# Patient Record
Sex: MALE | Race: WHITE | NOT HISPANIC OR LATINO | Employment: FULL TIME | URBAN - METROPOLITAN AREA
[De-identification: names, ages, dates, MRNs, and addresses within clinical notes are randomized per-mention and may not be internally consistent; named-entity substitution may affect disease eponyms.]

---

## 2018-01-04 ENCOUNTER — OFFICE VISIT (OUTPATIENT)
Dept: FAMILY MEDICINE | Facility: CLINIC | Age: 48
End: 2018-01-04
Payer: COMMERCIAL

## 2018-01-04 ENCOUNTER — LAB VISIT (OUTPATIENT)
Dept: LAB | Facility: HOSPITAL | Age: 48
End: 2018-01-04
Attending: FAMILY MEDICINE
Payer: COMMERCIAL

## 2018-01-04 VITALS
TEMPERATURE: 99 F | SYSTOLIC BLOOD PRESSURE: 134 MMHG | BODY MASS INDEX: 42.32 KG/M2 | WEIGHT: 285.69 LBS | HEART RATE: 109 BPM | HEIGHT: 69 IN | DIASTOLIC BLOOD PRESSURE: 86 MMHG | OXYGEN SATURATION: 97 %

## 2018-01-04 DIAGNOSIS — Z00.00 VISIT FOR WELL MAN HEALTH CHECK: Primary | ICD-10-CM

## 2018-01-04 DIAGNOSIS — Z23 NEED FOR INFLUENZA VACCINATION: ICD-10-CM

## 2018-01-04 DIAGNOSIS — R22.1 LOCALIZED SWELLING, MASS AND LUMP, NECK: ICD-10-CM

## 2018-01-04 DIAGNOSIS — Z12.11 COLON CANCER SCREENING: ICD-10-CM

## 2018-01-04 DIAGNOSIS — Z00.00 VISIT FOR WELL MAN HEALTH CHECK: ICD-10-CM

## 2018-01-04 DIAGNOSIS — Z23 NEED FOR DIPHTHERIA-TETANUS-PERTUSSIS (TDAP) VACCINE: ICD-10-CM

## 2018-01-04 LAB
25(OH)D3+25(OH)D2 SERPL-MCNC: 22 NG/ML
ALBUMIN SERPL BCP-MCNC: 3.5 G/DL
ALP SERPL-CCNC: 91 U/L
ALT SERPL W/O P-5'-P-CCNC: 18 U/L
ANION GAP SERPL CALC-SCNC: 8 MMOL/L
AST SERPL-CCNC: 23 U/L
BASOPHILS # BLD AUTO: 0.05 K/UL
BASOPHILS NFR BLD: 0.5 %
BILIRUB SERPL-MCNC: 0.3 MG/DL
BUN SERPL-MCNC: 16 MG/DL
CALCIUM SERPL-MCNC: 9.5 MG/DL
CHLORIDE SERPL-SCNC: 103 MMOL/L
CHOLEST SERPL-MCNC: 195 MG/DL
CHOLEST/HDLC SERPL: 5 {RATIO}
CO2 SERPL-SCNC: 28 MMOL/L
CREAT SERPL-MCNC: 0.9 MG/DL
DIFFERENTIAL METHOD: ABNORMAL
EOSINOPHIL # BLD AUTO: 0.3 K/UL
EOSINOPHIL NFR BLD: 2.8 %
ERYTHROCYTE [DISTWIDTH] IN BLOOD BY AUTOMATED COUNT: 12.7 %
EST. GFR  (AFRICAN AMERICAN): >60 ML/MIN/1.73 M^2
EST. GFR  (NON AFRICAN AMERICAN): >60 ML/MIN/1.73 M^2
ESTIMATED AVG GLUCOSE: 123 MG/DL
GLUCOSE SERPL-MCNC: 98 MG/DL
HBA1C MFR BLD HPLC: 5.9 %
HCT VFR BLD AUTO: 42.1 %
HDLC SERPL-MCNC: 39 MG/DL
HDLC SERPL: 20 %
HGB BLD-MCNC: 14.2 G/DL
IMM GRANULOCYTES # BLD AUTO: 0.05 K/UL
IMM GRANULOCYTES NFR BLD AUTO: 0.5 %
LDLC SERPL CALC-MCNC: 104.8 MG/DL
LYMPHOCYTES # BLD AUTO: 2 K/UL
LYMPHOCYTES NFR BLD: 19.3 %
MCH RBC QN AUTO: 31.3 PG
MCHC RBC AUTO-ENTMCNC: 33.7 G/DL
MCV RBC AUTO: 93 FL
MONOCYTES # BLD AUTO: 0.9 K/UL
MONOCYTES NFR BLD: 8.5 %
NEUTROPHILS # BLD AUTO: 7.1 K/UL
NEUTROPHILS NFR BLD: 68.4 %
NONHDLC SERPL-MCNC: 156 MG/DL
NRBC BLD-RTO: 0 /100 WBC
PLATELET # BLD AUTO: 259 K/UL
PMV BLD AUTO: 9.5 FL
POTASSIUM SERPL-SCNC: 3.9 MMOL/L
PROT SERPL-MCNC: 7.6 G/DL
RBC # BLD AUTO: 4.53 M/UL
SODIUM SERPL-SCNC: 139 MMOL/L
T3FREE SERPL-MCNC: 3.2 PG/ML
T4 FREE SERPL-MCNC: 1.07 NG/DL
TRIGL SERPL-MCNC: 256 MG/DL
TSH SERPL DL<=0.005 MIU/L-ACNC: 1.39 UIU/ML
WBC # BLD AUTO: 10.34 K/UL

## 2018-01-04 PROCEDURE — 84439 ASSAY OF FREE THYROXINE: CPT

## 2018-01-04 PROCEDURE — 99999 PR PBB SHADOW E&M-NEW PATIENT-LVL IV: CPT | Mod: PBBFAC,,, | Performed by: FAMILY MEDICINE

## 2018-01-04 PROCEDURE — 82306 VITAMIN D 25 HYDROXY: CPT

## 2018-01-04 PROCEDURE — 85025 COMPLETE CBC W/AUTO DIFF WBC: CPT

## 2018-01-04 PROCEDURE — 80053 COMPREHEN METABOLIC PANEL: CPT

## 2018-01-04 PROCEDURE — 84443 ASSAY THYROID STIM HORMONE: CPT

## 2018-01-04 PROCEDURE — 99386 PREV VISIT NEW AGE 40-64: CPT | Mod: S$GLB,,, | Performed by: FAMILY MEDICINE

## 2018-01-04 PROCEDURE — 80061 LIPID PANEL: CPT

## 2018-01-04 PROCEDURE — 83036 HEMOGLOBIN GLYCOSYLATED A1C: CPT

## 2018-01-04 PROCEDURE — 84481 FREE ASSAY (FT-3): CPT

## 2018-01-04 PROCEDURE — 36415 COLL VENOUS BLD VENIPUNCTURE: CPT | Mod: PO

## 2018-01-04 NOTE — PROGRESS NOTES
Patient, Burton Whiting (MRN #[id number]), presented with a recorded BMI of 42.19 kg/m^2 consistent with the definition of morbid obesity (ICD-10 E66.01). The patient's morbid obesity was monitored, evaluated, addressed and/or treated. This addendum to the medical record is made on 01/04/2018.

## 2018-01-04 NOTE — PROGRESS NOTES
Subjective:       Patient ID: Burton Whiting is a 48 y.o. male.    Chief Complaint: Establish Care (Growth on neck)    48 year old male presents today to establish care and neck swelling.     Rare exercise. Eats a lot of fast/processed food.     Patient complains of swelling in the left neck.  The edema has been severe. Onset of symptoms was 4 months ago, and patient reports symptoms have gradually worsened since that time. The swelling is present all day. The patient states the problem is new. T The swelling has been relieved by nothing. Associated factors include: shortness of breath. Cardiac risk factors include male gender, obesity (BMI >= 30 kg/m2) and smoking/ tobacco exposure. US done by previous physician done about 6 weeks ago. Records are pending.    Flu: declined  Tdap: declined for today  Labs: ordered   c-scope: ordered    PMHx: reviewed in EMR and updated  Meds: reviewed in EMR and updated  Shx: reviewed in EMR and updated  FMHx: no family history of colon cancer, breast cancer, ovarian cancer  Social: he is a  and . He lives alone. No pets. He has no family in the immediate area.              Review of Systems   Constitutional: Positive for fatigue. Negative for chills, diaphoresis, fever and unexpected weight change.   HENT: Positive for facial swelling, trouble swallowing and voice change.         Neck swelling   Eyes: Negative for photophobia and visual disturbance.   Respiratory: Negative for cough, choking and shortness of breath.    Cardiovascular: Negative for chest pain.   Gastrointestinal: Negative for blood in stool, constipation, diarrhea, nausea and vomiting.   Genitourinary: Negative for difficulty urinating and dysuria.   Musculoskeletal: Positive for neck pain. Negative for back pain, joint swelling and neck stiffness.   Skin: Negative for rash.   Neurological: Negative for dizziness, light-headedness, numbness and headaches.          Objective:     Vitals:    01/04/18  1507   BP: 134/86   Pulse: 109   Temp: 98.9 °F (37.2 °C)        Physical Exam   Constitutional: He appears well-developed and well-nourished.   HENT:   Head: Normocephalic and atraumatic.       Right Ear: External ear normal.   Left Ear: External ear normal.   PLEASE SEE PICTURES UNDER MEDIA    Eyes: Conjunctivae are normal. Pupils are equal, round, and reactive to light.   Cardiovascular:   Tachycardia noted  Normal rhythm   Pulmonary/Chest: Effort normal and breath sounds normal. No respiratory distress.   Abdominal: Soft.   obese   Musculoskeletal: He exhibits no edema.   Lymphadenopathy:        Head (right side): No submental and no submandibular adenopathy present.     He has no axillary adenopathy.        Right: No inguinal, no supraclavicular and no epitrochlear adenopathy present.        Left: No inguinal and no supraclavicular adenopathy present.   See exam under HEAD for further details along with Photograph listed in MEDIA   Neurological: No cranial nerve deficit or sensory deficit. He exhibits normal muscle tone.   Psychiatric: He has a normal mood and affect. His behavior is normal. Judgment and thought content normal.   Nursing note and vitals reviewed.      Assessment:       1. Visit for [facility] health check    2. Localized swelling, mass and lump, neck    3. Colon cancer screening    4. Need for influenza vaccination    5. Need for diphtheria-tetanus-pertussis (Tdap) vaccine        Plan:         Visit for well [facility] health check  ?Avoid tobacco  ?Be physically active  ?Maintain a healthy weight  ?Eat a diet rich in fruits, vegetables, and whole grains, and low in saturated/trans fat  ?Limit alcohol consumption  ?Protect against sexually transmitted infections  ?Avoid excess sun  -     CBC auto differential; Future; Expected date: 01/04/2018  -     Comprehensive metabolic panel; Future; Expected date: 01/04/2018  -     Hemoglobin A1c; Future; Expected date: 01/04/2018  -     TSH; Future; Expected date:  01/04/2018  -     Vitamin D; Future; Expected date: 01/04/2018  -     T4, free; Future; Expected date: 01/04/2018    Localized swelling, mass and lump, neck  This has been going on for about 4 months. US by previous PCP was apparently negative, records pending. CT and ENT referral ordered  -     Ambulatory referral to ENT  -     CT Soft Tissue Neck W WO Contrast; Future; Expected date: 01/04/2018    Colon cancer screening  -     Case request GI: COLONOSCOPY    Need for influenza vaccination  Declined today    Need for diphtheria-tetanus-pertussis (Tdap) vaccine  Declined today    BMI 42  Diet and exercise changes discussed briefly  Will readdress after workup for neck swelling is completed    Warning signs discussed, patient to call with any further issues or worsening of symptoms.

## 2018-01-04 NOTE — PATIENT INSTRUCTIONS
Understanding Fat and Cholesterol  Too much cholesterol in your blood can lead to many problems such as blocked arteries. This can lead to heart attack and stroke. One of the best ways to manage heart and blood vessel disease is to lower your blood cholesterol. Planning meals that are low in saturated fat and cholesterol helps reduce the level of cholesterol in your blood. Below are eating tips to help lower your blood cholesterol levels.  Eat less fat  A healthy goal is to have less than 25% to 35% of your daily calories come from fat. Instead of fats, eat more fruits, whole-grains, and vegetables. This also helps control your weight, and can even reduce your risk for some cancers. There are different kinds of fats in foods. Fats can be saturated, unsaturated, or trans fats. The best fats to choose are unsaturated fats. But fats are high in calories, so eat even unsaturated fats sparingly.  Limit foods high in saturated fats  Saturated fats come from animals and certain plants (such as coconut and palm). Eating too much saturated fat can raise your blood cholesterol levels and make your artery problems worse. Your goal is to eat less saturated fat. Below are some examples of foods that contain lots of saturated fat:  · Fatty cuts of meat (lamb, ham, beef)  · Many pastries, cakes, cookies, and candies  · Cream, ice cream, sour cream, cheese, and butter, and foods made with them  · Sauces made with butter or cream  · Salad dressings with saturated fats  · Foods that contain palm or coconut oil  Choose unsaturated fats  Unsaturated fats are usually liquid at room temperature. They are better choices for your heart than saturated fat. There are two types of unsaturated fats: polyunsaturated fat and monounsaturated fat. Aim to replace saturated fats with polyunsaturated or monounsaturated fats.  · Polyunsaturated fats are found in corn oil, safflower oil, sunflower oil, and other vegetable oils.  · Monounsaturated  fats are found in olive oil, canola oil, and peanut oil. Some margarines and spreads are now made with these oils, too. Avocados are also high in monounsaturated fat.  Of all fats, monounsaturated fats are the least harmful to your heart.  Avoid trans fats  Like saturated fats, trans fats have been linked to heart disease. Even a small amount can harm your health. Trans fats are found in liquid oils that have been changed to be solid at room temperature. Margarine, which is often made from vegetable oil, is one example. Vegetable shortening is another. Trans fats are often found in packaged goods. Check ingredients for the words hydrogenated or partially hydrogenated. They mean the foods contain trans fat.  What about triglycerides?  Triglycerides are a type of fat in your blood. Like cholesterol, high levels of triglycerides can lead to blocked arteries. High triglyceride levels can be reduced 20% to 50%  by limiting added sugars in your diet, susbstituting healthier fats for saturated and trans fats, getting more physical activity, and losing weight if your are overweight. You may also be advised to avoid or limi alcohol.    Reading food labels  Luckily, most foods now have labels giving you the facts about what youre eating. Reading food labels helps you make healthy choices. Look for the words highlighted below.  · Serving Size. This is the amount of food in 1 serving. If you eat larger portions, be sure to count more of everything: fat, calories, and cholesterol.  · Total Fat. Tells you how many grams (g) of fat are in 1 serving.  · Calories from Fat. This tells you the total number of calories from fat in 1 serving (there are 9 calories per gram of fat). Look for foods with the fewest calories from fat.  · Saturated Fat. Tells you how many grams (g) of saturated fat are in 1 serving.  · Trans Fat. Tells how many grams (g) of trans fat are in 1 serving.  · Cholesterol. Tells you how many milligrams (mg) of  cholesterol are in 1 serving.  Date Last Reviewed: 5/11/2015  © 1868-9356 [employer]. [address] [place] [address], [place] [address]. All rights reserved. This information is not intended as a substitute for professional medical care. Always follow your healthcare professional's instructions.        Low-Salt Diet  This diet removes foods that are high in salt. It also limits the amount of salt you use when cooking. It is most often used for people with high blood pressure, edema (fluid retention), and kidney, liver, or heart disease.  Table salt contains the mineral sodium. Your body needs sodium to work normally. But too much sodium can make your health problems worse. Your healthcare provider is recommending a low-salt (also called low-sodium) diet for you. Your total daily allowance of salt is 1,500 to 2,300 milligrams (mg). It is less than 1 teaspoon of table salt. This means you can have only about 500 to 700 mg of sodium at each meal. People with certain health problems should limit salt intake to the lower end of the recommended range.    When you cook, dont add much salt. If you can cook without using salt, even better. Dont add salt to your food at the table.  When shopping, read food labels. Salt is often called sodium on the label. Choose foods that are salt-free, low salt, or very low salt. Note that foods with reduced salt may not lower your salt intake enough.    Beans, potatoes, and pasta  Ok: Dry beans, split peas, lentils, potatoes, rice, macaroni, pasta, spaghetti without added salt  Avoid: Potato chips, tortilla chips, and similar products  Breads and cereals  Ok: Low-sodium breads, rolls, cereals, and cakes; low-salt crackers, matzo crackers  Avoid: Salted crackers, pretzels, popcorn, Hungarian toast, pancakes, muffins  Dairy  Ok: Milk, chocolate milk, hot chocolate mix, low-salt cheeses, and yogurt  Avoid: Processed cheese and cheese spreads; Roquefort, Camembert, and cottage cheese;  buttermilk, instant breakfast drink  Desserts  Ok: Ice cream, frozen yogurt, juice bars, gelatin, cookies and pies, sugar, honey, jelly, hard candy  Avoid: Most pies, cakes and cookies prepared or processed with salt; instant pudding  Drinks  Ok: Tea, coffee, fizzy (carbonated) drinks, juices  Avoid: Flavored coffees, electrolyte replacement drinks, sports drinks  Meats  Ok: All fresh meat, fish, poultry, low-salt tuna, eggs, egg substitute  Avoid: Smoked, pickled, brine-cured, or salted meats and fish. This includes ferrer, chipped beef, corned beef, hot dogs, deli meats, ham, kosher meats, salt pork, sausage, canned tuna, salted codfish, smoked salmon, herring, sardines, or anchovies.  Seasonings and spices  Ok: Most seasonings are okay. Good substitutes for salt include: fresh herb blends, hot sauce, lemon, garlic, arteaga, vinegar, dry mustard, parsley, cilantro, horseradish, tomato paste, regular margarine, mayonnaise, unsalted butter, cream cheese, vegetable oil, cream, low-salt salad dressing and gravy.  Avoid: Regular ketchup, relishes, pickles, soy sauce, teriyaki sauce, Worcestershire sauce, BBQ sauce, tartar sauce, meat tenderizer, chili sauce, regular gravy, regular salad dressing, salted butter  Soups  Ok: Low-salt soups and broths made with allowed foods  Avoid: Bouillon cubes, soups with smoked or salted meats, regular soup and broth  Vegetables  Ok: Most vegetables are okay; also low-salt tomato and vegetable juices  Avoid: Sauerkraut and other brine-soaked vegetables; pickles and other pickled vegetables; tomato juice, olives  Date Last Reviewed: 8/1/2016  © 5732-3934 [employer]. [address] [place] [address]. All rights reserved. This information is not intended as a substitute for professional medical care. Always follow your healthcare professional's instructions.        4 Steps for Eating Healthier  Changing the way you eat can improve your health. It can lower your  cholesterol and blood pressure, and help you stay at a healthy weight. Your diet doesnt have to be bland and boring to be healthy. Just watch your calories and follow these steps:    1. Eat fewer unhealthy fats  · Choose more fish and lean meats instead of fatty cuts of meat.  · Skip butter and lard, and use less margarine.  · Pass on foods that have palm, coconut, or hydrogenated oils.  · Eat fewer high-fat dairy foods like cheese, ice cream, and whole milk.  · Get a heart-healthy cookbook and try some low-fat recipes.  2. Go light on salt  · Keep the saltshaker off the table.  · Limit high-salt ingredients, such as soy sauce, bouillon, and garlic salt.  · Instead of adding salt when cooking, season your food with herbs and flavorings. Try lemon, garlic, and onion.  · Limit convenience foods, such as boxed or canned foods and restaurant food.  · Read food labels and choose lower-sodium options.  3. Limit sugar  · Pause before you add sugars to pancakes, cereal, coffee, or tea. This includes white and brown table sugar, syrup, honey, and molasses. Cut your usual amount by half.  · Use non-sugar sweeteners. Stevia, aspartame, and sucralose can satisfy a sweet tooth without adding calories.  · Swap out sugar-filled soda and other drinks. Buy sugar-free or low-calorie beverages. Remember water is always the best choice.  · Read labels and choose foods with less added sugar. Keep in mind that dairy foods and foods with fruit will have some natural sugar.  · Cut the sugar in recipes by 1/3 to 1/2. Boost the flavor with extracts like almond, vanilla, or orange. Or add spices such as cinnamon or nutmeg.  4. Eat more fiber  · Eat fresh fruits and vegetables every day.  · Boost your diet with whole grains. Go for oats, whole-grain rice, and bran.  · Add beans and lentils to your meals.  · Drink more water to match your fiber increase. This is to help prevent constipation.  Date Last Reviewed: 5/11/2015  © 0874-4774 [employer]. [address] [place] [address], [place] [address]. All rights reserved. This information is not intended as a substitute for professional medical care. Always follow your healthcare professional's instructions.

## 2018-01-05 ENCOUNTER — TELEPHONE (OUTPATIENT)
Dept: FAMILY MEDICINE | Facility: CLINIC | Age: 48
End: 2018-01-05

## 2018-01-05 DIAGNOSIS — R79.89 LOW VITAMIN D LEVEL: Primary | ICD-10-CM

## 2018-01-05 DIAGNOSIS — R73.01 ELEVATED FASTING GLUCOSE: ICD-10-CM

## 2018-01-05 DIAGNOSIS — R73.03 PREDIABETES: ICD-10-CM

## 2018-01-05 NOTE — TELEPHONE ENCOUNTER
Called patient, left message discussing all of his lab results and his abnormalities (low vitamin D, prediabetes). Recommend that patient get neck mass worked up prior to discussing the small issues with his blood work. Reiterated what symptoms should take patient to ED with his neck mass.

## 2018-01-08 ENCOUNTER — TELEPHONE (OUTPATIENT)
Dept: FAMILY MEDICINE | Facility: CLINIC | Age: 48
End: 2018-01-08

## 2018-01-08 ENCOUNTER — OFFICE VISIT (OUTPATIENT)
Dept: OTOLARYNGOLOGY | Facility: CLINIC | Age: 48
End: 2018-01-08
Payer: COMMERCIAL

## 2018-01-08 ENCOUNTER — HOSPITAL ENCOUNTER (OUTPATIENT)
Dept: RADIOLOGY | Facility: HOSPITAL | Age: 48
Discharge: HOME | End: 2018-01-08
Attending: FAMILY MEDICINE
Payer: COMMERCIAL

## 2018-01-08 VITALS
SYSTOLIC BLOOD PRESSURE: 168 MMHG | DIASTOLIC BLOOD PRESSURE: 96 MMHG | HEART RATE: 95 BPM | BODY MASS INDEX: 42.19 KG/M2 | WEIGHT: 285.69 LBS

## 2018-01-08 DIAGNOSIS — R22.1 NECK MASS: Primary | ICD-10-CM

## 2018-01-08 DIAGNOSIS — J35.8 TONSILLAR MASS: ICD-10-CM

## 2018-01-08 DIAGNOSIS — R22.1 LOCALIZED SWELLING, MASS AND LUMP, NECK: ICD-10-CM

## 2018-01-08 PROCEDURE — 88173 CYTOPATH EVAL FNA REPORT: CPT | Mod: 26,,, | Performed by: PATHOLOGY

## 2018-01-08 PROCEDURE — 88305 TISSUE EXAM BY PATHOLOGIST: CPT | Mod: 26,,, | Performed by: PATHOLOGY

## 2018-01-08 PROCEDURE — 10021 FNA BX W/O IMG GDN 1ST LES: CPT | Mod: ,,, | Performed by: PATHOLOGY

## 2018-01-08 PROCEDURE — 88305 TISSUE EXAM BY PATHOLOGIST: CPT | Performed by: PATHOLOGY

## 2018-01-08 PROCEDURE — 99204 OFFICE O/P NEW MOD 45 MIN: CPT | Mod: 25,S$GLB,, | Performed by: OTOLARYNGOLOGY

## 2018-01-08 PROCEDURE — 88342 IMHCHEM/IMCYTCHM 1ST ANTB: CPT | Mod: 26,,, | Performed by: PATHOLOGY

## 2018-01-08 PROCEDURE — 31575 DIAGNOSTIC LARYNGOSCOPY: CPT | Mod: S$GLB,,, | Performed by: OTOLARYNGOLOGY

## 2018-01-08 PROCEDURE — 88342 IMHCHEM/IMCYTCHM 1ST ANTB: CPT | Performed by: PATHOLOGY

## 2018-01-08 PROCEDURE — 70492 CT SFT TSUE NCK W/O & W/DYE: CPT | Mod: TC,PO

## 2018-01-08 PROCEDURE — 25500020 PHARM REV CODE 255: Mod: PO | Performed by: FAMILY MEDICINE

## 2018-01-08 PROCEDURE — 99999 PR PBB SHADOW E&M-EST. PATIENT-LVL III: CPT | Mod: PBBFAC,,, | Performed by: OTOLARYNGOLOGY

## 2018-01-08 RX ADMIN — IOHEXOL 75 ML: 350 INJECTION, SOLUTION INTRAVENOUS at 09:01

## 2018-01-08 NOTE — PROCEDURES
The patient was examined and there was a palpable mass, lt neck     The patient was explained the nature of the procedure and risks, and a consent form was signed. At timeout was performed at 4pm    The skin was prepared with alcohol, and fine needle aspirate was performed. 2 passes were performed. Material was obtained, and results will follow in a separate report. The patient tolerated the procedure well.  Additional comments: none  Complications: none

## 2018-01-08 NOTE — TELEPHONE ENCOUNTER
----- Message from Millicent Manzanares MA sent at 1/8/2018  3:42 PM CST -----  Contact: [phone]/self      ----- Message -----  From: Junie Walters  Sent: 1/8/2018  12:57 PM  To: [facility] White Staff    Patient called in returning your call. Please advise.

## 2018-01-08 NOTE — TELEPHONE ENCOUNTER
Called patient, no response. Left message urging patient to make it to the ENT appointment this afternoon and to call me back to discuss CT results if he time prior to then. Emphasized importance of follow up with ENT without disclosing results over the phone.

## 2018-01-08 NOTE — LETTER
January 8, 2018      Christopher Turpin, DO  [address] [facility] [facility]  [place] [address]           [facility]  [address]  [facility] [address]  Phone: [phone]  Fax: [phone]          Patient: Burton Whiting   MR Number: [id number]   YOB: 1970   Date of Visit: 1/8/2018       Dear Dr. Christopher Turpin:    Thank you for referring Burton Whiting to me for evaluation. Attached you will find relevant portions of my assessment and plan of care.    If you have questions, please do not hesitate to call me. I look forward to following Burton Whiting along with you.    Sincerely,    Xavier Olguin MD    Enclosure  CC:  No Recipients    If you would like to receive this communication electronically, please contact [email or web address] or [phone] to request more information on [employer] access.    For providers and/or their staff who would like to refer a patient to [facility], please contact us through our one-stop-shop provider referral line, [facility], at [phone].    If you feel you have received this communication in error or would no longer like to receive these types of communications, please e-mail [email or web address]

## 2018-01-08 NOTE — TELEPHONE ENCOUNTER
Attempted to call patient back, he is likely at the appointment with his ENT doctor, who will give him his diagnosis at this point. If he calls back, I can discuss the results with him also

## 2018-01-08 NOTE — PROGRESS NOTES
Chief Complaint   Patient presents with    Mass     left side of neck    Neck Swelling       HPI   48 y.o. male presents from Dr. Turpin with a 4 month history of a R neck mass.  He reports that he first noted a L neck mass 4 months ago.  He was uninsured at the time and, thus, did not seek care.   Since that time, this mass has continued to enlarge.  He denies brian SOB while awake but reports that he feels that his KESHAV has worsened since this mass appeared.  He denies throat pain.  He reports that he does have some dysphagia to solids since noting this mass.  He denies weight loss.  He quit smoking 8 mos ago.     Review of Systems   Constitutional: Negative for fatigue and unexpected weight change.   HENT: Per HPI.  Eyes: Negative for visual disturbance.   Respiratory: Negative for shortness of breath, hemoptysis   Cardiovascular: Negative for chest pain and palpitations.   Musculoskeletal: Negative for decreased ROM, back pain.   Skin: Negative for rash, sunburn, itching.   Neurological: Negative for dizziness and seizures.   Hematological: Negative for adenopathy. Does not bruise/bleed easily.   Endocrine: Negative for rapid weight loss/weight gain, heat/cold intolerance.     Past Medical History   Patient Active Problem List   Diagnosis    Localized swelling, mass and lump, neck    Low vitamin D level    Prediabetes           Past Surgical History   No past surgical history on file.      Family History   Family History   Problem Relation Age of Onset    Cancer Mother     Leukemia Mother     Hypertension Father            Social History   .  Social History     Social History    Marital status: Single     Spouse name: N/A    Number of children: N/A    Years of education: N/A     Occupational History    Not on file.     Social History Main Topics    Smoking status: Former Smoker     Packs/day: 1.50     Years: 25.00     Types: Cigarettes     Quit date: 06/2017    Smokeless tobacco: Never Used       Comment: smoked for about 25 years. quit 6 months ago    Alcohol use No    Drug use: No    Sexual activity: Yes     Partners: Female     Other Topics Concern    Not on file     Social History Narrative    No narrative on file         Allergies   Review of patient's allergies indicates:  No Known Allergies        Physical Exam     Vitals:    01/08/18 1514   BP: (!) 168/96   Pulse: 95         Body mass index is 42.19 kg/m².      General: AOx3, NAD   Respiratory:  Symmetric chest rise, normal effort  Right Ear: External Auditory Canal WNL,TM w/o masses/lesions/perforations.  Middle ear without evidence of effusion.   Left Ear: External Auditory Canal WNL,TM w/o masses/lesions/perforations.  Middle ear without evidence of effusion.   Nose: No gross nasal septal deviation. Inferior Turbinates WNL bilaterally. No septal perforation. No masses/lesions.   Oral Cavity:  Oral Tongue mobile, no lesions noted. Hard Palate WNL. No buccal or FOM lesions.  Oropharynx:  No masses/lesions of the posterior pharyngeal wall. L tonsil enlarged relative to R side ~ 4 cm in diameter.  Soft palate without masses. Midline uvula.   Neck: No scars.  ~9 cm firm, fixed L neck mass.  Trachea deviated to R.   No thyromegaly or thyroid nodules.  Normal range of motion.    Face: House Brackmann I bilaterally.     Flex Naso [facility] Antonia Hypo Procedures #2    Procedure:  Diagnostic flexible nasopharyngoscopy, laryngoscopy and hypopharyngoscopy:    Routine preparation with local atomizer with 1% neosynephrine/pontocaine with customary flexible endoscope.    Nasopharynx:  No lesions.   Mucosa:  No lesions.   Adenoids:  Present.  Posterior Choanae:  Patent.  Eustachian Tubes:  Patent.  Posterior pharynx:  L tonsil enlarged, obstructing ~40% of the pharynx.    Larynx/hypopharynx:   Epiglottis:  No lesions, without edema.   AE Folds:  No lesions.   Vocal cords:  No polyps, nodules, ulcers or lesions.   Mobility:  Equal and normal bilateral.   Hypopharynx:   No lesions.   Piriform sinus:  No pooling, no lesions.   Post Cricoid:  No erythema, no edema.    CT neck reviewed    Assessment/Plan  Problem List Items Addressed This Visit     None      Visit Diagnoses     Neck mass    -  Primary    Relevant Orders    Cytology Specimen-FNA-Pathologist Performed    Tonsillar mass          L tonsil mass suspicious for SCCA with bulky L cervical LAD and clival lesion worrisome for metastatic disease.  FNA obtained today. His airway is narrowed but stable and safe though I do suspect that his tonsil tumor is worsening his KESHAV.    I will contact him with the results of his FNA.

## 2018-01-10 ENCOUNTER — TELEPHONE (OUTPATIENT)
Dept: OTOLARYNGOLOGY | Facility: CLINIC | Age: 48
End: 2018-01-10

## 2018-01-10 DIAGNOSIS — C09.9 TONSIL CANCER: Primary | ICD-10-CM

## 2018-01-12 ENCOUNTER — TELEPHONE (OUTPATIENT)
Dept: RADIATION ONCOLOGY | Facility: CLINIC | Age: 48
End: 2018-01-12

## 2018-01-12 NOTE — TELEPHONE ENCOUNTER
----- Message from Emily Price sent at 1/12/2018  8:51 AM CST -----  Pt is returning your call. Please call at [phone].  Return call to patient  appt for 1/19/18 confirmed

## 2018-01-16 ENCOUNTER — TELEPHONE (OUTPATIENT)
Dept: HEMATOLOGY/ONCOLOGY | Facility: CLINIC | Age: 48
End: 2018-01-16

## 2018-01-18 ENCOUNTER — TELEPHONE (OUTPATIENT)
Dept: RADIATION ONCOLOGY | Facility: CLINIC | Age: 48
End: 2018-01-18

## 2018-01-18 ENCOUNTER — TELEPHONE (OUTPATIENT)
Dept: HEMATOLOGY/ONCOLOGY | Facility: CLINIC | Age: 48
End: 2018-01-18

## 2018-01-19 ENCOUNTER — INITIAL CONSULT (OUTPATIENT)
Dept: RADIATION ONCOLOGY | Facility: CLINIC | Age: 48
End: 2018-01-19
Payer: COMMERCIAL

## 2018-01-19 ENCOUNTER — HOSPITAL ENCOUNTER (OUTPATIENT)
Dept: RADIOLOGY | Facility: HOSPITAL | Age: 48
Discharge: HOME | End: 2018-01-19
Attending: OTOLARYNGOLOGY
Payer: COMMERCIAL

## 2018-01-19 VITALS
HEIGHT: 69 IN | DIASTOLIC BLOOD PRESSURE: 97 MMHG | HEART RATE: 110 BPM | BODY MASS INDEX: 41.77 KG/M2 | RESPIRATION RATE: 22 BRPM | WEIGHT: 282 LBS | TEMPERATURE: 98 F | SYSTOLIC BLOOD PRESSURE: 157 MMHG

## 2018-01-19 DIAGNOSIS — C09.9 TONSIL CANCER: ICD-10-CM

## 2018-01-19 DIAGNOSIS — C09.9 SQUAMOUS CELL CARCINOMA OF PALATINE TONSIL: Primary | ICD-10-CM

## 2018-01-19 LAB — POCT GLUCOSE: 90 MG/DL (ref 70–110)

## 2018-01-19 PROCEDURE — 78815 PET IMAGE W/CT SKULL-THIGH: CPT | Mod: TC

## 2018-01-19 PROCEDURE — 99205 OFFICE O/P NEW HI 60 MIN: CPT | Mod: S$GLB,,, | Performed by: RADIOLOGY

## 2018-01-19 PROCEDURE — 78815 PET IMAGE W/CT SKULL-THIGH: CPT | Mod: 26,PI,, | Performed by: RADIOLOGY

## 2018-01-19 PROCEDURE — A9552 F18 FDG: HCPCS

## 2018-01-19 PROCEDURE — 99999 PR PBB SHADOW E&M-EST. PATIENT-LVL III: CPT | Mod: PBBFAC,,, | Performed by: RADIOLOGY

## 2018-01-19 NOTE — LETTER
January 19, 2018      Xavier Olguin MD  [address] [facility] [address]           [facility] - Radiation Oncology  [address]  [place] [address]  Phone: [phone]          Patient: Burton Whiting   MR Number: [id number]   YOB: 1970   Date of Visit: 1/19/2018       Dear Dr. Xavier Olguin:    Thank you for referring Burton Whiting to me for evaluation. Attached you will find relevant portions of my assessment and plan of care.    If you have questions, please do not hesitate to call me. I look forward to following Burton Whiting along with you.    Sincerely,    Burton Sandra MD    Enclosure  CC:  No Recipients    If you would like to receive this communication electronically, please contact [email or web address] or [phone] to request more information on [email or web address] Link access.    For providers and/or their staff who would like to refer a patient to [facility], please contact us through our one-stop-shop provider referral line, [facility], [place], at [phone].    If you feel you have received this communication in error or would no longer like to receive these types of communications, please e-mail [email or web address]

## 2018-01-19 NOTE — PROGRESS NOTES
REFERRING PHYSICIAN: Dr. 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 Whiting is a 49 yo male former smoker who presented with a 4 month history of an enlarging neck mass.  He delayed care initially due to lack of insurance.  He was seen by Dr. Turpin who referred him to Dr. Olguin.  CT neck showed a 3.8 x 3.8 x 4.9 cm soft tissue mass arising from the left palatine tonsil resulting in moderate narrowing of the hypopharyngeal airway.  Adjacent extensive matted left cervical lymphadenopathy.  The largest ella mass measures 6.6 x 9.0 x 2.6 cm extending inferiorly to the subclavicular region.  The mass pushes the trachea and the left common carotid artery to the right.  Additional representative of cervical lymph nodes measure 2.9 x 3.1 x 3.5 cm on axial image 37 of series 3 and 2.4 x 2.1 x 2.2 cm on axial image 55 of series 3.  There is a sclerotic lesion involving the midline of the clivus measuring 1.2 cm.  FNA of the left neck mass revealed p16 positive SCC.  PET/CT was performed today.  Results are not yet available.  I reviewed the images which shows a large hypermetabolic left tonsil mass with narrowing of the oropharyngeal airway. There are multiple large ipsilateral LNs involving at least levels 1B, II, and III.  I do not appreciate definite uptake in the clivus, nor do I see any metastases in the lung or elsewhere.  Mild uptake in contralateral level 2 and maybe 1B.  Will await the final report.    Patient was scheduled to see Dr. Lopez today but had to cancel that appt to accomodate the PET scan, which was rescheduled from a couple of days ago due to weather issues.   Today he feels well.  He denies any brian pain or dysphagia.  No otalgia.  Occasional unusual sensations of his left postauricular area but no pain.  He has a remote history of opiate abuse and is worried about using opiates in the future.  He has a  "remote alcohol use history but quit many years ago.  He quit smoking 8 months ago (45 pack year history).  No weight loss.  He continues to work as a .  He does not have a dentist.    ECO  ECOG SCORE           REVIEW OF SYSTEMS:   As above.  In addition, patient denies headaches, visual problems, dizziness, chest pain, shortness of breath, cough, nausea, vomiting, diarrhea, or any new bony pains.  Patient also denies easy bruising, skin rashes, or numbness or tingling.      PAST MEDICAL HISTORY:  No past medical history on file.    PAST SURGICAL HISTORY:  No past surgical history on file.    ALLERGIES:   Review of patient's allergies indicates:  No Known Allergies    MEDICATIONS:  No current outpatient prescriptions on file.     No current facility-administered medications for this visit.        SOCIAL HISTORY:  Social History     Social History    Marital status: Single     Spouse name: N/A    Number of children: N/A    Years of education: N/A     Occupational History    Not on file.     Social History Main Topics    Smoking status: Former Smoker     Packs/day: 1.50     Years: 25.00     Types: Cigarettes     Quit date: 2017    Smokeless tobacco: Never Used      Comment: smoked for about 25 years. quit 6 months ago    Alcohol use No    Drug use: No    Sexual activity: Yes     Partners: Female     Other Topics Concern    Not on file     Social History Narrative    No narrative on file   Single, no kids.  [place] in [place]  Works as a supervisor.  Very supportive family.    FAMILY HISTORY:  Family History   Problem Relation Age of Onset    Cancer Mother     Leukemia Mother     Hypertension Father          PHYSICAL EXAMINATION:  BP (!) 157/97 (BP Location: Right arm, Patient Position: Sitting)   Pulse 110   Temp 98.3 °F (36.8 °C) (Oral)   Resp (!) 22   Ht 5' 9" (1.753 m)   Wt 127.9 kg (282 lb)   BMI 41.64 kg/m²   GENERAL: Patient is alert and oriented, in no acute " distress.  HEENT:Extraocular muscles are intact.  There is a 4-5 cm exophytic mass involving the left tonsil extending onto the soft palate, narrowing the airway.  Dentition shows some e/o decay at the gumline, no obvious missing teeth,   LYMPH:  There is no right cervical or supraclavicular lymphadenopathy.  There is a multilobulated left cervical neck mass measuring 10-11 cm in AP dimension, with possible other palpable LNs in levels 2 and IB.  HEART: Regular rate and rhythm.  LUNGS: Clear to auscultation bilaterally.  ABDOMEN:Soft, nontender, nondistended, without hepatosplenomegaly.  Normoactive bowel sounds.  EXTREMITIES: No clubbing, cyanosis, or edema.  NEUROLOGICAL: Cranial nerve II through XII grossly intact.  Sensation is intact.  Strength is 5 out of 5 in the upper and lower extremities bilaterally.    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    PLAN:  I reviewed the PET scan with the patient and his brother.  It looks like he has bulky T3N3M0 disease, but we need to await the official read.  The patient understands this.  Treatment options include chemoRT or chemo-->chemoRT, followed by possible surgery for incomplete response.  The patient will make appointments with Dr. Jessica TORO as well as with a dentist.  I gave them Dr. Hood Cabrera's phone number, who they will call (we tried to make an appt for him but office was closed).  He understands the need for urgent dental eval and clearance.  He will need 10-14 days of healing if extractions are required.  If there will be a delay in initiating treatment due to necessary dental work it may be reasonable to start with 2-3 cycles of TPF, simply as a bridge to chemoRT.  This will be discussed with the multidisciplinary team.  If he does not need major dental work then I think it is reasonable to proceed with definitive chemoradiation.  He is p16+ but his disease is bulky (both primary and nodes).  Additionally he  has a relatively heavy smoking history, so I plan to deliver 70Gy to the primary and involved nodes, concurrently with chemo.  I would prefer q3week cisplatin if primary chemoRT is delivered.  He does not have any known comorbidities at this point and his PS is excellent.  Will refer him to speech and swallow for baseline eval, and dietician.  We discussed the rationale for treatment, logistics, risks v benefits, side effects and potential complications of treatment.  Both acute and long term effects were discussed, including but not limited to mucositis, dermatitis, dysgeusia, dysphagia, xerostomia, dehydration/malnutrition, need for PEG tube and PEG tube dependence, as well as osteoradionecrosis. He understands and asked excellent questions, which I answered as best I could.  He wishes to proceed.  Consent form was signed.  He will call the dentist to schedule an appointment ASAP and let me know when his appt is so that simulation can be scheduled accordingly.  We discussed a phase 2 oral mucositis prevention trial I have opened, and he will think about it.    I spent approximately 75 minutes reviewing the available records and evaluating the patient, out of which over 50% of the time was spent face to face with the patient in counseling and coordinating this patient's care.    Distress Screening Results: Psychosocial Distress screening score of Distress Score: 0 noted and reviewed. No intervention indicated.

## 2018-01-22 ENCOUNTER — OFFICE VISIT (OUTPATIENT)
Dept: OTOLARYNGOLOGY | Facility: CLINIC | Age: 48
End: 2018-01-22
Payer: COMMERCIAL

## 2018-01-22 ENCOUNTER — TELEPHONE (OUTPATIENT)
Dept: OTOLARYNGOLOGY | Facility: CLINIC | Age: 48
End: 2018-01-22

## 2018-01-22 ENCOUNTER — TELEPHONE (OUTPATIENT)
Dept: SPEECH THERAPY | Facility: HOSPITAL | Age: 48
End: 2018-01-22

## 2018-01-22 VITALS
BODY MASS INDEX: 42.97 KG/M2 | SYSTOLIC BLOOD PRESSURE: 158 MMHG | HEART RATE: 104 BPM | TEMPERATURE: 97 F | DIASTOLIC BLOOD PRESSURE: 98 MMHG | WEIGHT: 291 LBS

## 2018-01-22 DIAGNOSIS — C09.9 SQUAMOUS CELL CARCINOMA OF PALATINE TONSIL: Primary | ICD-10-CM

## 2018-01-22 PROCEDURE — 99213 OFFICE O/P EST LOW 20 MIN: CPT | Mod: S$GLB,,, | Performed by: OTOLARYNGOLOGY

## 2018-01-22 PROCEDURE — 99999 PR PBB SHADOW E&M-EST. PATIENT-LVL III: CPT | Mod: PBBFAC,,, | Performed by: OTOLARYNGOLOGY

## 2018-01-22 NOTE — Clinical Note
Burton-   His PET showed only his tonsil mass and neck disease.  Interestingly, the lesion in his clivus did not light up.  I explained that we have 3 options at this point regarding his clivus:  Observe and proceed with CRT for tonsil cancer vs MRI to better characterize vs biopsy.  The PET is reassuring so this may be some congenital lesion.  I'm leaning toward getting an MRI.  Thoughts?

## 2018-01-22 NOTE — TELEPHONE ENCOUNTER
Spoke with patient above scheduling his speech therapy appointment. Patient would like to go through his appointment and schedule before making commitment to appointment in speech.

## 2018-01-22 NOTE — PROGRESS NOTES
Chief Complaint   Patient presents with    post PET       HPI   48 y.o. male presents from Dr. Turpin with a 4 month history of a R neck mass.  He reports that he first noted a L neck mass 4 months ago.  He was uninsured at the time and, thus, did not seek care.   Since that time, this mass has continued to enlarge.  He denies brian SOB while awake but reports that he feels that his KESHAV has worsened since this mass appeared.  He denies throat pain.  He reports that he does have some dysphagia to solids since noting this mass.  He denies weight loss.  He quit smoking 8 mos ago.     FNA revealed p16+ SCCA.  He has seen Dr. Sandra and is scheduled to see Dr. Lopez later this week.  PET/CT today revealed evidence of his primary tumor and cervical nodes.  There were several non-metabolic pulmonary lesions noted.  His clival lesion was found to be non-metabolic.     Review of Systems   Constitutional: Negative for fatigue and unexpected weight change.   HENT: Per HPI.  Eyes: Negative for visual disturbance.   Respiratory: Negative for shortness of breath, hemoptysis   Cardiovascular: Negative for chest pain and palpitations.   Musculoskeletal: Negative for decreased ROM, back pain.   Skin: Negative for rash, sunburn, itching.   Neurological: Negative for dizziness and seizures.   Hematological: Negative for adenopathy. Does not bruise/bleed easily.   Endocrine: Negative for rapid weight loss/weight gain, heat/cold intolerance.     Past Medical History   Patient Active Problem List   Diagnosis    Squamous cell carcinoma of palatine tonsil    Low vitamin D level    Prediabetes           Past Surgical History   No past surgical history on file.      Family History   Family History   Problem Relation Age of Onset    Cancer Mother     Leukemia Mother     Hypertension Father            Social History   .  Social History     Social History    Marital status: Single     Spouse name: N/A    Number of children: N/A     Years of education: N/A     Occupational History    Not on file.     Social History Main Topics    Smoking status: Former Smoker     Packs/day: 1.50     Years: 25.00     Types: Cigarettes     Quit date: 06/2017    Smokeless tobacco: Never Used      Comment: smoked for about 25 years. quit 6 months ago    Alcohol use No    Drug use: No    Sexual activity: Yes     Partners: Female     Other Topics Concern    Not on file     Social History Narrative    No narrative on file         Allergies   Review of patient's allergies indicates:  No Known Allergies        Physical Exam     Vitals:    01/22/18 1424   BP: (!) 158/98   Pulse: 104   Temp: 97.4 °F (36.3 °C)         Body mass index is 42.97 kg/m².    No exam performed.  Entire visit devoted to counseling.     Assessment/Plan  Problem List Items Addressed This Visit        Oncology    Squamous cell carcinoma of palatine tonsil - Primary        T3N3M0 HPV+ SCCA L tonsil.  There is no brian evidence of distant metastases on his PET/CT though he does have several small lung nodules that may be beyond the resolution of a PET/CT.  His clival lesion remains of uncertain etiology though it is reassuring that it did not light up on PET/CT.  I explained that,at this time, we have 3 options regarding his clival lesion: observe and proceed with CRT for tonsil cancer vs biopsy vs MRI to further characterize.  I will discuss these options with Dr. Sandra and at our next tumor board and will contact him with recommendations.

## 2018-01-22 NOTE — TELEPHONE ENCOUNTER
----- Message from Burton Sandra MD sent at 1/22/2018  3:31 PM CST -----  I agree, let's get an MRI.    Thanks!  PP  ----- Message -----  From: Xavier Olguin MD  Sent: 1/22/2018   3:06 PM  To: MD Burton Brown-    His PET showed only his tonsil mass and neck disease.  Interestingly, the lesion in his clivus did not light up.  I explained that we have 3 options at this point regarding his clivus:  Observe and proceed with CRT for tonsil cancer vs MRI to better characterize vs biopsy.  The PET is reassuring so this may be some congenital lesion.  I'm leaning toward getting an MRI.    Thoughts?

## 2018-01-23 ENCOUNTER — TELEPHONE (OUTPATIENT)
Dept: HEMATOLOGY/ONCOLOGY | Facility: CLINIC | Age: 48
End: 2018-01-23

## 2018-01-23 NOTE — TELEPHONE ENCOUNTER
----- Message from Cortney Lopez MD sent at 1/23/2018  2:59 PM CST -----  Yes if possible  ----- Message -----  From: Sachin Vela RN  Sent: 1/23/2018   2:44 PM  To: Cortney Lopez MD    This is the Thursday patient at 7am you wanted me to move to lunch.  He has an MRI for dr louise at noon---    Should I ask dr louise if I can reschedule that to a later date?      ~sachin

## 2018-01-23 NOTE — TELEPHONE ENCOUNTER
Spoke with patient.  Rescheduled MRI orbit to tomorrow. Dr. Lopez at noon on Thursday.  Patient thanked nurse.

## 2018-01-24 ENCOUNTER — HOSPITAL ENCOUNTER (OUTPATIENT)
Dept: RADIOLOGY | Facility: HOSPITAL | Age: 48
Discharge: HOME | End: 2018-01-24
Attending: OTOLARYNGOLOGY
Payer: COMMERCIAL

## 2018-01-24 DIAGNOSIS — C09.9 SQUAMOUS CELL CARCINOMA OF PALATINE TONSIL: ICD-10-CM

## 2018-01-24 PROCEDURE — A9585 GADOBUTROL INJECTION: HCPCS | Mod: PO | Performed by: OTOLARYNGOLOGY

## 2018-01-24 PROCEDURE — 70543 MRI ORBT/FAC/NCK W/O &W/DYE: CPT | Mod: TC,PO

## 2018-01-24 PROCEDURE — 25500020 PHARM REV CODE 255: Mod: PO | Performed by: OTOLARYNGOLOGY

## 2018-01-24 RX ORDER — GADOBUTROL 604.72 MG/ML
10 INJECTION INTRAVENOUS
Status: COMPLETED | OUTPATIENT
Start: 2018-01-24 | End: 2018-01-24

## 2018-01-24 RX ADMIN — GADOBUTROL 10 ML: 604.72 INJECTION INTRAVENOUS at 02:01

## 2018-01-25 ENCOUNTER — HOSPITAL ENCOUNTER (OUTPATIENT)
Dept: RADIOLOGY | Facility: HOSPITAL | Age: 48
Discharge: HOME | End: 2018-01-25
Attending: INTERNAL MEDICINE
Payer: COMMERCIAL

## 2018-01-25 ENCOUNTER — INITIAL CONSULT (OUTPATIENT)
Dept: HEMATOLOGY/ONCOLOGY | Facility: CLINIC | Age: 48
End: 2018-01-25
Payer: COMMERCIAL

## 2018-01-25 VITALS
DIASTOLIC BLOOD PRESSURE: 90 MMHG | WEIGHT: 282.88 LBS | OXYGEN SATURATION: 93 % | TEMPERATURE: 99 F | RESPIRATION RATE: 21 BRPM | BODY MASS INDEX: 44.4 KG/M2 | HEART RATE: 103 BPM | HEIGHT: 67 IN | SYSTOLIC BLOOD PRESSURE: 150 MMHG

## 2018-01-25 DIAGNOSIS — C79.51 SECONDARY CANCER OF BONE: ICD-10-CM

## 2018-01-25 DIAGNOSIS — C09.9 SQUAMOUS CELL CARCINOMA OF PALATINE TONSIL: ICD-10-CM

## 2018-01-25 DIAGNOSIS — C09.9 SQUAMOUS CELL CARCINOMA OF PALATINE TONSIL: Primary | ICD-10-CM

## 2018-01-25 DIAGNOSIS — C77.0 CANCER OF HEAD, FACE, OR NECK LYMPH NODES, SECONDARY: ICD-10-CM

## 2018-01-25 DIAGNOSIS — E66.01 MORBID OBESITY: ICD-10-CM

## 2018-01-25 PROCEDURE — 25500020 PHARM REV CODE 255: Performed by: INTERNAL MEDICINE

## 2018-01-25 PROCEDURE — 99999 PR PBB SHADOW E&M-EST. PATIENT-LVL IV: CPT | Mod: PBBFAC,,, | Performed by: INTERNAL MEDICINE

## 2018-01-25 PROCEDURE — 72157 MRI CHEST SPINE W/O & W/DYE: CPT | Mod: TC

## 2018-01-25 PROCEDURE — 72156 MRI NECK SPINE W/O & W/DYE: CPT | Mod: 26,,, | Performed by: RADIOLOGY

## 2018-01-25 PROCEDURE — 99205 OFFICE O/P NEW HI 60 MIN: CPT | Mod: S$GLB,,, | Performed by: INTERNAL MEDICINE

## 2018-01-25 PROCEDURE — 72156 MRI NECK SPINE W/O & W/DYE: CPT | Mod: TC

## 2018-01-25 PROCEDURE — A9585 GADOBUTROL INJECTION: HCPCS | Performed by: INTERNAL MEDICINE

## 2018-01-25 PROCEDURE — 72157 MRI CHEST SPINE W/O & W/DYE: CPT | Mod: 26,,, | Performed by: RADIOLOGY

## 2018-01-25 RX ORDER — GADOBUTROL 604.72 MG/ML
10 INJECTION INTRAVENOUS
Status: COMPLETED | OUTPATIENT
Start: 2018-01-25 | End: 2018-01-25

## 2018-01-25 RX ORDER — VIT C/E/ZN/COPPR/LUTEIN/ZEAXAN 250MG-90MG
1000 CAPSULE ORAL DAILY
COMMUNITY
End: 2018-10-01

## 2018-01-25 RX ORDER — MULTIVITAMIN
1 TABLET ORAL DAILY
COMMUNITY
End: 2018-05-17

## 2018-01-25 RX ADMIN — GADOBUTROL 10 ML: 604.72 INJECTION INTRAVENOUS at 05:01

## 2018-01-25 NOTE — PROGRESS NOTES
REASON FOR VISIT:  New diagnosis of tonsil cancer.    REFERRING PHYSICIAN:  Dr. Sandra and Dr. Olguin with Radiation Oncology and Head   and Neck Surgical Oncology.    HISTORY OF PRESENT ILLNESS:  Mr. Burton Whiting is a 48-year-old male, former   smoker, who presents today with a four-month history of enlarging neck mass.  He   initially delayed care due to lack of insurance.  He was seen by Dr. Turpin   who referred him to see Dr. Olguin.  He had a CT that showed a 3.8 x 3.8 x 4.9   cm soft tissue mass arising from the left palatine tonsil resulting in moderate   narrowing of the hypopharyngeal airway adjacent extensive matted left cervical   lymphadenopathy was noted.  The largest ella mass was 6.6 x 9 x 2.6 cm   extending inferiorly to the supraclavicular region.  The mass pushed the trachea   and the left common carotid artery to the right.  Additional representative of   cervical lymph nodes measuring 2.9 x 3.1 x 3.5 cm on axial image 37 of series 3   and 2.4 x 2.1 x 2.2 cm on axial image 55 of series 3.  There is also a sclerotic   lesion involving the midline of the clivus measuring 1.2 cm.  FNA of the left   mass revealed P16 positive squamous cell carcinoma.  PET scan performed on   01/19/2018 revealed persistent evidence of a soft tissue mass arising from the   left palatine tonsil resulting in moderate narrowing of the hypopharyngeal   airway.  The mass is hypermetabolic demonstrating an SUV max of 18.5.  There is   also persistent adjacent extensive matted left cervical lymphadenopathy, largest   of this measuring 6.7 x 8.42 with hypermetabolic activity and SUV max of 20.5.    Lymphadenopathy is again noted to have a mass effect on the trachea and left   common carotid artery, pushing both structures towards the right.  There is also   prominent right cervical lymph nodes with the largest measuring 0.8 cm and   demonstrating mild hypermetabolic activity with SUV max of 1.8, physiologic   distribution of  FDG in the gray matter.  There are 3 pulmonary nodules noted   within the right lung, the largest is in the anterior segment of the right upper   lobe measuring 1 cm, second is visualized in the middle lobe measuring 0.6 cm   and the third is within the posterior basal segment measuring 0.6 cm.  There is   one pulmonary nodule within the left lung within the lateral basal segment   measuring 0.6 cm.  These nodules do not demonstrate hypermetabolic activity;   however, they are below the threshold for observation with PET.  He also   underwent MRI orbit, face, neck with Dr. Olguin yesterday and that has revealed   infiltrative metastatic disease involving the cervical spine, visualized upper   thoracic spine, clivus and visualized calvarium.  He currently surprisingly has   no symptoms.  He denies any pain.  He notes some discomfort in his throat.  He   denies any appetite change, weight loss, cough, shortness of breath, chest pain,   abdominal pain, diarrhea, back pain, rash, headaches or adenopathy.    REVIEW OF SYSTEMS:  CONSTITUTIONAL:  Negative for appetite change, fatigue, unexpected weight   change.  HEENT:  Negative for mouth sores.  Eyes, negative for visual disturbance.  RESPIRATORY:  Negative for cough and shortness of breath.  CARDIOVASCULAR:  Negative for chest pain.  GASTROINTESTINAL:  Negative for diarrhea.  GENITOURINARY:  Negative for frequency.  MUSCULOSKELETAL:  Negative for back pain.  SKIN:  Negative for rash.  NEUROLOGIC:  Negative for headaches.  HEMATOLOGIC:  Negative for adenopathy.  PSYCHIATRIC AND BEHAVIORAL:  He does not nervous or anxious.    COMORBID MEDICAL CONDITIONS:  Include none.    PAST SURGICAL HISTORY:  None.    FAMILY HISTORY:  Brother alive with hypertension.  Father is alive with   hypertension.  Maternal uncle alive with breast cancer.  Mother  at age   31 with leukemia.  Sister alive with thyroid problems.    SOCIAL HISTORY:  Former smoker, quit in 2017, one and  a half packs a day   for 25 years.  Denies any alcohol use, currently not , has no children.    PHYSICAL EXAMINATION:  VITAL SIGNS:  Reviewed in EPIC.  GENERAL:  The patient is oriented to person, place and time.  He is morbidly   obese.  He does not appear to be in any acute distress.  HEENT:  Right and left ears are normal.  His mouth and throat area, he has a   huge solid mass, which is exophytic involving his left neck area.  There is no   right-sided cervical lymphadenopathy or supraclavicular lymphadenopathy   appreciated.  The mass is about 10-11 cm and no other lymph nodes could be   palpable underneath the mass; however, it is technically difficult as well.    Eyes, conjunctivae and lids are normal.  NECK:  Supple, no JVD, no thyromegaly.  CARDIOVASCULAR:  Normal rate, regular rhythm.  Normal heart sounds.  No edema or   tenderness in the extremities.  PULMONARY AND CHEST:  Bilateral equal breath sounds heard.  No rales, rhonchi or   wheezes.  ABDOMEN:  Soft, nontender, nondistended.  No hepatomegaly or splenomegaly.  MUSCULOSKELETAL:  Normal range of motion.  Gait is normal.  No clubbing or   cyanosis.  LYMPHADENOPATHY:  No submental, submandibular, cervical or supraclavicular lymph   nodes noted.  NEUROLOGIC:  Alert and oriented to person, place and time, has normal strength,   normal reflexes.  No sensory deficits.  Gait is normal.  SKIN:  Warm, dry and intact.  No bruising, no lesions.  No rashes.  PSYCHIATRIC:  Normal mood and affect.  Speech, behavior, judgment, thought   content, cognition and memory are normal.    LABORATORY DATA:  From 01/04/2018 revealed normal CMP.  CBC, which is within   normal limits.  Thyroid within normal limits.    IMAGING:  PET scan, MRI as discussed above.    ASSESSMENT AND PLAN:  Mr. Burton Whiting is a 48-year-old male with tonsil cancer,   appears to be a T3 N3 M0; however, the bone involvement is an issue at this   time.  I have scheduled him for MRIs this  afternoon.  He will need a port   placement, which has been scheduled for tomorrow and hopes to start a TPS   chemotherapy on Monday.  I will discuss his case this afternoon at the head and   neck conference to see if he needs any palliative radiation to the spine.  The   rationale for this was extensively discussed with the patient.  He understands   his prognosis is very poor and if there is any involvement of the cervical and   spinal cord, then he could be at immediate threat for paralysis.  So if his   scans show evidence of disease there, I will refer him to the Emergency Room for   further evaluation.  Currently, surprisingly despite how aggressive the tumor   looks on the scans and the extent of the tumor, the patient is relatively   asymptomatic.  All other patient's questions and his brother's questions were   answered to satisfaction.    This was a 60-minute visit with more than 50% spent on counseling.  Distress   score is 5 and no intervention is indicated.      SPS/HN  dd: 01/25/2018 12:59:33 (CST)  td: 01/26/2018 09:51:10 (CST)  Doc ID   #[id number]  Job ID #[id number]    CC:       Distress Score    Distress Score: 5        Practical Problems Physical Problems   : No Appearance: No   Housing: No Bathing / Dressing: No   Insurance / Financial: No Breathing: No    Transportation: No  Changes in Urination: No    Work / School: No  Constipation: No   Treatment Decisions: No  Diarrhea: No     Eating: No    Family Problems Fatigue: No    Dealing with Children: No Feeling Swollen: No    Dealing with Partner: No Fevers: No    Ability to Have Children: No  Getting Around: No    Family Health Issues: No  Indigestion: No     Memory / Concentration: No   Emotional Problems Mouth Sores: No    Depression: No  Nausea: No    Fears: No  Nose Dry / Congested: No    Nervousness: No  Pain: No    Sadness: No Sexual: No    Worry: No Skin Dry / Itchy: No    Loss of Interest in Usual Activities: No Sleep: No     Substance  Abuse: No    Spiritual/Religions Concerns Tingling in Hands / Feet: No   Spritual / Yazdanism Concerns: No         Other Problems            Dictated # [id number]

## 2018-01-25 NOTE — LETTER
January 25, 2018      Xavier Olguin MD  [address]  [facility] [address]           [facility] Hematology Oncology  [address]  [facility] [address]  Phone: [phone]          Patient: Burton Whiting   MR Number: [id number]   YOB: 1970   Date of Visit: 1/25/2018       Dear Dr. Xavier Olguin:    Thank you for referring Burton Whiting to me for evaluation. Attached you will find relevant portions of my assessment and plan of care.    If you have questions, please do not hesitate to call me. I look forward to following Burton Whiting along with you.    Sincerely,    Cortney Lopez MD    Enclosure  CC:  No Recipients    If you would like to receive this communication electronically, please contact [email or web address] or [phone] to request more information on [employer] Link access.    For providers and/or their staff who would like to refer a patient to [facility], please contact us through our one-stop-shop provider referral line, [facility] , at [phone].    If you feel you have received this communication in error or would no longer like to receive these types of communications, please e-mail [email or web address]

## 2018-01-26 ENCOUNTER — TELEPHONE (OUTPATIENT)
Dept: RADIATION ONCOLOGY | Facility: CLINIC | Age: 48
End: 2018-01-26

## 2018-01-26 ENCOUNTER — TELEPHONE (OUTPATIENT)
Dept: HEMATOLOGY/ONCOLOGY | Facility: CLINIC | Age: 48
End: 2018-01-26

## 2018-01-26 ENCOUNTER — DOCUMENTATION ONLY (OUTPATIENT)
Dept: OTOLARYNGOLOGY | Facility: CLINIC | Age: 48
End: 2018-01-26

## 2018-01-26 ENCOUNTER — SURGERY (OUTPATIENT)
Age: 48
End: 2018-01-26

## 2018-01-26 ENCOUNTER — HOSPITAL ENCOUNTER (OUTPATIENT)
Facility: OTHER | Age: 48
Discharge: HOME | End: 2018-01-26
Attending: RADIOLOGY | Admitting: RADIOLOGY
Payer: COMMERCIAL

## 2018-01-26 VITALS
SYSTOLIC BLOOD PRESSURE: 122 MMHG | TEMPERATURE: 97 F | RESPIRATION RATE: 18 BRPM | HEART RATE: 92 BPM | DIASTOLIC BLOOD PRESSURE: 81 MMHG | OXYGEN SATURATION: 96 %

## 2018-01-26 DIAGNOSIS — C09.9 SQUAMOUS CELL CARCINOMA OF PALATINE TONSIL: ICD-10-CM

## 2018-01-26 DIAGNOSIS — C79.51 SECONDARY CANCER OF BONE: ICD-10-CM

## 2018-01-26 DIAGNOSIS — C09.9 TONSIL CANCER: ICD-10-CM

## 2018-01-26 LAB
APTT BLDCRRT: 29.9 SEC
INR PPP: 0.9
PROTHROMBIN TIME: 10.3 SEC

## 2018-01-26 PROCEDURE — 85610 PROTHROMBIN TIME: CPT

## 2018-01-26 PROCEDURE — 36561 INSERT TUNNELED CV CATH: CPT

## 2018-01-26 PROCEDURE — C1788 PORT, INDWELLING, IMP: HCPCS | Performed by: RADIOLOGY

## 2018-01-26 PROCEDURE — 63600175 PHARM REV CODE 636 W HCPCS

## 2018-01-26 PROCEDURE — 85730 THROMBOPLASTIN TIME PARTIAL: CPT

## 2018-01-26 PROCEDURE — 36571 INSERT PICVAD CATH: CPT

## 2018-01-26 PROCEDURE — 99152 MOD SED SAME PHYS/QHP 5/>YRS: CPT | Performed by: RADIOLOGY

## 2018-01-26 PROCEDURE — C1769 GUIDE WIRE: HCPCS | Performed by: RADIOLOGY

## 2018-01-26 PROCEDURE — 25000003 PHARM REV CODE 250

## 2018-01-26 PROCEDURE — 25500020 PHARM REV CODE 255

## 2018-01-26 PROCEDURE — C1894 INTRO/SHEATH, NON-LASER: HCPCS | Performed by: RADIOLOGY

## 2018-01-26 PROCEDURE — 63600175 PHARM REV CODE 636 W HCPCS: Performed by: RADIOLOGY

## 2018-01-26 RX ORDER — LIDOCAINE HYDROCHLORIDE 10 MG/ML
INJECTION INFILTRATION; PERINEURAL
Status: DISCONTINUED | OUTPATIENT
Start: 2018-01-26 | End: 2018-01-26 | Stop reason: HOSPADM

## 2018-01-26 RX ORDER — HEPARIN SODIUM 1000 [USP'U]/ML
INJECTION, SOLUTION INTRAVENOUS; SUBCUTANEOUS
Status: DISCONTINUED | OUTPATIENT
Start: 2018-01-26 | End: 2018-01-26 | Stop reason: HOSPADM

## 2018-01-26 RX ORDER — MIDAZOLAM HYDROCHLORIDE 1 MG/ML
INJECTION, SOLUTION INTRAMUSCULAR; INTRAVENOUS
Status: DISCONTINUED | OUTPATIENT
Start: 2018-01-26 | End: 2018-01-26 | Stop reason: HOSPADM

## 2018-01-26 RX ORDER — CEFAZOLIN SODIUM 2 G/50ML
SOLUTION INTRAVENOUS
Status: DISCONTINUED | OUTPATIENT
Start: 2018-01-26 | End: 2018-01-26 | Stop reason: HOSPADM

## 2018-01-26 RX ORDER — FENTANYL CITRATE 50 UG/ML
INJECTION, SOLUTION INTRAMUSCULAR; INTRAVENOUS
Status: DISCONTINUED | OUTPATIENT
Start: 2018-01-26 | End: 2018-01-26 | Stop reason: HOSPADM

## 2018-01-26 RX ORDER — HEPARIN SOD,PORCINE/0.9 % NACL 1000/500ML
INTRAVENOUS SOLUTION INTRAVENOUS
Status: DISCONTINUED | OUTPATIENT
Start: 2018-01-26 | End: 2018-01-26 | Stop reason: HOSPADM

## 2018-01-26 RX ADMIN — HEPARIN SODIUM IN SODIUM CHLORIDE 500 ML: 200 INJECTION INTRAVENOUS at 03:01

## 2018-01-26 RX ADMIN — FENTANYL CITRATE 50 MCG: 50 INJECTION, SOLUTION INTRAMUSCULAR; INTRAVENOUS at 03:01

## 2018-01-26 RX ADMIN — MIDAZOLAM HYDROCHLORIDE 1 MG: 1 INJECTION, SOLUTION INTRAMUSCULAR; INTRAVENOUS at 03:01

## 2018-01-26 RX ADMIN — LIDOCAINE HYDROCHLORIDE 2 ML: 10 INJECTION INFILTRATION; PERINEURAL at 03:01

## 2018-01-26 RX ADMIN — HEPARIN SODIUM 3000 UNITS: 1000 INJECTION, SOLUTION INTRAVENOUS; SUBCUTANEOUS at 03:01

## 2018-01-26 RX ADMIN — CEFAZOLIN SODIUM 2 G: 2 SOLUTION INTRAVENOUS at 02:01

## 2018-01-26 NOTE — DISCHARGE SUMMARY
Radiology Discharge Summary      Hospital Course: No complications    Admit Date: 1/26/2018  Discharge Date: 01/26/2018     Instructions Given to Patient: Yes  Diet: Resume prior diet  Activity: activity as tolerated and no driving for today    Description of Condition on Discharge: Stable  Vital Signs (Most Recent): Temp: 98.6 °F (37 °C) (01/26/18 1310)  Pulse: 92 (01/26/18 1530)  Resp: 20 (01/26/18 1530)  BP: 136/85 (01/26/18 1530)  SpO2: 97 % (01/26/18 1530)    Discharge Disposition: Home    Discharge Diagnosis: tonsillar cancer     Follow-up: per oncology    @SIG@

## 2018-01-26 NOTE — TELEPHONE ENCOUNTER
Left a  Message telling pt to keep his dental appointment on Monday.  ----- Message from Burton Sandra MD sent at 1/26/2018  1:37 PM CST -----  He still should see dental because there is a very high likelihood that he will have radiation down the road, even if he does have metastatic disease.  The tentative plan is to start with 2 or 3 cycles of chemotherapy.  Meanwhile we are working up these bone findings to see if they're real.  He may need a biopsy.  Jessica has ordered MRIs of his C and T spine.  Suzanne could you give him a quick call and let him know to still see dental?  Please tell him I'm out of town so won't be able to call him until Monday, but if Jessica changes anything she will give him a call.    ----- Message -----  From: Suzanne El RN  Sent: 1/26/2018  10:39 AM  To: Elle Christopher RN, Burton Sandra MD    Pt is going for a port at [facility] this AM per is brother Rocky. Jesscia is starting chemo Monday. Keeping dental appointment for Monday just in case the dental work is still needed. Brother would like Dr. Sandra & Dr. Lopez  to call pt later this evening to discuss Jessica's findings from yesterday and the radiation plan change if there is one. Let me know what I need to do.

## 2018-01-26 NOTE — PROGRESS NOTES
Burton Whiting is a 48 y.o. male with T3N3M0 HPV+ SCC L tonsil.    His case was discussed at the Multidisciplinary Head and Neck Team Planning Meeting on 1/25/18.   The following studies were reviewed: path.  Representatives from Medical Oncology, Radiation Oncology, Head and Neck Surgical Oncology, Psychosocial Oncology, and Speech and Language Pathology discussed the case with the following recommendations:    1) TPF  2) chemoradiation

## 2018-01-26 NOTE — PROCEDURES
Radiology Post-Procedure Note    Pre Op Diagnosis: head and neck cancer  Post Op Diagnosis: Same    Procedure: port placement    Procedure performed by: Andrew Villanueva MD    Written Informed Consent Obtained: Yes  Specimen Removed: NO  Estimated Blood Loss: Minimal    Findings:   Successful R arm port placement.    Patient tolerated procedure well.    @SIG@

## 2018-01-26 NOTE — H&P
Consult/H&P Note  Interventional Radiology    Consult Requested By: oncology    Reason for Consult: port placement    SUBJECTIVE:     Chief Complaint: head and neck cancer    History of Present Illness: 47 yo M with head and neck cancer, needing port.    Past Medical History:   Diagnosis Date    Squamous cell carcinoma of palatine tonsil      History reviewed. No pertinent surgical history.  Family History   Problem Relation Age of Onset    Leukemia Mother     Hypertension Father     Thyroid disease Sister     Hypertension Brother     Brain cancer Maternal Uncle     Brain cancer Maternal Uncle      Social History   Substance Use Topics    Smoking status: Former Smoker     Packs/day: 1.50     Years: 25.00     Types: Cigarettes     Quit date: 06/2017    Smokeless tobacco: Never Used      Comment: smoked for about 25 years. quit 6 months ago    Alcohol use No       Review of Systems:  Constitutional/General:No fever, chills, change in appetite or weight loss.  Hematological/Immuno: no known coagulopathies  Respiratory: no shortness of breath  Cardiovascular: no chest pain  Gastrointestinal: no abdominal pain  Genito-Urinary: no dysuria  Neurological: no TIA or stroke symptoms  Psychiatric: normal mood/affect, good insight/judgement      OBJECTIVE:     Vital Signs Range (Last 24H):  Temp:  [98.6 °F (37 °C)]   Pulse:  [100]   Resp:  [20]   BP: (146)/(88)   SpO2:  [96 %]     Physical Exam:  General- Patient alert and oriented x3 in NAD  ENT- PERRLA,  Neck- L neck mass  CV- Regular rate and rhythm  Resp-  No increased WOB  GI- Non tender/non-distended  Extrem- No cyanosis, clubbing, edema.   Neuro-  No focal deficits noted.     Physical Exam  There is no height or weight on file to calculate BMI.    Scheduled Meds:   Continuous Infusions:   PRN Meds:    Allergies: Review of patient's allergies indicates:  No Known Allergies    Labs:  No results for input(s): INR in the last 168 hours.    Invalid input(s):  PT,   PTT    Recent Labs  Lab 01/25/18  1309   WBC 8.84   HGB 13.9*   HCT 40.8   MCV 92         Recent Labs  Lab 01/25/18  1309   GLU 98      K 4.1      CO2 31*   BUN 10   CREATININE 0.9   CALCIUM 9.4   MG 2.1   ALT 18   AST 24   ALBUMIN 3.6   BILITOT 0.4       Vitals (Most Recent):  Temp: 98.6 °F (37 °C) (01/26/18 1310)  Pulse: 100 (01/26/18 1310)  Resp: 20 (01/26/18 1310)  BP: (!) 146/88 (01/26/18 1310)  SpO2: 96 % (01/26/18 1310)    ASA: 3  Mallampati: 2    Consent obtained    ASSESSMENT/PLAN:     R arm port placement. Moderate sedation.    Active Hospital Problems    Diagnosis  POA    Tonsil cancer [C09.9]  Yes      Resolved Hospital Problems    Diagnosis Date Resolved POA   No resolved problems to display.           Andrew Villanueva MD

## 2018-01-26 NOTE — PLAN OF CARE
Burton Whiting has met all discharge criteria from Phase II. Vital Signs are stable, ambulating  without difficulty. Discharge instructions given, patient verbalized understanding. Discharged from facility via wheelchair in stable condition.

## 2018-01-29 ENCOUNTER — OFFICE VISIT (OUTPATIENT)
Dept: HEMATOLOGY/ONCOLOGY | Facility: CLINIC | Age: 48
End: 2018-01-29
Payer: COMMERCIAL

## 2018-01-29 ENCOUNTER — INFUSION (OUTPATIENT)
Dept: INFUSION THERAPY | Facility: HOSPITAL | Age: 48
End: 2018-01-29
Attending: INTERNAL MEDICINE
Payer: COMMERCIAL

## 2018-01-29 VITALS
HEIGHT: 68 IN | OXYGEN SATURATION: 95 % | SYSTOLIC BLOOD PRESSURE: 141 MMHG | DIASTOLIC BLOOD PRESSURE: 82 MMHG | TEMPERATURE: 98 F | BODY MASS INDEX: 42.66 KG/M2 | HEART RATE: 98 BPM | WEIGHT: 281.5 LBS | RESPIRATION RATE: 18 BRPM

## 2018-01-29 VITALS
HEART RATE: 92 BPM | RESPIRATION RATE: 18 BRPM | SYSTOLIC BLOOD PRESSURE: 156 MMHG | TEMPERATURE: 99 F | DIASTOLIC BLOOD PRESSURE: 100 MMHG | OXYGEN SATURATION: 95 %

## 2018-01-29 DIAGNOSIS — C77.0 CANCER OF HEAD, FACE, OR NECK LYMPH NODES, SECONDARY: ICD-10-CM

## 2018-01-29 DIAGNOSIS — C79.51 SECONDARY CANCER OF BONE: ICD-10-CM

## 2018-01-29 DIAGNOSIS — C09.9 SQUAMOUS CELL CARCINOMA OF PALATINE TONSIL: ICD-10-CM

## 2018-01-29 DIAGNOSIS — C09.9 TONSIL CANCER: Primary | ICD-10-CM

## 2018-01-29 DIAGNOSIS — C09.9 SQUAMOUS CELL CARCINOMA OF PALATINE TONSIL: Primary | ICD-10-CM

## 2018-01-29 PROCEDURE — 63600175 PHARM REV CODE 636 W HCPCS: Performed by: INTERNAL MEDICINE

## 2018-01-29 PROCEDURE — 96416 CHEMO PROLONG INFUSE W/PUMP: CPT

## 2018-01-29 PROCEDURE — 96366 THER/PROPH/DIAG IV INF ADDON: CPT

## 2018-01-29 PROCEDURE — 96417 CHEMO IV INFUS EACH ADDL SEQ: CPT

## 2018-01-29 PROCEDURE — 99999 PR PBB SHADOW E&M-EST. PATIENT-LVL IV: CPT | Mod: PBBFAC,,, | Performed by: INTERNAL MEDICINE

## 2018-01-29 PROCEDURE — 96413 CHEMO IV INFUSION 1 HR: CPT

## 2018-01-29 PROCEDURE — 96367 TX/PROPH/DG ADDL SEQ IV INF: CPT

## 2018-01-29 PROCEDURE — 99215 OFFICE O/P EST HI 40 MIN: CPT | Mod: S$GLB,,, | Performed by: INTERNAL MEDICINE

## 2018-01-29 PROCEDURE — 25000003 PHARM REV CODE 250: Performed by: INTERNAL MEDICINE

## 2018-01-29 RX ORDER — HEPARIN 100 UNIT/ML
500 SYRINGE INTRAVENOUS
Status: CANCELLED | OUTPATIENT
Start: 2018-01-31

## 2018-01-29 RX ORDER — DEXAMETHASONE 6 MG/1
6 TABLET ORAL 2 TIMES DAILY WITH MEALS
Qty: 8 TABLET | Refills: 6 | Status: SHIPPED | OUTPATIENT
Start: 2018-01-29 | End: 2018-02-06

## 2018-01-29 RX ORDER — HEPARIN 100 UNIT/ML
500 SYRINGE INTRAVENOUS
Status: DISCONTINUED | OUTPATIENT
Start: 2018-01-29 | End: 2018-01-29 | Stop reason: HOSPADM

## 2018-01-29 RX ORDER — HEPARIN 100 UNIT/ML
500 SYRINGE INTRAVENOUS
Status: CANCELLED | OUTPATIENT
Start: 2018-02-03

## 2018-01-29 RX ORDER — SODIUM CHLORIDE 0.9 % (FLUSH) 0.9 %
10 SYRINGE (ML) INJECTION
Status: DISCONTINUED | OUTPATIENT
Start: 2018-01-29 | End: 2018-01-29 | Stop reason: HOSPADM

## 2018-01-29 RX ORDER — ONDANSETRON HYDROCHLORIDE 8 MG/1
8 TABLET, FILM COATED ORAL 4 TIMES DAILY PRN
Qty: 60 TABLET | Refills: 2 | Status: SHIPPED | OUTPATIENT
Start: 2018-01-29 | End: 2018-08-01

## 2018-01-29 RX ORDER — SODIUM CHLORIDE 0.9 % (FLUSH) 0.9 %
10 SYRINGE (ML) INJECTION
Status: CANCELLED | OUTPATIENT
Start: 2018-01-31

## 2018-01-29 RX ORDER — SODIUM CHLORIDE 0.9 % (FLUSH) 0.9 %
10 SYRINGE (ML) INJECTION
Status: CANCELLED | OUTPATIENT
Start: 2018-02-03

## 2018-01-29 RX ORDER — SODIUM CHLORIDE 0.9 % (FLUSH) 0.9 %
10 SYRINGE (ML) INJECTION
Status: CANCELLED | OUTPATIENT
Start: 2018-01-30

## 2018-01-29 RX ORDER — HEPARIN 100 UNIT/ML
500 SYRINGE INTRAVENOUS
Status: CANCELLED | OUTPATIENT
Start: 2018-01-30

## 2018-01-29 RX ORDER — PROMETHAZINE HYDROCHLORIDE 25 MG/1
25 TABLET ORAL EVERY 6 HOURS PRN
Qty: 60 TABLET | Refills: 7 | Status: ON HOLD | OUTPATIENT
Start: 2018-01-29 | End: 2018-02-13 | Stop reason: HOSPADM

## 2018-01-29 RX ADMIN — CISPLATIN 248 MG: 1 INJECTION, SOLUTION INTRAVENOUS at 03:01

## 2018-01-29 RX ADMIN — DOCETAXEL 185 MG: 10 INJECTION, SOLUTION INTRAVENOUS at 01:01

## 2018-01-29 RX ADMIN — SODIUM CHLORIDE 150 MG: 9 INJECTION, SOLUTION INTRAVENOUS at 01:01

## 2018-01-29 RX ADMIN — MAGNESIUM SULFATE HEPTAHYDRATE: 500 INJECTION, SOLUTION INTRAMUSCULAR; INTRAVENOUS at 11:01

## 2018-01-29 RX ADMIN — FLUOROURACIL 12400 MG: 50 INJECTION, SOLUTION INTRAVENOUS at 06:01

## 2018-01-29 RX ADMIN — DEXAMETHASONE SODIUM PHOSPHATE: 4 INJECTION, SOLUTION INTRAMUSCULAR; INTRAVENOUS at 01:01

## 2018-01-29 RX ADMIN — MAGNESIUM SULFATE HEPTAHYDRATE: 500 INJECTION, SOLUTION INTRAMUSCULAR; INTRAVENOUS at 04:01

## 2018-01-29 NOTE — PATIENT INSTRUCTIONS
Docetaxel injection  What is this medicine?  DOCETAXEL (luevano se TAX el) is a chemotherapy drug. It targets fast dividing cells, like cancer cells, and causes these cells to die. This medicine is used to treat many types of cancers like breast cancer, certain stomach cancers, head and neck cancer, lung cancer, and prostate cancer.  How should I use this medicine?  This drug is given as an infusion into a vein. It is administered in a hospital or clinic by a specially trained health care professional.  Talk to your pediatrician regarding the use of this medicine in children. Special care may be needed.  What side effects may I notice from receiving this medicine?  Side effects that you should report to your doctor or health care professional as soon as possible:  · allergic reactions like skin rash, itching or hives, swelling of the face, lips, or tongue  · low blood counts - This drug may decrease the number of white blood cells, red blood cells and platelets. You may be at increased risk for infections and bleeding.  · signs of infection - fever or chills, cough, sore throat, pain or difficulty passing urine  · signs of decreased platelets or bleeding - bruising, pinpoint red spots on the skin, black, tarry stools, nosebleeds  · signs of decreased red blood cells - unusually weak or tired, fainting spells, lightheadedness  · breathing problems  · fast or irregular heartbeat  · low blood pressure  · mouth sores  · nausea and vomiting  · pain, swelling, redness or irritation at the injection site  · pain, tingling, numbness in the hands or feet  · swelling of the ankle, feet, hands  · weight gain  Side effects that usually do not require medical attention (report to your prescriber or health care professional if they continue or are bothersome):  · bone pain  · complete hair loss including hair on your head, underarms, pubic hair, eyebrows, and eyelashes  · diarrhea  · excessive tearing  · changes in the color of  fingernails  · loosening of the fingernails  · nausea  · muscle pain  · red flush to skin  · sweating  · weak or tired  What may interact with this medicine?  · cyclosporine  · erythromycin  · ketoconazole  · medicines to increase blood counts like filgrastim, pegfilgrastim, sargramostim  · vaccines  Talk to your doctor or health care professional before taking any of these medicines:  · acetaminophen  · aspirin  · ibuprofen  · ketoprofen  · naproxen  What if I miss a dose?  It is important not to miss your dose. Call your doctor or health care professional if you are unable to keep an appointment.  Where should I keep my medicine?  This drug is given in a hospital or clinic and will not be stored at home.  What should I tell my health care provider before I take this medicine?  They need to know if you have any of these conditions:  · infection (especially a virus infection such as chickenpox, cold sores, or herpes)  · liver disease  · low blood counts, like low white cell, platelet, or red cell counts  · an unusual or allergic reaction to docetaxel, polysorbate 80, other chemotherapy agents, other medicines, foods, dyes, or preservatives  · pregnant or trying to get pregnant  · breast-feeding  What should I watch for while using this medicine?  Your condition will be monitored carefully while you are receiving this medicine. You will need important blood work done while you are taking this medicine.  This drug may make you feel generally unwell. This is not uncommon, as chemotherapy can affect healthy cells as well as cancer cells. Report any side effects. Continue your course of treatment even though you feel ill unless your doctor tells you to stop.  In some cases, you may be given additional medicines to help with side effects. Follow all directions for their use.  Call your doctor or health care professional for advice if you get a fever, chills or sore throat, or other symptoms of a cold or flu. Do not treat  yourself. This drug decreases your body's ability to fight infections. Try to avoid being around people who are sick.  This medicine may increase your risk to bruise or bleed. Call your doctor or health care professional if you notice any unusual bleeding.  This medicine may contain alcohol in the product. You may get drowsy or dizzy. Do not drive, use machinery, or do anything that needs mental alertness until you know how this medicine affects you. Do not stand or sit up quickly, especially if you are an older patient. This reduces the risk of dizzy or fainting spells. Avoid alcoholic drinks.  Do not become pregnant while taking this medicine. Women should inform their doctor if they wish to become pregnant or think they might be pregnant. There is a potential for serious side effects to an unborn child. Talk to your health care professional or pharmacist for more information. Do not breast-feed an infant while taking this medicine.  NOTE:This sheet is a summary. It may not cover all possible information. If you have questions about this medicine, talk to your doctor, pharmacist, or health care provider. Copyright© 2017 [employer]        Cisplatin injection  What is this medicine?  CISPLATIN (SIS bianca tin) is a chemotherapy drug. It targets fast dividing cells, like cancer cells, and causes these cells to die. This medicine is used to treat many types of cancer like bladder, ovarian, and testicular cancers.  How should I use this medicine?  This drug is given as an infusion into a vein. It is administered in a hospital or clinic by a specially trained health care professional.  Talk to your pediatrician regarding the use of this medicine in children. Special care may be needed.  What side effects may I notice from receiving this medicine?  Side effects that you should report to your doctor or health care professional as soon as possible:  · allergic reactions like skin rash, itching or hives, swelling of the  face, lips, or tongue  · signs of infection - fever or chills, cough, sore throat, pain or difficulty passing urine  · signs of decreased platelets or bleeding - bruising, pinpoint red spots on the skin, black, tarry stools, nosebleeds  · signs of decreased red blood cells - unusually weak or tired, fainting spells, lightheadedness  · breathing problems  · changes in hearing  · gout pain  · low blood counts - This drug may decrease the number of white blood cells, red blood cells and platelets. You may be at increased risk for infections and bleeding.  · nausea and vomiting  · pain, swelling, redness or irritation at the injection site  · pain, tingling, numbness in the hands or feet  · problems with balance, movement  · trouble passing urine or change in the amount of urine  Side effects that usually do not require medical attention (report to your doctor or health care professional if they continue or are bothersome):  · changes in vision  · loss of appetite  · metallic taste in the mouth or changes in taste  What may interact with this medicine?  · dofetilide  · foscarnet  · medicines for seizures  · medicines to increase blood counts like filgrastim, pegfilgrastim, sargramostim  · probenecid  · pyridoxine used with altretamine  · rituximab  · some antibiotics like amikacin, gentamicin, neomycin, polymyxin B, streptomycin, tobramycin  · sulfinpyrazone  · vaccines  · zalcitabine  Talk to your doctor or health care professional before taking any of these medicines:  · acetaminophen  · aspirin  · ibuprofen  · ketoprofen  · naproxen  What if I miss a dose?  It is important not to miss a dose. Call your doctor or health care professional if you are unable to keep an appointment.  Where should I keep my medicine?  This drug is given in a hospital or clinic and will not be stored at home.  What should I tell my health care provider before I take this medicine?  They need to know if you have any of these  conditions:  · blood disorders  · hearing problems  · kidney disease  · recent or ongoing radiation therapy  · an unusual or allergic reaction to cisplatin, carboplatin, other chemotherapy, other medicines, foods, dyes, or preservatives  · pregnant or trying to get pregnant  · breast-feeding  What should I watch for while using this medicine?  Your condition will be monitored carefully while you are receiving this medicine. You will need important blood work done while you are taking this medicine.  This drug may make you feel generally unwell. This is not uncommon, as chemotherapy can affect healthy cells as well as cancer cells. Report any side effects. Continue your course of treatment even though you feel ill unless your doctor tells you to stop.  In some cases, you may be given additional medicines to help with side effects. Follow all directions for their use.  Call your doctor or health care professional for advice if you get a fever, chills or sore throat, or other symptoms of a cold or flu. Do not treat yourself. This drug decreases your body's ability to fight infections. Try to avoid being around people who are sick.  This medicine may increase your risk to bruise or bleed. Call your doctor or health care professional if you notice any unusual bleeding.  Be careful brushing and flossing your teeth or using a toothpick because you may get an infection or bleed more easily. If you have any dental work done, tell your dentist you are receiving this medicine.  Avoid taking products that contain aspirin, acetaminophen, ibuprofen, naproxen, or ketoprofen unless instructed by your doctor. These medicines may hide a fever.  Do not become pregnant while taking this medicine. Women should inform their doctor if they wish to become pregnant or think they might be pregnant. There is a potential for serious side effects to an unborn child. Talk to your health care professional or pharmacist for more information. Do  not breast-feed an infant while taking this medicine.  Drink fluids as directed while you are taking this medicine. This will help protect your kidneys.  Call your doctor or health care professional if you get diarrhea. Do not treat yourself.  NOTE:This sheet is a summary. It may not cover all possible information. If you have questions about this medicine, talk to your doctor, pharmacist, or health care provider. Copyright© 2017 [employer]        Fluorouracil, 5-FU injection  What is this medicine?  FLUOROURACIL, 5-FU (flure oh YOOR a luca) is a chemotherapy drug. It slows the growth of cancer cells. This medicine is used to treat many types of cancer like breast cancer, colon or rectal cancer, pancreatic cancer, and stomach cancer.  How should I use this medicine?  This drug is given as an infusion or injection into a vein. It is administered in a hospital or clinic by a specially trained health care professional.  Talk to your pediatrician regarding the use of this medicine in children. Special care may be needed.  What side effects may I notice from receiving this medicine?  Side effects that you should report to your doctor or health care professional as soon as possible:  · allergic reactions like skin rash, itching or hives, swelling of the face, lips, or tongue  · low blood counts - this medicine may decrease the number of white blood cells, red blood cells and platelets. You may be at increased risk for infections and bleeding.  · signs of infection - fever or chills, cough, sore throat, pain or difficulty passing urine  · signs of decreased platelets or bleeding - bruising, pinpoint red spots on the skin, black, tarry stools, blood in the urine  · signs of decreased red blood cells - unusually weak or tired, fainting spells, lightheadedness  · breathing problems  · changes in vision  · chest pain  · mouth sores  · nausea and vomiting  · pain, swelling, redness at site where injected  · pain, tingling,  numbness in the hands or feet  · redness, swelling, or sores on hands or feet  · stomach pain  · unusual bleeding  Side effects that usually do not require medical attention (report to your doctor or health care professional if they continue or are bothersome):  · changes in finger or toe nails  · diarrhea  · dry or itchy skin  · hair loss  · headache  · loss of appetite  · sensitivity of eyes to the light  · stomach upset  · unusually teary eyes  What may interact with this medicine?  · allopurinol  · cimetidine  · dapsone  · digoxin  · hydroxyurea  · leucovorin  · levamisole  · medicines for seizures like ethotoin, fosphenytoin, phenytoin  · medicines to increase blood counts like filgrastim, pegfilgrastim, sargramostim  · medicines that treat or prevent blood clots like warfarin, enoxaparin, and dalteparin  · methotrexate  · metronidazole  · pyrimethamine  · some other chemotherapy drugs like busulfan, cisplatin, estramustine, vinblastine  · trimethoprim  · trimetrexate  · vaccines  Talk to your doctor or health care professional before taking any of these medicines:  · acetaminophen  · aspirin  · ibuprofen  · ketoprofen  · naproxen  What if I miss a dose?  It is important not to miss your dose. Call your doctor or health care professional if you are unable to keep an appointment.  Where should I keep my medicine?  This drug is given in a hospital or clinic and will not be stored at home.  What should I tell my health care provider before I take this medicine?  They need to know if you have any of these conditions:  · blood disorders  · dihydropyrimidine dehydrogenase (DPD) deficiency  · infection (especially a virus infection such as chickenpox, cold sores, or herpes)  · kidney disease  · liver disease  · malnourished, poor nutrition  · recent or ongoing radiation therapy  · an unusual or allergic reaction to fluorouracil, other chemotherapy, other medicines, foods, dyes, or preservatives  · pregnant or trying  to get pregnant  · breast-feeding  What should I watch for while using this medicine?  Visit your doctor for checks on your progress. This drug may make you feel generally unwell. This is not uncommon, as chemotherapy can affect healthy cells as well as cancer cells. Report any side effects. Continue your course of treatment even though you feel ill unless your doctor tells you to stop.  In some cases, you may be given additional medicines to help with side effects. Follow all directions for their use.  Call your doctor or health care professional for advice if you get a fever, chills or sore throat, or other symptoms of a cold or flu. Do not treat yourself. This drug decreases your body's ability to fight infections. Try to avoid being around people who are sick.  This medicine may increase your risk to bruise or bleed. Call your doctor or health care professional if you notice any unusual bleeding.  Be careful brushing and flossing your teeth or using a toothpick because you may get an infection or bleed more easily. If you have any dental work done, tell your dentist you are receiving this medicine.  Avoid taking products that contain aspirin, acetaminophen, ibuprofen, naproxen, or ketoprofen unless instructed by your doctor. These medicines may hide a fever.  Do not become pregnant while taking this medicine. Women should inform their doctor if they wish to become pregnant or think they might be pregnant. There is a potential for serious side effects to an unborn child. Talk to your health care professional or pharmacist for more information. Do not breast-feed an infant while taking this medicine.  Men should inform their doctor if they wish to father a child. This medicine may lower sperm counts.  Do not treat diarrhea with over the counter products. Contact your doctor if you have diarrhea that lasts more than 2 days or if it is severe and watery.  This medicine can make you more sensitive to the sun. Keep  out of the sun. If you cannot avoid being in the sun, wear protective clothing and use sunscreen. Do not use sun lamps or tanning beds/booths.  NOTE:This sheet is a summary. It may not cover all possible information. If you have questions about this medicine, talk to your doctor, pharmacist, or health care provider. Copyright© 2017 [employer]        Discharge Instructions for Chemotherapy  Your healthcare provider prescribed a type of medicine therapy for you called chemotherapy. Healthcare providers prescribe chemotherapy for many different types of illnesses, including cancer. There are many types of chemotherapy. This sheet provides general guidelines on how you can take care of yourself after your chemotherapy.  Mouth care  Dont be discouraged if you get mouth sores, even if you are following all your healthcare providers instructions. Many people get mouth sores as a side effect of chemotherapy. Heres what you can do to prevent mouth sores:  · Keep your mouth clean. Brush your teeth with a soft-bristle toothbrush after every meal.  · Ask if you should use a toothpaste with fluoride, or a mixture of 1 teaspoon of salt in 8-ounces of water to brush your teeth.   · Use an oral swab or special soft toothbrush if your gums bleed during regular brushing.  · Don't use dental floss if it causes your gums to bleed.  · Use any mouthwashes given to you as directed.  · If you cant tolerate regular methods, use salt and baking soda to clean your mouth. Mix 1 teaspoon of salt and 1 teaspoon of baking soda into an 8-ounce glass of warm water. Swish and spit.  · If you wear dentures, you may be told to wear them only when you eat, ask your healthcare provider. Clean dentures twice a day and soak in antimicrobial solution when you aren't wearing them. Rinse your mouth after each meal.   · Watch your mouth and tongue for white patches. This may be a sign of a type of yeast infection (thrush), a common side effect of  chemotherapy. Be sure to tell your healthcare provider about these patches. Medicine can be prescribed to treat it.  Other home care  Here's what else you can do:  · Try to exercise. Exercise keeps you strong and keeps your heart and lungs active. Walking and yoga are good types of exercise.   · Keep clean. During chemotherapy, your body cant fight infection very well. Take short baths or showers.  ¨ Wash your hands before you eat and after going to the bathroom.  ¨ Use moisturizing soap. Chemotherapy can make your skin dry.  ¨ Apply moisturizing lotion several times a day to help relieve dry skin.  ¨ Dont take very hot or very cold showers or baths.  · Dont be surprised if your chemotherapy causes slight burns to your skin--usually on the hands and feet. Some medicines used in high doses cause this to happen. Ask for a special cream to help relieve the burn and protect your skin.  · Avoid people who are sick with illnesses and diseases you could catch, such as colds, flu, measles, or chicken pox as well as people who have recently had vaccinations for these illnesses.   · Let your healthcare provider know if your throat is sore. You may have an infection that needs treatment.  · Remember, many patients feel sick and lose their appetites during treatment. Eat small meals several times a day to keep your strength up:  ¨ Choose bland foods with little taste or smell if you are reacting strongly to food.  ¨ Be sure to cook all food thoroughly. This kills bacteria and helps you avoid infection.  ¨ Eat foods that are soft. Soft foods are less likely to cause stomach irritation.  ¨ Try to eat a variety of foods for a well-balanced diet. Drink plenty of fluids and eat foods with fiber to avoid constipation.      When to call your healthcare provider  Call your healthcare provider right away if you have any of the following:  · Unexplained bleeding  · Trouble concentrating  · Ongoing fatigue  · Shortness of breath,  wheezing, trouble breathing, or bad cough  · Rapid, irregular heartbeat, or chest pain  · Dizziness, lightheadedness  · Constant feeling of being cold  · Hives or a cut or rash that swells, turns red, feels hot or painful, or begins to ooze  · Burning when you urinate  · Fever of 100.4°F (38°C) or higher, or chills   Date Last Reviewed: 5/1/2016  © 4708-4127 [email or web address]. [address] [place] [address], [place] [address]. All rights reserved. This information is not intended as a substitute for professional medical care. Always follow your healthcare professional's instructions.        Mouth Care During Chemotherapy     Brush gently with an extra soft toothbrush and mild toothpaste.     Mouth sores (stomatitis) and dry mouth are common side effects of chemotherapy and radiation therapy. These side effects occur because these treatments affect normal cells as well as cancer cells. Using the tips on this handout may help you feel better.   Remedies that help  · Rinse with 1/2 teaspoon baking soda and 1/4 teaspoon salt mixed in 1 cup of warm water. This helps keep your mouth free of germs.  · Use products that coat and protect the mouth and throat. Or use medications that coat and soothe mouth sores themselves.  · Numb your mouth and throat with special sprays or lozenges to make eating easier.  Prevent mouth sores  · Buy an extra soft toothbrush and mild toothpaste.  · Gently brush your teeth and gums.  · Have your dentist treat any dental problems before your therapy begins.  Moisten a dry mouth  · Drink plenty of water. Take frequent sips or suck on ice chips.  · Suck on sugar-free candy and lozenges. Chew sugar-free gum.  · Use products that moisten the mouth if your doctor prescribes them.  · Apply lip balm to help prevent dry lips.  · Avoid mouthwash that contains alcohol.  Choose foods less likely to irritate  Try foods that are:  · Soft and go down smoothly, such as a milkshake or food puréed with a    · Served cold or at room temperature  · Cooked until tender and cut into small pieces  Avoid foods that are:  · Sharp or crunchy  · Hot, salty, or spicy  · Acidic, such as citrus juices  When to seek medical advice  · You develop mouth sores  · Mouth pain keeps you from eating or resting   Date Last Reviewed: 1/5/2016  © 1428-9739 [employer]. [address] [place] [address], [place] [address]. All rights reserved. This information is not intended as a substitute for professional medical care. Always follow your healthcare professional's instructions.        Nutrition During Chemotherapy     Drink plenty of liquids, such as water.     During chemotherapy, the energy provided by a healthy diet can help you rebuild normal cells. It can also help you keep up your strength and fight infection. As a result, you may feel better and be more able to cope with side effects. Ask your doctor about your nutrition needs.  Drink plenty of fluids  · Fluids help the body produce urine and decrease constipation. They help prevent kidney and bladder problems. They also help replace fluids lost from vomiting and diarrhea.  · Try water, unsweetened juices, and other flavored drinks without caffeine. They flush toxins from the body.  Get enough calories  · Calories are fuel. The body uses this fuel to perform all of its functions, including healing.  · Its OK to be lean, but be sure you are not underweight. If you are, try eating more calories.  · Eat calorie-dense foods such as avocados, peanut butter, eggs, and ice cream.  · If you need extra calories, add butter, gravy, and sauces to foods (if tolerated).  · If you don't need the extra calories, try to limit foods that are fried, greasy, or high in fat or added sugar.  Eat protein, fruits, and vegetables  · Protein builds muscle, bone, skin, and blood. It helps your body heal and fight infection. It also helps boost your energy level.  · Good protein choices include  yogurt, eggs, chicken, lean meats, beans, and peanut butter.  · Fruits and vegetables are full of important vitamins, minerals, and fiber to help your body function properly.  · Try to eat a variety of vegetables, fruits, whole grains, and beans.  · Ask your doctor about instant protein powder or other supplements.  Eating right during treatment  Side effects may make it a little harder to eat well on some days. The following tips will help you continue to get the nutrition you need:  · Be open to new foods and recipes.  · Eat small portions often and slowly.  · Have a healthy snack instead of a meal if you are not very hungry.  · Try eating in a new setting.  · Physical activity, such as walking, can help increase your appetite. Try to be active for at least 30 minutes each day.  · Boost your diet by getting the vitamins and minerals you need from fruits, vegetables, and whole grains.  · If you live alone and are not up to cooking, ask your healthcare provider about Meals on Wheels or other outreach programs.  · Sometimes, it is best to follow your appetitie. Eat when you are hungry, but when you ar enot, forcing yourself to eat can make you feel bad, nauseated, or even cause you to vomit.   Date Last Reviewed: 1/6/2016 © 2000-2017 [employer]. [address] [place] [address], [place] [address]. All rights reserved. This information is not intended as a substitute for professional medical care. Always follow your healthcare professional's instructions.        Chemotherapy: Common Questions About Activity During Treatment     Working from home may be an option.   You may have questions about how chemotherapy could affect the things you take for granted in everyday life. Here are some answers to common questions, and some of the adjustments you may need to make.  Will I still be able to work?  Many people do still work during chemotherapy. If you find you have less energy, you may need to talk with your  employer about adjusting your schedule:  · Work at home or reduce the number of hours you work.  · Plan time off that fits best with your treatment cycle.  Should I exercise?  Ask your healthcare provider about starting an exercise program. It may help you sleep better and sometimes even helps control your appetite. It is also good for your sense of well-being:  · Exercise when you feel most energetic.  · Keep the pace moderate. Even small amounts of exercise can help. Instead of jogging, walk or ride a stationary bike.  Will I be affected sexually?  Chemotherapy can cause sexual changes in men and women:  · You may notice changes in your desire to have sex. Hugging and cuddling may seem more important now.  · Chemotherapy can cause short-term or permanent infertility. Talk to your healthcare provider if you are planning on having children. Men may want to bank, or freeze, sperm.  · Most chemotherapy drugs can cause birth defects if taken during pregnancy. Talk to your healthcare provider about whether you need to use birth control throughout treatment.  Date Last Reviewed: 12/27/2015  © 7048-0388 [employer]. [address] [place] [address], [place] [address]. All rights reserved. This information is not intended as a substitute for professional medical care. Always follow your healthcare professional's instructions.

## 2018-01-29 NOTE — PROGRESS NOTES
Subjective:       Patient ID: Burton Whiting is a 48 y.o. male.    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 Burton Whiting is a 48-year-old male, former smoker, who presents today with a four-month history of enlarging neck mass.  He initially delayed care due to lack of insurance.  He was seen by Dr. Turpin who referred him to see Dr. Olguin.  He had a CT that showed a 3.8 x 3.8 x 4.9 cm soft tissue mass arising from the left palatine tonsil resulting in moderate narrowing of the hypopharyngeal airway adjacent extensive matted left cervical lymphadenopathy was noted.  The largest ella mass was 6.6 x 9 x 2.6 cm extending inferiorly to the supraclavicular region.  The mass pushed the trachea and the left common carotid artery to the right.  Additional representative of cervical lymph nodes measuring 2.9 x 3.1 x 3.5 cm on axial image 37 of series 3   and 2.4 x 2.1 x 2.2 cm on axial image 55 of series 3.  There is also a sclerotic lesion involving the midline of the clivus measuring 1.2 cm.  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  PET scan performed on 01/19/2018 revealed persistent evidence of a soft tissue mass arising from the left palatine tonsil resulting in moderate narrowing of the hypopharyngeal   airway.  The mass is hypermetabolic demonstrating an SUV max of 18.5.  There is also persistent adjacent extensive matted left cervical lymphadenopathy, largest of this measuring 6.7 x 8.42 with hypermetabolic activity and SUV max of 20.5.  Lymphadenopathy is again noted to have a mass effect on the trachea and left common carotid artery, pushing both structures towards the right.  There is also prominent right cervical lymph nodes with the largest measuring 0.8 cm and demonstrating mild hypermetabolic activity with SUV max of 1.8, physiologic   distribution of FDG in the gray matter.  There are 3 pulmonary nodules noted within the right lung, the largest is in the  "anterior segment of the right upper lobe measuring 1 cm, second is visualized in the middle lobe measuring 0.6 cm and the third is within the posterior basal segment measuring 0.6 cm.  There is one pulmonary nodule within the left lung within the lateral basal segment measuring 0.6 cm.  These nodules do not demonstrate hypermetabolic activity; however, they are below the threshold for observation with PET    HPIHe underwent MRI cervical spine revealed "No evidence of metastatic disease to the cervical spine. Multilevel degenerative changes of the cervical spine with disc protrusion at C5-6 which results in moderate to severe spinal canal stenosis and and associated cord signal abnormality, suggestive of compressive myelopathy. 6 mm T1 hypointense non-enhancing lesion in the clivus.  Continued followup is suggested. 9 mm inferior descent of the cerebellar tonsils below the foramen magnum, suggestive of Chiari 1 malformation"  MRI thoracic spine "No evidence of metastatic disease involving the thoracic spine. Incidental hemangioma involving the T7 vertebral body. Mild degenerative disc disease without any significant spinal canal stenosis or neuroforaminal narrowing.  He comes in to receive TPF.    Review of Systems   Constitutional: Negative for appetite change, fatigue and unexpected weight change.   HENT: Negative for mouth sores.    Eyes: Negative for visual disturbance.   Respiratory: Negative for cough and shortness of breath.    Cardiovascular: Negative for chest pain.   Gastrointestinal: Negative for abdominal pain and diarrhea.   Genitourinary: Negative for frequency.   Musculoskeletal: Negative for back pain.   Skin: Negative for rash.   Neurological: Negative for headaches.   Hematological: Negative for adenopathy.   Psychiatric/Behavioral: The patient is not nervous/anxious.    All other systems reviewed and are negative.      PMFSH: all information reviewed and updated as relevant to today's " visit  Objective:      Physical Exam   Constitutional: He is oriented to person, place, and time. He appears well-developed and well-nourished.   HENT:   Mouth/Throat: No oropharyngeal exudate.   Cardiovascular: Normal rate and normal heart sounds.    Pulmonary/Chest: Effort normal and breath sounds normal. He has no wheezes.   Abdominal: Soft. Bowel sounds are normal. There is no tenderness.   Musculoskeletal: He exhibits no edema or tenderness.   Lymphadenopathy:     He has no cervical adenopathy.   Neurological: He is alert and oriented to person, place, and time. Coordination normal.   Skin: Skin is warm and dry. No rash noted.   Psychiatric: He has a normal mood and affect. Judgment and thought content normal.   Vitals reviewed.      LABS:  WBC   Date Value Ref Range Status   01/25/2018 8.84 3.90 - 12.70 K/uL Final     Hemoglobin   Date Value Ref Range Status   01/25/2018 13.9 (L) 14.0 - 18.0 g/dL Final     Hematocrit   Date Value Ref Range Status   01/25/2018 40.8 40.0 - 54.0 % Final     Platelets   Date Value Ref Range Status   01/25/2018 248 150 - 350 K/uL Final     Gran # (ANC)   Date Value Ref Range Status   01/25/2018 6.0 1.8 - 7.7 K/uL Final     Comment:     The ANC is based on a white cell differential from an   automated cell counter. It has not been microscopically   reviewed for the presence of abnormal cells. Clinical   correlation is required.         Chemistry        Component Value Date/Time     01/25/2018 1309    K 4.1 01/25/2018 1309     01/25/2018 1309    CO2 31 (H) 01/25/2018 1309    BUN 10 01/25/2018 1309    CREATININE 0.9 01/25/2018 1309    GLU 98 01/25/2018 1309        Component Value Date/Time    CALCIUM 9.4 01/25/2018 1309    ALKPHOS 90 01/25/2018 1309    AST 24 01/25/2018 1309    ALT 18 01/25/2018 1309    BILITOT 0.4 01/25/2018 1309    ESTGFRAFRICA >60.0 01/25/2018 1309    EGFRNONAA >60.0 01/25/2018 1309          Assessment:       1. Squamous cell carcinoma of palatine  tonsil    2. Cancer of head, face, or neck lymph nodes, secondary    3. Secondary cancer of bone        Plan:        1,2,3. He will proceed with cycle 1 of TPF. Patient was consented for chemotherapy today 1/29/2018 .   An extensive discussion was had which included a thorough discussion of the risk and benefits of treatment and alternatives.  Risks, including but not limited to, possible hair loss, bone marrow damage (anemia, thrombocytopenia, immune suppression, neutropenia), damage to body organs (brain, heart, liver, kidney, lungs, nervous system, skin, and others), allergic reactions, sterility, nausea/vomiting, constipation/diarrhea, sores in the mouth, secondary cancers, local damage at possible injection sites, and rarely death were all discussed.  The patient agrees with the plan, and all questions have been answered to their satisfaction.  Consent was signed the patient, provider, and a third party witness.      Reviewed MRI of cervical and thoracic spine  He will return in 3 weeks for next cycle    Above care plan was discussed with patient and accompanying brother and sister and all questions were addressed to their satisfaction

## 2018-01-29 NOTE — Clinical Note
Schedule weekly labs CBC,CMP, mag and phos  Schedule CBC,CMP, Mag, phos and see me in 3 weeks and for TPF. DC pump on day 6. Neulasta on day 6

## 2018-01-29 NOTE — PLAN OF CARE
Problem: Patient Care Overview (Adult)  Goal: Individualization & Mutuality  Outcome: Ongoing (interventions implemented as appropriate)  1100-Labs , hx, and medications reviewed, patient was seen by MD prior to arrival. Assessment completed. Discussed plan of care with patient. Patient in agreement. Chair reclined and warm blanket and snack offered.

## 2018-01-30 ENCOUNTER — TELEPHONE (OUTPATIENT)
Dept: HEMATOLOGY/ONCOLOGY | Facility: CLINIC | Age: 48
End: 2018-01-30

## 2018-01-30 ENCOUNTER — PATIENT MESSAGE (OUTPATIENT)
Dept: HEMATOLOGY/ONCOLOGY | Facility: CLINIC | Age: 48
End: 2018-01-30

## 2018-01-30 ENCOUNTER — INFUSION (OUTPATIENT)
Dept: INFUSION THERAPY | Facility: HOSPITAL | Age: 48
End: 2018-01-30
Attending: INTERNAL MEDICINE
Payer: COMMERCIAL

## 2018-01-30 VITALS
DIASTOLIC BLOOD PRESSURE: 76 MMHG | TEMPERATURE: 98 F | SYSTOLIC BLOOD PRESSURE: 136 MMHG | RESPIRATION RATE: 20 BRPM | HEART RATE: 77 BPM

## 2018-01-30 DIAGNOSIS — C09.9 TONSIL CANCER: Primary | ICD-10-CM

## 2018-01-30 DIAGNOSIS — C09.9 SQUAMOUS CELL CARCINOMA OF PALATINE TONSIL: ICD-10-CM

## 2018-01-30 DIAGNOSIS — K12.30 ORAL MUCOSITIS: Primary | ICD-10-CM

## 2018-01-30 PROCEDURE — 25000003 PHARM REV CODE 250: Performed by: INTERNAL MEDICINE

## 2018-01-30 PROCEDURE — 96360 HYDRATION IV INFUSION INIT: CPT

## 2018-01-30 RX ORDER — NYSTATIN 100000 [USP'U]/ML
4 SUSPENSION ORAL 4 TIMES DAILY
Qty: 500 ML | Refills: 6 | Status: SHIPPED | OUTPATIENT
Start: 2018-01-30 | End: 2018-02-06

## 2018-01-30 RX ORDER — HEPARIN 100 UNIT/ML
500 SYRINGE INTRAVENOUS
Status: DISCONTINUED | OUTPATIENT
Start: 2018-01-30 | End: 2018-01-30 | Stop reason: HOSPADM

## 2018-01-30 RX ORDER — SODIUM CHLORIDE 0.9 % (FLUSH) 0.9 %
10 SYRINGE (ML) INJECTION
Status: DISCONTINUED | OUTPATIENT
Start: 2018-01-30 | End: 2018-01-30 | Stop reason: HOSPADM

## 2018-01-30 RX ORDER — LIDOCAINE HYDROCHLORIDE 20 MG/ML
SOLUTION OROPHARYNGEAL
Qty: 500 ML | Refills: 6 | Status: SHIPPED | OUTPATIENT
Start: 2018-01-30 | End: 2018-02-22 | Stop reason: SDUPTHER

## 2018-01-30 RX ADMIN — SODIUM CHLORIDE: 0.9 INJECTION, SOLUTION INTRAVENOUS at 05:01

## 2018-01-30 NOTE — PATIENT INSTRUCTIONS
Discharge Instructions for Chemotherapy  Your healthcare provider prescribed a type of medicine therapy for you called chemotherapy. Healthcare providers prescribe chemotherapy for many different types of illnesses, including cancer. There are many types of chemotherapy. This sheet provides general guidelines on how you can take care of yourself after your chemotherapy.  Mouth care  Dont be discouraged if you get mouth sores, even if you are following all your healthcare providers instructions. Many people get mouth sores as a side effect of chemotherapy. Heres what you can do to prevent mouth sores:  · Keep your mouth clean. Brush your teeth with a soft-bristle toothbrush after every meal.  · Ask if you should use a toothpaste with fluoride, or a mixture of 1 teaspoon of salt in 8-ounces of water to brush your teeth.   · Use an oral swab or special soft toothbrush if your gums bleed during regular brushing.  · Don't use dental floss if it causes your gums to bleed.  · Use any mouthwashes given to you as directed.  · If you cant tolerate regular methods, use salt and baking soda to clean your mouth. Mix 1 teaspoon of salt and 1 teaspoon of baking soda into an 8-ounce glass of warm water. Swish and spit.  · If you wear dentures, you may be told to wear them only when you eat, ask your healthcare provider. Clean dentures twice a day and soak in antimicrobial solution when you aren't wearing them. Rinse your mouth after each meal.   · Watch your mouth and tongue for white patches. This may be a sign of a type of yeast infection (thrush), a common side effect of chemotherapy. Be sure to tell your healthcare provider about these patches. Medicine can be prescribed to treat it.  Other home care  Here's what else you can do:  · Try to exercise. Exercise keeps you strong and keeps your heart and lungs active. Walking and yoga are good types of exercise.   · Keep clean. During chemotherapy, your body cant fight  infection very well. Take short baths or showers.  ¨ Wash your hands before you eat and after going to the bathroom.  ¨ Use moisturizing soap. Chemotherapy can make your skin dry.  ¨ Apply moisturizing lotion several times a day to help relieve dry skin.  ¨ Dont take very hot or very cold showers or baths.  · Dont be surprised if your chemotherapy causes slight burns to your skin--usually on the hands and feet. Some medicines used in high doses cause this to happen. Ask for a special cream to help relieve the burn and protect your skin.  · Avoid people who are sick with illnesses and diseases you could catch, such as colds, flu, measles, or chicken pox as well as people who have recently had vaccinations for these illnesses.   · Let your healthcare provider know if your throat is sore. You may have an infection that needs treatment.  · Remember, many patients feel sick and lose their appetites during treatment. Eat small meals several times a day to keep your strength up:  ¨ Choose bland foods with little taste or smell if you are reacting strongly to food.  ¨ Be sure to cook all food thoroughly. This kills bacteria and helps you avoid infection.  ¨ Eat foods that are soft. Soft foods are less likely to cause stomach irritation.  ¨ Try to eat a variety of foods for a well-balanced diet. Drink plenty of fluids and eat foods with fiber to avoid constipation.      When to call your healthcare provider  Call your healthcare provider right away if you have any of the following:  · Unexplained bleeding  · Trouble concentrating  · Ongoing fatigue  · Shortness of breath, wheezing, trouble breathing, or bad cough  · Rapid, irregular heartbeat, or chest pain  · Dizziness, lightheadedness  · Constant feeling of being cold  · Hives or a cut or rash that swells, turns red, feels hot or painful, or begins to ooze  · Burning when you urinate  · Fever of 100.4°F (38°C) or higher, or chills   Date Last Reviewed: 5/1/2016  ©  9351-0138 [employer]. [address] [place] [address], [place] [address]. All rights reserved. This information is not intended as a substitute for professional medical care. Always follow your healthcare professional's instructions.

## 2018-01-30 NOTE — NURSING
1708: Patient present for IV fluids. No c/o pain at this time. Patient has 5FU pump in place to RUE PAC. Sister accompanying.

## 2018-01-30 NOTE — TELEPHONE ENCOUNTER
----- Message from Danae Culver RN sent at 1/29/2018  5:57 PM CST -----  Darvin Combs- Mr Whiting's med list says that the dukes solution was sent to [facility] but when he called [facility] he was told they dont have the rx, when he talked to the [facility] they told him they had the rx and could fill it but his insurance wouldn't cover it and it would be $200.  Mr Whiting and his family are just trying to figure out if there is a reason it isnt covered or what the deal is and I figured you might be able to help them out.  Thanks!

## 2018-01-30 NOTE — PLAN OF CARE
Problem: Patient Care Overview (Adult)  Goal: Plan of Care Review  Outcome: Ongoing (interventions implemented as appropriate)  1830- Portable pump infusing on discharge all clamps open and connections secured. No kinks in tubing noted. Settings double checked by ALESSIA Jose RN. Educated patient to be sure pump should say run and volume infused number should increase daily as reservoir volume decreases. Instructed to call with any issues with pump. Phone numbers given.   Instructed to call provider for any questions or concerns.  Patient to return tomorrow for day 2 iv fluids and return Saturday for pump dc.

## 2018-01-30 NOTE — TELEPHONE ENCOUNTER
----- Message from Corteny Lopez MD sent at 1/29/2018 10:14 AM CST -----  Schedule weekly labs CBC,CMP, mag and phos    Schedule CBC,CMP, Mag, phos and see me in 3 weeks and for TPF. DC pump on day 6. Neulasta on day 6

## 2018-01-30 NOTE — TELEPHONE ENCOUNTER
Can you please send lidocaine and nystatin to [facility] separately?  His insurance does not cover compounds, and the out of picket cost to him is $200.    Message routed to dr waggoner

## 2018-01-31 NOTE — NURSING
1815: patient received fluids per MD orders. 5fu pump infusing. Patient verbalized understanding to RTC Saturday for pump dc.

## 2018-02-01 ENCOUNTER — PATIENT MESSAGE (OUTPATIENT)
Dept: HEMATOLOGY/ONCOLOGY | Facility: CLINIC | Age: 48
End: 2018-02-01

## 2018-02-01 ENCOUNTER — TELEPHONE (OUTPATIENT)
Dept: RADIATION ONCOLOGY | Facility: CLINIC | Age: 48
End: 2018-02-01

## 2018-02-02 ENCOUNTER — TELEPHONE (OUTPATIENT)
Dept: GASTROENTEROLOGY | Facility: CLINIC | Age: 48
End: 2018-02-02

## 2018-02-02 NOTE — TELEPHONE ENCOUNTER
Referral was sent from Dr. Turpin to schedule patient for an Colonoscopy, patient declines at this time due to medical problems.

## 2018-02-03 ENCOUNTER — INFUSION (OUTPATIENT)
Dept: INFUSION THERAPY | Facility: HOSPITAL | Age: 48
End: 2018-02-03
Attending: INTERNAL MEDICINE
Payer: COMMERCIAL

## 2018-02-03 DIAGNOSIS — B37.0 ORAL THRUSH: Primary | ICD-10-CM

## 2018-02-03 DIAGNOSIS — C09.9 TONSIL CANCER: ICD-10-CM

## 2018-02-03 DIAGNOSIS — G89.3 NEOPLASM RELATED PAIN: ICD-10-CM

## 2018-02-03 DIAGNOSIS — C09.9 SQUAMOUS CELL CARCINOMA OF PALATINE TONSIL: Primary | ICD-10-CM

## 2018-02-03 PROCEDURE — A4216 STERILE WATER/SALINE, 10 ML: HCPCS | Performed by: INTERNAL MEDICINE

## 2018-02-03 PROCEDURE — 96360 HYDRATION IV INFUSION INIT: CPT

## 2018-02-03 PROCEDURE — 63600175 PHARM REV CODE 636 W HCPCS: Performed by: INTERNAL MEDICINE

## 2018-02-03 PROCEDURE — 25000003 PHARM REV CODE 250: Performed by: INTERNAL MEDICINE

## 2018-02-03 RX ORDER — FLUCONAZOLE 100 MG/1
100 TABLET ORAL DAILY
Qty: 11 TABLET | Refills: 0 | Status: ON HOLD | OUTPATIENT
Start: 2018-02-03 | End: 2018-02-13 | Stop reason: HOSPADM

## 2018-02-03 RX ORDER — SODIUM CHLORIDE 0.9 % (FLUSH) 0.9 %
10 SYRINGE (ML) INJECTION
Status: DISCONTINUED | OUTPATIENT
Start: 2018-02-03 | End: 2018-02-03 | Stop reason: HOSPADM

## 2018-02-03 RX ORDER — HEPARIN 100 UNIT/ML
500 SYRINGE INTRAVENOUS
Status: DISCONTINUED | OUTPATIENT
Start: 2018-02-03 | End: 2018-02-03 | Stop reason: HOSPADM

## 2018-02-03 RX ORDER — OXYCODONE AND ACETAMINOPHEN 5; 325 MG/1; MG/1
1 TABLET ORAL EVERY 4 HOURS PRN
Qty: 120 TABLET | Refills: 0 | Status: ON HOLD | OUTPATIENT
Start: 2018-02-03 | End: 2018-02-13 | Stop reason: HOSPADM

## 2018-02-03 RX ORDER — OXYCODONE AND ACETAMINOPHEN 5; 325 MG/1; MG/1
1 TABLET ORAL EVERY 6 HOURS PRN
Status: DISCONTINUED | OUTPATIENT
Start: 2018-02-03 | End: 2018-02-03

## 2018-02-03 RX ADMIN — SODIUM CHLORIDE 1000 ML: 0.9 INJECTION, SOLUTION INTRAVENOUS at 11:02

## 2018-02-03 RX ADMIN — SODIUM CHLORIDE, PRESERVATIVE FREE 10 ML: 5 INJECTION INTRAVENOUS at 10:02

## 2018-02-03 RX ADMIN — HEPARIN 500 UNITS: 100 SYRINGE at 12:02

## 2018-02-03 NOTE — NURSING
Pt. Arrived today for ambulatory pump d/c. Infusion had about 1.5 hours remaining. Pt supposed to get the Neulasta OBI placed but refused, requested to come get the injection on Monday. Pt reports pain in throat. Lips dry, cracked  And bleeding. Tongue appears to have thrush, back of throat is red and raw. Pt reports difficulty swallowing. Dr. Lopez arrived at chairside to assess. Pain medication ordered, mouthwash for thrush and IV Fluids for dehydration added to today's treatment plan. Order placed for Neualsta injection Monday. Appt to be scheduled. No questions at this time.

## 2018-02-05 ENCOUNTER — DOCUMENTATION ONLY (OUTPATIENT)
Dept: HEMATOLOGY/ONCOLOGY | Facility: CLINIC | Age: 48
End: 2018-02-05

## 2018-02-05 ENCOUNTER — INFUSION (OUTPATIENT)
Dept: INFUSION THERAPY | Facility: HOSPITAL | Age: 48
End: 2018-02-05
Attending: INTERNAL MEDICINE
Payer: COMMERCIAL

## 2018-02-05 ENCOUNTER — PATIENT MESSAGE (OUTPATIENT)
Dept: HEMATOLOGY/ONCOLOGY | Facility: CLINIC | Age: 48
End: 2018-02-05

## 2018-02-05 DIAGNOSIS — C09.9 SQUAMOUS CELL CARCINOMA OF PALATINE TONSIL: Primary | ICD-10-CM

## 2018-02-05 PROCEDURE — 25000003 PHARM REV CODE 250: Performed by: INTERNAL MEDICINE

## 2018-02-05 RX ORDER — ONDANSETRON 4 MG/1
8 TABLET, FILM COATED ORAL ONCE
Status: COMPLETED | OUTPATIENT
Start: 2018-02-05 | End: 2018-02-05

## 2018-02-05 RX ADMIN — ONDANSETRON 8 MG: 4 TABLET, FILM COATED ORAL at 03:02

## 2018-02-05 NOTE — NURSING
Per LIONEL Gaitan, charge RN neupogen will not be approved today.  Patient will be placed on tomorrow's schedule and notified once authorization has occurred.  Information relayed to patient's brother Rigo, verbalized understanding.

## 2018-02-05 NOTE — NURSING
Per SHANELL Vela, RN Dr Lopez states will not get neulasta today, and will be changed to 5 days of neupogen 480mcg.  Patient and family member updated on plan of care, verbalized understanding.  Awaiting auth before giving.  At this time SHANELL Vela RN also notified of mouth sores, per Dr Lopez advice instructed patient on use of ordered Dukes solution 4-5 times a day along with baking soda and salt water rinses, verbalized understanding.  Patient will be given zofran po while waiting for authorization because he forgot his nausea medication at home.

## 2018-02-06 ENCOUNTER — PATIENT MESSAGE (OUTPATIENT)
Dept: HEMATOLOGY/ONCOLOGY | Facility: CLINIC | Age: 48
End: 2018-02-06

## 2018-02-06 ENCOUNTER — TELEPHONE (OUTPATIENT)
Dept: HEMATOLOGY/ONCOLOGY | Facility: CLINIC | Age: 48
End: 2018-02-06

## 2018-02-06 ENCOUNTER — INFUSION (OUTPATIENT)
Dept: INFUSION THERAPY | Facility: HOSPITAL | Age: 48
End: 2018-02-06
Attending: INTERNAL MEDICINE
Payer: COMMERCIAL

## 2018-02-06 VITALS
WEIGHT: 261.69 LBS | DIASTOLIC BLOOD PRESSURE: 65 MMHG | SYSTOLIC BLOOD PRESSURE: 97 MMHG | RESPIRATION RATE: 18 BRPM | HEART RATE: 128 BPM | TEMPERATURE: 100 F | BODY MASS INDEX: 39.66 KG/M2 | HEIGHT: 68 IN

## 2018-02-06 DIAGNOSIS — C09.9 SQUAMOUS CELL CARCINOMA OF PALATINE TONSIL: Primary | ICD-10-CM

## 2018-02-06 PROBLEM — E86.0 DEHYDRATION: Status: ACTIVE | Noted: 2018-02-06

## 2018-02-06 PROBLEM — R50.81 FEVER AND NEUTROPENIA: Status: ACTIVE | Noted: 2018-02-06

## 2018-02-06 PROBLEM — A41.9 SEPSIS: Status: ACTIVE | Noted: 2018-02-06

## 2018-02-06 PROBLEM — D70.9 FEVER AND NEUTROPENIA: Status: ACTIVE | Noted: 2018-02-06

## 2018-02-06 PROBLEM — B37.0 THRUSH: Status: ACTIVE | Noted: 2018-02-06

## 2018-02-06 PROCEDURE — 96372 THER/PROPH/DIAG INJ SC/IM: CPT | Mod: PN

## 2018-02-06 PROCEDURE — 63600175 PHARM REV CODE 636 W HCPCS: Mod: TB,PN | Performed by: INTERNAL MEDICINE

## 2018-02-06 RX ADMIN — FILGRASTIM 480 MCG: 480 INJECTION, SOLUTION INTRAVENOUS; SUBCUTANEOUS at 08:02

## 2018-02-06 NOTE — PATIENT INSTRUCTIONS
"  Preventing Falls: Are You At Risk of Falling?     Ask for help to reduce risk of falling in your home.     As you get older, you're not as steady on your feet as you once were. And you may have health problems you didn't have when you were younger. So, it's not surprising that older people are more likely to trip and fall. Falling can be very serious. It can change your overall health and quality of life. That's why it's important to be aware of your own risk of falling.  The dangers of falling  Falls are one of the main causes of injury in people over age 65. An older person who falls may take longer to get better than a younger person. And, after a fall, an older person is more likely to have problems that don't go away. So, preventing falls can help you avoid serious health problems.  Are you at risk of falling?  Answer these questions to rate your level of risk.  · Are you a woman?  · Have you fallen or stumbled in the last year?  · Are you over age 65?  · Are you ever dizzy or lightheaded with standing?  · Do you have a hard time getting in and out of the bathtub or on and off the toilet?  · Do you lean on objects to help you get around? Or do you use a cane or walker?  · Do you have vision or hearing problems? For example, do you need new glasses or hearing aids?  · Do you have 2 or more long-lasting (chronic) medical conditions?  · Do you take 3 or more medicines?  · Have you felt depressed recently?  · Have you had more trouble with your memory in recent months?  · Are there hazards in your home that might cause you to fall, such as loose rugs or poor lighting?  · Do you have a pet that jumps on you or might trip you?  · Have you stopped getting regular exercise?  · Do you have diabetes?   · Do you have a neurologic disease, such as Parkinson or Alzheimer disease?   · Do you drink alcohol?  · Do you wear athletic shoes or slippers, or go barefoot at home?  You can help prevent falls  If you answered "yes" " to any of the above questions, you should take steps to reduce your risk of a fall. Monitoring health conditions and keeping walkways in your home free of clutter are just 2 ways. Changing is sometimes easier said than done. But keep in mind that even small changes can make you less likely to fall.  The fear of falling  It's normal to be scared of falling, especially if you've fallen before. But being afraid can actually make you more likely to fall. This is because:  · Fear might cause you to become less active. Being less active can lead to a loss of strength and balance.  · Fear can lead to isolation from others, depression, or the use of more medicines or alcohol. And all these things make falling even more likely.  To break the cycle, learn more about ways to avoid falling. As you take control, you may find yourself feeling less afraid.   Date Last Reviewed: 6/12/2015  © 8926-8633 [employer]. [address] [place] [address]. All rights reserved. This information is not intended as a substitute for professional medical care. Always follow your healthcare professional's instructions.        Understanding Neupogen     You may be able to get Neupogen injections at home.      Your doctor has prescribed the medication Neupogen for you. Neupogen increases the number of infection-fighting cells in your blood following a chemotherapy treatment. It is given by injection (a shot). Your doctor or pharmacist can give you information about getting the injections. This sheet will help you learn more about Neupogen and how it is given at home, or in a clinic or hospital.  What Neupogen can do for you  · Improve your quality of life.  · Make you less prone to infection.  · Make it safe for you to be in close contact with people. And as a result, you can do more.  · Prevent illness that could cause a delay in your treatment.  How it works  · When you have a chemotherapy treatment, the number of  infection-fighting cells in your blood is reduced.  · As the number of cells decreases, you are less able to fight infection.  · Neupogen works by helping these infection-fighting blood cells rebuild faster inside of your bones.  Coping with side effects  · Common side effects are aching bones, joints, and muscles, and redness, swelling, or itching at the site of injection.  · These symptoms can often be relieved with a heating pad and/or pain medications that dont contain aspirin. Check with your doctor before you take anything for pain.  · Rare side effects include headache, pain in the lower back or pelvis, skin rash or itching, and nausea. Report all side effects to your doctor.  When should I call my healthcare provider?  Call your healthcare provider right away if any of these occur:  · Possible signs of infection, such as fever, chills, rash, sore throat, diarrhea, or redness at the site of a wound or sore  · Pain in the left upper stomach area or left shoulder area  (this could be a sign of spleen enlargement, a rare side effect of neupogen treatment)  · Shortness of breath, wheezing, dizziness, swelling around the mouth or eyes, quick pulse, or sweating  · Redness, swelling, or itching at the site of injection   Date Last Reviewed: 5/1/2016 © 2000-2017 [employer]. [address] [place] [address]. All rights reserved. This information is not intended as a substitute for professional medical care. Always follow your healthcare professional's instructions.        Oncology: Preventing Infections  Chemotherapy can make your body less able to fight off infection. This happens because treatment reduces the number of white blood cells. White blood cells fight infection in your body. To help prevent infections, follow the tips below.     Scrub your hands with soap and warm water for at least 15 seconds.   Know your jenise  The jenise is the time during your chemotherapy cycle when you have  the fewest white blood cells. The length of your jenise and when it occurs depend on the medicines you are taking. Each medicine has its own jenise. Talk with your doctor or nurse about your jenise period. Then take extra precautions to prevent infection at that time.  Protect yourself  · Keep your hands clean. To reduce your risk of infection, bathe every day and wash your hands often throughout the day. For best results, lather them with soap for at least 15 seconds. Wash your hands before eating, after spending time in public places, and after using the bathroom.  · Stay away from some foods. Limit your risk. Dont eat uncooked or undercooked meat or fish. You may also be told not to eat raw vegetables or thin-skinned fruits during your jenise.  · Reduce your risk for illness. During this time your body is less able to fight off colds, measles, and other illnesses. Stay away from anyone who has a fever or an infection. Also stay away from large crowds during your jenise.  · Wear gloves. Make it harder for infection to enter your body. Wear gloves when you work around germs and dirt. Have someone else clean a pets tank, cage, or litter box.  · Try not to accidentally cut yourself. Protect your feet from injury and germs by not walking barefoot.  How medicines can help  · Prevent and treat infection. Antibiotics work in this way by attacking and killing the germs that cause infection.  · Trigger new cell growth. These medicines cause your body to make new white blood cells. Neupogen is an example of such a medicine.  Talk with your doctor about the best medicines for you. In some cases, your doctor may tell you to not take acetaminophen or NSAIDs for pain relief because these medicines can mask a fever if you have neutropenia. Talk with your doctor about medicines for pain control if you need it during your jenise period.  When to seek medical advice  Contact your doctor right away if you have any of the  following:  · Fever of 100.4ºF (38ºC) or higher, or as directed by your healthcare provider  · Burning when you urinate  · Severe coughing or sore throat  · Shortness of breath, sweating, or chills  · Pain, especially near an open wound or catheter site   Date Last Reviewed: 1/3/2016  © 6047-3734 [employer]. [address] [place] [address]. All rights reserved. This information is not intended as a substitute for professional medical care. Always follow your healthcare professional's instructions.

## 2018-02-06 NOTE — NURSING
"Pt arrived "I don't feel good at all." Pt will 100.0 axillary temp. Pt has chills and is here for Neupogen. See doc flow sheet. Dr. Lopez notified. To the ER at [facility]  after Neupogen injection per v/o read back Lauryn Vela RN for Dr Lopez. Pt and family verbalized understanding.  "

## 2018-02-07 ENCOUNTER — PATIENT MESSAGE (OUTPATIENT)
Dept: HEMATOLOGY/ONCOLOGY | Facility: CLINIC | Age: 48
End: 2018-02-07

## 2018-02-11 ENCOUNTER — PATIENT MESSAGE (OUTPATIENT)
Dept: HEMATOLOGY/ONCOLOGY | Facility: CLINIC | Age: 48
End: 2018-02-11

## 2018-02-12 ENCOUNTER — PATIENT MESSAGE (OUTPATIENT)
Dept: HEMATOLOGY/ONCOLOGY | Facility: CLINIC | Age: 48
End: 2018-02-12

## 2018-02-18 ENCOUNTER — PATIENT MESSAGE (OUTPATIENT)
Dept: HEMATOLOGY/ONCOLOGY | Facility: CLINIC | Age: 48
End: 2018-02-18

## 2018-02-18 PROBLEM — T45.1X5A CHEMOTHERAPY-INDUCED NEUTROPENIA: Status: ACTIVE | Noted: 2018-02-18

## 2018-02-18 PROBLEM — D70.1 CHEMOTHERAPY-INDUCED NEUTROPENIA: Status: ACTIVE | Noted: 2018-02-18

## 2018-02-19 ENCOUNTER — PATIENT MESSAGE (OUTPATIENT)
Dept: HEMATOLOGY/ONCOLOGY | Facility: CLINIC | Age: 48
End: 2018-02-19

## 2018-02-20 ENCOUNTER — PATIENT MESSAGE (OUTPATIENT)
Dept: HEMATOLOGY/ONCOLOGY | Facility: CLINIC | Age: 48
End: 2018-02-20

## 2018-02-21 ENCOUNTER — OFFICE VISIT (OUTPATIENT)
Dept: HEMATOLOGY/ONCOLOGY | Facility: CLINIC | Age: 48
End: 2018-02-21
Payer: COMMERCIAL

## 2018-02-21 ENCOUNTER — PATIENT MESSAGE (OUTPATIENT)
Dept: HEMATOLOGY/ONCOLOGY | Facility: CLINIC | Age: 48
End: 2018-02-21

## 2018-02-21 ENCOUNTER — INFUSION (OUTPATIENT)
Dept: INFUSION THERAPY | Facility: HOSPITAL | Age: 48
End: 2018-02-21
Attending: INTERNAL MEDICINE
Payer: COMMERCIAL

## 2018-02-21 VITALS
OXYGEN SATURATION: 97 % | HEART RATE: 81 BPM | SYSTOLIC BLOOD PRESSURE: 139 MMHG | TEMPERATURE: 98 F | DIASTOLIC BLOOD PRESSURE: 83 MMHG | BODY MASS INDEX: 39.2 KG/M2 | RESPIRATION RATE: 16 BRPM | HEIGHT: 68 IN | WEIGHT: 258.63 LBS

## 2018-02-21 VITALS
DIASTOLIC BLOOD PRESSURE: 64 MMHG | TEMPERATURE: 98 F | SYSTOLIC BLOOD PRESSURE: 121 MMHG | RESPIRATION RATE: 18 BRPM | HEART RATE: 70 BPM

## 2018-02-21 DIAGNOSIS — C09.9 SQUAMOUS CELL CARCINOMA OF PALATINE TONSIL: ICD-10-CM

## 2018-02-21 DIAGNOSIS — R11.0 NAUSEA: Primary | ICD-10-CM

## 2018-02-21 DIAGNOSIS — C09.9 SQUAMOUS CELL CARCINOMA OF PALATINE TONSIL: Primary | ICD-10-CM

## 2018-02-21 DIAGNOSIS — C77.0 CANCER OF HEAD, FACE, OR NECK LYMPH NODES, SECONDARY: ICD-10-CM

## 2018-02-21 DIAGNOSIS — C79.51 SECONDARY CANCER OF BONE: ICD-10-CM

## 2018-02-21 PROCEDURE — 63600175 PHARM REV CODE 636 W HCPCS: Performed by: INTERNAL MEDICINE

## 2018-02-21 PROCEDURE — 3008F BODY MASS INDEX DOCD: CPT | Mod: S$GLB,,, | Performed by: INTERNAL MEDICINE

## 2018-02-21 PROCEDURE — 96413 CHEMO IV INFUSION 1 HR: CPT

## 2018-02-21 PROCEDURE — 99999 PR PBB SHADOW E&M-EST. PATIENT-LVL IV: CPT | Mod: PBBFAC,,, | Performed by: INTERNAL MEDICINE

## 2018-02-21 PROCEDURE — 96366 THER/PROPH/DIAG IV INF ADDON: CPT

## 2018-02-21 PROCEDURE — 25000003 PHARM REV CODE 250: Performed by: INTERNAL MEDICINE

## 2018-02-21 PROCEDURE — 96417 CHEMO IV INFUS EACH ADDL SEQ: CPT

## 2018-02-21 PROCEDURE — 96367 TX/PROPH/DG ADDL SEQ IV INF: CPT

## 2018-02-21 PROCEDURE — 99215 OFFICE O/P EST HI 40 MIN: CPT | Mod: S$GLB,,, | Performed by: INTERNAL MEDICINE

## 2018-02-21 PROCEDURE — 96416 CHEMO PROLONG INFUSE W/PUMP: CPT

## 2018-02-21 RX ORDER — HEPARIN 100 UNIT/ML
500 SYRINGE INTRAVENOUS
Status: CANCELLED | OUTPATIENT
Start: 2018-02-26

## 2018-02-21 RX ORDER — DEXAMETHASONE 6 MG/1
6 TABLET ORAL 2 TIMES DAILY WITH MEALS
Qty: 60 TABLET | Refills: 0 | Status: ON HOLD | OUTPATIENT
Start: 2018-02-21 | End: 2018-03-08 | Stop reason: HOSPADM

## 2018-02-21 RX ORDER — SODIUM CHLORIDE 0.9 % (FLUSH) 0.9 %
10 SYRINGE (ML) INJECTION
Status: CANCELLED | OUTPATIENT
Start: 2018-02-26

## 2018-02-21 RX ORDER — SODIUM CHLORIDE 0.9 % (FLUSH) 0.9 %
10 SYRINGE (ML) INJECTION
Status: CANCELLED | OUTPATIENT
Start: 2018-02-21

## 2018-02-21 RX ORDER — HEPARIN 100 UNIT/ML
500 SYRINGE INTRAVENOUS
Status: DISCONTINUED | OUTPATIENT
Start: 2018-02-21 | End: 2018-02-21 | Stop reason: HOSPADM

## 2018-02-21 RX ORDER — HEPARIN 100 UNIT/ML
500 SYRINGE INTRAVENOUS
Status: CANCELLED | OUTPATIENT
Start: 2018-02-22

## 2018-02-21 RX ORDER — SODIUM CHLORIDE 0.9 % (FLUSH) 0.9 %
10 SYRINGE (ML) INJECTION
Status: CANCELLED | OUTPATIENT
Start: 2018-02-22

## 2018-02-21 RX ORDER — SODIUM CHLORIDE 0.9 % (FLUSH) 0.9 %
10 SYRINGE (ML) INJECTION
Status: DISCONTINUED | OUTPATIENT
Start: 2018-02-21 | End: 2018-02-21 | Stop reason: HOSPADM

## 2018-02-21 RX ORDER — HEPARIN 100 UNIT/ML
500 SYRINGE INTRAVENOUS
Status: CANCELLED | OUTPATIENT
Start: 2018-02-21

## 2018-02-21 RX ADMIN — MAGNESIUM SULFATE: 500 INJECTION, SOLUTION INTRAMUSCULAR; INTRAVENOUS at 02:02

## 2018-02-21 RX ADMIN — DOCETAXEL 180 MG: 10 INJECTION, SOLUTION INTRAVENOUS at 12:02

## 2018-02-21 RX ADMIN — SODIUM CHLORIDE 150 MG: 9 INJECTION, SOLUTION INTRAVENOUS at 12:02

## 2018-02-21 RX ADMIN — FLUOROURACIL 11850 MG: 50 INJECTION, SOLUTION INTRAVENOUS at 05:02

## 2018-02-21 RX ADMIN — MAGNESIUM SULFATE: 500 INJECTION, SOLUTION INTRAMUSCULAR; INTRAVENOUS at 09:02

## 2018-02-21 RX ADMIN — DEXAMETHASONE SODIUM PHOSPHATE: 4 INJECTION, SOLUTION INTRAMUSCULAR; INTRAVENOUS at 11:02

## 2018-02-21 RX ADMIN — SODIUM CHLORIDE 237 MG: 9 INJECTION, SOLUTION INTRAVENOUS at 01:02

## 2018-02-21 NOTE — PLAN OF CARE
Problem: Patient Care Overview  Goal: Plan of Care Review  Outcome: Ongoing (interventions implemented as appropriate)  Tolerated treatment well.  Advised to call MD for any problems or concerns.  AVS given.  RTC on tomorrow for ivf's.  NAD noted upon discharge.

## 2018-02-21 NOTE — PROGRESS NOTES
Subjective:       Patient ID: Burton Whiting is a 48 y.o. male.    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 Burton Whiting is a 48-year-old male, former smoker, who presents today with a four-month history of enlarging neck mass.  He initially delayed care due to lack of insurance.  He was seen by Dr. Turpin who referred him to see Dr. Olguin.  He had a CT that showed a 3.8 x 3.8 x 4.9 cm soft tissue mass arising from the left palatine tonsil resulting in moderate narrowing of the hypopharyngeal airway adjacent extensive matted left cervical lymphadenopathy was noted.  The largest ella mass was 6.6 x 9 x 2.6 cm extending inferiorly to the supraclavicular region.  The mass pushed the trachea and the left common carotid artery to the right.  Additional representative of cervical lymph nodes measuring 2.9 x 3.1 x 3.5 cm on axial image 37 of series 3   and 2.4 x 2.1 x 2.2 cm on axial image 55 of series 3.  There is also a sclerotic lesion involving the midline of the clivus measuring 1.2 cm.  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  PET scan performed on 01/19/2018 revealed persistent evidence of a soft tissue mass arising from the left palatine tonsil resulting in moderate narrowing of the hypopharyngeal   airway.  The mass is hypermetabolic demonstrating an SUV max of 18.5.  There is also persistent adjacent extensive matted left cervical lymphadenopathy, largest of this measuring 6.7 x 8.42 with hypermetabolic activity and SUV max of 20.5.  Lymphadenopathy is again noted to have a mass effect on the trachea and left common carotid artery, pushing both structures towards the right.  There is also prominent right cervical lymph nodes with the largest measuring 0.8 cm and demonstrating mild hypermetabolic activity with SUV max of 1.8, physiologic   distribution of FDG in the gray matter.  There are 3 pulmonary nodules noted within the right lung, the largest is in the  "anterior segment of the right upper lobe measuring 1 cm, second is visualized in the middle lobe measuring 0.6 cm and the third is within the posterior basal segment measuring 0.6 cm.  There is one pulmonary nodule within the left lung within the lateral basal segment measuring 0.6 cm.  These nodules do not demonstrate hypermetabolic activity; however, they are below the threshold for observation with PET     HPIHe underwent MRI cervical spine revealed "No evidence of metastatic disease to the cervical spine. Multilevel degenerative changes of the cervical spine with disc protrusion at C5-6 which results in moderate to severe spinal canal stenosis and and associated cord signal abnormality, suggestive of compressive myelopathy. 6 mm T1 hypointense non-enhancing lesion in the clivus.  Continued followup is suggested. 9 mm inferior descent of the cerebellar tonsils below the foramen magnum, suggestive of Chiari 1 malformation"  MRI thoracic spine "No evidence of metastatic disease involving the thoracic spine. Incidental hemangioma involving the T7 vertebral body. Mild degenerative disc disease without any significant spinal canal stenosis or neuroforaminal narrowing.    HPI He comes in to receive cycle 2 of TPF. He feels well and denies any new complaints. He feels better and notes that his breathing is better, and is also able to sleep.  He had severe mucositis and neutropenic fever and was admitted and now feels better. He notes that his breathing is better and he is able to sleep through the whole night  He denies any nausea, vomiting, diarrhea, constipation, abdominal pain, weight loss or loss of appetite, chest pain, shortness of breath, leg swelling, fatigue, pain, headache, dizziness, or mood changes. His ECOG PS is zero. He is accompanied by his brother.             Review of Systems   Constitutional: Negative for appetite change, fatigue and unexpected weight change.   HENT: Negative for mouth sores.  "   Eyes: Negative for visual disturbance.   Respiratory: Negative for cough and shortness of breath.    Cardiovascular: Negative for chest pain.   Gastrointestinal: Negative for abdominal pain and diarrhea.   Genitourinary: Negative for frequency.   Musculoskeletal: Negative for back pain.   Skin: Negative for rash.   Neurological: Negative for headaches.   Hematological: Negative for adenopathy.   Psychiatric/Behavioral: The patient is not nervous/anxious.    All other systems reviewed and are negative.      PMFSH: all information reviewed and updated as relevant to today's visit  Objective:      Physical Exam   Constitutional: He is oriented to person, place, and time. He appears well-developed and well-nourished.   HENT:   Mouth/Throat: No oropharyngeal exudate.   Cardiovascular: Normal rate and normal heart sounds.    Pulmonary/Chest: Effort normal and breath sounds normal. He has no wheezes.   Abdominal: Soft. Bowel sounds are normal. There is no tenderness.   Musculoskeletal: He exhibits no edema or tenderness.   Lymphadenopathy:     He has no cervical adenopathy.   Neurological: He is alert and oriented to person, place, and time. Coordination normal.   Skin: Skin is warm and dry. No rash noted.   Psychiatric: He has a normal mood and affect. Judgment and thought content normal.   Vitals reviewed.      LABS:  WBC   Date Value Ref Range Status   02/21/2018 7.44 3.90 - 12.70 K/uL Final     Hemoglobin   Date Value Ref Range Status   02/21/2018 11.9 (L) 14.0 - 18.0 g/dL Final     Hematocrit   Date Value Ref Range Status   02/21/2018 36.2 (L) 40.0 - 54.0 % Final     Platelets   Date Value Ref Range Status   02/21/2018 189 150 - 350 K/uL Final     Gran # (ANC)   Date Value Ref Range Status   02/21/2018 5.0 1.8 - 7.7 K/uL Final     Comment:     The ANC is based on a white cell differential from an   automated cell counter. It has not been microscopically   reviewed for the presence of abnormal cells. Clinical    correlation is required.         Chemistry        Component Value Date/Time     02/21/2018 0801    K 5.0 02/21/2018 0801     02/21/2018 0801    CO2 25 02/21/2018 0801    BUN 12 02/21/2018 0801    CREATININE 1.2 02/21/2018 0801     (H) 02/21/2018 0801        Component Value Date/Time    CALCIUM 9.4 02/21/2018 0801    ALKPHOS 95 02/21/2018 0801    AST 25 02/21/2018 0801    ALT 28 02/21/2018 0801    BILITOT 0.3 02/21/2018 0801    ESTGFRAFRICA >60.0 02/21/2018 0801    EGFRNONAA >60.0 02/21/2018 0801          Assessment:       1. Squamous cell carcinoma of palatine tonsil    2. Cancer of head, face, or neck lymph nodes, secondary    3. Secondary cancer of bone        Plan:        1,2,3. He will proceed with cycle 2 of TPF and will return in 3 weeks for cycle 3 with PET scan prior.  Neulasta on day 5. Will also monitor labs weekly    Above care plan was discussed with patient and accompanying brother and all questions were addressed to their satisfaction

## 2018-02-21 NOTE — Clinical Note
Schedule IV fluids tomorrow here Schedule CBC,CMp, mag and phos weekly in [place] weekly   Schedule CBC,CMP, mag and phos and PET scan (schedule PET on main campus) and see me in 3 weeks and for TPF. DC pump on day 5. Neulasta and IV fluids on day 5. IV fluids on day 2

## 2018-02-22 ENCOUNTER — PATIENT MESSAGE (OUTPATIENT)
Dept: HEMATOLOGY/ONCOLOGY | Facility: CLINIC | Age: 48
End: 2018-02-22

## 2018-02-22 ENCOUNTER — INFUSION (OUTPATIENT)
Dept: INFUSION THERAPY | Facility: HOSPITAL | Age: 48
End: 2018-02-22
Attending: INTERNAL MEDICINE
Payer: COMMERCIAL

## 2018-02-22 VITALS
HEART RATE: 78 BPM | TEMPERATURE: 98 F | SYSTOLIC BLOOD PRESSURE: 146 MMHG | DIASTOLIC BLOOD PRESSURE: 67 MMHG | RESPIRATION RATE: 18 BRPM

## 2018-02-22 DIAGNOSIS — K12.30 ORAL MUCOSITIS: ICD-10-CM

## 2018-02-22 DIAGNOSIS — C09.9 SQUAMOUS CELL CARCINOMA OF PALATINE TONSIL: Primary | ICD-10-CM

## 2018-02-22 PROCEDURE — 25000003 PHARM REV CODE 250: Performed by: INTERNAL MEDICINE

## 2018-02-22 PROCEDURE — 96360 HYDRATION IV INFUSION INIT: CPT

## 2018-02-22 RX ORDER — LIDOCAINE HYDROCHLORIDE 20 MG/ML
SOLUTION OROPHARYNGEAL
Qty: 500 ML | Refills: 6 | Status: SHIPPED | OUTPATIENT
Start: 2018-02-22 | End: 2018-10-01

## 2018-02-22 RX ORDER — SODIUM CHLORIDE 0.9 % (FLUSH) 0.9 %
10 SYRINGE (ML) INJECTION
Status: DISCONTINUED | OUTPATIENT
Start: 2018-02-22 | End: 2018-02-22 | Stop reason: HOSPADM

## 2018-02-22 RX ORDER — HEPARIN 100 UNIT/ML
500 SYRINGE INTRAVENOUS
Status: DISCONTINUED | OUTPATIENT
Start: 2018-02-22 | End: 2018-02-22 | Stop reason: HOSPADM

## 2018-02-22 RX ADMIN — SODIUM CHLORIDE: 0.9 INJECTION, SOLUTION INTRAVENOUS at 04:02

## 2018-02-22 NOTE — TELEPHONE ENCOUNTER
----- Message from Thea Bach sent at 2/22/2018 11:21 AM CST -----  Contact: Pt  Pt called about medication and is concern   Call back [phone]  Thank You  ITZ Bach

## 2018-02-22 NOTE — TELEPHONE ENCOUNTER
Spoke with patient.  He is requesting a refill on lidocaine solution to [facility]. Reviewed with patient how to take steroid---6mg bid starting on day 2---for 4 days.  Patient voiced understanding.

## 2018-02-23 NOTE — NURSING
Patient arrived for ivfs. Tolerated well. No complaints at this time. CADD pump infusing w/out issues. Plan to rtc 2/26. Verbalized understanding to call MD for any questions/concerns. AVS given. Discharged home, ambulated independently with brother by side.

## 2018-02-26 ENCOUNTER — INFUSION (OUTPATIENT)
Dept: INFUSION THERAPY | Facility: HOSPITAL | Age: 48
End: 2018-02-26
Attending: INTERNAL MEDICINE
Payer: COMMERCIAL

## 2018-02-26 ENCOUNTER — PATIENT MESSAGE (OUTPATIENT)
Dept: HEMATOLOGY/ONCOLOGY | Facility: CLINIC | Age: 48
End: 2018-02-26

## 2018-02-26 VITALS
RESPIRATION RATE: 16 BRPM | HEART RATE: 92 BPM | SYSTOLIC BLOOD PRESSURE: 128 MMHG | DIASTOLIC BLOOD PRESSURE: 72 MMHG | TEMPERATURE: 98 F

## 2018-02-26 DIAGNOSIS — C09.9 SQUAMOUS CELL CARCINOMA OF PALATINE TONSIL: Primary | ICD-10-CM

## 2018-02-26 DIAGNOSIS — B37.0 ORAL THRUSH: Primary | ICD-10-CM

## 2018-02-26 PROCEDURE — 25000003 PHARM REV CODE 250: Performed by: INTERNAL MEDICINE

## 2018-02-26 PROCEDURE — 63600175 PHARM REV CODE 636 W HCPCS: Mod: JG | Performed by: INTERNAL MEDICINE

## 2018-02-26 PROCEDURE — 96377 APPLICATON ON-BODY INJECTOR: CPT

## 2018-02-26 PROCEDURE — 96360 HYDRATION IV INFUSION INIT: CPT

## 2018-02-26 RX ORDER — HEPARIN 100 UNIT/ML
500 SYRINGE INTRAVENOUS
Status: DISCONTINUED | OUTPATIENT
Start: 2018-02-26 | End: 2018-02-26 | Stop reason: HOSPADM

## 2018-02-26 RX ORDER — SODIUM CHLORIDE 0.9 % (FLUSH) 0.9 %
10 SYRINGE (ML) INJECTION
Status: DISCONTINUED | OUTPATIENT
Start: 2018-02-26 | End: 2018-02-26 | Stop reason: HOSPADM

## 2018-02-26 RX ORDER — FLUCONAZOLE 40 MG/ML
200 POWDER, FOR SUSPENSION ORAL DAILY
Qty: 60 ML | Refills: 0 | Status: ON HOLD | OUTPATIENT
Start: 2018-02-26 | End: 2018-03-08 | Stop reason: HOSPADM

## 2018-02-26 RX ADMIN — HEPARIN SODIUM (PORCINE) LOCK FLUSH IV SOLN 100 UNIT/ML 500 UNITS: 100 SOLUTION at 05:02

## 2018-02-26 RX ADMIN — SODIUM CHLORIDE 1000 ML: 9 INJECTION, SOLUTION INTRAVENOUS at 04:02

## 2018-02-26 RX ADMIN — PEGFILGRASTIM 6 MG: KIT SUBCUTANEOUS at 05:02

## 2018-02-26 NOTE — PLAN OF CARE
Problem: Patient Care Overview  Goal: Plan of Care Review  Outcome: Ongoing (interventions implemented as appropriate)  Patient tolerated treatment well.  VSS, NAD noted.  Released to home accompanied by family members, in stable condition.

## 2018-02-26 NOTE — PLAN OF CARE
Problem: Patient Care Overview  Goal: Plan of Care Review  Outcome: Ongoing (interventions implemented as appropriate)  [facility], 5-FU CADD still infusing to [employer].  VSS, NAD noted.  Pt reports mouth sores not resolved, but improved since last visit. He is experiencing tinnitus, and constipation as well.  Lengthy discussion regarding care and tx of side effects.  Dr. Lopze's office notified of patient's complaints.

## 2018-02-28 ENCOUNTER — HOSPITAL ENCOUNTER (INPATIENT)
Facility: HOSPITAL | Age: 48
LOS: 8 days | Discharge: HOME | DRG: 871 | End: 2018-03-08
Attending: EMERGENCY MEDICINE | Admitting: INTERNAL MEDICINE
Payer: COMMERCIAL

## 2018-02-28 DIAGNOSIS — R10.9 ABDOMINAL PAIN: ICD-10-CM

## 2018-02-28 DIAGNOSIS — E86.0 DEHYDRATION: ICD-10-CM

## 2018-02-28 DIAGNOSIS — K52.9 ENTEROCOLITIS: Primary | ICD-10-CM

## 2018-02-28 DIAGNOSIS — D70.9 NEUTROPENIA, UNSPECIFIED TYPE: ICD-10-CM

## 2018-02-28 DIAGNOSIS — E87.8 ELECTROLYTE ABNORMALITY: ICD-10-CM

## 2018-02-28 PROBLEM — D69.6 THROMBOCYTOPENIA: Status: ACTIVE | Noted: 2018-02-28

## 2018-02-28 PROBLEM — E83.42 HYPOMAGNESEMIA: Status: ACTIVE | Noted: 2018-02-28

## 2018-02-28 PROBLEM — R00.0 TACHYCARDIA: Status: ACTIVE | Noted: 2018-02-28

## 2018-02-28 PROBLEM — E87.1 HYPONATREMIA: Status: ACTIVE | Noted: 2018-02-28

## 2018-02-28 LAB
ALBUMIN SERPL BCP-MCNC: 2.4 G/DL
ALP SERPL-CCNC: 61 U/L
ALT SERPL W/O P-5'-P-CCNC: 51 U/L
ANION GAP SERPL CALC-SCNC: 11 MMOL/L
ANISOCYTOSIS BLD QL SMEAR: SLIGHT
AST SERPL-CCNC: 22 U/L
BASOPHILS NFR BLD: 0 %
BILIRUB SERPL-MCNC: 1.2 MG/DL
BUN SERPL-MCNC: 20 MG/DL
C DIFF GDH STL QL: NEGATIVE
C DIFF TOX A+B STL QL IA: NEGATIVE
CALCIUM SERPL-MCNC: 7.1 MG/DL
CHLORIDE SERPL-SCNC: 95 MMOL/L
CO2 SERPL-SCNC: 23 MMOL/L
CREAT SERPL-MCNC: 1.1 MG/DL
DIFFERENTIAL METHOD: ABNORMAL
EOSINOPHIL NFR BLD: 0 %
ERYTHROCYTE [DISTWIDTH] IN BLOOD BY AUTOMATED COUNT: 12.6 %
EST. GFR  (AFRICAN AMERICAN): >60 ML/MIN/1.73 M^2
EST. GFR  (NON AFRICAN AMERICAN): >60 ML/MIN/1.73 M^2
GLUCOSE SERPL-MCNC: 165 MG/DL
HCT VFR BLD AUTO: 39 %
HGB BLD-MCNC: 13.4 G/DL
HYPOCHROMIA BLD QL SMEAR: ABNORMAL
IMM GRANULOCYTES # BLD AUTO: ABNORMAL K/UL
IMM GRANULOCYTES NFR BLD AUTO: ABNORMAL %
INR PPP: 1.3
LACTATE SERPL-SCNC: 2.7 MMOL/L
LACTATE SERPL-SCNC: 3.4 MMOL/L
LIPASE SERPL-CCNC: <3 U/L
LYMPHOCYTES NFR BLD: 81.2 %
MAGNESIUM SERPL-MCNC: 1.2 MG/DL
MCH RBC QN AUTO: 30.5 PG
MCHC RBC AUTO-ENTMCNC: 34.4 G/DL
MCV RBC AUTO: 89 FL
MONOCYTES NFR BLD: 0 %
NEUTROPHILS NFR BLD: 18.8 %
NRBC BLD-RTO: 0 /100 WBC
OB PNL STL: POSITIVE
OVALOCYTES BLD QL SMEAR: ABNORMAL
PLATELET # BLD AUTO: 126 K/UL
PLATELET BLD QL SMEAR: ABNORMAL
PMV BLD AUTO: 10.5 FL
POIKILOCYTOSIS BLD QL SMEAR: SLIGHT
POLYCHROMASIA BLD QL SMEAR: ABNORMAL
POTASSIUM SERPL-SCNC: 3.7 MMOL/L
PROT SERPL-MCNC: 5.6 G/DL
PROTHROMBIN TIME: 12.8 SEC
RBC # BLD AUTO: 4.39 M/UL
SODIUM SERPL-SCNC: 129 MMOL/L
TROPONIN I SERPL DL<=0.01 NG/ML-MCNC: 0.02 NG/ML
WBC # BLD AUTO: 0.32 K/UL
WBC #/AREA STL HPF: ABNORMAL /[HPF]

## 2018-02-28 PROCEDURE — 89055 LEUKOCYTE ASSESSMENT FECAL: CPT

## 2018-02-28 PROCEDURE — 96368 THER/DIAG CONCURRENT INF: CPT

## 2018-02-28 PROCEDURE — 25500020 PHARM REV CODE 255: Performed by: EMERGENCY MEDICINE

## 2018-02-28 PROCEDURE — 93010 ELECTROCARDIOGRAM REPORT: CPT | Mod: ,,, | Performed by: INTERNAL MEDICINE

## 2018-02-28 PROCEDURE — 87209 SMEAR COMPLEX STAIN: CPT

## 2018-02-28 PROCEDURE — 83690 ASSAY OF LIPASE: CPT

## 2018-02-28 PROCEDURE — S0030 INJECTION, METRONIDAZOLE: HCPCS | Performed by: STUDENT IN AN ORGANIZED HEALTH CARE EDUCATION/TRAINING PROGRAM

## 2018-02-28 PROCEDURE — 87425 ROTAVIRUS AG IA: CPT

## 2018-02-28 PROCEDURE — 84484 ASSAY OF TROPONIN QUANT: CPT

## 2018-02-28 PROCEDURE — 25000003 PHARM REV CODE 250: Performed by: EMERGENCY MEDICINE

## 2018-02-28 PROCEDURE — 80053 COMPREHEN METABOLIC PANEL: CPT

## 2018-02-28 PROCEDURE — 96366 THER/PROPH/DIAG IV INF ADDON: CPT

## 2018-02-28 PROCEDURE — 63600175 PHARM REV CODE 636 W HCPCS: Performed by: STUDENT IN AN ORGANIZED HEALTH CARE EDUCATION/TRAINING PROGRAM

## 2018-02-28 PROCEDURE — 63600175 PHARM REV CODE 636 W HCPCS: Performed by: EMERGENCY MEDICINE

## 2018-02-28 PROCEDURE — 87427 SHIGA-LIKE TOXIN AG IA: CPT | Mod: 59

## 2018-02-28 PROCEDURE — 25000003 PHARM REV CODE 250: Performed by: STUDENT IN AN ORGANIZED HEALTH CARE EDUCATION/TRAINING PROGRAM

## 2018-02-28 PROCEDURE — 96372 THER/PROPH/DIAG INJ SC/IM: CPT

## 2018-02-28 PROCEDURE — 82272 OCCULT BLD FECES 1-3 TESTS: CPT

## 2018-02-28 PROCEDURE — 87449 NOS EACH ORGANISM AG IA: CPT

## 2018-02-28 PROCEDURE — 99223 1ST HOSP IP/OBS HIGH 75: CPT | Mod: ,,, | Performed by: INTERNAL MEDICINE

## 2018-02-28 PROCEDURE — 83605 ASSAY OF LACTIC ACID: CPT

## 2018-02-28 PROCEDURE — 96365 THER/PROPH/DIAG IV INF INIT: CPT

## 2018-02-28 PROCEDURE — 11000001 HC ACUTE MED/SURG PRIVATE ROOM

## 2018-02-28 PROCEDURE — 96376 TX/PRO/DX INJ SAME DRUG ADON: CPT

## 2018-02-28 PROCEDURE — 85610 PROTHROMBIN TIME: CPT

## 2018-02-28 PROCEDURE — 96361 HYDRATE IV INFUSION ADD-ON: CPT

## 2018-02-28 PROCEDURE — 85007 BL SMEAR W/DIFF WBC COUNT: CPT

## 2018-02-28 PROCEDURE — 99285 EMERGENCY DEPT VISIT HI MDM: CPT | Mod: ,,, | Performed by: EMERGENCY MEDICINE

## 2018-02-28 PROCEDURE — 99285 EMERGENCY DEPT VISIT HI MDM: CPT | Mod: 25

## 2018-02-28 PROCEDURE — 87045 FECES CULTURE AEROBIC BACT: CPT

## 2018-02-28 PROCEDURE — 85027 COMPLETE CBC AUTOMATED: CPT

## 2018-02-28 PROCEDURE — 96375 TX/PRO/DX INJ NEW DRUG ADDON: CPT

## 2018-02-28 PROCEDURE — 83735 ASSAY OF MAGNESIUM: CPT

## 2018-02-28 PROCEDURE — 25000003 PHARM REV CODE 250: Performed by: INTERNAL MEDICINE

## 2018-02-28 PROCEDURE — 87046 STOOL CULTR AEROBIC BACT EA: CPT | Mod: 59

## 2018-02-28 RX ORDER — ONDANSETRON 2 MG/ML
4 INJECTION INTRAMUSCULAR; INTRAVENOUS
Status: COMPLETED | OUTPATIENT
Start: 2018-02-28 | End: 2018-02-28

## 2018-02-28 RX ORDER — SODIUM CHLORIDE 0.9 % (FLUSH) 0.9 %
3 SYRINGE (ML) INJECTION
Status: DISCONTINUED | OUTPATIENT
Start: 2018-02-28 | End: 2018-03-08 | Stop reason: HOSPADM

## 2018-02-28 RX ORDER — ONDANSETRON 2 MG/ML
8 INJECTION INTRAMUSCULAR; INTRAVENOUS
Status: COMPLETED | OUTPATIENT
Start: 2018-02-28 | End: 2018-02-28

## 2018-02-28 RX ORDER — CEFEPIME HYDROCHLORIDE 2 G/1
2 INJECTION, POWDER, FOR SOLUTION INTRAVENOUS
Status: COMPLETED | OUTPATIENT
Start: 2018-02-28 | End: 2018-02-28

## 2018-02-28 RX ORDER — PROMETHAZINE HYDROCHLORIDE 12.5 MG/1
12.5 TABLET ORAL EVERY 6 HOURS PRN
Status: DISCONTINUED | OUTPATIENT
Start: 2018-02-28 | End: 2018-03-08 | Stop reason: HOSPADM

## 2018-02-28 RX ORDER — METRONIDAZOLE 500 MG/100ML
500 INJECTION, SOLUTION INTRAVENOUS
Status: DISCONTINUED | OUTPATIENT
Start: 2018-02-28 | End: 2018-03-01

## 2018-02-28 RX ORDER — MAGNESIUM SULFATE/D5W 2 G/50 ML
2 INTRAVENOUS SOLUTION, PIGGYBACK (ML) INTRAVENOUS ONCE
Status: COMPLETED | OUTPATIENT
Start: 2018-02-28 | End: 2018-02-28

## 2018-02-28 RX ORDER — RAMELTEON 8 MG/1
8 TABLET ORAL NIGHTLY PRN
Status: DISCONTINUED | OUTPATIENT
Start: 2018-02-28 | End: 2018-03-08 | Stop reason: HOSPADM

## 2018-02-28 RX ORDER — ONDANSETRON 4 MG/1
4 TABLET, FILM COATED ORAL EVERY 6 HOURS PRN
Status: DISCONTINUED | OUTPATIENT
Start: 2018-02-28 | End: 2018-03-01

## 2018-02-28 RX ORDER — ADENOSINE 3 MG/ML
6 INJECTION, SOLUTION INTRAVENOUS ONCE
Status: DISCONTINUED | OUTPATIENT
Start: 2018-02-28 | End: 2018-02-28

## 2018-02-28 RX ORDER — KETOROLAC TROMETHAMINE 30 MG/ML
15 INJECTION, SOLUTION INTRAMUSCULAR; INTRAVENOUS EVERY 6 HOURS PRN
Status: DISCONTINUED | OUTPATIENT
Start: 2018-02-28 | End: 2018-03-01

## 2018-02-28 RX ORDER — FLUCONAZOLE 40 MG/ML
200 POWDER, FOR SUSPENSION ORAL DAILY
Status: DISCONTINUED | OUTPATIENT
Start: 2018-02-28 | End: 2018-03-01

## 2018-02-28 RX ORDER — CEFEPIME HYDROCHLORIDE 2 G/1
2 INJECTION, POWDER, FOR SOLUTION INTRAVENOUS
Status: DISCONTINUED | OUTPATIENT
Start: 2018-02-28 | End: 2018-03-01

## 2018-02-28 RX ORDER — FENTANYL CITRATE 50 UG/ML
75 INJECTION, SOLUTION INTRAMUSCULAR; INTRAVENOUS
Status: COMPLETED | OUTPATIENT
Start: 2018-02-28 | End: 2018-02-28

## 2018-02-28 RX ORDER — ACETAMINOPHEN 325 MG/1
650 TABLET ORAL EVERY 6 HOURS PRN
Status: DISCONTINUED | OUTPATIENT
Start: 2018-02-28 | End: 2018-03-08 | Stop reason: HOSPADM

## 2018-02-28 RX ORDER — ENOXAPARIN SODIUM 100 MG/ML
40 INJECTION SUBCUTANEOUS EVERY 24 HOURS
Status: DISCONTINUED | OUTPATIENT
Start: 2018-02-28 | End: 2018-03-02

## 2018-02-28 RX ADMIN — METRONIDAZOLE 500 MG: 500 INJECTION, SOLUTION INTRAVENOUS at 11:02

## 2018-02-28 RX ADMIN — SODIUM CHLORIDE 1000 ML: 0.9 INJECTION, SOLUTION INTRAVENOUS at 09:02

## 2018-02-28 RX ADMIN — ONDANSETRON 8 MG: 2 INJECTION INTRAMUSCULAR; INTRAVENOUS at 09:02

## 2018-02-28 RX ADMIN — KETOROLAC TROMETHAMINE 15 MG: 30 INJECTION, SOLUTION INTRAMUSCULAR at 03:02

## 2018-02-28 RX ADMIN — CEFEPIME HYDROCHLORIDE 2 G: 2 INJECTION, POWDER, FOR SOLUTION INTRAVENOUS at 11:02

## 2018-02-28 RX ADMIN — ACETAMINOPHEN 650 MG: 325 TABLET ORAL at 07:02

## 2018-02-28 RX ADMIN — ENOXAPARIN SODIUM 40 MG: 100 INJECTION SUBCUTANEOUS at 05:02

## 2018-02-28 RX ADMIN — SODIUM CHLORIDE 1000 ML: 0.9 INJECTION, SOLUTION INTRAVENOUS at 12:02

## 2018-02-28 RX ADMIN — IOHEXOL 100 ML: 350 INJECTION, SOLUTION INTRAVENOUS at 11:02

## 2018-02-28 RX ADMIN — SODIUM CHLORIDE 1000 ML: 0.9 INJECTION, SOLUTION INTRAVENOUS at 01:02

## 2018-02-28 RX ADMIN — ONDANSETRON 4 MG: 4 TABLET, FILM COATED ORAL at 09:02

## 2018-02-28 RX ADMIN — FENTANYL CITRATE 75 MCG: 50 INJECTION INTRAMUSCULAR; INTRAVENOUS at 09:02

## 2018-02-28 RX ADMIN — ONDANSETRON 4 MG: 2 INJECTION INTRAMUSCULAR; INTRAVENOUS at 12:02

## 2018-02-28 RX ADMIN — KETOROLAC TROMETHAMINE 15 MG: 30 INJECTION, SOLUTION INTRAMUSCULAR at 09:02

## 2018-02-28 RX ADMIN — METRONIDAZOLE 500 MG: 500 INJECTION, SOLUTION INTRAVENOUS at 03:02

## 2018-02-28 RX ADMIN — Medication 2 G: at 02:02

## 2018-02-28 NOTE — ASSESSMENT & PLAN NOTE
"Septic on admit with hypotension (SBP 80s), tachycardia 120s, ANC 60. Lactate 3.4. Likely source is enterocolitis given presenting complaints of diarrhea, predisposing RF of neutropenia, and findings on Ct. Also concern for UTI given lower abdominal pain with urinary urgency & previous history of UTI with E. Coli & enterobacter 2 weeks ago. CXR neg for PNA.     -CXR neg for PNA  -UA, urine culture sent  -blood cultures sent  -trending lactate  -monitor vitals  -see "enterocolitis" for details regarding plan of care  "

## 2018-02-28 NOTE — ASSESSMENT & PLAN NOTE
-secondary to chemotherapy-induced immunosuppression  -no thrush noted on physical exam, but patient was started on course per outpatient oncologist.  -currently day 3/10 course of fluconazole, will plan to continue while in patient

## 2018-02-28 NOTE — ASSESSMENT & PLAN NOTE
1.2 on admit. Likely secondary to diarrhea & decreased PO intake. Potassium wnl.    -correct PRN  -monitor with daily Mg

## 2018-02-28 NOTE — ASSESSMENT & PLAN NOTE
Sx=096 on admit. No neurological symptoms but overall some fatigue & confusion (likely due to hyponatremia with concurrent infection.) Hyponatremia likely hypovolemic hypotonic in nature due to diarrhea (appears dehydrated.) Also possibly euvolemic hypotonic secondary to low solute (decrease PO intake.)    -correcting with fluid resuscitation, will aim for 3L per sepsis 30cc/kg  -monitor with frequent CMP  -further work-up with urine & osm studies if Na doesn't correct with fluid resuscitation

## 2018-02-28 NOTE — ASSESSMENT & PLAN NOTE
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 Likely enterocolitis based on findings of bowel wall thickening seen on CT abd/pelvis. Other considerations included gastroenteritis vs food poisoning vs disimpaction (previously constipated) vs digestive enzyme deficiency.     No indications for surgery at this time (CT neg for bowel perforation or pneumonitis.) Septic on admit with hypotension SBP 80s, tachycardia 120s, ANC 60, lactate 3.4. Dehydrated with dry mucous membranes & hyponatremia. Received 1x dose cefepime 2g in ED & IVF (NS fluid boluses.)    -will con't cefepime 2g Q12H and add IV flagyl 500mg Q12H  -IVF (per sepsis guidelines, 30cc/hr ==> ~3L)  -bowel rest (NPO)  -zofran for nausea  -stool studies in process-C. Diff, o/p/c, WBC  -blood cultures sent  -trending lactate

## 2018-02-28 NOTE — ED NOTES
Patient identifiers verified and correct for Burton Whiting  LOC: The patient is awake, alert and aware of environment with an appropriate affect, the patient is oriented x 3 and speaking appropriately.   APPEARANCE: Patient appears in no acute distress, patient is clean and well groomed.  SKIN: The skin is warm and dry, color consistent with ethnicity, patient has normal skin turgor and moist mucus membranes, skin intact, no breakdown, bruising around gastronomy tube noted.   MUSCULOSKELETAL: Patient moving all extremities spontaneously, no swelling noted.  RESPIRATORY: Airway is open and patent, respirations are spontaneous, patient has a normal effort and rate, no accessory muscle use noted, pt placed on continuous pulse ox with O2 sats noted at 99% on room air.  CARDIAC: Pt placed on cardiac monitor. Patient has a tachy rate and regular rhythm, no edema noted, capillary refill < 3 seconds.   GASTRO: Soft and tender to palpation, no distention noted, normoactive bowel sounds present in all four quadrants. Pt states bowel movements have been like diarrhea for the past 2 days.  : Pt denies any pain or frequency with urination.  NEURO: Pt opens eyes spontaneously, behavior appropriate to situation, follows commands, facial expression symmetrical, bilateral hand grasp equal and even, purposeful motor response noted, normal sensation in all extremities when touched with a finger.

## 2018-02-28 NOTE — ASSESSMENT & PLAN NOTE
"Likely secodnary to sepsis, response to hypotension & hypovolemia .     -EKG neg for STEMI/NSTEMI or arrythmia.  -monitor with frequent vitals  -see "sepsis/enterocolitis" for details regarding plan of care  "

## 2018-02-28 NOTE — ASSESSMENT & PLAN NOTE
Likely secondary to chemotherapy. Will monitor for signs of bleeding while in patient.     -daily CBC  -transfuse per guidelines; no indication at this time.

## 2018-02-28 NOTE — ED TRIAGE NOTES
Pt arrived to ED via EMS with c/o lower abdominal pain in the middle of his abdomen and weakness. Pt states abd pain at a 5 out of 10. EMS gave the the pt 500ml NS in a 20g left hand. EMS performed CBG resulted as 174. Pt sister states the last time the pt had something PO was Monday.  Pt has hx of throat cancer and receives chemo. Pt has a port in his upper right arm.  Pt sister states symptoms began Monday night once the the pts home port was taken off. Pt received his regular chemo tx Monday. Pt has had a slight fever of 99.4 and c/o of chills, but once the put water through his gastromy tube, pt sister states his temp returned to normal. Pt states he's had diarrhea the past 2 days.

## 2018-02-28 NOTE — PROGRESS NOTES
[facility]  Hematology/Oncology  Progress Note    Patient Name: Burton Whiting  Admission Date: 2/28/2018  Hospital Length of Stay: 0 days  Code Status: Full Code     Subjective:     HPI:  Mr. Burton Whiting is a 48 y.o. male with a PMHx palatine tonsillar squamous cell carcinoma who presents with chief complaint of diarrhea. Onset 2 days ago, watery, non-bloody, occurring every 45 minutes, has started to taper down since this morning. Associated with lower abdominal pain (5/10, non-radiating, occurs intermittently, stabbing in nature, not associated with food,) nausea (no vomiting), low grade fever (tmax 99.4 that improved with water infusion through PEG), urinary urgency, decreased PO intake, insomnia (taking home med oxycodone 5mg more frequently to help sleep) and altered mental status (per sister, patient with some confusion to situations.) No recent sick contacts, abx use, travel, or atypical food intake. Completed cycle 2 of chemo 2 days ago with removal of port, prior to initiation of current symptoms. Recently admitted 02/08-02/13 with neutropenic fever, UTI w/ E. Coli & enterobacter. PEG placed 02/11 secondary to decreased PO intake.    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 He follows with Dr. Lopez in outpatient oncology clinic. He recently completed his second round of chemo on 02/26 with pump removal that day. Also on decadron 6mg BD with current cycles, [facility]'s solution for oral/throat pain, fluconazole (day 3/10 day course) for thrush, zofran PRN for nausea, and oxy 5mg Q6H PRN. PEG placed ~ 2wks ago. Lives in a home alone, is independent with his ADLs, former smoker (>30 yr history, quit 10 months ago), former EtOH use (sober 5 yrs), and opiate addiction (sober 10 yrs with current rare use of prescribed oxycodone for cancer pain.) Family support  includes brother & sister.    Oncology Treatment Plan:   OP CISPLATIN DOCETAXEL 5FU Q3W    Medications:  Continuous Infusions:  Scheduled Meds:   dexamethasone  6 mg Oral BID WM    enoxaparin  40 mg Subcutaneous Daily    fluconazole 40 mg/ml  200 mg Oral Daily    magnesium sulfate IVPB  2 g Intravenous Once    sodium chloride 0.9%  1,000 mL Intravenous ED 1 Time    sodium chloride 0.9%  1,000 mL Intravenous Once     PRN Meds:ketorolac, sodium chloride 0.9%     Review of patient's allergies indicates:  No Known Allergies     Past Medical History:   Diagnosis Date    Former smoker     HPV in male     Opiate addiction     Squamous cell carcinoma of palatine tonsil     throat and tonsillar     Past Surgical History:   Procedure Laterality Date    GASTROSTOMY TUBE PLACEMENT Left 02/12/2018     Family History     Problem Relation (Age of Onset)    Brain cancer Maternal Uncle, Maternal Uncle    Hypertension Father, Brother    Leukemia Mother    Thyroid disease Sister        Social History Main Topics    Smoking status: Former Smoker     Packs/day: 1.50     Years: 25.00     Types: Cigarettes     Quit date: 06/2017    Smokeless tobacco: Never Used      Comment: smoked for about 25 years. quit 6 months ago    Alcohol use No    Drug use: No      Comment: Former Opiate    Sexual activity: Yes     Partners: Female       Review of Systems   Constitutional: Positive for activity change, appetite change, chills and fatigue. Negative for diaphoresis and fever.   HENT: Negative for sore throat and trouble swallowing.         Thrush   Eyes: Negative for visual disturbance.   Respiratory: Negative for cough and shortness of breath.    Cardiovascular: Negative for chest pain, palpitations and leg swelling.   Gastrointestinal: Positive for abdominal pain, diarrhea and nausea. Negative for blood in stool and vomiting.   Genitourinary: Positive for urgency. Negative for decreased urine volume, difficulty urinating, dysuria,  frequency and hematuria.   Skin: Negative for rash.        Swelling at site of port removal, right arm   Allergic/Immunologic: Positive for immunocompromised state.   Neurological: Negative for seizures, syncope, weakness and numbness.   Psychiatric/Behavioral: Positive for confusion.     Objective:     Vital Signs (Most Recent):  Temp: 98.7 °F (37.1 °C) (02/28/18 0826)  Pulse: (!) 124 (02/28/18 1230)  Resp: 13 (02/28/18 1226)  BP: 120/73 (02/28/18 1230)  SpO2: 98 % (02/28/18 1230) Vital Signs (24h Range):  Temp:  [98.7 °F (37.1 °C)] 98.7 °F (37.1 °C)  Pulse:  [123-133] 124  Resp:  [12-24] 13  SpO2:  [95 %-99 %] 98 %  BP: ()/(49-80) 120/73     Weight: 113.4 kg (250 lb)  Body mass index is 39.16 kg/m².  Body surface area is 2.32 meters squared.      Intake/Output Summary (Last 24 hours) at 02/28/18 1256  Last data filed at 02/28/18 1243   Gross per 24 hour   Intake             1000 ml   Output                0 ml   Net             1000 ml       Physical Exam   Constitutional: He is oriented to person, place, and time. He appears well-developed and well-nourished. He appears distressed.   HENT:   Head: Normocephalic and atraumatic.   Mouth/Throat: No oropharyngeal exudate.   Dry mucous membranes  Healing ulcerations  No white plaques   Neck: Normal range of motion. No JVD present.   Cardiovascular: Normal rate, regular rhythm, normal heart sounds and intact distal pulses.    No murmur heard.  Tachycardic  Weak pulses   Pulmonary/Chest: Effort normal and breath sounds normal.   Abdominal: Soft. Bowel sounds are normal. He exhibits no distension. There is tenderness. There is no rebound and no guarding.   Musculoskeletal: Normal range of motion. He exhibits no edema or tenderness.   -right arm, medial aspect lump with small superficial. Non-tender to palpation, no drainage, surrounding skin is non-erythematous, non edematous.   -Healing incision inferior to lump, appears c/d. Site of former port.    Lymphadenopathy:     He has no cervical adenopathy.   Neurological: He is alert and oriented to person, place, and time.   drowsy   Skin: Skin is warm and dry. Capillary refill takes less than 2 seconds. He is not diaphoretic.   Flushing of face       Significant Labs:   CBC:   Recent Labs  Lab 02/28/18  0858   WBC 0.32*   HGB 13.4*   HCT 39.0*   *    and CMP:   Recent Labs  Lab 02/28/18  0858   *   K 3.7   CL 95   CO2 23   *   BUN 20   CREATININE 1.1   CALCIUM 7.1*   PROT 5.6*   ALBUMIN 2.4*   BILITOT 1.2*   ALKPHOS 61   AST 22   ALT 51*   ANIONGAP 11   EGFRNONAA >60.0       Diagnostic Results:  Imaging Results          CT Abdomen Pelvis With Contrast (Preliminary result)  Result time 02/28/18 12:16:12    Preliminary result by Sudhir Alvarenga MD (02/28/18 12:16:12)                 Impression:         Mild diffuse bowel wall thickening throughout the small and large bowel.  Findings likely represent infectious or inflammatory enterocolitis.  No pneumatosis intestinalis to suggest bowel ischemia.  Note is made that there are 2 small foci of air within the liver, presumably in the biliary system given that there is no extension to the periphery or evidence of mesenteric venous gas.    Percutaneous gastric tube in the stomach.    Additional findings as above.  ______________________________________     Electronically signed by resident: SUDHIR ALVARENGA MD  Date:     02/28/18  Time:    12:16            As the supervising and teaching physician, I personally reviewed the images and resident's interpretation and I agree with the findings.               Narrative:    HISTORY:  48-year-old male with provided clinical history of neutropenic     TECHNIQUE: Axial CT images acquired from the diaphragm through the ischial tuberosities  after administration of 100 cc of Omnipaque 350  IV.  Oral contrast not used.  Multiplanar reconstructions provided for review.    COMPARISON: PET/CT routine 01/19/2018      FINDINGS:    Inferior Mediastinum/Heart: No cardiomegaly. No significant pericardial effusion.  Lung Bases: No focal consolidation, mass, pleural thickening, or pneumothorax.    Peritoneal Space: No ascites. No free air.  Stomach: Distended with fluid.  There is a percutaneous gastric tube with tip/balloon inflated in the gastric antrum.  Otherwise unremarkable.  Liver: There are punctate foci of air in the liver (axial series 2, images 9 and 11).  Presumably this is biliary in location, noting no extension to the periphery.  Findings are not present on recent PET/CT examination 01/19/2018.  Otherwise the liver is normal in size, homogenous in attenuation, and without focal lesion.    Gallbladder: No calcified gallstones.  Bile Ducts: No evidence of dilated ducts.  Pancreas: Mild fatty involution. No mass or peripancreatic fat stranding.   Spleen: Unremarkable.  Adrenals: Unremarkable.  Bowel/Mesentery: There is diffuse, mild wall thickening throughout the small and large bowel.  No evidence of pneumatosis intestinalis or air visualized in the mesenteric veins.  Small and large bowel are normal in caliber without evidence of obstruction.  No significant mesenteric edema or adenopathy.  The appendix is unremarkable.      Urinary Tract: Kidneys are normal in size and position and concentrate/excrete contrast appropriately on delayed imaging. No solid renal mass.  No nephrolithiasis or evidence of obstructive uropathy.  Ureters and bladder are unremarkable.  Retroperitoneum:  No significant adenopathy, or fluid.   Pelvis:  No pelvic masses, adenopathy, or free fluid. Prostate is unremarkable.  Abdominal wall:  Unremarkable.     Vasculature: No aneurysm or dissection.  Minimal atherosclerotic calcification of the visualized aorta and its branch vessels.      Bones: No acute fracture or bony destructive process. Age related degenerative joint disease.                             X-Ray Chest 1 View (Final result)   "Result time 02/28/18 10:17:55    Final result by Gidlardo Artis Jr., MD (02/28/18 10:17:55)                 Impression:     See above      Electronically signed by: GILDARDO ARTIS MD  Date:     02/28/18  Time:    10:17              Narrative:    Chest single view compared to February 6.  Right-sided PICC type catheter overlies the SVC though its tip is not well-visualized.  Heart size and pulmonary vascularity is similar.  Lungs are hypoexpanded.  No confluent consolidation.                                Assessment/Plan:     * Enterocolitis    48 yr old with palatine tonsillar SCC on chemotherapy with neutropenia presents with diarrhea & abdominal pain. Likely enterocolitis based on findings of bowel wall thickening seen on CT abd/pelvis. Other considerations included gastroenteritis vs food poisoning vs disimpaction (previously constipated) vs digestive enzyme deficiency.     No indications for surgery at this time (CT neg for bowel perforation or pneumonitis.) Septic on admit with hypotension SBP 80s, tachycardia 120s, ANC 60, lactate 3.4. Dehydrated with dry mucous membranes & hyponatremia. Received 1x dose cefepime 2g in ED & IVF (NS fluid boluses.)    -will con't cefepime 2g Q12H and add IV flagyl 500mg Q12H  -IVF (per sepsis guidelines, 30cc/hr ==> ~3L)  -bowel rest (NPO)  -zofran for nausea  -stool studies in process-C. Diff, o/p/c, WBC  -blood cultures sent  -trending lactate        Sepsis    Septic on admit with hypotension (SBP 80s), tachycardia 120s, ANC 60. Lactate 3.4. Likely source is enterocolitis given presenting complaints of diarrhea, predisposing RF of neutropenia, and findings on Ct. Also concern for UTI given lower abdominal pain with urinary urgency & previous history of UTI with E. Coli & enterobacter 2 weeks ago. CXR neg for PNA.     -CXR neg for PNA  -UA, urine culture sent  -blood cultures sent  -trending lactate  -monitor vitals  -see "enterocolitis" for details regarding plan of care      " "  Squamous cell carcinoma of palatine tonsil    -Recent completion of cycle 2 on 02/26 with port removal. See Dr. Lopez's office visit note for details regarding oncological history & plan of care. Will optimize supportive care while in-patient.    -will hold on opiate analgesia for now, as patient not currently having cancer-related pain. Has mainly been using oxy at home to help with sleep. Will start with IV ketorolac for required pain control and reevaluate. Ramelteon PRN for sleep aid.   -zofran for nausea (QTc <500 on EKT 02/28)  -con't decadron 6mg BD  -Ness's sol'n PRN  -see "neutropenia" for details regarding plan of care        Chemotherapy-induced neutropenia    -neutropenic precautions  -monitor ANC while in hospital with daily CBC        Hyponatremia    Wh=167 on admit. No neurological symptoms but overall some fatigue & confusion (likely due to hyponatremia with concurrent infection.) Hyponatremia likely hypovolemic hypotonic in nature due to diarrhea (appears dehydrated.) Also possibly euvolemic hypotonic secondary to low solute (decrease PO intake.)    -correcting with fluid resuscitation, will aim for 3L per sepsis 30cc/kg  -monitor with frequent CMP  -further work-up with urine & osm studies if Na doesn't correct with fluid resuscitation        Tachycardia    Likely secodnary to sepsis, response to hypotension & hypovolemia .     -EKG neg for STEMI/NSTEMI or arrythmia.  -monitor with frequent vitals  -see "sepsis/enterocolitis" for details regarding plan of care        Hypomagnesemia    1.2 on admit. Likely secondary to diarrhea & decreased PO intake. Potassium wnl.    -correct PRN  -monitor with daily Mg        Thrombocytopenia    Likely secondary to chemotherapy. Will monitor for signs of bleeding while in patient.     -daily CBC  -transfuse per guidelines; no indication at this time.        Thrush    -secondary to chemotherapy-induced immunosuppression  -no thrush noted on physical exam, but " patient was started on course per outpatient oncologist.  -currently day 3/10 course of fluconazole, will plan to continue while in patient        Morbid obesity    -NPO for now given enterocolitis  -PT/OT        Prediabetes    HbA1C 6.4% 02/06/2018  - on admit  -will monitor with daily CMP        Low vitamin D level    Takes 1000u weekly  -will administer appropriately while in-patient          Diet-NPO for now in setting of bowel rest  DVT ppx-high risk in setting of active malignancy; enoxaparin 40mg Q24H  Dispo-no anticipated needs at this time. PT/OT to eval & treat once stable       Cici Phillips MD  Hematology/Oncology  [facility]

## 2018-02-28 NOTE — HPI
Mr. Burton Whiting is a 48 y.o. male with a PMHx palatine tonsillar squamous cell carcinoma who presents with chief complaint of diarrhea. Onset 2 days ago, watery, non-bloody, occurring every 45 minutes, has started to taper down since this morning. Associated with lower abdominal pain (5/10, non-radiating, occurs intermittently, stabbing in nature, not associated with food,) nausea (no vomiting), low grade fever (tmax 99.4 that improved with water infusion through PEG), urinary urgency, decreased PO intake, insomnia (taking home med oxycodone 5mg more frequently to help sleep) and altered mental status (per sister, patient with some confusion to situations.) No recent sick contacts, abx use, travel, or atypical food intake. Completed cycle 2 of chemo 2 days ago with removal of port, prior to initiation of current symptoms. Recently admitted 02/08-02/13 with neutropenic fever, UTI w/ E. Coli & enterobacter. PEG placed 02/11 secondary to decreased PO intake.    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 He follows with Dr. Lopez in outpatient oncology clinic. He recently completed his second round of chemo on 02/26 with pump removal that day. Also on decadron 6mg BD with current cycles, [facility]'s solution for oral/throat pain, fluconazole (day 3/10 day course) for thrush, zofran PRN for nausea, and oxy 5mg Q6H PRN. PEG placed ~ 2wks ago. Lives in a home alone, is independent with his ADLs, former smoker (>30 yr history, quit 10 months ago), former EtOH use (sober 5 yrs), and opiate addiction (sober 10 yrs with current rare use of prescribed oxycodone for cancer pain.) Family support includes brother & sister.

## 2018-02-28 NOTE — ED PROVIDER NOTES
Encounter Date: 2/28/2018    SCRIBE #1 NOTE: I, Carla Diann, am scribing for, and in the presence of, Dr. Klein.       History     Chief Complaint   Patient presents with    Abdominal Pain     Chemo last week. + N/V.      Time seen by provider: 8:43 AM    This is a 48 y.o. male with medical conditions including G tube, squamous cell carcinoma on chemo, who presents with complaint of severe diarrhea for 2 days and associated mid abdominal pain. The patient finished this round of chemo 2 days ago and got the chemo pump removed. Later that day he began having severe watery diarrhea, he thinks he has seen a little blood in it. He has been getting most of his feeding through the tube and has not had anything by mouth since this morning of his last day of chemo (2 days ago). The patient denies any vomiting. The patient's sister reports his temperature was 99.4 this morning but came down with some water. She also states she thinks he has thrush and the patient reports a mild sore throat and mild intermittent cough. He denies shortness of breath.       The history is provided by the patient and a relative.     Review of patient's allergies indicates:  No Known Allergies  Past Medical History:   Diagnosis Date    Former smoker     HPV in male     Opiate addiction     Squamous cell carcinoma of palatine tonsil     throat and tonsillar     Past Surgical History:   Procedure Laterality Date    GASTROSTOMY TUBE PLACEMENT Left 02/12/2018     Family History   Problem Relation Age of Onset    Leukemia Mother     Hypertension Father     Thyroid disease Sister     Hypertension Brother     Brain cancer Maternal Uncle     Brain cancer Maternal Uncle      Social History   Substance Use Topics    Smoking status: Former Smoker     Packs/day: 1.50     Years: 25.00     Types: Cigarettes     Quit date: 06/2017    Smokeless tobacco: Never Used      Comment: smoked for about 25 years. quit 6 months ago    Alcohol use No      Review of Systems   Constitutional: Positive for appetite change. Negative for chills and fever.   HENT: Positive for sore throat.    Eyes: Negative for visual disturbance.   Respiratory: Positive for cough. Negative for shortness of breath.    Cardiovascular: Negative for chest pain.   Gastrointestinal: Positive for abdominal pain, blood in stool and diarrhea. Negative for vomiting.   Genitourinary: Negative for difficulty urinating.   Musculoskeletal: Negative for gait problem.   Skin: Negative for rash.   Neurological: Negative for speech difficulty.       Physical Exam     Initial Vitals [02/28/18 0826]   BP Pulse Resp Temp SpO2   133/80 (!) 130 18 98.7 °F (37.1 °C) 99 %      MAP       97.67         Physical Exam    Nursing note and vitals reviewed.  Constitutional: He appears well-developed and well-nourished. He appears distressed.   Patient is ill appearing   HENT:   Head: Normocephalic and atraumatic.   Dry mucous membranes wit thrush on the pallet    Eyes: Conjunctivae are normal.   Neck: Normal range of motion.   Cardiovascular: Regular rhythm and normal heart sounds. Tachycardia present.    Pulmonary/Chest: No respiratory distress.   Course breath sounds with crackles at the bases   Abdominal: Soft. There is tenderness. There is guarding. There is no rebound.   Peg tube in epigastric area with scattered bruising and general tenderness to abdomen.    Musculoskeletal: Normal range of motion.   Neurological: He is alert and oriented to person, place, and time.   Skin: Skin is warm and dry.   Port in right bicep         ED Course   Procedures  Labs Reviewed   CBC W/ AUTO DIFFERENTIAL - Abnormal; Notable for the following:        Result Value    WBC 0.32 (*)     RBC 4.39 (*)     Hemoglobin 13.4 (*)     Hematocrit 39.0 (*)     Platelets 126 (*)     Gran% 18.8 (*)     Lymph% 81.2 (*)     Mono% 0.0 (*)     Platelet Estimate Decreased (*)     All other components within normal limits    Narrative:      WBC   critical result(s) called and verbal readback obtained from   GARCIA WILLOUGHBY RN, 02/28/2018 10:25   COMPREHENSIVE METABOLIC PANEL - Abnormal; Notable for the following:     Sodium 129 (*)     Glucose 165 (*)     Calcium 7.1 (*)     Total Protein 5.6 (*)     Albumin 2.4 (*)     Total Bilirubin 1.2 (*)     ALT 51 (*)     All other components within normal limits   LIPASE - Abnormal; Notable for the following:     Lipase <3 (*)     All other components within normal limits   LACTIC ACID, PLASMA - Abnormal; Notable for the following:     Lactate (Lactic Acid) 3.4 (*)     All other components within normal limits   MAGNESIUM - Abnormal; Notable for the following:     Magnesium 1.2 (*)     All other components within normal limits   PROTIME-INR - Abnormal; Notable for the following:     Prothrombin Time 12.8 (*)     INR 1.3 (*)     All other components within normal limits   OCCULT BLOOD X 1, STOOL - Abnormal; Notable for the following:     Occult Blood Positive (*)     All other components within normal limits   CULTURE, STOOL   CLOSTRIDIUM DIFFICILE   ENTEROHEMORRHAGIC E.COLI   URINALYSIS, REFLEX TO URINE CULTURE   STOOL EXAM-OVA,CYSTS,PARASITES   ROTAVIRUS ANTIGEN, STOOL   WBC, STOOL   LACTIC ACID, PLASMA   TROPONIN I     EKG Readings: (Independently Interpreted)   Initial Reading: No STEMI. Rhythm: Sinus Tachycardia. Heart Rate: 134. Ectopy: No Ectopy. ST Segments: Normal ST Segments.       X-Rays:   Independently Interpreted Readings:   Chest X-Ray: PICC line in place. No focal consolidations. No pleural effusion.      Medical Decision Making:   History:   Old Medical Records: I decided to obtain old medical records.  Initial Assessment:   Emergent evaluation of a 48 y.o. male who presents with diarrhea. He finished chemotherapy 2 days ago. Initial concerns include volume depletion, electrolyte abnormality, neutropenia, typhlitis. Will start fluid resuscitation and pain control. Will consult [facility]. I anticipate  admission to hospital.  Independently Interpreted Test(s):   I have ordered and independently interpreted X-rays - see prior notes.  Clinical Tests:   Lab Tests: Ordered and Reviewed  Radiological Study: Ordered and Reviewed  Other:   I have discussed this case with another health care provider.            Scribe Attestation:   Scribe #1: I performed the above scribed service and the documentation accurately describes the services I performed. I attest to the accuracy of the note.    Attending Attestation:             Attending ED Notes:   Labs reviewed. He has neutropenia. Patient also noted to have hyponatremia, elevated lactic acid. antibiotics given and fluid resuscitation begun. CT scan pending. I have a high suspicion of neutropenic typhlitis. Stool studies sent. Discussed with onc, they will admit the patient.              Clinical Impression:   The primary encounter diagnosis was Neutropenia, unspecified type. A diagnosis of Abdominal pain was also pertinent to this visit.    Disposition:   Disposition: Admitted                        Chen Klein MD  02/28/18 [id number]

## 2018-02-28 NOTE — ASSESSMENT & PLAN NOTE
"-Recent completion of cycle 2 on 02/26 with port removal. See Dr. Lopez's office visit note for details regarding oncological history & plan of care. Will optimize supportive care while in-patient.    -will hold on opiate analgesia for now, as patient not currently having cancer-related pain. Has mainly been using oxy at home to help with sleep. Will start with IV ketorolac for required pain control and reevaluate. Ramelteon PRN for sleep aid.   -zofran for nausea (QTc <500 on EKT 02/28)  -con't decadron 6mg BD  -Ness's sol'n PRN  -see "neutropenia" for details regarding plan of care  "

## 2018-02-28 NOTE — ASSESSMENT & PLAN NOTE
"-Recent completion of cycle 2 on 02/26 with port removal. See Dr. Lopez's office visit note for details regarding oncological history & plan of care. Will optimize supportive care while in-patient.    -significant pain related to mucositis; initiating PCA pump   -zofran for nausea (QTc <500 on EKT 02/28)  -Ness's sol'n PRN  -see "neutropenia" for details regarding plan of care  "

## 2018-02-28 NOTE — SUBJECTIVE & OBJECTIVE
Oncology Treatment Plan:   OP CISPLATIN DOCETAXEL 5FU Q3W    Medications:  Continuous Infusions:  Scheduled Meds:   dexamethasone  6 mg Oral BID WM    enoxaparin  40 mg Subcutaneous Daily    fluconazole 40 mg/ml  200 mg Oral Daily    magnesium sulfate IVPB  2 g Intravenous Once    sodium chloride 0.9%  1,000 mL Intravenous ED 1 Time    sodium chloride 0.9%  1,000 mL Intravenous Once     PRN Meds:ketorolac, sodium chloride 0.9%     Review of patient's allergies indicates:  No Known Allergies     Past Medical History:   Diagnosis Date    Former smoker     HPV in male     Opiate addiction     Squamous cell carcinoma of palatine tonsil     throat and tonsillar     Past Surgical History:   Procedure Laterality Date    GASTROSTOMY TUBE PLACEMENT Left 02/12/2018     Family History     Problem Relation (Age of Onset)    Brain cancer Maternal Uncle, Maternal Uncle    Hypertension Father, Brother    Leukemia Mother    Thyroid disease Sister        Social History Main Topics    Smoking status: Former Smoker     Packs/day: 1.50     Years: 25.00     Types: Cigarettes     Quit date: 06/2017    Smokeless tobacco: Never Used      Comment: smoked for about 25 years. quit 6 months ago    Alcohol use No    Drug use: No      Comment: Former Opiate    Sexual activity: Yes     Partners: Female       Review of Systems   Constitutional: Positive for activity change, appetite change, chills and fatigue. Negative for diaphoresis and fever.   HENT: Negative for sore throat and trouble swallowing.         Thrush   Eyes: Negative for visual disturbance.   Respiratory: Negative for cough and shortness of breath.    Cardiovascular: Negative for chest pain, palpitations and leg swelling.   Gastrointestinal: Positive for abdominal pain, diarrhea and nausea. Negative for blood in stool and vomiting.   Genitourinary: Positive for urgency. Negative for decreased urine volume, difficulty urinating, dysuria, frequency and hematuria.    Skin: Negative for rash.        Swelling at site of port removal, right arm   Allergic/Immunologic: Positive for immunocompromised state.   Neurological: Negative for seizures, syncope, weakness and numbness.   Psychiatric/Behavioral: Positive for confusion.     Objective:     Vital Signs (Most Recent):  Temp: 98.7 °F (37.1 °C) (02/28/18 0826)  Pulse: (!) 124 (02/28/18 1230)  Resp: 13 (02/28/18 1226)  BP: 120/73 (02/28/18 1230)  SpO2: 98 % (02/28/18 1230) Vital Signs (24h Range):  Temp:  [98.7 °F (37.1 °C)] 98.7 °F (37.1 °C)  Pulse:  [123-133] 124  Resp:  [12-24] 13  SpO2:  [95 %-99 %] 98 %  BP: ()/(49-80) 120/73     Weight: 113.4 kg (250 lb)  Body mass index is 39.16 kg/m².  Body surface area is 2.32 meters squared.      Intake/Output Summary (Last 24 hours) at 02/28/18 1256  Last data filed at 02/28/18 1243   Gross per 24 hour   Intake             1000 ml   Output                0 ml   Net             1000 ml       Physical Exam   Constitutional: He is oriented to person, place, and time. He appears well-developed and well-nourished. He appears distressed.   HENT:   Head: Normocephalic and atraumatic.   Mouth/Throat: No oropharyngeal exudate.   Dry mucous membranes  Healing ulcerations  No white plaques   Neck: Normal range of motion. No JVD present.   Cardiovascular: Normal rate, regular rhythm, normal heart sounds and intact distal pulses.    No murmur heard.  Tachycardic  Weak pulses   Pulmonary/Chest: Effort normal and breath sounds normal.   Abdominal: Soft. Bowel sounds are normal. He exhibits no distension. There is tenderness. There is no rebound and no guarding.   Musculoskeletal: Normal range of motion. He exhibits no edema or tenderness.   -right arm, medial aspect lump with small superficial. Non-tender to palpation, no drainage, surrounding skin is non-erythematous, non edematous.   -Healing incision inferior to lump, appears c/d. Site of former port.   Lymphadenopathy:     He has no cervical  adenopathy.   Neurological: He is alert and oriented to person, place, and time.   drowsy   Skin: Skin is warm and dry. Capillary refill takes less than 2 seconds. He is not diaphoretic.   Flushing of face       Significant Labs:   CBC:   Recent Labs  Lab 02/28/18  0858   WBC 0.32*   HGB 13.4*   HCT 39.0*   *    and CMP:   Recent Labs  Lab 02/28/18  0858   *   K 3.7   CL 95   CO2 23   *   BUN 20   CREATININE 1.1   CALCIUM 7.1*   PROT 5.6*   ALBUMIN 2.4*   BILITOT 1.2*   ALKPHOS 61   AST 22   ALT 51*   ANIONGAP 11   EGFRNONAA >60.0       Diagnostic Results:  Imaging Results          CT Abdomen Pelvis With Contrast (Preliminary result)  Result time 02/28/18 12:16:12    Preliminary result by Sudhir Alvarenga MD (02/28/18 12:16:12)                 Impression:         Mild diffuse bowel wall thickening throughout the small and large bowel.  Findings likely represent infectious or inflammatory enterocolitis.  No pneumatosis intestinalis to suggest bowel ischemia.  Note is made that there are 2 small foci of air within the liver, presumably in the biliary system given that there is no extension to the periphery or evidence of mesenteric venous gas.    Percutaneous gastric tube in the stomach.    Additional findings as above.  ______________________________________     Electronically signed by resident: SUDHIR ALVARENGA MD  Date:     02/28/18  Time:    12:16            As the supervising and teaching physician, I personally reviewed the images and resident's interpretation and I agree with the findings.               Narrative:    HISTORY:  48-year-old male with provided clinical history of neutropenic     TECHNIQUE: Axial CT images acquired from the diaphragm through the ischial tuberosities  after administration of 100 cc of Omnipaque 350  IV.  Oral contrast not used.  Multiplanar reconstructions provided for review.    COMPARISON: PET/CT routine 01/19/2018     FINDINGS:    Inferior Mediastinum/Heart:  No cardiomegaly. No significant pericardial effusion.  Lung Bases: No focal consolidation, mass, pleural thickening, or pneumothorax.    Peritoneal Space: No ascites. No free air.  Stomach: Distended with fluid.  There is a percutaneous gastric tube with tip/balloon inflated in the gastric antrum.  Otherwise unremarkable.  Liver: There are punctate foci of air in the liver (axial series 2, images 9 and 11).  Presumably this is biliary in location, noting no extension to the periphery.  Findings are not present on recent PET/CT examination 01/19/2018.  Otherwise the liver is normal in size, homogenous in attenuation, and without focal lesion.    Gallbladder: No calcified gallstones.  Bile Ducts: No evidence of dilated ducts.  Pancreas: Mild fatty involution. No mass or peripancreatic fat stranding.   Spleen: Unremarkable.  Adrenals: Unremarkable.  Bowel/Mesentery: There is diffuse, mild wall thickening throughout the small and large bowel.  No evidence of pneumatosis intestinalis or air visualized in the mesenteric veins.  Small and large bowel are normal in caliber without evidence of obstruction.  No significant mesenteric edema or adenopathy.  The appendix is unremarkable.      Urinary Tract: Kidneys are normal in size and position and concentrate/excrete contrast appropriately on delayed imaging. No solid renal mass.  No nephrolithiasis or evidence of obstructive uropathy.  Ureters and bladder are unremarkable.  Retroperitoneum:  No significant adenopathy, or fluid.   Pelvis:  No pelvic masses, adenopathy, or free fluid. Prostate is unremarkable.  Abdominal wall:  Unremarkable.     Vasculature: No aneurysm or dissection.  Minimal atherosclerotic calcification of the visualized aorta and its branch vessels.      Bones: No acute fracture or bony destructive process. Age related degenerative joint disease.                             X-Ray Chest 1 View (Final result)  Result time 02/28/18 10:17:55    Final result by  Gildardo Artis Jr., MD (02/28/18 10:17:55)                 Impression:     See above      Electronically signed by: GILDARDO ARTIS MD  Date:     02/28/18  Time:    10:17              Narrative:    Chest single view compared to February 6.  Right-sided PICC type catheter overlies the SVC though its tip is not well-visualized.  Heart size and pulmonary vascularity is similar.  Lungs are hypoexpanded.  No confluent consolidation.

## 2018-03-01 LAB
ALBUMIN SERPL BCP-MCNC: 2 G/DL
ALP SERPL-CCNC: 51 U/L
ALT SERPL W/O P-5'-P-CCNC: 34 U/L
AMORPH CRY UR QL COMP ASSIST: ABNORMAL
ANION GAP SERPL CALC-SCNC: 11 MMOL/L
AST SERPL-CCNC: 11 U/L
BACTERIA #/AREA URNS AUTO: ABNORMAL /HPF
BASOPHILS # BLD AUTO: 0 K/UL
BASOPHILS NFR BLD: 0 %
BILIRUB SERPL-MCNC: 0.8 MG/DL
BILIRUB UR QL STRIP: ABNORMAL
BUN SERPL-MCNC: 20 MG/DL
CALCIUM SERPL-MCNC: 7 MG/DL
CHLORIDE SERPL-SCNC: 99 MMOL/L
CLARITY UR REFRACT.AUTO: ABNORMAL
CO2 SERPL-SCNC: 21 MMOL/L
COLOR UR AUTO: ABNORMAL
CREAT SERPL-MCNC: 1.2 MG/DL
DIFFERENTIAL METHOD: ABNORMAL
E COLI SXT1 STL QL IA: NEGATIVE
E COLI SXT2 STL QL IA: NEGATIVE
EOSINOPHIL # BLD AUTO: 0 K/UL
EOSINOPHIL NFR BLD: 0 %
ERYTHROCYTE [DISTWIDTH] IN BLOOD BY AUTOMATED COUNT: 12.6 %
EST. GFR  (AFRICAN AMERICAN): >60 ML/MIN/1.73 M^2
EST. GFR  (NON AFRICAN AMERICAN): >60 ML/MIN/1.73 M^2
GLUCOSE SERPL-MCNC: 175 MG/DL
GLUCOSE UR QL STRIP: ABNORMAL
HCT VFR BLD AUTO: 33 %
HGB BLD-MCNC: 11.7 G/DL
HGB UR QL STRIP: ABNORMAL
HYALINE CASTS UR QL AUTO: 0 /LPF
IMM GRANULOCYTES # BLD AUTO: 0 K/UL
IMM GRANULOCYTES NFR BLD AUTO: 0 %
KETONES UR QL STRIP: ABNORMAL
LACTATE SERPL-SCNC: 1.1 MMOL/L
LEUKOCYTE ESTERASE UR QL STRIP: NEGATIVE
LYMPHOCYTES # BLD AUTO: 0.2 K/UL
LYMPHOCYTES NFR BLD: 56.3 %
MAGNESIUM SERPL-MCNC: 1.5 MG/DL
MCH RBC QN AUTO: 31 PG
MCHC RBC AUTO-ENTMCNC: 35.5 G/DL
MCV RBC AUTO: 88 FL
MICROSCOPIC COMMENT: ABNORMAL
MONOCYTES # BLD AUTO: 0.1 K/UL
MONOCYTES NFR BLD: 18.8 %
NEUTROPHILS # BLD AUTO: 0.1 K/UL
NEUTROPHILS NFR BLD: 24.9 %
NITRITE UR QL STRIP: NEGATIVE
NRBC BLD-RTO: 0 /100 WBC
O+P STL TRI STN: NORMAL
PH UR STRIP: 5 [PH] (ref 5–8)
PHOSPHATE SERPL-MCNC: 2.4 MG/DL
PLATELET # BLD AUTO: 79 K/UL
PMV BLD AUTO: 10.8 FL
POTASSIUM SERPL-SCNC: 3.5 MMOL/L
PROT SERPL-MCNC: 5.1 G/DL
PROT UR QL STRIP: ABNORMAL
RBC # BLD AUTO: 3.77 M/UL
RBC #/AREA URNS AUTO: 1 /HPF (ref 0–4)
RV AG STL QL IA.RAPID: NEGATIVE
SODIUM SERPL-SCNC: 131 MMOL/L
SP GR UR STRIP: >=1.03 (ref 1–1.03)
URN SPEC COLLECT METH UR: ABNORMAL
UROBILINOGEN UR STRIP-ACNC: NEGATIVE EU/DL
WBC # BLD AUTO: 0.32 K/UL
WBC #/AREA URNS AUTO: 8 /HPF (ref 0–5)

## 2018-03-01 PROCEDURE — 83605 ASSAY OF LACTIC ACID: CPT

## 2018-03-01 PROCEDURE — 80053 COMPREHEN METABOLIC PANEL: CPT

## 2018-03-01 PROCEDURE — 85025 COMPLETE CBC W/AUTO DIFF WBC: CPT

## 2018-03-01 PROCEDURE — 87040 BLOOD CULTURE FOR BACTERIA: CPT | Mod: 59

## 2018-03-01 PROCEDURE — 63600175 PHARM REV CODE 636 W HCPCS: Performed by: STUDENT IN AN ORGANIZED HEALTH CARE EDUCATION/TRAINING PROGRAM

## 2018-03-01 PROCEDURE — 87086 URINE CULTURE/COLONY COUNT: CPT

## 2018-03-01 PROCEDURE — 25000003 PHARM REV CODE 250: Performed by: INTERNAL MEDICINE

## 2018-03-01 PROCEDURE — 99233 SBSQ HOSP IP/OBS HIGH 50: CPT | Mod: ,,, | Performed by: INTERNAL MEDICINE

## 2018-03-01 PROCEDURE — 81001 URINALYSIS AUTO W/SCOPE: CPT

## 2018-03-01 PROCEDURE — 36415 COLL VENOUS BLD VENIPUNCTURE: CPT

## 2018-03-01 PROCEDURE — 25000003 PHARM REV CODE 250: Performed by: STUDENT IN AN ORGANIZED HEALTH CARE EDUCATION/TRAINING PROGRAM

## 2018-03-01 PROCEDURE — 97802 MEDICAL NUTRITION INDIV IN: CPT

## 2018-03-01 PROCEDURE — 83735 ASSAY OF MAGNESIUM: CPT

## 2018-03-01 PROCEDURE — 84100 ASSAY OF PHOSPHORUS: CPT

## 2018-03-01 PROCEDURE — 11000001 HC ACUTE MED/SURG PRIVATE ROOM

## 2018-03-01 PROCEDURE — S0030 INJECTION, METRONIDAZOLE: HCPCS | Performed by: STUDENT IN AN ORGANIZED HEALTH CARE EDUCATION/TRAINING PROGRAM

## 2018-03-01 RX ORDER — LANOLIN ALCOHOL/MO/W.PET/CERES
400 CREAM (GRAM) TOPICAL DAILY
Status: DISCONTINUED | OUTPATIENT
Start: 2018-03-01 | End: 2018-03-01

## 2018-03-01 RX ORDER — HYDROMORPHONE HCL IN 0.9% NACL 6 MG/30 ML
PATIENT CONTROLLED ANALGESIA SYRINGE INTRAVENOUS CONTINUOUS
Status: DISCONTINUED | OUTPATIENT
Start: 2018-03-01 | End: 2018-03-05

## 2018-03-01 RX ORDER — NALOXONE HCL 0.4 MG/ML
0.02 VIAL (ML) INJECTION
Status: DISCONTINUED | OUTPATIENT
Start: 2018-03-01 | End: 2018-03-05

## 2018-03-01 RX ORDER — FLUCONAZOLE 200 MG/1
200 TABLET ORAL DAILY
Status: DISPENSED | OUTPATIENT
Start: 2018-03-01 | End: 2018-03-07

## 2018-03-01 RX ORDER — SODIUM CHLORIDE, SODIUM LACTATE, POTASSIUM CHLORIDE, CALCIUM CHLORIDE 600; 310; 30; 20 MG/100ML; MG/100ML; MG/100ML; MG/100ML
INJECTION, SOLUTION INTRAVENOUS CONTINUOUS
Status: ACTIVE | OUTPATIENT
Start: 2018-03-01 | End: 2018-03-02

## 2018-03-01 RX ORDER — CEFEPIME HYDROCHLORIDE 2 G/1
2 INJECTION, POWDER, FOR SOLUTION INTRAVENOUS
Status: DISCONTINUED | OUTPATIENT
Start: 2018-03-01 | End: 2018-03-05

## 2018-03-01 RX ORDER — OXYCODONE HCL 5 MG/5 ML
5 SOLUTION, ORAL ORAL EVERY 4 HOURS PRN
Status: DISCONTINUED | OUTPATIENT
Start: 2018-03-01 | End: 2018-03-01

## 2018-03-01 RX ORDER — OXYCODONE HYDROCHLORIDE 5 MG/1
5 TABLET ORAL EVERY 4 HOURS PRN
Status: DISCONTINUED | OUTPATIENT
Start: 2018-03-01 | End: 2018-03-01

## 2018-03-01 RX ORDER — MAGNESIUM SULFATE/D5W 2 G/50 ML
2 INTRAVENOUS SOLUTION, PIGGYBACK (ML) INTRAVENOUS ONCE
Status: COMPLETED | OUTPATIENT
Start: 2018-03-01 | End: 2018-03-01

## 2018-03-01 RX ORDER — CEFEPIME HYDROCHLORIDE 2 G/1
2 INJECTION, POWDER, FOR SOLUTION INTRAVENOUS
Status: DISCONTINUED | OUTPATIENT
Start: 2018-03-01 | End: 2018-03-01

## 2018-03-01 RX ORDER — HYDROMORPHONE HYDROCHLORIDE 5 MG/5ML
1 SOLUTION ORAL
Status: DISCONTINUED | OUTPATIENT
Start: 2018-03-01 | End: 2018-03-01

## 2018-03-01 RX ORDER — ONDANSETRON 4 MG/1
4 TABLET, ORALLY DISINTEGRATING ORAL ONCE
Status: COMPLETED | OUTPATIENT
Start: 2018-03-01 | End: 2018-03-01

## 2018-03-01 RX ORDER — DIPHENHYDRAMINE HYDROCHLORIDE 50 MG/ML
12.5 INJECTION INTRAMUSCULAR; INTRAVENOUS EVERY 4 HOURS PRN
Status: DISCONTINUED | OUTPATIENT
Start: 2018-03-01 | End: 2018-03-05

## 2018-03-01 RX ORDER — METRONIDAZOLE 500 MG/100ML
500 INJECTION, SOLUTION INTRAVENOUS
Status: DISCONTINUED | OUTPATIENT
Start: 2018-03-01 | End: 2018-03-05

## 2018-03-01 RX ORDER — FENTANYL CITRATE 50 UG/ML
25 INJECTION, SOLUTION INTRAMUSCULAR; INTRAVENOUS EVERY 4 HOURS PRN
Status: DISCONTINUED | OUTPATIENT
Start: 2018-03-01 | End: 2018-03-01

## 2018-03-01 RX ORDER — FENTANYL 25 UG/1
1 PATCH TRANSDERMAL
Status: DISCONTINUED | OUTPATIENT
Start: 2018-03-01 | End: 2018-03-01

## 2018-03-01 RX ORDER — ONDANSETRON 2 MG/ML
4 INJECTION INTRAMUSCULAR; INTRAVENOUS ONCE AS NEEDED
Status: COMPLETED | OUTPATIENT
Start: 2018-03-01 | End: 2018-03-01

## 2018-03-01 RX ORDER — SODIUM,POTASSIUM PHOSPHATES 280-250MG
2 POWDER IN PACKET (EA) ORAL ONCE
Status: DISCONTINUED | OUTPATIENT
Start: 2018-03-01 | End: 2018-03-03

## 2018-03-01 RX ADMIN — Medication 10 ML: at 05:03

## 2018-03-01 RX ADMIN — METRONIDAZOLE 500 MG: 500 INJECTION, SOLUTION INTRAVENOUS at 08:03

## 2018-03-01 RX ADMIN — VANCOMYCIN HYDROCHLORIDE 1750 MG: 1 INJECTION, POWDER, LYOPHILIZED, FOR SOLUTION INTRAVENOUS at 12:03

## 2018-03-01 RX ADMIN — ONDANSETRON 4 MG: 4 TABLET, ORALLY DISINTEGRATING ORAL at 09:03

## 2018-03-01 RX ADMIN — ENOXAPARIN SODIUM 40 MG: 100 INJECTION SUBCUTANEOUS at 05:03

## 2018-03-01 RX ADMIN — KETOROLAC TROMETHAMINE 15 MG: 30 INJECTION, SOLUTION INTRAMUSCULAR at 03:03

## 2018-03-01 RX ADMIN — CEFEPIME 2 G: 2 INJECTION, POWDER, FOR SOLUTION INTRAVENOUS at 05:03

## 2018-03-01 RX ADMIN — VANCOMYCIN HYDROCHLORIDE 1750 MG: 1 INJECTION, POWDER, LYOPHILIZED, FOR SOLUTION INTRAVENOUS at 11:03

## 2018-03-01 RX ADMIN — Medication: at 11:03

## 2018-03-01 RX ADMIN — FENTANYL CITRATE 25 MCG: 50 INJECTION, SOLUTION INTRAMUSCULAR; INTRAVENOUS at 12:03

## 2018-03-01 RX ADMIN — KETOROLAC TROMETHAMINE 15 MG: 30 INJECTION, SOLUTION INTRAMUSCULAR at 10:03

## 2018-03-01 RX ADMIN — Medication 2 G: at 09:03

## 2018-03-01 RX ADMIN — ONDANSETRON 4 MG: 2 INJECTION INTRAMUSCULAR; INTRAVENOUS at 03:03

## 2018-03-01 RX ADMIN — CEFEPIME 2 G: 2 INJECTION, POWDER, FOR SOLUTION INTRAVENOUS at 10:03

## 2018-03-01 RX ADMIN — SODIUM CHLORIDE 1000 ML: 0.9 INJECTION, SOLUTION INTRAVENOUS at 08:03

## 2018-03-01 RX ADMIN — METRONIDAZOLE 500 MG: 500 INJECTION, SOLUTION INTRAVENOUS at 10:03

## 2018-03-01 RX ADMIN — HYDROMORPHONE HYDROCHLORIDE 1 MG: 1 SOLUTION ORAL at 12:03

## 2018-03-01 RX ADMIN — METRONIDAZOLE 500 MG: 500 INJECTION, SOLUTION INTRAVENOUS at 02:03

## 2018-03-01 RX ADMIN — FLUCONAZOLE 200 MG: 40 POWDER, FOR SUSPENSION ORAL at 05:03

## 2018-03-01 RX ADMIN — FENTANYL 1 PATCH: 25 PATCH, EXTENDED RELEASE TRANSDERMAL at 12:03

## 2018-03-01 RX ADMIN — Medication: at 02:03

## 2018-03-01 RX ADMIN — SODIUM CHLORIDE, SODIUM LACTATE, POTASSIUM CHLORIDE, AND CALCIUM CHLORIDE: 600; 310; 30; 20 INJECTION, SOLUTION INTRAVENOUS at 10:03

## 2018-03-01 RX ADMIN — SODIUM CHLORIDE 1000 ML: 0.9 INJECTION, SOLUTION INTRAVENOUS at 05:03

## 2018-03-01 NOTE — PROGRESS NOTES
[facility]  Hematology/Oncology  Progress Note    Patient Name: Burton Whiting  Admission Date: 2/28/2018  Hospital Length of Stay: 1 days  Code Status: Full Code     Subjective:     HPI:  Mr. Burton Whiting is a 48 y.o. male with a PMHx palatine tonsillar squamous cell carcinoma who presents with chief complaint of diarrhea. Onset 2 days ago, watery, non-bloody, occurring every 45 minutes, has started to taper down since this morning. Associated with lower abdominal pain (5/10, non-radiating, occurs intermittently, stabbing in nature, not associated with food,) nausea (no vomiting), low grade fever (tmax 99.4 that improved with water infusion through PEG), urinary urgency, decreased PO intake, insomnia (taking home med oxycodone 5mg more frequently to help sleep) and altered mental status (per sister, patient with some confusion to situations.) No recent sick contacts, abx use, travel, or atypical food intake. Completed cycle 2 of chemo 2 days ago with removal of port, prior to initiation of current symptoms. Recently admitted 02/08-02/13 with neutropenic fever, UTI w/ E. Coli & enterobacter. PEG placed 02/11 secondary to decreased PO intake.    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 He follows with Dr. Lopez in outpatient oncology clinic. He recently completed his second round of chemo on 02/26 with pump removal that day. Also on decadron 6mg BD with current cycles, [facility]'s solution for oral/throat pain, fluconazole (day 3/10 day course) for thrush, zofran PRN for nausea, and oxy 5mg Q6H PRN. PEG placed ~ 2wks ago. Lives in a home alone, is independent with his ADLs, former smoker (>30 yr history, quit 10 months ago), former EtOH use (sober 5 yrs), and opiate addiction (sober 10 yrs with current rare use of prescribed oxycodone for cancer pain.) Family support  includes brother & sister.    Interval History: Remains in significant pain but more alert and responsive than yesterday. Few questions answered during interview, refusing PO meds, and refusing to provide urine sample, as patient is overall in distress.     Oncology Treatment Plan:   OP CISPLATIN DOCETAXEL 5FU Q3W    Medications:  Continuous Infusions:  Scheduled Meds:   ceFEPime (MAXIPIME) IVPB  2 g Intravenous Q12H    enoxaparin  40 mg Subcutaneous Daily    fluconazole 40 mg/ml  200 mg Oral Daily    magnesium sulfate IVPB  2 g Intravenous Once    metronidazole  500 mg Intravenous Q8H    ondansetron  4 mg Oral Once    sodium chloride 0.9%  1,000 mL Intravenous Once     PRN Meds:acetaminophen, duke's soln (benadryl 30 mL, mylanta 30 mL, lidocane 30 mL, nystatin 30 mL) 120 mL, ketorolac, oxyCODONE, promethazine, ramelteon, sodium chloride 0.9%     Review of Systems   Constitutional: Negative for chills and fever.   HENT: Positive for mouth sores (thrush). Negative for sore throat.         Dry mouth   Eyes: Negative for visual disturbance.   Respiratory: Negative for shortness of breath.    Cardiovascular: Positive for chest pain (extension of abdominal pain).   Gastrointestinal: Positive for abdominal pain, diarrhea and nausea. Negative for blood in stool, constipation and vomiting.   Genitourinary: Negative for difficulty urinating.   Musculoskeletal: Negative for arthralgias.   Neurological: Negative for dizziness and light-headedness.   Psychiatric/Behavioral: Negative for confusion.     Objective:     Vital Signs (Most Recent):  Temp: 96.9 °F (36.1 °C) (03/01/18 0813)  Pulse: 101 (03/01/18 0813)  Resp: 20 (03/01/18 0813)  BP: 133/67 (03/01/18 0813)  SpO2: 98 % (03/01/18 0813) Vital Signs (24h Range):  Temp:  [96.9 °F (36.1 °C)-100.1 °F (37.8 °C)] 96.9 °F (36.1 °C)  Pulse:  [101-133] 101  Resp:  [12-24] 20  SpO2:  [93 %-100 %] 98 %  BP: ()/(49-88) 133/67     Weight: 113.4 kg (250 lb)  Body mass index  is 39.16 kg/m².  Body surface area is 2.32 meters squared.      Intake/Output Summary (Last 24 hours) at 03/01/18 0858  Last data filed at 03/01/18 0822   Gross per 24 hour   Intake             4200 ml   Output                0 ml   Net             4200 ml       Physical Exam   Constitutional: He is oriented to person, place, and time. He appears well-developed and well-nourished. He appears distressed.   obese   HENT:   Head: Normocephalic and atraumatic.   Erythema, swelling of oral mucosa. White plaque ventral aspect tongue. Petechiae dorsal aspect tongue.   Eyes: EOM are normal. Pupils are equal, round, and reactive to light.   Neck: Normal range of motion. No JVD present.   Cardiovascular: Normal rate and regular rhythm.    Murmur heard.  Pulmonary/Chest: Effort normal and breath sounds normal.   Abdominal: Soft. Bowel sounds are normal. He exhibits distension. There is tenderness (diffuse). There is no guarding.   Musculoskeletal: He exhibits no edema or tenderness.   Neurological: He is alert and oriented to person, place, and time.   Skin: Skin is warm and dry. Capillary refill takes less than 2 seconds.       Significant Labs:   CBC:   Recent Labs  Lab 02/28/18  0858 03/01/18  0510   WBC 0.32* 0.32*   HGB 13.4* 11.7*   HCT 39.0* 33.0*   * 79*    and CMP:   Recent Labs  Lab 02/28/18  0858 03/01/18  0510   * 131*   K 3.7 3.5   CL 95 99   CO2 23 21*   * 175*   BUN 20 20   CREATININE 1.1 1.2   CALCIUM 7.1* 7.0*   PROT 5.6* 5.1*   ALBUMIN 2.4* 2.0*   BILITOT 1.2* 0.8   ALKPHOS 61 51*   AST 22 11   ALT 51* 34   ANIONGAP 11 11   EGFRNONAA >60.0 >60.0       Diagnostic Results:  Imaging Results          CT Abdomen Pelvis With Contrast (Final result)  Result time 02/28/18 16:07:16    Final result by Calvin Sainz MD (02/28/18 16:07:16)                 Impression:         Mild diffuse bowel wall thickening throughout the small and large bowel.  Findings likely represent infectious or  inflammatory enterocolitis.  No pneumatosis intestinalis to suggest bowel ischemia.  Note is made that there are 2 small foci of air within the liver, presumably in the biliary system given that there is no extension to the periphery or evidence of mesenteric venous gas.    Percutaneous gastric tube in the stomach.    Additional findings as above.  ______________________________________     Electronically signed by resident: RASHID WAGNER MD  Date:     02/28/18  Time:    12:16            As the supervising and teaching physician, I personally reviewed the images and resident's interpretation and I agree with the findings.            Electronically signed by: Dr. Calvin Sainz MD  Date:     02/28/18  Time:    16:07              Narrative:    HISTORY:  48-year-old male with provided clinical history of neutropenic     TECHNIQUE: Axial CT images acquired from the diaphragm through the ischial tuberosities  after administration of 100 cc of Omnipaque 350  IV.  Oral contrast not used.  Multiplanar reconstructions provided for review.    COMPARISON: PET/CT routine 01/19/2018     FINDINGS:    Inferior Mediastinum/Heart: No cardiomegaly. No significant pericardial effusion.  Lung Bases: No focal consolidation, mass, pleural thickening, or pneumothorax.    Peritoneal Space: No ascites. No free air.  Stomach: Distended with fluid.  There is a percutaneous gastric tube with tip/balloon inflated in the gastric antrum.  Otherwise unremarkable.  Liver: There are punctate foci of air in the liver (axial series 2, images 9 and 11).  Presumably this is biliary in location, noting no extension to the periphery.  Findings are not present on recent PET/CT examination 01/19/2018.  Otherwise the liver is normal in size, homogenous in attenuation, and without focal lesion.    Gallbladder: No calcified gallstones.  Bile Ducts: No evidence of dilated ducts.  Pancreas: Mild fatty involution. No mass or peripancreatic fat stranding.    Spleen: Unremarkable.  Adrenals: Unremarkable.  Bowel/Mesentery: There is diffuse, mild wall thickening throughout the small and large bowel.  No evidence of pneumatosis intestinalis or air visualized in the mesenteric veins.  Small and large bowel are normal in caliber without evidence of obstruction.  No significant mesenteric edema or adenopathy.  The appendix is unremarkable.      Urinary Tract: Kidneys are normal in size and position and concentrate/excrete contrast appropriately on delayed imaging. No solid renal mass.  No nephrolithiasis or evidence of obstructive uropathy.  Ureters and bladder are unremarkable.  Retroperitoneum:  No significant adenopathy, or fluid.   Pelvis:  No pelvic masses, adenopathy, or free fluid. Prostate is unremarkable.  Abdominal wall:  Unremarkable.     Vasculature: No aneurysm or dissection.  Minimal atherosclerotic calcification of the visualized aorta and its branch vessels.      Bones: No acute fracture or bony destructive process. Age related degenerative joint disease.                             X-Ray Chest 1 View (Final result)  Result time 02/28/18 10:17:55    Final result by Gildardo Artis Jr., MD (02/28/18 10:17:55)                 Impression:     See above      Electronically signed by: GILDARDO ARTIS MD  Date:     02/28/18  Time:    10:17              Narrative:    Chest single view compared to February 6.  Right-sided PICC type catheter overlies the SVC though its tip is not well-visualized.  Heart size and pulmonary vascularity is similar.  Lungs are hypoexpanded.  No confluent consolidation.                                Assessment/Plan:     * Enterocolitis    48 yr old with palatine tonsillar SCC on chemotherapy with neutropenia presents with diarrhea & abdominal pain. Likely enterocolitis based on findings of bowel wall thickening seen on CT abd/pelvis. Other considerations included gastroenteritis vs food poisoning vs disimpaction (previously  "constipated.)    No indications for surgery at this time (CT neg for bowel perforation, overall stable.) Septic on admit with hypotension SBP 80s, tachycardia 120s, ANC 60, lactate 3.4. Dehydrated with dry mucous membranes & hyponatremia. Received 1x dose cefepime 2g in ED & IVF (NS fluid boluses.)    -will con't cefepime 2g Q12H, IV flagyl 500mg Q12H; add vancomycin pending culture results  -IVF- 3L NS fluid boluses on day 1 admission;  will give another 2L today and start continuous drip  -bowel rest (NPO)  -IV zofran for nausea  -stool studies: WBC positive, C. Diff negative, o/p/c, culture & rotavirus in process  -blood cultures sent  -trending lactate, decreasing        Sepsis    Septic on admit with hypotension (SBP 80s), tachycardia 120s, ANC 60. Lactate 3.4. Likely source is enterocolitis given presenting complaints of diarrhea, predisposing RF of neutropenia, and findings on CT abd/pelv. Also concern for UTI given lower abdominal pain with urinary urgency & previous history of UTI with E. Coli & enterobacter 2 weeks ago. CXR neg for PNA.     -CXR neg for PNA  -UA, urine culture- unable to obtain as patient refusing; will reassess following improved pain control  -blood cultures sent  -trending lactate  -monitor vitals  -see "enterocolitis" for details regarding plan of care        Squamous cell carcinoma of palatine tonsil    -Recent completion of cycle 2 on 02/26 with port removal. See Dr. Lopez's office visit note for details regarding oncological history & plan of care. Will optimize supportive care while in-patient.    -significant pain related to mucositis; initiating PCA pump   -zofran for nausea (QTc <500 on EKT 02/28)  -Ness's sol'n PRN  -see "neutropenia" for details regarding plan of care        Chemotherapy-induced neutropenia    -neutropenic precautions  -monitor ANC while in hospital with daily CBC        Hyponatremia    Re=925 on admit. No neurological symptoms but overall some fatigue & " "confusion (likely due to hyponatremia with concurrent infection.) Hyponatremia likely hypovolemic hypotonic in nature due to diarrhea (appears dehydrated.) Also possibly euvolemic hypotonic secondary to low solute (decrease PO intake.)    -correcting with fluid resuscitation  -monitor with CMP  -further work-up with urine & osm studies if Na doesn't correct with fluid resuscitation        Tachycardia    Likely secondary to sepsis and appropriate response to hypotension & hypovolemia .     -EKG neg for STEMI/NSTEMI or arrythmia.  -monitor with frequent vitals  -see "sepsis/enterocolitis" for details regarding plan of care        Hypomagnesemia    1.2 on admit. Likely secondary to diarrhea & decreased PO intake. Potassium wnl.    -1.5 <-- 1.2 s/p IV Mg 2g  -correct PRN  -monitor with daily Mg        Thrombocytopenia    Likely secondary to chemotherapy. Will monitor for signs of bleeding while in patient.     -daily CBC  -transfuse per guidelines; no indication at this time.        Thrush    -secondary to chemotherapy-induced immunosuppression  -started on course per outpatient oncologist.  -today is day 4/10 day course fluconazole, however did not receive yesterday's dose and currently refusing PO meds in setting of mucositis  -will transition to IV fluconazole         Morbid obesity    -NPO for now given enterocolitis  -PT/OT        Prediabetes    HbA1C 6.4% 02/06/2018  - on admit  -will monitor with daily CMP        Low vitamin D level    Takes 1000u weekly  -will administer appropriately while in-patient            Diet-NPO   DVT ppx-enoxaparin 40mg SQ Q8H  Dispo- pending medical stability    Cici Phillips MD  Hematology/Oncology  [facility]    Attending Note  I have personally taken the history and examined this patient and agree with the resident's note as stated above.  Severe mucositis- start IV Fentanyl and Fentanyl patch  Neutropenic fever- will add Vancomycin to current antibiotic " with mucositis  IVFs and replete lytes  Diarrhea slowly improving

## 2018-03-01 NOTE — ASSESSMENT & PLAN NOTE
1.2 on admit. Likely secondary to diarrhea & decreased PO intake. Potassium wnl.    -1.5 <-- 1.2 s/p IV Mg 2g  -correct PRN  -monitor with daily Mg

## 2018-03-01 NOTE — ASSESSMENT & PLAN NOTE
Nutrition Problem  Inadequate oral intake    Related to (etiology):   Dysphagia, Squamous cell carcinoma of palatine tonsil    Signs and Symptoms (as evidenced by):   Unable to consume foods po, NPO status    Interventions/Recommendations (treatment strategy):  See recs    Nutrition Diagnosis Status:   New

## 2018-03-01 NOTE — SUBJECTIVE & OBJECTIVE
Interval History: Remains in significant pain but more alert and responsive than yesterday. Few questions answered during interview, refusing PO meds, and refusing to provide urine sample, as patient is overall in distress.     Oncology Treatment Plan:   OP CISPLATIN DOCETAXEL 5FU Q3W    Medications:  Continuous Infusions:  Scheduled Meds:   ceFEPime (MAXIPIME) IVPB  2 g Intravenous Q12H    enoxaparin  40 mg Subcutaneous Daily    fluconazole 40 mg/ml  200 mg Oral Daily    magnesium sulfate IVPB  2 g Intravenous Once    metronidazole  500 mg Intravenous Q8H    ondansetron  4 mg Oral Once    sodium chloride 0.9%  1,000 mL Intravenous Once     PRN Meds:acetaminophen, duke's soln (benadryl 30 mL, mylanta 30 mL, lidocane 30 mL, nystatin 30 mL) 120 mL, ketorolac, oxyCODONE, promethazine, ramelteon, sodium chloride 0.9%     Review of Systems   Constitutional: Negative for chills and fever.   HENT: Positive for mouth sores (thrush). Negative for sore throat.         Dry mouth   Eyes: Negative for visual disturbance.   Respiratory: Negative for shortness of breath.    Cardiovascular: Positive for chest pain (extension of abdominal pain).   Gastrointestinal: Positive for abdominal pain, diarrhea and nausea. Negative for blood in stool, constipation and vomiting.   Genitourinary: Negative for difficulty urinating.   Musculoskeletal: Negative for arthralgias.   Neurological: Negative for dizziness and light-headedness.   Psychiatric/Behavioral: Negative for confusion.     Objective:     Vital Signs (Most Recent):  Temp: 96.9 °F (36.1 °C) (03/01/18 0813)  Pulse: 101 (03/01/18 0813)  Resp: 20 (03/01/18 0813)  BP: 133/67 (03/01/18 0813)  SpO2: 98 % (03/01/18 0813) Vital Signs (24h Range):  Temp:  [96.9 °F (36.1 °C)-100.1 °F (37.8 °C)] 96.9 °F (36.1 °C)  Pulse:  [101-133] 101  Resp:  [12-24] 20  SpO2:  [93 %-100 %] 98 %  BP: ()/(49-88) 133/67     Weight: 113.4 kg (250 lb)  Body mass index is 39.16 kg/m².  Body surface  area is 2.32 meters squared.      Intake/Output Summary (Last 24 hours) at 03/01/18 0858  Last data filed at 03/01/18 0822   Gross per 24 hour   Intake             4200 ml   Output                0 ml   Net             4200 ml       Physical Exam   Constitutional: He is oriented to person, place, and time. He appears well-developed and well-nourished. He appears distressed.   obese   HENT:   Head: Normocephalic and atraumatic.   Erythema, swelling of oral mucosa. White plaque ventral aspect tongue. Petechiae dorsal aspect tongue.   Eyes: EOM are normal. Pupils are equal, round, and reactive to light.   Neck: Normal range of motion. No JVD present.   Cardiovascular: Normal rate and regular rhythm.    Murmur heard.  Pulmonary/Chest: Effort normal and breath sounds normal.   Abdominal: Soft. Bowel sounds are normal. He exhibits distension. There is tenderness (diffuse). There is no guarding.   Musculoskeletal: He exhibits no edema or tenderness.   Neurological: He is alert and oriented to person, place, and time.   Skin: Skin is warm and dry. Capillary refill takes less than 2 seconds.       Significant Labs:   CBC:   Recent Labs  Lab 02/28/18  0858 03/01/18  0510   WBC 0.32* 0.32*   HGB 13.4* 11.7*   HCT 39.0* 33.0*   * 79*    and CMP:   Recent Labs  Lab 02/28/18  0858 03/01/18  0510   * 131*   K 3.7 3.5   CL 95 99   CO2 23 21*   * 175*   BUN 20 20   CREATININE 1.1 1.2   CALCIUM 7.1* 7.0*   PROT 5.6* 5.1*   ALBUMIN 2.4* 2.0*   BILITOT 1.2* 0.8   ALKPHOS 61 51*   AST 22 11   ALT 51* 34   ANIONGAP 11 11   EGFRNONAA >60.0 >60.0       Diagnostic Results:  Imaging Results          CT Abdomen Pelvis With Contrast (Final result)  Result time 02/28/18 16:07:16    Final result by Calvin Sainz MD (02/28/18 16:07:16)                 Impression:         Mild diffuse bowel wall thickening throughout the small and large bowel.  Findings likely represent infectious or inflammatory enterocolitis.  No  pneumatosis intestinalis to suggest bowel ischemia.  Note is made that there are 2 small foci of air within the liver, presumably in the biliary system given that there is no extension to the periphery or evidence of mesenteric venous gas.    Percutaneous gastric tube in the stomach.    Additional findings as above.  ______________________________________     Electronically signed by resident: RASHID WAGNER MD  Date:     02/28/18  Time:    12:16            As the supervising and teaching physician, I personally reviewed the images and resident's interpretation and I agree with the findings.            Electronically signed by: Dr. Calvin Sianz MD  Date:     02/28/18  Time:    16:07              Narrative:    HISTORY:  48-year-old male with provided clinical history of neutropenic     TECHNIQUE: Axial CT images acquired from the diaphragm through the ischial tuberosities  after administration of 100 cc of Omnipaque 350  IV.  Oral contrast not used.  Multiplanar reconstructions provided for review.    COMPARISON: PET/CT routine 01/19/2018     FINDINGS:    Inferior Mediastinum/Heart: No cardiomegaly. No significant pericardial effusion.  Lung Bases: No focal consolidation, mass, pleural thickening, or pneumothorax.    Peritoneal Space: No ascites. No free air.  Stomach: Distended with fluid.  There is a percutaneous gastric tube with tip/balloon inflated in the gastric antrum.  Otherwise unremarkable.  Liver: There are punctate foci of air in the liver (axial series 2, images 9 and 11).  Presumably this is biliary in location, noting no extension to the periphery.  Findings are not present on recent PET/CT examination 01/19/2018.  Otherwise the liver is normal in size, homogenous in attenuation, and without focal lesion.    Gallbladder: No calcified gallstones.  Bile Ducts: No evidence of dilated ducts.  Pancreas: Mild fatty involution. No mass or peripancreatic fat stranding.   Spleen: Unremarkable.  Adrenals:  Unremarkable.  Bowel/Mesentery: There is diffuse, mild wall thickening throughout the small and large bowel.  No evidence of pneumatosis intestinalis or air visualized in the mesenteric veins.  Small and large bowel are normal in caliber without evidence of obstruction.  No significant mesenteric edema or adenopathy.  The appendix is unremarkable.      Urinary Tract: Kidneys are normal in size and position and concentrate/excrete contrast appropriately on delayed imaging. No solid renal mass.  No nephrolithiasis or evidence of obstructive uropathy.  Ureters and bladder are unremarkable.  Retroperitoneum:  No significant adenopathy, or fluid.   Pelvis:  No pelvic masses, adenopathy, or free fluid. Prostate is unremarkable.  Abdominal wall:  Unremarkable.     Vasculature: No aneurysm or dissection.  Minimal atherosclerotic calcification of the visualized aorta and its branch vessels.      Bones: No acute fracture or bony destructive process. Age related degenerative joint disease.                             X-Ray Chest 1 View (Final result)  Result time 02/28/18 10:17:55    Final result by Gildardo Artis Jr., MD (02/28/18 10:17:55)                 Impression:     See above      Electronically signed by: GILDARDO ARTIS MD  Date:     02/28/18  Time:    10:17              Narrative:    Chest single view compared to February 6.  Right-sided PICC type catheter overlies the SVC though its tip is not well-visualized.  Heart size and pulmonary vascularity is similar.  Lungs are hypoexpanded.  No confluent consolidation.

## 2018-03-01 NOTE — ASSESSMENT & PLAN NOTE
"Septic on admit with hypotension (SBP 80s), tachycardia 120s, ANC 60. Lactate 3.4. Likely source is enterocolitis given presenting complaints of diarrhea, predisposing RF of neutropenia, and findings on CT abd/pelv. Also concern for UTI given lower abdominal pain with urinary urgency & previous history of UTI with E. Coli & enterobacter 2 weeks ago. CXR neg for PNA.     -CXR neg for PNA  -UA, urine culture- unable to obtain as patient refusing; will reassess following improved pain control  -blood cultures sent  -trending lactate  -monitor vitals  -see "enterocolitis" for details regarding plan of care  "

## 2018-03-01 NOTE — ASSESSMENT & PLAN NOTE
Ra=108 on admit. No neurological symptoms but overall some fatigue & confusion (likely due to hyponatremia with concurrent infection.) Hyponatremia likely hypovolemic hypotonic in nature due to diarrhea (appears dehydrated.) Also possibly euvolemic hypotonic secondary to low solute (decrease PO intake.)    -correcting with fluid resuscitation  -monitor with CMP  -further work-up with urine & osm studies if Na doesn't correct with fluid resuscitation

## 2018-03-01 NOTE — PROGRESS NOTES
[facility]  Adult Nutrition  Progress Note    SUMMARY     Recommendations    Recommendation/Intervention: ADAT to Regular. If unable adv diet, Recommend Isosource 1.5 @ 60ml/hr w/150ml water flushes q6hrs & 2pkts Psyllium daily w/ flushes. Provides  2160kcals, 98g/Pro, 6.8g Fiber and 1500ml free fl. Start with 10ml/hr and increase by 10ml q2hrs until goal rate is achieved. hold for residuals >500ml  Goals: pt to consume nutrients >/= 85% EEN/EPN   Nutrition Goal Status: new  Communication of RD Recs: discussed on rounds    Reason for Assessment    Reason for Assessment: new tube feeding  Diagnosis:  (Enterocolitis)  Relevant Medical History: HPV, Opiate addiction, Squamous cell carcinoma of palatine tonsil   Interdisciplinary Rounds: attended  General Information Comments: pt with moderate dysphagia, c/o mouth/throat pain when swallowing.   Nutrition Discharge Planning: A[place]FO    Nutrition Prescription Ordered    Current Diet Order: NPO      Evaluation of Received Nutrients/Fluid Intake     Energy Calories Required: meeting needs   Protein Required: meeting needs   I/O: +4.2L since admit        Intake/Output Summary (Last 24 hours) at 03/01/18 1416  Last data filed at 03/01/18 1000   Gross per 24 hour   Intake             2470 ml   Output                0 ml   Net             2470 ml      Fluid Required: meeting needs  Comments: LBM:3/01/18     % Intake of Estimated Energy Needs: 0 - 25 %  % Meal Intake: NPO     Nutrition Risk Screen     Nutrition Risk Screen: dysphagia or difficulty swallowing, tube feeding or parenteral nutrition    Nutrition/Diet History    Patient Reported Diet/Restrictions/Preferences: no oral intake (x 2wks)  Typical Food/Fluid Intake: decrease since dx  Food Preferences: no [employer] or cultural preferences noted   Factors Affecting Nutritional Intake: NPO     Labs/Tests/Procedures/Meds    Diagnostic Test/Procedure Review: reviewed  Pertinent Labs Reviewed:  "reviewed  Pertinent Labs Comments: WBC-0.32, H/H-11.7/33, Na-131, CO2-21, Glu-175, Ca-7.0, Phos-2.4, mg-1.5, AlkPO-2.0, Trig-256  Pertinent Medications Reviewed: reviewed  Pertinent Medications Comments:    ceFEPime  Q12H    enoxaparin Daily    fentaNYL Q72H    fluconazole 40 mg/ml Daily      metronidazole Q8H    potassium, sodium phosphates Once    sodium chloride 0.9% Once    vancomycin IVPB Q12H         Physical Findings    Overall Physical Appearance: obese, weak  Tubes: gastrostomy tube  Oral/Mouth Cavity: WDL  Skin: edema, intact    Anthropometrics    Temp: 98.4 °F (36.9 °C)  Height: 5' 7.01" (170.2 cm)  Weight Method: Stated  Weight: 113.4 kg (250 lb)  Ideal Body Weight (IBW), Male: 148.06 lb  % Ideal Body Weight, Male (lb): 168.85 lb  BMI (Calculated): 39.2  BMI Grade: 35 - 39.9 - obesity - grade II  Usual Body Weight (UBW), k.4 kg  % Usual Body Weight: 100.21  % Weight Change From Usual Weight: 0 %     Estimated/Assessed Needs    Weight Used For Calorie Calculations: 113.4 kg (250 lb)   Energy Calorie Requirements (kcal): 2579-5710  Energy Need Method: Kcal/kg   RMR (Jackson-St. Jeor Equation): 1952.75   Weight Used For Protein Calculations: 66.1 kg (145 lb 11.6 oz)  Protein Requirements:   Fluid Requirements (mL): per MD  Fluid Need Method: RDA Method   RDA Method (mL): 2835       Assessment and Plan    Squamous cell carcinoma of palatine tonsil    Nutrition Problem  Inadequate oral intake    Related to (etiology):   Dysphagia, Squamous cell carcinoma of palatine tonsil    Signs and Symptoms (as evidenced by):   Unable to consume foods po, NPO status    Interventions/Recommendations (treatment strategy):  See recs    Nutrition Diagnosis Status:   New                  Monitor and Evaluation    Food and Nutrient Intake: energy intake, enteral nutrition intake, food and beverage intake  Food and Nutrient Adminstration: enteral and parenteral nutrition administration, diet order  Physical " Activity and Function: nutrition-related ADLs and IADLs  Anthropometric Measurements: weight, weight change  Biochemical Data, Medical Tests and Procedures:  (All Labs)  Nutrition-Focused Physical Findings: overall appearance    Nutrition Risk    Level of Risk:  (f/u 2x/wk)    Nutrition Follow-Up    RD Follow-up?: Yes

## 2018-03-01 NOTE — PLAN OF CARE
Problem: Patient Care Overview  Goal: Plan of Care Review   03/01/18 1422   Coping/Psychosocial   Plan Of Care Reviewed With patient   Recommendations     Recommendation/Intervention: ADAT to Regular. If unable adv diet, Recommend Isosource 1.5 @ 60ml/hr w/150ml water flushes q6hrs & 2pkts Psyllium daily w/ flushes. Provides  2160kcals, 98g/Pro, 6.8g Fiber and 1500ml free fl. Start with 10ml/hr and increase by 10ml q2hrs until goal rate is achieved. hold for residuals >500ml  Goals: pt to consume nutrients >/= 85% EEN/EPN   Nutrition Goal Status: new  Communication of RD Recs: discussed on rounds    Assessment and Plan         Squamous cell carcinoma of palatine tonsil     Nutrition Problem  Inadequate oral intake     Related to (etiology):   Dysphagia, Squamous cell carcinoma of palatine tonsil     Signs and Symptoms (as evidenced by):   Unable to consume foods po, NPO status     Interventions/Recommendations (treatment strategy):  See recs     Nutrition Diagnosis Status:   New

## 2018-03-01 NOTE — ASSESSMENT & PLAN NOTE
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 Likely enterocolitis based on findings of bowel wall thickening seen on CT abd/pelvis. Other considerations included gastroenteritis vs food poisoning vs disimpaction (previously constipated.)    No indications for surgery at this time (CT neg for bowel perforation, overall stable.) Septic on admit with hypotension SBP 80s, tachycardia 120s, ANC 60, lactate 3.4. Dehydrated with dry mucous membranes & hyponatremia. Received 1x dose cefepime 2g in ED & IVF (NS fluid boluses.)    -will con't cefepime 2g Q12H, IV flagyl 500mg Q12H; add vancomycin pending culture results  -IVF- 3L NS fluid boluses on day 1 admission;  will give another 2L today and start continuous drip  -bowel rest (NPO)  -IV zofran for nausea  -stool studies: WBC positive, C. Diff negative, o/p/c, culture & rotavirus in process  -blood cultures sent  -trending lactate, decreasing

## 2018-03-01 NOTE — ASSESSMENT & PLAN NOTE
-secondary to chemotherapy-induced immunosuppression  -started on course per outpatient oncologist.  -today is day 4/10 day course fluconazole, however did not receive yesterday's dose and currently refusing PO meds in setting of mucositis  -will transition to IV fluconazole

## 2018-03-01 NOTE — ASSESSMENT & PLAN NOTE
"Likely secondary to sepsis and appropriate response to hypotension & hypovolemia .     -EKG neg for STEMI/NSTEMI or arrythmia.  -monitor with frequent vitals  -see "sepsis/enterocolitis" for details regarding plan of care  "

## 2018-03-01 NOTE — HOSPITAL COURSE
Mr. Burton Whiting is a 48 y.o. male with a palatine tonsillar squamous cell carcinoma s/p 2nd cycle CRX who presents with diarrhea. Enterocolitis seen on CT abd/pelv. Septic on admit (SBP 80s, tachycardic 120s, neutrophils 0.32, lactate 3.7) Dehydrated, with hypovolemic hypotonic Na 129. Administered 3L NS fluid boluses, started cefepime & flagyl, bowel rest & pain control. On day 1, vitals responding appropriately, lactate trending down. On day 2, patient remains in significant distress from mucositis and abdominal pain. Add vancomycin to antimicrobial regimen pending culture results. Started on PCA pump 03/01 which helped to control pain.  Transitioned to fentanyl patch 50mcg to then wean off of the PCA pump. Repeat CT abd/pelv to assess clinical status, as patient continues to have diarrhea Q2H. Demonstrates uncomplicated diverticulitis. Transitioned to PO ciprofloxacin & flagyl. Started tube feeds that were tolerated well. WBC rising daily 0.32 --> 7 throughout admission. Medically stable for discharge.

## 2018-03-02 PROBLEM — E87.8 ELECTROLYTE ABNORMALITY: Status: ACTIVE | Noted: 2018-02-28

## 2018-03-02 LAB
ALBUMIN SERPL BCP-MCNC: 1.7 G/DL
ALP SERPL-CCNC: 45 U/L
ALT SERPL W/O P-5'-P-CCNC: 22 U/L
ANION GAP SERPL CALC-SCNC: 8 MMOL/L
AST SERPL-CCNC: 7 U/L
BASOPHILS # BLD AUTO: 0 K/UL
BASOPHILS NFR BLD: 0 %
BILIRUB SERPL-MCNC: 0.5 MG/DL
BUN SERPL-MCNC: 19 MG/DL
CALCIUM SERPL-MCNC: 7.1 MG/DL
CHLORIDE SERPL-SCNC: 106 MMOL/L
CO2 SERPL-SCNC: 21 MMOL/L
CREAT SERPL-MCNC: 1.1 MG/DL
DIFFERENTIAL METHOD: ABNORMAL
EOSINOPHIL # BLD AUTO: 0 K/UL
EOSINOPHIL NFR BLD: 0 %
ERYTHROCYTE [DISTWIDTH] IN BLOOD BY AUTOMATED COUNT: 12.9 %
EST. GFR  (AFRICAN AMERICAN): >60 ML/MIN/1.73 M^2
EST. GFR  (NON AFRICAN AMERICAN): >60 ML/MIN/1.73 M^2
GLUCOSE SERPL-MCNC: 124 MG/DL
HCT VFR BLD AUTO: 28.7 %
HGB BLD-MCNC: 9.9 G/DL
IMM GRANULOCYTES # BLD AUTO: 0 K/UL
IMM GRANULOCYTES NFR BLD AUTO: 0 %
LYMPHOCYTES # BLD AUTO: 0.2 K/UL
LYMPHOCYTES NFR BLD: 53.5 %
MAGNESIUM SERPL-MCNC: 1.7 MG/DL
MCH RBC QN AUTO: 30.7 PG
MCHC RBC AUTO-ENTMCNC: 34.5 G/DL
MCV RBC AUTO: 89 FL
MONOCYTES # BLD AUTO: 0.1 K/UL
MONOCYTES NFR BLD: 16.3 %
NEUTROPHILS # BLD AUTO: 0.1 K/UL
NEUTROPHILS NFR BLD: 30.2 %
NRBC BLD-RTO: 0 /100 WBC
PHOSPHATE SERPL-MCNC: 1.9 MG/DL
PLATELET # BLD AUTO: 52 K/UL
PMV BLD AUTO: 10.9 FL
POTASSIUM SERPL-SCNC: 3.4 MMOL/L
PROT SERPL-MCNC: 4.7 G/DL
RBC # BLD AUTO: 3.23 M/UL
SODIUM SERPL-SCNC: 135 MMOL/L
VANCOMYCIN TROUGH SERPL-MCNC: 19.1 UG/ML
WBC # BLD AUTO: 0.43 K/UL

## 2018-03-02 PROCEDURE — 94770 HC EXHALED C02 TEST: CPT

## 2018-03-02 PROCEDURE — S0030 INJECTION, METRONIDAZOLE: HCPCS | Performed by: STUDENT IN AN ORGANIZED HEALTH CARE EDUCATION/TRAINING PROGRAM

## 2018-03-02 PROCEDURE — 99232 SBSQ HOSP IP/OBS MODERATE 35: CPT | Mod: ,,, | Performed by: INTERNAL MEDICINE

## 2018-03-02 PROCEDURE — 25000003 PHARM REV CODE 250: Performed by: STUDENT IN AN ORGANIZED HEALTH CARE EDUCATION/TRAINING PROGRAM

## 2018-03-02 PROCEDURE — 36415 COLL VENOUS BLD VENIPUNCTURE: CPT

## 2018-03-02 PROCEDURE — 80202 ASSAY OF VANCOMYCIN: CPT

## 2018-03-02 PROCEDURE — 83735 ASSAY OF MAGNESIUM: CPT

## 2018-03-02 PROCEDURE — 84100 ASSAY OF PHOSPHORUS: CPT

## 2018-03-02 PROCEDURE — 85025 COMPLETE CBC W/AUTO DIFF WBC: CPT

## 2018-03-02 PROCEDURE — 99900035 HC TECH TIME PER 15 MIN (STAT)

## 2018-03-02 PROCEDURE — 11000001 HC ACUTE MED/SURG PRIVATE ROOM

## 2018-03-02 PROCEDURE — 63600175 PHARM REV CODE 636 W HCPCS: Performed by: STUDENT IN AN ORGANIZED HEALTH CARE EDUCATION/TRAINING PROGRAM

## 2018-03-02 PROCEDURE — 80053 COMPREHEN METABOLIC PANEL: CPT

## 2018-03-02 RX ORDER — SODIUM,POTASSIUM PHOSPHATES 280-250MG
2 POWDER IN PACKET (EA) ORAL ONCE
Status: COMPLETED | OUTPATIENT
Start: 2018-03-02 | End: 2018-03-02

## 2018-03-02 RX ORDER — ONDANSETRON 2 MG/ML
4 INJECTION INTRAMUSCULAR; INTRAVENOUS EVERY 6 HOURS PRN
Status: DISCONTINUED | OUTPATIENT
Start: 2018-03-02 | End: 2018-03-07

## 2018-03-02 RX ADMIN — METRONIDAZOLE 500 MG: 500 INJECTION, SOLUTION INTRAVENOUS at 10:03

## 2018-03-02 RX ADMIN — CEFEPIME 2 G: 2 INJECTION, POWDER, FOR SOLUTION INTRAVENOUS at 05:03

## 2018-03-02 RX ADMIN — POTASSIUM & SODIUM PHOSPHATES POWDER PACK 280-160-250 MG 2 PACKET: 280-160-250 PACK at 09:03

## 2018-03-02 RX ADMIN — SODIUM CHLORIDE, SODIUM LACTATE, POTASSIUM CHLORIDE, AND CALCIUM CHLORIDE: 600; 310; 30; 20 INJECTION, SOLUTION INTRAVENOUS at 10:03

## 2018-03-02 RX ADMIN — METRONIDAZOLE 500 MG: 500 INJECTION, SOLUTION INTRAVENOUS at 01:03

## 2018-03-02 RX ADMIN — FLUCONAZOLE 200 MG: 200 TABLET ORAL at 09:03

## 2018-03-02 RX ADMIN — VANCOMYCIN HYDROCHLORIDE 1750 MG: 1 INJECTION, POWDER, LYOPHILIZED, FOR SOLUTION INTRAVENOUS at 11:03

## 2018-03-02 RX ADMIN — CEFEPIME 2 G: 2 INJECTION, POWDER, FOR SOLUTION INTRAVENOUS at 02:03

## 2018-03-02 RX ADMIN — METRONIDAZOLE 500 MG: 500 INJECTION, SOLUTION INTRAVENOUS at 05:03

## 2018-03-02 RX ADMIN — ONDANSETRON 4 MG: 2 INJECTION INTRAMUSCULAR; INTRAVENOUS at 08:03

## 2018-03-02 RX ADMIN — CEFEPIME 2 G: 2 INJECTION, POWDER, FOR SOLUTION INTRAVENOUS at 09:03

## 2018-03-02 RX ADMIN — Medication: at 04:03

## 2018-03-02 RX ADMIN — ASCORBIC ACID, VITAMIN A PALMITATE, CHOLECALCIFEROL, THIAMINE HYDROCHLORIDE, RIBOFLAVIN-5 PHOSPHATE SODIUM, PYRIDOXINE HYDROCHLORIDE, NIACINAMIDE, DEXPANTHENOL, ALPHA-TOCOPHEROL ACETATE, VITAMIN K1, FOLIC ACID, BIOTIN, CYANOCOBALAMIN: 200; 3300; 200; 6; 3.6; 6; 40; 15; 10; 150; 600; 60; 5 INJECTION, SOLUTION INTRAVENOUS at 04:03

## 2018-03-02 NOTE — ASSESSMENT & PLAN NOTE
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 Likely enterocolitis based on findings of bowel wall thickening seen on CT abd/pelvis. Other considerations included gastroenteritis vs food poisoning vs disimpaction (previously constipated.)    No indications for surgery at this time (CT neg for bowel perforation, overall stable.) Septic on admit with hypotension SBP 80s, tachycardia 120s, ANC 60, lactate 3.4. Dehydrated with dry mucous membranes & hyponatremia. Received 1x dose cefepime 2g in ED & IVF (NS fluid boluses.)    -will con't cefepime 2g Q12H, IV flagyl 500mg Q12H, vancomycin (added 03/01 pending culture results; will con't another 24 hrs)  -IVF- 5L total NS fluid boluses, continuous drip  -bowel rest (NPO), starting TPN today  -IV zofran for nausea  -stool studies: WBC positive, C. Diff, o/p/c, culture & rotavirus negative  -blood cultures NGTD  -lactate downtrending

## 2018-03-02 NOTE — ASSESSMENT & PLAN NOTE
Likely secondary to diarrhea & decreased PO intake    -P low, attempted to replete yesterday but patient unable to tolerate PO; try again today in setting of improved pain control with PCA pump  -K low-K phos ordered  -MG responded well to IV supplementation  -con't to monitor & replete PRN.

## 2018-03-02 NOTE — PROGRESS NOTES
[facility]  Hematology/Oncology  Progress Note    Patient Name: Burton Whiting  Admission Date: 2/28/2018  Hospital Length of Stay: 2 days  Code Status: Full Code     Subjective:     HPI:  Mr. Burton Whiting is a 48 y.o. male with a PMHx palatine tonsillar squamous cell carcinoma who presents with chief complaint of diarrhea. Onset 2 days ago, watery, non-bloody, occurring every 45 minutes, has started to taper down since this morning. Associated with lower abdominal pain (5/10, non-radiating, occurs intermittently, stabbing in nature, not associated with food,) nausea (no vomiting), low grade fever (tmax 99.4 that improved with water infusion through PEG), urinary urgency, decreased PO intake, insomnia (taking home med oxycodone 5mg more frequently to help sleep) and altered mental status (per sister, patient with some confusion to situations.) No recent sick contacts, abx use, travel, or atypical food intake. Completed cycle 2 of chemo 2 days ago with removal of port, prior to initiation of current symptoms. Recently admitted 02/08-02/13 with neutropenic fever, UTI w/ E. Coli & enterobacter. PEG placed 02/11 secondary to decreased PO intake.    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 He follows with Dr. Lopez in outpatient oncology clinic. He recently completed his second round of chemo on 02/26 with pump removal that day. Also on decadron 6mg BD with current cycles, [facility]'s solution for oral/throat pain, fluconazole (day 3/10 day course) for thrush, zofran PRN for nausea, and oxy 5mg Q6H PRN. PEG placed ~ 2wks ago. Lives in a home alone, is independent with his ADLs, former smoker (>30 yr history, quit 10 months ago), former EtOH use (sober 5 yrs), and opiate addiction (sober 10 yrs with current rare use of prescribed oxycodone for cancer pain.) Family support  includes brother & sister.    Interval History: Improved pain control with PCA pump. More cooperative with therapy. Diarrhea occurring less frequently. Continues to deny blood in stool, urine, or other sites.    Oncology Treatment Plan:   OP CISPLATIN DOCETAXEL 5FU Q3W    Medications:  Continuous Infusions:   hydromorphone in 0.9 % NaCl 6 mg/30 ml      lactated ringers 100 mL/hr at 03/02/18 1000     Scheduled Meds:   ceFEPime (MAXIPIME) IVPB  2 g Intravenous Q8H    enoxaparin  40 mg Subcutaneous Daily    fluconazole  200 mg Oral Daily    metronidazole  500 mg Intravenous Q8H    potassium, sodium phosphates  2 packet Oral Once    vancomycin (VANCOCIN) IVPB  15 mg/kg Intravenous Q12H     PRN Meds:acetaminophen, diphenhydrAMINE, duke's soln (benadryl 30 mL, mylanta 30 mL, lidocane 30 mL, nystatin 30 mL) 120 mL, naloxone, promethazine, ramelteon, sodium chloride 0.9%     Review of Systems   Constitutional: Negative for chills and fever.   HENT: Positive for mouth sores (thrush). Negative for sore throat.         Erythema, edema, slothing of mucous membranes. Petechia on dorsal aspect of tongue, white plaque on ventral aspect of tongue.   Eyes: Negative for visual disturbance.   Respiratory: Negative for shortness of breath.    Cardiovascular: Positive for chest pain (extension of abdominal pain).   Gastrointestinal: Positive for abdominal pain, diarrhea and nausea. Negative for blood in stool, constipation and vomiting.   Genitourinary: Negative for difficulty urinating and hematuria.   Musculoskeletal: Negative for arthralgias.   Neurological: Negative for dizziness and light-headedness.   Psychiatric/Behavioral: Negative for confusion.     Objective:     Vital Signs (Most Recent):  Temp: 97.8 °F (36.6 °C) (03/02/18 0441)  Pulse: 106 (03/02/18 0708)  Resp: 20 (03/02/18 0441)  BP: 132/88 (03/02/18 0441)  SpO2: 98 % (03/02/18 0441) Vital Signs (24h Range):  Temp:  [97.8 °F (36.6 °C)-100 °F (37.8 °C)] 97.8 °F  (36.6 °C)  Pulse:  [104-122] 106  Resp:  [16-20] 20  SpO2:  [93 %-98 %] 98 %  BP: (114-152)/(74-88) 132/88     Weight: 113.4 kg (250 lb)  Body mass index is 39.15 kg/m².  Body surface area is 2.32 meters squared.      Intake/Output Summary (Last 24 hours) at 03/02/18 1007  Last data filed at 03/02/18 0900   Gross per 24 hour   Intake          3352.94 ml   Output              175 ml   Net          3177.94 ml       Physical Exam   Constitutional: He is oriented to person, place, and time. He appears well-developed and well-nourished. He appears distressed.   obese   HENT:   Head: Normocephalic and atraumatic.   Erythema, swelling of oral mucosa. White plaque ventral aspect tongue. Petechiae dorsal aspect tongue.   Eyes: EOM are normal. Pupils are equal, round, and reactive to light.   Neck: Normal range of motion. No JVD present.   Cardiovascular: Normal rate and regular rhythm.    Murmur heard.  Pulmonary/Chest: Effort normal and breath sounds normal.   Abdominal: Soft. Bowel sounds are normal. He exhibits distension. There is tenderness (diffuse). There is no guarding.   Musculoskeletal: He exhibits no edema or tenderness.   Neurological: He is alert and oriented to person, place, and time.   Skin: Skin is warm and dry. Capillary refill takes less than 2 seconds.       Significant Labs:   CBC:     Recent Labs  Lab 03/01/18  0510 03/02/18  0358   WBC 0.32* 0.43*   HGB 11.7* 9.9*   HCT 33.0* 28.7*   PLT 79* 52*    and CMP:     Recent Labs  Lab 03/01/18  0510 03/02/18  0358   * 135*   K 3.5 3.4*   CL 99 106   CO2 21* 21*   * 124*   BUN 20 19   CREATININE 1.2 1.1   CALCIUM 7.0* 7.1*   PROT 5.1* 4.7*   ALBUMIN 2.0* 1.7*   BILITOT 0.8 0.5   ALKPHOS 51* 45*   AST 11 7*   ALT 34 22   ANIONGAP 11 8   EGFRNONAA >60.0 >60.0       Diagnostic Results:  Imaging Results          CT Abdomen Pelvis With Contrast (Final result)  Result time 02/28/18 16:07:16    Final result by Calvin Sainz MD (02/28/18 16:07:16)                  Impression:         Mild diffuse bowel wall thickening throughout the small and large bowel.  Findings likely represent infectious or inflammatory enterocolitis.  No pneumatosis intestinalis to suggest bowel ischemia.  Note is made that there are 2 small foci of air within the liver, presumably in the biliary system given that there is no extension to the periphery or evidence of mesenteric venous gas.    Percutaneous gastric tube in the stomach.    Additional findings as above.  ______________________________________     Electronically signed by resident: RASHID WAGNER MD  Date:     02/28/18  Time:    12:16            As the supervising and teaching physician, I personally reviewed the images and resident's interpretation and I agree with the findings.            Electronically signed by: Dr. Calvin Sainz MD  Date:     02/28/18  Time:    16:07              Narrative:    HISTORY:  48-year-old male with provided clinical history of neutropenic     TECHNIQUE: Axial CT images acquired from the diaphragm through the ischial tuberosities  after administration of 100 cc of Omnipaque 350  IV.  Oral contrast not used.  Multiplanar reconstructions provided for review.    COMPARISON: PET/CT routine 01/19/2018     FINDINGS:    Inferior Mediastinum/Heart: No cardiomegaly. No significant pericardial effusion.  Lung Bases: No focal consolidation, mass, pleural thickening, or pneumothorax.    Peritoneal Space: No ascites. No free air.  Stomach: Distended with fluid.  There is a percutaneous gastric tube with tip/balloon inflated in the gastric antrum.  Otherwise unremarkable.  Liver: There are punctate foci of air in the liver (axial series 2, images 9 and 11).  Presumably this is biliary in location, noting no extension to the periphery.  Findings are not present on recent PET/CT examination 01/19/2018.  Otherwise the liver is normal in size, homogenous in attenuation, and without focal lesion.    Gallbladder: No  calcified gallstones.  Bile Ducts: No evidence of dilated ducts.  Pancreas: Mild fatty involution. No mass or peripancreatic fat stranding.   Spleen: Unremarkable.  Adrenals: Unremarkable.  Bowel/Mesentery: There is diffuse, mild wall thickening throughout the small and large bowel.  No evidence of pneumatosis intestinalis or air visualized in the mesenteric veins.  Small and large bowel are normal in caliber without evidence of obstruction.  No significant mesenteric edema or adenopathy.  The appendix is unremarkable.      Urinary Tract: Kidneys are normal in size and position and concentrate/excrete contrast appropriately on delayed imaging. No solid renal mass.  No nephrolithiasis or evidence of obstructive uropathy.  Ureters and bladder are unremarkable.  Retroperitoneum:  No significant adenopathy, or fluid.   Pelvis:  No pelvic masses, adenopathy, or free fluid. Prostate is unremarkable.  Abdominal wall:  Unremarkable.     Vasculature: No aneurysm or dissection.  Minimal atherosclerotic calcification of the visualized aorta and its branch vessels.      Bones: No acute fracture or bony destructive process. Age related degenerative joint disease.                             X-Ray Chest 1 View (Final result)  Result time 02/28/18 10:17:55    Final result by Gildardo Artis Jr., MD (02/28/18 10:17:55)                 Impression:     See above      Electronically signed by: GILDARDO ARTIS MD  Date:     02/28/18  Time:    10:17              Narrative:    Chest single view compared to February 6.  Right-sided PICC type catheter overlies the SVC though its tip is not well-visualized.  Heart size and pulmonary vascularity is similar.  Lungs are hypoexpanded.  No confluent consolidation.                                Assessment/Plan:     * Enterocolitis    48 yr old with palatine tonsillar SCC on chemotherapy with neutropenia presents with diarrhea & abdominal pain. Likely enterocolitis based on findings of bowel wall  "thickening seen on CT abd/pelvis. Other considerations included gastroenteritis vs food poisoning vs disimpaction (previously constipated.)    No indications for surgery at this time (CT neg for bowel perforation, overall stable.) Septic on admit with hypotension SBP 80s, tachycardia 120s, ANC 60, lactate 3.4. Dehydrated with dry mucous membranes & hyponatremia. Received 1x dose cefepime 2g in ED & IVF (NS fluid boluses.)    -will con't cefepime 2g Q12H, IV flagyl 500mg Q12H, vancomycin (added 03/01 pending culture results; will con't another 24 hrs)  -IVF- 5L total NS fluid boluses, continuous drip  -bowel rest (NPO), starting TPN today  -IV zofran for nausea  -stool studies: WBC positive, C. Diff, o/p/c, culture & rotavirus negative  -blood cultures NGTD  -lactate downtrending        Sepsis    Septic on admit with hypotension (SBP 80s), tachycardia 120s, ANC 60. Lactate 3.4. Likely source is enterocolitis given presenting complaints of diarrhea, predisposing RF of neutropenia, and findings on CT abd/pelv. Also concern for UTI given lower abdominal pain with urinary urgency & previous history of UTI with E. Coli & enterobacter 2 weeks ago. CXR neg for PNA.     -CXR neg for PNA  -UA, urine culture neg  -blood cultures NGTD  -lactate downtrending    -monitor vitals  -see "enterocolitis" for details regarding plan of care        Squamous cell carcinoma of palatine tonsil    -Recent completion of cycle 2 on 02/26 with port removal. See Dr. Lopez's office visit note for details regarding oncological history & plan of care. Will optimize supportive care while in-patient.    -significant pain related to mucositis; con't PCA pump  -zofran for nausea (QTc <500 on EKT 02/28)  -Ness's sol'n PRN  -see "neutropenia" for details regarding plan of care        Chemotherapy-induced neutropenia    -neutropenic precautions  -monitor ANC while in hospital with daily CBC        Hyponatremia    Yq=955 on admit. No neurological symptoms " "but overall some fatigue & confusion (likely due to hyponatremia with concurrent infection.) Hyponatremia likely hypovolemic hypotonic in nature due to diarrhea (appears dehydrated.) Also possibly euvolemic hypotonic secondary to low solute (decrease PO intake.)    -correcting with fluid resuscitation  -monitor with CMP  -further work-up with urine & osm studies if Na doesn't correct with fluid resuscitation        Tachycardia    Likely secondary to sepsis and appropriate response to hypotension & hypovolemia .     -EKG neg for STEMI/NSTEMI or arrythmia.  -monitor with frequent vitals  -see "sepsis/enterocolitis" for details regarding plan of care        Electrolyte abnormality    Likely secondary to diarrhea & decreased PO intake    -P low, attempted to replete yesterday but patient unable to tolerate PO; try again today in setting of improved pain control with PCA pump  -K low-K phos ordered  -MG responded well to IV supplementation  -con't to monitor & replete PRN.          Thrombocytopenia    Likely secondary to chemotherapy. Will monitor for signs of bleeding while in patient.     -daily CBC  -transfuse per guidelines; no indication at this time.  -will hold pharmaceutical DVT ppx at this time        Thrush    -secondary to chemotherapy-induced immunosuppression  -started on course per outpatient oncologist.  -today is day 5/10 day course fluconazole        Morbid obesity    -NPO for now given enterocolitis  -PT/OT        Prediabetes    HbA1C 6.4% 02/06/2018  - on admit  -will monitor with daily CMP        Low vitamin D level    Takes 1000u weekly  -will administer appropriately while in-patient            Diet- PPN today  DVT ppx- platelets downtrending daily, 52K today; will stop pharmaceutical DVT ppx today and start SCDs  Dispo- pending control of diarrhea, pain, and improved immunosuppresion    Cici Phillips MD  Hematology/Oncology  [facility]    Attending Addendum:  The patient " was seen, examined, and discussed on rounds with the team.  I agree with the assessment and plan as outlined for Burton Whiting.  Doing better today.  Continue supportive care.    Sudhir Piper DO, FACP  Hematology & Oncology  [facility] [place] [address]  ph. [phone]  Fax. [phone]

## 2018-03-02 NOTE — PLAN OF CARE
Problem: Patient Care Overview  Goal: Plan of Care Review  Outcome: Ongoing (interventions implemented as appropriate)  Plan of care reviewed with patient and family.  FAll precautions maintained, side rails up x2 , call light in reach, bed in low position and locked.  Dilaudid pca and and lactated ringers infusing without difficulty.  Ambulating , voiding and npo at the present time.  Sister at the bedside.on telemetry.

## 2018-03-02 NOTE — PLAN OF CARE
Problem: Patient Care Overview  Goal: Plan of Care Review  Outcome: Ongoing (interventions implemented as appropriate)  Plan of care reviewed with the patient at the beginning of the shift. Pt admitted with enterocolitis. He is NPO but does take sips of water. Pt complains of mouth pain and abdominal pain. Thrush and mucositis noted. Dilaudid PCA for pain management. Reinforced correct usage of pump. He has diarrhea. Two liquid BMs overnight. PEG tube currently not in use but was flushed without resistance. Zofran given prn for nausea. IVF infusing. Fall precautions maintained. Pt able to walk independently at this time. Pt remained free from falls and injury this shift. Bed locked in lowest position, side rails up x2, call light within reach. Instructed pt to call for assistance as needed. Pt verbalized understanding. Pt is neutropenic. Precautions maintained. Tele monitoring continued. No acute issues overnight. Will continue to monitor.

## 2018-03-02 NOTE — SUBJECTIVE & OBJECTIVE
Interval History: Improved pain control with PCA pump. More cooperative with therapy. Diarrhea occurring less frequently. Continues to deny blood in stool, urine, or other sites.    Oncology Treatment Plan:   OP CISPLATIN DOCETAXEL 5FU Q3W    Medications:  Continuous Infusions:   hydromorphone in 0.9 % NaCl 6 mg/30 ml      lactated ringers 100 mL/hr at 03/02/18 1000     Scheduled Meds:   ceFEPime (MAXIPIME) IVPB  2 g Intravenous Q8H    enoxaparin  40 mg Subcutaneous Daily    fluconazole  200 mg Oral Daily    metronidazole  500 mg Intravenous Q8H    potassium, sodium phosphates  2 packet Oral Once    vancomycin (VANCOCIN) IVPB  15 mg/kg Intravenous Q12H     PRN Meds:acetaminophen, diphenhydrAMINE, duke's soln (benadryl 30 mL, mylanta 30 mL, lidocane 30 mL, nystatin 30 mL) 120 mL, naloxone, promethazine, ramelteon, sodium chloride 0.9%     Review of Systems   Constitutional: Negative for chills and fever.   HENT: Positive for mouth sores (thrush). Negative for sore throat.         Erythema, edema, slothing of mucous membranes. Petechia on dorsal aspect of tongue, white plaque on ventral aspect of tongue.   Eyes: Negative for visual disturbance.   Respiratory: Negative for shortness of breath.    Cardiovascular: Positive for chest pain (extension of abdominal pain).   Gastrointestinal: Positive for abdominal pain, diarrhea and nausea. Negative for blood in stool, constipation and vomiting.   Genitourinary: Negative for difficulty urinating and hematuria.   Musculoskeletal: Negative for arthralgias.   Neurological: Negative for dizziness and light-headedness.   Psychiatric/Behavioral: Negative for confusion.     Objective:     Vital Signs (Most Recent):  Temp: 97.8 °F (36.6 °C) (03/02/18 0441)  Pulse: 106 (03/02/18 0708)  Resp: 20 (03/02/18 0441)  BP: 132/88 (03/02/18 0441)  SpO2: 98 % (03/02/18 0441) Vital Signs (24h Range):  Temp:  [97.8 °F (36.6 °C)-100 °F (37.8 °C)] 97.8 °F (36.6 °C)  Pulse:  [104-122]  106  Resp:  [16-20] 20  SpO2:  [93 %-98 %] 98 %  BP: (114-152)/(74-88) 132/88     Weight: 113.4 kg (250 lb)  Body mass index is 39.15 kg/m².  Body surface area is 2.32 meters squared.      Intake/Output Summary (Last 24 hours) at 03/02/18 1007  Last data filed at 03/02/18 0900   Gross per 24 hour   Intake          3352.94 ml   Output              175 ml   Net          3177.94 ml       Physical Exam   Constitutional: He is oriented to person, place, and time. He appears well-developed and well-nourished. He appears distressed.   obese   HENT:   Head: Normocephalic and atraumatic.   Erythema, swelling of oral mucosa. White plaque ventral aspect tongue. Petechiae dorsal aspect tongue.   Eyes: EOM are normal. Pupils are equal, round, and reactive to light.   Neck: Normal range of motion. No JVD present.   Cardiovascular: Normal rate and regular rhythm.    Murmur heard.  Pulmonary/Chest: Effort normal and breath sounds normal.   Abdominal: Soft. Bowel sounds are normal. He exhibits distension. There is tenderness (diffuse). There is no guarding.   Musculoskeletal: He exhibits no edema or tenderness.   Neurological: He is alert and oriented to person, place, and time.   Skin: Skin is warm and dry. Capillary refill takes less than 2 seconds.       Significant Labs:   CBC:     Recent Labs  Lab 03/01/18  0510 03/02/18  0358   WBC 0.32* 0.43*   HGB 11.7* 9.9*   HCT 33.0* 28.7*   PLT 79* 52*    and CMP:     Recent Labs  Lab 03/01/18  0510 03/02/18  0358   * 135*   K 3.5 3.4*   CL 99 106   CO2 21* 21*   * 124*   BUN 20 19   CREATININE 1.2 1.1   CALCIUM 7.0* 7.1*   PROT 5.1* 4.7*   ALBUMIN 2.0* 1.7*   BILITOT 0.8 0.5   ALKPHOS 51* 45*   AST 11 7*   ALT 34 22   ANIONGAP 11 8   EGFRNONAA >60.0 >60.0       Diagnostic Results:  Imaging Results          CT Abdomen Pelvis With Contrast (Final result)  Result time 02/28/18 16:07:16    Final result by Calvin Sainz MD (02/28/18 16:07:16)                 Impression:          Mild diffuse bowel wall thickening throughout the small and large bowel.  Findings likely represent infectious or inflammatory enterocolitis.  No pneumatosis intestinalis to suggest bowel ischemia.  Note is made that there are 2 small foci of air within the liver, presumably in the biliary system given that there is no extension to the periphery or evidence of mesenteric venous gas.    Percutaneous gastric tube in the stomach.    Additional findings as above.  ______________________________________     Electronically signed by resident: RASHID WAGNER MD  Date:     02/28/18  Time:    12:16            As the supervising and teaching physician, I personally reviewed the images and resident's interpretation and I agree with the findings.            Electronically signed by: Dr. Calvin Sainz MD  Date:     02/28/18  Time:    16:07              Narrative:    HISTORY:  48-year-old male with provided clinical history of neutropenic     TECHNIQUE: Axial CT images acquired from the diaphragm through the ischial tuberosities  after administration of 100 cc of Omnipaque 350  IV.  Oral contrast not used.  Multiplanar reconstructions provided for review.    COMPARISON: PET/CT routine 01/19/2018     FINDINGS:    Inferior Mediastinum/Heart: No cardiomegaly. No significant pericardial effusion.  Lung Bases: No focal consolidation, mass, pleural thickening, or pneumothorax.    Peritoneal Space: No ascites. No free air.  Stomach: Distended with fluid.  There is a percutaneous gastric tube with tip/balloon inflated in the gastric antrum.  Otherwise unremarkable.  Liver: There are punctate foci of air in the liver (axial series 2, images 9 and 11).  Presumably this is biliary in location, noting no extension to the periphery.  Findings are not present on recent PET/CT examination 01/19/2018.  Otherwise the liver is normal in size, homogenous in attenuation, and without focal lesion.    Gallbladder: No calcified gallstones.  Bile  Ducts: No evidence of dilated ducts.  Pancreas: Mild fatty involution. No mass or peripancreatic fat stranding.   Spleen: Unremarkable.  Adrenals: Unremarkable.  Bowel/Mesentery: There is diffuse, mild wall thickening throughout the small and large bowel.  No evidence of pneumatosis intestinalis or air visualized in the mesenteric veins.  Small and large bowel are normal in caliber without evidence of obstruction.  No significant mesenteric edema or adenopathy.  The appendix is unremarkable.      Urinary Tract: Kidneys are normal in size and position and concentrate/excrete contrast appropriately on delayed imaging. No solid renal mass.  No nephrolithiasis or evidence of obstructive uropathy.  Ureters and bladder are unremarkable.  Retroperitoneum:  No significant adenopathy, or fluid.   Pelvis:  No pelvic masses, adenopathy, or free fluid. Prostate is unremarkable.  Abdominal wall:  Unremarkable.     Vasculature: No aneurysm or dissection.  Minimal atherosclerotic calcification of the visualized aorta and its branch vessels.      Bones: No acute fracture or bony destructive process. Age related degenerative joint disease.                             X-Ray Chest 1 View (Final result)  Result time 02/28/18 10:17:55    Final result by Gildardo Artis Jr., MD (02/28/18 10:17:55)                 Impression:     See above      Electronically signed by: GILDARDO ARTIS MD  Date:     02/28/18  Time:    10:17              Narrative:    Chest single view compared to February 6.  Right-sided PICC type catheter overlies the SVC though its tip is not well-visualized.  Heart size and pulmonary vascularity is similar.  Lungs are hypoexpanded.  No confluent consolidation.

## 2018-03-02 NOTE — ASSESSMENT & PLAN NOTE
Likely secondary to chemotherapy. Will monitor for signs of bleeding while in patient.     -daily CBC  -transfuse per guidelines; no indication at this time.  -will hold pharmaceutical DVT ppx at this time

## 2018-03-02 NOTE — ASSESSMENT & PLAN NOTE
"-Recent completion of cycle 2 on 02/26 with port removal. See Dr. Lopez's office visit note for details regarding oncological history & plan of care. Will optimize supportive care while in-patient.    -significant pain related to mucositis; con't PCA pump  -zofran for nausea (QTc <500 on EKT 02/28)  -Ness's sol'n PRN  -see "neutropenia" for details regarding plan of care  "

## 2018-03-02 NOTE — ASSESSMENT & PLAN NOTE
"Septic on admit with hypotension (SBP 80s), tachycardia 120s, ANC 60. Lactate 3.4. Likely source is enterocolitis given presenting complaints of diarrhea, predisposing RF of neutropenia, and findings on CT abd/pelv. Also concern for UTI given lower abdominal pain with urinary urgency & previous history of UTI with E. Coli & enterobacter 2 weeks ago. CXR neg for PNA.     -CXR neg for PNA  -UA, urine culture neg  -blood cultures NGTD  -lactate downtrending    -monitor vitals  -see "enterocolitis" for details regarding plan of care  "

## 2018-03-02 NOTE — ASSESSMENT & PLAN NOTE
-secondary to chemotherapy-induced immunosuppression  -started on course per outpatient oncologist.  -today is day 5/10 day course fluconazole

## 2018-03-02 NOTE — ASSESSMENT & PLAN NOTE
Lr=732 on admit. No neurological symptoms but overall some fatigue & confusion (likely due to hyponatremia with concurrent infection.) Hyponatremia likely hypovolemic hypotonic in nature due to diarrhea (appears dehydrated.) Also possibly euvolemic hypotonic secondary to low solute (decrease PO intake.)    -correcting with fluid resuscitation  -monitor with CMP  -further work-up with urine & osm studies if Na doesn't correct with fluid resuscitation

## 2018-03-03 LAB
ALBUMIN SERPL BCP-MCNC: 1.5 G/DL
ALP SERPL-CCNC: 39 U/L
ALT SERPL W/O P-5'-P-CCNC: 14 U/L
ANION GAP SERPL CALC-SCNC: 7 MMOL/L
ANISOCYTOSIS BLD QL SMEAR: SLIGHT
AST SERPL-CCNC: 5 U/L
BACTERIA STL CULT: NORMAL
BACTERIA UR CULT: NO GROWTH
BASOPHILS # BLD AUTO: ABNORMAL K/UL
BASOPHILS NFR BLD: 2 %
BILIRUB SERPL-MCNC: 0.5 MG/DL
BUN SERPL-MCNC: 20 MG/DL
CALCIUM SERPL-MCNC: 7.2 MG/DL
CHLORIDE SERPL-SCNC: 107 MMOL/L
CO2 SERPL-SCNC: 22 MMOL/L
CREAT SERPL-MCNC: 1 MG/DL
DIFFERENTIAL METHOD: ABNORMAL
EOSINOPHIL # BLD AUTO: ABNORMAL K/UL
EOSINOPHIL NFR BLD: 0 %
ERYTHROCYTE [DISTWIDTH] IN BLOOD BY AUTOMATED COUNT: 13 %
EST. GFR  (AFRICAN AMERICAN): >60 ML/MIN/1.73 M^2
EST. GFR  (NON AFRICAN AMERICAN): >60 ML/MIN/1.73 M^2
GLUCOSE SERPL-MCNC: 124 MG/DL
HCT VFR BLD AUTO: 26.2 %
HGB BLD-MCNC: 9.3 G/DL
IMM GRANULOCYTES # BLD AUTO: ABNORMAL K/UL
IMM GRANULOCYTES NFR BLD AUTO: ABNORMAL %
LYMPHOCYTES # BLD AUTO: ABNORMAL K/UL
LYMPHOCYTES NFR BLD: 80 %
MAGNESIUM SERPL-MCNC: 1.5 MG/DL
MCH RBC QN AUTO: 31.7 PG
MCHC RBC AUTO-ENTMCNC: 35.5 G/DL
MCV RBC AUTO: 89 FL
METAMYELOCYTES NFR BLD MANUAL: 2 %
MONOCYTES # BLD AUTO: ABNORMAL K/UL
MONOCYTES NFR BLD: 2 %
NEUTROPHILS NFR BLD: 14 %
NRBC BLD-RTO: 0 /100 WBC
PHOSPHATE SERPL-MCNC: 1.6 MG/DL
PLATELET # BLD AUTO: 30 K/UL
PLATELET BLD QL SMEAR: ABNORMAL
PMV BLD AUTO: 11.5 FL
POTASSIUM SERPL-SCNC: 3 MMOL/L
PROT SERPL-MCNC: 4.3 G/DL
RBC # BLD AUTO: 2.93 M/UL
SODIUM SERPL-SCNC: 136 MMOL/L
WBC # BLD AUTO: 0.8 K/UL

## 2018-03-03 PROCEDURE — 99233 SBSQ HOSP IP/OBS HIGH 50: CPT | Mod: ,,, | Performed by: INTERNAL MEDICINE

## 2018-03-03 PROCEDURE — 85007 BL SMEAR W/DIFF WBC COUNT: CPT

## 2018-03-03 PROCEDURE — 11000001 HC ACUTE MED/SURG PRIVATE ROOM

## 2018-03-03 PROCEDURE — 83735 ASSAY OF MAGNESIUM: CPT

## 2018-03-03 PROCEDURE — 84100 ASSAY OF PHOSPHORUS: CPT

## 2018-03-03 PROCEDURE — S0030 INJECTION, METRONIDAZOLE: HCPCS | Performed by: STUDENT IN AN ORGANIZED HEALTH CARE EDUCATION/TRAINING PROGRAM

## 2018-03-03 PROCEDURE — 25000003 PHARM REV CODE 250: Performed by: STUDENT IN AN ORGANIZED HEALTH CARE EDUCATION/TRAINING PROGRAM

## 2018-03-03 PROCEDURE — 63600175 PHARM REV CODE 636 W HCPCS: Performed by: STUDENT IN AN ORGANIZED HEALTH CARE EDUCATION/TRAINING PROGRAM

## 2018-03-03 PROCEDURE — 80053 COMPREHEN METABOLIC PANEL: CPT

## 2018-03-03 PROCEDURE — 85027 COMPLETE CBC AUTOMATED: CPT

## 2018-03-03 RX ORDER — LANOLIN ALCOHOL/MO/W.PET/CERES
400 CREAM (GRAM) TOPICAL 2 TIMES DAILY
Status: DISCONTINUED | OUTPATIENT
Start: 2018-03-03 | End: 2018-03-05

## 2018-03-03 RX ORDER — SODIUM,POTASSIUM PHOSPHATES 280-250MG
2 POWDER IN PACKET (EA) ORAL
Status: DISCONTINUED | OUTPATIENT
Start: 2018-03-03 | End: 2018-03-04

## 2018-03-03 RX ADMIN — MAGNESIUM OXIDE TAB 400 MG (241.3 MG ELEMENTAL MG) 400 MG: 400 (241.3 MG) TAB at 09:03

## 2018-03-03 RX ADMIN — CEFEPIME 2 G: 2 INJECTION, POWDER, FOR SOLUTION INTRAVENOUS at 10:03

## 2018-03-03 RX ADMIN — MAGNESIUM OXIDE TAB 400 MG (241.3 MG ELEMENTAL MG) 400 MG: 400 (241.3 MG) TAB at 08:03

## 2018-03-03 RX ADMIN — ASCORBIC ACID, VITAMIN A PALMITATE, CHOLECALCIFEROL, THIAMINE HYDROCHLORIDE, RIBOFLAVIN-5 PHOSPHATE SODIUM, PYRIDOXINE HYDROCHLORIDE, NIACINAMIDE, DEXPANTHENOL, ALPHA-TOCOPHEROL ACETATE, VITAMIN K1, FOLIC ACID, BIOTIN, CYANOCOBALAMIN: 200; 3300; 200; 6; 3.6; 6; 40; 15; 10; 150; 600; 60; 5 INJECTION, SOLUTION INTRAVENOUS at 05:03

## 2018-03-03 RX ADMIN — METRONIDAZOLE 500 MG: 500 INJECTION, SOLUTION INTRAVENOUS at 01:03

## 2018-03-03 RX ADMIN — POTASSIUM & SODIUM PHOSPHATES POWDER PACK 280-160-250 MG 2 PACKET: 280-160-250 PACK at 05:03

## 2018-03-03 RX ADMIN — METRONIDAZOLE 500 MG: 500 INJECTION, SOLUTION INTRAVENOUS at 10:03

## 2018-03-03 RX ADMIN — Medication 5 ML: at 09:03

## 2018-03-03 RX ADMIN — POTASSIUM & SODIUM PHOSPHATES POWDER PACK 280-160-250 MG 2 PACKET: 280-160-250 PACK at 09:03

## 2018-03-03 RX ADMIN — FLUCONAZOLE 200 MG: 200 TABLET ORAL at 08:03

## 2018-03-03 RX ADMIN — POTASSIUM & SODIUM PHOSPHATES POWDER PACK 280-160-250 MG 2 PACKET: 280-160-250 PACK at 10:03

## 2018-03-03 RX ADMIN — METRONIDAZOLE 500 MG: 500 INJECTION, SOLUTION INTRAVENOUS at 05:03

## 2018-03-03 RX ADMIN — CEFEPIME 2 G: 2 INJECTION, POWDER, FOR SOLUTION INTRAVENOUS at 02:03

## 2018-03-03 RX ADMIN — Medication: at 07:03

## 2018-03-03 RX ADMIN — CEFEPIME 2 G: 2 INJECTION, POWDER, FOR SOLUTION INTRAVENOUS at 05:03

## 2018-03-03 NOTE — ASSESSMENT & PLAN NOTE
Neutrophil count <1.0. Slowly improving. Will need continued hospitalization pending safe levels.    -neutropenic precautions  -monitor ANC while in hospital with daily CBC

## 2018-03-03 NOTE — ASSESSMENT & PLAN NOTE
"-Recent completion of cycle 2 on 02/26 with port removal. See Dr. Lopez's office visit note for details regarding oncological history & plan of care. Will optimize supportive care while in-patient.    -significant pain related to mucositis; con't PCA pump  -zofran for nausea (QTc <500 on EKT 02/28)  -magic sol'n rather than [facility]'s mouthwash per patient request  -see "neutropenia" for details regarding plan of care  "

## 2018-03-03 NOTE — ASSESSMENT & PLAN NOTE
Qp=332 on admit. No neurological symptoms but overall some fatigue & confusion (likely due to hyponatremia with concurrent infection.) Hyponatremia likely hypovolemic hypotonic in nature due to diarrhea (appears dehydrated.) Also possibly euvolemic hypotonic secondary to low solute (decrease PO intake.)    -correcting with fluid resuscitation  -monitor with CMP

## 2018-03-03 NOTE — ASSESSMENT & PLAN NOTE
Downtrending, likely secondary to chemotherapy. Will monitor for signs of bleeding while in patient.     -daily CBC  -transfuse per guidelines; no indication at this time.  -will hold pharmaceutical DVT ppx at this time

## 2018-03-03 NOTE — ASSESSMENT & PLAN NOTE
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 Treating for enterocolitis based on findings of bowel wall thickening seen on CT abd/pelvis. Other considerations included gastroenteritis vs food poisoning.    No indications for surgery at this time (CT neg for bowel perforation, overall stable.) Septic on admit with hypotension SBP 80s, tachycardia 120s, ANC 60, lactate 3.4. Dehydrated with dry mucous membranes & hyponatremia. Received 1x dose cefepime 2g in ED & IVF (NS fluid boluses.)     Overall improving (pain, appetite, more alert, neutrophil count rising.) Will con't PCA pump through today and reassess needs tomorrow. Initiate diet today.    -Will con't with broad spec abx targeted towards intraabdominal source (cefepime 2g Q12H, IV flagyl 500mg Q12H, discon't vancomycin   -IVF- 5L total NS fluid boluses, continuous drip  -bowel rest (NPO), started PPN 03/02; CLD today  -IV zofran for nausea  -stool studies: WBC positive, C. Diff, o/p/c, culture & rotavirus negative  -blood cultures NGTD  -lactate downtrending

## 2018-03-03 NOTE — PLAN OF CARE
Problem: Patient Care Overview  Goal: Plan of Care Review  Outcome: Ongoing (interventions implemented as appropriate)  Fall, and infection precautions continued. Dilaudid PCA continued. Clinimix continued. Vancomycin discontinued. Phosphorus and magnesium replaced as ordered. Telemetry continued. Patient advanced to a clear liquid diet, tolerating well. Two bowel movements this shift. Patient stable, will continue to monitor.

## 2018-03-03 NOTE — PLAN OF CARE
Problem: Patient Care Overview  Goal: Plan of Care Review  Outcome: Ongoing (interventions implemented as appropriate)  Plan of care reviewed with the patient at the beginning of the shift. Pt admitted with enterocolitis. He is NPO but does take sips of water. Pt complains of mouth pain and abdominal pain. Thrush and mucositis noted. Dilaudid PCA for pain management. Reinforced correct usage of pump. He has diarrhea. One liquid BMs overnight. PEG tube currently not in use but was flushed without resistance. Zofran given prn for nausea. IVF infusing. Fall precautions maintained. Pt able to walk independently at this time. Pt remained free from falls and injury this shift. Bed locked in lowest position, side rails up x2, call light within reach. Instructed pt to call for assistance as needed. Pt verbalized understanding. Pt is neutropenic. Vanc, Flagyl, and Cefepime continued. Precautions maintained. Tele monitoring continued. No acute issues overnight. Will continue to monitor.

## 2018-03-03 NOTE — ASSESSMENT & PLAN NOTE
-secondary to chemotherapy-induced immunosuppression  -started on course per outpatient oncologist.  -today is day 6/10 day course fluconazole

## 2018-03-03 NOTE — PROGRESS NOTES
[facility]  Hematology/Oncology  Progress Note    Patient Name: Burton Whiting  Admission Date: 2/28/2018  Hospital Length of Stay: 3 days  Code Status: Full Code     Subjective:     HPI:  Mr. Burton Whiting is a 48 y.o. male with a PMHx palatine tonsillar squamous cell carcinoma who presents with chief complaint of diarrhea. Onset 2 days ago, watery, non-bloody, occurring every 45 minutes, has started to taper down since this morning. Associated with lower abdominal pain (5/10, non-radiating, occurs intermittently, stabbing in nature, not associated with food,) nausea (no vomiting), low grade fever (tmax 99.4 that improved with water infusion through PEG), urinary urgency, decreased PO intake, insomnia (taking home med oxycodone 5mg more frequently to help sleep) and altered mental status (per sister, patient with some confusion to situations.) No recent sick contacts, abx use, travel, or atypical food intake. Completed cycle 2 of chemo 2 days ago with removal of port, prior to initiation of current symptoms. Recently admitted 02/08-02/13 with neutropenic fever, UTI w/ E. Coli & enterobacter. PEG placed 02/11 secondary to decreased PO intake.    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 He follows with Dr. Lopez in outpatient oncology clinic. He recently completed his second round of chemo on 02/26 with pump removal that day. Also on decadron 6mg BD with current cycles, [facility]'s solution for oral/throat pain, fluconazole (day 3/10 day course) for thrush, zofran PRN for nausea, and oxy 5mg Q6H PRN. PEG placed ~ 2wks ago. Lives in a home alone, is independent with his ADLs, former smoker (>30 yr history, quit 10 months ago), former EtOH use (sober 5 yrs), and opiate addiction (sober 10 yrs with current rare use of prescribed oxycodone for cancer pain.) Family support  includes brother & sister.    Interval History: Significant improvement in pain this morning. Main concern remains oral pain. However, today he is asking to eat. Bowel movements are lessening in frequency and solidifying. Continues to deny overt blood in stool, urine or other sites.    Oncology Treatment Plan:   OP CISPLATIN DOCETAXEL 5FU Q3W    Medications:  Continuous Infusions:   Amino acid 4.25% - dextrose 5% (CLINIMIX-E) solution with additives (1L provides 42.5 gm AA, 50 gm CHO (170 kcal/L dextrose), Na 35, K 30, Mg 5, Ca 4.5, Acetate 70, Cl 39, Phos 15) 75 mL/hr at 03/02/18 1654    hydromorphone in 0.9 % NaCl 6 mg/30 ml       Scheduled Meds:   ceFEPime (MAXIPIME) IVPB  2 g Intravenous Q8H    fluconazole  200 mg Oral Daily    magnesium oxide  400 mg Oral BID    metronidazole  500 mg Intravenous Q8H    potassium, sodium phosphates  2 packet Oral QID (AC & HS)     PRN Meds:(Magic mouthwash) 1:1:1 Benadryl 12.5mg/5ml liq, aluminum & magnesium hydroxide-simehticone (Maalox), lidocaine viscous 2%, acetaminophen, diphenhydrAMINE, naloxone, ondansetron, promethazine, ramelteon, sodium chloride 0.9%     Review of Systems   Constitutional: Negative for chills and fever.   HENT: Positive for mouth sores (thrush). Negative for sore throat.         Erythema, edema, slothing of mucous membranes. Petechia on dorsal aspect of tongue, white plaque on ventral aspect of tongue.   Eyes: Negative for visual disturbance.   Respiratory: Negative for shortness of breath.    Cardiovascular: Positive for chest pain (extension of abdominal pain).   Gastrointestinal: Positive for abdominal pain, diarrhea and nausea. Negative for blood in stool, constipation and vomiting.   Genitourinary: Negative for difficulty urinating and hematuria.   Musculoskeletal: Negative for arthralgias.   Neurological: Negative for dizziness and light-headedness.   Psychiatric/Behavioral: Negative for confusion.     Objective:     Vital Signs (Most  Recent):  Temp: 98.6 °F (37 °C) (03/03/18 0727)  Pulse: 87 (03/03/18 0752)  Resp: 18 (03/03/18 1000)  BP: (!) 150/90 (03/03/18 0727)  SpO2: 95 % (03/03/18 0727) Vital Signs (24h Range):  Temp:  [98.5 °F (36.9 °C)-98.9 °F (37.2 °C)] 98.6 °F (37 °C)  Pulse:  [] 87  Resp:  [14-18] 18  SpO2:  [95 %-97 %] 95 %  BP: (128-150)/(70-90) 150/90     Weight: 113.4 kg (250 lb)  Body mass index is 39.15 kg/m².  Body surface area is 2.32 meters squared.      Intake/Output Summary (Last 24 hours) at 03/03/18 1041  Last data filed at 03/03/18 1039   Gross per 24 hour   Intake          3455.22 ml   Output                0 ml   Net          3455.22 ml       Physical Exam   Constitutional: He is oriented to person, place, and time. He appears well-developed and well-nourished. He appears distressed.   obese   HENT:   Head: Normocephalic and atraumatic.   Erythema, swelling of oral mucosa. White plaque ventral aspect tongue. Petechiae dorsal aspect tongue.   Eyes: EOM are normal. Pupils are equal, round, and reactive to light.   Neck: Normal range of motion. No JVD present.   Cardiovascular: Normal rate and regular rhythm.    Murmur heard.  Pulmonary/Chest: Effort normal and breath sounds normal.   Abdominal: Soft. Bowel sounds are normal. He exhibits distension. There is tenderness (diffuse). There is no guarding.   Musculoskeletal: He exhibits no edema or tenderness.   Neurological: He is alert and oriented to person, place, and time.   Skin: Skin is warm and dry. Capillary refill takes less than 2 seconds.       Significant Labs:   CBC:     Recent Labs  Lab 03/02/18  0358 03/03/18  0500   WBC 0.43* 0.80*   HGB 9.9* 9.3*   HCT 28.7* 26.2*   PLT 52* 30*    and CMP:     Recent Labs  Lab 03/02/18  0358 03/03/18  0500   * 136   K 3.4* 3.0*    107   CO2 21* 22*   * 124*   BUN 19 20   CREATININE 1.1 1.0   CALCIUM 7.1* 7.2*   PROT 4.7* 4.3*   ALBUMIN 1.7* 1.5*   BILITOT 0.5 0.5   ALKPHOS 45* 39*   AST 7* 5*   ALT 22  14   ANIONGAP 8 7*   EGFRNONAA >60.0 >60.0       Diagnostic Results:  Imaging Results          CT Abdomen Pelvis With Contrast (Final result)  Result time 02/28/18 16:07:16    Final result by Calvin Sainz MD (02/28/18 16:07:16)                 Impression:         Mild diffuse bowel wall thickening throughout the small and large bowel.  Findings likely represent infectious or inflammatory enterocolitis.  No pneumatosis intestinalis to suggest bowel ischemia.  Note is made that there are 2 small foci of air within the liver, presumably in the biliary system given that there is no extension to the periphery or evidence of mesenteric venous gas.    Percutaneous gastric tube in the stomach.    Additional findings as above.  ______________________________________     Electronically signed by resident: RASHID WAGNER MD  Date:     02/28/18  Time:    12:16            As the supervising and teaching physician, I personally reviewed the images and resident's interpretation and I agree with the findings.            Electronically signed by: Dr. Calvin Sainz MD  Date:     02/28/18  Time:    16:07              Narrative:    HISTORY:  48-year-old male with provided clinical history of neutropenic     TECHNIQUE: Axial CT images acquired from the diaphragm through the ischial tuberosities  after administration of 100 cc of Omnipaque 350  IV.  Oral contrast not used.  Multiplanar reconstructions provided for review.    COMPARISON: PET/CT routine 01/19/2018     FINDINGS:    Inferior Mediastinum/Heart: No cardiomegaly. No significant pericardial effusion.  Lung Bases: No focal consolidation, mass, pleural thickening, or pneumothorax.    Peritoneal Space: No ascites. No free air.  Stomach: Distended with fluid.  There is a percutaneous gastric tube with tip/balloon inflated in the gastric antrum.  Otherwise unremarkable.  Liver: There are punctate foci of air in the liver (axial series 2, images 9 and 11).  Presumably this is  biliary in location, noting no extension to the periphery.  Findings are not present on recent PET/CT examination 01/19/2018.  Otherwise the liver is normal in size, homogenous in attenuation, and without focal lesion.    Gallbladder: No calcified gallstones.  Bile Ducts: No evidence of dilated ducts.  Pancreas: Mild fatty involution. No mass or peripancreatic fat stranding.   Spleen: Unremarkable.  Adrenals: Unremarkable.  Bowel/Mesentery: There is diffuse, mild wall thickening throughout the small and large bowel.  No evidence of pneumatosis intestinalis or air visualized in the mesenteric veins.  Small and large bowel are normal in caliber without evidence of obstruction.  No significant mesenteric edema or adenopathy.  The appendix is unremarkable.      Urinary Tract: Kidneys are normal in size and position and concentrate/excrete contrast appropriately on delayed imaging. No solid renal mass.  No nephrolithiasis or evidence of obstructive uropathy.  Ureters and bladder are unremarkable.  Retroperitoneum:  No significant adenopathy, or fluid.   Pelvis:  No pelvic masses, adenopathy, or free fluid. Prostate is unremarkable.  Abdominal wall:  Unremarkable.     Vasculature: No aneurysm or dissection.  Minimal atherosclerotic calcification of the visualized aorta and its branch vessels.      Bones: No acute fracture or bony destructive process. Age related degenerative joint disease.                             X-Ray Chest 1 View (Final result)  Result time 02/28/18 10:17:55    Final result by Gildardo Artis Jr., MD (02/28/18 10:17:55)                 Impression:     See above      Electronically signed by: GILDARDO ARTIS MD  Date:     02/28/18  Time:    10:17              Narrative:    Chest single view compared to February 6.  Right-sided PICC type catheter overlies the SVC though its tip is not well-visualized.  Heart size and pulmonary vascularity is similar.  Lungs are hypoexpanded.  No confluent consolidation.  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 Treating for enterocolitis based on findings of bowel wall thickening seen on CT abd/pelvis. Other considerations included gastroenteritis vs food poisoning.    No indications for surgery at this time (CT neg for bowel perforation, overall stable.) Septic on admit with hypotension SBP 80s, tachycardia 120s, ANC 60, lactate 3.4. Dehydrated with dry mucous membranes & hyponatremia. Received 1x dose cefepime 2g in ED & IVF (NS fluid boluses.)     Overall improving (pain, appetite, more alert, neutrophil count rising.) Will con't PCA pump through today and reassess needs tomorrow. Initiate diet today.    -Will con't with broad spec abx targeted towards intraabdominal source (cefepime 2g Q12H, IV flagyl 500mg Q12H, discon't vancomycin   -IVF- 5L total NS fluid boluses, continuous drip  -bowel rest (NPO), started PPN 03/02; CLD today  -IV zofran for nausea  -stool studies: WBC positive, C. Diff, o/p/c, culture & rotavirus negative  -blood cultures NGTD  -lactate downtrending        Sepsis    Septic on admit with hypotension (SBP 80s), tachycardia 120s, ANC 60. Lactate 3.4. Likely source is enterocolitis given presenting complaints of diarrhea, predisposing RF of neutropenia, and findings on CT abd/pelv. Also concern for UTI given lower abdominal pain with urinary urgency & previous history of UTI with E. Coli & enterobacter 2 weeks ago. CXR neg for PNA.     -CXR neg for PNA  -UA, urine culture neg  -blood cultures NGTD  -lactate downtrending    -monitor vitals  -see "enterocolitis" for details regarding plan of care        Squamous cell carcinoma of palatine tonsil    -Recent completion of cycle 2 on 02/26 with port removal. See Dr. Lopez's office visit note for details regarding oncological history & plan of care. Will optimize supportive " "care while in-patient.    -significant pain related to mucositis; con't PCA pump  -zofran for nausea (QTc <500 on EKT 02/28)  -magic sol'n rather than [facility] mouthwash per patient request  -see "neutropenia" for details regarding plan of care        Chemotherapy-induced neutropenia    Neutrophil count <1.0. Slowly improving. Will need continued hospitalization pending safe levels.    -neutropenic precautions  -monitor ANC while in hospital with daily CBC        Hyponatremia    Fx=284 on admit. No neurological symptoms but overall some fatigue & confusion (likely due to hyponatremia with concurrent infection.) Hyponatremia likely hypovolemic hypotonic in nature due to diarrhea (appears dehydrated.) Also possibly euvolemic hypotonic secondary to low solute (decrease PO intake.)    -correcting with fluid resuscitation  -monitor with CMP        Tachycardia    Likely secondary to sepsis, pain,and  hypotension/hypovolemia .     -EKG neg for STEMI/NSTEMI or arrythmia.  -monitor with frequent vitals  -see "sepsis/enterocolitis" for details regarding plan of care        Electrolyte abnormality    Likely secondary to diarrhea & decreased PO intake.     -Repleting daily with PO as patient does not qualify for IV route at this time.   -More aggressive repletion of K, P, Mg today  -con't to monitor with daily labs        Thrombocytopenia    Downtrending, likely secondary to chemotherapy. Will monitor for signs of bleeding while in patient.     -daily CBC  -transfuse per guidelines; no indication at this time.  -will hold pharmaceutical DVT ppx at this time          Thrush    -secondary to chemotherapy-induced immunosuppression  -started on course per outpatient oncologist.  -today is day 6/10 day course fluconazole        Morbid obesity    -NPO for now given enterocolitis  -PT/OT        Prediabetes    HbA1C 6.4% 02/06/2018  - on admit  -will monitor with daily CMP        Low vitamin D level    Takes 1000u weekly  -will " administer appropriately while in-patient            Diet- CLD  DVT ppx- holding pharmaceutical DVT ppx given thrombocytopenia  Dispo- pending improved NP count and controlled pain    Cici Phillips MD  Hematology/Oncology  [facility]

## 2018-03-03 NOTE — SUBJECTIVE & OBJECTIVE
Interval History: Significant improvement in pain this morning. Main concern remains oral pain. However, today he is asking to eat. Bowel movements are lessening in frequency and solidifying.     Oncology Treatment Plan:   OP CISPLATIN DOCETAXEL 5FU Q3W    Medications:  Continuous Infusions:   Amino acid 4.25% - dextrose 5% (CLINIMIX-E) solution with additives (1L provides 42.5 gm AA, 50 gm CHO (170 kcal/L dextrose), Na 35, K 30, Mg 5, Ca 4.5, Acetate 70, Cl 39, Phos 15) 75 mL/hr at 03/02/18 1654    hydromorphone in 0.9 % NaCl 6 mg/30 ml       Scheduled Meds:   ceFEPime (MAXIPIME) IVPB  2 g Intravenous Q8H    fluconazole  200 mg Oral Daily    magnesium oxide  400 mg Oral BID    metronidazole  500 mg Intravenous Q8H    potassium, sodium phosphates  2 packet Oral QID (AC & HS)     PRN Meds:(Magic mouthwash) 1:1:1 Benadryl 12.5mg/5ml liq, aluminum & magnesium hydroxide-simehticone (Maalox), lidocaine viscous 2%, acetaminophen, diphenhydrAMINE, naloxone, ondansetron, promethazine, ramelteon, sodium chloride 0.9%     Review of Systems   Constitutional: Negative for chills and fever.   HENT: Positive for mouth sores (thrush). Negative for sore throat.         Erythema, edema, slothing of mucous membranes. Petechia on dorsal aspect of tongue, white plaque on ventral aspect of tongue.   Eyes: Negative for visual disturbance.   Respiratory: Negative for shortness of breath.    Cardiovascular: Positive for chest pain (extension of abdominal pain).   Gastrointestinal: Positive for abdominal pain, diarrhea and nausea. Negative for blood in stool, constipation and vomiting.   Genitourinary: Negative for difficulty urinating and hematuria.   Musculoskeletal: Negative for arthralgias.   Neurological: Negative for dizziness and light-headedness.   Psychiatric/Behavioral: Negative for confusion.     Objective:     Vital Signs (Most Recent):  Temp: 98.6 °F (37 °C) (03/03/18 0727)  Pulse: 87 (03/03/18 0752)  Resp: 18 (03/03/18  1000)  BP: (!) 150/90 (03/03/18 0727)  SpO2: 95 % (03/03/18 0727) Vital Signs (24h Range):  Temp:  [98.5 °F (36.9 °C)-98.9 °F (37.2 °C)] 98.6 °F (37 °C)  Pulse:  [] 87  Resp:  [14-18] 18  SpO2:  [95 %-97 %] 95 %  BP: (128-150)/(70-90) 150/90     Weight: 113.4 kg (250 lb)  Body mass index is 39.15 kg/m².  Body surface area is 2.32 meters squared.      Intake/Output Summary (Last 24 hours) at 03/03/18 1041  Last data filed at 03/03/18 1039   Gross per 24 hour   Intake          3455.22 ml   Output                0 ml   Net          3455.22 ml       Physical Exam   Constitutional: He is oriented to person, place, and time. He appears well-developed and well-nourished. He appears distressed.   obese   HENT:   Head: Normocephalic and atraumatic.   Erythema, swelling of oral mucosa. White plaque ventral aspect tongue. Petechiae dorsal aspect tongue.   Eyes: EOM are normal. Pupils are equal, round, and reactive to light.   Neck: Normal range of motion. No JVD present.   Cardiovascular: Normal rate and regular rhythm.    Murmur heard.  Pulmonary/Chest: Effort normal and breath sounds normal.   Abdominal: Soft. Bowel sounds are normal. He exhibits distension. There is tenderness (diffuse). There is no guarding.   Musculoskeletal: He exhibits no edema or tenderness.   Neurological: He is alert and oriented to person, place, and time.   Skin: Skin is warm and dry. Capillary refill takes less than 2 seconds.       Significant Labs:   CBC:     Recent Labs  Lab 03/02/18  0358 03/03/18  0500   WBC 0.43* 0.80*   HGB 9.9* 9.3*   HCT 28.7* 26.2*   PLT 52* 30*    and CMP:     Recent Labs  Lab 03/02/18  0358 03/03/18  0500   * 136   K 3.4* 3.0*    107   CO2 21* 22*   * 124*   BUN 19 20   CREATININE 1.1 1.0   CALCIUM 7.1* 7.2*   PROT 4.7* 4.3*   ALBUMIN 1.7* 1.5*   BILITOT 0.5 0.5   ALKPHOS 45* 39*   AST 7* 5*   ALT 22 14   ANIONGAP 8 7*   EGFRNONAA >60.0 >60.0       Diagnostic Results:  Imaging Results           CT Abdomen Pelvis With Contrast (Final result)  Result time 02/28/18 16:07:16    Final result by Calvin Sainz MD (02/28/18 16:07:16)                 Impression:         Mild diffuse bowel wall thickening throughout the small and large bowel.  Findings likely represent infectious or inflammatory enterocolitis.  No pneumatosis intestinalis to suggest bowel ischemia.  Note is made that there are 2 small foci of air within the liver, presumably in the biliary system given that there is no extension to the periphery or evidence of mesenteric venous gas.    Percutaneous gastric tube in the stomach.    Additional findings as above.  ______________________________________     Electronically signed by resident: RASHID WAGNER MD  Date:     02/28/18  Time:    12:16            As the supervising and teaching physician, I personally reviewed the images and resident's interpretation and I agree with the findings.            Electronically signed by: Dr. Calvin Sainz MD  Date:     02/28/18  Time:    16:07              Narrative:    HISTORY:  48-year-old male with provided clinical history of neutropenic     TECHNIQUE: Axial CT images acquired from the diaphragm through the ischial tuberosities  after administration of 100 cc of Omnipaque 350  IV.  Oral contrast not used.  Multiplanar reconstructions provided for review.    COMPARISON: PET/CT routine 01/19/2018     FINDINGS:    Inferior Mediastinum/Heart: No cardiomegaly. No significant pericardial effusion.  Lung Bases: No focal consolidation, mass, pleural thickening, or pneumothorax.    Peritoneal Space: No ascites. No free air.  Stomach: Distended with fluid.  There is a percutaneous gastric tube with tip/balloon inflated in the gastric antrum.  Otherwise unremarkable.  Liver: There are punctate foci of air in the liver (axial series 2, images 9 and 11).  Presumably this is biliary in location, noting no extension to the periphery.  Findings are not present on recent  PET/CT examination 01/19/2018.  Otherwise the liver is normal in size, homogenous in attenuation, and without focal lesion.    Gallbladder: No calcified gallstones.  Bile Ducts: No evidence of dilated ducts.  Pancreas: Mild fatty involution. No mass or peripancreatic fat stranding.   Spleen: Unremarkable.  Adrenals: Unremarkable.  Bowel/Mesentery: There is diffuse, mild wall thickening throughout the small and large bowel.  No evidence of pneumatosis intestinalis or air visualized in the mesenteric veins.  Small and large bowel are normal in caliber without evidence of obstruction.  No significant mesenteric edema or adenopathy.  The appendix is unremarkable.      Urinary Tract: Kidneys are normal in size and position and concentrate/excrete contrast appropriately on delayed imaging. No solid renal mass.  No nephrolithiasis or evidence of obstructive uropathy.  Ureters and bladder are unremarkable.  Retroperitoneum:  No significant adenopathy, or fluid.   Pelvis:  No pelvic masses, adenopathy, or free fluid. Prostate is unremarkable.  Abdominal wall:  Unremarkable.     Vasculature: No aneurysm or dissection.  Minimal atherosclerotic calcification of the visualized aorta and its branch vessels.      Bones: No acute fracture or bony destructive process. Age related degenerative joint disease.                             X-Ray Chest 1 View (Final result)  Result time 02/28/18 10:17:55    Final result by Gildardo Artis Jr., MD (02/28/18 10:17:55)                 Impression:     See above      Electronically signed by: GILDARDO ARTIS MD  Date:     02/28/18  Time:    10:17              Narrative:    Chest single view compared to February 6.  Right-sided PICC type catheter overlies the SVC though its tip is not well-visualized.  Heart size and pulmonary vascularity is similar.  Lungs are hypoexpanded.  No confluent consolidation.

## 2018-03-03 NOTE — ASSESSMENT & PLAN NOTE
"Likely secondary to sepsis, pain,and  hypotension/hypovolemia .     -EKG neg for STEMI/NSTEMI or arrythmia.  -monitor with frequent vitals  -see "sepsis/enterocolitis" for details regarding plan of care  "

## 2018-03-03 NOTE — ASSESSMENT & PLAN NOTE
Likely secondary to diarrhea & decreased PO intake.     -Repleting daily with PO as patient does not qualify for IV route at this time.   -More aggressive repletion of K, P, Mg today  -con't to monitor with daily labs

## 2018-03-04 LAB
ALBUMIN SERPL BCP-MCNC: 1.8 G/DL
ALP SERPL-CCNC: 40 U/L
ALT SERPL W/O P-5'-P-CCNC: 12 U/L
ANION GAP SERPL CALC-SCNC: 7 MMOL/L
ANISOCYTOSIS BLD QL SMEAR: SLIGHT
AST SERPL-CCNC: 6 U/L
BASOPHILS # BLD AUTO: 0 K/UL
BASOPHILS NFR BLD: 0 %
BILIRUB SERPL-MCNC: 0.6 MG/DL
BUN SERPL-MCNC: 23 MG/DL
CALCIUM SERPL-MCNC: 7.9 MG/DL
CHLORIDE SERPL-SCNC: 103 MMOL/L
CO2 SERPL-SCNC: 26 MMOL/L
CREAT SERPL-MCNC: 1.1 MG/DL
DIFFERENTIAL METHOD: ABNORMAL
EOSINOPHIL # BLD AUTO: 0 K/UL
EOSINOPHIL NFR BLD: 0 %
ERYTHROCYTE [DISTWIDTH] IN BLOOD BY AUTOMATED COUNT: 12.9 %
EST. GFR  (AFRICAN AMERICAN): >60 ML/MIN/1.73 M^2
EST. GFR  (NON AFRICAN AMERICAN): >60 ML/MIN/1.73 M^2
GLUCOSE SERPL-MCNC: 142 MG/DL
HCT VFR BLD AUTO: 26.4 %
HGB BLD-MCNC: 9.2 G/DL
HYPOCHROMIA BLD QL SMEAR: ABNORMAL
IMM GRANULOCYTES # BLD AUTO: 0 K/UL
IMM GRANULOCYTES NFR BLD AUTO: 0 %
LYMPHOCYTES # BLD AUTO: 0.5 K/UL
LYMPHOCYTES NFR BLD: 68.1 %
MAGNESIUM SERPL-MCNC: 1.6 MG/DL
MCH RBC QN AUTO: 31.2 PG
MCHC RBC AUTO-ENTMCNC: 34.8 G/DL
MCV RBC AUTO: 90 FL
MONOCYTES # BLD AUTO: 0.1 K/UL
MONOCYTES NFR BLD: 6.9 %
NEUTROPHILS # BLD AUTO: 0.2 K/UL
NEUTROPHILS NFR BLD: 25 %
NRBC BLD-RTO: 0 /100 WBC
PHOSPHATE SERPL-MCNC: 2.2 MG/DL
PLATELET # BLD AUTO: 24 K/UL
PLATELET BLD QL SMEAR: ABNORMAL
PMV BLD AUTO: 10.7 FL
POTASSIUM SERPL-SCNC: 2.8 MMOL/L
PROT SERPL-MCNC: 4.6 G/DL
RBC # BLD AUTO: 2.95 M/UL
SODIUM SERPL-SCNC: 136 MMOL/L
WBC # BLD AUTO: 0.72 K/UL

## 2018-03-04 PROCEDURE — 25000003 PHARM REV CODE 250: Performed by: STUDENT IN AN ORGANIZED HEALTH CARE EDUCATION/TRAINING PROGRAM

## 2018-03-04 PROCEDURE — S0030 INJECTION, METRONIDAZOLE: HCPCS | Performed by: STUDENT IN AN ORGANIZED HEALTH CARE EDUCATION/TRAINING PROGRAM

## 2018-03-04 PROCEDURE — 25000003 PHARM REV CODE 250: Performed by: HOSPITALIST

## 2018-03-04 PROCEDURE — 63600175 PHARM REV CODE 636 W HCPCS: Performed by: STUDENT IN AN ORGANIZED HEALTH CARE EDUCATION/TRAINING PROGRAM

## 2018-03-04 PROCEDURE — 80053 COMPREHEN METABOLIC PANEL: CPT

## 2018-03-04 PROCEDURE — 99233 SBSQ HOSP IP/OBS HIGH 50: CPT | Mod: ,,, | Performed by: INTERNAL MEDICINE

## 2018-03-04 PROCEDURE — 11000001 HC ACUTE MED/SURG PRIVATE ROOM

## 2018-03-04 PROCEDURE — 85025 COMPLETE CBC W/AUTO DIFF WBC: CPT

## 2018-03-04 PROCEDURE — 84100 ASSAY OF PHOSPHORUS: CPT

## 2018-03-04 PROCEDURE — 83735 ASSAY OF MAGNESIUM: CPT

## 2018-03-04 RX ORDER — FENTANYL 25 UG/1
2 PATCH TRANSDERMAL
Status: DISCONTINUED | OUTPATIENT
Start: 2018-03-04 | End: 2018-03-07

## 2018-03-04 RX ORDER — SODIUM,POTASSIUM PHOSPHATES 280-250MG
2 POWDER IN PACKET (EA) ORAL ONCE
Status: DISCONTINUED | OUTPATIENT
Start: 2018-03-04 | End: 2018-03-04

## 2018-03-04 RX ORDER — POTASSIUM CHLORIDE 20 MEQ/15ML
40 SOLUTION ORAL ONCE
Status: DISCONTINUED | OUTPATIENT
Start: 2018-03-04 | End: 2018-03-04

## 2018-03-04 RX ORDER — FENTANYL 25 UG/1
2 PATCH TRANSDERMAL
Status: DISCONTINUED | OUTPATIENT
Start: 2018-03-07 | End: 2018-03-04

## 2018-03-04 RX ORDER — FENTANYL 25 UG/1
1 PATCH TRANSDERMAL
Status: DISCONTINUED | OUTPATIENT
Start: 2018-03-04 | End: 2018-03-04

## 2018-03-04 RX ADMIN — MAGNESIUM OXIDE TAB 400 MG (241.3 MG ELEMENTAL MG) 400 MG: 400 (241.3 MG) TAB at 08:03

## 2018-03-04 RX ADMIN — CEFEPIME 2 G: 2 INJECTION, POWDER, FOR SOLUTION INTRAVENOUS at 10:03

## 2018-03-04 RX ADMIN — Medication: at 11:03

## 2018-03-04 RX ADMIN — METRONIDAZOLE 500 MG: 500 INJECTION, SOLUTION INTRAVENOUS at 11:03

## 2018-03-04 RX ADMIN — CEFEPIME 2 G: 2 INJECTION, POWDER, FOR SOLUTION INTRAVENOUS at 05:03

## 2018-03-04 RX ADMIN — CEFEPIME 2 G: 2 INJECTION, POWDER, FOR SOLUTION INTRAVENOUS at 01:03

## 2018-03-04 RX ADMIN — POTASSIUM PHOSPHATE, MONOBASIC AND POTASSIUM PHOSPHATE, DIBASIC 30 MMOL: 224; 236 INJECTION, SOLUTION, CONCENTRATE INTRAVENOUS at 08:03

## 2018-03-04 RX ADMIN — RETINOL, ERGOCALCIFEROL, .ALPHA.-TOCOPHEROL ACETATE, DL-, PHYTONADIONE, ASCORBIC ACID, NIACINAMIDE, RIBOFLAVIN 5-PHOSPHATE SODIUM, THIAMINE HYDROCHLORIDE, PYRIDOXINE HYDROCHLORIDE, DEXPANTHENOL, BIOTIN, FOLIC ACID, AND CYANOCOBALAMIN: KIT at 07:03

## 2018-03-04 RX ADMIN — METRONIDAZOLE 500 MG: 500 INJECTION, SOLUTION INTRAVENOUS at 02:03

## 2018-03-04 RX ADMIN — FLUCONAZOLE 200 MG: 200 TABLET ORAL at 08:03

## 2018-03-04 RX ADMIN — MAGNESIUM OXIDE TAB 400 MG (241.3 MG ELEMENTAL MG) 400 MG: 400 (241.3 MG) TAB at 09:03

## 2018-03-04 RX ADMIN — POTASSIUM & SODIUM PHOSPHATES POWDER PACK 280-160-250 MG 2 PACKET: 280-160-250 PACK at 06:03

## 2018-03-04 RX ADMIN — FENTANYL 1 PATCH: 25 PATCH, EXTENDED RELEASE TRANSDERMAL at 11:03

## 2018-03-04 RX ADMIN — METRONIDAZOLE 500 MG: 500 INJECTION, SOLUTION INTRAVENOUS at 06:03

## 2018-03-04 RX ADMIN — FENTANYL 1 PATCH: 25 PATCH, EXTENDED RELEASE TRANSDERMAL at 05:03

## 2018-03-04 NOTE — ASSESSMENT & PLAN NOTE
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 Treating for enterocolitis based on findings of bowel wall thickening seen on CT abd/pelvis. Other considerations included gastroenteritis vs food poisoning.    No indications for surgery at this time (CT neg for bowel perforation, overall stable.) Septic on admit with hypotension SBP 80s, tachycardia 120s, ANC 60, lactate 3.4. Dehydrated with dry mucous membranes & hyponatremia. Received 1x dose cefepime 2g in ED & IVF (NS fluid boluses.)     Overall improving (pain, appetite, more alert, neutrophil count rising.)   - Will continue PCA pump through today at 0.1mg/hr decreased from 0.2mg/hour and will discontinue boluses  - fentanyl 25mg patch to be placed tonight in hopes of entirely discontinuing PCA pump tomorrow as pain seems to be well-controlled   -Will continue broad spectrum abx targeted towards intraabdominal source (cefepime 2g Q12H, IV flagyl 500mg Q12H, no longer on vancomycin)   -started PPN 03/02; CLD started 03/03 - will continue clear liquid diet today   -IV zofran for nausea  -stool studies: WBC positive, C. Diff, o/p/c, culture & rotavirus negative  -blood cultures NGTD  -lactate downtrended

## 2018-03-04 NOTE — SUBJECTIVE & OBJECTIVE
Interval History: Pain well-controlled today. Will continue PCA pump at 0.1mg/hour and discontinue pushes -slowly weaning off of pump. Patient not well-tolerating oral/PEG tube electrolyte replacements as it causes abdominal pain and diarrhea - IV replacements today. Continue with clear liquid diet today as patient is tolerating that well.     Oncology Treatment Plan:   OP CISPLATIN DOCETAXEL 5FU Q3W    Medications:  Continuous Infusions:   Amino acid 4.25% - dextrose 5% (CLINIMIX-E) solution with additives (1L provides 42.5 gm AA, 50 gm CHO (170 kcal/L dextrose), Na 35, K 30, Mg 5, Ca 4.5, Acetate 70, Cl 39, Phos 15) 75 mL/hr at 03/03/18 1755    hydromorphone in 0.9 % NaCl 6 mg/30 ml       Scheduled Meds:   ceFEPime (MAXIPIME) IVPB  2 g Intravenous Q8H    fluconazole  200 mg Oral Daily    magnesium oxide  400 mg Oral BID    metronidazole  500 mg Intravenous Q8H    potassium phosphate IVPB  30 mmol Intravenous Once    potassium, sodium phosphates  2 packet Oral QID (AC & HS)     PRN Meds:(Magic mouthwash) 1:1:1 Benadryl 12.5mg/5ml liq, aluminum & magnesium hydroxide-simehticone (Maalox), lidocaine viscous 2%, acetaminophen, diphenhydrAMINE, naloxone, ondansetron, promethazine, ramelteon, sodium chloride 0.9%     Review of Systems   Constitutional: Negative for chills and fever.   HENT: Positive for mouth sores (thrush). Negative for sore throat.         Erythema, edema, slothing of mucous membranes. Petechia on dorsal aspect of tongue, white plaque on ventral aspect of tongue.   Eyes: Negative for visual disturbance.   Respiratory: Negative for shortness of breath.    Cardiovascular: Positive for chest pain.   Gastrointestinal: Positive for abdominal pain, diarrhea and nausea. Negative for blood in stool, constipation and vomiting.   Genitourinary: Negative for difficulty urinating and hematuria.   Musculoskeletal: Negative for arthralgias.   Neurological: Negative for dizziness and light-headedness.    Psychiatric/Behavioral: Negative for confusion.     Objective:     Vital Signs (Most Recent):  Temp: 99 °F (37.2 °C) (03/04/18 1100)  Pulse: 98 (03/04/18 1100)  Resp: 16 (03/04/18 1100)  BP: (!) 124/94 (03/04/18 1100)  SpO2: 96 % (03/04/18 1100) Vital Signs (24h Range):  Temp:  [98.1 °F (36.7 °C)-99 °F (37.2 °C)] 99 °F (37.2 °C)  Pulse:  [] 98  Resp:  [14-20] 16  SpO2:  [95 %-98 %] 96 %  BP: (106-153)/(70-94) 124/94     Weight: 113.4 kg (250 lb)  Body mass index is 39.15 kg/m².  Body surface area is 2.32 meters squared.      Intake/Output Summary (Last 24 hours) at 03/04/18 1227  Last data filed at 03/04/18 0926   Gross per 24 hour   Intake          3329.45 ml   Output                0 ml   Net          3329.45 ml     Physical Exam   Constitutional: He is oriented to person, place, and time. He appears well-developed and well-nourished. No distress.   obese   HENT:   Head: Normocephalic and atraumatic.   Erythema, swelling of oral mucosa. White plaque ventral aspect tongue. Petechiae dorsal aspect tongue.   Eyes: EOM are normal. Pupils are equal, round, and reactive to light.   Neck: Normal range of motion. No JVD present.   Cardiovascular: Normal rate and regular rhythm.    Murmur heard.  Pulmonary/Chest: Effort normal and breath sounds normal.   Abdominal: Soft. Bowel sounds are normal. He exhibits no distension. There is no tenderness. There is no guarding.   Musculoskeletal: He exhibits no edema or tenderness.   Neurological: He is alert and oriented to person, place, and time.   Skin: Skin is warm and dry. Capillary refill takes less than 2 seconds.   Vitals reviewed.    Significant Labs:   BMP:   Recent Labs  Lab 03/03/18  0500 03/04/18  0356   * 142*    136   K 3.0* 2.8*    103   CO2 22* 26   BUN 20 23*   CREATININE 1.0 1.1   CALCIUM 7.2* 7.9*   MG 1.5* 1.6   CBC:   Recent Labs  Lab 03/03/18  0500 03/04/18  0356   WBC 0.80* 0.72*   HGB 9.3* 9.2*   HCT 26.2* 26.4*   PLT 30* 24*    CMP:   Recent Labs  Lab 03/03/18  0500 03/04/18  0356    136   K 3.0* 2.8*    103   CO2 22* 26   * 142*   BUN 20 23*   CREATININE 1.0 1.1   CALCIUM 7.2* 7.9*   PROT 4.3* 4.6*   ALBUMIN 1.5* 1.8*   BILITOT 0.5 0.6   ALKPHOS 39* 40*   AST 5* 6*   ALT 14 12   ANIONGAP 7* 7*   EGFRNONAA >60.0 >60.0     Diagnostic Results:  I have reviewed and interpreted all pertinent imaging results/findings within the past 24 hours.

## 2018-03-04 NOTE — ASSESSMENT & PLAN NOTE
Neutrophil count <1.0. Will need continued hospitalization pending safe levels.    -neutropenic precautions  -monitor ANC while in hospital with daily CBC

## 2018-03-04 NOTE — ASSESSMENT & PLAN NOTE
"-Recent completion of cycle 2 on 02/26 with port removal. See Dr. Lopez's office visit note for details regarding oncological history & plan of care. Will optimize supportive care while in-patient.    -significant pain related to mucositis; continue PCA pump today decreased to 0.1mg/hour   -zofran for nausea (QTc <500 on EKT 02/28)  -magic sol'n rather than [facility] mouthwash per patient request  -see "neutropenia" for details regarding plan of care  "

## 2018-03-04 NOTE — ASSESSMENT & PLAN NOTE
Likely secondary to diarrhea & decreased PO intake.     -Repleting - patient not well tolerating PO or via PEG tube - causes abdominal pain and diarrhea   -IV electrolyte replacement today   -daily CMP

## 2018-03-04 NOTE — PLAN OF CARE
Problem: Patient Care Overview  Goal: Plan of Care Review  Plan of care reviewed with the patient at the beginning of the shift. Pt admitted with enterocolitis. He is tolerating a clear liquid diet. Pt complains of mouth pain and abdominal pain. Thrush and mucositis noted. Dilaudid PCA for pain management. Reinforced correct usage of pump. He has diarrhea. Two liquid BMs overnight. PEG tube currently not in use but was flushed without resistance. He denies nausea, but Zofran available prn for nausea. Fall precautions maintained. Pt able to walk independently at this time. Pt remained free from falls and injury this shift. Bed locked in lowest position, side rails up x2, call light within reach. Instructed pt to call for assistance as needed. Pt verbalized understanding. Pt is neutropenic. Flagyl and Cefepime continued. Precautions maintained. Tele monitoring continued. No acute issues overnight. Will continue to monitor.

## 2018-03-04 NOTE — ASSESSMENT & PLAN NOTE
Downtrending, likely secondary to chemotherapy. Will monitor for signs of bleeding while in patient.     -daily CBC  -transfuse per guidelines; no indication at this time.  -will hold pharmacologic DVT ppx at this time

## 2018-03-04 NOTE — PROGRESS NOTES
[facility]  Hematology/Oncology  Progress Note    Patient Name: Burton Whiting  Admission Date: 2/28/2018  Hospital Length of Stay: 4 days  Code Status: Full Code     Subjective:     HPI:  Mr. Burton Whiting is a 48 y.o. male with a PMHx palatine tonsillar squamous cell carcinoma who presents with chief complaint of diarrhea. Onset 2 days ago, watery, non-bloody, occurring every 45 minutes, has started to taper down since this morning. Associated with lower abdominal pain (5/10, non-radiating, occurs intermittently, stabbing in nature, not associated with food,) nausea (no vomiting), low grade fever (tmax 99.4 that improved with water infusion through PEG), urinary urgency, decreased PO intake, insomnia (taking home med oxycodone 5mg more frequently to help sleep) and altered mental status (per sister, patient with some confusion to situations.) No recent sick contacts, abx use, travel, or atypical food intake. Completed cycle 2 of chemo 2 days ago with removal of port, prior to initiation of current symptoms. Recently admitted 02/08-02/13 with neutropenic fever, UTI w/ E. Coli & enterobacter. PEG placed 02/11 secondary to decreased PO intake.    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 He follows with Dr. Lopez in outpatient oncology clinic. He recently completed his second round of chemo on 02/26 with pump removal that day. Also on decadron 6mg BD with current cycles, [facility]'s solution for oral/throat pain, fluconazole (day 3/10 day course) for thrush, zofran PRN for nausea, and oxy 5mg Q6H PRN. PEG placed ~ 2wks ago. Lives in a home alone, is independent with his ADLs, former smoker (>30 yr history, quit 10 months ago), former EtOH use (sober 5 yrs), and opiate addiction (sober 10 yrs with current rare use of prescribed oxycodone for cancer pain.) Family support  includes brother & sister.    Interval History: Pain well-controlled today. Will continue PCA pump at 0.1mg/hour and discontinue pushes -slowly weaning off of pump. Patient not well-tolerating oral/PEG tube electrolyte replacements as it causes abdominal pain and diarrhea - IV replacements today. Continue with clear liquid diet today as patient is tolerating that well.     Oncology Treatment Plan:   OP CISPLATIN DOCETAXEL 5FU Q3W    Medications:  Continuous Infusions:   Amino acid 4.25% - dextrose 5% (CLINIMIX-E) solution with additives (1L provides 42.5 gm AA, 50 gm CHO (170 kcal/L dextrose), Na 35, K 30, Mg 5, Ca 4.5, Acetate 70, Cl 39, Phos 15) 75 mL/hr at 03/03/18 1755    hydromorphone in 0.9 % NaCl 6 mg/30 ml       Scheduled Meds:   ceFEPime (MAXIPIME) IVPB  2 g Intravenous Q8H    fluconazole  200 mg Oral Daily    magnesium oxide  400 mg Oral BID    metronidazole  500 mg Intravenous Q8H    potassium phosphate IVPB  30 mmol Intravenous Once    potassium, sodium phosphates  2 packet Oral QID (AC & HS)     PRN Meds:(Magic mouthwash) 1:1:1 Benadryl 12.5mg/5ml liq, aluminum & magnesium hydroxide-simehticone (Maalox), lidocaine viscous 2%, acetaminophen, diphenhydrAMINE, naloxone, ondansetron, promethazine, ramelteon, sodium chloride 0.9%     Review of Systems   Constitutional: Negative for chills and fever.   HENT: Positive for mouth sores (thrush). Negative for sore throat.         Erythema, edema, slothing of mucous membranes. Petechia on dorsal aspect of tongue, white plaque on ventral aspect of tongue.   Eyes: Negative for visual disturbance.   Respiratory: Negative for shortness of breath.    Cardiovascular: Positive for chest pain.   Gastrointestinal: Positive for abdominal pain, diarrhea and nausea. Negative for blood in stool, constipation and vomiting.   Genitourinary: Negative for difficulty urinating and hematuria.   Musculoskeletal: Negative for arthralgias.   Neurological: Negative for dizziness  and light-headedness.   Psychiatric/Behavioral: Negative for confusion.     Objective:     Vital Signs (Most Recent):  Temp: 99 °F (37.2 °C) (03/04/18 1100)  Pulse: 98 (03/04/18 1100)  Resp: 16 (03/04/18 1100)  BP: (!) 124/94 (03/04/18 1100)  SpO2: 96 % (03/04/18 1100) Vital Signs (24h Range):  Temp:  [98.1 °F (36.7 °C)-99 °F (37.2 °C)] 99 °F (37.2 °C)  Pulse:  [] 98  Resp:  [14-20] 16  SpO2:  [95 %-98 %] 96 %  BP: (106-153)/(70-94) 124/94     Weight: 113.4 kg (250 lb)  Body mass index is 39.15 kg/m².  Body surface area is 2.32 meters squared.      Intake/Output Summary (Last 24 hours) at 03/04/18 1227  Last data filed at 03/04/18 0926   Gross per 24 hour   Intake          3329.45 ml   Output                0 ml   Net          3329.45 ml     Physical Exam   Constitutional: He is oriented to person, place, and time. He appears well-developed and well-nourished. No distress.   obese   HENT:   Head: Normocephalic and atraumatic.   Erythema, swelling of oral mucosa. White plaque ventral aspect tongue. Petechiae dorsal aspect tongue.   Eyes: EOM are normal. Pupils are equal, round, and reactive to light.   Neck: Normal range of motion. No JVD present.   Cardiovascular: Normal rate and regular rhythm.    Murmur heard.  Pulmonary/Chest: Effort normal and breath sounds normal.   Abdominal: Soft. Bowel sounds are normal. He exhibits no distension. There is no tenderness. There is no guarding.   Musculoskeletal: He exhibits no edema or tenderness.   Neurological: He is alert and oriented to person, place, and time.   Skin: Skin is warm and dry. Capillary refill takes less than 2 seconds.   Vitals reviewed.    Significant Labs:   BMP:   Recent Labs  Lab 03/03/18  0500 03/04/18  0356   * 142*    136   K 3.0* 2.8*    103   CO2 22* 26   BUN 20 23*   CREATININE 1.0 1.1   CALCIUM 7.2* 7.9*   MG 1.5* 1.6   CBC:   Recent Labs  Lab 03/03/18  0500 03/04/18  0356   WBC 0.80* 0.72*   HGB 9.3* 9.2*   HCT 26.2*  26.4*   PLT 30* 24*   CMP:   Recent Labs  Lab 03/03/18  0500 03/04/18  0356    136   K 3.0* 2.8*    103   CO2 22* 26   * 142*   BUN 20 23*   CREATININE 1.0 1.1   CALCIUM 7.2* 7.9*   PROT 4.3* 4.6*   ALBUMIN 1.5* 1.8*   BILITOT 0.5 0.6   ALKPHOS 39* 40*   AST 5* 6*   ALT 14 12   ANIONGAP 7* 7*   EGFRNONAA >60.0 >60.0     Diagnostic Results:  I have reviewed and interpreted all pertinent imaging results/findings within the past 24 hours.    Assessment/Plan:     * Enterocolitis    48 yr old with palatine tonsillar SCC on chemotherapy with neutropenia presented with diarrhea & abdominal pain. Treating for enterocolitis based on findings of bowel wall thickening seen on CT abd/pelvis. Other considerations included gastroenteritis vs food poisoning.    No indications for surgery at this time (CT neg for bowel perforation, overall stable.) Septic on admit with hypotension SBP 80s, tachycardia 120s, ANC 60, lactate 3.4. Dehydrated with dry mucous membranes & hyponatremia. Received 1x dose cefepime 2g in ED & IVF (NS fluid boluses.)     Overall improving (pain, appetite, more alert, neutrophil count rising.)   - Will continue PCA pump through today at 0.1mg/hr decreased from 0.2mg/hour and will discontinue boluses  - fentanyl 25mg patch to be placed tonight in hopes of entirely discontinuing PCA pump tomorrow as pain seems to be well-controlled   -Will continue broad spectrum abx targeted towards intraabdominal source (cefepime 2g Q12H, IV flagyl 500mg Q12H, no longer on vancomycin)   -started PPN 03/02; CLD started 03/03 - will continue clear liquid diet today   -IV zofran for nausea  -stool studies: WBC positive, C. Diff, o/p/c, culture & rotavirus negative  -blood cultures NGTD  -lactate downtrended        Electrolyte abnormality    Likely secondary to diarrhea & decreased PO intake.     -Repleting - patient not well tolerating PO or via PEG tube - causes abdominal pain and diarrhea   -IV electrolyte  "replacement today   -daily CMP        Hyponatremia    Na:129 on admit. No neurological symptoms but overall some fatigue & confusion (likely due to hyponatremia with concurrent infection.)  Hyponatremia likely hypovolemic hypotonic in nature due to diarrhea (appeared dehydrated.) Also possibly euvolemic hypotonic secondary to low solute (decrease PO intake.)    -improved   -monitor with CMP        Tachycardia    Likely secondary to sepsis, pain,and  hypotension/hypovolemia .     -EKG neg for STEMI/NSTEMI or arrythmia.  -monitor with frequent vitals  -see "sepsis/enterocolitis" for details regarding plan of care    -resolved         Thrombocytopenia    Downtrending, likely secondary to chemotherapy. Will monitor for signs of bleeding while in patient.     -daily CBC  -transfuse per guidelines; no indication at this time.  -will hold pharmacologic DVT ppx at this time          Chemotherapy-induced neutropenia    Neutrophil count <1.0. Will need continued hospitalization pending safe levels.    -neutropenic precautions  -monitor ANC while in hospital with daily CBC        Thrush    -secondary to chemotherapy-induced immunosuppression  -started on course per outpatient oncologist.  -today is day 7/10 day course fluconazole        Sepsis    Septic on admit with hypotension (SBP 80s), tachycardia 120s, ANC 60. Lactate 3.4. Likely source is enterocolitis given presenting complaints of diarrhea, predisposing RF of neutropenia, and findings on CT abd/pelv. Also concern for UTI given lower abdominal pain with urinary urgency & previous history of UTI with E. Coli & enterobacter 2 weeks ago. CXR neg for PNA.     -CXR neg for PNA  -UA, urine culture neg  -blood cultures NGTD  -lactate downtrended    -continue cefepime and flagyl    -monitor vitals  -see "enterocolitis" for details regarding plan of care        Morbid obesity      -PT/OT        Prediabetes    HbA1C 6.4% 2018  -B on admit  -will monitor with daily " "CMP        Low vitamin D level    Takes 1000u weekly  -will continue inpatient         Squamous cell carcinoma of palatine tonsil    -Recent completion of cycle 2 on 02/26 with port removal. See Dr. Lopez's office visit note for details regarding oncological history & plan of care. Will optimize supportive care while in-patient.    -significant pain related to mucositis; continue PCA pump today decreased to 0.1mg/hour   -zofran for nausea (QTc <500 on EKT 02/28)  -magic sol'n rather than [facility]'s mouthwash per patient request  -see "neutropenia" for details regarding plan of care                 Dayanna Gregory MD  Hematology/Oncology  [facility]    Attending Note  I have personally taken the history and examined this patient and agree with the resident's note as stated above.  Clinically improving  Awaiting wbc recovery  Pain improved- we will stop bolus and decrease continuous rate on PCA with likely removal tomorrow  Tolerating clears  Replete lytes  "

## 2018-03-04 NOTE — ASSESSMENT & PLAN NOTE
Na:129 on admit. No neurological symptoms but overall some fatigue & confusion (likely due to hyponatremia with concurrent infection.)  Hyponatremia likely hypovolemic hypotonic in nature due to diarrhea (appeared dehydrated.) Also possibly euvolemic hypotonic secondary to low solute (decrease PO intake.)    -improved   -monitor with CMP

## 2018-03-04 NOTE — ASSESSMENT & PLAN NOTE
"Likely secondary to sepsis, pain,and  hypotension/hypovolemia .     -EKG neg for STEMI/NSTEMI or arrythmia.  -monitor with frequent vitals  -see "sepsis/enterocolitis" for details regarding plan of care    -resolved   "

## 2018-03-04 NOTE — ASSESSMENT & PLAN NOTE
-secondary to chemotherapy-induced immunosuppression  -started on course per outpatient oncologist.  -today is day 7/10 day course fluconazole

## 2018-03-04 NOTE — PLAN OF CARE
Problem: Patient Care Overview  Goal: Plan of Care Review  Outcome: Ongoing (interventions implemented as appropriate)  Fall, and infection precautions continued. Dilaudid PCA settings adjusted, bolus dose discontinued and continuous rate decreased. Fentanyl patch to be placed later today. Clinimix continued. Phosphorus, magnesium and potassium replaced as ordered. Telemetry discontinued. One bowel movements this shift, patient reports color of bowel movement is now brown. Patient stable, will continue to monitor.

## 2018-03-04 NOTE — ASSESSMENT & PLAN NOTE
"Septic on admit with hypotension (SBP 80s), tachycardia 120s, ANC 60. Lactate 3.4. Likely source is enterocolitis given presenting complaints of diarrhea, predisposing RF of neutropenia, and findings on CT abd/pelv. Also concern for UTI given lower abdominal pain with urinary urgency & previous history of UTI with E. Coli & enterobacter 2 weeks ago. CXR neg for PNA.     -CXR neg for PNA  -UA, urine culture neg  -blood cultures NGTD  -lactate downtrended    -continue cefepime and flagyl    -monitor vitals  -see "enterocolitis" for details regarding plan of care  "

## 2018-03-05 ENCOUNTER — PATIENT MESSAGE (OUTPATIENT)
Dept: HEMATOLOGY/ONCOLOGY | Facility: CLINIC | Age: 48
End: 2018-03-05

## 2018-03-05 LAB
ALBUMIN SERPL BCP-MCNC: 1.7 G/DL
ALP SERPL-CCNC: 38 U/L
ALT SERPL W/O P-5'-P-CCNC: 9 U/L
ANION GAP SERPL CALC-SCNC: 6 MMOL/L
ANISOCYTOSIS BLD QL SMEAR: SLIGHT
AST SERPL-CCNC: 6 U/L
BASOPHILS # BLD AUTO: ABNORMAL K/UL
BASOPHILS NFR BLD: 1.3 %
BILIRUB SERPL-MCNC: 0.4 MG/DL
BUN SERPL-MCNC: 22 MG/DL
CALCIUM SERPL-MCNC: 7.3 MG/DL
CHLORIDE SERPL-SCNC: 106 MMOL/L
CO2 SERPL-SCNC: 24 MMOL/L
CREAT SERPL-MCNC: 0.9 MG/DL
DIFFERENTIAL METHOD: ABNORMAL
EOSINOPHIL # BLD AUTO: ABNORMAL K/UL
EOSINOPHIL NFR BLD: 0 %
ERYTHROCYTE [DISTWIDTH] IN BLOOD BY AUTOMATED COUNT: 13.3 %
EST. GFR  (AFRICAN AMERICAN): >60 ML/MIN/1.73 M^2
EST. GFR  (NON AFRICAN AMERICAN): >60 ML/MIN/1.73 M^2
GLUCOSE SERPL-MCNC: 146 MG/DL
HCT VFR BLD AUTO: 24.6 %
HGB BLD-MCNC: 8.6 G/DL
IMM GRANULOCYTES # BLD AUTO: ABNORMAL K/UL
IMM GRANULOCYTES NFR BLD AUTO: ABNORMAL %
LYMPHOCYTES # BLD AUTO: ABNORMAL K/UL
LYMPHOCYTES NFR BLD: 85.4 %
MAGNESIUM SERPL-MCNC: 1.5 MG/DL
MCH RBC QN AUTO: 30.7 PG
MCHC RBC AUTO-ENTMCNC: 35 G/DL
MCV RBC AUTO: 88 FL
MONOCYTES # BLD AUTO: ABNORMAL K/UL
MONOCYTES NFR BLD: 6.7 %
MYELOCYTES NFR BLD MANUAL: 1.3 %
NEUTROPHILS NFR BLD: 4 %
NEUTS BAND NFR BLD MANUAL: 1.3 %
NRBC BLD-RTO: 0 /100 WBC
PHOSPHATE SERPL-MCNC: 2.3 MG/DL
PLATELET # BLD AUTO: 25 K/UL
PLATELET BLD QL SMEAR: ABNORMAL
PMV BLD AUTO: 10.8 FL
POTASSIUM SERPL-SCNC: 2.8 MMOL/L
PROT SERPL-MCNC: 4.2 G/DL
RBC # BLD AUTO: 2.8 M/UL
SODIUM SERPL-SCNC: 136 MMOL/L
WBC # BLD AUTO: 1.02 K/UL

## 2018-03-05 PROCEDURE — 97803 MED NUTRITION INDIV SUBSEQ: CPT

## 2018-03-05 PROCEDURE — 25000003 PHARM REV CODE 250: Performed by: INTERNAL MEDICINE

## 2018-03-05 PROCEDURE — 85027 COMPLETE CBC AUTOMATED: CPT

## 2018-03-05 PROCEDURE — S0030 INJECTION, METRONIDAZOLE: HCPCS | Performed by: STUDENT IN AN ORGANIZED HEALTH CARE EDUCATION/TRAINING PROGRAM

## 2018-03-05 PROCEDURE — 83735 ASSAY OF MAGNESIUM: CPT

## 2018-03-05 PROCEDURE — 97161 PT EVAL LOW COMPLEX 20 MIN: CPT

## 2018-03-05 PROCEDURE — 85007 BL SMEAR W/DIFF WBC COUNT: CPT

## 2018-03-05 PROCEDURE — 63600175 PHARM REV CODE 636 W HCPCS: Performed by: STUDENT IN AN ORGANIZED HEALTH CARE EDUCATION/TRAINING PROGRAM

## 2018-03-05 PROCEDURE — 63600175 PHARM REV CODE 636 W HCPCS: Performed by: INTERNAL MEDICINE

## 2018-03-05 PROCEDURE — 25000003 PHARM REV CODE 250: Performed by: STUDENT IN AN ORGANIZED HEALTH CARE EDUCATION/TRAINING PROGRAM

## 2018-03-05 PROCEDURE — 84100 ASSAY OF PHOSPHORUS: CPT

## 2018-03-05 PROCEDURE — 11000001 HC ACUTE MED/SURG PRIVATE ROOM

## 2018-03-05 PROCEDURE — 99232 SBSQ HOSP IP/OBS MODERATE 35: CPT | Mod: ,,, | Performed by: INTERNAL MEDICINE

## 2018-03-05 PROCEDURE — 80053 COMPREHEN METABOLIC PANEL: CPT

## 2018-03-05 RX ORDER — METRONIDAZOLE 500 MG/1
500 TABLET ORAL EVERY 8 HOURS
Status: DISCONTINUED | OUTPATIENT
Start: 2018-03-05 | End: 2018-03-08 | Stop reason: HOSPADM

## 2018-03-05 RX ORDER — CEFEPIME HYDROCHLORIDE 2 G/1
2 INJECTION, POWDER, FOR SOLUTION INTRAVENOUS
Status: DISCONTINUED | OUTPATIENT
Start: 2018-03-05 | End: 2018-03-06

## 2018-03-05 RX ORDER — LANOLIN ALCOHOL/MO/W.PET/CERES
800 CREAM (GRAM) TOPICAL 2 TIMES DAILY
Status: DISCONTINUED | OUTPATIENT
Start: 2018-03-05 | End: 2018-03-08 | Stop reason: HOSPADM

## 2018-03-05 RX ADMIN — ALUMINUM HYDROXIDE, MAGNESIUM HYDROXIDE, AND SIMETHICONE 50 ML: 200; 200; 20 SUSPENSION ORAL at 08:03

## 2018-03-05 RX ADMIN — METRONIDAZOLE 500 MG: 500 TABLET ORAL at 10:03

## 2018-03-05 RX ADMIN — CEFEPIME 2 G: 2 INJECTION, POWDER, FOR SOLUTION INTRAVENOUS at 03:03

## 2018-03-05 RX ADMIN — DEXTROSE MONOHYDRATE 30 MMOL: 5 INJECTION, SOLUTION INTRAVENOUS at 08:03

## 2018-03-05 RX ADMIN — METRONIDAZOLE 500 MG: 500 TABLET ORAL at 02:03

## 2018-03-05 RX ADMIN — METRONIDAZOLE 500 MG: 500 INJECTION, SOLUTION INTRAVENOUS at 06:03

## 2018-03-05 RX ADMIN — MAGNESIUM OXIDE TAB 400 MG (241.3 MG ELEMENTAL MG) 400 MG: 400 (241.3 MG) TAB at 08:03

## 2018-03-05 RX ADMIN — CEFEPIME HYDROCHLORIDE 2 G: 2 INJECTION, POWDER, FOR SOLUTION INTRAVENOUS at 10:03

## 2018-03-05 RX ADMIN — ASCORBIC ACID, VITAMIN A PALMITATE, CHOLECALCIFEROL, THIAMINE HYDROCHLORIDE, RIBOFLAVIN-5 PHOSPHATE SODIUM, PYRIDOXINE HYDROCHLORIDE, NIACINAMIDE, DEXPANTHENOL, ALPHA-TOCOPHEROL ACETATE, VITAMIN K1, FOLIC ACID, BIOTIN, CYANOCOBALAMIN: 200; 3300; 200; 6; 3.6; 6; 40; 15; 10; 150; 600; 60; 5 INJECTION, SOLUTION INTRAVENOUS at 10:03

## 2018-03-05 RX ADMIN — CEFEPIME 2 G: 2 INJECTION, POWDER, FOR SOLUTION INTRAVENOUS at 10:03

## 2018-03-05 RX ADMIN — FLUCONAZOLE 200 MG: 200 TABLET ORAL at 08:03

## 2018-03-05 RX ADMIN — MAGNESIUM SULFATE HEPTAHYDRATE 3 G: 500 INJECTION, SOLUTION INTRAMUSCULAR; INTRAVENOUS at 08:03

## 2018-03-05 RX ADMIN — POTASSIUM CHLORIDE: 2 INJECTION, SOLUTION, CONCENTRATE INTRAVENOUS at 02:03

## 2018-03-05 NOTE — ASSESSMENT & PLAN NOTE
"Septic on admit with hypotension (SBP 80s), tachycardia 120s, ANC 60. Lactate 3.4. Likely source is enterocolitis given presenting complaints of diarrhea, predisposing RF of neutropenia, and findings on CT abd/pelv. Also concern for UTI given lower abdominal pain with urinary urgency & previous history of UTI with E. Coli & enterobacter 2 weeks ago. CXR neg for PNA.     -CXR neg for PNA  -UA, urine culture neg  -blood cultures NGTD  -stool cultures all negative  -lactate downtrended    -continue cefepime and flagyl    -monitor vitals  -see "enterocolitis" for details regarding plan of care  "

## 2018-03-05 NOTE — PLAN OF CARE
"Problem: Patient Care Overview  Goal: Plan of Care Review  Outcome: Ongoing (interventions implemented as appropriate)  Patient remained free from falls throughout shift, call bell within reach. Patient complaining of pain in beginning of shift, stating that ever since PCA was decreased his pain is "awful." Dr. hamm notified and ordered and extra 25mcg fentanyl patch applied. Patient stated he started to feel better as the night went on. 3 BMs overnight. Vitals stable, will continue to monitor.      "

## 2018-03-05 NOTE — SUBJECTIVE & OBJECTIVE
Interval History: Pain well controlled with fentanyl patches and dilaudid pump.  Still not taking in nutrition by mouth.  Improvement in diarrhea overall.  Denies fevers, chills, cough, or shortness of breath.    Oncology Treatment Plan:   OP CISPLATIN DOCETAXEL 5FU Q3W    Medications:  Continuous Infusions:   Amino acid 4.25% - dextrose 10% (CLINIMIX-E) solution with additives (1L provides 42.5 gm AA, 100 gm CHO (340 kcal/L dextrose), Na 35, K 30, Mg 5, Ca 4.5, Acetate 70, Cl 39, Phos 15) 75 mL/hr at 03/04/18 1927    hydromorphone in 0.9 % NaCl 6 mg/30 ml       Scheduled Meds:   ceFEPime (MAXIPIME) IVPB  2 g Intravenous Q8H    fentaNYL  2 patch Transdermal Q72H    fluconazole  200 mg Oral Daily    magnesium oxide  400 mg Oral BID    magnesium sulfate IVPB  3 g Intravenous Once    metronidazole  500 mg Intravenous Q8H    potassium phosphate IVPB  30 mmol Intravenous Once    custom IV infusion builder   Intravenous Once     PRN Meds:(Magic mouthwash) 1:1:1 Benadryl 12.5mg/5ml liq, aluminum & magnesium hydroxide-simehticone (Maalox), lidocaine viscous 2%, acetaminophen, diphenhydrAMINE, naloxone, ondansetron, promethazine, ramelteon, sodium chloride 0.9%     Review of Systems   Constitutional: Negative for chills and fever.   HENT: Positive for mouth sores (thrush). Negative for sore throat.         Erythema, edema, slothing of mucous membranes. Petechia on dorsal aspect of tongue, white plaque on ventral aspect of tongue.   Eyes: Negative for visual disturbance.   Respiratory: Negative for shortness of breath.    Cardiovascular: Negative for chest pain.   Gastrointestinal: Positive for diarrhea and nausea. Negative for abdominal pain, blood in stool, constipation and vomiting.   Genitourinary: Negative for difficulty urinating and hematuria.   Musculoskeletal: Negative for arthralgias.   Neurological: Negative for dizziness and light-headedness.   Psychiatric/Behavioral: Negative for confusion.      Objective:     Vital Signs (Most Recent):  Temp: 97.7 °F (36.5 °C) (03/05/18 0739)  Pulse: 84 (03/05/18 0739)  Resp: 18 (03/05/18 0739)  BP: 118/75 (03/05/18 0739)  SpO2: 97 % (03/05/18 0739) Vital Signs (24h Range):  Temp:  [97.7 °F (36.5 °C)-99 °F (37.2 °C)] 97.7 °F (36.5 °C)  Pulse:  [76-98] 84  Resp:  [16-20] 18  SpO2:  [96 %-97 %] 97 %  BP: (118-144)/(75-94) 118/75     Weight: 113.4 kg (250 lb)  Body mass index is 39.15 kg/m².  Body surface area is 2.32 meters squared.      Intake/Output Summary (Last 24 hours) at 03/05/18 0806  Last data filed at 03/05/18 0400   Gross per 24 hour   Intake          2055.93 ml   Output                0 ml   Net          2055.93 ml       Physical Exam   Constitutional: He is oriented to person, place, and time. He appears well-developed and well-nourished. No distress.   obese   HENT:   Head: Normocephalic and atraumatic.   Erythema, swelling of oral mucosa. White plaque ventral aspect tongue. Petechiae dorsal aspect tongue.  Dried blood scattered throughout oral mucosa.   Eyes: EOM are normal. Pupils are equal, round, and reactive to light.   Neck: Normal range of motion. No JVD present.   Cardiovascular: Normal rate and regular rhythm.    Murmur heard.  Pulmonary/Chest: Effort normal and breath sounds normal.   Abdominal: Soft. Bowel sounds are normal. He exhibits no distension. There is no tenderness. There is no guarding.   Musculoskeletal: He exhibits no edema or tenderness.   Neurological: He is alert and oriented to person, place, and time.   Skin: Skin is warm and dry. Capillary refill takes less than 2 seconds.   Vitals reviewed.      Significant Labs:   CBC:   Recent Labs  Lab 03/04/18  0356 03/05/18  0328   WBC 0.72* 1.02*   HGB 9.2* 8.6*   HCT 26.4* 24.6*   PLT 24* 25*    and CMP:   Recent Labs  Lab 03/04/18  0356 03/05/18  0328    136   K 2.8* 2.8*    106   CO2 26 24   * 146*   BUN 23* 22*   CREATININE 1.1 0.9   CALCIUM 7.9* 7.3*   PROT 4.6*  4.2*   ALBUMIN 1.8* 1.7*   BILITOT 0.6 0.4   ALKPHOS 40* 38*   AST 6* 6*   ALT 12 9*   ANIONGAP 7* 6*   EGFRNONAA >60.0 >60.0       Diagnostic Results:  None

## 2018-03-05 NOTE — ASSESSMENT & PLAN NOTE
-secondary to chemotherapy-induced immunosuppression  -started on course per outpatient oncologist.  -today is day 8/10 day course fluconazole

## 2018-03-05 NOTE — ASSESSMENT & PLAN NOTE
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 Treating for enterocolitis based on findings of bowel wall thickening seen on CT abd/pelvis. Other considerations included gastroenteritis vs food poisoning.    No indications for surgery at this time (CT neg for bowel perforation, overall stable.) Septic on admit with hypotension SBP 80s, tachycardia 120s, ANC 60, lactate 3.4. Dehydrated with dry mucous membranes & hyponatremia. 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 Overall improving (pain, appetite, more alert, neutrophil count rising.)   - Consider discontinuation of PCA pump  - fentanyl 50mg patch to be placed tonight in hopes of entirely discontinuing PCA pump tomorrow as pain seems to be well-controlled   -Will continue broad spectrum abx targeted towards intraabdominal source (cefepime 2g Q12H, IV flagyl 500mg Q12H, no longer on vancomycin) until ANC recovers then transition to oral/GT abx for total course of 14 days.  Today is day 6   -started PPN 03/02; CLD started 03/03 - will continue clear liquid diet today   -IV zofran for nausea  -stool studies: WBC positive, C. Diff negative, o/p/c negative, culture & rotavirus negative  -blood cultures NGTD  -lactate downtrended

## 2018-03-05 NOTE — PLAN OF CARE
Problem: Nutrition, Imbalanced: Inadequate Oral Intake (Adult)  Intervention: Promote/Optimize Nutrition   03/05/18 1008   Nutrition Interventions   Oral Nutrition Promotion calorie dense liquids provided;calorie dense foods provided   Recommendations     Recommendation/Intervention: ADAT to Regular. If unable adv diet, Recommend Isosource 1.5 @ 60ml/hr w/150ml water flushes q6hrs & 2pkts Psyllium daily w/ flushes. Provides  2160kcals, 98g/Pro, 6.8g Fiber and 1500ml free fl. Start with 10ml/hr and increase by 10ml q2hrs until goal rate is achieved. hold for residuals >500ml  Goals: pt to consume nutrients >/= 85% EEN/EPN   Nutrition Goal Status: goal not met  Communication of RD Recs: discussed on rounds     Assessment and Plan         Squamous cell carcinoma of palatine tonsil     Nutrition Problem  Inadequate oral intake     Related to (etiology):   Dysphagia, Squamous cell carcinoma of palatine tonsil     Signs and Symptoms (as evidenced by):   Unable to consume solid foods po, Nutrition Via TPN     Interventions/Recommendations (treatment strategy):  See recs     Nutrition Diagnosis Status:   Continues

## 2018-03-05 NOTE — PT/OT/SLP EVAL
Physical Therapy Evaluation    Patient Name:  Burton Whiting   MRN:  [id number]    Recommendations:     Discharge Recommendations:  home   Discharge Equipment Recommendations: none   Barriers to discharge: Inaccessible home and decreased caregiver support    Assessment:     Burton Whiting is a 48 y.o. male admitted with a medical diagnosis of Enterocolitis.  He presents with the following impairments/functional limitations:  gait instability, pain, decreased lower extremity function. Patient demonstrated excellent participation despite oral pain and lack of sleep. Level of assistance required supervision. Able to tolerate gait training and transfers in room/hallway. Recommending home. Skilled physical therapy is medically necessary to address the above impairments in order to return the patient back to their previous level of function.       Rehab Prognosis:  excellent; patient would benefit from acute skilled PT services to address these deficits and reach maximum level of function.      Recent Surgery: * No surgery found *      Plan:     During this hospitalization, patient to be seen 2 x/week to address the above listed problems via gait training, therapeutic activities, therapeutic exercises, neuromuscular re-education  · Plan of Care Expires:      Plan of Care Reviewed with: patient    Subjective     Communicated with nurse Lopes prior to session.  Patient found in bed with HOB elevated in NAD upon PT entry to room, agreeable to evaluation.      Chief Complaint: oral pain and lack of sleep  Patient comments/goals: to go home  Pain/Comfort:  · Pain Rating 1: 2/10  · Location 1: mouth  · Pain Addressed 1: Cessation of Activity, Pre-medicate for activity    Patients cultural, spiritual, Baptism conflicts given the current situation: none stated    Living Environment:  Patient lives in a one story home alone. Home has 4 steps to enter with no handrails. He owns a walk in shower and jacuzzi tub.   Prior to  admission, patients level of function was independent with all activities and driving.  Patient works as a machine parts  Patient has the following equipment: none.  DME owned (not currently used): none.  Upon discharge, patient will have assistance from brother and sister.    Objective:     Patient found with:  (all lines intact)     General Precautions: Standard, neutropenic   Orthopedic Precautions:N/A     Exams:  · Postural Exam:  Patient presented with the following abnormalities:    · -       Rounded shoulders  · -       Forward head  · Sensation:    · -       Intact  · Skin Integrity/Edema:      · -       Skin integrity: Visible skin intact  · RUE ROM: WFL  · RUE Strength: WFL  · LUE ROM: WFL  · LUE Strength: WFL  · RLE ROM: WFL  · RLE Strength: WFL  · LLE ROM: WFL  · LLE Strength: WFL    Functional Mobility:  · Bed Mobility:   Rolling to the left: Modified [place]   Supine to Sit: Modified [place]    · Sitting Balance at Edge of Bed:   Assistance Level Required: [place]   Time: 5 minutes   Postural deviations noted:    -       Rounded shoulders  -       Forward head    · Transfers:   Sit to Stand: Supervision or Set-up Assistance with No Assistive Device x 1 trial   Stand to Sit: Independent with No Assistive Device x 1 trial      AM-PAC 6 CLICK MOBILITY  Total Score:23       Therapeutic Activities and Exercises:  PT arrived to patient's room to find patient resting quietly; agreeable to PT session. Patient performed mobility as above with non-skid shoes in place.    · Patient Education:   · PT role and POC  · Stair gait training with family   · Gait training safety   Bedside table in front of patient and area set up for function, convenience, and safety. RN aware of patient's mobility needs and status. Questions/concerns addressed within PT scope of practice; patient and family with no further questions.       Patient left at EOB sitting with all lines intact, call button in reach  and nurse Marianne notified.    GOALS:    Physical Therapy Goals        Problem: Physical Therapy Goal    Goal Priority Disciplines Outcome Goal Variances Interventions   Physical Therapy Goal     PT/OT, PT      Description:  Goals to be met by: 03/15/2018    Patient will increase functional independence with mobility by performin. Gait  x >500 feet with Supervision using no AD.   2. Ascend/descend 4 stairs with no Handrails Contact Guard Assistance using hand held assist of one person.   3. Lower extremity exercise program x30 reps, with supervision and independence                      History:     Past Medical History:   Diagnosis Date    Former smoker     HPV in male     Opiate addiction     Squamous cell carcinoma of palatine tonsil     throat and tonsillar       Past Surgical History:   Procedure Laterality Date    GASTROSTOMY TUBE PLACEMENT Left 2018       Clinical Decision Making:     History  Co-morbidities and personal factors that may impact the plan of care Examination  Body Structures and Functions, activity limitations and participation restrictions that may impact the plan of care Clinical Presentation   Decision Making/ Complexity Score   Co-morbidities:   [] Time since onset of injury / illness / exacerbation  [] Status of current condition  []Patient's cognitive status and safety concerns    [x] Multiple Medical Problems (see med hx)  Personal Factors:   [] Patient's age  [] Prior Level of function   [x] Patient's home situation (environment and family support)  [] Patient's level of motivation  [] Expected progression of patient      HISTORY:(criteria)    [] 48088 - no personal factors/history    [x] 44376 - has 1-2 personal factor/comorbidity     [] 18069 - has >3 personal factor/comorbidity     Body Regions:  [] Objective examination findings  [] Head     []  Neck  [] Trunk   [] Upper Extremity  [x] Lower Extremity    Body Systems:  [] For communication ability, affect,  cognition, language, and learning style: the assessment of the ability to make needs known, consciousness, orientation (person, place, and time), expected emotional /behavioral responses, and learning preferences (eg, learning barriers, education  needs)  [x] For the neuromuscular system: a general assessment of gross coordinated movement (eg, balance, gait, locomotion, transfers, and transitions) and motor function  (motor control and motor learning)  [x] For the musculoskeletal system: the assessment of gross symmetry, gross range of motion, gross strength, height, and weight  [] For the integumentary system: the assessment of pliability(texture), presence of scar formation, skin color, and skin integrity  [] For cardiovascular/pulmonary system: the assessment of heart rate, respiratory rate, blood pressure, and edema     Activity limitations:    [] Patient's cognitive status and saf ety concerns          [] Status of current condition      [] Weight bearing restriction  [] Cardiopulmunary Restriction    Participation Restrictions:   [x] Goals and goal agreement with the patient     [] Rehab potential (prognosis) and probable outcome      Examination of Body System: (criteria)    [x] 27716 - addressing 1-2 elements    [] 15750 - addressing a total of 3 or more elements     [] 54071 -  Addressing a total of 4 or more elements         Clinical Presentation: (criteria)  Evolving - 94278     On examination of body system using standardized tests and measures patient presents with (CHOOSE ONE) elements from any of the following: body structures and functions, activity limitations, and/or participation restrictions.  Leading to a clinical presentation that is considered stable and/or uncomplicated                              Clinical Decision Making  (Eval Complexity):  Low- 16608     Time Tracking:     PT Received On: 03/05/18  PT Start Time: 1408     PT Stop Time: 1421  PT Total Time (min): 13 min     Billable  Minutes: Evaluation 13    Lauren Navarro PT, DPT  3/5/2018  [phone]

## 2018-03-05 NOTE — ASSESSMENT & PLAN NOTE
Nutrition Problem  Inadequate oral intake    Related to (etiology):   Dysphagia, Squamous cell carcinoma of palatine tonsil    Signs and Symptoms (as evidenced by):   Unable to consume solid foods po, Nutrition Via TPN    Interventions/Recommendations (treatment strategy):  See recs    Nutrition Diagnosis Status:   Continues

## 2018-03-05 NOTE — ASSESSMENT & PLAN NOTE
"-Recent completion of cycle 2 on 02/26 with port removal. See Dr. Lopez's office visit note for details regarding oncological history & plan of care. Will optimize supportive care while in-patient.    -Admitted with significant pain related to mucositis; consider discontinuing PCA pump today, currently at 0.1mg/hour, as his pain is well controlled with fentanyl patches.  Still not eating but pain control significantly improved.  -zofran for nausea (QTc <500 on EKT 02/28)  -magic sol'n rather than [facility] mouthwash per patient request  -see "neutropenia" for details regarding plan of care  "

## 2018-03-05 NOTE — PROGRESS NOTES
[facility]  Adult Nutrition  Progress Note    SUMMARY     Recommendations    Recommendation/Intervention: ADAT to Regular. If unable adv diet, Recommend Isosource 1.5 @ 60ml/hr w/150ml water flushes q6hrs & 2pkts Psyllium daily w/ flushes. Provides  2160kcals, 98g/Pro, 6.8g Fiber and 1500ml free fl. Start with 10ml/hr and increase by 10ml q2hrs until goal rate is achieved. hold for residuals >500ml  Goals: pt to consume nutrients >/= 85% EEN/EPN   Nutrition Goal Status: goal not met  Communication of RD Recs: discussed on rounds    Reason for Assessment    Reason for Assessment: RD follow-up  Diagnosis:  (Enterocolitis)  Relevant Medical History: HPV, Opiate addiction, Squamous cell carcinoma of palatine tonsil   Interdisciplinary Rounds: attended  General Information Comments: TPN started over the weekend, currently running @ 75ml/hr in conjunction w/ Clear liquid diet. Pt asleep at time of visit ADELFO % of liq's consumed  Nutrition Discharge Planning: ADELFO    Nutrition Prescription Ordered    Current Diet Order: NPO     Evaluation of Received Nutrients/Fluid Intake     Energy Calories Required: meeting needs   Protein Required: meeting needs   I/O: +4.2L since admit    Intake/Output Summary (Last 24 hours) at 03/05/18 1001  Last data filed at 03/05/18 0400   Gross per 24 hour   Intake          1024.68 ml   Output                0 ml   Net          1024.68 ml   Fluid Required: meeting needs  Comments: LBM:3/01/18     % Intake of Estimated Energy Needs: 75 - 100 %  % Meal Intake: 0%     Nutrition Risk Screen     Nutrition Risk Screen: dysphagia or difficulty swallowing, tube feeding or parenteral nutrition    Nutrition/Diet History    Patient Reported Diet/Restrictions/Preferences: no oral intake (x 2wks)  Typical Food/Fluid Intake: decrease since dx  Food Preferences: no [place]ism or cultural preferences noted   Factors Affecting Nutritional Intake: NPO     Labs/Tests/Procedures/Meds    Diagnostic  "Test/Procedure Review: reviewed  Pertinent Labs Reviewed: reviewed  Pertinent Labs Comments:   Component Value    WBC 1.02 (LL)    HGB 8.6 (L)    HCT 24.6 (L)    PLT 25 (LL)     Component Value    K 2.8 (L)    BUN 22 (H)    CALCIUM 7.3 (L)    ANIONGAP 6 (L)     Component Value    ALT 9 (L)    AST 6 (L)    ALKPHOS 38 (L)     Component Value    CALCIUM 7.3 (L)    PHOS 2.3 (L)     Component Value    ALBUMIN 1.7 (L)         Pertinent Medications Reviewed: reviewed  Pertinent Medications Comments:    ceFEPime (MAXIPIME) IVPB  2 g    fentaNYL  2 patch    fluconazole  200 mg    magnesium oxide  800 mg    magnesium sulfate IVPB  3 g    metroNIDAZOLE  500 mg    potassium phosphate IVPB  30 mmol    custom IV infusion builder        (Magic mouthwash) 1:1:1 Benadryl 12.5mg/5ml liq, aluminum & magnesium hydroxide-simehticone (Maalox), lidocaine viscous 2%    acetaminophen    diphenhydrAMINE    naloxone    ondansetron    promethazine    ramelteon    sodium chloride 0.9%         Physical Findings    Overall Physical Appearance: obese, weak  Tubes: gastrostomy tube  Oral/Mouth Cavity: WDL  Skin: edema, intact    Anthropometrics    Temp: 97.7 °F (36.5 °C)  Height: 5' 7.01" (170.2 cm)  Weight Method: Standard Scale  Weight: 113.4 kg (250 lb)  Ideal Body Weight (IBW), Male: 148.06 lb  % Ideal Body Weight, Male (lb): 168.85 lb  BMI (Calculated): 39.2  BMI Grade: 35 - 39.9 - obesity - grade II  Usual Body Weight (UBW), k.4 kg  % Usual Body Weight: 100.21  % Weight Change From Usual Weight: 0 %     Estimated/Assessed Needs    Weight Used For Calorie Calculations: 113.4 kg (250 lb)   Energy Calorie Requirements (kcal): 9559-9496  Energy Need Method: Kcal/kg   RMR ([place]-St. Jeor Equation): .75   Weight Used For Protein Calculations: 66.1 kg (145 lb 11.6 oz)  Protein Requirements:   Fluid Requirements (mL): per MD  Fluid Need Method: RDA Method   RDA Method (mL): 7696         Assessment and " Plan    Squamous cell carcinoma of palatine tonsil    Nutrition Problem  Inadequate oral intake    Related to (etiology):   Dysphagia, Squamous cell carcinoma of palatine tonsil    Signs and Symptoms (as evidenced by):   Unable to consume solid foods po, Nutrition Via TPN    Interventions/Recommendations (treatment strategy):  See recs    Nutrition Diagnosis Status:   Continues                  Monitor and Evaluation    Food and Nutrient Intake: energy intake, enteral nutrition intake, food and beverage intake  Food and Nutrient Adminstration: enteral and parenteral nutrition administration, diet order     Physical Activity and Function: nutrition-related ADLs and IADLs  Anthropometric Measurements: weight, weight change  Biochemical Data, Medical Tests and Procedures:  (All Labs)  Nutrition-Focused Physical Findings: overall appearance    Nutrition Risk    Level of Risk:  (f/u 2x/wk)    Nutrition Follow-Up    RD Follow-up?: Yes

## 2018-03-05 NOTE — PROGRESS NOTES
[facility]  Hematology/Oncology  Progress Note    Patient Name: Burton Whiting  Admission Date: 2/28/2018  Hospital Length of Stay: 5 days  Code Status: Full Code     Subjective:     HPI:  Mr. Burton Whiting is a 48 y.o. male with a PMHx palatine tonsillar squamous cell carcinoma who presents with chief complaint of diarrhea. Onset 2 days ago, watery, non-bloody, occurring every 45 minutes, has started to taper down since this morning. Associated with lower abdominal pain (5/10, non-radiating, occurs intermittently, stabbing in nature, not associated with food,) nausea (no vomiting), low grade fever (tmax 99.4 that improved with water infusion through PEG), urinary urgency, decreased PO intake, insomnia (taking home med oxycodone 5mg more frequently to help sleep) and altered mental status (per sister, patient with some confusion to situations.) No recent sick contacts, abx use, travel, or atypical food intake. Completed cycle 2 of chemo 2 days ago with removal of port, prior to initiation of current symptoms. Recently admitted 02/08-02/13 with neutropenic fever, UTI w/ E. Coli & enterobacter. PEG placed 02/11 secondary to decreased PO intake.    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 He follows with Dr. Lopez in outpatient oncology clinic. He recently completed his second round of chemo on 02/26 with pump removal that day. Also on decadron 6mg BD with current cycles, [facility]'s solution for oral/throat pain, fluconazole (day 3/10 day course) for thrush, zofran PRN for nausea, and oxy 5mg Q6H PRN. PEG placed ~ 2wks ago. Lives in a home alone, is independent with his ADLs, former smoker (>30 yr history, quit 10 months ago), former EtOH use (sober 5 yrs), and opiate addiction (sober 10 yrs with current rare use of prescribed oxycodone for cancer pain.) Family support  includes brother & sister.    Interval History: Pain well controlled with fentanyl patches and dilaudid pump.  Still not taking in nutrition by mouth.  Improvement in diarrhea overall.  Denies fevers, chills, cough, or shortness of breath.    Oncology Treatment Plan:   OP CISPLATIN DOCETAXEL 5FU Q3W    Medications:  Continuous Infusions:   Amino acid 4.25% - dextrose 10% (CLINIMIX-E) solution with additives (1L provides 42.5 gm AA, 100 gm CHO (340 kcal/L dextrose), Na 35, K 30, Mg 5, Ca 4.5, Acetate 70, Cl 39, Phos 15) 75 mL/hr at 03/04/18 1927    hydromorphone in 0.9 % NaCl 6 mg/30 ml       Scheduled Meds:   ceFEPime (MAXIPIME) IVPB  2 g Intravenous Q8H    fentaNYL  2 patch Transdermal Q72H    fluconazole  200 mg Oral Daily    magnesium oxide  400 mg Oral BID    magnesium sulfate IVPB  3 g Intravenous Once    metronidazole  500 mg Intravenous Q8H    potassium phosphate IVPB  30 mmol Intravenous Once    custom IV infusion builder   Intravenous Once     PRN Meds:(Magic mouthwash) 1:1:1 Benadryl 12.5mg/5ml liq, aluminum & magnesium hydroxide-simehticone (Maalox), lidocaine viscous 2%, acetaminophen, diphenhydrAMINE, naloxone, ondansetron, promethazine, ramelteon, sodium chloride 0.9%     Review of Systems   Constitutional: Negative for chills and fever.   HENT: Positive for mouth sores (thrush). Negative for sore throat.         Erythema, edema, slothing of mucous membranes. Petechia on dorsal aspect of tongue, white plaque on ventral aspect of tongue.   Eyes: Negative for visual disturbance.   Respiratory: Negative for shortness of breath.    Cardiovascular: Negative for chest pain.   Gastrointestinal: Positive for diarrhea and nausea. Negative for abdominal pain, blood in stool, constipation and vomiting.   Genitourinary: Negative for difficulty urinating and hematuria.   Musculoskeletal: Negative for arthralgias.   Neurological: Negative for dizziness and light-headedness.   Psychiatric/Behavioral:  Negative for confusion.     Objective:     Vital Signs (Most Recent):  Temp: 97.7 °F (36.5 °C) (03/05/18 0739)  Pulse: 84 (03/05/18 0739)  Resp: 18 (03/05/18 0739)  BP: 118/75 (03/05/18 0739)  SpO2: 97 % (03/05/18 0739) Vital Signs (24h Range):  Temp:  [97.7 °F (36.5 °C)-99 °F (37.2 °C)] 97.7 °F (36.5 °C)  Pulse:  [76-98] 84  Resp:  [16-20] 18  SpO2:  [96 %-97 %] 97 %  BP: (118-144)/(75-94) 118/75     Weight: 113.4 kg (250 lb)  Body mass index is 39.15 kg/m².  Body surface area is 2.32 meters squared.      Intake/Output Summary (Last 24 hours) at 03/05/18 0806  Last data filed at 03/05/18 0400   Gross per 24 hour   Intake          2055.93 ml   Output                0 ml   Net          2055.93 ml       Physical Exam   Constitutional: He is oriented to person, place, and time. He appears well-developed and well-nourished. No distress.   obese   HENT:   Head: Normocephalic and atraumatic.   Erythema, swelling of oral mucosa. White plaque ventral aspect tongue. Petechiae dorsal aspect tongue.  Dried blood scattered throughout oral mucosa.   Eyes: EOM are normal. Pupils are equal, round, and reactive to light.   Neck: Normal range of motion. No JVD present.   Cardiovascular: Normal rate and regular rhythm.    Murmur heard.  Pulmonary/Chest: Effort normal and breath sounds normal.   Abdominal: Soft. Bowel sounds are normal. He exhibits no distension. There is no tenderness. There is no guarding.   Musculoskeletal: He exhibits no edema or tenderness.   Neurological: He is alert and oriented to person, place, and time.   Skin: Skin is warm and dry. Capillary refill takes less than 2 seconds.   Vitals reviewed.      Significant Labs:   CBC:   Recent Labs  Lab 03/04/18  0356 03/05/18  0328   WBC 0.72* 1.02*   HGB 9.2* 8.6*   HCT 26.4* 24.6*   PLT 24* 25*    and CMP:   Recent Labs  Lab 03/04/18  0356 03/05/18  0328    136   K 2.8* 2.8*    106   CO2 26 24   * 146*   BUN 23* 22*   CREATININE 1.1 0.9   CALCIUM  7.9* 7.3*   PROT 4.6* 4.2*   ALBUMIN 1.8* 1.7*   BILITOT 0.6 0.4   ALKPHOS 40* 38*   AST 6* 6*   ALT 12 9*   ANIONGAP 7* 6*   EGFRNONAA >60.0 >60.0       Diagnostic Results:  None    Assessment/Plan:     * Enterocolitis    48 yr old with palatine tonsillar SCC on chemotherapy with neutropenia presented with diarrhea & abdominal pain. Treating for enterocolitis based on findings of bowel wall thickening seen on CT abd/pelvis. Other considerations included gastroenteritis vs food poisoning.    No indications for surgery at this time (CT neg for bowel perforation, overall stable.) Septic on admit with hypotension SBP 80s, tachycardia 120s, ANC 60, lactate 3.4. Dehydrated with dry mucous membranes & hyponatremia. 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 Overall improving (pain, appetite, more alert, neutrophil count rising.)   - Consider discontinuation of PCA pump  - fentanyl 50mg patch to be placed tonight in hopes of entirely discontinuing PCA pump tomorrow as pain seems to be well-controlled   -Will continue broad spectrum abx targeted towards intraabdominal source (cefepime 2g Q12H, IV flagyl 500mg Q12H, no longer on vancomycin) until ANC recovers then transition to oral/GT abx for total course of 14 days.  Today is day 6   -started PPN 03/02; CLD started 03/03 - will continue clear liquid diet today   -IV zofran for nausea  -stool studies: WBC positive, C. Diff negative, o/p/c negative, culture & rotavirus negative  -blood cultures NGTD  -lactate downtrended        Electrolyte abnormality    Likely secondary to diarrhea & decreased PO intake.     -Repleting - patient not well tolerating PO or via PEG tube - causes abdominal pain and diarrhea   -IV electrolyte replacement today   -daily CMP        Hyponatremia    Na:129 on admit. No neurological symptoms but overall some fatigue & confusion (likely due to hyponatremia with concurrent  "infection.)  Hyponatremia likely hypovolemic hypotonic in nature due to diarrhea (appeared dehydrated.) Also possibly euvolemic hypotonic secondary to low solute (decrease PO intake.)    -improved   -monitor with CMP        Tachycardia    Likely secondary to sepsis, pain,and  hypotension/hypovolemia .     -EKG neg for STEMI/NSTEMI or arrythmia.  -monitor with frequent vitals  -see "sepsis/enterocolitis" for details regarding plan of care    -resolved         Thrombocytopenia    Downtrending, likely secondary to chemotherapy. Will monitor for signs of bleeding while in patient.     -daily CBC  -transfuse per guidelines; no indication at this time.  -will hold pharmacologic DVT ppx at this time          Chemotherapy-induced neutropenia    Neutrophil count <1.0. Will need continued hospitalization pending safe levels.    -neutropenic precautions  -monitor ANC while in hospital with daily CBC        Thrush    -secondary to chemotherapy-induced immunosuppression  -started on course per outpatient oncologist.  -today is day 8/10 day course fluconazole        Sepsis    Septic on admit with hypotension (SBP 80s), tachycardia 120s, ANC 60. Lactate 3.4. Likely source is enterocolitis given presenting complaints of diarrhea, predisposing RF of neutropenia, and findings on CT abd/pelv. Also concern for UTI given lower abdominal pain with urinary urgency & previous history of UTI with E. Coli & enterobacter 2 weeks ago. CXR neg for PNA.     -CXR neg for PNA  -UA, urine culture neg  -blood cultures NGTD  -stool cultures all negative  -lactate downtrended    -continue cefepime and flagyl    -monitor vitals  -see "enterocolitis" for details regarding plan of care        Morbid obesity      -PT/OT        Prediabetes    HbA1C 6.4% 2018  -B on admit  -will monitor with daily CMP        Low vitamin D level    Takes 1000u weekly  -will continue inpatient         Squamous cell carcinoma of palatine tonsil    -Recent " "completion of cycle 2 on 02/26 with port removal. See Dr. Lopez's office visit note for details regarding oncological history & plan of care. Will optimize supportive care while in-patient.    -Admitted with significant pain related to mucositis; consider discontinuing PCA pump today, currently at 0.1mg/hour, as his pain is well controlled with fentanyl patches.  Still not eating but pain control significantly improved.  -zofran for nausea (QTc <500 on EKT 02/28)  -magic sol'n rather than [facility]'s mouthwash per patient request  -see "neutropenia" for details regarding plan of care                 Roman Nelson MD  Hematology/Oncology  [facility]    Attending Addendum:  The patient was seen, examined, and discussed on rounds with the team.  I agree with the assessment and plan as outlined for Burton Whiting.  Continue on with supportive care.    Sudhir Piper DO, FACP  Hematology & Oncology  [address] [place] [address]  ph. [phone]  Fax. [phone]        "

## 2018-03-06 ENCOUNTER — PATIENT MESSAGE (OUTPATIENT)
Dept: HEMATOLOGY/ONCOLOGY | Facility: CLINIC | Age: 48
End: 2018-03-06

## 2018-03-06 PROBLEM — E87.1 HYPONATREMIA: Status: RESOLVED | Noted: 2018-02-28 | Resolved: 2018-03-06

## 2018-03-06 LAB
ALBUMIN SERPL BCP-MCNC: 2 G/DL
ALP SERPL-CCNC: 44 U/L
ALT SERPL W/O P-5'-P-CCNC: 9 U/L
ANION GAP SERPL CALC-SCNC: 10 MMOL/L
AST SERPL-CCNC: 7 U/L
BACTERIA BLD CULT: NORMAL
BACTERIA BLD CULT: NORMAL
BASOPHILS # BLD AUTO: 0.01 K/UL
BASOPHILS NFR BLD: 0.5 %
BILIRUB SERPL-MCNC: 0.4 MG/DL
BUN SERPL-MCNC: 21 MG/DL
CALCIUM SERPL-MCNC: 7.8 MG/DL
CHLORIDE SERPL-SCNC: 106 MMOL/L
CO2 SERPL-SCNC: 23 MMOL/L
CREAT SERPL-MCNC: 1 MG/DL
DIFFERENTIAL METHOD: ABNORMAL
EOSINOPHIL # BLD AUTO: 0 K/UL
EOSINOPHIL NFR BLD: 0 %
ERYTHROCYTE [DISTWIDTH] IN BLOOD BY AUTOMATED COUNT: 13.7 %
EST. GFR  (AFRICAN AMERICAN): >60 ML/MIN/1.73 M^2
EST. GFR  (NON AFRICAN AMERICAN): >60 ML/MIN/1.73 M^2
GLUCOSE SERPL-MCNC: 130 MG/DL
HCT VFR BLD AUTO: 27.6 %
HGB BLD-MCNC: 9.3 G/DL
IMM GRANULOCYTES # BLD AUTO: 0.02 K/UL
IMM GRANULOCYTES NFR BLD AUTO: 1.1 %
LYMPHOCYTES # BLD AUTO: 0.8 K/UL
LYMPHOCYTES NFR BLD: 44.1 %
MAGNESIUM SERPL-MCNC: 1.8 MG/DL
MCH RBC QN AUTO: 30.1 PG
MCHC RBC AUTO-ENTMCNC: 33.7 G/DL
MCV RBC AUTO: 89 FL
MONOCYTES # BLD AUTO: 0.2 K/UL
MONOCYTES NFR BLD: 12.9 %
NEUTROPHILS # BLD AUTO: 0.8 K/UL
NEUTROPHILS NFR BLD: 41.4 %
NRBC BLD-RTO: 1 /100 WBC
PHOSPHATE SERPL-MCNC: 2.5 MG/DL
PLATELET # BLD AUTO: 47 K/UL
PLATELET BLD QL SMEAR: ABNORMAL
PMV BLD AUTO: 10.7 FL
POTASSIUM SERPL-SCNC: 3 MMOL/L
PROT SERPL-MCNC: 4.8 G/DL
RBC # BLD AUTO: 3.09 M/UL
SODIUM SERPL-SCNC: 139 MMOL/L
WBC # BLD AUTO: 1.86 K/UL

## 2018-03-06 PROCEDURE — 63600175 PHARM REV CODE 636 W HCPCS: Performed by: STUDENT IN AN ORGANIZED HEALTH CARE EDUCATION/TRAINING PROGRAM

## 2018-03-06 PROCEDURE — 84100 ASSAY OF PHOSPHORUS: CPT

## 2018-03-06 PROCEDURE — 25500020 PHARM REV CODE 255: Performed by: INTERNAL MEDICINE

## 2018-03-06 PROCEDURE — 99232 SBSQ HOSP IP/OBS MODERATE 35: CPT | Mod: ,,, | Performed by: INTERNAL MEDICINE

## 2018-03-06 PROCEDURE — 85025 COMPLETE CBC W/AUTO DIFF WBC: CPT

## 2018-03-06 PROCEDURE — 25000003 PHARM REV CODE 250: Performed by: INTERNAL MEDICINE

## 2018-03-06 PROCEDURE — 80053 COMPREHEN METABOLIC PANEL: CPT

## 2018-03-06 PROCEDURE — 25500020 PHARM REV CODE 255: Performed by: STUDENT IN AN ORGANIZED HEALTH CARE EDUCATION/TRAINING PROGRAM

## 2018-03-06 PROCEDURE — 83735 ASSAY OF MAGNESIUM: CPT

## 2018-03-06 PROCEDURE — 11000001 HC ACUTE MED/SURG PRIVATE ROOM

## 2018-03-06 PROCEDURE — 25000003 PHARM REV CODE 250: Performed by: STUDENT IN AN ORGANIZED HEALTH CARE EDUCATION/TRAINING PROGRAM

## 2018-03-06 PROCEDURE — 63600175 PHARM REV CODE 636 W HCPCS: Performed by: INTERNAL MEDICINE

## 2018-03-06 RX ORDER — OXYCODONE HYDROCHLORIDE 5 MG/1
10 TABLET ORAL EVERY 6 HOURS PRN
Status: DISCONTINUED | OUTPATIENT
Start: 2018-03-06 | End: 2018-03-08 | Stop reason: HOSPADM

## 2018-03-06 RX ORDER — POTASSIUM CHLORIDE 14.9 MG/ML
20 INJECTION INTRAVENOUS
Status: COMPLETED | OUTPATIENT
Start: 2018-03-06 | End: 2018-03-06

## 2018-03-06 RX ORDER — SODIUM,POTASSIUM PHOSPHATES 280-250MG
2 POWDER IN PACKET (EA) ORAL ONCE
Status: COMPLETED | OUTPATIENT
Start: 2018-03-06 | End: 2018-03-06

## 2018-03-06 RX ORDER — OXYCODONE HYDROCHLORIDE 5 MG/1
10 TABLET ORAL ONCE
Status: COMPLETED | OUTPATIENT
Start: 2018-03-06 | End: 2018-03-06

## 2018-03-06 RX ORDER — CEFEPIME HYDROCHLORIDE 2 G/1
2 INJECTION, POWDER, FOR SOLUTION INTRAVENOUS
Status: DISCONTINUED | OUTPATIENT
Start: 2018-03-06 | End: 2018-03-07

## 2018-03-06 RX ORDER — CEFEPIME HYDROCHLORIDE 1 G/1
1 INJECTION, POWDER, FOR SOLUTION INTRAMUSCULAR; INTRAVENOUS
Status: DISCONTINUED | OUTPATIENT
Start: 2018-03-06 | End: 2018-03-06

## 2018-03-06 RX ORDER — POTASSIUM CHLORIDE 20 MEQ/1
40 TABLET, EXTENDED RELEASE ORAL ONCE
Status: DISCONTINUED | OUTPATIENT
Start: 2018-03-06 | End: 2018-03-06

## 2018-03-06 RX ORDER — CEFEPIME HYDROCHLORIDE 2 G/1
2 INJECTION, POWDER, FOR SOLUTION INTRAVENOUS
Status: DISCONTINUED | OUTPATIENT
Start: 2018-03-06 | End: 2018-03-06

## 2018-03-06 RX ADMIN — IOHEXOL 15 ML: 350 INJECTION, SOLUTION INTRAVENOUS at 12:03

## 2018-03-06 RX ADMIN — METRONIDAZOLE 500 MG: 500 TABLET ORAL at 03:03

## 2018-03-06 RX ADMIN — OXYCODONE HYDROCHLORIDE 10 MG: 5 TABLET ORAL at 04:03

## 2018-03-06 RX ADMIN — METRONIDAZOLE 500 MG: 500 TABLET ORAL at 09:03

## 2018-03-06 RX ADMIN — MAGNESIUM OXIDE TAB 400 MG (241.3 MG ELEMENTAL MG) 800 MG: 400 (241.3 MG) TAB at 08:03

## 2018-03-06 RX ADMIN — FLUCONAZOLE 200 MG: 200 TABLET ORAL at 09:03

## 2018-03-06 RX ADMIN — IOHEXOL 15 ML: 350 INJECTION, SOLUTION INTRAVENOUS at 03:03

## 2018-03-06 RX ADMIN — POTASSIUM & SODIUM PHOSPHATES POWDER PACK 280-160-250 MG 2 PACKET: 280-160-250 PACK at 03:03

## 2018-03-06 RX ADMIN — POTASSIUM CHLORIDE 20 MEQ: 200 INJECTION, SOLUTION INTRAVENOUS at 11:03

## 2018-03-06 RX ADMIN — POTASSIUM & SODIUM PHOSPHATES POWDER PACK 280-160-250 MG 2 PACKET: 280-160-250 PACK at 09:03

## 2018-03-06 RX ADMIN — METRONIDAZOLE 500 MG: 500 TABLET ORAL at 05:03

## 2018-03-06 RX ADMIN — IOHEXOL 100 ML: 350 INJECTION, SOLUTION INTRAVENOUS at 06:03

## 2018-03-06 RX ADMIN — MAGNESIUM OXIDE TAB 400 MG (241.3 MG ELEMENTAL MG) 800 MG: 400 (241.3 MG) TAB at 09:03

## 2018-03-06 RX ADMIN — CEFEPIME HYDROCHLORIDE 2 G: 2 INJECTION, POWDER, FOR SOLUTION INTRAVENOUS at 11:03

## 2018-03-06 RX ADMIN — OXYCODONE HYDROCHLORIDE 10 MG: 5 TABLET ORAL at 08:03

## 2018-03-06 RX ADMIN — RETINOL, ERGOCALCIFEROL, .ALPHA.-TOCOPHEROL ACETATE, DL-, PHYTONADIONE, ASCORBIC ACID, NIACINAMIDE, RIBOFLAVIN 5-PHOSPHATE SODIUM, THIAMINE HYDROCHLORIDE, PYRIDOXINE HYDROCHLORIDE, DEXPANTHENOL, BIOTIN, FOLIC ACID, AND CYANOCOBALAMIN: KIT at 08:03

## 2018-03-06 RX ADMIN — POTASSIUM CHLORIDE 20 MEQ: 200 INJECTION, SOLUTION INTRAVENOUS at 08:03

## 2018-03-06 NOTE — PT/OT/SLP DISCHARGE
Physical Therapy Discharge Summary    Name: Burton Whiting  MRN: [id number]   Principal Problem: Enterocolitis     Patient Discharged from acute Physical Therapy on 18.  Please refer to prior PT noted date on 18 for functional status.     Assessment:     Patient has met all goals and is not appropriate for therapy.    Objective:     GOALS:    Physical Therapy Goals     Not on file          Multidisciplinary Problems (Resolved)        Problem: Physical Therapy Goal    Goal Priority Disciplines Outcome Goal Variances Interventions   Physical Therapy Goal   (Resolved)     PT/OT, PT Outcome(s) achieved     Description:  Goals to be met by: 03/15/2018    Patient will increase functional independence with mobility by performin. Gait  x >500 feet with Supervision using no AD.   2. Ascend/descend 4 stairs with no Handrails Contact Guard Assistance using hand held assist of one person.   3. Lower extremity exercise program x30 reps, with supervision and independence                      Reasons for Discontinuation of Therapy Services  Satisfactory goal achievement.      Plan:     Patient Discharged to: Home no PT services needed.    Lauren Navarro, PT  3/6/2018

## 2018-03-06 NOTE — ASSESSMENT & PLAN NOTE
"Septic on admit with hypotension (SBP 80s), tachycardia 120s, ANC 60. Lactate 3.4. Likely source is enterocolitis given presenting complaints of diarrhea, predisposing RF of neutropenia, and findings on CT abd/pelv. Also concern for UTI given lower abdominal pain with urinary urgency & previous history of UTI with E. Coli & enterobacter 2 weeks ago. CXR neg for PNA.     -CXR neg for PNA  -UA, urine culture neg  -blood cultures NGTD  -stool cultures, C diff, Rotavirus, o/p/c neg; WBC (+)  -lactate downtrended  -continue cefepime and flagyl  -see "enterocolitis" for details regarding plan of care  "

## 2018-03-06 NOTE — SUBJECTIVE & OBJECTIVE
Interval History: Patient reports that he is having no pain. Continues to have diarrhea Q2H. Wants to go home.    Oncology Treatment Plan:   OP CISPLATIN DOCETAXEL 5FU Q3W    Medications:  Continuous Infusions:   Amino acid 4.25% - dextrose 10% (CLINIMIX-E) solution with additives (1L provides 42.5 gm AA, 100 gm CHO (340 kcal/L dextrose), Na 35, K 30, Mg 5, Ca 4.5, Acetate 70, Cl 39, Phos 15) 75 mL/hr at 03/05/18 2231     Scheduled Meds:   ceFEPime (MAXIPIME) IVPB  2 g Intravenous Q12H    fentaNYL  2 patch Transdermal Q72H    fluconazole  200 mg Oral Daily    magnesium oxide  800 mg Oral BID    metroNIDAZOLE  500 mg Oral Q8H    potassium, sodium phosphates  2 packet Oral Once     PRN Meds:(Magic mouthwash) 1:1:1 Benadryl 12.5mg/5ml liq, aluminum & magnesium hydroxide-simehticone (Maalox), lidocaine viscous 2%, acetaminophen, ondansetron, promethazine, ramelteon, sodium chloride 0.9%     Review of Systems   Constitutional: Negative for chills and fever.   HENT: Negative for sore throat.         Improved overall. Less erythema, edema, slothing of mucous membranes. No white plaques noted.   Eyes: Negative for visual disturbance.   Respiratory: Negative for shortness of breath.    Cardiovascular: Negative for chest pain.   Gastrointestinal: Positive for diarrhea. Negative for abdominal pain, blood in stool, constipation, nausea and vomiting.   Genitourinary: Negative for difficulty urinating and hematuria.   Musculoskeletal: Negative for arthralgias.   Neurological: Negative for dizziness and light-headedness.   Psychiatric/Behavioral: Negative for confusion.     Objective:     Vital Signs (Most Recent):  Temp: 98.5 °F (36.9 °C) (03/06/18 0818)  Pulse: 98 (03/06/18 0818)  Resp: 18 (03/06/18 0818)  BP: 121/68 (03/06/18 0818)  SpO2: (!) 94 % (03/06/18 0818) Vital Signs (24h Range):  Temp:  [98.5 °F (36.9 °C)-98.9 °F (37.2 °C)] 98.5 °F (36.9 °C)  Pulse:  [80-98] 98  Resp:  [16-20] 18  SpO2:  [94 %-98 %] 94 %  BP:  (114-146)/(65-85) 121/68     Weight: 113.4 kg (250 lb)  Body mass index is 39.15 kg/m².  Body surface area is 2.32 meters squared.      Intake/Output Summary (Last 24 hours) at 03/06/18 0957  Last data filed at 03/06/18 0400   Gross per 24 hour   Intake          1911.25 ml   Output                0 ml   Net          1911.25 ml       Physical Exam   Constitutional: He is oriented to person, place, and time. He appears well-developed and well-nourished. No distress.   obese   HENT:   Head: Normocephalic and atraumatic.   Erythema, swelling of oral mucosa. White plaque ventral aspect tongue. Petechiae dorsal aspect tongue.  Dried blood scattered throughout oral mucosa.   Eyes: EOM are normal. Pupils are equal, round, and reactive to light.   Neck: Normal range of motion. No JVD present.   Cardiovascular: Normal rate and regular rhythm.    Murmur heard.  Pulmonary/Chest: Effort normal and breath sounds normal.   Abdominal: Soft. Bowel sounds are normal. He exhibits no distension. There is no tenderness. There is no guarding.   Musculoskeletal: He exhibits no edema or tenderness.   Neurological: He is alert and oriented to person, place, and time.   Skin: Skin is warm and dry. Capillary refill takes less than 2 seconds.   Vitals reviewed.      Significant Labs:   CBC:     Recent Labs  Lab 03/05/18 0328 03/06/18 0419   WBC 1.02* 1.86*   HGB 8.6* 9.3*   HCT 24.6* 27.6*   PLT 25* 47*    and CMP:     Recent Labs  Lab 03/05/18 0328 03/06/18 0419    139   K 2.8* 3.0*    106   CO2 24 23   * 130*   BUN 22* 21*   CREATININE 0.9 1.0   CALCIUM 7.3* 7.8*   PROT 4.2* 4.8*   ALBUMIN 1.7* 2.0*   BILITOT 0.4 0.4   ALKPHOS 38* 44*   AST 6* 7*   ALT 9* 9*   ANIONGAP 6* 10   EGFRNONAA >60.0 >60.0       Diagnostic Results:  None

## 2018-03-06 NOTE — PROGRESS NOTES
[facility]  Hematology/Oncology  Progress Note    Patient Name: Burton Whiting  Admission Date: 2/28/2018  Hospital Length of Stay: 6 days  Code Status: Full Code     Subjective:     HPI:  Mr. Burton Whiting is a 48 y.o. male with a PMHx palatine tonsillar squamous cell carcinoma who presents with chief complaint of diarrhea. Onset 2 days ago, watery, non-bloody, occurring every 45 minutes, has started to taper down since this morning. Associated with lower abdominal pain (5/10, non-radiating, occurs intermittently, stabbing in nature, not associated with food,) nausea (no vomiting), low grade fever (tmax 99.4 that improved with water infusion through PEG), urinary urgency, decreased PO intake, insomnia (taking home med oxycodone 5mg more frequently to help sleep) and altered mental status (per sister, patient with some confusion to situations.) No recent sick contacts, abx use, travel, or atypical food intake. Completed cycle 2 of chemo 2 days ago with removal of port, prior to initiation of current symptoms. Recently admitted 02/08-02/13 with neutropenic fever, UTI w/ E. Coli & enterobacter. PEG placed 02/11 secondary to decreased PO intake.    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 He follows with Dr. Lopez in outpatient oncology clinic. He recently completed his second round of chemo on 02/26 with pump removal that day. Also on decadron 6mg BD with current cycles, [facility]'s solution for oral/throat pain, fluconazole (day 3/10 day course) for thrush, zofran PRN for nausea, and oxy 5mg Q6H PRN. PEG placed ~ 2wks ago. Lives in a home alone, is independent with his ADLs, former smoker (>30 yr history, quit 10 months ago), former EtOH use (sober 5 yrs), and opiate addiction (sober 10 yrs with current rare use of prescribed oxycodone for cancer pain.) Family support  includes brother & sister.    Interval History: Patient reports that he is having no pain. Continues to have diarrhea Q2H. Wants to go home.    Oncology Treatment Plan:   OP CISPLATIN DOCETAXEL 5FU Q3W    Medications:  Continuous Infusions:   Amino acid 4.25% - dextrose 10% (CLINIMIX-E) solution with additives (1L provides 42.5 gm AA, 100 gm CHO (340 kcal/L dextrose), Na 35, K 30, Mg 5, Ca 4.5, Acetate 70, Cl 39, Phos 15) 75 mL/hr at 03/05/18 2231     Scheduled Meds:   ceFEPime (MAXIPIME) IVPB  2 g Intravenous Q12H    fentaNYL  2 patch Transdermal Q72H    fluconazole  200 mg Oral Daily    magnesium oxide  800 mg Oral BID    metroNIDAZOLE  500 mg Oral Q8H    potassium, sodium phosphates  2 packet Oral Once     PRN Meds:(Magic mouthwash) 1:1:1 Benadryl 12.5mg/5ml liq, aluminum & magnesium hydroxide-simehticone (Maalox), lidocaine viscous 2%, acetaminophen, ondansetron, promethazine, ramelteon, sodium chloride 0.9%     Review of Systems   Constitutional: Negative for chills and fever.   HENT: Negative for sore throat.         Improved overall. Less erythema, edema, slothing of mucous membranes. No white plaques noted.   Eyes: Negative for visual disturbance.   Respiratory: Negative for shortness of breath.    Cardiovascular: Negative for chest pain.   Gastrointestinal: Positive for diarrhea. Negative for abdominal pain, blood in stool, constipation, nausea and vomiting.   Genitourinary: Negative for difficulty urinating and hematuria.   Musculoskeletal: Negative for arthralgias.   Neurological: Negative for dizziness and light-headedness.   Psychiatric/Behavioral: Negative for confusion.     Objective:     Vital Signs (Most Recent):  Temp: 98.5 °F (36.9 °C) (03/06/18 0818)  Pulse: 98 (03/06/18 0818)  Resp: 18 (03/06/18 0818)  BP: 121/68 (03/06/18 0818)  SpO2: (!) 94 % (03/06/18 0818) Vital Signs (24h Range):  Temp:  [98.5 °F (36.9 °C)-98.9 °F (37.2 °C)] 98.5 °F (36.9 °C)  Pulse:  [80-98] 98  Resp:  [16-20]  18  SpO2:  [94 %-98 %] 94 %  BP: (114-146)/(65-85) 121/68     Weight: 113.4 kg (250 lb)  Body mass index is 39.15 kg/m².  Body surface area is 2.32 meters squared.      Intake/Output Summary (Last 24 hours) at 03/06/18 0957  Last data filed at 03/06/18 0400   Gross per 24 hour   Intake          1911.25 ml   Output                0 ml   Net          1911.25 ml       Physical Exam   Constitutional: He is oriented to person, place, and time. He appears well-developed and well-nourished. No distress.   obese   HENT:   Head: Normocephalic and atraumatic.   Erythema, swelling of oral mucosa. White plaque ventral aspect tongue. Petechiae dorsal aspect tongue.  Dried blood scattered throughout oral mucosa.   Eyes: EOM are normal. Pupils are equal, round, and reactive to light.   Neck: Normal range of motion. No JVD present.   Cardiovascular: Normal rate and regular rhythm.    Murmur heard.  Pulmonary/Chest: Effort normal and breath sounds normal.   Abdominal: Soft. Bowel sounds are normal. He exhibits no distension. There is no tenderness. There is no guarding.   Musculoskeletal: He exhibits no edema or tenderness.   Neurological: He is alert and oriented to person, place, and time.   Skin: Skin is warm and dry. Capillary refill takes less than 2 seconds.   Vitals reviewed.      Significant Labs:   CBC:     Recent Labs  Lab 03/05/18  0328 03/06/18  0419   WBC 1.02* 1.86*   HGB 8.6* 9.3*   HCT 24.6* 27.6*   PLT 25* 47*    and CMP:     Recent Labs  Lab 03/05/18  0328 03/06/18  0419    139   K 2.8* 3.0*    106   CO2 24 23   * 130*   BUN 22* 21*   CREATININE 0.9 1.0   CALCIUM 7.3* 7.8*   PROT 4.2* 4.8*   ALBUMIN 1.7* 2.0*   BILITOT 0.4 0.4   ALKPHOS 38* 44*   AST 6* 7*   ALT 9* 9*   ANIONGAP 6* 10   EGFRNONAA >60.0 >60.0       Diagnostic Results:  None    Assessment/Plan:     * Enterocolitis    48 yr old with palatine tonsillar SCC on chemotherapy with neutropenia presented with diarrhea & abdominal pain.  "Treating for enterocolitis based on findings of bowel wall thickening seen on CT abd/pelvis. Other considerations included gastroenteritis vs food poisoning.    No indications for surgery at this time (CT neg for bowel perforation, overall stable.) Septic on admit with hypotension SBP 80s, tachycardia 120s, ANC 60, lactate 3.4. Dehydrated with dry mucous membranes & hyponatremia. 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 Overall improving (pain, appetite, more alert, neutrophil count rising.)     - stool studies: WBC positive, C. Diff negative, o/p/c negative, culture & rotavirus negative  - blood cultures NGTD  - lactate downtrended    - fentanyl 50mg patch   -Will continue broad spectrum abx targeted towards intraabdominal source (cefepime 2g Q12H, IV flagyl 500mg Q12H, no longer on vancomycin) until ANC recovers then transition to oral/GT abx for total course of 14 days. Today is day 7  - Continues to have diarrhea, will plan to repeat CT abd/pelv   - Con't PPN & hold PO/Gtube intake pending CT results  - IV zofran for nausea            Sepsis    Septic on admit with hypotension (SBP 80s), tachycardia 120s, ANC 60. Lactate 3.4. Likely source is enterocolitis given presenting complaints of diarrhea, predisposing RF of neutropenia, and findings on CT abd/pelv. Also concern for UTI given lower abdominal pain with urinary urgency & previous history of UTI with E. Coli & enterobacter 2 weeks ago. CXR neg for PNA.     -CXR neg for PNA  -UA, urine culture neg  -blood cultures NGTD  -stool cultures, C diff, Rotavirus, o/p/c neg; WBC (+)  -lactate downtrended  -continue cefepime and flagyl  -see "enterocolitis" for details regarding plan of care        Squamous cell carcinoma of palatine tonsil    -Recent completion of cycle 2 on 02/26 with port removal. See Dr. Lopez's office visit note for details regarding oncological history & plan of care. Will optimize " "supportive care while in-patient.    -Admitted with significant pain related to mucositis; consider discontinuing PCA pump today, currently at 0.1mg/hour, as his pain is well controlled with fentanyl patches.  Still not eating but pain control significantly improved.  -zofran for nausea (QTc <500 on EKT )  -magic sol'n rather than [facility]'s mouthwash per patient request  -see "neutropenia" for details regarding plan of care        Chemotherapy-induced neutropenia    Neutrophil count <1.0. Will need continued hospitalization pending safe levels.    -neutropenic precautions  -monitor ANC while in hospital with daily CBC        Tachycardia    Likely secondary to sepsis, pain,and  hypotension/hypovolemia .     -EKG neg for STEMI/NSTEMI or arrythmia.  -monitor with frequent vitals  -see "sepsis/enterocolitis" for details regarding plan of care    -resolved         Electrolyte abnormality    Likely secondary to diarrhea & decreased PO intake.     -Repleting PRN  -daily CMP        Thrombocytopenia    Likely secondary to chemotherapy. Downtrended daily, now starting to improve.    -Will monitor for signs of bleeding while in patient.   -daily CBC  -transfuse per guidelines; no indication at this time.  -will hold pharmacologic DVT ppx at this time          Thrush    -secondary to chemotherapy-induced immunosuppression  -started on course per outpatient oncologist.  -today is day 9/10 day course fluconazole  -overall improving, no white plaques noted on exam        Morbid obesity    -PT/OT        Prediabetes    HbA1C 6.4% 2018  -B on admit  -will monitor with daily CMP        Low vitamin D level    Takes 1000u weekly  -will continue inpatient           Diet-PPN pending results of CT. Plan to transition to tube feeds  DVT ppx-holding in setting of thrombocytopenia  Dispo-pending medical stability     Cici Phillips MD  Hematology/Oncology  [facility]    Attending Addendum:  The patient was " seen, examined, and discussed on rounds with the team.  I agree with the assessment and plan as outlined for Burton Whiting.  Doing better clinically and counts are improving.  We'll check CT prior to initiation of enteral feeds.    Sudhir Piper DO, FACP  Hematology & Oncology  [facility] [place] [address]  ph. [phone]  Fax. [phone]

## 2018-03-06 NOTE — PLAN OF CARE
Problem: Patient Care Overview  Goal: Plan of Care Review  Outcome: Ongoing (interventions implemented as appropriate)  Patient remained free from falls throughout shift, call bell within reach. Patient did not sleep well last night due to multiple BMs throughout night. Patient states he was up every hour going to the restroom. GI cocktail given once for complaints of indigestion in beginning of shift. PEG tube feedings to start today once tube feed arrives and TPN to be d/c'd. Vitals stable, will continue to monitor.

## 2018-03-06 NOTE — ASSESSMENT & PLAN NOTE
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 Treating for enterocolitis based on findings of bowel wall thickening seen on CT abd/pelvis. Other considerations included gastroenteritis vs food poisoning.    No indications for surgery at this time (CT neg for bowel perforation, overall stable.) Septic on admit with hypotension SBP 80s, tachycardia 120s, ANC 60, lactate 3.4. Dehydrated with dry mucous membranes & hyponatremia. 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 Overall improving (pain, appetite, more alert, neutrophil count rising.)     - stool studies: WBC positive, C. Diff negative, o/p/c negative, culture & rotavirus negative  - blood cultures NGTD  - lactate downtrended    - fentanyl 50mg patch   -Will continue broad spectrum abx targeted towards intraabdominal source (cefepime 2g Q12H, IV flagyl 500mg Q12H, no longer on vancomycin) until ANC recovers then transition to oral/GT abx for total course of 14 days. Today is day 7  - Continues to have diarrhea, will plan to repeat CT abd/pelv   - Con't PPN & hold PO/Gtube intake pending CT results  - IV zofran for nausea

## 2018-03-06 NOTE — PLAN OF CARE
Problem: Physical Therapy Goal  Goal: Physical Therapy Goal  Goals to be met by: 03/15/2018    Patient will increase functional independence with mobility by performin. Gait  x >500 feet with Supervision using no AD.   2. Ascend/descend 4 stairs with no Handrails Contact Guard Assistance using hand held assist of one person.   3. Lower extremity exercise program x30 reps, with supervision and independence     Outcome: Outcome(s) achieved Date Met: 18  Patient is at previous level of function; discharge from PT services.     Lauren Navarro PT, DPT  3/6/2018  [phone]

## 2018-03-06 NOTE — ASSESSMENT & PLAN NOTE
Likely secondary to chemotherapy. Downtrended daily, now starting to improve.    -Will monitor for signs of bleeding while in patient.   -daily CBC  -transfuse per guidelines; no indication at this time.  -will hold pharmacologic DVT ppx at this time

## 2018-03-06 NOTE — ASSESSMENT & PLAN NOTE
-secondary to chemotherapy-induced immunosuppression  -started on course per outpatient oncologist.  -today is day 9/10 day course fluconazole  -overall improving, no white plaques noted on exam

## 2018-03-07 PROBLEM — R00.0 TACHYCARDIA: Status: RESOLVED | Noted: 2018-02-28 | Resolved: 2018-03-07

## 2018-03-07 PROBLEM — K57.92 DIVERTICULITIS: Status: ACTIVE | Noted: 2018-03-07

## 2018-03-07 LAB
ALBUMIN SERPL BCP-MCNC: 2.1 G/DL
ALP SERPL-CCNC: 60 U/L
ALT SERPL W/O P-5'-P-CCNC: 10 U/L
ANION GAP SERPL CALC-SCNC: 7 MMOL/L
ANISOCYTOSIS BLD QL SMEAR: SLIGHT
AST SERPL-CCNC: 12 U/L
BASOPHILS # BLD AUTO: ABNORMAL K/UL
BASOPHILS NFR BLD: 1 %
BILIRUB SERPL-MCNC: 0.3 MG/DL
BUN SERPL-MCNC: 23 MG/DL
CALCIUM SERPL-MCNC: 8 MG/DL
CHLORIDE SERPL-SCNC: 106 MMOL/L
CO2 SERPL-SCNC: 25 MMOL/L
CREAT SERPL-MCNC: 1.1 MG/DL
DIFFERENTIAL METHOD: ABNORMAL
DOHLE BOD BLD QL SMEAR: PRESENT
EOSINOPHIL # BLD AUTO: ABNORMAL K/UL
EOSINOPHIL NFR BLD: 0 %
ERYTHROCYTE [DISTWIDTH] IN BLOOD BY AUTOMATED COUNT: 14 %
EST. GFR  (AFRICAN AMERICAN): >60 ML/MIN/1.73 M^2
EST. GFR  (NON AFRICAN AMERICAN): >60 ML/MIN/1.73 M^2
GLUCOSE SERPL-MCNC: 116 MG/DL
HCT VFR BLD AUTO: 27 %
HGB BLD-MCNC: 9.1 G/DL
IMM GRANULOCYTES # BLD AUTO: ABNORMAL K/UL
IMM GRANULOCYTES NFR BLD AUTO: ABNORMAL %
LYMPHOCYTES # BLD AUTO: ABNORMAL K/UL
LYMPHOCYTES NFR BLD: 34 %
MAGNESIUM SERPL-MCNC: 2 MG/DL
MCH RBC QN AUTO: 31.1 PG
MCHC RBC AUTO-ENTMCNC: 33.7 G/DL
MCV RBC AUTO: 92 FL
METAMYELOCYTES NFR BLD MANUAL: 1 %
MONOCYTES # BLD AUTO: ABNORMAL K/UL
MONOCYTES NFR BLD: 4 %
MYELOCYTES NFR BLD MANUAL: 1 %
NEUTROPHILS NFR BLD: 51 %
NEUTS BAND NFR BLD MANUAL: 8 %
NRBC BLD-RTO: 3 /100 WBC
OVALOCYTES BLD QL SMEAR: ABNORMAL
PHOSPHATE SERPL-MCNC: 3 MG/DL
PLATELET # BLD AUTO: 85 K/UL
PMV BLD AUTO: 10.7 FL
POIKILOCYTOSIS BLD QL SMEAR: SLIGHT
POLYCHROMASIA BLD QL SMEAR: ABNORMAL
POTASSIUM SERPL-SCNC: 3.2 MMOL/L
PROT SERPL-MCNC: 4.8 G/DL
RBC # BLD AUTO: 2.93 M/UL
SODIUM SERPL-SCNC: 138 MMOL/L
WBC # BLD AUTO: 4.58 K/UL

## 2018-03-07 PROCEDURE — 85007 BL SMEAR W/DIFF WBC COUNT: CPT

## 2018-03-07 PROCEDURE — 63600175 PHARM REV CODE 636 W HCPCS: Performed by: STUDENT IN AN ORGANIZED HEALTH CARE EDUCATION/TRAINING PROGRAM

## 2018-03-07 PROCEDURE — 25000003 PHARM REV CODE 250: Performed by: STUDENT IN AN ORGANIZED HEALTH CARE EDUCATION/TRAINING PROGRAM

## 2018-03-07 PROCEDURE — 99232 SBSQ HOSP IP/OBS MODERATE 35: CPT | Mod: ,,, | Performed by: INTERNAL MEDICINE

## 2018-03-07 PROCEDURE — 11000001 HC ACUTE MED/SURG PRIVATE ROOM

## 2018-03-07 PROCEDURE — 25000003 PHARM REV CODE 250: Performed by: INTERNAL MEDICINE

## 2018-03-07 PROCEDURE — 83735 ASSAY OF MAGNESIUM: CPT

## 2018-03-07 PROCEDURE — 80053 COMPREHEN METABOLIC PANEL: CPT

## 2018-03-07 PROCEDURE — 85027 COMPLETE CBC AUTOMATED: CPT

## 2018-03-07 PROCEDURE — 84100 ASSAY OF PHOSPHORUS: CPT

## 2018-03-07 RX ORDER — CIPROFLOXACIN 500 MG/1
500 TABLET ORAL EVERY 12 HOURS
Status: DISCONTINUED | OUTPATIENT
Start: 2018-03-07 | End: 2018-03-08 | Stop reason: HOSPADM

## 2018-03-07 RX ORDER — FENTANYL 50 UG/1
1 PATCH TRANSDERMAL
Status: DISCONTINUED | OUTPATIENT
Start: 2018-03-07 | End: 2018-03-08 | Stop reason: HOSPADM

## 2018-03-07 RX ORDER — ONDANSETRON 4 MG/1
4 TABLET, FILM COATED ORAL EVERY 4 HOURS PRN
Status: DISCONTINUED | OUTPATIENT
Start: 2018-03-07 | End: 2018-03-08 | Stop reason: HOSPADM

## 2018-03-07 RX ORDER — POTASSIUM CHLORIDE 20 MEQ/15ML
40 SOLUTION ORAL ONCE
Status: COMPLETED | OUTPATIENT
Start: 2018-03-07 | End: 2018-03-07

## 2018-03-07 RX ADMIN — FENTANYL 1 PATCH: 50 PATCH, EXTENDED RELEASE TRANSDERMAL at 09:03

## 2018-03-07 RX ADMIN — CIPROFLOXACIN HYDROCHLORIDE 500 MG: 500 TABLET, FILM COATED ORAL at 11:03

## 2018-03-07 RX ADMIN — OXYCODONE HYDROCHLORIDE 10 MG: 5 TABLET ORAL at 11:03

## 2018-03-07 RX ADMIN — METRONIDAZOLE 500 MG: 500 TABLET ORAL at 02:03

## 2018-03-07 RX ADMIN — METRONIDAZOLE 500 MG: 500 TABLET ORAL at 10:03

## 2018-03-07 RX ADMIN — POTASSIUM CHLORIDE 40 MEQ: 20 SOLUTION ORAL at 09:03

## 2018-03-07 RX ADMIN — MAGNESIUM OXIDE TAB 400 MG (241.3 MG ELEMENTAL MG) 800 MG: 400 (241.3 MG) TAB at 08:03

## 2018-03-07 RX ADMIN — METRONIDAZOLE 500 MG: 500 TABLET ORAL at 06:03

## 2018-03-07 RX ADMIN — FLUCONAZOLE 200 MG: 200 TABLET ORAL at 09:03

## 2018-03-07 RX ADMIN — MAGNESIUM OXIDE TAB 400 MG (241.3 MG ELEMENTAL MG) 800 MG: 400 (241.3 MG) TAB at 09:03

## 2018-03-07 RX ADMIN — OXYCODONE HYDROCHLORIDE 10 MG: 5 TABLET ORAL at 05:03

## 2018-03-07 RX ADMIN — OXYCODONE HYDROCHLORIDE 10 MG: 5 TABLET ORAL at 06:03

## 2018-03-07 RX ADMIN — PSYLLIUM HUSK 2 PACKET: 3.4 POWDER ORAL at 11:03

## 2018-03-07 RX ADMIN — CIPROFLOXACIN HYDROCHLORIDE 500 MG: 500 TABLET, FILM COATED ORAL at 08:03

## 2018-03-07 NOTE — ASSESSMENT & PLAN NOTE
-secondary to chemotherapy-induced immunosuppression  -started on course per outpatient oncologist.  -today is day 10/10 day course fluconazole  -overall improving

## 2018-03-07 NOTE — PLAN OF CARE
Problem: Patient Care Overview  Goal: Plan of Care Review  Outcome: Ongoing (interventions implemented as appropriate)  Patient remains free from falls and injury this shift. Bed in low, locked position with call bell in reach. Family at bedside. Patient encouraged to call for assistance when getting out of bed. Patient verbalized understanding. All belongings within reach. Ambulating independently. TPN discontinued - tube feedings restarted. Advancing tube feeds Q2H by 10cc/hr until goal of 60 cc/hr reached. Potassium replacements given as ordered. Continuing to have loose stools - metamucil given. Fentanyl patch controlling pain. Tolerating small amount of clear fluids orally. will continue to monitor.

## 2018-03-07 NOTE — SUBJECTIVE & OBJECTIVE
Interval History: Significant improvement in symptoms. Overall minimal pain. Diarrhea less frequent (1x episode last night and this morning.) Dnies n/v. Patient is open to trying tube feeds.    Oncology Treatment Plan:   OP CISPLATIN DOCETAXEL 5FU Q3W    Medications:  Continuous Infusions:   Amino acid 4.25% - dextrose 10% (CLINIMIX-E) solution with additives (1L provides 42.5 gm AA, 100 gm CHO (340 kcal/L dextrose), Na 35, K 30, Mg 5, Ca 4.5, Acetate 70, Cl 39, Phos 15) 75 mL/hr at 03/06/18 2033     Scheduled Meds:   ceFEPime (MAXIPIME) IVPB  2 g Intravenous Q12H    fentaNYL  1 patch Transdermal Q72H    fluconazole  200 mg Oral Daily    magnesium oxide  800 mg Oral BID    metroNIDAZOLE  500 mg Oral Q8H    potassium chloride 10%  40 mEq Oral Once     PRN Meds:(Magic mouthwash) 1:1:1 Benadryl 12.5mg/5ml liq, aluminum & magnesium hydroxide-simehticone (Maalox), lidocaine viscous 2%, acetaminophen, ondansetron, oxyCODONE, promethazine, ramelteon, sodium chloride 0.9%     Review of Systems   Constitutional: Negative for chills and fever.   HENT: Negative for sore throat.         Improved overall. Less erythema, edema, slothing of mucous membranes. No white plaques noted.   Eyes: Negative for visual disturbance.   Respiratory: Negative for shortness of breath.    Cardiovascular: Negative for chest pain.   Gastrointestinal: Positive for diarrhea. Negative for abdominal pain, blood in stool, constipation, nausea and vomiting.   Genitourinary: Negative for difficulty urinating and hematuria.   Musculoskeletal: Negative for arthralgias.   Neurological: Negative for dizziness and light-headedness.   Psychiatric/Behavioral: Negative for confusion.     Objective:     Vital Signs (Most Recent):  Temp: 99.2 °F (37.3 °C) (03/07/18 0808)  Pulse: 109 (03/07/18 0808)  Resp: 18 (03/07/18 0808)  BP: 118/72 (03/07/18 0808)  SpO2: (!) 94 % (03/07/18 0808) Vital Signs (24h Range):  Temp:  [98.3 °F (36.8 °C)-99.2 °F (37.3 °C)] 99.2  °F (37.3 °C)  Pulse:  [] 109  Resp:  [16-18] 18  SpO2:  [93 %-97 %] 94 %  BP: (107-131)/(64-80) 118/72     Weight: 113.4 kg (250 lb)  Body mass index is 39.15 kg/m².  Body surface area is 2.32 meters squared.      Intake/Output Summary (Last 24 hours) at 03/07/18 0838  Last data filed at 03/07/18 0400   Gross per 24 hour   Intake             3340 ml   Output                0 ml   Net             3340 ml       Physical Exam   Constitutional: He is oriented to person, place, and time. He appears well-developed and well-nourished. No distress.   obese   HENT:   Head: Normocephalic and atraumatic.   Improved erythema & swelling of oral mucosa. Minimal slough. 2x white plaque vs blistering of tongue. Some dried blood.   Eyes: EOM are normal. Pupils are equal, round, and reactive to light.   Neck: Normal range of motion. No JVD present.   Cardiovascular: Normal rate and regular rhythm.    Murmur heard.  Pulmonary/Chest: Effort normal and breath sounds normal.   Abdominal: Soft. Bowel sounds are normal. He exhibits no distension. There is no tenderness. There is no guarding.   Musculoskeletal: He exhibits no edema or tenderness.   Neurological: He is alert and oriented to person, place, and time.   Skin: Skin is warm and dry. Capillary refill takes less than 2 seconds.   Vitals reviewed.      Significant Labs:   CBC:     Recent Labs  Lab 03/06/18  0419 03/07/18  0349   WBC 1.86* 4.58   HGB 9.3* 9.1*   HCT 27.6* 27.0*   PLT 47* 85*    and CMP:     Recent Labs  Lab 03/06/18  0419 03/07/18  0349    138   K 3.0* 3.2*    106   CO2 23 25   * 116*   BUN 21* 23*   CREATININE 1.0 1.1   CALCIUM 7.8* 8.0*   PROT 4.8* 4.8*   ALBUMIN 2.0* 2.1*   BILITOT 0.4 0.3   ALKPHOS 44* 60   AST 7* 12   ALT 9* 10   ANIONGAP 10 7*   EGFRNONAA >60.0 >60.0       Diagnostic Results:  None

## 2018-03-07 NOTE — ASSESSMENT & PLAN NOTE
"Septic on admit with hypotension (SBP 80s), tachycardia 120s, ANC 60. Lactate 3.4. Likely source is typhlitis given presenting complaints of diarrhea, predisposing RF of neutropenia, and findings on CT abd/pelv.     -CXR neg for PNA  -UA, urine culture neg  -blood cultures NGTD  -stool cultures, C diff, Rotavirus, o/p/c, Salm/shigella neg; WBC (+)  -lactate downtrended  -see "enterocolitis" for details regarding plan of care  "

## 2018-03-07 NOTE — ASSESSMENT & PLAN NOTE
Admitted with neutrophil count <1.0. Likely causing mucositis & typhilitiis.    Uptrending daily. Monitor with CBC.

## 2018-03-07 NOTE — ASSESSMENT & PLAN NOTE
Repeat CT abd/pelv 03/06 obtained in setting continued diarrhea while treating typhlitis. Demonstrating uncomplicated diverticulitis.     -plan for 14 days antibiotics. Today is day 7/14.  -transition IV cefepime to PO cipro and con't PO flagyl

## 2018-03-07 NOTE — ASSESSMENT & PLAN NOTE
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 Treating for typhlitis (likely secondary to chemo) based on findings of bowel wall thickening seen on CT abd/pelvis. Other considerations included gastroenteritis vs food poisoning.    No indications for surgery (CT neg for bowel perforation, overall stable.) Septic on admit with hypotension SBP 80s, tachycardia 120s, ANC 60, lactate 3.4. Dehydrated with dry mucous membranes & hyponatremia. Received 1x dose cefepime 2g in ED & IVF (NS fluid boluses.)     Overall improving (pain, appetite, more alert, neutrophil count rising.)     - stool studies: WBC positive, C. Diff negative, o/p/c negative, culture & rotavirus negative  - blood cultures NGTD  - lactate downtrended    - fentanyl 50mcg patch   - 14 day course abx. Cefepime 2g Q12H --> PO cipro, PO flagyl 500mg Q12H  · Today day 8  - Start tube feeds today  - PO zofran for nausea

## 2018-03-07 NOTE — PLAN OF CARE
Problem: Patient Care Overview  Goal: Plan of Care Review  Outcome: Ongoing (interventions implemented as appropriate)  Patient remained free from falls throughout shift, call bell within reach. Patient had much better night last night, pain better controlled with oxy 10mg  q6hr ordered. Oxy given twice last night. Patient only had 2 BMs overnight. Vitals stable, will continue to monitor.

## 2018-03-07 NOTE — PROGRESS NOTES
[facility]  Hematology/Oncology  Progress Note    Patient Name: Burton Whiting  Admission Date: 2/28/2018  Hospital Length of Stay: 7 days  Code Status: Full Code     Subjective:     HPI:  Mr. Burton Whiting is a 48 y.o. male with a PMHx palatine tonsillar squamous cell carcinoma who presents with chief complaint of diarrhea. Onset 2 days ago, watery, non-bloody, occurring every 45 minutes, has started to taper down since this morning. Associated with lower abdominal pain (5/10, non-radiating, occurs intermittently, stabbing in nature, not associated with food,) nausea (no vomiting), low grade fever (tmax 99.4 that improved with water infusion through PEG), urinary urgency, decreased PO intake, insomnia (taking home med oxycodone 5mg more frequently to help sleep) and altered mental status (per sister, patient with some confusion to situations.) No recent sick contacts, abx use, travel, or atypical food intake. Completed cycle 2 of chemo 2 days ago with removal of port, prior to initiation of current symptoms. Recently admitted 02/08-02/13 with neutropenic fever, UTI w/ E. Coli & enterobacter. PEG placed 02/11 secondary to decreased PO intake.    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 He follows with Dr. Lopez in outpatient oncology clinic. He recently completed his second round of chemo on 02/26 with pump removal that day. Also on decadron 6mg BD with current cycles, Duke's solution for oral/throat pain, fluconazole (day 3/10 day course) for thrush, zofran PRN for nausea, and oxy 5mg Q6H PRN. PEG placed ~ 2wks ago. Lives in a home alone, is independent with his ADLs, former smoker (>30 yr history, quit 10 months ago), former EtOH use (sober 5 yrs), and opiate addiction (sober 10 yrs with current rare use of prescribed oxycodone for cancer pain.) Family support  includes brother & sister.    Interval History: Significant improvement in symptoms. Overall minimal pain. Diarrhea less frequent (1x episode last night and this morning.) Dnies n/v. Patient is open to trying tube feeds.    Oncology Treatment Plan:   OP CISPLATIN DOCETAXEL 5FU Q3W    Medications:  Continuous Infusions:   Amino acid 4.25% - dextrose 10% (CLINIMIX-E) solution with additives (1L provides 42.5 gm AA, 100 gm CHO (340 kcal/L dextrose), Na 35, K 30, Mg 5, Ca 4.5, Acetate 70, Cl 39, Phos 15) 75 mL/hr at 03/06/18 2033     Scheduled Meds:   ceFEPime (MAXIPIME) IVPB  2 g Intravenous Q12H    fentaNYL  1 patch Transdermal Q72H    fluconazole  200 mg Oral Daily    magnesium oxide  800 mg Oral BID    metroNIDAZOLE  500 mg Oral Q8H    potassium chloride 10%  40 mEq Oral Once     PRN Meds:(Magic mouthwash) 1:1:1 Benadryl 12.5mg/5ml liq, aluminum & magnesium hydroxide-simehticone (Maalox), lidocaine viscous 2%, acetaminophen, ondansetron, oxyCODONE, promethazine, ramelteon, sodium chloride 0.9%     Review of Systems   Constitutional: Negative for chills and fever.   HENT: Negative for sore throat.         Improved overall. Less erythema, edema, slothing of mucous membranes. No white plaques noted.   Eyes: Negative for visual disturbance.   Respiratory: Negative for shortness of breath.    Cardiovascular: Negative for chest pain.   Gastrointestinal: Positive for diarrhea. Negative for abdominal pain, blood in stool, constipation, nausea and vomiting.   Genitourinary: Negative for difficulty urinating and hematuria.   Musculoskeletal: Negative for arthralgias.   Neurological: Negative for dizziness and light-headedness.   Psychiatric/Behavioral: Negative for confusion.     Objective:     Vital Signs (Most Recent):  Temp: 99.2 °F (37.3 °C) (03/07/18 0808)  Pulse: 109 (03/07/18 0808)  Resp: 18 (03/07/18 0808)  BP: 118/72 (03/07/18 0808)  SpO2: (!) 94 % (03/07/18 0808) Vital Signs (24h Range):  Temp:  [98.3 °F  (36.8 °C)-99.2 °F (37.3 °C)] 99.2 °F (37.3 °C)  Pulse:  [] 109  Resp:  [16-18] 18  SpO2:  [93 %-97 %] 94 %  BP: (107-131)/(64-80) 118/72     Weight: 113.4 kg (250 lb)  Body mass index is 39.15 kg/m².  Body surface area is 2.32 meters squared.      Intake/Output Summary (Last 24 hours) at 03/07/18 0838  Last data filed at 03/07/18 0400   Gross per 24 hour   Intake             3340 ml   Output                0 ml   Net             3340 ml       Physical Exam   Constitutional: He is oriented to person, place, and time. He appears well-developed and well-nourished. No distress.   obese   HENT:   Head: Normocephalic and atraumatic.   Improved erythema & swelling of oral mucosa. Minimal slough. 2x white plaque vs blistering of tongue. Some dried blood.   Eyes: EOM are normal. Pupils are equal, round, and reactive to light.   Neck: Normal range of motion. No JVD present.   Cardiovascular: Normal rate and regular rhythm.    Murmur heard.  Pulmonary/Chest: Effort normal and breath sounds normal.   Abdominal: Soft. Bowel sounds are normal. He exhibits no distension. There is no tenderness. There is no guarding.   Musculoskeletal: He exhibits no edema or tenderness.   Neurological: He is alert and oriented to person, place, and time.   Skin: Skin is warm and dry. Capillary refill takes less than 2 seconds.   Vitals reviewed.      Significant Labs:   CBC:     Recent Labs  Lab 03/06/18 0419 03/07/18  0349   WBC 1.86* 4.58   HGB 9.3* 9.1*   HCT 27.6* 27.0*   PLT 47* 85*    and CMP:     Recent Labs  Lab 03/06/18 0419 03/07/18  0349    138   K 3.0* 3.2*    106   CO2 23 25   * 116*   BUN 21* 23*   CREATININE 1.0 1.1   CALCIUM 7.8* 8.0*   PROT 4.8* 4.8*   ALBUMIN 2.0* 2.1*   BILITOT 0.4 0.3   ALKPHOS 44* 60   AST 7* 12   ALT 9* 10   ANIONGAP 10 7*   EGFRNONAA >60.0 >60.0       Diagnostic Results:  None    Assessment/Plan:     * Enterocolitis    48 yr old with palatine tonsillar SCC on chemotherapy with  "neutropenia presented with diarrhea & abdominal pain. Treating for typhlitis (likely secondary to chemo) based on findings of bowel wall thickening seen on CT abd/pelvis. Other considerations included gastroenteritis vs food poisoning.    No indications for surgery (CT neg for bowel perforation, overall stable.) Septic on admit with hypotension SBP 80s, tachycardia 120s, ANC 60, lactate 3.4. Dehydrated with dry mucous membranes & hyponatremia. Received 1x dose cefepime 2g in ED & IVF (NS fluid boluses.)     Overall improving (pain, appetite, more alert, neutrophil count rising.)     - stool studies: WBC positive, C. Diff negative, o/p/c negative, culture & rotavirus negative  - blood cultures NGTD  - lactate downtrended    - fentanyl 50mcg patch   - 14 day course abx. Cefepime 2g Q12H --> PO cipro, PO flagyl 500mg Q12H  · Today day 8  - Start tube feeds today  - PO zofran for nausea            Sepsis    Septic on admit with hypotension (SBP 80s), tachycardia 120s, ANC 60. Lactate 3.4. Likely source is typhlitis given presenting complaints of diarrhea, predisposing RF of neutropenia, and findings on CT abd/pelv.     -CXR neg for PNA  -UA, urine culture neg  -blood cultures NGTD  -stool cultures, C diff, Rotavirus, o/p/c, Salm/shigella neg; WBC (+)  -lactate downtrended  -see "enterocolitis" for details regarding plan of care        Diverticulitis    Repeat CT abd/pelv 03/06 obtained in setting continued diarrhea while treating typhlitis. Demonstrating uncomplicated diverticulitis.     -plan for 14 days antibiotics. Today is day 7/14.  -transition IV cefepime to PO cipro and con't PO flagyl        Squamous cell carcinoma of palatine tonsil    -Recent completion of cycle 2 on 02/26 with port removal. See Dr. Lopez's office visit note for details regarding oncological history & plan of care. Will optimize supportive care while in-patient.    -Admitted with significant pain related to mucositis; consider discontinuing " "PCA pump today, currently at 0.1mg/hour, as his pain is well controlled with fentanyl patches.  Still not eating but pain control significantly improved.  -zofran for nausea (QTc <500 on EKT )  -magic sol'n rather than [facility]'s mouthwash per patient request  -see "neutropenia" for details regarding plan of care        Chemotherapy-induced neutropenia    Admitted with neutrophil count <1.0. Likely causing mucositis & typhilitiis.    Uptrending daily. Monitor with CBC.        Electrolyte abnormality    Likely secondary to diarrhea & decreased PO intake.     -Repleting PRN  -daily CMP        Thrombocytopenia    Likely secondary to chemotherapy. Improving now.    -Will monitor for signs of bleeding while in patient.   -daily CBC  -transfuse per guidelines; no indication at this time.  -will hold pharmacologic DVT ppx at this time          Thrush    -secondary to chemotherapy-induced immunosuppression  -started on course per outpatient oncologist.  -today is day 10/10 day course fluconazole  -overall improving        Morbid obesity    -PT/OT        Prediabetes    HbA1C 6.4% 2018  -B on admit  -will monitor with daily CMP        Low vitamin D level    Takes 1000u weekly  -will continue inpatient           Diet-tube feeds isosource  DVT ppx- holding pharmaceutical DVT ppx in setting of thrombocytopenia  Dispo- pending PO intake       Cici Phillips MD  Hematology/Oncology  [facility]    Attending Addendum:  The patient was seen, examined, and discussed on rounds with the team.  I agree with the assessment and plan as outlined for Burton Whiting.  Overall doing much better clinically.  Counts continue to improve.  CT findings noted.  Will transition to by mouth antibiotics.  Start tube feeds.  Discontinue TPN.  Likely home tomorrow.    Sudhir Piper DO, FACP  Hematology & Oncology  [address] [place] [address]  ph. [phone]  Fax. [phone]        "

## 2018-03-08 ENCOUNTER — TELEPHONE (OUTPATIENT)
Dept: HEMATOLOGY/ONCOLOGY | Facility: CLINIC | Age: 48
End: 2018-03-08

## 2018-03-08 VITALS
SYSTOLIC BLOOD PRESSURE: 144 MMHG | OXYGEN SATURATION: 99 % | HEART RATE: 122 BPM | DIASTOLIC BLOOD PRESSURE: 86 MMHG | WEIGHT: 250 LBS | RESPIRATION RATE: 20 BRPM | HEIGHT: 67 IN | TEMPERATURE: 97 F | BODY MASS INDEX: 39.24 KG/M2

## 2018-03-08 LAB
ALBUMIN SERPL BCP-MCNC: 2.4 G/DL
ALP SERPL-CCNC: 74 U/L
ALT SERPL W/O P-5'-P-CCNC: 10 U/L
ANION GAP SERPL CALC-SCNC: 9 MMOL/L
ANISOCYTOSIS BLD QL SMEAR: SLIGHT
AST SERPL-CCNC: 13 U/L
BASO STIPL BLD QL SMEAR: ABNORMAL
BASOPHILS # BLD AUTO: ABNORMAL K/UL
BASOPHILS NFR BLD: 0 %
BILIRUB SERPL-MCNC: 0.3 MG/DL
BUN SERPL-MCNC: 24 MG/DL
CALCIUM SERPL-MCNC: 8.3 MG/DL
CHLORIDE SERPL-SCNC: 109 MMOL/L
CO2 SERPL-SCNC: 24 MMOL/L
CREAT SERPL-MCNC: 1.3 MG/DL
DIFFERENTIAL METHOD: ABNORMAL
DOHLE BOD BLD QL SMEAR: PRESENT
EOSINOPHIL # BLD AUTO: ABNORMAL K/UL
EOSINOPHIL NFR BLD: 0 %
ERYTHROCYTE [DISTWIDTH] IN BLOOD BY AUTOMATED COUNT: 14.6 %
EST. GFR  (AFRICAN AMERICAN): >60 ML/MIN/1.73 M^2
EST. GFR  (NON AFRICAN AMERICAN): >60 ML/MIN/1.73 M^2
GLUCOSE SERPL-MCNC: 115 MG/DL
HCT VFR BLD AUTO: 30 %
HGB BLD-MCNC: 9.9 G/DL
HYPOCHROMIA BLD QL SMEAR: ABNORMAL
IMM GRANULOCYTES # BLD AUTO: ABNORMAL K/UL
IMM GRANULOCYTES NFR BLD AUTO: ABNORMAL %
LYMPHOCYTES # BLD AUTO: ABNORMAL K/UL
LYMPHOCYTES NFR BLD: 19 %
MAGNESIUM SERPL-MCNC: 2 MG/DL
MCH RBC QN AUTO: 30.9 PG
MCHC RBC AUTO-ENTMCNC: 33 G/DL
MCV RBC AUTO: 94 FL
MONOCYTES # BLD AUTO: ABNORMAL K/UL
MONOCYTES NFR BLD: 6 %
NEUTROPHILS NFR BLD: 69 %
NEUTS BAND NFR BLD MANUAL: 6 %
NRBC BLD-RTO: 1 /100 WBC
PHOSPHATE SERPL-MCNC: 3.6 MG/DL
PLATELET # BLD AUTO: 129 K/UL
PLATELET BLD QL SMEAR: ABNORMAL
PMV BLD AUTO: 10.5 FL
POTASSIUM SERPL-SCNC: 3.1 MMOL/L
PROT SERPL-MCNC: 5.5 G/DL
RBC # BLD AUTO: 3.2 M/UL
SODIUM SERPL-SCNC: 142 MMOL/L
TOXIC GRANULES BLD QL SMEAR: PRESENT
WBC # BLD AUTO: 7.36 K/UL

## 2018-03-08 PROCEDURE — 63600175 PHARM REV CODE 636 W HCPCS: Performed by: STUDENT IN AN ORGANIZED HEALTH CARE EDUCATION/TRAINING PROGRAM

## 2018-03-08 PROCEDURE — 80053 COMPREHEN METABOLIC PANEL: CPT

## 2018-03-08 PROCEDURE — 25000003 PHARM REV CODE 250: Performed by: STUDENT IN AN ORGANIZED HEALTH CARE EDUCATION/TRAINING PROGRAM

## 2018-03-08 PROCEDURE — 84100 ASSAY OF PHOSPHORUS: CPT

## 2018-03-08 PROCEDURE — 99239 HOSP IP/OBS DSCHRG MGMT >30: CPT | Mod: ,,, | Performed by: INTERNAL MEDICINE

## 2018-03-08 PROCEDURE — 83735 ASSAY OF MAGNESIUM: CPT

## 2018-03-08 PROCEDURE — 25000003 PHARM REV CODE 250: Performed by: INTERNAL MEDICINE

## 2018-03-08 RX ORDER — HEPARIN 100 UNIT/ML
300 SYRINGE INTRAVENOUS
Status: DISCONTINUED | OUTPATIENT
Start: 2018-03-08 | End: 2018-03-08 | Stop reason: HOSPADM

## 2018-03-08 RX ORDER — METRONIDAZOLE 500 MG/1
500 TABLET ORAL EVERY 8 HOURS
Qty: 18 TABLET | Refills: 0 | Status: SHIPPED | OUTPATIENT
Start: 2018-03-08 | End: 2018-03-14

## 2018-03-08 RX ORDER — POTASSIUM CHLORIDE 20 MEQ/15ML
80 SOLUTION ORAL ONCE
Status: COMPLETED | OUTPATIENT
Start: 2018-03-08 | End: 2018-03-08

## 2018-03-08 RX ORDER — POTASSIUM CHLORIDE 20 MEQ/15ML
60 SOLUTION ORAL 2 TIMES DAILY
Status: DISCONTINUED | OUTPATIENT
Start: 2018-03-08 | End: 2018-03-08

## 2018-03-08 RX ORDER — FENTANYL 50 UG/1
1 PATCH TRANSDERMAL
Qty: 3 PATCH | Refills: 0 | Status: SHIPPED | OUTPATIENT
Start: 2018-03-10 | End: 2018-05-17

## 2018-03-08 RX ORDER — FENTANYL 50 UG/1
1 PATCH TRANSDERMAL
Qty: 3 PATCH | Refills: 0 | Status: SHIPPED | OUTPATIENT
Start: 2018-03-10 | End: 2018-03-08

## 2018-03-08 RX ORDER — HEPARIN 100 UNIT/ML
SYRINGE INTRAVENOUS
Status: DISPENSED
Start: 2018-03-08 | End: 2018-03-09

## 2018-03-08 RX ORDER — CIPROFLOXACIN 500 MG/1
500 TABLET ORAL EVERY 12 HOURS
Qty: 12 TABLET | Refills: 0 | Status: SHIPPED | OUTPATIENT
Start: 2018-03-08 | End: 2018-03-14

## 2018-03-08 RX ORDER — POTASSIUM CHLORIDE 20 MEQ/15ML
SOLUTION ORAL
Status: DISPENSED
Start: 2018-03-08 | End: 2018-03-08

## 2018-03-08 RX ADMIN — MAGNESIUM OXIDE TAB 400 MG (241.3 MG ELEMENTAL MG) 800 MG: 400 (241.3 MG) TAB at 09:03

## 2018-03-08 RX ADMIN — HEPARIN 300 UNITS: 100 SYRINGE at 01:03

## 2018-03-08 RX ADMIN — METRONIDAZOLE 500 MG: 500 TABLET ORAL at 05:03

## 2018-03-08 RX ADMIN — CIPROFLOXACIN HYDROCHLORIDE 500 MG: 500 TABLET, FILM COATED ORAL at 09:03

## 2018-03-08 RX ADMIN — OXYCODONE HYDROCHLORIDE 10 MG: 5 TABLET ORAL at 05:03

## 2018-03-08 RX ADMIN — PSYLLIUM HUSK 2 PACKET: 3.4 POWDER ORAL at 09:03

## 2018-03-08 RX ADMIN — POTASSIUM CHLORIDE 80 MEQ: 20 SOLUTION ORAL at 09:03

## 2018-03-08 NOTE — ASSESSMENT & PLAN NOTE
Likely secondary to chemotherapy. Improving now.    -Will monitor for signs of bleeding while in patient.   -daily CBC  -transfuse per guidelines; no indication at this time.  -holding pharmacologic DVT ppx at this time

## 2018-03-08 NOTE — PLAN OF CARE
[facility]    HOME HEALTH ORDERS  FACE TO FACE ENCOUNTER    Patient Name: Burton Whiting  YOB: 1970    PCP: Christopher Turpin DO   PCP Address: [address] [place] [address] / [place] [address]  PCP Phone Number: [phone]  PCP Fax: [phone]    Encounter Date: 03/08/2018    Admit to Home Health    Diagnoses:  Active Hospital Problems    Diagnosis  POA    *Enterocolitis [K52.9]  Unknown     Priority: 1 - High    Sepsis [A41.9]  Yes     Priority: 2     Diverticulitis [K57.92]  Unknown     Priority: 3     Squamous cell carcinoma of palatine tonsil [C09.9]  Yes     Priority: 4     Chemotherapy-induced neutropenia [D70.1, T45.1X5A]  Yes     Priority: 5     Thrombocytopenia [D69.6]  Yes    Electrolyte abnormality [E87.8]  Unknown    Thrush [B37.0]  Yes    Morbid obesity [E66.01]  Yes    Low vitamin D level [E55.9]  Yes    Prediabetes [R73.03]  Yes      Resolved Hospital Problems    Diagnosis Date Resolved POA    Hyponatremia [E87.1] 03/06/2018 Unknown     Priority: 5     Tachycardia [R00.0] 03/07/2018 Unknown     Priority: 6        Future Appointments  Date Time Provider Department Center   3/13/2018 12:00 PM [facility] PET CT LIMIT 500 LBS [facility]   3/14/2018 9:00 AM Cortney Lopez MD [facility] HEM ONC [facility]   3/14/2018 10:00 AM [facility]   3/15/2018 3:00 PM [facility]   3/19/2018 3:00 PM [facility]   4/4/2018 4:00 PM Christopher Turpin DO [facility]     Follow-up Information     Cortney Lopez MD On 3/14/2018.    Specialties:  Hematology and Oncology, Hematology  Contact information:  [address]  [facility] [address]  [phone]                     I have seen and examined this patient face to face today. My clinical findings that support the need for the home health skilled services and home bound status are the following:  Medical restrictions requiring assistance of  another human to leave home due to  Newly placed G-tube/ostomy.    Allergies:Review of patient's allergies indicates:  No Known Allergies    Diet:   isosource 1.5 tube feedings:   1 can at a time with 125ml flushes before and after, adding 1/2 can with 400ml water in evening  4x daily    Activities: activity as tolerated    Nursing:   SN to complete comprehensive assessment including routine vital signs. Instruct on disease process and s/s of complications to report to MD. Review/verify medication list sent home with the patient at time of discharge  and instruct patient/caregiver as needed. Frequency may be adjusted depending on start of care date.    Notify MD if SBP > 160 or < 90; DBP > 90 or < 50; HR > 120 or < 50; Temp > 101      CONSULTS:    Aide to provide assistance with personal care, ADLs, and vital signs.    MISCELLANEOUS CARE:  PEG Care:  Instruct patient/caregiver to clean site.  Monitor skin integrity.    WOUND CARE ORDERS  n/a      Medications: Review discharge medications with patient and family and provide education.      Current Discharge Medication List      START taking these medications    Details   ciprofloxacin HCl (CIPRO) 500 MG tablet Take 1 tablet (500 mg total) by mouth every 12 (twelve) hours.  Qty: 12 tablet, Refills: 0      fentaNYL (DURAGESIC) 50 mcg/hr Place 1 patch onto the skin every 72 hours.  Qty: 3 patch, Refills: 0      metroNIDAZOLE (FLAGYL) 500 MG tablet Take 1 tablet (500 mg total) by mouth every 8 (eight) hours.  Qty: 18 tablet, Refills: 0    Associated Diagnoses: Neutropenia, unspecified type      psyllium husk, aspartame, (METAMUCIL) 3.4 gram PwPk packet Take 2 packets by mouth once daily.         CONTINUE these medications which have NOT CHANGED    Details   cholecalciferol, vitamin D3, (VITAMIN D3) 1,000 unit capsule Take 1,000 Units by mouth once daily.      diphenhydrAMINE-aluminum-magnesium hydroxide-simethicone-lidocaine HCl 2% Swish and spit 15 mLs every 4 (four)  hours as needed (pain).  Qty: 500 mL, Refills: 0      multivitamin (THERAGRAN) per tablet Take 1 tablet by mouth once daily.      NYSTATIN (DUKE'S SOLUTION) Mix equal amounts of benadryl, maalox, 2% viscous lidocaine and nystatin    Swish and swallow 10 ml 4 times a day  Qty: 500 mL, Refills: 5    Associated Diagnoses: Squamous cell carcinoma of palatine tonsil      ondansetron (ZOFRAN) 8 MG tablet Take 1 tablet (8 mg total) by mouth 4 (four) times daily as needed for Nausea.  Qty: 60 tablet, Refills: 2    Associated Diagnoses: Squamous cell carcinoma of palatine tonsil      oxyCODONE (ROXICODONE) 5 mg/5 mL Soln Take 5 mLs (5 mg total) by mouth every 6 (six) hours as needed.  Qty: 473 mL, Refills: 0      IBUPROFEN (ADVIL ORAL) Take 1 tablet by mouth as needed.      lidocaine HCl 2% (XYLOCAINE) 2 % Soln by Mucous Membrane route every 3 (three) hours.  Qty: 500 mL, Refills: 6    Associated Diagnoses: Oral mucositis         STOP taking these medications       dexamethasone (DECADRON) 6 MG tablet Comments:   Reason for Stopping:         fluconazole 40 mg/ml (DIFLUCAN) 40 mg/mL suspension Comments:   Reason for Stopping:               I certify that this patient is confined to his home and needs intermittent skilled nursing care.

## 2018-03-08 NOTE — ASSESSMENT & PLAN NOTE
Likely secondary to chemotherapy. Improving daily.    -Will monitor for signs of bleeding  -daily CBC  -transfuse per guidelines; no indication at this time.  -holding pharmacologic DVT ppx at this time

## 2018-03-08 NOTE — ASSESSMENT & PLAN NOTE
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 Treating for typhlitis (likely secondary to chemo) based on findings of bowel wall thickening seen on CT abd/pelvis. No indications for surgery (CT neg for bowel perforation, overall stable.) Septic on admit with hypotension SBP 80s, tachycardia 120s, ANC 60, lactate 3.4. Dehydrated with dry mucous membranes & hyponatremia. Received 1x dose cefepime 2g in ED & IVF (NS fluid boluses.)     Overall improving (pain, appetite, more alert, neutrophil count rising.)     - stool studies: WBC positive, C. Diff negative, o/p/c negative, culture & rotavirus negative  - blood cultures NGTD  - lactate downtrended    - fentanyl 50mcg patch & oxy 10mg Q6H PRN  - 14 day course abx. PO cipro, PO flagyl 500mg Q12H  · Today day 8  - Continue tube feeds  - PO zofran for nausea

## 2018-03-08 NOTE — ASSESSMENT & PLAN NOTE
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 Treating for typhlitis (likely secondary to chemo) based on findings of bowel wall thickening seen on CT abd/pelvis. No indications for surgery (CT neg for bowel perforation, overall stable.) Septic on admit with hypotension SBP 80s, tachycardia 120s, ANC 60, lactate 3.4. Dehydrated with dry mucous membranes & hyponatremia. Received 1x dose cefepime 2g in ED & IVF (NS fluid boluses.)     Overall improving (pain, appetite, more alert, neutrophil count rising.)     - stool studies: WBC positive, C. Diff negative, o/p/c negative, culture & rotavirus negative  - blood cultures NGTD  - lactate downtrended    - fentanyl 50mcg patch prescribed; patient has roxanol remaining at home  - 14 day course abx. PO cipro, PO flagyl 500mg Q12H  · Anticipated end date: 03/14  - Continue tube feeds at home  - PO zofran for nausea

## 2018-03-08 NOTE — ASSESSMENT & PLAN NOTE
Repeat CT abd/pelv 03/06 obtained in setting continued diarrhea despite treating typhlitis. Now demonstrating uncomplicated diverticulitis.     -plan for 14 days antibiotics. Today is day 8/14.  -con't PO cipro and con't PO flagyl

## 2018-03-08 NOTE — PROGRESS NOTES
[facility]  Hematology/Oncology  Progress Note    Patient Name: Burton Whiting  Admission Date: 2/28/2018  Hospital Length of Stay: 8 days  Code Status: Full Code     Subjective:     HPI:  Mr. Burton Whiting is a 48 y.o. male with a PMHx palatine tonsillar squamous cell carcinoma who presents with chief complaint of diarrhea. Onset 2 days ago, watery, non-bloody, occurring every 45 minutes, has started to taper down since this morning. Associated with lower abdominal pain (5/10, non-radiating, occurs intermittently, stabbing in nature, not associated with food,) nausea (no vomiting), low grade fever (tmax 99.4 that improved with water infusion through PEG), urinary urgency, decreased PO intake, insomnia (taking home med oxycodone 5mg more frequently to help sleep) and altered mental status (per sister, patient with some confusion to situations.) No recent sick contacts, abx use, travel, or atypical food intake. Completed cycle 2 of chemo 2 days ago with removal of port, prior to initiation of current symptoms. Recently admitted 02/08-02/13 with neutropenic fever, UTI w/ E. Coli & enterobacter. PEG placed 02/11 secondary to decreased PO intake.    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 He follows with Dr. Lopez in outpatient oncology clinic. He recently completed his second round of chemo on 02/26 with pump removal that day. Also on decadron 6mg BD with current cycles, [facility]'s solution for oral/throat pain, fluconazole (day 3/10 day course) for thrush, zofran PRN for nausea, and oxy 5mg Q6H PRN. PEG placed ~ 2wks ago. Lives in a home alone, is independent with his ADLs, former smoker (>30 yr history, quit 10 months ago), former EtOH use (sober 5 yrs), and opiate addiction (sober 10 yrs with current rare use of prescribed oxycodone for cancer pain.) Family support  includes brother & sister.    Interval History: No events overnight. Abdominal & esophageal pain is stable. Tolerating tube feeds without increased pain, n/v although stools mildly looser.    Oncology Treatment Plan:   OP CISPLATIN DOCETAXEL 5FU Q3W    Medications:  Continuous Infusions:    Scheduled Meds:   ciprofloxacin HCl  500 mg Oral Q12H    fentaNYL  1 patch Transdermal Q72H    magnesium oxide  800 mg Oral BID    metroNIDAZOLE  500 mg Oral Q8H    psyllium husk (aspartame)  2 packet Oral Daily     PRN Meds:(Magic mouthwash) 1:1:1 Benadryl 12.5mg/5ml liq, aluminum & magnesium hydroxide-simehticone (Maalox), lidocaine viscous 2%, acetaminophen, ondansetron, oxyCODONE, promethazine, ramelteon, sodium chloride 0.9%     Review of Systems   Constitutional: Negative for chills and fever.   HENT: Negative for sore throat.         Improved overall. Less erythema, edema, slothing of mucous membranes. No white plaques noted.   Eyes: Negative for visual disturbance.   Respiratory: Negative for shortness of breath.    Cardiovascular: Negative for chest pain.   Gastrointestinal: Positive for abdominal pain (minimal, chronic). Negative for blood in stool, constipation, diarrhea, nausea and vomiting.   Genitourinary: Negative for difficulty urinating and hematuria.   Musculoskeletal: Negative for arthralgias.   Neurological: Negative for dizziness and light-headedness.   Psychiatric/Behavioral: Negative for confusion.     Objective:     Vital Signs (Most Recent):  Temp: 98.7 °F (37.1 °C) (03/08/18 0741)  Pulse: 92 (03/08/18 0741)  Resp: 16 (03/08/18 0741)  BP: 116/70 (03/08/18 0741)  SpO2: 95 % (03/08/18 0741) Vital Signs (24h Range):  Temp:  [98.2 °F (36.8 °C)-98.7 °F (37.1 °C)] 98.7 °F (37.1 °C)  Pulse:  [] 92  Resp:  [16-20] 16  SpO2:  [94 %-96 %] 95 %  BP: (114-138)/(67-81) 116/70     Weight: 113.4 kg (250 lb)  Body mass index is 39.15 kg/m².  Body surface area is 2.32 meters squared.      Intake/Output Summary  (Last 24 hours) at 03/08/18 0950  Last data filed at 03/08/18 0743   Gross per 24 hour   Intake           2343.5 ml   Output                0 ml   Net           2343.5 ml       Physical Exam   Constitutional: He is oriented to person, place, and time. He appears well-developed and well-nourished. No distress.   obese   HENT:   Head: Normocephalic and atraumatic.   Improved erythema & swelling of oral mucosa. Minimal slough. 2x white plaque vs blistering of tongue. Some dried blood.   Eyes: EOM are normal. Pupils are equal, round, and reactive to light.   Neck: Normal range of motion. No JVD present.   Cardiovascular: Normal rate and regular rhythm.    Murmur heard.  Pulmonary/Chest: Effort normal and breath sounds normal.   Abdominal: Soft. Bowel sounds are normal. He exhibits no distension. There is no tenderness. There is no guarding.   Musculoskeletal: He exhibits no edema or tenderness.   Neurological: He is alert and oriented to person, place, and time.   Skin: Skin is warm and dry. Capillary refill takes less than 2 seconds.   Vitals reviewed.      Significant Labs:   CBC:     Recent Labs  Lab 03/07/18  0349 03/08/18  0314   WBC 4.58 7.36   HGB 9.1* 9.9*   HCT 27.0* 30.0*   PLT 85* 129*    and CMP:     Recent Labs  Lab 03/07/18  0349 03/08/18  0314    142   K 3.2* 3.1*    109   CO2 25 24   * 115*   BUN 23* 24*   CREATININE 1.1 1.3   CALCIUM 8.0* 8.3*   PROT 4.8* 5.5*   ALBUMIN 2.1* 2.4*   BILITOT 0.3 0.3   ALKPHOS 60 74   AST 12 13   ALT 10 10   ANIONGAP 7* 9   EGFRNONAA >60.0 >60.0       Diagnostic Results:  None    Assessment/Plan:     * Enterocolitis    48 yr old with palatine tonsillar SCC on chemotherapy with neutropenia presented with diarrhea & abdominal pain. Treating for typhlitis (likely secondary to chemo) based on findings of bowel wall thickening seen on CT abd/pelvis. No indications for surgery (CT neg for bowel perforation, overall stable.) Septic on admit with hypotension SBP  "80s, tachycardia 120s, ANC 60, lactate 3.4. Dehydrated with dry mucous membranes & hyponatremia. Received 1x dose cefepime 2g in ED & IVF (NS fluid boluses.)     Overall improving (pain, appetite, more alert, neutrophil count rising.)     - stool studies: WBC positive, C. Diff negative, o/p/c negative, culture & rotavirus negative  - blood cultures NGTD  - lactate downtrended    - fentanyl 50mcg patch & oxy 10mg Q6H PRN  - 14 day course abx. PO cipro, PO flagyl 500mg Q12H  · Today day 8  - Continue tube feeds  - PO zofran for nausea            Sepsis    Septic on admit with hypotension (SBP 80s), tachycardia 120s, ANC 60. Lactate 3.4. Likely source is typhlitis given presenting complaints of diarrhea, predisposing RF of neutropenia, and findings on CT abd/pelv.     -CXR neg for PNA  -UA, urine culture neg  -blood cultures NGTD  -stool cultures, C diff, Rotavirus, o/p/c, Salm/shigella neg; WBC (+)  -lactate downtrended  -see "enterocolitis" for details regarding plan of care        Diverticulitis    Repeat CT abd/pelv 03/06 obtained in setting continued diarrhea despite treating typhlitis. Now demonstrating uncomplicated diverticulitis.     -plan for 14 days antibiotics. Today is day 8/14.  -con't PO cipro and con't PO flagyl        Squamous cell carcinoma of palatine tonsil    -Recent completion of cycle 2 on 02/26 with port removal. See Dr. Lopez's office visit note for details regarding oncological history & plan of care. Will optimize supportive care while in-patient.    -Pain control with fentanyl 50mcg patch & oxycodone 10mg Q6H PRN  -zofran for nausea (QTc <500 on EKT 02/28)  -magic sol'n rather than [facility] mouthwash per patient request  -see "neutropenia" for details regarding plan of care        Chemotherapy-induced neutropenia    Admitted with neutrophil count <1.0. Likely causing mucositis & typhilitiis.    Uptrending daily. Monitor with CBC.        Electrolyte abnormality    Likely secondary to diarrhea & " decreased PO intake.     -Repleting PRN  -daily CMP        Thrombocytopenia    Likely secondary to chemotherapy. Improving now.    -Will monitor for signs of bleeding while in patient.   -daily CBC  -transfuse per guidelines; no indication at this time.  -holding pharmacologic DVT ppx at this time          Thrush    -secondary to chemotherapy-induced immunosuppression  -started on course per outpatient oncologist.  -completed course of fluconazole  -overall improved        Morbid obesity    -PT/OT        Prediabetes    HbA1C 6.4% 02/06/2018  - BG well-controlled  - monitor with daily CMP        Low vitamin D level    Takes 1000u weekly  -will continue inpatient           Diet- tube feeds with isosource  DVT ppx- holding in setting of thrombocytopenia; SCDs ordered  Dispo- medically stable for discharge       Cici Phillips MD  Hematology/Oncology  [facility]    Attending Addendum:  The patient was seen, examined, and discussed on rounds with the team.  I agree with the assessment and plan as outlined for Burton Whiting.  Doing well clinically and tolerating tube feeds.  Pain well controlled. Home today.    Sudhir Piper DO, FACP  Hematology & Oncology  [facility] [place] [address]  ph. [phone]  Fax. [phone]

## 2018-03-08 NOTE — ASSESSMENT & PLAN NOTE
Admitted with neutrophil count <1.0. Likely causing mucositis & typhilitiis.    Uptrending daily. Within normal range (WBC 7) on day of discharge

## 2018-03-08 NOTE — ASSESSMENT & PLAN NOTE
"-Recent completion of cycle 2 on 02/26 with port removal. See Dr. Lopez's office visit note for details regarding oncological history & plan of care. Will optimize supportive care while in-patient.    -Pain control with fentanyl 50mcg patch & oxycodone 10mg Q6H PRN  -zofran for nausea (QTc <500 on EKT 02/28)  -magic sol'n rather than [facility]'s mouthwash per patient request  -see "neutropenia" for details regarding plan of care  "

## 2018-03-08 NOTE — ASSESSMENT & PLAN NOTE
-secondary to chemotherapy-induced immunosuppression  -started on course per outpatient oncologist.  -completed course of fluconazole  -overall improved

## 2018-03-08 NOTE — ASSESSMENT & PLAN NOTE
Repeat CT abd/pelv 03/06 obtained in setting continued diarrhea despite treating typhlitis. Now demonstrating uncomplicated diverticulitis.     -plan for 14 days antibiotics. Anticipated end date: 03/14  -con't PO cipro and con't PO flagyl

## 2018-03-08 NOTE — ASSESSMENT & PLAN NOTE
-Recent completion of cycle 2 on 02/26 with port removal. See Dr. Lopez's office visit note for details regarding oncological history & plan of care. Will optimize supportive care while in-patient.    -Pain control with fentanyl 50mcg patch & oxycodone 10mg Q6H PRN  -zofran for nausea (QTc <500 on EKT 02/28)  -Scheduled for PET 03/13, chemo & office appointment with Dr. Lopez 03/14

## 2018-03-08 NOTE — PLAN OF CARE
Problem: Patient Care Overview  Goal: Plan of Care Review  Outcome: Ongoing (interventions implemented as appropriate)  Patient remained free from falls throughout shift, call bell within reach. Patient complained of pain once overnight, prn oxy given. Tube feeds currently infusing at 60cc/hr, which is goal. Patient tolerating well. Patient tried to eat a couple bites of applesauce overnight, but state it burned a little. Only 3 BMs reported. Vitals stable, will continue to monitor.

## 2018-03-08 NOTE — SUBJECTIVE & OBJECTIVE
Interval History: No events overnight. Abdominal & esophageal pain is stable. Tolerating tube feeds without increased pain, n/v although stools mildly looser.    Oncology Treatment Plan:   OP CISPLATIN DOCETAXEL 5FU Q3W    Medications:  Continuous Infusions:    Scheduled Meds:   ciprofloxacin HCl  500 mg Oral Q12H    fentaNYL  1 patch Transdermal Q72H    magnesium oxide  800 mg Oral BID    metroNIDAZOLE  500 mg Oral Q8H    psyllium husk (aspartame)  2 packet Oral Daily     PRN Meds:(Magic mouthwash) 1:1:1 Benadryl 12.5mg/5ml liq, aluminum & magnesium hydroxide-simehticone (Maalox), lidocaine viscous 2%, acetaminophen, ondansetron, oxyCODONE, promethazine, ramelteon, sodium chloride 0.9%     Review of Systems   Constitutional: Negative for chills and fever.   HENT: Negative for sore throat.         Improved overall. Less erythema, edema, slothing of mucous membranes. No white plaques noted.   Eyes: Negative for visual disturbance.   Respiratory: Negative for shortness of breath.    Cardiovascular: Negative for chest pain.   Gastrointestinal: Positive for abdominal pain (minimal, chronic). Negative for blood in stool, constipation, diarrhea, nausea and vomiting.   Genitourinary: Negative for difficulty urinating and hematuria.   Musculoskeletal: Negative for arthralgias.   Neurological: Negative for dizziness and light-headedness.   Psychiatric/Behavioral: Negative for confusion.     Objective:     Vital Signs (Most Recent):  Temp: 98.7 °F (37.1 °C) (03/08/18 0741)  Pulse: 92 (03/08/18 0741)  Resp: 16 (03/08/18 0741)  BP: 116/70 (03/08/18 0741)  SpO2: 95 % (03/08/18 0741) Vital Signs (24h Range):  Temp:  [98.2 °F (36.8 °C)-98.7 °F (37.1 °C)] 98.7 °F (37.1 °C)  Pulse:  [] 92  Resp:  [16-20] 16  SpO2:  [94 %-96 %] 95 %  BP: (114-138)/(67-81) 116/70     Weight: 113.4 kg (250 lb)  Body mass index is 39.15 kg/m².  Body surface area is 2.32 meters squared.      Intake/Output Summary (Last 24 hours) at 03/08/18  0950  Last data filed at 03/08/18 0743   Gross per 24 hour   Intake           2343.5 ml   Output                0 ml   Net           2343.5 ml       Physical Exam   Constitutional: He is oriented to person, place, and time. He appears well-developed and well-nourished. No distress.   obese   HENT:   Head: Normocephalic and atraumatic.   Improved erythema & swelling of oral mucosa. Minimal slough. 2x white plaque vs blistering of tongue. Some dried blood.   Eyes: EOM are normal. Pupils are equal, round, and reactive to light.   Neck: Normal range of motion. No JVD present.   Cardiovascular: Normal rate and regular rhythm.    Murmur heard.  Pulmonary/Chest: Effort normal and breath sounds normal.   Abdominal: Soft. Bowel sounds are normal. He exhibits no distension. There is no tenderness. There is no guarding.   Musculoskeletal: He exhibits no edema or tenderness.   Neurological: He is alert and oriented to person, place, and time.   Skin: Skin is warm and dry. Capillary refill takes less than 2 seconds.   Vitals reviewed.      Significant Labs:   CBC:     Recent Labs  Lab 03/07/18  0349 03/08/18  0314   WBC 4.58 7.36   HGB 9.1* 9.9*   HCT 27.0* 30.0*   PLT 85* 129*    and CMP:     Recent Labs  Lab 03/07/18  0349 03/08/18 0314    142   K 3.2* 3.1*    109   CO2 25 24   * 115*   BUN 23* 24*   CREATININE 1.1 1.3   CALCIUM 8.0* 8.3*   PROT 4.8* 5.5*   ALBUMIN 2.1* 2.4*   BILITOT 0.3 0.3   ALKPHOS 60 74   AST 12 13   ALT 10 10   ANIONGAP 7* 9   EGFRNONAA >60.0 >60.0       Diagnostic Results:  None

## 2018-03-08 NOTE — DISCHARGE SUMMARY
[facility]  Hematology/Oncology  Discharge Summary      Patient Name: Burton Whiting  MRN: [id number]  Admission Date: 2/28/2018  Hospital Length of Stay: 8 days  Discharge Date and Time:  03/08/2018 1:42 PM  Attending Physician: Sudhir Piper DO, F*   Discharging Provider: Cici Phillips MD  Primary Care Provider: Christopher Turpin DO    HPI: Mr. Burton Whiting is a 48 y.o. male with a PMHx palatine tonsillar squamous cell carcinoma who presents with chief complaint of diarrhea. Onset 2 days ago, watery, non-bloody, occurring every 45 minutes, has started to taper down since this morning. Associated with lower abdominal pain (5/10, non-radiating, occurs intermittently, stabbing in nature, not associated with food,) nausea (no vomiting), low grade fever (tmax 99.4 that improved with water infusion through PEG), urinary urgency, decreased PO intake, insomnia (taking home med oxycodone 5mg more frequently to help sleep) and altered mental status (per sister, patient with some confusion to situations.) No recent sick contacts, abx use, travel, or atypical food intake. Completed cycle 2 of chemo 2 days ago with removal of port, prior to initiation of current symptoms. Recently admitted 02/08-02/13 with neutropenic fever, UTI w/ E. Coli & enterobacter. PEG placed 02/11 secondary to decreased PO intake.    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 He follows with Dr. Lopez in outpatient oncology clinic. He recently completed his second round of chemo on 02/26 with pump removal that day. Also on decadron 6mg BD with current cycles, [facility]'s solution for oral/throat pain, fluconazole (day 3/10 day course) for thrush, zofran PRN for nausea, and oxy 5mg Q6H PRN. PEG placed ~ 2wks ago. Lives in a home alone, is independent with his ADLs, former smoker (>30 yr history, quit 10  months ago), former EtOH use (sober 5 yrs), and opiate addiction (sober 10 yrs with current rare use of prescribed oxycodone for cancer pain.) Family support includes brother & sister.    * No surgery found *     Hospital Course: Mr. Burton Whiting is a 48 y.o. male with a palatine tonsillar squamous cell carcinoma s/p 2nd cycle CRX who presents with diarrhea. Enterocolitis seen on CT abd/pelv. Septic on admit (SBP 80s, tachycardic 120s, neutrophils 0.32, lactate 3.7) Dehydrated, with hypovolemic hypotonic Na 129. Administered 3L NS fluid boluses, started cefepime & flagyl, bowel rest & pain control. On day 1, vitals responding appropriately, lactate trending down. On day 2, patient remains in significant distress from mucositis and abdominal pain. Add vancomycin to antimicrobial regimen pending culture results. Started on PCA pump 03/01 which helped to control pain.  Transitioned to fentanyl patch 50mcg to then wean off of the PCA pump. Repeat CT abd/pelv to assess clinical status, as patient continues to have diarrhea Q2H. Demonstrates uncomplicated diverticulitis. Transitioned to PO ciprofloxacin & flagyl. Started tube feeds that were tolerated well. WBC rising daily 0.32 --> 7 throughout admission. Medically stable for discharge.      Consults:   Consults         Status Ordering Provider     Inpatient consult to Registered Dietitian/Nutritionist  Once     Provider:  (Not yet assigned)    Completed SERAFIN CLINTON          Significant Diagnostic Studies: Labs:   CMP   Recent Labs  Lab 03/07/18  0349 03/08/18  0314    142   K 3.2* 3.1*    109   CO2 25 24   * 115*   BUN 23* 24*   CREATININE 1.1 1.3   CALCIUM 8.0* 8.3*   PROT 4.8* 5.5*   ALBUMIN 2.1* 2.4*   BILITOT 0.3 0.3   ALKPHOS 60 74   AST 12 13   ALT 10 10   ANIONGAP 7* 9   ESTGFRAFRICA >60.0 >60.0   EGFRNONAA >60.0 >60.0    and CBC   Recent Labs  Lab 03/07/18  0349 03/08/18  0314   WBC 4.58 7.36   HGB 9.1* 9.9*   HCT 27.0* 30.0*   PLT 85*  129*     Imaging Results          CT Abdomen Pelvis With Contrast (Final result)  Result time 02/28/18 16:07:16    Final result by Calvin Sainz MD (02/28/18 16:07:16)                 Impression:         Mild diffuse bowel wall thickening throughout the small and large bowel.  Findings likely represent infectious or inflammatory enterocolitis.  No pneumatosis intestinalis to suggest bowel ischemia.  Note is made that there are 2 small foci of air within the liver, presumably in the biliary system given that there is no extension to the periphery or evidence of mesenteric venous gas.    Percutaneous gastric tube in the stomach.    Additional findings as above.  ______________________________________     Electronically signed by resident: RASHID WAGNER MD  Date:     02/28/18  Time:    12:16            As the supervising and teaching physician, I personally reviewed the images and resident's interpretation and I agree with the findings.            Electronically signed by: Dr. Calvin Sainz MD  Date:     02/28/18  Time:    16:07              Narrative:    HISTORY:  48-year-old male with provided clinical history of neutropenic     TECHNIQUE: Axial CT images acquired from the diaphragm through the ischial tuberosities  after administration of 100 cc of Omnipaque 350  IV.  Oral contrast not used.  Multiplanar reconstructions provided for review.    COMPARISON: PET/CT routine 01/19/2018     FINDINGS:    Inferior Mediastinum/Heart: No cardiomegaly. No significant pericardial effusion.  Lung Bases: No focal consolidation, mass, pleural thickening, or pneumothorax.    Peritoneal Space: No ascites. No free air.  Stomach: Distended with fluid.  There is a percutaneous gastric tube with tip/balloon inflated in the gastric antrum.  Otherwise unremarkable.  Liver: There are punctate foci of air in the liver (axial series 2, images 9 and 11).  Presumably this is biliary in location, noting no extension to the periphery.   Findings are not present on recent PET/CT examination 01/19/2018.  Otherwise the liver is normal in size, homogenous in attenuation, and without focal lesion.    Gallbladder: No calcified gallstones.  Bile Ducts: No evidence of dilated ducts.  Pancreas: Mild fatty involution. No mass or peripancreatic fat stranding.   Spleen: Unremarkable.  Adrenals: Unremarkable.  Bowel/Mesentery: There is diffuse, mild wall thickening throughout the small and large bowel.  No evidence of pneumatosis intestinalis or air visualized in the mesenteric veins.  Small and large bowel are normal in caliber without evidence of obstruction.  No significant mesenteric edema or adenopathy.  The appendix is unremarkable.      Urinary Tract: Kidneys are normal in size and position and concentrate/excrete contrast appropriately on delayed imaging. No solid renal mass.  No nephrolithiasis or evidence of obstructive uropathy.  Ureters and bladder are unremarkable.  Retroperitoneum:  No significant adenopathy, or fluid.   Pelvis:  No pelvic masses, adenopathy, or free fluid. Prostate is unremarkable.  Abdominal wall:  Unremarkable.     Vasculature: No aneurysm or dissection.  Minimal atherosclerotic calcification of the visualized aorta and its branch vessels.      Bones: No acute fracture or bony destructive process. Age related degenerative joint disease.                             X-Ray Chest 1 View (Final result)  Result time 02/28/18 10:17:55    Final result by Gildardo Artis Jr., MD (02/28/18 10:17:55)                 Impression:     See above      Electronically signed by: GILDARDO ARTIS MD  Date:     02/28/18  Time:    10:17              Narrative:    Chest single view compared to February 6.  Right-sided PICC type catheter overlies the SVC though its tip is not well-visualized.  Heart size and pulmonary vascularity is similar.  Lungs are hypoexpanded.  No confluent consolidation.                                Pending Diagnostic Studies:      None        Final Active Diagnoses:    Diagnosis Date Noted POA    PRINCIPAL PROBLEM:  Enterocolitis [K52.9] 02/28/2018 Unknown    Sepsis [A41.9] 02/06/2018 Yes    Diverticulitis [K57.92] 03/07/2018 Unknown    Squamous cell carcinoma of palatine tonsil [C09.9] 01/04/2018 Yes    Chemotherapy-induced neutropenia [D70.1, T45.1X5A] 02/18/2018 Yes    Thrombocytopenia [D69.6] 02/28/2018 Yes    Electrolyte abnormality [E87.8] 02/28/2018 Unknown    Thrush [B37.0] 02/06/2018 Yes    Morbid obesity [E66.01] 01/25/2018 Yes    Low vitamin D level [E55.9] 01/05/2018 Yes    Prediabetes [R73.03] 01/05/2018 Yes      Problems Resolved During this Admission:    Diagnosis Date Noted Date Resolved POA    Hyponatremia [E87.1] 02/28/2018 03/06/2018 Unknown    Tachycardia [R00.0] 02/28/2018 03/07/2018 Unknown      Discharged Condition: good    Disposition: Home or Self Care    Follow Up:  Follow-up Information     Cortney Lopez MD On 3/14/2018.    Specialties:  Hematology and Oncology, Hematology  Contact information:  [address] [place] [address] [id number] [phone]                 Patient Instructions:     Diet Cardiac     Activity as tolerated     Notify your health care provider if you experience any of the following:  temperature >100.4     Notify your health care provider if you experience any of the following:  persistent nausea and vomiting or diarrhea     Notify your health care provider if you experience any of the following:  severe uncontrolled pain     Notify your health care provider if you experience any of the following:  redness, tenderness, or signs of infection (pain, swelling, redness, odor or green/yellow discharge around incision site)     Notify your health care provider if you experience any of the following:  difficulty breathing or increased cough     Notify your health care provider if you experience any of the following:  severe persistent headache     Notify your health care provider if  you experience any of the following:  persistent dizziness, light-headedness, or visual disturbances     Notify your health care provider if you experience any of the following:  increased confusion or weakness       Medications:  Reconciled Home Medications:   Current Discharge Medication List      START taking these medications    Details   ciprofloxacin HCl (CIPRO) 500 MG tablet Take 1 tablet (500 mg total) by mouth every 12 (twelve) hours.  Qty: 12 tablet, Refills: 0      fentaNYL (DURAGESIC) 50 mcg/hr Place 1 patch onto the skin every 72 hours.  Qty: 3 patch, Refills: 0      metroNIDAZOLE (FLAGYL) 500 MG tablet Take 1 tablet (500 mg total) by mouth every 8 (eight) hours.  Qty: 18 tablet, Refills: 0    Associated Diagnoses: Neutropenia, unspecified type      psyllium husk, aspartame, (METAMUCIL) 3.4 gram PwPk packet Take 2 packets by mouth once daily.         CONTINUE these medications which have NOT CHANGED    Details   cholecalciferol, vitamin D3, (VITAMIN D3) 1,000 unit capsule Take 1,000 Units by mouth once daily.      diphenhydrAMINE-aluminum-magnesium hydroxide-simethicone-lidocaine HCl 2% Swish and spit 15 mLs every 4 (four) hours as needed (pain).  Qty: 500 mL, Refills: 0      multivitamin (THERAGRAN) per tablet Take 1 tablet by mouth once daily.      NYSTATIN ([facility]KE'S SOLUTION) Mix equal amounts of benadryl, maalox, 2% viscous lidocaine and nystatin    Swish and swallow 10 ml 4 times a day  Qty: 500 mL, Refills: 5    Associated Diagnoses: Squamous cell carcinoma of palatine tonsil      ondansetron (ZOFRAN) 8 MG tablet Take 1 tablet (8 mg total) by mouth 4 (four) times daily as needed for Nausea.  Qty: 60 tablet, Refills: 2    Associated Diagnoses: Squamous cell carcinoma of palatine tonsil      oxyCODONE (ROXICODONE) 5 mg/5 mL Soln Take 5 mLs (5 mg total) by mouth every 6 (six) hours as needed.  Qty: 473 mL, Refills: 0      IBUPROFEN (ADVIL ORAL) Take 1 tablet by mouth as needed.      lidocaine HCl 2%  (XYLOCAINE) 2 % Soln by Mucous Membrane route every 3 (three) hours.  Qty: 500 mL, Refills: 6    Associated Diagnoses: Oral mucositis         STOP taking these medications       dexamethasone (DECADRON) 6 MG tablet Comments:   Reason for Stopping:         fluconazole 40 mg/ml (DIFLUCAN) 40 mg/mL suspension Comments:   Reason for Stopping:               Cici Phillips MD  Hematology/Oncology  [facility]

## 2018-03-08 NOTE — PROGRESS NOTES
03/08/18 1320   Vital Signs   Pulse (!) 122   Dr. Gregory notified of elevated HR above baseline. Patient performing a lot of activity over the last couple hours (showering and walking within room). After 10 minutes of rest, HR dropped from 129 to 122. Patient asymptomatic - denies dizziness or SOB. MD stated ok to send patient home.

## 2018-03-09 ENCOUNTER — PATIENT MESSAGE (OUTPATIENT)
Dept: HEMATOLOGY/ONCOLOGY | Facility: CLINIC | Age: 48
End: 2018-03-09

## 2018-03-11 ENCOUNTER — PATIENT MESSAGE (OUTPATIENT)
Dept: HEMATOLOGY/ONCOLOGY | Facility: CLINIC | Age: 48
End: 2018-03-11

## 2018-03-13 ENCOUNTER — HOSPITAL ENCOUNTER (OUTPATIENT)
Dept: RADIOLOGY | Facility: HOSPITAL | Age: 48
Discharge: HOME | End: 2018-03-13
Attending: INTERNAL MEDICINE
Payer: COMMERCIAL

## 2018-03-13 ENCOUNTER — PATIENT MESSAGE (OUTPATIENT)
Dept: HEMATOLOGY/ONCOLOGY | Facility: CLINIC | Age: 48
End: 2018-03-13

## 2018-03-13 DIAGNOSIS — C09.9 SQUAMOUS CELL CARCINOMA OF PALATINE TONSIL: ICD-10-CM

## 2018-03-13 LAB — POCT GLUCOSE: 106 MG/DL (ref 70–110)

## 2018-03-13 PROCEDURE — 78815 PET IMAGE W/CT SKULL-THIGH: CPT | Mod: 26,PS,, | Performed by: RADIOLOGY

## 2018-03-13 PROCEDURE — A9552 F18 FDG: HCPCS

## 2018-03-13 PROCEDURE — 78815 PET IMAGE W/CT SKULL-THIGH: CPT | Mod: TC

## 2018-03-14 ENCOUNTER — OFFICE VISIT (OUTPATIENT)
Dept: HEMATOLOGY/ONCOLOGY | Facility: CLINIC | Age: 48
End: 2018-03-14
Payer: COMMERCIAL

## 2018-03-14 ENCOUNTER — LAB VISIT (OUTPATIENT)
Dept: LAB | Facility: HOSPITAL | Age: 48
End: 2018-03-14
Attending: INTERNAL MEDICINE
Payer: COMMERCIAL

## 2018-03-14 VITALS
SYSTOLIC BLOOD PRESSURE: 113 MMHG | WEIGHT: 244.06 LBS | BODY MASS INDEX: 38.21 KG/M2 | TEMPERATURE: 98 F | DIASTOLIC BLOOD PRESSURE: 70 MMHG | RESPIRATION RATE: 16 BRPM | HEART RATE: 118 BPM | OXYGEN SATURATION: 99 %

## 2018-03-14 DIAGNOSIS — T45.1X5A CHEMOTHERAPY-INDUCED NEUTROPENIA: ICD-10-CM

## 2018-03-14 DIAGNOSIS — D70.1 CHEMOTHERAPY-INDUCED NEUTROPENIA: ICD-10-CM

## 2018-03-14 DIAGNOSIS — C09.9 SQUAMOUS CELL CARCINOMA OF PALATINE TONSIL: ICD-10-CM

## 2018-03-14 DIAGNOSIS — C09.9 SQUAMOUS CELL CARCINOMA OF PALATINE TONSIL: Primary | ICD-10-CM

## 2018-03-14 DIAGNOSIS — C77.0 CANCER OF HEAD, FACE, OR NECK LYMPH NODES, SECONDARY: ICD-10-CM

## 2018-03-14 LAB
ALBUMIN SERPL BCP-MCNC: 2.7 G/DL
ALP SERPL-CCNC: 80 U/L
ALT SERPL W/O P-5'-P-CCNC: 12 U/L
ANION GAP SERPL CALC-SCNC: 10 MMOL/L
AST SERPL-CCNC: 15 U/L
BILIRUB SERPL-MCNC: 0.3 MG/DL
BUN SERPL-MCNC: 19 MG/DL
CALCIUM SERPL-MCNC: 8.6 MG/DL
CHLORIDE SERPL-SCNC: 101 MMOL/L
CO2 SERPL-SCNC: 24 MMOL/L
CREAT SERPL-MCNC: 1.2 MG/DL
ERYTHROCYTE [DISTWIDTH] IN BLOOD BY AUTOMATED COUNT: 15.4 %
EST. GFR  (AFRICAN AMERICAN): >60 ML/MIN/1.73 M^2
EST. GFR  (NON AFRICAN AMERICAN): >60 ML/MIN/1.73 M^2
GLUCOSE SERPL-MCNC: 140 MG/DL
HCT VFR BLD AUTO: 30 %
HGB BLD-MCNC: 10 G/DL
IMM GRANULOCYTES # BLD AUTO: 0.77 K/UL
MAGNESIUM SERPL-MCNC: 1.5 MG/DL
MCH RBC QN AUTO: 30.6 PG
MCHC RBC AUTO-ENTMCNC: 33.3 G/DL
MCV RBC AUTO: 92 FL
NEUTROPHILS # BLD AUTO: 7.5 K/UL
PHOSPHATE SERPL-MCNC: 2 MG/DL
PLATELET # BLD AUTO: 262 K/UL
PMV BLD AUTO: 9.3 FL
POTASSIUM SERPL-SCNC: 3.7 MMOL/L
PROT SERPL-MCNC: 5.9 G/DL
RBC # BLD AUTO: 3.27 M/UL
SODIUM SERPL-SCNC: 135 MMOL/L
WBC # BLD AUTO: 10.75 K/UL

## 2018-03-14 PROCEDURE — 85027 COMPLETE CBC AUTOMATED: CPT

## 2018-03-14 PROCEDURE — 80053 COMPREHEN METABOLIC PANEL: CPT

## 2018-03-14 PROCEDURE — 83735 ASSAY OF MAGNESIUM: CPT

## 2018-03-14 PROCEDURE — 99999 PR PBB SHADOW E&M-EST. PATIENT-LVL III: CPT | Mod: PBBFAC,,, | Performed by: INTERNAL MEDICINE

## 2018-03-14 PROCEDURE — 84100 ASSAY OF PHOSPHORUS: CPT

## 2018-03-14 PROCEDURE — 36415 COLL VENOUS BLD VENIPUNCTURE: CPT

## 2018-03-14 PROCEDURE — 99215 OFFICE O/P EST HI 40 MIN: CPT | Mod: S$GLB,,, | Performed by: INTERNAL MEDICINE

## 2018-03-14 NOTE — PROGRESS NOTES
Subjective:       Patient ID: Burton Whiting is a 48 y.o. male.    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 Burton Whiting is a 48-year-old male, former smoker, who presents today with a four-month history of enlarging neck mass.  He initially delayed care due to lack of insurance.  He was seen by Dr. Turpin who referred him to see Dr. Olguin.  He had a CT that showed a 3.8 x 3.8 x 4.9 cm soft tissue mass arising from the left palatine tonsil resulting in moderate narrowing of the hypopharyngeal airway adjacent extensive matted left cervical lymphadenopathy was noted.  The largest ella mass was 6.6 x 9 x 2.6 cm extending inferiorly to the supraclavicular region.  The mass pushed the trachea and the left common carotid artery to the right.  Additional representative of cervical lymph nodes measuring 2.9 x 3.1 x 3.5 cm on axial image 37 of series 3   and 2.4 x 2.1 x 2.2 cm on axial image 55 of series 3.  There is also a sclerotic lesion involving the midline of the clivus measuring 1.2 cm.  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  PET scan performed on 01/19/2018 revealed persistent evidence of a soft tissue mass arising from the left palatine tonsil resulting in moderate narrowing of the hypopharyngeal   airway.  The mass is hypermetabolic demonstrating an SUV max of 18.5.  There is also persistent adjacent extensive matted left cervical lymphadenopathy, largest of this measuring 6.7 x 8.42 with hypermetabolic activity and SUV max of 20.5.  Lymphadenopathy is again noted to have a mass effect on the trachea and left common carotid artery, pushing both structures towards the right.  There is also prominent right cervical lymph nodes with the largest measuring 0.8 cm and demonstrating mild hypermetabolic activity with SUV max of 1.8, physiologic   distribution of FDG in the gray matter.  There are 3 pulmonary nodules noted within the right lung, the largest is in the  "anterior segment of the right upper lobe measuring 1 cm, second is visualized in the middle lobe measuring 0.6 cm and the third is within the posterior basal segment measuring 0.6 cm.  There is one pulmonary nodule within the left lung within the lateral basal segment measuring 0.6 cm.  These nodules do not demonstrate hypermetabolic activity; however, they are below the threshold for observation with PET     HPIHe underwent MRI cervical spine revealed "No evidence of metastatic disease to the cervical spine. Multilevel degenerative changes of the cervical spine with disc protrusion at C5-6 which results in moderate to severe spinal canal stenosis and and associated cord signal abnormality, suggestive of compressive myelopathy. 6 mm T1 hypointense non-enhancing lesion in the clivus.  Continued followup is suggested. 9 mm inferior descent of the cerebellar tonsils below the foramen magnum, suggestive of Chiari 1 malformation"  MRI thoracic spine "No evidence of metastatic disease involving the thoracic spine. Incidental hemangioma involving the T7 vertebral body. Mild degenerative disc disease without any significant spinal canal stenosis or neuroforaminal narrowing.     HPI He comes in to receive cycle 3 of TPF  PET scan reveals "Interval decrease in size and activity of left palatine tonsillar mass and left cervical mass.  Mild increase in hypermetabolic activity of a right cervical lymph node and sclerotic focus within the clivus.  Left cervical mass is ill-defined and margins are hard to delineate from the underlying musculature.Index lesions:1. Left palatine tonsillar mass SUV max 3.8 (previously 18.5) 2. Left cervical mass SUV max 8.4 (previously 20.5) 3. Right cervical lymph node SUV max 3.5 (previously 2.8) 4. Sclerotic focus within the clivus SUV max 4.7 (previously 2.7). There is diffuse mild hypermetabolic activity throughout the joints of the skeletal system compatible with DJD.    Review of Systems "   Constitutional: Negative for appetite change, fatigue and unexpected weight change.   HENT: Negative for mouth sores.    Eyes: Negative for visual disturbance.   Respiratory: Negative for cough and shortness of breath.    Cardiovascular: Negative for chest pain.   Gastrointestinal: Negative for abdominal pain and diarrhea.   Genitourinary: Negative for frequency.   Musculoskeletal: Negative for back pain.   Skin: Negative for rash.   Neurological: Negative for headaches.   Hematological: Negative for adenopathy.   Psychiatric/Behavioral: The patient is not nervous/anxious.    All other systems reviewed and are negative.      Objective:      Physical Exam   Constitutional: He is oriented to person, place, and time. He appears well-developed and well-nourished.   HENT:   Mouth/Throat: No oropharyngeal exudate.   Cardiovascular: Normal rate and normal heart sounds.    Pulmonary/Chest: Effort normal and breath sounds normal. He has no wheezes.   Abdominal: Soft. Bowel sounds are normal. There is no tenderness.   Musculoskeletal: He exhibits no edema or tenderness.   Lymphadenopathy:     He has no cervical adenopathy.   Neurological: He is alert and oriented to person, place, and time. Coordination normal.   Skin: Skin is warm and dry. No rash noted.   Psychiatric: He has a normal mood and affect. Judgment and thought content normal.   Vitals reviewed.      LABS:  WBC   Date Value Ref Range Status   03/14/2018 10.75 3.90 - 12.70 K/uL Final     Hemoglobin   Date Value Ref Range Status   03/14/2018 10.0 (L) 14.0 - 18.0 g/dL Final     Hematocrit   Date Value Ref Range Status   03/14/2018 30.0 (L) 40.0 - 54.0 % Final     Platelets   Date Value Ref Range Status   03/14/2018 262 150 - 350 K/uL Final     Gran # (ANC)   Date Value Ref Range Status   03/14/2018 7.5 1.8 - 7.7 K/uL Final     Comment:     The ANC is based on a white cell differential from an   automated cell counter. It has not been microscopically   reviewed for  the presence of abnormal cells. Clinical   correlation is required.         Chemistry        Component Value Date/Time     (L) 03/14/2018 0809    K 3.7 03/14/2018 0809     03/14/2018 0809    CO2 24 03/14/2018 0809    BUN 19 03/14/2018 0809    CREATININE 1.2 03/14/2018 0809     (H) 03/14/2018 0809        Component Value Date/Time    CALCIUM 8.6 (L) 03/14/2018 0809    ALKPHOS 80 03/14/2018 0809    AST 15 03/14/2018 0809    ALT 12 03/14/2018 0809    BILITOT 0.3 03/14/2018 0809    ESTGFRAFRICA >60.0 03/14/2018 0809    EGFRNONAA >60.0 03/14/2018 0809          Assessment:       1. Squamous cell carcinoma of palatine tonsil    2. Cancer of head, face, or neck lymph nodes, secondary    3. Chemotherapy-induced neutropenia        Plan:        1,2,3. He is still recovering from recent hospitalization so hold chemo this week. Will plan on proceeding with cycle 3 of TPF on 3/19/18   Will plan on seeing him for cycle 4.     Above care plan was discussed with patient and accompanying brother and brother-in-law and all questions were addressed to their satisfaction

## 2018-03-14 NOTE — Clinical Note
Cancel chemo today  Schedule CBC and schedule TPF on (Monday 3/19) early AM IV fluids on day 2.  Schedule DC pump on day 5. Neupogen on day 6,7,8,9 in [place]  No neulasta this time.  Also schedule CBC,CMP weekly for labs in [place]   Schedule CBC,CMP, and see me or another MD and for TPF in 3 weeks. IV fluids on day 2. DC pump on day 5. neupogen in [place] for day 4 days.

## 2018-03-15 ENCOUNTER — PATIENT MESSAGE (OUTPATIENT)
Dept: HEMATOLOGY/ONCOLOGY | Facility: CLINIC | Age: 48
End: 2018-03-15

## 2018-03-15 ENCOUNTER — TELEPHONE (OUTPATIENT)
Dept: HEMATOLOGY/ONCOLOGY | Facility: CLINIC | Age: 48
End: 2018-03-15

## 2018-03-16 ENCOUNTER — PATIENT MESSAGE (OUTPATIENT)
Dept: HEMATOLOGY/ONCOLOGY | Facility: CLINIC | Age: 48
End: 2018-03-16

## 2018-03-16 NOTE — TELEPHONE ENCOUNTER
He has seen Dr. Sandra also. Original plan was to do chemo and Radiation together but the mass was so big that we back off of that and did chemo first but now since the mass shrunk Dr. Sandra is willing to try radiation and chemo

## 2018-03-19 ENCOUNTER — INFUSION (OUTPATIENT)
Dept: INFUSION THERAPY | Facility: HOSPITAL | Age: 48
End: 2018-03-19
Attending: INTERNAL MEDICINE
Payer: COMMERCIAL

## 2018-03-19 ENCOUNTER — PATIENT MESSAGE (OUTPATIENT)
Dept: HEMATOLOGY/ONCOLOGY | Facility: CLINIC | Age: 48
End: 2018-03-19

## 2018-03-19 ENCOUNTER — TELEPHONE (OUTPATIENT)
Dept: RADIATION ONCOLOGY | Facility: CLINIC | Age: 48
End: 2018-03-19

## 2018-03-19 ENCOUNTER — PATIENT MESSAGE (OUTPATIENT)
Dept: RADIATION ONCOLOGY | Facility: CLINIC | Age: 48
End: 2018-03-19

## 2018-03-19 VITALS
HEART RATE: 110 BPM | RESPIRATION RATE: 18 BRPM | TEMPERATURE: 98 F | SYSTOLIC BLOOD PRESSURE: 124 MMHG | WEIGHT: 244.06 LBS | BODY MASS INDEX: 38.3 KG/M2 | HEIGHT: 67 IN | DIASTOLIC BLOOD PRESSURE: 70 MMHG

## 2018-03-19 DIAGNOSIS — C09.9 TONSIL CANCER: Primary | ICD-10-CM

## 2018-03-19 DIAGNOSIS — C09.9 SQUAMOUS CELL CARCINOMA OF PALATINE TONSIL: ICD-10-CM

## 2018-03-19 DIAGNOSIS — C77.0 CANCER OF HEAD, FACE, OR NECK LYMPH NODES, SECONDARY: ICD-10-CM

## 2018-03-19 DIAGNOSIS — C76.0 HEAD AND NECK CANCER: Primary | ICD-10-CM

## 2018-03-19 PROCEDURE — 63600175 PHARM REV CODE 636 W HCPCS: Performed by: INTERNAL MEDICINE

## 2018-03-19 PROCEDURE — 96416 CHEMO PROLONG INFUSE W/PUMP: CPT

## 2018-03-19 PROCEDURE — 96367 TX/PROPH/DG ADDL SEQ IV INF: CPT

## 2018-03-19 PROCEDURE — 96413 CHEMO IV INFUSION 1 HR: CPT

## 2018-03-19 PROCEDURE — 96366 THER/PROPH/DIAG IV INF ADDON: CPT

## 2018-03-19 PROCEDURE — 96417 CHEMO IV INFUS EACH ADDL SEQ: CPT

## 2018-03-19 PROCEDURE — 25000003 PHARM REV CODE 250: Performed by: INTERNAL MEDICINE

## 2018-03-19 RX ORDER — DEXAMETHASONE 6 MG/1
TABLET ORAL
Qty: 24 TABLET | Refills: 0 | Status: SHIPPED | OUTPATIENT
Start: 2018-03-19 | End: 2018-05-17

## 2018-03-19 RX ORDER — SODIUM CHLORIDE 0.9 % (FLUSH) 0.9 %
10 SYRINGE (ML) INJECTION
Status: DISCONTINUED | OUTPATIENT
Start: 2018-03-19 | End: 2018-03-19 | Stop reason: HOSPADM

## 2018-03-19 RX ORDER — HEPARIN 100 UNIT/ML
500 SYRINGE INTRAVENOUS
Status: DISCONTINUED | OUTPATIENT
Start: 2018-03-19 | End: 2018-03-19 | Stop reason: HOSPADM

## 2018-03-19 RX ORDER — DEXAMETHASONE 4 MG/1
TABLET ORAL
COMMUNITY
Start: 2018-02-21 | End: 2018-03-19 | Stop reason: SDUPTHER

## 2018-03-19 RX ORDER — HEPARIN 100 UNIT/ML
500 SYRINGE INTRAVENOUS
Status: CANCELLED | OUTPATIENT
Start: 2018-03-19

## 2018-03-19 RX ORDER — SODIUM CHLORIDE 0.9 % (FLUSH) 0.9 %
10 SYRINGE (ML) INJECTION
Status: CANCELLED | OUTPATIENT
Start: 2018-03-20

## 2018-03-19 RX ORDER — SODIUM CHLORIDE 0.9 % (FLUSH) 0.9 %
10 SYRINGE (ML) INJECTION
Status: CANCELLED | OUTPATIENT
Start: 2018-03-23

## 2018-03-19 RX ORDER — HEPARIN 100 UNIT/ML
500 SYRINGE INTRAVENOUS
Status: CANCELLED | OUTPATIENT
Start: 2018-03-23

## 2018-03-19 RX ORDER — SODIUM CHLORIDE 0.9 % (FLUSH) 0.9 %
10 SYRINGE (ML) INJECTION
Status: CANCELLED | OUTPATIENT
Start: 2018-03-19

## 2018-03-19 RX ORDER — HEPARIN 100 UNIT/ML
500 SYRINGE INTRAVENOUS
Status: CANCELLED | OUTPATIENT
Start: 2018-03-20

## 2018-03-19 RX ADMIN — SODIUM CHLORIDE 150 MG: 9 INJECTION, SOLUTION INTRAVENOUS at 01:03

## 2018-03-19 RX ADMIN — DEXAMETHASONE SODIUM PHOSPHATE: 4 INJECTION, SOLUTION INTRAMUSCULAR; INTRAVENOUS at 12:03

## 2018-03-19 RX ADMIN — FLUOROURACIL 8590 MG: 50 INJECTION, SOLUTION INTRAVENOUS at 03:03

## 2018-03-19 RX ADMIN — CISPLATIN 172 MG: 1 INJECTION, SOLUTION INTRAVENOUS at 02:03

## 2018-03-19 RX ADMIN — MAGNESIUM SULFATE HEPTAHYDRATE: 500 INJECTION, SOLUTION INTRAMUSCULAR; INTRAVENOUS at 11:03

## 2018-03-19 RX ADMIN — DOCETAXEL 170 MG: 10 INJECTION, SOLUTION INTRAVENOUS at 01:03

## 2018-03-19 NOTE — TELEPHONE ENCOUNTER
----- Message from Burton Sandra MD sent at 3/16/2018  5:40 PM CDT -----  Suzanne,  Would you please call Mr. Whiting on Monday and tell him to go see his dentist for pre radiation evaluation and fluoride trays if he hasn't already done so?  Thanks!!    ----- Message -----  From: Cortney Lopez MD  Sent: 3/16/2018   4:24 PM  To: Burton Sandra MD    He has not done any dental work  ----- Message -----  From: Burton Sandra MD  Sent: 3/16/2018   4:08 PM  To: Cortney Lopez MD    He's coming on Tuesday for simulation.   I need to make sure he's had any needed dental work done.  If all goes perfectly I should be ready to start him Monday 3/26.  PP  ----- Message -----  From: Cortnye Lopez MD  Sent: 3/16/2018   3:10 PM  To: Burton Sandra MD    Thanks. As discussed will plan on Cisplatin weekly with RT  ----- Message -----  From: Burton Sandra MD  Sent: 3/15/2018   9:53 PM  To: Cortney Lopez MD    Yes, absolutely can be done.  Are you planning to give him cycle 3 on 3/19 or should we get him started with cis/radiation?  Do you know if he saw the dentist?      ----- Message -----  From: Cortney Lopez MD  Sent: 3/15/2018   4:38 PM  To: MD Payam Brown,  Can you please take a look at his scans and tell me if chemo/RT can be done. He has had several issues with TPF

## 2018-03-19 NOTE — PHYSICIAN QUERY
PT Name: Burton Whiting  MR #: [id number]    Physician Query Form - Perfusion Diagnosis Clarification     CDS/: Inez Loera RN            Contact information: [phone]  This form is a permanent document in the medical record.     Query Date: March 19, 2018    By submitting this query, we are merely seeking further clarification of documentation. Please utilize your independent clinical judgment when addressing the question(s) below.    The medical record contains the following:    Indicators   Supporting Clinical Findings   Location in Medical Record   x Acute Illness (e.g. AMI, Sepsis, etc.) Sepsis, enterocolitis, electrolyte abnormality DC Summary 3/8    Acidosis documented     x ABGs / Labs Lactate 3.4 Labs 2/28   x Vital Signs BP 84/49 Hematology PN  2/28   x Hypotension or Low Blood Pressure documented Septic on admit with hypotension SBP 80s, tachycardia 120s, ANC 60, lactate 3.4. Dehydrated with dry mucous membranes & hyponatremia. Received 1x dose cefepime 2g in ED & IVF (NS fluid boluses.) Hematology PN 2/28   x Altered Mental Status or Confusion but overall some fatigue & confusion (likely due to hyponatremia with concurrent infection.)  Hematology PN 2/28    Diaphoresis, Cold Extremities or Cyanosis      Oliguria     x Medication/Treatment:  -Vasopressors  -Inotropic Drugs  -IV Fluids  -Cardiac Assist Devices  -Hemodynamic Monitoring  -Blood/Blood Products Administered 3L NS fluid boluses DC Summary 3/8   x Other: Will start fluid resuscitation     Initial concerns include volume depletion,    bowel movements have been like diarrhea for the past 2 days.    Septic on admit (SBP 80s, tachycardic 120s, neutrophils 0.32, lactate 3.7 ED Note 2/28                  DC Summary 3/8     Provider, please specify diagnosis or diagnoses associated with above clinical findings.        [  ] Hypovolemic Shock  [X  ] Septic Shock  [  ] Other Shock (please specify): _________________________________  [  ] Shock  Unspecified  [  ] Other Condition (please specify): ___________________________________  [  ] Clinically Undetermined    Please document in your progress notes daily for the duration of treatment until resolved and include in your discharge summary.

## 2018-03-19 NOTE — TELEPHONE ENCOUNTER
No more tpf for him, correct---  He is on the schedule today for TPF.    Waiting on cisplatin/RT to start/be approved.  ~Lauryn

## 2018-03-20 ENCOUNTER — INFUSION (OUTPATIENT)
Dept: INFUSION THERAPY | Facility: HOSPITAL | Age: 48
End: 2018-03-20
Attending: INTERNAL MEDICINE
Payer: COMMERCIAL

## 2018-03-20 ENCOUNTER — PATIENT MESSAGE (OUTPATIENT)
Dept: HEMATOLOGY/ONCOLOGY | Facility: CLINIC | Age: 48
End: 2018-03-20

## 2018-03-20 VITALS
HEART RATE: 68 BPM | TEMPERATURE: 98 F | RESPIRATION RATE: 16 BRPM | SYSTOLIC BLOOD PRESSURE: 129 MMHG | DIASTOLIC BLOOD PRESSURE: 86 MMHG

## 2018-03-20 DIAGNOSIS — C77.0 CANCER OF HEAD, FACE, OR NECK LYMPH NODES, SECONDARY: ICD-10-CM

## 2018-03-20 DIAGNOSIS — C09.9 SQUAMOUS CELL CARCINOMA OF PALATINE TONSIL: ICD-10-CM

## 2018-03-20 DIAGNOSIS — C09.9 TONSIL CANCER: Primary | ICD-10-CM

## 2018-03-20 PROCEDURE — 96360 HYDRATION IV INFUSION INIT: CPT

## 2018-03-20 PROCEDURE — 25000003 PHARM REV CODE 250: Performed by: INTERNAL MEDICINE

## 2018-03-20 RX ORDER — SODIUM CHLORIDE 0.9 % (FLUSH) 0.9 %
10 SYRINGE (ML) INJECTION
Status: DISCONTINUED | OUTPATIENT
Start: 2018-03-20 | End: 2018-03-20 | Stop reason: HOSPADM

## 2018-03-20 RX ORDER — HEPARIN 100 UNIT/ML
500 SYRINGE INTRAVENOUS
Status: DISCONTINUED | OUTPATIENT
Start: 2018-03-20 | End: 2018-03-20 | Stop reason: HOSPADM

## 2018-03-20 RX ADMIN — SODIUM CHLORIDE: 0.9 INJECTION, SOLUTION INTRAVENOUS at 03:03

## 2018-03-20 NOTE — PLAN OF CARE
Problem: Patient Care Overview  Goal: Plan of Care Review  Outcome: Ongoing (interventions implemented as appropriate)  Patient received fluids without any issues. Notified to call MD with any further concerns . Vss. No apparent distress noted.

## 2018-03-20 NOTE — PLAN OF CARE
Problem: Chemotherapy Effects (Adult)  Goal: Signs and Symptoms of Listed Potential Problems Will be Absent, Minimized or Managed (Chemotherapy Effects)  Signs and symptoms of listed potential problems will be absent, minimized or managed by discharge/transition of care (reference Chemotherapy Effects (Adult) CPG).   Outcome: Ongoing (interventions implemented as appropriate)  Pt here for fluids.  VSS. Labs/allergies/treatment reviewed.  Accessed per flowsheet.  All questions asked were answered. Will Continue to monitor

## 2018-03-21 ENCOUNTER — TELEPHONE (OUTPATIENT)
Dept: RADIATION ONCOLOGY | Facility: CLINIC | Age: 48
End: 2018-03-21

## 2018-03-21 NOTE — TELEPHONE ENCOUNTER
Pt is going to call oral surgeon to confirm that blood work from 3/28 is ok or does the blood work need to be done closer to the surgery date. Pt will call back to let me know. ----- Message from Emily Price sent at 3/21/2018  1:05 PM CDT -----  Pt would like to speak to you. Please call at [phone].

## 2018-03-21 NOTE — TELEPHONE ENCOUNTER
After his extractions, he needs to contact RT to be sim'd--then we can start weekly cisplatin with RT the following week after planning happens?  Lise

## 2018-03-23 ENCOUNTER — INFUSION (OUTPATIENT)
Dept: INFUSION THERAPY | Facility: HOSPITAL | Age: 48
End: 2018-03-23
Attending: INTERNAL MEDICINE
Payer: COMMERCIAL

## 2018-03-23 VITALS
SYSTOLIC BLOOD PRESSURE: 125 MMHG | RESPIRATION RATE: 16 BRPM | DIASTOLIC BLOOD PRESSURE: 68 MMHG | HEART RATE: 99 BPM | TEMPERATURE: 98 F

## 2018-03-23 DIAGNOSIS — C77.0 CANCER OF HEAD, FACE, OR NECK LYMPH NODES, SECONDARY: Primary | ICD-10-CM

## 2018-03-23 PROCEDURE — 25000003 PHARM REV CODE 250: Performed by: INTERNAL MEDICINE

## 2018-03-23 PROCEDURE — 96360 HYDRATION IV INFUSION INIT: CPT

## 2018-03-23 RX ADMIN — SODIUM CHLORIDE 1000 ML: 0.9 INJECTION, SOLUTION INTRAVENOUS at 04:03

## 2018-03-24 ENCOUNTER — INFUSION (OUTPATIENT)
Dept: INFUSION THERAPY | Facility: HOSPITAL | Age: 48
End: 2018-03-24
Attending: INTERNAL MEDICINE
Payer: COMMERCIAL

## 2018-03-24 VITALS
HEART RATE: 72 BPM | DIASTOLIC BLOOD PRESSURE: 78 MMHG | TEMPERATURE: 98 F | RESPIRATION RATE: 18 BRPM | SYSTOLIC BLOOD PRESSURE: 117 MMHG

## 2018-03-24 DIAGNOSIS — C09.9 SQUAMOUS CELL CARCINOMA OF PALATINE TONSIL: ICD-10-CM

## 2018-03-24 DIAGNOSIS — C77.0 CANCER OF HEAD, FACE, OR NECK LYMPH NODES, SECONDARY: ICD-10-CM

## 2018-03-24 DIAGNOSIS — C09.9 TONSIL CANCER: Primary | ICD-10-CM

## 2018-03-24 PROCEDURE — 25000003 PHARM REV CODE 250: Performed by: INTERNAL MEDICINE

## 2018-03-24 PROCEDURE — 96360 HYDRATION IV INFUSION INIT: CPT

## 2018-03-24 PROCEDURE — 63600175 PHARM REV CODE 636 W HCPCS: Performed by: INTERNAL MEDICINE

## 2018-03-24 PROCEDURE — A4216 STERILE WATER/SALINE, 10 ML: HCPCS | Performed by: INTERNAL MEDICINE

## 2018-03-24 RX ORDER — HEPARIN 100 UNIT/ML
500 SYRINGE INTRAVENOUS
Status: DISCONTINUED | OUTPATIENT
Start: 2018-03-24 | End: 2018-03-24 | Stop reason: HOSPADM

## 2018-03-24 RX ORDER — SODIUM CHLORIDE 0.9 % (FLUSH) 0.9 %
10 SYRINGE (ML) INJECTION
Status: DISCONTINUED | OUTPATIENT
Start: 2018-03-24 | End: 2018-03-24 | Stop reason: HOSPADM

## 2018-03-24 RX ADMIN — HEPARIN 500 UNITS: 100 SYRINGE at 12:03

## 2018-03-24 RX ADMIN — SODIUM CHLORIDE 1000 ML: 0.9 INJECTION, SOLUTION INTRAVENOUS at 11:03

## 2018-03-24 RX ADMIN — SODIUM CHLORIDE, PRESERVATIVE FREE 10 ML: 5 INJECTION INTRAVENOUS at 12:03

## 2018-03-24 NOTE — PLAN OF CARE
Problem: Chemotherapy Effects (Adult)  Goal: Signs and Symptoms of Listed Potential Problems Will be Absent, Minimized or Managed (Chemotherapy Effects)  Signs and symptoms of listed potential problems will be absent, minimized or managed by discharge/transition of care (reference Chemotherapy Effects (Adult) CPG).   Outcome: Ongoing (interventions implemented as appropriate)  Pt here for  1 liter of fluid. VSS.  No complaints  voiced.Consent/labs/meds/allergies/treatment reviewed.  Accessed per flowsheet.  All questions asked were answered. Will Continue to monitor

## 2018-03-24 NOTE — NURSING
Pt. Arrived today for ambulatory pump d/c. Infusion had a little over an hour left. Pt requested to sit while it finished and receive additional IV hydration. Reports dizzy spell this morning, feeling very week and fatigued. VSS. Dr. Lopez notified. 1 liter of NS given over 1 hour. Upon port de-access, pt has a new white spot, tough skin. Not oozing, not hot, no discharge. Pt. Denies fevers. States same tenderness that he always has. Pt. Coming back Monday (in two days). Will Reassess port site. No questions at this time.

## 2018-03-24 NOTE — NURSING
"1620--Pt arrived. Upon assessing pump reading on machine was at 47cc to be infused.  Notified pt that he still had a full 24 hours to receive his treatment.  Pt and family member reported "No I am suppose to get it off today."  Requested pt to wait and let me look into the situation.  Will start iv fluids for pt.  "

## 2018-03-24 NOTE — NURSING
1645-- Notified Dr Lopez of situation. Pt is here for pump d/c but still has a full day of infusion to happen. Tomorrow wihen his pump is to complete, the infusion area will be closed.  Was given the okay to speed up the infusion for it to complete approximately at 12 pm.  Also notified Md of unable to give Neulasta on Saturday.  Md also reported that the Neulasta is being changed to Neupogen and he is to return on Monday for the injection for 4 days. Monday thru Thursday.  Will notify the patient and friend at side.

## 2018-03-24 NOTE — PLAN OF CARE
Problem: Patient Care Overview  Goal: Plan of Care Review  Outcome: Ongoing (interventions implemented as appropriate)  Patient received 1 liter of fluid without any issues. Notified to call MD with any further concerns . Vss. No apparent distress noted.

## 2018-03-24 NOTE — NURSING
1655--Speed up the pump to 2.4 cc and it should finish between 11:30 and 12 noon.  Notified pt of all changes of pump to return tomorrow, and of neupogen to be given Monday thru THursday.  All questions asked were answered.

## 2018-03-26 ENCOUNTER — INFUSION (OUTPATIENT)
Dept: INFUSION THERAPY | Facility: HOSPITAL | Age: 48
End: 2018-03-26
Attending: INTERNAL MEDICINE
Payer: COMMERCIAL

## 2018-03-26 ENCOUNTER — OFFICE VISIT (OUTPATIENT)
Dept: HEMATOLOGY/ONCOLOGY | Facility: CLINIC | Age: 48
End: 2018-03-26
Payer: COMMERCIAL

## 2018-03-26 ENCOUNTER — TELEPHONE (OUTPATIENT)
Dept: HEMATOLOGY/ONCOLOGY | Facility: CLINIC | Age: 48
End: 2018-03-26

## 2018-03-26 VITALS
RESPIRATION RATE: 18 BRPM | SYSTOLIC BLOOD PRESSURE: 123 MMHG | HEART RATE: 100 BPM | TEMPERATURE: 98 F | DIASTOLIC BLOOD PRESSURE: 77 MMHG

## 2018-03-26 VITALS
DIASTOLIC BLOOD PRESSURE: 69 MMHG | HEIGHT: 67 IN | SYSTOLIC BLOOD PRESSURE: 110 MMHG | OXYGEN SATURATION: 96 % | HEART RATE: 104 BPM | TEMPERATURE: 99 F | RESPIRATION RATE: 18 BRPM

## 2018-03-26 DIAGNOSIS — B37.0 ORAL THRUSH: ICD-10-CM

## 2018-03-26 DIAGNOSIS — C09.9 TONSIL CANCER: Primary | ICD-10-CM

## 2018-03-26 DIAGNOSIS — T80.212A PORT OR RESERVOIR INFECTION, INITIAL ENCOUNTER: Primary | ICD-10-CM

## 2018-03-26 DIAGNOSIS — C77.0 CANCER OF HEAD, FACE, OR NECK LYMPH NODES, SECONDARY: ICD-10-CM

## 2018-03-26 DIAGNOSIS — C09.9 SQUAMOUS CELL CARCINOMA OF PALATINE TONSIL: ICD-10-CM

## 2018-03-26 PROCEDURE — 25000003 PHARM REV CODE 250: Performed by: INTERNAL MEDICINE

## 2018-03-26 PROCEDURE — 99999 PR PBB SHADOW E&M-EST. PATIENT-LVL III: CPT | Mod: PBBFAC,,, | Performed by: INTERNAL MEDICINE

## 2018-03-26 PROCEDURE — 99215 OFFICE O/P EST HI 40 MIN: CPT | Mod: S$GLB,,, | Performed by: INTERNAL MEDICINE

## 2018-03-26 PROCEDURE — 63600175 PHARM REV CODE 636 W HCPCS: Performed by: INTERNAL MEDICINE

## 2018-03-26 PROCEDURE — 96365 THER/PROPH/DIAG IV INF INIT: CPT

## 2018-03-26 PROCEDURE — 96361 HYDRATE IV INFUSION ADD-ON: CPT

## 2018-03-26 PROCEDURE — 87040 BLOOD CULTURE FOR BACTERIA: CPT | Mod: 59

## 2018-03-26 PROCEDURE — 96372 THER/PROPH/DIAG INJ SC/IM: CPT

## 2018-03-26 PROCEDURE — 96366 THER/PROPH/DIAG IV INF ADDON: CPT

## 2018-03-26 PROCEDURE — 63600175 PHARM REV CODE 636 W HCPCS: Mod: JG | Performed by: INTERNAL MEDICINE

## 2018-03-26 RX ORDER — HEPARIN 100 UNIT/ML
500 SYRINGE INTRAVENOUS
Status: DISCONTINUED | OUTPATIENT
Start: 2018-03-26 | End: 2018-03-26 | Stop reason: HOSPADM

## 2018-03-26 RX ORDER — HEPARIN 100 UNIT/ML
500 SYRINGE INTRAVENOUS
Status: CANCELLED | OUTPATIENT
Start: 2018-03-26

## 2018-03-26 RX ORDER — SULFAMETHOXAZOLE AND TRIMETHOPRIM 800; 160 MG/1; MG/1
2 TABLET ORAL 2 TIMES DAILY
Qty: 40 TABLET | Refills: 2 | Status: SHIPPED | OUTPATIENT
Start: 2018-03-26 | End: 2018-04-12

## 2018-03-26 RX ORDER — FLUCONAZOLE 40 MG/ML
200 POWDER, FOR SUSPENSION ORAL DAILY
Qty: 60 ML | Refills: 0 | Status: SHIPPED | OUTPATIENT
Start: 2018-03-26 | End: 2018-04-05

## 2018-03-26 RX ORDER — SODIUM CHLORIDE 0.9 % (FLUSH) 0.9 %
10 SYRINGE (ML) INJECTION
Status: CANCELLED | OUTPATIENT
Start: 2018-03-27

## 2018-03-26 RX ORDER — SODIUM CHLORIDE 0.9 % (FLUSH) 0.9 %
10 SYRINGE (ML) INJECTION
Status: CANCELLED | OUTPATIENT
Start: 2018-03-26

## 2018-03-26 RX ORDER — SODIUM CHLORIDE 0.9 % (FLUSH) 0.9 %
10 SYRINGE (ML) INJECTION
Status: DISCONTINUED | OUTPATIENT
Start: 2018-03-26 | End: 2018-03-26 | Stop reason: HOSPADM

## 2018-03-26 RX ORDER — HEPARIN 100 UNIT/ML
500 SYRINGE INTRAVENOUS
Status: CANCELLED | OUTPATIENT
Start: 2018-03-27

## 2018-03-26 RX ADMIN — FILGRASTIM 480 MCG: 480 INJECTION, SOLUTION INTRAVENOUS; SUBCUTANEOUS at 09:03

## 2018-03-26 RX ADMIN — VANCOMYCIN HYDROCHLORIDE 1750 MG: 1 INJECTION, POWDER, LYOPHILIZED, FOR SOLUTION INTRAVENOUS at 12:03

## 2018-03-26 RX ADMIN — SODIUM CHLORIDE 2000 ML: 0.9 INJECTION, SOLUTION INTRAVENOUS at 11:03

## 2018-03-26 NOTE — PROGRESS NOTES
Subjective:       Patient ID: Burton Whiting is a 48 y.o. male.    Chief Complaint: No chief complaint on file.  Mr. Burton Whiting is a 48-year-old male, former smoker, who presents today with a four-month history of enlarging neck mass.  He initially delayed care due to lack of insurance.  He was seen by Dr. Turpin who referred him to see Dr. Olguin.  He had a CT that showed a 3.8 x 3.8 x 4.9 cm soft tissue mass arising from the left palatine tonsil resulting in moderate narrowing of the hypopharyngeal airway adjacent extensive matted left cervical lymphadenopathy was noted.  The largest ella mass was 6.6 x 9 x 2.6 cm extending inferiorly to the supraclavicular region.  The mass pushed the trachea and the left common carotid artery to the right.  Additional representative of cervical lymph nodes measuring 2.9 x 3.1 x 3.5 cm on axial image 37 of series 3   and 2.4 x 2.1 x 2.2 cm on axial image 55 of series 3.  There is also a sclerotic lesion involving the midline of the clivus measuring 1.2 cm.  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  PET scan performed on 01/19/2018 revealed persistent evidence of a soft tissue mass arising from the left palatine tonsil resulting in moderate narrowing of the hypopharyngeal   airway.  The mass is hypermetabolic demonstrating an SUV max of 18.5.  There is also persistent adjacent extensive matted left cervical lymphadenopathy, largest of this measuring 6.7 x 8.42 with hypermetabolic activity and SUV max of 20.5.  Lymphadenopathy is again noted to have a mass effect on the trachea and left common carotid artery, pushing both structures towards the right.  There is also prominent right cervical lymph nodes with the largest measuring 0.8 cm and demonstrating mild hypermetabolic activity with SUV max of 1.8, physiologic   distribution of FDG in the gray matter.  There are 3 pulmonary nodules noted within the right lung, the largest is in the anterior segment  "of the right upper lobe measuring 1 cm, second is visualized in the middle lobe measuring 0.6 cm and the third is within the posterior basal segment measuring 0.6 cm.  There is one pulmonary nodule within the left lung within the lateral basal segment measuring 0.6 cm.  These nodules do not demonstrate hypermetabolic activity; however, they are below the threshold for observation with PET     HPIHe underwent MRI cervical spine revealed "No evidence of metastatic disease to the cervical spine. Multilevel degenerative changes of the cervical spine with disc protrusion at C5-6 which results in moderate to severe spinal canal stenosis and and associated cord signal abnormality, suggestive of compressive myelopathy. 6 mm T1 hypointense non-enhancing lesion in the clivus.  Continued followup is suggested. 9 mm inferior descent of the cerebellar tonsils below the foramen magnum, suggestive of Chiari 1 malformation"  MRI thoracic spine "No evidence of metastatic disease involving the thoracic spine. Incidental hemangioma involving the T7 vertebral body. Mild degenerative disc disease without any significant spinal canal stenosis or neuroforaminal narrowing.      HPI He received cycle 3 of TPF last week  Started neupogen today. He is being seen as urgent care as his PORT site is red and irritable. He notes chills, No fever.  He denies any nausea, vomiting, diarrhea, constipation, abdominal pain, weight loss or loss of appetite, chest pain, shortness of breath, leg swelling, fatigue, pain, headache, dizziness, or mood changes. His ECOG PS is one. He is accompanied by his           Review of Systems   Constitutional: Positive for fatigue. Negative for appetite change and unexpected weight change.   HENT: Negative for mouth sores.    Eyes: Negative for visual disturbance.   Respiratory: Negative for cough and shortness of breath.    Cardiovascular: Negative for chest pain.   Gastrointestinal: Negative for abdominal pain and " diarrhea.   Genitourinary: Negative for frequency.   Musculoskeletal: Negative for back pain.   Skin: Negative for rash.   Neurological: Negative for headaches.   Hematological: Negative for adenopathy.   Psychiatric/Behavioral: The patient is not nervous/anxious.    All other systems reviewed and are negative.      Objective:      Physical Exam   Constitutional: He is oriented to person, place, and time. He appears well-developed and well-nourished.   HENT:   Mouth/Throat: No oropharyngeal exudate.   Eyes:   Oral thrush noted)   Cardiovascular: Normal rate and normal heart sounds.    Pulmonary/Chest: Effort normal and breath sounds normal. He has no wheezes.   Abdominal: Soft. Bowel sounds are normal. There is no tenderness.   Musculoskeletal: He exhibits no edema or tenderness.   Lymphadenopathy:     He has no cervical adenopathy.   Neurological: He is alert and oriented to person, place, and time. Coordination normal.   Skin: Skin is warm and dry. No rash noted.   Psychiatric: He has a normal mood and affect. Judgment and thought content normal.   Vitals reviewed.      LABS:  WBC   Date Value Ref Range Status   03/26/2018 1.01 (LL) 3.90 - 12.70 K/uL Final     Comment:     WBC critical result(s) called and verbal readback obtained from   NILDA RAYMOND RN., 03/26/2018 11:37       Hemoglobin   Date Value Ref Range Status   03/26/2018 9.5 (L) 14.0 - 18.0 g/dL Final     Hematocrit   Date Value Ref Range Status   03/26/2018 28.4 (L) 40.0 - 54.0 % Final     Platelets   Date Value Ref Range Status   03/26/2018 125 (L) 150 - 350 K/uL Final     Gran # (ANC)   Date Value Ref Range Status   03/26/2018 0.6 (L) 1.8 - 7.7 K/uL Final     Comment:     The ANC is based on a white cell differential from an   automated cell counter. It has not been microscopically   reviewed for the presence of abnormal cells. Clinical   correlation is required.         Chemistry        Component Value Date/Time     (L) 03/26/2018 1049    K  3.3 (L) 03/26/2018 1049    CL 92 (L) 03/26/2018 1049    CO2 29 03/26/2018 1049    BUN 24 (H) 03/26/2018 1049    CREATININE 1.1 03/26/2018 1049     03/26/2018 1049        Component Value Date/Time    CALCIUM 7.9 (L) 03/26/2018 1049    ALKPHOS 66 03/26/2018 1049    AST 20 03/26/2018 1049    ALT 30 03/26/2018 1049    BILITOT 0.8 03/26/2018 1049    ESTGFRAFRICA >60.0 03/26/2018 1049    EGFRNONAA >60.0 03/26/2018 1049          Assessment:       1. Port or reservoir infection, initial encounter    2. Oral thrush        Plan:        . Administerd neupogen, IV fluids and IV Vancomycin. He will return daily for neupogen this week.  Labs daily   Blood cultures were drawn and escribed Bactrim    Above care plan was discussed with patient and accompanying sister and all questions were addressed to their satisfaction

## 2018-03-26 NOTE — Clinical Note
IV fluids 2 liters and IV Vanc today  Schedule CBC,CMP on 4/2 AM  Cancel my appointment and chemo on 4/4/18 He needs to see me on same day as he starts RT with CBC,CMP, mag and phos and weekly Cisplatin. IV fluids on day 2

## 2018-03-26 NOTE — PLAN OF CARE
Problem: Patient Care Overview  Goal: Plan of Care Review  Outcome: Ongoing (interventions implemented as appropriate)  Pt. Tolerated infusions of 2 liters NS and Vancomycin without complication. Blood cultures drawn and sent from port. Lab results reported to Dr. Lopez. Pt's sister expressed concerns about being able to get patient back and forth to appointments if fatigue continues. Per Jessica, will monitor. Encouraged pt to hydrate and take in broths and gatorade as well as water and popsicles Encrouraged rest and to utilize transport services when coming in and going out of the hospital. No questions at this time. Verbalized understanding. Instructed to report fevers if they occur.

## 2018-03-26 NOTE — NURSING
Patient port sire red, swollen, warm to touch, scab present, denies fever but states has not eaten or drank by mouth or peg tube, thinks he needs fluids.  SHANELL oates Rn notified.  Per Dr Lopez patient to see her today to further asses.  Patient and family updated, verbalized understanding.

## 2018-03-26 NOTE — TELEPHONE ENCOUNTER
----- Message from Cortney Lopez MD sent at 3/26/2018 10:14 AM CDT -----  IV fluids 2 liters and IV Vanc today   Schedule CBC,CMP on 4/2 AM    Cancel my appointment and chemo on 4/4/18  He needs to see me on same day as he starts RT with CBC,CMP, mag and phos and weekly Cisplatin. IV fluids on day 2

## 2018-03-27 ENCOUNTER — INFUSION (OUTPATIENT)
Dept: INFUSION THERAPY | Facility: HOSPITAL | Age: 48
End: 2018-03-27
Attending: INTERNAL MEDICINE
Payer: COMMERCIAL

## 2018-03-27 ENCOUNTER — TELEPHONE (OUTPATIENT)
Dept: HEMATOLOGY/ONCOLOGY | Facility: CLINIC | Age: 48
End: 2018-03-27

## 2018-03-27 ENCOUNTER — PATIENT MESSAGE (OUTPATIENT)
Dept: HEMATOLOGY/ONCOLOGY | Facility: CLINIC | Age: 48
End: 2018-03-27

## 2018-03-27 DIAGNOSIS — C77.0 CANCER OF HEAD, FACE, OR NECK LYMPH NODES, SECONDARY: ICD-10-CM

## 2018-03-27 DIAGNOSIS — C09.9 TONSIL CANCER: Primary | ICD-10-CM

## 2018-03-27 DIAGNOSIS — C09.9 SQUAMOUS CELL CARCINOMA OF PALATINE TONSIL: ICD-10-CM

## 2018-03-27 PROCEDURE — 96372 THER/PROPH/DIAG INJ SC/IM: CPT

## 2018-03-27 PROCEDURE — 63600175 PHARM REV CODE 636 W HCPCS: Mod: JG | Performed by: INTERNAL MEDICINE

## 2018-03-27 RX ADMIN — FILGRASTIM 480 MCG: 480 INJECTION, SOLUTION INTRAVENOUS; SUBCUTANEOUS at 08:03

## 2018-03-27 NOTE — NURSING
Pt arrived for neupogen #2.  Pt tolerated injection SQ to left side of abd.  Discharged to home in wheelchair with wife

## 2018-03-27 NOTE — TELEPHONE ENCOUNTER
----- Message from Cortney Lopez MD sent at 3/26/2018  5:31 PM CDT -----  Scghedule labs daily this week and Vancomycin tomorrow

## 2018-03-27 NOTE — TELEPHONE ENCOUNTER
spoke with pt on today in regards to upcoming appointment on Wednesday and Thursday, pt has confirm.

## 2018-03-28 ENCOUNTER — DOCUMENTATION ONLY (OUTPATIENT)
Dept: HEMATOLOGY/ONCOLOGY | Facility: CLINIC | Age: 48
End: 2018-03-28

## 2018-03-28 ENCOUNTER — INFUSION (OUTPATIENT)
Dept: INFUSION THERAPY | Facility: HOSPITAL | Age: 48
End: 2018-03-28
Attending: INTERNAL MEDICINE
Payer: COMMERCIAL

## 2018-03-28 ENCOUNTER — TELEPHONE (OUTPATIENT)
Dept: HEMATOLOGY/ONCOLOGY | Facility: CLINIC | Age: 48
End: 2018-03-28

## 2018-03-28 VITALS
DIASTOLIC BLOOD PRESSURE: 62 MMHG | SYSTOLIC BLOOD PRESSURE: 112 MMHG | TEMPERATURE: 100 F | RESPIRATION RATE: 18 BRPM | HEART RATE: 101 BPM

## 2018-03-28 DIAGNOSIS — T80.212A PORT OR RESERVOIR INFECTION, INITIAL ENCOUNTER: Primary | ICD-10-CM

## 2018-03-28 DIAGNOSIS — C09.9 TONSIL CANCER: ICD-10-CM

## 2018-03-28 DIAGNOSIS — C77.0 CANCER OF HEAD, FACE, OR NECK LYMPH NODES, SECONDARY: ICD-10-CM

## 2018-03-28 DIAGNOSIS — C09.9 SQUAMOUS CELL CARCINOMA OF PALATINE TONSIL: ICD-10-CM

## 2018-03-28 PROCEDURE — 96372 THER/PROPH/DIAG INJ SC/IM: CPT

## 2018-03-28 PROCEDURE — 25000003 PHARM REV CODE 250: Performed by: INTERNAL MEDICINE

## 2018-03-28 PROCEDURE — 63600175 PHARM REV CODE 636 W HCPCS: Mod: JG | Performed by: INTERNAL MEDICINE

## 2018-03-28 PROCEDURE — 96365 THER/PROPH/DIAG IV INF INIT: CPT

## 2018-03-28 PROCEDURE — 96361 HYDRATE IV INFUSION ADD-ON: CPT

## 2018-03-28 PROCEDURE — 96366 THER/PROPH/DIAG IV INF ADDON: CPT

## 2018-03-28 RX ORDER — SODIUM CHLORIDE 0.9 % (FLUSH) 0.9 %
10 SYRINGE (ML) INJECTION
Status: DISCONTINUED | OUTPATIENT
Start: 2018-03-28 | End: 2018-03-28 | Stop reason: HOSPADM

## 2018-03-28 RX ORDER — HEPARIN 100 UNIT/ML
500 SYRINGE INTRAVENOUS
Status: DISCONTINUED | OUTPATIENT
Start: 2018-03-28 | End: 2018-03-28 | Stop reason: HOSPADM

## 2018-03-28 RX ADMIN — FILGRASTIM 480 MCG: 480 INJECTION, SOLUTION INTRAVENOUS; SUBCUTANEOUS at 03:03

## 2018-03-28 RX ADMIN — SODIUM CHLORIDE 2000 ML: 0.9 INJECTION, SOLUTION INTRAVENOUS at 02:03

## 2018-03-28 RX ADMIN — VANCOMYCIN HYDROCHLORIDE 1750 MG: 1 INJECTION, POWDER, LYOPHILIZED, FOR SOLUTION INTRAVENOUS at 02:03

## 2018-03-28 RX ADMIN — HEPARIN 500 UNITS: 100 SYRINGE at 05:03

## 2018-03-28 NOTE — PLAN OF CARE
Problem: Patient Care Overview  Goal: Plan of Care Review  Outcome: Ongoing (interventions implemented as appropriate)  Report received from Nicolette Butler RN. Patient completed ivfs. Temp of 99.6. Port flushed, hep locked and deaccessed. Red and tender to touch. Educated on importance of monitoring temp and notify md of temp > 100.4. AVS given, Discharged home, ambulated independently with wife by side.

## 2018-03-28 NOTE — TELEPHONE ENCOUNTER
Left VM for dr maldonado, patient's dentist ([phone]) informing him patient will have repeat cbc next week---and if WBC has recovered, he is OK to proceed with 22 extractions.

## 2018-03-29 ENCOUNTER — INFUSION (OUTPATIENT)
Dept: INFUSION THERAPY | Facility: HOSPITAL | Age: 48
End: 2018-03-29
Attending: INTERNAL MEDICINE
Payer: COMMERCIAL

## 2018-03-29 DIAGNOSIS — C09.9 TONSIL CANCER: Primary | ICD-10-CM

## 2018-03-29 DIAGNOSIS — C77.0 CANCER OF HEAD, FACE, OR NECK LYMPH NODES, SECONDARY: ICD-10-CM

## 2018-03-29 DIAGNOSIS — C09.9 SQUAMOUS CELL CARCINOMA OF PALATINE TONSIL: ICD-10-CM

## 2018-03-29 PROCEDURE — 63600175 PHARM REV CODE 636 W HCPCS: Mod: JG | Performed by: INTERNAL MEDICINE

## 2018-03-29 PROCEDURE — 96372 THER/PROPH/DIAG INJ SC/IM: CPT

## 2018-03-29 RX ADMIN — FILGRASTIM 480 MCG: 480 INJECTION, SOLUTION INTRAVENOUS; SUBCUTANEOUS at 08:03

## 2018-03-29 NOTE — NURSING
Pt arrived for neupogen.  Pt tolerated injection SQ to right side of abd.  Labs reviiewed.  Pt discharged to home with sister.

## 2018-03-30 ENCOUNTER — HOSPITAL ENCOUNTER (EMERGENCY)
Facility: HOSPITAL | Age: 48
Discharge: HOME | End: 2018-03-30
Payer: COMMERCIAL

## 2018-03-30 VITALS
TEMPERATURE: 98 F | HEART RATE: 103 BPM | DIASTOLIC BLOOD PRESSURE: 83 MMHG | OXYGEN SATURATION: 98 % | BODY MASS INDEX: 36.02 KG/M2 | RESPIRATION RATE: 20 BRPM | WEIGHT: 230 LBS | SYSTOLIC BLOOD PRESSURE: 147 MMHG

## 2018-03-30 DIAGNOSIS — K94.23 MALFUNCTION OF GASTROSTOMY TUBE: ICD-10-CM

## 2018-03-30 DIAGNOSIS — K94.23 GASTROSTOMY TUBE OBSTRUCTION: Primary | ICD-10-CM

## 2018-03-30 PROCEDURE — 99282 EMERGENCY DEPT VISIT SF MDM: CPT

## 2018-03-30 NOTE — ED PROVIDER NOTES
Encounter Date: 3/30/2018       History     Chief Complaint   Patient presents with    GI Problem     states his PEG is clogged for 1 day     Had trouble with his PEG tube this morning and feels like it's blocked  has PEG tube for oral cancer and has been undergoing chemotherapy      The history is provided by the patient.   GI Problem   The other symptoms of the illness do not include vomiting.   The patient states that she believes she is currently not pregnant. The patient has not had a change in bowel habit. Symptoms associated with the illness do not include chills. Significant associated medical issues do not include PUD.     Review of patient's allergies indicates:  No Known Allergies  Past Medical History:   Diagnosis Date    Former smoker     HPV in male     Opiate addiction     Squamous cell carcinoma of palatine tonsil     throat and tonsillar     Past Surgical History:   Procedure Laterality Date    GASTROSTOMY TUBE PLACEMENT Left 02/12/2018     Family History   Problem Relation Age of Onset    Leukemia Mother     Hypertension Father     Thyroid disease Sister     Hypertension Brother     Brain cancer Maternal Uncle     Brain cancer Maternal Uncle      Social History   Substance Use Topics    Smoking status: Former Smoker     Packs/day: 1.50     Years: 25.00     Types: Cigarettes     Quit date: 06/2017    Smokeless tobacco: Never Used      Comment: smoked for about 25 years. quit 6 months ago    Alcohol use No     Review of Systems   Constitutional: Negative.  Negative for chills.   Eyes: Negative.    Respiratory: Negative.    Cardiovascular: Negative.    Gastrointestinal: Negative.  Negative for vomiting.   Endocrine: Negative.    Genitourinary: Negative.    Musculoskeletal: Negative.    Skin: Negative.    Allergic/Immunologic: Negative.    Neurological: Negative.    Hematological: Negative.    Psychiatric/Behavioral: Negative.    All other systems reviewed and are negative.      Physical  Exam     Initial Vitals [03/30/18 1010]   BP Pulse Resp Temp SpO2   (!) 147/83 103 20 98.2 °F (36.8 °C) 98 %      MAP       104.33         Physical Exam    Constitutional: He appears well-developed.   HENT:   Head: Normocephalic.   Eyes: Conjunctivae are normal.   Neck: Normal range of motion.   Cardiovascular: Normal rate.   Pulmonary/Chest: Breath sounds normal.   Abdominal: Soft.   PEG tube site okay.  There is some residue material in the lining of the PEG tube   Neurological: He is alert and oriented to person, place, and time.   Skin: Skin is warm.   Psychiatric: He has a normal mood and affect.         ED Course   Procedures  Labs Reviewed - No data to display          Medical Decision Making:   Initial Assessment:   Obstructed gastrostomy tube  ED Management:  Tube was cleared by irrigation of saline and there was no obstruction was detected at the end of the irrigation                      Clinical Impression:   The primary encounter diagnosis was Gastrostomy tube obstruction. A diagnosis of Malfunction of gastrostomy tube was also pertinent to this visit.    Disposition:   Disposition: Discharged  Condition: Stable                        EDY Reyes III, MD  03/30/18 1029       EDY Reyes III, MD  03/30/18 1032

## 2018-03-30 NOTE — ED NOTES
Patient identifiers verified and correct for Burton Barrerajared.    LOC: The patient is awake, alert and aware of environment with an appropriate affect, the patient is oriented x 3 and speaking appropriately.  APPEARANCE: Patient resting comfortably and in no acute distress, patient is clean and well groomed, patient's clothing is properly fastened.  SKIN: The skin is warm and dry, color consistent with ethnicity, patient has normal skin turgor and moist mucus membranes, skin intact, no breakdown or bruising noted.  MUSCULOSKELETAL: Patient moving all extremities spontaneously, no obvious swelling or deformities noted.  ABDOMEN: Soft and non tender to palpation, no distention noted, normoactive bowel sounds present in all four quadrants. Peg tube noted to abdominal area.

## 2018-03-31 ENCOUNTER — PATIENT MESSAGE (OUTPATIENT)
Dept: HEMATOLOGY/ONCOLOGY | Facility: CLINIC | Age: 48
End: 2018-03-31

## 2018-03-31 LAB — BACTERIA BLD CULT: NORMAL

## 2018-04-02 ENCOUNTER — PATIENT MESSAGE (OUTPATIENT)
Dept: FAMILY MEDICINE | Facility: CLINIC | Age: 48
End: 2018-04-02

## 2018-04-02 ENCOUNTER — LAB VISIT (OUTPATIENT)
Dept: LAB | Facility: HOSPITAL | Age: 48
End: 2018-04-02
Attending: INTERNAL MEDICINE
Payer: COMMERCIAL

## 2018-04-02 ENCOUNTER — PATIENT MESSAGE (OUTPATIENT)
Dept: HEMATOLOGY/ONCOLOGY | Facility: CLINIC | Age: 48
End: 2018-04-02

## 2018-04-02 DIAGNOSIS — T80.212A PORT OR RESERVOIR INFECTION, INITIAL ENCOUNTER: ICD-10-CM

## 2018-04-02 LAB
ALBUMIN SERPL BCP-MCNC: 3 G/DL
ALP SERPL-CCNC: 76 U/L
ALT SERPL W/O P-5'-P-CCNC: 14 U/L
ANION GAP SERPL CALC-SCNC: 11 MMOL/L
AST SERPL-CCNC: 14 U/L
BILIRUB SERPL-MCNC: 0.1 MG/DL
BUN SERPL-MCNC: 11 MG/DL
CALCIUM SERPL-MCNC: 8.7 MG/DL
CHLORIDE SERPL-SCNC: 103 MMOL/L
CO2 SERPL-SCNC: 19 MMOL/L
CREAT SERPL-MCNC: 1.3 MG/DL
ERYTHROCYTE [DISTWIDTH] IN BLOOD BY AUTOMATED COUNT: 16.5 %
EST. GFR  (AFRICAN AMERICAN): >60 ML/MIN/1.73 M^2
EST. GFR  (NON AFRICAN AMERICAN): >60 ML/MIN/1.73 M^2
GLUCOSE SERPL-MCNC: 100 MG/DL
HCT VFR BLD AUTO: 27.4 %
HGB BLD-MCNC: 9.1 G/DL
IMM GRANULOCYTES # BLD AUTO: 0.15 K/UL
MCH RBC QN AUTO: 32.4 PG
MCHC RBC AUTO-ENTMCNC: 33.2 G/DL
MCV RBC AUTO: 98 FL
NEUTROPHILS # BLD AUTO: 1.5 K/UL
PLATELET # BLD AUTO: 133 K/UL
PMV BLD AUTO: 9.7 FL
POTASSIUM SERPL-SCNC: 3.5 MMOL/L
PROT SERPL-MCNC: 6.2 G/DL
RBC # BLD AUTO: 2.81 M/UL
SODIUM SERPL-SCNC: 133 MMOL/L
WBC # BLD AUTO: 3.28 K/UL

## 2018-04-02 PROCEDURE — 36415 COLL VENOUS BLD VENIPUNCTURE: CPT | Mod: PO

## 2018-04-02 PROCEDURE — 80053 COMPREHEN METABOLIC PANEL: CPT

## 2018-04-02 PROCEDURE — 85027 COMPLETE CBC AUTOMATED: CPT

## 2018-04-03 ENCOUNTER — PATIENT MESSAGE (OUTPATIENT)
Dept: HEMATOLOGY/ONCOLOGY | Facility: CLINIC | Age: 48
End: 2018-04-03

## 2018-04-03 ENCOUNTER — HOSPITAL ENCOUNTER (EMERGENCY)
Facility: HOSPITAL | Age: 48
Discharge: HOME | End: 2018-04-03
Admitting: INTERNAL MEDICINE
Payer: COMMERCIAL

## 2018-04-03 ENCOUNTER — TELEPHONE (OUTPATIENT)
Dept: HEMATOLOGY/ONCOLOGY | Facility: CLINIC | Age: 48
End: 2018-04-03

## 2018-04-03 ENCOUNTER — TELEPHONE (OUTPATIENT)
Dept: FAMILY MEDICINE | Facility: CLINIC | Age: 48
End: 2018-04-03

## 2018-04-03 VITALS
HEART RATE: 83 BPM | RESPIRATION RATE: 16 BRPM | SYSTOLIC BLOOD PRESSURE: 110 MMHG | OXYGEN SATURATION: 98 % | HEIGHT: 67 IN | DIASTOLIC BLOOD PRESSURE: 67 MMHG | WEIGHT: 230 LBS | BODY MASS INDEX: 36.1 KG/M2 | TEMPERATURE: 98 F

## 2018-04-03 DIAGNOSIS — K94.23 PEG TUBE MALFUNCTION: ICD-10-CM

## 2018-04-03 DIAGNOSIS — T85.598A: ICD-10-CM

## 2018-04-03 DIAGNOSIS — T85.598A FEEDING TUBE BLOCKED, INITIAL ENCOUNTER: Primary | ICD-10-CM

## 2018-04-03 LAB
ALBUMIN SERPL BCP-MCNC: 3.3 G/DL
ALP SERPL-CCNC: 84 U/L
ALT SERPL W/O P-5'-P-CCNC: 22 U/L
ANION GAP SERPL CALC-SCNC: 13 MMOL/L
ANISOCYTOSIS BLD QL SMEAR: ABNORMAL
AST SERPL-CCNC: 17 U/L
BASOPHILS # BLD AUTO: ABNORMAL K/UL
BASOPHILS NFR BLD: 1 %
BILIRUB SERPL-MCNC: 0.1 MG/DL
BUN SERPL-MCNC: 11 MG/DL
CALCIUM SERPL-MCNC: 8.9 MG/DL
CHLORIDE SERPL-SCNC: 101 MMOL/L
CO2 SERPL-SCNC: 21 MMOL/L
CREAT SERPL-MCNC: 1.3 MG/DL
DACRYOCYTES BLD QL SMEAR: ABNORMAL
DIFFERENTIAL METHOD: ABNORMAL
EOSINOPHIL # BLD AUTO: ABNORMAL K/UL
EOSINOPHIL NFR BLD: 1 %
ERYTHROCYTE [DISTWIDTH] IN BLOOD BY AUTOMATED COUNT: 16.7 %
EST. GFR  (AFRICAN AMERICAN): >60 ML/MIN/1.73 M^2
EST. GFR  (NON AFRICAN AMERICAN): >60 ML/MIN/1.73 M^2
GLUCOSE SERPL-MCNC: 98 MG/DL
HCT VFR BLD AUTO: 27.3 %
HGB BLD-MCNC: 8.8 G/DL
HYPOCHROMIA BLD QL SMEAR: ABNORMAL
LYMPHOCYTES # BLD AUTO: ABNORMAL K/UL
LYMPHOCYTES NFR BLD: 26 %
MCH RBC QN AUTO: 32 PG
MCHC RBC AUTO-ENTMCNC: 32.2 G/DL
MCV RBC AUTO: 99 FL
METAMYELOCYTES NFR BLD MANUAL: 2 %
MONOCYTES # BLD AUTO: ABNORMAL K/UL
MONOCYTES NFR BLD: 14 %
NEUTROPHILS NFR BLD: 42 %
NEUTS BAND NFR BLD MANUAL: 14 %
NRBC BLD-RTO: 1 /100 WBC
PLATELET # BLD AUTO: 154 K/UL
PMV BLD AUTO: 8.7 FL
POIKILOCYTOSIS BLD QL SMEAR: SLIGHT
POLYCHROMASIA BLD QL SMEAR: ABNORMAL
POTASSIUM SERPL-SCNC: 3.6 MMOL/L
PROT SERPL-MCNC: 5.9 G/DL
RBC # BLD AUTO: 2.75 M/UL
SMUDGE CELLS BLD QL SMEAR: PRESENT
SODIUM SERPL-SCNC: 135 MMOL/L
WBC # BLD AUTO: 3.42 K/UL

## 2018-04-03 PROCEDURE — 96360 HYDRATION IV INFUSION INIT: CPT

## 2018-04-03 PROCEDURE — 96361 HYDRATE IV INFUSION ADD-ON: CPT

## 2018-04-03 PROCEDURE — 85007 BL SMEAR W/DIFF WBC COUNT: CPT

## 2018-04-03 PROCEDURE — 99283 EMERGENCY DEPT VISIT LOW MDM: CPT | Mod: 25

## 2018-04-03 PROCEDURE — 25000003 PHARM REV CODE 250

## 2018-04-03 PROCEDURE — 25000003 PHARM REV CODE 250: Performed by: NURSE PRACTITIONER

## 2018-04-03 PROCEDURE — 80053 COMPREHEN METABOLIC PANEL: CPT

## 2018-04-03 PROCEDURE — 85027 COMPLETE CBC AUTOMATED: CPT

## 2018-04-03 RX ORDER — SODIUM CHLORIDE 9 MG/ML
1000 INJECTION, SOLUTION INTRAVENOUS
Status: COMPLETED | OUTPATIENT
Start: 2018-04-03 | End: 2018-04-03

## 2018-04-03 RX ORDER — SODIUM BICARBONATE 650 MG/1
1 TABLET ORAL ONCE
Status: COMPLETED | OUTPATIENT
Start: 2018-04-03 | End: 2018-04-03

## 2018-04-03 RX ADMIN — PANCRELIPASE 2 CAPSULE: 30000; 6000; 19000 CAPSULE, DELAYED RELEASE PELLETS ORAL at 07:04

## 2018-04-03 RX ADMIN — SODIUM BICARBONATE 650 MG TABLET 650 MG: at 07:04

## 2018-04-03 RX ADMIN — SODIUM CHLORIDE 1000 ML: 0.9 INJECTION, SOLUTION INTRAVENOUS at 09:04

## 2018-04-03 RX ADMIN — SODIUM CHLORIDE 1000 ML: 0.9 INJECTION, SOLUTION INTRAVENOUS at 04:04

## 2018-04-03 NOTE — TELEPHONE ENCOUNTER
----- Message from Tesha Corbett sent at 4/3/2018  9:05 AM CDT -----  Contact: Self/ [phone]  Patient called in requesting to speak with you. Patient prefers to speak with a nurse.     Please call and advise.

## 2018-04-03 NOTE — TELEPHONE ENCOUNTER
----- Message from Blaire Simpson sent at 4/3/2018  9:56 AM CDT -----  Contact: pt   Nurse Lauryn:  Pt would like to be called back regarding documents for oral surgeon and for PEG tube     Pt can be reached at [phone].

## 2018-04-03 NOTE — ED PROVIDER NOTES
"Encounter Date: 4/3/2018    SCRIBE #1 NOTE: I, Elias Penny, am scribing for, and in the presence of,  Dr. Shi. I have scribed the entire note.       History     Chief Complaint   Patient presents with    clogged peg tube     "My peg tube is clogged and I been trying to unclog it for 24 hours"       Time seen by provider: 6:08 PM    This is a 48 y.o. male who presents with complaint of a clogged PEG tube since 7 PM last night. Patient reports that he attempted to clear the blockage of the PEG tube at home with Coke and water, but he was unable to resolve the issue. Patient denies any abdominal pain, vomiting, or nausea. He reports that he was seen at [facility] ER already for this problem, but they were unable to unclog the tube. The provider from [facility] spoke with Dr. Salinas, who agreed to see the patient in the [place] ER to determine how to replace the tube.This patient has a PEG tube placed due to his throat cancer. No other palliative or provocative factors except as noted.      The history is provided by the patient.     Review of patient's allergies indicates:  No Known Allergies  Past Medical History:   Diagnosis Date    Former smoker     HPV in male     Opiate addiction     Squamous cell carcinoma of palatine tonsil     throat and tonsillar     Past Surgical History:   Procedure Laterality Date    GASTROSTOMY TUBE PLACEMENT Left 02/12/2018     Family History   Problem Relation Age of Onset    Leukemia Mother     Hypertension Father     Thyroid disease Sister     Hypertension Brother     Brain cancer Maternal Uncle     Brain cancer Maternal Uncle      Social History   Substance Use Topics    Smoking status: Former Smoker     Packs/day: 1.50     Years: 25.00     Types: Cigarettes     Quit date: 06/2017    Smokeless tobacco: Never Used      Comment: smoked for about 25 years. quit 6 months ago    Alcohol use No     Review of Systems   Gastrointestinal:        Clogged PEG tube   All " other systems reviewed and are negative.      Physical Exam     Initial Vitals [04/03/18 1331]   BP Pulse Resp Temp SpO2   128/80 89 19 98.3 °F (36.8 °C) 97 %      MAP       96         Physical Exam    Constitutional: Vital signs are normal. He appears well-developed.   HENT:   Head: Normocephalic and atraumatic.   Neck: Neck supple.   Cardiovascular: Regular rhythm.   Abdominal: Soft. There is no tenderness. There is no rebound, no guarding, no tenderness at McBurney's point and negative Guerra's sign.   G-tube in LUQ which is clear, dry, and intact. However, tube will not flush or draw.   Musculoskeletal:   No deformity.   Neurological: He is alert and oriented to person, place, and time.   Skin: Skin is warm and dry.   Psychiatric: He has a normal mood and affect.         ED Course   Procedures  Labs Reviewed   CBC W/ AUTO DIFFERENTIAL - Abnormal; Notable for the following:        Result Value    WBC 3.42 (*)     RBC 2.75 (*)     Hemoglobin 8.8 (*)     Hematocrit 27.3 (*)     MCV 99 (*)     MCH 32.0 (*)     RDW 16.7 (*)     MPV 8.7 (*)     nRBC 1 (*)     All other components within normal limits   COMPREHENSIVE METABOLIC PANEL - Abnormal; Notable for the following:     Sodium 135 (*)     CO2 21 (*)     Total Protein 5.9 (*)     Albumin 3.3 (*)     All other components within normal limits        Imaging Results          X-Ray Abdomen AP 1 View (KUB) (Final result)  Result time 04/03/18 22:03:46    Final result by Alhaji Hardy MD (04/03/18 22:03:46)                 Impression:      Moderate gaseous distension of small bowel in the left mid abdomen, which could represent an ileus or partial small bowel obstruction.      Electronically signed by: Alhaji Hardy MD  Date:    04/03/2018  Time:    22:03             Narrative:    EXAMINATION:  XR ABDOMEN AP 1 VIEW    CLINICAL HISTORY:  Other mechanical complication of other gastrointestinal prosthetic devices, implants and grafts, initial  encounter    TECHNIQUE:  Single AP View of the abdomen was performed.    COMPARISON:  None    FINDINGS:  Bilateral hemidiaphragms are excluded from the field of view, limiting evaluation.  There is a prominent gas distended loop of small bowel in the left mid abdomen measuring up to 4 cm in diameter.  Bowel gas pattern is otherwise unremarkable.  Percutaneous gastrostomy tube overlies the left upper quadrant.  No large volume fecal burden is identified.                                   Medical Decision Making:   Clinical Tests:   Lab Tests: Ordered and Reviewed  ED Management:  6:20 PM  Spoke with Dr. Salinas, who recommends surgical consult.    6:30 PM   After discussion with transfer facility, who reports only attempted clearance of the tube was a warm water flush. We will attempt cola and pancreatic enzymes.     9:40 PM  Discussed with patient the removal and replacement of tube in the ED. Patient declined this procedure secondary to complications with initial tube placement and comorbidities. Patient states he is urinating well and that he feels comfortable to follow up with surgery tomorrow.     9:45 PM  Spoke with Dr. Kebede,who will see patient in clinic tomorrow.    10:25 PM  At time of disposition, we were able to obtain endoscopy brush which was used by nursing staff to clear tube, which now flushes easily. Patient is comfortable, exam benign, will follow up as previously planned.  X-ray findings noted; however patient does not clinically have any signs of bowel obstruction/ileus.  He notes normal bowel movements, normal flatus, no abdominal pain or tenderness.  Exam benign.  Discussed x-ray findings with patient who will monitor for symptoms and return to the emergency department for any changes.  Note that he is following up with surgery tomorrow.    Patient is nontoxic appearing in the ED.  No persistent emergent issues detected.  Exam benign.  We will discharge home to follow up with   Edson.  Patient will return to the ED as needed for any deterioration or any other concerns.  Verbal discharge instructions and return precautions given.                        Clinical Impression:     1. Feeding tube blocked, initial encounter    2. PEG tube malfunction            I, Brijesh Shi, personally performed the services described in this documentation. All medical record entries made by the scribe were at my direction and in my presence.  I have reviewed the chart and agree that the record reflects my personal performance and is accurate and complete. Brijesh Shi MD  04/03/18 7771

## 2018-04-03 NOTE — ED NOTES
Pt presents to the ED via EMS from [facility] c/o PEG tube complications at [facility]

## 2018-04-03 NOTE — ED PROVIDER NOTES
"Encounter Date: 4/3/2018       History     Chief Complaint   Patient presents with    clogged peg tube     "My peg tube is clogged and I been trying to unclog it for 24 hours"      48-year-old male presents to emergency room with clogged J-tube since this morning.  Patient states he attempted to unclog at home with cold water, warm water and .  Patient has a PEG tube due to throat cancer and thrush.  States he was placed approximately 2 months ago.  Has not followed up since placement but has had 2 previous issues of clogged PEG tube as.  Denies any abdominal pain or distention.          Review of patient's allergies indicates:  No Known Allergies  Past Medical History:   Diagnosis Date    Former smoker     HPV in male     Opiate addiction     Squamous cell carcinoma of palatine tonsil     throat and tonsillar     Past Surgical History:   Procedure Laterality Date    GASTROSTOMY TUBE PLACEMENT Left 02/12/2018     Family History   Problem Relation Age of Onset    Leukemia Mother     Hypertension Father     Thyroid disease Sister     Hypertension Brother     Brain cancer Maternal Uncle     Brain cancer Maternal Uncle      Social History   Substance Use Topics    Smoking status: Former Smoker     Packs/day: 1.50     Years: 25.00     Types: Cigarettes     Quit date: 06/2017    Smokeless tobacco: Never Used      Comment: smoked for about 25 years. quit 6 months ago    Alcohol use No     Review of Systems   Constitutional: Negative for fever.   HENT: Negative for sore throat.    Respiratory: Negative for shortness of breath.    Cardiovascular: Negative for chest pain.   Gastrointestinal: Negative for nausea.        PEG tube issues   Genitourinary: Negative for dysuria.   Musculoskeletal: Negative for back pain.   Skin: Negative for rash.   Neurological: Negative for weakness.   Hematological: Does not bruise/bleed easily.   All other systems reviewed and are negative.      Physical Exam     Initial " Vitals [04/03/18 1331]   BP Pulse Resp Temp SpO2   128/80 89 19 98.3 °F (36.8 °C) 97 %      MAP       96         Physical Exam    Nursing note and vitals reviewed.  Constitutional: He appears well-developed and well-nourished. He is not diaphoretic. No distress.   HENT:   Head: Normocephalic and atraumatic.   Eyes: Conjunctivae are normal.   Neck: Normal range of motion. Neck supple.   Cardiovascular: Normal rate and regular rhythm.   Pulmonary/Chest: Breath sounds normal. No respiratory distress. He exhibits no tenderness.   Abdominal: Soft. He exhibits no distension. There is no tenderness.       Musculoskeletal: Normal range of motion. He exhibits no tenderness.   Neurological: He is alert and oriented to person, place, and time. He has normal strength. No cranial nerve deficit or sensory deficit.   Skin: Skin is warm and dry.   Psychiatric: He has a normal mood and affect. His behavior is normal. Judgment and thought content normal.         ED Course   Procedures  Labs Reviewed - No data to display          Medical Decision Making:   Initial Assessment:   48-year-old male presents to emergency room with clogged J-tube since this morning.  Patient states he attempted to unclog at home with cold water, warm water and .   Patient has a PEG tube due to throat cancer and thrush.  States he was placed approximately 2 months ago.  Has not followed up since placement but has had 2 previous issues of clogged PEG tube as.  Denies any abdominal pain or distention.  Patient was placed on February 11 due to throat cancer and thrush.  Patient had a large tumor that time and was unable to be intubated for surgical procedures therefore an 18 [place] tube was placed at [facility].  Patient has since had 3 rounds of radiation and the tumor shrunk however he is still unable to take those foods by mouth.  Patient takes all medication and fluids and initiation through G-tube.  Abdomen is soft nontender.    Attempted  to irrigate tube unsuccessfully with warm water.  Irrigation was unsuccessful.  ED Management:  Spoke with Dr. Rita BURNETT if patient is sent to [facility] [place] he will be evaluated in the emergency room at that time to determine how to needs to be replaced.  Patient accepted by Dr. Grande for transfer to ED at [facility].                      Clinical Impression:   The primary encounter diagnosis was Feeding tube blocked, initial encounter. Diagnoses of PEG tube malfunction and Feeding tube blocked were also pertinent to this visit.                           Elle Pradhan NP  04/05/18 1100

## 2018-04-03 NOTE — TELEPHONE ENCOUNTER
Spoke to patient, he is getting ready to go to the Er, let him know we would work on getting him set up with surgery to to evaluate the PEG. Discussed the possibility of home health if he qualifies. Patient verbalizes an understanding.

## 2018-04-03 NOTE — TELEPHONE ENCOUNTER
Spoke with patient and he state that his feeding tube is blocked and he tried everything to get it unblocked.I advised patient to go to the GI doctor that placed the feeding tune or go to the ED.Patient voiced understanding.

## 2018-04-04 DIAGNOSIS — C09.9 SQUAMOUS CELL CARCINOMA OF PALATINE TONSIL: Primary | ICD-10-CM

## 2018-04-04 NOTE — ED NOTES
Unable to flush PEG tube after instillation of medication through tube. Dr. Shi notified. Pt and family updated on plan of care.

## 2018-04-06 ENCOUNTER — TELEPHONE (OUTPATIENT)
Dept: HEMATOLOGY/ONCOLOGY | Facility: CLINIC | Age: 48
End: 2018-04-06

## 2018-04-06 NOTE — TELEPHONE ENCOUNTER
Received consult re: arranging home health services for patient. Called patient and discussed services ordered. Patient stated that he doesn't feel he needs home health at this time, but maybe in a month or so. He is doing pretty good and has returned to work this week. He reported that his sister and family will be coming to town and will be able to help him if needed. He has an upcoming appt. to see the surgeon who placed his feeding tube. He also has a dental appt. next week and will have to get his teeth extracted so he can move forward with radiation treatments. He will call the clinic if/when things change and he feels he needs a home health nurse to check on him. Notifying Dr. Lopez and staff via Epic message.

## 2018-04-09 ENCOUNTER — TELEPHONE (OUTPATIENT)
Dept: HEMATOLOGY/ONCOLOGY | Facility: CLINIC | Age: 48
End: 2018-04-09

## 2018-04-09 ENCOUNTER — PATIENT MESSAGE (OUTPATIENT)
Dept: HEMATOLOGY/ONCOLOGY | Facility: CLINIC | Age: 48
End: 2018-04-09

## 2018-04-09 DIAGNOSIS — G89.3 NEOPLASM RELATED PAIN: Primary | ICD-10-CM

## 2018-04-09 RX ORDER — OXYCODONE HCL 5 MG/5 ML
5 SOLUTION, ORAL ORAL EVERY 6 HOURS PRN
Qty: 473 ML | Refills: 0 | Status: SHIPPED | OUTPATIENT
Start: 2018-04-09 | End: 2018-04-10 | Stop reason: SDUPTHER

## 2018-04-09 NOTE — TELEPHONE ENCOUNTER
----- Message from Thea Bach sent at 4/9/2018  2:50 PM CDT -----  Contact: Pt  Pt called to get a refill on medication  oxyCODONE (ROXICODONE) 5 mg/5 mL Soln  [address]   [phone] (Phone)  [phone] (fax  Callback#[phone]  Thank You  Xin

## 2018-04-10 ENCOUNTER — PATIENT MESSAGE (OUTPATIENT)
Dept: HEMATOLOGY/ONCOLOGY | Facility: CLINIC | Age: 48
End: 2018-04-10

## 2018-04-10 ENCOUNTER — TELEPHONE (OUTPATIENT)
Dept: PHARMACY | Facility: CLINIC | Age: 48
End: 2018-04-10

## 2018-04-10 DIAGNOSIS — G89.3 NEOPLASM RELATED PAIN: ICD-10-CM

## 2018-04-10 RX ORDER — OXYCODONE HCL 5 MG/5 ML
5 SOLUTION, ORAL ORAL EVERY 6 HOURS PRN
Qty: 473 ML | Refills: 0 | Status: SHIPPED | OUTPATIENT
Start: 2018-04-10 | End: 2018-05-03 | Stop reason: SDUPTHER

## 2018-04-11 ENCOUNTER — TELEPHONE (OUTPATIENT)
Dept: HEMATOLOGY/ONCOLOGY | Facility: CLINIC | Age: 48
End: 2018-04-11

## 2018-04-12 ENCOUNTER — TELEPHONE (OUTPATIENT)
Dept: RADIATION ONCOLOGY | Facility: CLINIC | Age: 48
End: 2018-04-12

## 2018-04-12 ENCOUNTER — PATIENT MESSAGE (OUTPATIENT)
Dept: RADIATION ONCOLOGY | Facility: CLINIC | Age: 48
End: 2018-04-12

## 2018-04-12 ENCOUNTER — PATIENT MESSAGE (OUTPATIENT)
Dept: ADMINISTRATIVE | Facility: HOSPITAL | Age: 48
End: 2018-04-12

## 2018-04-12 NOTE — TELEPHONE ENCOUNTER
Spoke with pt to answer his questions. Told him not PET was needed and that his SIM was set for the 4/17 @ 1PM and he would be getting weekly chemo and no pump would be needed.

## 2018-04-13 ENCOUNTER — TELEPHONE (OUTPATIENT)
Dept: HEMATOLOGY/ONCOLOGY | Facility: CLINIC | Age: 48
End: 2018-04-13

## 2018-04-14 PROBLEM — K94.23 PEG TUBE MALFUNCTION: Status: ACTIVE | Noted: 2018-04-14

## 2018-04-17 ENCOUNTER — HOSPITAL ENCOUNTER (OUTPATIENT)
Dept: RADIATION THERAPY | Facility: HOSPITAL | Age: 48
Discharge: HOME | End: 2018-04-17
Attending: RADIOLOGY
Payer: COMMERCIAL

## 2018-04-17 PROCEDURE — 77290 THER RAD SIMULAJ FIELD CPLX: CPT | Mod: 26,,, | Performed by: RADIOLOGY

## 2018-04-17 PROCEDURE — 77334 RADIATION TREATMENT AID(S): CPT | Mod: TC | Performed by: RADIOLOGY

## 2018-04-17 PROCEDURE — 77334 RADIATION TREATMENT AID(S): CPT | Mod: 26,,, | Performed by: RADIOLOGY

## 2018-04-17 PROCEDURE — 77290 THER RAD SIMULAJ FIELD CPLX: CPT | Mod: TC | Performed by: RADIOLOGY

## 2018-04-17 PROCEDURE — 77014 HC CT GUIDANCE RADIATION THERAPY FLDS PLACEMENT: CPT | Mod: TC | Performed by: RADIOLOGY

## 2018-04-17 PROCEDURE — 77263 THER RADIOLOGY TX PLNG CPLX: CPT | Mod: ,,, | Performed by: RADIOLOGY

## 2018-04-18 ENCOUNTER — PATIENT MESSAGE (OUTPATIENT)
Dept: HEMATOLOGY/ONCOLOGY | Facility: CLINIC | Age: 48
End: 2018-04-18

## 2018-04-23 ENCOUNTER — TELEPHONE (OUTPATIENT)
Dept: HEMATOLOGY/ONCOLOGY | Facility: CLINIC | Age: 48
End: 2018-04-23

## 2018-04-27 ENCOUNTER — TELEPHONE (OUTPATIENT)
Dept: SURGERY | Facility: CLINIC | Age: 48
End: 2018-04-27

## 2018-04-27 NOTE — TELEPHONE ENCOUNTER
Pt called back.  He will continue to f/u with Dr. Quintero on the [facility] for his PEG tube issues.  He verbalized understanding.

## 2018-04-27 NOTE — TELEPHONE ENCOUNTER
----- Message from Lis Monroy RN sent at 4/26/2018 11:42 AM CDT -----  Please see below.  ----- Message -----  From: Lissa Sanon RN  Sent: 4/4/2018   3:12 PM  To: [facility] General Surgery Clinical Support    This was a referral from Dr. Lopez to surgical oncology but this would be a general surgery f/u. Dr. Haas put in the PEG tube. They need a surgeon to help manage it.

## 2018-04-30 PROCEDURE — 77301 RADIOTHERAPY DOSE PLAN IMRT: CPT | Mod: 26,,, | Performed by: RADIOLOGY

## 2018-04-30 PROCEDURE — 77301 RADIOTHERAPY DOSE PLAN IMRT: CPT | Mod: TC | Performed by: RADIOLOGY

## 2018-05-01 ENCOUNTER — PATIENT MESSAGE (OUTPATIENT)
Dept: RADIATION ONCOLOGY | Facility: CLINIC | Age: 48
End: 2018-05-01

## 2018-05-01 ENCOUNTER — HOSPITAL ENCOUNTER (OUTPATIENT)
Dept: RADIATION THERAPY | Facility: HOSPITAL | Age: 48
Discharge: HOME | End: 2018-05-01
Attending: RADIOLOGY
Payer: COMMERCIAL

## 2018-05-01 PROCEDURE — 77300 RADIATION THERAPY DOSE PLAN: CPT | Mod: 26,,, | Performed by: RADIOLOGY

## 2018-05-01 PROCEDURE — 77470 SPECIAL RADIATION TREATMENT: CPT | Mod: 26,59,, | Performed by: RADIOLOGY

## 2018-05-01 PROCEDURE — 77300 RADIATION THERAPY DOSE PLAN: CPT | Mod: TC | Performed by: RADIOLOGY

## 2018-05-01 PROCEDURE — 77338 DESIGN MLC DEVICE FOR IMRT: CPT | Mod: TC | Performed by: RADIOLOGY

## 2018-05-01 PROCEDURE — 77470 SPECIAL RADIATION TREATMENT: CPT | Mod: 59,TC | Performed by: RADIOLOGY

## 2018-05-01 PROCEDURE — 77338 DESIGN MLC DEVICE FOR IMRT: CPT | Mod: 26,,, | Performed by: RADIOLOGY

## 2018-05-02 PROCEDURE — 77280 THER RAD SIMULAJ FIELD SMPL: CPT | Mod: 26,,, | Performed by: RADIOLOGY

## 2018-05-02 PROCEDURE — 77280 THER RAD SIMULAJ FIELD SMPL: CPT | Mod: TC | Performed by: RADIOLOGY

## 2018-05-03 ENCOUNTER — OFFICE VISIT (OUTPATIENT)
Dept: HEMATOLOGY/ONCOLOGY | Facility: CLINIC | Age: 48
End: 2018-05-03
Payer: COMMERCIAL

## 2018-05-03 ENCOUNTER — INFUSION (OUTPATIENT)
Dept: INFUSION THERAPY | Facility: HOSPITAL | Age: 48
End: 2018-05-03
Attending: INTERNAL MEDICINE
Payer: COMMERCIAL

## 2018-05-03 ENCOUNTER — HOSPITAL ENCOUNTER (OUTPATIENT)
Dept: RADIOLOGY | Facility: HOSPITAL | Age: 48
Discharge: HOME | End: 2018-05-03
Attending: INTERNAL MEDICINE
Payer: COMMERCIAL

## 2018-05-03 VITALS
RESPIRATION RATE: 20 BRPM | HEART RATE: 61 BPM | TEMPERATURE: 98 F | DIASTOLIC BLOOD PRESSURE: 62 MMHG | SYSTOLIC BLOOD PRESSURE: 109 MMHG

## 2018-05-03 VITALS
RESPIRATION RATE: 19 BRPM | HEART RATE: 61 BPM | OXYGEN SATURATION: 100 % | SYSTOLIC BLOOD PRESSURE: 125 MMHG | HEIGHT: 67 IN | WEIGHT: 232.81 LBS | TEMPERATURE: 98 F | DIASTOLIC BLOOD PRESSURE: 69 MMHG | BODY MASS INDEX: 36.54 KG/M2

## 2018-05-03 DIAGNOSIS — C09.9 SQUAMOUS CELL CARCINOMA OF PALATINE TONSIL: Primary | ICD-10-CM

## 2018-05-03 DIAGNOSIS — G89.3 NEOPLASM RELATED PAIN: ICD-10-CM

## 2018-05-03 DIAGNOSIS — T85.848A PAIN AROUND PERCUTANEOUS ENDOSCOPIC GASTROSTOMY (PEG) TUBE SITE, INITIAL ENCOUNTER: ICD-10-CM

## 2018-05-03 DIAGNOSIS — C09.9 SQUAMOUS CELL CARCINOMA OF PALATINE TONSIL: ICD-10-CM

## 2018-05-03 DIAGNOSIS — C09.9 TONSIL CANCER: Primary | ICD-10-CM

## 2018-05-03 DIAGNOSIS — C77.0 CANCER OF HEAD, FACE, OR NECK LYMPH NODES, SECONDARY: ICD-10-CM

## 2018-05-03 PROCEDURE — 77387 GUIDANCE FOR RADJ TX DLVR: CPT | Mod: 26,,, | Performed by: RADIOLOGY

## 2018-05-03 PROCEDURE — 25000003 PHARM REV CODE 250: Performed by: INTERNAL MEDICINE

## 2018-05-03 PROCEDURE — 3008F BODY MASS INDEX DOCD: CPT | Mod: CPTII,S$GLB,, | Performed by: INTERNAL MEDICINE

## 2018-05-03 PROCEDURE — 71260 CT THORAX DX C+: CPT | Mod: 26,,, | Performed by: RADIOLOGY

## 2018-05-03 PROCEDURE — 99999 PR PBB SHADOW E&M-EST. PATIENT-LVL IV: CPT | Mod: PBBFAC,,, | Performed by: INTERNAL MEDICINE

## 2018-05-03 PROCEDURE — 96367 TX/PROPH/DG ADDL SEQ IV INF: CPT

## 2018-05-03 PROCEDURE — 70491 CT SOFT TISSUE NECK W/DYE: CPT | Mod: TC

## 2018-05-03 PROCEDURE — 77386 HC IMRT, COMPLEX: CPT | Performed by: RADIOLOGY

## 2018-05-03 PROCEDURE — 70491 CT SOFT TISSUE NECK W/DYE: CPT | Mod: 26,,, | Performed by: RADIOLOGY

## 2018-05-03 PROCEDURE — 25500020 PHARM REV CODE 255: Performed by: INTERNAL MEDICINE

## 2018-05-03 PROCEDURE — 96361 HYDRATE IV INFUSION ADD-ON: CPT

## 2018-05-03 PROCEDURE — 63600175 PHARM REV CODE 636 W HCPCS: Mod: JG | Performed by: INTERNAL MEDICINE

## 2018-05-03 PROCEDURE — 96413 CHEMO IV INFUSION 1 HR: CPT

## 2018-05-03 PROCEDURE — 99215 OFFICE O/P EST HI 40 MIN: CPT | Mod: S$GLB,,, | Performed by: INTERNAL MEDICINE

## 2018-05-03 RX ORDER — NYSTATIN 100000 U/G
CREAM TOPICAL 2 TIMES DAILY
Qty: 30 G | Refills: 6 | Status: SHIPPED | OUTPATIENT
Start: 2018-05-03 | End: 2018-10-01

## 2018-05-03 RX ORDER — SODIUM CHLORIDE 0.9 % (FLUSH) 0.9 %
10 SYRINGE (ML) INJECTION
Status: CANCELLED | OUTPATIENT
Start: 2018-05-03

## 2018-05-03 RX ORDER — HEPARIN 100 UNIT/ML
500 SYRINGE INTRAVENOUS
Status: DISCONTINUED | OUTPATIENT
Start: 2018-05-03 | End: 2018-05-03 | Stop reason: HOSPADM

## 2018-05-03 RX ORDER — SODIUM CHLORIDE 0.9 % (FLUSH) 0.9 %
10 SYRINGE (ML) INJECTION
Status: DISCONTINUED | OUTPATIENT
Start: 2018-05-03 | End: 2018-05-03 | Stop reason: HOSPADM

## 2018-05-03 RX ORDER — OXYCODONE HCL 5 MG/5 ML
5 SOLUTION, ORAL ORAL EVERY 6 HOURS PRN
Qty: 473 ML | Refills: 0 | Status: CANCELLED | OUTPATIENT
Start: 2018-05-03

## 2018-05-03 RX ORDER — AMOXICILLIN AND CLAVULANATE POTASSIUM 400; 57 MG/5ML; MG/5ML
10 POWDER, FOR SUSPENSION ORAL 2 TIMES DAILY
Qty: 200 ML | Refills: 0 | Status: SHIPPED | OUTPATIENT
Start: 2018-05-03 | End: 2018-05-13

## 2018-05-03 RX ORDER — HEPARIN 100 UNIT/ML
500 SYRINGE INTRAVENOUS
Status: CANCELLED | OUTPATIENT
Start: 2018-05-03

## 2018-05-03 RX ORDER — OXYCODONE HCL 5 MG/5 ML
5 SOLUTION, ORAL ORAL EVERY 6 HOURS PRN
Qty: 473 ML | Refills: 0 | Status: SHIPPED | OUTPATIENT
Start: 2018-05-03 | End: 2018-05-17 | Stop reason: SDUPTHER

## 2018-05-03 RX ADMIN — CISPLATIN 89 MG: 100 INJECTION, SOLUTION INTRAVENOUS at 01:05

## 2018-05-03 RX ADMIN — DEXAMETHASONE SODIUM PHOSPHATE: 4 INJECTION, SOLUTION INTRAMUSCULAR; INTRAVENOUS at 01:05

## 2018-05-03 RX ADMIN — SODIUM CHLORIDE: 9 INJECTION, SOLUTION INTRAVENOUS at 02:05

## 2018-05-03 RX ADMIN — IOHEXOL 100 ML: 350 INJECTION, SOLUTION INTRAVENOUS at 06:05

## 2018-05-03 RX ADMIN — SODIUM CHLORIDE: 0.9 INJECTION, SOLUTION INTRAVENOUS at 12:05

## 2018-05-03 NOTE — Clinical Note
CT neck and Ultrasound upper extremities today Schedule follow-up with Ovi TORO Schedule CBC,CMP, mag and phos and see me or another MD in 1 week and for Cisplatin, Day 2 IV fluids. Schedule CBC,CMP, Mag and phos and see me in 2 weeks and for Cisplatin. Day 2 IV fluids

## 2018-05-03 NOTE — PROGRESS NOTES
Subjective:       Patient ID: Burton Whiting is a 48 y.o. male.    Chief Complaint: Squamous cell carcinoma of palatine tonsil; g-tube malodorous; left neck pain 2/10; 4 cans formula per day; and r arm port---red/warm to touch  Mr. Burton Whiting is a 48-year-old male, former smoker, who presents today with a four-month history of enlarging neck mass.  He initially delayed care due to lack of insurance.  He was seen by Dr. Turpin who referred him to see Dr. Olguin.  He had a CT that showed a 3.8 x 3.8 x 4.9 cm soft tissue mass arising from the left palatine tonsil resulting in moderate narrowing of the hypopharyngeal airway adjacent extensive matted left cervical lymphadenopathy was noted.  The largest ella mass was 6.6 x 9 x 2.6 cm extending inferiorly to the supraclavicular region.  The mass pushed the trachea and the left common carotid artery to the right.  Additional representative of cervical lymph nodes measuring 2.9 x 3.1 x 3.5 cm on axial image 37 of series 3   and 2.4 x 2.1 x 2.2 cm on axial image 55 of series 3.  There is also a sclerotic lesion involving the midline of the clivus measuring 1.2 cm.  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  PET scan performed on 01/19/2018 revealed persistent evidence of a soft tissue mass arising from the left palatine tonsil resulting in moderate narrowing of the hypopharyngeal   airway.  The mass is hypermetabolic demonstrating an SUV max of 18.5.  There is also persistent adjacent extensive matted left cervical lymphadenopathy, largest of this measuring 6.7 x 8.42 with hypermetabolic activity and SUV max of 20.5.  Lymphadenopathy is again noted to have a mass effect on the trachea and left common carotid artery, pushing both structures towards the right.  There is also prominent right cervical lymph nodes with the largest measuring 0.8 cm and demonstrating mild hypermetabolic activity with SUV max of 1.8, physiologic   distribution of FDG in  "the gray matter.  There are 3 pulmonary nodules noted within the right lung, the largest is in the anterior segment of the right upper lobe measuring 1 cm, second is visualized in the middle lobe measuring 0.6 cm and the third is within the posterior basal segment measuring 0.6 cm.  There is one pulmonary nodule within the left lung within the lateral basal segment measuring 0.6 cm.  These nodules do not demonstrate hypermetabolic activity; however, they are below the threshold for observation with PET     HPIHe underwent MRI cervical spine revealed "No evidence of metastatic disease to the cervical spine. Multilevel degenerative changes of the cervical spine with disc protrusion at C5-6 which results in moderate to severe spinal canal stenosis and and associated cord signal abnormality, suggestive of compressive myelopathy. 6 mm T1 hypointense non-enhancing lesion in the clivus.  Continued followup is suggested. 9 mm inferior descent of the cerebellar tonsils below the foramen magnum, suggestive of Chiari 1 malformation"  MRI thoracic spine "No evidence of metastatic disease involving the thoracic spine. Incidental hemangioma involving the T7 vertebral body. Mild degenerative disc disease without any significant spinal canal stenosis or neuroforaminal narrowing.       HPI He completed 3 cycles of TPF and plan is to start definitive chemo./RT today. There has been a delay in dental extractions and he is now ready to start  Last PET from 3/13/18 revealed "Mixed response to treatment. Decreased size and activity of left palatine tonsillar mass and left cervical mass, with mildly increased hypermetabolic activity of a right cervical node and sclerotic focus within the clivus"  He feels his neck mass is slightly larger. He has finally has all his teeth extracted.          Review of Systems   Constitutional: Negative for appetite change, fatigue and unexpected weight change.   HENT: Negative for mouth sores.         Neck " mass +   Eyes: Negative for visual disturbance.   Respiratory: Negative for cough and shortness of breath.    Cardiovascular: Negative for chest pain.   Gastrointestinal: Negative for abdominal pain and diarrhea.   Genitourinary: Negative for frequency.   Musculoskeletal: Negative for back pain.   Skin: Negative for rash.   Neurological: Negative for headaches.   Hematological: Negative for adenopathy.   Psychiatric/Behavioral: The patient is not nervous/anxious.    All other systems reviewed and are negative.      Objective:      Physical Exam   Constitutional: He is oriented to person, place, and time. He appears well-developed and well-nourished.   HENT:   Mouth/Throat: No oropharyngeal exudate.   Neck:   Left neck mass ~ 5 cm and hard.   Cardiovascular: Normal rate and normal heart sounds.    Pulmonary/Chest: Effort normal and breath sounds normal. He has no wheezes.   Abdominal: Soft. Bowel sounds are normal. There is no tenderness.   Musculoskeletal: He exhibits no edema or tenderness.   Lymphadenopathy:     He has no cervical adenopathy.   Neurological: He is alert and oriented to person, place, and time. Coordination normal.   Skin: Skin is warm and dry. No rash noted.   Psychiatric: He has a normal mood and affect. Judgment and thought content normal.   Vitals reviewed.        LABS:  WBC   Date Value Ref Range Status   05/03/2018 4.59 3.90 - 12.70 K/uL Final     Hemoglobin   Date Value Ref Range Status   05/03/2018 8.9 (L) 14.0 - 18.0 g/dL Final     Hematocrit   Date Value Ref Range Status   05/03/2018 27.4 (L) 40.0 - 54.0 % Final     Platelets   Date Value Ref Range Status   05/03/2018 239 150 - 350 K/uL Final     Gran # (ANC)   Date Value Ref Range Status   05/03/2018 2.8 1.8 - 7.7 K/uL Final     Comment:     The ANC is based on a white cell differential from an   automated cell counter. It has not been microscopically   reviewed for the presence of abnormal cells. Clinical   correlation is required.          Chemistry        Component Value Date/Time     05/03/2018 0802    K 4.7 05/03/2018 0802     05/03/2018 0802    CO2 31 (H) 05/03/2018 0802    BUN 16 05/03/2018 0802    CREATININE 1.2 05/03/2018 0802     (H) 05/03/2018 0802        Component Value Date/Time    CALCIUM 9.6 05/03/2018 0802    ALKPHOS 70 05/03/2018 0802    AST 27 05/03/2018 0802    ALT 18 05/03/2018 0802    BILITOT 0.6 05/03/2018 0802    ESTGFRAFRICA >60.0 05/03/2018 0802    EGFRNONAA >60.0 05/03/2018 0802          Assessment:       1. Squamous cell carcinoma of palatine tonsil    2. Cancer of head, face, or neck lymph nodes, secondary    3. Neoplasm related pain    4. Pain around percutaneous endoscopic gastrostomy (PEG) tube site, initial encounter        Plan:        1,2. He will proceed with Cisplatin weekly with concurrent RT and will return in 1 week for next chcmeo. Will also obtain CT neck to get a baseline imaging of his cancer.  3,4. PORT siote appears red. Escribed antibiotic and will also obtain ultrasound of his upper extremity.   Site around PEG tube appears soggy and erythematous. Escribed Nystatin cream.    Above care plan was discussed with patient and accompanying brother and sister (on the telephone) and all questions were addressed to their satisfaction

## 2018-05-03 NOTE — PLAN OF CARE
Problem: Patient Care Overview  Goal: Plan of Care Review  Outcome: Ongoing (interventions implemented as appropriate)  Tolerated treatment well.  Advised to call MD for any problems or concerns.  AVS given.  RTC on tomorrow for day 2 ivf's . NAD noted upon discharge.

## 2018-05-03 NOTE — PLAN OF CARE
Problem: Chemotherapy Effects (Adult)  Goal: Signs and Symptoms of Listed Potential Problems Will be Absent, Minimized or Managed (Chemotherapy Effects)  Signs and symptoms of listed potential problems will be absent, minimized or managed by discharge/transition of care (reference Chemotherapy Effects (Adult) CPG).   Outcome: Ongoing (interventions implemented as appropriate)  Here for cycle 1 day1 of weekly Cisplatin with radiation.  No questions at this time.  Comfort measures provided.  Port not used today d/t redness and warmth.  MD aware.

## 2018-05-03 NOTE — TELEPHONE ENCOUNTER
----- Message from Lauryn Vela RN sent at 5/3/2018 10:52 AM CDT -----  Yes, that is OK.  Thank you  ~lauryn    ----- Message -----  From: Yue Engle  Sent: 5/3/2018  10:49 AM  To: ALLEN Russell Dr. is out on 5/10 and Dr. Sneed doesn't have anythign (he is on service). can I schedule with Dr. Nicole?     Message Received: Today   Message Contents MD Vilma Mcfarland    CT neck and Ultrasound upper extremities today   Schedule follow-up with Ovi TORO   Schedule CBC,CMP, mag and phos and see me or another MD in 1 week and for Cisplatin, Day 2 IV fluids.   Schedule CBC,CMP, Mag and phos and see me in 2 weeks and for Cisplatin. Day 2 IV fluids

## 2018-05-04 ENCOUNTER — HOSPITAL ENCOUNTER (OUTPATIENT)
Dept: RADIOLOGY | Facility: HOSPITAL | Age: 48
Discharge: HOME | End: 2018-05-04
Attending: INTERNAL MEDICINE
Payer: COMMERCIAL

## 2018-05-04 ENCOUNTER — PATIENT MESSAGE (OUTPATIENT)
Dept: HEMATOLOGY/ONCOLOGY | Facility: CLINIC | Age: 48
End: 2018-05-04

## 2018-05-04 ENCOUNTER — INFUSION (OUTPATIENT)
Dept: INFUSION THERAPY | Facility: HOSPITAL | Age: 48
End: 2018-05-04
Attending: INTERNAL MEDICINE
Payer: COMMERCIAL

## 2018-05-04 ENCOUNTER — TELEPHONE (OUTPATIENT)
Dept: HEMATOLOGY/ONCOLOGY | Facility: CLINIC | Age: 48
End: 2018-05-04

## 2018-05-04 VITALS
HEART RATE: 62 BPM | TEMPERATURE: 98 F | RESPIRATION RATE: 18 BRPM | DIASTOLIC BLOOD PRESSURE: 70 MMHG | SYSTOLIC BLOOD PRESSURE: 114 MMHG

## 2018-05-04 DIAGNOSIS — C09.9 TONSIL CANCER: Primary | ICD-10-CM

## 2018-05-04 DIAGNOSIS — C09.9 SQUAMOUS CELL CARCINOMA OF PALATINE TONSIL: ICD-10-CM

## 2018-05-04 DIAGNOSIS — C77.0 CANCER OF HEAD, FACE, OR NECK LYMPH NODES, SECONDARY: ICD-10-CM

## 2018-05-04 PROCEDURE — 96360 HYDRATION IV INFUSION INIT: CPT

## 2018-05-04 PROCEDURE — 93970 EXTREMITY STUDY: CPT | Mod: TC,PO

## 2018-05-04 PROCEDURE — 77387 GUIDANCE FOR RADJ TX DLVR: CPT | Mod: 26,,, | Performed by: RADIOLOGY

## 2018-05-04 PROCEDURE — 77386 HC IMRT, COMPLEX: CPT | Performed by: RADIOLOGY

## 2018-05-04 NOTE — NURSING
1600:  [facility], assessment completed.  #20 gauge saline lock patent and intact to left forearm, placed by radiology today, for CT scan.  Flushes easily, blood return noted. IVF's initiated.

## 2018-05-04 NOTE — TELEPHONE ENCOUNTER
----- Message from Thea Bach sent at 5/4/2018 10:40 AM CDT -----  Contact: Pt  Patient Returning Call    Who Called: Burton Whiting   Who Left Message for Patient: Yue Engle  Does the patient know what this is regarding?: Schedule Appt  Communication Preference (Ald[place]art response to Pt. (or) Call Back # and timeframe)[phone]  Thank You  ITZ Bach  Additional Information:

## 2018-05-04 NOTE — TELEPHONE ENCOUNTER
Returned call, spoke with patient and assisted with moving his fluid appointment on Friday so he could see the dietician as well on Friday.

## 2018-05-04 NOTE — PLAN OF CARE
Problem: Patient Care Overview  Goal: Plan of Care Review  Outcome: Ongoing (interventions implemented as appropriate)  1720:  Patient tolerated 1 liter of IVF well.  PIV removed intact.  AVS declined, released to home in stable condition, accompanied by his brother.

## 2018-05-07 PROCEDURE — 77386 HC IMRT, COMPLEX: CPT | Performed by: RADIOLOGY

## 2018-05-07 PROCEDURE — 77387 GUIDANCE FOR RADJ TX DLVR: CPT | Mod: 26,,, | Performed by: RADIOLOGY

## 2018-05-08 PROCEDURE — 77386 HC IMRT, COMPLEX: CPT | Performed by: RADIOLOGY

## 2018-05-08 PROCEDURE — 77387 GUIDANCE FOR RADJ TX DLVR: CPT | Mod: 26,,, | Performed by: RADIOLOGY

## 2018-05-09 PROCEDURE — 77387 GUIDANCE FOR RADJ TX DLVR: CPT | Mod: 26,,, | Performed by: RADIOLOGY

## 2018-05-09 PROCEDURE — 77386 HC IMRT, COMPLEX: CPT | Performed by: RADIOLOGY

## 2018-05-10 ENCOUNTER — OFFICE VISIT (OUTPATIENT)
Dept: HEMATOLOGY/ONCOLOGY | Facility: CLINIC | Age: 48
End: 2018-05-10
Payer: COMMERCIAL

## 2018-05-10 ENCOUNTER — INFUSION (OUTPATIENT)
Dept: INFUSION THERAPY | Facility: HOSPITAL | Age: 48
End: 2018-05-10
Attending: INTERNAL MEDICINE
Payer: COMMERCIAL

## 2018-05-10 ENCOUNTER — DOCUMENTATION ONLY (OUTPATIENT)
Dept: RADIATION ONCOLOGY | Facility: CLINIC | Age: 48
End: 2018-05-10

## 2018-05-10 ENCOUNTER — LAB VISIT (OUTPATIENT)
Dept: LAB | Facility: HOSPITAL | Age: 48
End: 2018-05-10
Attending: INTERNAL MEDICINE
Payer: COMMERCIAL

## 2018-05-10 VITALS
SYSTOLIC BLOOD PRESSURE: 115 MMHG | HEIGHT: 67 IN | BODY MASS INDEX: 36.47 KG/M2 | HEART RATE: 69 BPM | DIASTOLIC BLOOD PRESSURE: 59 MMHG | TEMPERATURE: 98 F | WEIGHT: 232.38 LBS

## 2018-05-10 VITALS
SYSTOLIC BLOOD PRESSURE: 113 MMHG | TEMPERATURE: 98 F | RESPIRATION RATE: 16 BRPM | HEART RATE: 62 BPM | DIASTOLIC BLOOD PRESSURE: 66 MMHG

## 2018-05-10 DIAGNOSIS — D69.6 THROMBOCYTOPENIA: ICD-10-CM

## 2018-05-10 DIAGNOSIS — C09.9 TONSIL CANCER: Primary | ICD-10-CM

## 2018-05-10 DIAGNOSIS — C09.9 SQUAMOUS CELL CARCINOMA OF PALATINE TONSIL: ICD-10-CM

## 2018-05-10 DIAGNOSIS — K52.9 ENTEROCOLITIS: ICD-10-CM

## 2018-05-10 DIAGNOSIS — C79.51 SECONDARY CANCER OF BONE: ICD-10-CM

## 2018-05-10 DIAGNOSIS — E66.01 MORBID OBESITY: ICD-10-CM

## 2018-05-10 DIAGNOSIS — C09.9 SQUAMOUS CELL CARCINOMA OF PALATINE TONSIL: Primary | ICD-10-CM

## 2018-05-10 DIAGNOSIS — C77.0 CANCER OF HEAD, FACE, OR NECK LYMPH NODES, SECONDARY: ICD-10-CM

## 2018-05-10 LAB
ALBUMIN SERPL BCP-MCNC: 3.3 G/DL
ALP SERPL-CCNC: 75 U/L
ALT SERPL W/O P-5'-P-CCNC: 9 U/L
ANION GAP SERPL CALC-SCNC: 10 MMOL/L
AST SERPL-CCNC: 15 U/L
BILIRUB SERPL-MCNC: 0.4 MG/DL
BUN SERPL-MCNC: 12 MG/DL
CALCIUM SERPL-MCNC: 9.4 MG/DL
CHLORIDE SERPL-SCNC: 102 MMOL/L
CO2 SERPL-SCNC: 27 MMOL/L
CREAT SERPL-MCNC: 1.2 MG/DL
ERYTHROCYTE [DISTWIDTH] IN BLOOD BY AUTOMATED COUNT: 13.1 %
EST. GFR  (AFRICAN AMERICAN): >60 ML/MIN/1.73 M^2
EST. GFR  (NON AFRICAN AMERICAN): >60 ML/MIN/1.73 M^2
GLUCOSE SERPL-MCNC: 107 MG/DL
HCT VFR BLD AUTO: 26.8 %
HGB BLD-MCNC: 8.9 G/DL
IMM GRANULOCYTES # BLD AUTO: 0.03 K/UL
MCH RBC QN AUTO: 34.4 PG
MCHC RBC AUTO-ENTMCNC: 33.2 G/DL
MCV RBC AUTO: 104 FL
NEUTROPHILS # BLD AUTO: 4 K/UL
PLATELET # BLD AUTO: 217 K/UL
PMV BLD AUTO: 9.2 FL
POTASSIUM SERPL-SCNC: 4.2 MMOL/L
PROT SERPL-MCNC: 6.1 G/DL
RBC # BLD AUTO: 2.59 M/UL
SODIUM SERPL-SCNC: 139 MMOL/L
WBC # BLD AUTO: 5.53 K/UL

## 2018-05-10 PROCEDURE — 25000003 PHARM REV CODE 250: Performed by: INTERNAL MEDICINE

## 2018-05-10 PROCEDURE — 77336 RADIATION PHYSICS CONSULT: CPT | Performed by: RADIOLOGY

## 2018-05-10 PROCEDURE — 96361 HYDRATE IV INFUSION ADD-ON: CPT

## 2018-05-10 PROCEDURE — 3008F BODY MASS INDEX DOCD: CPT | Mod: CPTII,S$GLB,, | Performed by: INTERNAL MEDICINE

## 2018-05-10 PROCEDURE — 96367 TX/PROPH/DG ADDL SEQ IV INF: CPT

## 2018-05-10 PROCEDURE — 36415 COLL VENOUS BLD VENIPUNCTURE: CPT

## 2018-05-10 PROCEDURE — 77417 THER RADIOLOGY PORT IMAGE(S): CPT | Performed by: RADIOLOGY

## 2018-05-10 PROCEDURE — 96413 CHEMO IV INFUSION 1 HR: CPT

## 2018-05-10 PROCEDURE — 99999 PR PBB SHADOW E&M-EST. PATIENT-LVL III: CPT | Mod: PBBFAC,,, | Performed by: INTERNAL MEDICINE

## 2018-05-10 PROCEDURE — 77387 GUIDANCE FOR RADJ TX DLVR: CPT | Mod: 26,,, | Performed by: RADIOLOGY

## 2018-05-10 PROCEDURE — 63600175 PHARM REV CODE 636 W HCPCS: Performed by: INTERNAL MEDICINE

## 2018-05-10 PROCEDURE — 85027 COMPLETE CBC AUTOMATED: CPT

## 2018-05-10 PROCEDURE — 99215 OFFICE O/P EST HI 40 MIN: CPT | Mod: S$GLB,,, | Performed by: INTERNAL MEDICINE

## 2018-05-10 PROCEDURE — 80053 COMPREHEN METABOLIC PANEL: CPT

## 2018-05-10 PROCEDURE — 77386 HC IMRT, COMPLEX: CPT | Performed by: RADIOLOGY

## 2018-05-10 RX ORDER — SODIUM CHLORIDE 0.9 % (FLUSH) 0.9 %
10 SYRINGE (ML) INJECTION
Status: DISCONTINUED | OUTPATIENT
Start: 2018-05-10 | End: 2018-05-10 | Stop reason: HOSPADM

## 2018-05-10 RX ORDER — HEPARIN 100 UNIT/ML
500 SYRINGE INTRAVENOUS
Status: DISCONTINUED | OUTPATIENT
Start: 2018-05-10 | End: 2018-05-10 | Stop reason: HOSPADM

## 2018-05-10 RX ORDER — HEPARIN 100 UNIT/ML
500 SYRINGE INTRAVENOUS
Status: CANCELLED | OUTPATIENT
Start: 2018-05-10

## 2018-05-10 RX ORDER — SODIUM CHLORIDE 0.9 % (FLUSH) 0.9 %
10 SYRINGE (ML) INJECTION
Status: CANCELLED | OUTPATIENT
Start: 2018-05-10

## 2018-05-10 RX ADMIN — DEXAMETHASONE SODIUM PHOSPHATE: 4 INJECTION, SOLUTION INTRA-ARTICULAR; INTRALESIONAL; INTRAMUSCULAR; INTRAVENOUS; SOFT TISSUE at 11:05

## 2018-05-10 RX ADMIN — SODIUM CHLORIDE: 0.9 INJECTION, SOLUTION INTRAVENOUS at 12:05

## 2018-05-10 RX ADMIN — SODIUM CHLORIDE: 0.9 INJECTION, SOLUTION INTRAVENOUS at 10:05

## 2018-05-10 RX ADMIN — CISPLATIN 89 MG: 1 INJECTION, SOLUTION INTRAVENOUS at 11:05

## 2018-05-10 NOTE — PLAN OF CARE
Problem: Patient Care Overview  Goal: Plan of Care Review  Outcome: Ongoing (interventions implemented as appropriate)  Day 6 of radiation to the h/n wgt stable

## 2018-05-10 NOTE — PLAN OF CARE
Problem: Patient Care Overview  Goal: Plan of Care Review  Outcome: Ongoing (interventions implemented as appropriate)  1315:  Patient tolerated treatment well, NAD noted, no c/o voiced.  Released to home in stable condition, accompanied by his brother.

## 2018-05-10 NOTE — PROGRESS NOTES
Subjective:       Patient ID: Burton Whiting is a 48 y.o. male.    Chief Complaint: On active treatment for head and neck cancer    Mr. Burton Whiting is a 48-year-old male, former smoker, who presents today with a four-month history of enlarging neck mass.  He initially delayed care due to lack of insurance.  He was seen by Dr. Turpin who referred him to see Dr. Olguin.  He had a CT that showed a 3.8 x 3.8 x 4.9 cm soft tissue mass arising from the left palatine tonsil resulting in moderate narrowing of the hypopharyngeal airway adjacent extensive matted left cervical lymphadenopathy was noted.  The largest ella mass was 6.6 x 9 x 2.6 cm extending inferiorly to the supraclavicular region.  The mass pushed the trachea and the left common carotid artery to the right.  Additional representative of cervical lymph nodes measuring 2.9 x 3.1 x 3.5 cm on axial image 37 of series 3 and 2.4 x 2.1 x 2.2 cm on axial image 55 of series 3.  There is also a sclerotic lesion involving the midline of the clivus measuring 1.2 cm.  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  PET scan performed on 01/19/2018 revealed persistent evidence of a soft tissue mass arising from the left palatine tonsil resulting in moderate narrowing of the hypopharyngeal   airway.  The mass is hypermetabolic demonstrating an SUV max of 18.5.  There is also persistent adjacent extensive matted left cervical lymphadenopathy, largest of this measuring 6.7 x 8.42 with hypermetabolic activity and SUV max of 20.5.  Lymphadenopathy is again noted to have a mass effect on the trachea and left common carotid artery, pushing both structures towards the right.  There is also prominent right cervical lymph nodes with the largest measuring 0.8 cm and demonstrating mild hypermetabolic activity with SUV max of 1.8, physiologic   distribution of FDG in the gray matter.  There are 3 pulmonary nodules noted within the right lung, the largest is in the  "anterior segment of the right upper lobe measuring 1 cm, second is visualized in the middle lobe measuring 0.6 cm and the third is within the posterior basal segment measuring 0.6 cm.  There is one pulmonary nodule within the left lung within the lateral basal segment measuring 0.6 cm.  These nodules do not demonstrate hypermetabolic activity; however, they are below the threshold for observation with PET     He underwent MRI cervical spine revealed "No evidence of metastatic disease to the cervical spine. Multilevel degenerative changes of the cervical spine with disc protrusion at C5-6 which results in moderate to severe spinal canal stenosis and and associated cord signal abnormality, suggestive of compressive myelopathy. 6 mm T1 hypointense non-enhancing lesion in the clivus.  Continued followup is suggested. 9 mm inferior descent of the cerebellar tonsils below the foramen magnum, suggestive of Chiari 1 malformation"  MRI thoracic spine "No evidence of metastatic disease involving the thoracic spine. Incidental hemangioma involving the T7 vertebral body. Mild degenerative disc disease without any significant spinal canal stenosis or neuroforaminal narrowing.       HPI He completed 3 cycles of TPF and plan is to start definitive chemo./RT today. There has been a delay in dental extractions and he is now ready to start  Last PET from 3/13/18 revealed "Mixed response to treatment. Decreased size and activity of left palatine tonsillar mass and left cervical mass, with mildly increased hypermetabolic activity of a right cervical node and sclerotic focus within the clivus"  He feels his neck mass is slightly larger. He has finally has all his teeth extracted.    Interval History:   is doing well. The infection overlaying his port is markedly better with the course of antibiotics. His only complaint is intermittent thrush in his mouth, but he controls it well with fluconazole as needed.    Review of Systems "   Constitutional: Negative for appetite change, fatigue and unexpected weight change.   HENT: Negative for mouth sores.         Neck mass +   Eyes: Negative for visual disturbance.   Respiratory: Negative for cough and shortness of breath.    Cardiovascular: Negative for chest pain.   Gastrointestinal: Negative for abdominal pain and diarrhea.   Genitourinary: Negative for frequency.   Musculoskeletal: Negative for back pain.   Skin: Negative for rash.   Neurological: Negative for headaches.   Hematological: Negative for adenopathy.   Psychiatric/Behavioral: The patient is not nervous/anxious.    All other systems reviewed and are negative.      Objective:      Physical Exam   Constitutional: He is oriented to person, place, and time. He appears well-developed and well-nourished.   HENT:   Mouth/Throat: No oropharyngeal exudate.   Neck:   Left neck mass ~ 5 cm and hard.   Cardiovascular: Normal rate and normal heart sounds.    Pulmonary/Chest: Effort normal and breath sounds normal. He has no wheezes.   Abdominal: Soft. Bowel sounds are normal. There is no tenderness.   Musculoskeletal: He exhibits no edema or tenderness.   Lymphadenopathy:     He has no cervical adenopathy.   Neurological: He is alert and oriented to person, place, and time. Coordination normal.   Skin: Skin is warm and dry. No rash noted.   Psychiatric: He has a normal mood and affect. Judgment and thought content normal.   Vitals reviewed.        LABS:  WBC   Date Value Ref Range Status   05/10/2018 5.53 3.90 - 12.70 K/uL Final     Hemoglobin   Date Value Ref Range Status   05/10/2018 8.9 (L) 14.0 - 18.0 g/dL Final     Hematocrit   Date Value Ref Range Status   05/10/2018 26.8 (L) 40.0 - 54.0 % Final     Platelets   Date Value Ref Range Status   05/10/2018 217 150 - 350 K/uL Final     Gran # (ANC)   Date Value Ref Range Status   05/10/2018 4.0 1.8 - 7.7 K/uL Final     Comment:     The ANC is based on a white cell differential from an   automated  cell counter. It has not been microscopically   reviewed for the presence of abnormal cells. Clinical   correlation is required.         Chemistry        Component Value Date/Time     05/10/2018 0743    K 4.2 05/10/2018 0743     05/10/2018 0743    CO2 27 05/10/2018 0743    BUN 12 05/10/2018 0743    CREATININE 1.2 05/10/2018 0743     05/10/2018 0743        Component Value Date/Time    CALCIUM 9.4 05/10/2018 0743    ALKPHOS 75 05/10/2018 0743    AST 15 05/10/2018 0743    ALT 9 (L) 05/10/2018 0743    BILITOT 0.4 05/10/2018 0743    ESTGFRAFRICA >60.0 05/10/2018 0743    EGFRNONAA >60.0 05/10/2018 0743          Assessment:   Head and neck cancer on active treatment  Plan:        1,2. He will proceed with Cisplatin weekly with concurrent RT and will return in 1 week for next chemo. Will also obtain CT neck to get a baseline imaging of his cancer.  3,4. PORT site is much improved. He knows to finish full course of abx.   Site around PEG tube is improved with the Nystatin cream.    30 minutes were spent face to face with the patient and his  family to discuss the disease, natural history, treatment options and survival statistics. I have provided the patient with an opportunity to ask questions and have all questions answered to his satisfaction.     he will return to clinic in 1 week with , but knows to call in the interim if symptoms change or should a problem arise        Glory Nicole MD  Hematology and Medical Oncology  Bone Marrow Transplant  [facility]

## 2018-05-10 NOTE — NURSING
1005:  Pt arrived at Infusion center, VSS, assessment completed.Unable to use right portacath, due to resolving infection.  #22 gauge PIV placed to left forearm, flushes easily, brisk blood return noted.  IVF initiated.

## 2018-05-11 ENCOUNTER — INFUSION (OUTPATIENT)
Dept: INFUSION THERAPY | Facility: HOSPITAL | Age: 48
End: 2018-05-11
Attending: INTERNAL MEDICINE
Payer: COMMERCIAL

## 2018-05-11 ENCOUNTER — CLINICAL SUPPORT (OUTPATIENT)
Dept: HEMATOLOGY/ONCOLOGY | Facility: CLINIC | Age: 48
End: 2018-05-11
Payer: COMMERCIAL

## 2018-05-11 VITALS — HEIGHT: 67 IN | BODY MASS INDEX: 36.79 KG/M2 | WEIGHT: 234.38 LBS

## 2018-05-11 VITALS
DIASTOLIC BLOOD PRESSURE: 77 MMHG | RESPIRATION RATE: 18 BRPM | HEART RATE: 65 BPM | TEMPERATURE: 98 F | SYSTOLIC BLOOD PRESSURE: 128 MMHG

## 2018-05-11 DIAGNOSIS — C09.9 TONSIL CANCER: ICD-10-CM

## 2018-05-11 DIAGNOSIS — Z71.3 NUTRITIONAL COUNSELING: Primary | ICD-10-CM

## 2018-05-11 DIAGNOSIS — C09.9 SQUAMOUS CELL CARCINOMA OF PALATINE TONSIL: ICD-10-CM

## 2018-05-11 DIAGNOSIS — C09.9 TONSIL CANCER: Primary | ICD-10-CM

## 2018-05-11 DIAGNOSIS — C77.0 CANCER OF HEAD, FACE, OR NECK LYMPH NODES, SECONDARY: ICD-10-CM

## 2018-05-11 PROCEDURE — 96360 HYDRATION IV INFUSION INIT: CPT

## 2018-05-11 PROCEDURE — 97802 MEDICAL NUTRITION INDIV IN: CPT | Mod: S$GLB,,, | Performed by: DIETITIAN, REGISTERED

## 2018-05-11 PROCEDURE — 99999 PR PBB SHADOW E&M-EST. PATIENT-LVL II: CPT | Mod: PBBFAC,,,

## 2018-05-11 PROCEDURE — 25000003 PHARM REV CODE 250: Performed by: INTERNAL MEDICINE

## 2018-05-11 PROCEDURE — 77386 HC IMRT, COMPLEX: CPT | Performed by: RADIOLOGY

## 2018-05-11 PROCEDURE — 77387 GUIDANCE FOR RADJ TX DLVR: CPT | Mod: 26,,, | Performed by: RADIOLOGY

## 2018-05-11 RX ADMIN — SODIUM CHLORIDE 1000 ML: 9 INJECTION, SOLUTION INTRAVENOUS at 12:05

## 2018-05-11 NOTE — PLAN OF CARE
Problem: Patient Care Overview  Goal: Discharge Needs Assessment  Outcome: Ongoing (interventions implemented as appropriate)  Pt tolerated IVF well, leaves clinic ambulatory unassisted, vitals stable, avs given future appointments reviewed, instructed to contact MD office with any questions or concerns.

## 2018-05-11 NOTE — PROGRESS NOTES
"Medical Nutrition Therapy Oncology Progress Note    Name: Burton Whiting MRN: [id number]  : 1970    Age: 48 y.o.  Ethnicity: /White Language: English    Diagnosis: No diagnosis found.    Chemo Regimen: Cisplatin   Referring MD: Dr. Lopez Frequency: weekly Radiation: Yes           Goal of Cancer treatment n/a         Nutrition Assessment     Chief Complaint:   Chief Complaint   Patient presents with    Nutrition Counseling    Cancer     tonsil        Anthropometric Measurements:  Height: 5' 7" (1.702 m)  Current Weight: 106.3 kg (234 lb 5.6 oz)  Ideal Body Weight: 148#  Percent Ideal Body Weight:: 158%  BMI: Body mass index is 36.7 kg/m².     Weight History:   Wt Readings from Last 8 Encounters:   18 106.3 kg (234 lb 5.6 oz)   05/10/18 105.4 kg (232 lb 5.8 oz)   18 105.6 kg (232 lb 12.9 oz)   18 108 kg (238 lb)   18 104.3 kg (230 lb)   18 104.3 kg (230 lb)   18 110.7 kg (244 lb 0.8 oz)   18 110.7 kg (244 lb 0.8 oz)     Medical Health History:  Feeding tube placed: Yes - PEG  Pre-treatment: No    Past Surgical History:   Procedure Laterality Date    GASTROSTOMY TUBE PLACEMENT Left 2018        Medications:   Current Outpatient Prescriptions:     amoxicillin-clavulanate (AUGMENTIN) 400-57 mg/5 mL SusR, 10 mLs by Per G Tube route 2 (two) times daily., Disp: 200 mL, Rfl: 0    cholecalciferol, vitamin D3, (VITAMIN D3) 1,000 unit capsule, Take 1,000 Units by mouth once daily., Disp: , Rfl:     dexamethasone (DECADRON) 6 MG tablet, Take 1 tablet by mouth twice daily starting on day 2 and for 4 days after chemo, Disp: 24 tablet, Rfl: 0    diphenhydrAMINE-aluminum-magnesium hydroxide-simethicone-lidocaine HCl 2%, Swish and spit 15 mLs every 4 (four) hours as needed (pain)., Disp: 500 mL, Rfl: 0    fentaNYL (DURAGESIC) 50 mcg/hr, Place 1 patch onto the skin every 72 hours., Disp: 3 patch, Rfl: 0    fluconazole (DIFLUCAN) 100 MG tablet, 1 tablet by Per G Tube " route once daily., Disp: , Rfl:     IBUPROFEN (ADVIL ORAL), Take 1 tablet by mouth as needed., Disp: , Rfl:     lidocaine HCl 2% (XYLOCAINE) 2 % Soln, by Mucous Membrane route every 3 (three) hours., Disp: 500 mL, Rfl: 6    multivitamin (THERAGRAN) per tablet, Take 1 tablet by mouth once daily., Disp: , Rfl:     NYSTATIN (DUKE'S SOLUTION), Mix equal amounts of benadryl, maalox, 2% viscous lidocaine and nystatin  Swish and swallow 10 ml 4 times a day, Disp: 500 mL, Rfl: 5    nystatin (MYCOSTATIN) cream, Apply topically 2 (two) times daily., Disp: 30 g, Rfl: 6    ondansetron (ZOFRAN) 8 MG tablet, Take 1 tablet (8 mg total) by mouth 4 (four) times daily as needed for Nausea., Disp: 60 tablet, Rfl: 2    oxyCODONE (ROXICODONE) 5 mg/5 mL Soln, Take 5 mLs (5 mg total) by mouth every 6 (six) hours as needed., Disp: 473 mL, Rfl: 0    psyllium husk, aspartame, (METAMUCIL) 3.4 gram PwPk packet, Take 2 packets by mouth once daily., Disp: , Rfl:   No current facility-administered medications for this visit.     Last Labs:  Last Labs:  Glucose   Date Value Ref Range Status   05/10/2018 107 70 - 110 mg/dL Final   05/03/2018 114 (H) 70 - 110 mg/dL Final     BUN, Bld   Date Value Ref Range Status   05/10/2018 12 6 - 20 mg/dL Final   05/03/2018 16 6 - 20 mg/dL Final     Creatinine   Date Value Ref Range Status   05/10/2018 1.2 0.5 - 1.4 mg/dL Final   05/03/2018 1.2 0.5 - 1.4 mg/dL Final     Sodium   Date Value Ref Range Status   05/10/2018 139 136 - 145 mmol/L Final   05/03/2018 138 136 - 145 mmol/L Final     Potassium   Date Value Ref Range Status   05/10/2018 4.2 3.5 - 5.1 mmol/L Final   05/03/2018 4.7 3.5 - 5.1 mmol/L Final     Phosphorus   Date Value Ref Range Status   05/10/2018 3.2 2.7 - 4.5 mg/dL Final   03/14/2018 2.0 (L) 2.7 - 4.5 mg/dL Final     Calcium   Date Value Ref Range Status   05/10/2018 9.4 8.7 - 10.5 mg/dL Final   05/03/2018 9.6 8.7 - 10.5 mg/dL Final     No results found for: PREALBUMIN  Total Protein    Date Value Ref Range Status   05/10/2018 6.1 6.0 - 8.4 g/dL Final   05/03/2018 6.2 6.0 - 8.4 g/dL Final     Cholesterol   Date Value Ref Range Status   01/04/2018 195 120 - 199 mg/dL Final     Comment:     The National Cholesterol Education Program (NCEP) has set the  following guidelines (reference ranges) for Cholesterol:  Optimal.....................<200 mg/dL  Borderline High.............200-239 mg/dL  High........................> or = 240 mg/dL       Hemoglobin A1C   Date Value Ref Range Status   02/07/2018 6.4 (H) 0.0 - 5.6 % Final     Comment:     Reference Interval:  5.0 - 5.6 Normal   5.7 - 6.4 High Risk   > 6.5 Diabetic    Hgb A1c results are standardized based on the (NGSP) National   Glycohemoglobin Standardization Program.    Hemoglobin A1C levels are related to mean serum/plasma glucose   during the preceding 2-3 months.        01/04/2018 5.9 (H) 4.0 - 5.6 % Final     Comment:     According to ADA guidelines, hemoglobin A1c <7.0% represents  optimal control in non-pregnant diabetic patients. Different  metrics may apply to specific patient populations.   Standards of Medical Care in Diabetes-2016.  For the purpose of screening for the presence of diabetes:  <5.7%     Consistent with the absence of diabetes  5.7-6.4%  Consistent with increasing risk for diabetes   (prediabetes)  >or=6.5%  Consistent with diabetes  Currently, no consensus exists for use of hemoglobin A1c  for diagnosis of diabetes for children.  This Hemoglobin A1c assay has significant interference with fetal   hemoglobin   (HbF). The results are invalid for patients with abnormal amounts of   HbF,   including those with known Hereditary Persistence   of Fetal Hemoglobin. Heterozygous hemoglobin variants (HbAS, HbAC,   HbAD, HbAE, HbA2) do not significantly interfere with this assay;   however, presence of multiple variants in a sample may impact the %   interference.       Hemoglobin   Date Value Ref Range Status   05/10/2018 8.9  (L) 14.0 - 18.0 g/dL Final   05/03/2018 8.9 (L) 14.0 - 18.0 g/dL Final     Hematocrit   Date Value Ref Range Status   05/10/2018 26.8 (L) 40.0 - 54.0 % Final   05/03/2018 27.4 (L) 40.0 - 54.0 % Final     No results found for: IRON  No components found for: FROLATE  Vit D, 25-Hydroxy   Date Value Ref Range Status   01/04/2018 22 (L) 30 - 96 ng/mL Final     Comment:     Vitamin D deficiency.........<10 ng/mL                              Vitamin D insufficiency......10-29 ng/mL       Vitamin D sufficiency........> or equal to 30 ng/mL  Vitamin D toxicity............>100 ng/mL       WBC   Date Value Ref Range Status   05/10/2018 5.53 3.90 - 12.70 K/uL Final   05/03/2018 4.59 3.90 - 12.70 K/uL Final         Client History/Food Access:    Living Situation: Lives alone   Who: Shops for Groceries? Patient   Who : Prepares meals? Patient   Who: Fills prescriptions? Patient   Are there financial difficulties purchasing food? No   Personal History (occupation, physical activity level, exercise):      Baseline for Outcomes Monitoring  Food and Nutrition History: Pt here for nutrition counseling while on chemo and radiation. Pt with current weight of 234#. Pt reports UBW of 282# (last weighed in January); weight loss over 2-3 months. Pt states he had a bad reaction to the first chemo he was put on which is why he lost so much weight. Chemo has since been switched and pt feels he is tolerating it much better. Complains of taste changes and dry mouth. Pt feels he has kept a stable weight x 1 month. Pt has PEG tube and uses 4 cans of Twocal/day for 1900 calories and 80 g protein. Pt also eating yogurt, oatmeal, and fruit smoothies throughout the day (on most days). Pt flushing with 150 ml water before and after each feeding and pt spreads 4 feedings out over about 15 hours. Tolerating feedings well. No complaints other than some heartburn that pt states started when he started Cisplatin. Pt on day 6 of radiation. . Provided pt with  head and neck treatment packet which suggests liquids and soft foods that are more easily tolerated with dysphagia. Provided tips on managing common side effects of chemo and radiation for head and neck cancer including dry mouth, difficulty swallowing, taste changes, nausea/vomiting, diarrhea/constipation, sore mouth and throat, gas, and poor appetite. Pt understands that as PO intake decreases, he may need to increase tube feeding to 5 cans/day.      Nutritional Needs:  Estimated Needs Method Use    2300 kcal/day [] Predictive Equation: [place]   [x]  30 kcal/kg (adjusted)   Protein 100 g 1.3 gm/kg/day   Fluid 2300 ml 30 ml/kg/day     Food/Nutrient Intake (oral, enteral or parenteral) and Patient Behaviors     Calorie intake: Adequate   Protein intake: Adequate     Yes/No    Yes Uses medical food supplements   N/A Cooking techniques to minimize fatigue   Yes Currently modifying food textures   Yes Able to maintain usual physical actiivty     Nutrition Diagnosis     Nutrition Diagnosis Related to (Etiology) As Evidenced By (Signs/Symptoms)   Imbalance of nutrients Increased demand for nutrients Tonsil cancer on chemo and radiation with previous 50# unintentional weight loss                 Nutritional Intervention and Prescription        Nutrition Intervention Enteral Nutrition  Volume   Goals/Expected Outcomes Pt to continue 4 cans of Twocal each day to provide 1900 calories and 80 g protein while also continuing PO intake.  If pt begins having difficulty chewing/swallowing, pt to increase to 5 cans of Twocal each day to provide 2300 calories and 100 g protein.   Progress Progressing towards goal     Nutrition Intervention Fluid-modified diet   Goals/Expected Outcomes Pt to continue 150 ml water flushes before and after each feeding to maintain hydration throughout treatment.   Progress Progressing towards goal     Nutrition Intervention Strategies  Self-monitoring   Goals/Expected Outcomes Pt to manage  side effects with strategies discussed today.  Pt to monitor weight.   Progress Progressing towards goal     Pt needs education? yes (see intervention)    Coordination of Nutrition Care: Comments:   Collaboration with other providers MD         Monitoring and Evaluation     Monitor: weight    Next Visit: PRN    Materials Provided:  1. RD contact information 2. Head and neck packet               Total time: 15 minutes    Nutrition Score:      ©2010 [employer] Oncology Toolkit

## 2018-05-14 PROCEDURE — 77386 HC IMRT, COMPLEX: CPT | Performed by: RADIOLOGY

## 2018-05-14 PROCEDURE — 77387 GUIDANCE FOR RADJ TX DLVR: CPT | Mod: 26,,, | Performed by: RADIOLOGY

## 2018-05-15 PROCEDURE — 77386 HC IMRT, COMPLEX: CPT | Performed by: RADIOLOGY

## 2018-05-15 PROCEDURE — 77387 GUIDANCE FOR RADJ TX DLVR: CPT | Mod: 26,,, | Performed by: RADIOLOGY

## 2018-05-16 PROCEDURE — 77387 GUIDANCE FOR RADJ TX DLVR: CPT | Mod: 26,,, | Performed by: RADIOLOGY

## 2018-05-16 PROCEDURE — 77386 HC IMRT, COMPLEX: CPT | Performed by: RADIOLOGY

## 2018-05-17 ENCOUNTER — DOCUMENTATION ONLY (OUTPATIENT)
Dept: RADIATION ONCOLOGY | Facility: CLINIC | Age: 48
End: 2018-05-17

## 2018-05-17 ENCOUNTER — INFUSION (OUTPATIENT)
Dept: INFUSION THERAPY | Facility: HOSPITAL | Age: 48
End: 2018-05-17
Attending: INTERNAL MEDICINE
Payer: COMMERCIAL

## 2018-05-17 ENCOUNTER — OFFICE VISIT (OUTPATIENT)
Dept: HEMATOLOGY/ONCOLOGY | Facility: CLINIC | Age: 48
End: 2018-05-17
Payer: COMMERCIAL

## 2018-05-17 ENCOUNTER — LAB VISIT (OUTPATIENT)
Dept: LAB | Facility: HOSPITAL | Age: 48
End: 2018-05-17
Attending: INTERNAL MEDICINE
Payer: COMMERCIAL

## 2018-05-17 VITALS
HEART RATE: 76 BPM | TEMPERATURE: 98 F | SYSTOLIC BLOOD PRESSURE: 127 MMHG | HEIGHT: 67 IN | DIASTOLIC BLOOD PRESSURE: 72 MMHG | BODY MASS INDEX: 35.36 KG/M2 | OXYGEN SATURATION: 98 % | WEIGHT: 225.31 LBS | RESPIRATION RATE: 18 BRPM

## 2018-05-17 VITALS
HEIGHT: 67 IN | RESPIRATION RATE: 18 BRPM | TEMPERATURE: 98 F | WEIGHT: 225.31 LBS | DIASTOLIC BLOOD PRESSURE: 68 MMHG | SYSTOLIC BLOOD PRESSURE: 121 MMHG | HEART RATE: 68 BPM | BODY MASS INDEX: 35.36 KG/M2

## 2018-05-17 DIAGNOSIS — C09.9 SQUAMOUS CELL CARCINOMA OF PALATINE TONSIL: Primary | ICD-10-CM

## 2018-05-17 DIAGNOSIS — C79.51 SECONDARY CANCER OF BONE: ICD-10-CM

## 2018-05-17 DIAGNOSIS — C77.0 CANCER OF HEAD, FACE, OR NECK LYMPH NODES, SECONDARY: ICD-10-CM

## 2018-05-17 DIAGNOSIS — C09.9 SQUAMOUS CELL CARCINOMA OF PALATINE TONSIL: ICD-10-CM

## 2018-05-17 DIAGNOSIS — C09.9 TONSIL CANCER: Primary | ICD-10-CM

## 2018-05-17 DIAGNOSIS — G89.3 NEOPLASM RELATED PAIN: ICD-10-CM

## 2018-05-17 LAB
ALBUMIN SERPL BCP-MCNC: 3.7 G/DL
ALP SERPL-CCNC: 69 U/L
ALT SERPL W/O P-5'-P-CCNC: 7 U/L
ANION GAP SERPL CALC-SCNC: 10 MMOL/L
AST SERPL-CCNC: 13 U/L
BILIRUB SERPL-MCNC: 0.5 MG/DL
BUN SERPL-MCNC: 20 MG/DL
CALCIUM SERPL-MCNC: 9.4 MG/DL
CHLORIDE SERPL-SCNC: 98 MMOL/L
CO2 SERPL-SCNC: 30 MMOL/L
CREAT SERPL-MCNC: 1.4 MG/DL
ERYTHROCYTE [DISTWIDTH] IN BLOOD BY AUTOMATED COUNT: 12.8 %
EST. GFR  (AFRICAN AMERICAN): >60 ML/MIN/1.73 M^2
EST. GFR  (NON AFRICAN AMERICAN): 58.2 ML/MIN/1.73 M^2
GLUCOSE SERPL-MCNC: 108 MG/DL
HCT VFR BLD AUTO: 25.5 %
HGB BLD-MCNC: 8.4 G/DL
IMM GRANULOCYTES # BLD AUTO: 0.03 K/UL
MCH RBC QN AUTO: 34.4 PG
MCHC RBC AUTO-ENTMCNC: 32.9 G/DL
MCV RBC AUTO: 105 FL
NEUTROPHILS # BLD AUTO: 4.9 K/UL
PLATELET # BLD AUTO: 171 K/UL
PMV BLD AUTO: 9.5 FL
POTASSIUM SERPL-SCNC: 4.1 MMOL/L
PROT SERPL-MCNC: 6.8 G/DL
RBC # BLD AUTO: 2.44 M/UL
SODIUM SERPL-SCNC: 138 MMOL/L
WBC # BLD AUTO: 6.07 K/UL

## 2018-05-17 PROCEDURE — 3008F BODY MASS INDEX DOCD: CPT | Mod: CPTII,S$GLB,, | Performed by: INTERNAL MEDICINE

## 2018-05-17 PROCEDURE — 80053 COMPREHEN METABOLIC PANEL: CPT

## 2018-05-17 PROCEDURE — 99215 OFFICE O/P EST HI 40 MIN: CPT | Mod: S$GLB,,, | Performed by: INTERNAL MEDICINE

## 2018-05-17 PROCEDURE — 36415 COLL VENOUS BLD VENIPUNCTURE: CPT

## 2018-05-17 PROCEDURE — 25000003 PHARM REV CODE 250: Performed by: INTERNAL MEDICINE

## 2018-05-17 PROCEDURE — 77387 GUIDANCE FOR RADJ TX DLVR: CPT | Mod: 26,,, | Performed by: RADIOLOGY

## 2018-05-17 PROCEDURE — A4216 STERILE WATER/SALINE, 10 ML: HCPCS | Performed by: INTERNAL MEDICINE

## 2018-05-17 PROCEDURE — 99999 PR PBB SHADOW E&M-EST. PATIENT-LVL III: CPT | Mod: PBBFAC,,, | Performed by: INTERNAL MEDICINE

## 2018-05-17 PROCEDURE — 77386 HC IMRT, COMPLEX: CPT | Performed by: RADIOLOGY

## 2018-05-17 PROCEDURE — 96361 HYDRATE IV INFUSION ADD-ON: CPT

## 2018-05-17 PROCEDURE — 85027 COMPLETE CBC AUTOMATED: CPT

## 2018-05-17 PROCEDURE — 96413 CHEMO IV INFUSION 1 HR: CPT

## 2018-05-17 PROCEDURE — 63600175 PHARM REV CODE 636 W HCPCS: Mod: JG | Performed by: INTERNAL MEDICINE

## 2018-05-17 PROCEDURE — 96367 TX/PROPH/DG ADDL SEQ IV INF: CPT

## 2018-05-17 RX ORDER — HEPARIN 100 UNIT/ML
500 SYRINGE INTRAVENOUS
Status: DISCONTINUED | OUTPATIENT
Start: 2018-05-17 | End: 2018-05-17 | Stop reason: HOSPADM

## 2018-05-17 RX ORDER — HEPARIN 100 UNIT/ML
500 SYRINGE INTRAVENOUS
Status: CANCELLED | OUTPATIENT
Start: 2018-05-17

## 2018-05-17 RX ORDER — SODIUM CHLORIDE 0.9 % (FLUSH) 0.9 %
10 SYRINGE (ML) INJECTION
Status: DISCONTINUED | OUTPATIENT
Start: 2018-05-17 | End: 2018-05-17 | Stop reason: HOSPADM

## 2018-05-17 RX ORDER — SODIUM CHLORIDE 0.9 % (FLUSH) 0.9 %
10 SYRINGE (ML) INJECTION
Status: CANCELLED | OUTPATIENT
Start: 2018-05-17

## 2018-05-17 RX ORDER — OXYCODONE HCL 5 MG/5 ML
5 SOLUTION, ORAL ORAL EVERY 6 HOURS PRN
Qty: 473 ML | Refills: 0 | Status: SHIPPED | OUTPATIENT
Start: 2018-05-17 | End: 2018-06-07 | Stop reason: SDUPTHER

## 2018-05-17 RX ADMIN — SODIUM CHLORIDE: 9 INJECTION, SOLUTION INTRAVENOUS at 01:05

## 2018-05-17 RX ADMIN — SODIUM CHLORIDE 89 MG: 9 INJECTION, SOLUTION INTRAVENOUS at 12:05

## 2018-05-17 RX ADMIN — DEXAMETHASONE SODIUM PHOSPHATE: 4 INJECTION, SOLUTION INTRAMUSCULAR; INTRAVENOUS at 11:05

## 2018-05-17 RX ADMIN — HEPARIN 500 UNITS: 100 SYRINGE at 02:05

## 2018-05-17 RX ADMIN — SODIUM CHLORIDE, PRESERVATIVE FREE 10 ML: 5 INJECTION INTRAVENOUS at 02:05

## 2018-05-17 RX ADMIN — SODIUM CHLORIDE: 9 INJECTION, SOLUTION INTRAVENOUS at 11:05

## 2018-05-17 NOTE — PROGRESS NOTES
Subjective:       Patient ID: Burton Whiting is a 48 y.o. male.    Chief Complaint: Squamous cell carcinoma of palatine tonsil  Chief Complaint: Squamous cell carcinoma of palatine tonsil; g-tube malodorous; left neck pain 2/10; 4 cans formula per day; and r arm port---red/warm to touch  Mr. Burton Whiting is a 48-year-old male, former smoker, who presents today with a four-month history of enlarging neck mass.  He initially delayed care due to lack of insurance.  He was seen by Dr. Turpin who referred him to see Dr. Olguin.  He had a CT that showed a 3.8 x 3.8 x 4.9 cm soft tissue mass arising from the left palatine tonsil resulting in moderate narrowing of the hypopharyngeal airway adjacent extensive matted left cervical lymphadenopathy was noted.  The largest ella mass was 6.6 x 9 x 2.6 cm extending inferiorly to the supraclavicular region.  The mass pushed the trachea and the left common carotid artery to the right.  Additional representative of cervical lymph nodes measuring 2.9 x 3.1 x 3.5 cm on axial image 37 of series 3   and 2.4 x 2.1 x 2.2 cm on axial image 55 of series 3.  There is also a sclerotic lesion involving the midline of the clivus measuring 1.2 cm.  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  PET scan performed on 01/19/2018 revealed persistent evidence of a soft tissue mass arising from the left palatine tonsil resulting in moderate narrowing of the hypopharyngeal   airway.  The mass is hypermetabolic demonstrating an SUV max of 18.5.  There is also persistent adjacent extensive matted left cervical lymphadenopathy, largest of this measuring 6.7 x 8.42 with hypermetabolic activity and SUV max of 20.5.  Lymphadenopathy is again noted to have a mass effect on the trachea and left common carotid artery, pushing both structures towards the right.  There is also prominent right cervical lymph nodes with the largest measuring 0.8 cm and demonstrating mild hypermetabolic  "activity with SUV max of 1.8, physiologic   distribution of FDG in the gray matter.  There are 3 pulmonary nodules noted within the right lung, the largest is in the anterior segment of the right upper lobe measuring 1 cm, second is visualized in the middle lobe measuring 0.6 cm and the third is within the posterior basal segment measuring 0.6 cm.  There is one pulmonary nodule within the left lung within the lateral basal segment measuring 0.6 cm.  These nodules do not demonstrate hypermetabolic activity; however, they are below the threshold for observation with PET     HPIHe underwent MRI cervical spine revealed "No evidence of metastatic disease to the cervical spine. Multilevel degenerative changes of the cervical spine with disc protrusion at C5-6 which results in moderate to severe spinal canal stenosis and and associated cord signal abnormality, suggestive of compressive myelopathy. 6 mm T1 hypointense non-enhancing lesion in the clivus.  Continued followup is suggested. 9 mm inferior descent of the cerebellar tonsils below the foramen magnum, suggestive of Chiari 1 malformation"  MRI thoracic spine "No evidence of metastatic disease involving the thoracic spine. Incidental hemangioma involving the T7 vertebral body. Mild degenerative disc disease without any significant spinal canal stenosis or neuroforaminal narrowing.   He completed 3 cycles of TPF and is now on definitive chemo./RT      HPIHe comes in for week 3 of Cisplatin and RT. He is taking 4 cans/day via the PEG tube.  He has been able to drink liquids by mouth  He is here with his sister.    Review of Systems   Constitutional: Negative for appetite change, fatigue and unexpected weight change.   HENT: Negative for mouth sores.    Eyes: Negative for visual disturbance.   Respiratory: Negative for cough and shortness of breath.    Cardiovascular: Negative for chest pain.   Gastrointestinal: Negative for abdominal pain and diarrhea.   Genitourinary: " Negative for frequency.   Musculoskeletal: Negative for back pain.   Skin: Negative for rash.   Neurological: Negative for headaches.   Hematological: Negative for adenopathy.   Psychiatric/Behavioral: The patient is not nervous/anxious.    All other systems reviewed and are negative.      Objective:      Physical Exam   Constitutional: He is oriented to person, place, and time. He appears well-developed and well-nourished.   HENT:   Mouth/Throat: No oropharyngeal exudate.   Cardiovascular: Normal rate and normal heart sounds.    Pulmonary/Chest: Effort normal and breath sounds normal. He has no wheezes.   Abdominal: Soft. Bowel sounds are normal. There is no tenderness.   Musculoskeletal: He exhibits no edema or tenderness.   Lymphadenopathy:     He has no cervical adenopathy.   Neurological: He is alert and oriented to person, place, and time. Coordination normal.   Skin: Skin is warm and dry. No rash noted.   Psychiatric: He has a normal mood and affect. Judgment and thought content normal.   Vitals reviewed.      LABS:  WBC   Date Value Ref Range Status   05/17/2018 6.07 3.90 - 12.70 K/uL Final     Hemoglobin   Date Value Ref Range Status   05/17/2018 8.4 (L) 14.0 - 18.0 g/dL Final     Hematocrit   Date Value Ref Range Status   05/17/2018 25.5 (L) 40.0 - 54.0 % Final     Platelets   Date Value Ref Range Status   05/17/2018 171 150 - 350 K/uL Final     Gran # (ANC)   Date Value Ref Range Status   05/17/2018 4.9 1.8 - 7.7 K/uL Final     Comment:     The ANC is based on a white cell differential from an   automated cell counter. It has not been microscopically   reviewed for the presence of abnormal cells. Clinical   correlation is required.         Chemistry        Component Value Date/Time     05/17/2018 0839    K 4.1 05/17/2018 0839    CL 98 05/17/2018 0839    CO2 30 (H) 05/17/2018 0839    BUN 20 05/17/2018 0839    CREATININE 1.4 05/17/2018 0839     05/17/2018 0839        Component Value Date/Time     CALCIUM 9.4 05/17/2018 0839    ALKPHOS 69 05/17/2018 0839    AST 13 05/17/2018 0839    ALT 7 (L) 05/17/2018 0839    BILITOT 0.5 05/17/2018 0839    ESTGFRAFRICA >60.0 05/17/2018 0839    EGFRNONAA 58.2 (A) 05/17/2018 0839          Assessment:       1. Squamous cell carcinoma of palatine tonsil    2. Neoplasm related pain    3. Cancer of head, face, or neck lymph nodes, secondary    4. Secondary cancer of bone        Plan:        1,2,3,4. He will proceed with Cisplatin and RT and will return in 1 week for next cycle    .Above care plan was discussed with patient and accompanying sister and all questions were addressed to their satisfaction

## 2018-05-17 NOTE — PLAN OF CARE
Problem: Patient Care Overview  Goal: Plan of Care Review  Outcome: Ongoing (interventions implemented as appropriate)  Day 11 of radiation to the h/n wgt down 3 lbs instructed ti increase tube feeds

## 2018-05-17 NOTE — Clinical Note
IV fluids 2 liters tomorrow and all day 2 Schedule CBC, CMP, mag and phos and see me and for weekly Cisplatin X 3 more weeks. IV fluids on day 2

## 2018-05-17 NOTE — PLAN OF CARE
Problem: Patient Care Overview  Goal: Plan of Care Review  1420-Patient tolerated treatment well. Discharged without complaints or S/S of adverse event. Patients port left accessed at his request- will return to clinic for next infusion tomorrow.  Instructed to call provider for any questions or concerns.

## 2018-05-17 NOTE — PLAN OF CARE
Problem: Patient Care Overview  Goal: Individualization & Mutuality  Outcome: Ongoing (interventions implemented as appropriate)  1100-Labs , hx, and medications reviewed, patient was seen by Md prior to arrival- okay to use port again per MD. Assessment completed. Discussed plan of care with patient. Patient in agreement. Chair reclined and warm blanket and snack offered.

## 2018-05-18 ENCOUNTER — INFUSION (OUTPATIENT)
Dept: INFUSION THERAPY | Facility: HOSPITAL | Age: 48
End: 2018-05-18
Attending: INTERNAL MEDICINE
Payer: COMMERCIAL

## 2018-05-18 VITALS
RESPIRATION RATE: 18 BRPM | SYSTOLIC BLOOD PRESSURE: 135 MMHG | TEMPERATURE: 98 F | DIASTOLIC BLOOD PRESSURE: 70 MMHG | HEART RATE: 77 BPM

## 2018-05-18 DIAGNOSIS — C77.0 CANCER OF HEAD, FACE, OR NECK LYMPH NODES, SECONDARY: ICD-10-CM

## 2018-05-18 DIAGNOSIS — C09.9 TONSIL CANCER: Primary | ICD-10-CM

## 2018-05-18 DIAGNOSIS — C09.9 SQUAMOUS CELL CARCINOMA OF PALATINE TONSIL: ICD-10-CM

## 2018-05-18 PROCEDURE — 96361 HYDRATE IV INFUSION ADD-ON: CPT

## 2018-05-18 PROCEDURE — 77336 RADIATION PHYSICS CONSULT: CPT | Performed by: RADIOLOGY

## 2018-05-18 PROCEDURE — 77386 HC IMRT, COMPLEX: CPT | Performed by: RADIOLOGY

## 2018-05-18 PROCEDURE — 96360 HYDRATION IV INFUSION INIT: CPT

## 2018-05-18 PROCEDURE — 77387 GUIDANCE FOR RADJ TX DLVR: CPT | Mod: 26,,, | Performed by: RADIOLOGY

## 2018-05-18 PROCEDURE — 25000003 PHARM REV CODE 250: Performed by: INTERNAL MEDICINE

## 2018-05-18 RX ADMIN — SODIUM CHLORIDE 2000 ML: 0.9 INJECTION, SOLUTION INTRAVENOUS at 01:05

## 2018-05-18 NOTE — PLAN OF CARE
Problem: Patient Care Overview  Goal: Plan of Care Review  Pt tolerated 2 liters normal saline without issue, avs given, pt to rtc 5/24/18, no distress noted upon d/c to home

## 2018-05-18 NOTE — PLAN OF CARE
Problem: Chemotherapy Effects (Adult)  Goal: Signs and Symptoms of Listed Potential Problems Will be Absent, Minimized or Managed (Chemotherapy Effects)  Signs and symptoms of listed potential problems will be absent, minimized or managed by discharge/transition of care (reference Chemotherapy Effects (Adult) CPG).   Outcome: Ongoing (interventions implemented as appropriate)  Pt here for 2 liters normal saline, labs, hx, meds, allergies reviewed, pt with no complaints at this time, reclined in chair, continue to monitor

## 2018-05-21 PROCEDURE — 77387 GUIDANCE FOR RADJ TX DLVR: CPT | Mod: 26,,, | Performed by: RADIOLOGY

## 2018-05-21 PROCEDURE — 77386 HC IMRT, COMPLEX: CPT | Performed by: RADIOLOGY

## 2018-05-22 PROCEDURE — 77387 GUIDANCE FOR RADJ TX DLVR: CPT | Mod: 26,,, | Performed by: RADIOLOGY

## 2018-05-22 PROCEDURE — 77386 HC IMRT, COMPLEX: CPT | Performed by: RADIOLOGY

## 2018-05-23 PROCEDURE — 77387 GUIDANCE FOR RADJ TX DLVR: CPT | Mod: 26,,, | Performed by: RADIOLOGY

## 2018-05-23 PROCEDURE — 77386 HC IMRT, COMPLEX: CPT | Performed by: RADIOLOGY

## 2018-05-24 ENCOUNTER — DOCUMENTATION ONLY (OUTPATIENT)
Dept: RADIATION ONCOLOGY | Facility: CLINIC | Age: 48
End: 2018-05-24

## 2018-05-24 ENCOUNTER — INFUSION (OUTPATIENT)
Dept: INFUSION THERAPY | Facility: HOSPITAL | Age: 48
End: 2018-05-24
Attending: INTERNAL MEDICINE
Payer: COMMERCIAL

## 2018-05-24 ENCOUNTER — OFFICE VISIT (OUTPATIENT)
Dept: HEMATOLOGY/ONCOLOGY | Facility: CLINIC | Age: 48
End: 2018-05-24
Payer: COMMERCIAL

## 2018-05-24 ENCOUNTER — LAB VISIT (OUTPATIENT)
Dept: LAB | Facility: HOSPITAL | Age: 48
End: 2018-05-24
Attending: INTERNAL MEDICINE
Payer: COMMERCIAL

## 2018-05-24 VITALS
RESPIRATION RATE: 18 BRPM | OXYGEN SATURATION: 99 % | HEIGHT: 67 IN | DIASTOLIC BLOOD PRESSURE: 68 MMHG | TEMPERATURE: 98 F | WEIGHT: 222.88 LBS | SYSTOLIC BLOOD PRESSURE: 106 MMHG | HEART RATE: 76 BPM | BODY MASS INDEX: 34.98 KG/M2

## 2018-05-24 VITALS
SYSTOLIC BLOOD PRESSURE: 120 MMHG | OXYGEN SATURATION: 99 % | DIASTOLIC BLOOD PRESSURE: 70 MMHG | TEMPERATURE: 98 F | RESPIRATION RATE: 16 BRPM | HEART RATE: 70 BPM

## 2018-05-24 DIAGNOSIS — C77.0 CANCER OF HEAD, FACE, OR NECK LYMPH NODES, SECONDARY: ICD-10-CM

## 2018-05-24 DIAGNOSIS — C09.9 SQUAMOUS CELL CARCINOMA OF PALATINE TONSIL: ICD-10-CM

## 2018-05-24 DIAGNOSIS — C09.9 SQUAMOUS CELL CARCINOMA OF PALATINE TONSIL: Primary | ICD-10-CM

## 2018-05-24 DIAGNOSIS — Z93.1 STATUS POST INSERTION OF PERCUTANEOUS ENDOSCOPIC GASTROSTOMY (PEG) TUBE: ICD-10-CM

## 2018-05-24 DIAGNOSIS — C09.9 TONSIL CANCER: Primary | ICD-10-CM

## 2018-05-24 LAB
ALBUMIN SERPL BCP-MCNC: 3.3 G/DL
ALP SERPL-CCNC: 62 U/L
ALT SERPL W/O P-5'-P-CCNC: 8 U/L
ANION GAP SERPL CALC-SCNC: 11 MMOL/L
AST SERPL-CCNC: 10 U/L
BILIRUB SERPL-MCNC: 0.5 MG/DL
BUN SERPL-MCNC: 17 MG/DL
CALCIUM SERPL-MCNC: 8.8 MG/DL
CHLORIDE SERPL-SCNC: 96 MMOL/L
CO2 SERPL-SCNC: 29 MMOL/L
CREAT SERPL-MCNC: 1.3 MG/DL
ERYTHROCYTE [DISTWIDTH] IN BLOOD BY AUTOMATED COUNT: 12.4 %
EST. GFR  (AFRICAN AMERICAN): >60 ML/MIN/1.73 M^2
EST. GFR  (NON AFRICAN AMERICAN): >60 ML/MIN/1.73 M^2
GLUCOSE SERPL-MCNC: 122 MG/DL
HCT VFR BLD AUTO: 24 %
HGB BLD-MCNC: 8.2 G/DL
IMM GRANULOCYTES # BLD AUTO: 0.01 K/UL
MAGNESIUM SERPL-MCNC: 1.1 MG/DL
MCH RBC QN AUTO: 34.5 PG
MCHC RBC AUTO-ENTMCNC: 34.2 G/DL
MCV RBC AUTO: 101 FL
NEUTROPHILS # BLD AUTO: 2.9 K/UL
PHOSPHATE SERPL-MCNC: 2.8 MG/DL
PLATELET # BLD AUTO: 149 K/UL
PMV BLD AUTO: 9.5 FL
POTASSIUM SERPL-SCNC: 3.6 MMOL/L
PROT SERPL-MCNC: 6.7 G/DL
RBC # BLD AUTO: 2.38 M/UL
SODIUM SERPL-SCNC: 136 MMOL/L
WBC # BLD AUTO: 3.38 K/UL

## 2018-05-24 PROCEDURE — 3008F BODY MASS INDEX DOCD: CPT | Mod: CPTII,S$GLB,, | Performed by: INTERNAL MEDICINE

## 2018-05-24 PROCEDURE — 96360 HYDRATION IV INFUSION INIT: CPT

## 2018-05-24 PROCEDURE — 84100 ASSAY OF PHOSPHORUS: CPT

## 2018-05-24 PROCEDURE — 96413 CHEMO IV INFUSION 1 HR: CPT

## 2018-05-24 PROCEDURE — 96361 HYDRATE IV INFUSION ADD-ON: CPT

## 2018-05-24 PROCEDURE — 25000003 PHARM REV CODE 250: Performed by: INTERNAL MEDICINE

## 2018-05-24 PROCEDURE — 83735 ASSAY OF MAGNESIUM: CPT

## 2018-05-24 PROCEDURE — 96367 TX/PROPH/DG ADDL SEQ IV INF: CPT

## 2018-05-24 PROCEDURE — 80053 COMPREHEN METABOLIC PANEL: CPT

## 2018-05-24 PROCEDURE — 96366 THER/PROPH/DIAG IV INF ADDON: CPT

## 2018-05-24 PROCEDURE — 99999 PR PBB SHADOW E&M-EST. PATIENT-LVL III: CPT | Mod: PBBFAC,,, | Performed by: INTERNAL MEDICINE

## 2018-05-24 PROCEDURE — 99215 OFFICE O/P EST HI 40 MIN: CPT | Mod: S$GLB,,, | Performed by: INTERNAL MEDICINE

## 2018-05-24 PROCEDURE — 36415 COLL VENOUS BLD VENIPUNCTURE: CPT

## 2018-05-24 PROCEDURE — 85027 COMPLETE CBC AUTOMATED: CPT

## 2018-05-24 PROCEDURE — 63600175 PHARM REV CODE 636 W HCPCS: Performed by: INTERNAL MEDICINE

## 2018-05-24 RX ORDER — SODIUM CHLORIDE 0.9 % (FLUSH) 0.9 %
10 SYRINGE (ML) INJECTION
Status: DISCONTINUED | OUTPATIENT
Start: 2018-05-24 | End: 2018-05-24 | Stop reason: HOSPADM

## 2018-05-24 RX ORDER — HEPARIN 100 UNIT/ML
500 SYRINGE INTRAVENOUS
Status: DISCONTINUED | OUTPATIENT
Start: 2018-05-24 | End: 2018-05-24 | Stop reason: HOSPADM

## 2018-05-24 RX ORDER — SODIUM CHLORIDE 0.9 % (FLUSH) 0.9 %
10 SYRINGE (ML) INJECTION
Status: CANCELLED | OUTPATIENT
Start: 2018-05-24

## 2018-05-24 RX ORDER — HEPARIN 100 UNIT/ML
500 SYRINGE INTRAVENOUS
Status: CANCELLED | OUTPATIENT
Start: 2018-05-24

## 2018-05-24 RX ADMIN — SODIUM CHLORIDE: 0.9 INJECTION, SOLUTION INTRAVENOUS at 10:05

## 2018-05-24 RX ADMIN — SODIUM CHLORIDE: 0.9 INJECTION, SOLUTION INTRAVENOUS at 01:05

## 2018-05-24 RX ADMIN — DEXAMETHASONE SODIUM PHOSPHATE: 4 INJECTION, SOLUTION INTRA-ARTICULAR; INTRALESIONAL; INTRAMUSCULAR; INTRAVENOUS; SOFT TISSUE at 12:05

## 2018-05-24 RX ADMIN — CISPLATIN 89 MG: 100 INJECTION, SOLUTION INTRAVENOUS at 12:05

## 2018-05-24 NOTE — PLAN OF CARE
Problem: Patient Care Overview  Goal: Plan of Care Review  Outcome: Ongoing (interventions implemented as appropriate)  Pt completed 4 of 6 cycles of Cisplatin along with pre and post hydration fluids, Tolerated well. Plan of care reviewed with Patient and Pt instructed to contact MD with any further questions or concerns, he verbalized understanding. REBEL Port left in place, flushed and hep locked and capped, area covered with elastic dressing. Pt to return 5/25/18 for IV fluids. Pt given AVS and discharged, ambulated off unit unassisted, accompanied by  @ 15:35

## 2018-05-24 NOTE — PLAN OF CARE
Problem: Chemotherapy Effects (Adult)  Goal: Signs and Symptoms of Listed Potential Problems Will be Absent, Minimized or Managed (Chemotherapy Effects)  Signs and symptoms of listed potential problems will be absent, minimized or managed by discharge/transition of care (reference Chemotherapy Effects (Adult) CPG).   Outcome: Ongoing (interventions implemented as appropriate)  Pt admitted for 4 of 6 cycles of Cisplatin,ambulated unassisted to unit, , accompanied by brother. Side effects of chemo reviewed with Pt, he verbalized understanding.  VSS. [place] offered.

## 2018-05-24 NOTE — PROGRESS NOTES
Subjective:       Patient ID: Burton Whiting is a 48 y.o. male.    Chief Complaint: Squamous cell carcinoma of palatine tonsil  Chief Complaint: Squamous cell carcinoma of palatine tonsil; g-tube malodorous; left neck pain 2/10; 4 cans formula per day; and r arm port---red/warm to touch  Mr. Burton Whiting is a 48-year-old male, former smoker, who presents today with a four-month history of enlarging neck mass.  He initially delayed care due to lack of insurance.  He was seen by Dr. Turpin who referred him to see Dr. Olguin.  He had a CT that showed a 3.8 x 3.8 x 4.9 cm soft tissue mass arising from the left palatine tonsil resulting in moderate narrowing of the hypopharyngeal airway adjacent extensive matted left cervical lymphadenopathy was noted.  The largest ella mass was 6.6 x 9 x 2.6 cm extending inferiorly to the supraclavicular region.  The mass pushed the trachea and the left common carotid artery to the right.  Additional representative of cervical lymph nodes measuring 2.9 x 3.1 x 3.5 cm on axial image 37 of series 3   and 2.4 x 2.1 x 2.2 cm on axial image 55 of series 3.  There is also a sclerotic lesion involving the midline of the clivus measuring 1.2 cm.  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  PET scan performed on 01/19/2018 revealed persistent evidence of a soft tissue mass arising from the left palatine tonsil resulting in moderate narrowing of the hypopharyngeal   airway.  The mass is hypermetabolic demonstrating an SUV max of 18.5.  There is also persistent adjacent extensive matted left cervical lymphadenopathy, largest of this measuring 6.7 x 8.42 with hypermetabolic activity and SUV max of 20.5.  Lymphadenopathy is again noted to have a mass effect on the trachea and left common carotid artery, pushing both structures towards the right.  There is also prominent right cervical lymph nodes with the largest measuring 0.8 cm and demonstrating mild hypermetabolic  "activity with SUV max of 1.8, physiologic   distribution of FDG in the gray matter.  There are 3 pulmonary nodules noted within the right lung, the largest is in the anterior segment of the right upper lobe measuring 1 cm, second is visualized in the middle lobe measuring 0.6 cm and the third is within the posterior basal segment measuring 0.6 cm.  There is one pulmonary nodule within the left lung within the lateral basal segment measuring 0.6 cm.  These nodules do not demonstrate hypermetabolic activity; however, they are below the threshold for observation with PET     HPIHe underwent MRI cervical spine revealed "No evidence of metastatic disease to the cervical spine. Multilevel degenerative changes of the cervical spine with disc protrusion at C5-6 which results in moderate to severe spinal canal stenosis and and associated cord signal abnormality, suggestive of compressive myelopathy. 6 mm T1 hypointense non-enhancing lesion in the clivus.  Continued followup is suggested. 9 mm inferior descent of the cerebellar tonsils below the foramen magnum, suggestive of Chiari 1 malformation"  MRI thoracic spine "No evidence of metastatic disease involving the thoracic spine. Incidental hemangioma involving the T7 vertebral body. Mild degenerative disc disease without any significant spinal canal stenosis or neuroforaminal narrowing.   He completed 3 cycles of TPF and is now on definitive chemo./RT         HPI He comes in for week 4 of Cisplatin and RT. He is taking 4 cans/day via the PEG tube.  He has been able to drink liquids by mouth  He is here with his sister  He notes vomiting, diarrhea, constipation, abdominal pain, weight loss or loss of appetite, chest pain, shortness of breath, leg swelling, fatigue, pain, headache, dizziness, or mood changes. His ECOG PS is zero He is accompanied by his brother          Review of Systems   Constitutional: Negative for appetite change, fatigue and unexpected weight change. "   HENT: Negative for mouth sores.    Eyes: Negative for visual disturbance.   Respiratory: Negative for cough and shortness of breath.    Cardiovascular: Negative for chest pain.   Gastrointestinal: Positive for vomiting. Negative for abdominal pain and diarrhea.   Genitourinary: Negative for frequency.   Musculoskeletal: Negative for back pain.   Skin: Negative for rash.   Neurological: Negative for headaches.   Hematological: Negative for adenopathy.   Psychiatric/Behavioral: The patient is not nervous/anxious.    All other systems reviewed and are negative.      PMFSH: all information reviewed and updated as relevant to today's visit  Objective:      Physical Exam   Constitutional: He is oriented to person, place, and time. He appears well-developed and well-nourished.   HENT:   Mouth/Throat: No oropharyngeal exudate.   Cardiovascular: Normal rate and normal heart sounds.    Pulmonary/Chest: Effort normal and breath sounds normal. He has no wheezes.   Abdominal: Soft. Bowel sounds are normal. There is no tenderness.   Musculoskeletal: He exhibits no edema or tenderness.   Lymphadenopathy:     He has no cervical adenopathy.   Neurological: He is alert and oriented to person, place, and time. Coordination normal.   Skin: Skin is warm and dry. No rash noted.   Psychiatric: He has a normal mood and affect. Judgment and thought content normal.   Vitals reviewed.        LABS:  WBC   Date Value Ref Range Status   05/24/2018 3.38 (L) 3.90 - 12.70 K/uL Final     Hemoglobin   Date Value Ref Range Status   05/24/2018 8.2 (L) 14.0 - 18.0 g/dL Final     Hematocrit   Date Value Ref Range Status   05/24/2018 24.0 (L) 40.0 - 54.0 % Final     Platelets   Date Value Ref Range Status   05/24/2018 149 (L) 150 - 350 K/uL Final     Gran # (ANC)   Date Value Ref Range Status   05/24/2018 2.9 1.8 - 7.7 K/uL Final     Comment:     The ANC is based on a white cell differential from an   automated cell counter. It has not been  microscopically   reviewed for the presence of abnormal cells. Clinical   correlation is required.         Chemistry        Component Value Date/Time     05/24/2018 0834    K 3.6 05/24/2018 0834    CL 96 05/24/2018 0834    CO2 29 05/24/2018 0834    BUN 17 05/24/2018 0834    CREATININE 1.3 05/24/2018 0834     (H) 05/24/2018 0834        Component Value Date/Time    CALCIUM 8.8 05/24/2018 0834    ALKPHOS 62 05/24/2018 0834    AST 10 05/24/2018 0834    ALT 8 (L) 05/24/2018 0834    BILITOT 0.5 05/24/2018 0834    ESTGFRAFRICA >60.0 05/24/2018 0834    EGFRNONAA >60.0 05/24/2018 0834          Assessment:       1. Squamous cell carcinoma of palatine tonsil    2. Cancer of head, face, or neck lymph nodes, secondary    3. Status post insertion of percutaneous endoscopic gastrostomy (PEG) tube        Plan:        1,2,3. He will proceed with weekly Cisplatin and concurrent RT and will return in 1 week for next dose.    Last RT will be on 6/20/18    Above care plan was discussed with patient and accompanying brother and all questions were addressed to their satisfaction

## 2018-05-24 NOTE — Clinical Note
Also schedule CBC,CMP, mag and phos and see me on 6/7 and 6/14/18 and for weekly Cisplatin. IV fluids on day 2

## 2018-05-25 ENCOUNTER — INFUSION (OUTPATIENT)
Dept: INFUSION THERAPY | Facility: HOSPITAL | Age: 48
End: 2018-05-25
Attending: INTERNAL MEDICINE
Payer: COMMERCIAL

## 2018-05-25 ENCOUNTER — TELEPHONE (OUTPATIENT)
Dept: HEMATOLOGY/ONCOLOGY | Facility: CLINIC | Age: 48
End: 2018-05-25

## 2018-05-25 VITALS
TEMPERATURE: 98 F | HEART RATE: 78 BPM | RESPIRATION RATE: 20 BRPM | DIASTOLIC BLOOD PRESSURE: 82 MMHG | SYSTOLIC BLOOD PRESSURE: 139 MMHG

## 2018-05-25 DIAGNOSIS — C09.9 TONSIL CANCER: Primary | ICD-10-CM

## 2018-05-25 DIAGNOSIS — C77.0 CANCER OF HEAD, FACE, OR NECK LYMPH NODES, SECONDARY: ICD-10-CM

## 2018-05-25 DIAGNOSIS — C09.9 SQUAMOUS CELL CARCINOMA OF PALATINE TONSIL: ICD-10-CM

## 2018-05-25 PROCEDURE — 96360 HYDRATION IV INFUSION INIT: CPT

## 2018-05-25 PROCEDURE — 77387 GUIDANCE FOR RADJ TX DLVR: CPT | Mod: 26,,, | Performed by: RADIOLOGY

## 2018-05-25 PROCEDURE — 63600175 PHARM REV CODE 636 W HCPCS: Performed by: INTERNAL MEDICINE

## 2018-05-25 PROCEDURE — 77336 RADIATION PHYSICS CONSULT: CPT | Performed by: RADIOLOGY

## 2018-05-25 PROCEDURE — 96361 HYDRATE IV INFUSION ADD-ON: CPT

## 2018-05-25 PROCEDURE — 77386 HC IMRT, COMPLEX: CPT | Performed by: RADIOLOGY

## 2018-05-25 PROCEDURE — 77417 THER RADIOLOGY PORT IMAGE(S): CPT | Performed by: RADIOLOGY

## 2018-05-25 PROCEDURE — 25000003 PHARM REV CODE 250: Performed by: INTERNAL MEDICINE

## 2018-05-25 RX ORDER — HEPARIN 100 UNIT/ML
500 SYRINGE INTRAVENOUS
Status: COMPLETED | OUTPATIENT
Start: 2018-05-25 | End: 2018-05-25

## 2018-05-25 RX ADMIN — SODIUM CHLORIDE 2000 ML: 0.9 INJECTION, SOLUTION INTRAVENOUS at 01:05

## 2018-05-25 RX ADMIN — Medication 500 UNITS: at 04:05

## 2018-05-25 NOTE — PLAN OF CARE
Problem: Patient Care Overview  Goal: Plan of Care Review  Outcome: Ongoing (interventions implemented as appropriate)  Pt received 2L NS. Pt arrived with port already accessed. Upon deaccessing chen needle, there appeared to be a 2mm hole above port site with port protruding out of hole. Pt stated that Dr. Lopez has seen this and was aware. ALESSIA Dougherty, nurse working with Dr. Lopez in clinic today, notified and stated she would ensure Dr. Lopez was aware of the situation. Pt discharged to radiation appt independently.

## 2018-05-25 NOTE — PLAN OF CARE
Problem: Patient Care Overview  Goal: Plan of Care Review  Outcome: Ongoing (interventions implemented as appropriate)  Day 15 of radiation to the H/N tolerating well

## 2018-05-29 ENCOUNTER — PATIENT MESSAGE (OUTPATIENT)
Dept: HEMATOLOGY/ONCOLOGY | Facility: CLINIC | Age: 48
End: 2018-05-29

## 2018-05-29 ENCOUNTER — PATIENT MESSAGE (OUTPATIENT)
Dept: RADIATION ONCOLOGY | Facility: CLINIC | Age: 48
End: 2018-05-29

## 2018-05-29 DIAGNOSIS — C09.9 TONSIL CANCER: Primary | ICD-10-CM

## 2018-05-30 ENCOUNTER — PATIENT MESSAGE (OUTPATIENT)
Dept: HEMATOLOGY/ONCOLOGY | Facility: CLINIC | Age: 48
End: 2018-05-30

## 2018-05-30 ENCOUNTER — TELEPHONE (OUTPATIENT)
Dept: HEMATOLOGY/ONCOLOGY | Facility: CLINIC | Age: 48
End: 2018-05-30

## 2018-05-30 ENCOUNTER — HOSPITAL ENCOUNTER (EMERGENCY)
Facility: HOSPITAL | Age: 48
Discharge: HOME | End: 2018-05-30
Attending: EMERGENCY MEDICINE
Payer: COMMERCIAL

## 2018-05-30 VITALS
BODY MASS INDEX: 30.96 KG/M2 | SYSTOLIC BLOOD PRESSURE: 125 MMHG | OXYGEN SATURATION: 99 % | HEIGHT: 69 IN | WEIGHT: 209 LBS | TEMPERATURE: 98 F | RESPIRATION RATE: 29 BRPM | HEART RATE: 62 BPM | DIASTOLIC BLOOD PRESSURE: 83 MMHG

## 2018-05-30 DIAGNOSIS — N17.9 AKI (ACUTE KIDNEY INJURY): Primary | ICD-10-CM

## 2018-05-30 DIAGNOSIS — E83.42 HYPOMAGNESEMIA: ICD-10-CM

## 2018-05-30 PROCEDURE — 99285 EMERGENCY DEPT VISIT HI MDM: CPT | Mod: ,,, | Performed by: EMERGENCY MEDICINE

## 2018-05-30 PROCEDURE — 77387 GUIDANCE FOR RADJ TX DLVR: CPT | Mod: ,,, | Performed by: RADIOLOGY

## 2018-05-30 PROCEDURE — 96368 THER/DIAG CONCURRENT INF: CPT

## 2018-05-30 PROCEDURE — 63600175 PHARM REV CODE 636 W HCPCS: Performed by: STUDENT IN AN ORGANIZED HEALTH CARE EDUCATION/TRAINING PROGRAM

## 2018-05-30 PROCEDURE — 77386 HC IMRT, COMPLEX: CPT | Performed by: RADIOLOGY

## 2018-05-30 PROCEDURE — 25000003 PHARM REV CODE 250: Performed by: STUDENT IN AN ORGANIZED HEALTH CARE EDUCATION/TRAINING PROGRAM

## 2018-05-30 PROCEDURE — 99284 EMERGENCY DEPT VISIT MOD MDM: CPT | Mod: 25

## 2018-05-30 PROCEDURE — 96365 THER/PROPH/DIAG IV INF INIT: CPT

## 2018-05-30 RX ORDER — MAGNESIUM SULFATE HEPTAHYDRATE 40 MG/ML
2 INJECTION, SOLUTION INTRAVENOUS
Status: DISCONTINUED | OUTPATIENT
Start: 2018-05-30 | End: 2018-05-30 | Stop reason: HOSPADM

## 2018-05-30 RX ADMIN — MAGNESIUM SULFATE IN WATER 2 G: 40 INJECTION, SOLUTION INTRAVENOUS at 02:05

## 2018-05-30 RX ADMIN — PROMETHAZINE HYDROCHLORIDE 25 MG: 25 INJECTION INTRAMUSCULAR; INTRAVENOUS at 03:05

## 2018-05-30 RX ADMIN — SODIUM CHLORIDE, SODIUM LACTATE, POTASSIUM CHLORIDE, AND CALCIUM CHLORIDE 1000 ML: .6; .31; .03; .02 INJECTION, SOLUTION INTRAVENOUS at 02:05

## 2018-05-30 NOTE — ED NOTES
"Pt stated "not sticking me again, Im going home, get my paperwork together now and get me out of here"  MD notified   "

## 2018-05-30 NOTE — ED PROVIDER NOTES
Encounter Date: 5/30/2018    SCRIBE #1 NOTE: I, Tamara Dior, am scribing for, and in the presence of,  Dr. Ellis. I have scribed the following portions of the note - the Resident attestation.       History     Chief Complaint   Patient presents with    Weight Loss     chemo 17.2 # weight loss in one week. blood work done this am.      48M on active treatment with cisplatin and radiation therapy for SCC of tonsil presenting for weight loss and nausea/vomiting. Patient has a G tube in place and tolerated feeds this morning. Apparently has lost 17 pounds in the last week. He complains of nausea and has had a non-productive cough. Last chemo was Thursday, due again tomorrow. Denies chest and abdominal pain.           Review of patient's allergies indicates:  No Known Allergies  Past Medical History:   Diagnosis Date    Former smoker     HPV in male     Opiate addiction     Squamous cell carcinoma of palatine tonsil     throat and tonsillar     Past Surgical History:   Procedure Laterality Date    GASTROSTOMY TUBE PLACEMENT Left 02/12/2018     Family History   Problem Relation Age of Onset    Leukemia Mother     Hypertension Father     Thyroid disease Sister     Hypertension Brother     Brain cancer Maternal Uncle     Brain cancer Maternal Uncle      Social History   Substance Use Topics    Smoking status: Former Smoker     Packs/day: 1.50     Years: 25.00     Types: Cigarettes     Quit date: 06/2017    Smokeless tobacco: Never Used      Comment: smoked for about 25 years. quit 6 months ago    Alcohol use No     Review of Systems   Constitutional: Positive for chills and unexpected weight change. Negative for fever.   HENT: Positive for sore throat and trouble swallowing.    Eyes: Negative for photophobia and visual disturbance.   Respiratory: Positive for cough. Negative for shortness of breath.    Cardiovascular: Negative for chest pain, palpitations and leg swelling.   Gastrointestinal: Positive for  nausea and vomiting. Negative for abdominal pain and diarrhea.   Genitourinary: Negative for dysuria and hematuria.   Musculoskeletal: Negative for arthralgias and myalgias.   Skin: Negative for rash and wound.   Neurological: Negative for syncope, weakness, numbness and headaches.   Psychiatric/Behavioral: Negative for agitation and confusion.       Physical Exam     Initial Vitals [05/30/18 1201]   BP Pulse Resp Temp SpO2   136/84 80 18 98 °F (36.7 °C) 97 %      MAP       101.33         Physical Exam    Constitutional: No distress.   Chronically ill appearing  + rigors   HENT:   Head: Normocephalic and atraumatic.   Mouth/Throat: Oropharynx is clear and moist.   Eyes: Right eye exhibits no discharge. Left eye exhibits no discharge. No scleral icterus.   Neck: No thyromegaly present.   Cardiovascular: Normal rate, regular rhythm and normal heart sounds.   Pulmonary/Chest: Breath sounds normal. No respiratory distress. He has no wheezes. He has no rales.   Abdominal: Soft. Bowel sounds are normal. He exhibits no distension. There is no tenderness.   G tube in place with no surrounding erythema   Musculoskeletal: He exhibits no edema or tenderness.   Neurological: He is alert and oriented to person, place, and time.   Skin: Skin is warm and dry.   Psychiatric: He has a normal mood and affect. Thought content normal.         ED Course   Procedures  Labs Reviewed - No data to display          Medical Decision Making:   History:   Old Medical Records: I decided to obtain old medical records.  Clinical Tests:   Lab Tests: Ordered and Reviewed  Radiological Study: Ordered and Reviewed  Other:   I have discussed this case with another health care provider.       APC / Resident Notes:   4:11 PM  Discussed with Oncology fellow Dr. Mejias. If patient tolerating tube feeds with phenergan he recommends discharge with prescription for phenergan and follow up in the oncology urgent care clinic tomorrow.     Discussed with Formula  Room, tube feeds will be delivered for trial of feed.        Scribe Attestation:   Scribe #1: I performed the above scribed service and the documentation accurately describes the services I performed. I attest to the accuracy of the note.    Attending Attestation:   Physician Attestation Statement for Resident:  As the supervising MD   Physician Attestation Statement: I have personally seen and examined this patient.   I agree with the above history. -: Weight loss of 17 lbs over the past 7 days, likely dehydrated. Undergoing daily radiation and weekly chemotherapy for throat cancer. Will check labs, give fluids, and consult oncology. Disposition per their recommendation.    As the supervising MD I agree with the above PE.    As the supervising MD I agree with the above treatment, course, plan, and disposition.  I have reviewed and agree with the residents interpretation of the following: lab data and x-rays.  I have reviewed the following: old records at this facility.                       Clinical Impression:   The primary encounter diagnosis was PJ (acute kidney injury). A diagnosis of Hypomagnesemia was also pertinent to this visit.                           Harry Ellis MD  05/31/18 [id number]

## 2018-05-30 NOTE — ED NOTES
"Pt refusing blood cultures and stated "aint sticking me with no more needles, I want to go home, get my paperwork together".   "

## 2018-05-30 NOTE — ED NOTES
"Pt reports "been sick nausea and vomiting, have throat cancer and doing radiation and chemo, sick from chemo" saliva thick , lip and mm dry, speech distorted pt denies sob, cp, fever, abd pain dysuria     LOC: The patient is awake, alert and aware of environment with an appropriate affect, the patient is oriented x 3 and speaking appropriately.  APPEARANCE: Patient resting comfortably and in no acute distress, patient is clean and well groomed, patient's clothing is properly fastened.  SKIN: The skin is warm and dry, intact, patient has normal skin turgor and moist mucus membranes.   RESPIRATORY: Airway is open and patent, respirations are spontaneous, patient has a normal effort and rate.  CARDIAC: Patient has a normal rate and rhythm, no periphreal edema noted, capillary refill < 3 seconds. Bilateral radial pulses +2  ABDOMEN: Soft and non tender to palpation, no distention noted. Bowel sounds present  NEUROLOGIC: PERRL, facial expression is symmetrical, patient moving all extremities spontaneously, normal sensation in all extremities when touched with a finger. Follows all commands appropriately  MUSCULOSKELETAL: No obvious deformities. Full ROM in all 4 extremities.   "

## 2018-05-31 ENCOUNTER — OFFICE VISIT (OUTPATIENT)
Dept: HEMATOLOGY/ONCOLOGY | Facility: CLINIC | Age: 48
End: 2018-05-31
Payer: COMMERCIAL

## 2018-05-31 ENCOUNTER — INFUSION (OUTPATIENT)
Dept: INFUSION THERAPY | Facility: HOSPITAL | Age: 48
End: 2018-05-31
Attending: INTERNAL MEDICINE
Payer: COMMERCIAL

## 2018-05-31 ENCOUNTER — DOCUMENTATION ONLY (OUTPATIENT)
Dept: RADIATION ONCOLOGY | Facility: CLINIC | Age: 48
End: 2018-05-31

## 2018-05-31 VITALS
HEART RATE: 72 BPM | WEIGHT: 211.44 LBS | OXYGEN SATURATION: 98 % | DIASTOLIC BLOOD PRESSURE: 80 MMHG | SYSTOLIC BLOOD PRESSURE: 124 MMHG | BODY MASS INDEX: 33.19 KG/M2 | TEMPERATURE: 98 F | RESPIRATION RATE: 16 BRPM | HEIGHT: 67 IN

## 2018-05-31 VITALS
HEART RATE: 72 BPM | DIASTOLIC BLOOD PRESSURE: 84 MMHG | HEIGHT: 67 IN | TEMPERATURE: 98 F | BODY MASS INDEX: 33.12 KG/M2 | SYSTOLIC BLOOD PRESSURE: 133 MMHG | WEIGHT: 211 LBS | RESPIRATION RATE: 18 BRPM

## 2018-05-31 DIAGNOSIS — C76.0 HEAD AND NECK CANCER: Primary | ICD-10-CM

## 2018-05-31 DIAGNOSIS — B37.0 ORAL THRUSH: ICD-10-CM

## 2018-05-31 DIAGNOSIS — C09.9 TONSIL CANCER: Primary | ICD-10-CM

## 2018-05-31 DIAGNOSIS — R11.0 NAUSEA: ICD-10-CM

## 2018-05-31 DIAGNOSIS — C09.9 SQUAMOUS CELL CARCINOMA OF PALATINE TONSIL: Primary | ICD-10-CM

## 2018-05-31 DIAGNOSIS — N17.9 ACUTE RENAL FAILURE, UNSPECIFIED ACUTE RENAL FAILURE TYPE: ICD-10-CM

## 2018-05-31 DIAGNOSIS — T80.212A PORT OR RESERVOIR INFECTION, INITIAL ENCOUNTER: Primary | ICD-10-CM

## 2018-05-31 DIAGNOSIS — C77.0 CANCER OF HEAD, FACE, OR NECK LYMPH NODES, SECONDARY: ICD-10-CM

## 2018-05-31 PROCEDURE — 77387 GUIDANCE FOR RADJ TX DLVR: CPT | Mod: ,,, | Performed by: RADIOLOGY

## 2018-05-31 PROCEDURE — 3008F BODY MASS INDEX DOCD: CPT | Mod: CPTII,S$GLB,, | Performed by: INTERNAL MEDICINE

## 2018-05-31 PROCEDURE — 99999 PR PBB SHADOW E&M-EST. PATIENT-LVL III: CPT | Mod: PBBFAC,,, | Performed by: INTERNAL MEDICINE

## 2018-05-31 PROCEDURE — 96361 HYDRATE IV INFUSION ADD-ON: CPT

## 2018-05-31 PROCEDURE — 25000003 PHARM REV CODE 250: Performed by: INTERNAL MEDICINE

## 2018-05-31 PROCEDURE — 77386 HC IMRT, COMPLEX: CPT | Performed by: RADIOLOGY

## 2018-05-31 PROCEDURE — 99215 OFFICE O/P EST HI 40 MIN: CPT | Mod: S$GLB,,, | Performed by: INTERNAL MEDICINE

## 2018-05-31 PROCEDURE — 96367 TX/PROPH/DG ADDL SEQ IV INF: CPT

## 2018-05-31 PROCEDURE — 63600175 PHARM REV CODE 636 W HCPCS: Performed by: INTERNAL MEDICINE

## 2018-05-31 RX ORDER — HEPARIN SODIUM,PORCINE/PF 10 UNIT/ML
10 SYRINGE (ML) INTRAVENOUS
Qty: 200 ML | Refills: 12 | Status: SHIPPED | OUTPATIENT
Start: 2018-05-31 | End: 2018-08-10

## 2018-05-31 RX ORDER — ONDANSETRON HYDROCHLORIDE 4 MG/5ML
8 SOLUTION ORAL EVERY 6 HOURS PRN
Qty: 500 ML | Refills: 3 | Status: SHIPPED | OUTPATIENT
Start: 2018-05-31 | End: 2018-08-01

## 2018-05-31 RX ORDER — SODIUM CHLORIDE 0.9 % (FLUSH) 0.9 %
10 SYRINGE (ML) INJECTION
Qty: 200 ML | Refills: 12 | Status: SHIPPED | OUTPATIENT
Start: 2018-05-31 | End: 2018-08-10

## 2018-05-31 RX ORDER — PROMETHAZINE HYDROCHLORIDE 6.25 MG/5ML
25 SYRUP ORAL EVERY 6 HOURS PRN
Qty: 500 ML | Refills: 6 | Status: SHIPPED | OUTPATIENT
Start: 2018-05-31 | End: 2018-10-01

## 2018-05-31 RX ORDER — HEPARIN 100 UNIT/ML
500 SYRINGE INTRAVENOUS
Status: CANCELLED | OUTPATIENT
Start: 2018-05-31

## 2018-05-31 RX ORDER — SODIUM CHLORIDE 0.9 % (FLUSH) 0.9 %
10 SYRINGE (ML) INJECTION
Status: CANCELLED | OUTPATIENT
Start: 2018-05-31

## 2018-05-31 RX ORDER — FLUCONAZOLE 40 MG/ML
200 POWDER, FOR SUSPENSION ORAL DAILY
Qty: 100 ML | Refills: 0 | Status: SHIPPED | OUTPATIENT
Start: 2018-05-31 | End: 2018-06-20

## 2018-05-31 RX ORDER — HEPARIN 100 UNIT/ML
500 SYRINGE INTRAVENOUS
Status: DISCONTINUED | OUTPATIENT
Start: 2018-05-31 | End: 2018-05-31 | Stop reason: HOSPADM

## 2018-05-31 RX ORDER — SODIUM CHLORIDE 0.9 % (FLUSH) 0.9 %
10 SYRINGE (ML) INJECTION
Status: DISCONTINUED | OUTPATIENT
Start: 2018-05-31 | End: 2018-05-31 | Stop reason: HOSPADM

## 2018-05-31 RX ADMIN — SODIUM CHLORIDE 2000 ML: 0.9 INJECTION, SOLUTION INTRAVENOUS at 10:05

## 2018-05-31 RX ADMIN — GRANISETRON HYDROCHLORIDE 1 MG: 0.1 INJECTION, SOLUTION INTRAVENOUS at 10:05

## 2018-05-31 NOTE — PLAN OF CARE
Problem: Chemotherapy Effects (Adult)  Goal: Signs and Symptoms of Listed Potential Problems Will be Absent, Minimized or Managed (Chemotherapy Effects)  Signs and symptoms of listed potential problems will be absent, minimized or managed by discharge/transition of care (reference Chemotherapy Effects (Adult) CPG).   Outcome: Ongoing (interventions implemented as appropriate)  Pt here for ivf and anti-nausea , will not receive chemo today, labs, hx, meds, allergies reviewed, peripheral started will not use right arm port, reclined in chair, warm blankets provided, continue to monitor

## 2018-05-31 NOTE — PLAN OF CARE
Problem: Patient Care Overview  Goal: Plan of Care Review  Outcome: Ongoing (interventions implemented as appropriate)  Pt tolerated kytril and ivf without issue, pt to rtc 6/1/18 for chemo after picc placed tomorrow, left #22 to left forearm in for use for tomorrow, clamped, flushed and wrapped, no distress noted upon d/c to home with sister

## 2018-05-31 NOTE — PLAN OF CARE
Problem: Patient Care Overview  Goal: Plan of Care Review  Outcome: Ongoing (interventions implemented as appropriate)  P c/o nausea and vomiting after PEG feeding. Pt looks pale and states he his weak having trouble getting up and just having overall fatigue. Weight loss of 17.2 lbs in 1 week. Pt brought to ER by nurse per Dr. Sandra.

## 2018-05-31 NOTE — Clinical Note
Only IV fluids 2 liters and Kytril today. No chemo today  Schedule CBC, CMP and Cisplatin tomorrow. IV fluids on Saturday

## 2018-05-31 NOTE — PROGRESS NOTES
Subjective:       Patient ID: Burton Whiting is a 48 y.o. male.    Chief Complaint: Squamous cell carcinoma of palatine tonsil  Chief Complaint: Squamous cell carcinoma of palatine tonsil; g-tube malodorous; left neck pain 2/10; 4 cans formula per day; and r arm port---red/warm to touch  Mr. Burton Whiting is a 48-year-old male, former smoker, who presents today with a four-month history of enlarging neck mass.  He initially delayed care due to lack of insurance.  He was seen by Dr. Turpin who referred him to see Dr. Olguin.  He had a CT that showed a 3.8 x 3.8 x 4.9 cm soft tissue mass arising from the left palatine tonsil resulting in moderate narrowing of the hypopharyngeal airway adjacent extensive matted left cervical lymphadenopathy was noted.  The largest ella mass was 6.6 x 9 x 2.6 cm extending inferiorly to the supraclavicular region.  The mass pushed the trachea and the left common carotid artery to the right.  Additional representative of cervical lymph nodes measuring 2.9 x 3.1 x 3.5 cm on axial image 37 of series 3   and 2.4 x 2.1 x 2.2 cm on axial image 55 of series 3.  There is also a sclerotic lesion involving the midline of the clivus measuring 1.2 cm.  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  PET scan performed on 01/19/2018 revealed persistent evidence of a soft tissue mass arising from the left palatine tonsil resulting in moderate narrowing of the hypopharyngeal   airway.  The mass is hypermetabolic demonstrating an SUV max of 18.5.  There is also persistent adjacent extensive matted left cervical lymphadenopathy, largest of this measuring 6.7 x 8.42 with hypermetabolic activity and SUV max of 20.5.  Lymphadenopathy is again noted to have a mass effect on the trachea and left common carotid artery, pushing both structures towards the right.  There is also prominent right cervical lymph nodes with the largest measuring 0.8 cm and demonstrating mild hypermetabolic  "activity with SUV max of 1.8, physiologic   distribution of FDG in the gray matter.  There are 3 pulmonary nodules noted within the right lung, the largest is in the anterior segment of the right upper lobe measuring 1 cm, second is visualized in the middle lobe measuring 0.6 cm and the third is within the posterior basal segment measuring 0.6 cm.  There is one pulmonary nodule within the left lung within the lateral basal segment measuring 0.6 cm.  These nodules do not demonstrate hypermetabolic activity; however, they are below the threshold for observation with PET     HPIHe underwent MRI cervical spine revealed "No evidence of metastatic disease to the cervical spine. Multilevel degenerative changes of the cervical spine with disc protrusion at C5-6 which results in moderate to severe spinal canal stenosis and and associated cord signal abnormality, suggestive of compressive myelopathy. 6 mm T1 hypointense non-enhancing lesion in the clivus.  Continued followup is suggested. 9 mm inferior descent of the cerebellar tonsils below the foramen magnum, suggestive of Chiari 1 malformation"  MRI thoracic spine "No evidence of metastatic disease involving the thoracic spine. Incidental hemangioma involving the T7 vertebral body. Mild degenerative disc disease without any significant spinal canal stenosis or neuroforaminal narrowing.   He completed 3 cycles of TPF and is now on definitive chemo./RT           HPI He comes in for week 4 of Cisplatin and RT. He noted nausea and vomiting over the weekend and was unable to get nutrition via PEG tube.  He restarted nutrition this morning and put in half a can via the PEG tube and has not thrown up. He was referred to the ED yesterday for feeling weak but he left as he was frustrated and left        Review of Systems   Constitutional: Positive for appetite change, fatigue and unexpected weight change.   HENT: Positive for mouth sores.    Eyes: Negative for visual disturbance. "   Respiratory: Negative for cough and shortness of breath.    Cardiovascular: Negative for chest pain.   Gastrointestinal: Negative for abdominal pain and diarrhea.   Genitourinary: Negative for frequency.   Musculoskeletal: Negative for back pain.   Skin: Negative for rash.   Neurological: Negative for headaches.   Hematological: Negative for adenopathy.   Psychiatric/Behavioral: The patient is not nervous/anxious.    All other systems reviewed and are negative.      Objective:      Physical Exam   Constitutional: He is oriented to person, place, and time. He appears well-developed and well-nourished. He appears toxic. He has a sickly appearance.   HENT:   Mouth/Throat: No oropharyngeal exudate.   Cardiovascular: Normal rate and normal heart sounds.    Pulmonary/Chest: Effort normal and breath sounds normal. He has no wheezes.   Abdominal: Soft. Bowel sounds are normal. There is no tenderness.   Musculoskeletal: He exhibits no edema or tenderness.   Lymphadenopathy:     He has no cervical adenopathy.   Neurological: He is alert and oriented to person, place, and time. Coordination normal.   Skin: Skin is warm and dry. No rash noted.   Psychiatric: He has a normal mood and affect. Judgment and thought content normal.   Vitals reviewed.      LABS:  WBC   Date Value Ref Range Status   05/30/2018 2.11 (L) 3.90 - 12.70 K/uL Final     Hemoglobin   Date Value Ref Range Status   05/30/2018 8.9 (L) 14.0 - 18.0 g/dL Final     Hematocrit   Date Value Ref Range Status   05/30/2018 25.5 (L) 40.0 - 54.0 % Final     Platelets   Date Value Ref Range Status   05/30/2018 93 (L) 150 - 350 K/uL Final     Gran # (ANC)   Date Value Ref Range Status   05/30/2018 1.6 (L) 1.8 - 7.7 K/uL Final     Comment:     The ANC is based on a white cell differential from an   automated cell counter. It has not been microscopically   reviewed for the presence of abnormal cells. Clinical   correlation is required.         Chemistry        Component  Value Date/Time     05/30/2018 1110    K 3.8 05/30/2018 1110    CL 97 05/30/2018 1110    CO2 32 (H) 05/30/2018 1110    BUN 29 (H) 05/30/2018 1110    CREATININE 1.6 (H) 05/30/2018 1110     (H) 05/30/2018 1110        Component Value Date/Time    CALCIUM 8.5 (L) 05/30/2018 1110    ALKPHOS 70 05/30/2018 1110    AST 12 05/30/2018 1110    ALT 10 05/30/2018 1110    BILITOT 0.5 05/30/2018 1110    ESTGFRAFRICA 57.2 (A) 05/30/2018 1110    EGFRNONAA 49.5 (A) 05/30/2018 1110          Assessment:       1. Squamous cell carcinoma of palatine tonsil    2. Cancer of head, face, or neck lymph nodes, secondary    3. Nausea    4. Oral thrush    5. Acute renal failure, unspecified acute renal failure type        Plan:        1,2,3,4,5. Hold chemo today. He will receive IV fluids and then return tomorrow after PICC line placement with labs and for Cisplatin.  His PORT has to be removed as it is coming out of the surface.  He will also receive IV fluids today. Escribed liquid Zofran and phenergan and oral diflucan. Continue nutrition through PEG tube.        Above care plan was discussed with patient and accompanying sister and all questions were addressed to their satisfaction

## 2018-06-01 ENCOUNTER — TELEPHONE (OUTPATIENT)
Dept: HEMATOLOGY/ONCOLOGY | Facility: CLINIC | Age: 48
End: 2018-06-01

## 2018-06-01 ENCOUNTER — PATIENT MESSAGE (OUTPATIENT)
Dept: HEMATOLOGY/ONCOLOGY | Facility: CLINIC | Age: 48
End: 2018-06-01

## 2018-06-01 ENCOUNTER — HOSPITAL ENCOUNTER (OUTPATIENT)
Dept: INTERVENTIONAL RADIOLOGY/VASCULAR | Facility: HOSPITAL | Age: 48
Discharge: HOME | End: 2018-06-01
Attending: INTERNAL MEDICINE
Payer: COMMERCIAL

## 2018-06-01 ENCOUNTER — HOSPITAL ENCOUNTER (OUTPATIENT)
Dept: RADIATION THERAPY | Facility: HOSPITAL | Age: 48
Discharge: HOME | End: 2018-06-01
Attending: RADIOLOGY
Payer: COMMERCIAL

## 2018-06-01 VITALS
DIASTOLIC BLOOD PRESSURE: 69 MMHG | SYSTOLIC BLOOD PRESSURE: 123 MMHG | HEART RATE: 72 BPM | RESPIRATION RATE: 18 BRPM | OXYGEN SATURATION: 98 %

## 2018-06-01 DIAGNOSIS — C76.0 HEAD AND NECK CANCER: ICD-10-CM

## 2018-06-01 DIAGNOSIS — C76.0 HEAD AND NECK CANCER: Primary | ICD-10-CM

## 2018-06-01 DIAGNOSIS — C09.9 SQUAMOUS CELL CARCINOMA OF PALATINE TONSIL: Primary | ICD-10-CM

## 2018-06-01 PROCEDURE — 76937 US GUIDE VASCULAR ACCESS: CPT | Mod: 26,,, | Performed by: RADIOLOGY

## 2018-06-01 PROCEDURE — C1751 CATH, INF, PER/CENT/MIDLINE: HCPCS

## 2018-06-01 PROCEDURE — 77001 FLUOROGUIDE FOR VEIN DEVICE: CPT | Mod: 26,,, | Performed by: RADIOLOGY

## 2018-06-01 PROCEDURE — 77387 GUIDANCE FOR RADJ TX DLVR: CPT | Mod: 26,,, | Performed by: RADIOLOGY

## 2018-06-01 PROCEDURE — 76937 US GUIDE VASCULAR ACCESS: CPT | Mod: TC

## 2018-06-01 PROCEDURE — 77386 HC IMRT, COMPLEX: CPT | Performed by: RADIOLOGY

## 2018-06-01 PROCEDURE — 36569 INSJ PICC 5 YR+ W/O IMAGING: CPT | Mod: LT,,, | Performed by: RADIOLOGY

## 2018-06-01 NOTE — TELEPHONE ENCOUNTER
----- Message from Lisa Brumfield sent at 6/1/2018 12:44 PM CDT -----  Contact: [place], [facility]  MsKana Irwin is calling to speak with Staff regarding a prescription for Heprin.  Medication is on back order and the pt needs something for the weekend.      She can be reached a [phone].    Thank you.

## 2018-06-01 NOTE — TELEPHONE ENCOUNTER
Spoke to pharmacy, heparin will not be in until Monday. Let her know that I will talk to the doctor and be back in touch

## 2018-06-01 NOTE — TELEPHONE ENCOUNTER
So apparently patient or family do the flushes but Modesta is calling to see if his insurance covers home health for that.   Orders done

## 2018-06-01 NOTE — H&P
Radiology History & Physical      SUBJECTIVE:     Chief Complaint: tonsillar cancer    History of Present Illness:  Burton Whiting is a 48 y.o. male who presents for picc placement. Already has right arm port but cannot be used due to skin breakdown. Scheduled for port removal 6/6. Will attempt left arm picc placement.    Past Medical History:   Diagnosis Date    Former smoker     HPV in male     Opiate addiction     Squamous cell carcinoma of palatine tonsil     throat and tonsillar     Past Surgical History:   Procedure Laterality Date    GASTROSTOMY TUBE PLACEMENT Left 02/12/2018       Home Meds:   Prior to Admission medications    Medication Sig Start Date End Date Taking? Authorizing Provider   cholecalciferol, vitamin D3, (VITAMIN D3) 1,000 unit capsule Take 1,000 Units by mouth once daily.    Historical Provider, MD   fluconazole 40 mg/ml (DIFLUCAN) 40 mg/mL suspension Take 5 mLs (200 mg total) by mouth once daily. 5/31/18 6/20/18  Cortney Lopez MD   heparin, porcine, PF, (HEPARIN LOCKFLUSH,PORCINE,,PF,) 10 unit/mL Syrg Inject 1 mL (10 Units total) into the vein as needed. 5/31/18   Cortney Lopez MD   IBUPROFEN (ADVIL ORAL) Take 1 tablet by mouth as needed.    Historical Provider, MD   lidocaine HCl 2% (XYLOCAINE) 2 % Soln by Mucous Membrane route every 3 (three) hours. 2/22/18   Cotrney Lopez MD   NYSTATIN (DUKE'S SOLUTION) Mix equal amounts of benadryl, maalox, 2% viscous lidocaine and nystatin    Swish and swallow 10 ml 4 times a day 1/29/18   Cortney Lopez MD   nystatin (MYCOSTATIN) cream Apply topically 2 (two) times daily. 5/3/18   Cortney Lopez MD   ondansetron (ZOFRAN) 4 mg/5 mL solution Take 10 mLs (8 mg total) by mouth every 6 (six) hours as needed for Nausea. 5/31/18   Cortney Lopez MD   ondansetron (ZOFRAN) 8 MG tablet Take 1 tablet (8 mg total) by mouth 4 (four) times daily as needed for Nausea. 1/29/18 1/29/19  Cortney Lopez MD   oxyCODONE (ROXICODONE) 5 mg/5 mL Soln Take 5 mLs (5 mg  total) by mouth every 6 (six) hours as needed. 5/17/18   Cortney Lopez MD   promethazine (PHENERGAN) 6.25 mg/5 mL syrup Take 20 mLs (25 mg total) by mouth every 6 (six) hours as needed for Nausea. 5/31/18   Cortney Lopez MD   psyllium husk, aspartame, (METAMUCIL) 3.4 gram PwPk packet Take 2 packets by mouth once daily. 3/9/18   Cici Phillips MD   sodium chloride 0.9% injection Inject 10 mLs into the vein as needed. 5/31/18   Cortney Lopez MD     Anticoagulants/Antiplatelets: no anticoagulation    Allergies: Review of patient's allergies indicates:  No Known Allergies  Sedation History:  no adverse reactions    Review of Systems:   As documented in primary provider H&P.      OBJECTIVE:     Vital Signs (Most Recent)  Pulse: 72 (06/01/18 1046)  Resp: 18 (06/01/18 1046)  BP: 123/69 (06/01/18 1046)  SpO2: 98 % (06/01/18 1046)    Physical Exam:  ASA: 3  Mallampati: 1      Laboratory  Lab Results   Component Value Date    INR 1.3 (H) 02/28/2018       Lab Results   Component Value Date    WBC 2.11 (L) 05/30/2018    HGB 8.9 (L) 05/30/2018    HCT 25.5 (L) 05/30/2018    MCV 99 (H) 05/30/2018    PLT 93 (L) 05/30/2018      Lab Results   Component Value Date     (H) 05/30/2018     05/30/2018    K 3.8 05/30/2018    CL 97 05/30/2018    CO2 32 (H) 05/30/2018    BUN 29 (H) 05/30/2018    CREATININE 1.6 (H) 05/30/2018    CALCIUM 8.5 (L) 05/30/2018    MG 1.0 (L) 05/30/2018    ALT 10 05/30/2018    AST 12 05/30/2018    ALBUMIN 3.3 (L) 05/30/2018    BILITOT 0.5 05/30/2018       ASSESSMENT/PLAN:     Sedation Plan: local only  Patient will undergo left arm picc placement.    Chino Méndez MD  Department of Radiology  Pager: [phone]

## 2018-06-01 NOTE — PROCEDURES
"Burton Whiting is a 48 y.o. male patient.    Pulse: 72 (06/01/18 1046)  Resp: 18 (06/01/18 1046)  BP: 123/69 (06/01/18 1046)  SpO2: 98 % (06/01/18 1046)       Central Line  Date/Time: 6/1/2018 11:27 AM  Performed by: KIRBY WHITE  Supervising provider: Dr. Landers  Consent Done: Yes  Time out: Immediately prior to procedure a "time out" was called to verify the correct patient, procedure, equipment, support staff and site/side marked as required.  Indications: med administration  Anesthesia: local infiltration    Anesthesia:  Local Anesthetic: lidocaine 1% without epinephrine  Preparation: skin prepped with ChloraPrep  Skin prep agent dried: skin prep agent completely dried prior to procedure  Sterile barriers: all five maximum sterile barriers used - cap, mask, sterile gown, sterile gloves, and large sterile sheet  Hand hygiene: hand hygiene performed prior to central venous catheter insertion  Location details: left brachial  Catheter type: double lumen  Catheter size: 5 Fr  Catheter Length: 42cm    Ultrasound guidance: yes  Vessel Caliber: small, patent, compressibility normal  Needle advanced into vessel with real time Ultrasound guidance.  Guidewire confirmed in vessel.  Image recorded and saved.  Sterile sheath used.  Number of attempts: 2  Assessment: verified by fluoroscopy  Complications: none  Estimated blood loss (mL): 1  Post-procedure: chlorhexidine patch,  sterile dressing applied and blood return through all ports  Comments: PICC line is ready for use.           Kirby White  6/1/2018    "

## 2018-06-01 NOTE — PROGRESS NOTES
Discharge instructions given. Verbalized understand. Instructed on incision site care and symptoms that require MD attention. Patient ambulated tin lobby. Gait steady.

## 2018-06-01 NOTE — DISCHARGE INSTRUCTIONS
For scheduling: Call Abena at [phone]    For questions or concerns call: FABIENNE MON-FRI 8 AM- 5PM [phone]. Radiology resident on call [phone].    For immediate concerns that are not emergent, you may call our radiology clinic at: [phone]

## 2018-06-04 PROCEDURE — 77387 GUIDANCE FOR RADJ TX DLVR: CPT | Mod: 26,,, | Performed by: RADIOLOGY

## 2018-06-04 PROCEDURE — 77386 HC IMRT, COMPLEX: CPT | Performed by: RADIOLOGY

## 2018-06-05 ENCOUNTER — DOCUMENTATION ONLY (OUTPATIENT)
Dept: RADIATION ONCOLOGY | Facility: CLINIC | Age: 48
End: 2018-06-05

## 2018-06-05 DIAGNOSIS — F41.0 ANXIETY ATTACK: ICD-10-CM

## 2018-06-05 DIAGNOSIS — C76.0 HEAD AND NECK CANCER: Primary | ICD-10-CM

## 2018-06-05 PROCEDURE — 77336 RADIATION PHYSICS CONSULT: CPT | Performed by: RADIOLOGY

## 2018-06-05 PROCEDURE — 77387 GUIDANCE FOR RADJ TX DLVR: CPT | Mod: 26,,, | Performed by: RADIOLOGY

## 2018-06-05 PROCEDURE — 77417 THER RADIOLOGY PORT IMAGE(S): CPT | Performed by: RADIOLOGY

## 2018-06-05 PROCEDURE — 77386 HC IMRT, COMPLEX: CPT | Performed by: RADIOLOGY

## 2018-06-05 RX ORDER — LORAZEPAM 0.5 MG/1
0.5 TABLET ORAL EVERY 6 HOURS PRN
Qty: 60 TABLET | Refills: 0 | Status: SHIPPED | OUTPATIENT
Start: 2018-06-05 | End: 2018-08-01 | Stop reason: SDUPTHER

## 2018-06-05 NOTE — PLAN OF CARE
Problem: Patient Care Overview  Goal: Plan of Care Review  Outcome: Ongoing (interventions implemented as appropriate)  Day 21 of XRT to left tonsil/neck. Using Peg with 3 cans daily. Nausea at times. Able to swallow liquids.

## 2018-06-06 ENCOUNTER — HOSPITAL ENCOUNTER (OUTPATIENT)
Facility: HOSPITAL | Age: 48
Discharge: HOME | End: 2018-06-06
Attending: INTERNAL MEDICINE | Admitting: INTERNAL MEDICINE
Payer: COMMERCIAL

## 2018-06-06 VITALS
BODY MASS INDEX: 31.83 KG/M2 | RESPIRATION RATE: 18 BRPM | TEMPERATURE: 98 F | HEART RATE: 61 BPM | HEIGHT: 68 IN | DIASTOLIC BLOOD PRESSURE: 90 MMHG | WEIGHT: 210 LBS | SYSTOLIC BLOOD PRESSURE: 169 MMHG | OXYGEN SATURATION: 99 %

## 2018-06-06 DIAGNOSIS — C76.0 HEAD AND NECK CANCER: ICD-10-CM

## 2018-06-06 PROCEDURE — 77387 GUIDANCE FOR RADJ TX DLVR: CPT | Mod: 26,,, | Performed by: RADIOLOGY

## 2018-06-06 PROCEDURE — 63600175 PHARM REV CODE 636 W HCPCS: Performed by: STUDENT IN AN ORGANIZED HEALTH CARE EDUCATION/TRAINING PROGRAM

## 2018-06-06 PROCEDURE — 77386 HC IMRT, COMPLEX: CPT | Performed by: RADIOLOGY

## 2018-06-06 RX ORDER — FENTANYL CITRATE 50 UG/ML
INJECTION, SOLUTION INTRAMUSCULAR; INTRAVENOUS CODE/TRAUMA/SEDATION MEDICATION
Status: COMPLETED | OUTPATIENT
Start: 2018-06-06 | End: 2018-06-06

## 2018-06-06 RX ORDER — MIDAZOLAM HYDROCHLORIDE 1 MG/ML
INJECTION INTRAMUSCULAR; INTRAVENOUS CODE/TRAUMA/SEDATION MEDICATION
Status: COMPLETED | OUTPATIENT
Start: 2018-06-06 | End: 2018-06-06

## 2018-06-06 RX ORDER — FENTANYL CITRATE 50 UG/ML
50 INJECTION, SOLUTION INTRAMUSCULAR; INTRAVENOUS
Status: DISCONTINUED | OUTPATIENT
Start: 2018-06-06 | End: 2018-06-06 | Stop reason: HOSPADM

## 2018-06-06 RX ORDER — CEFAZOLIN SODIUM 1 G/3ML
1 INJECTION, POWDER, FOR SOLUTION INTRAMUSCULAR; INTRAVENOUS
Status: DISCONTINUED | OUTPATIENT
Start: 2018-06-06 | End: 2018-06-06 | Stop reason: HOSPADM

## 2018-06-06 RX ORDER — SODIUM CHLORIDE 9 MG/ML
500 INJECTION, SOLUTION INTRAVENOUS ONCE
Status: DISCONTINUED | OUTPATIENT
Start: 2018-06-06 | End: 2018-06-06 | Stop reason: HOSPADM

## 2018-06-06 RX ORDER — MIDAZOLAM HYDROCHLORIDE 1 MG/ML
1 INJECTION INTRAMUSCULAR; INTRAVENOUS
Status: DISCONTINUED | OUTPATIENT
Start: 2018-06-06 | End: 2018-06-06 | Stop reason: HOSPADM

## 2018-06-06 RX ADMIN — FENTANYL CITRATE 50 MCG: 50 INJECTION, SOLUTION INTRAMUSCULAR; INTRAVENOUS at 02:06

## 2018-06-06 RX ADMIN — MIDAZOLAM HYDROCHLORIDE 1 MG: 1 INJECTION, SOLUTION INTRAMUSCULAR; INTRAVENOUS at 02:06

## 2018-06-06 RX ADMIN — MIDAZOLAM HYDROCHLORIDE 0.5 MG: 1 INJECTION, SOLUTION INTRAMUSCULAR; INTRAVENOUS at 02:06

## 2018-06-06 NOTE — PROGRESS NOTES
Pt discharged to home.  Discharge instructions given, pt and friend stated understanding.  Dressing to arm dry and intact, IV removed.  Pt left with friend to home.

## 2018-06-06 NOTE — DISCHARGE SUMMARY
Radiology Discharge Summary      Hospital Course: No complications    Admit Date: 6/6/2018  Discharge Date: 06/06/2018     Instructions Given to Patient: Yes  Diet: Resume prior diet  Activity: activity as tolerated    Description of Condition on Discharge: Stable  Vital Signs (Most Recent): Temp: 98.1 °F (36.7 °C) (06/06/18 1319)  Pulse: 67 (06/06/18 1505)  Resp: 17 (06/06/18 1505)  BP: (!) 157/88 (06/06/18 1505)  SpO2: 97 % (06/06/18 1505)    Discharge Disposition: Home    Discharge Diagnosis: s/p right arm port removal     Follow-up: per ordering provider    Chino Méndez MD  Department of Radiology  Pager: [phone]

## 2018-06-06 NOTE — PROGRESS NOTES
Pt arrived to [facility], bay [facility]. Report received from ALLEN Rendon. Dressing to arm dry and intact. Family at bedside.

## 2018-06-06 NOTE — PROGRESS NOTES
right arm port removal procedure complete. Patient tolerated well, no acute signs of distress noted. VSS. Dressing clean, dry and intact. Patient ready for transfer to [facility].

## 2018-06-06 NOTE — PROCEDURES
Radiology Post-Procedure Note    Pre Op Diagnosis: skin erosion  Post Op Diagnosis: Same    Procedure: right arm port removal    Procedure performed by: Ward Forman MD; Chino Méndez MD (res)    Written Informed Consent Obtained: Yes  Specimen Removed: YES right arm port  Estimated Blood Loss: Minimal    Findings:   Using a combination of blunt and sharp dissection, a right arm port was removed. Incision closed deeply with 3-0 vicryl and superficially with dermabond and steri-strips. Patient tolerated procedure well. See imaging report for details.    Patient tolerated procedure well.    Chino Méndez MD  Department of Radiology  Pager: [phone]

## 2018-06-06 NOTE — H&P
Radiology History & Physical      SUBJECTIVE:     Chief Complaint: Squamous cell carcinoma    History of Present Illness:  Burton Whiting is a 48 y.o. male who presents for right arm port removal.     Past Medical History:   Diagnosis Date    Former smoker     HPV in male     Opiate addiction     Squamous cell carcinoma of palatine tonsil     throat and tonsillar     Past Surgical History:   Procedure Laterality Date    GASTROSTOMY TUBE PLACEMENT Left 02/12/2018       Home Meds:   Prior to Admission medications    Medication Sig Start Date End Date Taking? Authorizing Provider   heparin, porcine, PF, (HEPARIN LOCKFLUSH,PORCINE,,PF,) 10 unit/mL Syrg Inject 1 mL (10 Units total) into the vein as needed. 5/31/18  Yes Cortney Lopez MD   IBUPROFEN (ADVIL ORAL) Take 1 tablet by mouth as needed.   Yes Historical Provider, MD   LORazepam (ATIVAN) 0.5 MG tablet Take 1 tablet (0.5 mg total) by mouth every 6 (six) hours as needed for Anxiety. 6/5/18 7/5/18 Yes Burton Sandra MD   ondansetron (ZOFRAN) 4 mg/5 mL solution Take 10 mLs (8 mg total) by mouth every 6 (six) hours as needed for Nausea. 5/31/18  Yes Cortney Lopez MD   ondansetron (ZOFRAN) 8 MG tablet Take 1 tablet (8 mg total) by mouth 4 (four) times daily as needed for Nausea. 1/29/18 1/29/19 Yes Cortney Lopez MD   oxyCODONE (ROXICODONE) 5 mg/5 mL Soln Take 5 mLs (5 mg total) by mouth every 6 (six) hours as needed. 5/17/18  Yes Cortney Lopez MD   cholecalciferol, vitamin D3, (VITAMIN D3) 1,000 unit capsule Take 1,000 Units by mouth once daily.    Historical Provider, MD   fluconazole 40 mg/ml (DIFLUCAN) 40 mg/mL suspension Take 5 mLs (200 mg total) by mouth once daily. 5/31/18 6/20/18  Cortney Lopez MD   lidocaine HCl 2% (XYLOCAINE) 2 % Soln by Mucous Membrane route every 3 (three) hours. 2/22/18   Cortney Lopez MD   NYSTATIN (DUKE'S SOLUTION) Mix equal amounts of benadryl, maalox, 2% viscous lidocaine and nystatin    Swish and swallow 10 ml 4 times a day 1/29/18    Cortney Lopez MD   nystatin (MYCOSTATIN) cream Apply topically 2 (two) times daily. 5/3/18   Cortney Lopez MD   promethazine (PHENERGAN) 6.25 mg/5 mL syrup Take 20 mLs (25 mg total) by mouth every 6 (six) hours as needed for Nausea. 5/31/18   Cortney Lopez MD   psyllium husk, aspartame, (METAMUCIL) 3.4 gram PwPk packet Take 2 packets by mouth once daily. 3/9/18   Cici Phillips MD   sodium chloride 0.9% injection Inject 10 mLs into the vein as needed. 5/31/18   Cortney Lopez MD     Anticoagulants/Antiplatelets: no anticoagulation    Allergies: Review of patient's allergies indicates:  No Known Allergies  Sedation History:  no adverse reactions    Review of Systems:   Hematological: no known coagulopathies  Respiratory: no shortness of breath  Cardiovascular: no chest pain  Gastrointestinal: no abdominal pain  Genito-Urinary: no dysuria  Musculoskeletal: negative  Neurological: no TIA or stroke symptoms         OBJECTIVE:     Vital Signs (Most Recent)  Temp: 98.1 °F (36.7 °C) (06/06/18 1319)  Pulse: 77 (06/06/18 1319)  Resp: 18 (06/06/18 1319)  BP: 133/75 (06/06/18 1319)  SpO2: 98 % (06/06/18 1319)    Physical Exam:  ASA: III  Mallampati: II    General: no acute distress  Mental Status: alert and oriented to person, place and time  HEENT: normocephalic, atraumatic  Chest: unlabored breathing  Abdomen: nondistended  Extremity: moves all extremities    Laboratory  Lab Results   Component Value Date    INR 1.0 06/06/2018       Lab Results   Component Value Date    WBC 2.43 (L) 06/01/2018    HGB 8.5 (L) 06/01/2018    HCT 24.4 (L) 06/01/2018    MCV 99 (H) 06/01/2018    PLT 75 (L) 06/01/2018      Lab Results   Component Value Date    GLU 97 06/01/2018     06/01/2018    K 3.3 (L) 06/01/2018    CL 99 06/01/2018    CO2 26 06/01/2018    BUN 19 06/01/2018    CREATININE 1.4 06/01/2018    CALCIUM 8.3 (L) 06/01/2018    MG 1.0 (L) 05/30/2018    ALT 9 (L) 06/01/2018    AST 11 06/01/2018    ALBUMIN 3.5 06/01/2018     BILITOT 0.4 06/01/2018       ASSESSMENT/PLAN:     Sedation Plan: Local anesthesia  Patient will undergo port removal.    Junaid Gregorio MD  PGY-III  Dept of Radiology   Pager: [phone]

## 2018-06-06 NOTE — DISCHARGE INSTRUCTIONS
For scheduling: Call Abena at [phone]    For questions or concerns call: FABIENNE MON-FRI 8 AM- 5PM [phone]. Radiology resident on call [phone].    For immediate concerns that are not emergent, you may call our radiology clinic at: [phone]

## 2018-06-07 ENCOUNTER — LAB VISIT (OUTPATIENT)
Dept: LAB | Facility: HOSPITAL | Age: 48
End: 2018-06-07
Attending: INTERNAL MEDICINE
Payer: COMMERCIAL

## 2018-06-07 ENCOUNTER — TELEPHONE (OUTPATIENT)
Dept: PHARMACY | Facility: CLINIC | Age: 48
End: 2018-06-07

## 2018-06-07 ENCOUNTER — OFFICE VISIT (OUTPATIENT)
Dept: HEMATOLOGY/ONCOLOGY | Facility: CLINIC | Age: 48
End: 2018-06-07
Payer: COMMERCIAL

## 2018-06-07 ENCOUNTER — PATIENT MESSAGE (OUTPATIENT)
Dept: HEMATOLOGY/ONCOLOGY | Facility: CLINIC | Age: 48
End: 2018-06-07

## 2018-06-07 ENCOUNTER — INFUSION (OUTPATIENT)
Dept: INFUSION THERAPY | Facility: HOSPITAL | Age: 48
End: 2018-06-07
Attending: INTERNAL MEDICINE
Payer: COMMERCIAL

## 2018-06-07 VITALS
OXYGEN SATURATION: 97 % | WEIGHT: 212.31 LBS | HEIGHT: 67 IN | BODY MASS INDEX: 33.32 KG/M2 | RESPIRATION RATE: 18 BRPM | TEMPERATURE: 98 F | DIASTOLIC BLOOD PRESSURE: 94 MMHG | HEART RATE: 81 BPM | SYSTOLIC BLOOD PRESSURE: 133 MMHG

## 2018-06-07 VITALS
DIASTOLIC BLOOD PRESSURE: 87 MMHG | SYSTOLIC BLOOD PRESSURE: 162 MMHG | OXYGEN SATURATION: 97 % | RESPIRATION RATE: 18 BRPM | HEART RATE: 63 BPM | TEMPERATURE: 98 F

## 2018-06-07 DIAGNOSIS — T45.1X5A CHEMOTHERAPY INDUCED NEUTROPENIA: ICD-10-CM

## 2018-06-07 DIAGNOSIS — D70.1 CHEMOTHERAPY-INDUCED NEUTROPENIA: ICD-10-CM

## 2018-06-07 DIAGNOSIS — T45.1X5A CHEMOTHERAPY-INDUCED NEUTROPENIA: ICD-10-CM

## 2018-06-07 DIAGNOSIS — C09.9 SQUAMOUS CELL CARCINOMA OF PALATINE TONSIL: Primary | ICD-10-CM

## 2018-06-07 DIAGNOSIS — T45.1X5A ANTINEOPLASTIC CHEMOTHERAPY INDUCED ANEMIA: Primary | ICD-10-CM

## 2018-06-07 DIAGNOSIS — D63.0 ANEMIA IN NEOPLASTIC DISEASE: Primary | ICD-10-CM

## 2018-06-07 DIAGNOSIS — T45.1X5A ANTINEOPLASTIC CHEMOTHERAPY INDUCED ANEMIA: ICD-10-CM

## 2018-06-07 DIAGNOSIS — C09.9 SQUAMOUS CELL CARCINOMA OF PALATINE TONSIL: ICD-10-CM

## 2018-06-07 DIAGNOSIS — D70.1 CHEMOTHERAPY INDUCED NEUTROPENIA: ICD-10-CM

## 2018-06-07 DIAGNOSIS — D64.81 ANTINEOPLASTIC CHEMOTHERAPY INDUCED ANEMIA: ICD-10-CM

## 2018-06-07 DIAGNOSIS — C77.0 CANCER OF HEAD, FACE, OR NECK LYMPH NODES, SECONDARY: ICD-10-CM

## 2018-06-07 DIAGNOSIS — T80.212A PORT OR RESERVOIR INFECTION, INITIAL ENCOUNTER: ICD-10-CM

## 2018-06-07 DIAGNOSIS — C79.51 SECONDARY CANCER OF BONE: ICD-10-CM

## 2018-06-07 DIAGNOSIS — G89.3 NEOPLASM RELATED PAIN: ICD-10-CM

## 2018-06-07 DIAGNOSIS — D64.81 ANTINEOPLASTIC CHEMOTHERAPY INDUCED ANEMIA: Primary | ICD-10-CM

## 2018-06-07 LAB
ALBUMIN SERPL BCP-MCNC: 3.4 G/DL
ALP SERPL-CCNC: 68 U/L
ALT SERPL W/O P-5'-P-CCNC: 8 U/L
ANION GAP SERPL CALC-SCNC: 12 MMOL/L
AST SERPL-CCNC: 14 U/L
BILIRUB SERPL-MCNC: 0.4 MG/DL
BUN SERPL-MCNC: 14 MG/DL
CALCIUM SERPL-MCNC: 8.3 MG/DL
CHLORIDE SERPL-SCNC: 95 MMOL/L
CO2 SERPL-SCNC: 29 MMOL/L
CREAT SERPL-MCNC: 1.2 MG/DL
ERYTHROCYTE [DISTWIDTH] IN BLOOD BY AUTOMATED COUNT: 12.9 %
EST. GFR  (AFRICAN AMERICAN): >60 ML/MIN/1.73 M^2
EST. GFR  (NON AFRICAN AMERICAN): >60 ML/MIN/1.73 M^2
GLUCOSE SERPL-MCNC: 105 MG/DL
HCT VFR BLD AUTO: 21.9 %
HGB BLD-MCNC: 7.7 G/DL
IMM GRANULOCYTES # BLD AUTO: 0 K/UL
MAGNESIUM SERPL-MCNC: 1.1 MG/DL
MCH RBC QN AUTO: 34.8 PG
MCHC RBC AUTO-ENTMCNC: 35.2 G/DL
MCV RBC AUTO: 99 FL
NEUTROPHILS # BLD AUTO: 0.7 K/UL
PHOSPHATE SERPL-MCNC: 3.6 MG/DL
PLATELET # BLD AUTO: 53 K/UL
PMV BLD AUTO: 10 FL
POTASSIUM SERPL-SCNC: 3.3 MMOL/L
PROT SERPL-MCNC: 6.8 G/DL
RBC # BLD AUTO: 2.21 M/UL
SODIUM SERPL-SCNC: 136 MMOL/L
WBC # BLD AUTO: 1 K/UL

## 2018-06-07 PROCEDURE — 63600175 PHARM REV CODE 636 W HCPCS: Performed by: INTERNAL MEDICINE

## 2018-06-07 PROCEDURE — 99999 PR PBB SHADOW E&M-EST. PATIENT-LVL IV: CPT | Mod: PBBFAC,,, | Performed by: INTERNAL MEDICINE

## 2018-06-07 PROCEDURE — 36415 COLL VENOUS BLD VENIPUNCTURE: CPT

## 2018-06-07 PROCEDURE — 3008F BODY MASS INDEX DOCD: CPT | Mod: CPTII,S$GLB,, | Performed by: INTERNAL MEDICINE

## 2018-06-07 PROCEDURE — 96374 THER/PROPH/DIAG INJ IV PUSH: CPT

## 2018-06-07 PROCEDURE — 96372 THER/PROPH/DIAG INJ SC/IM: CPT

## 2018-06-07 PROCEDURE — 84100 ASSAY OF PHOSPHORUS: CPT

## 2018-06-07 PROCEDURE — 36430 TRANSFUSION BLD/BLD COMPNT: CPT

## 2018-06-07 PROCEDURE — 25000003 PHARM REV CODE 250: Performed by: INTERNAL MEDICINE

## 2018-06-07 PROCEDURE — 77386 HC IMRT, COMPLEX: CPT | Performed by: RADIOLOGY

## 2018-06-07 PROCEDURE — 83735 ASSAY OF MAGNESIUM: CPT

## 2018-06-07 PROCEDURE — 80053 COMPREHEN METABOLIC PANEL: CPT

## 2018-06-07 PROCEDURE — 99215 OFFICE O/P EST HI 40 MIN: CPT | Mod: S$GLB,,, | Performed by: INTERNAL MEDICINE

## 2018-06-07 PROCEDURE — 77387 GUIDANCE FOR RADJ TX DLVR: CPT | Mod: 26,,, | Performed by: RADIOLOGY

## 2018-06-07 PROCEDURE — 85027 COMPLETE CBC AUTOMATED: CPT

## 2018-06-07 RX ORDER — HYDROMORPHONE HYDROCHLORIDE 4 MG/1
4 TABLET ORAL EVERY 4 HOURS PRN
Qty: 90 TABLET | Refills: 0 | Status: SHIPPED | OUTPATIENT
Start: 2018-06-07 | End: 2018-08-01

## 2018-06-07 RX ORDER — ACETAMINOPHEN 325 MG/1
650 TABLET ORAL
Status: CANCELLED | OUTPATIENT
Start: 2018-06-07

## 2018-06-07 RX ORDER — HYDROCODONE BITARTRATE AND ACETAMINOPHEN 500; 5 MG/1; MG/1
TABLET ORAL ONCE
Status: COMPLETED | OUTPATIENT
Start: 2018-06-07 | End: 2018-06-07

## 2018-06-07 RX ORDER — OXYCODONE HCL 5 MG/5 ML
5 SOLUTION, ORAL ORAL EVERY 6 HOURS PRN
Qty: 473 ML | Refills: 0 | Status: SHIPPED | OUTPATIENT
Start: 2018-06-07 | End: 2018-06-14 | Stop reason: SDUPTHER

## 2018-06-07 RX ORDER — DIPHENHYDRAMINE HCL 25 MG
25 CAPSULE ORAL
Status: CANCELLED | OUTPATIENT
Start: 2018-06-07

## 2018-06-07 RX ORDER — DIPHENHYDRAMINE HCL 25 MG
25 CAPSULE ORAL
Status: COMPLETED | OUTPATIENT
Start: 2018-06-07 | End: 2018-06-07

## 2018-06-07 RX ORDER — ACETAMINOPHEN 325 MG/1
650 TABLET ORAL
Status: COMPLETED | OUTPATIENT
Start: 2018-06-07 | End: 2018-06-07

## 2018-06-07 RX ORDER — LANOLIN ALCOHOL/MO/W.PET/CERES
1200 CREAM (GRAM) TOPICAL
Status: COMPLETED | OUTPATIENT
Start: 2018-06-07 | End: 2018-06-07

## 2018-06-07 RX ORDER — HYDROCODONE BITARTRATE AND ACETAMINOPHEN 500; 5 MG/1; MG/1
TABLET ORAL ONCE
Status: CANCELLED | OUTPATIENT
Start: 2018-06-07 | End: 2018-06-07

## 2018-06-07 RX ORDER — LANOLIN ALCOHOL/MO/W.PET/CERES
1200 CREAM (GRAM) TOPICAL
Status: CANCELLED
Start: 2018-06-07

## 2018-06-07 RX ADMIN — ACETAMINOPHEN 650 MG: 325 TABLET, FILM COATED ORAL at 10:06

## 2018-06-07 RX ADMIN — DIPHENHYDRAMINE HYDROCHLORIDE 25 MG: 25 CAPSULE ORAL at 10:06

## 2018-06-07 RX ADMIN — SODIUM CHLORIDE: 9 INJECTION, SOLUTION INTRAVENOUS at 11:06

## 2018-06-07 RX ADMIN — MAGNESIUM OXIDE TAB 400 MG (241.3 MG ELEMENTAL MG) 1200 MG: 400 (241.3 MG) TAB at 04:06

## 2018-06-07 RX ADMIN — FILGRASTIM 480 MCG: 480 INJECTION, SOLUTION INTRAVENOUS; SUBCUTANEOUS at 04:06

## 2018-06-07 RX ADMIN — HYDROCORTISONE SODIUM SUCCINATE 50 MG: 100 INJECTION, POWDER, FOR SOLUTION INTRAMUSCULAR; INTRAVENOUS at 11:06

## 2018-06-07 NOTE — PLAN OF CARE
Problem: Patient Care Overview  Goal: Plan of Care Review  Outcome: Ongoing (interventions implemented as appropriate)  Pt tolerated tx without complications. VSS. No s/s of reaction. neupogen injection given in abd without problems. 1K mg Mg ox given PO. Instructed to contact MD with any questions. PICC hep locked x2. AVS given to patient.

## 2018-06-07 NOTE — PROGRESS NOTES
Subjective:       Patient ID: Burton Whiting is a 48 y.o. male.    Chief Complaint: Squamous cell carcinoma of palatine tonsil  Chief Complaint: Squamous cell carcinoma of palatine tonsil; g-tube malodorous; left neck pain 2/10; 4 cans formula per day; and r arm port---red/warm to touch  Mr. Burton Whiting is a 48-year-old male, former smoker, who presents today with a four-month history of enlarging neck mass.  He initially delayed care due to lack of insurance.  He was seen by Dr. Turpin who referred him to see Dr. Olguin.  He had a CT that showed a 3.8 x 3.8 x 4.9 cm soft tissue mass arising from the left palatine tonsil resulting in moderate narrowing of the hypopharyngeal airway adjacent extensive matted left cervical lymphadenopathy was noted.  The largest ella mass was 6.6 x 9 x 2.6 cm extending inferiorly to the supraclavicular region.  The mass pushed the trachea and the left common carotid artery to the right.  Additional representative of cervical lymph nodes measuring 2.9 x 3.1 x 3.5 cm on axial image 37 of series 3   and 2.4 x 2.1 x 2.2 cm on axial image 55 of series 3.  There is also a sclerotic lesion involving the midline of the clivus measuring 1.2 cm.  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  PET scan performed on 01/19/2018 revealed persistent evidence of a soft tissue mass arising from the left palatine tonsil resulting in moderate narrowing of the hypopharyngeal   airway.  The mass is hypermetabolic demonstrating an SUV max of 18.5.  There is also persistent adjacent extensive matted left cervical lymphadenopathy, largest of this measuring 6.7 x 8.42 with hypermetabolic activity and SUV max of 20.5.  Lymphadenopathy is again noted to have a mass effect on the trachea and left common carotid artery, pushing both structures towards the right.  There is also prominent right cervical lymph nodes with the largest measuring 0.8 cm and demonstrating mild hypermetabolic  "activity with SUV max of 1.8, physiologic   distribution of FDG in the gray matter.  There are 3 pulmonary nodules noted within the right lung, the largest is in the anterior segment of the right upper lobe measuring 1 cm, second is visualized in the middle lobe measuring 0.6 cm and the third is within the posterior basal segment measuring 0.6 cm.  There is one pulmonary nodule within the left lung within the lateral basal segment measuring 0.6 cm.  These nodules do not demonstrate hypermetabolic activity; however, they are below the threshold for observation with PET     HPIHe underwent MRI cervical spine revealed "No evidence of metastatic disease to the cervical spine. Multilevel degenerative changes of the cervical spine with disc protrusion at C5-6 which results in moderate to severe spinal canal stenosis and and associated cord signal abnormality, suggestive of compressive myelopathy. 6 mm T1 hypointense non-enhancing lesion in the clivus.  Continued followup is suggested. 9 mm inferior descent of the cerebellar tonsils below the foramen magnum, suggestive of Chiari 1 malformation"  MRI thoracic spine "No evidence of metastatic disease involving the thoracic spine. Incidental hemangioma involving the T7 vertebral body. Mild degenerative disc disease without any significant spinal canal stenosis or neuroforaminal narrowing.   He completed 3 cycles of TPF and is now on definitive chemo./RT              HPI He comes in for week 5 of Cisplatin and RT.  He notes fatigue and weakness    Review of Systems   Constitutional: Positive for fatigue and unexpected weight change. Negative for appetite change.   HENT: Positive for mouth sores and trouble swallowing.    Eyes: Negative for visual disturbance.   Respiratory: Negative for cough and shortness of breath.    Cardiovascular: Negative for chest pain.   Gastrointestinal: Negative for abdominal pain and diarrhea.   Genitourinary: Negative for frequency. "   Musculoskeletal: Negative for back pain.   Skin: Negative for rash.   Neurological: Negative for headaches.   Hematological: Negative for adenopathy.   Psychiatric/Behavioral: The patient is not nervous/anxious.    All other systems reviewed and are negative.      Objective:      Physical Exam   Constitutional: He is oriented to person, place, and time. He appears well-developed and well-nourished.   HENT:   Mouth/Throat: No oropharyngeal exudate.   Cardiovascular: Normal rate and normal heart sounds.    Pulmonary/Chest: Effort normal and breath sounds normal. He has no wheezes.   Abdominal: Soft. Bowel sounds are normal. There is no tenderness.   PEG tube in place   Musculoskeletal: He exhibits no edema or tenderness.   Lymphadenopathy:     He has no cervical adenopathy.   Neurological: He is alert and oriented to person, place, and time. Coordination normal.   Skin: Skin is warm and dry. No rash noted.   Psychiatric: He has a normal mood and affect. Judgment and thought content normal.   Vitals reviewed.        LABS:  WBC   Date Value Ref Range Status   06/07/2018 1.00 (LL) 3.90 - 12.70 K/uL Final     Comment:     wbc  critical result(s) called and verbal readback obtained from   NILDA RAYMONDNURSE, 06/07/2018 09:06       Hemoglobin   Date Value Ref Range Status   06/07/2018 7.7 (L) 14.0 - 18.0 g/dL Final     Hematocrit   Date Value Ref Range Status   06/07/2018 21.9 (L) 40.0 - 54.0 % Final     Platelets   Date Value Ref Range Status   06/07/2018 53 (L) 150 - 350 K/uL Final     Gran # (ANC)   Date Value Ref Range Status   06/07/2018 0.7 (L) 1.8 - 7.7 K/uL Final     Comment:     The ANC is based on a white cell differential from an   automated cell counter. It has not been microscopically   reviewed for the presence of abnormal cells. Clinical   correlation is required.         Chemistry        Component Value Date/Time     06/07/2018 0828    K 3.3 (L) 06/07/2018 0828    CL 95 06/07/2018 0828    CO2 29  06/07/2018 0828    BUN 14 06/07/2018 0828    CREATININE 1.2 06/07/2018 0828     06/07/2018 0828        Component Value Date/Time    CALCIUM 8.3 (L) 06/07/2018 0828    ALKPHOS 68 06/07/2018 0828    AST 14 06/07/2018 0828    ALT 8 (L) 06/07/2018 0828    BILITOT 0.4 06/07/2018 0828    ESTGFRAFRICA >60.0 06/07/2018 0828    EGFRNONAA >60.0 06/07/2018 0828          Assessment:       1. Squamous cell carcinoma of palatine tonsil    2. Cancer of head, face, or neck lymph nodes, secondary    3. Secondary cancer of bone    4. Neoplasm related pain    5. Antineoplastic chemotherapy induced anemia    6. Chemotherapy-induced neutropenia    7. Chemotherapy induced neutropenia        Plan:        1,2,3. Hold chemo. He will receive 2 units PRBC and neupogen and will return in 1 week to be reassessed for chemo    Last RT 6/25/18    Above care plan was discussed with patient and accompanying brother and all questions were addressed to their satisfaction

## 2018-06-07 NOTE — PLAN OF CARE
Problem: Anemia (Adult)  Goal: Identify Related Risk Factors and Signs and Symptoms  Related risk factors and signs and symptoms are identified upon initiation of [employer] Clinical Practice Guideline (CPG)   Outcome: Ongoing (interventions implemented as appropriate)  Pt here for 2 units PRBC. VSS. No complaints voiced. Consent/labs/meds/allergies reviewed. PICC line in place on arrival- blood return noted. All questions answered. Will continue to monitor.

## 2018-06-08 ENCOUNTER — INFUSION (OUTPATIENT)
Dept: INFUSION THERAPY | Facility: HOSPITAL | Age: 48
End: 2018-06-08
Attending: INTERNAL MEDICINE
Payer: COMMERCIAL

## 2018-06-08 DIAGNOSIS — T80.212A PORT OR RESERVOIR INFECTION, INITIAL ENCOUNTER: Primary | ICD-10-CM

## 2018-06-08 PROCEDURE — 63600175 PHARM REV CODE 636 W HCPCS: Mod: JG | Performed by: INTERNAL MEDICINE

## 2018-06-08 PROCEDURE — 96372 THER/PROPH/DIAG INJ SC/IM: CPT

## 2018-06-08 PROCEDURE — 77387 GUIDANCE FOR RADJ TX DLVR: CPT | Mod: 26,,, | Performed by: RADIOLOGY

## 2018-06-08 PROCEDURE — 77386 HC IMRT, COMPLEX: CPT | Performed by: RADIOLOGY

## 2018-06-08 RX ADMIN — FILGRASTIM 480 MCG: 480 INJECTION, SOLUTION INTRAVENOUS; SUBCUTANEOUS at 09:06

## 2018-06-09 ENCOUNTER — INFUSION (OUTPATIENT)
Dept: INFUSION THERAPY | Facility: HOSPITAL | Age: 48
End: 2018-06-09
Attending: INTERNAL MEDICINE
Payer: COMMERCIAL

## 2018-06-09 DIAGNOSIS — T80.212A PORT OR RESERVOIR INFECTION, INITIAL ENCOUNTER: Primary | ICD-10-CM

## 2018-06-09 PROCEDURE — 96372 THER/PROPH/DIAG INJ SC/IM: CPT

## 2018-06-09 PROCEDURE — 63600175 PHARM REV CODE 636 W HCPCS: Mod: JG | Performed by: INTERNAL MEDICINE

## 2018-06-09 RX ADMIN — FILGRASTIM 480 MCG: 480 INJECTION, SOLUTION INTRAVENOUS; SUBCUTANEOUS at 09:06

## 2018-06-11 PROCEDURE — 77386 HC IMRT, COMPLEX: CPT | Performed by: RADIOLOGY

## 2018-06-11 PROCEDURE — 77387 GUIDANCE FOR RADJ TX DLVR: CPT | Mod: 26,,, | Performed by: RADIOLOGY

## 2018-06-12 PROCEDURE — 77387 GUIDANCE FOR RADJ TX DLVR: CPT | Mod: 26,,, | Performed by: RADIOLOGY

## 2018-06-12 PROCEDURE — 77417 THER RADIOLOGY PORT IMAGE(S): CPT | Performed by: RADIOLOGY

## 2018-06-12 PROCEDURE — 77386 HC IMRT, COMPLEX: CPT | Performed by: RADIOLOGY

## 2018-06-13 ENCOUNTER — DOCUMENTATION ONLY (OUTPATIENT)
Dept: RADIATION ONCOLOGY | Facility: CLINIC | Age: 48
End: 2018-06-13

## 2018-06-13 PROCEDURE — 77387 GUIDANCE FOR RADJ TX DLVR: CPT | Mod: 26,,, | Performed by: RADIOLOGY

## 2018-06-13 PROCEDURE — 77386 HC IMRT, COMPLEX: CPT | Performed by: RADIOLOGY

## 2018-06-13 NOTE — PLAN OF CARE
Problem: Patient Care Overview  Goal: Plan of Care Review  Outcome: Ongoing (interventions implemented as appropriate)  Day of 27 of XRT to H&N Vomiting from time to time and taking nausea meds. PEG only with 3 cans. Only swallowing water but it burns to swallow. Constipation x1 week. Using pain RX.

## 2018-06-14 ENCOUNTER — LAB VISIT (OUTPATIENT)
Dept: LAB | Facility: HOSPITAL | Age: 48
End: 2018-06-14
Attending: INTERNAL MEDICINE
Payer: COMMERCIAL

## 2018-06-14 ENCOUNTER — PATIENT MESSAGE (OUTPATIENT)
Dept: RADIATION ONCOLOGY | Facility: CLINIC | Age: 48
End: 2018-06-14

## 2018-06-14 ENCOUNTER — OFFICE VISIT (OUTPATIENT)
Dept: HEMATOLOGY/ONCOLOGY | Facility: CLINIC | Age: 48
End: 2018-06-14
Payer: COMMERCIAL

## 2018-06-14 ENCOUNTER — INFUSION (OUTPATIENT)
Dept: INFUSION THERAPY | Facility: HOSPITAL | Age: 48
End: 2018-06-14
Attending: INTERNAL MEDICINE
Payer: COMMERCIAL

## 2018-06-14 VITALS
HEART RATE: 78 BPM | HEIGHT: 67 IN | OXYGEN SATURATION: 97 % | RESPIRATION RATE: 18 BRPM | DIASTOLIC BLOOD PRESSURE: 85 MMHG | TEMPERATURE: 98 F | HEART RATE: 88 BPM | SYSTOLIC BLOOD PRESSURE: 133 MMHG | DIASTOLIC BLOOD PRESSURE: 91 MMHG | RESPIRATION RATE: 18 BRPM | WEIGHT: 215.38 LBS | BODY MASS INDEX: 33.8 KG/M2 | SYSTOLIC BLOOD PRESSURE: 152 MMHG | BODY MASS INDEX: 33.8 KG/M2 | HEIGHT: 67 IN | TEMPERATURE: 99 F | WEIGHT: 215.38 LBS

## 2018-06-14 DIAGNOSIS — Z51.11 ENCOUNTER FOR ANTINEOPLASTIC CHEMOTHERAPY AND IMMUNOTHERAPY: Primary | ICD-10-CM

## 2018-06-14 DIAGNOSIS — C09.9 SQUAMOUS CELL CARCINOMA OF PALATINE TONSIL: ICD-10-CM

## 2018-06-14 DIAGNOSIS — Z51.12 ENCOUNTER FOR ANTINEOPLASTIC CHEMOTHERAPY AND IMMUNOTHERAPY: Primary | ICD-10-CM

## 2018-06-14 DIAGNOSIS — K59.00 CONSTIPATION, UNSPECIFIED CONSTIPATION TYPE: ICD-10-CM

## 2018-06-14 DIAGNOSIS — C09.9 TONSIL CANCER: ICD-10-CM

## 2018-06-14 DIAGNOSIS — C09.9 SQUAMOUS CELL CARCINOMA OF PALATINE TONSIL: Primary | ICD-10-CM

## 2018-06-14 DIAGNOSIS — C77.0 CANCER OF HEAD, FACE, OR NECK LYMPH NODES, SECONDARY: ICD-10-CM

## 2018-06-14 DIAGNOSIS — G89.3 NEOPLASM RELATED PAIN: ICD-10-CM

## 2018-06-14 DIAGNOSIS — C79.51 SECONDARY CANCER OF BONE: ICD-10-CM

## 2018-06-14 LAB
ALBUMIN SERPL BCP-MCNC: 3.6 G/DL
ALP SERPL-CCNC: 76 U/L
ALT SERPL W/O P-5'-P-CCNC: 13 U/L
ANION GAP SERPL CALC-SCNC: 12 MMOL/L
AST SERPL-CCNC: 16 U/L
BILIRUB SERPL-MCNC: 0.5 MG/DL
BUN SERPL-MCNC: 14 MG/DL
CALCIUM SERPL-MCNC: 9.3 MG/DL
CHLORIDE SERPL-SCNC: 93 MMOL/L
CO2 SERPL-SCNC: 34 MMOL/L
CREAT SERPL-MCNC: 1.3 MG/DL
ERYTHROCYTE [DISTWIDTH] IN BLOOD BY AUTOMATED COUNT: 14.4 %
EST. GFR  (AFRICAN AMERICAN): >60 ML/MIN/1.73 M^2
EST. GFR  (NON AFRICAN AMERICAN): >60 ML/MIN/1.73 M^2
GLUCOSE SERPL-MCNC: 103 MG/DL
HCT VFR BLD AUTO: 28.4 %
HGB BLD-MCNC: 9.9 G/DL
IMM GRANULOCYTES # BLD AUTO: 0.07 K/UL
MAGNESIUM SERPL-MCNC: 1.3 MG/DL
MCH RBC QN AUTO: 34.4 PG
MCHC RBC AUTO-ENTMCNC: 34.9 G/DL
MCV RBC AUTO: 99 FL
NEUTROPHILS # BLD AUTO: 3.3 K/UL
PHOSPHATE SERPL-MCNC: 3.5 MG/DL
PLATELET # BLD AUTO: 92 K/UL
PMV BLD AUTO: 9.4 FL
POTASSIUM SERPL-SCNC: 3.8 MMOL/L
PROT SERPL-MCNC: 6.6 G/DL
RBC # BLD AUTO: 2.88 M/UL
SODIUM SERPL-SCNC: 139 MMOL/L
WBC # BLD AUTO: 4.26 K/UL

## 2018-06-14 PROCEDURE — 3008F BODY MASS INDEX DOCD: CPT | Mod: CPTII,S$GLB,, | Performed by: INTERNAL MEDICINE

## 2018-06-14 PROCEDURE — 63600175 PHARM REV CODE 636 W HCPCS: Performed by: INTERNAL MEDICINE

## 2018-06-14 PROCEDURE — 36415 COLL VENOUS BLD VENIPUNCTURE: CPT

## 2018-06-14 PROCEDURE — 77387 GUIDANCE FOR RADJ TX DLVR: CPT | Mod: 26,,, | Performed by: RADIOLOGY

## 2018-06-14 PROCEDURE — 99999 PR PBB SHADOW E&M-EST. PATIENT-LVL III: CPT | Mod: PBBFAC,,, | Performed by: INTERNAL MEDICINE

## 2018-06-14 PROCEDURE — 77386 HC IMRT, COMPLEX: CPT | Performed by: RADIOLOGY

## 2018-06-14 PROCEDURE — 77336 RADIATION PHYSICS CONSULT: CPT | Performed by: RADIOLOGY

## 2018-06-14 PROCEDURE — 96361 HYDRATE IV INFUSION ADD-ON: CPT

## 2018-06-14 PROCEDURE — 83735 ASSAY OF MAGNESIUM: CPT

## 2018-06-14 PROCEDURE — 99215 OFFICE O/P EST HI 40 MIN: CPT | Mod: S$GLB,,, | Performed by: INTERNAL MEDICINE

## 2018-06-14 PROCEDURE — 96413 CHEMO IV INFUSION 1 HR: CPT

## 2018-06-14 PROCEDURE — 85027 COMPLETE CBC AUTOMATED: CPT

## 2018-06-14 PROCEDURE — 96367 TX/PROPH/DG ADDL SEQ IV INF: CPT

## 2018-06-14 PROCEDURE — 80053 COMPREHEN METABOLIC PANEL: CPT

## 2018-06-14 PROCEDURE — 84100 ASSAY OF PHOSPHORUS: CPT

## 2018-06-14 PROCEDURE — 25000003 PHARM REV CODE 250: Performed by: INTERNAL MEDICINE

## 2018-06-14 RX ORDER — SODIUM CHLORIDE 0.9 % (FLUSH) 0.9 %
10 SYRINGE (ML) INJECTION
Status: CANCELLED | OUTPATIENT
Start: 2018-06-14

## 2018-06-14 RX ORDER — SODIUM CHLORIDE 0.9 % (FLUSH) 0.9 %
10 SYRINGE (ML) INJECTION
Status: DISCONTINUED | OUTPATIENT
Start: 2018-06-14 | End: 2018-06-14 | Stop reason: HOSPADM

## 2018-06-14 RX ORDER — HEPARIN 100 UNIT/ML
500 SYRINGE INTRAVENOUS
Status: DISCONTINUED | OUTPATIENT
Start: 2018-06-14 | End: 2018-06-14 | Stop reason: HOSPADM

## 2018-06-14 RX ORDER — LACTULOSE 10 G/15ML
20 SOLUTION ORAL 3 TIMES DAILY
Qty: 900 ML | Refills: 6 | Status: SHIPPED | OUTPATIENT
Start: 2018-06-14 | End: 2018-06-24

## 2018-06-14 RX ORDER — HEPARIN 100 UNIT/ML
500 SYRINGE INTRAVENOUS
Status: CANCELLED | OUTPATIENT
Start: 2018-06-14

## 2018-06-14 RX ORDER — OXYCODONE HCL 5 MG/5 ML
5 SOLUTION, ORAL ORAL EVERY 6 HOURS PRN
Qty: 473 ML | Refills: 0 | Status: SHIPPED | OUTPATIENT
Start: 2018-06-14 | End: 2018-07-10 | Stop reason: SDUPTHER

## 2018-06-14 RX ORDER — OXYCODONE HCL 5 MG/5 ML
5 SOLUTION, ORAL ORAL EVERY 6 HOURS PRN
Qty: 473 ML | Refills: 0 | Status: SHIPPED | OUTPATIENT
Start: 2018-06-14 | End: 2018-06-14 | Stop reason: SDUPTHER

## 2018-06-14 RX ADMIN — HEPARIN 500 UNITS: 100 SYRINGE at 03:06

## 2018-06-14 RX ADMIN — CISPLATIN 89 MG: 100 INJECTION, SOLUTION INTRAVENOUS at 01:06

## 2018-06-14 RX ADMIN — SODIUM CHLORIDE: 9 INJECTION, SOLUTION INTRAVENOUS at 12:06

## 2018-06-14 RX ADMIN — SODIUM CHLORIDE: 9 INJECTION, SOLUTION INTRAVENOUS at 02:06

## 2018-06-14 RX ADMIN — DEXAMETHASONE SODIUM PHOSPHATE: 4 INJECTION, SOLUTION INTRA-ARTICULAR; INTRALESIONAL; INTRAMUSCULAR; INTRAVENOUS; SOFT TISSUE at 12:06

## 2018-06-14 RX ADMIN — SODIUM CHLORIDE: 9 INJECTION, SOLUTION INTRAVENOUS at 11:06

## 2018-06-14 NOTE — Clinical Note
Schedule neupogen tomorrow and on 6/18, 6/19  Schedule CBC,CMP and see me in 1 weeks and for Cisplatin. IV fluids on day 2

## 2018-06-14 NOTE — PLAN OF CARE
Problem: Chemotherapy Effects (Adult)  Goal: Signs and Symptoms of Listed Potential Problems Will be Absent, Minimized or Managed (Chemotherapy Effects)  Signs and symptoms of listed potential problems will be absent, minimized or managed by discharge/transition of care (reference Chemotherapy Effects (Adult) CPG).   Outcome: Ongoing (interventions implemented as appropriate)  Pt here for Cisplatin infusion, accompanied by brother, reports continued nausea/vomiting following treatment, also constipation, last bowel movement approx one week prior, MD Lopez aware of same and new script which brother will  today; brother states ANGEL PICC placed approx two week prior, dressing was to be changed by  but didn't have supplies, dressing will be changed today and weekly at center, pt's nephew is flushing PICC daily; discussed treatment plan, all pt questions answered and pt agrees to proceed

## 2018-06-14 NOTE — PLAN OF CARE
Problem: Patient Care Overview  Goal: Plan of Care Review  Outcome: Ongoing (interventions implemented as appropriate)  Infusion completed, pt tolerated well; pt instructed to increase hydration r/t Cisplatin, instructed to contact MD for any needs or concerns; brother declined printed AVS, pt and brother verbalized understanding of all discussed and when to report next

## 2018-06-14 NOTE — PROGRESS NOTES
Subjective:       Patient ID: Burton Whiting is a 48 y.o. male.    Chief Complaint: Squamous cell carcinoma of palatine tonsil  Chief Complaint: Squamous cell carcinoma of palatine tonsil; g-tube malodorous; left neck pain 2/10; 4 cans formula per day; and r arm port---red/warm to touch  Mr. Burton Whiting is a 48-year-old male, former smoker, who presents today with a four-month history of enlarging neck mass.  He initially delayed care due to lack of insurance.  He was seen by Dr. Turpin who referred him to see Dr. Olguin.  He had a CT that showed a 3.8 x 3.8 x 4.9 cm soft tissue mass arising from the left palatine tonsil resulting in moderate narrowing of the hypopharyngeal airway adjacent extensive matted left cervical lymphadenopathy was noted.  The largest ella mass was 6.6 x 9 x 2.6 cm extending inferiorly to the supraclavicular region.  The mass pushed the trachea and the left common carotid artery to the right.  Additional representative of cervical lymph nodes measuring 2.9 x 3.1 x 3.5 cm on axial image 37 of series 3   and 2.4 x 2.1 x 2.2 cm on axial image 55 of series 3.  There is also a sclerotic lesion involving the midline of the clivus measuring 1.2 cm.  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  PET scan performed on 01/19/2018 revealed persistent evidence of a soft tissue mass arising from the left palatine tonsil resulting in moderate narrowing of the hypopharyngeal   airway.  The mass is hypermetabolic demonstrating an SUV max of 18.5.  There is also persistent adjacent extensive matted left cervical lymphadenopathy, largest of this measuring 6.7 x 8.42 with hypermetabolic activity and SUV max of 20.5.  Lymphadenopathy is again noted to have a mass effect on the trachea and left common carotid artery, pushing both structures towards the right.  There is also prominent right cervical lymph nodes with the largest measuring 0.8 cm and demonstrating mild hypermetabolic  "activity with SUV max of 1.8, physiologic   distribution of FDG in the gray matter.  There are 3 pulmonary nodules noted within the right lung, the largest is in the anterior segment of the right upper lobe measuring 1 cm, second is visualized in the middle lobe measuring 0.6 cm and the third is within the posterior basal segment measuring 0.6 cm.  There is one pulmonary nodule within the left lung within the lateral basal segment measuring 0.6 cm.  These nodules do not demonstrate hypermetabolic activity; however, they are below the threshold for observation with PET     HPIHe underwent MRI cervical spine revealed "No evidence of metastatic disease to the cervical spine. Multilevel degenerative changes of the cervical spine with disc protrusion at C5-6 which results in moderate to severe spinal canal stenosis and and associated cord signal abnormality, suggestive of compressive myelopathy. 6 mm T1 hypointense non-enhancing lesion in the clivus.  Continued followup is suggested. 9 mm inferior descent of the cerebellar tonsils below the foramen magnum, suggestive of Chiari 1 malformation"  MRI thoracic spine "No evidence of metastatic disease involving the thoracic spine. Incidental hemangioma involving the T7 vertebral body. Mild degenerative disc disease without any significant spinal canal stenosis or neuroforaminal narrowing.   He completed 3 cycles of TPF and is now on definitive chemo./RT                HPI He comes in for week 5 of Cisplatin and RT. He feels better today and denies any new issues. He is receiving nutrition via PEG tube.      Review of Systems   Constitutional: Negative for appetite change, fatigue and unexpected weight change.   HENT: Negative for mouth sores.    Eyes: Negative for visual disturbance.   Respiratory: Negative for cough and shortness of breath.    Cardiovascular: Negative for chest pain.   Gastrointestinal: Negative for abdominal pain and diarrhea.   Genitourinary: Negative for " frequency.   Musculoskeletal: Negative for back pain.   Skin: Negative for rash.   Neurological: Negative for headaches.   Hematological: Negative for adenopathy.   Psychiatric/Behavioral: The patient is not nervous/anxious.    All other systems reviewed and are negative.      Objective:      Physical Exam   Constitutional: He is oriented to person, place, and time. He appears well-developed and well-nourished.   HENT:   Mouth/Throat: No oropharyngeal exudate.   Cardiovascular: Normal rate and normal heart sounds.    Pulmonary/Chest: Effort normal and breath sounds normal. He has no wheezes.   Abdominal: Soft. Bowel sounds are normal. There is no tenderness.   Musculoskeletal: He exhibits no edema or tenderness.   Lymphadenopathy:     He has no cervical adenopathy.   Neurological: He is alert and oriented to person, place, and time. Coordination normal.   Skin: Skin is warm and dry. No rash noted.   Psychiatric: He has a normal mood and affect. Judgment and thought content normal.   Vitals reviewed.        LABS:  WBC   Date Value Ref Range Status   06/14/2018 4.26 3.90 - 12.70 K/uL Final     Hemoglobin   Date Value Ref Range Status   06/14/2018 9.9 (L) 14.0 - 18.0 g/dL Final     Hematocrit   Date Value Ref Range Status   06/14/2018 28.4 (L) 40.0 - 54.0 % Final     Platelets   Date Value Ref Range Status   06/14/2018 92 (L) 150 - 350 K/uL Final     Gran # (ANC)   Date Value Ref Range Status   06/14/2018 3.3 1.8 - 7.7 K/uL Final     Comment:     The ANC is based on a white cell differential from an   automated cell counter. It has not been microscopically   reviewed for the presence of abnormal cells. Clinical   correlation is required.         Chemistry        Component Value Date/Time     06/14/2018 0930    K 3.8 06/14/2018 0930    CL 93 (L) 06/14/2018 0930    CO2 34 (H) 06/14/2018 0930    BUN 14 06/14/2018 0930    CREATININE 1.3 06/14/2018 0930     06/14/2018 0930        Component Value Date/Time     CALCIUM 9.3 06/14/2018 0930    ALKPHOS 76 06/14/2018 0930    AST 16 06/14/2018 0930    ALT 13 06/14/2018 0930    BILITOT 0.5 06/14/2018 0930    ESTGFRAFRICA >60.0 06/14/2018 0930    EGFRNONAA >60.0 06/14/2018 0930          Assessment:       1. Squamous cell carcinoma of palatine tonsil    2. Secondary cancer of bone    3. Cancer of head, face, or neck lymph nodes, secondary    4. Neoplasm related pain    5. Constipation, unspecified constipation type        Plan:        1,2,3. He is doing well clinically and will proceed with Cisplatin and concurrent RT and will return in 1 week for next cycle  4. Stable on meds  5. Escribed lactulose.    Above care plan was discussed with patient and accompanying brother and all questions were addressed to their satisfaction

## 2018-06-15 ENCOUNTER — PATIENT MESSAGE (OUTPATIENT)
Dept: HEMATOLOGY/ONCOLOGY | Facility: CLINIC | Age: 48
End: 2018-06-15

## 2018-06-15 ENCOUNTER — TELEPHONE (OUTPATIENT)
Dept: HEMATOLOGY/ONCOLOGY | Facility: CLINIC | Age: 48
End: 2018-06-15

## 2018-06-15 ENCOUNTER — INFUSION (OUTPATIENT)
Dept: INFUSION THERAPY | Facility: HOSPITAL | Age: 48
End: 2018-06-15
Attending: INTERNAL MEDICINE
Payer: COMMERCIAL

## 2018-06-15 VITALS
TEMPERATURE: 98 F | SYSTOLIC BLOOD PRESSURE: 172 MMHG | HEART RATE: 70 BPM | DIASTOLIC BLOOD PRESSURE: 99 MMHG | RESPIRATION RATE: 18 BRPM

## 2018-06-15 DIAGNOSIS — C09.9 SQUAMOUS CELL CARCINOMA OF PALATINE TONSIL: ICD-10-CM

## 2018-06-15 DIAGNOSIS — C77.0 CANCER OF HEAD, FACE, OR NECK LYMPH NODES, SECONDARY: ICD-10-CM

## 2018-06-15 DIAGNOSIS — C09.9 TONSIL CANCER: Primary | ICD-10-CM

## 2018-06-15 PROCEDURE — 96372 THER/PROPH/DIAG INJ SC/IM: CPT

## 2018-06-15 PROCEDURE — 25000003 PHARM REV CODE 250: Performed by: INTERNAL MEDICINE

## 2018-06-15 PROCEDURE — 96361 HYDRATE IV INFUSION ADD-ON: CPT

## 2018-06-15 PROCEDURE — 63600175 PHARM REV CODE 636 W HCPCS: Mod: JG | Performed by: INTERNAL MEDICINE

## 2018-06-15 PROCEDURE — 96360 HYDRATION IV INFUSION INIT: CPT

## 2018-06-15 PROCEDURE — A4216 STERILE WATER/SALINE, 10 ML: HCPCS | Performed by: INTERNAL MEDICINE

## 2018-06-15 RX ORDER — HEPARIN 100 UNIT/ML
500 SYRINGE INTRAVENOUS
Status: CANCELLED | OUTPATIENT
Start: 2018-06-15

## 2018-06-15 RX ORDER — SODIUM CHLORIDE 0.9 % (FLUSH) 0.9 %
10 SYRINGE (ML) INJECTION
Status: DISCONTINUED | OUTPATIENT
Start: 2018-06-15 | End: 2018-06-15 | Stop reason: HOSPADM

## 2018-06-15 RX ORDER — SODIUM CHLORIDE 0.9 % (FLUSH) 0.9 %
10 SYRINGE (ML) INJECTION
Status: CANCELLED | OUTPATIENT
Start: 2018-06-15

## 2018-06-15 RX ORDER — HEPARIN 100 UNIT/ML
500 SYRINGE INTRAVENOUS
Status: DISCONTINUED | OUTPATIENT
Start: 2018-06-15 | End: 2018-06-15 | Stop reason: HOSPADM

## 2018-06-15 RX ORDER — SODIUM CHLORIDE 0.9 % (FLUSH) 0.9 %
10 SYRINGE (ML) INJECTION
Status: COMPLETED | OUTPATIENT
Start: 2018-06-15 | End: 2018-06-15

## 2018-06-15 RX ORDER — HEPARIN 100 UNIT/ML
500 SYRINGE INTRAVENOUS
Status: COMPLETED | OUTPATIENT
Start: 2018-06-15 | End: 2018-06-15

## 2018-06-15 RX ADMIN — SODIUM CHLORIDE 2000 ML: 0.9 INJECTION, SOLUTION INTRAVENOUS at 02:06

## 2018-06-15 RX ADMIN — FILGRASTIM 480 MCG: 480 INJECTION, SOLUTION INTRAVENOUS; SUBCUTANEOUS at 03:06

## 2018-06-15 RX ADMIN — SODIUM CHLORIDE, PRESERVATIVE FREE 10 ML: 5 INJECTION INTRAVENOUS at 04:06

## 2018-06-15 RX ADMIN — HEPARIN 500 UNITS: 100 SYRINGE at 04:06

## 2018-06-15 NOTE — PLAN OF CARE
Problem: Chemotherapy Effects (Adult)  Goal: Signs and Symptoms of Listed Potential Problems Will be Absent, Minimized or Managed (Chemotherapy Effects)  Signs and symptoms of listed potential problems will be absent, minimized or managed by discharge/transition of care (reference Chemotherapy Effects (Adult) CPG).   Outcome: Ongoing (interventions implemented as appropriate)  Pt here for 2L NS, Neupogen injection, accompanied by nephew, no new complaints or concerns at present; reports bowel movement this morning (nephew states ten days since last BM); discussed treatment plan for today, all pt questions answered and pt agrees to proceed

## 2018-06-15 NOTE — PLAN OF CARE
Problem: Patient Care Overview  Goal: Plan of Care Review  Outcome: Ongoing (interventions implemented as appropriate)  Infusion completed, pt tolerated well; Neupogen injection to LUAQ, tolerated well; pt instructed to increase hydration (via PEG tube), to contact MD for any needs or concerns (also reviewed s/s of elevated BP); pt instructed to report for Neupogen injection tomorrow at 12N, again on Monday, printed AVS given to and reviewed with nephew, pt and nephew verbalized understanding of all discussed and when to report next

## 2018-06-15 NOTE — TELEPHONE ENCOUNTER
Spoke with patient's nephew.  Encouraged him to do his best to get his uncle to clinic today for fluids---as he had cisplatin today. Nephew will get him here---unless it is impossible--and if that is the case----he will call nurse back.

## 2018-06-15 NOTE — NURSING
[id number]  Per MD Lopez pt to receive D2 Neupogen on Sat 6/16/18, D3 Neupogen on Mon 6/18/18; discussed with pt and nephew, nephew verbalized understanding

## 2018-06-16 ENCOUNTER — INFUSION (OUTPATIENT)
Dept: INFUSION THERAPY | Facility: HOSPITAL | Age: 48
End: 2018-06-16
Attending: INTERNAL MEDICINE
Payer: COMMERCIAL

## 2018-06-16 DIAGNOSIS — C77.0 CANCER OF HEAD, FACE, OR NECK LYMPH NODES, SECONDARY: ICD-10-CM

## 2018-06-16 DIAGNOSIS — C09.9 TONSIL CANCER: Primary | ICD-10-CM

## 2018-06-16 DIAGNOSIS — C09.9 SQUAMOUS CELL CARCINOMA OF PALATINE TONSIL: ICD-10-CM

## 2018-06-16 PROCEDURE — 96372 THER/PROPH/DIAG INJ SC/IM: CPT

## 2018-06-16 PROCEDURE — 63600175 PHARM REV CODE 636 W HCPCS: Mod: JG | Performed by: INTERNAL MEDICINE

## 2018-06-16 RX ADMIN — FILGRASTIM 480 MCG: 480 INJECTION, SOLUTION INTRAVENOUS; SUBCUTANEOUS at 10:06

## 2018-06-16 NOTE — NURSING
Neupgen given subcu to abd. No redness around injection site. No complaints voiced. Pt refused 2L IVF.

## 2018-06-18 ENCOUNTER — INFUSION (OUTPATIENT)
Dept: INFUSION THERAPY | Facility: HOSPITAL | Age: 48
End: 2018-06-18
Attending: INTERNAL MEDICINE
Payer: COMMERCIAL

## 2018-06-18 DIAGNOSIS — C09.9 TONSIL CANCER: Primary | ICD-10-CM

## 2018-06-18 DIAGNOSIS — C09.9 SQUAMOUS CELL CARCINOMA OF PALATINE TONSIL: ICD-10-CM

## 2018-06-18 DIAGNOSIS — C77.0 CANCER OF HEAD, FACE, OR NECK LYMPH NODES, SECONDARY: ICD-10-CM

## 2018-06-18 PROCEDURE — 77387 GUIDANCE FOR RADJ TX DLVR: CPT | Mod: 26,,, | Performed by: RADIOLOGY

## 2018-06-18 PROCEDURE — 77386 HC IMRT, COMPLEX: CPT | Performed by: RADIOLOGY

## 2018-06-18 PROCEDURE — 63600175 PHARM REV CODE 636 W HCPCS: Mod: JG | Performed by: INTERNAL MEDICINE

## 2018-06-18 PROCEDURE — 96372 THER/PROPH/DIAG INJ SC/IM: CPT

## 2018-06-18 RX ORDER — LACTULOSE 10 G/15ML
SOLUTION ORAL; RECTAL
Refills: 6 | COMMUNITY
Start: 2018-06-14 | End: 2018-10-01

## 2018-06-18 RX ADMIN — FILGRASTIM 480 MCG: 480 INJECTION, SOLUTION INTRAVENOUS; SUBCUTANEOUS at 10:06

## 2018-06-19 ENCOUNTER — PATIENT MESSAGE (OUTPATIENT)
Dept: HEMATOLOGY/ONCOLOGY | Facility: CLINIC | Age: 48
End: 2018-06-19

## 2018-06-19 PROCEDURE — 77386 HC IMRT, COMPLEX: CPT | Performed by: RADIOLOGY

## 2018-06-19 PROCEDURE — 77387 GUIDANCE FOR RADJ TX DLVR: CPT | Mod: 26,,, | Performed by: RADIOLOGY

## 2018-06-20 ENCOUNTER — TELEPHONE (OUTPATIENT)
Dept: ADMINISTRATIVE | Facility: CLINIC | Age: 48
End: 2018-06-20

## 2018-06-20 PROCEDURE — 77417 THER RADIOLOGY PORT IMAGE(S): CPT | Performed by: RADIOLOGY

## 2018-06-20 PROCEDURE — 77387 GUIDANCE FOR RADJ TX DLVR: CPT | Mod: 26,,, | Performed by: RADIOLOGY

## 2018-06-20 PROCEDURE — 77386 HC IMRT, COMPLEX: CPT | Performed by: RADIOLOGY

## 2018-06-21 ENCOUNTER — INFUSION (OUTPATIENT)
Dept: INFUSION THERAPY | Facility: HOSPITAL | Age: 48
End: 2018-06-21
Attending: INTERNAL MEDICINE
Payer: COMMERCIAL

## 2018-06-21 ENCOUNTER — OFFICE VISIT (OUTPATIENT)
Dept: HEMATOLOGY/ONCOLOGY | Facility: CLINIC | Age: 48
End: 2018-06-21
Payer: COMMERCIAL

## 2018-06-21 VITALS — HEART RATE: 68 BPM | SYSTOLIC BLOOD PRESSURE: 117 MMHG | DIASTOLIC BLOOD PRESSURE: 75 MMHG | RESPIRATION RATE: 18 BRPM

## 2018-06-21 VITALS
DIASTOLIC BLOOD PRESSURE: 76 MMHG | HEART RATE: 88 BPM | WEIGHT: 206.81 LBS | TEMPERATURE: 99 F | OXYGEN SATURATION: 96 % | SYSTOLIC BLOOD PRESSURE: 116 MMHG | HEIGHT: 67 IN | RESPIRATION RATE: 20 BRPM | BODY MASS INDEX: 32.46 KG/M2

## 2018-06-21 DIAGNOSIS — C09.9 SQUAMOUS CELL CARCINOMA OF PALATINE TONSIL: Primary | ICD-10-CM

## 2018-06-21 DIAGNOSIS — Z09 CHEMOTHERAPY FOLLOW-UP EXAMINATION: ICD-10-CM

## 2018-06-21 DIAGNOSIS — N17.9 ACUTE RENAL FAILURE, UNSPECIFIED ACUTE RENAL FAILURE TYPE: ICD-10-CM

## 2018-06-21 DIAGNOSIS — C77.0 CANCER OF HEAD, FACE, OR NECK LYMPH NODES, SECONDARY: ICD-10-CM

## 2018-06-21 DIAGNOSIS — C09.9 SQUAMOUS CELL CARCINOMA OF PALATINE TONSIL: ICD-10-CM

## 2018-06-21 DIAGNOSIS — B37.0 ORAL THRUSH: ICD-10-CM

## 2018-06-21 DIAGNOSIS — C09.9 TONSIL CANCER: Primary | ICD-10-CM

## 2018-06-21 PROCEDURE — 99999 PR PBB SHADOW E&M-EST. PATIENT-LVL IV: CPT | Mod: PBBFAC,,, | Performed by: INTERNAL MEDICINE

## 2018-06-21 PROCEDURE — 99215 OFFICE O/P EST HI 40 MIN: CPT | Mod: S$GLB,,, | Performed by: INTERNAL MEDICINE

## 2018-06-21 PROCEDURE — 63600175 PHARM REV CODE 636 W HCPCS: Performed by: INTERNAL MEDICINE

## 2018-06-21 PROCEDURE — 25000003 PHARM REV CODE 250: Performed by: INTERNAL MEDICINE

## 2018-06-21 PROCEDURE — 96360 HYDRATION IV INFUSION INIT: CPT

## 2018-06-21 PROCEDURE — 96361 HYDRATE IV INFUSION ADD-ON: CPT

## 2018-06-21 PROCEDURE — 77387 GUIDANCE FOR RADJ TX DLVR: CPT | Mod: 26,,, | Performed by: RADIOLOGY

## 2018-06-21 PROCEDURE — 77386 HC IMRT, COMPLEX: CPT | Performed by: RADIOLOGY

## 2018-06-21 PROCEDURE — 3008F BODY MASS INDEX DOCD: CPT | Mod: CPTII,S$GLB,, | Performed by: INTERNAL MEDICINE

## 2018-06-21 RX ORDER — HEPARIN 100 UNIT/ML
500 SYRINGE INTRAVENOUS
Status: COMPLETED | OUTPATIENT
Start: 2018-06-21 | End: 2018-06-21

## 2018-06-21 RX ORDER — HEPARIN 100 UNIT/ML
500 SYRINGE INTRAVENOUS
Status: CANCELLED | OUTPATIENT
Start: 2018-06-22

## 2018-06-21 RX ORDER — SODIUM CHLORIDE 0.9 % (FLUSH) 0.9 %
10 SYRINGE (ML) INJECTION
Status: DISCONTINUED | OUTPATIENT
Start: 2018-06-21 | End: 2018-06-21 | Stop reason: HOSPADM

## 2018-06-21 RX ORDER — SODIUM CHLORIDE 0.9 % (FLUSH) 0.9 %
10 SYRINGE (ML) INJECTION
Status: CANCELLED | OUTPATIENT
Start: 2018-06-21

## 2018-06-21 RX ORDER — HEPARIN 100 UNIT/ML
500 SYRINGE INTRAVENOUS
Status: CANCELLED | OUTPATIENT
Start: 2018-06-21

## 2018-06-21 RX ORDER — FLUCONAZOLE 40 MG/ML
200 POWDER, FOR SUSPENSION ORAL DAILY
Qty: 70 ML | Refills: 0 | Status: ON HOLD | OUTPATIENT
Start: 2018-06-21 | End: 2018-07-05 | Stop reason: HOSPADM

## 2018-06-21 RX ORDER — SODIUM CHLORIDE 0.9 % (FLUSH) 0.9 %
10 SYRINGE (ML) INJECTION
Status: CANCELLED | OUTPATIENT
Start: 2018-06-22

## 2018-06-21 RX ADMIN — SODIUM CHLORIDE 2000 ML: 0.9 INJECTION, SOLUTION INTRAVENOUS at 09:06

## 2018-06-21 RX ADMIN — HEPARIN 500 UNITS: 100 SYRINGE at 11:06

## 2018-06-21 NOTE — PLAN OF CARE
Problem: Patient Care Overview  Goal: Plan of Care Review  Outcome: Ongoing (interventions implemented as appropriate)  Pt. Tolerated 2 liters of IV fluids without complication. VSS. PICC hep-locked and secured.

## 2018-06-21 NOTE — PLAN OF CARE
Problem: Chemotherapy Effects (Adult)  Goal: Signs and Symptoms of Listed Potential Problems Will be Absent, Minimized or Managed (Chemotherapy Effects)  Signs and symptoms of listed potential problems will be absent, minimized or managed by discharge/transition of care (reference Chemotherapy Effects (Adult) CPG).   Outcome: Ongoing (interventions implemented as appropriate)  Pt. Here today for 2 liters of IV fluids, instead of chemotherapy. Reports weight loss of around 9 lbs in last two weeks with nausea and vomitting in the mornings. MD aware. No questions at this time. PICC Dressing changed per sterile procedure.

## 2018-06-22 ENCOUNTER — INFUSION (OUTPATIENT)
Dept: INFUSION THERAPY | Facility: HOSPITAL | Age: 48
End: 2018-06-22
Attending: INTERNAL MEDICINE
Payer: COMMERCIAL

## 2018-06-22 VITALS
RESPIRATION RATE: 18 BRPM | SYSTOLIC BLOOD PRESSURE: 105 MMHG | TEMPERATURE: 99 F | HEART RATE: 72 BPM | DIASTOLIC BLOOD PRESSURE: 58 MMHG

## 2018-06-22 DIAGNOSIS — T80.212A PORT OR RESERVOIR INFECTION, INITIAL ENCOUNTER: Primary | ICD-10-CM

## 2018-06-22 PROCEDURE — 77386 HC IMRT, COMPLEX: CPT | Performed by: RADIOLOGY

## 2018-06-22 PROCEDURE — 96361 HYDRATE IV INFUSION ADD-ON: CPT

## 2018-06-22 PROCEDURE — 77336 RADIATION PHYSICS CONSULT: CPT | Performed by: RADIOLOGY

## 2018-06-22 PROCEDURE — 96360 HYDRATION IV INFUSION INIT: CPT

## 2018-06-22 PROCEDURE — 77387 GUIDANCE FOR RADJ TX DLVR: CPT | Mod: 26,,, | Performed by: RADIOLOGY

## 2018-06-22 PROCEDURE — 25000003 PHARM REV CODE 250: Performed by: INTERNAL MEDICINE

## 2018-06-22 RX ORDER — SODIUM CHLORIDE 0.9 % (FLUSH) 0.9 %
10 SYRINGE (ML) INJECTION
Status: DISCONTINUED | OUTPATIENT
Start: 2018-06-22 | End: 2018-06-22 | Stop reason: HOSPADM

## 2018-06-22 RX ORDER — HEPARIN 100 UNIT/ML
500 SYRINGE INTRAVENOUS
Status: DISCONTINUED | OUTPATIENT
Start: 2018-06-22 | End: 2018-06-22 | Stop reason: HOSPADM

## 2018-06-22 RX ADMIN — SODIUM CHLORIDE 2000 ML: 9 INJECTION, SOLUTION INTRAVENOUS at 02:06

## 2018-06-22 NOTE — PLAN OF CARE
Problem: Patient Care Overview  Goal: Discharge Needs Assessment  Outcome: Ongoing (interventions implemented as appropriate)  Pt tolerated 2L IVF bolus well leaves clinic accompanied by wife, PICC flushed hep locked capped. ambulatory NAD noted. Instructed to contact MD office with any concerns or questions.

## 2018-06-25 ENCOUNTER — INFUSION (OUTPATIENT)
Dept: INFUSION THERAPY | Facility: HOSPITAL | Age: 48
End: 2018-06-25
Attending: INTERNAL MEDICINE
Payer: COMMERCIAL

## 2018-06-25 ENCOUNTER — PATIENT MESSAGE (OUTPATIENT)
Dept: HEMATOLOGY/ONCOLOGY | Facility: CLINIC | Age: 48
End: 2018-06-25

## 2018-06-25 ENCOUNTER — DOCUMENTATION ONLY (OUTPATIENT)
Dept: RADIATION ONCOLOGY | Facility: CLINIC | Age: 48
End: 2018-06-25

## 2018-06-25 DIAGNOSIS — C09.9 SQUAMOUS CELL CARCINOMA OF PALATINE TONSIL: ICD-10-CM

## 2018-06-25 DIAGNOSIS — C77.0 CANCER OF HEAD, FACE, OR NECK LYMPH NODES, SECONDARY: ICD-10-CM

## 2018-06-25 DIAGNOSIS — C09.9 TONSIL CANCER: Primary | ICD-10-CM

## 2018-06-25 PROCEDURE — 63600175 PHARM REV CODE 636 W HCPCS: Performed by: INTERNAL MEDICINE

## 2018-06-25 PROCEDURE — 96361 HYDRATE IV INFUSION ADD-ON: CPT

## 2018-06-25 PROCEDURE — 77386 HC IMRT, COMPLEX: CPT | Performed by: RADIOLOGY

## 2018-06-25 PROCEDURE — 96367 TX/PROPH/DG ADDL SEQ IV INF: CPT

## 2018-06-25 PROCEDURE — 25000003 PHARM REV CODE 250: Performed by: INTERNAL MEDICINE

## 2018-06-25 PROCEDURE — 96413 CHEMO IV INFUSION 1 HR: CPT

## 2018-06-25 PROCEDURE — 77387 GUIDANCE FOR RADJ TX DLVR: CPT | Mod: 26,,, | Performed by: RADIOLOGY

## 2018-06-25 PROCEDURE — 96366 THER/PROPH/DIAG IV INF ADDON: CPT

## 2018-06-25 RX ORDER — SODIUM CHLORIDE 0.9 % (FLUSH) 0.9 %
10 SYRINGE (ML) INJECTION
Status: DISCONTINUED | OUTPATIENT
Start: 2018-06-25 | End: 2018-06-25 | Stop reason: HOSPADM

## 2018-06-25 RX ORDER — HEPARIN 100 UNIT/ML
500 SYRINGE INTRAVENOUS
Status: CANCELLED | OUTPATIENT
Start: 2018-06-25

## 2018-06-25 RX ORDER — HEPARIN 100 UNIT/ML
500 SYRINGE INTRAVENOUS
Status: DISCONTINUED | OUTPATIENT
Start: 2018-06-25 | End: 2018-06-25 | Stop reason: HOSPADM

## 2018-06-25 RX ORDER — SODIUM CHLORIDE 0.9 % (FLUSH) 0.9 %
10 SYRINGE (ML) INJECTION
Status: CANCELLED | OUTPATIENT
Start: 2018-06-25

## 2018-06-25 RX ADMIN — DEXAMETHASONE SODIUM PHOSPHATE: 4 INJECTION, SOLUTION INTRA-ARTICULAR; INTRALESIONAL; INTRAMUSCULAR; INTRAVENOUS; SOFT TISSUE at 11:06

## 2018-06-25 RX ADMIN — SODIUM CHLORIDE: 9 INJECTION, SOLUTION INTRAVENOUS at 12:06

## 2018-06-25 RX ADMIN — CISPLATIN 89 MG: 100 INJECTION, SOLUTION INTRAVENOUS at 11:06

## 2018-06-25 RX ADMIN — SODIUM CHLORIDE: 9 INJECTION, SOLUTION INTRAVENOUS at 10:06

## 2018-06-25 RX ADMIN — MAGNESIUM SULFATE 3 G: 500 INJECTION, SOLUTION INTRAMUSCULAR; INTRAVENOUS at 12:06

## 2018-06-25 NOTE — PLAN OF CARE
Problem: Patient Care Overview  Goal: Plan of Care Review  Outcome: Ongoing (interventions implemented as appropriate)  Pt. Tolerated infusion. VSS. PICC flushed, hep-locked and capped. No questions at this time.

## 2018-06-25 NOTE — TELEPHONE ENCOUNTER
Yes he can remove it but if he needs fluids will be willing to get stuck. He is more than likely to need fluids

## 2018-06-25 NOTE — PLAN OF CARE
Problem: Chemotherapy Effects (Adult)  Goal: Signs and Symptoms of Listed Potential Problems Will be Absent, Minimized or Managed (Chemotherapy Effects)  Signs and symptoms of listed potential problems will be absent, minimized or managed by discharge/transition of care (reference Chemotherapy Effects (Adult) CPG).   Outcome: Ongoing (interventions implemented as appropriate)  Pt. Here today for chemo. Reports pain, nausea and vomiting related to radiation. Has radiation for two more days. This is scheduled to be the final cycle of chemo, per pt. Overall feeling better than last week. Okay to treat per dr. Lopez. No questions at this time.

## 2018-06-25 NOTE — PLAN OF CARE
Problem: Patient Care Overview  Goal: Plan of Care Review  Outcome: Ongoing (interventions implemented as appropriate)  Day 33 of XET to left neck tonsils. Reports fatigue. Swallowing liquids.  Denies headache but had nausea and vomiting 3 times yesterday.

## 2018-06-26 ENCOUNTER — DOCUMENTATION ONLY (OUTPATIENT)
Dept: RADIATION ONCOLOGY | Facility: CLINIC | Age: 48
End: 2018-06-26

## 2018-06-26 ENCOUNTER — INFUSION (OUTPATIENT)
Dept: INFUSION THERAPY | Facility: HOSPITAL | Age: 48
End: 2018-06-26
Attending: INTERNAL MEDICINE
Payer: COMMERCIAL

## 2018-06-26 VITALS
DIASTOLIC BLOOD PRESSURE: 71 MMHG | HEART RATE: 73 BPM | SYSTOLIC BLOOD PRESSURE: 134 MMHG | TEMPERATURE: 98 F | RESPIRATION RATE: 18 BRPM

## 2018-06-26 DIAGNOSIS — C77.0 CANCER OF HEAD, FACE, OR NECK LYMPH NODES, SECONDARY: ICD-10-CM

## 2018-06-26 DIAGNOSIS — C09.9 SQUAMOUS CELL CARCINOMA OF PALATINE TONSIL: ICD-10-CM

## 2018-06-26 DIAGNOSIS — C09.9 TONSIL CANCER: ICD-10-CM

## 2018-06-26 PROCEDURE — 63600175 PHARM REV CODE 636 W HCPCS: Performed by: INTERNAL MEDICINE

## 2018-06-26 PROCEDURE — 96372 THER/PROPH/DIAG INJ SC/IM: CPT

## 2018-06-26 PROCEDURE — 25000003 PHARM REV CODE 250: Performed by: INTERNAL MEDICINE

## 2018-06-26 PROCEDURE — 77387 GUIDANCE FOR RADJ TX DLVR: CPT | Mod: 26,,, | Performed by: RADIOLOGY

## 2018-06-26 PROCEDURE — 96361 HYDRATE IV INFUSION ADD-ON: CPT

## 2018-06-26 PROCEDURE — 77386 HC IMRT, COMPLEX: CPT | Performed by: RADIOLOGY

## 2018-06-26 RX ADMIN — FILGRASTIM 480 MCG: 480 INJECTION, SOLUTION INTRAVENOUS; SUBCUTANEOUS at 12:06

## 2018-06-26 RX ADMIN — SODIUM CHLORIDE 2000 ML: 0.9 INJECTION, SOLUTION INTRAVENOUS at 09:06

## 2018-06-26 RX ADMIN — PROMETHAZINE HYDROCHLORIDE 25 MG: 25 INJECTION INTRAMUSCULAR; INTRAVENOUS at 10:06

## 2018-06-26 NOTE — PLAN OF CARE
Problem: Patient Care Overview  Goal: Plan of Care Review  Outcome: Outcome(s) achieved Date Met: 06/26/18  Last day of XRT. Pt needs follow up apt and call in 1 week.

## 2018-06-26 NOTE — PLAN OF CARE
Problem: Chemotherapy Effects (Adult)  Goal: Signs and Symptoms of Listed Potential Problems Will be Absent, Minimized or Managed (Chemotherapy Effects)  Signs and symptoms of listed potential problems will be absent, minimized or managed by discharge/transition of care (reference Chemotherapy Effects (Adult) CPG).   Outcome: Ongoing (interventions implemented as appropriate)  Pt tolerated 2L IVF/phenergan/neupogen in abd  without complications. VSS. No s/s of reaction. Instructed to contact MD with any questions. PICC x2 lumens heparin locked. AVS given to patient.

## 2018-06-26 NOTE — PLAN OF CARE
Problem: Patient Care Overview  Goal: Plan of Care Review  Outcome: Outcome(s) achieved Date Met: 06/26/18  Last day of XRT to tonsil. Miaderm being used. Difficulty swallowing due to thrush. Nausea with car rides. Follow up appointment needed in 2 week.

## 2018-06-27 ENCOUNTER — INFUSION (OUTPATIENT)
Dept: INFUSION THERAPY | Facility: HOSPITAL | Age: 48
End: 2018-06-27
Attending: INTERNAL MEDICINE
Payer: COMMERCIAL

## 2018-06-27 DIAGNOSIS — C09.9 TONSIL CANCER: Primary | ICD-10-CM

## 2018-06-27 DIAGNOSIS — C77.0 CANCER OF HEAD, FACE, OR NECK LYMPH NODES, SECONDARY: ICD-10-CM

## 2018-06-27 DIAGNOSIS — C09.9 SQUAMOUS CELL CARCINOMA OF PALATINE TONSIL: ICD-10-CM

## 2018-06-27 PROCEDURE — 63600175 PHARM REV CODE 636 W HCPCS: Mod: JG | Performed by: INTERNAL MEDICINE

## 2018-06-27 PROCEDURE — 96372 THER/PROPH/DIAG INJ SC/IM: CPT

## 2018-06-27 RX ADMIN — FILGRASTIM 480 MCG: 480 INJECTION, SOLUTION INTRAVENOUS; SUBCUTANEOUS at 01:06

## 2018-06-28 PROCEDURE — 77336 RADIATION PHYSICS CONSULT: CPT | Performed by: RADIOLOGY

## 2018-06-30 ENCOUNTER — HOSPITAL ENCOUNTER (EMERGENCY)
Facility: HOSPITAL | Age: 48
Discharge: ANOTHER HOSPITAL | End: 2018-07-01
Attending: SURGERY
Payer: COMMERCIAL

## 2018-06-30 DIAGNOSIS — R50.9 FEVER: ICD-10-CM

## 2018-06-30 DIAGNOSIS — R11.2 INTRACTABLE VOMITING WITH NAUSEA, UNSPECIFIED VOMITING TYPE: Primary | ICD-10-CM

## 2018-06-30 LAB
ALBUMIN SERPL BCP-MCNC: 4.4 G/DL
ALP SERPL-CCNC: 75 U/L
ALT SERPL W/O P-5'-P-CCNC: 20 U/L
ANION GAP SERPL CALC-SCNC: 12 MMOL/L
AST SERPL-CCNC: 24 U/L
BASOPHILS # BLD AUTO: 0.02 K/UL
BASOPHILS NFR BLD: 0.3 %
BILIRUB SERPL-MCNC: 0.7 MG/DL
BILIRUB UR QL STRIP: NEGATIVE
BUN SERPL-MCNC: 34 MG/DL
CALCIUM SERPL-MCNC: 8.3 MG/DL
CHLORIDE SERPL-SCNC: 95 MMOL/L
CLARITY UR REFRACT.AUTO: CLEAR
CO2 SERPL-SCNC: 35 MMOL/L
COLOR UR AUTO: YELLOW
CREAT SERPL-MCNC: 1.42 MG/DL
DIFFERENTIAL METHOD: ABNORMAL
EOSINOPHIL # BLD AUTO: 0 K/UL
EOSINOPHIL NFR BLD: 0.3 %
ERYTHROCYTE [DISTWIDTH] IN BLOOD BY AUTOMATED COUNT: 14.5 %
EST. GFR  (AFRICAN AMERICAN): >60 ML/MIN/1.73 M^2
EST. GFR  (NON AFRICAN AMERICAN): 57.2 ML/MIN/1.73 M^2
GLUCOSE SERPL-MCNC: 114 MG/DL
GLUCOSE UR QL STRIP: NEGATIVE
HCT VFR BLD AUTO: 28.3 %
HGB BLD-MCNC: 9.7 G/DL
HGB UR QL STRIP: NEGATIVE
INR PPP: 1.2
KETONES UR QL STRIP: NEGATIVE
LACTATE SERPL-SCNC: 1.6 MMOL/L
LEUKOCYTE ESTERASE UR QL STRIP: NEGATIVE
LYMPHOCYTES # BLD AUTO: 0.3 K/UL
LYMPHOCYTES NFR BLD: 4.2 %
MCH RBC QN AUTO: 33.8 PG
MCHC RBC AUTO-ENTMCNC: 34.3 G/DL
MCV RBC AUTO: 99 FL
MONOCYTES # BLD AUTO: 0.8 K/UL
MONOCYTES NFR BLD: 12.6 %
NEUTROPHILS # BLD AUTO: 4.9 K/UL
NEUTROPHILS NFR BLD: 82.3 %
NITRITE UR QL STRIP: NEGATIVE
PH UR STRIP: 7 [PH] (ref 5–8)
PLATELET # BLD AUTO: 222 K/UL
PMV BLD AUTO: 9.7 FL
POTASSIUM SERPL-SCNC: 3.4 MMOL/L
PROT SERPL-MCNC: 7.3 G/DL
PROT UR QL STRIP: ABNORMAL
PROTHROMBIN TIME: 13.3 SEC
RBC # BLD AUTO: 2.87 M/UL
SODIUM SERPL-SCNC: 142 MMOL/L
SP GR UR STRIP: 1.01 (ref 1–1.03)
URN SPEC COLLECT METH UR: ABNORMAL
UROBILINOGEN UR STRIP-ACNC: NEGATIVE EU/DL
WBC # BLD AUTO: 5.95 K/UL

## 2018-06-30 PROCEDURE — 25000003 PHARM REV CODE 250: Performed by: SURGERY

## 2018-06-30 PROCEDURE — 81003 URINALYSIS AUTO W/O SCOPE: CPT

## 2018-06-30 PROCEDURE — 85025 COMPLETE CBC W/AUTO DIFF WBC: CPT

## 2018-06-30 PROCEDURE — 96375 TX/PRO/DX INJ NEW DRUG ADDON: CPT

## 2018-06-30 PROCEDURE — 96361 HYDRATE IV INFUSION ADD-ON: CPT

## 2018-06-30 PROCEDURE — 96376 TX/PRO/DX INJ SAME DRUG ADON: CPT

## 2018-06-30 PROCEDURE — 83605 ASSAY OF LACTIC ACID: CPT

## 2018-06-30 PROCEDURE — 63600175 PHARM REV CODE 636 W HCPCS

## 2018-06-30 PROCEDURE — 87040 BLOOD CULTURE FOR BACTERIA: CPT

## 2018-06-30 PROCEDURE — 85610 PROTHROMBIN TIME: CPT

## 2018-06-30 PROCEDURE — 25500020 PHARM REV CODE 255: Performed by: SURGERY

## 2018-06-30 PROCEDURE — 94760 N-INVAS EAR/PLS OXIMETRY 1: CPT

## 2018-06-30 PROCEDURE — 63600175 PHARM REV CODE 636 W HCPCS: Performed by: SURGERY

## 2018-06-30 PROCEDURE — 96374 THER/PROPH/DIAG INJ IV PUSH: CPT

## 2018-06-30 PROCEDURE — 99285 EMERGENCY DEPT VISIT HI MDM: CPT | Mod: 25

## 2018-06-30 PROCEDURE — 27000221 HC OXYGEN, UP TO 24 HOURS

## 2018-06-30 PROCEDURE — 80053 COMPREHEN METABOLIC PANEL: CPT

## 2018-06-30 RX ORDER — MORPHINE SULFATE 4 MG/ML
3 INJECTION, SOLUTION INTRAMUSCULAR; INTRAVENOUS
Status: COMPLETED | OUTPATIENT
Start: 2018-06-30 | End: 2018-06-30

## 2018-06-30 RX ORDER — PROMETHAZINE HYDROCHLORIDE 25 MG/ML
INJECTION, SOLUTION INTRAMUSCULAR; INTRAVENOUS
Status: COMPLETED
Start: 2018-06-30 | End: 2018-06-30

## 2018-06-30 RX ORDER — ONDANSETRON 2 MG/ML
8 INJECTION INTRAMUSCULAR; INTRAVENOUS
Status: COMPLETED | OUTPATIENT
Start: 2018-06-30 | End: 2018-06-30

## 2018-06-30 RX ORDER — PROMETHAZINE HYDROCHLORIDE 25 MG/ML
25 INJECTION, SOLUTION INTRAMUSCULAR; INTRAVENOUS
Status: COMPLETED | OUTPATIENT
Start: 2018-06-30 | End: 2018-06-30

## 2018-06-30 RX ADMIN — ONDANSETRON 8 MG: 2 INJECTION INTRAMUSCULAR; INTRAVENOUS at 04:06

## 2018-06-30 RX ADMIN — IOHEXOL 100 ML: 350 INJECTION, SOLUTION INTRAVENOUS at 06:06

## 2018-06-30 RX ADMIN — PROMETHAZINE HYDROCHLORIDE 25 MG: 25 INJECTION INTRAMUSCULAR; INTRAVENOUS at 11:06

## 2018-06-30 RX ADMIN — PROMETHAZINE HYDROCHLORIDE 25 MG: 25 INJECTION, SOLUTION INTRAMUSCULAR; INTRAVENOUS at 11:06

## 2018-06-30 RX ADMIN — MORPHINE SULFATE 3 MG: 4 INJECTION INTRAVENOUS at 06:06

## 2018-06-30 RX ADMIN — SODIUM CHLORIDE 1000 ML: 0.9 INJECTION, SOLUTION INTRAVENOUS at 04:06

## 2018-06-30 RX ADMIN — LORAZEPAM 1 MG: 2 INJECTION INTRAMUSCULAR; INTRAVENOUS at 04:06

## 2018-06-30 NOTE — ED NOTES
CA pt how has a beg tube, flushes well. Lots of dry heaving with some mucus coming up. Pt feels he has a lot he can not get out. Family at bedside.

## 2018-06-30 NOTE — ED PROVIDER NOTES
Encounter Date: 6/30/2018       History     Chief Complaint   Patient presents with    not eating     not eating or drinking for a couple of days. mild abd discomfort. history of throat cancer and has a peg. just finished chemo and radiation.      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  He has had recent episodes of sepsis which necessitated removal of his port and recent episodes of diverticulitis as well as inflammatory colitis.  He has a PEG tube placed and  his chief complaint today is vomiting after PEG feedings.  He has not had a bowel movement in several days.  He complains of pain in the left tonsil.  And burning chest discomfort from the vomiting      The history is provided by the patient.   Emesis    This is a new problem. The current episode started yesterday. The problem occurs 2 - 4 times per day. The problem has been unchanged. The emesis has an appearance of stomach contents. Associated symptoms include abdominal pain. Pertinent negatives include no diarrhea and no headaches.     Review of patient's allergies indicates:  No Known Allergies  Past Medical History:   Diagnosis Date    Former smoker     HPV in male     Opiate addiction     Squamous cell carcinoma of palatine tonsil     throat and tonsillar     Past Surgical History:   Procedure Laterality Date    GASTROSTOMY TUBE PLACEMENT Left 02/12/2018    MEDIPORT REMOVAL N/A 6/6/2018    Procedure: Removal-port-a-cath;  Surgeon: Blayne Diagnostic Provider;  Location: [facility] OR [address] [place] [address];  Service: General;  Laterality: N/A;     Family History   Problem Relation Age of Onset    Leukemia Mother     Hypertension Father     Thyroid disease Sister     Hypertension Brother     Brain cancer Maternal Uncle     Brain cancer Maternal Uncle      Social History   Substance Use Topics    Smoking status:  Former Smoker     Packs/day: 1.50     Years: 25.00     Types: Cigarettes     Quit date: 06/2017    Smokeless tobacco: Never Used      Comment: smoked for about 25 years. quit 6 months ago    Alcohol use No     Review of Systems   Constitutional: Positive for unexpected weight change.   HENT: Negative.    Eyes: Negative.    Respiratory: Negative.    Cardiovascular: Negative.    Gastrointestinal: Positive for abdominal pain and vomiting. Negative for diarrhea.   Endocrine: Negative.    Genitourinary: Negative.    Musculoskeletal: Negative.    Skin: Negative.    Allergic/Immunologic: Negative.    Neurological: Negative.  Negative for headaches.   Hematological: Negative.    Psychiatric/Behavioral: Negative.    All other systems reviewed and are negative.      Physical Exam     Initial Vitals   BP Pulse Resp Temp SpO2   06/30/18 1612 06/30/18 1612 06/30/18 1612 06/30/18 1612 06/30/18 1702   (!) 147/79 62 16 98.3 °F (36.8 °C) 99 %      MAP       --                Physical Exam    Nursing note and vitals reviewed.  Constitutional:   Retching and appears ill   HENT:   Left tonsil appears inflamed but no radiation sores seen   Eyes: Conjunctivae are normal.   Neck: Normal range of motion.   Cardiovascular: Normal rate, regular rhythm, normal heart sounds and intact distal pulses.   Pulmonary/Chest: Breath sounds normal.   Abdominal:   PEG tube in place with mild distention no localized tenderness   Musculoskeletal: Normal range of motion.   Neurological: He is alert and oriented to person, place, and time. He has normal strength.   Skin: Skin is warm and dry. Capillary refill takes less than 2 seconds.         ED Course   Procedures  Labs Reviewed   COMPREHENSIVE METABOLIC PANEL - Abnormal; Notable for the following:        Result Value    Potassium 3.4 (*)     CO2 35 (*)     Glucose 114 (*)     BUN, Bld 34 (*)     Creatinine 1.42 (*)     Calcium 8.3 (*)     eGFR if non  57.2 (*)     All other components  within normal limits   CBC W/ AUTO DIFFERENTIAL - Abnormal; Notable for the following:     RBC 2.87 (*)     Hemoglobin 9.7 (*)     Hematocrit 28.3 (*)     MCV 99 (*)     MCH 33.8 (*)     Lymph # 0.3 (*)     Gran% 82.3 (*)     Lymph% 4.2 (*)     All other components within normal limits   PROTIME-INR - Abnormal; Notable for the following:     Prothrombin Time 13.3 (*)     All other components within normal limits   CULTURE, BLOOD   CULTURE, BLOOD   LACTIC ACID, PLASMA   URINALYSIS          Imaging Results          X-Ray Chest 1 View (Final result)  Result time 06/30/18 17:00:59    Final result by Oc Albarado Jr., MD (06/30/18 17:00:59)                 Impression:      No acute cardiopulmonary disease.      Electronically signed by: Oc Albarado Jr., MD  Date:    06/30/2018  Time:    17:00             Narrative:    EXAMINATION:  XR CHEST 1 VIEW    CLINICAL HISTORY:  Fever, unspecified    COMPARISON:  05/30/2018.    FINDINGS:  The lungs are clear. The cardiac silhouette and mediastinum are within normal limits. The remaining osseous structures and soft tissues are within normal limits.                                 Medical Decision Making:   Clinical Tests:   Lab Tests: Ordered and Reviewed  Radiological Study: Ordered and Reviewed  ED Management:  2130: I spoke with both patient and his family at length regarding his situation.  It seems that the patient is having intractable vomiting despite Phenergan and Zofran.  In light of this will have to admitted to the hospital for IV medications and IV fluids.  I have spoken with the regional referral [place] and they will get someone for his admission                      Clinical Impression:   The primary encounter diagnosis was Intractable vomiting with nausea, unspecified vomiting type. A diagnosis of Fever was also pertinent to this visit.      Disposition:   Disposition: Transferred  Condition: Stable                        Lili Fontana MD  07/01/18 0039

## 2018-07-01 ENCOUNTER — HOSPITAL ENCOUNTER (INPATIENT)
Facility: HOSPITAL | Age: 48
LOS: 4 days | Discharge: HOME HEALTH | DRG: 392 | End: 2018-07-05
Attending: INTERNAL MEDICINE | Admitting: INTERNAL MEDICINE
Payer: COMMERCIAL

## 2018-07-01 VITALS
OXYGEN SATURATION: 100 % | HEIGHT: 68 IN | HEART RATE: 70 BPM | WEIGHT: 195 LBS | BODY MASS INDEX: 29.55 KG/M2 | SYSTOLIC BLOOD PRESSURE: 140 MMHG | RESPIRATION RATE: 18 BRPM | DIASTOLIC BLOOD PRESSURE: 87 MMHG | TEMPERATURE: 100 F

## 2018-07-01 DIAGNOSIS — N17.9 ACUTE RENAL FAILURE, UNSPECIFIED ACUTE RENAL FAILURE TYPE: ICD-10-CM

## 2018-07-01 DIAGNOSIS — C76.0 HEAD AND NECK CANCER: ICD-10-CM

## 2018-07-01 DIAGNOSIS — B37.0 THRUSH: ICD-10-CM

## 2018-07-01 DIAGNOSIS — E87.8 ELECTROLYTE ABNORMALITY: ICD-10-CM

## 2018-07-01 DIAGNOSIS — E86.0 DEHYDRATION: ICD-10-CM

## 2018-07-01 DIAGNOSIS — R11.2 INTRACTABLE VOMITING WITH NAUSEA: ICD-10-CM

## 2018-07-01 DIAGNOSIS — C09.9 SQUAMOUS CELL CARCINOMA OF PALATINE TONSIL: Primary | ICD-10-CM

## 2018-07-01 PROBLEM — D53.9 MACROCYTIC ANEMIA: Status: ACTIVE | Noted: 2018-07-01

## 2018-07-01 LAB
ABO + RH BLD: NORMAL
ALBUMIN SERPL BCP-MCNC: 3.5 G/DL
ALP SERPL-CCNC: 86 U/L
ALT SERPL W/O P-5'-P-CCNC: 18 U/L
ANION GAP SERPL CALC-SCNC: 13 MMOL/L
ANION GAP SERPL CALC-SCNC: 13 MMOL/L
AST SERPL-CCNC: 18 U/L
BASOPHILS # BLD AUTO: 0.02 K/UL
BASOPHILS NFR BLD: 0.6 %
BILIRUB SERPL-MCNC: 0.7 MG/DL
BILIRUB UR QL STRIP: NEGATIVE
BLD GP AB SCN CELLS X3 SERPL QL: NORMAL
BUN SERPL-MCNC: 25 MG/DL
BUN SERPL-MCNC: 27 MG/DL
CALCIUM SERPL-MCNC: 7.7 MG/DL
CALCIUM SERPL-MCNC: 8.1 MG/DL
CHLORIDE SERPL-SCNC: 100 MMOL/L
CHLORIDE SERPL-SCNC: 98 MMOL/L
CLARITY UR REFRACT.AUTO: CLEAR
CO2 SERPL-SCNC: 28 MMOL/L
CO2 SERPL-SCNC: 33 MMOL/L
COLOR UR AUTO: YELLOW
CORTIS SERPL-MCNC: 12 UG/DL
CREAT SERPL-MCNC: 1.4 MG/DL
CREAT SERPL-MCNC: 1.6 MG/DL
DEPRECATED S PYO AG THROAT QL EIA: NEGATIVE
DIFFERENTIAL METHOD: ABNORMAL
EOSINOPHIL # BLD AUTO: 0 K/UL
EOSINOPHIL NFR BLD: 1.1 %
ERYTHROCYTE [DISTWIDTH] IN BLOOD BY AUTOMATED COUNT: 14.6 %
EST. GFR  (AFRICAN AMERICAN): 57.2 ML/MIN/1.73 M^2
EST. GFR  (AFRICAN AMERICAN): >60 ML/MIN/1.73 M^2
EST. GFR  (NON AFRICAN AMERICAN): 49.5 ML/MIN/1.73 M^2
EST. GFR  (NON AFRICAN AMERICAN): 58.2 ML/MIN/1.73 M^2
GLUCOSE SERPL-MCNC: 92 MG/DL
GLUCOSE SERPL-MCNC: 94 MG/DL
GLUCOSE UR QL STRIP: NEGATIVE
HCT VFR BLD AUTO: 24.1 %
HGB BLD-MCNC: 8.5 G/DL
HGB UR QL STRIP: NEGATIVE
IMM GRANULOCYTES # BLD AUTO: 0.03 K/UL
IMM GRANULOCYTES NFR BLD AUTO: 0.8 %
INR PPP: 1.1
KETONES UR QL STRIP: NEGATIVE
LACTATE SERPL-SCNC: 0.8 MMOL/L
LEUKOCYTE ESTERASE UR QL STRIP: NEGATIVE
LIPASE SERPL-CCNC: <3 U/L
LYMPHOCYTES # BLD AUTO: 0.3 K/UL
LYMPHOCYTES NFR BLD: 7.3 %
MAGNESIUM SERPL-MCNC: 0.9 MG/DL
MAGNESIUM SERPL-MCNC: 2 MG/DL
MCH RBC QN AUTO: 34.7 PG
MCHC RBC AUTO-ENTMCNC: 35.3 G/DL
MCV RBC AUTO: 98 FL
MONOCYTES # BLD AUTO: 0.8 K/UL
MONOCYTES NFR BLD: 21.9 %
NEUTROPHILS # BLD AUTO: 2.4 K/UL
NEUTROPHILS NFR BLD: 68.3 %
NITRITE UR QL STRIP: NEGATIVE
NRBC BLD-RTO: 0 /100 WBC
PH UR STRIP: 7 [PH] (ref 5–8)
PHOSPHATE SERPL-MCNC: 3.5 MG/DL
PHOSPHATE SERPL-MCNC: 4 MG/DL
PLATELET # BLD AUTO: 194 K/UL
PMV BLD AUTO: 10.2 FL
POTASSIUM SERPL-SCNC: 3 MMOL/L
POTASSIUM SERPL-SCNC: 3.3 MMOL/L
PROCALCITONIN SERPL IA-MCNC: 0.17 NG/ML
PROT SERPL-MCNC: 6.4 G/DL
PROT UR QL STRIP: NEGATIVE
PROTHROMBIN TIME: 11.7 SEC
RBC # BLD AUTO: 2.45 M/UL
SODIUM SERPL-SCNC: 141 MMOL/L
SODIUM SERPL-SCNC: 144 MMOL/L
SP GR UR STRIP: >=1.03 (ref 1–1.03)
TSH SERPL DL<=0.005 MIU/L-ACNC: 0.57 UIU/ML
URN SPEC COLLECT METH UR: ABNORMAL
UROBILINOGEN UR STRIP-ACNC: 2 EU/DL
WBC # BLD AUTO: 3.56 K/UL

## 2018-07-01 PROCEDURE — 83735 ASSAY OF MAGNESIUM: CPT | Mod: 91

## 2018-07-01 PROCEDURE — 82533 TOTAL CORTISOL: CPT

## 2018-07-01 PROCEDURE — 36415 COLL VENOUS BLD VENIPUNCTURE: CPT

## 2018-07-01 PROCEDURE — 87081 CULTURE SCREEN ONLY: CPT

## 2018-07-01 PROCEDURE — 63600175 PHARM REV CODE 636 W HCPCS: Performed by: STUDENT IN AN ORGANIZED HEALTH CARE EDUCATION/TRAINING PROGRAM

## 2018-07-01 PROCEDURE — 85025 COMPLETE CBC W/AUTO DIFF WBC: CPT

## 2018-07-01 PROCEDURE — 86850 RBC ANTIBODY SCREEN: CPT

## 2018-07-01 PROCEDURE — 25000003 PHARM REV CODE 250: Performed by: INTERNAL MEDICINE

## 2018-07-01 PROCEDURE — 87088 URINE BACTERIA CULTURE: CPT

## 2018-07-01 PROCEDURE — 84443 ASSAY THYROID STIM HORMONE: CPT

## 2018-07-01 PROCEDURE — 25000003 PHARM REV CODE 250: Performed by: STUDENT IN AN ORGANIZED HEALTH CARE EDUCATION/TRAINING PROGRAM

## 2018-07-01 PROCEDURE — 85610 PROTHROMBIN TIME: CPT

## 2018-07-01 PROCEDURE — 84145 PROCALCITONIN (PCT): CPT

## 2018-07-01 PROCEDURE — 84100 ASSAY OF PHOSPHORUS: CPT | Mod: 91

## 2018-07-01 PROCEDURE — 63600175 PHARM REV CODE 636 W HCPCS: Performed by: INTERNAL MEDICINE

## 2018-07-01 PROCEDURE — 83735 ASSAY OF MAGNESIUM: CPT

## 2018-07-01 PROCEDURE — 20600001 HC STEP DOWN PRIVATE ROOM

## 2018-07-01 PROCEDURE — 87880 STREP A ASSAY W/OPTIC: CPT

## 2018-07-01 PROCEDURE — 63600175 PHARM REV CODE 636 W HCPCS: Performed by: EMERGENCY MEDICINE

## 2018-07-01 PROCEDURE — 93005 ELECTROCARDIOGRAM TRACING: CPT

## 2018-07-01 PROCEDURE — 83690 ASSAY OF LIPASE: CPT

## 2018-07-01 PROCEDURE — 80048 BASIC METABOLIC PNL TOTAL CA: CPT

## 2018-07-01 PROCEDURE — 80053 COMPREHEN METABOLIC PANEL: CPT

## 2018-07-01 PROCEDURE — 99222 1ST HOSP IP/OBS MODERATE 55: CPT | Mod: ,,, | Performed by: INTERNAL MEDICINE

## 2018-07-01 PROCEDURE — 87077 CULTURE AEROBIC IDENTIFY: CPT

## 2018-07-01 PROCEDURE — 87086 URINE CULTURE/COLONY COUNT: CPT

## 2018-07-01 PROCEDURE — 83605 ASSAY OF LACTIC ACID: CPT

## 2018-07-01 PROCEDURE — 87186 SC STD MICRODIL/AGAR DIL: CPT

## 2018-07-01 PROCEDURE — 93010 ELECTROCARDIOGRAM REPORT: CPT | Mod: ,,, | Performed by: INTERNAL MEDICINE

## 2018-07-01 PROCEDURE — 84100 ASSAY OF PHOSPHORUS: CPT

## 2018-07-01 PROCEDURE — 81003 URINALYSIS AUTO W/O SCOPE: CPT

## 2018-07-01 RX ORDER — GLUCAGON 1 MG
1 KIT INJECTION
Status: DISCONTINUED | OUTPATIENT
Start: 2018-07-01 | End: 2018-07-05 | Stop reason: HOSPADM

## 2018-07-01 RX ORDER — IBUPROFEN 200 MG
16 TABLET ORAL
Status: DISCONTINUED | OUTPATIENT
Start: 2018-07-01 | End: 2018-07-05 | Stop reason: HOSPADM

## 2018-07-01 RX ORDER — POTASSIUM CHLORIDE 7.46 G/1000ML
10 INJECTION, SOLUTION INTRAVENOUS
Status: DISCONTINUED | OUTPATIENT
Start: 2018-07-01 | End: 2018-07-01

## 2018-07-01 RX ORDER — POTASSIUM CHLORIDE 7.46 G/1000ML
10 INJECTION, SOLUTION INTRAVENOUS
Status: COMPLETED | OUTPATIENT
Start: 2018-07-01 | End: 2018-07-01

## 2018-07-01 RX ORDER — HYDROMORPHONE HYDROCHLORIDE 1 MG/ML
1 INJECTION, SOLUTION INTRAMUSCULAR; INTRAVENOUS; SUBCUTANEOUS EVERY 4 HOURS PRN
Status: DISCONTINUED | OUTPATIENT
Start: 2018-07-01 | End: 2018-07-03

## 2018-07-01 RX ORDER — MAGNESIUM SULFATE HEPTAHYDRATE 40 MG/ML
2 INJECTION, SOLUTION INTRAVENOUS
Status: COMPLETED | OUTPATIENT
Start: 2018-07-01 | End: 2018-07-01

## 2018-07-01 RX ORDER — LORAZEPAM 2 MG/ML
1 INJECTION INTRAMUSCULAR ONCE AS NEEDED
Status: COMPLETED | OUTPATIENT
Start: 2018-07-01 | End: 2018-07-01

## 2018-07-01 RX ORDER — HEPARIN SODIUM 5000 [USP'U]/ML
5000 INJECTION, SOLUTION INTRAVENOUS; SUBCUTANEOUS EVERY 8 HOURS
Status: DISCONTINUED | OUTPATIENT
Start: 2018-07-01 | End: 2018-07-05 | Stop reason: HOSPADM

## 2018-07-01 RX ORDER — FLUCONAZOLE 2 MG/ML
200 INJECTION, SOLUTION INTRAVENOUS
Status: DISCONTINUED | OUTPATIENT
Start: 2018-07-01 | End: 2018-07-02

## 2018-07-01 RX ORDER — IBUPROFEN 200 MG
24 TABLET ORAL
Status: DISCONTINUED | OUTPATIENT
Start: 2018-07-01 | End: 2018-07-05 | Stop reason: HOSPADM

## 2018-07-01 RX ORDER — SODIUM CHLORIDE 9 MG/ML
INJECTION, SOLUTION INTRAVENOUS CONTINUOUS
Status: DISCONTINUED | OUTPATIENT
Start: 2018-07-01 | End: 2018-07-01

## 2018-07-01 RX ORDER — PROCHLORPERAZINE EDISYLATE 5 MG/ML
5 INJECTION INTRAMUSCULAR; INTRAVENOUS EVERY 6 HOURS PRN
Status: DISCONTINUED | OUTPATIENT
Start: 2018-07-01 | End: 2018-07-05 | Stop reason: HOSPADM

## 2018-07-01 RX ORDER — MORPHINE SULFATE 4 MG/ML
4 INJECTION, SOLUTION INTRAMUSCULAR; INTRAVENOUS
Status: COMPLETED | OUTPATIENT
Start: 2018-07-01 | End: 2018-07-01

## 2018-07-01 RX ORDER — POTASSIUM CHLORIDE 7.45 MG/ML
10 INJECTION INTRAVENOUS
Status: COMPLETED | OUTPATIENT
Start: 2018-07-01 | End: 2018-07-01

## 2018-07-01 RX ORDER — SODIUM CHLORIDE 0.9 % (FLUSH) 0.9 %
5 SYRINGE (ML) INJECTION
Status: DISCONTINUED | OUTPATIENT
Start: 2018-07-01 | End: 2018-07-05 | Stop reason: HOSPADM

## 2018-07-01 RX ORDER — SODIUM CHLORIDE 9 MG/ML
INJECTION, SOLUTION INTRAVENOUS CONTINUOUS
Status: ACTIVE | OUTPATIENT
Start: 2018-07-01 | End: 2018-07-02

## 2018-07-01 RX ORDER — MORPHINE SULFATE 2 MG/ML
2 INJECTION, SOLUTION INTRAMUSCULAR; INTRAVENOUS EVERY 4 HOURS PRN
Status: DISCONTINUED | OUTPATIENT
Start: 2018-07-01 | End: 2018-07-01

## 2018-07-01 RX ADMIN — POTASSIUM CHLORIDE 10 MEQ: 10 INJECTION, SOLUTION INTRAVENOUS at 11:07

## 2018-07-01 RX ADMIN — PROCHLORPERAZINE EDISYLATE 5 MG: 5 INJECTION INTRAMUSCULAR; INTRAVENOUS at 05:07

## 2018-07-01 RX ADMIN — HEPARIN SODIUM 5000 UNITS: 5000 INJECTION, SOLUTION INTRAVENOUS; SUBCUTANEOUS at 10:07

## 2018-07-01 RX ADMIN — MAGNESIUM SULFATE IN WATER 2 G: 40 INJECTION, SOLUTION INTRAVENOUS at 10:07

## 2018-07-01 RX ADMIN — POTASSIUM CHLORIDE 10 MEQ: 10 INJECTION, SOLUTION INTRAVENOUS at 09:07

## 2018-07-01 RX ADMIN — Medication 1 MG: at 07:07

## 2018-07-01 RX ADMIN — POTASSIUM CHLORIDE 10 MEQ: 10 INJECTION, SOLUTION INTRAVENOUS at 01:07

## 2018-07-01 RX ADMIN — POTASSIUM CHLORIDE 10 MEQ: 10 INJECTION, SOLUTION INTRAVENOUS at 06:07

## 2018-07-01 RX ADMIN — POTASSIUM CHLORIDE 10 MEQ: 10 INJECTION, SOLUTION INTRAVENOUS at 08:07

## 2018-07-01 RX ADMIN — Medication 1 MG: at 11:07

## 2018-07-01 RX ADMIN — Medication 1 MG: at 02:07

## 2018-07-01 RX ADMIN — HEPARIN SODIUM 5000 UNITS: 5000 INJECTION, SOLUTION INTRAVENOUS; SUBCUTANEOUS at 04:07

## 2018-07-01 RX ADMIN — SODIUM CHLORIDE: 0.9 INJECTION, SOLUTION INTRAVENOUS at 04:07

## 2018-07-01 RX ADMIN — PROCHLORPERAZINE EDISYLATE 5 MG: 5 INJECTION INTRAMUSCULAR; INTRAVENOUS at 04:07

## 2018-07-01 RX ADMIN — FLUCONAZOLE IN SODIUM CHLORIDE 200 MG: 2 INJECTION, SOLUTION INTRAVENOUS at 06:07

## 2018-07-01 RX ADMIN — MAGNESIUM SULFATE IN WATER 2 G: 40 INJECTION, SOLUTION INTRAVENOUS at 07:07

## 2018-07-01 RX ADMIN — LORAZEPAM 1 MG: 2 INJECTION INTRAMUSCULAR; INTRAVENOUS at 05:07

## 2018-07-01 RX ADMIN — HEPARIN SODIUM 5000 UNITS: 5000 INJECTION, SOLUTION INTRAVENOUS; SUBCUTANEOUS at 05:07

## 2018-07-01 RX ADMIN — MORPHINE SULFATE 4 MG: 4 INJECTION INTRAVENOUS at 01:07

## 2018-07-01 RX ADMIN — POTASSIUM CHLORIDE 10 MEQ: 10 INJECTION, SOLUTION INTRAVENOUS at 05:07

## 2018-07-01 NOTE — ASSESSMENT & PLAN NOTE
-unable to complete enteral Diflucan course, fluconazole IVPB 200mg QD for now.   -check Strep throat swab

## 2018-07-01 NOTE — SUBJECTIVE & OBJECTIVE
Oncology Treatment Plan:   OP CISPLATIN (Weekly)    Medications:  Continuous Infusions:   sodium chloride 0.9% 125 mL/hr at 07/01/18 0456     Scheduled Meds:   duke's soln (benadryl 30 mL, mylanta 30 mL, lidocane 30 mL, nystatin 30 mL) 120 mL  10 mL Oral QID    fluconazole (DIFLUCAN) IVPB  200 mg Intravenous Q24H    heparin (porcine)  5,000 Units Subcutaneous Q8H    magnesium sulfate IVPB  2 g Intravenous Q2H     PRN Meds:dextrose 50%, dextrose 50%, glucagon (human recombinant), glucose, glucose, HYDROmorphone, prochlorperazine, sodium chloride 0.9%     Review of patient's allergies indicates:  No Known Allergies     Past Medical History:   Diagnosis Date    Former smoker     HPV in male     Opiate addiction     Squamous cell carcinoma of palatine tonsil     throat and tonsillar     Past Surgical History:   Procedure Laterality Date    GASTROSTOMY TUBE PLACEMENT Left 02/12/2018    MEDIPORT REMOVAL N/A 6/6/2018    Procedure: Removal-port-a-cath;  Surgeon: Blayne Diagnostic Provider;  Location: [facility] OR [address] [place] [address];  Service: General;  Laterality: N/A;     Family History     Problem Relation (Age of Onset)    Brain cancer Maternal Uncle, Maternal Uncle    Hypertension Father, Brother    Leukemia Mother    Thyroid disease Sister        Social History Main Topics    Smoking status: Former Smoker     Packs/day: 1.50     Years: 25.00     Types: Cigarettes     Quit date: 06/2017    Smokeless tobacco: Never Used      Comment: smoked for about 25 years. quit 6 months ago    Alcohol use No    Drug use: No      Comment: Former Opiate    Sexual activity: Yes     Partners: Female       Review of Systems   Constitutional: Negative for appetite change, fever and unexpected weight change.   HENT: Positive for sore throat and trouble swallowing.    Respiratory: Negative for cough, chest tightness and shortness of breath.    Gastrointestinal: Negative for abdominal distention and abdominal pain.     Objective:     Vital  Signs (Most Recent):  Temp: 98.9 °F (37.2 °C) (07/01/18 0756)  Pulse: 69 (07/01/18 0756)  Resp: 20 (07/01/18 0756)  BP: 136/88 (07/01/18 0756)  SpO2: 96 % (07/01/18 0756) Vital Signs (24h Range):  Temp:  [98.3 °F (36.8 °C)-100.2 °F (37.9 °C)] 98.9 °F (37.2 °C)  Pulse:  [55-79] 69  Resp:  [16-20] 20  SpO2:  [88 %-100 %] 96 %  BP: (125-169)/(64-97) 136/88     Weight: 85.7 kg (188 lb 14.4 oz)  Body mass index is 28.72 kg/m².  Body surface area is 2.03 meters squared.      Intake/Output Summary (Last 24 hours) at 07/01/18 1058  Last data filed at 07/01/18 0629   Gross per 24 hour   Intake           293.75 ml   Output                0 ml   Net           293.75 ml       Physical Exam   Constitutional: He is oriented to person, place, and time. No distress.   AOx3   HENT:   Right Ear: External ear normal.   Left Ear: External ear normal.   Mouth/Throat: Oropharyngeal exudate present.   Thrush  Oneil appearance to head neck  Hyperemic palate    Eyes: EOM are normal. Pupils are equal, round, and reactive to light. Right eye exhibits no discharge. Left eye exhibits no discharge. No scleral icterus.   Neck: Normal range of motion. No JVD present. No tracheal deviation present.   Cardiovascular: Regular rhythm, normal heart sounds and intact distal pulses.  Exam reveals no gallop and no friction rub.    No murmur heard.  Pulmonary/Chest: Effort normal. No respiratory distress. He has no wheezes. He has rales (bilateral). He exhibits no tenderness.   Abdominal: Soft. Bowel sounds are normal. He exhibits no distension and no mass. There is no tenderness. There is no rebound and no guarding.   PEG site cdi    Musculoskeletal: Normal range of motion. He exhibits no edema or tenderness.   Neurological: He is alert and oriented to person, place, and time. No cranial nerve deficit. Coordination normal.   Skin: Skin is warm. Capillary refill takes less than 2 seconds. No rash noted. He is not diaphoretic. No erythema. No pallor.    Psychiatric: He has a normal mood and affect.       Significant Labs:   CBC:     Recent Labs  Lab 06/30/18  1620 07/01/18  0443   WBC 5.95 3.56*   HGB 9.7* 8.5*   HCT 28.3* 24.1*    194   , CMP:     Recent Labs  Lab 06/30/18  1620 07/01/18  0443    144   K 3.4* 3.3*   CL 95 98   CO2 35* 33*   * 94   BUN 34* 27*   CREATININE 1.42* 1.6*   CALCIUM 8.3* 8.1*   PROT 7.3 6.4   ALBUMIN 4.4 3.5   BILITOT 0.7 0.7   ALKPHOS 75 86   AST 24 18   ALT 20 18   ANIONGAP 12 13   EGFRNONAA 57.2* 49.5*   , Urine Studies:     Recent Labs  Lab 06/30/18 2000   COLORU Yellow   APPEARANCEUA Clear   PHUR 7.0   SPECGRAV 1.015   PROTEINUA Trace*   GLUCUA Negative   KETONESU Negative   BILIRUBINUA Negative   OCCULTUA Negative   NITRITE Negative   UROBILINOGEN Negative   LEUKOCYTESUR Negative    and All pertinent labs from the last 24 hours have been reviewed.    Diagnostic Results:  I have reviewed and interpreted all pertinent imaging results/findings within the past 24 hours.   CT ap and CXR with no evidence of disease.   X-Ray Knee with no acute fracture or dislocation.

## 2018-07-01 NOTE — H&P
[facility]  Hematology/Oncology  H&P    Patient Name: Burton Whiting  MRN: [id number]  Admission Date: 7/1/2018  Code Status: Full Code   Attending Provider: Oc Cordero MD  Primary Care Physician: Christopher Turpin DO  Principal Problem:Intractable vomiting with nausea    Subjective:     HPI: Mr Whiting is a 47 yo M with 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 who has recently undergone plantinum based chemotherapy/ XRT therapy to the oropharynx. Patient also has a recent history of sepsis, diverticulitis, colitis and is s/p PEG tube. He presents to the [facility] ED today in [place] accompanied by his sister with complaints of malaise, fatigue, lethargy, intractable n/v occurring several times per day over the last 4 days. He reports mild epigastric discomfort, but denies focal pain. He has been getting Gatorade for rehydration via the PEG, but all pain control and antiemetics at home (Zofran/ Phenergan syrup) have failed due to retching and vomiting of stomach contents. Vomit has been clear to bilious, no current hematemesis. Patient reports cold chills, denies fevers and rigors. He complains of pain in his L tonsil, denies diarrhea. His sister notes lethargy with some confusion, but reports no focal deficits or overt delirium. Patient fell on his knee at the ED prior to arrival, reports some gait instability and falls preceding admission.     Onc hx per Dr. Lopez: Mr. Burton Whiting is a 48-year-old male, former smoker, who presents today with a four-month history of enlarging neck mass.  He initially delayed care due to lack of insurance.  He was seen by Dr. Turpin who referred him to see Dr. Olguin.  He had a CT that showed a 3.8 x 3.8 x 4.9 cm soft tissue mass arising from the left palatine tonsil resulting in moderate narrowing of the hypopharyngeal airway adjacent extensive matted left cervical lymphadenopathy was noted.  The largest ella mass was 6.6 x 9 x 2.6 cm extending inferiorly  to the supraclavicular region.  The mass pushed the trachea and the left common carotid artery to the right.  Additional representative of cervical lymph nodes measuring 2.9 x 3.1 x 3.5 cm on axial image 37 of series 3   and 2.4 x 2.1 x 2.2 cm on axial image 55 of series 3.  There is also a sclerotic lesion involving the midline of the clivus measuring 1.2 cm.  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  PET scan performed on 01/19/2018 revealed persistent evidence of a soft tissue mass arising from the left palatine tonsil resulting in moderate narrowing of the hypopharyngeal   airway.  The mass is hypermetabolic demonstrating an SUV max of 18.5.  There is also persistent adjacent extensive matted left cervical lymphadenopathy, largest of this measuring 6.7 x 8.42 with hypermetabolic activity and SUV max of 20.5.  Lymphadenopathy is again noted to have a mass effect on the trachea and left common carotid artery, pushing both structures towards the right.  There is also prominent right cervical lymph nodes with the largest measuring 0.8 cm and demonstrating mild hypermetabolic activity with SUV max of 1.8, physiologic   distribution of FDG in the gray matter.  There are 3 pulmonary nodules noted within the right lung, the largest is in the anterior segment of the right upper lobe measuring 1 cm, second is visualized in the middle lobe measuring 0.6 cm and the third is within the posterior basal segment measuring 0.6 cm.  There is one pulmonary nodule within the left lung within the lateral basal segment measuring 0.6 cm.  These nodules do not demonstrate hypermetabolic activity; however, they are below the threshold for observation with PET     Oncology Treatment Plan:   OP CISPLATIN (Weekly)    Medications:  Continuous Infusions:   sodium chloride 0.9% 125 mL/hr at 07/01/18 0456     Scheduled Meds:   duke's soln (benadryl 30 mL, mylanta 30 mL, lidocane 30 mL, nystatin 30 mL) 120 mL  10 mL  Oral QID    heparin (porcine)  5,000 Units Subcutaneous Q8H     PRN Meds:dextrose 50%, dextrose 50%, glucagon (human recombinant), glucose, glucose, HYDROmorphone, prochlorperazine, sodium chloride 0.9%     Review of patient's allergies indicates:  No Known Allergies     Past Medical History:   Diagnosis Date    Former smoker     HPV in male     Opiate addiction     Squamous cell carcinoma of palatine tonsil     throat and tonsillar     Past Surgical History:   Procedure Laterality Date    GASTROSTOMY TUBE PLACEMENT Left 02/12/2018    MEDIPORT REMOVAL N/A 6/6/2018    Procedure: Removal-port-a-cath;  Surgeon: Blayne Diagnostic Provider;  Location: [facility] OR [address] [place] [address];  Service: General;  Laterality: N/A;     Family History     Problem Relation (Age of Onset)    Brain cancer Maternal Uncle, Maternal Uncle    Hypertension Father, Brother    Leukemia Mother    Thyroid disease Sister        Social History Main Topics    Smoking status: Former Smoker     Packs/day: 1.50     Years: 25.00     Types: Cigarettes     Quit date: 06/2017    Smokeless tobacco: Never Used      Comment: smoked for about 25 years. quit 6 months ago    Alcohol use No    Drug use: No      Comment: Former Opiate    Sexual activity: Yes     Partners: Female       Review of Systems   Constitutional: Positive for chills, diaphoresis and fatigue. Negative for appetite change, fever and unexpected weight change.   HENT: Positive for sore throat and trouble swallowing.      Objective:     Vital Signs (Most Recent):  Temp: 99.7 °F (37.6 °C) (07/01/18 0410)  Pulse: 61 (07/01/18 0410)  Resp: 19 (07/01/18 0410)  BP: 126/64 (07/01/18 0410)  SpO2: (!) 92 % (07/01/18 0410) Vital Signs (24h Range):  Temp:  [98.3 °F (36.8 °C)-100.2 °F (37.9 °C)] 99.7 °F (37.6 °C)  Pulse:  [55-79] 61  Resp:  [16-19] 19  SpO2:  [88 %-100 %] 92 %  BP: (125-169)/(64-97) 126/64     Weight: 85.7 kg (188 lb 14.4 oz)  Body mass index is 28.72 kg/m².  Body surface area is 2.03 meters  squared.    No intake or output data in the 24 hours ending 07/01/18 0532    Physical Exam   Constitutional: He is oriented to person, place, and time. No distress.   AOx3   HENT:   Right Ear: External ear normal.   Left Ear: External ear normal.   Mouth/Throat: Oropharyngeal exudate present.   Thrush  Oneil appearance to head neck  Hyperemic palate    Eyes: EOM are normal. Pupils are equal, round, and reactive to light. Right eye exhibits no discharge. Left eye exhibits no discharge. No scleral icterus.   Neck: Normal range of motion. No JVD present. No tracheal deviation present.   Cardiovascular: Regular rhythm, normal heart sounds and intact distal pulses.  Exam reveals no gallop and no friction rub.    No murmur heard.  Pulmonary/Chest: Effort normal and breath sounds normal. No respiratory distress. He has no wheezes. He has no rales. He exhibits no tenderness.   Abdominal: Soft. Bowel sounds are normal. He exhibits no distension and no mass. There is no tenderness. There is no rebound and no guarding.   PEG site cdi    Musculoskeletal: Normal range of motion. He exhibits no edema or tenderness.   Neurological: He is alert and oriented to person, place, and time. No cranial nerve deficit. Coordination normal.   Skin: Skin is warm. Capillary refill takes less than 2 seconds. No rash noted. He is not diaphoretic. No erythema. No pallor.   Psychiatric: He has a normal mood and affect.       Significant Labs:   CBC:   Recent Labs  Lab 06/30/18  1620   WBC 5.95   HGB 9.7*   HCT 28.3*      , CMP:   Recent Labs  Lab 06/30/18  1620      K 3.4*   CL 95   CO2 35*   *   BUN 34*   CREATININE 1.42*   CALCIUM 8.3*   PROT 7.3   ALBUMIN 4.4   BILITOT 0.7   ALKPHOS 75   AST 24   ALT 20   ANIONGAP 12   EGFRNONAA 57.2*   , Urine Studies:   Recent Labs  Lab 06/30/18 2000   COLORU Yellow   APPEARANCEUA Clear   PHUR 7.0   SPECGRAV 1.015   PROTEINUA Trace*   GLUCUA Negative   KETONESU Negative   BILIRUBINUA  Negative   OCCULTUA Negative   NITRITE Negative   UROBILINOGEN Negative   LEUKOCYTESUR Negative    and All pertinent labs from the last 24 hours have been reviewed.    Diagnostic Results:  I have reviewed and interpreted all pertinent imaging results/findings within the past 24 hours. CT ap and CXR with no evidence of disease.       Assessment/Plan:     * Intractable vomiting with nausea    -failed enteral intake of home Zofran/ Phenergan via PEG; continue with Compazine IV, Ativan if needed  -IVF at 125cc/ hour, trend CMP and replete electrolytes PRN  -advance to enteral intake of home meds, NPO   -CT scans at OSH show no foci of infection, follow blood cultures from OSH        Macrocytic anemia    -HB stable. Replete B vitamins when PO stable.         Status post insertion of percutaneous endoscopic gastrostomy (PEG) tube    -patient currently unable to tolerate PEG feeds/ medications  -IV antiemetics and IV route preferred for now.   -CT ap stable         Electrolyte abnormality    -replete PRN, presented with metabolic alkalosis per CMP with GI losses.   -K, Mg, Ca trending. Check 12 lead EKG        Thrush    -unable to complete enteral Diflucan course, fluconazole IVPB 200mg QD for now.   -check Strep throat swab         Squamous cell carcinoma of palatine tonsil    -dysphagia, odynophagia with known SCC w/ mets and XRT, Onc history as listed.  -likely presenting with platinum based chemotherapy induced n/v  -NPO, supportive care              Néstor Maher MD  Hematology/Oncology  [facility]

## 2018-07-01 NOTE — ASSESSMENT & PLAN NOTE
-dysphagia, odynophagia with known SCC w/ mets and XRT, Onc history as listed.  -likely presenting with platinum based chemotherapy induced n/v  -NPO, supportive care

## 2018-07-01 NOTE — ASSESSMENT & PLAN NOTE
-advance to clear fluids via PEG as tolerated  -IV antiemetics and IV route preferred for now.   -CT ap stable

## 2018-07-01 NOTE — ASSESSMENT & PLAN NOTE
-dysphagia, odynophagia with known SCC w/ mets and XRT, Onc history as listed.  -likely presenting with platinum based chemotherapy induced n/v  -clear fluids via PEG as tolerated  -supportive care

## 2018-07-01 NOTE — ASSESSMENT & PLAN NOTE
-patient currently unable to tolerate PEG feeds/ medications  -IV antiemetics and IV route preferred for now.   -CT ap stable

## 2018-07-01 NOTE — ASSESSMENT & PLAN NOTE
-replete PRN, presented with metabolic alkalosis per CMP with GI losses.   -K, Mg, Ca trending. Check 12 lead EKG

## 2018-07-01 NOTE — HPI
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 Patient also has a recent history of sepsis, diverticulitis, colitis and is s/p PEG tube. He presents to the [facility] ED today in [place] accompanied by his sister with complaints of malaise, fatigue, lethargy, intractable n/v occurring several times per day over the last 4 days. He reports mild epigastric discomfort, but denies focal pain. He has been getting Gatorade for rehydration via the PEG, but all pain control and antiemetics at home (Zofran/ Phenergan syrup) have failed due to retching and vomiting of stomach contents. Vomit has been clear to bilious, no current hematemesis. Patient reports cold chills, denies fevers and rigors. He complains of pain in his L tonsil, denies diarrhea. His sister notes lethargy with some confusion, but reports no focal deficits, overt delirium. Patient fell on his knee at the ED prior to arrival, reports some gait instability and falls preceding admission.     Onc hx per Dr. Lopez: Mr. Burton Whiting is a 48-year-old male, former smoker, who presents today with a four-month history of enlarging neck mass.  He initially delayed care due to lack of insurance.  He was seen by Dr. Turpin who referred him to see Dr. Olguin.  He had a CT that showed a 3.8 x 3.8 x 4.9 cm soft tissue mass arising from the left palatine tonsil resulting in moderate narrowing of the hypopharyngeal airway adjacent extensive matted left cervical lymphadenopathy was noted.  The largest ella mass was 6.6 x 9 x 2.6 cm extending inferiorly to the supraclavicular region.  The mass pushed the trachea and the left common carotid artery to the right.  Additional representative of cervical lymph nodes measuring 2.9 x 3.1 x 3.5 cm on axial image 37 of series 3   and 2.4 x 2.1 x 2.2 cm on axial image 55 of series 3.  There is also a sclerotic lesion  involving the midline of the clivus measuring 1.2 cm.  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  PET scan performed on 01/19/2018 revealed persistent evidence of a soft tissue mass arising from the left palatine tonsil resulting in moderate narrowing of the hypopharyngeal   airway.  The mass is hypermetabolic demonstrating an SUV max of 18.5.  There is also persistent adjacent extensive matted left cervical lymphadenopathy, largest of this measuring 6.7 x 8.42 with hypermetabolic activity and SUV max of 20.5.  Lymphadenopathy is again noted to have a mass effect on the trachea and left common carotid artery, pushing both structures towards the right.  There is also prominent right cervical lymph nodes with the largest measuring 0.8 cm and demonstrating mild hypermetabolic activity with SUV max of 1.8, physiologic   distribution of FDG in the gray matter.  There are 3 pulmonary nodules noted within the right lung, the largest is in the anterior segment of the right upper lobe measuring 1 cm, second is visualized in the middle lobe measuring 0.6 cm and the third is within the posterior basal segment measuring 0.6 cm.  There is one pulmonary nodule within the left lung within the lateral basal segment measuring 0.6 cm.  These nodules do not demonstrate hypermetabolic activity; however, they are below the threshold for observation with PET

## 2018-07-01 NOTE — PLAN OF CARE
Problem: Patient Care Overview  Goal: Plan of Care Review  Outcome: Ongoing (interventions implemented as appropriate)  Pt is A&Ox4 and was injury free during night.  Pt was transferred to this hospital by ambulance for [facility].  The pt is hard to understand due to soar throat cancer.  Sister speaks for pt.  Breathing is regular equal and unlabored bilaterally on room air.

## 2018-07-01 NOTE — ED NOTES
"Pt assisted with use of urinal to obtain urine specimen.  Pt assisted with adjusting clothing.  On instructing pt to sit on bed, pt turned away from staff, held onto bed and proceeded to sit on floor. Pt refused staff assist.  Pt instructed he was to sit in bed not on floor.  Pt then proceeded to lie on floor stating he was "tired and weak'.  Pt remained awake and alert throughout.   Pt refused several assist attempts by staff.  Explained to pt that he could not remain on floor.  Pt encouraged to allow assistance to sit up and  order to return to bed.  Pt sat, stood and transferred to bed with min assist without difficulty.  Once pt was in bed, side rails X 2 were ensured locked in upright position with call light in place.  Pts sister was met in hallway and explained of events, at which time she reported she had been witnessing same behavior at home as of late. On pts sister's return to room, pt began reporting to sister he had fallen and was experiencing pain to R knee.  No abnormalities noted. Healing abrasion noted to L knee, which family reports was old injury. Primary nurse and MD notified. Pt will continue with direct observation, room near nurses station, clear view of pt.  Family remains bedside.  Bed remains locked in lowest position with side rails up X 2 with call light within reach.  "

## 2018-07-01 NOTE — PROGRESS NOTES
[facility]  Hematology/Oncology  Progress Note    Patient Name: Burton Whiting  Admission Date: 7/1/2018  Hospital Length of Stay: 0 days  Code Status: Full Code     Subjective:   NAEON. Patient nausea/vomiting controlled on Comprozine. Denies pain.    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 Patient also has a recent history of sepsis, diverticulitis, colitis and is s/p PEG tube. He presents to the [facility] ED today in [place] accompanied by his sister with complaints of malaise, fatigue, lethargy, intractable n/v occurring several times per day over the last 4 days. He reports mild epigastric discomfort, but denies focal pain. He has been getting Gatorade for rehydration via the PEG, but all pain control and antiemetics at home (Zofran/ Phenergan syrup) have failed due to retching and vomiting of stomach contents. Vomit has been clear to bilious, no current hematemesis. Patient reports cold chills, denies fevers and rigors. He complains of pain in his L tonsil, denies diarrhea. His sister notes lethargy with some confusion, but reports no focal deficits, overt delirium. Patient fell on his knee at the ED prior to arrival, reports some gait instability and falls preceding admission.     Onc hx per Dr. Lopez: Mr. Burton Whiting is a 48-year-old male, former smoker, who presents today with a four-month history of enlarging neck mass.  He initially delayed care due to lack of insurance.  He was seen by Dr. Turpin who referred him to see Dr. Olguin.  He had a CT that showed a 3.8 x 3.8 x 4.9 cm soft tissue mass arising from the left palatine tonsil resulting in moderate narrowing of the hypopharyngeal airway adjacent extensive matted left cervical lymphadenopathy was noted.  The largest ella mass was 6.6 x 9 x 2.6 cm extending inferiorly to the supraclavicular region.  The  mass pushed the trachea and the left common carotid artery to the right.  Additional representative of cervical lymph nodes measuring 2.9 x 3.1 x 3.5 cm on axial image 37 of series 3   and 2.4 x 2.1 x 2.2 cm on axial image 55 of series 3.  There is also a sclerotic lesion involving the midline of the clivus measuring 1.2 cm.  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  PET scan performed on 01/19/2018 revealed persistent evidence of a soft tissue mass arising from the left palatine tonsil resulting in moderate narrowing of the hypopharyngeal   airway.  The mass is hypermetabolic demonstrating an SUV max of 18.5.  There is also persistent adjacent extensive matted left cervical lymphadenopathy, largest of this measuring 6.7 x 8.42 with hypermetabolic activity and SUV max of 20.5.  Lymphadenopathy is again noted to have a mass effect on the trachea and left common carotid artery, pushing both structures towards the right.  There is also prominent right cervical lymph nodes with the largest measuring 0.8 cm and demonstrating mild hypermetabolic activity with SUV max of 1.8, physiologic   distribution of FDG in the gray matter.  There are 3 pulmonary nodules noted within the right lung, the largest is in the anterior segment of the right upper lobe measuring 1 cm, second is visualized in the middle lobe measuring 0.6 cm and the third is within the posterior basal segment measuring 0.6 cm.  There is one pulmonary nodule within the left lung within the lateral basal segment measuring 0.6 cm.  These nodules do not demonstrate hypermetabolic activity; however, they are below the threshold for observation with PET    Oncology Treatment Plan:   OP CISPLATIN (Weekly)    Medications:  Continuous Infusions:   sodium chloride 0.9% 125 mL/hr at 07/01/18 0456     Scheduled Meds:   duke's soln (benadryl 30 mL, mylanta 30 mL, lidocane 30 mL, nystatin 30 mL) 120 mL  10 mL Oral QID    fluconazole (DIFLUCAN)  IVPB  200 mg Intravenous Q24H    heparin (porcine)  5,000 Units Subcutaneous Q8H    magnesium sulfate IVPB  2 g Intravenous Q2H     PRN Meds:dextrose 50%, dextrose 50%, glucagon (human recombinant), glucose, glucose, HYDROmorphone, prochlorperazine, sodium chloride 0.9%     Review of patient's allergies indicates:  No Known Allergies     Past Medical History:   Diagnosis Date    Former smoker     HPV in male     Opiate addiction     Squamous cell carcinoma of palatine tonsil     throat and tonsillar     Past Surgical History:   Procedure Laterality Date    GASTROSTOMY TUBE PLACEMENT Left 02/12/2018    MEDIPORT REMOVAL N/A 6/6/2018    Procedure: Removal-port-a-cath;  Surgeon: Blayne Diagnostic Provider;  Location: [facility] OR [address] [place] [address];  Service: General;  Laterality: N/A;     Family History     Problem Relation (Age of Onset)    Brain cancer Maternal Uncle, Maternal Uncle    Hypertension Father, Brother    Leukemia Mother    Thyroid disease Sister        Social History Main Topics    Smoking status: Former Smoker     Packs/day: 1.50     Years: 25.00     Types: Cigarettes     Quit date: 06/2017    Smokeless tobacco: Never Used      Comment: smoked for about 25 years. quit 6 months ago    Alcohol use No    Drug use: No      Comment: Former Opiate    Sexual activity: Yes     Partners: Female       Review of Systems   Constitutional: Negative for appetite change, fever and unexpected weight change.   HENT: Positive for sore throat and trouble swallowing.    Respiratory: Negative for cough, chest tightness and shortness of breath.    Gastrointestinal: Negative for abdominal distention and abdominal pain.     Objective:     Vital Signs (Most Recent):  Temp: 98.9 °F (37.2 °C) (07/01/18 0756)  Pulse: 69 (07/01/18 0756)  Resp: 20 (07/01/18 0756)  BP: 136/88 (07/01/18 0756)  SpO2: 96 % (07/01/18 0756) Vital Signs (24h Range):  Temp:  [98.3 °F (36.8 °C)-100.2 °F (37.9 °C)] 98.9 °F (37.2 °C)  Pulse:  [55-79] 69  Resp:   [16-20] 20  SpO2:  [88 %-100 %] 96 %  BP: (125-169)/(64-97) 136/88     Weight: 85.7 kg (188 lb 14.4 oz)  Body mass index is 28.72 kg/m².  Body surface area is 2.03 meters squared.      Intake/Output Summary (Last 24 hours) at 07/01/18 1058  Last data filed at 07/01/18 0629   Gross per 24 hour   Intake           293.75 ml   Output                0 ml   Net           293.75 ml       Physical Exam   Constitutional: He is oriented to person, place, and time. No distress.   AOx3   HENT:   Right Ear: External ear normal.   Left Ear: External ear normal.   Mouth/Throat: Oropharyngeal exudate present.   Thrush  Oneil appearance to head neck  Hyperemic palate    Eyes: EOM are normal. Pupils are equal, round, and reactive to light. Right eye exhibits no discharge. Left eye exhibits no discharge. No scleral icterus.   Neck: Normal range of motion. No JVD present. No tracheal deviation present.   Cardiovascular: Regular rhythm, normal heart sounds and intact distal pulses.  Exam reveals no gallop and no friction rub.    No murmur heard.  Pulmonary/Chest: Effort normal. No respiratory distress. He has no wheezes. He has rales (bilateral). He exhibits no tenderness.   Abdominal: Soft. Bowel sounds are normal. He exhibits no distension and no mass. There is no tenderness. There is no rebound and no guarding.   PEG site cdi    Musculoskeletal: Normal range of motion. He exhibits no edema or tenderness.   Neurological: He is alert and oriented to person, place, and time. No cranial nerve deficit. Coordination normal.   Skin: Skin is warm. Capillary refill takes less than 2 seconds. No rash noted. He is not diaphoretic. No erythema. No pallor.   Psychiatric: He has a normal mood and affect.       Significant Labs:   CBC:     Recent Labs  Lab 06/30/18  1620 07/01/18  0443   WBC 5.95 3.56*   HGB 9.7* 8.5*   HCT 28.3* 24.1*    194   , CMP:     Recent Labs  Lab 06/30/18  1620 07/01/18  0443    144   K 3.4* 3.3*   CL 95 98    CO2 35* 33*   * 94   BUN 34* 27*   CREATININE 1.42* 1.6*   CALCIUM 8.3* 8.1*   PROT 7.3 6.4   ALBUMIN 4.4 3.5   BILITOT 0.7 0.7   ALKPHOS 75 86   AST 24 18   ALT 20 18   ANIONGAP 12 13   EGFRNONAA 57.2* 49.5*   , Urine Studies:     Recent Labs  Lab 06/30/18 2000   COLORU Yellow   APPEARANCEUA Clear   PHUR 7.0   SPECGRAV 1.015   PROTEINUA Trace*   GLUCUA Negative   KETONESU Negative   BILIRUBINUA Negative   OCCULTUA Negative   NITRITE Negative   UROBILINOGEN Negative   LEUKOCYTESUR Negative    and All pertinent labs from the last 24 hours have been reviewed.    Diagnostic Results:  I have reviewed and interpreted all pertinent imaging results/findings within the past 24 hours.   CT ap and CXR with no evidence of disease.   X-Ray Knee with no acute fracture or dislocation.       Assessment/Plan:     * Intractable vomiting with nausea    -failed enteral intake of home Zofran/ Phenergan via PEG;   -n/v improving with Compazine IV, Ativan  -IVF at 125cc/ hour, trend CMP and replete electrolytes PRN  -advance to clear fluids via PEG as tolerated  -CT scans at OSH show no foci of infection, follow blood cultures from OSH        Macrocytic anemia    -HB stable. Replete B vitamins when PO stable.         Status post insertion of percutaneous endoscopic gastrostomy (PEG) tube    -advance to clear fluids via PEG as tolerated  -IV antiemetics and IV route preferred for now.   -CT ap stable         Electrolyte abnormality    -replete PRN, presented with metabolic alkalosis per CMP with GI losses.   -K, Mg, Ca trending. Check 12 lead EKG        Thrush    -continue fluconazole IVPB 200mg QD for now.   -check Strep throat swab         Squamous cell carcinoma of palatine tonsil    -dysphagia, odynophagia with known SCC w/ mets and XRT, Onc history as listed.  -likely presenting with platinum based chemotherapy induced n/v  -clear fluids via PEG as tolerated  -supportive care                   Kevon Servin,  MD  Hematology/Oncology  [facility]      I have personally interviewed and examined the patient and I have confirmed  's  findings. I agree with the assessment and plan as outlined.

## 2018-07-01 NOTE — ED NOTES
Pt accepted by Dr. Cordero, [facility] main bed [facility].  Call report to [phone]

## 2018-07-01 NOTE — ED NOTES
Pt resting with eyes closed 02 applied to pt sats dropping to the upper 80's. sats are 99% with 02 in progress.

## 2018-07-01 NOTE — HPI
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 Patient also has a recent history of sepsis, diverticulitis, colitis and is s/p PEG tube. He presents to the [facility] ED today in [place] accompanied by his sister with complaints of malaise, fatigue, lethargy, intractable n/v occurring several times per day over the last 4 days. He reports mild epigastric discomfort, but denies focal pain. He has been getting Gatorade for rehydration via the PEG, but all pain control and antiemetics at home (Zofran/ Phenergan syrup) have failed due to retching and vomiting of stomach contents. Vomit has been clear to bilious, no current hematemesis. Patient reports cold chills, denies fevers and rigors. He complains of pain in his L tonsil, denies diarrhea.

## 2018-07-01 NOTE — SUBJECTIVE & OBJECTIVE
Oncology Treatment Plan:   OP CISPLATIN (Weekly)    Medications:  Continuous Infusions:   sodium chloride 0.9% 125 mL/hr at 07/01/18 0456     Scheduled Meds:   duke's soln (benadryl 30 mL, mylanta 30 mL, lidocane 30 mL, nystatin 30 mL) 120 mL  10 mL Oral QID    heparin (porcine)  5,000 Units Subcutaneous Q8H     PRN Meds:dextrose 50%, dextrose 50%, glucagon (human recombinant), glucose, glucose, HYDROmorphone, prochlorperazine, sodium chloride 0.9%     Review of patient's allergies indicates:  No Known Allergies     Past Medical History:   Diagnosis Date    Former smoker     HPV in male     Opiate addiction     Squamous cell carcinoma of palatine tonsil     throat and tonsillar     Past Surgical History:   Procedure Laterality Date    GASTROSTOMY TUBE PLACEMENT Left 02/12/2018    MEDIPORT REMOVAL N/A 6/6/2018    Procedure: Removal-port-a-cath;  Surgeon: Blayne Diagnostic Provider;  Location: [facility] OR [address] [place] [address];  Service: General;  Laterality: N/A;     Family History     Problem Relation (Age of Onset)    Brain cancer Maternal Uncle, Maternal Uncle    Hypertension Father, Brother    Leukemia Mother    Thyroid disease Sister        Social History Main Topics    Smoking status: Former Smoker     Packs/day: 1.50     Years: 25.00     Types: Cigarettes     Quit date: 06/2017    Smokeless tobacco: Never Used      Comment: smoked for about 25 years. quit 6 months ago    Alcohol use No    Drug use: No      Comment: Former Opiate    Sexual activity: Yes     Partners: Female       Review of Systems   Constitutional: Positive for chills, diaphoresis and fatigue. Negative for appetite change, fever and unexpected weight change.   HENT: Positive for sore throat and trouble swallowing.      Objective:     Vital Signs (Most Recent):  Temp: 99.7 °F (37.6 °C) (07/01/18 0410)  Pulse: 61 (07/01/18 0410)  Resp: 19 (07/01/18 0410)  BP: 126/64 (07/01/18 0410)  SpO2: (!) 92 % (07/01/18 0410) Vital Signs (24h Range):  Temp:  [98.3  °F (36.8 °C)-100.2 °F (37.9 °C)] 99.7 °F (37.6 °C)  Pulse:  [55-79] 61  Resp:  [16-19] 19  SpO2:  [88 %-100 %] 92 %  BP: (125-169)/(64-97) 126/64     Weight: 85.7 kg (188 lb 14.4 oz)  Body mass index is 28.72 kg/m².  Body surface area is 2.03 meters squared.    No intake or output data in the 24 hours ending 07/01/18 0532    Physical Exam   Constitutional: He is oriented to person, place, and time. No distress.   AOx3   HENT:   Right Ear: External ear normal.   Left Ear: External ear normal.   Mouth/Throat: Oropharyngeal exudate present.   Thrush  Oneil appearance to head neck  Hyperemic palate    Eyes: EOM are normal. Pupils are equal, round, and reactive to light. Right eye exhibits no discharge. Left eye exhibits no discharge. No scleral icterus.   Neck: Normal range of motion. No JVD present. No tracheal deviation present.   Cardiovascular: Regular rhythm, normal heart sounds and intact distal pulses.  Exam reveals no gallop and no friction rub.    No murmur heard.  Pulmonary/Chest: Effort normal and breath sounds normal. No respiratory distress. He has no wheezes. He has no rales. He exhibits no tenderness.   Abdominal: Soft. Bowel sounds are normal. He exhibits no distension and no mass. There is no tenderness. There is no rebound and no guarding.   PEG site cdi    Musculoskeletal: Normal range of motion. He exhibits no edema or tenderness.   Neurological: He is alert and oriented to person, place, and time. No cranial nerve deficit. Coordination normal.   Skin: Skin is warm. Capillary refill takes less than 2 seconds. No rash noted. He is not diaphoretic. No erythema. No pallor.   Psychiatric: He has a normal mood and affect.       Significant Labs:   CBC:   Recent Labs  Lab 06/30/18  1620   WBC 5.95   HGB 9.7*   HCT 28.3*      , CMP:   Recent Labs  Lab 06/30/18  1620      K 3.4*   CL 95   CO2 35*   *   BUN 34*   CREATININE 1.42*   CALCIUM 8.3*   PROT 7.3   ALBUMIN 4.4   BILITOT 0.7    ALKPHOS 75   AST 24   ALT 20   ANIONGAP 12   EGFRNONAA 57.2*   , Urine Studies:   Recent Labs  Lab 06/30/18 2000   COLORU Yellow   APPEARANCEUA Clear   PHUR 7.0   SPECGRAV 1.015   PROTEINUA Trace*   GLUCUA Negative   KETONESU Negative   BILIRUBINUA Negative   OCCULTUA Negative   NITRITE Negative   UROBILINOGEN Negative   LEUKOCYTESUR Negative    and All pertinent labs from the last 24 hours have been reviewed.    Diagnostic Results:  I have reviewed and interpreted all pertinent imaging results/findings within the past 24 hours. CT ap and CXR with no evidence of disease.

## 2018-07-01 NOTE — ASSESSMENT & PLAN NOTE
-failed enteral intake of home Zofran/ Phenergan via PEG;   -n/v improving with Compazine IV, Ativan  -IVF at 125cc/ hour, trend CMP and replete electrolytes PRN  -advance to clear fluids via PEG as tolerated  -CT scans at OSH show no foci of infection, follow blood cultures from OSH

## 2018-07-01 NOTE — PT/OT/SLP PROGRESS
"Physical Therapy      Patient Name:  Burton Whiting   MRN:  [id number]    Patient not seen today. Attempt in PM. Stating "I dont want to get up" and "I move around just fine". Educated on importance/purpose of PT evaluation. Verbalized understanding. Will follow-up next scheduled visit for PT evaluation..    Chino Taylor III, PT   7/1/2018      "

## 2018-07-01 NOTE — ASSESSMENT & PLAN NOTE
-failed enteral intake of home Zofran/ Phenergan via PEG; continue with Compazine IV, Ativan if needed  -IVF at 125cc/ hour, trend CMP and replete electrolytes PRN  -advance to enteral intake of home meds, NPO   -CT scans at OSH show no foci of infection, follow blood cultures from OSH

## 2018-07-02 LAB
ALBUMIN SERPL BCP-MCNC: 3.3 G/DL
ALP SERPL-CCNC: 79 U/L
ALT SERPL W/O P-5'-P-CCNC: 21 U/L
ANION GAP SERPL CALC-SCNC: 11 MMOL/L
AST SERPL-CCNC: 21 U/L
BILIRUB SERPL-MCNC: 0.7 MG/DL
BUN SERPL-MCNC: 21 MG/DL
CALCIUM SERPL-MCNC: 7.8 MG/DL
CHLORIDE SERPL-SCNC: 102 MMOL/L
CO2 SERPL-SCNC: 29 MMOL/L
CREAT SERPL-MCNC: 1.3 MG/DL
ERYTHROCYTE [DISTWIDTH] IN BLOOD BY AUTOMATED COUNT: 14.6 %
EST. GFR  (AFRICAN AMERICAN): >60 ML/MIN/1.73 M^2
EST. GFR  (NON AFRICAN AMERICAN): >60 ML/MIN/1.73 M^2
GLUCOSE SERPL-MCNC: 78 MG/DL
HCT VFR BLD AUTO: 23.4 %
HGB BLD-MCNC: 8 G/DL
IMM GRANULOCYTES # BLD AUTO: 0.04 K/UL
MAGNESIUM SERPL-MCNC: 1.4 MG/DL
MCH RBC QN AUTO: 33.9 PG
MCHC RBC AUTO-ENTMCNC: 34.2 G/DL
MCV RBC AUTO: 99 FL
NEUTROPHILS # BLD AUTO: 1.2 K/UL
PHOSPHATE SERPL-MCNC: 3.7 MG/DL
PLATELET # BLD AUTO: 175 K/UL
PMV BLD AUTO: 10 FL
POTASSIUM SERPL-SCNC: 3.3 MMOL/L
PROT SERPL-MCNC: 6.1 G/DL
RBC # BLD AUTO: 2.36 M/UL
SODIUM SERPL-SCNC: 142 MMOL/L
WBC # BLD AUTO: 2.21 K/UL

## 2018-07-02 PROCEDURE — 84100 ASSAY OF PHOSPHORUS: CPT

## 2018-07-02 PROCEDURE — 85027 COMPLETE CBC AUTOMATED: CPT

## 2018-07-02 PROCEDURE — 36415 COLL VENOUS BLD VENIPUNCTURE: CPT

## 2018-07-02 PROCEDURE — 63600175 PHARM REV CODE 636 W HCPCS: Performed by: INTERNAL MEDICINE

## 2018-07-02 PROCEDURE — 20600001 HC STEP DOWN PRIVATE ROOM

## 2018-07-02 PROCEDURE — 97161 PT EVAL LOW COMPLEX 20 MIN: CPT

## 2018-07-02 PROCEDURE — 83735 ASSAY OF MAGNESIUM: CPT

## 2018-07-02 PROCEDURE — 25000003 PHARM REV CODE 250: Performed by: STUDENT IN AN ORGANIZED HEALTH CARE EDUCATION/TRAINING PROGRAM

## 2018-07-02 PROCEDURE — 80053 COMPREHEN METABOLIC PANEL: CPT

## 2018-07-02 PROCEDURE — 99232 SBSQ HOSP IP/OBS MODERATE 35: CPT | Mod: ,,, | Performed by: INTERNAL MEDICINE

## 2018-07-02 PROCEDURE — 63600175 PHARM REV CODE 636 W HCPCS: Performed by: STUDENT IN AN ORGANIZED HEALTH CARE EDUCATION/TRAINING PROGRAM

## 2018-07-02 RX ORDER — MAGNESIUM SULFATE HEPTAHYDRATE 40 MG/ML
2 INJECTION, SOLUTION INTRAVENOUS
Status: COMPLETED | OUTPATIENT
Start: 2018-07-02 | End: 2018-07-02

## 2018-07-02 RX ORDER — POTASSIUM CHLORIDE 7.45 MG/ML
10 INJECTION INTRAVENOUS
Status: COMPLETED | OUTPATIENT
Start: 2018-07-02 | End: 2018-07-02

## 2018-07-02 RX ORDER — SODIUM CHLORIDE 9 MG/ML
INJECTION, SOLUTION INTRAVENOUS CONTINUOUS
Status: ACTIVE | OUTPATIENT
Start: 2018-07-02 | End: 2018-07-02

## 2018-07-02 RX ORDER — FLUCONAZOLE 40 MG/ML
200 POWDER, FOR SUSPENSION ORAL DAILY
Status: DISCONTINUED | OUTPATIENT
Start: 2018-07-03 | End: 2018-07-05

## 2018-07-02 RX ADMIN — HEPARIN SODIUM 5000 UNITS: 5000 INJECTION, SOLUTION INTRAVENOUS; SUBCUTANEOUS at 05:07

## 2018-07-02 RX ADMIN — Medication 1 MG: at 07:07

## 2018-07-02 RX ADMIN — SODIUM CHLORIDE: 0.9 INJECTION, SOLUTION INTRAVENOUS at 02:07

## 2018-07-02 RX ADMIN — Medication 1 MG: at 12:07

## 2018-07-02 RX ADMIN — MAGNESIUM SULFATE IN WATER 2 G: 40 INJECTION, SOLUTION INTRAVENOUS at 10:07

## 2018-07-02 RX ADMIN — POTASSIUM CHLORIDE 10 MEQ: 10 INJECTION, SOLUTION INTRAVENOUS at 06:07

## 2018-07-02 RX ADMIN — HEPARIN SODIUM 5000 UNITS: 5000 INJECTION, SOLUTION INTRAVENOUS; SUBCUTANEOUS at 06:07

## 2018-07-02 RX ADMIN — POTASSIUM CHLORIDE 10 MEQ: 10 INJECTION, SOLUTION INTRAVENOUS at 02:07

## 2018-07-02 RX ADMIN — FLUCONAZOLE IN SODIUM CHLORIDE 200 MG: 2 INJECTION, SOLUTION INTRAVENOUS at 06:07

## 2018-07-02 RX ADMIN — PROCHLORPERAZINE EDISYLATE 5 MG: 5 INJECTION INTRAMUSCULAR; INTRAVENOUS at 09:07

## 2018-07-02 RX ADMIN — MAGNESIUM SULFATE IN WATER 2 G: 40 INJECTION, SOLUTION INTRAVENOUS at 07:07

## 2018-07-02 RX ADMIN — PROCHLORPERAZINE EDISYLATE 5 MG: 5 INJECTION INTRAMUSCULAR; INTRAVENOUS at 03:07

## 2018-07-02 RX ADMIN — Medication 1 MG: at 09:07

## 2018-07-02 RX ADMIN — Medication 1 MG: at 03:07

## 2018-07-02 RX ADMIN — POTASSIUM CHLORIDE 10 MEQ: 10 INJECTION, SOLUTION INTRAVENOUS at 01:07

## 2018-07-02 RX ADMIN — POTASSIUM CHLORIDE 10 MEQ: 10 INJECTION, SOLUTION INTRAVENOUS at 04:07

## 2018-07-02 RX ADMIN — HEPARIN SODIUM 5000 UNITS: 5000 INJECTION, SOLUTION INTRAVENOUS; SUBCUTANEOUS at 09:07

## 2018-07-02 RX ADMIN — Medication 1 MG: at 05:07

## 2018-07-02 RX ADMIN — SODIUM CHLORIDE: 0.9 INJECTION, SOLUTION INTRAVENOUS at 08:07

## 2018-07-02 NOTE — ASSESSMENT & PLAN NOTE
-replete PRN, presented with metabolic alkalosis per CMP with GI losses.   -K, Mg, Ca trending. Check 12 lead EKG  -K 3.3, Mg 1.4 today  -ordered K and Mg replacements

## 2018-07-02 NOTE — PLAN OF CARE
Problem: Patient Care Overview  Goal: Plan of Care Review  Outcome: Ongoing (interventions implemented as appropriate)  Pt is A&Ox4 and was injury free during night . The pt is able to speak much better to night than last night, throat not as coarse.  Breathing is regular equal and unlabored bilaterally on room air. Pt has frequent pain med's during night shift. New IV was started in Left hand. Pt did receive two K riders during night shift to finish up orders from day shift.  The pt was not able to tolerate the K riders at 100 cc hr even though they were Y with pt's fluids.

## 2018-07-02 NOTE — ASSESSMENT & PLAN NOTE
-dysphagia, odynophagia with known SCC w/ mets and XRT, Onc history as listed.  -likely presenting with platinum based chemotherapy induced n/v  -clear fluids diet  -supportive care  -patient still experiencing dysphagia with solids, will continue him on liquid diet.

## 2018-07-02 NOTE — SUBJECTIVE & OBJECTIVE
Oncology Treatment Plan:   OP CISPLATIN (Weekly)    Medications:  Continuous Infusions:   sodium chloride 0.9% 100 mL/hr at 07/02/18 0800     Scheduled Meds:   duke's soln (benadryl 30 mL, mylanta 30 mL, lidocane 30 mL, nystatin 30 mL) 120 mL  10 mL Oral QID    [START ON 7/3/2018] fluconazole 40 mg/ml  200 mg Per G Tube Daily    heparin (porcine)  5,000 Units Subcutaneous Q8H    potassium chloride  10 mEq Intravenous Q1H     PRN Meds:dextrose 50%, dextrose 50%, glucagon (human recombinant), glucose, glucose, HYDROmorphone, prochlorperazine, sodium chloride 0.9%     Review of patient's allergies indicates:  No Known Allergies     Past Medical History:   Diagnosis Date    Former smoker     HPV in male     Opiate addiction     Squamous cell carcinoma of palatine tonsil     throat and tonsillar     Past Surgical History:   Procedure Laterality Date    GASTROSTOMY TUBE PLACEMENT Left 02/12/2018    MEDIPORT REMOVAL N/A 6/6/2018    Procedure: Removal-port-a-cath;  Surgeon: Blayne Diagnostic Provider;  Location: [facility] OR [address] [place] [address];  Service: General;  Laterality: N/A;     Family History     Problem Relation (Age of Onset)    Brain cancer Maternal Uncle, Maternal Uncle    Hypertension Father, Brother    Leukemia Mother    Thyroid disease Sister        Social History Main Topics    Smoking status: Former Smoker     Packs/day: 1.50     Years: 25.00     Types: Cigarettes     Quit date: 06/2017    Smokeless tobacco: Never Used      Comment: smoked for about 25 years. quit 6 months ago    Alcohol use No    Drug use: No      Comment: Former Opiate    Sexual activity: Yes     Partners: Female       Review of Systems   Constitutional: Negative for appetite change, fever and unexpected weight change.   HENT: Positive for sore throat and trouble swallowing.    Respiratory: Negative for cough, chest tightness and shortness of breath.    Gastrointestinal: Negative for abdominal distention and abdominal pain.     Objective:      Vital Signs (Most Recent):  Temp: 98 °F (36.7 °C) (07/02/18 1246)  Pulse: 71 (07/02/18 1246)  Resp: 18 (07/02/18 1246)  BP: 124/69 (07/02/18 1246)  SpO2: 95 % (07/02/18 1246) Vital Signs (24h Range):  Temp:  [98 °F (36.7 °C)-99 °F (37.2 °C)] 98 °F (36.7 °C)  Pulse:  [64-78] 71  Resp:  [16-20] 18  SpO2:  [94 %-99 %] 95 %  BP: (120-141)/(61-84) 124/69     Weight: 85.7 kg (188 lb 14.4 oz)  Body mass index is 28.72 kg/m².  Body surface area is 2.03 meters squared.      Intake/Output Summary (Last 24 hours) at 07/02/18 1258  Last data filed at 07/02/18 1000   Gross per 24 hour   Intake          3611.25 ml   Output                0 ml   Net          3611.25 ml       Physical Exam   Constitutional: He is oriented to person, place, and time. No distress.   AOx3   HENT:   Right Ear: External ear normal.   Left Ear: External ear normal.   Mouth/Throat: Oropharyngeal exudate present.   Thrush  Oneil appearance to head neck  Hyperemic palate    Eyes: EOM are normal. Pupils are equal, round, and reactive to light. Right eye exhibits no discharge. Left eye exhibits no discharge. No scleral icterus.   Neck: Normal range of motion. No JVD present. No tracheal deviation present.   Cardiovascular: Regular rhythm, normal heart sounds and intact distal pulses.  Exam reveals no gallop and no friction rub.    No murmur heard.  Pulmonary/Chest: Effort normal. No respiratory distress. He has no wheezes. He has rales (bilateral; unchanged from previous exam). He exhibits no tenderness.   Abdominal: Soft. Bowel sounds are normal. He exhibits no distension and no mass. There is no tenderness. There is no rebound and no guarding.   PEG site cdi    Musculoskeletal: Normal range of motion. He exhibits no edema or tenderness.   Neurological: He is alert and oriented to person, place, and time. No cranial nerve deficit. Coordination normal.   Skin: Skin is warm. Capillary refill takes less than 2 seconds. No rash noted. He is not diaphoretic.  No erythema. No pallor.   Psychiatric: He has a normal mood and affect.       Significant Labs:   CBC:     Recent Labs  Lab 06/30/18  1620 07/01/18  0443 07/02/18  0421   WBC 5.95 3.56* 2.21*   HGB 9.7* 8.5* 8.0*   HCT 28.3* 24.1* 23.4*    194 175   , CMP:     Recent Labs  Lab 06/30/18  1620 07/01/18  0443 07/01/18  1352 07/02/18  0422    144 141 142   K 3.4* 3.3* 3.0* 3.3*   CL 95 98 100 102   CO2 35* 33* 28 29   * 94 92 78   BUN 34* 27* 25* 21*   CREATININE 1.42* 1.6* 1.4 1.3   CALCIUM 8.3* 8.1* 7.7* 7.8*   PROT 7.3 6.4  --  6.1   ALBUMIN 4.4 3.5  --  3.3*   BILITOT 0.7 0.7  --  0.7   ALKPHOS 75 86  --  79   AST 24 18  --  21   ALT 20 18  --  21   ANIONGAP 12 13 13 11   EGFRNONAA 57.2* 49.5* 58.2* >60.0   , Urine Studies:     Recent Labs  Lab 07/01/18  1300   COLORU Yellow   APPEARANCEUA Clear   PHUR 7.0   SPECGRAV >=1.030*   PROTEINUA Negative   GLUCUA Negative   KETONESU Negative   BILIRUBINUA Negative   OCCULTUA Negative   NITRITE Negative   UROBILINOGEN 2.0   LEUKOCYTESUR Negative    and All pertinent labs from the last 24 hours have been reviewed.    Diagnostic Results:  I have reviewed and interpreted all pertinent imaging results/findings within the past 24 hours.   CT ap and CXR with no evidence of disease.   X-Ray Knee with no acute fracture or dislocation.

## 2018-07-02 NOTE — PLAN OF CARE
Problem: Physical Therapy Goal  Goal: Physical Therapy Goal  PT goals not established. Acute PT not needed at this time.   Outcome: Outcome(s) achieved Date Met: 07/02/18  PT evaluation completed. Pt is supervision/mod (I) with all mobility and transfers. Pt does not demonstrate a need for skilled acute PT services. Eval/DC note to follow.     Dayanna Foreman, SPT  07/02/2018

## 2018-07-02 NOTE — PT/OT/SLP EVAL
"Physical Therapy Evaluation and Discharge Note    Patient Name:  Burton Whiting   MRN:  [id number]    Recommendations:     Discharge Recommendations:  home   Discharge Equipment Recommendations: none   Barriers to discharge: None    Assessment:     Burton Whiting is a 48 y.o. male admitted with a medical diagnosis of Intractable vomiting with nausea. Pt tolerated PT evaluation well. Pt demonstrated all bed mobility, transfers, and ambulation with (I). Pt ascend/descend 2 flights of stairs with (I), necessary for 5 steps to enter home. At this time, patient is functioning at their prior level of function and does not require further acute PT services. D/C home when medically appropriate.    Recent Surgery: * No surgery found *      Plan:     During this hospitalization, patient does not require further acute PT services.  Please re-consult if situation changes.     Plan of Care Reviewed with: patient, sibling    Subjective     Communicated with RN prior to session.  Patient found HoB elevated upon PT entry to room, agreeable to evaluation.      Chief Complaint: none stated  Patient comments/goals: "I know how to move. I don't need PT."  Pain/Comfort:  Pain Rating 1: 0/10    Patients cultural, spiritual, Buddhist conflicts given the current situation: none stated    Living Environment:  Pt lives alone in a [facility] with 3 RADHA. PTA, pt was (I) with all ADLs. Pt currently works as a supervisor, mainly doing computer work. Pt able to ambulate whatever distance necessary, using no ADs.  Patient has the following equipment:  (all lines intact).  DME owned (not currently used): none.  Upon discharge, patient will have assistance from sister when needed.    Objective:     Patient found with:  (all lines intact)     General Precautions: Standard, fall   Orthopedic Precautions:Full weight bearing   Braces: N/A     Exams:  · RLE ROM: WFL  · RLE Strength: WFL  · LLE ROM: WFL  · LLE Strength: WFL    Functional Mobility:  · Bed " Mobility:     · Scooting: independence  · Supine to Sit: independence  · Sit to Supine: independence    · Transfers:     · Sit to Stand:  independence with no AD    · Gait: Pt ambulated 160 with no AD and supervision for safety. Pt demonstrated increased nguyen and decreased safety awareness. Pt was very impulsive during ambulation.     · Stairs:  Pt ascended/descended 2 flight(s) with No Assistive Device with left handrail with Supervision.     AM-PAC 6 CLICK MOBILITY  Total Score:24       Therapeutic Activities and Exercises:    Pt educated on role of PT, importance of mobility, PT POC, and safety awareness education.    Patient left HOB elevated with all lines intact and call button in reach.    GOALS:    Physical Therapy Goals     Not on file          Multidisciplinary Problems (Resolved)        Problem: Physical Therapy Goal    Goal Priority Disciplines Outcome Goal Variances Interventions   Physical Therapy Goal   (Resolved)     PT/OT, PT Outcome(s) achieved     Description:  PT goals not established. Acute PT not needed at this time.                     History:     Past Medical History:   Diagnosis Date    Former smoker     HPV in male     Opiate addiction     Squamous cell carcinoma of palatine tonsil     throat and tonsillar       Past Surgical History:   Procedure Laterality Date    GASTROSTOMY TUBE PLACEMENT Left 02/12/2018    MEDIPORT REMOVAL N/A 6/6/2018    Procedure: Removal-port-a-cath;  Surgeon: Blayne Diagnostic Provider;  Location: [facility] OR [address] [place] [address];  Service: General;  Laterality: N/A;       Clinical Decision Making:     Personal factors/comorbidities: Cancer, Smoker. The listed co-morbidities and personal factors impact pt's current level and progress with functional mobility and independence.   Body systems elements affected: head, neck, musculoskeletal system  Impairments: see assessment below  Clinical Presentation: stable  Functional Outcome Tools: AMPAC, MMT, ROM  Evaluation Complexity  Level: low      Time Tracking:     PT Received On: 07/02/18  PT Start Time: 1133     PT Stop Time: 1145  PT Total Time (min): 12 min     Billable Minutes: Evaluation 12      RESHMA Ramirez  07/02/2018

## 2018-07-02 NOTE — ASSESSMENT & PLAN NOTE
-day 2 on fluconazole IVPB 200mg QD.   -Recommend 7-14 days of ongoing treatment.   -Strep throat swab negative

## 2018-07-02 NOTE — PROGRESS NOTES
Admit Assessment    Patient Identification  Burton Whiting   :  1970  Admit Date:  2018  Attending Provider:  Oc Cordero MD              Referral:   Patient was admitted to Medical Oncology with a diagnosis of 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 who recently received plantinum based chemotherapy/ XRT therapy to the oropharynx, and he was admitted this hospital stay due to intractable vomiting with nausea.  Intractable vomiting with nausea [R11.2]  Intractable vomiting with nausea [R11.2].   Additional oncologic and medical history noted in H&P in chart.    Oncology Social Worker is involved. Patient was referred to the Social Work Department via admission list/census. Patient presents as a 48 y.o. year old, single, [place]auasian male.    Persons interviewed, with patient's permission: Met with patient and his sister Franchesca Whiting in patient's room to complete assessment.    Living Situation:  Lives alone. Patient's siblings have been taking turns coming to town and staying with patient since he started cancer treatment.    Resides at [address] [place] [address].  Phone: [phone] (patient's cell phone).      Functional Status Prior  Ambulation: 2-->assistive person  Transferrin-->assistive person  Toiletin-->assistive person  Bathin-->assistive person  Dressin-->assistive person  Eatin-->assistive person  Communication: difficulty understanding (not related to language barrier)  Swallowing: difficulty swallowing liquids    Patient had been ambulatory and very independent prior to starting cancer treatment.    Emergency Contacts:  1. Vladislav Whiting, [phone]  2. Franchesca Whiting, [phone]      Current or Past Agencies and Description of Services/Supplies    DME  None    Home Health  Agency Name: [facility] [facility]  Agency Phone Number: [phone]  Services: Skilled nursing.    IV Infusion  Agency Name: [facility]  Phone Number: [phone]  Services/Supplies Provided: Tube feeding supplies    Nutrition: Bolus PEG tube feedings, and patient still taking in a little orally    Outpatient Pharmacy:     [facility], [place] - [address] [address]  Phone: [phone] Fax: [phone]      Patient Preference of agencies include: as listed above.    Patient/Caregiver informed of right to choose providers or agencies.  Patient provides permission to release any necessary information to [facility] and to Non[facility] agencies as needed to facilitate patient care, treatment planning, and patient discharge planning.  Written and verbal resources will be provided as needed/requested.      Coping  Patient having anxiety about feedings/eating, and not being able to work daily as he had been used to doing.  Patient has good family support.          Adjustment to Diagnosis and Treatment  Ongoing process.    Emotional/Behavioral/Cognitive Issues  Anxiety as above. None other noted/observed           History/Current Symptoms of Anxiety/Depression: Yes      History/Current Substance Use:   Social History     Social History Main Topics    Smoking status: Former Smoker     Packs/day: 1.50     Years: 25.00     Types: Cigarettes     Quit date: 2017    Smokeless tobacco: Never Used      Comment: smoked for about 25 years. quit 6 months ago    Alcohol use No    Drug use: No      Comment: Former Opiate    Sexual activity: Yes     Partners: Female       Indications of Abuse/Neglect: No  Abuse Screen  Do You Feel Unsafe at Home, Work or School?: yes      Financial:  Payor/Plan Subscr  Sex Relation Sub. Ins. ID Effective Group Num   1. BLUE CROSS BL* JUAN PABLO ADAME 1970 Male  [id number]                                   [address] [address]      Patient has [employer] insurance - Blue Connect plan.                Other identified concerns/needs: Patient and sister both spoke of patient's  anxiety and asked about someone to talk with. Discussed our Psychologist in Oncology, Dr. Vic Birch, and provided them with information needed to contact her  [facility] and make an appointment.                                                         Resumption of home health services and tube feeding supplies.    Plan: Patient to return home via car accompanied by family member.     Interventions/Referrals: Pending patient's readiness for d/c and orders from MDs.     Patient/caregiver engaged in treatment planning process.    Oncology Social Worker providing psychosocial and supportive counseling, resources, education, assistance and discharge planning as appropriate.  Patient/caregiver state understanding of  available resources,  following, remains available.    Provided patient and his sister Franchesca Whiting with business cards containing all contact information for Oncology Social Worker, and encouraged them to make contact as needed.

## 2018-07-02 NOTE — PLAN OF CARE
Problem: Patient Care Overview  Goal: Plan of Care Review  Pt had 120 cc  Broth via G tube +100 cc water flush this morning. Pt tolerated well.  Pt had the same as above for lunch, 2 hours later he threw up the whole broth and mucous green emesis.  For dinner he tolerated 60 cc of his own home formula plus 120 cc water. Tolerated well

## 2018-07-02 NOTE — PROGRESS NOTES
Pt had 120 cc  Broth via G tube +100 cc water flush this morning. Pt tolerated well.  Pt had the same as above for lunch, 2 hours later he threw up the whole broth and mucous green emesis

## 2018-07-02 NOTE — PROGRESS NOTES
[facility]  Hematology/Oncology  Progress Note    Patient Name: Burton Whiting  Admission Date: 7/1/2018  Hospital Length of Stay: 1 days  Code Status: Full Code     Subjective:   Interval History:  NAEON. Patient is tolerating liquid diet, supplemented with home TwoCal supplements.     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 Patient also has a recent history of sepsis, diverticulitis, colitis and is s/p PEG tube. He presents to the [facility] ED today in [place] accompanied by his sister with complaints of malaise, fatigue, lethargy, intractable n/v occurring several times per day over the last 4 days. He reports mild epigastric discomfort, but denies focal pain. He has been getting Gatorade for rehydration via the PEG, but all pain control and antiemetics at home (Zofran/ Phenergan syrup) have failed due to retching and vomiting of stomach contents. Vomit has been clear to bilious, no current hematemesis. Patient reports cold chills, denies fevers and rigors. He complains of pain in his L tonsil, denies diarrhea. His sister notes lethargy with some confusion, but reports no focal deficits, overt delirium. Patient fell on his knee at the ED prior to arrival, reports some gait instability and falls preceding admission.     Onc hx per Dr. Lopez: Mr. Burton Whiting is a 48-year-old male, former smoker, who presents today with a four-month history of enlarging neck mass.  He initially delayed care due to lack of insurance.  He was seen by Dr. Turpin who referred him to see Dr. Olguin.  He had a CT that showed a 3.8 x 3.8 x 4.9 cm soft tissue mass arising from the left palatine tonsil resulting in moderate narrowing of the hypopharyngeal airway adjacent extensive matted left cervical lymphadenopathy was noted.  The largest ella mass was 6.6 x 9 x 2.6 cm extending inferiorly  to the supraclavicular region.  The mass pushed the trachea and the left common carotid artery to the right.  Additional representative of cervical lymph nodes measuring 2.9 x 3.1 x 3.5 cm on axial image 37 of series 3   and 2.4 x 2.1 x 2.2 cm on axial image 55 of series 3.  There is also a sclerotic lesion involving the midline of the clivus measuring 1.2 cm.  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  PET scan performed on 01/19/2018 revealed persistent evidence of a soft tissue mass arising from the left palatine tonsil resulting in moderate narrowing of the hypopharyngeal   airway.  The mass is hypermetabolic demonstrating an SUV max of 18.5.  There is also persistent adjacent extensive matted left cervical lymphadenopathy, largest of this measuring 6.7 x 8.42 with hypermetabolic activity and SUV max of 20.5.  Lymphadenopathy is again noted to have a mass effect on the trachea and left common carotid artery, pushing both structures towards the right.  There is also prominent right cervical lymph nodes with the largest measuring 0.8 cm and demonstrating mild hypermetabolic activity with SUV max of 1.8, physiologic   distribution of FDG in the gray matter.  There are 3 pulmonary nodules noted within the right lung, the largest is in the anterior segment of the right upper lobe measuring 1 cm, second is visualized in the middle lobe measuring 0.6 cm and the third is within the posterior basal segment measuring 0.6 cm.  There is one pulmonary nodule within the left lung within the lateral basal segment measuring 0.6 cm.  These nodules do not demonstrate hypermetabolic activity; however, they are below the threshold for observation with PET    Oncology Treatment Plan:   OP CISPLATIN (Weekly)    Medications:  Continuous Infusions:   sodium chloride 0.9% 100 mL/hr at 07/02/18 0800     Scheduled Meds:   duke's soln (benadryl 30 mL, mylanta 30 mL, lidocane 30 mL, nystatin 30 mL) 120 mL  10 mL  Oral QID    [START ON 7/3/2018] fluconazole 40 mg/ml  200 mg Per G Tube Daily    heparin (porcine)  5,000 Units Subcutaneous Q8H    potassium chloride  10 mEq Intravenous Q1H     PRN Meds:dextrose 50%, dextrose 50%, glucagon (human recombinant), glucose, glucose, HYDROmorphone, prochlorperazine, sodium chloride 0.9%     Review of patient's allergies indicates:  No Known Allergies     Past Medical History:   Diagnosis Date    Former smoker     HPV in male     Opiate addiction     Squamous cell carcinoma of palatine tonsil     throat and tonsillar     Past Surgical History:   Procedure Laterality Date    GASTROSTOMY TUBE PLACEMENT Left 02/12/2018    MEDIPORT REMOVAL N/A 6/6/2018    Procedure: Removal-port-a-cath;  Surgeon: Blayne Diagnostic Provider;  Location: [facility] OR [address] [place] [address];  Service: General;  Laterality: N/A;     Family History     Problem Relation (Age of Onset)    Brain cancer Maternal Uncle, Maternal Uncle    Hypertension Father, Brother    Leukemia Mother    Thyroid disease Sister        Social History Main Topics    Smoking status: Former Smoker     Packs/day: 1.50     Years: 25.00     Types: Cigarettes     Quit date: 06/2017    Smokeless tobacco: Never Used      Comment: smoked for about 25 years. quit 6 months ago    Alcohol use No    Drug use: No      Comment: Former Opiate    Sexual activity: Yes     Partners: Female       Review of Systems   Constitutional: Negative for appetite change, fever and unexpected weight change.   HENT: Positive for sore throat and trouble swallowing.    Respiratory: Negative for cough, chest tightness and shortness of breath.    Gastrointestinal: Negative for abdominal distention and abdominal pain.     Objective:     Vital Signs (Most Recent):  Temp: 98 °F (36.7 °C) (07/02/18 1246)  Pulse: 71 (07/02/18 1246)  Resp: 18 (07/02/18 1246)  BP: 124/69 (07/02/18 1246)  SpO2: 95 % (07/02/18 1246) Vital Signs (24h Range):  Temp:  [98 °F (36.7 °C)-99 °F (37.2 °C)] 98 °F  (36.7 °C)  Pulse:  [64-78] 71  Resp:  [16-20] 18  SpO2:  [94 %-99 %] 95 %  BP: (120-141)/(61-84) 124/69     Weight: 85.7 kg (188 lb 14.4 oz)  Body mass index is 28.72 kg/m².  Body surface area is 2.03 meters squared.      Intake/Output Summary (Last 24 hours) at 07/02/18 1258  Last data filed at 07/02/18 1000   Gross per 24 hour   Intake          3611.25 ml   Output                0 ml   Net          3611.25 ml       Physical Exam   Constitutional: He is oriented to person, place, and time. No distress.   AOx3   HENT:   Right Ear: External ear normal.   Left Ear: External ear normal.   Mouth/Throat: Oropharyngeal exudate present.   Thrush  Oneil appearance to head neck  Hyperemic palate    Eyes: EOM are normal. Pupils are equal, round, and reactive to light. Right eye exhibits no discharge. Left eye exhibits no discharge. No scleral icterus.   Neck: Normal range of motion. No JVD present. No tracheal deviation present.   Cardiovascular: Regular rhythm, normal heart sounds and intact distal pulses.  Exam reveals no gallop and no friction rub.    No murmur heard.  Pulmonary/Chest: Effort normal. No respiratory distress. He has no wheezes. He has rales (bilateral; unchanged from previous exam). He exhibits no tenderness.   Abdominal: Soft. Bowel sounds are normal. He exhibits no distension and no mass. There is no tenderness. There is no rebound and no guarding.   PEG site cdi    Musculoskeletal: Normal range of motion. He exhibits no edema or tenderness.   Neurological: He is alert and oriented to person, place, and time. No cranial nerve deficit. Coordination normal.   Skin: Skin is warm. Capillary refill takes less than 2 seconds. No rash noted. He is not diaphoretic. No erythema. No pallor.   Psychiatric: He has a normal mood and affect.       Significant Labs:   CBC:     Recent Labs  Lab 06/30/18  1620 07/01/18  0443 07/02/18  0421   WBC 5.95 3.56* 2.21*   HGB 9.7* 8.5* 8.0*   HCT 28.3* 24.1* 23.4*    194  175   , CMP:     Recent Labs  Lab 06/30/18  1620 07/01/18  0443 07/01/18  1352 07/02/18  0422    144 141 142   K 3.4* 3.3* 3.0* 3.3*   CL 95 98 100 102   CO2 35* 33* 28 29   * 94 92 78   BUN 34* 27* 25* 21*   CREATININE 1.42* 1.6* 1.4 1.3   CALCIUM 8.3* 8.1* 7.7* 7.8*   PROT 7.3 6.4  --  6.1   ALBUMIN 4.4 3.5  --  3.3*   BILITOT 0.7 0.7  --  0.7   ALKPHOS 75 86  --  79   AST 24 18  --  21   ALT 20 18  --  21   ANIONGAP 12 13 13 11   EGFRNONAA 57.2* 49.5* 58.2* >60.0   , Urine Studies:     Recent Labs  Lab 07/01/18  1300   COLORU Yellow   APPEARANCEUA Clear   PHUR 7.0   SPECGRAV >=1.030*   PROTEINUA Negative   GLUCUA Negative   KETONESU Negative   BILIRUBINUA Negative   OCCULTUA Negative   NITRITE Negative   UROBILINOGEN 2.0   LEUKOCYTESUR Negative    and All pertinent labs from the last 24 hours have been reviewed.    Diagnostic Results:  I have reviewed and interpreted all pertinent imaging results/findings within the past 24 hours.   CT ap and CXR with no evidence of disease.   X-Ray Knee with no acute fracture or dislocation.       Assessment/Plan:     * Intractable vomiting with nausea    -failed enteral intake of home Zofran/ Phenergan via PEG;   -n/v improving with Compazine IV, Ativan  -IVF at 125cc/ hour, trend CMP and replete electrolytes PRN  -CT scans at OSH show no foci of infection, follow blood cultures from OSH        Macrocytic anemia    -HB stable. Replete B vitamins when PO stable.   -Hb 8.0 (8.5 yesterday)        Status post insertion of percutaneous endoscopic gastrostomy (PEG) tube    -on clear fluids  -IV antiemetics and IV route preferred for now.   -CT ap stable         Electrolyte abnormality    -replete PRN, presented with metabolic alkalosis per CMP with GI losses.   -K, Mg, Ca trending. Check 12 lead EKG  -K 3.3, Mg 1.4 today  -ordered K and Mg replacements         Thrush    -day 2 on fluconazole IVPB 200mg QD.   -Recommend 7-14 days of ongoing treatment.   -Strep throat swab  negative        Squamous cell carcinoma of palatine tonsil    -dysphagia, odynophagia with known SCC w/ mets and XRT, Onc history as listed.  -likely presenting with platinum based chemotherapy induced n/v  -clear fluids diet  -supportive care  -patient still experiencing dysphagia with solids, will continue him on liquid diet.                  Kevon Servin MD  Hematology/Oncology  [facility]

## 2018-07-02 NOTE — ASSESSMENT & PLAN NOTE
-failed enteral intake of home Zofran/ Phenergan via PEG;   -n/v improving with Compazine IV, Ativan  -IVF at 125cc/ hour, trend CMP and replete electrolytes PRN  -CT scans at OSH show no foci of infection, follow blood cultures from OSH

## 2018-07-03 LAB
ALBUMIN SERPL BCP-MCNC: 3.3 G/DL
ALP SERPL-CCNC: 84 U/L
ALT SERPL W/O P-5'-P-CCNC: 18 U/L
ANION GAP SERPL CALC-SCNC: 13 MMOL/L
AST SERPL-CCNC: 18 U/L
BASOPHILS # BLD AUTO: 0.02 K/UL
BASOPHILS NFR BLD: 0.7 %
BILIRUB SERPL-MCNC: 0.7 MG/DL
BUN SERPL-MCNC: 15 MG/DL
CALCIUM SERPL-MCNC: 8.6 MG/DL
CHLORIDE SERPL-SCNC: 99 MMOL/L
CO2 SERPL-SCNC: 26 MMOL/L
CREAT SERPL-MCNC: 1.1 MG/DL
DIFFERENTIAL METHOD: ABNORMAL
EOSINOPHIL # BLD AUTO: 0 K/UL
EOSINOPHIL NFR BLD: 1.5 %
ERYTHROCYTE [DISTWIDTH] IN BLOOD BY AUTOMATED COUNT: 14.2 %
EST. GFR  (AFRICAN AMERICAN): >60 ML/MIN/1.73 M^2
EST. GFR  (NON AFRICAN AMERICAN): >60 ML/MIN/1.73 M^2
GLUCOSE SERPL-MCNC: 79 MG/DL
HCT VFR BLD AUTO: 23.1 %
HGB BLD-MCNC: 8.4 G/DL
IMM GRANULOCYTES # BLD AUTO: 0.05 K/UL
IMM GRANULOCYTES NFR BLD AUTO: 1.8 %
LYMPHOCYTES # BLD AUTO: 0.2 K/UL
LYMPHOCYTES NFR BLD: 6.9 %
MAGNESIUM SERPL-MCNC: 1.6 MG/DL
MCH RBC QN AUTO: 34.7 PG
MCHC RBC AUTO-ENTMCNC: 36.4 G/DL
MCV RBC AUTO: 96 FL
MONOCYTES # BLD AUTO: 0.5 K/UL
MONOCYTES NFR BLD: 19.3 %
NEUTROPHILS # BLD AUTO: 1.9 K/UL
NEUTROPHILS NFR BLD: 69.8 %
NRBC BLD-RTO: 0 /100 WBC
PHOSPHATE SERPL-MCNC: 3.8 MG/DL
PLATELET # BLD AUTO: 166 K/UL
PMV BLD AUTO: 9.2 FL
POCT GLUCOSE: 106 MG/DL (ref 70–110)
POCT GLUCOSE: 85 MG/DL (ref 70–110)
POCT GLUCOSE: 88 MG/DL (ref 70–110)
POTASSIUM SERPL-SCNC: 3.1 MMOL/L
PROT SERPL-MCNC: 6.1 G/DL
RBC # BLD AUTO: 2.42 M/UL
SODIUM SERPL-SCNC: 138 MMOL/L
WBC # BLD AUTO: 2.74 K/UL

## 2018-07-03 PROCEDURE — 83735 ASSAY OF MAGNESIUM: CPT

## 2018-07-03 PROCEDURE — 25000003 PHARM REV CODE 250: Performed by: STUDENT IN AN ORGANIZED HEALTH CARE EDUCATION/TRAINING PROGRAM

## 2018-07-03 PROCEDURE — 63600175 PHARM REV CODE 636 W HCPCS: Performed by: STUDENT IN AN ORGANIZED HEALTH CARE EDUCATION/TRAINING PROGRAM

## 2018-07-03 PROCEDURE — 36415 COLL VENOUS BLD VENIPUNCTURE: CPT

## 2018-07-03 PROCEDURE — 63600175 PHARM REV CODE 636 W HCPCS: Performed by: INTERNAL MEDICINE

## 2018-07-03 PROCEDURE — 85025 COMPLETE CBC W/AUTO DIFF WBC: CPT

## 2018-07-03 PROCEDURE — 80053 COMPREHEN METABOLIC PANEL: CPT

## 2018-07-03 PROCEDURE — 99232 SBSQ HOSP IP/OBS MODERATE 35: CPT | Mod: ,,, | Performed by: INTERNAL MEDICINE

## 2018-07-03 PROCEDURE — 84100 ASSAY OF PHOSPHORUS: CPT

## 2018-07-03 PROCEDURE — 20600001 HC STEP DOWN PRIVATE ROOM

## 2018-07-03 PROCEDURE — 25000003 PHARM REV CODE 250: Performed by: INTERNAL MEDICINE

## 2018-07-03 RX ORDER — OXYCODONE HCL 5 MG/5 ML
5 SOLUTION, ORAL ORAL EVERY 4 HOURS PRN
Status: DISCONTINUED | OUTPATIENT
Start: 2018-07-03 | End: 2018-07-05

## 2018-07-03 RX ORDER — AMOXICILLIN 250 MG
1 CAPSULE ORAL DAILY PRN
Status: DISCONTINUED | OUTPATIENT
Start: 2018-07-03 | End: 2018-07-05 | Stop reason: HOSPADM

## 2018-07-03 RX ORDER — ONDANSETRON 2 MG/ML
8 INJECTION INTRAMUSCULAR; INTRAVENOUS EVERY 6 HOURS PRN
Status: DISCONTINUED | OUTPATIENT
Start: 2018-07-03 | End: 2018-07-05 | Stop reason: HOSPADM

## 2018-07-03 RX ORDER — POTASSIUM CHLORIDE 20 MEQ/15ML
40 SOLUTION ORAL EVERY 4 HOURS
Status: DISPENSED | OUTPATIENT
Start: 2018-07-03 | End: 2018-07-03

## 2018-07-03 RX ORDER — POTASSIUM CHLORIDE 7.46 G/1000ML
10 INJECTION, SOLUTION INTRAVENOUS
Status: COMPLETED | OUTPATIENT
Start: 2018-07-03 | End: 2018-07-04

## 2018-07-03 RX ORDER — POLYETHYLENE GLYCOL 3350 17 G/17G
17 POWDER, FOR SOLUTION ORAL 2 TIMES DAILY PRN
Status: DISCONTINUED | OUTPATIENT
Start: 2018-07-03 | End: 2018-07-05 | Stop reason: HOSPADM

## 2018-07-03 RX ADMIN — OXYCODONE HYDROCHLORIDE 5 MG: 5 SOLUTION ORAL at 06:07

## 2018-07-03 RX ADMIN — Medication 1 MG: at 08:07

## 2018-07-03 RX ADMIN — FLUCONAZOLE 200 MG: 40 POWDER, FOR SUSPENSION ORAL at 08:07

## 2018-07-03 RX ADMIN — PROCHLORPERAZINE EDISYLATE 5 MG: 5 INJECTION INTRAMUSCULAR; INTRAVENOUS at 12:07

## 2018-07-03 RX ADMIN — POTASSIUM CHLORIDE 10 MEQ: 10 INJECTION, SOLUTION INTRAVENOUS at 09:07

## 2018-07-03 RX ADMIN — OXYCODONE HYDROCHLORIDE 5 MG: 5 SOLUTION ORAL at 02:07

## 2018-07-03 RX ADMIN — POTASSIUM CHLORIDE 10 MEQ: 10 INJECTION, SOLUTION INTRAVENOUS at 11:07

## 2018-07-03 RX ADMIN — HEPARIN SODIUM 5000 UNITS: 5000 INJECTION, SOLUTION INTRAVENOUS; SUBCUTANEOUS at 02:07

## 2018-07-03 RX ADMIN — OXYCODONE HYDROCHLORIDE 5 MG: 5 SOLUTION ORAL at 09:07

## 2018-07-03 RX ADMIN — PROCHLORPERAZINE EDISYLATE 5 MG: 5 INJECTION INTRAMUSCULAR; INTRAVENOUS at 11:07

## 2018-07-03 RX ADMIN — Medication 10 ML: at 08:07

## 2018-07-03 RX ADMIN — HEPARIN SODIUM 5000 UNITS: 5000 INJECTION, SOLUTION INTRAVENOUS; SUBCUTANEOUS at 06:07

## 2018-07-03 RX ADMIN — POTASSIUM CHLORIDE 10 MEQ: 10 INJECTION, SOLUTION INTRAVENOUS at 07:07

## 2018-07-03 RX ADMIN — Medication 1 MG: at 04:07

## 2018-07-03 RX ADMIN — POTASSIUM CHLORIDE 40 MEQ: 20 SOLUTION ORAL at 07:07

## 2018-07-03 RX ADMIN — PROCHLORPERAZINE EDISYLATE 5 MG: 5 INJECTION INTRAMUSCULAR; INTRAVENOUS at 04:07

## 2018-07-03 RX ADMIN — MAGNESIUM SULFATE HEPTAHYDRATE 3 G: 500 INJECTION, SOLUTION INTRAMUSCULAR; INTRAVENOUS at 12:07

## 2018-07-03 RX ADMIN — MAGNESIUM SULFATE HEPTAHYDRATE 3 G: 500 INJECTION, SOLUTION INTRAMUSCULAR; INTRAVENOUS at 08:07

## 2018-07-03 RX ADMIN — OXYCODONE HYDROCHLORIDE 5 MG: 5 SOLUTION ORAL at 11:07

## 2018-07-03 RX ADMIN — POTASSIUM CHLORIDE 40 MEQ: 20 SOLUTION ORAL at 12:07

## 2018-07-03 RX ADMIN — HEPARIN SODIUM 5000 UNITS: 5000 INJECTION, SOLUTION INTRAVENOUS; SUBCUTANEOUS at 09:07

## 2018-07-03 RX ADMIN — ONDANSETRON 8 MG: 2 INJECTION INTRAMUSCULAR; INTRAVENOUS at 06:07

## 2018-07-03 NOTE — PROGRESS NOTES
[facility] has accepted the patient. [facility] has been to the bedside to see the patient already.

## 2018-07-03 NOTE — ASSESSMENT & PLAN NOTE
-day 3 on fluconazole gtube 200mg QD.   -Plan for 14 days of treatment.   -Strep throat swab negative

## 2018-07-03 NOTE — SUBJECTIVE & OBJECTIVE
Interval History: Last vomited yesterday early afternoon.  Nausea improved.  Denies dysuria.  Feeling better overall and throat pain control improving as well.  Denies constipation or diarrhea.    Oncology Treatment Plan:   OP CISPLATIN (Weekly)    Medications:  Continuous Infusions:  Scheduled Meds:   duke's soln (benadryl 30 mL, mylanta 30 mL, lidocane 30 mL, nystatin 30 mL) 120 mL  10 mL Oral QID    fluconazole 40 mg/ml  200 mg Per G Tube Daily    heparin (porcine)  5,000 Units Subcutaneous Q8H    magnesium sulfate IVPB  3 g Intravenous Q2H    potassium chloride 10%  40 mEq Per G Tube Q4H     PRN Meds:dextrose 50%, dextrose 50%, glucagon (human recombinant), glucose, glucose, oxyCODONE, polyethylene glycol, prochlorperazine, senna-docusate 8.6-50 mg, sodium chloride 0.9%     Review of Systems   Constitutional: Negative for appetite change, fever and unexpected weight change.   HENT: Positive for sore throat and trouble swallowing.    Respiratory: Negative for cough, chest tightness and shortness of breath.    Gastrointestinal: Negative for abdominal distention, abdominal pain, nausea and vomiting.   Genitourinary: Negative for dysuria.   Skin: Negative for pallor.   Neurological: Positive for weakness. Negative for dizziness and light-headedness.   Psychiatric/Behavioral: Negative for confusion.     Objective:     Vital Signs (Most Recent):  Temp: 98.7 °F (37.1 °C) (07/03/18 0735)  Pulse: 79 (07/03/18 0735)  Resp: 20 (07/03/18 0735)  BP: (!) 141/88 (07/03/18 0735)  SpO2: 96 % (07/03/18 0735) Vital Signs (24h Range):  Temp:  [98 °F (36.7 °C)-98.8 °F (37.1 °C)] 98.7 °F (37.1 °C)  Pulse:  [68-79] 79  Resp:  [18-20] 20  SpO2:  [95 %-97 %] 96 %  BP: (121-141)/(64-88) 141/88     Weight: 85.7 kg (188 lb 14.4 oz)  Body mass index is 28.72 kg/m².  Body surface area is 2.03 meters squared.      Intake/Output Summary (Last 24 hours) at 07/03/18 1221  Last data filed at 07/02/18 2144   Gross per 24 hour   Intake           2473.33 ml   Output                0 ml   Net          2473.33 ml       Physical Exam   Constitutional: He is oriented to person, place, and time. No distress.   AOx3   HENT:   Right Ear: External ear normal.   Left Ear: External ear normal.   Mouth/Throat: Oropharyngeal exudate present.   Thrush  Oneil appearance to head neck  Hyperemic palate    Eyes: EOM are normal. Pupils are equal, round, and reactive to light. Right eye exhibits no discharge. Left eye exhibits no discharge. No scleral icterus.   Neck: Normal range of motion. No JVD present. No tracheal deviation present.   Cardiovascular: Regular rhythm, normal heart sounds and intact distal pulses.  Exam reveals no gallop and no friction rub.    No murmur heard.  Pulmonary/Chest: Effort normal. No respiratory distress. He has no wheezes. He has rales (bilateral; unchanged from previous exam). He exhibits no tenderness.   Abdominal: Soft. Bowel sounds are normal. He exhibits no distension and no mass. There is no tenderness. There is no rebound and no guarding.   PEG site cdi    Musculoskeletal: Normal range of motion. He exhibits no edema or tenderness.   Neurological: He is alert and oriented to person, place, and time. No cranial nerve deficit. Coordination normal.   Skin: Skin is warm. Capillary refill takes less than 2 seconds. No rash noted. He is not diaphoretic. No erythema. No pallor.   Psychiatric: He has a normal mood and affect.       Significant Labs:   CBC:   Recent Labs  Lab 07/02/18  0421 07/03/18  0810   WBC 2.21* 2.74*   HGB 8.0* 8.4*   HCT 23.4* 23.1*    166    and CMP:   Recent Labs  Lab 07/01/18  1352 07/02/18  0422 07/03/18  0358    142 138   K 3.0* 3.3* 3.1*    102 99   CO2 28 29 26   GLU 92 78 79   BUN 25* 21* 15   CREATININE 1.4 1.3 1.1   CALCIUM 7.7* 7.8* 8.6*   PROT  --  6.1 6.1   ALBUMIN  --  3.3* 3.3*   BILITOT  --  0.7 0.7   ALKPHOS  --  79 84   AST  --  21 18   ALT  --  21 18   ANIONGAP 13 11 13   EGFRNONAA  58.2* >60.0 >60.0       Diagnostic Results:  None

## 2018-07-03 NOTE — ASSESSMENT & PLAN NOTE
Admitted because failed enteral intake of home Zofran/ Phenergan via PEG  -n/v improving with antiemetics  -s/p IVF; tolerating liquid diet  -CT scans at OSH show no foci of infection, follow blood cultures from OSH

## 2018-07-03 NOTE — PLAN OF CARE
MDR's with Dr Cordero.  Patient nausea has improved.  Plan to transition to PEG/PO pain meds today.  Planning for potential d/c tomorrow.  SW arranging HH needs.      Future Appointments  Date Time Provider Department Center   7/5/2018 8:30 AM LAB, HEMONC CANCER BLD[facility] LAB HO [facility]   7/5/2018 9:45 AM Cortney Lopez MD [facility] HEM ONC [facility]        07/03/18 1400   Final Note   Assessment Type Final Discharge Note   Discharge Disposition Home-Health   What phone number can be called within the next 1-3 days to see how you are doing after discharge? ([phone])   Hospital Follow Up  Appt(s) scheduled? Yes

## 2018-07-03 NOTE — PLAN OF CARE
Problem: Patient Care Overview  Goal: Plan of Care Review  Outcome: Ongoing (interventions implemented as appropriate)  Pt AAOx4. Sister at bedside. Pt NPO, PEG tube intact and patent. Pt tolerated bolus TFs x2 of 60 cc overnight. Pain managed with PRN pain meds. VSS on RA. Pt ambulates to the bathroom independently. Call light in reach and bed in lowest position. TEDs on. Pt slept between care. Pt remains free of falls and injury. No acute events this shift. Will continue to monitor.

## 2018-07-03 NOTE — PLAN OF CARE
Problem: Patient Care Overview  Goal: Plan of Care Review  Plan of care reviewed with patient who verbalized understanding.  Denies SOB/CP.  Pt reports better pain control with liquid medication via PEG.  1 episode of emesis today.  Nausea relieved with prn medication.  Ambulating in the [place] with SB assist.  Up in room independently.  Call bell remains within reach.  Bed in lowest position.  Frequent rounds made for pt safety.  Patient remains free of any new or additional falls/injury at this time. VSS, will continue to monitor.

## 2018-07-03 NOTE — PROGRESS NOTES
Referrals sent to [facility] for potential discharge tomorrow via [facility].

## 2018-07-03 NOTE — PLAN OF CARE
[facility]    HOME HEALTH ORDERS  FACE TO FACE ENCOUNTER    Patient Name: Burton Whiting  YOB: 1970    PCP: Christopher Turpin DO   PCP Address: [address] [place] [address] / [place] [address]  PCP Phone Number: [phone]  PCP Fax: [phone]    Encounter Date: 07/03/2018    Admit to Home Health    Diagnoses:  Active Hospital Problems    Diagnosis  POA    *Intractable vomiting with nausea [R11.2]  Yes    Macrocytic anemia [D53.9]  Yes    Status post insertion of percutaneous endoscopic gastrostomy (PEG) tube [Z93.1]  Not Applicable    Electrolyte abnormality [E87.8]  Yes    Thrush [B37.0]  Yes    Squamous cell carcinoma of palatine tonsil [C09.9]  Yes      Resolved Hospital Problems    Diagnosis Date Resolved POA   No resolved problems to display.       Future Appointments  Date Time Provider Department Center   7/5/2018 8:30 AM LAB, HEMONC CANCER [facility] LAB HO [facility]   7/5/2018 9:45 AM Cortney Lopez MD [facility] HEM ONC [facility]     Follow-up Information     [facility] On 7/5/2018.    Specialty:  Lab  Why:  Labs-8:30am  Contact information:  [address]  [facility] [address] [phone]  Additional information:  [facility] 3rd Floor           Cortney Lopez MD On 7/5/2018.    Specialties:  Hematology and Oncology, Hematology  Why:  follow up- 9:45am  Contact information:  [address]  [facility] [id number] [phone]                     I have seen and examined this patient face to face today. My clinical findings that support the need for the home health skilled services and home bound status are the following:  Medical restrictions requiring assistance of another human to leave home due to  Newly placed G-tube/ostomy and Morbid Obesity.    Allergies:Review of patient's allergies indicates:  No Known Allergies    Diet: tube feedings: Bolusisosource 1.5 or its equivalent, 4 times daily.  150cc water flushes every 6  hours. 2 packets of psyllium daily with water flushes    Activities: activity as tolerated    Nursing:   SN to complete comprehensive assessment including routine vital signs. Instruct on disease process and s/s of complications to report to MD. Review/verify medication list sent home with the patient at time of discharge  and instruct patient/caregiver as needed. Frequency may be adjusted depending on start of care date.    Notify MD if SBP > 160 or < 90; DBP > 90 or < 50; HR > 120 or < 50; Temp > 101; Other:         CONSULTS:    Aide to provide assistance with personal care, ADLs, and vital signs.    MISCELLANEOUS CARE:  PEG Care:  Instruct patient/caregiver to clean site.  Monitor skin integrity.    WOUND CARE ORDERS  n/a      Medications: Review discharge medications with patient and family and provide education.      Current Discharge Medication List      CONTINUE these medications which have NOT CHANGED    Details   cholecalciferol, vitamin D3, (VITAMIN D3) 1,000 unit capsule Take 1,000 Units by mouth once daily.      fluconazole 40 mg/ml (DIFLUCAN) 40 mg/mL suspension Take 5 mLs (200 mg total) by mouth once daily. for 14 days  Qty: 70 mL, Refills: 0    Associated Diagnoses: Oral thrush      heparin, porcine, PF, (HEPARIN LOCKFLUSH,PORCINE,,PF,) 10 unit/mL Syrg Inject 1 mL (10 Units total) into the vein as needed.  Qty: 200 mL, Refills: 12    Associated Diagnoses: Tonsil cancer      HYDROmorphone (DILAUDID) 4 MG tablet Take 1 tablet (4 mg total) by mouth every 4 (four) hours as needed for Pain.  Qty: 90 tablet, Refills: 0    Associated Diagnoses: Neoplasm related pain      IBUPROFEN (ADVIL ORAL) Take 1 tablet by mouth as needed.      lactulose (CHRONULAC) 10 gram/15 mL solution TAKE 30ML(CC) BY MOUTH THREE TIMES DAILY  Refills: 6      lidocaine HCl 2% (XYLOCAINE) 2 % Soln by Mucous Membrane route every 3 (three) hours.  Qty: 500 mL, Refills: 6    Associated Diagnoses: Oral mucositis      LORazepam (ATIVAN) 0.5  MG tablet Take 1 tablet (0.5 mg total) by mouth every 6 (six) hours as needed for Anxiety.  Qty: 60 tablet, Refills: 0    Associated Diagnoses: Anxiety attack      NYSTATIN (DUKE'S SOLUTION) Mix equal amounts of benadryl, maalox, 2% viscous lidocaine and nystatin    Swish and swallow 10 ml 4 times a day  Qty: 500 mL, Refills: 5    Associated Diagnoses: Squamous cell carcinoma of palatine tonsil      nystatin (MYCOSTATIN) cream Apply topically 2 (two) times daily.  Qty: 30 g, Refills: 6    Associated Diagnoses: Pain around percutaneous endoscopic gastrostomy (PEG) tube site, initial encounter      ondansetron (ZOFRAN) 4 mg/5 mL solution Take 10 mLs (8 mg total) by mouth every 6 (six) hours as needed for Nausea.  Qty: 500 mL, Refills: 3    Associated Diagnoses: Nausea      ondansetron (ZOFRAN) 8 MG tablet Take 1 tablet (8 mg total) by mouth 4 (four) times daily as needed for Nausea.  Qty: 60 tablet, Refills: 2    Associated Diagnoses: Squamous cell carcinoma of palatine tonsil      oxyCODONE (ROXICODONE) 5 mg/5 mL Soln Take 5 mLs (5 mg total) by mouth every 6 (six) hours as needed.  Qty: 473 mL, Refills: 0    Associated Diagnoses: Neoplasm related pain      promethazine (PHENERGAN) 6.25 mg/5 mL syrup Take 20 mLs (25 mg total) by mouth every 6 (six) hours as needed for Nausea.  Qty: 500 mL, Refills: 6    Associated Diagnoses: Nausea      psyllium husk, aspartame, (METAMUCIL) 3.4 gram PwPk packet Take 2 packets by mouth once daily.      sodium chloride 0.9% injection Inject 10 mLs into the vein as needed.  Qty: 200 mL, Refills: 12    Associated Diagnoses: Tonsil cancer             I certify that this patient is confined to his home and needs intermittent skilled nursing care.

## 2018-07-03 NOTE — PROGRESS NOTES
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 Patient also has a recent history of sepsis, diverticulitis, colitis and is s/p PEG tube. He presents to the [facility] ED today in [place] accompanied by his sister with complaints of malaise, fatigue, lethargy, intractable n/v occurring several times per day over the last 4 days. He reports mild epigastric discomfort, but denies focal pain. He has been getting Gatorade for rehydration via the PEG, but all pain control and antiemetics at home (Zofran/ Phenergan syrup) have failed due to retching and vomiting of stomach contents. Vomit has been clear to bilious, no current hematemesis. Patient reports cold chills, denies fevers and rigors. He complains of pain in his L tonsil, denies diarrhea. His sister notes lethargy with some confusion, but reports no focal deficits, overt delirium. Patient fell on his knee at the ED prior to arrival, reports some gait instability and falls preceding admission.     Onc hx per Dr. Lopez: Mr. Burton Whiting is a 48-year-old male, former smoker, who presents today with a four-month history of enlarging neck mass.  He initially delayed care due to lack of insurance.  He was seen by Dr. Turpin who referred him to see Dr. Olguin.  He had a CT that showed a 3.8 x 3.8 x 4.9 cm soft tissue mass arising from the left palatine tonsil resulting in moderate narrowing of the hypopharyngeal airway adjacent extensive matted left cervical lymphadenopathy was noted.  The largest ella mass was 6.6 x 9 x 2.6 cm extending inferiorly to the supraclavicular region.  The mass pushed the trachea and the left common carotid artery to the  right.  Additional representative of cervical lymph nodes measuring 2.9 x 3.1 x 3.5 cm on axial image 37 of series 3   and 2.4 x 2.1 x 2.2 cm on axial image 55 of series 3.  There is also a sclerotic lesion involving the midline of the clivus measuring 1.2 cm.  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  PET scan performed on 01/19/2018 revealed persistent evidence of a soft tissue mass arising from the left palatine tonsil resulting in moderate narrowing of the hypopharyngeal   airway.  The mass is hypermetabolic demonstrating an SUV max of 18.5.  There is also persistent adjacent extensive matted left cervical lymphadenopathy, largest of this measuring 6.7 x 8.42 with hypermetabolic activity and SUV max of 20.5.  Lymphadenopathy is again noted to have a mass effect on the trachea and left common carotid artery, pushing both structures towards the right.  There is also prominent right cervical lymph nodes with the largest measuring 0.8 cm and demonstrating mild hypermetabolic activity with SUV max of 1.8, physiologic   distribution of FDG in the gray matter.  There are 3 pulmonary nodules noted within the right lung, the largest is in the anterior segment of the right upper lobe measuring 1 cm, second is visualized in the middle lobe measuring 0.6 cm and the third is within the posterior basal segment measuring 0.6 cm.  There is one pulmonary nodule within the left lung within the lateral basal segment measuring 0.6 cm.  These nodules do not demonstrate hypermetabolic activity; however, they are below the threshold for observation with PET    Interval History: Last vomited yesterday early afternoon.  Nausea improved.  Denies dysuria.  Feeling better overall and throat pain control improving as well.  Denies constipation or diarrhea.    Oncology Treatment Plan:   OP CISPLATIN (Weekly)    Medications:  Continuous Infusions:  Scheduled Meds:   duke's soln (benadryl 30 mL, mylanta 30 mL,  lidocane 30 mL, nystatin 30 mL) 120 mL  10 mL Oral QID    fluconazole 40 mg/ml  200 mg Per G Tube Daily    heparin (porcine)  5,000 Units Subcutaneous Q8H    magnesium sulfate IVPB  3 g Intravenous Q2H    potassium chloride 10%  40 mEq Per G Tube Q4H     PRN Meds:dextrose 50%, dextrose 50%, glucagon (human recombinant), glucose, glucose, oxyCODONE, polyethylene glycol, prochlorperazine, senna-docusate 8.6-50 mg, sodium chloride 0.9%     Review of Systems   Constitutional: Negative for appetite change, fever and unexpected weight change.   HENT: Positive for sore throat and trouble swallowing.    Respiratory: Negative for cough, chest tightness and shortness of breath.    Gastrointestinal: Negative for abdominal distention, abdominal pain, nausea and vomiting.   Genitourinary: Negative for dysuria.   Skin: Negative for pallor.   Neurological: Positive for weakness. Negative for dizziness and light-headedness.   Psychiatric/Behavioral: Negative for confusion.     Objective:     Vital Signs (Most Recent):  Temp: 98.7 °F (37.1 °C) (07/03/18 0735)  Pulse: 79 (07/03/18 0735)  Resp: 20 (07/03/18 0735)  BP: (!) 141/88 (07/03/18 0735)  SpO2: 96 % (07/03/18 0735) Vital Signs (24h Range):  Temp:  [98 °F (36.7 °C)-98.8 °F (37.1 °C)] 98.7 °F (37.1 °C)  Pulse:  [68-79] 79  Resp:  [18-20] 20  SpO2:  [95 %-97 %] 96 %  BP: (121-141)/(64-88) 141/88     Weight: 85.7 kg (188 lb 14.4 oz)  Body mass index is 28.72 kg/m².  Body surface area is 2.03 meters squared.      Intake/Output Summary (Last 24 hours) at 07/03/18 1221  Last data filed at 07/02/18 2144   Gross per 24 hour   Intake          2473.33 ml   Output                0 ml   Net          2473.33 ml       Physical Exam   Constitutional: He is oriented to person, place, and time. No distress.   AOx3   HENT:   Right Ear: External ear normal.   Left Ear: External ear normal.   Mouth/Throat: Oropharyngeal exudate present.   Thrush  Oneil appearance to head neck  Hyperemic palate     Eyes: EOM are normal. Pupils are equal, round, and reactive to light. Right eye exhibits no discharge. Left eye exhibits no discharge. No scleral icterus.   Neck: Normal range of motion. No JVD present. No tracheal deviation present.   Cardiovascular: Regular rhythm, normal heart sounds and intact distal pulses.  Exam reveals no gallop and no friction rub.    No murmur heard.  Pulmonary/Chest: Effort normal. No respiratory distress. He has no wheezes. He has rales (bilateral; unchanged from previous exam). He exhibits no tenderness.   Abdominal: Soft. Bowel sounds are normal. He exhibits no distension and no mass. There is no tenderness. There is no rebound and no guarding.   PEG site cdi    Musculoskeletal: Normal range of motion. He exhibits no edema or tenderness.   Neurological: He is alert and oriented to person, place, and time. No cranial nerve deficit. Coordination normal.   Skin: Skin is warm. Capillary refill takes less than 2 seconds. No rash noted. He is not diaphoretic. No erythema. No pallor.   Psychiatric: He has a normal mood and affect.       Significant Labs:   CBC:   Recent Labs  Lab 07/02/18  0421 07/03/18  0810   WBC 2.21* 2.74*   HGB 8.0* 8.4*   HCT 23.4* 23.1*    166    and CMP:   Recent Labs  Lab 07/01/18  1352 07/02/18  0422 07/03/18  0358    142 138   K 3.0* 3.3* 3.1*    102 99   CO2 28 29 26   GLU 92 78 79   BUN 25* 21* 15   CREATININE 1.4 1.3 1.1   CALCIUM 7.7* 7.8* 8.6*   PROT  --  6.1 6.1   ALBUMIN  --  3.3* 3.3*   BILITOT  --  0.7 0.7   ALKPHOS  --  79 84   AST  --  21 18   ALT  --  21 18   ANIONGAP 13 11 13   EGFRNONAA 58.2* >60.0 >60.0       Diagnostic Results:  None    Assessment/Plan:     * Intractable vomiting with nausea    Admitted because failed enteral intake of home Zofran/ Phenergan via PEG  -n/v improving with antiemetics  -s/p IVF; tolerating liquid diet  -CT scans at OSH show no foci of infection, follow blood cultures from OSH        Macrocytic  anemia    -HB stable. Replete B vitamins when PO stable.   -Hb 8.4 (8.5 yesterday)        Status post insertion of percutaneous endoscopic gastrostomy (PEG) tube    -on clear fluids  -continue with tube feeds  -CT ap stable         Electrolyte abnormality    -replete PRN, presented with metabolic alkalosis per CMP with GI losses.   -K, Mg, Ca trending. Check 12 lead EKG  -K 3.1, Mg 1.6 today  -ordered K and Mg replacements         Thrush    -day 3 on fluconazole gtube 200mg QD.   -Plan for 14 days of treatment.   -Strep throat swab negative        Squamous cell carcinoma of palatine tonsil    -dysphagia, odynophagia with known SCC w/ mets and XRT, Onc history as listed.  -likely presenting with platinum based chemotherapy induced n/v  -clear fluids diet  -supportive care  -patient still experiencing dysphagia with solids, will continue him on liquid diet.                  Roman Nelson MD  Hematology/Oncology  [facility]

## 2018-07-03 NOTE — ASSESSMENT & PLAN NOTE
-replete PRN, presented with metabolic alkalosis per CMP with GI losses.   -K, Mg, Ca trending. Check 12 lead EKG  -K 3.1, Mg 1.6 today  -ordered K and Mg replacements

## 2018-07-04 LAB
ALBUMIN SERPL BCP-MCNC: 3.3 G/DL
ALP SERPL-CCNC: 77 U/L
ALT SERPL W/O P-5'-P-CCNC: 15 U/L
ANION GAP SERPL CALC-SCNC: 10 MMOL/L
AST SERPL-CCNC: 13 U/L
BACTERIA THROAT CULT: NORMAL
BACTERIA UR CULT: NORMAL
BILIRUB SERPL-MCNC: 0.6 MG/DL
BUN SERPL-MCNC: 13 MG/DL
CALCIUM SERPL-MCNC: 8.7 MG/DL
CHLORIDE SERPL-SCNC: 100 MMOL/L
CO2 SERPL-SCNC: 28 MMOL/L
CREAT SERPL-MCNC: 1.4 MG/DL
EST. GFR  (AFRICAN AMERICAN): >60 ML/MIN/1.73 M^2
EST. GFR  (NON AFRICAN AMERICAN): 58.2 ML/MIN/1.73 M^2
GLUCOSE SERPL-MCNC: 82 MG/DL
MAGNESIUM SERPL-MCNC: 2.1 MG/DL
PHOSPHATE SERPL-MCNC: 3.5 MG/DL
POCT GLUCOSE: 110 MG/DL (ref 70–110)
POCT GLUCOSE: 82 MG/DL (ref 70–110)
POCT GLUCOSE: 91 MG/DL (ref 70–110)
POCT GLUCOSE: 95 MG/DL (ref 70–110)
POCT GLUCOSE: 96 MG/DL (ref 70–110)
POTASSIUM SERPL-SCNC: 3.5 MMOL/L
PROT SERPL-MCNC: 6.2 G/DL
SODIUM SERPL-SCNC: 138 MMOL/L

## 2018-07-04 PROCEDURE — 63600175 PHARM REV CODE 636 W HCPCS: Performed by: STUDENT IN AN ORGANIZED HEALTH CARE EDUCATION/TRAINING PROGRAM

## 2018-07-04 PROCEDURE — 20600001 HC STEP DOWN PRIVATE ROOM

## 2018-07-04 PROCEDURE — 84100 ASSAY OF PHOSPHORUS: CPT

## 2018-07-04 PROCEDURE — 25000003 PHARM REV CODE 250: Performed by: STUDENT IN AN ORGANIZED HEALTH CARE EDUCATION/TRAINING PROGRAM

## 2018-07-04 PROCEDURE — 36415 COLL VENOUS BLD VENIPUNCTURE: CPT

## 2018-07-04 PROCEDURE — 99231 SBSQ HOSP IP/OBS SF/LOW 25: CPT | Mod: ,,, | Performed by: INTERNAL MEDICINE

## 2018-07-04 PROCEDURE — 80053 COMPREHEN METABOLIC PANEL: CPT

## 2018-07-04 PROCEDURE — 63600175 PHARM REV CODE 636 W HCPCS: Performed by: INTERNAL MEDICINE

## 2018-07-04 PROCEDURE — 83735 ASSAY OF MAGNESIUM: CPT

## 2018-07-04 PROCEDURE — 25000003 PHARM REV CODE 250: Performed by: INTERNAL MEDICINE

## 2018-07-04 RX ORDER — LACTULOSE 10 G/15ML
20 SOLUTION ORAL 3 TIMES DAILY
Status: DISCONTINUED | OUTPATIENT
Start: 2018-07-04 | End: 2018-07-05 | Stop reason: HOSPADM

## 2018-07-04 RX ADMIN — HEPARIN SODIUM 5000 UNITS: 5000 INJECTION, SOLUTION INTRAVENOUS; SUBCUTANEOUS at 05:07

## 2018-07-04 RX ADMIN — OXYCODONE HYDROCHLORIDE 5 MG: 5 SOLUTION ORAL at 05:07

## 2018-07-04 RX ADMIN — OXYCODONE HYDROCHLORIDE 5 MG: 5 SOLUTION ORAL at 07:07

## 2018-07-04 RX ADMIN — POTASSIUM CHLORIDE 10 MEQ: 10 INJECTION, SOLUTION INTRAVENOUS at 12:07

## 2018-07-04 RX ADMIN — ONDANSETRON 8 MG: 2 INJECTION INTRAMUSCULAR; INTRAVENOUS at 03:07

## 2018-07-04 RX ADMIN — ONDANSETRON 8 MG: 2 INJECTION INTRAMUSCULAR; INTRAVENOUS at 10:07

## 2018-07-04 RX ADMIN — Medication 10 ML: at 10:07

## 2018-07-04 RX ADMIN — PROCHLORPERAZINE EDISYLATE 5 MG: 5 INJECTION INTRAMUSCULAR; INTRAVENOUS at 07:07

## 2018-07-04 RX ADMIN — ONDANSETRON 8 MG: 2 INJECTION INTRAMUSCULAR; INTRAVENOUS at 07:07

## 2018-07-04 RX ADMIN — LACTULOSE 20 G: 20 SOLUTION ORAL at 03:07

## 2018-07-04 RX ADMIN — OXYCODONE HYDROCHLORIDE 5 MG: 5 SOLUTION ORAL at 03:07

## 2018-07-04 RX ADMIN — OXYCODONE HYDROCHLORIDE 5 MG: 5 SOLUTION ORAL at 10:07

## 2018-07-04 RX ADMIN — HEPARIN SODIUM 5000 UNITS: 5000 INJECTION, SOLUTION INTRAVENOUS; SUBCUTANEOUS at 10:07

## 2018-07-04 RX ADMIN — Medication 10 ML: at 01:07

## 2018-07-04 RX ADMIN — HEPARIN SODIUM 5000 UNITS: 5000 INJECTION, SOLUTION INTRAVENOUS; SUBCUTANEOUS at 01:07

## 2018-07-04 RX ADMIN — Medication 10 ML: at 06:07

## 2018-07-04 RX ADMIN — FLUCONAZOLE 200 MG: 40 POWDER, FOR SUSPENSION ORAL at 09:07

## 2018-07-04 NOTE — ASSESSMENT & PLAN NOTE
-day 5 on fluconazole gtube 200mg QD.   -Plan for 14 days of treatment.   -Strep throat swab negative

## 2018-07-04 NOTE — SUBJECTIVE & OBJECTIVE
Interval History: Vomited twice in past 24 hours. Denies dysuria. Denies constipation or diarrhea.    Oncology Treatment Plan:   OP CISPLATIN (Weekly)    Medications:  Continuous Infusions:  Scheduled Meds:   duke's soln (benadryl 30 mL, mylanta 30 mL, lidocane 30 mL, nystatin 30 mL) 120 mL  10 mL Oral QID    fluconazole 40 mg/ml  200 mg Per G Tube Daily    heparin (porcine)  5,000 Units Subcutaneous Q8H    lactulose  20 g Per G Tube TID     PRN Meds:dextrose 50%, dextrose 50%, glucagon (human recombinant), glucose, glucose, ondansetron, oxyCODONE, polyethylene glycol, prochlorperazine, senna-docusate 8.6-50 mg, sodium chloride 0.9%     Review of Systems   Constitutional: Negative for appetite change, fever and unexpected weight change.   HENT: Positive for sore throat and trouble swallowing.    Respiratory: Negative for cough, chest tightness and shortness of breath.    Gastrointestinal: Negative for abdominal distention, abdominal pain, nausea and vomiting.   Genitourinary: Negative for dysuria.   Skin: Negative for pallor.   Neurological: Positive for weakness. Negative for dizziness and light-headedness.   Psychiatric/Behavioral: Negative for confusion.     Objective:     Vital Signs (Most Recent):  Temp: 96.2 °F (35.7 °C) (07/04/18 1135)  Pulse: 87 (07/04/18 1135)  Resp: 18 (07/04/18 1135)  BP: 127/77 (07/04/18 1135)  SpO2: 96 % (07/04/18 1135) Vital Signs (24h Range):  Temp:  [96.2 °F (35.7 °C)-98.9 °F (37.2 °C)] 96.2 °F (35.7 °C)  Pulse:  [69-96] 87  Resp:  [16-18] 18  SpO2:  [95 %-98 %] 96 %  BP: (113-163)/() 127/77     Weight: 85.7 kg (188 lb 14.4 oz)  Body mass index is 28.72 kg/m².  Body surface area is 2.03 meters squared.      Intake/Output Summary (Last 24 hours) at 07/04/18 1139  Last data filed at 07/04/18 1052   Gross per 24 hour   Intake             1560 ml   Output                0 ml   Net             1560 ml       Physical Exam   Constitutional: He is oriented to person, place, and  time. No distress.   AOx3   HENT:   Right Ear: External ear normal.   Left Ear: External ear normal.   Mouth/Throat: Oropharyngeal exudate present.   Thrush  Oneil appearance to head neck  Hyperemic palate    Eyes: EOM are normal. Pupils are equal, round, and reactive to light. Right eye exhibits no discharge. Left eye exhibits no discharge. No scleral icterus.   Neck: Normal range of motion. No JVD present. No tracheal deviation present.   Cardiovascular: Regular rhythm, normal heart sounds and intact distal pulses.  Exam reveals no gallop and no friction rub.    No murmur heard.  Pulmonary/Chest: Effort normal. No respiratory distress. He has no wheezes. He has rales (bilateral; unchanged from previous exam). He exhibits no tenderness.   Abdominal: Soft. Bowel sounds are normal. He exhibits no distension and no mass. There is no tenderness. There is no rebound and no guarding.   PEG site cdi    Musculoskeletal: Normal range of motion. He exhibits no edema or tenderness.   Neurological: He is alert and oriented to person, place, and time. No cranial nerve deficit. Coordination normal.   Skin: Skin is warm. Capillary refill takes less than 2 seconds. No rash noted. He is not diaphoretic. No erythema. No pallor.   Psychiatric: He has a normal mood and affect.       Significant Labs:   CBC:     Recent Labs  Lab 07/03/18  0810   WBC 2.74*   HGB 8.4*   HCT 23.1*       and CMP:     Recent Labs  Lab 07/03/18  0358 07/04/18  0420    138   K 3.1* 3.5   CL 99 100   CO2 26 28   GLU 79 82   BUN 15 13   CREATININE 1.1 1.4   CALCIUM 8.6* 8.7   PROT 6.1 6.2   ALBUMIN 3.3* 3.3*   BILITOT 0.7 0.6   ALKPHOS 84 77   AST 18 13   ALT 18 15   ANIONGAP 13 10   EGFRNONAA >60.0 58.2*       Diagnostic Results:  None

## 2018-07-04 NOTE — ASSESSMENT & PLAN NOTE
Admitted because failed enteral intake of home Zofran/ Phenergan via PEG  -n/v improving with antiemetics  -s/p IVF; tolerating liquid diet  -CT scans at OSH show no foci of infection, follow blood cultures from OSH  -patient back on Zofran/Phenergan with improving control of N/S (2 episodes in past 24 hours)

## 2018-07-04 NOTE — PLAN OF CARE
Problem: Patient Care Overview  Goal: Plan of Care Review  Outcome: Ongoing (interventions implemented as appropriate)  Pt AAOx4. Sister at bedside. Pt NPO, PEG tube intact and patent. Pt tolerated bolus TF of 60cc x1 overnight. Nausea & pain managed with PRN pain meds. VSS on RA. Pt ambulates to the bathroom independently. Pt showered overnight. Call light in reach and bed in lowest position. Pt slept between care. Pt remains free of falls and injury. No acute events this shift. Will continue to monitor.

## 2018-07-04 NOTE — ASSESSMENT & PLAN NOTE
-replete PRN, presented with metabolic alkalosis per CMP with GI losses.   -K, Mg, Ca trending. Check 12 lead EKG  -ordered K and Mg replacements

## 2018-07-04 NOTE — HOSPITAL COURSE
Mr. Whiting is a 49 yo. M with SCC L palatine tonsil w/ LN/bone metastases, s/p chemotherapy/XRT, presented with intractable N/V unresponsive to Phenergan/Zofran. N/V was controlled on IV compazine, Ativan. Patient has since been able to tolerate liquid diet with supplements via PEG. Patient was hypokalemic and hypocalcemic on admission, which were resolved with supplementation. Pain controlled on oxycodone and hydromorphone PRN. Patient is currently stable, tolerating clear liquids diet with PEG supplementation, and his N/V is controlled on Phenergan and Zofran.

## 2018-07-04 NOTE — PLAN OF CARE
Problem: Patient Care Overview  Goal: Plan of Care Review  Plan of care reviewed with patient who verbalized understanding.  Pt reported 1 small episode of emesis today. Nausea relieved with prn antiemetics.  Up independently in room/ambulating hallway with sister.  CBG monitored Q4 hours.  Call bell remains within reach.  Bed in lowest position.  Frequent rounds made for pt safety.  Patient remains free of any new or additional falls/injury at this time.  VSS, will continue to monitor.

## 2018-07-05 ENCOUNTER — PATIENT MESSAGE (OUTPATIENT)
Dept: HEMATOLOGY/ONCOLOGY | Facility: CLINIC | Age: 48
End: 2018-07-05

## 2018-07-05 ENCOUNTER — TELEPHONE (OUTPATIENT)
Dept: PHARMACY | Facility: CLINIC | Age: 48
End: 2018-07-05

## 2018-07-05 VITALS
OXYGEN SATURATION: 97 % | RESPIRATION RATE: 18 BRPM | BODY MASS INDEX: 28.62 KG/M2 | SYSTOLIC BLOOD PRESSURE: 136 MMHG | TEMPERATURE: 99 F | DIASTOLIC BLOOD PRESSURE: 77 MMHG | HEIGHT: 68 IN | HEART RATE: 67 BPM | WEIGHT: 188.88 LBS

## 2018-07-05 PROBLEM — E44.0 MODERATE MALNUTRITION: Status: ACTIVE | Noted: 2018-07-05

## 2018-07-05 PROBLEM — Z93.1 STATUS POST INSERTION OF PERCUTANEOUS ENDOSCOPIC GASTROSTOMY (PEG) TUBE: Status: RESOLVED | Noted: 2018-04-14 | Resolved: 2018-07-05

## 2018-07-05 PROBLEM — R11.2 INTRACTABLE VOMITING WITH NAUSEA: Status: RESOLVED | Noted: 2018-07-01 | Resolved: 2018-07-05

## 2018-07-05 PROBLEM — B37.0 THRUSH: Status: RESOLVED | Noted: 2018-02-06 | Resolved: 2018-07-05

## 2018-07-05 PROBLEM — E87.8 ELECTROLYTE ABNORMALITY: Status: RESOLVED | Noted: 2018-02-28 | Resolved: 2018-07-05

## 2018-07-05 LAB
ALBUMIN SERPL BCP-MCNC: 3.3 G/DL
ALP SERPL-CCNC: 78 U/L
ALT SERPL W/O P-5'-P-CCNC: 11 U/L
ANION GAP SERPL CALC-SCNC: 13 MMOL/L
AST SERPL-CCNC: 11 U/L
BACTERIA BLD CULT: NORMAL
BACTERIA BLD CULT: NORMAL
BILIRUB SERPL-MCNC: 0.5 MG/DL
BUN SERPL-MCNC: 12 MG/DL
CALCIUM SERPL-MCNC: 9 MG/DL
CHLORIDE SERPL-SCNC: 99 MMOL/L
CO2 SERPL-SCNC: 24 MMOL/L
CREAT SERPL-MCNC: 1.5 MG/DL
EST. GFR  (AFRICAN AMERICAN): >60 ML/MIN/1.73 M^2
EST. GFR  (NON AFRICAN AMERICAN): 53.5 ML/MIN/1.73 M^2
GLUCOSE SERPL-MCNC: 83 MG/DL
MAGNESIUM SERPL-MCNC: 1.4 MG/DL
PHOSPHATE SERPL-MCNC: 4 MG/DL
POCT GLUCOSE: 106 MG/DL (ref 70–110)
POCT GLUCOSE: 115 MG/DL (ref 70–110)
POCT GLUCOSE: 96 MG/DL (ref 70–110)
POTASSIUM SERPL-SCNC: 3.4 MMOL/L
PROT SERPL-MCNC: 6 G/DL
SODIUM SERPL-SCNC: 136 MMOL/L

## 2018-07-05 PROCEDURE — 63600175 PHARM REV CODE 636 W HCPCS: Performed by: STUDENT IN AN ORGANIZED HEALTH CARE EDUCATION/TRAINING PROGRAM

## 2018-07-05 PROCEDURE — 80053 COMPREHEN METABOLIC PANEL: CPT

## 2018-07-05 PROCEDURE — 83735 ASSAY OF MAGNESIUM: CPT

## 2018-07-05 PROCEDURE — 25000003 PHARM REV CODE 250: Performed by: STUDENT IN AN ORGANIZED HEALTH CARE EDUCATION/TRAINING PROGRAM

## 2018-07-05 PROCEDURE — 84100 ASSAY OF PHOSPHORUS: CPT

## 2018-07-05 PROCEDURE — 36415 COLL VENOUS BLD VENIPUNCTURE: CPT

## 2018-07-05 PROCEDURE — 99239 HOSP IP/OBS DSCHRG MGMT >30: CPT | Mod: ,,, | Performed by: INTERNAL MEDICINE

## 2018-07-05 PROCEDURE — 63600175 PHARM REV CODE 636 W HCPCS: Performed by: INTERNAL MEDICINE

## 2018-07-05 RX ORDER — HYDROCODONE BITARTRATE AND ACETAMINOPHEN 7.5; 325 MG/15ML; MG/15ML
10 SOLUTION ORAL EVERY 4 HOURS PRN
Status: DISCONTINUED | OUTPATIENT
Start: 2018-07-05 | End: 2018-07-05 | Stop reason: HOSPADM

## 2018-07-05 RX ORDER — ACETAMINOPHEN 650 MG/20.3ML
650 LIQUID ORAL EVERY 4 HOURS PRN
Status: DISCONTINUED | OUTPATIENT
Start: 2018-07-05 | End: 2018-07-05

## 2018-07-05 RX ORDER — HYDROCODONE BITARTRATE AND ACETAMINOPHEN 7.5; 325 MG/15ML; MG/15ML
10 SOLUTION ORAL EVERY 4 HOURS PRN
Qty: 473 ML | Refills: 0 | Status: SHIPPED | OUTPATIENT
Start: 2018-07-05 | End: 2018-08-01

## 2018-07-05 RX ORDER — SODIUM CHLORIDE 9 MG/ML
INJECTION, SOLUTION INTRAVENOUS CONTINUOUS
Status: DISCONTINUED | OUTPATIENT
Start: 2018-07-05 | End: 2018-07-05 | Stop reason: HOSPADM

## 2018-07-05 RX ORDER — POTASSIUM CHLORIDE 7.45 MG/ML
10 INJECTION INTRAVENOUS
Status: DISPENSED | OUTPATIENT
Start: 2018-07-05 | End: 2018-07-05

## 2018-07-05 RX ORDER — HYDROCODONE BITARTRATE AND ACETAMINOPHEN 7.5; 325 MG/15ML; MG/15ML
10 SOLUTION ORAL EVERY 4 HOURS PRN
Status: DISCONTINUED | OUTPATIENT
Start: 2018-07-05 | End: 2018-07-05

## 2018-07-05 RX ORDER — MAGNESIUM SULFATE HEPTAHYDRATE 40 MG/ML
2 INJECTION, SOLUTION INTRAVENOUS
Status: COMPLETED | OUTPATIENT
Start: 2018-07-05 | End: 2018-07-05

## 2018-07-05 RX ADMIN — HYDROCODONE BITARTRATE AND ACETAMINOPHEN 10 ML: 7.5; 325 SOLUTION ORAL at 11:07

## 2018-07-05 RX ADMIN — PROCHLORPERAZINE EDISYLATE 5 MG: 5 INJECTION INTRAMUSCULAR; INTRAVENOUS at 04:07

## 2018-07-05 RX ADMIN — FLUCONAZOLE 200 MG: 40 POWDER, FOR SUSPENSION ORAL at 09:07

## 2018-07-05 RX ADMIN — ONDANSETRON 8 MG: 2 INJECTION INTRAMUSCULAR; INTRAVENOUS at 01:07

## 2018-07-05 RX ADMIN — POTASSIUM CHLORIDE 10 MEQ: 10 INJECTION, SOLUTION INTRAVENOUS at 03:07

## 2018-07-05 RX ADMIN — OXYCODONE HYDROCHLORIDE 5 MG: 5 SOLUTION ORAL at 02:07

## 2018-07-05 RX ADMIN — POTASSIUM CHLORIDE 10 MEQ: 10 INJECTION, SOLUTION INTRAVENOUS at 11:07

## 2018-07-05 RX ADMIN — PROCHLORPERAZINE EDISYLATE 5 MG: 5 INJECTION INTRAMUSCULAR; INTRAVENOUS at 08:07

## 2018-07-05 RX ADMIN — ACETAMINOPHEN 650 MG: 650 SOLUTION ORAL at 09:07

## 2018-07-05 RX ADMIN — HYDROCODONE BITARTRATE AND ACETAMINOPHEN 10 ML: 7.5; 325 SOLUTION ORAL at 04:07

## 2018-07-05 RX ADMIN — MAGNESIUM SULFATE IN WATER 2 G: 40 INJECTION, SOLUTION INTRAVENOUS at 10:07

## 2018-07-05 RX ADMIN — Medication 10 ML: at 09:07

## 2018-07-05 RX ADMIN — SODIUM CHLORIDE: 0.9 INJECTION, SOLUTION INTRAVENOUS at 08:07

## 2018-07-05 RX ADMIN — Medication 10 ML: at 03:07

## 2018-07-05 RX ADMIN — HEPARIN SODIUM 5000 UNITS: 5000 INJECTION, SOLUTION INTRAVENOUS; SUBCUTANEOUS at 06:07

## 2018-07-05 RX ADMIN — POTASSIUM CHLORIDE 10 MEQ: 10 INJECTION, SOLUTION INTRAVENOUS at 10:07

## 2018-07-05 NOTE — ASSESSMENT & PLAN NOTE
-dysphagia, odynophagia with known SCC w/ mets and XRT, Onc history as listed.  -likely presenting with platinum based chemotherapy induced n/v  -clear fluids diet  -supportive care  -patient still experiencing dysphagia with solids, will continue him on liquid diet.   -vomiting controlled on Compozine

## 2018-07-05 NOTE — PROGRESS NOTES
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 Patient also has a recent history of sepsis, diverticulitis, colitis and is s/p PEG tube. He presents to the [facility] ED today in [place] accompanied by his sister with complaints of malaise, fatigue, lethargy, intractable n/v occurring several times per day over the last 4 days. He reports mild epigastric discomfort, but denies focal pain. He has been getting Gatorade for rehydration via the PEG, but all pain control and antiemetics at home (Zofran/ Phenergan syrup) have failed due to retching and vomiting of stomach contents. Vomit has been clear to bilious, no current hematemesis. Patient reports cold chills, denies fevers and rigors. He complains of pain in his L tonsil, denies diarrhea. His sister notes lethargy with some confusion, but reports no focal deficits, overt delirium. Patient fell on his knee at the ED prior to arrival, reports some gait instability and falls preceding admission.     Onc hx per Dr. Lopez: Mr. Burton Whiting is a 48-year-old male, former smoker, who presents today with a four-month history of enlarging neck mass.  He initially delayed care due to lack of insurance.  He was seen by Dr. Turpin who referred him to see Dr. Olguin.  He had a CT that showed a 3.8 x 3.8 x 4.9 cm soft tissue mass arising from the left palatine tonsil resulting in moderate narrowing of the hypopharyngeal airway adjacent extensive matted left cervical lymphadenopathy was noted.  The largest ella mass was 6.6 x 9 x 2.6 cm extending inferiorly to the supraclavicular region.  The mass pushed the trachea and the left common carotid artery to the  right.  Additional representative of cervical lymph nodes measuring 2.9 x 3.1 x 3.5 cm on axial image 37 of series 3   and 2.4 x 2.1 x 2.2 cm on axial image 55 of series 3.  There is also a sclerotic lesion involving the midline of the clivus measuring 1.2 cm.  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  PET scan performed on 01/19/2018 revealed persistent evidence of a soft tissue mass arising from the left palatine tonsil resulting in moderate narrowing of the hypopharyngeal   airway.  The mass is hypermetabolic demonstrating an SUV max of 18.5.  There is also persistent adjacent extensive matted left cervical lymphadenopathy, largest of this measuring 6.7 x 8.42 with hypermetabolic activity and SUV max of 20.5.  Lymphadenopathy is again noted to have a mass effect on the trachea and left common carotid artery, pushing both structures towards the right.  There is also prominent right cervical lymph nodes with the largest measuring 0.8 cm and demonstrating mild hypermetabolic activity with SUV max of 1.8, physiologic   distribution of FDG in the gray matter.  There are 3 pulmonary nodules noted within the right lung, the largest is in the anterior segment of the right upper lobe measuring 1 cm, second is visualized in the middle lobe measuring 0.6 cm and the third is within the posterior basal segment measuring 0.6 cm.  There is one pulmonary nodule within the left lung within the lateral basal segment measuring 0.6 cm.  These nodules do not demonstrate hypermetabolic activity; however, they are below the threshold for observation with PET    Interval History: Vomited twice in past 24 hours, achieving good control with Compozine.     Oncology Treatment Plan:   OP CISPLATIN (Weekly)    Medications:  Continuous Infusions:   sodium chloride 0.9% 100 mL/hr at 07/05/18 0807     Scheduled Meds:   duke's soln (benadryl 30 mL, mylanta 30 mL, lidocane 30 mL, nystatin 30 mL) 120 mL  10 mL Oral QID     heparin (porcine)  5,000 Units Subcutaneous Q8H    lactulose  20 g Per G Tube TID    potassium chloride in water  10 mEq Intravenous Q1H     PRN Meds:dextrose 50%, dextrose 50%, glucagon (human recombinant), glucose, glucose, hydrocodone-apap 7.5-325 MG/15 ML, ondansetron, polyethylene glycol, prochlorperazine, senna-docusate 8.6-50 mg, sodium chloride 0.9%     Review of Systems   Constitutional: Negative for appetite change, fever and unexpected weight change.   HENT: Positive for sore throat and trouble swallowing.    Respiratory: Negative for cough, chest tightness and shortness of breath.    Gastrointestinal: Negative for abdominal distention, abdominal pain, nausea and vomiting.   Genitourinary: Negative for dysuria.   Skin: Negative for pallor.   Neurological: Positive for weakness. Negative for dizziness and light-headedness.   Psychiatric/Behavioral: Negative for confusion.     Objective:     Vital Signs (Most Recent):  Temp: 99.2 °F (37.3 °C) (07/05/18 1149)  Pulse: 67 (07/05/18 1149)  Resp: 18 (07/05/18 1149)  BP: 136/77 (07/05/18 1149)  SpO2: 97 % (07/05/18 1149) Vital Signs (24h Range):  Temp:  [96.6 °F (35.9 °C)-99.5 °F (37.5 °C)] 99.2 °F (37.3 °C)  Pulse:  [67-96] 67  Resp:  [18] 18  SpO2:  [96 %-99 %] 97 %  BP: ()/(66-89) 136/77     Weight: 85.7 kg (188 lb 14.4 oz)  Body mass index is 28.72 kg/m².  Body surface area is 2.03 meters squared.      Intake/Output Summary (Last 24 hours) at 07/05/18 1332  Last data filed at 07/05/18 1152   Gross per 24 hour   Intake             1195 ml   Output              625 ml   Net              570 ml       Physical Exam   Constitutional: He is oriented to person, place, and time. No distress.   AOx3   HENT:   Right Ear: External ear normal.   Left Ear: External ear normal.   Mouth/Throat: Oropharyngeal exudate present.   Thrush  Oneil appearance to head neck  Hyperemic palate    Eyes: EOM are normal. Pupils are equal, round, and reactive to light. Right  eye exhibits no discharge. Left eye exhibits no discharge. No scleral icterus.   Neck: Normal range of motion. No JVD present. No tracheal deviation present.   Cardiovascular: Regular rhythm, normal heart sounds and intact distal pulses.  Exam reveals no gallop and no friction rub.    No murmur heard.  Pulmonary/Chest: Effort normal. No respiratory distress. He has no wheezes. Rales: bilateral; unchanged from previous exam. He exhibits no tenderness.   Abdominal: Soft. Bowel sounds are normal. He exhibits no distension and no mass. There is no tenderness. There is no rebound and no guarding.   PEG site cdi    Musculoskeletal: Normal range of motion. He exhibits no edema or tenderness.   Neurological: He is alert and oriented to person, place, and time. No cranial nerve deficit. Coordination normal.   Skin: Skin is warm. Capillary refill takes less than 2 seconds. No rash noted. He is not diaphoretic. No erythema. No pallor.   Psychiatric: He has a normal mood and affect.       Significant Labs:   CBC:   No results for input(s): WBC, HGB, HCT, PLT in the last 48 hours. and CMP:     Recent Labs  Lab 07/04/18  0420 07/05/18  0451    136   K 3.5 3.4*    99   CO2 28 24   GLU 82 83   BUN 13 12   CREATININE 1.4 1.5*   CALCIUM 8.7 9.0   PROT 6.2 6.0   ALBUMIN 3.3* 3.3*   BILITOT 0.6 0.5   ALKPHOS 77 78   AST 13 11   ALT 15 11   ANIONGAP 10 13   EGFRNONAA 58.2* 53.5*       Diagnostic Results:  None    Assessment/Plan:     Moderate malnutrition    -self-supplementing with 4 cans of 2Cal per day via PEG  -CMP WNL        Macrocytic anemia    -HB stable.        Squamous cell carcinoma of palatine tonsil    -dysphagia, odynophagia with known SCC w/ mets and XRT, Onc history as listed.  -likely presenting with platinum based chemotherapy induced n/v  -clear fluids diet  -supportive care  -patient still experiencing dysphagia with solids, will continue him on liquid diet.   -vomiting controlled on Compozine                  Kevon Servin MD  Hematology/Oncology  [facility]

## 2018-07-05 NOTE — PLAN OF CARE
Problem: Patient Care Overview  Goal: Plan of Care Review    Recommendations    Recommendation/Intervention:     1. When medically feasible, recommend increasing Isosource 1.5 to 6 cans/day to better meet pt's needs. Will provide 2250 kcal, 102 gm protein, and 1146 mL water.   2. RD following.     Goals: meet >85% EEN/EPN  Nutrition Goal Status: new

## 2018-07-05 NOTE — SUBJECTIVE & OBJECTIVE
Interval History: Vomited twice in past 24 hours, achieving good control with Compozine.     Oncology Treatment Plan:   OP CISPLATIN (Weekly)    Medications:  Continuous Infusions:   sodium chloride 0.9% 100 mL/hr at 07/05/18 0807     Scheduled Meds:   duke's soln (benadryl 30 mL, mylanta 30 mL, lidocane 30 mL, nystatin 30 mL) 120 mL  10 mL Oral QID    heparin (porcine)  5,000 Units Subcutaneous Q8H    lactulose  20 g Per G Tube TID    potassium chloride in water  10 mEq Intravenous Q1H     PRN Meds:dextrose 50%, dextrose 50%, glucagon (human recombinant), glucose, glucose, hydrocodone-apap 7.5-325 MG/15 ML, ondansetron, polyethylene glycol, prochlorperazine, senna-docusate 8.6-50 mg, sodium chloride 0.9%     Review of Systems   Constitutional: Negative for appetite change, fever and unexpected weight change.   HENT: Positive for sore throat and trouble swallowing.    Respiratory: Negative for cough, chest tightness and shortness of breath.    Gastrointestinal: Negative for abdominal distention, abdominal pain, nausea and vomiting.   Genitourinary: Negative for dysuria.   Skin: Negative for pallor.   Neurological: Positive for weakness. Negative for dizziness and light-headedness.   Psychiatric/Behavioral: Negative for confusion.     Objective:     Vital Signs (Most Recent):  Temp: 99.2 °F (37.3 °C) (07/05/18 1149)  Pulse: 67 (07/05/18 1149)  Resp: 18 (07/05/18 1149)  BP: 136/77 (07/05/18 1149)  SpO2: 97 % (07/05/18 1149) Vital Signs (24h Range):  Temp:  [96.6 °F (35.9 °C)-99.5 °F (37.5 °C)] 99.2 °F (37.3 °C)  Pulse:  [67-96] 67  Resp:  [18] 18  SpO2:  [96 %-99 %] 97 %  BP: ()/(66-89) 136/77     Weight: 85.7 kg (188 lb 14.4 oz)  Body mass index is 28.72 kg/m².  Body surface area is 2.03 meters squared.      Intake/Output Summary (Last 24 hours) at 07/05/18 1332  Last data filed at 07/05/18 1152   Gross per 24 hour   Intake             1195 ml   Output              625 ml   Net              570 ml        Physical Exam   Constitutional: He is oriented to person, place, and time. No distress.   AOx3   HENT:   Right Ear: External ear normal.   Left Ear: External ear normal.   Mouth/Throat: Oropharyngeal exudate present.   Thrush  Oneil appearance to head neck  Hyperemic palate    Eyes: EOM are normal. Pupils are equal, round, and reactive to light. Right eye exhibits no discharge. Left eye exhibits no discharge. No scleral icterus.   Neck: Normal range of motion. No JVD present. No tracheal deviation present.   Cardiovascular: Regular rhythm, normal heart sounds and intact distal pulses.  Exam reveals no gallop and no friction rub.    No murmur heard.  Pulmonary/Chest: Effort normal. No respiratory distress. He has no wheezes. Rales: bilateral; unchanged from previous exam. He exhibits no tenderness.   Abdominal: Soft. Bowel sounds are normal. He exhibits no distension and no mass. There is no tenderness. There is no rebound and no guarding.   PEG site cdi    Musculoskeletal: Normal range of motion. He exhibits no edema or tenderness.   Neurological: He is alert and oriented to person, place, and time. No cranial nerve deficit. Coordination normal.   Skin: Skin is warm. Capillary refill takes less than 2 seconds. No rash noted. He is not diaphoretic. No erythema. No pallor.   Psychiatric: He has a normal mood and affect.       Significant Labs:   CBC:   No results for input(s): WBC, HGB, HCT, PLT in the last 48 hours. and CMP:     Recent Labs  Lab 07/04/18  0420 07/05/18  0451    136   K 3.5 3.4*    99   CO2 28 24   GLU 82 83   BUN 13 12   CREATININE 1.4 1.5*   CALCIUM 8.7 9.0   PROT 6.2 6.0   ALBUMIN 3.3* 3.3*   BILITOT 0.6 0.5   ALKPHOS 77 78   AST 13 11   ALT 15 11   ANIONGAP 10 13   EGFRNONAA 58.2* 53.5*       Diagnostic Results:  None

## 2018-07-05 NOTE — PLAN OF CARE
Problem: Patient Care Overview  Goal: Plan of Care Review  Outcome: Ongoing (interventions implemented as appropriate)  Plan of care reviewed with patient. Patient and wife verbalized understanding. Patient tolerated TF. Patient voided in urinal. Patient received prn pain meds per MD via G-tube. Patient remained free of any falls or acute events. VSS, Will continue to monitor.

## 2018-07-05 NOTE — NURSING
Discharge Plans reviewed w/ pt and family. Potassium and Magnesium repleted this AM. PIV removed. Rx delivered to bedside. Awaiting transport.

## 2018-07-05 NOTE — ASSESSMENT & PLAN NOTE
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

## 2018-07-05 NOTE — PROGRESS NOTES
" [facility]  Adult Nutrition  Progress Note    SUMMARY       Recommendations    Recommendation/Intervention:     1. When medically feasible, recommend increasing Isosource 1.5 to 6 cans/day to better meet pt's needs. Will provide 2250 kcal, 102 gm protein, and 1146 mL water.   2. RD following.     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 Pt states he does not remember the name of this home TF - 2 kcal formula, 4 cans/day. Pt NPO at home. C/o N/V. Pt reports wt loss 2/2 chemo treatment. Pt is malnourished 2/2 wt loss and mild muscle wasting.   Nutrition Discharge Planning: adequate nutrition via TF    Nutrition Risk Screen    Nutrition Risk Screen: dysphagia or difficulty swallowing    Nutrition/Diet History    Do you have any cultural, spiritual, Scientologist conflicts, given your current situation?: none stated  Factors Affecting Nutritional Intake: NPO, other (see comments) (home TF)  Nutrition Support Formula Prior to Admit: Other (see comments) (unsure, but a 2 kcal formula )  Nutrtion Support Frequency Prior to Admit: 4 cans/day    Anthropometrics    Temp: 99.2 °F (37.3 °C)  Height Method: Stated  Height: 5' 8" (172.7 cm)  Height (inches): 68 in  Weight Method: Bed Scale  Weight: 85.7 kg (188 lb 14.4 oz)  Weight (lb): 188.9 lb  Ideal Body Weight (IBW), Male: 154 lb  % Ideal Body Weight, Male (lb): 122.66 lb  BMI (Calculated): 28.8  BMI Grade: 25 - 29.9 - overweight  Weight Loss: unintentional  Usual Body Weight (UBW), k.3 kg (per chart review 4/3/18)  % Usual Body Weight: 82.32  % Weight Change From Usual Weight: " -17.85 %     Lab/Procedures/Meds    Pertinent Labs Reviewed: reviewed  Pertinent Labs Comments: K 3.4, GFR 53.5, Mg 1.4  Pertinent Medications Reviewed: reviewed  Pertinent Medications Comments: IVF, heparin    Physical Findings/Assessment    Overall Physical Appearance: loss of muscle mass, overweight  Skin: intact    Estimated/Assessed Needs    Weight Used For Calorie Calculations: 85.7 kg (188 lb 15 oz)  Energy Calorie Requirements (kcal): 1462-0964 kcal/day  Energy Need Method: Kcal/kg (25-30 kcal/kg)  Protein Requirements:  gm/day (1.0-1.3 gm/kg)  Weight Used For Protein Calculations: 85.7 kg (188 lb 15 oz)  Fluid Requirements (mL): 1 mL/hr or per MD  Fluid Need Method: other (see comments) (per MD)  RDA Method (mL): 2142     Nutrition Prescription Ordered    Current Diet Order: NPO (per pt )  Current Nutrition Support Formula Ordered: Isosource 1.5  Current Nutrition Support Frequency Ordered: 4 cans/day    Evaluation of Received Nutrient/Fluid Intake    Enteral Calories (kcal): 1500  Enteral Protein (gm): 68  Enteral (Free Water) Fluid (mL): 764  % Kcal Needs: 65%  % Protein Needs: 71%  I/O: +0.24L x 24 hrs, +7.93L since admit  Energy Calories Required: not meeting needs  Protein Required: not meeting needs  Comments: LBM 7/4     % Intake of Estimated Energy Needs: Other: <75%  % Meal Intake: NPO    Nutrition Risk    Level of Risk/Frequency of Follow-up: low (f/u 1 x wk)     Assessment and Plan    Moderate malnutrition    Malnutrition in the context of Chronic Illness/Injury    Related to (etiology):  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     Signs and Symptoms (as evidenced by):  Muscle Mass Depletion: mild depletion of temples   Weight Loss: 13% x 3 months      Interventions/Recommendations (treatment strategy):  See RD note from 7/5/18    Nutrition Diagnosis Status:  New            Monitor and Evaluation    Food and Nutrient Intake: energy intake, enteral nutrition intake  Food and  Nutrient Adminstration: enteral and parenteral nutrition administration  Anthropometric Measurements: weight, weight change, body mass index  Biochemical Data, Medical Tests and Procedures: gastrointestinal profile, electrolyte and renal panel, glucose/endocrine profile, inflammatory profile, lipid profile  Nutrition-Focused Physical Findings: overall appearance     Nutrition Follow-Up    RD Follow-up?: Yes

## 2018-07-05 NOTE — TELEPHONE ENCOUNTER
He completed chemo. Can I schedule him one day at the end of next week or the following week, as you wanted to see him 2 weeks post with labs.  ~[email or web address]

## 2018-07-05 NOTE — DISCHARGE INSTRUCTIONS
:  [facility], [phone], to resume home health nursing services, beginning with nurse readmission assessment visit on the day after discharge from the hospital.  [email or web address], [phone], to resume providing bolus tube feeding supplies (Nutren 2.0/2calHN), delivery to patient's home as needed.  Modesta Roy, MSW, LCSW  [phone]

## 2018-07-05 NOTE — PLAN OF CARE
[facility]     HOME HEALTH ORDERS  FACE TO FACE ENCOUNTER     Patient Name: Burton Whiting  YOB: 1970     PCP: Christopher Turpin DO   PCP Address: [address] [place], [place] [address] / [address] [address]  PCP Phone Number: [phone]  PCP Fax: [phone]     Encounter Date: 07/03/2018     Admit to Home Health     Diagnoses:        Active Hospital Problems     Diagnosis   POA    *Intractable vomiting with nausea [R11.2]   Yes    Macrocytic anemia [D53.9]   Yes    Status post insertion of percutaneous endoscopic gastrostomy (PEG) tube [Z93.1]   Not Applicable    Electrolyte abnormality [E87.8]   Yes    Thrush [B37.0]   Yes    Squamous cell carcinoma of palatine tonsil [C09.9]   Yes       Resolved Hospital Problems     Diagnosis Date Resolved POA   No resolved problems to display.         Future Appointments  Date Time Provider Department Center   7/5/2018 8:30 AM LAB, HEMONC CANCER [facility] LAB HO Masood Unger   7/5/2018 9:45 AM Cortney Lopez MD [facility] HEM ONC Masood Unger          Follow-up Information      [facility] On 7/5/2018.    Specialty:  Lab  Why:  Labs-8:30am  Contact information:  [address]  [facility] [address] [phone]  Additional information:  [facility] 3rd Floor              Cortney Lopez MD On 7/5/2018.    Specialties:  Hematology and Oncology, Hematology  Why:  follow up- 9:45am  Contact information:  [address]  [facility] [id number] [phone]                            I have seen and examined this patient face to face today. My clinical findings that support the need for the home health skilled services and home bound status are the following:  Medical restrictions requiring assistance of another human to leave home due to  Newly placed G-tube/ostomy and Morbid Obesity.     Allergies:Review of patient's allergies indicates:  No Known Allergies     Diet: TwoCal HN or its equivalent  1 can QID with 125cc H20  before and after each feed  and 0.5 can with 400cc H20 one time per day     Activities: activity as tolerated     Nursing:   SN to complete comprehensive assessment including routine vital signs. Instruct on disease process and s/s of complications to report to MD. Review/verify medication list sent home with the patient at time of discharge  and instruct patient/caregiver as needed. Frequency may be adjusted depending on start of care date.     Notify MD if SBP > 160 or < 90; DBP > 90 or < 50; HR > 120 or < 50; Temp > 101; Other:           CONSULTS:    Aide to provide assistance with personal care, ADLs, and vital signs.     MISCELLANEOUS CARE:  PEG Care:  Instruct patient/caregiver to clean site.  Monitor skin integrity.     WOUND CARE ORDERS  n/a        Medications: Review discharge medications with patient and family and provide education.            Current Discharge Medication List            CONTINUE these medications which have NOT CHANGED     Details   cholecalciferol, vitamin D3, (VITAMIN D3) 1,000 unit capsule Take 1,000 Units by mouth once daily.             Associated Diagnoses: Oral thrush       heparin, porcine, PF, (HEPARIN LOCKFLUSH,PORCINE,,PF,) 10 unit/mL Syrg Inject 1 mL (10 Units total) into the vein as needed.  Qty: 200 mL, Refills: 12     Associated Diagnoses: Tonsil cancer       HYDROmorphone (DILAUDID) 4 MG tablet Take 1 tablet (4 mg total) by mouth every 4 (four) hours as needed for Pain.  Qty: 90 tablet, Refills: 0     Associated Diagnoses: Neoplasm related pain       IBUPROFEN (ADVIL ORAL) Take 1 tablet by mouth as needed.       lactulose (CHRONULAC) 10 gram/15 mL solution TAKE 30ML(CC) BY MOUTH THREE TIMES DAILY  Refills: 6       lidocaine HCl 2% (XYLOCAINE) 2 % Soln by Mucous Membrane route every 3 (three) hours.  Qty: 500 mL, Refills: 6     Associated Diagnoses: Oral mucositis       LORazepam (ATIVAN) 0.5 MG tablet Take 1 tablet (0.5 mg total) by mouth every 6 (six) hours as needed for  Anxiety.  Qty: 60 tablet, Refills: 0     Associated Diagnoses: Anxiety attack       NYSTATIN (DUKE'S SOLUTION) Mix equal amounts of benadryl, maalox, 2% viscous lidocaine and nystatin     Swish and swallow 10 ml 4 times a day  Qty: 500 mL, Refills: 5     Associated Diagnoses: Squamous cell carcinoma of palatine tonsil       nystatin (MYCOSTATIN) cream Apply topically 2 (two) times daily.  Qty: 30 g, Refills: 6     Associated Diagnoses: Pain around percutaneous endoscopic gastrostomy (PEG) tube site, initial encounter       ondansetron (ZOFRAN) 4 mg/5 mL solution Take 10 mLs (8 mg total) by mouth every 6 (six) hours as needed for Nausea.  Qty: 500 mL, Refills: 3     Associated Diagnoses: Nausea       ondansetron (ZOFRAN) 8 MG tablet Take 1 tablet (8 mg total) by mouth 4 (four) times daily as needed for Nausea.  Qty: 60 tablet, Refills: 2     Associated Diagnoses: Squamous cell carcinoma of palatine tonsil       oxyCODONE (ROXICODONE) 5 mg/5 mL Soln Take 5 mLs (5 mg total) by mouth every 6 (six) hours as needed.  Qty: 473 mL, Refills: 0     Associated Diagnoses: Neoplasm related pain       promethazine (PHENERGAN) 6.25 mg/5 mL syrup Take 20 mLs (25 mg total) by mouth every 6 (six) hours as needed for Nausea.  Qty: 500 mL, Refills: 6     Associated Diagnoses: Nausea       psyllium husk, aspartame, (METAMUCIL) 3.4 gram PwPk packet Take 2 packets by mouth once daily.       sodium chloride 0.9% injection Inject 10 mLs into the vein as needed.  Qty: 200 mL, Refills: 12     Associated Diagnoses: Tonsil cancer            Hydrocodone-apap 7.5-325mg/15ml oral solution 10mL per peg tube every 4 hours prn pain if not tolerating roxicodone due to nausea       I certify that this patient is confined to his home and needs intermittent skilled nursing care.

## 2018-07-08 NOTE — DISCHARGE SUMMARY
[facility]  Hematology/Oncology  Discharge Summary      Patient Name: Burton Whiting  MRN: [id number]  Admission Date: 7/1/2018  Hospital Length of Stay: 4 days  Discharge Date and Time: 7/5/2018  5:18 PM  Attending Physician: No att. 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 Patient also has a recent history of sepsis, diverticulitis, colitis and is s/p PEG tube. He presents to the [facility] ED today in [place] accompanied by his sister with complaints of malaise, fatigue, lethargy, intractable n/v occurring several times per day over the last 4 days. He reports mild epigastric discomfort, but denies focal pain. He has been getting Gatorade for rehydration via the PEG, but all pain control and antiemetics at home (Zofran/ Phenergan syrup) have failed due to retching and vomiting of stomach contents. Vomit has been clear to bilious, no current hematemesis. Patient reports cold chills, denies fevers and rigors. He complains of pain in his L tonsil, denies diarrhea. His sister notes lethargy with some confusion, but reports no focal deficits, overt delirium. Patient fell on his knee at the ED prior to arrival, reports some gait instability and falls preceding admission.     Onc hx per Dr. Lopez: Mr. Burton Whiting is a 48-year-old male, former smoker, who presents today with a four-month history of enlarging neck mass.  He initially delayed care due to lack of insurance.  He was seen by Dr. Turpin who referred him to see Dr. Olguin.  He had a CT that showed a 3.8 x 3.8 x 4.9 cm soft tissue mass arising from the left palatine tonsil resulting in moderate narrowing of the hypopharyngeal airway adjacent extensive matted left cervical lymphadenopathy was noted.  The largest ella  mass was 6.6 x 9 x 2.6 cm extending inferiorly to the supraclavicular region.  The mass pushed the trachea and the left common carotid artery to the right.  Additional representative of cervical lymph nodes measuring 2.9 x 3.1 x 3.5 cm on axial image 37 of series 3   and 2.4 x 2.1 x 2.2 cm on axial image 55 of series 3.  There is also a sclerotic lesion involving the midline of the clivus measuring 1.2 cm.  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  PET scan performed on 01/19/2018 revealed persistent evidence of a soft tissue mass arising from the left palatine tonsil resulting in moderate narrowing of the hypopharyngeal   airway.  The mass is hypermetabolic demonstrating an SUV max of 18.5.  There is also persistent adjacent extensive matted left cervical lymphadenopathy, largest of this measuring 6.7 x 8.42 with hypermetabolic activity and SUV max of 20.5.  Lymphadenopathy is again noted to have a mass effect on the trachea and left common carotid artery, pushing both structures towards the right.  There is also prominent right cervical lymph nodes with the largest measuring 0.8 cm and demonstrating mild hypermetabolic activity with SUV max of 1.8, physiologic   distribution of FDG in the gray matter.  There are 3 pulmonary nodules noted within the right lung, the largest is in the anterior segment of the right upper lobe measuring 1 cm, second is visualized in the middle lobe measuring 0.6 cm and the third is within the posterior basal segment measuring 0.6 cm.  There is one pulmonary nodule within the left lung within the lateral basal segment measuring 0.6 cm.  These nodules do not demonstrate hypermetabolic activity; however, they are below the threshold for observation with PET    * No surgery found *     Hospital Course: Mr. Whiting is a 49 yo. M with SCC L palatine tonsil w/ LN/bone metastases, s/p chemotherapy/XRT, presented with intractable N/V unresponsive to Phenergan/Zofran.  N/V was controlled on IV compazine, Ativan. Patient has since been able to tolerate liquid diet with supplements via PEG. Patient was hypokalemic and hypocalcemic on admission, which were resolved with supplementation. Pain controlled on oxycodone and hydromorphone PRN. Patient is currently stable, tolerating clear liquids diet with PEG supplementation, and his N/V is controlled on Phenergan and Zofran.     Consults:     Significant Diagnostic Studies: Labs: CMP No results for input(s): NA, K, CL, CO2, GLU, BUN, CREATININE, CALCIUM, PROT, ALBUMIN, BILITOT, ALKPHOS, AST, ALT, ANIONGAP, ESTGFRAFRICA, EGFRNONAA in the last 48 hours. and CBC No results for input(s): WBC, HGB, HCT, PLT in the last 48 hours.    Pending Diagnostic Studies:     None        Final Active Diagnoses:    Diagnosis Date Noted POA    Moderate malnutrition [E44.0] 07/05/2018 Unknown    Macrocytic anemia [D53.9] 07/01/2018 Yes    Squamous cell carcinoma of palatine tonsil [C09.9] 01/04/2018 Yes      Problems Resolved During this Admission:    Diagnosis Date Noted Date Resolved POA    PRINCIPAL PROBLEM:  Intractable vomiting with nausea [R11.2] 07/01/2018 07/05/2018 Yes    Status post insertion of percutaneous endoscopic gastrostomy (PEG) tube [Z93.1] 04/14/2018 07/05/2018 Not Applicable    Electrolyte abnormality [E87.8] 02/28/2018 07/05/2018 Yes    Thrush [B37.0] 02/06/2018 07/05/2018 Yes      Discharged Condition: stable    Disposition: Home or Self Care    Follow Up:  Follow-up Information     Cortney Lopez MD.    Specialties:  Hematology and Oncology, Hematology  Why:  as scheduled by clinic  Contact information:  [address] [facility] [id number] [phone]                 Patient Instructions:   No discharge procedures on file.  Medications:  Reconciled Home Medications:      Medication List      START taking these medications    hydrocodone-apap 7.5-325 MG/15 ML oral solution  Commonly known as:  HYCET  10 mLs by Per G Tube  route every 4 (four) hours as needed.        CONTINUE taking these medications    ADVIL ORAL  Take 1 tablet by mouth as needed.     * DUKE'S SOLUTION  Mix equal amounts of benadryl, maalox, 2% viscous lidocaine and nystatin  Swish and swallow 10 ml 4 times a day     * nystatin cream  Commonly known as:  MYCOSTATIN  Apply topically 2 (two) times daily.     heparin, porcine (PF) 10 unit/mL Syrg  Commonly known as:  HEPARIN LOCKFLUSH(PORCINE)(PF)  Inject 1 mL (10 Units total) into the vein as needed.     HYDROmorphone 4 MG tablet  Commonly known as:  DILAUDID  Take 1 tablet (4 mg total) by mouth every 4 (four) hours as needed for Pain.     lactulose 10 gram/15 mL solution  Commonly known as:  CHRONULAC  TAKE 30ML(CC) BY MOUTH THREE TIMES DAILY     lidocaine HCl 2% 2 % Soln  Commonly known as:  XYLOCAINE  by Mucous Membrane route every 3 (three) hours.     LORazepam 0.5 MG tablet  Commonly known as:  ATIVAN  Take 1 tablet (0.5 mg total) by mouth every 6 (six) hours as needed for Anxiety.     * ondansetron 8 MG tablet  Commonly known as:  ZOFRAN  Take 1 tablet (8 mg total) by mouth 4 (four) times daily as needed for Nausea.     * ondansetron 4 mg/5 mL solution  Commonly known as:  ZOFRAN  Take 10 mLs (8 mg total) by mouth every 6 (six) hours as needed for Nausea.     oxyCODONE 5 mg/5 mL Soln  Commonly known as:  ROXICODONE  Take 5 mLs (5 mg total) by mouth every 6 (six) hours as needed.     promethazine 6.25 mg/5 mL syrup  Commonly known as:  PHENERGAN  Take 20 mLs (25 mg total) by mouth every 6 (six) hours as needed for Nausea.     psyllium husk (aspartame) 3.4 gram Pwpk packet  Commonly known as:  METAMUCIL  Take 2 packets by mouth once daily.     sodium chloride 0.9% injection  Inject 10 mLs into the vein as needed.     VITAMIN D3 1,000 unit capsule  Generic drug:  cholecalciferol (vitamin D3)  Take 1,000 Units by mouth once daily.        * This list has 4 medication(s) that are the same as other medications  prescribed for you. Read the directions carefully, and ask your doctor or other care provider to review them with you.            STOP taking these medications    fluconazole 40 mg/ml 40 mg/mL suspension  Commonly known as:  DANDRE Servin MD  Hematology/Oncology  [facility]    I approve this discharge summary.

## 2018-07-09 ENCOUNTER — TELEPHONE (OUTPATIENT)
Dept: RADIATION ONCOLOGY | Facility: CLINIC | Age: 48
End: 2018-07-09

## 2018-07-09 ENCOUNTER — DOCUMENTATION ONLY (OUTPATIENT)
Dept: HEMATOLOGY/ONCOLOGY | Facility: CLINIC | Age: 48
End: 2018-07-09

## 2018-07-09 NOTE — PROGRESS NOTES
Received calls/VM messages from patient ([phone]) and from [facility] with [facility], [phone]. Returned call to both. Faxed final MD progress notes, home health orders, d/c summary from patient's hospital stay last week to Latonia's attention at [phone]. She said that they did not receive the message left re: patient's d/c home. Nurse visit to patient's home being scheduled. No other needs indicated at this time.

## 2018-07-09 NOTE — TELEPHONE ENCOUNTER
Unable to leave message. Follow up apt made and mailed. ----- Message from Suzanne El RN sent at 2018 11:56 AM CDT -----  Regardin  week follow up   1 week follow up call and follow up appointment

## 2018-07-09 NOTE — TELEPHONE ENCOUNTER
----- Message from Suzanne El RN sent at 2018 11:56 AM CDT -----  Regardin  week follow up   1 week follow up call and follow up appointment

## 2018-07-10 ENCOUNTER — INFUSION (OUTPATIENT)
Dept: INFUSION THERAPY | Facility: HOSPITAL | Age: 48
End: 2018-07-10
Attending: INTERNAL MEDICINE
Payer: COMMERCIAL

## 2018-07-10 ENCOUNTER — TELEPHONE (OUTPATIENT)
Dept: HEMATOLOGY/ONCOLOGY | Facility: CLINIC | Age: 48
End: 2018-07-10

## 2018-07-10 ENCOUNTER — PATIENT MESSAGE (OUTPATIENT)
Dept: HEMATOLOGY/ONCOLOGY | Facility: CLINIC | Age: 48
End: 2018-07-10

## 2018-07-10 ENCOUNTER — LAB VISIT (OUTPATIENT)
Dept: LAB | Facility: HOSPITAL | Age: 48
End: 2018-07-10
Attending: INTERNAL MEDICINE
Payer: COMMERCIAL

## 2018-07-10 VITALS
RESPIRATION RATE: 18 BRPM | SYSTOLIC BLOOD PRESSURE: 134 MMHG | DIASTOLIC BLOOD PRESSURE: 73 MMHG | TEMPERATURE: 98 F | HEART RATE: 83 BPM

## 2018-07-10 DIAGNOSIS — C09.9 SQUAMOUS CELL CARCINOMA OF PALATINE TONSIL: ICD-10-CM

## 2018-07-10 DIAGNOSIS — C76.0 HEAD AND NECK CANCER: ICD-10-CM

## 2018-07-10 DIAGNOSIS — G89.3 NEOPLASM RELATED PAIN: ICD-10-CM

## 2018-07-10 DIAGNOSIS — T80.212A PORT OR RESERVOIR INFECTION, INITIAL ENCOUNTER: Primary | ICD-10-CM

## 2018-07-10 DIAGNOSIS — C76.0 HEAD AND NECK CANCER: Primary | ICD-10-CM

## 2018-07-10 LAB
ABO + RH BLD: NORMAL
ALBUMIN SERPL BCP-MCNC: 3.7 G/DL
ALP SERPL-CCNC: 76 U/L
ALT SERPL W/O P-5'-P-CCNC: 8 U/L
ANION GAP SERPL CALC-SCNC: 10 MMOL/L
AST SERPL-CCNC: 11 U/L
BILIRUB SERPL-MCNC: 0.4 MG/DL
BLD GP AB SCN CELLS X3 SERPL QL: NORMAL
BUN SERPL-MCNC: 25 MG/DL
CALCIUM SERPL-MCNC: 9.8 MG/DL
CHLORIDE SERPL-SCNC: 95 MMOL/L
CO2 SERPL-SCNC: 30 MMOL/L
CREAT SERPL-MCNC: 1.9 MG/DL
ERYTHROCYTE [DISTWIDTH] IN BLOOD BY AUTOMATED COUNT: 15.7 %
EST. GFR  (AFRICAN AMERICAN): 46.5 ML/MIN/1.73 M^2
EST. GFR  (NON AFRICAN AMERICAN): 40.2 ML/MIN/1.73 M^2
GLUCOSE SERPL-MCNC: 113 MG/DL
HCT VFR BLD AUTO: 24.1 %
HGB BLD-MCNC: 8.4 G/DL
IMM GRANULOCYTES # BLD AUTO: 0.01 K/UL
MCH RBC QN AUTO: 35.1 PG
MCHC RBC AUTO-ENTMCNC: 34.9 G/DL
MCV RBC AUTO: 101 FL
NEUTROPHILS # BLD AUTO: 3.8 K/UL
PLATELET # BLD AUTO: 131 K/UL
PMV BLD AUTO: 9.3 FL
POTASSIUM SERPL-SCNC: 3.9 MMOL/L
PROT SERPL-MCNC: 6.9 G/DL
RBC # BLD AUTO: 2.39 M/UL
SODIUM SERPL-SCNC: 135 MMOL/L
WBC # BLD AUTO: 4.48 K/UL

## 2018-07-10 PROCEDURE — 86850 RBC ANTIBODY SCREEN: CPT

## 2018-07-10 PROCEDURE — 80053 COMPREHEN METABOLIC PANEL: CPT

## 2018-07-10 PROCEDURE — 96360 HYDRATION IV INFUSION INIT: CPT

## 2018-07-10 PROCEDURE — 96361 HYDRATE IV INFUSION ADD-ON: CPT

## 2018-07-10 PROCEDURE — 36415 COLL VENOUS BLD VENIPUNCTURE: CPT

## 2018-07-10 PROCEDURE — 85027 COMPLETE CBC AUTOMATED: CPT

## 2018-07-10 PROCEDURE — 25000003 PHARM REV CODE 250: Performed by: INTERNAL MEDICINE

## 2018-07-10 RX ORDER — OXYCODONE HCL 5 MG/5 ML
5 SOLUTION, ORAL ORAL EVERY 6 HOURS PRN
Qty: 473 ML | Refills: 0 | Status: SHIPPED | OUTPATIENT
Start: 2018-07-10 | End: 2018-08-01 | Stop reason: SDUPTHER

## 2018-07-10 RX ADMIN — SODIUM CHLORIDE 2000 ML: 0.9 INJECTION, SOLUTION INTRAVENOUS at 03:07

## 2018-07-10 NOTE — TELEPHONE ENCOUNTER
Spoke with nehemias.  Patient is working and driving himself around---patient does not qualify for .  Nehemias will send verbal order for dr waggoner to sign for  DC.

## 2018-07-10 NOTE — TELEPHONE ENCOUNTER
----- Message from Jett Franz sent at 7/10/2018 10:27 AM CDT -----  Contact: [facility]  States she's returning Lauryn's call      Contact::[phone]

## 2018-07-10 NOTE — TELEPHONE ENCOUNTER
Please see below---  Do you want to maybe check labs on him?  He is scheduled with you next Friday with labs--  Is having no other significant symptoms but fatigue.  ~sachin

## 2018-07-10 NOTE — TELEPHONE ENCOUNTER
----- Message from Alma Delia Matson sent at 7/10/2018  9:56 AM CDT -----  Contact: Larisa with home health  Larisa calling to verify why pt needs home health if he is working and driving         Larisa call back number [phone]

## 2018-07-10 NOTE — PLAN OF CARE
Problem: Patient Care Overview  Goal: Plan of Care Review  Outcome: Ongoing (interventions implemented as appropriate)  Pt tolerated 2L NS without complications. VSS. No s/s of reaction. Instructed to contact MD with any questions. PIV left in place for return tomorrow. AVS given to patient.

## 2018-07-11 ENCOUNTER — INFUSION (OUTPATIENT)
Dept: INFUSION THERAPY | Facility: HOSPITAL | Age: 48
End: 2018-07-11
Attending: INTERNAL MEDICINE
Payer: COMMERCIAL

## 2018-07-11 VITALS
DIASTOLIC BLOOD PRESSURE: 79 MMHG | SYSTOLIC BLOOD PRESSURE: 127 MMHG | RESPIRATION RATE: 18 BRPM | HEART RATE: 91 BPM | TEMPERATURE: 98 F

## 2018-07-11 DIAGNOSIS — T80.212A PORT OR RESERVOIR INFECTION, INITIAL ENCOUNTER: Primary | ICD-10-CM

## 2018-07-11 PROCEDURE — 25000003 PHARM REV CODE 250: Performed by: INTERNAL MEDICINE

## 2018-07-11 PROCEDURE — 96361 HYDRATE IV INFUSION ADD-ON: CPT

## 2018-07-11 PROCEDURE — 96360 HYDRATION IV INFUSION INIT: CPT

## 2018-07-11 RX ORDER — SODIUM CHLORIDE 0.9 % (FLUSH) 0.9 %
10 SYRINGE (ML) INJECTION
Status: DISCONTINUED | OUTPATIENT
Start: 2018-07-11 | End: 2018-07-11 | Stop reason: HOSPADM

## 2018-07-11 RX ORDER — HEPARIN 100 UNIT/ML
500 SYRINGE INTRAVENOUS
Status: DISCONTINUED | OUTPATIENT
Start: 2018-07-11 | End: 2018-07-11 | Stop reason: HOSPADM

## 2018-07-11 RX ADMIN — SODIUM CHLORIDE 1000 ML: 0.9 INJECTION, SOLUTION INTRAVENOUS at 03:07

## 2018-07-11 RX ADMIN — SODIUM CHLORIDE 1000 ML: 0.9 INJECTION, SOLUTION INTRAVENOUS at 02:07

## 2018-07-11 NOTE — PHYSICIAN QUERY
PT Name: Burton Whiting  MR #: [id number]     Physician Query Form - Medication-Correlation for Diagnosis      CDS Shante Romero RN, BSN        Contact Information:  [phone]    [email or web address]           This form is a permanent document in the medical record.     Query Date: July 11, 2018      By submitting this query, we are merely seeking further clarification of documentation.  Please utilize your independent clinical judgment when addressing the question(s) below.    The medical record contains the following:  The patient has an order for the following medication(s):    Magnesium sulfate 2g IVPB     Lab values:  0.9 ---> 1.4 -->  2.1      Please provider the corresponding diagnosis or diagnoses that support(s) the use of the medication(s)         __x___   Hypomagnesemia    _____   Other, please specify _________________________________    _____   Clinically undetermined

## 2018-07-20 ENCOUNTER — PATIENT MESSAGE (OUTPATIENT)
Dept: HEMATOLOGY/ONCOLOGY | Facility: CLINIC | Age: 48
End: 2018-07-20

## 2018-07-20 RX ORDER — HEPARIN 100 UNIT/ML
500 SYRINGE INTRAVENOUS
Status: CANCELLED | OUTPATIENT
Start: 2018-08-02

## 2018-07-20 RX ORDER — SODIUM CHLORIDE 0.9 % (FLUSH) 0.9 %
10 SYRINGE (ML) INJECTION
Status: CANCELLED | OUTPATIENT
Start: 2018-08-02

## 2018-07-21 ENCOUNTER — INFUSION (OUTPATIENT)
Dept: INFUSION THERAPY | Facility: HOSPITAL | Age: 48
End: 2018-07-21
Attending: INTERNAL MEDICINE
Payer: COMMERCIAL

## 2018-07-21 VITALS
DIASTOLIC BLOOD PRESSURE: 94 MMHG | HEART RATE: 92 BPM | RESPIRATION RATE: 18 BRPM | SYSTOLIC BLOOD PRESSURE: 146 MMHG | TEMPERATURE: 98 F

## 2018-07-21 DIAGNOSIS — T80.212A PORT OR RESERVOIR INFECTION, INITIAL ENCOUNTER: Primary | ICD-10-CM

## 2018-07-21 DIAGNOSIS — E86.0 DEHYDRATION: ICD-10-CM

## 2018-07-21 PROCEDURE — 96360 HYDRATION IV INFUSION INIT: CPT

## 2018-07-21 PROCEDURE — 25000003 PHARM REV CODE 250: Performed by: INTERNAL MEDICINE

## 2018-07-21 RX ORDER — SODIUM CHLORIDE 0.9 % (FLUSH) 0.9 %
10 SYRINGE (ML) INJECTION
Status: DISCONTINUED | OUTPATIENT
Start: 2018-07-21 | End: 2018-07-21 | Stop reason: HOSPADM

## 2018-07-21 RX ORDER — HEPARIN 100 UNIT/ML
500 SYRINGE INTRAVENOUS
Status: DISCONTINUED | OUTPATIENT
Start: 2018-07-21 | End: 2018-07-21 | Stop reason: HOSPADM

## 2018-07-21 RX ADMIN — SODIUM CHLORIDE 1000 ML: 0.9 INJECTION, SOLUTION INTRAVENOUS at 08:07

## 2018-07-21 NOTE — PLAN OF CARE
Problem: Patient Care Overview  Goal: Plan of Care Review  0908-Patient tolerated treatment well. Discharged without complaints or S/S of adverse event.   Instructed to call provider for any questions or concerns. Patient ambulated independently off the unit.

## 2018-07-21 NOTE — PLAN OF CARE
Problem: Patient Care Overview  Goal: Individualization & Mutuality  Outcome: Ongoing (interventions implemented as appropriate)  0819-Labs , hx, and medications reviewed, patient is here for IV fluids this am. Assessment completed. Discussed plan of care with patient. Patient in agreement. Chair reclined and warm blanket and snack offered.

## 2018-08-01 ENCOUNTER — INFUSION (OUTPATIENT)
Dept: INFUSION THERAPY | Facility: HOSPITAL | Age: 48
End: 2018-08-01
Attending: INTERNAL MEDICINE
Payer: COMMERCIAL

## 2018-08-01 ENCOUNTER — TELEPHONE (OUTPATIENT)
Dept: SPEECH THERAPY | Facility: HOSPITAL | Age: 48
End: 2018-08-01

## 2018-08-01 ENCOUNTER — LAB VISIT (OUTPATIENT)
Dept: LAB | Facility: HOSPITAL | Age: 48
End: 2018-08-01
Attending: INTERNAL MEDICINE
Payer: COMMERCIAL

## 2018-08-01 ENCOUNTER — OFFICE VISIT (OUTPATIENT)
Dept: RADIATION ONCOLOGY | Facility: CLINIC | Age: 48
End: 2018-08-01
Payer: COMMERCIAL

## 2018-08-01 ENCOUNTER — PATIENT MESSAGE (OUTPATIENT)
Dept: RADIATION ONCOLOGY | Facility: CLINIC | Age: 48
End: 2018-08-01

## 2018-08-01 ENCOUNTER — PATIENT MESSAGE (OUTPATIENT)
Dept: HEMATOLOGY/ONCOLOGY | Facility: CLINIC | Age: 48
End: 2018-08-01

## 2018-08-01 VITALS
DIASTOLIC BLOOD PRESSURE: 89 MMHG | TEMPERATURE: 98 F | HEART RATE: 114 BPM | WEIGHT: 188.69 LBS | SYSTOLIC BLOOD PRESSURE: 119 MMHG | RESPIRATION RATE: 18 BRPM | BODY MASS INDEX: 28.69 KG/M2

## 2018-08-01 VITALS
RESPIRATION RATE: 20 BRPM | DIASTOLIC BLOOD PRESSURE: 82 MMHG | SYSTOLIC BLOOD PRESSURE: 122 MMHG | HEART RATE: 101 BPM | TEMPERATURE: 99 F

## 2018-08-01 DIAGNOSIS — T80.212A PORT OR RESERVOIR INFECTION, INITIAL ENCOUNTER: Primary | ICD-10-CM

## 2018-08-01 DIAGNOSIS — F41.0 ANXIETY ATTACK: ICD-10-CM

## 2018-08-01 DIAGNOSIS — R11.0 NAUSEA: ICD-10-CM

## 2018-08-01 DIAGNOSIS — C09.9 SQUAMOUS CELL CARCINOMA OF PALATINE TONSIL: ICD-10-CM

## 2018-08-01 DIAGNOSIS — C76.0 HEAD AND NECK CANCER: ICD-10-CM

## 2018-08-01 DIAGNOSIS — G89.3 NEOPLASM RELATED PAIN: ICD-10-CM

## 2018-08-01 DIAGNOSIS — C09.9 SQUAMOUS CELL CARCINOMA OF PALATINE TONSIL: Primary | ICD-10-CM

## 2018-08-01 LAB
ABO + RH BLD: NORMAL
ALBUMIN SERPL BCP-MCNC: 4.3 G/DL
ALP SERPL-CCNC: 79 U/L
ALT SERPL W/O P-5'-P-CCNC: 9 U/L
ANION GAP SERPL CALC-SCNC: 11 MMOL/L
AST SERPL-CCNC: 14 U/L
BILIRUB SERPL-MCNC: 0.4 MG/DL
BLD GP AB SCN CELLS X3 SERPL QL: NORMAL
BUN SERPL-MCNC: 28 MG/DL
CALCIUM SERPL-MCNC: 10.4 MG/DL
CHLORIDE SERPL-SCNC: 98 MMOL/L
CO2 SERPL-SCNC: 25 MMOL/L
CREAT SERPL-MCNC: 2 MG/DL
ERYTHROCYTE [DISTWIDTH] IN BLOOD BY AUTOMATED COUNT: 14.9 %
EST. GFR  (AFRICAN AMERICAN): 43.7 ML/MIN/1.73 M^2
EST. GFR  (NON AFRICAN AMERICAN): 37.8 ML/MIN/1.73 M^2
GLUCOSE SERPL-MCNC: 116 MG/DL
HCT VFR BLD AUTO: 26.1 %
HGB BLD-MCNC: 9.4 G/DL
IMM GRANULOCYTES # BLD AUTO: 0.03 K/UL
MAGNESIUM SERPL-MCNC: 2.2 MG/DL
MCH RBC QN AUTO: 36.6 PG
MCHC RBC AUTO-ENTMCNC: 36 G/DL
MCV RBC AUTO: 102 FL
NEUTROPHILS # BLD AUTO: 3.6 K/UL
PHOSPHATE SERPL-MCNC: 3.7 MG/DL
PLATELET # BLD AUTO: 226 K/UL
PMV BLD AUTO: 9.1 FL
POTASSIUM SERPL-SCNC: 4.5 MMOL/L
PROT SERPL-MCNC: 7.7 G/DL
RBC # BLD AUTO: 2.57 M/UL
SODIUM SERPL-SCNC: 134 MMOL/L
WBC # BLD AUTO: 4.6 K/UL

## 2018-08-01 PROCEDURE — 25000003 PHARM REV CODE 250: Performed by: NURSE PRACTITIONER

## 2018-08-01 PROCEDURE — 83735 ASSAY OF MAGNESIUM: CPT

## 2018-08-01 PROCEDURE — 84100 ASSAY OF PHOSPHORUS: CPT

## 2018-08-01 PROCEDURE — 36415 COLL VENOUS BLD VENIPUNCTURE: CPT

## 2018-08-01 PROCEDURE — 86850 RBC ANTIBODY SCREEN: CPT

## 2018-08-01 PROCEDURE — 96365 THER/PROPH/DIAG IV INF INIT: CPT

## 2018-08-01 PROCEDURE — 99999 PR PBB SHADOW E&M-EST. PATIENT-LVL IV: CPT | Mod: PBBFAC,,, | Performed by: RADIOLOGY

## 2018-08-01 PROCEDURE — 63600175 PHARM REV CODE 636 W HCPCS: Performed by: NURSE PRACTITIONER

## 2018-08-01 PROCEDURE — 80053 COMPREHEN METABOLIC PANEL: CPT

## 2018-08-01 PROCEDURE — 3008F BODY MASS INDEX DOCD: CPT | Mod: CPTII,S$GLB,, | Performed by: RADIOLOGY

## 2018-08-01 PROCEDURE — 99215 OFFICE O/P EST HI 40 MIN: CPT | Mod: S$GLB,,, | Performed by: RADIOLOGY

## 2018-08-01 PROCEDURE — 85027 COMPLETE CBC AUTOMATED: CPT

## 2018-08-01 PROCEDURE — 96361 HYDRATE IV INFUSION ADD-ON: CPT

## 2018-08-01 RX ORDER — OXYCODONE HCL 5 MG/5 ML
5-10 SOLUTION, ORAL ORAL EVERY 4 HOURS PRN
Qty: 473 ML | Refills: 0 | Status: SHIPPED | OUTPATIENT
Start: 2018-08-01 | End: 2018-08-22 | Stop reason: DRUGHIGH

## 2018-08-01 RX ORDER — ONDANSETRON HCL IN 0.9 % NACL 8 MG/50 ML
8 INTRAVENOUS SOLUTION, PIGGYBACK (ML) INTRAVENOUS
Status: COMPLETED | OUTPATIENT
Start: 2018-08-01 | End: 2018-08-01

## 2018-08-01 RX ORDER — ONDANSETRON HYDROCHLORIDE 4 MG/5ML
8 SOLUTION ORAL EVERY 6 HOURS PRN
Qty: 500 ML | Refills: 3 | Status: SHIPPED | OUTPATIENT
Start: 2018-08-01 | End: 2018-10-01

## 2018-08-01 RX ORDER — HEPARIN 100 UNIT/ML
500 SYRINGE INTRAVENOUS
Status: CANCELLED | OUTPATIENT
Start: 2018-08-03

## 2018-08-01 RX ORDER — LORAZEPAM 0.5 MG/1
0.5 TABLET ORAL EVERY 6 HOURS PRN
Qty: 120 TABLET | Refills: 0 | Status: SHIPPED | OUTPATIENT
Start: 2018-08-01 | End: 2018-09-21

## 2018-08-01 RX ORDER — SODIUM CHLORIDE 0.9 % (FLUSH) 0.9 %
10 SYRINGE (ML) INJECTION
Status: CANCELLED | OUTPATIENT
Start: 2018-08-03

## 2018-08-01 RX ADMIN — ONDANSETRON 8 MG: 2 INJECTION, SOLUTION INTRAMUSCULAR; INTRAVENOUS at 09:08

## 2018-08-01 RX ADMIN — SODIUM CHLORIDE 2000 ML: 0.9 INJECTION, SOLUTION INTRAVENOUS at 10:08

## 2018-08-01 NOTE — PROGRESS NOTES
"REFERRING PHYSICIAN:  Dr. Olguin    DIAGNOSIS: [id number] vs. 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 Whiting returns about 1 month after completing induction chemotherapy followed by chemoradiotherapy for his locally advanced vs metastatic p16+ SCC of the left tonsil and neck.  He received 70Gy in 35 fractions to gross disease and 56Gy to the elective neck from 5/3-6/26/18.  He received concurrent weekly cisplatin.  He initially received 3 cycles of TPF.  At the end of treatment he had a cCR.  Since completing chemoradiation he was admitted for N/V and dehydration from 7/1-7/5/18.  He recovered with rehydration and IV antiemetics and was using his PEG tube adequately.  Pain was well controlled.  Since that time he was feeling better but over the last several days has been feeling "lousy.".  He c/o odynophagia, occasional nausea.  Using feeding tube but able to swallow Gatorade and pills without difficulty.  He has been out of pain medication and anxiolytic for the last week, both of which have helped.  He has been trying to go back to work.  He is weak.  He is here today with his brother, and his sister joined the conversation via phone.  Mr. Whiting admits to feeling depressed.    PHYSICAL EXAMINATION:  VITAL SIGNS: /89 (BP Location: Right arm, Patient Position: Sitting)   Pulse (!) 114   Temp 98.1 °F (36.7 °C) (Oral)   Resp 18   Wt 85.6 kg (188 lb 11.2 oz)   BMI 28.69 kg/m²   GENERAL: Patient is alert and oriented, in no acute distress.  He is pale and chronically ill appearing.  Sitting in a wheelchair.  HEENT: Extraocular muscles are intact.  Oropharynx is clear without lesions.    LYMPH: There is no cervical or supraclavicular adenopathy palpated.    CHEST: Breath sounds clear bilaterally.  No rales.  No rhonchi.  Unlabored respirations.  CARDIOVASCULAR: Normal S1, S2.  Normal rate.  Regular " rhythm.  ABDOMEN: Bowel sounds normal.  No tenderness.  No abdominal distention.  No hepatomegaly.  No splenomegaly.  EXTREMITIES: No clubbing, cyanosis, edema.  NEUROLOGIC: Cranial nerves II through XII are grossly intact.  Sensation is intact.  Strength is 5 out of 5 in the upper and lower extremities bilaterally.     ASSESSMENT:   No diagnosis found.  Cancer Staging  Squamous cell carcinoma of palatine tonsil  Staging form: Pharynx - HPV-Mediated Oropharynx, AJCC 8th Edition  - Clinical stage from 1/19/2018: Stage III (cT3, cN3, cM0, p16: Positive) - Signed by Burton Sandra MD on 1/19/2018  49 yo man one month s/p induction TPF followed by definitive chemoradiotherapy for a stage III vs. Stage IV p16+ SCC of the oropharynx.  He has clinically deteriorated, partly out of a lack of social support and depression over his medical condition.  He is obviously dehydrated and out of many of his medications.    PLAN:   I had a long discussion with the patient and his siblings about the need for a good support system and encouraged him to ask for help when needed.  He understands he can call me when he needs refills of his medications.  He will go to oncology urgent care today for 2L IV NS and antiemetics.  I will refer him back to oncology nutrition to discuss his nutritional needs.  Will refer to Dr. Birch, patient agrees.  Refer to speech and swallow therapy (patient says he did not go to previous appointments).  Refilled his pain medication and Ativan, which has helped his anxiety and nausea in the past.   Refilled his zofran.  He will try to increase his po caloric intake as well.  He will see Dr. Lopez as scheduled next week.  I will see him again in 3 weeks.    PET/CT at 12 weeks post treatment.    40 minutes were spent in follow up, of which >50% was spent in face to face counseling.

## 2018-08-01 NOTE — PLAN OF CARE
Problem: Patient Care Overview  Goal: Plan of Care Review  Pt received 2L NS + IV zofran with no complications. Pt instructed to call MD with any problems. NAD. Pt discharged home with family at side.

## 2018-08-03 ENCOUNTER — TELEPHONE (OUTPATIENT)
Dept: PHARMACY | Facility: CLINIC | Age: 48
End: 2018-08-03

## 2018-08-06 ENCOUNTER — TELEPHONE (OUTPATIENT)
Dept: SPEECH THERAPY | Facility: HOSPITAL | Age: 48
End: 2018-08-06

## 2018-08-06 ENCOUNTER — TELEPHONE (OUTPATIENT)
Dept: INFUSION THERAPY | Facility: HOSPITAL | Age: 48
End: 2018-08-06

## 2018-08-06 ENCOUNTER — PATIENT MESSAGE (OUTPATIENT)
Dept: SPEECH THERAPY | Facility: HOSPITAL | Age: 48
End: 2018-08-06

## 2018-08-06 NOTE — TELEPHONE ENCOUNTER
Left voicemail re: scheduling office eval for swallowing per Dr. Sandra with my direct call back # [phone]) as our  is out today (dept # [phone]).

## 2018-08-07 ENCOUNTER — TELEPHONE (OUTPATIENT)
Dept: SPEECH THERAPY | Facility: HOSPITAL | Age: 48
End: 2018-08-07

## 2018-08-07 ENCOUNTER — TELEPHONE (OUTPATIENT)
Dept: RADIATION ONCOLOGY | Facility: CLINIC | Age: 48
End: 2018-08-07

## 2018-08-07 ENCOUNTER — TELEPHONE (OUTPATIENT)
Dept: INFUSION THERAPY | Facility: HOSPITAL | Age: 48
End: 2018-08-07

## 2018-08-07 NOTE — TELEPHONE ENCOUNTER
Spoke patient brother about scheduling for swallow eval, but brother is now out of town. Patient brother also said that he's brother might be seeking service at [facility]  Where Dr. Lopez is but its not guarantee. He'll call back when he get in town.

## 2018-08-09 ENCOUNTER — TELEPHONE (OUTPATIENT)
Dept: INFUSION THERAPY | Facility: HOSPITAL | Age: 48
End: 2018-08-09

## 2018-08-10 ENCOUNTER — OFFICE VISIT (OUTPATIENT)
Dept: OTOLARYNGOLOGY | Facility: CLINIC | Age: 48
End: 2018-08-10
Payer: COMMERCIAL

## 2018-08-10 ENCOUNTER — INFUSION (OUTPATIENT)
Dept: INFUSION THERAPY | Facility: HOSPITAL | Age: 48
End: 2018-08-10
Attending: INTERNAL MEDICINE
Payer: COMMERCIAL

## 2018-08-10 ENCOUNTER — OFFICE VISIT (OUTPATIENT)
Dept: HEMATOLOGY/ONCOLOGY | Facility: CLINIC | Age: 48
End: 2018-08-10
Payer: COMMERCIAL

## 2018-08-10 ENCOUNTER — CLINICAL SUPPORT (OUTPATIENT)
Dept: HEMATOLOGY/ONCOLOGY | Facility: CLINIC | Age: 48
End: 2018-08-10
Payer: COMMERCIAL

## 2018-08-10 ENCOUNTER — LAB VISIT (OUTPATIENT)
Dept: LAB | Facility: HOSPITAL | Age: 48
End: 2018-08-10
Attending: INTERNAL MEDICINE
Payer: COMMERCIAL

## 2018-08-10 VITALS
RESPIRATION RATE: 18 BRPM | HEART RATE: 67 BPM | SYSTOLIC BLOOD PRESSURE: 113 MMHG | DIASTOLIC BLOOD PRESSURE: 63 MMHG | TEMPERATURE: 98 F

## 2018-08-10 VITALS
WEIGHT: 195.56 LBS | HEART RATE: 67 BPM | TEMPERATURE: 98 F | DIASTOLIC BLOOD PRESSURE: 70 MMHG | HEIGHT: 68 IN | BODY MASS INDEX: 29.73 KG/M2 | WEIGHT: 195.56 LBS | BODY MASS INDEX: 29.64 KG/M2 | SYSTOLIC BLOOD PRESSURE: 115 MMHG

## 2018-08-10 VITALS
RESPIRATION RATE: 18 BRPM | HEART RATE: 73 BPM | DIASTOLIC BLOOD PRESSURE: 73 MMHG | TEMPERATURE: 98 F | SYSTOLIC BLOOD PRESSURE: 125 MMHG | OXYGEN SATURATION: 99 % | HEIGHT: 67 IN | BODY MASS INDEX: 30.69 KG/M2 | WEIGHT: 195.56 LBS

## 2018-08-10 DIAGNOSIS — C09.9 SQUAMOUS CELL CARCINOMA OF PALATINE TONSIL: Primary | ICD-10-CM

## 2018-08-10 DIAGNOSIS — C09.9 SQUAMOUS CELL CARCINOMA OF PALATINE TONSIL: ICD-10-CM

## 2018-08-10 DIAGNOSIS — C76.0 HEAD AND NECK CANCER: ICD-10-CM

## 2018-08-10 DIAGNOSIS — N17.9 ACUTE RENAL FAILURE, UNSPECIFIED ACUTE RENAL FAILURE TYPE: ICD-10-CM

## 2018-08-10 DIAGNOSIS — T80.212A PORT OR RESERVOIR INFECTION, INITIAL ENCOUNTER: Primary | ICD-10-CM

## 2018-08-10 DIAGNOSIS — Z71.3 NUTRITIONAL COUNSELING: Primary | ICD-10-CM

## 2018-08-10 LAB
ABO + RH BLD: NORMAL
ALBUMIN SERPL BCP-MCNC: 3.8 G/DL
ALP SERPL-CCNC: 63 U/L
ALT SERPL W/O P-5'-P-CCNC: 9 U/L
ANION GAP SERPL CALC-SCNC: 9 MMOL/L
AST SERPL-CCNC: 15 U/L
BILIRUB SERPL-MCNC: 0.4 MG/DL
BLD GP AB SCN CELLS X3 SERPL QL: NORMAL
BUN SERPL-MCNC: 26 MG/DL
CALCIUM SERPL-MCNC: 9.8 MG/DL
CHLORIDE SERPL-SCNC: 98 MMOL/L
CO2 SERPL-SCNC: 29 MMOL/L
CREAT SERPL-MCNC: 2.5 MG/DL
ERYTHROCYTE [DISTWIDTH] IN BLOOD BY AUTOMATED COUNT: 14.5 %
EST. GFR  (AFRICAN AMERICAN): 33.4 ML/MIN/1.73 M^2
EST. GFR  (NON AFRICAN AMERICAN): 28.9 ML/MIN/1.73 M^2
GLUCOSE SERPL-MCNC: 109 MG/DL
HCT VFR BLD AUTO: 23.3 %
HGB BLD-MCNC: 7.9 G/DL
IMM GRANULOCYTES # BLD AUTO: 0.02 K/UL
MAGNESIUM SERPL-MCNC: 2.2 MG/DL
MCH RBC QN AUTO: 36.6 PG
MCHC RBC AUTO-ENTMCNC: 33.9 G/DL
MCV RBC AUTO: 108 FL
NEUTROPHILS # BLD AUTO: 2.4 K/UL
PHOSPHATE SERPL-MCNC: 4.1 MG/DL
PLATELET # BLD AUTO: 221 K/UL
PMV BLD AUTO: 9.1 FL
POTASSIUM SERPL-SCNC: 4.8 MMOL/L
PROT SERPL-MCNC: 6.7 G/DL
RBC # BLD AUTO: 2.16 M/UL
SODIUM SERPL-SCNC: 136 MMOL/L
WBC # BLD AUTO: 3.14 K/UL

## 2018-08-10 PROCEDURE — 99999 PR PBB SHADOW E&M-EST. PATIENT-LVL III: CPT | Mod: PBBFAC,,, | Performed by: OTOLARYNGOLOGY

## 2018-08-10 PROCEDURE — 99999 PR PBB SHADOW E&M-EST. PATIENT-LVL II: CPT | Mod: PBBFAC,,, | Performed by: DIETITIAN, REGISTERED

## 2018-08-10 PROCEDURE — 36415 COLL VENOUS BLD VENIPUNCTURE: CPT

## 2018-08-10 PROCEDURE — 97803 MED NUTRITION INDIV SUBSEQ: CPT | Mod: S$GLB,,, | Performed by: DIETITIAN, REGISTERED

## 2018-08-10 PROCEDURE — 85027 COMPLETE CBC AUTOMATED: CPT

## 2018-08-10 PROCEDURE — 96360 HYDRATION IV INFUSION INIT: CPT

## 2018-08-10 PROCEDURE — 3008F BODY MASS INDEX DOCD: CPT | Mod: CPTII,S$GLB,, | Performed by: OTOLARYNGOLOGY

## 2018-08-10 PROCEDURE — 84100 ASSAY OF PHOSPHORUS: CPT

## 2018-08-10 PROCEDURE — 99999 PR PBB SHADOW E&M-EST. PATIENT-LVL IV: CPT | Mod: PBBFAC,,, | Performed by: INTERNAL MEDICINE

## 2018-08-10 PROCEDURE — 96361 HYDRATE IV INFUSION ADD-ON: CPT

## 2018-08-10 PROCEDURE — 86850 RBC ANTIBODY SCREEN: CPT

## 2018-08-10 PROCEDURE — 80053 COMPREHEN METABOLIC PANEL: CPT

## 2018-08-10 PROCEDURE — 3008F BODY MASS INDEX DOCD: CPT | Mod: CPTII,S$GLB,, | Performed by: INTERNAL MEDICINE

## 2018-08-10 PROCEDURE — 99213 OFFICE O/P EST LOW 20 MIN: CPT | Mod: S$GLB,,, | Performed by: OTOLARYNGOLOGY

## 2018-08-10 PROCEDURE — 25000003 PHARM REV CODE 250: Performed by: INTERNAL MEDICINE

## 2018-08-10 PROCEDURE — 83735 ASSAY OF MAGNESIUM: CPT

## 2018-08-10 PROCEDURE — 99214 OFFICE O/P EST MOD 30 MIN: CPT | Mod: S$GLB,,, | Performed by: INTERNAL MEDICINE

## 2018-08-10 RX ORDER — HEPARIN 100 UNIT/ML
500 SYRINGE INTRAVENOUS
Status: CANCELLED | OUTPATIENT
Start: 2018-08-10

## 2018-08-10 RX ORDER — SODIUM CHLORIDE 0.9 % (FLUSH) 0.9 %
10 SYRINGE (ML) INJECTION
Status: CANCELLED | OUTPATIENT
Start: 2018-08-10

## 2018-08-10 RX ADMIN — SODIUM CHLORIDE 2000 ML: 0.9 INJECTION, SOLUTION INTRAVENOUS at 01:08

## 2018-08-10 NOTE — ASSESSMENT & PLAN NOTE
Clinically JODI.   We will watch him closely given his questionable metastatic disease.  RTC 6 weeks. PET/CT at the end of September.

## 2018-08-10 NOTE — LETTER
August 10, 2018      Burton Sandra MD  [address]  [place] [facility] [address]           [facility] Hematology Oncology  [address]  [facility] [address]  Phone: [phone]          Patient: Burton Whiting   MR Number: [id number]   YOB: 1970   Date of Visit: 8/10/2018       Dear Dr. Burton Sandra:    Thank you for referring Burton Whiting to me for evaluation. Attached you will find relevant portions of my assessment and plan of care.    If you have questions, please do not hesitate to call me. I look forward to following Burton Whiting along with you.    Sincerely,    Cici Woodward, RD    Enclosure  CC:  No Recipients    If you would like to receive this communication electronically, please contact [email or web address] or [phone] to request more information on [email or web address] Link access.    For providers and/or their staff who would like to refer a patient to [facility], please contact us through our one-stop-shop provider referral line, Rosemary Shah, at [phone].    If you feel you have received this communication in error or would no longer like to receive these types of communications, please e-mail [email or web address]

## 2018-08-10 NOTE — PROGRESS NOTES
Chief Complaint   Patient presents with    Follow-up     Treatment History  6/26/18: Completion of neoadjuvant chemo+definitive CRT for T3N3M0 HPV+ SCCA L tonsil.     HPI   48 y.o. male presents with the above treatment history.  No complaints today.  He was admitted for IVF early this month but has no complaints today.     Review of Systems   Constitutional: Negative for fatigue and unexpected weight change.   HENT: Per HPI.  Eyes: Negative for visual disturbance.   Respiratory: Negative for shortness of breath, hemoptysis   Cardiovascular: Negative for chest pain and palpitations.   Musculoskeletal: Negative for decreased ROM, back pain.   Skin: Negative for rash, sunburn, itching.   Neurological: Negative for dizziness and seizures.   Hematological: Negative for adenopathy. Does not bruise/bleed easily.   Endocrine: Negative for rapid weight loss/weight gain, heat/cold intolerance.     Past Medical History   Patient Active Problem List   Diagnosis    Squamous cell carcinoma of palatine tonsil    Low vitamin D level    Prediabetes    Cancer of head, face, or neck lymph nodes, secondary    Secondary cancer of bone    Morbid obesity    Tonsil cancer    Sepsis    Fever and neutropenia    Dehydration    Chemotherapy-induced neutropenia    Thrombocytopenia    Enterocolitis    Diverticulitis    Port or reservoir infection    Anxiety attack    Head and neck cancer    Chemotherapy induced neutropenia    Acute renal failure    Macrocytic anemia    Moderate malnutrition           Past Surgical History   Past Surgical History:   Procedure Laterality Date    GASTROSTOMY TUBE PLACEMENT Left 02/12/2018    MEDIPORT REMOVAL N/A 6/6/2018    Procedure: Removal-port-a-cath;  Surgeon: Blayne Diagnostic Provider;  Location: [facility] OR [address] [place] [address];  Service: General;  Laterality: N/A;         Family History   Family History   Problem Relation Age of Onset    Leukemia Mother     Hypertension Father     Thyroid disease  Sister     Hypertension Brother     Brain cancer Maternal Uncle     Brain cancer Maternal Uncle            Social History   .  Social History     Social History    Marital status: Single     Spouse name: N/A    Number of children: N/A    Years of education: N/A     Occupational History    Not on file.     Social History Main Topics    Smoking status: Former Smoker     Packs/day: 1.50     Years: 25.00     Types: Cigarettes     Quit date: 06/2017    Smokeless tobacco: Never Used      Comment: smoked for about 25 years. quit 6 months ago    Alcohol use No    Drug use: No      Comment: Former Opiate    Sexual activity: Yes     Partners: Female     Other Topics Concern    Not on file     Social History Narrative    No narrative on file         Allergies   Review of patient's allergies indicates:  No Known Allergies        Physical Exam     Vitals:    08/10/18 1158   BP: 115/70   Pulse: 67   Temp: 97.6 °F (36.4 °C)         Body mass index is 29.73 kg/m².      General: AOx3, NAD   Respiratory:  Symmetric chest rise, normal effort  Right Ear: External Auditory Canal WNL,TM w/o masses/lesions/perforations.  Middle ear without evidence of effusion.   Left Ear: External Auditory Canal WNL,TM w/o masses/lesions/perforations.  Middle ear without evidence of effusion.   Nose: No gross nasal septal deviation. Inferior Turbinates WNL bilaterally. No septal perforation. No masses/lesions.   Oral Cavity:  Oral Tongue mobile, no lesions noted. Hard Palate WNL. No buccal or FOM lesions.  Oropharynx:  No masses/lesions of the posterior pharyngeal wall. Tonsillar fossa without lesions. Soft palate without masses. Midline uvula.   Neck: No scars.  No cervical lymphadenopathy, thyromegaly or thyroid nodules.  Normal range of motion.    Face: House Brackmann I bilaterally.     NP: No lesions of posterior wall  OP: No lesions of posterior wall or BOT. BOT is soft to palpation  Larynx: No lesions of glottic or supraglottic  larynx. Normal vocal fold mobility    HP: No lesions of pyriform sinuses or postcricoid region  Mirror examination was performed.      Assessment/Plan  Problem List Items Addressed This Visit        Oncology    Squamous cell carcinoma of palatine tonsil     Clinically JODI.   We will watch him closely given his questionable metastatic disease.  RTC 6 weeks. PET/CT at the end of September.

## 2018-08-10 NOTE — PLAN OF CARE
Problem: Patient Care Overview  Goal: Plan of Care Review  Outcome: Ongoing (interventions implemented as appropriate)  Pt received 2L NS with no complications. Pt instructed to call MD with any problems. NAD. Pt discharged home independently.

## 2018-08-10 NOTE — PROGRESS NOTES
"Medical Nutrition Therapy Oncology Follow-Up Progress Note    Name: Burton Whiting MRN: [id number]  : 1970    Age: 48 y.o.  Ethnicity: /White Language: English    Diagnosis: No diagnosis found.    Chemo Regimen: completed   Referring MD: Dr. Sandra Frequency:  Radiation: Yes; completed           Goal of Cancer treatment n/a         Nutrition Assessment     Chief Complaint:   Chief Complaint   Patient presents with    Nutrition Counseling        Anthropometric Measurements:  Height: 5' 8" (1.727 m)  Current Weight: 88.7 kg (195 lb 8.8 oz)  BMI: Body mass index is 29.73 kg/m².     Weight History:   Wt Readings from Last 4 Encounters:   08/10/18 88.7 kg (195 lb 8.8 oz)   08/10/18 88.7 kg (195 lb 8.8 oz)   18 85.6 kg (188 lb 11.2 oz)   18 85.7 kg (188 lb 14.4 oz)      Last Labs:  Glucose   Date Value Ref Range Status   08/10/2018 109 70 - 110 mg/dL Final   2018 116 (H) 70 - 110 mg/dL Final     BUN, Bld   Date Value Ref Range Status   08/10/2018 26 (H) 6 - 20 mg/dL Final   2018 28 (H) 6 - 20 mg/dL Final     Creatinine   Date Value Ref Range Status   08/10/2018 2.5 (H) 0.5 - 1.4 mg/dL Final   2018 2.0 (H) 0.5 - 1.4 mg/dL Final     Sodium   Date Value Ref Range Status   08/10/2018 136 136 - 145 mmol/L Final   2018 134 (L) 136 - 145 mmol/L Final     Potassium   Date Value Ref Range Status   08/10/2018 4.8 3.5 - 5.1 mmol/L Final   2018 4.5 3.5 - 5.1 mmol/L Final     Phosphorus   Date Value Ref Range Status   08/10/2018 4.1 2.7 - 4.5 mg/dL Final   2018 3.7 2.7 - 4.5 mg/dL Final     Calcium   Date Value Ref Range Status   08/10/2018 9.8 8.7 - 10.5 mg/dL Final   2018 10.4 8.7 - 10.5 mg/dL Final     No results found for: PREALBUMIN  Total Protein   Date Value Ref Range Status   08/10/2018 6.7 6.0 - 8.4 g/dL Final   2018 7.7 6.0 - 8.4 g/dL Final     Cholesterol   Date Value Ref Range Status   2018 195 120 - 199 mg/dL Final     Comment:     The National " Cholesterol Education Program (NCEP) has set the  following guidelines (reference ranges) for Cholesterol:  Optimal.....................<200 mg/dL  Borderline High.............200-239 mg/dL  High........................> or = 240 mg/dL       Hemoglobin A1C   Date Value Ref Range Status   02/07/2018 6.4 (H) 0.0 - 5.6 % Final     Comment:     Reference Interval:  5.0 - 5.6 Normal   5.7 - 6.4 High Risk   > 6.5 Diabetic    Hgb A1c results are standardized based on the (NGSP) National   Glycohemoglobin Standardization Program.    Hemoglobin A1C levels are related to mean serum/plasma glucose   during the preceding 2-3 months.        01/04/2018 5.9 (H) 4.0 - 5.6 % Final     Comment:     According to ADA guidelines, hemoglobin A1c <7.0% represents  optimal control in non-pregnant diabetic patients. Different  metrics may apply to specific patient populations.   Standards of Medical Care in Diabetes-2016.  For the purpose of screening for the presence of diabetes:  <5.7%     Consistent with the absence of diabetes  5.7-6.4%  Consistent with increasing risk for diabetes   (prediabetes)  >or=6.5%  Consistent with diabetes  Currently, no consensus exists for use of hemoglobin A1c  for diagnosis of diabetes for children.  This Hemoglobin A1c assay has significant interference with fetal   hemoglobin   (HbF). The results are invalid for patients with abnormal amounts of   HbF,   including those with known Hereditary Persistence   of Fetal Hemoglobin. Heterozygous hemoglobin variants (HbAS, HbAC,   HbAD, HbAE, HbA2) do not significantly interfere with this assay;   however, presence of multiple variants in a sample may impact the %   interference.       Hemoglobin   Date Value Ref Range Status   08/10/2018 7.9 (L) 14.0 - 18.0 g/dL Final   08/01/2018 9.4 (L) 14.0 - 18.0 g/dL Final     Hematocrit   Date Value Ref Range Status   08/10/2018 23.3 (L) 40.0 - 54.0 % Final   08/01/2018 26.1 (L) 40.0 - 54.0 % Final     No results found  for: IRON  No components found for: FROLATE  Vit D, 25-Hydroxy   Date Value Ref Range Status   01/04/2018 22 (L) 30 - 96 ng/mL Final     Comment:     Vitamin D deficiency.........<10 ng/mL                              Vitamin D insufficiency......10-29 ng/mL       Vitamin D sufficiency........> or equal to 30 ng/mL  Vitamin D toxicity............>100 ng/mL       WBC   Date Value Ref Range Status   08/10/2018 3.14 (L) 3.90 - 12.70 K/uL Final   08/01/2018 4.60 3.90 - 12.70 K/uL Final       Medical Health History:    Past Surgical History:   Procedure Laterality Date    GASTROSTOMY TUBE PLACEMENT Left 02/12/2018    MEDIPORT REMOVAL N/A 6/6/2018    Procedure: Removal-port-a-cath;  Surgeon: Blayne Diagnostic Provider;  Location: [facility] OR [address] [place] [address];  Service: General;  Laterality: N/A;        Baseline for Outcomes Monitoring  Food and Nutrition History:   Pt here for f/u nutrition visit. Pt with current weight of 195# which is a 6# weight gain in 10 days. Pt was 234# at initial visit in May before starting treatment. Pt very dehydrated and weak [facility] week[facility] Today, pt walking and feeling much better. Completed chemo and radiation.Tolerating 4 cans of Twocal daily per PEG tube and eating 1 container of plain yogurt. Pt flushes tube with 2400 ml water/day and drinks 2 bottles of water each day. Pt started working 1/2 days this week and notes that job is outside thus he is sweating a lot. Pt is in need of fluids again today. Encouraged pt to increase PO fluids while at work. Encouraged pt to continue tube feedings and slowly incorporate more PO food as tolerated. Pt states he is meeting with the dentist next week to discuss implants. Pt feels he will be able to eat much more once implants are in. Discussed soft foods for pt to consume. Explained that tube feeding can only be weaned once pt eating enough to maintain weight. Pt expresses understanding.     Nutritional Needs:  Estimated Needs Method  Use    2200 kcal/day [] Predictive Equation: Blair-Heaters   [x]  30 kcal/kg (adjusted)   Protein 90 g 1.2 gm/kg/day   Fluid 4000 ml 45 ml/kg/day     Food/Nutrient Intake (oral, enteral or parenteral) and Patient Behaviors     Calorie intake:  Adequate   Protein intake:  Adequate     Yes/No    yes Uses medical food supplements:  Name: Twocal  Amount: 960 mL  Calories: 1900 kcal  Protein: 80 g   Yes Cooking techniques to minimize fatigue   Yes Currently modifying food textures   Yes Able to maintain usual physical actiivty     Nutrition Diagnosis     Nutrition Diagnosis Related to (Etiology) As Evidenced By (Signs/Symptoms)   Unintended weight loss Physiological causes 16% weight loss in 3 months due to chemo/radiation for head and neck cancer            Nutritional Interventions, Monitoring and Evaluation     Nutrition Intervention Goals/Expected Outcomes Progress   Enteral Nutrition  Volume Pt to continue 4 cans of Twocal daily as main source of nutrition. Goal Met   Texture-modified diet Pt to slowly start incorporating soft foods into diet before weaning from tube feeding. Progressing towards goal   Fluid-modified diet Pt to continue with 2400 ml water flushes per PEG.   Pt to increase to 3-4 bottles of water/Gaterade by mouth. Progressing towards goal            Pt needs education? yes (see intervention)    Coordination of Nutrition Care: Comments:   Collaboration with other providers MD         Monitoring and Evaluation     Next Visit: PRN     ©2010 [employer] Oncology Toolkit

## 2018-08-10 NOTE — PROGRESS NOTES
Subjective:       Patient ID: Burton Whiting is a 48 y.o. male.    Chief Complaint: Squamous cell carcinoma of palatine tonsil  Chief Complaint: Squamous cell carcinoma of palatine tonsil; g-tube malodorous; left neck pain 2/10; 4 cans formula per day; and r arm port---red/warm to touch  Mr. Burton Whiting is a 48-year-old male, former smoker, who presents today with a four-month history of enlarging neck mass.  He initially delayed care due to lack of insurance.  He was seen by Dr. Turpin who referred him to see Dr. Olguin.  He had a CT that showed a 3.8 x 3.8 x 4.9 cm soft tissue mass arising from the left palatine tonsil resulting in moderate narrowing of the hypopharyngeal airway adjacent extensive matted left cervical lymphadenopathy was noted.  The largest ella mass was 6.6 x 9 x 2.6 cm extending inferiorly to the supraclavicular region.  The mass pushed the trachea and the left common carotid artery to the right.  Additional representative of cervical lymph nodes measuring 2.9 x 3.1 x 3.5 cm on axial image 37 of series 3   and 2.4 x 2.1 x 2.2 cm on axial image 55 of series 3.  There is also a sclerotic lesion involving the midline of the clivus measuring 1.2 cm.  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  PET scan performed on 01/19/2018 revealed persistent evidence of a soft tissue mass arising from the left palatine tonsil resulting in moderate narrowing of the hypopharyngeal   airway.  The mass is hypermetabolic demonstrating an SUV max of 18.5.  There is also persistent adjacent extensive matted left cervical lymphadenopathy, largest of this measuring 6.7 x 8.42 with hypermetabolic activity and SUV max of 20.5.  Lymphadenopathy is again noted to have a mass effect on the trachea and left common carotid artery, pushing both structures towards the right.  There is also prominent right cervical lymph nodes with the largest measuring 0.8 cm and demonstrating mild hypermetabolic  "activity with SUV max of 1.8, physiologic   distribution of FDG in the gray matter.  There are 3 pulmonary nodules noted within the right lung, the largest is in the anterior segment of the right upper lobe measuring 1 cm, second is visualized in the middle lobe measuring 0.6 cm and the third is within the posterior basal segment measuring 0.6 cm.  There is one pulmonary nodule within the left lung within the lateral basal segment measuring 0.6 cm.  These nodules do not demonstrate hypermetabolic activity; however, they are below the threshold for observation with PET     HPIHe underwent MRI cervical spine revealed "No evidence of metastatic disease to the cervical spine. Multilevel degenerative changes of the cervical spine with disc protrusion at C5-6 which results in moderate to severe spinal canal stenosis and and associated cord signal abnormality, suggestive of compressive myelopathy. 6 mm T1 hypointense non-enhancing lesion in the clivus.  Continued followup is suggested. 9 mm inferior descent of the cerebellar tonsils below the foramen magnum, suggestive of Chiari 1 malformation"  MRI thoracic spine "No evidence of metastatic disease involving the thoracic spine. Incidental hemangioma involving the T7 vertebral body. Mild degenerative disc disease without any significant spinal canal stenosis or neuroforaminal narrowing.   He completed 3 cycles of TPF and 6 weeks of Cisplatin and RT. Completed on 6/26/18                  HPIHe comes in to review labs. His nutrition is via the PEG tube  He is able to swallow liquids and yogurt by mouth. He lost 11 bs since 6/21/18    Review of Systems   Constitutional: Negative for appetite change, fatigue and unexpected weight change.   HENT: Negative for mouth sores.    Eyes: Negative for visual disturbance.   Respiratory: Negative for cough and shortness of breath.    Cardiovascular: Negative for chest pain.   Gastrointestinal: Negative for abdominal pain and diarrhea. "   Genitourinary: Negative for frequency.   Musculoskeletal: Negative for back pain.   Skin: Negative for rash.   Neurological: Negative for headaches.   Hematological: Negative for adenopathy.   Psychiatric/Behavioral: The patient is not nervous/anxious.    All other systems reviewed and are negative.      Objective:      Physical Exam   Constitutional: He is oriented to person, place, and time. He appears well-developed and well-nourished.   HENT:   Mouth/Throat: No oropharyngeal exudate.   Cardiovascular: Normal rate and normal heart sounds.    Pulmonary/Chest: Effort normal and breath sounds normal. He has no wheezes.   Abdominal: Soft. Bowel sounds are normal. There is no tenderness.   Musculoskeletal: He exhibits no edema or tenderness.   Lymphadenopathy:     He has no cervical adenopathy.   Neurological: He is alert and oriented to person, place, and time. Coordination normal.   Skin: Skin is warm and dry. No rash noted.   Psychiatric: He has a normal mood and affect. Judgment and thought content normal.   Vitals reviewed.      LABS:  WBC   Date Value Ref Range Status   08/10/2018 3.14 (L) 3.90 - 12.70 K/uL Final     Hemoglobin   Date Value Ref Range Status   08/10/2018 7.9 (L) 14.0 - 18.0 g/dL Final     Hematocrit   Date Value Ref Range Status   08/10/2018 23.3 (L) 40.0 - 54.0 % Final     Platelets   Date Value Ref Range Status   08/10/2018 221 150 - 350 K/uL Final     Gran # (ANC)   Date Value Ref Range Status   08/10/2018 2.4 1.8 - 7.7 K/uL Final     Comment:     The ANC is based on a white cell differential from an   automated cell counter. It has not been microscopically   reviewed for the presence of abnormal cells. Clinical   correlation is required.         Chemistry        Component Value Date/Time     08/10/2018 0932    K 4.8 08/10/2018 0932    CL 98 08/10/2018 0932    CO2 29 08/10/2018 0932    BUN 26 (H) 08/10/2018 0932    CREATININE 2.5 (H) 08/10/2018 0932     08/10/2018 0932         Component Value Date/Time    CALCIUM 9.8 08/10/2018 0932    ALKPHOS 63 08/10/2018 0932    AST 15 08/10/2018 0932    ALT 9 (L) 08/10/2018 0932    BILITOT 0.4 08/10/2018 0932    ESTGFRAFRICA 33.4 (A) 08/10/2018 0932    EGFRNONAA 28.9 (A) 08/10/2018 0932          Assessment:       1. Squamous cell carcinoma of palatine tonsil    2. Acute renal failure, unspecified acute renal failure type        Plan:        1. He is doing well clinically. He will see Dr. Olguin today and will return at the end of September with PET scans.  2. IV fluids today. Encouraged increased hydration via PEG tube. Will repeat labs next week.    Above care plan was discussed with patient and all questions were addressed to his satisfaction

## 2018-08-13 ENCOUNTER — TELEPHONE (OUTPATIENT)
Dept: SPEECH THERAPY | Facility: HOSPITAL | Age: 48
End: 2018-08-13

## 2018-08-15 ENCOUNTER — INFUSION (OUTPATIENT)
Dept: INFUSION THERAPY | Facility: HOSPITAL | Age: 48
End: 2018-08-15
Attending: INTERNAL MEDICINE
Payer: COMMERCIAL

## 2018-08-15 VITALS
TEMPERATURE: 98 F | DIASTOLIC BLOOD PRESSURE: 66 MMHG | HEART RATE: 65 BPM | SYSTOLIC BLOOD PRESSURE: 114 MMHG | RESPIRATION RATE: 18 BRPM

## 2018-08-15 DIAGNOSIS — T80.212A PORT OR RESERVOIR INFECTION, INITIAL ENCOUNTER: Primary | ICD-10-CM

## 2018-08-15 PROCEDURE — 96360 HYDRATION IV INFUSION INIT: CPT

## 2018-08-15 PROCEDURE — 96361 HYDRATE IV INFUSION ADD-ON: CPT

## 2018-08-15 PROCEDURE — 25000003 PHARM REV CODE 250: Performed by: INTERNAL MEDICINE

## 2018-08-15 RX ORDER — SODIUM CHLORIDE 0.9 % (FLUSH) 0.9 %
10 SYRINGE (ML) INJECTION
Status: DISCONTINUED | OUTPATIENT
Start: 2018-08-15 | End: 2018-08-15 | Stop reason: HOSPADM

## 2018-08-15 RX ORDER — HEPARIN 100 UNIT/ML
500 SYRINGE INTRAVENOUS
Status: DISCONTINUED | OUTPATIENT
Start: 2018-08-15 | End: 2018-08-15 | Stop reason: HOSPADM

## 2018-08-15 RX ADMIN — SODIUM CHLORIDE 2000 ML: 0.9 INJECTION, SOLUTION INTRAVENOUS at 09:08

## 2018-08-15 NOTE — PLAN OF CARE
Problem: Patient Care Overview  Goal: Plan of Care Review  Outcome: Ongoing (interventions implemented as appropriate)  Pt tolerated 2L NS without complications. VSS. Instructed to contact MD with any questions. PIV removed and AVS given to patient.

## 2018-08-21 ENCOUNTER — PATIENT MESSAGE (OUTPATIENT)
Dept: HEMATOLOGY/ONCOLOGY | Facility: CLINIC | Age: 48
End: 2018-08-21

## 2018-08-21 ENCOUNTER — TELEPHONE (OUTPATIENT)
Dept: RADIATION ONCOLOGY | Facility: CLINIC | Age: 48
End: 2018-08-21

## 2018-08-21 DIAGNOSIS — C76.0 HEAD AND NECK CANCER: Primary | ICD-10-CM

## 2018-08-21 NOTE — TELEPHONE ENCOUNTER
Spoke with pt and informed him of his appointments for tomorrow. Lab, Dr. Sandra, MRI and . Pt stated he would come see us tomorrow.        Left message for pt to call and confirm appointments for 8/22 and that he needs to have an MRI scheduled.

## 2018-08-21 NOTE — TELEPHONE ENCOUNTER
"Spoke with candi, patient's brother, who is very concerned about his brother. Up until last Wednesday, patient has being doing well, working daily, visiting friends, etc.  Starting on Wednesday, the patient has shut out everyone, he is not answering any phone calls, staying in bed all the time/not going to work.  Brother states he went to his house on Sunday---and he stayed in his bed the entire time--and only had a few empty cans of soup and bottles of gatorade in the trash. Brother states his brother is not coherent--"is zoned out."    Brother states the last day he filled his oxycodone solution was on 8/8---and by the 15th morning he was completely out (this was last Wednesday).  Brother notes patient had an opioid addiction in the past---and he is not sure if the patient has relapsed.     Patient lives alone.    Brother is extremely concerned---he states patient is scheduled to see dr copeland and dr gutierrez tomorrow.    Nurse informed brother she would ask dr waggoner to contact him/or nurse would contact him back this afternoon.  Brother thanked nurse.      Message routed to dr waggoner   "

## 2018-08-22 ENCOUNTER — PATIENT MESSAGE (OUTPATIENT)
Dept: PSYCHIATRY | Facility: CLINIC | Age: 48
End: 2018-08-22

## 2018-08-22 ENCOUNTER — OFFICE VISIT (OUTPATIENT)
Dept: RADIATION ONCOLOGY | Facility: CLINIC | Age: 48
End: 2018-08-22
Payer: COMMERCIAL

## 2018-08-22 ENCOUNTER — PATIENT MESSAGE (OUTPATIENT)
Dept: RADIATION ONCOLOGY | Facility: CLINIC | Age: 48
End: 2018-08-22

## 2018-08-22 ENCOUNTER — HOSPITAL ENCOUNTER (OUTPATIENT)
Dept: RADIOLOGY | Facility: HOSPITAL | Age: 48
Discharge: HOME | End: 2018-08-22
Attending: RADIOLOGY
Payer: COMMERCIAL

## 2018-08-22 VITALS
RESPIRATION RATE: 17 BRPM | HEART RATE: 111 BPM | SYSTOLIC BLOOD PRESSURE: 111 MMHG | WEIGHT: 181.88 LBS | BODY MASS INDEX: 27.66 KG/M2 | DIASTOLIC BLOOD PRESSURE: 83 MMHG | OXYGEN SATURATION: 98 % | TEMPERATURE: 98 F

## 2018-08-22 DIAGNOSIS — C76.0 HEAD AND NECK CANCER: ICD-10-CM

## 2018-08-22 DIAGNOSIS — C76.0 HEAD AND NECK CANCER: Primary | ICD-10-CM

## 2018-08-22 PROCEDURE — A9585 GADOBUTROL INJECTION: HCPCS | Performed by: RADIOLOGY

## 2018-08-22 PROCEDURE — 70553 MRI BRAIN STEM W/O & W/DYE: CPT | Mod: 26,,, | Performed by: RADIOLOGY

## 2018-08-22 PROCEDURE — 25500020 PHARM REV CODE 255: Performed by: RADIOLOGY

## 2018-08-22 PROCEDURE — 70553 MRI BRAIN STEM W/O & W/DYE: CPT | Mod: TC

## 2018-08-22 PROCEDURE — 99999 PR PBB SHADOW E&M-EST. PATIENT-LVL IV: CPT | Mod: PBBFAC,,, | Performed by: RADIOLOGY

## 2018-08-22 PROCEDURE — 3008F BODY MASS INDEX DOCD: CPT | Mod: CPTII,S$GLB,, | Performed by: RADIOLOGY

## 2018-08-22 PROCEDURE — 99215 OFFICE O/P EST HI 40 MIN: CPT | Mod: S$GLB,,, | Performed by: RADIOLOGY

## 2018-08-22 RX ORDER — OXYCODONE HYDROCHLORIDE 5 MG/1
5 TABLET ORAL EVERY 4 HOURS PRN
Qty: 90 TABLET | Refills: 0 | Status: SHIPPED | OUTPATIENT
Start: 2018-08-22 | End: 2018-10-01

## 2018-08-22 RX ORDER — GADOBUTROL 604.72 MG/ML
9 INJECTION INTRAVENOUS
Status: COMPLETED | OUTPATIENT
Start: 2018-08-22 | End: 2018-08-22

## 2018-08-22 RX ADMIN — GADOBUTROL 9 ML: 604.72 INJECTION INTRAVENOUS at 04:08

## 2018-08-22 NOTE — PROGRESS NOTES
"REFERRING PHYSICIAN:  Dr. Olguin     DIAGNOSIS: [id number] vs. M1 p16+ SCC of the left tonsil and neck, questionable bone metastases, stage III vs IVC     INTERVAL SINCE COMPLETION: 2 months     INTERVAL HISTORY: Mr. Whiting returns about 2 months after completing induction chemotherapy followed by chemoradiotherapy for his locally advanced vs metastatic p16+ SCC of the left tonsil and neck.  He received 70Gy in 35 fractions to gross disease and 56Gy to the elective neck from 5/3-6/26/18.  He received concurrent weekly cisplatin.  He initially received 3 cycles of TPF.  At the end of treatment he had a cCR.  Since being seen 3 weeks ago he had been doing better, back at work part time, living independently; however, his brother, who is here with him today, says that last Thursday he went downhill, staying in his room and not interacting with people.    Mr. Whiting expresses frustration over the slowness of his recovery, and of not being able to chew food.  He is not satisfied with his diet and wants dentures.  He has lost more weight.  He feels that he may be depressed and has an appointment with Dr. Birch this afternoon, though he "doesn't feel like going."  He also has an MRI brain today due to his recent perceived personality changes.  No pain at this point. He is able to swallow everything normally.  His brother is worried that he may have been overusing his oxycodone liquid.  He saw palliative care at [facility] and gabapentin was prescribed, though he only took two pills then discontinued it due to feeling loopy.  CBC and CMP performed today show improvement in Cr to 1.8 from 2.3.  Hct improving from 24-->28.  WBC and plts wnl.    PHYSICAL EXAMINATION:  VITAL SIGNS: /83 (BP Location: Right arm, Patient Position: Sitting)   Pulse (!) 111   Temp 98.2 °F (36.8 °C)   Resp 17   Wt 82.5 kg (181 lb 14.4 oz)   SpO2 98%   BMI 27.66 kg/m²   GENERAL: Patient is alert and oriented, in no acute distress.  Appears " fatigued and pale.  HEENT: Extraocular muscles are intact.  Oropharynx is clear without lesions.    LYMPH: There is no cervical or supraclavicular adenopathy palpated.    CHEST: Breath sounds clear bilaterally.  No rales.  No rhonchi.  Unlabored respirations.  CARDIOVASCULAR: Normal S1, S2.  Normal rate.  Regular rhythm.  ABDOMEN: Bowel sounds normal.  No tenderness.  No abdominal distention.  No hepatomegaly.  No splenomegaly.  EXTREMITIES: No clubbing, cyanosis, edema.  NEUROLOGIC: Cranial nerves II through XII are grossly intact.  Sensation is intact.  Strength is 5 out of 5 in the upper and lower extremities bilaterally. Gait is normal.    ASSESSMENT:   47 yo man two months s/p induction TPF followed by definitive chemoradiotherapy for a stage III vs. Stage IV p16+ SCC of the oropharynx.  He is frustrated and depressed over the lack of improvement in his side effects of treatment, as well as the inability to eat solid foods.    PLAN:   I told him that I don't recommend dentures until at least 6 months after completion of chemoradiation.  I also reassured him that his taste will return and that he is in the early stages of a long recovery.  I encouraged him to keep the appt with Dr. Birch for this afternoon.  He seems to be using his oxycodone more for psychologic rather than pain benefit, and I discussed this with him.  I feel that an SSRI would provide more appropriate relief.  However, I did agree to refilling his oxycodone once so that he can make the transition.  He agrees to obtain pain meds only from me, to only take as much as necessary, and to wean himself off over the next month.  I will follow up with him in one month, after his scheduled post treatment PET.  40 minutes were spent in follow up, of which >50% was spent in face to face counseling.

## 2018-08-23 ENCOUNTER — PATIENT MESSAGE (OUTPATIENT)
Dept: PSYCHIATRY | Facility: CLINIC | Age: 48
End: 2018-08-23

## 2018-08-23 ENCOUNTER — PATIENT MESSAGE (OUTPATIENT)
Dept: RADIATION ONCOLOGY | Facility: CLINIC | Age: 48
End: 2018-08-23

## 2018-08-23 ENCOUNTER — OFFICE VISIT (OUTPATIENT)
Dept: PSYCHIATRY | Facility: CLINIC | Age: 48
End: 2018-08-23
Payer: COMMERCIAL

## 2018-08-23 DIAGNOSIS — F43.29 ADJUSTMENT DISORDER WITH EMOTIONAL DISTURBANCE: Primary | ICD-10-CM

## 2018-08-23 PROCEDURE — 99999 PR PBB SHADOW E&M-EST. PATIENT-LVL II: CPT | Mod: PBBFAC,,, | Performed by: PSYCHOLOGIST

## 2018-08-23 PROCEDURE — 90791 PSYCH DIAGNOSTIC EVALUATION: CPT | Mod: S$GLB,,, | Performed by: PSYCHOLOGIST

## 2018-08-24 PROBLEM — F43.29 ADJUSTMENT DISORDER WITH EMOTIONAL DISTURBANCE: Status: ACTIVE | Noted: 2018-08-24

## 2018-08-24 NOTE — PROGRESS NOTES
"PSYCHO-ONCOLOGY INTAKE    Diagnostic Interview - CPT 07363    Date: 8/23/2018  Site: [facility]     Evaluation Length (direct face-to-face time):  1 hour     Referral Source: XIAO Sandra MD Oncologist: RUBEN Lopez MD   PCP: Christopher Turpin DO    Clinical status of patient: Outpatient    Burton Whiting, a 48 y.o. male, seen for initial evaluation visit.  Met with patient.    Chief complaint/reason for encounter: adjustment to illness, depression and anxiety    Medical/Surgical History:    Patient Active Problem List   Diagnosis    Squamous cell carcinoma of palatine tonsil    Low vitamin D level    Prediabetes    Cancer of head, face, or neck lymph nodes, secondary    Secondary cancer of bone    Morbid obesity    Tonsil cancer    Sepsis    Fever and neutropenia    Dehydration    Chemotherapy-induced neutropenia    Thrombocytopenia    Enterocolitis    Diverticulitis    Port or reservoir infection    Anxiety attack    Head and neck cancer    Chemotherapy induced neutropenia    Acute renal failure    Macrocytic anemia    Moderate malnutrition    Adjustment disorder with emotional disturbance       Health Behaviors:       ETOH Use: No (past excessive and exceeding NIAAA healthy use limits for age and gender)     Tobacco Use: No (Quit 6/2017; 37 pack year history)   Illicit Drug Use:  No  (history of opiate/methamphetamine addiction)   Prescription Misuse:No   Caffeine: minimal   Exercise:The patient engages in little, if any physical activity.     Family History:   Psychiatric illness: Yes (sister depression)    Alcohol/Drug Abuse: No     Suicide: No      Past Psychiatric History:   Inpatient treatment: No     Outpatient treatment: No     Prior substance abuse treatment: No (quit independently)    Suicide Attempts: No     Psychotropic Medications: Current: None    Past:  Ativan and Paxil ("caused anger problems")   Current medications below, allergies reviewed in chart.  Current Outpatient " "Medications   Medication    cholecalciferol, vitamin D3, (VITAMIN D3) 1,000 unit capsule    IBUPROFEN (ADVIL ORAL)    lactulose (CHRONULAC) 10 gram/15 mL solution    lidocaine HCl 2% (XYLOCAINE) 2 % Soln    LORazepam (ATIVAN) 0.5 MG tablet    NYSTATIN (DUKE'S SOLUTION)    nystatin (MYCOSTATIN) cream    ondansetron (ZOFRAN) 4 mg/5 mL solution    oxyCODONE (ROXICODONE) 5 MG immediate release tablet    promethazine (PHENERGAN) 6.25 mg/5 mL syrup    psyllium husk, aspartame, (METAMUCIL) 3.4 gram PwPk packet     No current facility-administered medications for this visit.         CAM Therapies: None     Screening:  Daniela: Denies Psychosis: Denies    Panic Disorder: Denies    Social/specific phobia: Specific phobia of dentists   Trauma: Denies      Social situation/Stressors: Burton Whiting lives alone in [place], [place].  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 He is not  and has no children. The patient reports excellent social support from his siblings, father, and several close friends. Burton Whiting's hobbies include fishing and flying dsrones.  Additional stressors: financial strain due to lost wages ("I'm not comfortable with my family helping me, even though they wouldn't' mind.")    Strengths:Steady employment, financial stability, Housing stability, Able to vocalize needs, Motivation, readiness for change, Vocational interests, hobbies and/or talents and Interpersonal relationships and supports available - family, relatives, friends  Liabilities: Complicated medical illness and Financial strain    Current Evaluation:     Mental Status Exam: Burton Whiting arrived 20 minutes late for the assessment session. HIs brother was present in the waiting room, but did not participate in the evaluation.  The patient was superficially cooperative throughout the interview and was an adequate historian   Appearance: age appropriate,  casually dressed, " "adequately groomed  Behavior/Cooperation: friendly and cooperative  Speech: normal in rate and volume, slightly slurred  Mood: anxious, dysthymic  Affect: dysphoric  Thought Process: goal-directed, logical  Thought Content: normal, no suicidality, no homicidality, delusions, or paranoia;did not appear to be responding to internal stimuli during the interview.   Orientation: grossly intact  Memory: Grossly intact  Attention Span/Concentration: Attends to interview without distraction; reports mild subjective difficulty  Fund of Knowledge: average  Estimate of Intelligence: average from verbal skills and history  Cognition: grossly intact  Insight: patient has awareness of illness;  Adequate insight into own behavior and behavior of others  Judgment: the patient's behavior is adequate to circumstances    Distress Score             Practical Problems Physical Problems                                                   Family Problems                                         Emotional Problems                                                         Spiritual/Religions Concerns               Other Problems            MMSE:     Pain:     History of present illness:  Mr. Whiting is 2 months post completion of induction chemotherapy followed by chemoradiotherapy for his locally advanced vs metastatic p16+ SCC of the left tonsil and neck.  He received 70Gy in 35 fractions to gross disease and 56Gy to the elective neck from 5/3-6/26/18.  He received concurrent weekly cisplatin.  He initially received 3 cycles of TPF.  At the end of treatment he had a cCR.    Burton Whiting has adjusted to illness with difficulty primarily through passive coping strategies. He reports being highly frustrated by his "lack of progress."  He states he will "start getting better, then fall off again." He feels he "can't eat and can't hydrate."  Frequent vomitting has dissuaded him from eating. He has minimal appetite. He had expected more rapid " "progress with swallowing and with return of his taste buds. He is reluctant to try a variety of soft-food alternatives because he wants to be "back to normal."  Despite urging by his medical team, he wishes to pursue immediate dentures to "get back to chewing." The patient has excellent family/friend support.  His support system is coping adequately with the diagnosis/treatment/prognosis. Illness related psychosocial stressors include financial strain and absence from work.  The patient has an adequate partnership with [facility]. The patient reports the following barriers to cancer care: absence from work, distance from center.    Symptoms:   · Mood: depressed mood, weight loss, psychomotor retardation, fatigue, poor concentration, negative thoughts about self, loss of interest, and social isolation;  no prior and no SI/HI; PHQ-9=14  · Anxiety: decreased memory, restlessness/keyed up and irritability;worry, but patient does not believe he worries excessively; he does feel his mind is "always in motion," sleep is disturbed by psychophysiological factors; he has had periods of "burning in the chest" due to anxiety, but denies other panic symptoms; does feel he "throw(s) up more" when he is nervous/worried; lifelong avoidance/fear of dentists/dental work;  OLLIE-7=7  · Substance abuse: denied current- prior overuse of ETOH, opiates, methamphetamines  · Cognitive functioning: denied  · Health behaviors: Quit smoking 6/2017; no current urges  · Sleep: "almost non-existent" :restless sleep and non-restorative sleep, 1 hour+ extended sleep onset latency, multiple x WASO (with re-onset difficulty) and early AM awakening without return to sleep,no EDS, no reported apneic events, daily naps 1-2 hours after work and no restless legs,no caffeine/stimulants, (+) sleep hygiene considerations and (+) psychophysiological factors,no use of OTC/melatonin/hypnotics/benzodiazepines   · Pain: Mr. Whiting reports " "minimal pain unless vomitting. Appears to use pain medications for sleep onset and anxiety reduction; Not taking the neurontin prescribed by palliative care 2 weeks ago because it made him "too zonked out" to work in AM      Assessment - Diagnosis - Goals:       ICD-10-CM ICD-9-CM   1. Adjustment disorder with emotional disturbance F43.29 309.29       Plan:medication management by physician    Summary and Recommendations  Burton Whiting is a 48 y.o. male referred by Dr. Sandra for psychological evaluation and treatment.  Mr. Whiting appears to be overwhelmed by the course/speed of his recovery and is having significant difficulty sleeping. He may benefit from an antidepressant trial.  He was also encouraged to attend/participate in the [facility] support group.  He was encouraged to increase pleasant events scheduling and exercise and to focus on broad trials of foods (as acceptable to his medical team) to challenge dissatisfaction with diet. Mr Whiting was encouraged to follow the recommendation of his medical team re: dentures and swallow study and to follow up with palliative care in 2 weeks, as scheduled.  He is not interested in psychological treatment at this time. The patient is aware of resources available should his needs/wishes change in the future.    Vic Dupont, PhD  Clinical Psychologist  [place] License #[id number]        "

## 2018-08-27 ENCOUNTER — TELEPHONE (OUTPATIENT)
Dept: PSYCHIATRY | Facility: CLINIC | Age: 48
End: 2018-08-27

## 2018-08-31 ENCOUNTER — TELEPHONE (OUTPATIENT)
Dept: HEMATOLOGY/ONCOLOGY | Facility: CLINIC | Age: 48
End: 2018-08-31

## 2018-08-31 NOTE — TELEPHONE ENCOUNTER
tried reaching out to pt in regards to rescheduling appointments on 09/28/18, but no answer, a detail message was left on v/m to contact office to discuss.

## 2018-09-04 ENCOUNTER — PATIENT MESSAGE (OUTPATIENT)
Dept: PSYCHIATRY | Facility: CLINIC | Age: 48
End: 2018-09-04

## 2018-09-04 ENCOUNTER — TELEPHONE (OUTPATIENT)
Dept: SPEECH THERAPY | Facility: HOSPITAL | Age: 48
End: 2018-09-04

## 2018-09-04 ENCOUNTER — PATIENT MESSAGE (OUTPATIENT)
Dept: HEMATOLOGY/ONCOLOGY | Facility: CLINIC | Age: 48
End: 2018-09-04

## 2018-09-05 ENCOUNTER — TELEPHONE (OUTPATIENT)
Dept: PSYCHIATRY | Facility: CLINIC | Age: 48
End: 2018-09-05

## 2018-09-05 NOTE — TELEPHONE ENCOUNTER
Spoke to patient's sister in response to email from yesterday.  By sister's report, patient has been isolating for the past 4-5 days.  Family is concerned about his mood and possibility of relapse to substance use (history of opioid addiction). Police were called out yesterday, but patient denied suicidality.  He did give up known firearms to family members.   SHANELL Birch, PhD

## 2018-09-05 NOTE — TELEPHONE ENCOUNTER
Left message for patient in response to sister's email on 9/4/18.  Tentative appt set for patient this AM to get him seen as soon as possible.  SHANELL Birch, PhD

## 2018-09-05 NOTE — TELEPHONE ENCOUNTER
"Patient's brother called to discuss logistics for patient appointment. They can get patient here for tomorrow. Patient currently stating he is not suicidal and willingly gave up his firearms to family members.     Brother states patient still had oxycodone from prior Rx, so he is less concerned that patient may be using.    Described patient's prior "crazy" and "suicidal" reaction to smoking cessation aid. (Wellbutrin?  Chantix?)    Knows about emergency procedures if needed due to patient suicidality between now and tomorrow's visit, but patient not willing to stay with family (lives alone).    SHANELL Birch, PhD  "

## 2018-09-05 NOTE — TELEPHONE ENCOUNTER
Left message for Rigo Kellydharmesh re: brother Burton in response to message from family members expressing patient's distress yesterday evening. Tenative appt set for patient this AM.  SHANELL Birch, PhD

## 2018-09-06 ENCOUNTER — HOSPITAL ENCOUNTER (EMERGENCY)
Facility: HOSPITAL | Age: 48
Discharge: PSYCHIATRIC OR SUBSTANCE USE FACILITY | End: 2018-09-06
Attending: EMERGENCY MEDICINE
Payer: COMMERCIAL

## 2018-09-06 ENCOUNTER — OFFICE VISIT (OUTPATIENT)
Dept: HEMATOLOGY/ONCOLOGY | Facility: CLINIC | Age: 48
End: 2018-09-06
Payer: COMMERCIAL

## 2018-09-06 ENCOUNTER — PATIENT MESSAGE (OUTPATIENT)
Dept: HEMATOLOGY/ONCOLOGY | Facility: CLINIC | Age: 48
End: 2018-09-06

## 2018-09-06 ENCOUNTER — OFFICE VISIT (OUTPATIENT)
Dept: PSYCHIATRY | Facility: CLINIC | Age: 48
End: 2018-09-06
Payer: COMMERCIAL

## 2018-09-06 ENCOUNTER — LAB VISIT (OUTPATIENT)
Dept: LAB | Facility: HOSPITAL | Age: 48
End: 2018-09-06
Attending: INTERNAL MEDICINE
Payer: COMMERCIAL

## 2018-09-06 ENCOUNTER — TELEPHONE (OUTPATIENT)
Dept: SPEECH THERAPY | Facility: HOSPITAL | Age: 48
End: 2018-09-06

## 2018-09-06 VITALS
RESPIRATION RATE: 16 BRPM | HEART RATE: 90 BPM | DIASTOLIC BLOOD PRESSURE: 66 MMHG | TEMPERATURE: 98 F | SYSTOLIC BLOOD PRESSURE: 106 MMHG | OXYGEN SATURATION: 99 %

## 2018-09-06 VITALS
DIASTOLIC BLOOD PRESSURE: 76 MMHG | BODY MASS INDEX: 27.3 KG/M2 | WEIGHT: 173.94 LBS | SYSTOLIC BLOOD PRESSURE: 119 MMHG | HEART RATE: 110 BPM | TEMPERATURE: 98 F | OXYGEN SATURATION: 99 % | HEIGHT: 67 IN | RESPIRATION RATE: 19 BRPM

## 2018-09-06 DIAGNOSIS — F43.29 ADJUSTMENT DISORDER WITH EMOTIONAL DISTURBANCE: Primary | ICD-10-CM

## 2018-09-06 DIAGNOSIS — T14.91XA SUICIDAL BEHAVIOR WITH ATTEMPTED SELF-INJURY: Primary | ICD-10-CM

## 2018-09-06 DIAGNOSIS — Z00.8 MEDICAL CLEARANCE FOR PSYCHIATRIC ADMISSION: Primary | ICD-10-CM

## 2018-09-06 DIAGNOSIS — C09.9 SQUAMOUS CELL CARCINOMA OF PALATINE TONSIL: ICD-10-CM

## 2018-09-06 DIAGNOSIS — C77.0 CANCER OF HEAD, FACE, OR NECK LYMPH NODES, SECONDARY: ICD-10-CM

## 2018-09-06 LAB
ALBUMIN SERPL BCP-MCNC: 4.3 G/DL
ALP SERPL-CCNC: 79 U/L
ALT SERPL W/O P-5'-P-CCNC: 8 U/L
AMPHET+METHAMPHET UR QL: NEGATIVE
ANION GAP SERPL CALC-SCNC: 11 MMOL/L
APAP SERPL-MCNC: <3 UG/ML
AST SERPL-CCNC: 14 U/L
BACTERIA #/AREA URNS AUTO: ABNORMAL /HPF
BARBITURATES UR QL SCN>200 NG/ML: NEGATIVE
BENZODIAZ UR QL SCN>200 NG/ML: NEGATIVE
BILIRUB SERPL-MCNC: 0.6 MG/DL
BILIRUB UR QL STRIP: NEGATIVE
BUN SERPL-MCNC: 33 MG/DL
BZE UR QL SCN: NEGATIVE
CALCIUM SERPL-MCNC: 10 MG/DL
CANNABINOIDS UR QL SCN: NEGATIVE
CHLORIDE SERPL-SCNC: 101 MMOL/L
CLARITY UR REFRACT.AUTO: ABNORMAL
CO2 SERPL-SCNC: 26 MMOL/L
COLOR UR AUTO: ABNORMAL
CREAT SERPL-MCNC: 2.3 MG/DL
CREAT UR-MCNC: >450 MG/DL
ERYTHROCYTE [DISTWIDTH] IN BLOOD BY AUTOMATED COUNT: 11.4 %
EST. GFR  (AFRICAN AMERICAN): 36.9 ML/MIN/1.73 M^2
EST. GFR  (NON AFRICAN AMERICAN): 31.9 ML/MIN/1.73 M^2
ETHANOL SERPL-MCNC: <10 MG/DL
GLUCOSE SERPL-MCNC: 179 MG/DL
GLUCOSE UR QL STRIP: NEGATIVE
HCT VFR BLD AUTO: 33.1 %
HGB BLD-MCNC: 11.4 G/DL
HGB UR QL STRIP: NEGATIVE
HYALINE CASTS UR QL AUTO: 30 /LPF
IMM GRANULOCYTES # BLD AUTO: 0.02 K/UL
KETONES UR QL STRIP: ABNORMAL
LEUKOCYTE ESTERASE UR QL STRIP: NEGATIVE
MAGNESIUM SERPL-MCNC: 2.5 MG/DL
MCH RBC QN AUTO: 35.1 PG
MCHC RBC AUTO-ENTMCNC: 34.4 G/DL
MCV RBC AUTO: 102 FL
METHADONE UR QL SCN>300 NG/ML: NEGATIVE
MICROSCOPIC COMMENT: ABNORMAL
NEUTROPHILS # BLD AUTO: 6.1 K/UL
NITRITE UR QL STRIP: NEGATIVE
OPIATES UR QL SCN: ABNORMAL
PCP UR QL SCN>25 NG/ML: NEGATIVE
PH UR STRIP: 5 [PH] (ref 5–8)
PHOSPHATE SERPL-MCNC: 3.1 MG/DL
PLATELET # BLD AUTO: 210 K/UL
PMV BLD AUTO: 10 FL
POTASSIUM SERPL-SCNC: 3.9 MMOL/L
PROT SERPL-MCNC: 7.6 G/DL
PROT UR QL STRIP: ABNORMAL
RBC # BLD AUTO: 3.25 M/UL
RBC #/AREA URNS AUTO: 17 /HPF (ref 0–4)
SALICYLATES SERPL-MCNC: <5 MG/DL
SODIUM SERPL-SCNC: 138 MMOL/L
SP GR UR STRIP: 1.03 (ref 1–1.03)
SQUAMOUS #/AREA URNS AUTO: 0 /HPF
TOXICOLOGY INFORMATION: ABNORMAL
URN SPEC COLLECT METH UR: ABNORMAL
UROBILINOGEN UR STRIP-ACNC: 2 EU/DL
WBC # BLD AUTO: 7.28 K/UL
WBC #/AREA URNS AUTO: 2 /HPF (ref 0–5)

## 2018-09-06 PROCEDURE — 80307 DRUG TEST PRSMV CHEM ANLYZR: CPT

## 2018-09-06 PROCEDURE — 3008F BODY MASS INDEX DOCD: CPT | Mod: CPTII,S$GLB,, | Performed by: INTERNAL MEDICINE

## 2018-09-06 PROCEDURE — 80053 COMPREHEN METABOLIC PANEL: CPT

## 2018-09-06 PROCEDURE — 99999 PR PBB SHADOW E&M-EST. PATIENT-LVL III: CPT | Mod: PBBFAC,,, | Performed by: INTERNAL MEDICINE

## 2018-09-06 PROCEDURE — 84100 ASSAY OF PHOSPHORUS: CPT

## 2018-09-06 PROCEDURE — 81001 URINALYSIS AUTO W/SCOPE: CPT

## 2018-09-06 PROCEDURE — 99285 EMERGENCY DEPT VISIT HI MDM: CPT | Mod: ,,, | Performed by: EMERGENCY MEDICINE

## 2018-09-06 PROCEDURE — 80320 DRUG SCREEN QUANTALCOHOLS: CPT

## 2018-09-06 PROCEDURE — 99285 EMERGENCY DEPT VISIT HI MDM: CPT

## 2018-09-06 PROCEDURE — 93010 ELECTROCARDIOGRAM REPORT: CPT | Mod: ,,, | Performed by: INTERNAL MEDICINE

## 2018-09-06 PROCEDURE — 99215 OFFICE O/P EST HI 40 MIN: CPT | Mod: S$GLB,,, | Performed by: INTERNAL MEDICINE

## 2018-09-06 PROCEDURE — 90834 PSYTX W PT 45 MINUTES: CPT | Mod: S$GLB,,, | Performed by: PSYCHOLOGIST

## 2018-09-06 PROCEDURE — 85027 COMPLETE CBC AUTOMATED: CPT

## 2018-09-06 PROCEDURE — 36415 COLL VENOUS BLD VENIPUNCTURE: CPT

## 2018-09-06 PROCEDURE — 83735 ASSAY OF MAGNESIUM: CPT

## 2018-09-06 PROCEDURE — 80329 ANALGESICS NON-OPIOID 1 OR 2: CPT

## 2018-09-06 RX ORDER — LORAZEPAM 1 MG/1
2 TABLET ORAL EVERY 4 HOURS PRN
Status: DISCONTINUED | OUTPATIENT
Start: 2018-09-06 | End: 2018-09-07 | Stop reason: HOSPADM

## 2018-09-06 NOTE — ED PROVIDER NOTES
"Encounter Date: 9/6/2018       History     Chief Complaint   Patient presents with    Psychiatric Evaluation     + SI, starving himself, states he wants to shoot himself. third gun in house is not able to be located. depressed.     Pt with hx recent palatine Cancer txed here and recovering was seen by oncology today:   He comes in to review labs. He saw Dr. Birch today as his brother called us that he is concerned Mr. Whiting is expressing thoughts of hurting himself. He apparently told his brother 2 days ago that he wants to shoot himself. His brother, Rigo immediately removed 2 of the guns from the home. They cannot find the third gun. He also told Dr. Birch that he has not been eating for the last 5 days in the hopes of killing himself.  Pt was then PEC'ed and sent here for medical clearance.      The history is provided by a relative and the patient.   Mental Health Problem   Primary symptoms comment: suicidal thoughts. The current episode started today. This is a new problem.   The onset of the illness is precipitated by stressful event and emotional stress. The degree of incapacity that he is experiencing as a consequence of his illness is moderate. Sequelae of the illness include an inability to work and harmed interpersonal relations. Additional symptoms of the illness include anhedonia and poor judgment. He admits to suicidal ideas. He does have a plan to commit suicide. He contemplates harming himself. He has not already injured self. He does not contemplate injuring another person. He has not already  injured another person.     Review of patient's allergies indicates:  No Known Allergies  Past Medical History:   Diagnosis Date    Alcohol abuse     ended 2004    Former smoker     HPV in male     Hx of psychiatric care     Ativan, Paxil ("caused anger problems")    Opiate addiction     Squamous cell carcinoma of palatine tonsil     throat and tonsillar    Substance abuse     opiates, " methamphetamines; ended      Past Surgical History:   Procedure Laterality Date    GASTROSTOMY TUBE PLACEMENT Left 2018     Family History   Problem Relation Age of Onset    Leukemia Mother     Hypertension Father     Thyroid disease Sister     Depression Sister     Hypertension Brother     Brain cancer Maternal Uncle     Brain cancer Maternal Uncle      Social History     Tobacco Use    Smoking status: Former Smoker     Packs/day: 1.50     Years: 25.00     Pack years: 37.50     Types: Cigarettes     Last attempt to quit: 2017     Years since quittin.2    Smokeless tobacco: Never Used    Tobacco comment: smoked for about 25 years. quit 6 months ago   Substance Use Topics    Alcohol use: No     Comment: hiistory of overuse    Drug use: No     Comment: Former Opiate/methamphetamine addiction     Review of Systems   Constitutional: Negative.    HENT: Negative.    Respiratory: Negative.    Cardiovascular: Negative.    Gastrointestinal: Negative.    All other systems reviewed and are negative.      Physical Exam     Initial Vitals [18 1545]   BP Pulse Resp Temp SpO2   126/77 110 18 97.4 °F (36.3 °C) 97 %      MAP       --         Physical Exam    Nursing note and vitals reviewed.  Constitutional: He appears well-developed and well-nourished.   HENT:   Head: Normocephalic.   Eyes: Conjunctivae and EOM are normal. Pupils are equal, round, and reactive to light.   Neck: Normal range of motion. Neck supple.   Cardiovascular: Normal rate and regular rhythm.   Pulmonary/Chest: Breath sounds normal. No respiratory distress. He has no wheezes.   Abdominal: Soft. Bowel sounds are normal.   Neurological: He is alert and oriented to person, place, and time.   Skin: Skin is warm and dry. Capillary refill takes less than 2 seconds.   Psychiatric: His affect is blunt. His speech is delayed. He is slowed. He exhibits a depressed mood.         ED Course   Procedures  Labs Reviewed   DRUG SCREEN PANEL,  URINE EMERGENCY - Abnormal; Notable for the following components:       Result Value    Creatinine, Random Ur >450.0 (*)     All other components within normal limits   ACETAMINOPHEN LEVEL - Abnormal; Notable for the following components:    Acetaminophen (Tylenol), Serum <3.0 (*)     All other components within normal limits   SALICYLATE LEVEL - Abnormal; Notable for the following components:    Salicylate Lvl <5.0 (*)     All other components within normal limits   URINALYSIS - Abnormal; Notable for the following components:    Appearance, UA Hazy (*)     Protein, UA 1+ (*)     Ketones, UA Trace (*)     All other components within normal limits   URINALYSIS MICROSCOPIC - Abnormal; Notable for the following components:    RBC, UA 17 (*)     Hyaline Casts, UA 30 (*)     All other components within normal limits   ALCOHOL,MEDICAL (ETHANOL)     EKG Readings: (Independently Interpreted)   Initial Reading: No STEMI. Previous EKG: Compared with most recent EKG Rhythm: Normal Sinus Rhythm. Heart Rate: 88. Clinical Impression: Normal Sinus Rhythm       Imaging Results    None          Medical Decision Making:   Initial Assessment:   Will medically clear pt, PEC done prior by                    ED Course as of Sep 06 1946   Thu Sep 06, 2018   1945 Recheck pt stable, medically cleared. Will transfer to inpatient psych for evaluation  [PP]      ED Course User Index  [PP] Piyush Pringle MD     Clinical Impression:   The encounter diagnosis was Medical clearance for psychiatric admission.                             Piyush Pringle MD  09/06/18 1954       Piyush Pringle MD  09/06/18 2008

## 2018-09-06 NOTE — ED NOTES
"Patient identifiers for Burton Whiting 48 y.o. male checked and correct.  Chief Complaint   Patient presents with    Psychiatric Evaluation     + SI, starving himself, states he wants to shoot himself. third gun in house is not able to be located. depressed.     Past Medical History:   Diagnosis Date    Alcohol abuse     ended 2004    Former smoker     HPV in male     Hx of psychiatric care     Ativan, Paxil ("caused anger problems")    Opiate addiction     Squamous cell carcinoma of palatine tonsil     throat and tonsillar    Substance abuse     opiates, methamphetamines; ended 2004     Allergies reported: Review of patient's allergies indicates:  No Known Allergies      LOC: Patient is awake, alert, and aware of environment with flat affect. Patient is oriented x 3 and speaking appropriately.  APPEARANCE: Patient resting comfortably and in no acute distress. Patient is clean and well groomed, patient's clothing is properly fastened.  HEENT: no upper/bottom teeth noted  SKIN: The skin is warm and dry. Patient has normal skin turgor and moist mucus membranes. Skin is intact; no bruising or breakdown noted.  MUSKULOSKELETAL: Patient is moving all extremities well, no obvious deformities noted. Pulses intact.   RESPIRATORY: Airway is open and patent. Respirations are spontaneous and non-labored with normal effort and rate, BBS=clear  ABDOMEN: No distention noted. Bowel sounds active in all 4 quadrants. Soft and non-tender upon palpation, PEG tube noted to left abd.   NEUROLOGICAL: pupils PERRL. Facial expression is symmetrical. Hand grasps are equal bilaterally. Pt reports numbness/tingling to hands and feet (baseline).        "

## 2018-09-06 NOTE — PROGRESS NOTES
"Temporary note:    Patient reports he wishes he were dead  Not interested in living, no reasons to live    Did not want firearms in his home because he was afraid he would use them and "make a mess."  Would like to starve himself to death and had not eaten/taken in liquids for 5 days prior to this AM.    Adamantly opposed to intensive outpatient treatment  Won't go stay with family or allow them to stay with him    Does state he will "eat tonight, " but also stated he could "throw things down the sink" if food/drink were left for him and he didn't want to eat    Agrees to consider antidepressant treatment but also states he does not want "more medicines to become addicted to."    Had not been getting high on his oxycodone, but was using it when not in pain (stopped 3 days ago, by self report).  History of opioid addiction.  Feels he was sliding toward addiction, again  (negative thoughts about himself)    Minimally interactive/participatory during visit. Family states had to "force him" to come    Believes family "overreacted" by taking his guns. Could not specify how their concern over his desire to die was incorrect.         "

## 2018-09-06 NOTE — TELEPHONE ENCOUNTER
"Spoke with brother.  He states patient "is in a bad way."  Brother notes patient has lost an additional 15-20lbs---as he is not eating at all.  The brother stayed with patient all day yesterday, and brother's wife stayed overnight with patient, as he was threatening to "but a bullet in his head."  Brother states he removed 2 guns from the patient's house---but couldn't locate the 3rd one.  Brother went to the 's office yesterday to obtain protection papers over patient. Brother did not explain to nurse exactly what this means. Patient is scheduled for appointment with psych today for 1pm.  Brother is asking if labs need to be drawn before the appointment.  Nurse spoke with dr waggoner.  She requests patient to be seen by dr gutierrez first---to decide what needs to be done---whether he will be sent to ED for PEC admission or can be managed outpatient.    Brother voiced understanding.      Message routed to dr waggoner and dr gutierrez  "

## 2018-09-06 NOTE — ED TRIAGE NOTES
Pt arrived with PEC states pat has been starving himself x 5 days to die. Pt also reported to his brother that he wanted to shoot himself.

## 2018-09-06 NOTE — PROGRESS NOTES
Subjective:       Patient ID: Burton Whiting is a 48 y.o. male.    Chief Complaint: Squamous cell carcinoma of palatine tonsil and decreased appetite  Squamous cell carcinoma of palatine tonsil; g-tube malodorous; left neck pain 2/10; 4 cans formula per day; and r arm port---red/warm to touch  Mr. Burton Whiting is a 48-year-old male, former smoker, who presents today with a four-month history of enlarging neck mass.  He initially delayed care due to lack of insurance.  He was seen by Dr. Turpin who referred him to see Dr. Olguin.  He had a CT that showed a 3.8 x 3.8 x 4.9 cm soft tissue mass arising from the left palatine tonsil resulting in moderate narrowing of the hypopharyngeal airway adjacent extensive matted left cervical lymphadenopathy was noted.  The largest ella mass was 6.6 x 9 x 2.6 cm extending inferiorly to the supraclavicular region.  The mass pushed the trachea and the left common carotid artery to the right.  Additional representative of cervical lymph nodes measuring 2.9 x 3.1 x 3.5 cm on axial image 37 of series 3   and 2.4 x 2.1 x 2.2 cm on axial image 55 of series 3.  There is also a sclerotic lesion involving the midline of the clivus measuring 1.2 cm.  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  PET scan performed on 01/19/2018 revealed persistent evidence of a soft tissue mass arising from the left palatine tonsil resulting in moderate narrowing of the hypopharyngeal   airway.  The mass is hypermetabolic demonstrating an SUV max of 18.5.  There is also persistent adjacent extensive matted left cervical lymphadenopathy, largest of this measuring 6.7 x 8.42 with hypermetabolic activity and SUV max of 20.5.  Lymphadenopathy is again noted to have a mass effect on the trachea and left common carotid artery, pushing both structures towards the right.  There is also prominent right cervical lymph nodes with the largest measuring 0.8 cm and demonstrating mild hypermetabolic  "activity with SUV max of 1.8, physiologic   distribution of FDG in the gray matter.  There are 3 pulmonary nodules noted within the right lung, the largest is in the anterior segment of the right upper lobe measuring 1 cm, second is visualized in the middle lobe measuring 0.6 cm and the third is within the posterior basal segment measuring 0.6 cm.  There is one pulmonary nodule within the left lung within the lateral basal segment measuring 0.6 cm.  These nodules do not demonstrate hypermetabolic activity; however, they are below the threshold for observation with PET     HPIHe underwent MRI cervical spine revealed "No evidence of metastatic disease to the cervical spine. Multilevel degenerative changes of the cervical spine with disc protrusion at C5-6 which results in moderate to severe spinal canal stenosis and and associated cord signal abnormality, suggestive of compressive myelopathy. 6 mm T1 hypointense non-enhancing lesion in the clivus.  Continued followup is suggested. 9 mm inferior descent of the cerebellar tonsils below the foramen magnum, suggestive of Chiari 1 malformation"  MRI thoracic spine "No evidence of metastatic disease involving the thoracic spine. Incidental hemangioma involving the T7 vertebral body. Mild degenerative disc disease without any significant spinal canal stenosis or neuroforaminal narrowing.   He completed 3 cycles of TPF and 6 weeks of Cisplatin and RT. Completed on 6/26/18                    HPI He comes in to review labs. He saw Dr. Birch today as his brother called us that he is concerned Mr. Whiting is expressing thoughts of hurting himself. He apparently told his brother 2 days ago that he wants to shoot himself. His brother, Rigo immediately removed 2 of the guns from the home. They cannot find the third gun. He also told Dr. Birch that he has not been eating for the last 5 days in the hopes of killing himself.  He is sleeping during the conversation    Review of " Systems   Constitutional: Positive for appetite change, fatigue and unexpected weight change.   Psychiatric/Behavioral: Positive for behavioral problems, decreased concentration, dysphoric mood, self-injury, sleep disturbance and suicidal ideas. Negative for hallucinations. The patient is nervous/anxious.        Objective:      Physical Exam   Constitutional: He appears cachectic. He appears toxic. He has a sickly appearance.   HENT:   Oral thrush +   Cardiovascular: Normal rate, regular rhythm and normal heart sounds.   Pulmonary/Chest: Effort normal. He has no decreased breath sounds.   Abdominal: Soft. Normal appearance.         LABS:  WBC   Date Value Ref Range Status   09/06/2018 7.28 3.90 - 12.70 K/uL Final     Hemoglobin   Date Value Ref Range Status   09/06/2018 11.4 (L) 14.0 - 18.0 g/dL Final     Hematocrit   Date Value Ref Range Status   09/06/2018 33.1 (L) 40.0 - 54.0 % Final     Platelets   Date Value Ref Range Status   09/06/2018 210 150 - 350 K/uL Final     Gran # (ANC)   Date Value Ref Range Status   09/06/2018 6.1 1.8 - 7.7 K/uL Final     Comment:     The ANC is based on a white cell differential from an   automated cell counter. It has not been microscopically   reviewed for the presence of abnormal cells. Clinical   correlation is required.         Chemistry        Component Value Date/Time     09/06/2018 1416    K 3.9 09/06/2018 1416     09/06/2018 1416    CO2 26 09/06/2018 1416    BUN 33 (H) 09/06/2018 1416    CREATININE 2.3 (H) 09/06/2018 1416     (H) 09/06/2018 1416        Component Value Date/Time    CALCIUM 10.0 09/06/2018 1416    ALKPHOS 79 09/06/2018 1416    AST 14 09/06/2018 1416    ALT 8 (L) 09/06/2018 1416    BILITOT 0.6 09/06/2018 1416    ESTGFRAFRICA 36.9 (A) 09/06/2018 1416    EGFRNONAA 31.9 (A) 09/06/2018 1416          Assessment:       1. Suicidal behavior with attempted self-injury    2. Squamous cell carcinoma of palatine tonsil    3. Cancer of head, face, or  neck lymph nodes, secondary        Plan:        1. He saw Dr. Birch, and also based on my conversation with him, I am concerned that he is suicidal hence a PEC was completed. He will be assessed further by psychiatry.    Above care plan was discussed with patient and accompanying brother and sister-in-law and all questions were addressed to their satisfaction

## 2018-09-06 NOTE — PROGRESS NOTES
PSYCHO-ONCOLOGY NOTE/ Individual Psychotherapy     Date: 9/6/2018   Site:  [facility]        Therapeutic Intervention: Met with patient, brother and sister-in-law.  Outpatient - Behavior modifying psychotherapy 45 min - CPT code 72514    The patient's last visit with me was on 8/23/2018.    Problem list  Patient Active Problem List   Diagnosis    Squamous cell carcinoma of palatine tonsil    Low vitamin D level    Prediabetes    Cancer of head, face, or neck lymph nodes, secondary    Secondary cancer of bone    Morbid obesity    Tonsil cancer    Sepsis    Fever and neutropenia    Dehydration    Chemotherapy-induced neutropenia    Thrombocytopenia    Enterocolitis    Diverticulitis    Port or reservoir infection    Anxiety attack    Head and neck cancer    Chemotherapy induced neutropenia    Acute renal failure    Macrocytic anemia    Moderate malnutrition    Adjustment disorder with emotional disturbance       Chief complaint/reason for encounter: depression and suicidality   Met with patient to evaluate psychosocial adaptation to diagnosis/treatment/survivorship of tonsil cancer    Current Medications  Current Outpatient Medications   Medication    cholecalciferol, vitamin D3, (VITAMIN D3) 1,000 unit capsule    IBUPROFEN (ADVIL ORAL)    lactulose (CHRONULAC) 10 gram/15 mL solution    lidocaine HCl 2% (XYLOCAINE) 2 % Soln    LORazepam (ATIVAN) 0.5 MG tablet    NYSTATIN (DUKE'S SOLUTION)    nystatin (MYCOSTATIN) cream    ondansetron (ZOFRAN) 4 mg/5 mL solution    oxyCODONE (ROXICODONE) 5 MG immediate release tablet    promethazine (PHENERGAN) 6.25 mg/5 mL syrup    psyllium husk, aspartame, (METAMUCIL) 3.4 gram PwPk packet     No current facility-administered medications for this visit.        Objective:  Burton Whiting arrived promptly for the session accompanied by his brother and sister-in-law.  Mr. Whiting was seen with his family members and independently. He was using a  "wheelchair for mobility at the time of session. The patient was reluctant to participate in the visit.   Appearance: age appropriate,  casually dressed, minimally groomed  Behavior/Cooperation: reluctant, evasive, limited interaction  Speech:low volume, monosyllabic  Mood: depressed  Affect: flat  Thought Process: goal-directed, logical  Thought Content: Suicidal ideation, with plan, verbally denied intent (see below); No delusions or paranoia; did not appear to be responding to internal stimuli during the session  Orientation: grossly intact  Memory: Grossly intact  Attention Span/Concentration: Attends to session with difficulty- appeared to be falling asleep toward the end of the visit  Fund of Knowledge: average  Estimate of Intelligence: average from verbal skills and history  Cognition: grossly intact  Insight: patient has limited insight into own behavior and behavior of others  Judgment: the patient's judgment is impaired    Interval history and content of current session: Patient seen due to recent statement of suicidal ideation ("might as well blow my brains out") to family members. Family reports that patient called police out to his home to force family to leave this weekend.  Police accepted patient's denial of intent to harm himself this weekend.    Discussed current adaptation to disease and treatment status. Reports to be coping with significant difficulty. Patient states he wishes he were dead due to his current reliance on a PEG tube. He was unable to list any reasons to live. When family members mentioned others who love him, he made no visual or verbal response. He states he is "tired of being sick."  Patient began crying when discussing his current physical status, but was unable/unwilling to articulate his thoughts about his recovery/future. Despite recent clear scans, he feels hopeless/helpless about his future. He states he did not eat/drink for 5 days in an effort to hasten his death. He " "states he has a "50%" chance of eating/drinking tonight.  He agreed to give up his guns because he was afraid he would use them and "make a mess."  He gave up 2 guns, but family members believe there is an additional gun unaccounted for. He was unwilling to stay with family or allow family to stay with him.  He is unwilling to participate in intensive outpatient therapy.  He is ambivalent about the option of psychotropic medication ("more drugs to get addicted to").  When the non-addictive nature of antidepressants was explained, he seemed slightly more willing, but was still hesitant to commit to concrete action.  He, initially, refused to stay to see medical personnel to examine his physical status.    Patient is distressed by his recent oxycodone use.  He was previously addicted and, recently, began using his pain medications for non-pain purposes. He denies having gotten "high," but is very angry at himself for this lapse/relapse. He states he has not taken any pain medication for 5 days.     Mr. Whiting, previously, enjoyed work and was enthusiastic about returning to FT employment. He has not gone to work since last week and feels he probably will not go for "at least 2 weeks" even under the best of circumstances. He feels unable to face work (and his place of employment is currently undergoing an ownership transition). Patient repeatedly stated he "could have walked" into the clinic today, but was unable to hold himself erect for conversation and appeared to be falling asleep as the interview progressed.     Patient discussed with Dr. Lopez and her staff and brought downstairs for lab and medical visit. (Patient was not physically able to leave without assistance.)     Risk parameters:   Patient reports suicidal ideation: wish to be dead  Patient reports no homicidal ideation  Patient reports self-injurious behavior: starving himself x 5 days  Patient reports no violent behavior   Safety needs:  Patient should " be carefully monitored, not left alone, admitted to higher level of care    Verbal deficits: limted verbal interaction     Patient's response to intervention:The patient's response to intervention is reluctant and hostile to safety aims     Progress toward goals and other mental status changes:  The patient's progress toward goals is not progressing.      Progress to date:Revise Interventions- recommend admission to inpatient      Goals from last visit:       Patient reported outcomes:  Distress Thermometer:   Distress Score    Distress Score: 10        Practical Problems Physical Problems                                                   Family Problems                                         Emotional Problems                                                         Spiritual/Religions Concerns               Other Problems             PHQ-9= 27 completed by patient's family based upon observed behavior/comments   OLLIE-7= 18 completed by patient's family based upon observed behavior/comments        Client Strengths: good social support    Diagnosis:     ICD-10-CM ICD-9-CM   1. Adjustment disorder with emotional disturbance F43.29 309.29       Treatment Plan:individual psychotherapy, consult psychiatrist for medication evaluation and recommend inpatient admission  · Target symptoms: depression, anxiety   · Why chosen therapy is appropriate versus another modality: relevant to diagnosis, evidence based practice  · Outcome monitoring methods: self-report, feedback from family, checklist/rating scale, observation  · Therapeutic intervention type: behavior modifying psychotherapy, medication management      Behavioral goals:    Exercise:   Stress management:   Social engagement:   Nutrition:   Smoking Cessation:   Therapy:    Return to clinic: after discharge from inpatient treatment     Length of Service (minutes direct face-to-face contact): 45    Vic Birch, PhD  [place] License #[id number]

## 2018-09-06 NOTE — ED NOTES
Code watch paged overhead. Sitter is in triage to sit with the patient. Charge nurse and  in triage notified of missing third gun

## 2018-09-07 NOTE — ED NOTES
Admit packet faxed to the following facilities:[facility], [facility] [place], [place], [facility], [facility], [facility], [facility], [facility], [facility], [facility], [facility], [facility], [facility], [facility], [facility], [facility], [facility], [place], [place], [facility], [facility], [facility], [facility], [facility], [place],  [facility], [facility], [facility], [facility], [facility], [facility]), [facility], [facility], [facility], [facility], [facility], and [facility].

## 2018-09-07 NOTE — ED NOTES
Pt sleeping, easily aroused to verbal stimuli, pt is calm and cooperative, oriented x 3, resp even/unlabored, skin w/d, CINTRON well, NAD

## 2018-09-07 NOTE — ED NOTES
Admit packet faxed to the following facilities: [facility], [facility], [facility], and [facility].

## 2018-09-10 ENCOUNTER — PATIENT MESSAGE (OUTPATIENT)
Dept: HEMATOLOGY/ONCOLOGY | Facility: CLINIC | Age: 48
End: 2018-09-10

## 2018-09-10 ENCOUNTER — PATIENT MESSAGE (OUTPATIENT)
Dept: PSYCHIATRY | Facility: CLINIC | Age: 48
End: 2018-09-10

## 2018-09-12 ENCOUNTER — TELEPHONE (OUTPATIENT)
Dept: PSYCHIATRY | Facility: CLINIC | Age: 48
End: 2018-09-12

## 2018-09-13 ENCOUNTER — PATIENT MESSAGE (OUTPATIENT)
Dept: PSYCHIATRY | Facility: CLINIC | Age: 48
End: 2018-09-13

## 2018-09-18 ENCOUNTER — TELEPHONE (OUTPATIENT)
Dept: PSYCHIATRY | Facility: CLINIC | Age: 48
End: 2018-09-18

## 2018-09-18 NOTE — TELEPHONE ENCOUNTER
Called patient's brother re: earlier f/u appt availability.  They chose to keep Friday 9/21/18 appt.  SHANELL Birch

## 2018-09-19 ENCOUNTER — PATIENT MESSAGE (OUTPATIENT)
Dept: HEMATOLOGY/ONCOLOGY | Facility: CLINIC | Age: 48
End: 2018-09-19

## 2018-09-21 ENCOUNTER — OFFICE VISIT (OUTPATIENT)
Dept: PSYCHIATRY | Facility: CLINIC | Age: 48
End: 2018-09-21
Payer: COMMERCIAL

## 2018-09-21 DIAGNOSIS — F43.29 ADJUSTMENT DISORDER WITH EMOTIONAL DISTURBANCE: Primary | ICD-10-CM

## 2018-09-21 PROCEDURE — 90834 PSYTX W PT 45 MINUTES: CPT | Mod: S$GLB,,, | Performed by: PSYCHOLOGIST

## 2018-09-21 PROCEDURE — 99999 PR PBB SHADOW E&M-EST. PATIENT-LVL II: CPT | Mod: PBBFAC,,, | Performed by: PSYCHOLOGIST

## 2018-09-21 NOTE — PROGRESS NOTES
PSYCHO-ONCOLOGY NOTE/ Individual Psychotherapy     Date: 9/21/2018   Site:  [facility]        Therapeutic Intervention: Met with patient, brother and sister-in-law.  Outpatient - Behavior modifying psychotherapy 45 min - CPT code 20987    The patient's last visit with me was on 9/6/2018.    Problem list  Patient Active Problem List   Diagnosis    Squamous cell carcinoma of palatine tonsil    Low vitamin D level    Prediabetes    Cancer of head, face, or neck lymph nodes, secondary    Secondary cancer of bone    Morbid obesity    Tonsil cancer    Sepsis    Fever and neutropenia    Dehydration    Chemotherapy-induced neutropenia    Thrombocytopenia    Enterocolitis    Diverticulitis    Port or reservoir infection    Anxiety attack    Head and neck cancer    Chemotherapy induced neutropenia    Acute renal failure    Macrocytic anemia    Moderate malnutrition    Adjustment disorder with emotional disturbance       Chief complaint/reason for encounter: depression and suicidality   Met with patient to evaluate psychosocial adaptation to diagnosis/treatment/survivorship of tonsil cancer    Current Medications  Current Outpatient Medications   Medication    cholecalciferol, vitamin D3, (VITAMIN D3) 1,000 unit capsule    IBUPROFEN (ADVIL ORAL)    lactulose (CHRONULAC) 10 gram/15 mL solution    lidocaine HCl 2% (XYLOCAINE) 2 % Soln    LORazepam (ATIVAN) 0.5 MG tablet    NYSTATIN (DUKE'S SOLUTION)    nystatin (MYCOSTATIN) cream    ondansetron (ZOFRAN) 4 mg/5 mL solution    oxyCODONE (ROXICODONE) 5 MG immediate release tablet    promethazine (PHENERGAN) 6.25 mg/5 mL syrup    psyllium husk, aspartame, (METAMUCIL) 3.4 gram PwPk packet     No current facility-administered medications for this visit.        Objective:  Burton Whiting arrived promptly for the session accompanied by his sister-in-law (who did not participate in the visit).  Mr. Whiting was walking independently at the time of  "session. The patient was cooperative and motivated during the visit.  Appearance: age appropriate,  casually dressed, well groomed  Behavior/Cooperation: open, honest, participatory  Speech: normal in rate, rhythm and volume  Mood: mildly anxious  Affect: WNL  Thought Process: goal-directed, logical  Thought Content: No suicidality, no homicidality; No delusions or paranoia; did not appear to be responding to internal stimuli during the session  Orientation: grossly intact  Memory: Grossly intact  Attention Span/Concentration: Attends to session without difficulty  Fund of Knowledge: average  Estimate of Intelligence: average from verbal skills and history  Cognition: grossly intact  Insight: patient has good insight into own behavior and behavior of others  Judgment: the patient's judgment is WNL    Interval history and content of current session: Patient discussed recent psychiatric admission and changes in cognition and coping since that time.  Discussed current adaptation to disease and treatment status. Reports much improved coping. Patient has "turned the corner" and is motivated and enthusiastic about the future. He blames his former suicidality and distress on a return to overuse of oxycodone (in conjunction with Ativan use).  He is "embarassed" and apologetic about his behavior and attitudes.  He now easily lists numerous reasons to live. Cognition is clear. Patient is articulate and open about his thoughts and plans. He has slight continued anxiety about his job performance, while acknowledging positive statements by his boss and his value to the company.  He plans to return to work Oct 1.  He openly discusses his goals of paying back family members (in both monetary and non-monetary ways) for their assistance. He plans to "pay it forward" and become involved in the cancer community.  He has made appts with speech therapy and a dentist for next week to engage in planning around future food intake. He is " sleeping adequately on gabapentin (as per Dr. Corona). He has returned to driving. He continues to experience nausea and neuropathy and is working to adapt daily activities around these limitations.  He will stay in [place] with family until the middle of next week when he will transition back to his home.       Risk parameters:   Patient reports no suicidal ideation  Patient reports no homicidal ideation  Patient reports no self-injurious behavior  Patient reports no violent behavior   Safety needs:  None at this time   Verbal deficits: None     Patient's response to intervention:The patient's response to intervention is reluctant and hostile to safety aims     Progress toward goals and other mental status changes:  The patient's progress toward goals is excellent      Progress to date:Progress as Expected      Goals from last visit: met       Patient reported outcomes:  Distress Thermometer:   Distress Score    Distress Score: 5        Practical Problems Physical Problems                                                   Family Problems                                         Emotional Problems         Nausea: Yes            Nervousness: Yes       Sadness: Yes      Worry: Yes        Sleep: Yes          Spiritual/Religions Concerns Tingling in Hands / Feet: Yes             Other Problems             PHQ-9= 10, 27 highest    OLLIE-7= 10, 18 highest        Client Strengths: good social support, good insight, motivated for wellness    Diagnosis:     ICD-10-CM ICD-9-CM   1. Adjustment disorder with emotional disturbance F43.29 309.29       Treatment Plan:individual psychotherapy and medication management by physician  · Target symptoms: depression, anxiety   · Why chosen therapy is appropriate versus another modality: relevant to diagnosis, evidence based practice  · Outcome monitoring methods: self-report, feedback from family, checklist/rating scale, observation  · Therapeutic intervention type: behavior modifying  psychotherapy, medication management      Behavioral goals:    Exercise:   Stress management:  Pos self talk when anxious   Social engagement: time with friend in [place], sister from [place]   Nutrition:  As per ST next week   Smoking Cessation:   Therapy:  Continue Prozac as per MD (arrange for refills- Dr. Corona? 9/27 )    Fishing    Back to work 10/2    Return to clinic: 1 month, sooner if needed     Length of Service (minutes direct face-to-face contact): 45    Vic Birch, PhD  [place] License #[id number]

## 2018-09-25 ENCOUNTER — CLINICAL SUPPORT (OUTPATIENT)
Dept: SPEECH THERAPY | Facility: HOSPITAL | Age: 48
End: 2018-09-25
Attending: RADIOLOGY
Payer: COMMERCIAL

## 2018-09-25 ENCOUNTER — PATIENT MESSAGE (OUTPATIENT)
Dept: SPEECH THERAPY | Facility: HOSPITAL | Age: 48
End: 2018-09-25

## 2018-09-25 ENCOUNTER — OFFICE VISIT (OUTPATIENT)
Dept: RADIATION ONCOLOGY | Facility: CLINIC | Age: 48
End: 2018-09-25
Payer: COMMERCIAL

## 2018-09-25 ENCOUNTER — OFFICE VISIT (OUTPATIENT)
Dept: HEMATOLOGY/ONCOLOGY | Facility: CLINIC | Age: 48
End: 2018-09-25
Payer: COMMERCIAL

## 2018-09-25 ENCOUNTER — HOSPITAL ENCOUNTER (OUTPATIENT)
Dept: RADIOLOGY | Facility: HOSPITAL | Age: 48
Discharge: HOME | End: 2018-09-25
Attending: INTERNAL MEDICINE
Payer: COMMERCIAL

## 2018-09-25 VITALS
DIASTOLIC BLOOD PRESSURE: 73 MMHG | HEART RATE: 79 BPM | OXYGEN SATURATION: 97 % | HEIGHT: 67 IN | BODY MASS INDEX: 28.07 KG/M2 | SYSTOLIC BLOOD PRESSURE: 116 MMHG | WEIGHT: 178.81 LBS | TEMPERATURE: 98 F | RESPIRATION RATE: 18 BRPM

## 2018-09-25 VITALS
OXYGEN SATURATION: 92 % | BODY MASS INDEX: 28.04 KG/M2 | HEART RATE: 79 BPM | HEIGHT: 67 IN | WEIGHT: 178.69 LBS | RESPIRATION RATE: 18 BRPM | SYSTOLIC BLOOD PRESSURE: 116 MMHG | TEMPERATURE: 98 F | DIASTOLIC BLOOD PRESSURE: 73 MMHG

## 2018-09-25 DIAGNOSIS — C09.9 SQUAMOUS CELL CARCINOMA OF PALATINE TONSIL: Primary | ICD-10-CM

## 2018-09-25 DIAGNOSIS — Z92.21 HISTORY OF CHEMOTHERAPY: ICD-10-CM

## 2018-09-25 DIAGNOSIS — C09.9 SQUAMOUS CELL CARCINOMA OF PALATINE TONSIL: ICD-10-CM

## 2018-09-25 DIAGNOSIS — K11.7 XEROSTOMIA: ICD-10-CM

## 2018-09-25 DIAGNOSIS — R43.2 DYSGEUSIA: ICD-10-CM

## 2018-09-25 DIAGNOSIS — C79.51 SECONDARY CANCER OF BONE: ICD-10-CM

## 2018-09-25 DIAGNOSIS — C77.1 SECONDARY MALIGNANCY OF MEDIASTINAL LYMPH NODES: ICD-10-CM

## 2018-09-25 DIAGNOSIS — C77.0 CANCER OF HEAD, FACE, OR NECK LYMPH NODES, SECONDARY: ICD-10-CM

## 2018-09-25 DIAGNOSIS — Z92.3 HISTORY OF HEAD AND NECK RADIATION: ICD-10-CM

## 2018-09-25 DIAGNOSIS — R53.83 FATIGUE, UNSPECIFIED TYPE: ICD-10-CM

## 2018-09-25 DIAGNOSIS — R13.12 DYSPHAGIA, OROPHARYNGEAL: Primary | ICD-10-CM

## 2018-09-25 PROCEDURE — 99215 OFFICE O/P EST HI 40 MIN: CPT | Mod: S$GLB,,, | Performed by: INTERNAL MEDICINE

## 2018-09-25 PROCEDURE — 3008F BODY MASS INDEX DOCD: CPT | Mod: CPTII,S$GLB,, | Performed by: RADIOLOGY

## 2018-09-25 PROCEDURE — 78815 PET IMAGE W/CT SKULL-THIGH: CPT | Mod: TC

## 2018-09-25 PROCEDURE — G8996 SWALLOW CURRENT STATUS: HCPCS | Mod: CK | Performed by: SPEECH-LANGUAGE PATHOLOGIST

## 2018-09-25 PROCEDURE — 99999 PR PBB SHADOW E&M-EST. PATIENT-LVL IV: CPT | Mod: PBBFAC,,, | Performed by: RADIOLOGY

## 2018-09-25 PROCEDURE — 99214 OFFICE O/P EST MOD 30 MIN: CPT | Mod: S$GLB,,, | Performed by: RADIOLOGY

## 2018-09-25 PROCEDURE — 92610 EVALUATE SWALLOWING FUNCTION: CPT | Mod: GN | Performed by: SPEECH-LANGUAGE PATHOLOGIST

## 2018-09-25 PROCEDURE — A9552 F18 FDG: HCPCS

## 2018-09-25 PROCEDURE — 99999 PR PBB SHADOW E&M-EST. PATIENT-LVL IV: CPT | Mod: PBBFAC,,, | Performed by: INTERNAL MEDICINE

## 2018-09-25 PROCEDURE — 78815 PET IMAGE W/CT SKULL-THIGH: CPT | Mod: 26,PI,, | Performed by: RADIOLOGY

## 2018-09-25 PROCEDURE — G8997 SWALLOW GOAL STATUS: HCPCS | Mod: CI | Performed by: SPEECH-LANGUAGE PATHOLOGIST

## 2018-09-25 PROCEDURE — 3008F BODY MASS INDEX DOCD: CPT | Mod: CPTII,S$GLB,, | Performed by: INTERNAL MEDICINE

## 2018-09-25 NOTE — PROGRESS NOTES
Subjective:       Patient ID: Burton Whiting is a 48 y.o. male.    Chief Complaint: Squamous cell carcinoma of palatine tonsil and Peripheral Neuropathy  Squamous cell carcinoma of palatine tonsil; g-tube malodorous; left neck pain 2/10; 4 cans formula per day; and r arm port---red/warm to touch  Mr. Burton Whiting is a 48-year-old male, former smoker, who presents today with a four-month history of enlarging neck mass.  He initially delayed care due to lack of insurance.  He was seen by Dr. Turpin who referred him to see Dr. Olguin.  He had a CT that showed a 3.8 x 3.8 x 4.9 cm soft tissue mass arising from the left palatine tonsil resulting in moderate narrowing of the hypopharyngeal airway adjacent extensive matted left cervical lymphadenopathy was noted.  The largest ella mass was 6.6 x 9 x 2.6 cm extending inferiorly to the supraclavicular region.  The mass pushed the trachea and the left common carotid artery to the right.  Additional representative of cervical lymph nodes measuring 2.9 x 3.1 x 3.5 cm on axial image 37 of series 3   and 2.4 x 2.1 x 2.2 cm on axial image 55 of series 3.  There is also a sclerotic lesion involving the midline of the clivus measuring 1.2 cm.  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  PET scan performed on 01/19/2018 revealed persistent evidence of a soft tissue mass arising from the left palatine tonsil resulting in moderate narrowing of the hypopharyngeal   airway.  The mass is hypermetabolic demonstrating an SUV max of 18.5.  There is also persistent adjacent extensive matted left cervical lymphadenopathy, largest of this measuring 6.7 x 8.42 with hypermetabolic activity and SUV max of 20.5.  Lymphadenopathy is again noted to have a mass effect on the trachea and left common carotid artery, pushing both structures towards the right.  There is also prominent right cervical lymph nodes with the largest measuring 0.8 cm and demonstrating mild  "hypermetabolic activity with SUV max of 1.8, physiologic   distribution of FDG in the gray matter.  There are 3 pulmonary nodules noted within the right lung, the largest is in the anterior segment of the right upper lobe measuring 1 cm, second is visualized in the middle lobe measuring 0.6 cm and the third is within the posterior basal segment measuring 0.6 cm.  There is one pulmonary nodule within the left lung within the lateral basal segment measuring 0.6 cm.  These nodules do not demonstrate hypermetabolic activity; however, they are below the threshold for observation with PET     HPIHe underwent MRI cervical spine revealed "No evidence of metastatic disease to the cervical spine. Multilevel degenerative changes of the cervical spine with disc protrusion at C5-6 which results in moderate to severe spinal canal stenosis and and associated cord signal abnormality, suggestive of compressive myelopathy. 6 mm T1 hypointense non-enhancing lesion in the clivus.  Continued followup is suggested. 9 mm inferior descent of the cerebellar tonsils below the foramen magnum, suggestive of Chiari 1 malformation"  MRI thoracic spine "No evidence of metastatic disease involving the thoracic spine. Incidental hemangioma involving the T7 vertebral body. Mild degenerative disc disease without any significant spinal canal stenosis or neuroforaminal narrowing.   He completed 3 cycles of TPF and 6 weeks of Cisplatin and RT. Completed on 6/26/18       HPI He comes in to review labs and PET scan  He was hospitalized in [facility] and has been seeing Dr. Bhatt. He notes that he is feeling well. His weight is better. He is doing nutrition via cans but notes that he will eat more. He saw the speech therapy and plan to start eating  His PET scan reveals "Progression of disease with multiple new hypermetabolic foci including the manubrium, mediastinal/retrocrural lymph nodes, and lungs. Partial therapeutic response within the presumed " "radiation field involving the left cervical mass, and complete therapeutic response in the right cervical lymph node, clivus, and left palatine tonsil. New soft tissue thickening and hypermetabolism in the left aryepiglottic fold and right cricoid cartilage"      Review of Systems   Constitutional: Negative for appetite change, fatigue and unexpected weight change.   HENT: Negative for mouth sores.    Eyes: Negative for visual disturbance.   Respiratory: Negative for cough and shortness of breath.    Cardiovascular: Negative for chest pain.   Gastrointestinal: Negative for abdominal pain and diarrhea.   Genitourinary: Negative for frequency.   Musculoskeletal: Negative for back pain.   Skin: Negative for rash.   Neurological: Negative for headaches.   Hematological: Negative for adenopathy.   Psychiatric/Behavioral: The patient is not nervous/anxious.    All other systems reviewed and are negative.      PMFSH: all information reviewed and updated as relevant to today's visit  Objective:      Physical Exam   Constitutional: He is oriented to person, place, and time. He appears well-developed and well-nourished.   HENT:   Mouth/Throat: No oropharyngeal exudate.   Cardiovascular: Normal rate and normal heart sounds.   Pulmonary/Chest: Effort normal and breath sounds normal. He has no wheezes.   Abdominal: Soft. Bowel sounds are normal. There is no tenderness.   Musculoskeletal: He exhibits no edema or tenderness.   Lymphadenopathy:     He has no cervical adenopathy.   Neurological: He is alert and oriented to person, place, and time. Coordination normal.   Skin: Skin is warm and dry. No rash noted.   Psychiatric: He has a normal mood and affect. Judgment and thought content normal.   Vitals reviewed.      LABS:  WBC   Date Value Ref Range Status   09/25/2018 3.06 (L) 3.90 - 12.70 K/uL Final     Hemoglobin   Date Value Ref Range Status   09/25/2018 10.0 (L) 14.0 - 18.0 g/dL Final     Hematocrit   Date Value Ref Range " Status   09/25/2018 29.2 (L) 40.0 - 54.0 % Final     Platelets   Date Value Ref Range Status   09/25/2018 160 150 - 350 K/uL Final     Gran # (ANC)   Date Value Ref Range Status   09/25/2018 2.2 1.8 - 7.7 K/uL Final     Comment:     The ANC is based on a white cell differential from an   automated cell counter. It has not been microscopically   reviewed for the presence of abnormal cells. Clinical   correlation is required.         Chemistry        Component Value Date/Time     09/25/2018 1051    K 4.2 09/25/2018 1051     09/25/2018 1051    CO2 26 09/25/2018 1051    BUN 25 (H) 09/25/2018 1051    CREATININE 1.7 (H) 09/25/2018 1051    GLU 97 09/25/2018 1051        Component Value Date/Time    CALCIUM 10.1 09/25/2018 1051    ALKPHOS 71 09/25/2018 1051    AST 17 09/25/2018 1051    ALT 12 09/25/2018 1051    BILITOT 0.4 09/25/2018 1051    ESTGFRAFRICA 53.2 (A) 09/25/2018 1051    EGFRNONAA 46.0 (A) 09/25/2018 1051          Assessment:       1. Squamous cell carcinoma of palatine tonsil    2. Cancer of head, face, or neck lymph nodes, secondary    3. 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 Reviewed PET scan which reveal multiple areas of progression. Discussed starting Opdivo and will do so as soon as insurance approval is complete.    Above care plan was discussed with patient and accompanying brother and sister and all questions were addressed to their satisfaction

## 2018-09-25 NOTE — PLAN OF CARE
IMPRESSIONS:   This 47 yo man appears to present with   1.  Wet voice quality (delayed onset) after swallowing liquids or pureed foods; resolved with dry swallows.  2.  Globus sensation with large pill medications.  3.  CRT toxicities, including xerostomia, hypersensitivity, dysgeusia, possible neuropathy, and reported hearing acuity change and tinnitus.  4.  Edentulous.  5.  History CRT; no pre- or stanley-treatment swallowing exercises.  6.  History T3N3M0 HPV+ SCCA L tonsil.    G codes:  Swallowing  Current status:  FCM:  LEVEL 4 (40-59% impaired)  - CK  Projected status:  FCM:   LEVEL 6 (1-19% impaired)  - CI     RECOMMENDATIONS/PLAN OF CARE:  It is felt that Mr. Whiting would benefit from  1.  Continuation of his current pureed consistency diet with thin liquids using the following strategies and common aspiration precautions, including, but not limited to  monitoring for any signs/symptoms of aspiration (such as wet/gurgly voice that does not clear with coughing, inability to make any voice sounds, any persistent coughing with oral intake, otherwise unexplained fever, unexplained increased or new difficulty or discomfort breathing, unexplained increase in  sleepiness/lethargy/significant fatigue, unexplained increase or new onset confusion or change in cognitive functioning, or any other unexplained change in health or well-being that could be related to swallowing).  * Crush meds and combine with puree or dissolve in water for PO intake.  * Puree (thin or thick) any foods he would like to try.    * Track calories and subtract a g-tube feeding as needed.  * Safe swallowing strategies:       - Alternate liquids with solids     - Dry swallows after liquids or as needed if wet voice quality as needed  2.  Continuation of plans to obtain dentures unless otherwise guided by Dr. Sandra.  3.  Consider ENT and audiologic evaluations re: hearing changes and tinnitus.  4.  Return to my clinic 1 week after  beginning use of dentures to assess oropharyngeal swallowing with them in place and determine need for MBSS (would like to assess with dentures in place if study is needed) vs dysphagia therapy.  5.  Follow up with Staci Sandra, Jessica, and Clau as directed.    Long-term goals:  Mr. Whiting will  1.  Meet all his nutrition, hydration, and medication needs by mouth.  2.  Discontinue PEG use.    Short-term objectives:  Mr. Whiting will  1.  Return to clinic 1 week after obtaining dentures.  2.  Reduce his PEG feedings by at least one at time of return per increased PO intake of pureed food and/or liquids.  3.   Demonstrate recognition of wet voice quality with % consistency.  4.   Demonstrate use of appropriate response to wet voice quality with % consistency.  NOTE:  May modify these after dentures are in use.

## 2018-09-25 NOTE — PROGRESS NOTES
"REFERRING PHYSICIAN:  Dr. Olguin     DIAGNOSIS: [id number] vs. M1 p16+ SCC of the left tonsil and neck, questionable bone metastases, stage III vs IVC     INTERVAL SINCE COMPLETION: 3 months     INTERVAL HISTORY: Mr. Whiting returns about 3 months after completing induction chemotherapy followed by chemoradiotherapy for his locally advanced vs metastatic p16+ SCC of the left tonsil and neck.  He received 70Gy in 35 fractions to gross disease and 56Gy to the elective neck from 5/3-6/26/18.  He received concurrent weekly cisplatin.  He initially received 3 cycles of TPF.  At the end of treatment he had a cCR.  Since being seen a month ago he initially was depressed and suicidal and required admission.  In retrospect he feels that his ideations were related to his addiction to opioids, which he has successfully discontinued.  He has been seeing Dr. Birch and feels much much better.  He denies pain today.  Taste buds are returning.  He still has dry mouth managed with frequent sips of water. He is here with his brother and sister.  He does have a smoking history.  PET/CT earlier today unfortunately shows highly suspicious hypermetabolic lesions in his manubrium, right lung and mediastinal LNs.  There may be some residual uptake in a couple of left sided neck nodes vs post treatment inflammation.  No clivus activity.    PHYSICAL EXAMINATION:  VITAL SIGNS: /73 (BP Location: Right arm)   Pulse 79   Temp 97.9 °F (36.6 °C)   Resp 18   Ht 5' 7" (1.702 m)   Wt 81.1 kg (178 lb 11.2 oz)   SpO2 (!) 92%   BMI 27.99 kg/m²   GENERAL: Patient is alert and oriented, in no acute distress.  Appears comfortable.   HEENT: Extraocular muscles are intact.  Oropharynx is clear without lesions.    LYMPH: There is no cervical or supraclavicular adenopathy palpated.    CHEST: Breath sounds clear bilaterally.  No rales.  No rhonchi.  Unlabored respirations.  CARDIOVASCULAR: Normal S1, S2.  Normal rate.  Regular rhythm.  ABDOMEN: " Bowel sounds normal.  No tenderness.  No abdominal distention.  No hepatomegaly.  No splenomegaly.  EXTREMITIES: No clubbing, cyanosis, edema.  MSK: No TTP of sternum or manubrium.  NEUROLOGIC: Cranial nerves II through XII are grossly intact.  Sensation is intact.  Strength is 5 out of 5 in the upper and lower extremities bilaterally. Gait is normal.    ASSESSMENT:   47 yo man 3 months s/p induction TPF followed by definitive chemoradiotherapy for a stage III vs. Stage IV p16+ SCC of the oropharynx.  He has improved clinically since last being seen, but shows strong evidence for metastatic disease on 3 month post treatment PET/CT.     PLAN:   While it appears he has metastatic disease, may consider biopsying one of the lesions due to his prior smoking history and somewhat unusual metastatic pattern, as well as a history of unusual PET scans in the past.  Could biopsy the manubrium or the mediastinal LNs via EBUS.  I will discuss his case at head and neck conference, and call him to discuss our recommendations.  Another option is to proceed straight to immunotherapy for presumed metastatic disease.    Will also check his TSH for his fatigue.   40 minutes were spent in follow up, of which >50% was spent in face to face counseling.

## 2018-09-25 NOTE — PROGRESS NOTES
"REFERRING PHYSICIAN:  Burton Sandra M.D., Radiation Oncologist  LENGTH OF VISIT:  1 hour    REASON FOR REFERRAL:  Burton Whiting, age 48, was referred by Dr. Burton Sandra, radiation oncologist, for post-treatment assessment s/p CRT (TPF, 6 cycles of Cisplatin, and 70 Gy XRT, completed 6/26/18) to treat T3N3M0 HPV+ SCCA L tonsil.   He was accompanied by his brother and sister.    Mr. Whiting was referred prior to treatment, but declined to schedule, so this is my first visit with him.    MEDICAL HISTORY:  Past Medical History:   Diagnosis Date    Alcohol abuse     ended 2004    Former smoker     HPV in male     Hx of psychiatric care     Ativan, Paxil ("caused anger problems")    Opiate addiction     Squamous cell carcinoma of palatine tonsil     throat and tonsillar    Substance abuse     opiates, methamphetamines; ended 2004       SURGICAL HISTORY:  Past Surgical History:   Procedure Laterality Date    GASTROSTOMY TUBE PLACEMENT Left 02/12/2018    DUEQQKEHA-ZPTW-B-CATH N/A 1/26/2018    Performed by Andrew Villanueva MD at [facility] CATH LAB    INSERTION-TUBE-GASTROSTOMY-LAPAROSCOPIC N/A 2/11/2018    Performed by Susan Haas MD at [facility] OR    MEDIPORT REMOVAL N/A 6/6/2018    Procedure: Removal-port-a-cath;  Surgeon: [facility] Diagnostic Provider;  Location: [facility] OR [address] [place] [address];  Service: General;  Laterality: N/A;    Removal-port-a-cath N/A 6/6/2018    Performed by [facility] Diagnostic Provider at [facility] OR [address] [place] [address]       OONCOLOGIC and TREATMENT HISTORY of problem for which patient is referred:     Squamous cell carcinoma of palatine tonsil    1/4/2018 Initial Diagnosis     Squamous cell carcinoma of palatine tonsil       5/3/2018 - 6/26/2018 Radiation Therapy     Treating physician: Dr. Sandra  Total Dose: 70 Gy  Fractions: 35/35         5/2018 - 6/2018 Chemotherapy     Treatment Summary   Plan Name: OP CISPLATIN DOCETAXEL 5FU Q3W  Treatment Goal: Curative  Status: Inactive  Start Date: 1/29/2018  End Date: 5/27/2018 " (Planned)  Provider: Cortney Lopez MD  Chemotherapy: CISplatin (PLATINOL) 100 mg/m2 = 248 mg in sodium chloride 0.9% 500 mL chemo infusion, 100 mg/m2 = 248 mg (100 % of original dose 100 mg/m2), Intravenous, Clinic/HOD 1 time, 2 of 6 cycles  Dose modification: 100 mg/m2 (original dose 100 mg/m2, Cycle 1)    DOCEtaxel (TAXOTERE) 75 mg/m2 = 185 mg in sodium chloride 0.9% 250 mL chemo infusion, 75 mg/m2 = 185 mg, Intravenous, Clinic/HOD 1 time, 2 of 6 cycles    fluorouracil (ADRUCIL) 1,000 mg/m2/day = 12,400 mg in sodium chloride 0.9% 250 mL chemo infusion, 1,000 mg/m2/day = 12,400 mg (100 % of original dose 1,000 mg/m2/day), Intravenous, Over 120 hours, 2 of 6 cycles  Dose modification: 1,000 mg/m2/day (original dose 1,000 mg/m2/day, Cycle 1)    Plan Name: OP CISPLATIN (Weekly)  Treatment Goal: Curative  Status: Inactive  Start Date: 3/20/2018 (Planned)  End Date: 4/24/2018 (Planned)  Provider: Cortney Lopez MD  Chemotherapy: CISplatin (PLATINOL) 40 mg/m2 = 92 mg in sodium chloride 0.9% 250 mL chemo infusion, 40 mg/m2, Intravenous, Clinic/HOD 1 time, 0 of 6 cycles    Plan Name: OP CISPLATIN DOCETAXEL 5FU Q3W  Treatment Goal: Maintenance  Status: Inactive  Start Date: 3/19/2018  End Date: 7/12/2018 (Planned)  Provider: Cortney Lopez MD  Chemotherapy: CISplatin (PLATINOL) 75 mg/m2 = 172 mg in sodium chloride 0.9% 500 mL chemo infusion, 75 mg/m2 = 172 mg, Intravenous, Clinic/HOD 1 time, 1 of 6 cycles    DOCEtaxel (TAXOTERE) 75 mg/m2 = 170 mg in sodium chloride 0.9% 250 mL chemo infusion, 75 mg/m2 = 170 mg, Intravenous, Clinic/HOD 1 time, 1 of 6 cycles    fluorouracil (ADRUCIL) 750 mg/m2/day = 8,590 mg in sodium chloride 0.9% 240 mL chemo infusion, 750 mg/m2/day = 8,590 mg, Intravenous, Over 120 hours, 1 of 6 cycles    Plan Name: OP CISPLATIN (Weekly)  Treatment Goal: Palliative  Status: Active  Start Date: 5/3/2018  End Date: 6/27/2018  Provider: Cortney Lopez MD  Chemotherapy: CISplatin (PLATINOL) 40 mg/m2 = 89 mg  in sodium chloride 0.9% 250 mL chemo infusion, 40 mg/m2 = 89 mg, Intravenous, Clinic/HOD 1 time, 6 of 6 cycles                Possible side effects related to treatment:  Xerostomia:   Positive.  Manages with sipping water primarily.  Occasionally uses Biotene mouthwash.  Irritation:  Positive for salt and spicy foods; has not tried acidic foods in some time, but was positive for those when last tried.  Changes in taste: Positive, but noted improvements.  Radiation fibrosis: Negative  Lymphedema:  Negative   Other:  Reported tinnitus, muffled hearing, and possible neuropathy (numbness in hands, forearms, and feet)    SWALLOWING HISTORY:  Sipped liquids throughout treatment, but stopped PO intake of any form of food for about 6 months.  Attempted some earlier in the summer, but taste was so aversive that he spit out and did not swallow.  Began PO intake of thin and thick pureed foods in the last 1-2 weeks; denied dysphagia or globus sensation.  Can swallow small pill medications with liquid with no problem, but reported that larger pills taken with water get stuck (indicated base of neck) and require a lot more water to clear.    Relying on g-tube feedings as primary source of nutrition. Takes 4 feedings of 2-Bharat/day (475 bharat each) with 16 oz water per feeding by g-tube.    When questioned re: what is inhibiting progression of PO intake, he stated that it was taste.  Also discerned that he is strictly using foods that are typically served in pureed form (e.g., Greek yogurt, pudding) and has not had many trials of foods that were changed to pureed form from their typical presenstation.  He is also bothered by not being able to chew (as his arches do not meet) and not having teeth.  Acknowledged that Dr. Sandra recommended waiting until 6 months after XRT to pursue dentures, but has pursued (with indication that it was important for his psychological health) and anticipate having them in 1-2 weeks via [facility].    FAMILY HISTORY:  Family History   Problem Relation Age of Onset    Leukemia Mother     Hypertension Father     Thyroid disease Sister     Depression Sister     Hypertension Brother     Brain cancer Maternal Uncle     Brain cancer Maternal Uncle        SOCIAL HISTORY:  Mr. Whiting lives in [place] typically, but has been staying with his brother and his family in [place] for the past couple of weeks.  His sister in attendance today lives in [place] and has been here at different times during his treatment to assist.  Per the EMR, Mr. Whiting is .  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  The setting is very hot and he reported that he struggled with remaining hydrated when he was working half-days earlier this year.  He has been off work again for about 1 month, but plans to return to part-time next week.      When not working, Mr. Whiting enjoys salt-water fishing and helping his friend with the boat they use for fishing.  He also enjoys using remote controlled objects (e.g., drones) and generally hanging out with friends.    Tobacco use:  Per EMR, former smoker of cigarettes, 1.5 ppd x25 years  ETOH use:  No per EMR.    BEHAVIOR:  Mr. Magaña was a very pleasant man who was seen with his siblings.  His affect and social interaction were appropriate for situation and setting.  He was fully cooperative for the assessment. Results are considered indicative of his current levels of speech and swallowing functioning.    HEARING:  Subjectively, within normal limits.  He reported a "muffled" quality to hearing and ringing in his ears. He denied that this has been assessed by an ENT.    ASSESSMENT:  Oral Peripheral:     Mandible: 55 mm (no teeth) via TheraBite measuring tool.  Average with teeth is 40 mm.   Lips:  Within normal limits for rounding, spreading, and alternating as well as impounding air, smacking, and repetition of /p/.   Tongue:  Within " "normal limits for protrusion, lateralization, elevation, retroflexion.  He produces "er" with retroflexion.  AMRs for /t/, /k/, and diadochokinetics were within normal limits.   Velum and Hard Palate:  Within normal limits.   Reflexes:  Gag: Present per patient report. Swallow: Present   Dentition:  Edentulous; had all teeth extracted prior to treatment.  Cannot approximate arches in the absence of teeth; nearest approximation is at midline, judged to be ~ 3-4 mm gap.   Oropharynx: Within normal limits.    Speech:  100% intelligible with no distortions.       Swallowing:  Timing of initiation and laryngeal elevation per palpation were within normal limits. Observed the following:  * thin liquids (water):  No s/s of aspiration during or immediately after swallowing single or continuous swallows.  Wet voice quality noted 2-3 minutes afterwards; cleared with dry swallow.  * thin puree (applesauce):  No s/s of aspiration during or immediately after swallowing; no globus sensation.  Wet voice quality noted 2-3 minutes afterwards; cleared with effortful liquid swallow -- but returned as above a few minutes after liquid swallow and was cleared with dry swallow.  Dry swallow after food was not as effective.  * mixed texture (thin puree with crumbled jamie cracker):  As above with plain applesauce.    After it was pointed out to him and his siblings, all could appreciate the wet voice quality and understood strategies to address.    * mock pill (plain M&M) embedded in applesauce:  C/o globus sensation and indicated base to neck just to his L of midline.  In course of taking water to try to clear, appeared to exhibit s/s or liquid returning to his mouth and in danger of emesis, but this did not occur.  It progressed and he cleared all material.  On questioning afterwards, he could not confirm/deny that liquid was returning to his mouth.    Voice/Resonance:  No wet quality prior to swallowing liquids or solids.  He and family " "did think voice quality was a little "raspier" than usual s/p treatment.  Resonance within normal limits.    COUNSELING:  Mr. Whiting and his siblings were engaged in discussion of normal swallowing function, possible effects of CRT, and therapeutic intervention.    Reviewed the basics of the normal pharyngeal swallow using Follow the Swallow.    As he is so close to having his dentures, I would like to let him obtain them and use them for about a week, then reassess with them to determine what is needed next (e.g., MBSS, dysphagia therapy, other assistance progressing diet).    In the meantime, suggested the following:  * Crush meds and combine with puree or dissolve in water for PO intake.  * Puree (thin or thick) any foods he would like to try.    * Track calories and subtract a g-tube feeding as needed.  * Safe swallowing strategies:       - Alternate liquids with solids     - Dry swallows after liquids or as needed if wet voice quality as needed    IMPRESSIONS:   This 47 yo man appears to present with   1.  Wet voice quality (delayed onset) after swallowing liquids or pureed foods; resolved with dry swallows.  2.  Globus sensation with large pill medications.  3.  CRT toxicities, including xerostomia, hypersensitivity, dysgeusia, possible neuropathy, and reported hearing acuity change and tinnitus.  4.  Edentulous.  5.  History CRT; no pre- or stanley-treatment swallowing exercises.  6.  History T3N3M0 HPV+ SCCA L tonsil.    G codes:  Swallowing  Current status:  FCM:  LEVEL 4 (40-59% impaired)  - CK  Projected status:  FCM:   LEVEL 6 (1-19% impaired)  - CI     RECOMMENDATIONS/PLAN OF CARE:  It is felt that Mr. Whiting would benefit from  1.  Continuation of his current pureed consistency diet with thin liquids using the following strategies and common aspiration precautions, including, but not limited to  monitoring for any signs/symptoms of aspiration (such as wet/gurgly voice that does not clear with " coughing, inability to make any voice sounds, any persistent coughing with oral intake, otherwise unexplained fever, unexplained increased or new difficulty or discomfort breathing, unexplained increase in  sleepiness/lethargy/significant fatigue, unexplained increase or new onset confusion or change in cognitive functioning, or any other unexplained change in health or well-being that could be related to swallowing).  * Crush meds and combine with puree or dissolve in water for PO intake.  * Puree (thin or thick) any foods he would like to try.    * Track calories and subtract a g-tube feeding as needed.  * Safe swallowing strategies:       - Alternate liquids with solids     - Dry swallows after liquids or as needed if wet voice quality as needed  2.  Continuation of plans to obtain dentures unless otherwise guided by Dr. Sandra.  3.  Consider ENT and audiologic evaluations re: hearing changes and tinnitus.  4.  Return to my clinic 1 week after beginning use of dentures to assess oropharyngeal swallowing with them in place and determine need for MBSS (would like to assess with dentures in place if study is needed) vs dysphagia therapy.  5.  Follow up with Staci Sandra, Jessica, and Clau as directed.    Long-term goals:  Mr. Whiting will  1.  Meet all his nutrition, hydration, and medication needs by mouth.  2.  Discontinue PEG use.    Short-term objectives:  Mr. Whiting will  1.  Return to clinic 1 week after obtaining dentures.  2.  Reduce his PEG feedings by at least one at time of return per increased PO intake of pureed food and/or liquids.  3.   Demonstrate recognition of wet voice quality with % consistency.  4.   Demonstrate use of appropriate response to wet voice quality with % consistency.  NOTE:  May modify these after dentures are in use.

## 2018-09-26 ENCOUNTER — TELEPHONE (OUTPATIENT)
Dept: HEMATOLOGY/ONCOLOGY | Facility: CLINIC | Age: 48
End: 2018-09-26

## 2018-09-26 DIAGNOSIS — C09.9 TONSIL CANCER: Primary | ICD-10-CM

## 2018-09-26 NOTE — TELEPHONE ENCOUNTER
spoke with pt on today in regards to upcoming appointments on Thur/Friday, pt is aware and has confirm.

## 2018-09-27 ENCOUNTER — LAB VISIT (OUTPATIENT)
Dept: LAB | Facility: HOSPITAL | Age: 48
End: 2018-09-27
Attending: INTERNAL MEDICINE
Payer: COMMERCIAL

## 2018-09-27 DIAGNOSIS — C09.9 SQUAMOUS CELL CARCINOMA OF PALATINE TONSIL: ICD-10-CM

## 2018-09-27 LAB
ALBUMIN SERPL BCP-MCNC: 4 G/DL
ALP SERPL-CCNC: 78 U/L
ALT SERPL W/O P-5'-P-CCNC: 12 U/L
ANION GAP SERPL CALC-SCNC: 7 MMOL/L
AST SERPL-CCNC: 18 U/L
BILIRUB SERPL-MCNC: 0.2 MG/DL
BUN SERPL-MCNC: 25 MG/DL
CALCIUM SERPL-MCNC: 9.8 MG/DL
CHLORIDE SERPL-SCNC: 103 MMOL/L
CO2 SERPL-SCNC: 27 MMOL/L
CREAT SERPL-MCNC: 1.7 MG/DL
ERYTHROCYTE [DISTWIDTH] IN BLOOD BY AUTOMATED COUNT: 11.5 %
EST. GFR  (AFRICAN AMERICAN): 53.2 ML/MIN/1.73 M^2
EST. GFR  (NON AFRICAN AMERICAN): 46 ML/MIN/1.73 M^2
GLUCOSE SERPL-MCNC: 84 MG/DL
HCT VFR BLD AUTO: 27.7 %
HGB BLD-MCNC: 9.5 G/DL
IMM GRANULOCYTES # BLD AUTO: 0.01 K/UL
MCH RBC QN AUTO: 35.2 PG
MCHC RBC AUTO-ENTMCNC: 34.3 G/DL
MCV RBC AUTO: 103 FL
NEUTROPHILS # BLD AUTO: 2.1 K/UL
PLATELET # BLD AUTO: 163 K/UL
PMV BLD AUTO: 9.1 FL
POTASSIUM SERPL-SCNC: 4.2 MMOL/L
PROT SERPL-MCNC: 6.9 G/DL
RBC # BLD AUTO: 2.7 M/UL
SODIUM SERPL-SCNC: 137 MMOL/L
WBC # BLD AUTO: 2.89 K/UL

## 2018-09-27 PROCEDURE — 80053 COMPREHEN METABOLIC PANEL: CPT

## 2018-09-27 PROCEDURE — 36415 COLL VENOUS BLD VENIPUNCTURE: CPT

## 2018-09-27 PROCEDURE — 85027 COMPLETE CBC AUTOMATED: CPT

## 2018-09-28 ENCOUNTER — INFUSION (OUTPATIENT)
Dept: INFUSION THERAPY | Facility: HOSPITAL | Age: 48
End: 2018-09-28
Attending: INTERNAL MEDICINE
Payer: COMMERCIAL

## 2018-09-28 ENCOUNTER — OFFICE VISIT (OUTPATIENT)
Dept: HEMATOLOGY/ONCOLOGY | Facility: CLINIC | Age: 48
End: 2018-09-28
Payer: COMMERCIAL

## 2018-09-28 VITALS
HEART RATE: 77 BPM | BODY MASS INDEX: 27.93 KG/M2 | TEMPERATURE: 99 F | OXYGEN SATURATION: 98 % | HEIGHT: 67 IN | SYSTOLIC BLOOD PRESSURE: 117 MMHG | RESPIRATION RATE: 18 BRPM | DIASTOLIC BLOOD PRESSURE: 75 MMHG | WEIGHT: 177.94 LBS

## 2018-09-28 VITALS
HEART RATE: 70 BPM | RESPIRATION RATE: 18 BRPM | TEMPERATURE: 99 F | SYSTOLIC BLOOD PRESSURE: 118 MMHG | DIASTOLIC BLOOD PRESSURE: 77 MMHG

## 2018-09-28 DIAGNOSIS — C77.0 CANCER OF HEAD, FACE, OR NECK LYMPH NODES, SECONDARY: ICD-10-CM

## 2018-09-28 DIAGNOSIS — C79.51 SECONDARY CANCER OF BONE: ICD-10-CM

## 2018-09-28 DIAGNOSIS — E03.9 HYPOTHYROIDISM, UNSPECIFIED TYPE: ICD-10-CM

## 2018-09-28 DIAGNOSIS — C09.9 TONSIL CANCER: Primary | ICD-10-CM

## 2018-09-28 DIAGNOSIS — C09.9 SQUAMOUS CELL CARCINOMA OF PALATINE TONSIL: ICD-10-CM

## 2018-09-28 DIAGNOSIS — C09.9 SQUAMOUS CELL CARCINOMA OF PALATINE TONSIL: Primary | ICD-10-CM

## 2018-09-28 PROCEDURE — 63600175 PHARM REV CODE 636 W HCPCS: Mod: JG | Performed by: INTERNAL MEDICINE

## 2018-09-28 PROCEDURE — 99215 OFFICE O/P EST HI 40 MIN: CPT | Mod: S$GLB,,, | Performed by: INTERNAL MEDICINE

## 2018-09-28 PROCEDURE — 3008F BODY MASS INDEX DOCD: CPT | Mod: CPTII,S$GLB,, | Performed by: INTERNAL MEDICINE

## 2018-09-28 PROCEDURE — 99999 PR PBB SHADOW E&M-EST. PATIENT-LVL III: CPT | Mod: PBBFAC,,, | Performed by: INTERNAL MEDICINE

## 2018-09-28 PROCEDURE — 96413 CHEMO IV INFUSION 1 HR: CPT

## 2018-09-28 PROCEDURE — 25000003 PHARM REV CODE 250: Performed by: INTERNAL MEDICINE

## 2018-09-28 RX ORDER — SODIUM CHLORIDE 0.9 % (FLUSH) 0.9 %
10 SYRINGE (ML) INJECTION
Status: CANCELLED | OUTPATIENT
Start: 2018-09-28

## 2018-09-28 RX ORDER — HEPARIN 100 UNIT/ML
500 SYRINGE INTRAVENOUS
Status: CANCELLED | OUTPATIENT
Start: 2018-09-28

## 2018-09-28 RX ORDER — HEPARIN 100 UNIT/ML
500 SYRINGE INTRAVENOUS
Status: DISCONTINUED | OUTPATIENT
Start: 2018-09-28 | End: 2018-09-28 | Stop reason: HOSPADM

## 2018-09-28 RX ORDER — SODIUM CHLORIDE 0.9 % (FLUSH) 0.9 %
10 SYRINGE (ML) INJECTION
Status: DISCONTINUED | OUTPATIENT
Start: 2018-09-28 | End: 2018-09-28 | Stop reason: HOSPADM

## 2018-09-28 RX ADMIN — SODIUM CHLORIDE 240 MG: 9 INJECTION, SOLUTION INTRAVENOUS at 05:09

## 2018-09-28 NOTE — PLAN OF CARE
Problem: Patient Care Overview  Goal: Plan of Care Review  Outcome: Ongoing (interventions implemented as appropriate)  Pt. Tolerated infusion. VSS. PIV flushed and removed. Will RTC in two weeks.

## 2018-09-28 NOTE — Clinical Note
Schedule CBC,CMP, TSH and free T4 and see me in 2 weeks and for Opdivo. Wants late Thursday PM as late as possible

## 2018-09-28 NOTE — PROGRESS NOTES
Subjective:       Patient ID: Burton Whiting is a 48 y.o. male.    Chief Complaint: Squamous cell carcinoma of palatine tonsil  Squamous cell carcinoma of palatine tonsil; g-tube malodorous; left neck pain 2/10; 4 cans formula per day; and r arm port---red/warm to touch  Mr. Burton Whiting is a 48-year-old male, former smoker, who presents today with a four-month history of enlarging neck mass.  He initially delayed care due to lack of insurance.  He was seen by Dr. Turpin who referred him to see Dr. Olguin.  He had a CT that showed a 3.8 x 3.8 x 4.9 cm soft tissue mass arising from the left palatine tonsil resulting in moderate narrowing of the hypopharyngeal airway adjacent extensive matted left cervical lymphadenopathy was noted.  The largest ella mass was 6.6 x 9 x 2.6 cm extending inferiorly to the supraclavicular region.  The mass pushed the trachea and the left common carotid artery to the right.  Additional representative of cervical lymph nodes measuring 2.9 x 3.1 x 3.5 cm on axial image 37 of series 3   and 2.4 x 2.1 x 2.2 cm on axial image 55 of series 3.  There is also a sclerotic lesion involving the midline of the clivus measuring 1.2 cm.  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  PET scan performed on 01/19/2018 revealed persistent evidence of a soft tissue mass arising from the left palatine tonsil resulting in moderate narrowing of the hypopharyngeal   airway.  The mass is hypermetabolic demonstrating an SUV max of 18.5.  There is also persistent adjacent extensive matted left cervical lymphadenopathy, largest of this measuring 6.7 x 8.42 with hypermetabolic activity and SUV max of 20.5.  Lymphadenopathy is again noted to have a mass effect on the trachea and left common carotid artery, pushing both structures towards the right.  There is also prominent right cervical lymph nodes with the largest measuring 0.8 cm and demonstrating mild hypermetabolic activity with SUV max  "of 1.8, physiologic   distribution of FDG in the gray matter.  There are 3 pulmonary nodules noted within the right lung, the largest is in the anterior segment of the right upper lobe measuring 1 cm, second is visualized in the middle lobe measuring 0.6 cm and the third is within the posterior basal segment measuring 0.6 cm.  There is one pulmonary nodule within the left lung within the lateral basal segment measuring 0.6 cm.  These nodules do not demonstrate hypermetabolic activity; however, they are below the threshold for observation with PET     HPIHe underwent MRI cervical spine revealed "No evidence of metastatic disease to the cervical spine. Multilevel degenerative changes of the cervical spine with disc protrusion at C5-6 which results in moderate to severe spinal canal stenosis and and associated cord signal abnormality, suggestive of compressive myelopathy. 6 mm T1 hypointense non-enhancing lesion in the clivus.  Continued followup is suggested. 9 mm inferior descent of the cerebellar tonsils below the foramen magnum, suggestive of Chiari 1 malformation"  MRI thoracic spine "No evidence of metastatic disease involving the thoracic spine. Incidental hemangioma involving the T7 vertebral body. Mild degenerative disc disease without any significant spinal canal stenosis or neuroforaminal narrowing.   He completed 3 cycles of TPF and 6 weeks of Cisplatin and RT. Completed on 6/26/18          He was hospitalized in [facility] and was seeing Dr. Bhatt. He saw the speech therapy and plan to start eating  His PET scan from 9/25/18 revealed "Progression of disease with multiple new hypermetabolic foci including the manubrium, mediastinal/retrocrural lymph nodes, and lungs. Partial therapeutic response within the presumed radiation field involving the left cervical mass, and complete therapeutic response in the right cervical lymph node, clivus, and left palatine tonsil. New soft tissue thickening and " "hypermetabolism in the left aryepiglottic fold and right cricoid cartilage"    HPIHe comes in for Opdivo  He denies any issues. He feels well and notes that his tingling is getting better  He is accompanied by his sister. His ECOG PS is zero    Review of Systems   Constitutional: Negative for appetite change, fatigue and unexpected weight change.   HENT: Negative for mouth sores.    Eyes: Negative for visual disturbance.   Respiratory: Negative for cough and shortness of breath.    Cardiovascular: Negative for chest pain.   Gastrointestinal: Negative for abdominal pain and diarrhea.   Genitourinary: Negative for frequency.   Musculoskeletal: Negative for back pain.   Skin: Negative for rash.   Neurological: Negative for headaches.   Hematological: Negative for adenopathy.   Psychiatric/Behavioral: The patient is not nervous/anxious.    All other systems reviewed and are negative.      Objective:      Physical Exam   Constitutional: He is oriented to person, place, and time. He appears well-developed and well-nourished.   HENT:   Mouth/Throat: No oropharyngeal exudate.   Cardiovascular: Normal rate and normal heart sounds.   Pulmonary/Chest: Effort normal and breath sounds normal. He has no wheezes.   Abdominal: Soft. Bowel sounds are normal. There is no tenderness.   Musculoskeletal: He exhibits no edema or tenderness.   Lymphadenopathy:     He has no cervical adenopathy.   Neurological: He is alert and oriented to person, place, and time. Coordination normal.   Skin: Skin is warm and dry. No rash noted.   Psychiatric: He has a normal mood and affect. Judgment and thought content normal.   Vitals reviewed.      LABS:  WBC   Date Value Ref Range Status   09/27/2018 2.89 (L) 3.90 - 12.70 K/uL Final     Hemoglobin   Date Value Ref Range Status   09/27/2018 9.5 (L) 14.0 - 18.0 g/dL Final     Hematocrit   Date Value Ref Range Status   09/27/2018 27.7 (L) 40.0 - 54.0 % Final     Platelets   Date Value Ref Range Status "   09/27/2018 163 150 - 350 K/uL Final     Gran # (ANC)   Date Value Ref Range Status   09/27/2018 2.1 1.8 - 7.7 K/uL Final     Comment:     The ANC is based on a white cell differential from an   automated cell counter. It has not been microscopically   reviewed for the presence of abnormal cells. Clinical   correlation is required.         Chemistry        Component Value Date/Time     09/27/2018 1551    K 4.2 09/27/2018 1551     09/27/2018 1551    CO2 27 09/27/2018 1551    BUN 25 (H) 09/27/2018 1551    CREATININE 1.7 (H) 09/27/2018 1551    GLU 84 09/27/2018 1551        Component Value Date/Time    CALCIUM 9.8 09/27/2018 1551    ALKPHOS 78 09/27/2018 1551    AST 18 09/27/2018 1551    ALT 12 09/27/2018 1551    BILITOT 0.2 09/27/2018 1551    ESTGFRAFRICA 53.2 (A) 09/27/2018 1551    EGFRNONAA 46.0 (A) 09/27/2018 1551          Assessment:       1. Squamous cell carcinoma of palatine tonsil    2. Cancer of head, face, or neck lymph nodes, secondary    3. Secondary cancer of bone    4. Hypothyroidism, unspecified type        Plan:        1,2,3,4. Reviewed PET scans and there is progression. He will proceed with Opdivo and will return in 2 weeks for next cycle.  Patient was consented for Immunotherapy today 1/5/2017 .   An extensive discussion was had which included a thorough discussion of the risk and benefits of treatment and alternatives.  Risks, including but not limited to, (hypothyroidsm, hyperthyroidsm, hypophysitis, pneumonitis, colitis, myositis, pancreatitis, nausea, vomiting, diarrhea, decreased appetite and fatigue) were discussed.    Above care plan was discussed with patient and accompanying sister and all questions were addressed to their satisfaction

## 2018-10-01 ENCOUNTER — TELEPHONE (OUTPATIENT)
Dept: HEMATOLOGY/ONCOLOGY | Facility: CLINIC | Age: 48
End: 2018-10-01

## 2018-10-01 ENCOUNTER — HOSPITAL ENCOUNTER (EMERGENCY)
Facility: HOSPITAL | Age: 48
Discharge: HOME | End: 2018-10-01
Payer: COMMERCIAL

## 2018-10-01 VITALS
TEMPERATURE: 98 F | BODY MASS INDEX: 28.19 KG/M2 | HEART RATE: 87 BPM | SYSTOLIC BLOOD PRESSURE: 132 MMHG | WEIGHT: 180 LBS | DIASTOLIC BLOOD PRESSURE: 73 MMHG | RESPIRATION RATE: 15 BRPM | OXYGEN SATURATION: 98 %

## 2018-10-01 DIAGNOSIS — K94.23 PEG TUBE MALFUNCTION: Primary | ICD-10-CM

## 2018-10-01 PROCEDURE — 43760 *HC REPLACEMENT GASTROS TUBE: CPT

## 2018-10-01 PROCEDURE — 99284 EMERGENCY DEPT VISIT MOD MDM: CPT | Mod: 25

## 2018-10-01 RX ORDER — MIRTAZAPINE 30 MG/1
30 TABLET, FILM COATED ORAL NIGHTLY
COMMUNITY
End: 2019-03-28

## 2018-10-01 RX ORDER — FLUOXETINE HYDROCHLORIDE 20 MG/1
20 CAPSULE ORAL DAILY
COMMUNITY
End: 2019-07-05 | Stop reason: SDUPTHER

## 2018-10-01 RX ORDER — NAPROXEN SODIUM 220 MG/1
81 TABLET, FILM COATED ORAL DAILY
COMMUNITY
End: 2019-03-28

## 2018-10-01 RX ORDER — GABAPENTIN 250 MG/5ML
SOLUTION ORAL 3 TIMES DAILY
COMMUNITY
End: 2018-10-25

## 2018-10-01 RX ORDER — FERROUS GLUCONATE 324(38)MG
324 TABLET ORAL
COMMUNITY
End: 2019-03-28

## 2018-10-01 NOTE — DISCHARGE INSTRUCTIONS
Follow up with General surgery for further PEG tube care and management.  Return to ED with any worsening of symptoms or condition.  Continue all regular daily medications and tube feeds as directed by PCP/Oncology/General surgery.

## 2018-10-01 NOTE — TELEPHONE ENCOUNTER
Spoke with patient.  Advised him to proceed directly to ED, as he needs to have his feeding tube placed as quickly as he can, per surg/onc.   Patient voiced understanding.

## 2018-10-01 NOTE — ED PROVIDER NOTES
"Encounter Date: 10/1/2018       History     Chief Complaint   Patient presents with    PEG TUBE replacement     My peg tube fell out when I went to get in the shower. My oncologist told me to come to the ED to have it replaced. It happened 45 minutes ago     Patient is a 48-year-old male presenting to ED today reporting that his PEG tube accidentally pulse pulled out while he was taking his shirt off to take a shower approximately 1 hr ago.  Patient reports it has felt loose for a while now.  Patient denies any other physical complaints this time.  Patient has brought his PEG tube in a Ziploc bag for sizing.  Patient has PEG tube in place secondary to head and neck cancer requiring chemotherapy.      The history is provided by the patient.     Review of patient's allergies indicates:   Allergen Reactions    Trazodone Other (See Comments)     confusion     Past Medical History:   Diagnosis Date    Alcohol abuse     ended 2004    Former smoker     HPV in male     Hx of psychiatric care     Ativan, Paxil ("caused anger problems")    Opiate addiction     Squamous cell carcinoma of palatine tonsil     throat and tonsillar    Substance abuse     opiates, methamphetamines; ended 2004     Past Surgical History:   Procedure Laterality Date    GASTROSTOMY TUBE PLACEMENT Left 02/12/2018    [id number]-AJQF-Z-CATH N/A 1/26/2018    Performed by Andrew Villanueva MD at [facility] CATH LAB    INSERTION-TUBE-GASTROSTOMY-LAPAROSCOPIC N/A 2/11/2018    Performed by [facility] at [facility] OR    MEDIPORT REMOVAL N/A 6/6/2018    Procedure: Removal-port-a-cath;  Surgeon: [facility] Diagnostic Provider;  Location: [facility] OR [address] [place] [address];  Service: General;  Laterality: N/A;    Removal-port-a-cath N/A 6/6/2018    Performed by [facility] Diagnostic Provider at [facility] OR [address] [place] [address]     Family History   Problem Relation Age of Onset    Leukemia Mother     Hypertension Father     Thyroid disease Sister     Depression Sister     Hypertension " Brother     Brain cancer Maternal Uncle     Brain cancer Maternal Uncle      Social History     Tobacco Use    Smoking status: Former Smoker     Packs/day: 1.50     Years: 25.00     Pack years: 37.50     Types: Cigarettes     Last attempt to quit: 2017     Years since quittin.3    Smokeless tobacco: Never Used    Tobacco comment: smoked for about 25 years. quit 6 months ago   Substance Use Topics    Alcohol use: No     Comment: history of overuse    Drug use: No     Comment: Former Opiate/methamphetamine addiction     Review of Systems   Constitutional: Negative for chills, diaphoresis, fatigue and fever.   HENT: Negative for congestion, sinus pain and sore throat.    Eyes: Negative.    Respiratory: Negative for cough, shortness of breath and stridor.    Cardiovascular: Negative for chest pain.   Gastrointestinal: Negative for abdominal pain, nausea and vomiting.   Endocrine: Negative.    Genitourinary: Negative for dysuria, flank pain and hematuria.   Musculoskeletal: Negative for back pain, myalgias and neck pain.   Skin: Positive for wound. Negative for rash.   Allergic/Immunologic: Negative.    Neurological: Negative for dizziness, weakness and headaches.   Hematological: Negative.  Does not bruise/bleed easily.   Psychiatric/Behavioral: Negative.        Physical Exam     Initial Vitals [10/01/18 1345]   BP Pulse Resp Temp SpO2   132/73 87 15 98.2 °F (36.8 °C) 98 %      MAP       --         Physical Exam    Nursing note and vitals reviewed.  Constitutional: He appears well-developed and well-nourished. He is not diaphoretic. No distress.   Patient is a 48-year-old male sitting upright in no acute distress, nontoxic, AAO x4 and breathing comfortably on room air.   HENT:   Head: Normocephalic and atraumatic.   Right Ear: External ear normal.   Left Ear: External ear normal.   Nose: Nose normal.   Mouth/Throat: Oropharynx is clear and moist. No oropharyngeal exudate.   Eyes: EOM are normal. Pupils are  "equal, round, and reactive to light.   Neck: Normal range of motion. Neck supple.   Cardiovascular: Normal rate, regular rhythm, normal heart sounds and intact distal pulses.   Pulmonary/Chest: Breath sounds normal. No respiratory distress. He has no wheezes. He exhibits no tenderness.   Abdominal: Soft. Bowel sounds are normal. He exhibits no distension. There is no tenderness. There is no rebound and no guarding.   Abdomen soft and nontender in all quadrants.  Patient with PEG site open to left side abdomen and site appears patent. No bleeding from site.   Musculoskeletal: Normal range of motion. He exhibits no tenderness.   Neurological: He is alert and oriented to person, place, and time. He has normal strength. No sensory deficit. GCS score is 15. GCS eye subscore is 4. GCS verbal subscore is 5. GCS motor subscore is 6.   Skin: Skin is warm and dry. Capillary refill takes less than 2 seconds. No rash noted. No erythema.         ED Course   Procedures  Labs Reviewed - No data to display       Imaging Results    None          Medical Decision Making:   Initial Assessment:   Patient is a 48-year-old male presenting to ED today reporting that his PEG tube accidentally pulse pulled out while he was taking his shirt off to take a shower approximately 1 hr ago.  Patient reports "it has felt loose for a while now."  Patient denies any other physical complaints this time.  Patient has brought his PEG tube in a Ziploc bag for sizing.  Patient has PEG tube in place secondary to head and neck cancer requiring chemotherapy.  Differential Diagnosis:   PEG tube replacement  ED Management:  Discussed care plan with patient.  Patient tolerated PEG tube replacement very well while in the emergency department today.  A new 3 port 18 Ukrainian 6 mL peg tube was placed without difficulty.  Irrigation/aspiration performed without difficulty.  Tube placement adequate and patent. Patient pain free.  Adequate dressing applied to keep in " place.  Patient follow-up with General surgery for continued care and management of his PEG tube.  Patient is stable, no acute distress, nontoxic, neurovascular intact and all questions answered.                      Clinical Impression:   The encounter diagnosis was PEG tube malfunction.                             Griselda Araiza PA-C  10/01/18 [id number]

## 2018-10-01 NOTE — TELEPHONE ENCOUNTER
Spoke with patient.  He accidentally pulled his feeding tube out while changing his opal-shirt today.  Patient states it was placed at [facility].  This is his primary source of eating.    Nurse informed patient she would contact him back after speaking with dr waggoner.  Patient voiced understanding.    Message routed to dr waggoner (this needs to be replaced today...would the ED replace?)

## 2018-10-01 NOTE — TELEPHONE ENCOUNTER
----- Message from Cortney Lopez MD sent at 9/28/2018  5:04 PM CDT -----  Schedule CBC,CMP, TSH and free T4 and see me in 2 weeks and for Opdivo. Wants late Thursday PM as late as possible

## 2018-10-01 NOTE — TELEPHONE ENCOUNTER
----- Message from Thea Bach sent at 10/1/2018 12:47 PM CDT -----  Contact: Pt  Pt called to speak with nurse Lauryn Meadosw#[phone]  Thank You  ITZ Bach

## 2018-10-02 ENCOUNTER — TELEPHONE (OUTPATIENT)
Dept: HEMATOLOGY/ONCOLOGY | Facility: CLINIC | Age: 48
End: 2018-10-02

## 2018-10-02 DIAGNOSIS — C09.9 TONSIL CANCER: Primary | ICD-10-CM

## 2018-10-02 NOTE — TELEPHONE ENCOUNTER
Spoke with patient. He had his feeding tube replaced in the ED yesterday, with a temporary tube. The tube has fallen out twice already today---and he was able to replace each time and he fastened it down the second time with tape. He is requesting for a permanent one to be placed asap at [facility] preferably.    Nurse informed patient she would contact him back after speaking with dr waggoner. Patient voiced understanding.      Message routed to dr waggoner

## 2018-10-02 NOTE — TELEPHONE ENCOUNTER
----- Message from Alma Delia Matson sent at 10/2/2018  1:55 PM CDT -----  Contact: Pt  Pt calling with questions regarding peg tube       Pt call back number [phone]

## 2018-10-05 ENCOUNTER — OFFICE VISIT (OUTPATIENT)
Dept: SURGERY | Facility: CLINIC | Age: 48
End: 2018-10-05
Payer: COMMERCIAL

## 2018-10-05 VITALS
DIASTOLIC BLOOD PRESSURE: 65 MMHG | HEART RATE: 59 BPM | BODY MASS INDEX: 27.48 KG/M2 | HEIGHT: 67 IN | SYSTOLIC BLOOD PRESSURE: 117 MMHG | WEIGHT: 175.06 LBS | TEMPERATURE: 98 F

## 2018-10-05 DIAGNOSIS — K94.23 MALFUNCTION OF PERCUTANEOUS ENDOSCOPIC GASTROSTOMY (PEG) TUBE: Primary | ICD-10-CM

## 2018-10-05 PROCEDURE — 43760 PR CHANGE GASTROSTOMY TUBE PERCUTANEOUS W/O GUIDE: CPT | Mod: S$GLB,,, | Performed by: SURGERY

## 2018-10-05 PROCEDURE — 99999 PR PBB SHADOW E&M-EST. PATIENT-LVL III: CPT | Mod: PBBFAC,,, | Performed by: SURGERY

## 2018-10-05 PROCEDURE — 99203 OFFICE O/P NEW LOW 30 MIN: CPT | Mod: 25,S$GLB,, | Performed by: SURGERY

## 2018-10-05 PROCEDURE — 3008F BODY MASS INDEX DOCD: CPT | Mod: CPTII,S$GLB,, | Performed by: SURGERY

## 2018-10-05 NOTE — LETTER
October 5, 2018      Cortney Lopez MD  [address]  [place] [address]           [facility] General Surgery  [address]  [place] [address]  Phone: [phone]          Patient: Burton Whiting   MR Number: [id number]   YOB: 1970   Date of Visit: 10/5/2018       Dear Dr. Cortney Lopez:    Thank you for referring Burton Whiting to me for evaluation. Attached you will find relevant portions of my assessment and plan of care.    If you have questions, please do not hesitate to call me. I look forward to following Burton Whiting along with you.    Sincerely,    Chino Vizcarra Jr., MD    Enclosure  CC:  No Recipients    If you would like to receive this communication electronically, please contact [email or web address] or [phone] to request more information on [employer] Link access.    For providers and/or their staff who would like to refer a patient to [facility], please contact us through our one-stop-shop provider referral line, [facility], at [phone].    If you feel you have received this communication in error or would no longer like to receive these types of communications, please e-mail [email or web address]

## 2018-10-05 NOTE — PROGRESS NOTES
History & Physical    SUBJECTIVE:     Chief complaint: New PEG tube is not staying place    History of Present Illness:  Patient is a 48 y.o. male with PMHx significant for SCC of the palatine tonsil who presents today asking for exchange of his PEG tube. Patient notes that he has had a PEG ever since an illness in which he was treated at [facility] in 2/2018. During this time he was intubated and required a PEG tube for nutrition. Additionally, his SCC of the palatine tonsil was significant enough that he was unable to take in oral intake, and has limited his oral intake since, requiring continued use of his PEG tube for adequate nutrient intake. Patient notes that on Monday (10/1) his PEG tube became lose (had been becoming lose for some) time and as he was pulling off his clothes he inadvertently removed his PEG tube. He quickly came to the ED for it and they replaced it with the same sized tube (18 F). However, the tubes balloon was not inflated. Patient notes that the tube has already come out once on its own, and he therefore has to bandage it in place to keep it from coming out. He therefore presents today to hopefully replace the tube or find some way to stabilize it. Of note, he states that is he is also having a difficult time getting the syringes to fit onto the new tube.      Review of patient's allergies indicates:   Allergen Reactions    Trazodone Other (See Comments)     confusion       Current Outpatient Medications   Medication Sig Dispense Refill    aspirin 81 MG Chew Take 81 mg by mouth once daily.      ferrous gluconate (FERGON) 324 MG tablet Take 324 mg by mouth daily with breakfast.      FLUoxetine (PROZAC) 20 MG capsule Take 20 mg by mouth once daily.      gabapentin (NEURONTIN) 250 mg/5 mL solution Take by mouth 3 (three) times daily.      mirtazapine (REMERON) 30 MG tablet Take 30 mg by mouth every evening.       No current facility-administered medications for this visit.   "      Past Medical History:   Diagnosis Date    Alcohol abuse     ended     Former smoker     HPV in male     Hx of psychiatric care     Ativan, Paxil ("caused anger problems")    Opiate addiction     Squamous cell carcinoma of palatine tonsil     throat and tonsillar    Substance abuse     opiates, methamphetamines; ended      Past Surgical History:   Procedure Laterality Date    GASTROSTOMY TUBE PLACEMENT Left 2018    [id number]-[id number]-B-CATH N/A 2018    Performed by [facility] MD at [facility] CATH LAB    INSERTION-TUBE-GASTROSTOMY-LAPAROSCOPIC N/A 2018    Performed by [facility] at [facility] OR    MEDIPORT REMOVAL N/A 2018    Procedure: Removal-port-a-cath;  Surgeon: [facility] Diagnostic Provider;  Location: [facility] OR [address] [place] [address];  Service: General;  Laterality: N/A;    Removal-port-a-cath N/A 2018    Performed by [facility] Diagnostic Provider at [facility] OR [address] [place] [address]     Family History   Problem Relation Age of Onset    Leukemia Mother     Hypertension Father     Thyroid disease Sister     Depression Sister     Hypertension Brother     Brain cancer Maternal Uncle     Brain cancer Maternal Uncle      Social History     Tobacco Use    Smoking status: Former Smoker     Packs/day: 1.50     Years: 25.00     Pack years: 37.50     Types: Cigarettes     Last attempt to quit: 2017     Years since quittin.3    Smokeless tobacco: Never Used    Tobacco comment: smoked for about 25 years. quit 6 months ago   Substance Use Topics    Alcohol use: No     Comment: history of overuse    Drug use: No     Comment: Former Opiate/methamphetamine addiction        Review of Systems:  Review of Systems   Constitutional: Negative for chills and fever.   Respiratory: Negative for chest tightness and shortness of breath.    Cardiovascular: Negative for chest pain and palpitations.   Gastrointestinal: Negative for constipation, diarrhea, nausea and vomiting.   Musculoskeletal: Negative for " "arthralgias and myalgias.   Neurological: Positive for numbness (secondary to immunotherapy for SCC). Negative for seizures.   Hematological: Does not bruise/bleed easily.   Psychiatric/Behavioral: Negative for confusion. The patient is not nervous/anxious.        OBJECTIVE:     Vital Signs (Most Recent)  Temp: 98.4 °F (36.9 °C) (10/05/18 0909)  Pulse: (!) 59 (10/05/18 0909)  BP: 117/65 (10/05/18 0909)  5' 7" (1.702 m)  79.4 kg (175 lb 0.7 oz)     Physical Exam:  Physical Exam   Constitutional: He is oriented to person, place, and time. He appears well-developed and well-nourished.   HENT:   Head: Normocephalic and atraumatic.   Eyes: Conjunctivae are normal. Pupils are equal, round, and reactive to light.   Neck: Normal range of motion. Neck supple.   Cardiovascular: Normal rate and regular rhythm.   Pulmonary/Chest: Effort normal and breath sounds normal. No respiratory distress.   Abdominal: Soft. Bowel sounds are normal. He exhibits no distension.   PEG present to LUQ   Musculoskeletal: Normal range of motion. He exhibits no edema.   Neurological: He is alert and oriented to person, place, and time.   Skin: Skin is warm and dry.   Psychiatric: He has a normal mood and affect. His behavior is normal. Judgment normal.       Laboratory  Reviewed    Diagnostic Results:  Reviewed    ASSESSMENT/PLAN:     Mr. Burton Whiting is a 49 yo male who presents today for PEG tube adjustment/replacement following PEG tube inadvertent removal on 10/1/18.    PLAN:  -Replaced G-tube in office to ensure ballon in tube was functional.  Provided Jori valve to help with feeds.  -Used silver nitrate in office on excess tissue around PEG tube to better help position of tube on skin       Lissa Dee MD  General/Bariatric Surgery-PGYI [facility]    I have personally taken the history and examined this patient and agree with the resident's note as stated above.         Chino Vizcarra MD      "

## 2018-10-10 ENCOUNTER — TELEPHONE (OUTPATIENT)
Dept: HEMATOLOGY/ONCOLOGY | Facility: CLINIC | Age: 48
End: 2018-10-10

## 2018-10-10 NOTE — TELEPHONE ENCOUNTER
spoke with pt on today in regards to upcoming appointments on 10/11/18, pt is aware and has confirm.

## 2018-10-11 ENCOUNTER — LAB VISIT (OUTPATIENT)
Dept: LAB | Facility: HOSPITAL | Age: 48
End: 2018-10-11
Attending: INTERNAL MEDICINE
Payer: COMMERCIAL

## 2018-10-11 ENCOUNTER — OFFICE VISIT (OUTPATIENT)
Dept: HEMATOLOGY/ONCOLOGY | Facility: CLINIC | Age: 48
End: 2018-10-11
Payer: COMMERCIAL

## 2018-10-11 ENCOUNTER — INFUSION (OUTPATIENT)
Dept: INFUSION THERAPY | Facility: HOSPITAL | Age: 48
End: 2018-10-11
Attending: INTERNAL MEDICINE
Payer: COMMERCIAL

## 2018-10-11 VITALS
SYSTOLIC BLOOD PRESSURE: 127 MMHG | HEART RATE: 77 BPM | DIASTOLIC BLOOD PRESSURE: 55 MMHG | HEIGHT: 67 IN | RESPIRATION RATE: 18 BRPM | BODY MASS INDEX: 27.78 KG/M2 | TEMPERATURE: 98 F | WEIGHT: 177 LBS

## 2018-10-11 VITALS
DIASTOLIC BLOOD PRESSURE: 75 MMHG | BODY MASS INDEX: 27.78 KG/M2 | HEIGHT: 67 IN | WEIGHT: 177 LBS | TEMPERATURE: 98 F | SYSTOLIC BLOOD PRESSURE: 126 MMHG | OXYGEN SATURATION: 99 % | HEART RATE: 61 BPM | RESPIRATION RATE: 18 BRPM

## 2018-10-11 DIAGNOSIS — C09.9 TONSIL CANCER: ICD-10-CM

## 2018-10-11 DIAGNOSIS — C09.9 SQUAMOUS CELL CARCINOMA OF PALATINE TONSIL: Primary | ICD-10-CM

## 2018-10-11 DIAGNOSIS — C79.51 SECONDARY CANCER OF BONE: ICD-10-CM

## 2018-10-11 DIAGNOSIS — C09.9 SQUAMOUS CELL CARCINOMA OF PALATINE TONSIL: ICD-10-CM

## 2018-10-11 DIAGNOSIS — C09.9 TONSIL CANCER: Primary | ICD-10-CM

## 2018-10-11 DIAGNOSIS — C77.0 CANCER OF HEAD, FACE, OR NECK LYMPH NODES, SECONDARY: ICD-10-CM

## 2018-10-11 LAB
ALBUMIN SERPL BCP-MCNC: 4.2 G/DL
ALP SERPL-CCNC: 77 U/L
ALT SERPL W/O P-5'-P-CCNC: 13 U/L
ANION GAP SERPL CALC-SCNC: 9 MMOL/L
AST SERPL-CCNC: 18 U/L
BILIRUB SERPL-MCNC: 0.3 MG/DL
BUN SERPL-MCNC: 31 MG/DL
CALCIUM SERPL-MCNC: 9.8 MG/DL
CHLORIDE SERPL-SCNC: 102 MMOL/L
CO2 SERPL-SCNC: 26 MMOL/L
CREAT SERPL-MCNC: 1.9 MG/DL
ERYTHROCYTE [DISTWIDTH] IN BLOOD BY AUTOMATED COUNT: 11.8 %
EST. GFR  (AFRICAN AMERICAN): 46.5 ML/MIN/1.73 M^2
EST. GFR  (NON AFRICAN AMERICAN): 40.2 ML/MIN/1.73 M^2
GLUCOSE SERPL-MCNC: 89 MG/DL
HCT VFR BLD AUTO: 29 %
HGB BLD-MCNC: 10 G/DL
IMM GRANULOCYTES # BLD AUTO: 0.01 K/UL
MCH RBC QN AUTO: 35.6 PG
MCHC RBC AUTO-ENTMCNC: 34.5 G/DL
MCV RBC AUTO: 103 FL
NEUTROPHILS # BLD AUTO: 2.8 K/UL
PLATELET # BLD AUTO: 206 K/UL
PMV BLD AUTO: 9.3 FL
POTASSIUM SERPL-SCNC: 4.2 MMOL/L
PROT SERPL-MCNC: 7.2 G/DL
RBC # BLD AUTO: 2.81 M/UL
SODIUM SERPL-SCNC: 137 MMOL/L
T4 FREE SERPL-MCNC: 1.17 NG/DL
TSH SERPL DL<=0.005 MIU/L-ACNC: 0.84 UIU/ML
WBC # BLD AUTO: 3.7 K/UL

## 2018-10-11 PROCEDURE — 36415 COLL VENOUS BLD VENIPUNCTURE: CPT

## 2018-10-11 PROCEDURE — 96413 CHEMO IV INFUSION 1 HR: CPT

## 2018-10-11 PROCEDURE — 85027 COMPLETE CBC AUTOMATED: CPT

## 2018-10-11 PROCEDURE — 96372 THER/PROPH/DIAG INJ SC/IM: CPT

## 2018-10-11 PROCEDURE — 80053 COMPREHEN METABOLIC PANEL: CPT

## 2018-10-11 PROCEDURE — 63600175 PHARM REV CODE 636 W HCPCS: Mod: JG | Performed by: INTERNAL MEDICINE

## 2018-10-11 PROCEDURE — 25000003 PHARM REV CODE 250: Performed by: INTERNAL MEDICINE

## 2018-10-11 PROCEDURE — 84443 ASSAY THYROID STIM HORMONE: CPT

## 2018-10-11 PROCEDURE — 84439 ASSAY OF FREE THYROXINE: CPT

## 2018-10-11 PROCEDURE — 99999 PR PBB SHADOW E&M-EST. PATIENT-LVL III: CPT | Mod: PBBFAC,,, | Performed by: INTERNAL MEDICINE

## 2018-10-11 PROCEDURE — 3008F BODY MASS INDEX DOCD: CPT | Mod: CPTII,S$GLB,, | Performed by: INTERNAL MEDICINE

## 2018-10-11 PROCEDURE — 99215 OFFICE O/P EST HI 40 MIN: CPT | Mod: S$GLB,,, | Performed by: INTERNAL MEDICINE

## 2018-10-11 RX ORDER — HEPARIN 100 UNIT/ML
500 SYRINGE INTRAVENOUS
Status: CANCELLED | OUTPATIENT
Start: 2018-10-12

## 2018-10-11 RX ORDER — SODIUM CHLORIDE 0.9 % (FLUSH) 0.9 %
10 SYRINGE (ML) INJECTION
Status: CANCELLED | OUTPATIENT
Start: 2018-10-12

## 2018-10-11 RX ADMIN — SODIUM CHLORIDE 240 MG: 9 INJECTION, SOLUTION INTRAVENOUS at 03:10

## 2018-10-11 RX ADMIN — DENOSUMAB 120 MG: 120 INJECTION SUBCUTANEOUS at 03:10

## 2018-10-11 NOTE — PROGRESS NOTES
Subjective:       Patient ID: Burton Whiting is a 48 y.o. male.    Chief Complaint: Squamous cell carcinoma of palatine tonsil  Squamous cell carcinoma of palatine tonsil; g-tube malodorous; left neck pain 2/10; 4 cans formula per day; and r arm port---red/warm to touch  Mr. Burton Whiting is a 48-year-old male, former smoker, who presents today with a four-month history of enlarging neck mass.  He initially delayed care due to lack of insurance.  He was seen by Dr. Turpin who referred him to see Dr. Olguin.  He had a CT that showed a 3.8 x 3.8 x 4.9 cm soft tissue mass arising from the left palatine tonsil resulting in moderate narrowing of the hypopharyngeal airway adjacent extensive matted left cervical lymphadenopathy was noted.  The largest ella mass was 6.6 x 9 x 2.6 cm extending inferiorly to the supraclavicular region.  The mass pushed the trachea and the left common carotid artery to the right.  Additional representative of cervical lymph nodes measuring 2.9 x 3.1 x 3.5 cm on axial image 37 of series 3   and 2.4 x 2.1 x 2.2 cm on axial image 55 of series 3.  There is also a sclerotic lesion involving the midline of the clivus measuring 1.2 cm.  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  PET scan performed on 01/19/2018 revealed persistent evidence of a soft tissue mass arising from the left palatine tonsil resulting in moderate narrowing of the hypopharyngeal   airway.  The mass is hypermetabolic demonstrating an SUV max of 18.5.  There is also persistent adjacent extensive matted left cervical lymphadenopathy, largest of this measuring 6.7 x 8.42 with hypermetabolic activity and SUV max of 20.5.  Lymphadenopathy is again noted to have a mass effect on the trachea and left common carotid artery, pushing both structures towards the right.  There is also prominent right cervical lymph nodes with the largest measuring 0.8 cm and demonstrating mild hypermetabolic activity with SUV max  "of 1.8, physiologic   distribution of FDG in the gray matter.  There are 3 pulmonary nodules noted within the right lung, the largest is in the anterior segment of the right upper lobe measuring 1 cm, second is visualized in the middle lobe measuring 0.6 cm and the third is within the posterior basal segment measuring 0.6 cm.  There is one pulmonary nodule within the left lung within the lateral basal segment measuring 0.6 cm.  These nodules do not demonstrate hypermetabolic activity; however, they are below the threshold for observation with PET     HPIHe underwent MRI cervical spine revealed "No evidence of metastatic disease to the cervical spine. Multilevel degenerative changes of the cervical spine with disc protrusion at C5-6 which results in moderate to severe spinal canal stenosis and and associated cord signal abnormality, suggestive of compressive myelopathy. 6 mm T1 hypointense non-enhancing lesion in the clivus.  Continued followup is suggested. 9 mm inferior descent of the cerebellar tonsils below the foramen magnum, suggestive of Chiari 1 malformation"  MRI thoracic spine "No evidence of metastatic disease involving the thoracic spine. Incidental hemangioma involving the T7 vertebral body. Mild degenerative disc disease without any significant spinal canal stenosis or neuroforaminal narrowing.   He completed 3 cycles of TPF and 6 weeks of Cisplatin and RT. Completed on 6/26/18           He was hospitalized in [facility] and was seeing Dr. Bhatt. He saw the speech therapy and plan to start eating  His PET scan from 9/25/18 revealed "Progression of disease with multiple new hypermetabolic foci including the manubrium, mediastinal/retrocrural lymph nodes, and lungs. Partial therapeutic response within the presumed radiation field involving the left cervical mass, and complete therapeutic response in the right cervical lymph node, clivus, and left palatine tonsil. New soft tissue thickening and " "hypermetabolism in the left aryepiglottic fold and right cricoid cartilage"     HPI He comes in for Opdivo. He has been doing well and denies any new issues.      Review of Systems   Constitutional: Negative for appetite change, fatigue and unexpected weight change.   HENT: Negative for mouth sores.    Eyes: Negative for visual disturbance.   Respiratory: Negative for cough and shortness of breath.    Cardiovascular: Negative for chest pain.   Gastrointestinal: Negative for abdominal pain and diarrhea.   Genitourinary: Negative for frequency.   Musculoskeletal: Negative for back pain.   Skin: Negative for rash.   Neurological: Negative for headaches.   Hematological: Negative for adenopathy.   Psychiatric/Behavioral: The patient is not nervous/anxious.    All other systems reviewed and are negative.      Objective:      Physical Exam   Constitutional: He is oriented to person, place, and time. He appears well-developed and well-nourished.   HENT:   Mouth/Throat: No oropharyngeal exudate.   Cardiovascular: Normal rate and normal heart sounds.   Pulmonary/Chest: Effort normal and breath sounds normal. He has no wheezes.   Abdominal: Soft. Bowel sounds are normal. There is no tenderness.   Musculoskeletal: He exhibits no edema or tenderness.   Lymphadenopathy:     He has no cervical adenopathy.   Neurological: He is alert and oriented to person, place, and time. Coordination normal.   Skin: Skin is warm and dry. No rash noted.   Psychiatric: He has a normal mood and affect. Judgment and thought content normal.   Vitals reviewed.      LABS:  WBC   Date Value Ref Range Status   10/11/2018 3.70 (L) 3.90 - 12.70 K/uL Final     Hemoglobin   Date Value Ref Range Status   10/11/2018 10.0 (L) 14.0 - 18.0 g/dL Final     Hematocrit   Date Value Ref Range Status   10/11/2018 29.0 (L) 40.0 - 54.0 % Final     Platelets   Date Value Ref Range Status   10/11/2018 206 150 - 350 K/uL Final     Gran # (ANC)   Date Value Ref Range Status "   10/11/2018 2.8 1.8 - 7.7 K/uL Final     Comment:     The ANC is based on a white cell differential from an   automated cell counter. It has not been microscopically   reviewed for the presence of abnormal cells. Clinical   correlation is required.         Chemistry        Component Value Date/Time     10/11/2018 1259    K 4.2 10/11/2018 1259     10/11/2018 1259    CO2 26 10/11/2018 1259    BUN 31 (H) 10/11/2018 1259    CREATININE 1.9 (H) 10/11/2018 1259    GLU 89 10/11/2018 1259        Component Value Date/Time    CALCIUM 9.8 10/11/2018 1259    ALKPHOS 77 10/11/2018 1259    AST 18 10/11/2018 1259    ALT 13 10/11/2018 1259    BILITOT 0.3 10/11/2018 1259    ESTGFRAFRICA 46.5 (A) 10/11/2018 1259    EGFRNONAA 40.2 (A) 10/11/2018 1259        TSH   Date Value Ref Range Status   10/11/2018 0.838 0.400 - 4.000 uIU/mL Final     Free T4   Date Value Ref Range Status   10/11/2018 1.17 0.71 - 1.51 ng/dL Final       Assessment:       1. Squamous cell carcinoma of palatine tonsil    2. Cancer of head, face, or neck lymph nodes, secondary    3. Secondary cancer of bone        Plan:        1,2,3. He is doing well clinically and will proceed with Opdivo and will return in 2 weeks for next cycle. He will also receive Xgeva today  Check Ferritin, iron/TIBC.    Above care plan was discussed with patient and accompanying brother and all questions were addressed to their satisfaction

## 2018-10-11 NOTE — PLAN OF CARE
Problem: Patient Care Overview  Goal: Plan of Care Review  Outcome: Ongoing (interventions implemented as appropriate)  Tolerated infusion well.  No reactions noted.  No questions or concerns at this time.  Ambulated off unit unassisted.

## 2018-10-11 NOTE — PLAN OF CARE
Problem: Chemotherapy Effects (Adult)  Goal: Signs and Symptoms of Listed Potential Problems Will be Absent, Minimized or Managed (Chemotherapy Effects)  Signs and symptoms of listed potential problems will be absent, minimized or managed by discharge/transition of care (reference Chemotherapy Effects (Adult) CPG).   Outcome: Ongoing (interventions implemented as appropriate)  Patient here for Opdivo/Xgeva.  Assessment completed and labs reviewed.  VSS.  No needs at this time.  Chair reclined and blanket offered. Will continue to monitor.

## 2018-10-25 ENCOUNTER — OFFICE VISIT (OUTPATIENT)
Dept: HEMATOLOGY/ONCOLOGY | Facility: CLINIC | Age: 48
End: 2018-10-25
Payer: COMMERCIAL

## 2018-10-25 ENCOUNTER — INFUSION (OUTPATIENT)
Dept: INFUSION THERAPY | Facility: HOSPITAL | Age: 48
End: 2018-10-25
Attending: INTERNAL MEDICINE
Payer: COMMERCIAL

## 2018-10-25 VITALS
SYSTOLIC BLOOD PRESSURE: 114 MMHG | DIASTOLIC BLOOD PRESSURE: 65 MMHG | RESPIRATION RATE: 20 BRPM | TEMPERATURE: 98 F | HEART RATE: 53 BPM

## 2018-10-25 VITALS
TEMPERATURE: 98 F | RESPIRATION RATE: 18 BRPM | BODY MASS INDEX: 27.48 KG/M2 | DIASTOLIC BLOOD PRESSURE: 69 MMHG | SYSTOLIC BLOOD PRESSURE: 122 MMHG | HEART RATE: 58 BPM | OXYGEN SATURATION: 98 % | WEIGHT: 175.06 LBS | HEIGHT: 67 IN

## 2018-10-25 DIAGNOSIS — C79.51 SECONDARY CANCER OF BONE: ICD-10-CM

## 2018-10-25 DIAGNOSIS — E83.51 HYPOCALCEMIA: ICD-10-CM

## 2018-10-25 DIAGNOSIS — C77.0 CANCER OF HEAD, FACE, OR NECK LYMPH NODES, SECONDARY: ICD-10-CM

## 2018-10-25 DIAGNOSIS — D53.9 MACROCYTIC ANEMIA: ICD-10-CM

## 2018-10-25 DIAGNOSIS — C09.9 TONSIL CANCER: Primary | ICD-10-CM

## 2018-10-25 DIAGNOSIS — C09.9 SQUAMOUS CELL CARCINOMA OF PALATINE TONSIL: ICD-10-CM

## 2018-10-25 DIAGNOSIS — C77.1 SECONDARY MALIGNANCY OF MEDIASTINAL LYMPH NODES: ICD-10-CM

## 2018-10-25 PROCEDURE — 96413 CHEMO IV INFUSION 1 HR: CPT

## 2018-10-25 PROCEDURE — 25000003 PHARM REV CODE 250: Performed by: INTERNAL MEDICINE

## 2018-10-25 PROCEDURE — 3008F BODY MASS INDEX DOCD: CPT | Mod: CPTII,S$GLB,, | Performed by: INTERNAL MEDICINE

## 2018-10-25 PROCEDURE — 99214 OFFICE O/P EST MOD 30 MIN: CPT | Mod: S$GLB,,, | Performed by: INTERNAL MEDICINE

## 2018-10-25 PROCEDURE — 63600175 PHARM REV CODE 636 W HCPCS: Mod: JG | Performed by: INTERNAL MEDICINE

## 2018-10-25 PROCEDURE — 99999 PR PBB SHADOW E&M-EST. PATIENT-LVL III: CPT | Mod: PBBFAC,,, | Performed by: INTERNAL MEDICINE

## 2018-10-25 RX ORDER — TRAZODONE HYDROCHLORIDE 50 MG/1
TABLET ORAL
Refills: 0 | COMMUNITY
Start: 2018-09-15 | End: 2019-03-28

## 2018-10-25 RX ORDER — SODIUM CHLORIDE 0.9 % (FLUSH) 0.9 %
10 SYRINGE (ML) INJECTION
Status: CANCELLED | OUTPATIENT
Start: 2018-10-26

## 2018-10-25 RX ORDER — HEPARIN 100 UNIT/ML
500 SYRINGE INTRAVENOUS
Status: CANCELLED | OUTPATIENT
Start: 2018-10-26

## 2018-10-25 RX ORDER — SODIUM CHLORIDE 0.9 % (FLUSH) 0.9 %
10 SYRINGE (ML) INJECTION
Status: DISCONTINUED | OUTPATIENT
Start: 2018-10-25 | End: 2018-10-25 | Stop reason: HOSPADM

## 2018-10-25 RX ORDER — ARIPIPRAZOLE 2 MG/1
TABLET ORAL
Refills: 0 | COMMUNITY
Start: 2018-09-15 | End: 2019-03-28

## 2018-10-25 RX ORDER — HEPARIN 100 UNIT/ML
500 SYRINGE INTRAVENOUS
Status: DISCONTINUED | OUTPATIENT
Start: 2018-10-25 | End: 2018-10-25 | Stop reason: HOSPADM

## 2018-10-25 RX ORDER — GABAPENTIN 300 MG/1
300 CAPSULE ORAL NIGHTLY
Qty: 30 CAPSULE | Refills: 2 | Status: SHIPPED | OUTPATIENT
Start: 2018-10-25 | End: 2019-01-03 | Stop reason: SDUPTHER

## 2018-10-25 RX ADMIN — SODIUM CHLORIDE: 0.9 INJECTION, SOLUTION INTRAVENOUS at 04:10

## 2018-10-25 RX ADMIN — SODIUM CHLORIDE 240 MG: 9 INJECTION, SOLUTION INTRAVENOUS at 05:10

## 2018-10-25 NOTE — PLAN OF CARE
Problem: Patient Care Overview  Goal: Plan of Care Review  Outcome: Ongoing (interventions implemented as appropriate)  Tolerated treatment well.  Advised to call MD for any problems or concerns. Instructed to take Calcium and Vitamin D for Xgeva. Pt voiced understanding.  AVS given.  RTC as scheduled.  NAD noted upon discharge.

## 2018-10-25 NOTE — PLAN OF CARE
Problem: Chemotherapy Effects (Adult)  Goal: Signs and Symptoms of Listed Potential Problems Will be Absent, Minimized or Managed (Chemotherapy Effects)  Signs and symptoms of listed potential problems will be absent, minimized or managed by discharge/transition of care (reference Chemotherapy Effects (Adult) CPG).   Outcome: Ongoing (interventions implemented as appropriate)  Here for Opdivo.  Xgeva held.

## 2018-10-25 NOTE — PROGRESS NOTES
Chief Complaint: Squamous cell carcinoma of palatine tonsil  Squamous cell carcinoma of palatine tonsil; g-tube malodorous; left neck pain 2/10; 4 cans formula per day; and r arm port---red/warm to touch  Mr. Burton Whiting is a 48-year-old male, former smoker, who presents today with a four-month history of enlarging neck mass.  He initially delayed care due to lack of insurance.  He was seen by Dr. Turpin who referred him to see Dr. Olguin.  He had a CT that showed a 3.8 x 3.8 x 4.9 cm soft tissue mass arising from the left palatine tonsil resulting in moderate narrowing of the hypopharyngeal airway adjacent extensive matted left cervical lymphadenopathy was noted.  The largest ella mass was 6.6 x 9 x 2.6 cm extending inferiorly to the supraclavicular region.  The mass pushed the trachea and the left common carotid artery to the right.  Additional representative of cervical lymph nodes measuring 2.9 x 3.1 x 3.5 cm on axial image 37 of series 3   and 2.4 x 2.1 x 2.2 cm on axial image 55 of series 3.  There is also a sclerotic lesion involving the midline of the clivus measuring 1.2 cm.  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  PET scan performed on 01/19/2018 revealed persistent evidence of a soft tissue mass arising from the left palatine tonsil resulting in moderate narrowing of the hypopharyngeal   airway.  The mass is hypermetabolic demonstrating an SUV max of 18.5.  There is also persistent adjacent extensive matted left cervical lymphadenopathy, largest of this measuring 6.7 x 8.42 with hypermetabolic activity and SUV max of 20.5.  Lymphadenopathy is again noted to have a mass effect on the trachea and left common carotid artery, pushing both structures towards the right.  There is also prominent right cervical lymph nodes with the largest measuring 0.8 cm and demonstrating mild hypermetabolic activity with SUV max of 1.8, physiologic   distribution of FDG in the gray matter.  " There are 3 pulmonary nodules noted within the right lung, the largest is in the anterior segment of the right upper lobe measuring 1 cm, second is visualized in the middle lobe measuring 0.6 cm and the third is within the posterior basal segment measuring 0.6 cm.  There is one pulmonary nodule within the left lung within the lateral basal segment measuring 0.6 cm.  These nodules do not demonstrate hypermetabolic activity; however, they are below the threshold for observation with PET     HPIHe underwent MRI cervical spine revealed "No evidence of metastatic disease to the cervical spine. Multilevel degenerative changes of the cervical spine with disc protrusion at C5-6 which results in moderate to severe spinal canal stenosis and and associated cord signal abnormality, suggestive of compressive myelopathy. 6 mm T1 hypointense non-enhancing lesion in the clivus.  Continued followup is suggested. 9 mm inferior descent of the cerebellar tonsils below the foramen magnum, suggestive of Chiari 1 malformation"  MRI thoracic spine "No evidence of metastatic disease involving the thoracic spine. Incidental hemangioma involving the T7 vertebral body. Mild degenerative disc disease without any significant spinal canal stenosis or neuroforaminal narrowing.   He completed 3 cycles of TPF and 6 weeks of Cisplatin and RT. Completed on 6/26/18           He was hospitalized in [facility] and was seeing Dr. Bhatt. He saw the speech therapy and plan to start eating  His PET scan from 9/25/18 revealed "Progression of disease with multiple new hypermetabolic foci including the manubrium, mediastinal/retrocrural lymph nodes, and lungs. Partial therapeutic response within the presumed radiation field involving the left cervical mass, and complete therapeutic response in the right cervical lymph node, clivus, and left palatine tonsil. New soft tissue thickening and hypermetabolism in the left aryepiglottic fold and right cricoid " "cartilage"     Interval History:  He comes in for Opdivo. He has been doing well and denies any new issues.    Review of Systems   Constitutional: Positive for malaise/fatigue. Negative for chills, fever and weight loss.   HENT: Negative for ear discharge, ear pain, hearing loss and tinnitus.    Eyes: Negative for blurred vision, double vision and photophobia.   Respiratory: Negative for cough, hemoptysis and sputum production.    Cardiovascular: Negative for chest pain and orthopnea.   Gastrointestinal: Negative for abdominal pain, heartburn, nausea and vomiting.   Genitourinary: Negative for dysuria, frequency and urgency.   Musculoskeletal: Negative for myalgias and neck pain.   Neurological: Positive for tingling. Negative for dizziness, weakness and headaches.   Psychiatric/Behavioral: Negative for depression.       Current Outpatient Medications   Medication Sig    ARIPiprazole (ABILIFY) 2 MG Tab TK 1 T PO  ONCE D    aspirin 81 MG Chew Take 81 mg by mouth once daily.    ferrous gluconate (FERGON) 324 MG tablet Take 324 mg by mouth daily with breakfast.    FLUoxetine (PROZAC) 20 MG capsule Take 20 mg by mouth once daily.    gabapentin (NEURONTIN) 250 mg/5 mL solution Take by mouth 3 (three) times daily.    mirtazapine (REMERON) 30 MG tablet Take 30 mg by mouth every evening.    traZODone (DESYREL) 50 MG tablet TK 1 T PO  QHS     No current facility-administered medications for this visit.        Vitals:    10/25/18 1528   BP: 122/69   Pulse: (!) 58   Resp: 18   Temp: 98.2 °F (36.8 °C)      Physical Exam   Constitutional: He appears well-developed. No distress.   HENT:   Head: Normocephalic and atraumatic.   Mouth/Throat: No oropharyngeal exudate.   Eyes: Pupils are equal, round, and reactive to light. No scleral icterus.   Neck: Normal range of motion.   Cardiovascular: Normal rate, regular rhythm and normal heart sounds.   No murmur heard.  Pulmonary/Chest: Breath sounds normal. No respiratory distress. "   Abdominal: Soft. Bowel sounds are normal. He exhibits no distension. There is no tenderness. There is no rebound.   Musculoskeletal: He exhibits no edema.   Lymphadenopathy:     He has no cervical adenopathy.   Neurological: He is alert.   Skin: Skin is warm.   Psychiatric: He has a normal mood and affect.         Component      Latest Ref Rng & Units 10/25/2018   Sodium      136 - 145 mmol/L 137   Potassium      3.5 - 5.1 mmol/L 4.0   Chloride      95 - 110 mmol/L 106   CO2      23 - 29 mmol/L 25   Glucose      70 - 110 mg/dL 95   BUN, Bld      6 - 20 mg/dL 23 (H)   Creatinine      0.5 - 1.4 mg/dL 1.4   Calcium      8.7 - 10.5 mg/dL 8.0 (L)   Total Protein      6.0 - 8.4 g/dL 6.7   Albumin      3.5 - 5.2 g/dL 3.8   Total Bilirubin      0.1 - 1.0 mg/dL 0.3   Alkaline Phosphatase      55 - 135 U/L 75   AST      10 - 40 U/L 14   ALT      10 - 44 U/L 9 (L)   Anion Gap      8 - 16 mmol/L 6 (L)   eGFR if African American      >60 mL/min/1.73 m:2 >60.0   eGFR if non African American      >60 mL/min/1.73 m:2 58.2 (A)   WBC      3.90 - 12.70 K/uL 3.69 (L)   RBC      4.60 - 6.20 M/uL 2.65 (L)   Hemoglobin      14.0 - 18.0 g/dL 9.2 (L)   Hematocrit      40.0 - 54.0 % 27.5 (L)   MCV      82 - 98 fL 104 (H)   MCH      27.0 - 31.0 pg 34.7 (H)   MCHC      32.0 - 36.0 g/dL 33.5   RDW      11.5 - 14.5 % 12.4   Platelets      150 - 350 K/uL 181   MPV      9.2 - 12.9 fL 9.3   Gran # (ANC)      1.8 - 7.7 K/uL 2.7   Immature Grans (Abs)      0.00 - 0.04 K/uL 0.01     Assessment:    1. Squamous cell carcinoma of palatine tonsil  2. Cancer of head, face, or neck lymph   3. Secondary cancer of bone   4. Chemotherapy induced anemia  5. Hypocalcemia   6. Peripheral neuropathy secondary to chemotherapy      Plan:   1,2,3. He is doing well clinically and will proceed with Opdivo and will return in 2 weeks for next cycle. He will not  receive Xgeva today in view of mild hypocalcemia  4. hgb 9.4 today. He has macrocytosis  5. Mild,  asymptomatic. Hold Xgeva today. Recommend calcium with vitamin D BID  6. He is on Gabapentin.               Distress Screening Results: Psychosocial Distress screening score of Distress Score: 0 noted and reviewed. No intervention indicated.

## 2018-11-06 ENCOUNTER — PATIENT MESSAGE (OUTPATIENT)
Dept: HEMATOLOGY/ONCOLOGY | Facility: CLINIC | Age: 48
End: 2018-11-06

## 2018-11-08 ENCOUNTER — LAB VISIT (OUTPATIENT)
Dept: LAB | Facility: HOSPITAL | Age: 48
End: 2018-11-08
Attending: INTERNAL MEDICINE
Payer: COMMERCIAL

## 2018-11-08 ENCOUNTER — OFFICE VISIT (OUTPATIENT)
Dept: HEMATOLOGY/ONCOLOGY | Facility: CLINIC | Age: 48
End: 2018-11-08
Payer: COMMERCIAL

## 2018-11-08 ENCOUNTER — INFUSION (OUTPATIENT)
Dept: INFUSION THERAPY | Facility: HOSPITAL | Age: 48
End: 2018-11-08
Attending: INTERNAL MEDICINE
Payer: COMMERCIAL

## 2018-11-08 VITALS
SYSTOLIC BLOOD PRESSURE: 120 MMHG | TEMPERATURE: 98 F | OXYGEN SATURATION: 99 % | WEIGHT: 170.63 LBS | RESPIRATION RATE: 16 BRPM | HEIGHT: 67 IN | BODY MASS INDEX: 26.78 KG/M2 | DIASTOLIC BLOOD PRESSURE: 69 MMHG | HEART RATE: 80 BPM

## 2018-11-08 VITALS
TEMPERATURE: 98 F | BODY MASS INDEX: 26.78 KG/M2 | WEIGHT: 170.63 LBS | SYSTOLIC BLOOD PRESSURE: 120 MMHG | RESPIRATION RATE: 18 BRPM | DIASTOLIC BLOOD PRESSURE: 57 MMHG | HEIGHT: 67 IN | HEART RATE: 55 BPM

## 2018-11-08 DIAGNOSIS — C09.9 TONSIL CANCER: Primary | ICD-10-CM

## 2018-11-08 DIAGNOSIS — C09.9 TONSIL CANCER: ICD-10-CM

## 2018-11-08 DIAGNOSIS — C09.9 SQUAMOUS CELL CARCINOMA OF PALATINE TONSIL: Primary | ICD-10-CM

## 2018-11-08 DIAGNOSIS — D53.9 MACROCYTIC ANEMIA: ICD-10-CM

## 2018-11-08 DIAGNOSIS — C77.0 CANCER OF HEAD, FACE, OR NECK LYMPH NODES, SECONDARY: ICD-10-CM

## 2018-11-08 DIAGNOSIS — C09.9 SQUAMOUS CELL CARCINOMA OF PALATINE TONSIL: ICD-10-CM

## 2018-11-08 DIAGNOSIS — C79.51 SECONDARY CANCER OF BONE: ICD-10-CM

## 2018-11-08 LAB
ALBUMIN SERPL BCP-MCNC: 3.8 G/DL
ALP SERPL-CCNC: 78 U/L
ALT SERPL W/O P-5'-P-CCNC: 8 U/L
ANION GAP SERPL CALC-SCNC: 9 MMOL/L
AST SERPL-CCNC: 14 U/L
BILIRUB SERPL-MCNC: 0.4 MG/DL
BUN SERPL-MCNC: 18 MG/DL
CALCIUM SERPL-MCNC: 9 MG/DL
CHLORIDE SERPL-SCNC: 109 MMOL/L
CO2 SERPL-SCNC: 24 MMOL/L
CREAT SERPL-MCNC: 1.4 MG/DL
ERYTHROCYTE [DISTWIDTH] IN BLOOD BY AUTOMATED COUNT: 12.2 %
EST. GFR  (AFRICAN AMERICAN): >60 ML/MIN/1.73 M^2
EST. GFR  (NON AFRICAN AMERICAN): 58.2 ML/MIN/1.73 M^2
FOLATE SERPL-MCNC: 7.4 NG/ML
GLUCOSE SERPL-MCNC: 85 MG/DL
HCT VFR BLD AUTO: 28.6 %
HGB BLD-MCNC: 9.6 G/DL
IMM GRANULOCYTES # BLD AUTO: 0.01 K/UL
MCH RBC QN AUTO: 34.3 PG
MCHC RBC AUTO-ENTMCNC: 33.6 G/DL
MCV RBC AUTO: 102 FL
NEUTROPHILS # BLD AUTO: 3.6 K/UL
PLATELET # BLD AUTO: 180 K/UL
PMV BLD AUTO: 9.5 FL
POTASSIUM SERPL-SCNC: 3.9 MMOL/L
PROT SERPL-MCNC: 6.6 G/DL
RBC # BLD AUTO: 2.8 M/UL
SODIUM SERPL-SCNC: 142 MMOL/L
VIT B12 SERPL-MCNC: 282 PG/ML
WBC # BLD AUTO: 4.54 K/UL

## 2018-11-08 PROCEDURE — 99215 OFFICE O/P EST HI 40 MIN: CPT | Mod: S$GLB,,, | Performed by: INTERNAL MEDICINE

## 2018-11-08 PROCEDURE — 36415 COLL VENOUS BLD VENIPUNCTURE: CPT

## 2018-11-08 PROCEDURE — 96413 CHEMO IV INFUSION 1 HR: CPT

## 2018-11-08 PROCEDURE — 82746 ASSAY OF FOLIC ACID SERUM: CPT

## 2018-11-08 PROCEDURE — 63600175 PHARM REV CODE 636 W HCPCS: Mod: JG | Performed by: INTERNAL MEDICINE

## 2018-11-08 PROCEDURE — 3008F BODY MASS INDEX DOCD: CPT | Mod: CPTII,S$GLB,, | Performed by: INTERNAL MEDICINE

## 2018-11-08 PROCEDURE — 25000003 PHARM REV CODE 250: Performed by: INTERNAL MEDICINE

## 2018-11-08 PROCEDURE — 80053 COMPREHEN METABOLIC PANEL: CPT

## 2018-11-08 PROCEDURE — 99999 PR PBB SHADOW E&M-EST. PATIENT-LVL IV: CPT | Mod: PBBFAC,,, | Performed by: INTERNAL MEDICINE

## 2018-11-08 PROCEDURE — 82607 VITAMIN B-12: CPT

## 2018-11-08 PROCEDURE — 85027 COMPLETE CBC AUTOMATED: CPT

## 2018-11-08 RX ORDER — HEPARIN 100 UNIT/ML
500 SYRINGE INTRAVENOUS
Status: CANCELLED | OUTPATIENT
Start: 2018-11-09

## 2018-11-08 RX ORDER — SODIUM CHLORIDE 0.9 % (FLUSH) 0.9 %
10 SYRINGE (ML) INJECTION
Status: DISCONTINUED | OUTPATIENT
Start: 2018-11-08 | End: 2018-11-08 | Stop reason: HOSPADM

## 2018-11-08 RX ORDER — HEPARIN 100 UNIT/ML
500 SYRINGE INTRAVENOUS
Status: DISCONTINUED | OUTPATIENT
Start: 2018-11-08 | End: 2018-11-08 | Stop reason: HOSPADM

## 2018-11-08 RX ORDER — SODIUM CHLORIDE 0.9 % (FLUSH) 0.9 %
10 SYRINGE (ML) INJECTION
Status: CANCELLED | OUTPATIENT
Start: 2018-11-09

## 2018-11-08 RX ADMIN — DENOSUMAB 120 MG: 120 INJECTION SUBCUTANEOUS at 02:11

## 2018-11-08 RX ADMIN — SODIUM CHLORIDE: 0.9 INJECTION, SOLUTION INTRAVENOUS at 02:11

## 2018-11-08 RX ADMIN — SODIUM CHLORIDE 240 MG: 9 INJECTION, SOLUTION INTRAVENOUS at 02:11

## 2018-11-08 NOTE — PROGRESS NOTES
Subjective:       Patient ID: Burton Whiting is a 48 y.o. male.    Chief Complaint: Squamous cell carcinoma of palatine tonsil  Squamous cell carcinoma of palatine tonsil; g-tube malodorous; left neck pain 2/10; 4 cans formula per day; and r arm port---red/warm to touch  Mr. Burton Whiting is a 48-year-old male, former smoker, who presents today with a four-month history of enlarging neck mass.  He initially delayed care due to lack of insurance.  He was seen by Dr. Turpin who referred him to see Dr. Olguin.  He had a CT that showed a 3.8 x 3.8 x 4.9 cm soft tissue mass arising from the left palatine tonsil resulting in moderate narrowing of the hypopharyngeal airway adjacent extensive matted left cervical lymphadenopathy was noted.  The largest ella mass was 6.6 x 9 x 2.6 cm extending inferiorly to the supraclavicular region.  The mass pushed the trachea and the left common carotid artery to the right.  Additional representative of cervical lymph nodes measuring 2.9 x 3.1 x 3.5 cm on axial image 37 of series 3   and 2.4 x 2.1 x 2.2 cm on axial image 55 of series 3.  There is also a sclerotic lesion involving the midline of the clivus measuring 1.2 cm.  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  PET scan performed on 01/19/2018 revealed persistent evidence of a soft tissue mass arising from the left palatine tonsil resulting in moderate narrowing of the hypopharyngeal   airway.  The mass is hypermetabolic demonstrating an SUV max of 18.5.  There is also persistent adjacent extensive matted left cervical lymphadenopathy, largest of this measuring 6.7 x 8.42 with hypermetabolic activity and SUV max of 20.5.  Lymphadenopathy is again noted to have a mass effect on the trachea and left common carotid artery, pushing both structures towards the right.  There is also prominent right cervical lymph nodes with the largest measuring 0.8 cm and demonstrating mild hypermetabolic activity with SUV max  "of 1.8, physiologic   distribution of FDG in the gray matter.  There are 3 pulmonary nodules noted within the right lung, the largest is in the anterior segment of the right upper lobe measuring 1 cm, second is visualized in the middle lobe measuring 0.6 cm and the third is within the posterior basal segment measuring 0.6 cm.  There is one pulmonary nodule within the left lung within the lateral basal segment measuring 0.6 cm.  These nodules do not demonstrate hypermetabolic activity; however, they are below the threshold for observation with PET     HPIHe underwent MRI cervical spine revealed "No evidence of metastatic disease to the cervical spine. Multilevel degenerative changes of the cervical spine with disc protrusion at C5-6 which results in moderate to severe spinal canal stenosis and and associated cord signal abnormality, suggestive of compressive myelopathy. 6 mm T1 hypointense non-enhancing lesion in the clivus.  Continued followup is suggested. 9 mm inferior descent of the cerebellar tonsils below the foramen magnum, suggestive of Chiari 1 malformation"  MRI thoracic spine "No evidence of metastatic disease involving the thoracic spine. Incidental hemangioma involving the T7 vertebral body. Mild degenerative disc disease without any significant spinal canal stenosis or neuroforaminal narrowing.   He completed 3 cycles of TPF and 6 weeks of Cisplatin and RT. Completed on 6/26/18           [facility] seeing Dr. Bhatt. He saw the speech therapy and plan to start eating  His PET scan from 9/25/18 revealed "Progression of disease with multiple new hypermetabolic foci including the manubrium, mediastinal/retrocrural lymph nodes, and lungs. Partial therapeutic response within the presumed radiation field involving the left cervical mass, and complete therapeutic response in the right cervical lymph node, clivus, and left palatine tonsil. New soft tissue thickening and " "hypermetabolism in the left aryepiglottic fold and right cricoid cartilage"      HPI He comes in for Opdivo      Review of Systems   Constitutional: Negative for appetite change, fatigue and unexpected weight change.   HENT: Negative for mouth sores.    Eyes: Negative for visual disturbance.   Respiratory: Negative for cough and shortness of breath.    Cardiovascular: Negative for chest pain.   Gastrointestinal: Negative for abdominal pain and diarrhea.   Genitourinary: Negative for frequency.   Musculoskeletal: Negative for back pain.   Skin: Negative for rash.   Neurological: Negative for headaches.   Hematological: Negative for adenopathy.   Psychiatric/Behavioral: The patient is not nervous/anxious.    All other systems reviewed and are negative.      PMFSH: all information reviewed and updated as relevant to today's visit  Objective:      Physical Exam   Constitutional: He is oriented to person, place, and time. He appears well-developed and well-nourished.   HENT:   Mouth/Throat: No oropharyngeal exudate.   Cardiovascular: Normal rate and normal heart sounds.   Pulmonary/Chest: Effort normal and breath sounds normal. He has no wheezes.   Abdominal: Soft. Bowel sounds are normal. There is no tenderness.   Musculoskeletal: He exhibits no edema or tenderness.   Lymphadenopathy:     He has no cervical adenopathy.   Neurological: He is alert and oriented to person, place, and time. Coordination normal.   Skin: Skin is warm and dry. No rash noted.   Psychiatric: He has a normal mood and affect. Judgment and thought content normal.   Vitals reviewed.        LABS:  WBC   Date Value Ref Range Status   11/08/2018 4.54 3.90 - 12.70 K/uL Final     Hemoglobin   Date Value Ref Range Status   11/08/2018 9.6 (L) 14.0 - 18.0 g/dL Final     Hematocrit   Date Value Ref Range Status   11/08/2018 28.6 (L) 40.0 - 54.0 % Final     Platelets   Date Value Ref Range Status   11/08/2018 180 150 - 350 K/uL Final     Gran # (ANC)   Date " Value Ref Range Status   11/08/2018 3.6 1.8 - 7.7 K/uL Final     Comment:     The ANC is based on a white cell differential from an   automated cell counter. It has not been microscopically   reviewed for the presence of abnormal cells. Clinical   correlation is required.         Chemistry        Component Value Date/Time     11/08/2018 1217    K 3.9 11/08/2018 1217     11/08/2018 1217    CO2 24 11/08/2018 1217    BUN 18 11/08/2018 1217    CREATININE 1.4 11/08/2018 1217    GLU 85 11/08/2018 1217        Component Value Date/Time    CALCIUM 9.0 11/08/2018 1217    ALKPHOS 78 11/08/2018 1217    AST 14 11/08/2018 1217    ALT 8 (L) 11/08/2018 1217    BILITOT 0.4 11/08/2018 1217    ESTGFRAFRICA >60.0 11/08/2018 1217    EGFRNONAA 58.2 (A) 11/08/2018 1217          Assessment:       1. Squamous cell carcinoma of palatine tonsil    2. Cancer of head, face, or neck lymph nodes, secondary    3. Secondary cancer of bone        Plan:        1,2,3 He is doing well clinically and will proceed with opdivo and will return in 2 weeks for next cycle    Above care plan was discussed with patient and accompanying brother and all questions were addressed to their satisfaction

## 2018-11-08 NOTE — PLAN OF CARE
Problem: Patient Care Overview  Goal: Plan of Care Review  Outcome: Ongoing (interventions implemented as appropriate)  Pt tolerated Opdivo infusion without complications. VS stable. Left forearm PIV positive for blood return, SL and removed prior to discharge. RTC 11/21/18.

## 2018-11-19 ENCOUNTER — PATIENT MESSAGE (OUTPATIENT)
Dept: HEMATOLOGY/ONCOLOGY | Facility: CLINIC | Age: 48
End: 2018-11-19

## 2018-11-21 ENCOUNTER — INFUSION (OUTPATIENT)
Dept: INFUSION THERAPY | Facility: HOSPITAL | Age: 48
End: 2018-11-21
Attending: INTERNAL MEDICINE
Payer: COMMERCIAL

## 2018-11-21 ENCOUNTER — OFFICE VISIT (OUTPATIENT)
Dept: SURGERY | Facility: CLINIC | Age: 48
End: 2018-11-21
Payer: COMMERCIAL

## 2018-11-21 ENCOUNTER — OFFICE VISIT (OUTPATIENT)
Dept: HEMATOLOGY/ONCOLOGY | Facility: CLINIC | Age: 48
End: 2018-11-21
Payer: COMMERCIAL

## 2018-11-21 VITALS
BODY MASS INDEX: 26.61 KG/M2 | WEIGHT: 169.56 LBS | HEIGHT: 67 IN | HEART RATE: 72 BPM | RESPIRATION RATE: 17 BRPM | DIASTOLIC BLOOD PRESSURE: 75 MMHG | TEMPERATURE: 99 F | OXYGEN SATURATION: 97 % | SYSTOLIC BLOOD PRESSURE: 104 MMHG

## 2018-11-21 VITALS — WEIGHT: 169 LBS | HEIGHT: 67 IN | BODY MASS INDEX: 26.53 KG/M2

## 2018-11-21 VITALS — HEART RATE: 89 BPM | DIASTOLIC BLOOD PRESSURE: 59 MMHG | SYSTOLIC BLOOD PRESSURE: 114 MMHG | RESPIRATION RATE: 18 BRPM

## 2018-11-21 DIAGNOSIS — C79.51 SECONDARY CANCER OF BONE: ICD-10-CM

## 2018-11-21 DIAGNOSIS — C09.9 SQUAMOUS CELL CARCINOMA OF PALATINE TONSIL: ICD-10-CM

## 2018-11-21 DIAGNOSIS — Z93.1 STATUS POST INSERTION OF PERCUTANEOUS ENDOSCOPIC GASTROSTOMY (PEG) TUBE: Primary | ICD-10-CM

## 2018-11-21 DIAGNOSIS — C77.0 CANCER OF HEAD, FACE, OR NECK LYMPH NODES, SECONDARY: ICD-10-CM

## 2018-11-21 DIAGNOSIS — C09.9 TONSIL CANCER: Primary | ICD-10-CM

## 2018-11-21 DIAGNOSIS — K94.23 LEAKING PEG TUBE: ICD-10-CM

## 2018-11-21 DIAGNOSIS — C09.9 SQUAMOUS CELL CARCINOMA OF PALATINE TONSIL: Primary | ICD-10-CM

## 2018-11-21 PROCEDURE — 3008F BODY MASS INDEX DOCD: CPT | Mod: CPTII,S$GLB,, | Performed by: INTERNAL MEDICINE

## 2018-11-21 PROCEDURE — 25000003 PHARM REV CODE 250: Performed by: INTERNAL MEDICINE

## 2018-11-21 PROCEDURE — 99212 OFFICE O/P EST SF 10 MIN: CPT | Mod: S$GLB,,, | Performed by: SURGERY

## 2018-11-21 PROCEDURE — 99999 PR PBB SHADOW E&M-EST. PATIENT-LVL III: CPT | Mod: PBBFAC,,, | Performed by: INTERNAL MEDICINE

## 2018-11-21 PROCEDURE — 3008F BODY MASS INDEX DOCD: CPT | Mod: CPTII,S$GLB,, | Performed by: SURGERY

## 2018-11-21 PROCEDURE — 99999 PR PBB SHADOW E&M-EST. PATIENT-LVL III: CPT | Mod: PBBFAC,,, | Performed by: SURGERY

## 2018-11-21 PROCEDURE — 99215 OFFICE O/P EST HI 40 MIN: CPT | Mod: S$GLB,,, | Performed by: INTERNAL MEDICINE

## 2018-11-21 PROCEDURE — 63600175 PHARM REV CODE 636 W HCPCS: Mod: JG | Performed by: INTERNAL MEDICINE

## 2018-11-21 PROCEDURE — 96413 CHEMO IV INFUSION 1 HR: CPT

## 2018-11-21 RX ORDER — SODIUM CHLORIDE 0.9 % (FLUSH) 0.9 %
10 SYRINGE (ML) INJECTION
Status: DISCONTINUED | OUTPATIENT
Start: 2018-11-21 | End: 2018-11-21 | Stop reason: HOSPADM

## 2018-11-21 RX ORDER — SODIUM CHLORIDE 0.9 % (FLUSH) 0.9 %
10 SYRINGE (ML) INJECTION
Status: CANCELLED | OUTPATIENT
Start: 2018-11-23

## 2018-11-21 RX ORDER — HEPARIN 100 UNIT/ML
500 SYRINGE INTRAVENOUS
Status: CANCELLED | OUTPATIENT
Start: 2018-11-23

## 2018-11-21 RX ORDER — HEPARIN 100 UNIT/ML
500 SYRINGE INTRAVENOUS
Status: DISCONTINUED | OUTPATIENT
Start: 2018-11-21 | End: 2018-11-21 | Stop reason: HOSPADM

## 2018-11-21 RX ORDER — SULFAMETHOXAZOLE AND TRIMETHOPRIM 400; 80 MG/1; MG/1
1 TABLET ORAL 2 TIMES DAILY
Qty: 20 TABLET | Refills: 0 | Status: SHIPPED | OUTPATIENT
Start: 2018-11-21 | End: 2018-11-28

## 2018-11-21 RX ADMIN — SODIUM CHLORIDE 240 MG: 9 INJECTION, SOLUTION INTRAVENOUS at 01:11

## 2018-11-21 NOTE — LETTER
November 21, 2018      Cortney Lopez MD  [address]  [place] [address]           [facility]  [address]  [place] [address]  Phone: [phone]          Patient: Burton Whiting   MR Number: [id number]   YOB: 1970   Date of Visit: 11/21/2018       Dear Dr. Cortney Lopez:    Thank you for referring Burton Whiting to me for evaluation. Attached you will find relevant portions of my assessment and plan of care.    If you have questions, please do not hesitate to call me. I look forward to following Burton Whiting along with you.    Sincerely,    Wild Cruz MD    Enclosure  CC:  No Recipients    If you would like to receive this communication electronically, please contact [email or web address] or [phone] to request more information on [employer] access.    For providers and/or their staff who would like to refer a patient to [facility], please contact us through our one-stop-shop provider referral line, [facility], at [phone].    If you feel you have received this communication in error or would no longer like to receive these types of communications, please e-mail [email or web address]

## 2018-11-21 NOTE — PLAN OF CARE
Problem: Patient Care Overview  Goal: Plan of Care Review  Outcome: Ongoing (interventions implemented as appropriate)  Pt tolerated opdivo well.  No s/s of reaction. Vitals stable, NAD.

## 2018-11-21 NOTE — PROGRESS NOTES
Subjective:       Patient ID: Burton Whiting is a 48 y.o. male.    Chief Complaint: Squamous cell carcinoma of palatine tonsil  Squamous cell carcinoma of palatine tonsil; g-tube malodorous; left neck pain 2/10; 4 cans formula per day; and r arm port---red/warm to touch  Mr. Burton Whiting is a 48-year-old male, former smoker, who presents today with a four-month history of enlarging neck mass.  He initially delayed care due to lack of insurance.  He was seen by Dr. Turpin who referred him to see Dr. Olguin.  He had a CT that showed a 3.8 x 3.8 x 4.9 cm soft tissue mass arising from the left palatine tonsil resulting in moderate narrowing of the hypopharyngeal airway adjacent extensive matted left cervical lymphadenopathy was noted.  The largest ella mass was 6.6 x 9 x 2.6 cm extending inferiorly to the supraclavicular region.  The mass pushed the trachea and the left common carotid artery to the right.  Additional representative of cervical lymph nodes measuring 2.9 x 3.1 x 3.5 cm on axial image 37 of series 3   and 2.4 x 2.1 x 2.2 cm on axial image 55 of series 3.  There is also a sclerotic lesion involving the midline of the clivus measuring 1.2 cm.  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  PET scan performed on 01/19/2018 revealed persistent evidence of a soft tissue mass arising from the left palatine tonsil resulting in moderate narrowing of the hypopharyngeal   airway.  The mass is hypermetabolic demonstrating an SUV max of 18.5.  There is also persistent adjacent extensive matted left cervical lymphadenopathy, largest of this measuring 6.7 x 8.42 with hypermetabolic activity and SUV max of 20.5.  Lymphadenopathy is again noted to have a mass effect on the trachea and left common carotid artery, pushing both structures towards the right.  There is also prominent right cervical lymph nodes with the largest measuring 0.8 cm and demonstrating mild hypermetabolic activity with SUV max  "of 1.8, physiologic   distribution of FDG in the gray matter.  There are 3 pulmonary nodules noted within the right lung, the largest is in the anterior segment of the right upper lobe measuring 1 cm, second is visualized in the middle lobe measuring 0.6 cm and the third is within the posterior basal segment measuring 0.6 cm.  There is one pulmonary nodule within the left lung within the lateral basal segment measuring 0.6 cm.  These nodules do not demonstrate hypermetabolic activity; however, they are below the threshold for observation with PET     HPIHe underwent MRI cervical spine revealed "No evidence of metastatic disease to the cervical spine. Multilevel degenerative changes of the cervical spine with disc protrusion at C5-6 which results in moderate to severe spinal canal stenosis and and associated cord signal abnormality, suggestive of compressive myelopathy. 6 mm T1 hypointense non-enhancing lesion in the clivus.  Continued followup is suggested. 9 mm inferior descent of the cerebellar tonsils below the foramen magnum, suggestive of Chiari 1 malformation"  MRI thoracic spine "No evidence of metastatic disease involving the thoracic spine. Incidental hemangioma involving the T7 vertebral body. Mild degenerative disc disease without any significant spinal canal stenosis or neuroforaminal narrowing.   He completed 3 cycles of TPF and 6 weeks of Cisplatin and RT. Completed on 6/26/18           He was hospitalized in [facility] and was seeing Dr. Bhatt. He saw the speech therapy and plan to start eating  His PET scan from 9/25/18 revealed "Progression of disease with multiple new hypermetabolic foci including the manubrium, mediastinal/retrocrural lymph nodes, and lungs. Partial therapeutic response within the presumed radiation field involving the left cervical mass, and complete therapeutic response in the right cervical lymph node, clivus, and left palatine tonsil. New soft tissue thickening and " "hypermetabolism in the left aryepiglottic fold and right cricoid cartilage"      HPI He comes in for Opdivo. He is eating by mouth only. Denies any trouble swallowing      Review of Systems   Constitutional: Negative for appetite change, fatigue and unexpected weight change.   HENT: Negative for mouth sores.    Eyes: Negative for visual disturbance.   Respiratory: Negative for cough and shortness of breath.    Cardiovascular: Negative for chest pain.   Gastrointestinal: Negative for abdominal pain and diarrhea.   Genitourinary: Negative for frequency.   Musculoskeletal: Negative for back pain.   Skin: Negative for rash.   Neurological: Negative for headaches.   Hematological: Negative for adenopathy.   Psychiatric/Behavioral: The patient is not nervous/anxious.    All other systems reviewed and are negative.      Objective:      Physical Exam   Constitutional: He is oriented to person, place, and time. He appears well-developed and well-nourished.   HENT:   Mouth/Throat: No oropharyngeal exudate.   Cardiovascular: Normal rate and normal heart sounds.   Pulmonary/Chest: Effort normal and breath sounds normal. He has no wheezes.   Abdominal: Soft. Bowel sounds are normal. There is no tenderness.   Musculoskeletal: He exhibits no edema or tenderness.   Lymphadenopathy:     He has no cervical adenopathy.   Neurological: He is alert and oriented to person, place, and time. Coordination normal.   Skin: Skin is warm and dry. No rash noted.   Psychiatric: He has a normal mood and affect. Judgment and thought content normal.   Vitals reviewed.        LABS:  WBC   Date Value Ref Range Status   11/21/2018 4.82 3.90 - 12.70 K/uL Final     Hemoglobin   Date Value Ref Range Status   11/21/2018 9.0 (L) 14.0 - 18.0 g/dL Final     Hematocrit   Date Value Ref Range Status   11/21/2018 27.4 (L) 40.0 - 54.0 % Final     Platelets   Date Value Ref Range Status   11/21/2018 206 150 - 350 K/uL Final     Gran # (ANC)   Date Value Ref Range " Status   11/21/2018 4.0 1.8 - 7.7 K/uL Final     Comment:     The ANC is based on a white cell differential from an   automated cell counter. It has not been microscopically   reviewed for the presence of abnormal cells. Clinical   correlation is required.         Chemistry        Component Value Date/Time     11/21/2018 1021    K 3.5 11/21/2018 1021     11/21/2018 1021    CO2 24 11/21/2018 1021    BUN 14 11/21/2018 1021    CREATININE 1.5 (H) 11/21/2018 1021    GLU 93 11/21/2018 1021        Component Value Date/Time    CALCIUM 8.8 11/21/2018 1021    ALKPHOS 63 11/21/2018 1021    AST 11 11/21/2018 1021    ALT 7 (L) 11/21/2018 1021    BILITOT 0.4 11/21/2018 1021    ESTGFRAFRICA >60.0 11/21/2018 1021    EGFRNONAA 53.5 (A) 11/21/2018 1021          Assessment:       1. Squamous cell carcinoma of palatine tonsil    2. Cancer of head, face, or neck lymph nodes, secondary    3. Secondary cancer of bone    4. Leaking PEG tube        Plan:        1,2,3. He is doing very well clinically and will proceed with Opdivo and will return in 2 weeks for next cycle and Xgeva and restaging PET scan  4. Escribed Bactrim and also remove PEG tube    Above care plan was discussed with patient and accompanying sister and all questions were addressed to their satisfaction

## 2018-11-21 NOTE — PROGRESS NOTES
Procedure note:    Laparoscopically placed peg placed previously at outside hospital      Peg tube removed without difficulty    Dressing applied and patient counseled inregards to wound care

## 2018-11-27 ENCOUNTER — PATIENT MESSAGE (OUTPATIENT)
Dept: HEMATOLOGY/ONCOLOGY | Facility: CLINIC | Age: 48
End: 2018-11-27

## 2018-12-04 ENCOUNTER — HOSPITAL ENCOUNTER (OUTPATIENT)
Dept: RADIOLOGY | Facility: HOSPITAL | Age: 48
Discharge: HOME | End: 2018-12-04
Attending: INTERNAL MEDICINE
Payer: COMMERCIAL

## 2018-12-04 ENCOUNTER — PATIENT MESSAGE (OUTPATIENT)
Dept: HEMATOLOGY/ONCOLOGY | Facility: CLINIC | Age: 48
End: 2018-12-04

## 2018-12-04 DIAGNOSIS — C09.9 SQUAMOUS CELL CARCINOMA OF PALATINE TONSIL: ICD-10-CM

## 2018-12-04 LAB — POCT GLUCOSE: 86 MG/DL (ref 70–110)

## 2018-12-04 PROCEDURE — 78815 PET IMAGE W/CT SKULL-THIGH: CPT | Mod: 26,PS,, | Performed by: RADIOLOGY

## 2018-12-04 PROCEDURE — A9552 F18 FDG: HCPCS

## 2018-12-06 ENCOUNTER — INFUSION (OUTPATIENT)
Dept: INFUSION THERAPY | Facility: HOSPITAL | Age: 48
End: 2018-12-06
Attending: INTERNAL MEDICINE
Payer: COMMERCIAL

## 2018-12-06 ENCOUNTER — OFFICE VISIT (OUTPATIENT)
Dept: HEMATOLOGY/ONCOLOGY | Facility: CLINIC | Age: 48
End: 2018-12-06
Payer: COMMERCIAL

## 2018-12-06 VITALS
SYSTOLIC BLOOD PRESSURE: 126 MMHG | RESPIRATION RATE: 18 BRPM | TEMPERATURE: 98 F | HEART RATE: 60 BPM | HEIGHT: 67 IN | WEIGHT: 167 LBS | DIASTOLIC BLOOD PRESSURE: 67 MMHG | BODY MASS INDEX: 26.21 KG/M2

## 2018-12-06 VITALS
SYSTOLIC BLOOD PRESSURE: 138 MMHG | RESPIRATION RATE: 16 BRPM | OXYGEN SATURATION: 99 % | HEIGHT: 67 IN | HEART RATE: 57 BPM | BODY MASS INDEX: 26.26 KG/M2 | DIASTOLIC BLOOD PRESSURE: 62 MMHG | TEMPERATURE: 98 F | WEIGHT: 167.31 LBS

## 2018-12-06 DIAGNOSIS — C09.9 SQUAMOUS CELL CARCINOMA OF PALATINE TONSIL: ICD-10-CM

## 2018-12-06 DIAGNOSIS — C09.9 TONSIL CANCER: Primary | ICD-10-CM

## 2018-12-06 DIAGNOSIS — C09.9 SQUAMOUS CELL CARCINOMA OF PALATINE TONSIL: Primary | ICD-10-CM

## 2018-12-06 DIAGNOSIS — C77.0 CANCER OF HEAD, FACE, OR NECK LYMPH NODES, SECONDARY: ICD-10-CM

## 2018-12-06 DIAGNOSIS — C79.51 SECONDARY CANCER OF BONE: ICD-10-CM

## 2018-12-06 PROCEDURE — 25000003 PHARM REV CODE 250: Performed by: INTERNAL MEDICINE

## 2018-12-06 PROCEDURE — 3008F BODY MASS INDEX DOCD: CPT | Mod: CPTII,S$GLB,, | Performed by: INTERNAL MEDICINE

## 2018-12-06 PROCEDURE — 96413 CHEMO IV INFUSION 1 HR: CPT

## 2018-12-06 PROCEDURE — 96372 THER/PROPH/DIAG INJ SC/IM: CPT

## 2018-12-06 PROCEDURE — 99999 PR PBB SHADOW E&M-EST. PATIENT-LVL III: CPT | Mod: PBBFAC,,, | Performed by: INTERNAL MEDICINE

## 2018-12-06 PROCEDURE — 99215 OFFICE O/P EST HI 40 MIN: CPT | Mod: S$GLB,,, | Performed by: INTERNAL MEDICINE

## 2018-12-06 PROCEDURE — 63600175 PHARM REV CODE 636 W HCPCS: Mod: JG | Performed by: INTERNAL MEDICINE

## 2018-12-06 RX ORDER — SODIUM CHLORIDE 0.9 % (FLUSH) 0.9 %
10 SYRINGE (ML) INJECTION
Status: CANCELLED | OUTPATIENT
Start: 2018-12-07

## 2018-12-06 RX ORDER — SODIUM CHLORIDE 0.9 % (FLUSH) 0.9 %
10 SYRINGE (ML) INJECTION
Status: DISCONTINUED | OUTPATIENT
Start: 2018-12-06 | End: 2018-12-06 | Stop reason: HOSPADM

## 2018-12-06 RX ORDER — HEPARIN 100 UNIT/ML
500 SYRINGE INTRAVENOUS
Status: DISCONTINUED | OUTPATIENT
Start: 2018-12-06 | End: 2018-12-06 | Stop reason: HOSPADM

## 2018-12-06 RX ORDER — HEPARIN 100 UNIT/ML
500 SYRINGE INTRAVENOUS
Status: CANCELLED | OUTPATIENT
Start: 2018-12-07

## 2018-12-06 RX ADMIN — SODIUM CHLORIDE 240 MG: 9 INJECTION, SOLUTION INTRAVENOUS at 10:12

## 2018-12-06 RX ADMIN — SODIUM CHLORIDE: 0.9 INJECTION, SOLUTION INTRAVENOUS at 10:12

## 2018-12-06 RX ADMIN — DENOSUMAB 120 MG: 120 INJECTION SUBCUTANEOUS at 11:12

## 2018-12-06 NOTE — PLAN OF CARE
Problem: Patient Care Overview  Goal: Plan of Care Review  Outcome: Ongoing (interventions implemented as appropriate)  Pt tolerated opdivo infusion and xgeva injection without issue, pt to rtc 12/20/18, no distress noted upon d/c to home

## 2018-12-06 NOTE — PROGRESS NOTES
Subjective:       Patient ID: Burton Whiting is a 48 y.o. male.    Chief Complaint: Squamous cell carcinoma of palatine tonsil  Squamous cell carcinoma of palatine tonsil; g-tube malodorous; left neck pain 2/10; 4 cans formula per day; and r arm port---red/warm to touch  Mr. Burton Whiting is a 48-year-old male, former smoker, who presents today with a four-month history of enlarging neck mass.  He initially delayed care due to lack of insurance.  He was seen by Dr. Turpin who referred him to see Dr. Olguin.  He had a CT that showed a 3.8 x 3.8 x 4.9 cm soft tissue mass arising from the left palatine tonsil resulting in moderate narrowing of the hypopharyngeal airway adjacent extensive matted left cervical lymphadenopathy was noted.  The largest ella mass was 6.6 x 9 x 2.6 cm extending inferiorly to the supraclavicular region.  The mass pushed the trachea and the left common carotid artery to the right.  Additional representative of cervical lymph nodes measuring 2.9 x 3.1 x 3.5 cm on axial image 37 of series 3   and 2.4 x 2.1 x 2.2 cm on axial image 55 of series 3.  There is also a sclerotic lesion involving the midline of the clivus measuring 1.2 cm.  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  PET scan performed on 01/19/2018 revealed persistent evidence of a soft tissue mass arising from the left palatine tonsil resulting in moderate narrowing of the hypopharyngeal   airway.  The mass is hypermetabolic demonstrating an SUV max of 18.5.  There is also persistent adjacent extensive matted left cervical lymphadenopathy, largest of this measuring 6.7 x 8.42 with hypermetabolic activity and SUV max of 20.5.  Lymphadenopathy is again noted to have a mass effect on the trachea and left common carotid artery, pushing both structures towards the right.  There is also prominent right cervical lymph nodes with the largest measuring 0.8 cm and demonstrating mild hypermetabolic activity with SUV max  "of 1.8, physiologic   distribution of FDG in the gray matter.  There are 3 pulmonary nodules noted within the right lung, the largest is in the anterior segment of the right upper lobe measuring 1 cm, second is visualized in the middle lobe measuring 0.6 cm and the third is within the posterior basal segment measuring 0.6 cm.  There is one pulmonary nodule within the left lung within the lateral basal segment measuring 0.6 cm.  These nodules do not demonstrate hypermetabolic activity; however, they are below the threshold for observation with PET     HPIHe underwent MRI cervical spine revealed "No evidence of metastatic disease to the cervical spine. Multilevel degenerative changes of the cervical spine with disc protrusion at C5-6 which results in moderate to severe spinal canal stenosis and and associated cord signal abnormality, suggestive of compressive myelopathy. 6 mm T1 hypointense non-enhancing lesion in the clivus.  Continued followup is suggested. 9 mm inferior descent of the cerebellar tonsils below the foramen magnum, suggestive of Chiari 1 malformation"  MRI thoracic spine "No evidence of metastatic disease involving the thoracic spine. Incidental hemangioma involving the T7 vertebral body. Mild degenerative disc disease without any significant spinal canal stenosis or neuroforaminal narrowing.   He completed 3 cycles of TPF and 6 weeks of Cisplatin and RT. Completed on 6/26/18           He was hospitalized in [facility] and was seeing Dr. Bhatt. He saw the speech therapy and plan to start eating  His PET scan from 9/25/18 revealed "Progression of disease with multiple new hypermetabolic foci including the manubrium, mediastinal/retrocrural lymph nodes, and lungs. Partial therapeutic response within the presumed radiation field involving the left cervical mass, and complete therapeutic response in the right cervical lymph node, clivus, and left palatine tonsil. New soft tissue thickening and " "hypermetabolism in the left aryepiglottic fold and right cricoid cartilage"        HPI He comes in for Opdivo. PET scan reveals "With exception of the left manubrium all previous suspicious areas of activity have completely resolved indicating a good response to therapy. Index left manubrium SUV max 2.64, previously 10.9"  He feels well and denies any new complaints    Review of Systems   Constitutional: Negative for appetite change, fatigue and unexpected weight change.   HENT: Negative for mouth sores.    Eyes: Negative for visual disturbance.   Respiratory: Negative for cough and shortness of breath.    Cardiovascular: Negative for chest pain.   Gastrointestinal: Negative for abdominal pain and diarrhea.   Genitourinary: Negative for frequency.   Musculoskeletal: Negative for back pain.   Skin: Negative for rash.   Neurological: Negative for headaches.   Hematological: Negative for adenopathy.   Psychiatric/Behavioral: The patient is not nervous/anxious.    All other systems reviewed and are negative.      Objective:      Physical Exam   Constitutional: He is oriented to person, place, and time. He appears well-developed and well-nourished.   HENT:   Mouth/Throat: No oropharyngeal exudate.   Cardiovascular: Normal rate and normal heart sounds.   Pulmonary/Chest: Effort normal and breath sounds normal. He has no wheezes.   Abdominal: Soft. Bowel sounds are normal. There is no tenderness.   Musculoskeletal: He exhibits no edema or tenderness.   Lymphadenopathy:     He has no cervical adenopathy.   Neurological: He is alert and oriented to person, place, and time. Coordination normal.   Skin: Skin is warm and dry. No rash noted.   Psychiatric: He has a normal mood and affect. Judgment and thought content normal.   Vitals reviewed.      LABS:  WBC   Date Value Ref Range Status   12/06/2018 2.75 (L) 3.90 - 12.70 K/uL Final     Hemoglobin   Date Value Ref Range Status   12/06/2018 9.9 (L) 14.0 - 18.0 g/dL Final "     Hematocrit   Date Value Ref Range Status   12/06/2018 30.7 (L) 40.0 - 54.0 % Final     Platelets   Date Value Ref Range Status   12/06/2018 209 150 - 350 K/uL Final     Gran # (ANC)   Date Value Ref Range Status   12/06/2018 1.9 1.8 - 7.7 K/uL Final     Comment:     The ANC is based on a white cell differential from an   automated cell counter. It has not been microscopically   reviewed for the presence of abnormal cells. Clinical   correlation is required.         Chemistry        Component Value Date/Time     12/06/2018 0814    K 4.1 12/06/2018 0814     12/06/2018 0814    CO2 25 12/06/2018 0814    BUN 24 (H) 12/06/2018 0814    CREATININE 1.4 12/06/2018 0814    GLU 68 (L) 12/06/2018 0814        Component Value Date/Time    CALCIUM 8.9 12/06/2018 0814    ALKPHOS 67 12/06/2018 0814    AST 14 12/06/2018 0814    ALT 8 (L) 12/06/2018 0814    BILITOT 0.5 12/06/2018 0814    ESTGFRAFRICA >60.0 12/06/2018 0814    EGFRNONAA 58.2 (A) 12/06/2018 0814             TSH   Date Value Ref Range Status   12/06/2018 1.164 0.400 - 4.000 uIU/mL Final     Free T4   Date Value Ref Range Status   12/06/2018 1.03 0.71 - 1.51 ng/dL Final       Assessment:       1. Squamous cell carcinoma of palatine tonsil    2. Secondary cancer of bone        Plan:        1,2. He is doing well with a very good response on his PET scan. He will proceed with Opdivo and will return in 2 weeks for next cycle    Above care plan was discussed with patient and accompanying brother and sister and all questions were addressed to their satisfaction

## 2018-12-06 NOTE — PLAN OF CARE
Problem: Chemotherapy Effects (Adult)  Goal: Signs and Symptoms of Listed Potential Problems Will be Absent, Minimized or Managed (Chemotherapy Effects)  Signs and symptoms of listed potential problems will be absent, minimized or managed by discharge/transition of care (reference Chemotherapy Effects (Adult) CPG).   Outcome: Ongoing (interventions implemented as appropriate)  Pt here for opdivo infusion, labs, hx, meds, allergies reviewed, pt with no complaints at this time, reclined in chair, continue to monitor

## 2018-12-20 ENCOUNTER — TELEPHONE (OUTPATIENT)
Dept: HEMATOLOGY/ONCOLOGY | Facility: CLINIC | Age: 48
End: 2018-12-20

## 2018-12-20 ENCOUNTER — INFUSION (OUTPATIENT)
Dept: INFUSION THERAPY | Facility: HOSPITAL | Age: 48
End: 2018-12-20
Attending: INTERNAL MEDICINE
Payer: COMMERCIAL

## 2018-12-20 ENCOUNTER — LAB VISIT (OUTPATIENT)
Dept: LAB | Facility: HOSPITAL | Age: 48
End: 2018-12-20
Attending: INTERNAL MEDICINE
Payer: COMMERCIAL

## 2018-12-20 ENCOUNTER — OFFICE VISIT (OUTPATIENT)
Dept: HEMATOLOGY/ONCOLOGY | Facility: CLINIC | Age: 48
End: 2018-12-20
Payer: COMMERCIAL

## 2018-12-20 VITALS
DIASTOLIC BLOOD PRESSURE: 61 MMHG | HEART RATE: 62 BPM | HEIGHT: 67 IN | BODY MASS INDEX: 26.99 KG/M2 | RESPIRATION RATE: 18 BRPM | SYSTOLIC BLOOD PRESSURE: 111 MMHG | WEIGHT: 171.94 LBS | TEMPERATURE: 98 F

## 2018-12-20 VITALS
BODY MASS INDEX: 26.99 KG/M2 | TEMPERATURE: 98 F | OXYGEN SATURATION: 99 % | HEART RATE: 57 BPM | RESPIRATION RATE: 20 BRPM | HEIGHT: 67 IN | DIASTOLIC BLOOD PRESSURE: 74 MMHG | SYSTOLIC BLOOD PRESSURE: 137 MMHG | WEIGHT: 171.94 LBS

## 2018-12-20 DIAGNOSIS — D49.9 IMMUNODEFICIENCY SECONDARY TO NEOPLASM: Primary | ICD-10-CM

## 2018-12-20 DIAGNOSIS — C77.0 CANCER OF HEAD, FACE, OR NECK LYMPH NODES, SECONDARY: ICD-10-CM

## 2018-12-20 DIAGNOSIS — C09.9 SQUAMOUS CELL CARCINOMA OF PALATINE TONSIL: ICD-10-CM

## 2018-12-20 DIAGNOSIS — C09.9 TONSIL CANCER: Primary | ICD-10-CM

## 2018-12-20 DIAGNOSIS — C79.51 SECONDARY CANCER OF BONE: ICD-10-CM

## 2018-12-20 DIAGNOSIS — C09.9 SQUAMOUS CELL CARCINOMA OF PALATINE TONSIL: Primary | ICD-10-CM

## 2018-12-20 DIAGNOSIS — E07.9 THYROID DISORDER: ICD-10-CM

## 2018-12-20 DIAGNOSIS — D84.81 IMMUNODEFICIENCY SECONDARY TO NEOPLASM: Primary | ICD-10-CM

## 2018-12-20 LAB
ALBUMIN SERPL BCP-MCNC: 3.9 G/DL
ALP SERPL-CCNC: 62 U/L
ALT SERPL W/O P-5'-P-CCNC: 13 U/L
ANION GAP SERPL CALC-SCNC: 7 MMOL/L
AST SERPL-CCNC: 16 U/L
BILIRUB SERPL-MCNC: 0.4 MG/DL
BUN SERPL-MCNC: 35 MG/DL
CALCIUM SERPL-MCNC: 9 MG/DL
CHLORIDE SERPL-SCNC: 107 MMOL/L
CO2 SERPL-SCNC: 28 MMOL/L
CREAT SERPL-MCNC: 1.5 MG/DL
ERYTHROCYTE [DISTWIDTH] IN BLOOD BY AUTOMATED COUNT: 12.7 %
EST. GFR  (AFRICAN AMERICAN): >60 ML/MIN/1.73 M^2
EST. GFR  (NON AFRICAN AMERICAN): 53.5 ML/MIN/1.73 M^2
GLUCOSE SERPL-MCNC: 74 MG/DL
HCT VFR BLD AUTO: 29 %
HGB BLD-MCNC: 9.7 G/DL
IMM GRANULOCYTES # BLD AUTO: 0.01 K/UL
MCH RBC QN AUTO: 33.8 PG
MCHC RBC AUTO-ENTMCNC: 33.4 G/DL
MCV RBC AUTO: 101 FL
NEUTROPHILS # BLD AUTO: 2.4 K/UL
PLATELET # BLD AUTO: 155 K/UL
PMV BLD AUTO: 9.4 FL
POTASSIUM SERPL-SCNC: 4.2 MMOL/L
PROT SERPL-MCNC: 6.9 G/DL
RBC # BLD AUTO: 2.87 M/UL
SODIUM SERPL-SCNC: 142 MMOL/L
WBC # BLD AUTO: 3.4 K/UL

## 2018-12-20 PROCEDURE — 85027 COMPLETE CBC AUTOMATED: CPT

## 2018-12-20 PROCEDURE — 3008F BODY MASS INDEX DOCD: CPT | Mod: CPTII,S$GLB,, | Performed by: INTERNAL MEDICINE

## 2018-12-20 PROCEDURE — 80053 COMPREHEN METABOLIC PANEL: CPT

## 2018-12-20 PROCEDURE — 25000003 PHARM REV CODE 250: Performed by: INTERNAL MEDICINE

## 2018-12-20 PROCEDURE — 36415 COLL VENOUS BLD VENIPUNCTURE: CPT

## 2018-12-20 PROCEDURE — 99999 PR PBB SHADOW E&M-EST. PATIENT-LVL III: CPT | Mod: PBBFAC,,, | Performed by: INTERNAL MEDICINE

## 2018-12-20 PROCEDURE — 63600175 PHARM REV CODE 636 W HCPCS: Mod: JG | Performed by: INTERNAL MEDICINE

## 2018-12-20 PROCEDURE — 99215 OFFICE O/P EST HI 40 MIN: CPT | Mod: S$GLB,,, | Performed by: INTERNAL MEDICINE

## 2018-12-20 PROCEDURE — 96413 CHEMO IV INFUSION 1 HR: CPT

## 2018-12-20 RX ORDER — SODIUM CHLORIDE 0.9 % (FLUSH) 0.9 %
10 SYRINGE (ML) INJECTION
Status: CANCELLED | OUTPATIENT
Start: 2018-12-20

## 2018-12-20 RX ORDER — SODIUM CHLORIDE 0.9 % (FLUSH) 0.9 %
10 SYRINGE (ML) INJECTION
Status: DISCONTINUED | OUTPATIENT
Start: 2018-12-20 | End: 2018-12-20 | Stop reason: HOSPADM

## 2018-12-20 RX ORDER — HEPARIN 100 UNIT/ML
500 SYRINGE INTRAVENOUS
Status: CANCELLED | OUTPATIENT
Start: 2018-12-20

## 2018-12-20 RX ORDER — HEPARIN 100 UNIT/ML
500 SYRINGE INTRAVENOUS
Status: DISCONTINUED | OUTPATIENT
Start: 2018-12-20 | End: 2018-12-20 | Stop reason: HOSPADM

## 2018-12-20 RX ADMIN — SODIUM CHLORIDE 240 MG: 9 INJECTION, SOLUTION INTRAVENOUS at 11:12

## 2018-12-20 RX ADMIN — SODIUM CHLORIDE: 9 INJECTION, SOLUTION INTRAVENOUS at 10:12

## 2018-12-20 NOTE — PLAN OF CARE
Problem: Adult Inpatient Plan of Care  Goal: Plan of Care Review  Outcome: Ongoing (interventions implemented as appropriate)  Pt. Tolerated infusion. VSS. PIV flushed and removed. No questions at this time. Will RTC in two weeks for next infusion and xgeva.

## 2018-12-20 NOTE — TELEPHONE ENCOUNTER
----- Message from Cortney Lopez MD sent at 12/20/2018 10:28 AM CST -----  Also needs appointement for Flu and prevnar at next visit in 2 weeks

## 2018-12-20 NOTE — PROGRESS NOTES
Subjective:       Patient ID: Burton Whiting is a 48 y.o. male.    Chief Complaint: Squamous cell carcinoma of palatine tonsil  Squamous cell carcinoma of palatine tonsil; g-tube malodorous; left neck pain 2/10; 4 cans formula per day; and r arm port---red/warm to touch  Mr. Burton Whiting is a 48-year-old male, former smoker, who presents today with a four-month history of enlarging neck mass.  He initially delayed care due to lack of insurance.  He was seen by Dr. Turpin who referred him to see Dr. Olguin.  He had a CT that showed a 3.8 x 3.8 x 4.9 cm soft tissue mass arising from the left palatine tonsil resulting in moderate narrowing of the hypopharyngeal airway adjacent extensive matted left cervical lymphadenopathy was noted.  The largest ella mass was 6.6 x 9 x 2.6 cm extending inferiorly to the supraclavicular region.  The mass pushed the trachea and the left common carotid artery to the right.  Additional representative of cervical lymph nodes measuring 2.9 x 3.1 x 3.5 cm on axial image 37 of series 3   and 2.4 x 2.1 x 2.2 cm on axial image 55 of series 3.  There is also a sclerotic lesion involving the midline of the clivus measuring 1.2 cm.  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  PET scan performed on 01/19/2018 revealed persistent evidence of a soft tissue mass arising from the left palatine tonsil resulting in moderate narrowing of the hypopharyngeal   airway.  The mass is hypermetabolic demonstrating an SUV max of 18.5.  There is also persistent adjacent extensive matted left cervical lymphadenopathy, largest of this measuring 6.7 x 8.42 with hypermetabolic activity and SUV max of 20.5.  Lymphadenopathy is again noted to have a mass effect on the trachea and left common carotid artery, pushing both structures towards the right.  There is also prominent right cervical lymph nodes with the largest measuring 0.8 cm and demonstrating mild hypermetabolic activity with SUV max  "of 1.8, physiologic   distribution of FDG in the gray matter.  There are 3 pulmonary nodules noted within the right lung, the largest is in the anterior segment of the right upper lobe measuring 1 cm, second is visualized in the middle lobe measuring 0.6 cm and the third is within the posterior basal segment measuring 0.6 cm.  There is one pulmonary nodule within the left lung within the lateral basal segment measuring 0.6 cm.  These nodules do not demonstrate hypermetabolic activity; however, they are below the threshold for observation with PET     HPIHe underwent MRI cervical spine revealed "No evidence of metastatic disease to the cervical spine. Multilevel degenerative changes of the cervical spine with disc protrusion at C5-6 which results in moderate to severe spinal canal stenosis and and associated cord signal abnormality, suggestive of compressive myelopathy. 6 mm T1 hypointense non-enhancing lesion in the clivus.  Continued followup is suggested. 9 mm inferior descent of the cerebellar tonsils below the foramen magnum, suggestive of Chiari 1 malformation"  MRI thoracic spine "No evidence of metastatic disease involving the thoracic spine. Incidental hemangioma involving the T7 vertebral body. Mild degenerative disc disease without any significant spinal canal stenosis or neuroforaminal narrowing.   He completed 3 cycles of TPF and 6 weeks of Cisplatin and RT. Completed on 6/26/18           He was hospitalized in [facility] and was seeing Dr. Bhatt. He saw the speech therapy and plan to start eating  His PET scan from 9/25/18 revealed "Progression of disease with multiple new hypermetabolic foci including the manubrium, mediastinal/retrocrural lymph nodes, and lungs. Partial therapeutic response within the presumed radiation field involving the left cervical mass, and complete therapeutic response in the right cervical lymph node, clivus, and left palatine tonsil. New soft tissue thickening and " "hypermetabolism in the left aryepiglottic fold and right cricoid cartilage"          HPI He comes in for Opdivo. He feels well and denies any new complaints    Review of Systems   Constitutional: Negative for appetite change, fatigue and unexpected weight change.   HENT: Negative for mouth sores.    Eyes: Negative for visual disturbance.   Respiratory: Negative for cough and shortness of breath.    Cardiovascular: Negative for chest pain.   Gastrointestinal: Negative for abdominal pain and diarrhea.   Genitourinary: Negative for frequency.   Musculoskeletal: Negative for back pain.   Skin: Negative for rash.   Neurological: Negative for headaches.   Hematological: Negative for adenopathy.   Psychiatric/Behavioral: The patient is not nervous/anxious.    All other systems reviewed and are negative.      Objective:      Physical Exam   Constitutional: He is oriented to person, place, and time. He appears well-developed and well-nourished.   HENT:   Mouth/Throat: No oropharyngeal exudate.   Cardiovascular: Normal rate and normal heart sounds.   Pulmonary/Chest: Effort normal and breath sounds normal. He has no wheezes.   Abdominal: Soft. Bowel sounds are normal. There is no tenderness.   Musculoskeletal: He exhibits no edema or tenderness.   Lymphadenopathy:     He has no cervical adenopathy.   Neurological: He is alert and oriented to person, place, and time. Coordination normal.   Skin: Skin is warm and dry. No rash noted.   Psychiatric: He has a normal mood and affect. Judgment and thought content normal.   Vitals reviewed.        LABS:  WBC   Date Value Ref Range Status   12/20/2018 3.40 (L) 3.90 - 12.70 K/uL Final     Hemoglobin   Date Value Ref Range Status   12/20/2018 9.7 (L) 14.0 - 18.0 g/dL Final     Hematocrit   Date Value Ref Range Status   12/20/2018 29.0 (L) 40.0 - 54.0 % Final     Platelets   Date Value Ref Range Status   12/20/2018 155 150 - 350 K/uL Final     Gran # (ANC)   Date Value Ref Range Status "   12/20/2018 2.4 1.8 - 7.7 K/uL Final     Comment:     The ANC is based on a white cell differential from an   automated cell counter. It has not been microscopically   reviewed for the presence of abnormal cells. Clinical   correlation is required.         Chemistry        Component Value Date/Time     12/20/2018 0814    K 4.2 12/20/2018 0814     12/20/2018 0814    CO2 28 12/20/2018 0814    BUN 35 (H) 12/20/2018 0814    CREATININE 1.5 (H) 12/20/2018 0814    GLU 74 12/20/2018 0814        Component Value Date/Time    CALCIUM 9.0 12/20/2018 0814    ALKPHOS 62 12/20/2018 0814    AST 16 12/20/2018 0814    ALT 13 12/20/2018 0814    BILITOT 0.4 12/20/2018 0814    ESTGFRAFRICA >60.0 12/20/2018 0814    EGFRNONAA 53.5 (A) 12/20/2018 0814          TSH   Date Value Ref Range Status   12/06/2018 1.164 0.400 - 4.000 uIU/mL Final     Free T4   Date Value Ref Range Status   12/06/2018 1.03 0.71 - 1.51 ng/dL Final       Assessment:       1. Squamous cell carcinoma of palatine tonsil    2. Cancer of head, face, or neck lymph nodes, secondary    3. Secondary cancer of bone    4. Thyroid disorder        Plan:         1,2,3,4 He is doing well clinically and will proceed with Opdivo and will return in 2 weeks for next cycle    Above care plan was discussed with patient and accompanying brother and all questions were addressed to their satisfaction

## 2019-01-03 ENCOUNTER — INFUSION (OUTPATIENT)
Dept: INFUSION THERAPY | Facility: HOSPITAL | Age: 49
End: 2019-01-03
Attending: INTERNAL MEDICINE
Payer: COMMERCIAL

## 2019-01-03 ENCOUNTER — CLINICAL SUPPORT (OUTPATIENT)
Dept: INFECTIOUS DISEASES | Facility: CLINIC | Age: 49
End: 2019-01-03
Payer: COMMERCIAL

## 2019-01-03 ENCOUNTER — LAB VISIT (OUTPATIENT)
Dept: LAB | Facility: HOSPITAL | Age: 49
End: 2019-01-03
Attending: INTERNAL MEDICINE
Payer: COMMERCIAL

## 2019-01-03 ENCOUNTER — OFFICE VISIT (OUTPATIENT)
Dept: HEMATOLOGY/ONCOLOGY | Facility: CLINIC | Age: 49
End: 2019-01-03
Payer: COMMERCIAL

## 2019-01-03 VITALS
TEMPERATURE: 98 F | OXYGEN SATURATION: 100 % | WEIGHT: 173.06 LBS | HEIGHT: 67 IN | BODY MASS INDEX: 27.16 KG/M2 | RESPIRATION RATE: 18 BRPM | DIASTOLIC BLOOD PRESSURE: 66 MMHG | HEART RATE: 61 BPM | SYSTOLIC BLOOD PRESSURE: 133 MMHG

## 2019-01-03 VITALS
RESPIRATION RATE: 18 BRPM | SYSTOLIC BLOOD PRESSURE: 128 MMHG | DIASTOLIC BLOOD PRESSURE: 71 MMHG | HEART RATE: 54 BPM | TEMPERATURE: 98 F

## 2019-01-03 DIAGNOSIS — C09.9 TONSIL CANCER: Primary | ICD-10-CM

## 2019-01-03 DIAGNOSIS — C77.0 CANCER OF HEAD, FACE, OR NECK LYMPH NODES, SECONDARY: ICD-10-CM

## 2019-01-03 DIAGNOSIS — C09.9 SQUAMOUS CELL CARCINOMA OF PALATINE TONSIL: ICD-10-CM

## 2019-01-03 DIAGNOSIS — C09.9 TONSIL CANCER: ICD-10-CM

## 2019-01-03 DIAGNOSIS — E07.9 THYROID DISORDER: ICD-10-CM

## 2019-01-03 LAB
ALBUMIN SERPL BCP-MCNC: 3.8 G/DL
ALP SERPL-CCNC: 74 U/L
ALT SERPL W/O P-5'-P-CCNC: 14 U/L
ANION GAP SERPL CALC-SCNC: 9 MMOL/L
AST SERPL-CCNC: 18 U/L
BILIRUB SERPL-MCNC: 0.3 MG/DL
BUN SERPL-MCNC: 20 MG/DL
CALCIUM SERPL-MCNC: 8.3 MG/DL
CHLORIDE SERPL-SCNC: 105 MMOL/L
CO2 SERPL-SCNC: 26 MMOL/L
CREAT SERPL-MCNC: 1.5 MG/DL
ERYTHROCYTE [DISTWIDTH] IN BLOOD BY AUTOMATED COUNT: 12.3 %
EST. GFR  (AFRICAN AMERICAN): >60 ML/MIN/1.73 M^2
EST. GFR  (NON AFRICAN AMERICAN): 53.1 ML/MIN/1.73 M^2
GLUCOSE SERPL-MCNC: 77 MG/DL
HCT VFR BLD AUTO: 30.8 %
HGB BLD-MCNC: 9.8 G/DL
IMM GRANULOCYTES # BLD AUTO: 0.01 K/UL
MCH RBC QN AUTO: 33 PG
MCHC RBC AUTO-ENTMCNC: 31.8 G/DL
MCV RBC AUTO: 104 FL
NEUTROPHILS # BLD AUTO: 3.3 K/UL
PLATELET # BLD AUTO: 193 K/UL
PMV BLD AUTO: 9.3 FL
POTASSIUM SERPL-SCNC: 4.1 MMOL/L
PROT SERPL-MCNC: 7.2 G/DL
RBC # BLD AUTO: 2.97 M/UL
SODIUM SERPL-SCNC: 140 MMOL/L
T4 FREE SERPL-MCNC: 1.12 NG/DL
TSH SERPL DL<=0.005 MIU/L-ACNC: 1.28 UIU/ML
WBC # BLD AUTO: 4.49 K/UL

## 2019-01-03 PROCEDURE — 96372 THER/PROPH/DIAG INJ SC/IM: CPT

## 2019-01-03 PROCEDURE — 84439 ASSAY OF FREE THYROXINE: CPT

## 2019-01-03 PROCEDURE — 90686 FLU VACCINE (QUAD) GREATER THAN OR EQUAL TO 3YO PRESERVATIVE FREE IM: ICD-10-PCS | Mod: S$GLB,,, | Performed by: INTERNAL MEDICINE

## 2019-01-03 PROCEDURE — 80053 COMPREHEN METABOLIC PANEL: CPT

## 2019-01-03 PROCEDURE — 90471 IMMUNIZATION ADMIN: CPT | Mod: S$GLB,,, | Performed by: INTERNAL MEDICINE

## 2019-01-03 PROCEDURE — 90472 IMMUNIZATION ADMIN EACH ADD: CPT | Mod: S$GLB,,, | Performed by: INTERNAL MEDICINE

## 2019-01-03 PROCEDURE — 36415 COLL VENOUS BLD VENIPUNCTURE: CPT

## 2019-01-03 PROCEDURE — 90686 IIV4 VACC NO PRSV 0.5 ML IM: CPT | Mod: S$GLB,,, | Performed by: INTERNAL MEDICINE

## 2019-01-03 PROCEDURE — 90472 FLU VACCINE (QUAD) GREATER THAN OR EQUAL TO 3YO PRESERVATIVE FREE IM: ICD-10-PCS | Mod: S$GLB,,, | Performed by: INTERNAL MEDICINE

## 2019-01-03 PROCEDURE — 99215 OFFICE O/P EST HI 40 MIN: CPT | Mod: S$GLB,,, | Performed by: INTERNAL MEDICINE

## 2019-01-03 PROCEDURE — 3008F BODY MASS INDEX DOCD: CPT | Mod: CPTII,S$GLB,, | Performed by: INTERNAL MEDICINE

## 2019-01-03 PROCEDURE — 90670 PCV13 VACCINE IM: CPT | Mod: S$GLB,,, | Performed by: INTERNAL MEDICINE

## 2019-01-03 PROCEDURE — 96413 CHEMO IV INFUSION 1 HR: CPT

## 2019-01-03 PROCEDURE — 63600175 PHARM REV CODE 636 W HCPCS: Mod: JG | Performed by: INTERNAL MEDICINE

## 2019-01-03 PROCEDURE — 99999 PR PBB SHADOW E&M-EST. PATIENT-LVL III: CPT | Mod: PBBFAC,,, | Performed by: INTERNAL MEDICINE

## 2019-01-03 PROCEDURE — 99215 PR OFFICE/OUTPT VISIT, EST, LEVL V, 40-54 MIN: ICD-10-PCS | Mod: S$GLB,,, | Performed by: INTERNAL MEDICINE

## 2019-01-03 PROCEDURE — 85027 COMPLETE CBC AUTOMATED: CPT

## 2019-01-03 PROCEDURE — 3008F PR BODY MASS INDEX (BMI) DOCUMENTED: ICD-10-PCS | Mod: CPTII,S$GLB,, | Performed by: INTERNAL MEDICINE

## 2019-01-03 PROCEDURE — 84443 ASSAY THYROID STIM HORMONE: CPT

## 2019-01-03 PROCEDURE — 25000003 PHARM REV CODE 250: Performed by: INTERNAL MEDICINE

## 2019-01-03 PROCEDURE — 99999 PR PBB SHADOW E&M-EST. PATIENT-LVL III: ICD-10-PCS | Mod: PBBFAC,,, | Performed by: INTERNAL MEDICINE

## 2019-01-03 PROCEDURE — 90471 PNEUMOCOCCAL CONJUGATE VACCINE 13-VALENT LESS THAN 5YO & GREATER THAN: ICD-10-PCS | Mod: S$GLB,,, | Performed by: INTERNAL MEDICINE

## 2019-01-03 PROCEDURE — 90670 PNEUMOCOCCAL CONJUGATE VACCINE 13-VALENT LESS THAN 5YO & GREATER THAN: ICD-10-PCS | Mod: S$GLB,,, | Performed by: INTERNAL MEDICINE

## 2019-01-03 RX ORDER — HEPARIN 100 UNIT/ML
500 SYRINGE INTRAVENOUS
Status: CANCELLED | OUTPATIENT
Start: 2019-01-04

## 2019-01-03 RX ORDER — SODIUM CHLORIDE 0.9 % (FLUSH) 0.9 %
10 SYRINGE (ML) INJECTION
Status: DISCONTINUED | OUTPATIENT
Start: 2019-01-03 | End: 2019-01-03 | Stop reason: HOSPADM

## 2019-01-03 RX ORDER — GABAPENTIN 300 MG/1
300 CAPSULE ORAL 2 TIMES DAILY
Qty: 180 CAPSULE | Refills: 2 | Status: SHIPPED | OUTPATIENT
Start: 2019-01-03 | End: 2019-05-09 | Stop reason: SDUPTHER

## 2019-01-03 RX ORDER — HEPARIN 100 UNIT/ML
500 SYRINGE INTRAVENOUS
Status: DISCONTINUED | OUTPATIENT
Start: 2019-01-03 | End: 2019-01-03 | Stop reason: HOSPADM

## 2019-01-03 RX ORDER — SODIUM CHLORIDE 0.9 % (FLUSH) 0.9 %
10 SYRINGE (ML) INJECTION
Status: CANCELLED | OUTPATIENT
Start: 2019-01-04

## 2019-01-03 RX ADMIN — SODIUM CHLORIDE: 0.9 INJECTION, SOLUTION INTRAVENOUS at 04:01

## 2019-01-03 RX ADMIN — SODIUM CHLORIDE 240 MG: 9 INJECTION, SOLUTION INTRAVENOUS at 04:01

## 2019-01-03 RX ADMIN — DENOSUMAB 120 MG: 120 INJECTION SUBCUTANEOUS at 04:01

## 2019-01-03 NOTE — PROGRESS NOTES
Subjective:       Patient ID: Burton Whiting is a 49 y.o. male.    Chief Complaint: Squamous cell carcinoma of palatine tonsil  Squamous cell carcinoma of palatine tonsil; g-tube malodorous; left neck pain 2/10; 4 cans formula per day; and r arm port---red/warm to touch  Mr. Burton Whiting is a 48-year-old male, former smoker, who presents today with a four-month history of enlarging neck mass.  He initially delayed care due to lack of insurance.  He was seen by Dr. Turpin who referred him to see Dr. Olguin.  He had a CT that showed a 3.8 x 3.8 x 4.9 cm soft tissue mass arising from the left palatine tonsil resulting in moderate narrowing of the hypopharyngeal airway adjacent extensive matted left cervical lymphadenopathy was noted.  The largest ella mass was 6.6 x 9 x 2.6 cm extending inferiorly to the supraclavicular region.  The mass pushed the trachea and the left common carotid artery to the right.  Additional representative of cervical lymph nodes measuring 2.9 x 3.1 x 3.5 cm on axial image 37 of series 3   and 2.4 x 2.1 x 2.2 cm on axial image 55 of series 3.  There is also a sclerotic lesion involving the midline of the clivus measuring 1.2 cm.  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  PET scan performed on 01/19/2018 revealed persistent evidence of a soft tissue mass arising from the left palatine tonsil resulting in moderate narrowing of the hypopharyngeal   airway.  The mass is hypermetabolic demonstrating an SUV max of 18.5.  There is also persistent adjacent extensive matted left cervical lymphadenopathy, largest of this measuring 6.7 x 8.42 with hypermetabolic activity and SUV max of 20.5.  Lymphadenopathy is again noted to have a mass effect on the trachea and left common carotid artery, pushing both structures towards the right.  There is also prominent right cervical lymph nodes with the largest measuring 0.8 cm and demonstrating mild hypermetabolic activity with SUV max  "of 1.8, physiologic   distribution of FDG in the gray matter.  There are 3 pulmonary nodules noted within the right lung, the largest is in the anterior segment of the right upper lobe measuring 1 cm, second is visualized in the middle lobe measuring 0.6 cm and the third is within the posterior basal segment measuring 0.6 cm.  There is one pulmonary nodule within the left lung within the lateral basal segment measuring 0.6 cm.  These nodules do not demonstrate hypermetabolic activity; however, they are below the threshold for observation with PET     HPIHe underwent MRI cervical spine revealed "No evidence of metastatic disease to the cervical spine. Multilevel degenerative changes of the cervical spine with disc protrusion at C5-6 which results in moderate to severe spinal canal stenosis and and associated cord signal abnormality, suggestive of compressive myelopathy. 6 mm T1 hypointense non-enhancing lesion in the clivus.  Continued followup is suggested. 9 mm inferior descent of the cerebellar tonsils below the foramen magnum, suggestive of Chiari 1 malformation"  MRI thoracic spine "No evidence of metastatic disease involving the thoracic spine. Incidental hemangioma involving the T7 vertebral body. Mild degenerative disc disease without any significant spinal canal stenosis or neuroforaminal narrowing.   He completed 3 cycles of TPF and 6 weeks of Cisplatin and RT. Completed on 6/26/18           He was hospitalized in [facility] and was seeing Dr. Bhatt. He saw the speech therapy and plan to start eating  His PET scan from 9/25/18 revealed "Progression of disease with multiple new hypermetabolic foci including the manubrium, mediastinal/retrocrural lymph nodes, and lungs. Partial therapeutic response within the presumed radiation field involving the left cervical mass, and complete therapeutic response in the right cervical lymph node, clivus, and left palatine tonsil. New soft tissue thickening and " "hypermetabolism in the left aryepiglottic fold and right cricoid cartilage"           HPI He comes in for Opdivo  He feels well and denies any new issues. He is here with his brother. His ECOG PS is zero.    Review of Systems   Constitutional: Negative for appetite change, fatigue and unexpected weight change.   HENT: Negative for mouth sores.    Eyes: Negative for visual disturbance.   Respiratory: Negative for cough and shortness of breath.    Cardiovascular: Negative for chest pain.   Gastrointestinal: Negative for abdominal pain and diarrhea.   Genitourinary: Negative for frequency.   Musculoskeletal: Negative for back pain.   Skin: Negative for rash.   Neurological: Negative for headaches.   Hematological: Negative for adenopathy.   Psychiatric/Behavioral: The patient is not nervous/anxious.    All other systems reviewed and are negative.      Objective:      Physical Exam   Constitutional: He is oriented to person, place, and time. He appears well-developed and well-nourished.   HENT:   Mouth/Throat: No oropharyngeal exudate.   Cardiovascular: Normal rate and normal heart sounds.   Pulmonary/Chest: Effort normal and breath sounds normal. He has no wheezes.   Abdominal: Soft. Bowel sounds are normal. There is no tenderness.   Musculoskeletal: He exhibits no edema or tenderness.   Lymphadenopathy:     He has no cervical adenopathy.   Neurological: He is alert and oriented to person, place, and time. Coordination normal.   Skin: Skin is warm and dry. No rash noted.   Psychiatric: He has a normal mood and affect. Judgment and thought content normal.   Vitals reviewed.      LABS:  WBC   Date Value Ref Range Status   01/03/2019 4.49 3.90 - 12.70 K/uL Final     Hemoglobin   Date Value Ref Range Status   01/03/2019 9.8 (L) 14.0 - 18.0 g/dL Final     Hematocrit   Date Value Ref Range Status   01/03/2019 30.8 (L) 40.0 - 54.0 % Final     Platelets   Date Value Ref Range Status   01/03/2019 193 150 - 350 K/uL Final "     Gran # (ANC)   Date Value Ref Range Status   01/03/2019 3.3 1.8 - 7.7 K/uL Final     Comment:     The ANC is based on a white cell differential from an   automated cell counter. It has not been microscopically   reviewed for the presence of abnormal cells. Clinical   correlation is required.         Chemistry        Component Value Date/Time     01/03/2019 1400    K 4.1 01/03/2019 1400     01/03/2019 1400    CO2 26 01/03/2019 1400    BUN 20 01/03/2019 1400    CREATININE 1.5 (H) 01/03/2019 1400    GLU 77 01/03/2019 1400        Component Value Date/Time    CALCIUM 8.3 (L) 01/03/2019 1400    ALKPHOS 74 01/03/2019 1400    AST 18 01/03/2019 1400    ALT 14 01/03/2019 1400    BILITOT 0.3 01/03/2019 1400    ESTGFRAFRICA >60.0 01/03/2019 1400    EGFRNONAA 53.1 (A) 01/03/2019 1400        TSH   Date Value Ref Range Status   01/03/2019 1.276 0.400 - 4.000 uIU/mL Final     Free T4   Date Value Ref Range Status   01/03/2019 1.12 0.71 - 1.51 ng/dL Final        Assessment:       1. Tonsil cancer        Plan:        1. He is doing well and will proceed with Opdivo and Xgeva and will return in 2 weeks for next cycle    Above care plan was discussed with patient and accompanying brother and all questions were addressed to their satisfaction

## 2019-01-03 NOTE — PROGRESS NOTES
Mr. Whiting received influenza and prevnar 13 vaccines   Patient tolerated well.  Patient left unit in NAD.

## 2019-01-03 NOTE — PLAN OF CARE
Problem: Adult Inpatient Plan of Care  Goal: Plan of Care Review  Outcome: Ongoing (interventions implemented as appropriate)  Pt received opdivo; tolerated well. VSS and NAD. Pt instructed to call MD with any concerns. Pt discharged home independently.

## 2019-01-04 ENCOUNTER — TELEPHONE (OUTPATIENT)
Dept: HEMATOLOGY/ONCOLOGY | Facility: CLINIC | Age: 49
End: 2019-01-04

## 2019-01-04 ENCOUNTER — PATIENT MESSAGE (OUTPATIENT)
Dept: HEMATOLOGY/ONCOLOGY | Facility: CLINIC | Age: 49
End: 2019-01-04

## 2019-01-04 NOTE — TELEPHONE ENCOUNTER
Please schedule him for only labs and opdivo in 2 weeks---  Then 2 weeks AFTER that dose, repeat labs again, md appt, and opdivo.    ~sachin

## 2019-01-04 NOTE — TELEPHONE ENCOUNTER
----- Message from Cortney Lopez MD sent at 1/3/2019  4:00 PM CST -----  Schedule CBC,CMp and see me in 2 weeks and for Opdivo

## 2019-01-17 ENCOUNTER — INFUSION (OUTPATIENT)
Dept: INFUSION THERAPY | Facility: HOSPITAL | Age: 49
End: 2019-01-17
Attending: INTERNAL MEDICINE
Payer: COMMERCIAL

## 2019-01-17 VITALS
HEIGHT: 67 IN | RESPIRATION RATE: 18 BRPM | TEMPERATURE: 98 F | WEIGHT: 173.06 LBS | BODY MASS INDEX: 27.16 KG/M2 | HEART RATE: 52 BPM | SYSTOLIC BLOOD PRESSURE: 138 MMHG | DIASTOLIC BLOOD PRESSURE: 78 MMHG

## 2019-01-17 DIAGNOSIS — C09.9 TONSIL CANCER: Primary | ICD-10-CM

## 2019-01-17 DIAGNOSIS — C77.0 CANCER OF HEAD, FACE, OR NECK LYMPH NODES, SECONDARY: ICD-10-CM

## 2019-01-17 DIAGNOSIS — C09.9 SQUAMOUS CELL CARCINOMA OF PALATINE TONSIL: ICD-10-CM

## 2019-01-17 PROCEDURE — 63600175 PHARM REV CODE 636 W HCPCS: Mod: JG | Performed by: INTERNAL MEDICINE

## 2019-01-17 PROCEDURE — 25000003 PHARM REV CODE 250: Performed by: INTERNAL MEDICINE

## 2019-01-17 PROCEDURE — 96413 CHEMO IV INFUSION 1 HR: CPT

## 2019-01-17 RX ORDER — HEPARIN 100 UNIT/ML
500 SYRINGE INTRAVENOUS
Status: CANCELLED | OUTPATIENT
Start: 2019-01-17

## 2019-01-17 RX ORDER — SODIUM CHLORIDE 0.9 % (FLUSH) 0.9 %
10 SYRINGE (ML) INJECTION
Status: CANCELLED | OUTPATIENT
Start: 2019-01-17

## 2019-01-17 RX ADMIN — SODIUM CHLORIDE 240 MG: 9 INJECTION, SOLUTION INTRAVENOUS at 10:01

## 2019-01-17 NOTE — PLAN OF CARE
Problem: Nausea and Vomiting (Chemotherapy Effects)  Goal: Fluid and Electrolyte Balance  Outcome: Ongoing (interventions implemented as appropriate)  Patient here for Opdivo.  Assessment completed and labs reviewed.  VSS.  No needs at this time.  Chair reclined and blanket offered.  Will continue to monitor.

## 2019-01-17 NOTE — PLAN OF CARE
Problem: Adult Inpatient Plan of Care  Goal: Plan of Care Review  Outcome: Ongoing (interventions implemented as appropriate)  Patient tolerated infusion well.  No reactions noted.  No questions or concerns at this time.  Ambulated off unit unassisted.

## 2019-01-31 ENCOUNTER — INFUSION (OUTPATIENT)
Dept: INFUSION THERAPY | Facility: HOSPITAL | Age: 49
End: 2019-01-31
Attending: INTERNAL MEDICINE
Payer: COMMERCIAL

## 2019-01-31 ENCOUNTER — OFFICE VISIT (OUTPATIENT)
Dept: HEMATOLOGY/ONCOLOGY | Facility: CLINIC | Age: 49
End: 2019-01-31
Payer: COMMERCIAL

## 2019-01-31 VITALS
HEART RATE: 58 BPM | BODY MASS INDEX: 27.15 KG/M2 | DIASTOLIC BLOOD PRESSURE: 72 MMHG | WEIGHT: 173 LBS | HEIGHT: 67 IN | TEMPERATURE: 98 F | SYSTOLIC BLOOD PRESSURE: 124 MMHG | RESPIRATION RATE: 17 BRPM

## 2019-01-31 VITALS
RESPIRATION RATE: 18 BRPM | SYSTOLIC BLOOD PRESSURE: 130 MMHG | DIASTOLIC BLOOD PRESSURE: 77 MMHG | TEMPERATURE: 98 F | OXYGEN SATURATION: 100 % | HEIGHT: 67 IN | BODY MASS INDEX: 27.23 KG/M2 | HEART RATE: 55 BPM | WEIGHT: 173.5 LBS

## 2019-01-31 DIAGNOSIS — C09.9 SQUAMOUS CELL CARCINOMA OF PALATINE TONSIL: ICD-10-CM

## 2019-01-31 DIAGNOSIS — C09.9 SQUAMOUS CELL CARCINOMA OF PALATINE TONSIL: Primary | ICD-10-CM

## 2019-01-31 DIAGNOSIS — E07.9 THYROID DISORDER: ICD-10-CM

## 2019-01-31 DIAGNOSIS — C79.51 SECONDARY CANCER OF BONE: ICD-10-CM

## 2019-01-31 DIAGNOSIS — C77.0 CANCER OF HEAD, FACE, OR NECK LYMPH NODES, SECONDARY: ICD-10-CM

## 2019-01-31 DIAGNOSIS — C09.9 TONSIL CANCER: Primary | ICD-10-CM

## 2019-01-31 PROCEDURE — 99215 OFFICE O/P EST HI 40 MIN: CPT | Mod: S$GLB,,, | Performed by: INTERNAL MEDICINE

## 2019-01-31 PROCEDURE — 96413 CHEMO IV INFUSION 1 HR: CPT

## 2019-01-31 PROCEDURE — 63600175 PHARM REV CODE 636 W HCPCS: Mod: JG | Performed by: INTERNAL MEDICINE

## 2019-01-31 PROCEDURE — 99999 PR PBB SHADOW E&M-EST. PATIENT-LVL III: CPT | Mod: PBBFAC,,, | Performed by: INTERNAL MEDICINE

## 2019-01-31 PROCEDURE — 25000003 PHARM REV CODE 250: Performed by: INTERNAL MEDICINE

## 2019-01-31 PROCEDURE — 3008F PR BODY MASS INDEX (BMI) DOCUMENTED: ICD-10-PCS | Mod: CPTII,S$GLB,, | Performed by: INTERNAL MEDICINE

## 2019-01-31 PROCEDURE — 99999 PR PBB SHADOW E&M-EST. PATIENT-LVL III: ICD-10-PCS | Mod: PBBFAC,,, | Performed by: INTERNAL MEDICINE

## 2019-01-31 PROCEDURE — 99215 PR OFFICE/OUTPT VISIT, EST, LEVL V, 40-54 MIN: ICD-10-PCS | Mod: S$GLB,,, | Performed by: INTERNAL MEDICINE

## 2019-01-31 PROCEDURE — 3008F BODY MASS INDEX DOCD: CPT | Mod: CPTII,S$GLB,, | Performed by: INTERNAL MEDICINE

## 2019-01-31 RX ORDER — SODIUM CHLORIDE 0.9 % (FLUSH) 0.9 %
10 SYRINGE (ML) INJECTION
Status: DISCONTINUED | OUTPATIENT
Start: 2019-01-31 | End: 2019-01-31 | Stop reason: HOSPADM

## 2019-01-31 RX ORDER — HEPARIN 100 UNIT/ML
500 SYRINGE INTRAVENOUS
Status: DISCONTINUED | OUTPATIENT
Start: 2019-01-31 | End: 2019-01-31 | Stop reason: HOSPADM

## 2019-01-31 RX ORDER — HEPARIN 100 UNIT/ML
500 SYRINGE INTRAVENOUS
Status: CANCELLED | OUTPATIENT
Start: 2019-02-01

## 2019-01-31 RX ORDER — SODIUM CHLORIDE 0.9 % (FLUSH) 0.9 %
10 SYRINGE (ML) INJECTION
Status: CANCELLED | OUTPATIENT
Start: 2019-02-01

## 2019-01-31 RX ADMIN — SODIUM CHLORIDE 240 MG: 9 INJECTION, SOLUTION INTRAVENOUS at 12:01

## 2019-01-31 NOTE — PLAN OF CARE
Problem: Adult Inpatient Plan of Care  Goal: Plan of Care Review  Outcome: Ongoing (interventions implemented as appropriate)  Pt here for opdivo infusion, labs, hx, meds, allergies reviewed, pt with no complaints at this time, reclined in chair, continue to monitor

## 2019-01-31 NOTE — PROGRESS NOTES
Subjective:       Patient ID: Burton Whiting is a 49 y.o. male.    Chief Complaint: Tonsil cancer  Squamous cell carcinoma of palatine tonsil; g-tube malodorous; left neck pain 2/10; 4 cans formula per day; and r arm port---red/warm to touch  Mr. Burton Whiting is a 48-year-old male, former smoker, who presents today with a four-month history of enlarging neck mass.  He initially delayed care due to lack of insurance.  He was seen by Dr. Turpin who referred him to see Dr. Olguin.  He had a CT that showed a 3.8 x 3.8 x 4.9 cm soft tissue mass arising from the left palatine tonsil resulting in moderate narrowing of the hypopharyngeal airway adjacent extensive matted left cervical lymphadenopathy was noted.  The largest ella mass was 6.6 x 9 x 2.6 cm extending inferiorly to the supraclavicular region.  The mass pushed the trachea and the left common carotid artery to the right.  Additional representative of cervical lymph nodes measuring 2.9 x 3.1 x 3.5 cm on axial image 37 of series 3   and 2.4 x 2.1 x 2.2 cm on axial image 55 of series 3.  There is also a sclerotic lesion involving the midline of the clivus measuring 1.2 cm.  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  PET scan performed on 01/19/2018 revealed persistent evidence of a soft tissue mass arising from the left palatine tonsil resulting in moderate narrowing of the hypopharyngeal   airway.  The mass is hypermetabolic demonstrating an SUV max of 18.5.  There is also persistent adjacent extensive matted left cervical lymphadenopathy, largest of this measuring 6.7 x 8.42 with hypermetabolic activity and SUV max of 20.5.  Lymphadenopathy is again noted to have a mass effect on the trachea and left common carotid artery, pushing both structures towards the right.  There is also prominent right cervical lymph nodes with the largest measuring 0.8 cm and demonstrating mild hypermetabolic activity with SUV max of 1.8, physiologic  "  distribution of FDG in the gray matter.  There are 3 pulmonary nodules noted within the right lung, the largest is in the anterior segment of the right upper lobe measuring 1 cm, second is visualized in the middle lobe measuring 0.6 cm and the third is within the posterior basal segment measuring 0.6 cm.  There is one pulmonary nodule within the left lung within the lateral basal segment measuring 0.6 cm.  These nodules do not demonstrate hypermetabolic activity; however, they are below the threshold for observation with PET     HPIHe underwent MRI cervical spine revealed "No evidence of metastatic disease to the cervical spine. Multilevel degenerative changes of the cervical spine with disc protrusion at C5-6 which results in moderate to severe spinal canal stenosis and and associated cord signal abnormality, suggestive of compressive myelopathy. 6 mm T1 hypointense non-enhancing lesion in the clivus.  Continued followup is suggested. 9 mm inferior descent of the cerebellar tonsils below the foramen magnum, suggestive of Chiari 1 malformation"  MRI thoracic spine "No evidence of metastatic disease involving the thoracic spine. Incidental hemangioma involving the T7 vertebral body. Mild degenerative disc disease without any significant spinal canal stenosis or neuroforaminal narrowing.   He completed 3 cycles of TPF and 6 weeks of Cisplatin and RT. Completed on 6/26/18           He was hospitalized in [facility] and was seeing Dr. Bhatt. He saw the speech therapy and plan to start eating  His PET scan from 9/25/18 revealed "Progression of disease with multiple new hypermetabolic foci including the manubrium, mediastinal/retrocrural lymph nodes, and lungs. Partial therapeutic response within the presumed radiation field involving the left cervical mass, and complete therapeutic response in the right cervical lymph node, clivus, and left palatine tonsil. New soft tissue thickening and hypermetabolism in the left " "aryepiglottic fold and right cricoid cartilage"             HPI He comes in for Opdivo. He notes some arthritic pain in his hands in the morning  He denies any other issues. ECOG PS in zero. He is accompanied by his brother        Review of Systems   Constitutional: Negative for appetite change, fatigue and unexpected weight change.   HENT: Negative for mouth sores.    Eyes: Negative for visual disturbance.   Respiratory: Negative for cough and shortness of breath.    Cardiovascular: Negative for chest pain.   Gastrointestinal: Negative for abdominal pain and diarrhea.   Genitourinary: Negative for frequency.   Musculoskeletal: Negative for back pain.   Skin: Negative for rash.   Neurological: Negative for headaches.   Hematological: Negative for adenopathy.   Psychiatric/Behavioral: The patient is not nervous/anxious.    All other systems reviewed and are negative.      PMFSH: all information reviewed and updated as relevant to today's visit  Objective:      Physical Exam   Constitutional: He is oriented to person, place, and time. He appears well-developed and well-nourished.   HENT:   Mouth/Throat: No oropharyngeal exudate.   Cardiovascular: Normal rate and normal heart sounds.   Pulmonary/Chest: Effort normal and breath sounds normal. He has no wheezes.   Abdominal: Soft. Bowel sounds are normal. There is no tenderness.   Musculoskeletal: He exhibits no edema or tenderness.   Lymphadenopathy:     He has no cervical adenopathy.   Neurological: He is alert and oriented to person, place, and time. Coordination normal.   Skin: Skin is warm and dry. No rash noted.   Psychiatric: He has a normal mood and affect. Judgment and thought content normal.   Vitals reviewed.        LABS:  WBC   Date Value Ref Range Status   01/31/2019 4.08 3.90 - 12.70 K/uL Final     Hemoglobin   Date Value Ref Range Status   01/31/2019 9.7 (L) 14.0 - 18.0 g/dL Final     Hematocrit   Date Value Ref Range Status   01/31/2019 29.6 (L) 40.0 - " 54.0 % Final     Platelets   Date Value Ref Range Status   01/31/2019 161 150 - 350 K/uL Final     Gran # (ANC)   Date Value Ref Range Status   01/31/2019 3.0 1.8 - 7.7 K/uL Final     Comment:     The ANC is based on a white cell differential from an   automated cell counter. It has not been microscopically   reviewed for the presence of abnormal cells. Clinical   correlation is required.         Chemistry        Component Value Date/Time     01/31/2019 1010    K 4.3 01/31/2019 1010     01/31/2019 1010    CO2 25 01/31/2019 1010    BUN 23 (H) 01/31/2019 1010    CREATININE 1.4 01/31/2019 1010    GLU 83 01/31/2019 1010        Component Value Date/Time    CALCIUM 8.6 (L) 01/31/2019 1010    ALKPHOS 56 01/31/2019 1010    AST 23 01/31/2019 1010    ALT 15 01/31/2019 1010    BILITOT 0.3 01/31/2019 1010    ESTGFRAFRICA >60.0 01/31/2019 1010    EGFRNONAA 57.8 (A) 01/31/2019 1010        TSH   Date Value Ref Range Status   01/03/2019 1.276 0.400 - 4.000 uIU/mL Final     Free T4   Date Value Ref Range Status   01/03/2019 1.12 0.71 - 1.51 ng/dL Final        Assessment:       1. Squamous cell carcinoma of palatine tonsil    2. Cancer of head, face, or neck lymph nodes, secondary    3. Secondary cancer of bone    4. Thyroid disorder        Plan:        1,2,3,he is doing well clinically and will proceed with opdivo and will return in 2 weeks for next cycle and also Xgeva  4. Check TSH and free T4 in 2 weeks    Above care plan was discussed with patient and accompanying brother and all questions were addressed to their satisfaction

## 2019-01-31 NOTE — PLAN OF CARE
Problem: Adult Inpatient Plan of Care  Goal: Plan of Care Review  Outcome: Ongoing (interventions implemented as appropriate)  Pt tolerated opdivo infusion without issue, pt has no upcoming appts scheduled at this time, no distress noted upon d/c to home with brother

## 2019-02-12 DIAGNOSIS — C79.51 SECONDARY CANCER OF BONE: Primary | ICD-10-CM

## 2019-02-14 ENCOUNTER — OFFICE VISIT (OUTPATIENT)
Dept: HEMATOLOGY/ONCOLOGY | Facility: CLINIC | Age: 49
End: 2019-02-14
Payer: COMMERCIAL

## 2019-02-14 ENCOUNTER — PATIENT MESSAGE (OUTPATIENT)
Dept: HEMATOLOGY/ONCOLOGY | Facility: CLINIC | Age: 49
End: 2019-02-14

## 2019-02-14 ENCOUNTER — LAB VISIT (OUTPATIENT)
Dept: LAB | Facility: HOSPITAL | Age: 49
End: 2019-02-14
Attending: INTERNAL MEDICINE
Payer: COMMERCIAL

## 2019-02-14 ENCOUNTER — TELEPHONE (OUTPATIENT)
Dept: PHARMACY | Facility: CLINIC | Age: 49
End: 2019-02-14

## 2019-02-14 ENCOUNTER — INFUSION (OUTPATIENT)
Dept: INFUSION THERAPY | Facility: HOSPITAL | Age: 49
End: 2019-02-14
Attending: INTERNAL MEDICINE
Payer: COMMERCIAL

## 2019-02-14 VITALS
OXYGEN SATURATION: 100 % | DIASTOLIC BLOOD PRESSURE: 92 MMHG | WEIGHT: 177.94 LBS | TEMPERATURE: 98 F | HEIGHT: 67 IN | BODY MASS INDEX: 27.93 KG/M2 | RESPIRATION RATE: 20 BRPM | HEART RATE: 56 BPM | SYSTOLIC BLOOD PRESSURE: 147 MMHG

## 2019-02-14 VITALS
TEMPERATURE: 98 F | OXYGEN SATURATION: 100 % | DIASTOLIC BLOOD PRESSURE: 81 MMHG | RESPIRATION RATE: 18 BRPM | HEART RATE: 58 BPM | SYSTOLIC BLOOD PRESSURE: 148 MMHG

## 2019-02-14 DIAGNOSIS — C77.0 CANCER OF HEAD, FACE, OR NECK LYMPH NODES, SECONDARY: ICD-10-CM

## 2019-02-14 DIAGNOSIS — C09.9 SQUAMOUS CELL CARCINOMA OF PALATINE TONSIL: ICD-10-CM

## 2019-02-14 DIAGNOSIS — C09.9 TONSIL CANCER: Primary | ICD-10-CM

## 2019-02-14 DIAGNOSIS — E07.9 THYROID DISORDER: ICD-10-CM

## 2019-02-14 DIAGNOSIS — C09.9 SQUAMOUS CELL CARCINOMA OF PALATINE TONSIL: Primary | ICD-10-CM

## 2019-02-14 DIAGNOSIS — C79.51 SECONDARY CANCER OF BONE: ICD-10-CM

## 2019-02-14 LAB
ALBUMIN SERPL BCP-MCNC: 4 G/DL
ALP SERPL-CCNC: 59 U/L
ALT SERPL W/O P-5'-P-CCNC: 18 U/L
ANION GAP SERPL CALC-SCNC: 7 MMOL/L
AST SERPL-CCNC: 21 U/L
BILIRUB SERPL-MCNC: 0.3 MG/DL
BUN SERPL-MCNC: 29 MG/DL
CALCIUM SERPL-MCNC: 8.6 MG/DL
CHLORIDE SERPL-SCNC: 107 MMOL/L
CO2 SERPL-SCNC: 24 MMOL/L
CREAT SERPL-MCNC: 1.5 MG/DL
ERYTHROCYTE [DISTWIDTH] IN BLOOD BY AUTOMATED COUNT: 12.4 %
EST. GFR  (AFRICAN AMERICAN): >60 ML/MIN/1.73 M^2
EST. GFR  (NON AFRICAN AMERICAN): 53.1 ML/MIN/1.73 M^2
GLUCOSE SERPL-MCNC: 92 MG/DL
HCT VFR BLD AUTO: 32.8 %
HGB BLD-MCNC: 10.6 G/DL
IMM GRANULOCYTES # BLD AUTO: 0 K/UL
MCH RBC QN AUTO: 32.3 PG
MCHC RBC AUTO-ENTMCNC: 32.3 G/DL
MCV RBC AUTO: 100 FL
NEUTROPHILS # BLD AUTO: 2.4 K/UL
PLATELET # BLD AUTO: 138 K/UL
PMV BLD AUTO: 8.9 FL
POTASSIUM SERPL-SCNC: 4.9 MMOL/L
PROT SERPL-MCNC: 6.5 G/DL
RBC # BLD AUTO: 3.28 M/UL
SODIUM SERPL-SCNC: 138 MMOL/L
T4 FREE SERPL-MCNC: 0.94 NG/DL
TSH SERPL DL<=0.005 MIU/L-ACNC: 1.51 UIU/ML
WBC # BLD AUTO: 3.32 K/UL

## 2019-02-14 PROCEDURE — 36415 COLL VENOUS BLD VENIPUNCTURE: CPT

## 2019-02-14 PROCEDURE — 84443 ASSAY THYROID STIM HORMONE: CPT

## 2019-02-14 PROCEDURE — 96413 CHEMO IV INFUSION 1 HR: CPT

## 2019-02-14 PROCEDURE — 99999 PR PBB SHADOW E&M-EST. PATIENT-LVL IV: ICD-10-PCS | Mod: PBBFAC,,, | Performed by: INTERNAL MEDICINE

## 2019-02-14 PROCEDURE — 84439 ASSAY OF FREE THYROXINE: CPT

## 2019-02-14 PROCEDURE — 99999 PR PBB SHADOW E&M-EST. PATIENT-LVL IV: CPT | Mod: PBBFAC,,, | Performed by: INTERNAL MEDICINE

## 2019-02-14 PROCEDURE — 85027 COMPLETE CBC AUTOMATED: CPT

## 2019-02-14 PROCEDURE — 99215 PR OFFICE/OUTPT VISIT, EST, LEVL V, 40-54 MIN: ICD-10-PCS | Mod: S$GLB,,, | Performed by: INTERNAL MEDICINE

## 2019-02-14 PROCEDURE — 80053 COMPREHEN METABOLIC PANEL: CPT

## 2019-02-14 PROCEDURE — 3008F PR BODY MASS INDEX (BMI) DOCUMENTED: ICD-10-PCS | Mod: CPTII,S$GLB,, | Performed by: INTERNAL MEDICINE

## 2019-02-14 PROCEDURE — 25000003 PHARM REV CODE 250: Performed by: INTERNAL MEDICINE

## 2019-02-14 PROCEDURE — 99215 OFFICE O/P EST HI 40 MIN: CPT | Mod: S$GLB,,, | Performed by: INTERNAL MEDICINE

## 2019-02-14 PROCEDURE — 63600175 PHARM REV CODE 636 W HCPCS: Mod: JG | Performed by: INTERNAL MEDICINE

## 2019-02-14 PROCEDURE — 3008F BODY MASS INDEX DOCD: CPT | Mod: CPTII,S$GLB,, | Performed by: INTERNAL MEDICINE

## 2019-02-14 RX ORDER — HEPARIN 100 UNIT/ML
500 SYRINGE INTRAVENOUS
Status: DISCONTINUED | OUTPATIENT
Start: 2019-02-14 | End: 2019-02-14 | Stop reason: HOSPADM

## 2019-02-14 RX ORDER — SODIUM CHLORIDE 0.9 % (FLUSH) 0.9 %
10 SYRINGE (ML) INJECTION
Status: DISCONTINUED | OUTPATIENT
Start: 2019-02-14 | End: 2019-02-14 | Stop reason: HOSPADM

## 2019-02-14 RX ORDER — HEPARIN 100 UNIT/ML
500 SYRINGE INTRAVENOUS
Status: CANCELLED | OUTPATIENT
Start: 2019-02-16

## 2019-02-14 RX ORDER — SODIUM CHLORIDE 0.9 % (FLUSH) 0.9 %
10 SYRINGE (ML) INJECTION
Status: CANCELLED | OUTPATIENT
Start: 2019-02-16

## 2019-02-14 RX ADMIN — SODIUM CHLORIDE: 0.9 INJECTION, SOLUTION INTRAVENOUS at 10:02

## 2019-02-14 RX ADMIN — SODIUM CHLORIDE 240 MG: 9 INJECTION, SOLUTION INTRAVENOUS at 10:02

## 2019-02-14 NOTE — PROGRESS NOTES
Subjective:       Patient ID: Burton Whiting is a 49 y.o. male.    Chief Complaint: Squamous cell carcinoma of palatine tonsil  Squamous cell carcinoma of palatine tonsil; g-tube malodorous; left neck pain 2/10; 4 cans formula per day; and r arm port---red/warm to touch  Mr. Burton Whiting is a 48-year-old male, former smoker, who presents today with a four-month history of enlarging neck mass.  He initially delayed care due to lack of insurance.  He was seen by Dr. Turpin who referred him to see Dr. Olguin.  He had a CT that showed a 3.8 x 3.8 x 4.9 cm soft tissue mass arising from the left palatine tonsil resulting in moderate narrowing of the hypopharyngeal airway adjacent extensive matted left cervical lymphadenopathy was noted.  The largest ella mass was 6.6 x 9 x 2.6 cm extending inferiorly to the supraclavicular region.  The mass pushed the trachea and the left common carotid artery to the right.  Additional representative of cervical lymph nodes measuring 2.9 x 3.1 x 3.5 cm on axial image 37 of series 3   and 2.4 x 2.1 x 2.2 cm on axial image 55 of series 3.  There is also a sclerotic lesion involving the midline of the clivus measuring 1.2 cm.  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  PET scan performed on 01/19/2018 revealed persistent evidence of a soft tissue mass arising from the left palatine tonsil resulting in moderate narrowing of the hypopharyngeal   airway.  The mass is hypermetabolic demonstrating an SUV max of 18.5.  There is also persistent adjacent extensive matted left cervical lymphadenopathy, largest of this measuring 6.7 x 8.42 with hypermetabolic activity and SUV max of 20.5.  Lymphadenopathy is again noted to have a mass effect on the trachea and left common carotid artery, pushing both structures towards the right.  There is also prominent right cervical lymph nodes with the largest measuring 0.8 cm and demonstrating mild hypermetabolic activity with SUV max  "of 1.8, physiologic   distribution of FDG in the gray matter.  There are 3 pulmonary nodules noted within the right lung, the largest is in the anterior segment of the right upper lobe measuring 1 cm, second is visualized in the middle lobe measuring 0.6 cm and the third is within the posterior basal segment measuring 0.6 cm.  There is one pulmonary nodule within the left lung within the lateral basal segment measuring 0.6 cm.  These nodules do not demonstrate hypermetabolic activity; however, they are below the threshold for observation with PET     HPIHe underwent MRI cervical spine revealed "No evidence of metastatic disease to the cervical spine. Multilevel degenerative changes of the cervical spine with disc protrusion at C5-6 which results in moderate to severe spinal canal stenosis and and associated cord signal abnormality, suggestive of compressive myelopathy. 6 mm T1 hypointense non-enhancing lesion in the clivus.  Continued followup is suggested. 9 mm inferior descent of the cerebellar tonsils below the foramen magnum, suggestive of Chiari 1 malformation"  MRI thoracic spine "No evidence of metastatic disease involving the thoracic spine. Incidental hemangioma involving the T7 vertebral body. Mild degenerative disc disease without any significant spinal canal stenosis or neuroforaminal narrowing.   He completed 3 cycles of TPF and 6 weeks of Cisplatin and RT. Completed on 6/26/18           He was hospitalized in [facility] and was seeing Dr. Bhatt. He saw the speech therapy and plan to start eating  His PET scan from 9/25/18 revealed "Progression of disease with multiple new hypermetabolic foci including the manubrium, mediastinal/retrocrural lymph nodes, and lungs. Partial therapeutic response within the presumed radiation field involving the left cervical mass, and complete therapeutic response in the right cervical lymph node, clivus, and left palatine tonsil. New soft tissue thickening and " "hypermetabolism in the left aryepiglottic fold and right cricoid cartilage"               HPI He comes in for Opdivo. He feels well and denies any new complaints. ECOG PS is zero. He is accompanied by his brother      Review of Systems   Constitutional: Negative for appetite change, fatigue and unexpected weight change.   HENT: Negative for mouth sores.    Eyes: Negative for visual disturbance.   Respiratory: Negative for cough and shortness of breath.    Cardiovascular: Negative for chest pain.   Gastrointestinal: Negative for abdominal pain and diarrhea.   Genitourinary: Negative for frequency.   Musculoskeletal: Negative for back pain.   Skin: Negative for rash.   Neurological: Negative for headaches.   Hematological: Negative for adenopathy.   Psychiatric/Behavioral: The patient is not nervous/anxious.    All other systems reviewed and are negative.      PMFSH: all information reviewed and updated as relevant to today's visit  Objective:      Physical Exam   Constitutional: He is oriented to person, place, and time. He appears well-developed and well-nourished.   HENT:   Mouth/Throat: No oropharyngeal exudate.   Cardiovascular: Normal rate and normal heart sounds.   Pulmonary/Chest: Effort normal and breath sounds normal. He has no wheezes.   Abdominal: Soft. Bowel sounds are normal. There is no tenderness.   Musculoskeletal: He exhibits no edema or tenderness.   Lymphadenopathy:     He has no cervical adenopathy.   Neurological: He is alert and oriented to person, place, and time. Coordination normal.   Skin: Skin is warm and dry. No rash noted.   Psychiatric: He has a normal mood and affect. Judgment and thought content normal.   Vitals reviewed.      LABS:  WBC   Date Value Ref Range Status   02/14/2019 3.32 (L) 3.90 - 12.70 K/uL Final     Hemoglobin   Date Value Ref Range Status   02/14/2019 10.6 (L) 14.0 - 18.0 g/dL Final     Hematocrit   Date Value Ref Range Status   02/14/2019 32.8 (L) 40.0 - 54.0 % Final "     Platelets   Date Value Ref Range Status   02/14/2019 138 (L) 150 - 350 K/uL Final     Gran # (ANC)   Date Value Ref Range Status   02/14/2019 2.4 1.8 - 7.7 K/uL Final     Comment:     The ANC is based on a white cell differential from an   automated cell counter. It has not been microscopically   reviewed for the presence of abnormal cells. Clinical   correlation is required.         Chemistry        Component Value Date/Time     02/14/2019 0827    K 4.9 02/14/2019 0827     02/14/2019 0827    CO2 24 02/14/2019 0827    BUN 29 (H) 02/14/2019 0827    CREATININE 1.5 (H) 02/14/2019 0827    GLU 92 02/14/2019 0827        Component Value Date/Time    CALCIUM 8.6 (L) 02/14/2019 0827    ALKPHOS 59 02/14/2019 0827    AST 21 02/14/2019 0827    ALT 18 02/14/2019 0827    BILITOT 0.3 02/14/2019 0827    ESTGFRAFRICA >60.0 02/14/2019 0827    EGFRNONAA 53.1 (A) 02/14/2019 0827        TSH   Date Value Ref Range Status   02/14/2019 1.509 0.400 - 4.000 uIU/mL Final     Free T4   Date Value Ref Range Status   02/14/2019 0.94 0.71 - 1.51 ng/dL Final       Assessment:       1. Squamous cell carcinoma of palatine tonsil    2. Cancer of head, face, or neck lymph nodes, secondary    3. Secondary cancer of bone        Plan:        1,2,3. He will proceed with Opdivo and will return in 2 weeks for next cycle  His scans will be due in early MArch    Above care plan was discussed with patient and accompanying brother and all questions were addressed to their satisfaction

## 2019-02-14 NOTE — TELEPHONE ENCOUNTER
No initial call made as patient presented to [facility] to  rx for Xgeva.  Informed him that it requires authorization from the insurance and we will reach out once approved.

## 2019-02-14 NOTE — PLAN OF CARE
Problem: Neurotoxicity (Chemotherapy Effects)  Goal: Neurotoxicity Symptom Control    Intervention: Prevent or Manage Neurotoxicity Effects  Pt admitted for Opdivo infusion, ambulated onto unit unassisted accompanied by brother. Labs reviewed  and PIV started in RT arm. 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 Pt tolerating Opdivo well. Just reports fatigue. Frequent rest periods recommended during course of day.

## 2019-02-14 NOTE — PLAN OF CARE
Problem: Adult Inpatient Plan of Care  Goal: Plan of Care Review  Outcome: Ongoing (interventions implemented as appropriate)  Pt completed Opdivo infusion, tolerated well.Plan of care reviewed with Pt and Pt instructed to contact MD with any further concerns or questions, he verbalized understanding. PIV removed, catheter tip intact. Pt ambulated off of unit unassisted, accompanied by brother

## 2019-02-15 ENCOUNTER — PATIENT MESSAGE (OUTPATIENT)
Dept: HEMATOLOGY/ONCOLOGY | Facility: CLINIC | Age: 49
End: 2019-02-15

## 2019-02-15 ENCOUNTER — PATIENT MESSAGE (OUTPATIENT)
Dept: ADMINISTRATIVE | Facility: OTHER | Age: 49
End: 2019-02-15

## 2019-02-15 DIAGNOSIS — C79.51 SECONDARY CANCER OF BONE: Primary | ICD-10-CM

## 2019-02-15 NOTE — TELEPHONE ENCOUNTER
DOCUMENTATION ONLY:  Prior authorization for XGEVA approved from 02/15/2019 to 02/15/2020    Co-pay: $25.00    Patient Assistance IS  Required. CHAIM      FOR DOCUMENTATION ONLY:  Financial Assistance for Xgeva approved from 02/15/2019 to 02/15/2020  Source: [employer] Copay for Xgeva  BIN: [id number]  ID: [id number]  CHAIM

## 2019-02-16 NOTE — PROGRESS NOTES
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 Patient also has a recent history of sepsis, diverticulitis, colitis and is s/p PEG tube. He presents to the [facility] ED today in [place] accompanied by his sister with complaints of malaise, fatigue, lethargy, intractable n/v occurring several times per day over the last 4 days. He reports mild epigastric discomfort, but denies focal pain. He has been getting Gatorade for rehydration via the PEG, but all pain control and antiemetics at home (Zofran/ Phenergan syrup) have failed due to retching and vomiting of stomach contents. Vomit has been clear to bilious, no current hematemesis. Patient reports cold chills, denies fevers and rigors. He complains of pain in his L tonsil, denies diarrhea. His sister notes lethargy with some confusion, but reports no focal deficits, overt delirium. Patient fell on his knee at the ED prior to arrival, reports some gait instability and falls preceding admission.     Onc hx per Dr. Lopez: Mr. Burton Whiting is a 48-year-old male, former smoker, who presents today with a four-month history of enlarging neck mass.  He initially delayed care due to lack of insurance.  He was seen by Dr. Turpin who referred him to see Dr. Olguin.  He had a CT that showed a 3.8 x 3.8 x 4.9 cm soft tissue mass arising from the left palatine tonsil resulting in moderate narrowing of the hypopharyngeal airway adjacent extensive matted left cervical lymphadenopathy was noted.  The largest ella mass was 6.6 x 9 x 2.6 cm extending inferiorly to the supraclavicular region.  The mass pushed the trachea and the left common carotid artery to the  right.  Additional representative of cervical lymph nodes measuring 2.9 x 3.1 x 3.5 cm on axial image 37 of series 3   and 2.4 x 2.1 x 2.2 cm on axial image 55 of series 3.  There is also a sclerotic lesion involving the midline of the clivus measuring 1.2 cm.  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  PET scan performed on 01/19/2018 revealed persistent evidence of a soft tissue mass arising from the left palatine tonsil resulting in moderate narrowing of the hypopharyngeal   airway.  The mass is hypermetabolic demonstrating an SUV max of 18.5.  There is also persistent adjacent extensive matted left cervical lymphadenopathy, largest of this measuring 6.7 x 8.42 with hypermetabolic activity and SUV max of 20.5.  Lymphadenopathy is again noted to have a mass effect on the trachea and left common carotid artery, pushing both structures towards the right.  There is also prominent right cervical lymph nodes with the largest measuring 0.8 cm and demonstrating mild hypermetabolic activity with SUV max of 1.8, physiologic   distribution of FDG in the gray matter.  There are 3 pulmonary nodules noted within the right lung, the largest is in the anterior segment of the right upper lobe measuring 1 cm, second is visualized in the middle lobe measuring 0.6 cm and the third is within the posterior basal segment measuring 0.6 cm.  There is one pulmonary nodule within the left lung within the lateral basal segment measuring 0.6 cm.  These nodules do not demonstrate hypermetabolic activity; however, they are below the threshold for observation with PET    Interval History: Vomited twice in past 24 hours. Denies dysuria. Denies constipation or diarrhea.    Oncology Treatment Plan:   OP CISPLATIN (Weekly)    Medications:  Continuous Infusions:  Scheduled Meds:   duke's soln (benadryl 30 mL, mylanta 30 mL, lidocane 30 mL, nystatin 30 mL) 120 mL  10 mL Oral QID    fluconazole 40 mg/ml  200 mg Per G Tube  Daily    heparin (porcine)  5,000 Units Subcutaneous Q8H    lactulose  20 g Per G Tube TID     PRN Meds:dextrose 50%, dextrose 50%, glucagon (human recombinant), glucose, glucose, ondansetron, oxyCODONE, polyethylene glycol, prochlorperazine, senna-docusate 8.6-50 mg, sodium chloride 0.9%     Review of Systems   Constitutional: Negative for appetite change, fever and unexpected weight change.   HENT: Positive for sore throat and trouble swallowing.    Respiratory: Negative for cough, chest tightness and shortness of breath.    Gastrointestinal: Negative for abdominal distention, abdominal pain, nausea and vomiting.   Genitourinary: Negative for dysuria.   Skin: Negative for pallor.   Neurological: Positive for weakness. Negative for dizziness and light-headedness.   Psychiatric/Behavioral: Negative for confusion.     Objective:     Vital Signs (Most Recent):  Temp: 96.2 °F (35.7 °C) (07/04/18 1135)  Pulse: 87 (07/04/18 1135)  Resp: 18 (07/04/18 1135)  BP: 127/77 (07/04/18 1135)  SpO2: 96 % (07/04/18 1135) Vital Signs (24h Range):  Temp:  [96.2 °F (35.7 °C)-98.9 °F (37.2 °C)] 96.2 °F (35.7 °C)  Pulse:  [69-96] 87  Resp:  [16-18] 18  SpO2:  [95 %-98 %] 96 %  BP: (113-163)/() 127/77     Weight: 85.7 kg (188 lb 14.4 oz)  Body mass index is 28.72 kg/m².  Body surface area is 2.03 meters squared.      Intake/Output Summary (Last 24 hours) at 07/04/18 1139  Last data filed at 07/04/18 1052   Gross per 24 hour   Intake             1560 ml   Output                0 ml   Net             1560 ml       Physical Exam   Constitutional: He is oriented to person, place, and time. No distress.   AOx3   HENT:   Right Ear: External ear normal.   Left Ear: External ear normal.   Mouth/Throat: Oropharyngeal exudate present.   Thrush  Oneil appearance to head neck  Hyperemic palate    Eyes: EOM are normal. Pupils are equal, round, and reactive to light. Right eye exhibits no discharge. Left eye exhibits no discharge. No scleral  icterus.   Neck: Normal range of motion. No JVD present. No tracheal deviation present.   Cardiovascular: Regular rhythm, normal heart sounds and intact distal pulses.  Exam reveals no gallop and no friction rub.    No murmur heard.  Pulmonary/Chest: Effort normal. No respiratory distress. He has no wheezes. He has rales (bilateral; unchanged from previous exam). He exhibits no tenderness.   Abdominal: Soft. Bowel sounds are normal. He exhibits no distension and no mass. There is no tenderness. There is no rebound and no guarding.   PEG site cdi    Musculoskeletal: Normal range of motion. He exhibits no edema or tenderness.   Neurological: He is alert and oriented to person, place, and time. No cranial nerve deficit. Coordination normal.   Skin: Skin is warm. Capillary refill takes less than 2 seconds. No rash noted. He is not diaphoretic. No erythema. No pallor.   Psychiatric: He has a normal mood and affect.       Significant Labs:   CBC:     Recent Labs  Lab 07/03/18  0810   WBC 2.74*   HGB 8.4*   HCT 23.1*       and CMP:     Recent Labs  Lab 07/03/18  0358 07/04/18  0420    138   K 3.1* 3.5   CL 99 100   CO2 26 28   GLU 79 82   BUN 15 13   CREATININE 1.1 1.4   CALCIUM 8.6* 8.7   PROT 6.1 6.2   ALBUMIN 3.3* 3.3*   BILITOT 0.7 0.6   ALKPHOS 84 77   AST 18 13   ALT 18 15   ANIONGAP 13 10   EGFRNONAA >60.0 58.2*       Diagnostic Results:  None    Assessment/Plan:     * Intractable vomiting with nausea    Admitted because failed enteral intake of home Zofran/ Phenergan via PEG  -n/v improving with antiemetics  -s/p IVF; tolerating liquid diet  -CT scans at OSH show no foci of infection, follow blood cultures from OSH  -patient back on Zofran/Phenergan with improving control of N/S (2 episodes in past 24 hours)        Macrocytic anemia    -HB stable. Replete B vitamins when PO stable.           Status post insertion of percutaneous endoscopic gastrostomy (PEG) tube    -on clear fluids  -continue with tube  feeds  -lactulose for constipation  -CT ap stable         Electrolyte abnormality    -replete PRN, presented with metabolic alkalosis per CMP with GI losses.   -K, Mg, Ca trending. Check 12 lead EKG  -ordered K and Mg replacements         Thrush    -day 5 on fluconazole gtube 200mg QD.   -Plan for 14 days of treatment.   -Strep throat swab negative        Squamous cell carcinoma of palatine tonsil    -dysphagia, odynophagia with known SCC w/ mets and XRT, Onc history as listed.  -likely presenting with platinum based chemotherapy induced n/v  -clear fluids diet  -supportive care  -patient still experiencing dysphagia with solids, will continue him on liquid diet.                  Kevon Servin MD  Hematology/Oncology  [facility]     pt has ICH. came for monitoring. pt ETOH

## 2019-02-21 ENCOUNTER — TELEPHONE (OUTPATIENT)
Dept: HEMATOLOGY/ONCOLOGY | Facility: CLINIC | Age: 49
End: 2019-02-21

## 2019-02-21 NOTE — TELEPHONE ENCOUNTER
----- Message from William Daigle sent at 2019  2:58 PM CST -----  Contact: [facility]   Pharmacy needs clarification on Rx denosumab (XGEVA) 120 mg/1.7 mL (70 mg/mL) Soln ()    Contact:: [phone] Ref [id number]

## 2019-02-27 ENCOUNTER — TELEPHONE (OUTPATIENT)
Dept: HEMATOLOGY/ONCOLOGY | Facility: CLINIC | Age: 49
End: 2019-02-27

## 2019-02-27 NOTE — TELEPHONE ENCOUNTER
spoke with pt on today in regards to rescheduling appointments for Friday 03/1/19, pt is aware and has confirm.

## 2019-03-01 ENCOUNTER — OFFICE VISIT (OUTPATIENT)
Dept: HEMATOLOGY/ONCOLOGY | Facility: CLINIC | Age: 49
End: 2019-03-01
Payer: COMMERCIAL

## 2019-03-01 ENCOUNTER — INFUSION (OUTPATIENT)
Dept: INFUSION THERAPY | Facility: HOSPITAL | Age: 49
End: 2019-03-01
Attending: INTERNAL MEDICINE
Payer: COMMERCIAL

## 2019-03-01 ENCOUNTER — LAB VISIT (OUTPATIENT)
Dept: LAB | Facility: HOSPITAL | Age: 49
End: 2019-03-01
Attending: INTERNAL MEDICINE
Payer: COMMERCIAL

## 2019-03-01 VITALS
OXYGEN SATURATION: 100 % | BODY MASS INDEX: 27.44 KG/M2 | SYSTOLIC BLOOD PRESSURE: 151 MMHG | RESPIRATION RATE: 18 BRPM | HEART RATE: 48 BPM | DIASTOLIC BLOOD PRESSURE: 80 MMHG | HEIGHT: 67 IN | TEMPERATURE: 98 F | WEIGHT: 174.81 LBS

## 2019-03-01 VITALS
DIASTOLIC BLOOD PRESSURE: 82 MMHG | WEIGHT: 174.81 LBS | BODY MASS INDEX: 27.44 KG/M2 | HEIGHT: 67 IN | RESPIRATION RATE: 18 BRPM | TEMPERATURE: 98 F | HEART RATE: 52 BPM | SYSTOLIC BLOOD PRESSURE: 143 MMHG

## 2019-03-01 DIAGNOSIS — C09.9 SQUAMOUS CELL CARCINOMA OF PALATINE TONSIL: ICD-10-CM

## 2019-03-01 DIAGNOSIS — G62.0 CHEMOTHERAPY-INDUCED NEUROPATHY: ICD-10-CM

## 2019-03-01 DIAGNOSIS — C09.9 SQUAMOUS CELL CARCINOMA OF PALATINE TONSIL: Primary | ICD-10-CM

## 2019-03-01 DIAGNOSIS — C79.51 SECONDARY CANCER OF BONE: ICD-10-CM

## 2019-03-01 DIAGNOSIS — N17.9 ACUTE RENAL FAILURE, UNSPECIFIED ACUTE RENAL FAILURE TYPE: ICD-10-CM

## 2019-03-01 DIAGNOSIS — C77.0 CANCER OF HEAD, FACE, OR NECK LYMPH NODES, SECONDARY: ICD-10-CM

## 2019-03-01 DIAGNOSIS — C09.9 TONSIL CANCER: Primary | ICD-10-CM

## 2019-03-01 DIAGNOSIS — C77.1 SECONDARY MALIGNANCY OF MEDIASTINAL LYMPH NODES: ICD-10-CM

## 2019-03-01 DIAGNOSIS — T45.1X5A CHEMOTHERAPY-INDUCED NEUROPATHY: ICD-10-CM

## 2019-03-01 DIAGNOSIS — C09.9 TONSIL CANCER: ICD-10-CM

## 2019-03-01 LAB
ALBUMIN SERPL BCP-MCNC: 4.1 G/DL
ALP SERPL-CCNC: 64 U/L
ALT SERPL W/O P-5'-P-CCNC: 16 U/L
ANION GAP SERPL CALC-SCNC: 7 MMOL/L
AST SERPL-CCNC: 19 U/L
BILIRUB SERPL-MCNC: 0.4 MG/DL
BUN SERPL-MCNC: 30 MG/DL
CALCIUM SERPL-MCNC: 9.1 MG/DL
CHLORIDE SERPL-SCNC: 106 MMOL/L
CO2 SERPL-SCNC: 26 MMOL/L
CREAT SERPL-MCNC: 1.5 MG/DL
ERYTHROCYTE [DISTWIDTH] IN BLOOD BY AUTOMATED COUNT: 12.4 %
EST. GFR  (AFRICAN AMERICAN): >60 ML/MIN/1.73 M^2
EST. GFR  (NON AFRICAN AMERICAN): 53.1 ML/MIN/1.73 M^2
GLUCOSE SERPL-MCNC: 83 MG/DL
HCT VFR BLD AUTO: 33.8 %
HGB BLD-MCNC: 11 G/DL
IMM GRANULOCYTES # BLD AUTO: 0.01 K/UL
MCH RBC QN AUTO: 32.7 PG
MCHC RBC AUTO-ENTMCNC: 32.5 G/DL
MCV RBC AUTO: 101 FL
NEUTROPHILS # BLD AUTO: 2.7 K/UL
PLATELET # BLD AUTO: 157 K/UL
PMV BLD AUTO: 9.7 FL
POTASSIUM SERPL-SCNC: 4.5 MMOL/L
PROT SERPL-MCNC: 6.8 G/DL
RBC # BLD AUTO: 3.36 M/UL
SODIUM SERPL-SCNC: 139 MMOL/L
WBC # BLD AUTO: 3.7 K/UL

## 2019-03-01 PROCEDURE — 99999 PR PBB SHADOW E&M-EST. PATIENT-LVL III: CPT | Mod: PBBFAC,,, | Performed by: INTERNAL MEDICINE

## 2019-03-01 PROCEDURE — 63600175 PHARM REV CODE 636 W HCPCS: Mod: JG | Performed by: INTERNAL MEDICINE

## 2019-03-01 PROCEDURE — 3008F BODY MASS INDEX DOCD: CPT | Mod: CPTII,S$GLB,, | Performed by: INTERNAL MEDICINE

## 2019-03-01 PROCEDURE — 99215 OFFICE O/P EST HI 40 MIN: CPT | Mod: S$GLB,,, | Performed by: INTERNAL MEDICINE

## 2019-03-01 PROCEDURE — 80053 COMPREHEN METABOLIC PANEL: CPT

## 2019-03-01 PROCEDURE — 99999 PR PBB SHADOW E&M-EST. PATIENT-LVL III: ICD-10-PCS | Mod: PBBFAC,,, | Performed by: INTERNAL MEDICINE

## 2019-03-01 PROCEDURE — 96413 CHEMO IV INFUSION 1 HR: CPT

## 2019-03-01 PROCEDURE — 99215 PR OFFICE/OUTPT VISIT, EST, LEVL V, 40-54 MIN: ICD-10-PCS | Mod: S$GLB,,, | Performed by: INTERNAL MEDICINE

## 2019-03-01 PROCEDURE — 36415 COLL VENOUS BLD VENIPUNCTURE: CPT

## 2019-03-01 PROCEDURE — 3008F PR BODY MASS INDEX (BMI) DOCUMENTED: ICD-10-PCS | Mod: CPTII,S$GLB,, | Performed by: INTERNAL MEDICINE

## 2019-03-01 PROCEDURE — 85027 COMPLETE CBC AUTOMATED: CPT

## 2019-03-01 PROCEDURE — 25000003 PHARM REV CODE 250: Performed by: INTERNAL MEDICINE

## 2019-03-01 RX ORDER — SODIUM CHLORIDE 0.9 % (FLUSH) 0.9 %
10 SYRINGE (ML) INJECTION
Status: DISCONTINUED | OUTPATIENT
Start: 2019-03-01 | End: 2019-03-01 | Stop reason: HOSPADM

## 2019-03-01 RX ORDER — SODIUM CHLORIDE 0.9 % (FLUSH) 0.9 %
10 SYRINGE (ML) INJECTION
Status: CANCELLED | OUTPATIENT
Start: 2019-03-02

## 2019-03-01 RX ORDER — HEPARIN 100 UNIT/ML
500 SYRINGE INTRAVENOUS
Status: DISCONTINUED | OUTPATIENT
Start: 2019-03-01 | End: 2019-03-01 | Stop reason: HOSPADM

## 2019-03-01 RX ORDER — HEPARIN 100 UNIT/ML
500 SYRINGE INTRAVENOUS
Status: CANCELLED | OUTPATIENT
Start: 2019-03-02

## 2019-03-01 RX ADMIN — SODIUM CHLORIDE 240 MG: 9 INJECTION, SOLUTION INTRAVENOUS at 12:03

## 2019-03-01 RX ADMIN — SODIUM CHLORIDE: 9 INJECTION, SOLUTION INTRAVENOUS at 12:03

## 2019-03-01 NOTE — PLAN OF CARE
Problem: Adult Inpatient Plan of Care  Goal: Plan of Care Review  Left with brother. Did well on nivolumab. No complaints.

## 2019-03-01 NOTE — PROGRESS NOTES
Subjective:       Patient ID: Burton Whiting is a 49 y.o. male.    Chief Complaint: Squamous cell carcinoma of palatine tonsil  Squamous cell carcinoma of palatine tonsil; g-tube malodorous; left neck pain 2/10; 4 cans formula per day; and r arm port---red/warm to touch  Mr. Burton Whiting is a 48-year-old male, former smoker, who presents today with a four-month history of enlarging neck mass.  He initially delayed care due to lack of insurance.  He was seen by Dr. Turpin who referred him to see Dr. Olguin.  He had a CT that showed a 3.8 x 3.8 x 4.9 cm soft tissue mass arising from the left palatine tonsil resulting in moderate narrowing of the hypopharyngeal airway adjacent extensive matted left cervical lymphadenopathy was noted.  The largest ella mass was 6.6 x 9 x 2.6 cm extending inferiorly to the supraclavicular region.  The mass pushed the trachea and the left common carotid artery to the right.  Additional representative of cervical lymph nodes measuring 2.9 x 3.1 x 3.5 cm on axial image 37 of series 3   and 2.4 x 2.1 x 2.2 cm on axial image 55 of series 3.  There is also a sclerotic lesion involving the midline of the clivus measuring 1.2 cm.  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  PET scan performed on 01/19/2018 revealed persistent evidence of a soft tissue mass arising from the left palatine tonsil resulting in moderate narrowing of the hypopharyngeal   airway.  The mass is hypermetabolic demonstrating an SUV max of 18.5.  There is also persistent adjacent extensive matted left cervical lymphadenopathy, largest of this measuring 6.7 x 8.42 with hypermetabolic activity and SUV max of 20.5.  Lymphadenopathy is again noted to have a mass effect on the trachea and left common carotid artery, pushing both structures towards the right.  There is also prominent right cervical lymph nodes with the largest measuring 0.8 cm and demonstrating mild hypermetabolic activity with SUV max  "of 1.8, physiologic   distribution of FDG in the gray matter.  There are 3 pulmonary nodules noted within the right lung, the largest is in the anterior segment of the right upper lobe measuring 1 cm, second is visualized in the middle lobe measuring 0.6 cm and the third is within the posterior basal segment measuring 0.6 cm.  There is one pulmonary nodule within the left lung within the lateral basal segment measuring 0.6 cm.  These nodules do not demonstrate hypermetabolic activity; however, they are below the threshold for observation with PET     HPIHe underwent MRI cervical spine revealed "No evidence of metastatic disease to the cervical spine. Multilevel degenerative changes of the cervical spine with disc protrusion at C5-6 which results in moderate to severe spinal canal stenosis and and associated cord signal abnormality, suggestive of compressive myelopathy. 6 mm T1 hypointense non-enhancing lesion in the clivus.  Continued followup is suggested. 9 mm inferior descent of the cerebellar tonsils below the foramen magnum, suggestive of Chiari 1 malformation"  MRI thoracic spine "No evidence of metastatic disease involving the thoracic spine. Incidental hemangioma involving the T7 vertebral body. Mild degenerative disc disease without any significant spinal canal stenosis or neuroforaminal narrowing.   He completed 3 cycles of TPF and 6 weeks of Cisplatin and RT. Completed on 6/26/18           He was hospitalized in [facility] and was seeing Dr. Bhatt. He saw the speech therapy and plan to start eating  His PET scan from 9/25/18 revealed "Progression of disease with multiple new hypermetabolic foci including the manubrium, mediastinal/retrocrural lymph nodes, and lungs. Partial therapeutic response within the presumed radiation field involving the left cervical mass, and complete therapeutic response in the right cervical lymph node, clivus, and left palatine tonsil. New soft tissue thickening and " "hypermetabolism in the left aryepiglottic fold and right cricoid cartilage"                HPI He comes in for Opdivo. He feels well and denies any new issues      Review of Systems   Constitutional: Negative for appetite change, fatigue and unexpected weight change.   HENT: Negative for mouth sores.    Eyes: Negative for visual disturbance.   Respiratory: Negative for cough and shortness of breath.    Cardiovascular: Negative for chest pain.   Gastrointestinal: Negative for abdominal pain and diarrhea.   Genitourinary: Negative for frequency.   Musculoskeletal: Negative for back pain.   Skin: Negative for rash.   Neurological: Negative for headaches.   Hematological: Negative for adenopathy.   Psychiatric/Behavioral: The patient is not nervous/anxious.    All other systems reviewed and are negative.      Objective:      Physical Exam   Constitutional: He is oriented to person, place, and time. He appears well-developed and well-nourished.   HENT:   Mouth/Throat: No oropharyngeal exudate.   Cardiovascular: Normal rate and normal heart sounds.   Pulmonary/Chest: Effort normal and breath sounds normal. He has no wheezes.   Abdominal: Soft. Bowel sounds are normal. There is no tenderness.   Musculoskeletal: He exhibits no edema or tenderness.   Lymphadenopathy:     He has no cervical adenopathy.   Neurological: He is alert and oriented to person, place, and time. Coordination normal.   Skin: Skin is warm and dry. No rash noted.   Psychiatric: He has a normal mood and affect. Judgment and thought content normal.   Vitals reviewed.        LABS:  WBC   Date Value Ref Range Status   03/01/2019 3.70 (L) 3.90 - 12.70 K/uL Final     Hemoglobin   Date Value Ref Range Status   03/01/2019 11.0 (L) 14.0 - 18.0 g/dL Final     Hematocrit   Date Value Ref Range Status   03/01/2019 33.8 (L) 40.0 - 54.0 % Final     Platelets   Date Value Ref Range Status   03/01/2019 157 150 - 350 K/uL Final     Gran # (ANC)   Date Value Ref Range " Status   03/01/2019 2.7 1.8 - 7.7 K/uL Final     Comment:     The ANC is based on a white cell differential from an   automated cell counter. It has not been microscopically   reviewed for the presence of abnormal cells. Clinical   correlation is required.         Chemistry        Component Value Date/Time     03/01/2019 1036    K 4.5 03/01/2019 1036     03/01/2019 1036    CO2 26 03/01/2019 1036    BUN 30 (H) 03/01/2019 1036    CREATININE 1.5 (H) 03/01/2019 1036    GLU 83 03/01/2019 1036        Component Value Date/Time    CALCIUM 9.1 03/01/2019 1036    ALKPHOS 64 03/01/2019 1036    AST 19 03/01/2019 1036    ALT 16 03/01/2019 1036    BILITOT 0.4 03/01/2019 1036    ESTGFRAFRICA >60.0 03/01/2019 1036    EGFRNONAA 53.1 (A) 03/01/2019 1036          Assessment:       1. Squamous cell carcinoma of palatine tonsil    2. Cancer of head, face, or neck lymph nodes, secondary    3. 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 He is doing well clinically and will proceed with Opdivo and will return in 2 weeks for next cycle. Waiting on Xgeva auth as [facility]. If there is a copay will switch to Zometa and administer in chemo unit.    Above care plan was discussed with patient and all questions were addressed to his satisfaction

## 2019-03-14 ENCOUNTER — INFUSION (OUTPATIENT)
Dept: INFUSION THERAPY | Facility: HOSPITAL | Age: 49
End: 2019-03-14
Attending: INTERNAL MEDICINE
Payer: COMMERCIAL

## 2019-03-14 ENCOUNTER — OFFICE VISIT (OUTPATIENT)
Dept: HEMATOLOGY/ONCOLOGY | Facility: CLINIC | Age: 49
End: 2019-03-14
Payer: COMMERCIAL

## 2019-03-14 VITALS
OXYGEN SATURATION: 98 % | DIASTOLIC BLOOD PRESSURE: 81 MMHG | BODY MASS INDEX: 28.07 KG/M2 | HEIGHT: 67 IN | TEMPERATURE: 98 F | WEIGHT: 178.81 LBS | HEART RATE: 51 BPM | RESPIRATION RATE: 18 BRPM | SYSTOLIC BLOOD PRESSURE: 152 MMHG

## 2019-03-14 VITALS
RESPIRATION RATE: 18 BRPM | SYSTOLIC BLOOD PRESSURE: 150 MMHG | DIASTOLIC BLOOD PRESSURE: 83 MMHG | HEART RATE: 53 BPM | TEMPERATURE: 98 F

## 2019-03-14 DIAGNOSIS — C77.0 CANCER OF HEAD, FACE, OR NECK LYMPH NODES, SECONDARY: ICD-10-CM

## 2019-03-14 DIAGNOSIS — C79.51 SECONDARY CANCER OF BONE: ICD-10-CM

## 2019-03-14 DIAGNOSIS — N17.9 ACUTE RENAL FAILURE, UNSPECIFIED ACUTE RENAL FAILURE TYPE: ICD-10-CM

## 2019-03-14 DIAGNOSIS — C09.9 TONSIL CANCER: Primary | ICD-10-CM

## 2019-03-14 DIAGNOSIS — C09.9 SQUAMOUS CELL CARCINOMA OF PALATINE TONSIL: Primary | ICD-10-CM

## 2019-03-14 DIAGNOSIS — C09.9 SQUAMOUS CELL CARCINOMA OF PALATINE TONSIL: ICD-10-CM

## 2019-03-14 PROCEDURE — 99999 PR PBB SHADOW E&M-EST. PATIENT-LVL III: CPT | Mod: PBBFAC,,, | Performed by: INTERNAL MEDICINE

## 2019-03-14 PROCEDURE — 96361 HYDRATE IV INFUSION ADD-ON: CPT

## 2019-03-14 PROCEDURE — 99215 PR OFFICE/OUTPT VISIT, EST, LEVL V, 40-54 MIN: ICD-10-PCS | Mod: S$GLB,,, | Performed by: INTERNAL MEDICINE

## 2019-03-14 PROCEDURE — 3008F PR BODY MASS INDEX (BMI) DOCUMENTED: ICD-10-PCS | Mod: CPTII,S$GLB,, | Performed by: INTERNAL MEDICINE

## 2019-03-14 PROCEDURE — 3008F BODY MASS INDEX DOCD: CPT | Mod: CPTII,S$GLB,, | Performed by: INTERNAL MEDICINE

## 2019-03-14 PROCEDURE — 99215 OFFICE O/P EST HI 40 MIN: CPT | Mod: S$GLB,,, | Performed by: INTERNAL MEDICINE

## 2019-03-14 PROCEDURE — 99999 PR PBB SHADOW E&M-EST. PATIENT-LVL III: ICD-10-PCS | Mod: PBBFAC,,, | Performed by: INTERNAL MEDICINE

## 2019-03-14 PROCEDURE — 63600175 PHARM REV CODE 636 W HCPCS: Mod: JG | Performed by: INTERNAL MEDICINE

## 2019-03-14 PROCEDURE — 96413 CHEMO IV INFUSION 1 HR: CPT

## 2019-03-14 PROCEDURE — 25000003 PHARM REV CODE 250: Performed by: INTERNAL MEDICINE

## 2019-03-14 RX ORDER — SODIUM CHLORIDE 0.9 % (FLUSH) 0.9 %
10 SYRINGE (ML) INJECTION
Status: CANCELLED | OUTPATIENT
Start: 2019-03-14

## 2019-03-14 RX ORDER — HEPARIN 100 UNIT/ML
500 SYRINGE INTRAVENOUS
Status: DISCONTINUED | OUTPATIENT
Start: 2019-03-14 | End: 2019-03-14 | Stop reason: HOSPADM

## 2019-03-14 RX ORDER — SODIUM CHLORIDE 0.9 % (FLUSH) 0.9 %
10 SYRINGE (ML) INJECTION
Status: DISCONTINUED | OUTPATIENT
Start: 2019-03-14 | End: 2019-03-14 | Stop reason: HOSPADM

## 2019-03-14 RX ORDER — HEPARIN 100 UNIT/ML
500 SYRINGE INTRAVENOUS
Status: CANCELLED | OUTPATIENT
Start: 2019-03-14

## 2019-03-14 RX ADMIN — SODIUM CHLORIDE 1000 ML: 0.9 INJECTION, SOLUTION INTRAVENOUS at 02:03

## 2019-03-14 RX ADMIN — SODIUM CHLORIDE 240 MG: 9 INJECTION, SOLUTION INTRAVENOUS at 02:03

## 2019-03-14 NOTE — Clinical Note
Schedule CBC, CMP, PET scan on 3/26/19 early AMSchedule to see me late morning on 3/28/19 and Opdivo after that. He has a PCP appointment at 3 on same day in [place] so allow enough time to get there

## 2019-03-14 NOTE — PROGRESS NOTES
Subjective:       Patient ID: Burton Whiting is a 49 y.o. male.    Chief Complaint: Squamous cell carcinoma of palatine tonsil  Squamous cell carcinoma of palatine tonsil; g-tube malodorous; left neck pain 2/10; 4 cans formula per day; and r arm port---red/warm to touch  Mr. Burton Whiting is a 48-year-old male, former smoker, who presents today with a four-month history of enlarging neck mass.  He initially delayed care due to lack of insurance.  He was seen by Dr. Turpin who referred him to see Dr. Olguin.  He had a CT that showed a 3.8 x 3.8 x 4.9 cm soft tissue mass arising from the left palatine tonsil resulting in moderate narrowing of the hypopharyngeal airway adjacent extensive matted left cervical lymphadenopathy was noted.  The largest ella mass was 6.6 x 9 x 2.6 cm extending inferiorly to the supraclavicular region.  The mass pushed the trachea and the left common carotid artery to the right.  Additional representative of cervical lymph nodes measuring 2.9 x 3.1 x 3.5 cm on axial image 37 of series 3   and 2.4 x 2.1 x 2.2 cm on axial image 55 of series 3.  There is also a sclerotic lesion involving the midline of the clivus measuring 1.2 cm.  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  PET scan performed on 01/19/2018 revealed persistent evidence of a soft tissue mass arising from the left palatine tonsil resulting in moderate narrowing of the hypopharyngeal   airway.  The mass is hypermetabolic demonstrating an SUV max of 18.5.  There is also persistent adjacent extensive matted left cervical lymphadenopathy, largest of this measuring 6.7 x 8.42 with hypermetabolic activity and SUV max of 20.5.  Lymphadenopathy is again noted to have a mass effect on the trachea and left common carotid artery, pushing both structures towards the right.  There is also prominent right cervical lymph nodes with the largest measuring 0.8 cm and demonstrating mild hypermetabolic activity with SUV max  "of 1.8, physiologic   distribution of FDG in the gray matter.  There are 3 pulmonary nodules noted within the right lung, the largest is in the anterior segment of the right upper lobe measuring 1 cm, second is visualized in the middle lobe measuring 0.6 cm and the third is within the posterior basal segment measuring 0.6 cm.  There is one pulmonary nodule within the left lung within the lateral basal segment measuring 0.6 cm.  These nodules do not demonstrate hypermetabolic activity; however, they are below the threshold for observation with PET     HPIHe underwent MRI cervical spine revealed "No evidence of metastatic disease to the cervical spine. Multilevel degenerative changes of the cervical spine with disc protrusion at C5-6 which results in moderate to severe spinal canal stenosis and and associated cord signal abnormality, suggestive of compressive myelopathy. 6 mm T1 hypointense non-enhancing lesion in the clivus.  Continued followup is suggested. 9 mm inferior descent of the cerebellar tonsils below the foramen magnum, suggestive of Chiari 1 malformation"  MRI thoracic spine "No evidence of metastatic disease involving the thoracic spine. Incidental hemangioma involving the T7 vertebral body. Mild degenerative disc disease without any significant spinal canal stenosis or neuroforaminal narrowing.   He completed 3 cycles of TPF and 6 weeks of Cisplatin and RT. Completed on 6/26/18           He was hospitalized in [facility] and was seeing Dr. Bhatt. He saw the speech therapy and plan to start eating  His PET scan from 9/25/18 revealed "Progression of disease with multiple new hypermetabolic foci including the manubrium, mediastinal/retrocrural lymph nodes, and lungs. Partial therapeutic response within the presumed radiation field involving the left cervical mass, and complete therapeutic response in the right cervical lymph node, clivus, and left palatine tonsil. New soft tissue thickening and " "hypermetabolism in the left aryepiglottic fold and right cricoid cartilage"                 HPI He comes in for Opdivo. He denies any new issues. He is able to eat and drink and denies any new issues      Review of Systems   Constitutional: Negative for appetite change, fatigue and unexpected weight change.   HENT: Negative for mouth sores.    Eyes: Negative for visual disturbance.   Respiratory: Negative for cough and shortness of breath.    Cardiovascular: Negative for chest pain.   Gastrointestinal: Negative for abdominal pain and diarrhea.   Genitourinary: Negative for frequency.   Musculoskeletal: Negative for back pain.   Skin: Negative for rash.   Neurological: Negative for headaches.   Hematological: Negative for adenopathy.   Psychiatric/Behavioral: The patient is not nervous/anxious.    All other systems reviewed and are negative.      Objective:      Physical Exam   Constitutional: He is oriented to person, place, and time. He appears well-developed and well-nourished.   HENT:   Mouth/Throat: No oropharyngeal exudate.   Cardiovascular: Normal rate and normal heart sounds.   Pulmonary/Chest: Effort normal and breath sounds normal. He has no wheezes.   Abdominal: Soft. Bowel sounds are normal. There is no tenderness.   Musculoskeletal: He exhibits no edema or tenderness.   Lymphadenopathy:     He has no cervical adenopathy.   Neurological: He is alert and oriented to person, place, and time. Coordination normal.   Skin: Skin is warm and dry. No rash noted.   Psychiatric: He has a normal mood and affect. Judgment and thought content normal.   Vitals reviewed.      LABS:  WBC   Date Value Ref Range Status   03/14/2019 3.74 (L) 3.90 - 12.70 K/uL Final     Hemoglobin   Date Value Ref Range Status   03/14/2019 10.4 (L) 14.0 - 18.0 g/dL Final     Hematocrit   Date Value Ref Range Status   03/14/2019 31.3 (L) 40.0 - 54.0 % Final     Platelets   Date Value Ref Range Status   03/14/2019 152 150 - 350 K/uL Final "     Gran # (ANC)   Date Value Ref Range Status   03/14/2019 2.7 1.8 - 7.7 K/uL Final     Comment:     The ANC is based on a white cell differential from an   automated cell counter. It has not been microscopically   reviewed for the presence of abnormal cells. Clinical   correlation is required.         Chemistry        Component Value Date/Time     03/14/2019 1155    K 4.3 03/14/2019 1155     03/14/2019 1155    CO2 26 03/14/2019 1155    BUN 26 (H) 03/14/2019 1155    CREATININE 1.7 (H) 03/14/2019 1155    GLU 81 03/14/2019 1155        Component Value Date/Time    CALCIUM 9.0 03/14/2019 1155    ALKPHOS 53 (L) 03/14/2019 1155    AST 25 03/14/2019 1155    ALT 17 03/14/2019 1155    BILITOT 0.4 03/14/2019 1155    ESTGFRAFRICA 52.8 (A) 03/14/2019 1155    EGFRNONAA 45.7 (A) 03/14/2019 1155           TSH   Date Value Ref Range Status   03/14/2019 1.265 0.400 - 4.000 uIU/mL Final     Free T4   Date Value Ref Range Status   03/14/2019 1.01 0.71 - 1.51 ng/dL Final       Assessment:       1. Squamous cell carcinoma of palatine tonsil    2. Cancer of head, face, or neck lymph nodes, secondary    3. Secondary cancer of bone    4. Acute renal failure, unspecified acute renal failure type        Plan:         1,2,3,4. He will proceed with Opdivo and will return in 2 weeks for next cycle with restaging PET scan  He will also receive Xgeva today  4. IV fluids today and also advised him to increase his oral hydration. Do not suspect Opdivo induced Nephritis at this time, but will monitor closely     Above care plan was discussed with patient and accompanying brother and all questions were addressed to their satisfaction

## 2019-03-14 NOTE — PLAN OF CARE
Problem: Adult Inpatient Plan of Care  Goal: Plan of Care Review  Outcome: Ongoing (interventions implemented as appropriate)  Pt received 1L NS and opdivo; tolerated well. Dr. Lopez ok to treat with creatinine of 1.7. VSS and NAD. Pt instructed to call MD with any concerns. Pt discharged home independently.

## 2019-03-15 ENCOUNTER — TELEPHONE (OUTPATIENT)
Dept: HEMATOLOGY/ONCOLOGY | Facility: CLINIC | Age: 49
End: 2019-03-15

## 2019-03-15 NOTE — TELEPHONE ENCOUNTER
----- Message from Cortney Lopez MD sent at 3/14/2019  1:41 PM CDT -----  Schedule CBC, CMP, PET scan on 3/26/19 early AM    Schedule to see me late morning on 3/28/19 and Opdivo after that. He has a PCP appointment at 3 on same day in [place] so allow enough time to get there

## 2019-03-22 ENCOUNTER — PATIENT MESSAGE (OUTPATIENT)
Dept: HEMATOLOGY/ONCOLOGY | Facility: CLINIC | Age: 49
End: 2019-03-22

## 2019-03-26 ENCOUNTER — HOSPITAL ENCOUNTER (OUTPATIENT)
Dept: RADIOLOGY | Facility: HOSPITAL | Age: 49
Discharge: HOME | End: 2019-03-26
Attending: INTERNAL MEDICINE
Payer: COMMERCIAL

## 2019-03-26 DIAGNOSIS — C09.9 SQUAMOUS CELL CARCINOMA OF PALATINE TONSIL: ICD-10-CM

## 2019-03-26 PROCEDURE — 78815 PET IMAGE W/CT SKULL-THIGH: CPT | Mod: TC

## 2019-03-26 PROCEDURE — 78815 PET IMAGE W/CT SKULL-THIGH: CPT | Mod: 26,PS,, | Performed by: RADIOLOGY

## 2019-03-26 PROCEDURE — 78815 NM PET CT ROUTINE: ICD-10-PCS | Mod: 26,PS,, | Performed by: RADIOLOGY

## 2019-03-26 PROCEDURE — A9552 F18 FDG: HCPCS

## 2019-03-27 LAB — POCT GLUCOSE: 92 MG/DL (ref 70–110)

## 2019-03-28 ENCOUNTER — INFUSION (OUTPATIENT)
Dept: INFUSION THERAPY | Facility: HOSPITAL | Age: 49
End: 2019-03-28
Attending: INTERNAL MEDICINE
Payer: COMMERCIAL

## 2019-03-28 ENCOUNTER — OFFICE VISIT (OUTPATIENT)
Dept: FAMILY MEDICINE | Facility: CLINIC | Age: 49
End: 2019-03-28
Payer: COMMERCIAL

## 2019-03-28 ENCOUNTER — OFFICE VISIT (OUTPATIENT)
Dept: HEMATOLOGY/ONCOLOGY | Facility: CLINIC | Age: 49
End: 2019-03-28
Payer: COMMERCIAL

## 2019-03-28 VITALS
OXYGEN SATURATION: 99 % | DIASTOLIC BLOOD PRESSURE: 74 MMHG | WEIGHT: 179.88 LBS | HEIGHT: 67 IN | RESPIRATION RATE: 18 BRPM | BODY MASS INDEX: 28.23 KG/M2 | SYSTOLIC BLOOD PRESSURE: 135 MMHG | HEART RATE: 60 BPM | TEMPERATURE: 98 F

## 2019-03-28 VITALS
OXYGEN SATURATION: 99 % | SYSTOLIC BLOOD PRESSURE: 106 MMHG | BODY MASS INDEX: 28.09 KG/M2 | HEART RATE: 75 BPM | HEIGHT: 67 IN | WEIGHT: 179 LBS | DIASTOLIC BLOOD PRESSURE: 68 MMHG | TEMPERATURE: 99 F

## 2019-03-28 VITALS
SYSTOLIC BLOOD PRESSURE: 130 MMHG | DIASTOLIC BLOOD PRESSURE: 78 MMHG | HEART RATE: 73 BPM | RESPIRATION RATE: 18 BRPM | HEIGHT: 67 IN | BODY MASS INDEX: 28.23 KG/M2 | TEMPERATURE: 98 F | WEIGHT: 179.88 LBS

## 2019-03-28 DIAGNOSIS — Z23 NEED FOR VACCINATION AGAINST STREPTOCOCCUS PNEUMONIAE: ICD-10-CM

## 2019-03-28 DIAGNOSIS — C77.0 CANCER OF HEAD, FACE, OR NECK LYMPH NODES, SECONDARY: ICD-10-CM

## 2019-03-28 DIAGNOSIS — F41.9 ANXIETY: ICD-10-CM

## 2019-03-28 DIAGNOSIS — E07.9 THYROID DISORDER: ICD-10-CM

## 2019-03-28 DIAGNOSIS — Z12.11 COLON CANCER SCREENING: ICD-10-CM

## 2019-03-28 DIAGNOSIS — C09.9 SQUAMOUS CELL CARCINOMA OF PALATINE TONSIL: ICD-10-CM

## 2019-03-28 DIAGNOSIS — C09.9 TONSIL CANCER: Primary | ICD-10-CM

## 2019-03-28 DIAGNOSIS — G47.00 INSOMNIA, UNSPECIFIED TYPE: ICD-10-CM

## 2019-03-28 DIAGNOSIS — C77.1 SECONDARY MALIGNANCY OF MEDIASTINAL LYMPH NODES: ICD-10-CM

## 2019-03-28 DIAGNOSIS — C09.9 SQUAMOUS CELL CARCINOMA OF PALATINE TONSIL: Primary | ICD-10-CM

## 2019-03-28 DIAGNOSIS — Z23 NEED FOR DIPHTHERIA-TETANUS-PERTUSSIS (TDAP) VACCINE: ICD-10-CM

## 2019-03-28 DIAGNOSIS — Z00.00 VISIT FOR WELL MAN HEALTH CHECK: Primary | ICD-10-CM

## 2019-03-28 DIAGNOSIS — F32.A DEPRESSION, UNSPECIFIED DEPRESSION TYPE: ICD-10-CM

## 2019-03-28 PROBLEM — A41.9 SEPSIS: Status: RESOLVED | Noted: 2018-02-06 | Resolved: 2019-03-28

## 2019-03-28 PROBLEM — T45.1X5A CHEMOTHERAPY INDUCED NEUTROPENIA: Status: RESOLVED | Noted: 2018-06-07 | Resolved: 2019-03-28

## 2019-03-28 PROBLEM — E66.01 MORBID OBESITY: Status: RESOLVED | Noted: 2018-01-25 | Resolved: 2019-03-28

## 2019-03-28 PROBLEM — E44.0 MODERATE MALNUTRITION: Status: RESOLVED | Noted: 2018-07-05 | Resolved: 2019-03-28

## 2019-03-28 PROBLEM — N17.9 ACUTE RENAL FAILURE: Status: RESOLVED | Noted: 2018-06-21 | Resolved: 2019-03-28

## 2019-03-28 PROBLEM — D70.1 CHEMOTHERAPY INDUCED NEUTROPENIA: Status: RESOLVED | Noted: 2018-06-07 | Resolved: 2019-03-28

## 2019-03-28 PROBLEM — M79.2 NEUROPATHIC PAIN: Status: ACTIVE | Noted: 2018-08-07

## 2019-03-28 PROBLEM — R73.03 PREDIABETES: Status: RESOLVED | Noted: 2018-01-05 | Resolved: 2019-03-28

## 2019-03-28 PROCEDURE — 99999 PR PBB SHADOW E&M-EST. PATIENT-LVL III: ICD-10-PCS | Mod: PBBFAC,,, | Performed by: FAMILY MEDICINE

## 2019-03-28 PROCEDURE — 90472 TDAP VACCINE GREATER THAN OR EQUAL TO 7YO IM: ICD-10-PCS | Mod: S$GLB,,, | Performed by: FAMILY MEDICINE

## 2019-03-28 PROCEDURE — 99999 PR PBB SHADOW E&M-EST. PATIENT-LVL III: ICD-10-PCS | Mod: PBBFAC,,, | Performed by: INTERNAL MEDICINE

## 2019-03-28 PROCEDURE — 90715 TDAP VACCINE 7 YRS/> IM: CPT | Mod: S$GLB,,, | Performed by: FAMILY MEDICINE

## 2019-03-28 PROCEDURE — 90732 PPSV23 VACC 2 YRS+ SUBQ/IM: CPT | Mod: S$GLB,,, | Performed by: FAMILY MEDICINE

## 2019-03-28 PROCEDURE — 90472 IMMUNIZATION ADMIN EACH ADD: CPT | Mod: S$GLB,,, | Performed by: FAMILY MEDICINE

## 2019-03-28 PROCEDURE — 25000003 PHARM REV CODE 250: Performed by: INTERNAL MEDICINE

## 2019-03-28 PROCEDURE — 3008F BODY MASS INDEX DOCD: CPT | Mod: CPTII,S$GLB,, | Performed by: INTERNAL MEDICINE

## 2019-03-28 PROCEDURE — 90732 PNEUMOCOCCAL POLYSACCHARIDE VACCINE 23-VALENT =>2YO SQ IM: ICD-10-PCS | Mod: S$GLB,,, | Performed by: FAMILY MEDICINE

## 2019-03-28 PROCEDURE — 63600175 PHARM REV CODE 636 W HCPCS: Mod: JG | Performed by: INTERNAL MEDICINE

## 2019-03-28 PROCEDURE — 99396 PR PREVENTIVE VISIT,EST,40-64: ICD-10-PCS | Mod: 25,S$GLB,, | Performed by: FAMILY MEDICINE

## 2019-03-28 PROCEDURE — 99999 PR PBB SHADOW E&M-EST. PATIENT-LVL III: CPT | Mod: PBBFAC,,, | Performed by: INTERNAL MEDICINE

## 2019-03-28 PROCEDURE — 99999 PR PBB SHADOW E&M-EST. PATIENT-LVL III: CPT | Mod: PBBFAC,,, | Performed by: FAMILY MEDICINE

## 2019-03-28 PROCEDURE — 99396 PREV VISIT EST AGE 40-64: CPT | Mod: 25,S$GLB,, | Performed by: FAMILY MEDICINE

## 2019-03-28 PROCEDURE — 90471 IMMUNIZATION ADMIN: CPT | Mod: S$GLB,,, | Performed by: FAMILY MEDICINE

## 2019-03-28 PROCEDURE — 99215 OFFICE O/P EST HI 40 MIN: CPT | Mod: S$GLB,,, | Performed by: INTERNAL MEDICINE

## 2019-03-28 PROCEDURE — 3008F PR BODY MASS INDEX (BMI) DOCUMENTED: ICD-10-PCS | Mod: CPTII,S$GLB,, | Performed by: INTERNAL MEDICINE

## 2019-03-28 PROCEDURE — 90471 PNEUMOCOCCAL POLYSACCHARIDE VACCINE 23-VALENT =>2YO SQ IM: ICD-10-PCS | Mod: S$GLB,,, | Performed by: FAMILY MEDICINE

## 2019-03-28 PROCEDURE — 96413 CHEMO IV INFUSION 1 HR: CPT

## 2019-03-28 PROCEDURE — 99215 PR OFFICE/OUTPT VISIT, EST, LEVL V, 40-54 MIN: ICD-10-PCS | Mod: S$GLB,,, | Performed by: INTERNAL MEDICINE

## 2019-03-28 PROCEDURE — 90715 TDAP VACCINE GREATER THAN OR EQUAL TO 7YO IM: ICD-10-PCS | Mod: S$GLB,,, | Performed by: FAMILY MEDICINE

## 2019-03-28 RX ORDER — SODIUM CHLORIDE 0.9 % (FLUSH) 0.9 %
10 SYRINGE (ML) INJECTION
Status: CANCELLED | OUTPATIENT
Start: 2019-03-28

## 2019-03-28 RX ORDER — MIRTAZAPINE 7.5 MG/1
TABLET, FILM COATED ORAL
COMMUNITY
Start: 2019-03-11 | End: 2020-10-21

## 2019-03-28 RX ORDER — HEPARIN 100 UNIT/ML
500 SYRINGE INTRAVENOUS
Status: CANCELLED | OUTPATIENT
Start: 2019-03-28

## 2019-03-28 RX ADMIN — SODIUM CHLORIDE 240 MG: 9 INJECTION, SOLUTION INTRAVENOUS at 08:03

## 2019-03-28 NOTE — PROGRESS NOTES
Subjective:       Patient ID: Burton Whiting is a 49 y.o. male.    Chief Complaint: Squamous cell carcinoma of palatine tonsil  Squamous cell carcinoma of palatine tonsil; g-tube malodorous; left neck pain 2/10; 4 cans formula per day; and r arm port---red/warm to touch  Mr. Burton Whiting is a 48-year-old male, former smoker, who presents today with a four-month history of enlarging neck mass.  He initially delayed care due to lack of insurance.  He was seen by Dr. Turpin who referred him to see Dr. Olguin.  He had a CT that showed a 3.8 x 3.8 x 4.9 cm soft tissue mass arising from the left palatine tonsil resulting in moderate narrowing of the hypopharyngeal airway adjacent extensive matted left cervical lymphadenopathy was noted.  The largest ella mass was 6.6 x 9 x 2.6 cm extending inferiorly to the supraclavicular region.  The mass pushed the trachea and the left common carotid artery to the right.  Additional representative of cervical lymph nodes measuring 2.9 x 3.1 x 3.5 cm on axial image 37 of series 3   and 2.4 x 2.1 x 2.2 cm on axial image 55 of series 3.  There is also a sclerotic lesion involving the midline of the clivus measuring 1.2 cm.  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  PET scan performed on 01/19/2018 revealed persistent evidence of a soft tissue mass arising from the left palatine tonsil resulting in moderate narrowing of the hypopharyngeal   airway.  The mass is hypermetabolic demonstrating an SUV max of 18.5.  There is also persistent adjacent extensive matted left cervical lymphadenopathy, largest of this measuring 6.7 x 8.42 with hypermetabolic activity and SUV max of 20.5.  Lymphadenopathy is again noted to have a mass effect on the trachea and left common carotid artery, pushing both structures towards the right.  There is also prominent right cervical lymph nodes with the largest measuring 0.8 cm and demonstrating mild hypermetabolic activity with SUV max  "of 1.8, physiologic   distribution of FDG in the gray matter.  There are 3 pulmonary nodules noted within the right lung, the largest is in the anterior segment of the right upper lobe measuring 1 cm, second is visualized in the middle lobe measuring 0.6 cm and the third is within the posterior basal segment measuring 0.6 cm.  There is one pulmonary nodule within the left lung within the lateral basal segment measuring 0.6 cm.  These nodules do not demonstrate hypermetabolic activity; however, they are below the threshold for observation with PET     HPIHe underwent MRI cervical spine revealed "No evidence of metastatic disease to the cervical spine. Multilevel degenerative changes of the cervical spine with disc protrusion at C5-6 which results in moderate to severe spinal canal stenosis and and associated cord signal abnormality, suggestive of compressive myelopathy. 6 mm T1 hypointense non-enhancing lesion in the clivus.  Continued followup is suggested. 9 mm inferior descent of the cerebellar tonsils below the foramen magnum, suggestive of Chiari 1 malformation"  MRI thoracic spine "No evidence of metastatic disease involving the thoracic spine. Incidental hemangioma involving the T7 vertebral body. Mild degenerative disc disease without any significant spinal canal stenosis or neuroforaminal narrowing.   He completed 3 cycles of TPF and 6 weeks of Cisplatin and RT. Completed on 6/26/18           He was hospitalized in [facility] and was seeing Dr. Bhatt. He saw the speech therapy and plan to start eating  His PET scan from 9/25/18 revealed "Progression of disease with multiple new hypermetabolic foci including the manubrium, mediastinal/retrocrural lymph nodes, and lungs. Partial therapeutic response within the presumed radiation field involving the left cervical mass, and complete therapeutic response in the right cervical lymph node, clivus, and left palatine tonsil. New soft tissue thickening and " "hypermetabolism in the left aryepiglottic fold and right cricoid cartilage"                     HPI He comes in for Opdivo. PET scan reveals "Prior foci of abnormal uptake have resolved, including the left manubrial lesion which now has background level uptake suggesting complete response to therapy. Additional new foci of hypermetabolic activity are present in the right paracentral region along the inner margin of the mandible anteriorly with SUV max up to 6.4; there is no associated bony abnormality on CT and this most likely relates to tongue muscle activation"  He feels well and denies any new issues.    Review of Systems   Constitutional: Negative for appetite change, fatigue and unexpected weight change.   HENT: Negative for mouth sores.    Eyes: Negative for visual disturbance.   Respiratory: Negative for cough and shortness of breath.    Cardiovascular: Negative for chest pain.   Gastrointestinal: Negative for abdominal pain and diarrhea.   Genitourinary: Negative for frequency.   Musculoskeletal: Negative for back pain.   Skin: Negative for rash.   Neurological: Negative for headaches.   Hematological: Negative for adenopathy.   Psychiatric/Behavioral: The patient is not nervous/anxious.    All other systems reviewed and are negative.      Objective:      Physical Exam   Constitutional: He is oriented to person, place, and time. He appears well-developed and well-nourished.   HENT:   Mouth/Throat: No oropharyngeal exudate.   Cardiovascular: Normal rate and normal heart sounds.   Pulmonary/Chest: Effort normal and breath sounds normal. He has no wheezes.   Abdominal: Soft. Bowel sounds are normal. There is no tenderness.   Musculoskeletal: He exhibits no edema or tenderness.   Lymphadenopathy:     He has no cervical adenopathy.   Neurological: He is alert and oriented to person, place, and time. Coordination normal.   Skin: Skin is warm and dry. No rash noted.   Psychiatric: He has a normal mood and affect. " Judgment and thought content normal.   Vitals reviewed.      LABS:  WBC   Date Value Ref Range Status   03/26/2019 3.74 (L) 3.90 - 12.70 K/uL Final     Hemoglobin   Date Value Ref Range Status   03/26/2019 11.9 (L) 14.0 - 18.0 g/dL Final     Hematocrit   Date Value Ref Range Status   03/26/2019 36.5 (L) 40.0 - 54.0 % Final     Platelets   Date Value Ref Range Status   03/26/2019 165 150 - 350 K/uL Final     Gran # (ANC)   Date Value Ref Range Status   03/26/2019 2.7 1.8 - 7.7 K/uL Final     Comment:     The ANC is based on a white cell differential from an   automated cell counter. It has not been microscopically   reviewed for the presence of abnormal cells. Clinical   correlation is required.         Chemistry        Component Value Date/Time     03/26/2019 0840    K 4.9 03/26/2019 0840     03/26/2019 0840    CO2 23 03/26/2019 0840    BUN 37 (H) 03/26/2019 0840    CREATININE 1.7 (H) 03/26/2019 0840    GLU 95 03/26/2019 0840        Component Value Date/Time    CALCIUM 10.2 03/26/2019 0840    ALKPHOS 64 03/26/2019 0840    AST 20 03/26/2019 0840    ALT 19 03/26/2019 0840    BILITOT 0.4 03/26/2019 0840    ESTGFRAFRICA 52.8 (A) 03/26/2019 0840    EGFRNONAA 45.7 (A) 03/26/2019 0840        TSH   Date Value Ref Range Status   03/14/2019 1.265 0.400 - 4.000 uIU/mL Final     Free T4   Date Value Ref Range Status   03/14/2019 1.01 0.71 - 1.51 ng/dL Final       Assessment:       1. Squamous cell carcinoma of palatine tonsil    2. 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 Thyroid disorder        Plan:        1,2,3,. He is doing well clinically. Reviewed PET scan which reveals some muscle activation, no bony abnormality, on the right side where he notes some irritation from false teeth, most likely uptake related to that. He will proceed with Opdivo and will return in 2 weeks for next cycle. Xgeva for next cycle (he takes at home).    Above care  plan was discussed with patient and accompanying brother and all questions were addressed to their satisfaction

## 2019-03-28 NOTE — PATIENT INSTRUCTIONS
?Avoid tobacco  ?Be physically active  ?Maintain a healthy weight  ?Eat a diet rich in fruits, vegetables, and whole grains, and low in saturated/trans fat  ?Limit alcohol consumption  ?Protect against sexually transmitted infections  ?Avoid excess sun    Follow up annually and as needed!    Increase activity slowly. Contact us with any questions    Pneumonia and TDAP vaccine today.

## 2019-03-28 NOTE — PLAN OF CARE
Problem: Adult Inpatient Plan of Care  Goal: Plan of Care Review  Outcome: Ongoing (interventions implemented as appropriate)  Tolerated infusion well.  No reactions noted.  No questions or concerns at this time.  Ambulated off unit in [facility].

## 2019-03-28 NOTE — PROGRESS NOTES
Subjective:       Patient ID: Burton Whiting is a 49 y.o. male.    Chief Complaint: Annual Exam    Burton Whiting is a 49 y.o. male who presents today to [facility] care.     He was squamous cell cancer diagnosed by myself. He had a CT that showed a 3.8 x 3.8 x 4.9 cm soft tissue mass arising from the left palatine tonsil resulting in moderate narrowing of the hypopharyngeal airway adjacent extensive matted left cervical lymphadenopathy was noted.  The largest ella mass was 6.6 x 9 x 2.6 cm extending inferiorly to the supraclavicular region.     He had 4 sessions of chemo and 36 treatments of radiation. He goes every 2 weeks for Opdivo and Xgeva for next cycle (he takes at home). He had mets to his sternum, lung, and lymph nodes. He was currently told that he is cancer free today!    Diet/Exercise: has recently lost a lot of weight due to chemo/radiation. He just joined a gym! He joined about 2 weeks ago. Rowing. His strength is returning. He has 118 pounds!    Flu: UTD  Tdap: ordered  Labs:  ordered    C-scope: GI referral     PMHx: reviewed in EMR and updated  Meds: reviewed in EMR and updated  Shx: reviewed in EMR and updated  FMHx: reviewed in EMR and updated  Social: he is living alone. No children. Close to siblings/father/several friends; enjoys fishing and flying drones. He works as a .       Review of Systems   Constitutional: Negative for chills and fever.   Gastrointestinal: Negative for diarrhea, nausea and vomiting.   Genitourinary: Negative for difficulty urinating and dysuria.   Skin: Negative for rash.   Neurological: Positive for numbness. Negative for dizziness, light-headedness and headaches.         Health Maintenance Due   Topic Date Due    Colonoscopy  01/03/2018     Immunization History   Administered Date(s) Administered    Influenza - Quadrivalent - PF 01/03/2019    Pneumococcal Conjugate - 13 Valent 01/03/2019    Pneumococcal Polysaccharide - 23 Valent 03/28/2019    Tdap  03/28/2019         Objective:     Vitals:    03/28/19 1529   BP: 106/68   Pulse: 75   Temp: 98.5 °F (36.9 °C)        Physical Exam   Constitutional: He is oriented to person, place, and time. He appears well-developed and well-nourished.   HENT:   Head: Normocephalic and atraumatic.   Right Ear: Tympanic membrane, external ear and ear canal normal.   Left Ear: Tympanic membrane, external ear and ear canal normal.   Nose: Nose normal.   Mouth/Throat: Oropharynx is clear and moist and mucous membranes are normal. No tonsillar exudate.   Eyes: Pupils are equal, round, and reactive to light. Conjunctivae are normal.   Neck: Normal range of motion. Neck supple.   Cardiovascular: Normal rate, regular rhythm and normal heart sounds.   Pulmonary/Chest: Effort normal and breath sounds normal.   Abdominal: Soft. Bowel sounds are normal. He exhibits no distension.   Musculoskeletal: He exhibits no edema.   Neurological: He is alert and oriented to person, place, and time. A sensory deficit is present. No cranial nerve deficit. He exhibits normal muscle tone.   Sensory deficit noted on BL arms and legs, about 1/2 way down and down to hands/feet   Skin: Skin is warm and dry.   Psychiatric: He has a normal mood and affect. His speech is normal and behavior is normal. Judgment and thought content normal.   Nursing note and vitals reviewed.      Assessment:       1. Visit for well man health check    2. Squamous cell carcinoma of palatine tonsil    3. Colon cancer screening    4. Need for vaccination against Streptococcus pneumoniae    5. Need for diphtheria-tetanus-pertussis (Tdap) vaccine    6. BMI 28.0-28.9,adult    7. Anxiety    8. Depression, unspecified depression type    9. Insomnia, unspecified type        Plan:       Follow up annually. Continue gabapentin for neuropathy.     Visit for well man health check  ?Avoid tobacco  ?Be physically active  ?Maintain a healthy weight  ?Eat a diet rich in fruits, vegetables, and whole  grains, and low in saturated/trans fat  ?Limit alcohol consumption  ?Protect against sexually transmitted infections  ?Avoid excess sun  -     Vitamin D; Future; Expected date: 03/28/2019  -     Lipid panel; Future; Expected date: 03/28/2019    Squamous cell carcinoma of palatine tonsil  In remission!  Continue regimen per heme/onc  Continue follow up with heme/onc    Colon cancer screening  -     Ambulatory referral to Gastroenterology    Need for vaccination against Streptococcus pneumoniae  PNA today and again q5 years  -     (In Office Administered) Pneumococcal Polysaccharide Vaccine (23 Valent) (SQ/IM)    Need for diphtheria-tetanus-pertussis (Tdap) vaccine  -     Tdap Vaccine    BMI 28.0-28.9,adult  See AVS    Anxiety/Depression, unspecified depression type/Insomnia, unspecified type  Managed by outside psych?  Takes prozac daily and Remeron prn for sleep      Warning signs discussed, patient to call with any further issues or worsening of symptoms.

## 2019-04-10 ENCOUNTER — OFFICE VISIT (OUTPATIENT)
Dept: HEMATOLOGY/ONCOLOGY | Facility: CLINIC | Age: 49
End: 2019-04-10
Payer: COMMERCIAL

## 2019-04-10 ENCOUNTER — INFUSION (OUTPATIENT)
Dept: INFUSION THERAPY | Facility: HOSPITAL | Age: 49
End: 2019-04-10
Attending: INTERNAL MEDICINE
Payer: COMMERCIAL

## 2019-04-10 ENCOUNTER — LAB VISIT (OUTPATIENT)
Dept: LAB | Facility: HOSPITAL | Age: 49
End: 2019-04-10
Attending: INTERNAL MEDICINE
Payer: COMMERCIAL

## 2019-04-10 VITALS
WEIGHT: 182.31 LBS | DIASTOLIC BLOOD PRESSURE: 73 MMHG | RESPIRATION RATE: 17 BRPM | HEART RATE: 51 BPM | BODY MASS INDEX: 28.61 KG/M2 | OXYGEN SATURATION: 99 % | TEMPERATURE: 98 F | HEIGHT: 67 IN | SYSTOLIC BLOOD PRESSURE: 135 MMHG

## 2019-04-10 VITALS
TEMPERATURE: 98 F | SYSTOLIC BLOOD PRESSURE: 151 MMHG | RESPIRATION RATE: 18 BRPM | DIASTOLIC BLOOD PRESSURE: 73 MMHG | HEART RATE: 50 BPM

## 2019-04-10 DIAGNOSIS — C09.9 TONSIL CANCER: Primary | ICD-10-CM

## 2019-04-10 DIAGNOSIS — E07.9 THYROID DISORDER: ICD-10-CM

## 2019-04-10 DIAGNOSIS — C09.9 SQUAMOUS CELL CARCINOMA OF PALATINE TONSIL: ICD-10-CM

## 2019-04-10 DIAGNOSIS — C79.51 SECONDARY CANCER OF BONE: ICD-10-CM

## 2019-04-10 DIAGNOSIS — C09.9 SQUAMOUS CELL CARCINOMA OF PALATINE TONSIL: Primary | ICD-10-CM

## 2019-04-10 DIAGNOSIS — Z00.00 VISIT FOR WELL MAN HEALTH CHECK: ICD-10-CM

## 2019-04-10 DIAGNOSIS — C77.0 CANCER OF HEAD, FACE, OR NECK LYMPH NODES, SECONDARY: ICD-10-CM

## 2019-04-10 LAB
25(OH)D3+25(OH)D2 SERPL-MCNC: 47 NG/ML (ref 30–96)
ALBUMIN SERPL BCP-MCNC: 4.2 G/DL (ref 3.5–5.2)
ALP SERPL-CCNC: 57 U/L (ref 55–135)
ALT SERPL W/O P-5'-P-CCNC: 17 U/L (ref 10–44)
ANION GAP SERPL CALC-SCNC: 7 MMOL/L (ref 8–16)
AST SERPL-CCNC: 21 U/L (ref 10–40)
BILIRUB SERPL-MCNC: 0.4 MG/DL (ref 0.1–1)
BUN SERPL-MCNC: 28 MG/DL (ref 6–20)
CALCIUM SERPL-MCNC: 10.1 MG/DL (ref 8.7–10.5)
CHLORIDE SERPL-SCNC: 104 MMOL/L (ref 95–110)
CHOLEST SERPL-MCNC: 189 MG/DL (ref 120–199)
CHOLEST/HDLC SERPL: 3.7 {RATIO} (ref 2–5)
CO2 SERPL-SCNC: 27 MMOL/L (ref 23–29)
CREAT SERPL-MCNC: 1.7 MG/DL (ref 0.5–1.4)
ERYTHROCYTE [DISTWIDTH] IN BLOOD BY AUTOMATED COUNT: 11.9 % (ref 11.5–14.5)
EST. GFR  (AFRICAN AMERICAN): 52.8 ML/MIN/1.73 M^2
EST. GFR  (NON AFRICAN AMERICAN): 45.7 ML/MIN/1.73 M^2
GLUCOSE SERPL-MCNC: 80 MG/DL (ref 70–110)
HCT VFR BLD AUTO: 34.1 % (ref 40–54)
HDLC SERPL-MCNC: 51 MG/DL (ref 40–75)
HDLC SERPL: 27 % (ref 20–50)
HGB BLD-MCNC: 11.5 G/DL (ref 14–18)
IMM GRANULOCYTES # BLD AUTO: 0.01 K/UL (ref 0–0.04)
LDLC SERPL CALC-MCNC: 118.4 MG/DL (ref 63–159)
MCH RBC QN AUTO: 32.6 PG (ref 27–31)
MCHC RBC AUTO-ENTMCNC: 33.7 G/DL (ref 32–36)
MCV RBC AUTO: 97 FL (ref 82–98)
NEUTROPHILS # BLD AUTO: 2.2 K/UL (ref 1.8–7.7)
NONHDLC SERPL-MCNC: 138 MG/DL
PLATELET # BLD AUTO: 160 K/UL (ref 150–350)
PMV BLD AUTO: 8.9 FL (ref 9.2–12.9)
POTASSIUM SERPL-SCNC: 4.6 MMOL/L (ref 3.5–5.1)
PROT SERPL-MCNC: 7.2 G/DL (ref 6–8.4)
RBC # BLD AUTO: 3.53 M/UL (ref 4.6–6.2)
SODIUM SERPL-SCNC: 138 MMOL/L (ref 136–145)
T4 FREE SERPL-MCNC: 1.03 NG/DL (ref 0.71–1.51)
TRIGL SERPL-MCNC: 98 MG/DL (ref 30–150)
TSH SERPL DL<=0.005 MIU/L-ACNC: 1.66 UIU/ML (ref 0.4–4)
WBC # BLD AUTO: 3.16 K/UL (ref 3.9–12.7)

## 2019-04-10 PROCEDURE — 80061 LIPID PANEL: CPT

## 2019-04-10 PROCEDURE — 3008F PR BODY MASS INDEX (BMI) DOCUMENTED: ICD-10-PCS | Mod: CPTII,S$GLB,, | Performed by: INTERNAL MEDICINE

## 2019-04-10 PROCEDURE — 63600175 PHARM REV CODE 636 W HCPCS: Mod: JG | Performed by: INTERNAL MEDICINE

## 2019-04-10 PROCEDURE — 84443 ASSAY THYROID STIM HORMONE: CPT

## 2019-04-10 PROCEDURE — 84439 ASSAY OF FREE THYROXINE: CPT

## 2019-04-10 PROCEDURE — 96413 CHEMO IV INFUSION 1 HR: CPT

## 2019-04-10 PROCEDURE — 82306 VITAMIN D 25 HYDROXY: CPT

## 2019-04-10 PROCEDURE — 99999 PR PBB SHADOW E&M-EST. PATIENT-LVL III: ICD-10-PCS | Mod: PBBFAC,,, | Performed by: INTERNAL MEDICINE

## 2019-04-10 PROCEDURE — 80053 COMPREHEN METABOLIC PANEL: CPT

## 2019-04-10 PROCEDURE — 85027 COMPLETE CBC AUTOMATED: CPT

## 2019-04-10 PROCEDURE — 25000003 PHARM REV CODE 250: Performed by: INTERNAL MEDICINE

## 2019-04-10 PROCEDURE — 99215 OFFICE O/P EST HI 40 MIN: CPT | Mod: S$GLB,,, | Performed by: INTERNAL MEDICINE

## 2019-04-10 PROCEDURE — 36415 COLL VENOUS BLD VENIPUNCTURE: CPT

## 2019-04-10 PROCEDURE — 99999 PR PBB SHADOW E&M-EST. PATIENT-LVL III: CPT | Mod: PBBFAC,,, | Performed by: INTERNAL MEDICINE

## 2019-04-10 PROCEDURE — 99215 PR OFFICE/OUTPT VISIT, EST, LEVL V, 40-54 MIN: ICD-10-PCS | Mod: S$GLB,,, | Performed by: INTERNAL MEDICINE

## 2019-04-10 PROCEDURE — 3008F BODY MASS INDEX DOCD: CPT | Mod: CPTII,S$GLB,, | Performed by: INTERNAL MEDICINE

## 2019-04-10 RX ORDER — SODIUM CHLORIDE 0.9 % (FLUSH) 0.9 %
10 SYRINGE (ML) INJECTION
Status: DISCONTINUED | OUTPATIENT
Start: 2019-04-10 | End: 2019-04-10 | Stop reason: HOSPADM

## 2019-04-10 RX ORDER — HEPARIN 100 UNIT/ML
500 SYRINGE INTRAVENOUS
Status: DISCONTINUED | OUTPATIENT
Start: 2019-04-10 | End: 2019-04-10 | Stop reason: HOSPADM

## 2019-04-10 RX ORDER — SODIUM CHLORIDE 0.9 % (FLUSH) 0.9 %
10 SYRINGE (ML) INJECTION
Status: CANCELLED | OUTPATIENT
Start: 2019-04-11

## 2019-04-10 RX ORDER — HEPARIN 100 UNIT/ML
500 SYRINGE INTRAVENOUS
Status: CANCELLED | OUTPATIENT
Start: 2019-04-11

## 2019-04-10 RX ADMIN — SODIUM CHLORIDE 240 MG: 9 INJECTION, SOLUTION INTRAVENOUS at 09:04

## 2019-04-10 RX ADMIN — SODIUM CHLORIDE: 0.9 INJECTION, SOLUTION INTRAVENOUS at 09:04

## 2019-04-10 NOTE — Clinical Note
Please schedule follow-up with Dr. Olguin in 2 weeks when he sees me Coordinate both appointments on same day

## 2019-04-10 NOTE — PROGRESS NOTES
Subjective:       Patient ID: Burton Whiting is a 49 y.o. male.    Chief Complaint: Squamous cell carcinoma of palatine tonsil  Squamous cell carcinoma of palatine tonsil; g-tube malodorous; left neck pain 2/10; 4 cans formula per day; and r arm port---red/warm to touch  Mr. Burton Whiting is a 48-year-old male, former smoker, who presents today with a four-month history of enlarging neck mass.  He initially delayed care due to lack of insurance.  He was seen by Dr. Turpin who referred him to see Dr. Olguin.  He had a CT that showed a 3.8 x 3.8 x 4.9 cm soft tissue mass arising from the left palatine tonsil resulting in moderate narrowing of the hypopharyngeal airway adjacent extensive matted left cervical lymphadenopathy was noted.  The largest ella mass was 6.6 x 9 x 2.6 cm extending inferiorly to the supraclavicular region.  The mass pushed the trachea and the left common carotid artery to the right.  Additional representative of cervical lymph nodes measuring 2.9 x 3.1 x 3.5 cm on axial image 37 of series 3   and 2.4 x 2.1 x 2.2 cm on axial image 55 of series 3.  There is also a sclerotic lesion involving the midline of the clivus measuring 1.2 cm.  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  PET scan performed on 01/19/2018 revealed persistent evidence of a soft tissue mass arising from the left palatine tonsil resulting in moderate narrowing of the hypopharyngeal   airway.  The mass is hypermetabolic demonstrating an SUV max of 18.5.  There is also persistent adjacent extensive matted left cervical lymphadenopathy, largest of this measuring 6.7 x 8.42 with hypermetabolic activity and SUV max of 20.5.  Lymphadenopathy is again noted to have a mass effect on the trachea and left common carotid artery, pushing both structures towards the right.  There is also prominent right cervical lymph nodes with the largest measuring 0.8 cm and demonstrating mild hypermetabolic activity with SUV max  "of 1.8, physiologic   distribution of FDG in the gray matter.  There are 3 pulmonary nodules noted within the right lung, the largest is in the anterior segment of the right upper lobe measuring 1 cm, second is visualized in the middle lobe measuring 0.6 cm and the third is within the posterior basal segment measuring 0.6 cm.  There is one pulmonary nodule within the left lung within the lateral basal segment measuring 0.6 cm.  These nodules do not demonstrate hypermetabolic activity; however, they are below the threshold for observation with PET     He underwent MRI cervical spine revealed "No evidence of metastatic disease to the cervical spine. Multilevel degenerative changes of the cervical spine with disc protrusion at C5-6 which results in moderate to severe spinal canal stenosis and and associated cord signal abnormality, suggestive of compressive myelopathy. 6 mm T1 hypointense non-enhancing lesion in the clivus.  Continued followup is suggested. 9 mm inferior descent of the cerebellar tonsils below the foramen magnum, suggestive of Chiari 1 malformation"  MRI thoracic spine "No evidence of metastatic disease involving the thoracic spine. Incidental hemangioma involving the T7 vertebral body. Mild degenerative disc disease without any significant spinal canal stenosis or neuroforaminal narrowing.   He completed 3 cycles of TPF and 6 weeks of Cisplatin and RT. Completed on 6/26/18           His PET scan from 9/25/18 revealed "Progression of disease with multiple new hypermetabolic foci including the manubrium, mediastinal/retrocrural lymph nodes, and lungs. Partial therapeutic response within the presumed radiation field involving the left cervical mass, and complete therapeutic response in the right cervical lymph node, clivus, and left palatine tonsil. New soft tissue thickening and hypermetabolism in the left aryepiglottic fold and right cricoid cartilage"      He has been on Opdivo                     HPI " He comes in for Opdivo. He feels well and denies any new issues.      Review of Systems   Constitutional: Negative for appetite change, fatigue and unexpected weight change.   HENT: Negative for mouth sores.    Eyes: Negative for visual disturbance.   Respiratory: Negative for cough and shortness of breath.    Cardiovascular: Negative for chest pain.   Gastrointestinal: Negative for abdominal pain and diarrhea.   Genitourinary: Negative for frequency.   Musculoskeletal: Negative for back pain.   Skin: Negative for rash.   Neurological: Negative for headaches.   Hematological: Negative for adenopathy.   Psychiatric/Behavioral: The patient is not nervous/anxious.    All other systems reviewed and are negative.      Objective:      Physical Exam   Constitutional: He is oriented to person, place, and time. He appears well-developed and well-nourished.   HENT:   Mouth/Throat: No oropharyngeal exudate.   Cardiovascular: Normal rate and normal heart sounds.   Pulmonary/Chest: Effort normal and breath sounds normal. He has no wheezes.   Abdominal: Soft. Bowel sounds are normal. There is no tenderness.   Musculoskeletal: He exhibits no edema or tenderness.   Lymphadenopathy:     He has no cervical adenopathy.   Neurological: He is alert and oriented to person, place, and time. Coordination normal.   Skin: Skin is warm and dry. No rash noted.   Psychiatric: He has a normal mood and affect. Judgment and thought content normal.   Vitals reviewed.      LABS:  WBC   Date Value Ref Range Status   04/10/2019 3.16 (L) 3.90 - 12.70 K/uL Final     Hemoglobin   Date Value Ref Range Status   04/10/2019 11.5 (L) 14.0 - 18.0 g/dL Final     Hematocrit   Date Value Ref Range Status   04/10/2019 34.1 (L) 40.0 - 54.0 % Final     Platelets   Date Value Ref Range Status   04/10/2019 160 150 - 350 K/uL Final     Gran # (ANC)   Date Value Ref Range Status   04/10/2019 2.2 1.8 - 7.7 K/uL Final     Comment:     The ANC is based on a white cell  differential from an   automated cell counter. It has not been microscopically   reviewed for the presence of abnormal cells. Clinical   correlation is required.         Chemistry        Component Value Date/Time     04/10/2019 0806    K 4.6 04/10/2019 0806     04/10/2019 0806    CO2 27 04/10/2019 0806    BUN 28 (H) 04/10/2019 0806    CREATININE 1.7 (H) 04/10/2019 0806    GLU 80 04/10/2019 0806        Component Value Date/Time    CALCIUM 10.1 04/10/2019 0806    ALKPHOS 57 04/10/2019 0806    AST 21 04/10/2019 0806    ALT 17 04/10/2019 0806    BILITOT 0.4 04/10/2019 0806    ESTGFRAFRICA 52.8 (A) 04/10/2019 0806    EGFRNONAA 45.7 (A) 04/10/2019 0806        TSH   Date Value Ref Range Status   04/10/2019 1.660 0.400 - 4.000 uIU/mL Final     Free T4   Date Value Ref Range Status   04/10/2019 1.03 0.71 - 1.51 ng/dL Final       Assessment:       1. Squamous cell carcinoma of palatine tonsil    2. Cancer of head, face, or neck lymph nodes, secondary    3. Secondary cancer of bone    4. Thyroid disorder        Plan:        1,2,3. He is doing very well on Opdivo with complete response on PET scan,. Will have him see Dr. Olguin as well.  He will return in 2 weeks for next cycle  4. Doing well, normal labs.    Above care plan was discussed with patient and accompanying sister and all questions were addressed to their satisfaction

## 2019-04-10 NOTE — PLAN OF CARE
Problem: Nausea and Vomiting (Chemotherapy Effects)  Goal: Fluid and Electrolyte Balance    Intervention: Prevent and Manage Nausea and Vomiting  Tolerated opdivo infusion well vitals stable, PIV d/c'd cath tip intact site covered with 2x2 gauze and coban. avs printed f/u with MD as discussed, no questions at this time, instructed to contact MD office with concerns. Leaves clinic ambulatory no distress.

## 2019-04-24 ENCOUNTER — INFUSION (OUTPATIENT)
Dept: INFUSION THERAPY | Facility: HOSPITAL | Age: 49
End: 2019-04-24
Attending: INTERNAL MEDICINE
Payer: COMMERCIAL

## 2019-04-24 ENCOUNTER — OFFICE VISIT (OUTPATIENT)
Dept: HEMATOLOGY/ONCOLOGY | Facility: CLINIC | Age: 49
End: 2019-04-24
Payer: COMMERCIAL

## 2019-04-24 VITALS
RESPIRATION RATE: 18 BRPM | TEMPERATURE: 98 F | DIASTOLIC BLOOD PRESSURE: 75 MMHG | BODY MASS INDEX: 28.52 KG/M2 | SYSTOLIC BLOOD PRESSURE: 137 MMHG | WEIGHT: 181.69 LBS | OXYGEN SATURATION: 99 % | HEIGHT: 67 IN | HEART RATE: 55 BPM

## 2019-04-24 VITALS
HEART RATE: 47 BPM | DIASTOLIC BLOOD PRESSURE: 75 MMHG | SYSTOLIC BLOOD PRESSURE: 137 MMHG | TEMPERATURE: 98 F | RESPIRATION RATE: 18 BRPM

## 2019-04-24 DIAGNOSIS — C09.9 SQUAMOUS CELL CARCINOMA OF PALATINE TONSIL: Primary | ICD-10-CM

## 2019-04-24 DIAGNOSIS — C79.51 SECONDARY CANCER OF BONE: ICD-10-CM

## 2019-04-24 DIAGNOSIS — C77.0 CANCER OF HEAD, FACE, OR NECK LYMPH NODES, SECONDARY: ICD-10-CM

## 2019-04-24 DIAGNOSIS — C09.9 TONSIL CANCER: Primary | ICD-10-CM

## 2019-04-24 DIAGNOSIS — C09.9 SQUAMOUS CELL CARCINOMA OF PALATINE TONSIL: ICD-10-CM

## 2019-04-24 PROCEDURE — 3008F PR BODY MASS INDEX (BMI) DOCUMENTED: ICD-10-PCS | Mod: CPTII,S$GLB,, | Performed by: INTERNAL MEDICINE

## 2019-04-24 PROCEDURE — 63600175 PHARM REV CODE 636 W HCPCS: Mod: JG | Performed by: INTERNAL MEDICINE

## 2019-04-24 PROCEDURE — 25000003 PHARM REV CODE 250: Performed by: INTERNAL MEDICINE

## 2019-04-24 PROCEDURE — 99215 PR OFFICE/OUTPT VISIT, EST, LEVL V, 40-54 MIN: ICD-10-PCS | Mod: S$GLB,,, | Performed by: INTERNAL MEDICINE

## 2019-04-24 PROCEDURE — 99999 PR PBB SHADOW E&M-EST. PATIENT-LVL III: CPT | Mod: PBBFAC,,, | Performed by: INTERNAL MEDICINE

## 2019-04-24 PROCEDURE — 96413 CHEMO IV INFUSION 1 HR: CPT

## 2019-04-24 PROCEDURE — 3008F BODY MASS INDEX DOCD: CPT | Mod: CPTII,S$GLB,, | Performed by: INTERNAL MEDICINE

## 2019-04-24 PROCEDURE — 99999 PR PBB SHADOW E&M-EST. PATIENT-LVL III: ICD-10-PCS | Mod: PBBFAC,,, | Performed by: INTERNAL MEDICINE

## 2019-04-24 PROCEDURE — 99215 OFFICE O/P EST HI 40 MIN: CPT | Mod: S$GLB,,, | Performed by: INTERNAL MEDICINE

## 2019-04-24 RX ORDER — SODIUM CHLORIDE 0.9 % (FLUSH) 0.9 %
10 SYRINGE (ML) INJECTION
Status: DISCONTINUED | OUTPATIENT
Start: 2019-04-24 | End: 2019-04-24 | Stop reason: HOSPADM

## 2019-04-24 RX ORDER — SODIUM CHLORIDE 0.9 % (FLUSH) 0.9 %
10 SYRINGE (ML) INJECTION
Status: CANCELLED | OUTPATIENT
Start: 2019-04-25

## 2019-04-24 RX ORDER — HEPARIN 100 UNIT/ML
500 SYRINGE INTRAVENOUS
Status: CANCELLED | OUTPATIENT
Start: 2019-04-25

## 2019-04-24 RX ORDER — HEPARIN 100 UNIT/ML
500 SYRINGE INTRAVENOUS
Status: DISCONTINUED | OUTPATIENT
Start: 2019-04-24 | End: 2019-04-24 | Stop reason: HOSPADM

## 2019-04-24 RX ADMIN — SODIUM CHLORIDE 240 MG: 9 INJECTION, SOLUTION INTRAVENOUS at 01:04

## 2019-04-24 RX ADMIN — SODIUM CHLORIDE: 0.9 INJECTION, SOLUTION INTRAVENOUS at 01:04

## 2019-04-24 NOTE — PROGRESS NOTES
Subjective:       Patient ID: Burton Whiting is a 49 y.o. male.    Chief Complaint: Squamous cell carcinoma of palatine tonsil  Squamous cell carcinoma of palatine tonsil; g-tube malodorous; left neck pain 2/10; 4 cans formula per day; and r arm port---red/warm to touch  Mr. Burton Whiting is a 48-year-old male, former smoker, who presents today with a four-month history of enlarging neck mass.  He initially delayed care due to lack of insurance.  He was seen by Dr. Turpin who referred him to see Dr. Olguin.  He had a CT that showed a 3.8 x 3.8 x 4.9 cm soft tissue mass arising from the left palatine tonsil resulting in moderate narrowing of the hypopharyngeal airway adjacent extensive matted left cervical lymphadenopathy was noted.  The largest ella mass was 6.6 x 9 x 2.6 cm extending inferiorly to the supraclavicular region.  The mass pushed the trachea and the left common carotid artery to the right.  Additional representative of cervical lymph nodes measuring 2.9 x 3.1 x 3.5 cm on axial image 37 of series 3   and 2.4 x 2.1 x 2.2 cm on axial image 55 of series 3.  There is also a sclerotic lesion involving the midline of the clivus measuring 1.2 cm.  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  PET scan performed on 01/19/2018 revealed persistent evidence of a soft tissue mass arising from the left palatine tonsil resulting in moderate narrowing of the hypopharyngeal   airway.  The mass is hypermetabolic demonstrating an SUV max of 18.5.  There is also persistent adjacent extensive matted left cervical lymphadenopathy, largest of this measuring 6.7 x 8.42 with hypermetabolic activity and SUV max of 20.5.  Lymphadenopathy is again noted to have a mass effect on the trachea and left common carotid artery, pushing both structures towards the right.  There is also prominent right cervical lymph nodes with the largest measuring 0.8 cm and demonstrating mild hypermetabolic activity with SUV max  "of 1.8, physiologic   distribution of FDG in the gray matter.  There are 3 pulmonary nodules noted within the right lung, the largest is in the anterior segment of the right upper lobe measuring 1 cm, second is visualized in the middle lobe measuring 0.6 cm and the third is within the posterior basal segment measuring 0.6 cm.  There is one pulmonary nodule within the left lung within the lateral basal segment measuring 0.6 cm.  These nodules do not demonstrate hypermetabolic activity; however, they are below the threshold for observation with PET     He underwent MRI cervical spine revealed "No evidence of metastatic disease to the cervical spine. Multilevel degenerative changes of the cervical spine with disc protrusion at C5-6 which results in moderate to severe spinal canal stenosis and and associated cord signal abnormality, suggestive of compressive myelopathy. 6 mm T1 hypointense non-enhancing lesion in the clivus.  Continued followup is suggested. 9 mm inferior descent of the cerebellar tonsils below the foramen magnum, suggestive of Chiari 1 malformation"  MRI thoracic spine "No evidence of metastatic disease involving the thoracic spine. Incidental hemangioma involving the T7 vertebral body. Mild degenerative disc disease without any significant spinal canal stenosis or neuroforaminal narrowing.   He completed 3 cycles of TPF and 6 weeks of Cisplatin and RT. Completed on 6/26/18           His PET scan from 9/25/18 revealed "Progression of disease with multiple new hypermetabolic foci including the manubrium, mediastinal/retrocrural lymph nodes, and lungs. Partial therapeutic response within the presumed radiation field involving the left cervical mass, and complete therapeutic response in the right cervical lymph node, clivus, and left palatine tonsil. New soft tissue thickening and hypermetabolism in the left aryepiglottic fold and right cricoid cartilage"      He has been on Opdivo       HPI He comes in " for Opdivo. He feels well overall, occasionally notes joint aches.    Review of Systems   Constitutional: Negative for appetite change, fatigue and unexpected weight change.   HENT: Negative for mouth sores.    Eyes: Negative for visual disturbance.   Respiratory: Negative for cough and shortness of breath.    Cardiovascular: Negative for chest pain.   Gastrointestinal: Negative for abdominal pain and diarrhea.   Genitourinary: Negative for frequency.   Musculoskeletal: Negative for back pain.   Skin: Negative for rash.   Neurological: Negative for headaches.   Hematological: Negative for adenopathy.   Psychiatric/Behavioral: The patient is not nervous/anxious.    All other systems reviewed and are negative.      Objective:      Physical Exam   Constitutional: He is oriented to person, place, and time. He appears well-developed and well-nourished.   HENT:   Mouth/Throat: No oropharyngeal exudate.   Cardiovascular: Normal rate and normal heart sounds.   Pulmonary/Chest: Effort normal and breath sounds normal. He has no wheezes.   Abdominal: Soft. Bowel sounds are normal. There is no tenderness.   Musculoskeletal: He exhibits no edema or tenderness.   Lymphadenopathy:     He has no cervical adenopathy.   Neurological: He is alert and oriented to person, place, and time. Coordination normal.   Skin: Skin is warm and dry. No rash noted.   Psychiatric: He has a normal mood and affect. Judgment and thought content normal.   Vitals reviewed.      LABS:  WBC   Date Value Ref Range Status   04/24/2019 2.84 (L) 3.90 - 12.70 K/uL Final     Hemoglobin   Date Value Ref Range Status   04/24/2019 10.3 (L) 14.0 - 18.0 g/dL Final     Hematocrit   Date Value Ref Range Status   04/24/2019 30.5 (L) 40.0 - 54.0 % Final     Platelets   Date Value Ref Range Status   04/24/2019 161 150 - 350 K/uL Final     Gran # (ANC)   Date Value Ref Range Status   04/24/2019 2.0 1.8 - 7.7 K/uL Final     Comment:     The ANC is based on a white cell  differential from an   automated cell counter. It has not been microscopically   reviewed for the presence of abnormal cells. Clinical   correlation is required.         Chemistry        Component Value Date/Time     04/10/2019 0806    K 4.6 04/10/2019 0806     04/10/2019 0806    CO2 27 04/10/2019 0806    BUN 28 (H) 04/10/2019 0806    CREATININE 1.7 (H) 04/10/2019 0806    GLU 80 04/10/2019 0806        Component Value Date/Time    CALCIUM 10.1 04/10/2019 0806    ALKPHOS 57 04/10/2019 0806    AST 21 04/10/2019 0806    ALT 17 04/10/2019 0806    BILITOT 0.4 04/10/2019 0806    ESTGFRAFRICA 52.8 (A) 04/10/2019 0806    EGFRNONAA 45.7 (A) 04/10/2019 0806        TSH   Date Value Ref Range Status   04/10/2019 1.660 0.400 - 4.000 uIU/mL Final     Free T4   Date Value Ref Range Status   04/10/2019 1.03 0.71 - 1.51 ng/dL Final        Assessment:       1. Squamous cell carcinoma of palatine tonsil    2. Cancer of head, face, or neck lymph nodes, secondary    3. Secondary cancer of bone        Plan:        1,2,3. He will proceed with opdivo and will return in 2 weeks for next cycle. Xgeva at home every 4-6 weeks     Above care plan was discussed with patient and all questions were addressed to his satisfaction

## 2019-04-24 NOTE — PLAN OF CARE
Problem: Adult Inpatient Plan of Care  Goal: Patient-Specific Goal (Individualization)  Outcome: Ongoing (interventions implemented as appropriate)  1240-Labs , hx, and medications reviewed, patient was seen by Md prior to arrival. Assessment completed. Discussed plan of care with patient. Patient in agreement. Chair reclined and warm blanket and snack offered.

## 2019-04-24 NOTE — PLAN OF CARE
Problem: Adult Inpatient Plan of Care  Goal: Plan of Care Review  Outcome: Ongoing (interventions implemented as appropriate)  1350-Patient tolerated treatment well. Discharged without complaints or S/S of adverse event.  Instructed to call provider for any questions or concerns.

## 2019-05-09 ENCOUNTER — LAB VISIT (OUTPATIENT)
Dept: LAB | Facility: HOSPITAL | Age: 49
End: 2019-05-09
Attending: INTERNAL MEDICINE
Payer: COMMERCIAL

## 2019-05-09 ENCOUNTER — OFFICE VISIT (OUTPATIENT)
Dept: OTOLARYNGOLOGY | Facility: CLINIC | Age: 49
End: 2019-05-09
Payer: COMMERCIAL

## 2019-05-09 ENCOUNTER — INFUSION (OUTPATIENT)
Dept: INFUSION THERAPY | Facility: HOSPITAL | Age: 49
End: 2019-05-09
Attending: INTERNAL MEDICINE
Payer: COMMERCIAL

## 2019-05-09 ENCOUNTER — OFFICE VISIT (OUTPATIENT)
Dept: HEMATOLOGY/ONCOLOGY | Facility: CLINIC | Age: 49
End: 2019-05-09
Payer: COMMERCIAL

## 2019-05-09 VITALS
SYSTOLIC BLOOD PRESSURE: 131 MMHG | BODY MASS INDEX: 28.44 KG/M2 | HEART RATE: 58 BPM | WEIGHT: 181.19 LBS | HEIGHT: 67 IN | RESPIRATION RATE: 18 BRPM | TEMPERATURE: 98 F | DIASTOLIC BLOOD PRESSURE: 65 MMHG

## 2019-05-09 VITALS
DIASTOLIC BLOOD PRESSURE: 65 MMHG | BODY MASS INDEX: 28.45 KG/M2 | HEART RATE: 59 BPM | TEMPERATURE: 97 F | SYSTOLIC BLOOD PRESSURE: 107 MMHG | WEIGHT: 181.69 LBS

## 2019-05-09 VITALS
TEMPERATURE: 98 F | WEIGHT: 181.19 LBS | RESPIRATION RATE: 18 BRPM | OXYGEN SATURATION: 99 % | HEIGHT: 67 IN | DIASTOLIC BLOOD PRESSURE: 78 MMHG | BODY MASS INDEX: 28.44 KG/M2 | HEART RATE: 47 BPM | SYSTOLIC BLOOD PRESSURE: 140 MMHG

## 2019-05-09 DIAGNOSIS — C09.9 SQUAMOUS CELL CARCINOMA OF PALATINE TONSIL: ICD-10-CM

## 2019-05-09 DIAGNOSIS — C77.1 SECONDARY MALIGNANCY OF MEDIASTINAL LYMPH NODES: ICD-10-CM

## 2019-05-09 DIAGNOSIS — C09.9 TONSIL CANCER: ICD-10-CM
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
## 2019-05-09 DIAGNOSIS — E07.9 THYROID DISORDER: ICD-10-CM

## 2019-05-09 DIAGNOSIS — C77.0 CANCER OF HEAD, FACE, OR NECK LYMPH NODES, SECONDARY: ICD-10-CM

## 2019-05-09 DIAGNOSIS — G62.0 CHEMOTHERAPY-INDUCED NEUROPATHY: ICD-10-CM

## 2019-05-09 DIAGNOSIS — C09.9 TONSIL CANCER: Primary | ICD-10-CM

## 2019-05-09 DIAGNOSIS — C09.9 SQUAMOUS CELL CARCINOMA OF PALATINE TONSIL: Primary | ICD-10-CM

## 2019-05-09 DIAGNOSIS — C79.51 SECONDARY CANCER OF BONE: ICD-10-CM

## 2019-05-09 DIAGNOSIS — M27.2 OSTEORADIONECROSIS OF JAW: Primary | ICD-10-CM

## 2019-05-09 DIAGNOSIS — T45.1X5A CHEMOTHERAPY-INDUCED NEUROPATHY: ICD-10-CM

## 2019-05-09 LAB
ALBUMIN SERPL BCP-MCNC: 3.9 G/DL (ref 3.5–5.2)
ALP SERPL-CCNC: 53 U/L (ref 55–135)
ALT SERPL W/O P-5'-P-CCNC: 14 U/L (ref 10–44)
ANION GAP SERPL CALC-SCNC: 7 MMOL/L (ref 8–16)
AST SERPL-CCNC: 19 U/L (ref 10–40)
BILIRUB SERPL-MCNC: 0.3 MG/DL (ref 0.1–1)
BUN SERPL-MCNC: 27 MG/DL (ref 6–20)
CALCIUM SERPL-MCNC: 8.5 MG/DL (ref 8.7–10.5)
CHLORIDE SERPL-SCNC: 107 MMOL/L (ref 95–110)
CO2 SERPL-SCNC: 24 MMOL/L (ref 23–29)
CREAT SERPL-MCNC: 1.4 MG/DL (ref 0.5–1.4)
ERYTHROCYTE [DISTWIDTH] IN BLOOD BY AUTOMATED COUNT: 12.1 % (ref 11.5–14.5)
EST. GFR  (AFRICAN AMERICAN): >60 ML/MIN/1.73 M^2
EST. GFR  (NON AFRICAN AMERICAN): 57.8 ML/MIN/1.73 M^2
GLUCOSE SERPL-MCNC: 76 MG/DL (ref 70–110)
HCT VFR BLD AUTO: 33.6 % (ref 40–54)
HGB BLD-MCNC: 11.3 G/DL (ref 14–18)
IMM GRANULOCYTES # BLD AUTO: 0.02 K/UL (ref 0–0.04)
MCH RBC QN AUTO: 33.5 PG (ref 27–31)
MCHC RBC AUTO-ENTMCNC: 33.6 G/DL (ref 32–36)
MCV RBC AUTO: 100 FL (ref 82–98)
NEUTROPHILS # BLD AUTO: 2.4 K/UL (ref 1.8–7.7)
PLATELET # BLD AUTO: 160 K/UL (ref 150–350)
PMV BLD AUTO: 9.2 FL (ref 9.2–12.9)
POTASSIUM SERPL-SCNC: 4.6 MMOL/L (ref 3.5–5.1)
PROT SERPL-MCNC: 6.8 G/DL (ref 6–8.4)
RBC # BLD AUTO: 3.37 M/UL (ref 4.6–6.2)
SODIUM SERPL-SCNC: 138 MMOL/L (ref 136–145)
WBC # BLD AUTO: 3.43 K/UL (ref 3.9–12.7)

## 2019-05-09 PROCEDURE — 99999 PR PBB SHADOW E&M-EST. PATIENT-LVL III: ICD-10-PCS | Mod: PBBFAC,,, | Performed by: OTOLARYNGOLOGY

## 2019-05-09 PROCEDURE — 3008F PR BODY MASS INDEX (BMI) DOCUMENTED: ICD-10-PCS | Mod: CPTII,S$GLB,, | Performed by: INTERNAL MEDICINE

## 2019-05-09 PROCEDURE — 99215 PR OFFICE/OUTPT VISIT, EST, LEVL V, 40-54 MIN: ICD-10-PCS | Mod: S$GLB,,, | Performed by: INTERNAL MEDICINE

## 2019-05-09 PROCEDURE — 80053 COMPREHEN METABOLIC PANEL: CPT

## 2019-05-09 PROCEDURE — 25000003 PHARM REV CODE 250: Performed by: INTERNAL MEDICINE

## 2019-05-09 PROCEDURE — 36415 COLL VENOUS BLD VENIPUNCTURE: CPT

## 2019-05-09 PROCEDURE — 99213 PR OFFICE/OUTPT VISIT, EST, LEVL III, 20-29 MIN: ICD-10-PCS | Mod: S$GLB,,, | Performed by: OTOLARYNGOLOGY

## 2019-05-09 PROCEDURE — 99999 PR PBB SHADOW E&M-EST. PATIENT-LVL III: CPT | Mod: PBBFAC,,, | Performed by: INTERNAL MEDICINE

## 2019-05-09 PROCEDURE — 3008F PR BODY MASS INDEX (BMI) DOCUMENTED: ICD-10-PCS | Mod: CPTII,S$GLB,, | Performed by: OTOLARYNGOLOGY

## 2019-05-09 PROCEDURE — 63600175 PHARM REV CODE 636 W HCPCS: Mod: JG | Performed by: INTERNAL MEDICINE

## 2019-05-09 PROCEDURE — 85027 COMPLETE CBC AUTOMATED: CPT

## 2019-05-09 PROCEDURE — 96413 CHEMO IV INFUSION 1 HR: CPT

## 2019-05-09 PROCEDURE — 3008F BODY MASS INDEX DOCD: CPT | Mod: CPTII,S$GLB,, | Performed by: INTERNAL MEDICINE

## 2019-05-09 PROCEDURE — 99213 OFFICE O/P EST LOW 20 MIN: CPT | Mod: S$GLB,,, | Performed by: OTOLARYNGOLOGY

## 2019-05-09 PROCEDURE — 99999 PR PBB SHADOW E&M-EST. PATIENT-LVL III: CPT | Mod: PBBFAC,,, | Performed by: OTOLARYNGOLOGY

## 2019-05-09 PROCEDURE — 99215 OFFICE O/P EST HI 40 MIN: CPT | Mod: S$GLB,,, | Performed by: INTERNAL MEDICINE

## 2019-05-09 PROCEDURE — 3008F BODY MASS INDEX DOCD: CPT | Mod: CPTII,S$GLB,, | Performed by: OTOLARYNGOLOGY

## 2019-05-09 PROCEDURE — 99999 PR PBB SHADOW E&M-EST. PATIENT-LVL III: ICD-10-PCS | Mod: PBBFAC,,, | Performed by: INTERNAL MEDICINE

## 2019-05-09 RX ORDER — GABAPENTIN 300 MG/1
300 CAPSULE ORAL 2 TIMES DAILY
Qty: 180 CAPSULE | Refills: 2 | Status: SHIPPED | OUTPATIENT
Start: 2019-05-09 | End: 2019-05-09 | Stop reason: SDUPTHER

## 2019-05-09 RX ORDER — HEPARIN 100 UNIT/ML
500 SYRINGE INTRAVENOUS
Status: DISCONTINUED | OUTPATIENT
Start: 2019-05-09 | End: 2019-05-09 | Stop reason: HOSPADM

## 2019-05-09 RX ORDER — CHLORHEXIDINE GLUCONATE ORAL RINSE 1.2 MG/ML
15 SOLUTION DENTAL 2 TIMES DAILY
Qty: 473 ML | Refills: 0 | Status: SHIPPED | OUTPATIENT
Start: 2019-05-09 | End: 2019-05-23

## 2019-05-09 RX ORDER — GABAPENTIN 300 MG/1
300 CAPSULE ORAL 3 TIMES DAILY
Qty: 270 CAPSULE | Refills: 3 | Status: SHIPPED | OUTPATIENT
Start: 2019-05-09 | End: 2019-05-14 | Stop reason: SDUPTHER

## 2019-05-09 RX ORDER — SODIUM CHLORIDE 0.9 % (FLUSH) 0.9 %
10 SYRINGE (ML) INJECTION
Status: CANCELLED | OUTPATIENT
Start: 2019-05-10

## 2019-05-09 RX ORDER — HEPARIN 100 UNIT/ML
500 SYRINGE INTRAVENOUS
Status: CANCELLED | OUTPATIENT
Start: 2019-05-10

## 2019-05-09 RX ORDER — SODIUM CHLORIDE 0.9 % (FLUSH) 0.9 %
10 SYRINGE (ML) INJECTION
Status: DISCONTINUED | OUTPATIENT
Start: 2019-05-09 | End: 2019-05-09 | Stop reason: HOSPADM

## 2019-05-09 RX ADMIN — SODIUM CHLORIDE 240 MG: 9 INJECTION, SOLUTION INTRAVENOUS at 11:05

## 2019-05-09 RX ADMIN — SODIUM CHLORIDE: 0.9 INJECTION, SOLUTION INTRAVENOUS at 11:05

## 2019-05-09 NOTE — PLAN OF CARE
Problem: Adult Inpatient Plan of Care  Goal: Plan of Care Review  Outcome: Ongoing (interventions implemented as appropriate)  Did well today with opdivo

## 2019-05-09 NOTE — ASSESSMENT & PLAN NOTE
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  He is having minimal pain associated with the more anterior of the 2 sites.  Given the small size of these lesions, his relatively asymptomatic presentation and the overall healthy appearance of the mandible and CT scan, I am inclined to simply observe these lesions.  Considering his current treatment for metastatic disease, I plan to take a conservative approach.  I prescribed a course of Peridex mouthwash.  He is to use this for 3 weeks and return to clinic for repeat evaluation.

## 2019-05-09 NOTE — PROGRESS NOTES
Subjective:       Patient ID: Burton Whiting is a 49 y.o. male.    Chief Complaint: Squamous cell carcinoma of palatine tonsil  Squamous cell carcinoma of palatine tonsil; g-tube malodorous; left neck pain 2/10; 4 cans formula per day; and r arm port---red/warm to touch  Mr. Burton Whiting is a 48-year-old male, former smoker, who presents today with a four-month history of enlarging neck mass.  He initially delayed care due to lack of insurance.  He was seen by Dr. Turpin who referred him to see Dr. Olguin.  He had a CT that showed a 3.8 x 3.8 x 4.9 cm soft tissue mass arising from the left palatine tonsil resulting in moderate narrowing of the hypopharyngeal airway adjacent extensive matted left cervical lymphadenopathy was noted.  The largest ella mass was 6.6 x 9 x 2.6 cm extending inferiorly to the supraclavicular region.  The mass pushed the trachea and the left common carotid artery to the right.  Additional representative of cervical lymph nodes measuring 2.9 x 3.1 x 3.5 cm on axial image 37 of series 3   and 2.4 x 2.1 x 2.2 cm on axial image 55 of series 3.  There is also a sclerotic lesion involving the midline of the clivus measuring 1.2 cm.  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  PET scan performed on 01/19/2018 revealed persistent evidence of a soft tissue mass arising from the left palatine tonsil resulting in moderate narrowing of the hypopharyngeal   airway.  The mass is hypermetabolic demonstrating an SUV max of 18.5.  There is also persistent adjacent extensive matted left cervical lymphadenopathy, largest of this measuring 6.7 x 8.42 with hypermetabolic activity and SUV max of 20.5.  Lymphadenopathy is again noted to have a mass effect on the trachea and left common carotid artery, pushing both structures towards the right.  There is also prominent right cervical lymph nodes with the largest measuring 0.8 cm and demonstrating mild hypermetabolic activity with SUV max  "of 1.8, physiologic   distribution of FDG in the gray matter.  There are 3 pulmonary nodules noted within the right lung, the largest is in the anterior segment of the right upper lobe measuring 1 cm, second is visualized in the middle lobe measuring 0.6 cm and the third is within the posterior basal segment measuring 0.6 cm.  There is one pulmonary nodule within the left lung within the lateral basal segment measuring 0.6 cm.  These nodules do not demonstrate hypermetabolic activity; however, they are below the threshold for observation with PET     He underwent MRI cervical spine revealed "No evidence of metastatic disease to the cervical spine. Multilevel degenerative changes of the cervical spine with disc protrusion at C5-6 which results in moderate to severe spinal canal stenosis and and associated cord signal abnormality, suggestive of compressive myelopathy. 6 mm T1 hypointense non-enhancing lesion in the clivus.  Continued followup is suggested. 9 mm inferior descent of the cerebellar tonsils below the foramen magnum, suggestive of Chiari 1 malformation"  MRI thoracic spine "No evidence of metastatic disease involving the thoracic spine. Incidental hemangioma involving the T7 vertebral body. Mild degenerative disc disease without any significant spinal canal stenosis or neuroforaminal narrowing.   He completed 3 cycles of TPF and 6 weeks of Cisplatin and RT. Completed on 6/26/18           His PET scan from 9/25/18 revealed "Progression of disease with multiple new hypermetabolic foci including the manubrium, mediastinal/retrocrural lymph nodes, and lungs. Partial therapeutic response within the presumed radiation field involving the left cervical mass, and complete therapeutic response in the right cervical lymph node, clivus, and left palatine tonsil. New soft tissue thickening and hypermetabolism in the left aryepiglottic fold and right cricoid cartilage"      He has been on Opdivo        HPI He comes in " for Opdivo. He feels well and denies any new complaints  He notes neuropathy chronic not affecting ADL's   He is here with his brother. His ECOG PS is zero. He is working full time    Review of Systems   Constitutional: Negative for appetite change, fatigue and unexpected weight change.   HENT: Negative for mouth sores.    Eyes: Negative for visual disturbance.   Respiratory: Negative for cough and shortness of breath.    Cardiovascular: Negative for chest pain.   Gastrointestinal: Negative for abdominal pain and diarrhea.   Genitourinary: Negative for frequency.   Musculoskeletal: Negative for back pain.   Skin: Negative for rash.   Neurological: Positive for numbness. Negative for headaches.   Hematological: Negative for adenopathy.   Psychiatric/Behavioral: The patient is not nervous/anxious.    All other systems reviewed and are negative.          PMFSH: all information reviewed and updated as relevant to today's visit     Physical Exam   Constitutional: He is oriented to person, place, and time. He appears well-developed and well-nourished.   HENT:   Mouth/Throat: No oropharyngeal exudate.   Cardiovascular: Normal rate and normal heart sounds.   Pulmonary/Chest: Effort normal and breath sounds normal. He has no wheezes.   Abdominal: Soft. Bowel sounds are normal. There is no tenderness.   Musculoskeletal: He exhibits no edema or tenderness.   Lymphadenopathy:     He has no cervical adenopathy.   Neurological: He is alert and oriented to person, place, and time. Coordination normal.   Skin: Skin is warm and dry. No rash noted.   Psychiatric: He has a normal mood and affect. Judgment and thought content normal.   Vitals reviewed.        LABS:  WBC   Date Value Ref Range Status   05/09/2019 3.43 (L) 3.90 - 12.70 K/uL Final     Hemoglobin   Date Value Ref Range Status   05/09/2019 11.3 (L) 14.0 - 18.0 g/dL Final     Hematocrit   Date Value Ref Range Status   05/09/2019 33.6 (L) 40.0 - 54.0 % Final     Platelets    Date Value Ref Range Status   05/09/2019 160 150 - 350 K/uL Final     Gran # (ANC)   Date Value Ref Range Status   05/09/2019 2.4 1.8 - 7.7 K/uL Final     Comment:     The ANC is based on a white cell differential from an   automated cell counter. It has not been microscopically   reviewed for the presence of abnormal cells. Clinical   correlation is required.         Chemistry        Component Value Date/Time     05/09/2019 0845    K 4.6 05/09/2019 0845     05/09/2019 0845    CO2 24 05/09/2019 0845    BUN 27 (H) 05/09/2019 0845    CREATININE 1.4 05/09/2019 0845    GLU 76 05/09/2019 0845        Component Value Date/Time    CALCIUM 8.5 (L) 05/09/2019 0845    ALKPHOS 53 (L) 05/09/2019 0845    AST 19 05/09/2019 0845    ALT 14 05/09/2019 0845    BILITOT 0.3 05/09/2019 0845    ESTGFRAFRICA >60.0 05/09/2019 0845    EGFRNONAA 57.8 (A) 05/09/2019 0845        TSH   Date Value Ref Range Status   04/24/2019 1.266 0.400 - 4.000 uIU/mL Final     Free T4   Date Value Ref Range Status   04/24/2019 1.01 0.71 - 1.51 ng/dL Final     Assessment:       1. Squamous cell carcinoma of palatine tonsil    2. Cancer of head, face, or neck lymph nodes, secondary    3. 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 Chemotherapy-induced neuropathy    6. Thyroid disorder        Plan:        1,2,3,4. He will proceed with Opdivo and will return in 2 weeks for next cycle. He is doing Xgeva injections at home every 4-6 weeks   5. Continue with gabapentin, increased dose of 3 tablets daily (900 mg/day), escribed new script.  6. Check TSH and free T4 in 2 weeks    Above care plan was discussed with patient and accompanying brother and all questions were addressed to their satisfaction

## 2019-05-09 NOTE — PROGRESS NOTES
Chief Complaint   Patient presents with    Follow-up        Squamous cell carcinoma of palatine tonsil    1/4/2018 Initial Diagnosis     Squamous cell carcinoma of palatine tonsil         5/3/2018 - 6/26/2018 Radiation Therapy     Treating physician: Dr. Sandra  Total Dose: 70 Gy  Fractions: 35/35         5/2018 - 6/2018 Chemotherapy     Treatment Summary   Plan Name: OP CISPLATIN DOCETAXEL 5FU Q3W  Treatment Goal: Curative  Status: Inactive  Start Date: 1/29/2018  End Date: 5/27/2018 (Planned)  Provider: Cortney Lopez MD  Chemotherapy: CISplatin (PLATINOL) 100 mg/m2 = 248 mg in sodium chloride 0.9% 500 mL chemo infusion, 100 mg/m2 = 248 mg (100 % of original dose 100 mg/m2), Intravenous, Clinic/HOD 1 time, 2 of 6 cycles  Dose modification: 100 mg/m2 (original dose 100 mg/m2, Cycle 1)    DOCEtaxel (TAXOTERE) 75 mg/m2 = 185 mg in sodium chloride 0.9% 250 mL chemo infusion, 75 mg/m2 = 185 mg, Intravenous, Clinic/HOD 1 time, 2 of 6 cycles    fluorouracil (ADRUCIL) 1,000 mg/m2/day = 12,400 mg in sodium chloride 0.9% 250 mL chemo infusion, 1,000 mg/m2/day = 12,400 mg (100 % of original dose 1,000 mg/m2/day), Intravenous, Over 120 hours, 2 of 6 cycles  Dose modification: 1,000 mg/m2/day (original dose 1,000 mg/m2/day, Cycle 1)    Plan Name: OP CISPLATIN (Weekly)  Treatment Goal: Curative  Status: Inactive  Start Date: 3/20/2018 (Planned)  End Date: 4/24/2018 (Planned)  Provider: Cortney Lopez MD  Chemotherapy: CISplatin (PLATINOL) 40 mg/m2 = 92 mg in sodium chloride 0.9% 250 mL chemo infusion, 40 mg/m2, Intravenous, Clinic/HOD 1 time, 0 of 6 cycles    Plan Name: OP CISPLATIN DOCETAXEL 5FU Q3W  Treatment Goal: Maintenance  Status: Inactive  Start Date: 3/19/2018  End Date: 7/12/2018 (Planned)  Provider: Cortney Lopez MD  Chemotherapy: CISplatin (PLATINOL) 75 mg/m2 = 172 mg in sodium chloride 0.9% 500 mL chemo infusion, 75 mg/m2 = 172 mg, Intravenous, Clinic/HOD 1 time, 1 of 6 cycles    DOCEtaxel (TAXOTERE) 75 mg/m2 = 170  mg in sodium chloride 0.9% 250 mL chemo infusion, 75 mg/m2 = 170 mg, Intravenous, Clinic/HOD 1 time, 1 of 6 cycles    fluorouracil (ADRUCIL) 750 mg/m2/day = 8,590 mg in sodium chloride 0.9% 240 mL chemo infusion, 750 mg/m2/day = 8,590 mg, Intravenous, Over 120 hours, 1 of 6 cycles    Plan Name: OP CISPLATIN (Weekly)  Treatment Goal: Palliative  Status: Active  Start Date: 5/3/2018  End Date: 6/27/2018  Provider: Cortney Lopez MD  Chemotherapy: CISplatin (PLATINOL) 40 mg/m2 = 89 mg in sodium chloride 0.9% 250 mL chemo infusion, 40 mg/m2 = 89 mg, Intravenous, Clinic/HOD 1 time, 6 of 6 cycles             3/28/2019 Tumor Conference        His case was discussed at the Multidisciplinary Head and Neck Team Planning Meeting.    Representatives from Medical Oncology, Radiation Oncology, Head and Neck Surgical Oncology, Psychosocial Oncology, and Speech and Language Pathology discussed the case with the following recommendations:    1) continued observation                      HPI   49 y.o. male presents with the above treatment history.  He is currently receiving palliative immunotherapy for metastatic disease.   Recent PET-CT was clear with the exception of a hypermetabolic lesion along the right mandible.   Associated CT scan revealed no evidence of bony destruction.  He has no significant complaints today.  He reports some pain along the right mandible that he attributes to ill-fitting denture.  He is enjoying high energy level.  He is working and fishing recreationally.    Review of Systems   Constitutional: Negative for fatigue and unexpected weight change.   HENT: Per HPI.  Eyes: Negative for visual disturbance.   Respiratory: Negative for shortness of breath, hemoptysis   Cardiovascular: Negative for chest pain and palpitations.   Musculoskeletal: Negative for decreased ROM, back pain.   Skin: Negative for rash, sunburn, itching.   Neurological: Negative for dizziness and seizures.   Hematological: Negative for  adenopathy. Does not bruise/bleed easily.   Endocrine: Negative for rapid weight loss/weight gain, heat/cold intolerance.     Past Medical History   Patient Active Problem List   Diagnosis    Squamous cell carcinoma of palatine tonsil    Low vitamin D level    Cancer of head, face, or neck lymph nodes, secondary    Secondary cancer of bone    Tonsil cancer    Fever and neutropenia    Dehydration    Chemotherapy-induced neutropenia    Thrombocytopenia    Enterocolitis    Diverticulitis    Port or reservoir infection    Anxiety    Head and neck cancer    Macrocytic anemia    Adjustment disorder with emotional disturbance    Secondary malignancy of mediastinal lymph nodes    Fatigue    Hypocalcemia    Chemotherapy-induced neuropathy    Depression    Insomnia    BMI 28.0-28.9,adult    Neuropathic pain           Past Surgical History   Past Surgical History:   Procedure Laterality Date    GASTROSTOMY TUBE PLACEMENT Left 02/12/2018    XJBDFLVRT-EQCL-Q-CATH N/A 1/26/2018    Performed by [facility]anueva [facility] at [facility]ATH LAB    INSERTION-TUBE-GASTROSTOMY-LAPAROSCOPIC N/A 2/11/2018    Performed by [facility]as [facility] at [facility] OR    Removal-port-a-cath N/A 6/6/2018    Performed by [facility] Diagnostic Provider at [facility] OR [address] [place] [address]         Family History   Family History   Problem Relation Age of Onset    Leukemia Mother     Hypertension Father     Thyroid disease Sister     Depression Sister     Hypertension Brother     Brain cancer Maternal Uncle     Brain cancer Maternal Uncle            Social History   .  Social History     Socioeconomic History    Marital status:      Spouse name: Not on file    Number of children: Not on file    Years of education: Not on file    Highest education level: Not on file   Occupational History    Not on file   Social Needs    Financial resource strain: Not on file    Food insecurity:     Worry: Not on file     Inability: Not on file     Transportation needs:     Medical: Not on file     Non-medical: Not on file   Tobacco Use    Smoking status: Former Smoker     Packs/day: 1.50     Years: 25.00     Pack years: 37.50     Types: Cigarettes     Last attempt to quit: 2017     Years since quittin.9    Smokeless tobacco: Never Used    Tobacco comment: smoked for about 25 years. quit 6 months ago   Substance and Sexual Activity    Alcohol use: No     Comment: history of overuse    Drug use: No     Comment: Former Opiate/methamphetamine addiction    Sexual activity: Yes     Partners: Female   Lifestyle    Physical activity:     Days per week: Not on file     Minutes per session: Not on file    Stress: Not on file   Relationships    Social connections:     Talks on phone: Not on file     Gets together: Not on file     Attends [employer] service: Not on file     Active member of club or organization: Not on file     Attends meetings of clubs or organizations: Not on file     Relationship status: Not on file   Other Topics Concern    Patient feels they ought to cut down on drinking/drug use Not Asked    Patient annoyed by others criticizing their drinking/drug use Not Asked    Patient has felt bad or guilty about drinking/drug use Not Asked    Patient has had a drink/used drugs as an eye opener in the AM Not Asked   Social History Narrative    3/28/19: he is living alone. No children. Close to siblings/father/several friends; enjoys fishing and flying drones. He works as a .          Allergies   Review of patient's allergies indicates:   Allergen Reactions    Trazodone Other (See Comments)     confusion           Physical Exam     Vitals:    19 1514   BP: 107/65   Pulse: (!) 59   Temp: 96.8 °F (36 °C)         Body mass index is 28.45 kg/m².      General: AOx3, NAD   Respiratory:  Symmetric chest rise, normal effort  Nose: No gross nasal septal deviation. Inferior Turbinates WNL bilaterally. No septal perforation. No  masses/lesions.   Oral Cavity:  Oral Tongue mobile, no lesions noted.  2 foci of exposed bone along the lingual cortex of the right mandible.  The 1st measures roughly 2 mm and is located in the anterior aspect of the right mandibular body.  This side corresponds to lesion noted on PET-CT scan.  The 2nd focus is roughly at the angle of the mandible or the inferior ramus and has a similar appearance and size.  Hard Palate WNL. No buccal or FOM lesions.  Oropharynx:  No masses/lesions of the posterior pharyngeal wall. Tonsillar fossa without lesions. Soft palate without masses. Midline uvula.   Neck: No scars.  No cervical lymphadenopathy, thyromegaly or thyroid nodules.  Normal range of motion.    Face: House Brackmann I bilaterally.     Assessment/Plan  Problem List Items Addressed This Visit        ENT    Osteoradionecrosis of jaw - Primary     He has evidence of 2 small foci of osteoradionecrosis of the right mandible.  He is having minimal pain associated with the more anterior of the 2 sites.  Given the small size of these lesions, his relatively asymptomatic presentation and the overall healthy appearance of the mandible and CT scan, I am inclined to simply observe these lesions.  Considering his current treatment for metastatic disease, I plan to take a conservative approach.  I prescribed a course of Peridex mouthwash.  He is to use this for 3 weeks and return to clinic for repeat evaluation.         Relevant Medications    chlorhexidine (PERIDEX) 0.12 % solution       Oncology    Squamous cell carcinoma of palatine tonsil

## 2019-05-09 NOTE — PLAN OF CARE
Problem: Adult Inpatient Plan of Care  Goal: Plan of Care Review  Outcome: Ongoing (interventions implemented as appropriate)  Did well with chemo today. Home today.

## 2019-05-14 DIAGNOSIS — C09.9 TONSIL CANCER: ICD-10-CM

## 2019-05-14 RX ORDER — GABAPENTIN 300 MG/1
300 CAPSULE ORAL 3 TIMES DAILY
Qty: 270 CAPSULE | Refills: 3 | Status: ON HOLD | OUTPATIENT
Start: 2019-05-14 | End: 2019-10-03 | Stop reason: SDUPTHER

## 2019-05-14 RX ORDER — GABAPENTIN 300 MG/1
300 CAPSULE ORAL 3 TIMES DAILY
Qty: 270 CAPSULE | Refills: 3 | Status: SHIPPED | OUTPATIENT
Start: 2019-05-14 | End: 2019-05-14 | Stop reason: SDUPTHER

## 2019-05-22 ENCOUNTER — INFUSION (OUTPATIENT)
Dept: INFUSION THERAPY | Facility: HOSPITAL | Age: 49
End: 2019-05-22
Attending: INTERNAL MEDICINE
Payer: COMMERCIAL

## 2019-05-22 ENCOUNTER — OFFICE VISIT (OUTPATIENT)
Dept: HEMATOLOGY/ONCOLOGY | Facility: CLINIC | Age: 49
End: 2019-05-22
Payer: COMMERCIAL

## 2019-05-22 VITALS
HEIGHT: 67 IN | HEART RATE: 45 BPM | OXYGEN SATURATION: 98 % | DIASTOLIC BLOOD PRESSURE: 67 MMHG | RESPIRATION RATE: 16 BRPM | TEMPERATURE: 98 F | BODY MASS INDEX: 28.58 KG/M2 | WEIGHT: 182.13 LBS | SYSTOLIC BLOOD PRESSURE: 119 MMHG

## 2019-05-22 VITALS
DIASTOLIC BLOOD PRESSURE: 68 MMHG | TEMPERATURE: 98 F | SYSTOLIC BLOOD PRESSURE: 121 MMHG | RESPIRATION RATE: 18 BRPM | HEART RATE: 61 BPM

## 2019-05-22 DIAGNOSIS — C09.9 TONSIL CANCER: Primary | ICD-10-CM
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
## 2019-05-22 DIAGNOSIS — C09.9 SQUAMOUS CELL CARCINOMA OF PALATINE TONSIL: Primary | ICD-10-CM

## 2019-05-22 DIAGNOSIS — C79.51 SECONDARY CANCER OF BONE: ICD-10-CM

## 2019-05-22 DIAGNOSIS — M27.2 OSTEORADIONECROSIS OF JAW: ICD-10-CM

## 2019-05-22 DIAGNOSIS — C77.0 CANCER OF HEAD, FACE, OR NECK LYMPH NODES, SECONDARY: ICD-10-CM

## 2019-05-22 DIAGNOSIS — C09.9 SQUAMOUS CELL CARCINOMA OF PALATINE TONSIL: ICD-10-CM

## 2019-05-22 PROCEDURE — 99999 PR PBB SHADOW E&M-EST. PATIENT-LVL III: CPT | Mod: PBBFAC,,, | Performed by: INTERNAL MEDICINE

## 2019-05-22 PROCEDURE — 99215 OFFICE O/P EST HI 40 MIN: CPT | Mod: S$GLB,,, | Performed by: INTERNAL MEDICINE

## 2019-05-22 PROCEDURE — 99215 PR OFFICE/OUTPT VISIT, EST, LEVL V, 40-54 MIN: ICD-10-PCS | Mod: S$GLB,,, | Performed by: INTERNAL MEDICINE

## 2019-05-22 PROCEDURE — 96413 CHEMO IV INFUSION 1 HR: CPT

## 2019-05-22 PROCEDURE — 25000003 PHARM REV CODE 250: Performed by: INTERNAL MEDICINE

## 2019-05-22 PROCEDURE — 3008F PR BODY MASS INDEX (BMI) DOCUMENTED: ICD-10-PCS | Mod: CPTII,S$GLB,, | Performed by: INTERNAL MEDICINE

## 2019-05-22 PROCEDURE — 99999 PR PBB SHADOW E&M-EST. PATIENT-LVL III: ICD-10-PCS | Mod: PBBFAC,,, | Performed by: INTERNAL MEDICINE

## 2019-05-22 PROCEDURE — 63600175 PHARM REV CODE 636 W HCPCS: Mod: JG | Performed by: INTERNAL MEDICINE

## 2019-05-22 PROCEDURE — 3008F BODY MASS INDEX DOCD: CPT | Mod: CPTII,S$GLB,, | Performed by: INTERNAL MEDICINE

## 2019-05-22 RX ORDER — SODIUM CHLORIDE 0.9 % (FLUSH) 0.9 %
10 SYRINGE (ML) INJECTION
Status: CANCELLED | OUTPATIENT
Start: 2019-05-22

## 2019-05-22 RX ORDER — HEPARIN 100 UNIT/ML
500 SYRINGE INTRAVENOUS
Status: CANCELLED | OUTPATIENT
Start: 2019-05-22

## 2019-05-22 RX ADMIN — SODIUM CHLORIDE 240 MG: 9 INJECTION, SOLUTION INTRAVENOUS at 12:05

## 2019-05-22 RX ADMIN — SODIUM CHLORIDE: 9 INJECTION, SOLUTION INTRAVENOUS at 12:05

## 2019-05-22 NOTE — PROGRESS NOTES
Subjective:       Patient ID: Burton Whiting is a 49 y.o. male.    Chief Complaint: Squamous cell carcinoma of palatine tonsil  Squamous cell carcinoma of palatine tonsil; g-tube malodorous; left neck pain 2/10; 4 cans formula per day; and r arm port---red/warm to touch  Mr. Burton Whiting is a 48-year-old male, former smoker, who presents today with a four-month history of enlarging neck mass.  He initially delayed care due to lack of insurance.  He was seen by Dr. Turpin who referred him to see Dr. Olguin.  He had a CT that showed a 3.8 x 3.8 x 4.9 cm soft tissue mass arising from the left palatine tonsil resulting in moderate narrowing of the hypopharyngeal airway adjacent extensive matted left cervical lymphadenopathy was noted.  The largest ella mass was 6.6 x 9 x 2.6 cm extending inferiorly to the supraclavicular region.  The mass pushed the trachea and the left common carotid artery to the right.  Additional representative of cervical lymph nodes measuring 2.9 x 3.1 x 3.5 cm on axial image 37 of series 3   and 2.4 x 2.1 x 2.2 cm on axial image 55 of series 3.  There is also a sclerotic lesion involving the midline of the clivus measuring 1.2 cm.  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  PET scan performed on 01/19/2018 revealed persistent evidence of a soft tissue mass arising from the left palatine tonsil resulting in moderate narrowing of the hypopharyngeal   airway.  The mass is hypermetabolic demonstrating an SUV max of 18.5.  There is also persistent adjacent extensive matted left cervical lymphadenopathy, largest of this measuring 6.7 x 8.42 with hypermetabolic activity and SUV max of 20.5.  Lymphadenopathy is again noted to have a mass effect on the trachea and left common carotid artery, pushing both structures towards the right.  There is also prominent right cervical lymph nodes with the largest measuring 0.8 cm and demonstrating mild hypermetabolic activity with SUV max  "of 1.8, physiologic   distribution of FDG in the gray matter.  There are 3 pulmonary nodules noted within the right lung, the largest is in the anterior segment of the right upper lobe measuring 1 cm, second is visualized in the middle lobe measuring 0.6 cm and the third is within the posterior basal segment measuring 0.6 cm.  There is one pulmonary nodule within the left lung within the lateral basal segment measuring 0.6 cm.  These nodules do not demonstrate hypermetabolic activity; however, they are below the threshold for observation with PET     He underwent MRI cervical spine revealed "No evidence of metastatic disease to the cervical spine. Multilevel degenerative changes of the cervical spine with disc protrusion at C5-6 which results in moderate to severe spinal canal stenosis and and associated cord signal abnormality, suggestive of compressive myelopathy. 6 mm T1 hypointense non-enhancing lesion in the clivus.  Continued followup is suggested. 9 mm inferior descent of the cerebellar tonsils below the foramen magnum, suggestive of Chiari 1 malformation"  MRI thoracic spine "No evidence of metastatic disease involving the thoracic spine. Incidental hemangioma involving the T7 vertebral body. Mild degenerative disc disease without any significant spinal canal stenosis or neuroforaminal narrowing.   He completed 3 cycles of TPF and 6 weeks of Cisplatin and RT. Completed on 6/26/18           His PET scan from 9/25/18 revealed "Progression of disease with multiple new hypermetabolic foci including the manubrium, mediastinal/retrocrural lymph nodes, and lungs. Partial therapeutic response within the presumed radiation field involving the left cervical mass, and complete therapeutic response in the right cervical lymph node, clivus, and left palatine tonsil. New soft tissue thickening and hypermetabolism in the left aryepiglottic fold and right cricoid cartilage"      He has been on Opdivo        HPI He comes in " for Opdivo. He feels well and denies any new complaints  His ECOG PS is zero.    Review of Systems   Constitutional: Negative for appetite change, fatigue and unexpected weight change.   HENT: Negative for mouth sores.    Eyes: Negative for visual disturbance.   Respiratory: Negative for cough and shortness of breath.    Cardiovascular: Negative for chest pain.   Gastrointestinal: Negative for abdominal pain and diarrhea.   Genitourinary: Negative for frequency.   Musculoskeletal: Negative for back pain.   Skin: Negative for rash.   Neurological: Positive for numbness. Negative for headaches.   Hematological: Negative for adenopathy.   Psychiatric/Behavioral: The patient is not nervous/anxious.    All other systems reviewed and are negative.      Objective:      Physical Exam   Constitutional: He is oriented to person, place, and time. He appears well-developed and well-nourished.   HENT:   Mouth/Throat: No oropharyngeal exudate.   Cardiovascular: Normal rate and normal heart sounds.   Pulmonary/Chest: Effort normal and breath sounds normal. He has no wheezes.   Abdominal: Soft. Bowel sounds are normal. There is no tenderness.   Musculoskeletal: He exhibits no edema or tenderness.   Lymphadenopathy:     He has no cervical adenopathy.   Neurological: He is alert and oriented to person, place, and time. Coordination normal.   Skin: Skin is warm and dry. No rash noted.   Psychiatric: He has a normal mood and affect. Judgment and thought content normal.   Vitals reviewed.      LABS:  WBC   Date Value Ref Range Status   05/22/2019 3.40 (L) 3.90 - 12.70 K/uL Final     Hemoglobin   Date Value Ref Range Status   05/22/2019 11.3 (L) 14.0 - 18.0 g/dL Final     Hematocrit   Date Value Ref Range Status   05/22/2019 34.0 (L) 40.0 - 54.0 % Final     Platelets   Date Value Ref Range Status   05/22/2019 146 (L) 150 - 350 K/uL Final     Gran # (ANC)   Date Value Ref Range Status   05/22/2019 2.5 1.8 - 7.7 K/uL Final     Comment:      The ANC is based on a white cell differential from an   automated cell counter. It has not been microscopically   reviewed for the presence of abnormal cells. Clinical   correlation is required.         Chemistry        Component Value Date/Time     05/22/2019 1034    K 4.3 05/22/2019 1034     05/22/2019 1034    CO2 23 05/22/2019 1034    BUN 29 (H) 05/22/2019 1034    CREATININE 1.6 (H) 05/22/2019 1034    GLU 86 05/22/2019 1034        Component Value Date/Time    CALCIUM 9.6 05/22/2019 1034    ALKPHOS 57 05/22/2019 1034    AST 20 05/22/2019 1034    ALT 17 05/22/2019 1034    BILITOT 0.4 05/22/2019 1034    ESTGFRAFRICA 56.8 (A) 05/22/2019 1034    EGFRNONAA 49.2 (A) 05/22/2019 1034           Assessment:       1. Squamous cell carcinoma of palatine tonsil    2. Secondary cancer of bone    3. Cancer of head, face, or neck lymph nodes, secondary    4. Osteoradionecrosis of jaw        Plan:     1,2,3. He will proceed with Opdivo and will return in 2 weeks for next cycle.      4. On Peridex mouth wash. He will see his Dentist to get his dentures done    Above care plan was discussed with patient and all questions were addressed to his satisfaction

## 2019-05-22 NOTE — PLAN OF CARE
Problem: Adult Inpatient Plan of Care  Goal: Plan of Care Review  Outcome: Ongoing (interventions implemented as appropriate)  Pt tolerated Opdivo with no complications. VSS. Pt instructed to call MD with any problems. NAD. Pt discharged home independently.

## 2019-05-29 ENCOUNTER — OFFICE VISIT (OUTPATIENT)
Dept: GASTROENTEROLOGY | Facility: CLINIC | Age: 49
End: 2019-05-29
Payer: COMMERCIAL

## 2019-05-29 VITALS
WEIGHT: 177.5 LBS | DIASTOLIC BLOOD PRESSURE: 73 MMHG | BODY MASS INDEX: 27.8 KG/M2 | SYSTOLIC BLOOD PRESSURE: 115 MMHG | HEART RATE: 69 BPM

## 2019-05-29 DIAGNOSIS — Z12.11 SCREENING FOR MALIGNANT NEOPLASM OF COLON: Primary | ICD-10-CM

## 2019-05-29 DIAGNOSIS — R19.7 DIARRHEA, UNSPECIFIED TYPE: ICD-10-CM

## 2019-05-29 PROCEDURE — 99999 PR PBB SHADOW E&M-EST. PATIENT-LVL III: ICD-10-PCS | Mod: PBBFAC,,, | Performed by: INTERNAL MEDICINE

## 2019-05-29 PROCEDURE — 99999 PR PBB SHADOW E&M-EST. PATIENT-LVL III: CPT | Mod: PBBFAC,,, | Performed by: INTERNAL MEDICINE

## 2019-05-29 PROCEDURE — 99203 PR OFFICE/OUTPT VISIT, NEW, LEVL III, 30-44 MIN: ICD-10-PCS | Mod: S$GLB,,, | Performed by: INTERNAL MEDICINE

## 2019-05-29 PROCEDURE — 3008F BODY MASS INDEX DOCD: CPT | Mod: CPTII,S$GLB,, | Performed by: INTERNAL MEDICINE

## 2019-05-29 PROCEDURE — 99203 OFFICE O/P NEW LOW 30 MIN: CPT | Mod: S$GLB,,, | Performed by: INTERNAL MEDICINE

## 2019-05-29 PROCEDURE — 3008F PR BODY MASS INDEX (BMI) DOCUMENTED: ICD-10-PCS | Mod: CPTII,S$GLB,, | Performed by: INTERNAL MEDICINE

## 2019-05-29 NOTE — PATIENT INSTRUCTIONS
PREPOPIK Instructions    You are scheduled for a colonoscopy with Dr. edenilson finn   To ensure that your test is accurate and complete, you MUST follow these instructions listed below.  If you have any questions, please call our office at [phone].  Plan on being at the hospital for your procedure for 3-4 hours.    1.  Follow a CLEAR LIQUID DIET for the entire day before your scheduled colonoscopy.  This means no solid food the entire day starting when you wake.  You may have as much of the clear liquids as you want throughout the day.   CLEAR LIQUID DIET:   - Avoid Red, Orange, Purple, and/or Blue food coloring   - NO DAIRY   - You can have:  Coffee with sugar (no creamer), tea, water, soda, apple or white grape juice, chicken or beef broth/bouillon (no meat, noodles, or veggies), green/yellow popsicles, green/yellow Jell-O, lemonade.    2.  AT 5 pm the evening before your colonoscopy, FILL THE DOSING CUP (provided inside the Prepopik box) WITH COOL WATER TO THE LOWER LINE. POUR PACKET #1 INTO CONTAINER AND STIR FOR 3 MINUTES TO MIX WELL. (it is normal for the cup to feel warm as the medicine dissolves). DRINK THE ENTIRE MIXTURE. THEN DRINK FIVE (5) DOSING CUPS OF WATER TO THE UPPER LINE OVER THE NEXT FIVE (5) HOURS.     3.  The endoscopy department will call you 2 days before your colonoscopy to tell you the exact time to arrive, AND to tell you the exact time to drink the 2nd portion of your prep (which will be FIVE HOURS BEFORE YOUR ARRIVAL TIME).  At this time given to you, FILL THE DOSING CUP (provided inside the Prepopik box) WITH COOL WATER TO THE LOWER LINE. POUR PACKET #1 INTO CONTAINER AND STIR FOR 3 MINUTES TO MIX WELL. (it is normal for the cup to feel warm as the medicine dissolves). DRINK THE ENTIRE MIXTURE. THEN DRINK THREE (3) DOSING CUPS OF WATER TO THE UPPER LINE OVER THE NEXT ONE (1) HOUR. Once this is complete, you may not have ANYTHING else by mouth!    4.  You must have someone with you to  DRIVE YOU HOME since you will be receiving IV sedation for the colonoscopy.    5.  It is ok to take your heart, blood pressure, and seizure medications in the morning of your test with a SIP of water.  Hold other medications until after your procedure.  Do NOT have anything else to eat or drink the morning of your colonoscopy.  It is ok to brush your teeth.    6.  If you are on blood thinners THAT YOU HAVE BEEN INSTRUCTED TO HOLD BY YOUR DOCTOR FOR THIS PROCEDURE, then do NOT take this the morning of your colonoscopy.  Do NOT stop these medications on your own, they must be approved to be held by your doctor.  Your colonoscopy can NOT be done if you are on these medications.  Examples of blood thinners include: Coumadin, Aggrenox, Plavix, Pradaxa, Reapro, Pletal, Xarelto, Ticagrelor, Brilinta, Eliquis, and high dose aspirin (325 mg).  You do not have to stop baby aspirin 81 mg.    7.  IF YOU ARE DIABETIC:  NO INSULIN OR ORAL MEDICATIONS THE MORNING OF THE COLONOSCOPY.  TAKE ONLY HALF THE DOSE OF YOUR INSULIN THE DAY BEFORE THE COLONOSCOPY.  DO NOT TAKE ANY ORAL DIABETIC MEDICATIONS THE DAY BEFORE THE COLONOSCOPY.  IF YOU ARE AN INSULIN DEPENDENT DIABETIC WITH UNSTABLE BLOOD SUGARDS, NOTIFY YOUR PRIMARY CARE PHYSICIAN FOR INSTRUCTIONS.

## 2019-05-29 NOTE — LETTER
May 29, 2019      Christopher Turpin, DO  [address] [facility]  [place] [address]           [facility]  [address] [place] [address]  [place] [id number]  Phone: [phone]          Patient: Burton Whiting   MR Number: [id number]   YOB: 1970   Date of Visit: 5/29/2019       Dear Dr. Christopher Turpin:    Thank you for referring Burton Whiting to me for evaluation. Attached you will find relevant portions of my assessment and plan of care.    If you have questions, please do not hesitate to call me. I look forward to following Burton Whiting along with you.    Sincerely,    Nithya Sawyer MD    Enclosure  CC:  No Recipients    If you would like to receive this communication electronically, please contact [email or web address] or [phone] to request more information on [email or web address] Link access.    For providers and/or their staff who would like to refer a patient to [facility], please contact us through our one-stop-shop provider referral line, [facility], at [phone].    If you feel you have received this communication in error or would no longer like to receive these types of communications, please e-mail [email or web address]

## 2019-05-29 NOTE — PROGRESS NOTES
Subjective:       Patient ID: Burton Whiting is a 49 y.o. male.    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  He has never had a colonoscopy and there is no personal or family history of colon polyps or cancer.  Otherwise he is doing quite well with good results from his recent PET scan.  Since increasing his Neurontin he has noted some loose stools.  Previously he would have 2 bowel movements a day and now is having between 2 and 3 which were somewhat more soft.  No fevers, chills, weight loss, abdominal pain.  Nausea, vomiting. No other exacerbating or relieving factors or other associated symptoms.    The following portions of the patient's history were reviewed and updated as appropriate: allergies, current medications, past family history, past medical history, past social history, past surgical history and problem list.    (Portions of this note were dictated using voice recognition software and may contain dictation related errors in spelling/grammar/syntax not found on text review)    HPI  Review of Systems   Constitutional: Negative for appetite change.   Respiratory: Negative for shortness of breath and wheezing.    Cardiovascular: Negative for chest pain and palpitations.   Gastrointestinal: Positive for diarrhea. Negative for abdominal distention.       Objective:      Physical Exam   Constitutional: He is oriented to person, place, and time. He appears well-developed and well-nourished. No distress.   HENT:   Head: Normocephalic.   Eyes: Conjunctivae are normal. No scleral icterus.   Neck: No tracheal deviation present. No thyromegaly present.   Cardiovascular: Normal rate, regular rhythm and normal heart sounds. Exam reveals no gallop and no friction rub.   Pulmonary/Chest: Effort normal and breath sounds normal. He has no wheezes. He has no rales.   Abdominal: Soft. Bowel sounds are  normal. He exhibits no distension. There is no tenderness. There is no rebound and no guarding.   Musculoskeletal: Normal range of motion. He exhibits no edema or tenderness.   Neurological: He is alert and oriented to person, place, and time.   Skin: He is not diaphoretic.   Psychiatric: He has a normal mood and affect. His behavior is normal.   Nursing note and vitals reviewed.      Assessment:       1. Screening for malignant neoplasm of colon    2. Diarrhea, unspecified type        Plan:   1. Suspect mild change in bowel habits from meds, if worsens would send stool studies  2. Will d/w oncology re: further treatment plans/candidacy for colonoscopy

## 2019-05-30 ENCOUNTER — TELEPHONE (OUTPATIENT)
Dept: GASTROENTEROLOGY | Facility: CLINIC | Age: 49
End: 2019-05-30

## 2019-06-05 ENCOUNTER — INFUSION (OUTPATIENT)
Dept: INFUSION THERAPY | Facility: HOSPITAL | Age: 49
End: 2019-06-05
Attending: INTERNAL MEDICINE
Payer: COMMERCIAL

## 2019-06-05 ENCOUNTER — TELEPHONE (OUTPATIENT)
Dept: HEMATOLOGY/ONCOLOGY | Facility: CLINIC | Age: 49
End: 2019-06-05

## 2019-06-05 ENCOUNTER — OFFICE VISIT (OUTPATIENT)
Dept: HEMATOLOGY/ONCOLOGY | Facility: CLINIC | Age: 49
End: 2019-06-05
Payer: COMMERCIAL

## 2019-06-05 ENCOUNTER — LAB VISIT (OUTPATIENT)
Dept: LAB | Facility: HOSPITAL | Age: 49
End: 2019-06-05
Attending: INTERNAL MEDICINE
Payer: COMMERCIAL

## 2019-06-05 VITALS
TEMPERATURE: 98 F | HEART RATE: 60 BPM | OXYGEN SATURATION: 98 % | BODY MASS INDEX: 27.68 KG/M2 | WEIGHT: 176.38 LBS | HEIGHT: 67 IN | RESPIRATION RATE: 18 BRPM | SYSTOLIC BLOOD PRESSURE: 124 MMHG | DIASTOLIC BLOOD PRESSURE: 75 MMHG

## 2019-06-05 VITALS
SYSTOLIC BLOOD PRESSURE: 104 MMHG | HEART RATE: 50 BPM | TEMPERATURE: 98 F | WEIGHT: 176.38 LBS | BODY MASS INDEX: 27.68 KG/M2 | HEIGHT: 67 IN | DIASTOLIC BLOOD PRESSURE: 64 MMHG | RESPIRATION RATE: 18 BRPM

## 2019-06-05 DIAGNOSIS — C79.51 SECONDARY CANCER OF BONE: ICD-10-CM

## 2019-06-05 DIAGNOSIS — C09.9 SQUAMOUS CELL CARCINOMA OF PALATINE TONSIL: ICD-10-CM

## 2019-06-05 DIAGNOSIS — C09.9 TONSIL CANCER: Primary | ICD-10-CM

## 2019-06-05 DIAGNOSIS — C77.0 CANCER OF HEAD, FACE, OR NECK LYMPH NODES, SECONDARY: ICD-10-CM

## 2019-06-05 DIAGNOSIS — C09.9 SQUAMOUS CELL CARCINOMA OF PALATINE TONSIL: Primary | ICD-10-CM

## 2019-06-05 LAB
ALBUMIN SERPL BCP-MCNC: 3.8 G/DL (ref 3.5–5.2)
ALP SERPL-CCNC: 54 U/L (ref 55–135)
ALT SERPL W/O P-5'-P-CCNC: 15 U/L (ref 10–44)
ANION GAP SERPL CALC-SCNC: 7 MMOL/L (ref 8–16)
AST SERPL-CCNC: 19 U/L (ref 10–40)
BILIRUB SERPL-MCNC: 0.5 MG/DL (ref 0.1–1)
BUN SERPL-MCNC: 23 MG/DL (ref 6–20)
CALCIUM SERPL-MCNC: 9.4 MG/DL (ref 8.7–10.5)
CHLORIDE SERPL-SCNC: 112 MMOL/L (ref 95–110)
CO2 SERPL-SCNC: 21 MMOL/L (ref 23–29)
CREAT SERPL-MCNC: 1.6 MG/DL (ref 0.5–1.4)
ERYTHROCYTE [DISTWIDTH] IN BLOOD BY AUTOMATED COUNT: 12.5 % (ref 11.5–14.5)
EST. GFR  (AFRICAN AMERICAN): 56.8 ML/MIN/1.73 M^2
EST. GFR  (NON AFRICAN AMERICAN): 49.2 ML/MIN/1.73 M^2
GLUCOSE SERPL-MCNC: 86 MG/DL (ref 70–110)
HCT VFR BLD AUTO: 32 % (ref 40–54)
HGB BLD-MCNC: 11.1 G/DL (ref 14–18)
IMM GRANULOCYTES # BLD AUTO: 0.01 K/UL (ref 0–0.04)
MCH RBC QN AUTO: 33.1 PG (ref 27–31)
MCHC RBC AUTO-ENTMCNC: 34.7 G/DL (ref 32–36)
MCV RBC AUTO: 96 FL (ref 82–98)
NEUTROPHILS # BLD AUTO: 2 K/UL (ref 1.8–7.7)
PLATELET # BLD AUTO: 161 K/UL (ref 150–350)
PMV BLD AUTO: 9 FL (ref 9.2–12.9)
POTASSIUM SERPL-SCNC: 3.8 MMOL/L (ref 3.5–5.1)
PROT SERPL-MCNC: 6.6 G/DL (ref 6–8.4)
RBC # BLD AUTO: 3.35 M/UL (ref 4.6–6.2)
SODIUM SERPL-SCNC: 140 MMOL/L (ref 136–145)
T4 FREE SERPL-MCNC: 0.98 NG/DL (ref 0.71–1.51)
TSH SERPL DL<=0.005 MIU/L-ACNC: 1.59 UIU/ML (ref 0.4–4)
WBC # BLD AUTO: 2.91 K/UL (ref 3.9–12.7)

## 2019-06-05 PROCEDURE — 36415 COLL VENOUS BLD VENIPUNCTURE: CPT

## 2019-06-05 PROCEDURE — 3008F BODY MASS INDEX DOCD: CPT | Mod: CPTII,S$GLB,, | Performed by: INTERNAL MEDICINE

## 2019-06-05 PROCEDURE — 84439 ASSAY OF FREE THYROXINE: CPT

## 2019-06-05 PROCEDURE — 80053 COMPREHEN METABOLIC PANEL: CPT

## 2019-06-05 PROCEDURE — 99999 PR PBB SHADOW E&M-EST. PATIENT-LVL III: ICD-10-PCS | Mod: PBBFAC,,, | Performed by: INTERNAL MEDICINE

## 2019-06-05 PROCEDURE — 85027 COMPLETE CBC AUTOMATED: CPT

## 2019-06-05 PROCEDURE — 3008F PR BODY MASS INDEX (BMI) DOCUMENTED: ICD-10-PCS | Mod: CPTII,S$GLB,, | Performed by: INTERNAL MEDICINE

## 2019-06-05 PROCEDURE — 99999 PR PBB SHADOW E&M-EST. PATIENT-LVL III: CPT | Mod: PBBFAC,,, | Performed by: INTERNAL MEDICINE

## 2019-06-05 PROCEDURE — 25000003 PHARM REV CODE 250: Performed by: INTERNAL MEDICINE

## 2019-06-05 PROCEDURE — 99215 OFFICE O/P EST HI 40 MIN: CPT | Mod: S$GLB,,, | Performed by: INTERNAL MEDICINE

## 2019-06-05 PROCEDURE — 96413 CHEMO IV INFUSION 1 HR: CPT

## 2019-06-05 PROCEDURE — 99215 PR OFFICE/OUTPT VISIT, EST, LEVL V, 40-54 MIN: ICD-10-PCS | Mod: S$GLB,,, | Performed by: INTERNAL MEDICINE

## 2019-06-05 PROCEDURE — 63600175 PHARM REV CODE 636 W HCPCS: Mod: JG | Performed by: INTERNAL MEDICINE

## 2019-06-05 PROCEDURE — 84443 ASSAY THYROID STIM HORMONE: CPT

## 2019-06-05 RX ORDER — SODIUM CHLORIDE 0.9 % (FLUSH) 0.9 %
10 SYRINGE (ML) INJECTION
Status: CANCELLED | OUTPATIENT
Start: 2019-06-05

## 2019-06-05 RX ORDER — HEPARIN 100 UNIT/ML
500 SYRINGE INTRAVENOUS
Status: CANCELLED | OUTPATIENT
Start: 2019-06-05

## 2019-06-05 RX ADMIN — SODIUM CHLORIDE 240 MG: 9 INJECTION, SOLUTION INTRAVENOUS at 12:06

## 2019-06-05 NOTE — TELEPHONE ENCOUNTER
----- Message from Cortney Lopez MD sent at 6/5/2019 11:01 AM CDT -----  Also reschedule Dr. Olguin's appointment (from tomorrow) to same day he sees me in 2 weeks

## 2019-06-05 NOTE — Clinical Note
Schedule CBC,CMP and PET scan and see me in 2 weeks and for Opdivo. Wants everything on same day due to work

## 2019-06-05 NOTE — PROGRESS NOTES
Subjective:       Patient ID: Burton Whiting is a 49 y.o. male.    Chief Complaint: Squamous cell carcinoma of palatine tonsil  Squamous cell carcinoma of palatine tonsil; g-tube malodorous; left neck pain 2/10; 4 cans formula per day; and r arm port---red/warm to touch  Mr. Burton Whiting is a 48-year-old male, former smoker, who presents today with a four-month history of enlarging neck mass.  He initially delayed care due to lack of insurance.  He was seen by Dr. Turpin who referred him to see Dr. Olguin.  He had a CT that showed a 3.8 x 3.8 x 4.9 cm soft tissue mass arising from the left palatine tonsil resulting in moderate narrowing of the hypopharyngeal airway adjacent extensive matted left cervical lymphadenopathy was noted.  The largest ella mass was 6.6 x 9 x 2.6 cm extending inferiorly to the supraclavicular region.  The mass pushed the trachea and the left common carotid artery to the right.  Additional representative of cervical lymph nodes measuring 2.9 x 3.1 x 3.5 cm on axial image 37 of series 3   and 2.4 x 2.1 x 2.2 cm on axial image 55 of series 3.  There is also a sclerotic lesion involving the midline of the clivus measuring 1.2 cm.  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  PET scan performed on 01/19/2018 revealed persistent evidence of a soft tissue mass arising from the left palatine tonsil resulting in moderate narrowing of the hypopharyngeal   airway.  The mass is hypermetabolic demonstrating an SUV max of 18.5.  There is also persistent adjacent extensive matted left cervical lymphadenopathy, largest of this measuring 6.7 x 8.42 with hypermetabolic activity and SUV max of 20.5.  Lymphadenopathy is again noted to have a mass effect on the trachea and left common carotid artery, pushing both structures towards the right.  There is also prominent right cervical lymph nodes with the largest measuring 0.8 cm and demonstrating mild hypermetabolic activity with SUV max  "of 1.8, physiologic   distribution of FDG in the gray matter.  There are 3 pulmonary nodules noted within the right lung, the largest is in the anterior segment of the right upper lobe measuring 1 cm, second is visualized in the middle lobe measuring 0.6 cm and the third is within the posterior basal segment measuring 0.6 cm.  There is one pulmonary nodule within the left lung within the lateral basal segment measuring 0.6 cm.  These nodules do not demonstrate hypermetabolic activity; however, they are below the threshold for observation with PET     He underwent MRI cervical spine revealed "No evidence of metastatic disease to the cervical spine. Multilevel degenerative changes of the cervical spine with disc protrusion at C5-6 which results in moderate to severe spinal canal stenosis and and associated cord signal abnormality, suggestive of compressive myelopathy. 6 mm T1 hypointense non-enhancing lesion in the clivus.  Continued followup is suggested. 9 mm inferior descent of the cerebellar tonsils below the foramen magnum, suggestive of Chiari 1 malformation"  MRI thoracic spine "No evidence of metastatic disease involving the thoracic spine. Incidental hemangioma involving the T7 vertebral body. Mild degenerative disc disease without any significant spinal canal stenosis or neuroforaminal narrowing.   He completed 3 cycles of TPF and 6 weeks of Cisplatin and RT. Completed on 6/26/18           His PET scan from 9/25/18 revealed "Progression of disease with multiple new hypermetabolic foci including the manubrium, mediastinal/retrocrural lymph nodes, and lungs. Partial therapeutic response within the presumed radiation field involving the left cervical mass, and complete therapeutic response in the right cervical lymph node, clivus, and left palatine tonsil. New soft tissue thickening and hypermetabolism in the left aryepiglottic fold and right cricoid cartilage"      He has been on Opdivo          HPI He comes " in for Opdivo. He has been diarrhea X 1 month about twice a day. Saw GI and they may want to do colonoscopy.    Review of Systems   Constitutional: Negative for appetite change, fatigue and unexpected weight change.   HENT: Negative for mouth sores.    Eyes: Negative for visual disturbance.   Respiratory: Negative for cough and shortness of breath.    Cardiovascular: Negative for chest pain.   Gastrointestinal: Positive for diarrhea. Negative for abdominal pain.   Genitourinary: Negative for frequency.   Musculoskeletal: Negative for back pain.   Skin: Negative for rash.   Neurological: Negative for headaches.   Hematological: Negative for adenopathy.   Psychiatric/Behavioral: The patient is not nervous/anxious.    All other systems reviewed and are negative.      Objective:      Physical Exam   Constitutional: He is oriented to person, place, and time. He appears well-developed and well-nourished.   HENT:   Mouth/Throat: No oropharyngeal exudate.   Cardiovascular: Normal rate and normal heart sounds.   Pulmonary/Chest: Effort normal and breath sounds normal. He has no wheezes.   Abdominal: Soft. Bowel sounds are normal. There is no tenderness.   Musculoskeletal: He exhibits no edema or tenderness.   Lymphadenopathy:     He has no cervical adenopathy.   Neurological: He is alert and oriented to person, place, and time. Coordination normal.   Skin: Skin is warm and dry. No rash noted.   Psychiatric: He has a normal mood and affect. Judgment and thought content normal.   Vitals reviewed.      LABs:  WBC   Date Value Ref Range Status   06/05/2019 2.91 (L) 3.90 - 12.70 K/uL Final     Hemoglobin   Date Value Ref Range Status   06/05/2019 11.1 (L) 14.0 - 18.0 g/dL Final     Hematocrit   Date Value Ref Range Status   06/05/2019 32.0 (L) 40.0 - 54.0 % Final     Platelets   Date Value Ref Range Status   06/05/2019 161 150 - 350 K/uL Final     Gran # (ANC)   Date Value Ref Range Status   06/05/2019 2.0 1.8 - 7.7 K/uL Final      Comment:     The ANC is based on a white cell differential from an   automated cell counter. It has not been microscopically   reviewed for the presence of abnormal cells. Clinical   correlation is required.         Chemistry        Component Value Date/Time     06/05/2019 1001    K 3.8 06/05/2019 1001     (H) 06/05/2019 1001    CO2 21 (L) 06/05/2019 1001    BUN 23 (H) 06/05/2019 1001    CREATININE 1.6 (H) 06/05/2019 1001    GLU 86 06/05/2019 1001        Component Value Date/Time    CALCIUM 9.4 06/05/2019 1001    ALKPHOS 54 (L) 06/05/2019 1001    AST 19 06/05/2019 1001    ALT 15 06/05/2019 1001    BILITOT 0.5 06/05/2019 1001    ESTGFRAFRICA 56.8 (A) 06/05/2019 1001    EGFRNONAA 49.2 (A) 06/05/2019 1001           TSH   Date Value Ref Range Status   06/05/2019 1.589 0.400 - 4.000 uIU/mL Final     Free T4   Date Value Ref Range Status   06/05/2019 0.98 0.71 - 1.51 ng/dL Final       Assessment:       1. Squamous cell carcinoma of palatine tonsil    2. Cancer of head, face, or neck lymph nodes, secondary    3. Secondary cancer of bone        Plan:        1,2,3. He will proceed with opdivo and will return in 2 weeks for next cycle with restaging PET scans    Above care plan was discussed with patient and accompanying niece and all questions were addressed to their satisfaction

## 2019-06-19 ENCOUNTER — HOSPITAL ENCOUNTER (OUTPATIENT)
Dept: RADIOLOGY | Facility: HOSPITAL | Age: 49
Discharge: HOME | End: 2019-06-19
Attending: INTERNAL MEDICINE
Payer: COMMERCIAL

## 2019-06-19 ENCOUNTER — INFUSION (OUTPATIENT)
Dept: INFUSION THERAPY | Facility: HOSPITAL | Age: 49
End: 2019-06-19
Attending: INTERNAL MEDICINE
Payer: COMMERCIAL

## 2019-06-19 ENCOUNTER — OFFICE VISIT (OUTPATIENT)
Dept: OTOLARYNGOLOGY | Facility: CLINIC | Age: 49
End: 2019-06-19
Payer: COMMERCIAL

## 2019-06-19 ENCOUNTER — TELEPHONE (OUTPATIENT)
Dept: HEMATOLOGY/ONCOLOGY | Facility: CLINIC | Age: 49
End: 2019-06-19

## 2019-06-19 ENCOUNTER — OFFICE VISIT (OUTPATIENT)
Dept: HEMATOLOGY/ONCOLOGY | Facility: CLINIC | Age: 49
End: 2019-06-19
Payer: COMMERCIAL

## 2019-06-19 VITALS
BODY MASS INDEX: 27.31 KG/M2 | SYSTOLIC BLOOD PRESSURE: 132 MMHG | HEIGHT: 67 IN | TEMPERATURE: 98 F | HEART RATE: 49 BPM | RESPIRATION RATE: 18 BRPM | DIASTOLIC BLOOD PRESSURE: 75 MMHG | WEIGHT: 174 LBS

## 2019-06-19 VITALS
DIASTOLIC BLOOD PRESSURE: 67 MMHG | OXYGEN SATURATION: 97 % | SYSTOLIC BLOOD PRESSURE: 131 MMHG | RESPIRATION RATE: 18 BRPM | BODY MASS INDEX: 27.34 KG/M2 | HEART RATE: 47 BPM | SYSTOLIC BLOOD PRESSURE: 131 MMHG | DIASTOLIC BLOOD PRESSURE: 67 MMHG | TEMPERATURE: 98 F | HEIGHT: 67 IN | HEART RATE: 47 BPM | WEIGHT: 174.19 LBS

## 2019-06-19 DIAGNOSIS — R19.7 DIARRHEA, UNSPECIFIED TYPE: ICD-10-CM

## 2019-06-19 DIAGNOSIS — M27.2 OSTEORADIONECROSIS OF JAW: Primary | ICD-10-CM

## 2019-06-19 DIAGNOSIS — C09.9 SQUAMOUS CELL CARCINOMA OF PALATINE TONSIL: Primary | ICD-10-CM

## 2019-06-19 DIAGNOSIS — C09.9 TONSIL CANCER: Primary | ICD-10-CM
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
## 2019-06-19 DIAGNOSIS — C09.9 SQUAMOUS CELL CARCINOMA OF PALATINE TONSIL: ICD-10-CM

## 2019-06-19 DIAGNOSIS — C77.0 CANCER OF HEAD, FACE, OR NECK LYMPH NODES, SECONDARY: ICD-10-CM

## 2019-06-19 PROBLEM — T45.1X5A CHEMOTHERAPY-INDUCED NEUTROPENIA: Status: RESOLVED | Noted: 2018-02-18 | Resolved: 2019-06-19

## 2019-06-19 PROBLEM — D70.9 FEVER AND NEUTROPENIA: Status: RESOLVED | Noted: 2018-02-06 | Resolved: 2019-06-19

## 2019-06-19 PROBLEM — R50.81 FEVER AND NEUTROPENIA: Status: RESOLVED | Noted: 2018-02-06 | Resolved: 2019-06-19

## 2019-06-19 PROBLEM — D70.1 CHEMOTHERAPY-INDUCED NEUTROPENIA: Status: RESOLVED | Noted: 2018-02-18 | Resolved: 2019-06-19

## 2019-06-19 LAB — POCT GLUCOSE: 90 MG/DL (ref 70–110)

## 2019-06-19 PROCEDURE — 99213 PR OFFICE/OUTPT VISIT, EST, LEVL III, 20-29 MIN: ICD-10-PCS | Mod: S$GLB,,, | Performed by: OTOLARYNGOLOGY

## 2019-06-19 PROCEDURE — 3008F BODY MASS INDEX DOCD: CPT | Mod: CPTII,S$GLB,, | Performed by: INTERNAL MEDICINE

## 2019-06-19 PROCEDURE — 78815 PET IMAGE W/CT SKULL-THIGH: CPT | Mod: 26,PS,, | Performed by: RADIOLOGY

## 2019-06-19 PROCEDURE — 99215 PR OFFICE/OUTPT VISIT, EST, LEVL V, 40-54 MIN: ICD-10-PCS | Mod: S$GLB,,, | Performed by: INTERNAL MEDICINE

## 2019-06-19 PROCEDURE — 99999 PR PBB SHADOW E&M-EST. PATIENT-LVL III: CPT | Mod: PBBFAC,,, | Performed by: INTERNAL MEDICINE

## 2019-06-19 PROCEDURE — 99999 PR PBB SHADOW E&M-EST. PATIENT-LVL II: CPT | Mod: PBBFAC,,, | Performed by: OTOLARYNGOLOGY

## 2019-06-19 PROCEDURE — 96413 CHEMO IV INFUSION 1 HR: CPT

## 2019-06-19 PROCEDURE — 99999 PR PBB SHADOW E&M-EST. PATIENT-LVL III: ICD-10-PCS | Mod: PBBFAC,,, | Performed by: INTERNAL MEDICINE

## 2019-06-19 PROCEDURE — 63600175 PHARM REV CODE 636 W HCPCS: Mod: JG | Performed by: INTERNAL MEDICINE

## 2019-06-19 PROCEDURE — 99213 OFFICE O/P EST LOW 20 MIN: CPT | Mod: S$GLB,,, | Performed by: OTOLARYNGOLOGY

## 2019-06-19 PROCEDURE — 78815 PET IMAGE W/CT SKULL-THIGH: CPT | Mod: TC

## 2019-06-19 PROCEDURE — A9552 F18 FDG: HCPCS

## 2019-06-19 PROCEDURE — 3008F PR BODY MASS INDEX (BMI) DOCUMENTED: ICD-10-PCS | Mod: CPTII,S$GLB,, | Performed by: INTERNAL MEDICINE

## 2019-06-19 PROCEDURE — 25000003 PHARM REV CODE 250: Performed by: INTERNAL MEDICINE

## 2019-06-19 PROCEDURE — 78815 NM PET CT ROUTINE: ICD-10-PCS | Mod: 26,PS,, | Performed by: RADIOLOGY

## 2019-06-19 PROCEDURE — 99215 OFFICE O/P EST HI 40 MIN: CPT | Mod: S$GLB,,, | Performed by: INTERNAL MEDICINE

## 2019-06-19 PROCEDURE — 99999 PR PBB SHADOW E&M-EST. PATIENT-LVL II: ICD-10-PCS | Mod: PBBFAC,,, | Performed by: OTOLARYNGOLOGY

## 2019-06-19 RX ORDER — FERROUS SULFATE, DRIED 160(50) MG
1 TABLET, EXTENDED RELEASE ORAL 2 TIMES DAILY WITH MEALS
COMMUNITY
End: 2020-10-21

## 2019-06-19 RX ORDER — HEPARIN 100 UNIT/ML
500 SYRINGE INTRAVENOUS
Status: CANCELLED | OUTPATIENT
Start: 2019-06-19

## 2019-06-19 RX ORDER — SODIUM CHLORIDE 0.9 % (FLUSH) 0.9 %
10 SYRINGE (ML) INJECTION
Status: CANCELLED | OUTPATIENT
Start: 2019-06-19

## 2019-06-19 RX ORDER — SODIUM CHLORIDE 0.9 % (FLUSH) 0.9 %
10 SYRINGE (ML) INJECTION
Status: DISCONTINUED | OUTPATIENT
Start: 2019-06-19 | End: 2019-06-19 | Stop reason: HOSPADM

## 2019-06-19 RX ORDER — CHLORHEXIDINE GLUCONATE ORAL RINSE 1.2 MG/ML
15 SOLUTION DENTAL 2 TIMES DAILY
Qty: 473 ML | Refills: 6 | Status: SHIPPED | OUTPATIENT
Start: 2019-06-19 | End: 2019-07-03

## 2019-06-19 RX ORDER — HEPARIN 100 UNIT/ML
500 SYRINGE INTRAVENOUS
Status: DISCONTINUED | OUTPATIENT
Start: 2019-06-19 | End: 2019-06-19 | Stop reason: HOSPADM

## 2019-06-19 RX ADMIN — SODIUM CHLORIDE 240 MG: 9 INJECTION, SOLUTION INTRAVENOUS at 03:06

## 2019-06-19 NOTE — PROGRESS NOTES
Subjective:       Patient ID: Burton Whiting is a 49 y.o. male.    Chief Complaint: squamous cell carcinoma of palatine tonsil (follow up, labs)  Squamous cell carcinoma of palatine tonsil; g-tube malodorous; left neck pain 2/10; 4 cans formula per day; and r arm port---red/warm to touch  Mr. Burton Whiting is a 48-year-old male, former smoker, who presents today with a four-month history of enlarging neck mass.  He initially delayed care due to lack of insurance.  He was seen by Dr. Turpin who referred him to see Dr. Olguin.  He had a CT that showed a 3.8 x 3.8 x 4.9 cm soft tissue mass arising from the left palatine tonsil resulting in moderate narrowing of the hypopharyngeal airway adjacent extensive matted left cervical lymphadenopathy was noted.  The largest ella mass was 6.6 x 9 x 2.6 cm extending inferiorly to the supraclavicular region.  The mass pushed the trachea and the left common carotid artery to the right.  Additional representative of cervical lymph nodes measuring 2.9 x 3.1 x 3.5 cm on axial image 37 of series 3   and 2.4 x 2.1 x 2.2 cm on axial image 55 of series 3.  There is also a sclerotic lesion involving the midline of the clivus measuring 1.2 cm.  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  PET scan performed on 01/19/2018 revealed persistent evidence of a soft tissue mass arising from the left palatine tonsil resulting in moderate narrowing of the hypopharyngeal   airway.  The mass is hypermetabolic demonstrating an SUV max of 18.5.  There is also persistent adjacent extensive matted left cervical lymphadenopathy, largest of this measuring 6.7 x 8.42 with hypermetabolic activity and SUV max of 20.5.  Lymphadenopathy is again noted to have a mass effect on the trachea and left common carotid artery, pushing both structures towards the right.  There is also prominent right cervical lymph nodes with the largest measuring 0.8 cm and demonstrating mild hypermetabolic  "activity with SUV max of 1.8, physiologic   distribution of FDG in the gray matter.  There are 3 pulmonary nodules noted within the right lung, the largest is in the anterior segment of the right upper lobe measuring 1 cm, second is visualized in the middle lobe measuring 0.6 cm and the third is within the posterior basal segment measuring 0.6 cm.  There is one pulmonary nodule within the left lung within the lateral basal segment measuring 0.6 cm.  These nodules do not demonstrate hypermetabolic activity; however, they are below the threshold for observation with PET     He underwent MRI cervical spine revealed "No evidence of metastatic disease to the cervical spine. Multilevel degenerative changes of the cervical spine with disc protrusion at C5-6 which results in moderate to severe spinal canal stenosis and and associated cord signal abnormality, suggestive of compressive myelopathy. 6 mm T1 hypointense non-enhancing lesion in the clivus.  Continued followup is suggested. 9 mm inferior descent of the cerebellar tonsils below the foramen magnum, suggestive of Chiari 1 malformation"  MRI thoracic spine "No evidence of metastatic disease involving the thoracic spine. Incidental hemangioma involving the T7 vertebral body. Mild degenerative disc disease without any significant spinal canal stenosis or neuroforaminal narrowing.   He completed 3 cycles of TPF and 6 weeks of Cisplatin and RT. Completed on 6/26/18           His PET scan from 9/25/18 revealed "Progression of disease with multiple new hypermetabolic foci including the manubrium, mediastinal/retrocrural lymph nodes, and lungs. Partial therapeutic response within the presumed radiation field involving the left cervical mass, and complete therapeutic response in the right cervical lymph node, clivus, and left palatine tonsil. New soft tissue thickening and hypermetabolism in the left aryepiglottic fold and right cricoid cartilage"      He has been on " Opdivo             HPI He comes in for Opdivo. PET scan reveals no evidence of recurrence  He continues to have diarrhea.    Review of Systems   Constitutional: Negative for appetite change, fatigue and unexpected weight change.   HENT: Negative for mouth sores.    Eyes: Negative for visual disturbance.   Respiratory: Negative for cough and shortness of breath.    Cardiovascular: Negative for chest pain.   Gastrointestinal: Positive for diarrhea. Negative for abdominal pain.   Genitourinary: Negative for frequency.   Musculoskeletal: Negative for back pain.   Skin: Negative for rash.   Neurological: Negative for headaches.   Hematological: Negative for adenopathy.   Psychiatric/Behavioral: The patient is not nervous/anxious.    All other systems reviewed and are negative.      Objective:      Physical Exam   Constitutional: He is oriented to person, place, and time. He appears well-developed and well-nourished.   HENT:   Mouth/Throat: No oropharyngeal exudate.   Cardiovascular: Normal rate and normal heart sounds.   Pulmonary/Chest: Effort normal and breath sounds normal. He has no wheezes.   Abdominal: Soft. Bowel sounds are normal. There is no tenderness.   Musculoskeletal: He exhibits no edema or tenderness.   Lymphadenopathy:     He has no cervical adenopathy.   Neurological: He is alert and oriented to person, place, and time. Coordination normal.   Skin: Skin is warm and dry. No rash noted.   Psychiatric: He has a normal mood and affect. Judgment and thought content normal.   Vitals reviewed.        LABS:  WBC   Date Value Ref Range Status   06/19/2019 3.60 (L) 3.90 - 12.70 K/uL Final     Hemoglobin   Date Value Ref Range Status   06/19/2019 11.3 (L) 14.0 - 18.0 g/dL Final     Hematocrit   Date Value Ref Range Status   06/19/2019 33.6 (L) 40.0 - 54.0 % Final     Platelets   Date Value Ref Range Status   06/19/2019 168 150 - 350 K/uL Final     Gran # (ANC)   Date Value Ref Range Status   06/19/2019 2.6 1.8 -  7.7 K/uL Final     Comment:     The ANC is based on a white cell differential from an   automated cell counter. It has not been microscopically   reviewed for the presence of abnormal cells. Clinical   correlation is required.         Chemistry        Component Value Date/Time     06/19/2019 1204    K 4.6 06/19/2019 1204     06/19/2019 1204    CO2 23 06/19/2019 1204    BUN 25 (H) 06/19/2019 1204    CREATININE 1.4 06/19/2019 1204    GLU 85 06/19/2019 1204        Component Value Date/Time    CALCIUM 9.1 06/19/2019 1204    ALKPHOS 54 (L) 06/19/2019 1204    AST 21 06/19/2019 1204    ALT 17 06/19/2019 1204    BILITOT 0.3 06/19/2019 1204    ESTGFRAFRICA >60.0 06/19/2019 1204    EGFRNONAA 57.8 (A) 06/19/2019 1204          Assessment:       1. Squamous cell carcinoma of palatine tonsil    2. Cancer of head, face, or neck lymph nodes, secondary    3. Diarrhea, unspecified type        Plan:        1,2. He will proceed with Opdivo and will return in 2 weeks for next cycle  3. Obtain stool studies, if negative and diarrhea persists will plan on holding Opdivo and treat for immune mediated colitis    Above care plan was discussed with patient and accompanying brother and all questions were addressed to their satisfaction

## 2019-06-19 NOTE — PLAN OF CARE
Problem: Adult Inpatient Plan of Care  Goal: Plan of Care Review  Outcome: Ongoing (interventions implemented as appropriate)  Pt here for Opdivo infusion D1C10, labs, hx, meds, alleergies reviewed, pt with no complaints at this time, reclined in chair, continue to monitor

## 2019-06-19 NOTE — PLAN OF CARE
Problem: Adult Inpatient Plan of Care  Goal: Plan of Care Review  Outcome: Ongoing (interventions implemented as appropriate)  Pt tolerated opdivo without issue, no distress noted upon d/c to home with brother

## 2019-06-19 NOTE — TELEPHONE ENCOUNTER
----- Message from Cortney Lopez MD sent at 6/19/2019  3:43 PM CDT -----  Please let him know that his PET scan final result came in and it looks good. thanks

## 2019-06-20 DIAGNOSIS — C09.9 SQUAMOUS CELL CARCINOMA OF PALATINE TONSIL: Primary | ICD-10-CM

## 2019-06-24 NOTE — ASSESSMENT & PLAN NOTE
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  I refilled his Peridex his request.  I will re-examine him in 6 weeks.

## 2019-06-24 NOTE — PROGRESS NOTES
Chief Complaint   Patient presents with    Follow-up        Squamous cell carcinoma of palatine tonsil    1/4/2018 Initial Diagnosis     Squamous cell carcinoma of palatine tonsil         5/3/2018 - 6/26/2018 Radiation Therapy     Treating physician: Dr. Sandra  Total Dose: 70 Gy  Fractions: 35/35         5/2018 - 6/2018 Chemotherapy     Treatment Summary   Plan Name: OP CISPLATIN DOCETAXEL 5FU Q3W  Treatment Goal: Curative  Status: Inactive  Start Date: 1/29/2018  End Date: 5/27/2018 (Planned)  Provider: Cortney Lopez MD  Chemotherapy: CISplatin (PLATINOL) 100 mg/m2 = 248 mg in sodium chloride 0.9% 500 mL chemo infusion, 100 mg/m2 = 248 mg (100 % of original dose 100 mg/m2), Intravenous, Clinic/HOD 1 time, 2 of 6 cycles  Dose modification: 100 mg/m2 (original dose 100 mg/m2, Cycle 1)    DOCEtaxel (TAXOTERE) 75 mg/m2 = 185 mg in sodium chloride 0.9% 250 mL chemo infusion, 75 mg/m2 = 185 mg, Intravenous, Clinic/HOD 1 time, 2 of 6 cycles    fluorouracil (ADRUCIL) 1,000 mg/m2/day = 12,400 mg in sodium chloride 0.9% 250 mL chemo infusion, 1,000 mg/m2/day = 12,400 mg (100 % of original dose 1,000 mg/m2/day), Intravenous, Over 120 hours, 2 of 6 cycles  Dose modification: 1,000 mg/m2/day (original dose 1,000 mg/m2/day, Cycle 1)    Plan Name: OP CISPLATIN (Weekly)  Treatment Goal: Curative  Status: Inactive  Start Date: 3/20/2018 (Planned)  End Date: 4/24/2018 (Planned)  Provider: Cortney Lopez MD  Chemotherapy: CISplatin (PLATINOL) 40 mg/m2 = 92 mg in sodium chloride 0.9% 250 mL chemo infusion, 40 mg/m2, Intravenous, Clinic/HOD 1 time, 0 of 6 cycles    Plan Name: OP CISPLATIN DOCETAXEL 5FU Q3W  Treatment Goal: Maintenance  Status: Inactive  Start Date: 3/19/2018  End Date: 7/12/2018 (Planned)  Provider: Cortney Lopez MD  Chemotherapy: CISplatin (PLATINOL) 75 mg/m2 = 172 mg in sodium chloride 0.9% 500 mL chemo infusion, 75 mg/m2 = 172 mg, Intravenous, Clinic/HOD 1 time, 1 of 6 cycles    DOCEtaxel (TAXOTERE) 75 mg/m2 = 170  mg in sodium chloride 0.9% 250 mL chemo infusion, 75 mg/m2 = 170 mg, Intravenous, Clinic/HOD 1 time, 1 of 6 cycles    fluorouracil (ADRUCIL) 750 mg/m2/day = 8,590 mg in sodium chloride 0.9% 240 mL chemo infusion, 750 mg/m2/day = 8,590 mg, Intravenous, Over 120 hours, 1 of 6 cycles    Plan Name: OP CISPLATIN (Weekly)  Treatment Goal: Palliative  Status: Active  Start Date: 5/3/2018  End Date: 6/27/2018  Provider: Cortney Lopez MD  Chemotherapy: CISplatin (PLATINOL) 40 mg/m2 = 89 mg in sodium chloride 0.9% 250 mL chemo infusion, 40 mg/m2 = 89 mg, Intravenous, Clinic/HOD 1 time, 6 of 6 cycles             3/28/2019 Tumor Conference        His case was discussed at the Multidisciplinary Head and Neck Team Planning Meeting.    Representatives from Medical Oncology, Radiation Oncology, Head and Neck Surgical Oncology, Psychosocial Oncology, and Speech and Language Pathology discussed the case with the following recommendations:    1) continued observation                      HPI   49 y.o. male presents with the above treatment history.  He is currently receiving palliative immunotherapy for metastatic disease.  He underwent PET-CT today.  The official report is pending.  He has no significant complaints.    Review of Systems   Constitutional: Negative for fatigue and unexpected weight change.   HENT: Per HPI.  Eyes: Negative for visual disturbance.   Respiratory: Negative for shortness of breath, hemoptysis   Cardiovascular: Negative for chest pain and palpitations.   Musculoskeletal: Negative for decreased ROM, back pain.   Skin: Negative for rash, sunburn, itching.   Neurological: Negative for dizziness and seizures.   Hematological: Negative for adenopathy. Does not bruise/bleed easily.   Endocrine: Negative for rapid weight loss/weight gain, heat/cold intolerance.     Past Medical History   Patient Active Problem List   Diagnosis    Squamous cell carcinoma of palatine tonsil    Low vitamin D level    Cancer of  head, face, or neck lymph nodes, secondary    Secondary cancer of bone    Tonsil cancer    Dehydration    Thrombocytopenia    Enterocolitis    Diverticulitis    Port or reservoir infection    Anxiety    Head and neck cancer    Macrocytic anemia    Adjustment disorder with emotional disturbance    Secondary malignancy of mediastinal lymph nodes    Fatigue    Hypocalcemia    Chemotherapy-induced neuropathy    Depression    Insomnia    BMI 28.0-28.9,adult    Neuropathic pain    Osteoradionecrosis of jaw           Past Surgical History   Past Surgical History:   Procedure Laterality Date    GASTROSTOMY TUBE PLACEMENT Left 2018    [id number]-T-CATH N/A 2018    Performed [facility] at [facility] LAB    INSERTION-TUBE-GASTROSTOMY-LAPAROSCOPIC N/A 2018    [facility] at [facility] OR    Removal-port-a-cath N/A 2018    Performed by [facility] Diagnostic Provider at [facility] OR [address] [place] [address]         Family History   Family History   Problem Relation Age of Onset    Leukemia Mother     Hypertension Father     Thyroid disease Sister     Depression Sister     Hypertension Brother     Brain cancer Maternal Uncle     Brain cancer Maternal Uncle            Social History   .  Social History     Socioeconomic History    Marital status:      Spouse name: Not on file    Number of children: Not on file    Years of education: Not on file    Highest education level: Not on file   Occupational History    Not on file   Social Needs    Financial resource strain: Not on file    Food insecurity:     Worry: Not on file     Inability: Not on file    Transportation needs:     Medical: Not on file     Non-medical: Not on file   Tobacco Use    Smoking status: Former Smoker     Packs/day: 1.50     Years: 25.00     Pack years: 37.50     Types: Cigarettes     Last attempt to quit: 2017     Years since quittin.0    Smokeless tobacco: Never Used    Tobacco  comment: smoked for about 25 years. quit 6 months ago   Substance and Sexual Activity    Alcohol use: No     Comment: history of overuse    Drug use: No     Comment: Former Opiate/methamphetamine addiction    Sexual activity: Yes     Partners: Female   Lifestyle    Physical activity:     Days per week: Not on file     Minutes per session: Not on file    Stress: Not on file   Relationships    Social connections:     Talks on phone: Not on file     Gets together: Not on file     Attends Muslim service: Not on file     Active member of club or organization: Not on file     Attends meetings of clubs or organizations: Not on file     Relationship status: Not on file   Other Topics Concern    Patient feels they ought to cut down on drinking/drug use Not Asked    Patient annoyed by others criticizing their drinking/drug use Not Asked    Patient has felt bad or guilty about drinking/drug use Not Asked    Patient has had a drink/used drugs as an eye opener in the AM Not Asked   Social History Narrative    3/28/19: he is living alone. No children. Close to siblings/father/several friends; enjoys fishing and flying drones. He works as a .          Allergies   Review of patient's allergies indicates:   Allergen Reactions    Trazodone Other (See Comments)     confusion           Physical Exam     Vitals:    06/19/19 1336   BP: 131/67   Pulse: (!) 47         There is no height or weight on file to calculate BMI.      General: AOx3, NAD   Respiratory:  Symmetric chest rise, normal effort  Nose: No gross nasal septal deviation. Inferior Turbinates WNL bilaterally. No septal perforation. No masses/lesions.   Oral Cavity:  Oral Tongue mobile, no lesions noted.  2 foci of exposed bone along the lingual cortex of the right mandible.  Foci of exposed bone along the right anterior mandible and at the level of the inferior right mandibular ramus are stable in appearance.  Hard Palate WNL. No buccal or FOM  lesions.  Oropharynx:  No masses/lesions of the posterior pharyngeal wall. Tonsillar fossa without lesions. Soft palate without masses. Midline uvula.   Neck: No scars.  No cervical lymphadenopathy, thyromegaly or thyroid nodules.  Normal range of motion.    Face: House Brackmann I bilaterally.    NP: No lesions of posterior wall  OP: No lesions of posterior wall or BOT. BOT is soft to palpation  Larynx: No lesions of glottic or supraglottic larynx. Normal vocal fold mobility    HP: No lesions of pyriform sinuses or postcricoid region  Mirror examination was performed.       Assessment/Plan  Problem List Items Addressed This Visit        ENT    Osteoradionecrosis of jaw - Primary     His symptoms are minimal as is the extent of his osteoradionecrosis.  I refilled his Peridex his request.  I will re-examine him in 6 weeks.         Relevant Medications    chlorhexidine (PERIDEX) 0.12 % solution       Oncology    Squamous cell carcinoma of palatine tonsil     Doing well and palliative immunotherapy.  Return to clinic in 6 weeks.

## 2019-06-25 ENCOUNTER — TELEPHONE (OUTPATIENT)
Dept: HEMATOLOGY/ONCOLOGY | Facility: CLINIC | Age: 49
End: 2019-06-25

## 2019-06-25 NOTE — TELEPHONE ENCOUNTER
Returned call received time on Dr Lopez schedule on 7/5 at 11:15 per Lauryn. Labs infusions also scheduled . Mailed appt slip.

## 2019-06-25 NOTE — TELEPHONE ENCOUNTER
----- Message from Shivani Loera sent at 6/25/2019 12:09 PM CDT -----  Contact: self  Pt is calling to reschedule his appts that are scheduled for 07/08.  He will be out of town.    Pt can be reached at [phone]

## 2019-07-05 ENCOUNTER — LAB VISIT (OUTPATIENT)
Dept: LAB | Facility: HOSPITAL | Age: 49
End: 2019-07-05
Attending: INTERNAL MEDICINE
Payer: COMMERCIAL

## 2019-07-05 ENCOUNTER — OFFICE VISIT (OUTPATIENT)
Dept: HEMATOLOGY/ONCOLOGY | Facility: CLINIC | Age: 49
End: 2019-07-05
Payer: COMMERCIAL

## 2019-07-05 ENCOUNTER — INFUSION (OUTPATIENT)
Dept: INFUSION THERAPY | Facility: HOSPITAL | Age: 49
End: 2019-07-05
Attending: INTERNAL MEDICINE
Payer: COMMERCIAL

## 2019-07-05 ENCOUNTER — TELEPHONE (OUTPATIENT)
Dept: HEMATOLOGY/ONCOLOGY | Facility: CLINIC | Age: 49
End: 2019-07-05

## 2019-07-05 VITALS
RESPIRATION RATE: 18 BRPM | HEIGHT: 67 IN | OXYGEN SATURATION: 99 % | WEIGHT: 166.88 LBS | DIASTOLIC BLOOD PRESSURE: 73 MMHG | HEART RATE: 60 BPM | SYSTOLIC BLOOD PRESSURE: 110 MMHG | TEMPERATURE: 98 F | BODY MASS INDEX: 26.19 KG/M2

## 2019-07-05 VITALS
HEART RATE: 62 BPM | TEMPERATURE: 98 F | SYSTOLIC BLOOD PRESSURE: 112 MMHG | DIASTOLIC BLOOD PRESSURE: 74 MMHG | RESPIRATION RATE: 18 BRPM

## 2019-07-05 DIAGNOSIS — C09.9 SQUAMOUS CELL CARCINOMA OF PALATINE TONSIL: ICD-10-CM

## 2019-07-05 DIAGNOSIS — C77.0 CANCER OF HEAD, FACE, OR NECK LYMPH NODES, SECONDARY: ICD-10-CM

## 2019-07-05 DIAGNOSIS — R19.7 DIARRHEA, UNSPECIFIED TYPE: ICD-10-CM

## 2019-07-05 DIAGNOSIS — K52.9 COLITIS: Primary | ICD-10-CM

## 2019-07-05 DIAGNOSIS — N17.9 ACUTE RENAL FAILURE, UNSPECIFIED ACUTE RENAL FAILURE TYPE: ICD-10-CM

## 2019-07-05 DIAGNOSIS — N17.9 ACUTE RENAL FAILURE, UNSPECIFIED ACUTE RENAL FAILURE TYPE: Primary | ICD-10-CM

## 2019-07-05 DIAGNOSIS — F32.A DEPRESSION, UNSPECIFIED DEPRESSION TYPE: ICD-10-CM

## 2019-07-05 DIAGNOSIS — E87.6 HYPOKALEMIA: ICD-10-CM

## 2019-07-05 LAB
ALBUMIN SERPL BCP-MCNC: 3.8 G/DL (ref 3.5–5.2)
ALP SERPL-CCNC: 58 U/L (ref 55–135)
ALT SERPL W/O P-5'-P-CCNC: 16 U/L (ref 10–44)
ANION GAP SERPL CALC-SCNC: 12 MMOL/L (ref 8–16)
AST SERPL-CCNC: 16 U/L (ref 10–40)
BILIRUB SERPL-MCNC: 0.4 MG/DL (ref 0.1–1)
BUN SERPL-MCNC: 34 MG/DL (ref 6–20)
C DIFF GDH STL QL: NEGATIVE
C DIFF TOX A+B STL QL IA: NEGATIVE
CALCIUM SERPL-MCNC: 9.4 MG/DL (ref 8.7–10.5)
CHLORIDE SERPL-SCNC: 110 MMOL/L (ref 95–110)
CO2 SERPL-SCNC: 20 MMOL/L (ref 23–29)
CREAT SERPL-MCNC: 1.9 MG/DL (ref 0.5–1.4)
ERYTHROCYTE [DISTWIDTH] IN BLOOD BY AUTOMATED COUNT: 12.7 % (ref 11.5–14.5)
EST. GFR  (AFRICAN AMERICAN): 46.2 ML/MIN/1.73 M^2
EST. GFR  (NON AFRICAN AMERICAN): 39.9 ML/MIN/1.73 M^2
GLUCOSE SERPL-MCNC: 86 MG/DL (ref 70–110)
HCT VFR BLD AUTO: 36 % (ref 40–54)
HGB BLD-MCNC: 12.4 G/DL (ref 14–18)
IMM GRANULOCYTES # BLD AUTO: 0.02 K/UL (ref 0–0.04)
MCH RBC QN AUTO: 32.6 PG (ref 27–31)
MCHC RBC AUTO-ENTMCNC: 34.4 G/DL (ref 32–36)
MCV RBC AUTO: 95 FL (ref 82–98)
NEUTROPHILS # BLD AUTO: 2.8 K/UL (ref 1.8–7.7)
PLATELET # BLD AUTO: 185 K/UL (ref 150–350)
PMV BLD AUTO: 8.7 FL (ref 9.2–12.9)
POTASSIUM SERPL-SCNC: 2.9 MMOL/L (ref 3.5–5.1)
PROT SERPL-MCNC: 6.8 G/DL (ref 6–8.4)
RBC # BLD AUTO: 3.8 M/UL (ref 4.6–6.2)
SODIUM SERPL-SCNC: 142 MMOL/L (ref 136–145)
T4 FREE SERPL-MCNC: 1.08 NG/DL (ref 0.71–1.51)
TSH SERPL DL<=0.005 MIU/L-ACNC: 1.33 UIU/ML (ref 0.4–4)
WBC # BLD AUTO: 3.86 K/UL (ref 3.9–12.7)

## 2019-07-05 PROCEDURE — 84439 ASSAY OF FREE THYROXINE: CPT

## 2019-07-05 PROCEDURE — 84443 ASSAY THYROID STIM HORMONE: CPT

## 2019-07-05 PROCEDURE — 99215 PR OFFICE/OUTPT VISIT, EST, LEVL V, 40-54 MIN: ICD-10-PCS | Mod: S$GLB,,, | Performed by: INTERNAL MEDICINE

## 2019-07-05 PROCEDURE — 80053 COMPREHEN METABOLIC PANEL: CPT

## 2019-07-05 PROCEDURE — 87449 NOS EACH ORGANISM AG IA: CPT

## 2019-07-05 PROCEDURE — 99215 OFFICE O/P EST HI 40 MIN: CPT | Mod: S$GLB,,, | Performed by: INTERNAL MEDICINE

## 2019-07-05 PROCEDURE — 96360 HYDRATION IV INFUSION INIT: CPT

## 2019-07-05 PROCEDURE — 3008F BODY MASS INDEX DOCD: CPT | Mod: CPTII,S$GLB,, | Performed by: INTERNAL MEDICINE

## 2019-07-05 PROCEDURE — 96361 HYDRATE IV INFUSION ADD-ON: CPT

## 2019-07-05 PROCEDURE — 85027 COMPLETE CBC AUTOMATED: CPT

## 2019-07-05 PROCEDURE — 99999 PR PBB SHADOW E&M-EST. PATIENT-LVL IV: CPT | Mod: PBBFAC,,, | Performed by: INTERNAL MEDICINE

## 2019-07-05 PROCEDURE — 25000003 PHARM REV CODE 250: Performed by: INTERNAL MEDICINE

## 2019-07-05 PROCEDURE — 99999 PR PBB SHADOW E&M-EST. PATIENT-LVL IV: ICD-10-PCS | Mod: PBBFAC,,, | Performed by: INTERNAL MEDICINE

## 2019-07-05 PROCEDURE — 3008F PR BODY MASS INDEX (BMI) DOCUMENTED: ICD-10-PCS | Mod: CPTII,S$GLB,, | Performed by: INTERNAL MEDICINE

## 2019-07-05 PROCEDURE — 36415 COLL VENOUS BLD VENIPUNCTURE: CPT

## 2019-07-05 PROCEDURE — 87209 SMEAR COMPLEX STAIN: CPT

## 2019-07-05 RX ORDER — HEPARIN 100 UNIT/ML
500 SYRINGE INTRAVENOUS
Status: CANCELLED | OUTPATIENT
Start: 2019-07-05

## 2019-07-05 RX ORDER — PREDNISONE 20 MG/1
TABLET ORAL
Qty: 56 TABLET | Refills: 0 | Status: SHIPPED | OUTPATIENT
Start: 2019-07-05 | End: 2019-07-15 | Stop reason: SDUPTHER

## 2019-07-05 RX ORDER — POTASSIUM CHLORIDE 750 MG/1
40 CAPSULE, EXTENDED RELEASE ORAL DAILY
Qty: 120 CAPSULE | Refills: 3 | Status: ON HOLD | OUTPATIENT
Start: 2019-07-05 | End: 2019-09-13 | Stop reason: HOSPADM

## 2019-07-05 RX ORDER — FLUOXETINE HYDROCHLORIDE 40 MG/1
40 CAPSULE ORAL DAILY
Qty: 30 CAPSULE | Refills: 6 | Status: SHIPPED | OUTPATIENT
Start: 2019-07-05 | End: 2020-03-02 | Stop reason: SDUPTHER

## 2019-07-05 RX ORDER — HEPARIN 100 UNIT/ML
500 SYRINGE INTRAVENOUS
Status: DISCONTINUED | OUTPATIENT
Start: 2019-07-05 | End: 2019-07-05 | Stop reason: HOSPADM

## 2019-07-05 RX ORDER — SODIUM CHLORIDE 0.9 % (FLUSH) 0.9 %
10 SYRINGE (ML) INJECTION
Status: DISCONTINUED | OUTPATIENT
Start: 2019-07-05 | End: 2019-07-05 | Stop reason: HOSPADM

## 2019-07-05 RX ORDER — SODIUM CHLORIDE 0.9 % (FLUSH) 0.9 %
10 SYRINGE (ML) INJECTION
Status: CANCELLED | OUTPATIENT
Start: 2019-07-05

## 2019-07-05 RX ADMIN — SODIUM CHLORIDE 2000 ML: 0.9 INJECTION, SOLUTION INTRAVENOUS at 01:07

## 2019-07-05 NOTE — PLAN OF CARE
Problem: Adult Inpatient Plan of Care  Goal: Plan of Care Review  Outcome: Ongoing (interventions implemented as appropriate)  Pt received 1L of NS; tolerated well. VSS and NAD. Pt to  potassium prescription.  Pt instructed to call MD with any concerns. Pt discharged home independently.

## 2019-07-05 NOTE — PROGRESS NOTES
Subjective:       Patient ID: Burton Whiting is a 49 y.o. male.    Chief Complaint: Squamous cell carcinoma of palatine tonsil  Squamous cell carcinoma of palatine tonsil; g-tube malodorous; left neck pain 2/10; 4 cans formula per day; and r arm port---red/warm to touch  Mr. Burton Whiting is a 48-year-old male, former smoker, who presents today with a four-month history of enlarging neck mass.  He initially delayed care due to lack of insurance.  He was seen by Dr. Turpin who referred him to see Dr. Olguin.  He had a CT that showed a 3.8 x 3.8 x 4.9 cm soft tissue mass arising from the left palatine tonsil resulting in moderate narrowing of the hypopharyngeal airway adjacent extensive matted left cervical lymphadenopathy was noted.  The largest ella mass was 6.6 x 9 x 2.6 cm extending inferiorly to the supraclavicular region.  The mass pushed the trachea and the left common carotid artery to the right.  Additional representative of cervical lymph nodes measuring 2.9 x 3.1 x 3.5 cm on axial image 37 of series 3   and 2.4 x 2.1 x 2.2 cm on axial image 55 of series 3.  There is also a sclerotic lesion involving the midline of the clivus measuring 1.2 cm.  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  PET scan performed on 01/19/2018 revealed persistent evidence of a soft tissue mass arising from the left palatine tonsil resulting in moderate narrowing of the hypopharyngeal   airway.  The mass is hypermetabolic demonstrating an SUV max of 18.5.  There is also persistent adjacent extensive matted left cervical lymphadenopathy, largest of this measuring 6.7 x 8.42 with hypermetabolic activity and SUV max of 20.5.  Lymphadenopathy is again noted to have a mass effect on the trachea and left common carotid artery, pushing both structures towards the right.  There is also prominent right cervical lymph nodes with the largest measuring 0.8 cm and demonstrating mild hypermetabolic activity with SUV max  "of 1.8, physiologic   distribution of FDG in the gray matter.  There are 3 pulmonary nodules noted within the right lung, the largest is in the anterior segment of the right upper lobe measuring 1 cm, second is visualized in the middle lobe measuring 0.6 cm and the third is within the posterior basal segment measuring 0.6 cm.  There is one pulmonary nodule within the left lung within the lateral basal segment measuring 0.6 cm.  These nodules do not demonstrate hypermetabolic activity; however, they are below the threshold for observation with PET     He underwent MRI cervical spine revealed "No evidence of metastatic disease to the cervical spine. Multilevel degenerative changes of the cervical spine with disc protrusion at C5-6 which results in moderate to severe spinal canal stenosis and and associated cord signal abnormality, suggestive of compressive myelopathy. 6 mm T1 hypointense non-enhancing lesion in the clivus.  Continued followup is suggested. 9 mm inferior descent of the cerebellar tonsils below the foramen magnum, suggestive of Chiari 1 malformation"  MRI thoracic spine "No evidence of metastatic disease involving the thoracic spine. Incidental hemangioma involving the T7 vertebral body. Mild degenerative disc disease without any significant spinal canal stenosis or neuroforaminal narrowing.   He completed 3 cycles of TPF and 6 weeks of Cisplatin and RT. Completed on 6/26/18           His PET scan from 9/25/18 revealed "Progression of disease with multiple new hypermetabolic foci including the manubrium, mediastinal/retrocrural lymph nodes, and lungs. Partial therapeutic response within the presumed radiation field involving the left cervical mass, and complete therapeutic response in the right cervical lymph node, clivus, and left palatine tonsil. New soft tissue thickening and hypermetabolism in the left aryepiglottic fold and right cricoid cartilage"      He has been on Opdivo              HPI He " comes in for Opdivo. He notes loose stools 3-4 times/day. He also notes loss of appetite and has lost 9 lbs.  Notes nausea and some abdomen pain.       Review of Systems   Constitutional: Positive for appetite change, fatigue and unexpected weight change.   HENT: Negative for mouth sores.    Eyes: Negative for visual disturbance.   Respiratory: Negative for cough and shortness of breath.    Cardiovascular: Negative for chest pain.   Gastrointestinal: Positive for abdominal pain, diarrhea and nausea.   Genitourinary: Negative for frequency.   Musculoskeletal: Negative for back pain.   Skin: Negative for rash.   Neurological: Negative for headaches.   Hematological: Negative for adenopathy.   Psychiatric/Behavioral: The patient is not nervous/anxious.    All other systems reviewed and are negative.      Objective:      Physical Exam   Constitutional: He is oriented to person, place, and time. He appears well-developed and well-nourished.   HENT:   Mouth/Throat: No oropharyngeal exudate.   Cardiovascular: Normal rate and normal heart sounds.   Pulmonary/Chest: Effort normal and breath sounds normal. He has no wheezes.   Abdominal: Soft. Bowel sounds are normal. There is no tenderness.   Musculoskeletal: He exhibits no edema or tenderness.   Lymphadenopathy:     He has no cervical adenopathy.   Neurological: He is alert and oriented to person, place, and time. Coordination normal.   Skin: Skin is warm and dry. No rash noted.   Psychiatric: He has a normal mood and affect. Judgment and thought content normal.   Vitals reviewed.        LABS:  WBC   Date Value Ref Range Status   07/05/2019 3.86 (L) 3.90 - 12.70 K/uL Final     Hemoglobin   Date Value Ref Range Status   07/05/2019 12.4 (L) 14.0 - 18.0 g/dL Final     Hematocrit   Date Value Ref Range Status   07/05/2019 36.0 (L) 40.0 - 54.0 % Final     Platelets   Date Value Ref Range Status   07/05/2019 185 150 - 350 K/uL Final     Gran # (ANC)   Date Value Ref Range Status    07/05/2019 2.8 1.8 - 7.7 K/uL Final     Comment:     The ANC is based on a white cell differential from an   automated cell counter. It has not been microscopically   reviewed for the presence of abnormal cells. Clinical   correlation is required.         Chemistry        Component Value Date/Time     07/05/2019 1016    K 2.9 (L) 07/05/2019 1016     07/05/2019 1016    CO2 20 (L) 07/05/2019 1016    BUN 34 (H) 07/05/2019 1016    CREATININE 1.9 (H) 07/05/2019 1016    GLU 86 07/05/2019 1016        Component Value Date/Time    CALCIUM 9.4 07/05/2019 1016    ALKPHOS 58 07/05/2019 1016    AST 16 07/05/2019 1016    ALT 16 07/05/2019 1016    BILITOT 0.4 07/05/2019 1016    ESTGFRAFRICA 46.2 (A) 07/05/2019 1016    EGFRNONAA 39.9 (A) 07/05/2019 1016        TSH   Date Value Ref Range Status   07/05/2019 1.327 0.400 - 4.000 uIU/mL Final     Free T4   Date Value Ref Range Status   07/05/2019 1.08 0.71 - 1.51 ng/dL Final       Assessment:       1. Colitis    2. Depression, unspecified depression type    3. Hypokalemia    4. Acute renal failure, unspecified acute renal failure type    5. Squamous cell carcinoma of palatine tonsil    6. Cancer of head, face, or neck lymph nodes, secondary        Plan:        1,2,3,4,5,6. Hold Opdivo. Check stool today for C.diff. Started prednisone 1 mg/kg daily and will reassess him in 2 weeks. He is going out of town tomorrow.   He will receive IV fluids today and oral potassium script sent.    Above care plan was discussed with patient and all questions were addressed to his satisfaction

## 2019-07-05 NOTE — TELEPHONE ENCOUNTER
Spoke with patient.  He understands to get a couple liters of fluids today.  He will be out of town starting tomorrow for about 9 days---  He verbalized understanding to stay plenty hydrated while he is out of town---  Repeat labs when he returns to town.      Message routed to dr waggoner (melita WHEAT)

## 2019-07-05 NOTE — TELEPHONE ENCOUNTER
----- Message from Thea Bach sent at 7/5/2019 12:02 PM CDT -----  Contact: pt   Pt called to speak with nurse about test results   Callback#[phone]  Thank You  ITZ Bach

## 2019-07-09 ENCOUNTER — PATIENT MESSAGE (OUTPATIENT)
Dept: HEMATOLOGY/ONCOLOGY | Facility: CLINIC | Age: 49
End: 2019-07-09

## 2019-07-09 LAB — O+P STL TRI STN: NORMAL

## 2019-07-15 ENCOUNTER — TELEPHONE (OUTPATIENT)
Dept: HEMATOLOGY/ONCOLOGY | Facility: CLINIC | Age: 49
End: 2019-07-15

## 2019-07-15 ENCOUNTER — OFFICE VISIT (OUTPATIENT)
Dept: HEMATOLOGY/ONCOLOGY | Facility: CLINIC | Age: 49
End: 2019-07-15
Payer: COMMERCIAL

## 2019-07-15 VITALS
WEIGHT: 174.38 LBS | TEMPERATURE: 98 F | HEART RATE: 58 BPM | DIASTOLIC BLOOD PRESSURE: 74 MMHG | RESPIRATION RATE: 18 BRPM | HEIGHT: 67 IN | OXYGEN SATURATION: 98 % | SYSTOLIC BLOOD PRESSURE: 131 MMHG | BODY MASS INDEX: 27.37 KG/M2

## 2019-07-15 DIAGNOSIS — K52.9 COLITIS: ICD-10-CM

## 2019-07-15 DIAGNOSIS — C77.0 CANCER OF HEAD, FACE, OR NECK LYMPH NODES, SECONDARY: ICD-10-CM

## 2019-07-15 DIAGNOSIS — C09.9 SQUAMOUS CELL CARCINOMA OF PALATINE TONSIL: Primary | ICD-10-CM

## 2019-07-15 PROCEDURE — 99999 PR PBB SHADOW E&M-EST. PATIENT-LVL III: CPT | Mod: PBBFAC,,, | Performed by: INTERNAL MEDICINE

## 2019-07-15 PROCEDURE — 3008F PR BODY MASS INDEX (BMI) DOCUMENTED: ICD-10-PCS | Mod: CPTII,S$GLB,, | Performed by: INTERNAL MEDICINE

## 2019-07-15 PROCEDURE — 99999 PR PBB SHADOW E&M-EST. PATIENT-LVL III: ICD-10-PCS | Mod: PBBFAC,,, | Performed by: INTERNAL MEDICINE

## 2019-07-15 PROCEDURE — 99215 PR OFFICE/OUTPT VISIT, EST, LEVL V, 40-54 MIN: ICD-10-PCS | Mod: S$GLB,,, | Performed by: INTERNAL MEDICINE

## 2019-07-15 PROCEDURE — 3008F BODY MASS INDEX DOCD: CPT | Mod: CPTII,S$GLB,, | Performed by: INTERNAL MEDICINE

## 2019-07-15 PROCEDURE — 99215 OFFICE O/P EST HI 40 MIN: CPT | Mod: S$GLB,,, | Performed by: INTERNAL MEDICINE

## 2019-07-15 RX ORDER — PREDNISONE 20 MG/1
20 TABLET ORAL 4 TIMES DAILY
Qty: 28 TABLET | Refills: 0 | Status: SHIPPED | OUTPATIENT
Start: 2019-07-15 | End: 2019-08-12 | Stop reason: SDUPTHER

## 2019-07-15 NOTE — PROGRESS NOTES
Subjective:       Patient ID: Burton Whiting is a 49 y.o. male.    Chief Complaint: Squamous cell carcinoma of palatine tonsil  Squamous cell carcinoma of palatine tonsil; g-tube malodorous; left neck pain 2/10; 4 cans formula per day; and r arm port---red/warm to touch  Mr. Burton Whiting is a 48-year-old male, former smoker, who presents today with a four-month history of enlarging neck mass.  He initially delayed care due to lack of insurance.  He was seen by Dr. Turpin who referred him to see Dr. Olguin.  He had a CT that showed a 3.8 x 3.8 x 4.9 cm soft tissue mass arising from the left palatine tonsil resulting in moderate narrowing of the hypopharyngeal airway adjacent extensive matted left cervical lymphadenopathy was noted.  The largest ella mass was 6.6 x 9 x 2.6 cm extending inferiorly to the supraclavicular region.  The mass pushed the trachea and the left common carotid artery to the right.  Additional representative of cervical lymph nodes measuring 2.9 x 3.1 x 3.5 cm on axial image 37 of series 3   and 2.4 x 2.1 x 2.2 cm on axial image 55 of series 3.  There is also a sclerotic lesion involving the midline of the clivus measuring 1.2 cm.  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  PET scan performed on 01/19/2018 revealed persistent evidence of a soft tissue mass arising from the left palatine tonsil resulting in moderate narrowing of the hypopharyngeal   airway.  The mass is hypermetabolic demonstrating an SUV max of 18.5.  There is also persistent adjacent extensive matted left cervical lymphadenopathy, largest of this measuring 6.7 x 8.42 with hypermetabolic activity and SUV max of 20.5.  Lymphadenopathy is again noted to have a mass effect on the trachea and left common carotid artery, pushing both structures towards the right.  There is also prominent right cervical lymph nodes with the largest measuring 0.8 cm and demonstrating mild hypermetabolic activity with SUV max  "of 1.8, physiologic   distribution of FDG in the gray matter.  There are 3 pulmonary nodules noted within the right lung, the largest is in the anterior segment of the right upper lobe measuring 1 cm, second is visualized in the middle lobe measuring 0.6 cm and the third is within the posterior basal segment measuring 0.6 cm.  There is one pulmonary nodule within the left lung within the lateral basal segment measuring 0.6 cm.  These nodules do not demonstrate hypermetabolic activity; however, they are below the threshold for observation with PET     He underwent MRI cervical spine revealed "No evidence of metastatic disease to the cervical spine. Multilevel degenerative changes of the cervical spine with disc protrusion at C5-6 which results in moderate to severe spinal canal stenosis and and associated cord signal abnormality, suggestive of compressive myelopathy. 6 mm T1 hypointense non-enhancing lesion in the clivus.  Continued followup is suggested. 9 mm inferior descent of the cerebellar tonsils below the foramen magnum, suggestive of Chiari 1 malformation"  MRI thoracic spine "No evidence of metastatic disease involving the thoracic spine. Incidental hemangioma involving the T7 vertebral body. Mild degenerative disc disease without any significant spinal canal stenosis or neuroforaminal narrowing.   He completed 3 cycles of TPF and 6 weeks of Cisplatin and RT. Completed on 6/26/18           His PET scan from 9/25/18 revealed "Progression of disease with multiple new hypermetabolic foci including the manubrium, mediastinal/retrocrural lymph nodes, and lungs. Partial therapeutic response within the presumed radiation field involving the left cervical mass, and complete therapeutic response in the right cervical lymph node, clivus, and left palatine tonsil. New soft tissue thickening and hypermetabolism in the left aryepiglottic fold and right cricoid cartilage"      He has been on Opdivo                HPI He " comes in for Opdivo.  He notes loose stools are better. He is going about 2/day. He gained 8 lbs in 2 weeks    Review of Systems   Constitutional: Negative for appetite change, fatigue and unexpected weight change.   HENT: Negative for mouth sores.    Eyes: Negative for visual disturbance.   Respiratory: Negative for cough and shortness of breath.    Cardiovascular: Negative for chest pain.   Gastrointestinal: Negative for abdominal pain and diarrhea.   Genitourinary: Negative for frequency.   Musculoskeletal: Negative for back pain.   Skin: Negative for rash.   Neurological: Negative for headaches.   Hematological: Negative for adenopathy.   Psychiatric/Behavioral: The patient is not nervous/anxious.    All other systems reviewed and are negative.      Objective:      Physical Exam   Constitutional: He is oriented to person, place, and time. He appears well-developed and well-nourished.   HENT:   Mouth/Throat: No oropharyngeal exudate.   Cardiovascular: Normal rate and normal heart sounds.   Pulmonary/Chest: Effort normal and breath sounds normal. He has no wheezes.   Abdominal: Soft. Bowel sounds are normal. There is no tenderness.   Musculoskeletal: He exhibits no edema or tenderness.   Lymphadenopathy:     He has no cervical adenopathy.   Neurological: He is alert and oriented to person, place, and time. Coordination normal.   Skin: Skin is warm and dry. No rash noted.   Psychiatric: He has a normal mood and affect. Judgment and thought content normal.   Vitals reviewed.      LABS:  WBC   Date Value Ref Range Status   07/15/2019 16.06 (H) 3.90 - 12.70 K/uL Final     Hemoglobin   Date Value Ref Range Status   07/15/2019 13.0 (L) 14.0 - 18.0 g/dL Final     Hematocrit   Date Value Ref Range Status   07/15/2019 38.6 (L) 40.0 - 54.0 % Final     Platelets   Date Value Ref Range Status   07/15/2019 205 150 - 350 K/uL Final     Gran # (ANC)   Date Value Ref Range Status   07/15/2019 14.5 (H) 1.8 - 7.7 K/uL Final      Comment:     The ANC is based on a white cell differential from an   automated cell counter. It has not been microscopically   reviewed for the presence of abnormal cells. Clinical   correlation is required.         Chemistry        Component Value Date/Time     07/05/2019 1016    K 2.9 (L) 07/05/2019 1016     07/05/2019 1016    CO2 20 (L) 07/05/2019 1016    BUN 34 (H) 07/05/2019 1016    CREATININE 1.9 (H) 07/05/2019 1016    GLU 86 07/05/2019 1016        Component Value Date/Time    CALCIUM 9.4 07/05/2019 1016    ALKPHOS 58 07/05/2019 1016    AST 16 07/05/2019 1016    ALT 16 07/05/2019 1016    BILITOT 0.4 07/05/2019 1016    ESTGFRAFRICA 46.2 (A) 07/05/2019 1016    EGFRNONAA 39.9 (A) 07/05/2019 1016          Assessment:       1. Squamous cell carcinoma of palatine tonsil    2. Cancer of head, face, or neck lymph nodes, secondary    3. Colitis        Plan:        1,2,3. Immune mediated colitis is improving. Will continue with prednisone 80 mg daily for 1 more week. He will call me at that time and if his diarrhea is resolved then will start a slow taper.    Opdivo on hold currently.    Above care plan was discussed with patient and accompanying brother and all questions were addressed to their satisfaction

## 2019-07-15 NOTE — TELEPHONE ENCOUNTER
Spoke with patient.  His repeat K was 5.9.  He will hold K starting today--and will repeat CMP on Wednesday.  He voiced understanding.

## 2019-07-17 ENCOUNTER — TELEPHONE (OUTPATIENT)
Dept: HEMATOLOGY/ONCOLOGY | Facility: CLINIC | Age: 49
End: 2019-07-17

## 2019-07-17 ENCOUNTER — LAB VISIT (OUTPATIENT)
Dept: LAB | Facility: HOSPITAL | Age: 49
End: 2019-07-17
Attending: INTERNAL MEDICINE
Payer: COMMERCIAL

## 2019-07-17 DIAGNOSIS — C09.9 SQUAMOUS CELL CARCINOMA OF PALATINE TONSIL: ICD-10-CM

## 2019-07-17 LAB
ALBUMIN SERPL BCP-MCNC: 3.6 G/DL (ref 3.5–5.2)
ALP SERPL-CCNC: 73 U/L (ref 55–135)
ALT SERPL W/O P-5'-P-CCNC: 46 U/L (ref 10–44)
ANION GAP SERPL CALC-SCNC: 9 MMOL/L (ref 8–16)
AST SERPL-CCNC: 21 U/L (ref 10–40)
BILIRUB SERPL-MCNC: 0.4 MG/DL (ref 0.1–1)
BUN SERPL-MCNC: 42 MG/DL (ref 6–20)
CALCIUM SERPL-MCNC: 9.3 MG/DL (ref 8.7–10.5)
CHLORIDE SERPL-SCNC: 99 MMOL/L (ref 95–110)
CO2 SERPL-SCNC: 29 MMOL/L (ref 23–29)
CREAT SERPL-MCNC: 1.7 MG/DL (ref 0.5–1.4)
EST. GFR  (AFRICAN AMERICAN): 53 ML/MIN/1.73 M^2
EST. GFR  (NON AFRICAN AMERICAN): 46 ML/MIN/1.73 M^2
GLUCOSE SERPL-MCNC: 115 MG/DL (ref 70–110)
POTASSIUM SERPL-SCNC: 5.4 MMOL/L (ref 3.5–5.1)
PROT SERPL-MCNC: 6.1 G/DL (ref 6–8.4)
SODIUM SERPL-SCNC: 137 MMOL/L (ref 136–145)

## 2019-07-17 PROCEDURE — 80053 COMPREHEN METABOLIC PANEL: CPT

## 2019-07-17 PROCEDURE — 36415 COLL VENOUS BLD VENIPUNCTURE: CPT

## 2019-07-17 NOTE — TELEPHONE ENCOUNTER
Informed patient his K is trending down at 5.4.   Advised him to continue to hold oral K for now.  Will repeat CMP on Friday to monitor K is still trending that way.    Requested patient to contact clinic with any questions---  Nurse will f/u with patient on Friday afternoon.      Message routed to dr waggoner and sachin

## 2019-07-19 ENCOUNTER — TELEPHONE (OUTPATIENT)
Dept: HEMATOLOGY/ONCOLOGY | Facility: CLINIC | Age: 49
End: 2019-07-19

## 2019-07-19 ENCOUNTER — LAB VISIT (OUTPATIENT)
Dept: LAB | Facility: HOSPITAL | Age: 49
End: 2019-07-19
Attending: INTERNAL MEDICINE
Payer: COMMERCIAL

## 2019-07-19 DIAGNOSIS — C09.9 SQUAMOUS CELL CARCINOMA OF PALATINE TONSIL: ICD-10-CM

## 2019-07-19 LAB
ALBUMIN SERPL BCP-MCNC: 3.4 G/DL (ref 3.5–5.2)
ALP SERPL-CCNC: 62 U/L (ref 55–135)
ALT SERPL W/O P-5'-P-CCNC: 44 U/L (ref 10–44)
ANION GAP SERPL CALC-SCNC: 9 MMOL/L (ref 8–16)
AST SERPL-CCNC: 29 U/L (ref 10–40)
BILIRUB SERPL-MCNC: 0.4 MG/DL (ref 0.1–1)
BUN SERPL-MCNC: 45 MG/DL (ref 6–20)
CALCIUM SERPL-MCNC: 8.1 MG/DL (ref 8.7–10.5)
CHLORIDE SERPL-SCNC: 103 MMOL/L (ref 95–110)
CO2 SERPL-SCNC: 25 MMOL/L (ref 23–29)
CREAT SERPL-MCNC: 1.5 MG/DL (ref 0.5–1.4)
EST. GFR  (AFRICAN AMERICAN): >60 ML/MIN/1.73 M^2
EST. GFR  (NON AFRICAN AMERICAN): 53 ML/MIN/1.73 M^2
GLUCOSE SERPL-MCNC: 108 MG/DL (ref 70–110)
POTASSIUM SERPL-SCNC: 5 MMOL/L (ref 3.5–5.1)
PROT SERPL-MCNC: 5.7 G/DL (ref 6–8.4)
SODIUM SERPL-SCNC: 137 MMOL/L (ref 136–145)

## 2019-07-19 PROCEDURE — 80053 COMPREHEN METABOLIC PANEL: CPT

## 2019-07-19 PROCEDURE — 36415 COLL VENOUS BLD VENIPUNCTURE: CPT

## 2019-07-19 NOTE — TELEPHONE ENCOUNTER
----- Message from Sachin Vela RN sent at 7/18/2019  2:01 PM CDT -----  He is coming in for labs on Friday.   Please just check his K--  Presently he is holding his oral K....and needs to continue to hold it if his K is continuing to trend down and stable.    ~sachin

## 2019-07-20 NOTE — TELEPHONE ENCOUNTER
Continue with 1 more week of high dose steroids and then if no further episodes we will start taper

## 2019-07-22 NOTE — TELEPHONE ENCOUNTER
Left vm for patient informing him to continue high dose steroids for one more week---and if his stools are still under control, dr waggoner will start to taper then.  Requested patient to contact clinic on Monday with an update.

## 2019-07-29 ENCOUNTER — PATIENT MESSAGE (OUTPATIENT)
Dept: HEMATOLOGY/ONCOLOGY | Facility: CLINIC | Age: 49
End: 2019-07-29

## 2019-07-29 NOTE — TELEPHONE ENCOUNTER
Spoke with patient.  BMs completely normal.  Advised patient to taper prednisone down to 3.5 tablets daily.   He will follow up with nurse next Monday.    Message routed to dr waggoner

## 2019-07-31 ENCOUNTER — OFFICE VISIT (OUTPATIENT)
Dept: OTOLARYNGOLOGY | Facility: CLINIC | Age: 49
End: 2019-07-31
Payer: COMMERCIAL

## 2019-07-31 VITALS
TEMPERATURE: 98 F | HEART RATE: 81 BPM | SYSTOLIC BLOOD PRESSURE: 109 MMHG | DIASTOLIC BLOOD PRESSURE: 70 MMHG | BODY MASS INDEX: 27.83 KG/M2 | WEIGHT: 177.69 LBS

## 2019-07-31 DIAGNOSIS — M27.2 OSTEORADIONECROSIS OF JAW: Primary | ICD-10-CM
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
## 2019-07-31 DIAGNOSIS — C09.9 SQUAMOUS CELL CARCINOMA OF PALATINE TONSIL: ICD-10-CM

## 2019-07-31 PROCEDURE — 99999 PR PBB SHADOW E&M-EST. PATIENT-LVL III: ICD-10-PCS | Mod: PBBFAC,,, | Performed by: OTOLARYNGOLOGY

## 2019-07-31 PROCEDURE — 99213 PR OFFICE/OUTPT VISIT, EST, LEVL III, 20-29 MIN: ICD-10-PCS | Mod: S$GLB,,, | Performed by: OTOLARYNGOLOGY

## 2019-07-31 PROCEDURE — 3008F BODY MASS INDEX DOCD: CPT | Mod: CPTII,S$GLB,, | Performed by: OTOLARYNGOLOGY

## 2019-07-31 PROCEDURE — 99999 PR PBB SHADOW E&M-EST. PATIENT-LVL III: CPT | Mod: PBBFAC,,, | Performed by: OTOLARYNGOLOGY

## 2019-07-31 PROCEDURE — 99213 OFFICE O/P EST LOW 20 MIN: CPT | Mod: S$GLB,,, | Performed by: OTOLARYNGOLOGY

## 2019-07-31 PROCEDURE — 3008F PR BODY MASS INDEX (BMI) DOCUMENTED: ICD-10-PCS | Mod: CPTII,S$GLB,, | Performed by: OTOLARYNGOLOGY

## 2019-07-31 NOTE — PROGRESS NOTES
Chief Complaint   Patient presents with    Follow-up        Squamous cell carcinoma of palatine tonsil    1/4/2018 Initial Diagnosis     Squamous cell carcinoma of palatine tonsil         5/3/2018 - 6/26/2018 Radiation Therapy     Treating physician: Dr. Sandra  Total Dose: 70 Gy  Fractions: 35/35         5/2018 - 6/2018 Chemotherapy     Treatment Summary   Plan Name: OP CISPLATIN DOCETAXEL 5FU Q3W  Treatment Goal: Curative  Status: Inactive  Start Date: 1/29/2018  End Date: 5/27/2018 (Planned)  Provider: Cortney Lopez MD  Chemotherapy: CISplatin (PLATINOL) 100 mg/m2 = 248 mg in sodium chloride 0.9% 500 mL chemo infusion, 100 mg/m2 = 248 mg (100 % of original dose 100 mg/m2), Intravenous, Clinic/HOD 1 time, 2 of 6 cycles  Dose modification: 100 mg/m2 (original dose 100 mg/m2, Cycle 1)    DOCEtaxel (TAXOTERE) 75 mg/m2 = 185 mg in sodium chloride 0.9% 250 mL chemo infusion, 75 mg/m2 = 185 mg, Intravenous, Clinic/HOD 1 time, 2 of 6 cycles    fluorouracil (ADRUCIL) 1,000 mg/m2/day = 12,400 mg in sodium chloride 0.9% 250 mL chemo infusion, 1,000 mg/m2/day = 12,400 mg (100 % of original dose 1,000 mg/m2/day), Intravenous, Over 120 hours, 2 of 6 cycles  Dose modification: 1,000 mg/m2/day (original dose 1,000 mg/m2/day, Cycle 1)    Plan Name: OP CISPLATIN (Weekly)  Treatment Goal: Curative  Status: Inactive  Start Date: 3/20/2018 (Planned)  End Date: 4/24/2018 (Planned)  Provider: Cortney Lopez MD  Chemotherapy: CISplatin (PLATINOL) 40 mg/m2 = 92 mg in sodium chloride 0.9% 250 mL chemo infusion, 40 mg/m2, Intravenous, Clinic/HOD 1 time, 0 of 6 cycles    Plan Name: OP CISPLATIN DOCETAXEL 5FU Q3W  Treatment Goal: Maintenance  Status: Inactive  Start Date: 3/19/2018  End Date: 7/12/2018 (Planned)  Provider: Cortney Lopez MD  Chemotherapy: CISplatin (PLATINOL) 75 mg/m2 = 172 mg in sodium chloride 0.9% 500 mL chemo infusion, 75 mg/m2 = 172 mg, Intravenous, Clinic/HOD 1 time, 1 of 6 cycles    DOCEtaxel (TAXOTERE) 75 mg/m2 = 170  mg in sodium chloride 0.9% 250 mL chemo infusion, 75 mg/m2 = 170 mg, Intravenous, Clinic/HOD 1 time, 1 of 6 cycles    fluorouracil (ADRUCIL) 750 mg/m2/day = 8,590 mg in sodium chloride 0.9% 240 mL chemo infusion, 750 mg/m2/day = 8,590 mg, Intravenous, Over 120 hours, 1 of 6 cycles    Plan Name: OP CISPLATIN (Weekly)  Treatment Goal: Palliative  Status: Active  Start Date: 5/3/2018  End Date: 6/27/2018  Provider: Cortney Lopez MD  Chemotherapy: CISplatin (PLATINOL) 40 mg/m2 = 89 mg in sodium chloride 0.9% 250 mL chemo infusion, 40 mg/m2 = 89 mg, Intravenous, Clinic/HOD 1 time, 6 of 6 cycles             3/28/2019 Tumor Conference        His case was discussed at the Multidisciplinary Head and Neck Team Planning Meeting.    Representatives from Medical Oncology, Radiation Oncology, Head and Neck Surgical Oncology, Psychosocial Oncology, and Speech and Language Pathology discussed the case with the following recommendations:    1) continued observation                      HPI   49 y.o. male presents with the above treatment history.  No new complaints.  He reports that he is off immunotherapy due to diarrhea.  Review of Systems   Constitutional: Negative for fatigue and unexpected weight change.   HENT: Per HPI.  Eyes: Negative for visual disturbance.   Respiratory: Negative for shortness of breath, hemoptysis   Cardiovascular: Negative for chest pain and palpitations.   Musculoskeletal: Negative for decreased ROM, back pain.   Skin: Negative for rash, sunburn, itching.   Neurological: Negative for dizziness and seizures.   Hematological: Negative for adenopathy. Does not bruise/bleed easily.   Endocrine: Negative for rapid weight loss/weight gain, heat/cold intolerance.     Past Medical History   Patient Active Problem List   Diagnosis    Squamous cell carcinoma of palatine tonsil    Low vitamin D level    Cancer of head, face, or neck lymph nodes, secondary    Secondary cancer of bone    Tonsil cancer     Dehydration    Thrombocytopenia    Enterocolitis    Diverticulitis    Port or reservoir infection    Anxiety    Head and neck cancer    Macrocytic anemia    Adjustment disorder with emotional disturbance    Secondary malignancy of mediastinal lymph nodes    Fatigue    Hypocalcemia    Chemotherapy-induced neuropathy    Depression    Insomnia    BMI 28.0-28.9,adult    Neuropathic pain    Osteoradionecrosis of jaw    Acute renal failure           Past Surgical History   Past Surgical History:   Procedure Laterality Date    GASTROSTOMY TUBE PLACEMENT Left 2018    UWISUFCVN-YUMP-I-CATH N/A 2018    [facility] LAB    INSERTION-TUBE-GASTROSTOMY-LAPAROSCOPIC N/A 2018    [facility] at [facility] OR    Removal-port-a-cath N/A 2018    Performed by [facility] Provider at [facility] OR [address] [place] [address]         Family History   Family History   Problem Relation Age of Onset    Leukemia Mother     Hypertension Father     Thyroid disease Sister     Depression Sister     Hypertension Brother     Brain cancer Maternal Uncle     Brain cancer Maternal Uncle            Social History   .  Social History     Socioeconomic History    Marital status:      Spouse name: Not on file    Number of children: Not on file    Years of education: Not on file    Highest education level: Not on file   Occupational History    Not on file   Social Needs    Financial resource strain: Not on file    Food insecurity:     Worry: Not on file     Inability: Not on file    Transportation needs:     Medical: Not on file     Non-medical: Not on file   Tobacco Use    Smoking status: Former Smoker     Packs/day: 1.50     Years: 25.00     Pack years: 37.50     Types: Cigarettes     Last attempt to quit: 2017     Years since quittin.1    Smokeless tobacco: Never Used    Tobacco comment: smoked for about 25 years. quit 6 months ago   Substance and  Sexual Activity    Alcohol use: No     Comment: history of overuse    Drug use: No     Comment: Former Opiate/methamphetamine addiction    Sexual activity: Yes     Partners: Female   Lifestyle    Physical activity:     Days per week: Not on file     Minutes per session: Not on file    Stress: Not on file   Relationships    Social connections:     Talks on phone: Not on file     Gets together: Not on file     Attends Mosque service: Not on file     Active member of club or organization: Not on file     Attends meetings of clubs or organizations: Not on file     Relationship status: Not on file   Other Topics Concern    Patient feels they ought to cut down on drinking/drug use Not Asked    Patient annoyed by others criticizing their drinking/drug use Not Asked    Patient has felt bad or guilty about drinking/drug use Not Asked    Patient has had a drink/used drugs as an eye opener in the AM Not Asked   Social History Narrative    3/28/19: he is living alone. No children. Close to siblings/father/several friends; enjoys fishing and flying drones. He works as a .          Allergies   Review of patient's allergies indicates:   Allergen Reactions    Trazodone Other (See Comments)     confusion           Physical Exam     Vitals:    07/31/19 1334   BP: 109/70   Pulse: 81   Temp: 97.9 °F (36.6 °C)         Body mass index is 27.83 kg/m².      General: AOx3, NAD   Respiratory:  Symmetric chest rise, normal effort  Nose: No gross nasal septal deviation. Inferior Turbinates WNL bilaterally. No septal perforation. No masses/lesions.   Oral Cavity:  Oral Tongue mobile, no lesions noted.  2 foci of exposed bone along the lingual cortex of the right mandible.  Foci of exposed bone along the right anterior mandible and at the level of the inferior right mandibular ramus are stable in appearance.  Hard Palate WNL. No buccal or FOM lesions.  Oropharynx:  No masses/lesions of the posterior pharyngeal wall.  Tonsillar fossa without lesions. Soft palate without masses. Midline uvula.   Neck: No scars.  No cervical lymphadenopathy, thyromegaly or thyroid nodules.  Normal range of motion.    Face: House Brackmann I bilaterally.    NP: No lesions of posterior wall  OP: No lesions of posterior wall or BOT. BOT is soft to palpation  Larynx: No lesions of glottic or supraglottic larynx. Normal vocal fold mobility    HP: No lesions of pyriform sinuses or postcricoid region  Mirror examination was performed.       Assessment/Plan  Problem List Items Addressed This Visit        ENT    Osteoradionecrosis of jaw - Primary     Stable.  Continue Peridex rinses.            Oncology    Squamous cell carcinoma of palatine tonsil     No evidence of disease in the head neck.  Return to clinic in 6 weeks for surveillance.

## 2019-08-06 ENCOUNTER — PATIENT MESSAGE (OUTPATIENT)
Dept: HEMATOLOGY/ONCOLOGY | Facility: CLINIC | Age: 49
End: 2019-08-06

## 2019-08-06 ENCOUNTER — TELEPHONE (OUTPATIENT)
Dept: HEMATOLOGY/ONCOLOGY | Facility: CLINIC | Age: 49
End: 2019-08-06

## 2019-08-06 DIAGNOSIS — C09.9 SQUAMOUS CELL CARCINOMA OF PALATINE TONSIL: Primary | ICD-10-CM

## 2019-08-06 NOTE — TELEPHONE ENCOUNTER
----- Message from Thea Bach sent at 8/6/2019  3:37 PM CDT -----  Contact: pt   Pt called to speak with nurse sachin and states he has a questions about something he needed to    Callback [phone]  Thank You  ITZ Bach

## 2019-08-06 NOTE — TELEPHONE ENCOUNTER
"Spoke with patient.  He is calling to state he stopped prednisone altogether about 5 days ago, as he couldn't handle the way it made him feel. "not myself and going out of my mind."    He has had no more weight loss. Starting 2 days ago he started back with 5 loose stools per day. He is not taking imodium at all. He states he had a bunch of family/friends in town and they ate out a lot---spicy rich food. He hopes this is what caused the loose stools to return.   He is wondering if there is something else he can take besides steroids to help alleviate the colitis, if it has indeed returned, as he doesn't want to take prednisone. He understands he wasn't supposed to stop the medication abruptly, but he states, "I couldn't help it."    Nurse informed patient she would contact him back after speaking with dr waggoner. He voiced understanding.      Message routed to dr waggoner (can I have him start taking imodium appropriately for now---and if the loose stools persist through the week--tell him he will need to get back on steroids again?)   "

## 2019-08-06 NOTE — TELEPHONE ENCOUNTER
Left vm for patient requesting him to start taking imodium. Gave instructions. Encouraged plenty hydration. Instructed him to  c diff supplies from clinic.     Informed him he may have to get back started on steroids if loose stools persist.    Requested him to contact clinic back to discuss.

## 2019-08-06 NOTE — TELEPHONE ENCOUNTER
Spoke to patient he is on his way to clinic now to  stool specimen collection kit. Let him know that I would be here until after 5.

## 2019-08-10 ENCOUNTER — LAB VISIT (OUTPATIENT)
Dept: LAB | Facility: HOSPITAL | Age: 49
End: 2019-08-10
Payer: COMMERCIAL

## 2019-08-10 DIAGNOSIS — C09.9 SQUAMOUS CELL CARCINOMA OF PALATINE TONSIL: ICD-10-CM

## 2019-08-10 LAB
C DIFF GDH STL QL: NEGATIVE
C DIFF GDH STL QL: NEGATIVE
C DIFF TOX A+B STL QL IA: NEGATIVE
C DIFF TOX A+B STL QL IA: NEGATIVE

## 2019-08-10 PROCEDURE — 87449 NOS EACH ORGANISM AG IA: CPT

## 2019-08-12 ENCOUNTER — PATIENT MESSAGE (OUTPATIENT)
Dept: HEMATOLOGY/ONCOLOGY | Facility: CLINIC | Age: 49
End: 2019-08-12

## 2019-08-12 DIAGNOSIS — K52.9 COLITIS: ICD-10-CM

## 2019-08-12 RX ORDER — PREDNISONE 20 MG/1
20 TABLET ORAL 4 TIMES DAILY
Qty: 28 TABLET | Refills: 0 | Status: SHIPPED | OUTPATIENT
Start: 2019-08-12 | End: 2019-08-19 | Stop reason: SDUPTHER

## 2019-08-12 NOTE — TELEPHONE ENCOUNTER
He was on 80mg with no loose stools.  We tapered him to prednisone 70mg --and that is when he stopped the medication abruptly.     Start him back on 80mg?  ~sachin

## 2019-08-19 RX ORDER — PREDNISONE 20 MG/1
70 TABLET ORAL DAILY
Qty: 280 TABLET | Refills: 0 | Status: ON HOLD | OUTPATIENT
Start: 2019-08-19 | End: 2019-09-13 | Stop reason: HOSPADM

## 2019-08-19 NOTE — TELEPHONE ENCOUNTER
Stools are back to normal. May we taper him to 70mg now? (he was on 80mg the past week)    ceasar

## 2019-08-26 ENCOUNTER — PATIENT MESSAGE (OUTPATIENT)
Dept: HEMATOLOGY/ONCOLOGY | Facility: CLINIC | Age: 49
End: 2019-08-26

## 2019-09-03 ENCOUNTER — PATIENT MESSAGE (OUTPATIENT)
Dept: HEMATOLOGY/ONCOLOGY | Facility: CLINIC | Age: 49
End: 2019-09-03

## 2019-09-03 DIAGNOSIS — M79.602 PAIN OF LEFT UPPER EXTREMITY: Primary | ICD-10-CM

## 2019-09-06 ENCOUNTER — HOSPITAL ENCOUNTER (OUTPATIENT)
Dept: RADIOLOGY | Facility: HOSPITAL | Age: 49
Discharge: HOME | End: 2019-09-06
Attending: INTERNAL MEDICINE
Payer: COMMERCIAL

## 2019-09-06 DIAGNOSIS — M79.602 PAIN OF LEFT UPPER EXTREMITY: ICD-10-CM

## 2019-09-06 DIAGNOSIS — M54.2 CERVICAL PAIN: Primary | ICD-10-CM

## 2019-09-06 PROCEDURE — 72156 MRI NECK SPINE W/O & W/DYE: CPT | Mod: TC,PO

## 2019-09-06 PROCEDURE — 25500020 PHARM REV CODE 255: Mod: PO | Performed by: INTERNAL MEDICINE

## 2019-09-06 PROCEDURE — A9585 GADOBUTROL INJECTION: HCPCS | Mod: PO | Performed by: INTERNAL MEDICINE

## 2019-09-06 RX ORDER — GADOBUTROL 604.72 MG/ML
9 INJECTION INTRAVENOUS
Status: COMPLETED | OUTPATIENT
Start: 2019-09-06 | End: 2019-09-06

## 2019-09-06 RX ADMIN — GADOBUTROL 9 ML: 604.72 INJECTION INTRAVENOUS at 03:09

## 2019-09-08 ENCOUNTER — PATIENT MESSAGE (OUTPATIENT)
Dept: HEMATOLOGY/ONCOLOGY | Facility: CLINIC | Age: 49
End: 2019-09-08

## 2019-09-08 ENCOUNTER — HOSPITAL ENCOUNTER (INPATIENT)
Facility: HOSPITAL | Age: 49
LOS: 4 days | Discharge: HOME | DRG: 378 | End: 2019-09-13
Attending: EMERGENCY MEDICINE | Admitting: INTERNAL MEDICINE
Payer: COMMERCIAL

## 2019-09-08 DIAGNOSIS — K92.2 ACUTE UPPER GI BLEEDING: Primary | ICD-10-CM

## 2019-09-08 DIAGNOSIS — R40.4 UNRESPONSIVE EPISODE: ICD-10-CM

## 2019-09-08 DIAGNOSIS — R06.02 SOB (SHORTNESS OF BREATH) ON EXERTION: ICD-10-CM

## 2019-09-08 LAB
ABO + RH BLD: NORMAL
ALBUMIN SERPL BCP-MCNC: 2.3 G/DL (ref 3.5–5.2)
ALP SERPL-CCNC: 35 U/L (ref 55–135)
ALT SERPL W/O P-5'-P-CCNC: 23 U/L (ref 10–44)
ANION GAP SERPL CALC-SCNC: 13 MMOL/L (ref 8–16)
ANION GAP SERPL CALC-SCNC: 20 MMOL/L (ref 8–16)
AST SERPL-CCNC: 17 U/L (ref 10–40)
BASOPHILS NFR BLD: 0 % (ref 0–1.9)
BILIRUB SERPL-MCNC: 0.7 MG/DL (ref 0.1–1)
BLD GP AB SCN CELLS X3 SERPL QL: NORMAL
BLD PROD TYP BPU: NORMAL
BLD PROD TYP BPU: NORMAL
BLOOD UNIT EXPIRATION DATE: NORMAL
BLOOD UNIT EXPIRATION DATE: NORMAL
BLOOD UNIT TYPE CODE: 9500
BLOOD UNIT TYPE CODE: 9500
BLOOD UNIT TYPE: NORMAL
BLOOD UNIT TYPE: NORMAL
BUN SERPL-MCNC: 101 MG/DL (ref 6–20)
BUN SERPL-MCNC: 105 MG/DL (ref 6–30)
CALCIUM SERPL-MCNC: 7.2 MG/DL (ref 8.7–10.5)
CHLORIDE SERPL-SCNC: 100 MMOL/L (ref 95–110)
CHLORIDE SERPL-SCNC: 98 MMOL/L (ref 95–110)
CO2 SERPL-SCNC: 21 MMOL/L (ref 23–29)
CODING SYSTEM: NORMAL
CODING SYSTEM: NORMAL
CREAT SERPL-MCNC: 1.7 MG/DL (ref 0.5–1.4)
CREAT SERPL-MCNC: 1.7 MG/DL (ref 0.5–1.4)
DIFFERENTIAL METHOD: ABNORMAL
DISPENSE STATUS: NORMAL
DISPENSE STATUS: NORMAL
EOSINOPHIL NFR BLD: 1 % (ref 0–8)
ERYTHROCYTE [DISTWIDTH] IN BLOOD BY AUTOMATED COUNT: 13.7 % (ref 11.5–14.5)
EST. GFR  (AFRICAN AMERICAN): 52.8 ML/MIN/1.73 M^2
EST. GFR  (NON AFRICAN AMERICAN): 45.7 ML/MIN/1.73 M^2
GLUCOSE SERPL-MCNC: 153 MG/DL (ref 70–110)
GLUCOSE SERPL-MCNC: 159 MG/DL (ref 70–110)
HCT VFR BLD AUTO: 20.5 % (ref 40–54)
HCT VFR BLD CALC: 17 %PCV (ref 36–54)
HGB BLD-MCNC: 6.6 G/DL (ref 14–18)
IMM GRANULOCYTES # BLD AUTO: ABNORMAL K/UL (ref 0–0.04)
IMM GRANULOCYTES NFR BLD AUTO: ABNORMAL % (ref 0–0.5)
LYMPHOCYTES NFR BLD: 17 % (ref 18–48)
MCH RBC QN AUTO: 33.8 PG (ref 27–31)
MCHC RBC AUTO-ENTMCNC: 32.2 G/DL (ref 32–36)
MCV RBC AUTO: 105 FL (ref 82–98)
MONOCYTES NFR BLD: 8 % (ref 4–15)
NEUTROPHILS NFR BLD: 68 % (ref 38–73)
NEUTS BAND NFR BLD MANUAL: 6 %
NRBC BLD-RTO: 2 /100 WBC
NUM UNITS TRANS PACKED RBC: NORMAL
NUM UNITS TRANS PACKED RBC: NORMAL
PLATELET # BLD AUTO: 123 K/UL (ref 150–350)
PMV BLD AUTO: 9.3 FL (ref 9.2–12.9)
POC IONIZED CALCIUM: 1.06 MMOL/L (ref 1.06–1.42)
POC TCO2 (MEASURED): 21 MMOL/L (ref 23–29)
POTASSIUM BLD-SCNC: 4 MMOL/L (ref 3.5–5.1)
POTASSIUM SERPL-SCNC: 4 MMOL/L (ref 3.5–5.1)
PROT SERPL-MCNC: 4.3 G/DL (ref 6–8.4)
RBC # BLD AUTO: 1.95 M/UL (ref 4.6–6.2)
SAMPLE: ABNORMAL
SODIUM BLD-SCNC: 133 MMOL/L (ref 136–145)
SODIUM SERPL-SCNC: 134 MMOL/L (ref 136–145)
WBC # BLD AUTO: 7.94 K/UL (ref 3.9–12.7)

## 2019-09-08 PROCEDURE — 96376 TX/PRO/DX INJ SAME DRUG ADON: CPT

## 2019-09-08 PROCEDURE — P9016 RBC LEUKOCYTES REDUCED: HCPCS

## 2019-09-08 PROCEDURE — 63600175 PHARM REV CODE 636 W HCPCS: Performed by: EMERGENCY MEDICINE

## 2019-09-08 PROCEDURE — 80053 COMPREHEN METABOLIC PANEL: CPT

## 2019-09-08 PROCEDURE — 86920 COMPATIBILITY TEST SPIN: CPT

## 2019-09-08 PROCEDURE — 85007 BL SMEAR W/DIFF WBC COUNT: CPT

## 2019-09-08 PROCEDURE — C9113 INJ PANTOPRAZOLE SODIUM, VIA: HCPCS | Performed by: EMERGENCY MEDICINE

## 2019-09-08 PROCEDURE — 93005 ELECTROCARDIOGRAM TRACING: CPT

## 2019-09-08 PROCEDURE — 96374 THER/PROPH/DIAG INJ IV PUSH: CPT

## 2019-09-08 PROCEDURE — 99291 CRITICAL CARE FIRST HOUR: CPT

## 2019-09-08 PROCEDURE — 96375 TX/PRO/DX INJ NEW DRUG ADDON: CPT

## 2019-09-08 PROCEDURE — 86901 BLOOD TYPING SEROLOGIC RH(D): CPT

## 2019-09-08 PROCEDURE — 36430 TRANSFUSION BLD/BLD COMPNT: CPT

## 2019-09-08 PROCEDURE — 85027 COMPLETE CBC AUTOMATED: CPT

## 2019-09-08 RX ORDER — ONDANSETRON 2 MG/ML
4 INJECTION INTRAMUSCULAR; INTRAVENOUS
Status: COMPLETED | OUTPATIENT
Start: 2019-09-08 | End: 2019-09-08

## 2019-09-08 RX ORDER — PANTOPRAZOLE SODIUM 40 MG/10ML
80 INJECTION, POWDER, LYOPHILIZED, FOR SOLUTION INTRAVENOUS
Status: COMPLETED | OUTPATIENT
Start: 2019-09-08 | End: 2019-09-08

## 2019-09-08 RX ORDER — HYDROCODONE BITARTRATE AND ACETAMINOPHEN 500; 5 MG/1; MG/1
TABLET ORAL
Status: DISCONTINUED | OUTPATIENT
Start: 2019-09-09 | End: 2019-09-09

## 2019-09-08 RX ORDER — PANTOPRAZOLE SODIUM 40 MG/10ML
40 INJECTION, POWDER, LYOPHILIZED, FOR SOLUTION INTRAVENOUS
Status: COMPLETED | OUTPATIENT
Start: 2019-09-08 | End: 2019-09-08

## 2019-09-08 RX ORDER — LORAZEPAM 2 MG/ML
2 INJECTION INTRAMUSCULAR
Status: COMPLETED | OUTPATIENT
Start: 2019-09-08 | End: 2019-09-08

## 2019-09-08 RX ORDER — CHLORHEXIDINE GLUCONATE ORAL RINSE 1.2 MG/ML
15 SOLUTION DENTAL 2 TIMES DAILY
Status: ON HOLD | COMMUNITY
Start: 2019-09-03 | End: 2019-09-13 | Stop reason: HOSPADM

## 2019-09-08 RX ORDER — SODIUM CHLORIDE 9 MG/ML
1000 INJECTION, SOLUTION INTRAVENOUS
Status: COMPLETED | OUTPATIENT
Start: 2019-09-08 | End: 2019-09-08

## 2019-09-08 RX ADMIN — LORAZEPAM 2 MG: 2 INJECTION INTRAMUSCULAR; INTRAVENOUS at 11:09

## 2019-09-08 RX ADMIN — ONDANSETRON 4 MG: 2 INJECTION INTRAMUSCULAR; INTRAVENOUS at 11:09

## 2019-09-08 RX ADMIN — PANTOPRAZOLE SODIUM 80 MG: 40 INJECTION, POWDER, LYOPHILIZED, FOR SOLUTION INTRAVENOUS at 11:09

## 2019-09-08 RX ADMIN — SODIUM CHLORIDE 1000 ML: 0.9 INJECTION, SOLUTION INTRAVENOUS at 11:09

## 2019-09-08 RX ADMIN — PANTOPRAZOLE SODIUM 40 MG: 40 INJECTION, POWDER, LYOPHILIZED, FOR SOLUTION INTRAVENOUS at 11:09

## 2019-09-09 ENCOUNTER — PATIENT MESSAGE (OUTPATIENT)
Dept: HEMATOLOGY/ONCOLOGY | Facility: CLINIC | Age: 49
End: 2019-09-09

## 2019-09-09 PROBLEM — K92.2 ACUTE LOWER GI BLEEDING: Status: ACTIVE | Noted: 2019-09-09

## 2019-09-09 PROBLEM — R56.9 SEIZURE: Status: ACTIVE | Noted: 2019-09-09

## 2019-09-09 PROBLEM — K92.2 ACUTE UPPER GI BLEEDING: Status: ACTIVE | Noted: 2019-09-09

## 2019-09-09 LAB
ANION GAP SERPL CALC-SCNC: 8 MMOL/L (ref 8–16)
BUN SERPL-MCNC: 100 MG/DL (ref 6–20)
CALCIUM SERPL-MCNC: 6.9 MG/DL (ref 8.7–10.5)
CHLORIDE SERPL-SCNC: 105 MMOL/L (ref 95–110)
CO2 SERPL-SCNC: 23 MMOL/L (ref 23–29)
CREAT SERPL-MCNC: 1.4 MG/DL (ref 0.5–1.4)
EST. GFR  (AFRICAN AMERICAN): >60 ML/MIN/1.73 M^2
EST. GFR  (NON AFRICAN AMERICAN): 57.8 ML/MIN/1.73 M^2
FERRITIN SERPL-MCNC: 204 NG/ML (ref 20–300)
FOLATE SERPL-MCNC: 7 NG/ML (ref 4–24)
GLUCOSE SERPL-MCNC: 136 MG/DL (ref 70–110)
HCT VFR BLD AUTO: 20.6 % (ref 40–54)
HCT VFR BLD AUTO: 21.2 % (ref 40–54)
HCT VFR BLD AUTO: 22 % (ref 40–54)
HCT VFR BLD AUTO: 23.6 % (ref 40–54)
HGB BLD-MCNC: 6.9 G/DL (ref 14–18)
HGB BLD-MCNC: 7.1 G/DL (ref 14–18)
HGB BLD-MCNC: 7.4 G/DL (ref 14–18)
HGB BLD-MCNC: 7.9 G/DL (ref 14–18)
INR PPP: 1.3
IRON SERPL-MCNC: 146 UG/DL (ref 45–160)
POTASSIUM SERPL-SCNC: 3.9 MMOL/L (ref 3.5–5.1)
PROTHROMBIN TIME: 15.9 SEC (ref 11.7–14)
RETICS/RBC NFR AUTO: 3.4 % (ref 0.4–2)
SATURATED IRON: 86 % (ref 20–50)
SODIUM SERPL-SCNC: 136 MMOL/L (ref 136–145)
TOTAL IRON BINDING CAPACITY: 169 UG/DL (ref 250–450)
TRANSFERRIN SERPL-MCNC: 121 MG/DL (ref 200–375)
VIT B12 SERPL-MCNC: 113 PG/ML (ref 210–950)

## 2019-09-09 PROCEDURE — 85610 PROTHROMBIN TIME: CPT

## 2019-09-09 PROCEDURE — 36415 COLL VENOUS BLD VENIPUNCTURE: CPT

## 2019-09-09 PROCEDURE — P9016 RBC LEUKOCYTES REDUCED: HCPCS

## 2019-09-09 PROCEDURE — 94761 N-INVAS EAR/PLS OXIMETRY MLT: CPT

## 2019-09-09 PROCEDURE — 25000003 PHARM REV CODE 250: Performed by: NURSE PRACTITIONER

## 2019-09-09 PROCEDURE — 99152 MOD SED SAME PHYS/QHP 5/>YRS: CPT | Performed by: INTERNAL MEDICINE

## 2019-09-09 PROCEDURE — 20000000 HC ICU ROOM

## 2019-09-09 PROCEDURE — 85018 HEMOGLOBIN: CPT | Mod: 91

## 2019-09-09 PROCEDURE — 63600175 PHARM REV CODE 636 W HCPCS: Performed by: INTERNAL MEDICINE

## 2019-09-09 PROCEDURE — 85045 AUTOMATED RETICULOCYTE COUNT: CPT

## 2019-09-09 PROCEDURE — 36430 TRANSFUSION BLD/BLD COMPNT: CPT

## 2019-09-09 PROCEDURE — 63600175 PHARM REV CODE 636 W HCPCS: Performed by: NURSE PRACTITIONER

## 2019-09-09 PROCEDURE — 25000003 PHARM REV CODE 250

## 2019-09-09 PROCEDURE — 85014 HEMATOCRIT: CPT | Mod: 91

## 2019-09-09 PROCEDURE — C9113 INJ PANTOPRAZOLE SODIUM, VIA: HCPCS | Performed by: INTERNAL MEDICINE

## 2019-09-09 PROCEDURE — 99153 MOD SED SAME PHYS/QHP EA: CPT | Performed by: INTERNAL MEDICINE

## 2019-09-09 PROCEDURE — 80048 BASIC METABOLIC PNL TOTAL CA: CPT

## 2019-09-09 PROCEDURE — 43235 EGD DIAGNOSTIC BRUSH WASH: CPT | Performed by: INTERNAL MEDICINE

## 2019-09-09 PROCEDURE — 82607 VITAMIN B-12: CPT

## 2019-09-09 PROCEDURE — 82746 ASSAY OF FOLIC ACID SERUM: CPT

## 2019-09-09 PROCEDURE — 82728 ASSAY OF FERRITIN: CPT

## 2019-09-09 PROCEDURE — 83540 ASSAY OF IRON: CPT

## 2019-09-09 RX ORDER — PANTOPRAZOLE SODIUM 40 MG/10ML
40 INJECTION, POWDER, LYOPHILIZED, FOR SOLUTION INTRAVENOUS
Status: DISCONTINUED | OUTPATIENT
Start: 2019-09-09 | End: 2019-09-09 | Stop reason: ALTCHOICE

## 2019-09-09 RX ORDER — HYDROCODONE BITARTRATE AND ACETAMINOPHEN 500; 5 MG/1; MG/1
TABLET ORAL
Status: DISCONTINUED | OUTPATIENT
Start: 2019-09-09 | End: 2019-09-09

## 2019-09-09 RX ORDER — LORAZEPAM 2 MG/ML
1 INJECTION INTRAMUSCULAR
Status: DISCONTINUED | OUTPATIENT
Start: 2019-09-09 | End: 2019-09-13 | Stop reason: HOSPADM

## 2019-09-09 RX ORDER — FLUOXETINE HYDROCHLORIDE 20 MG/1
40 CAPSULE ORAL DAILY
Status: DISCONTINUED | OUTPATIENT
Start: 2019-09-09 | End: 2019-09-13 | Stop reason: HOSPADM

## 2019-09-09 RX ORDER — MIDAZOLAM HYDROCHLORIDE 5 MG/ML
INJECTION INTRAMUSCULAR; INTRAVENOUS
Status: COMPLETED | OUTPATIENT
Start: 2019-09-09 | End: 2019-09-09

## 2019-09-09 RX ORDER — HYDROMORPHONE HYDROCHLORIDE 1 MG/ML
1 INJECTION, SOLUTION INTRAMUSCULAR; INTRAVENOUS; SUBCUTANEOUS EVERY 4 HOURS PRN
Status: DISCONTINUED | OUTPATIENT
Start: 2019-09-09 | End: 2019-09-12

## 2019-09-09 RX ORDER — FENTANYL CITRATE 50 UG/ML
INJECTION, SOLUTION INTRAMUSCULAR; INTRAVENOUS
Status: COMPLETED | OUTPATIENT
Start: 2019-09-09 | End: 2019-09-09

## 2019-09-09 RX ORDER — SODIUM CHLORIDE 0.9 % (FLUSH) 0.9 %
10 SYRINGE (ML) INJECTION
Status: DISCONTINUED | OUTPATIENT
Start: 2019-09-09 | End: 2019-09-13 | Stop reason: HOSPADM

## 2019-09-09 RX ORDER — HYDROCODONE BITARTRATE AND ACETAMINOPHEN 500; 5 MG/1; MG/1
TABLET ORAL
Status: DISCONTINUED | OUTPATIENT
Start: 2019-09-09 | End: 2019-09-12

## 2019-09-09 RX ORDER — DIPHENHYDRAMINE HYDROCHLORIDE 50 MG/ML
12.5 INJECTION INTRAMUSCULAR; INTRAVENOUS
Status: DISCONTINUED | OUTPATIENT
Start: 2019-09-09 | End: 2019-09-13 | Stop reason: HOSPADM

## 2019-09-09 RX ORDER — CHLORHEXIDINE GLUCONATE ORAL RINSE 1.2 MG/ML
15 SOLUTION DENTAL 2 TIMES DAILY
Status: DISCONTINUED | OUTPATIENT
Start: 2019-09-09 | End: 2019-09-13 | Stop reason: HOSPADM

## 2019-09-09 RX ORDER — MUPIROCIN 20 MG/G
OINTMENT TOPICAL 2 TIMES DAILY
Status: DISCONTINUED | OUTPATIENT
Start: 2019-09-09 | End: 2019-09-13 | Stop reason: HOSPADM

## 2019-09-09 RX ORDER — SODIUM CHLORIDE 9 MG/ML
INJECTION, SOLUTION INTRAVENOUS CONTINUOUS
Status: DISCONTINUED | OUTPATIENT
Start: 2019-09-09 | End: 2019-09-13 | Stop reason: HOSPADM

## 2019-09-09 RX ORDER — POTASSIUM CHLORIDE 750 MG/1
40 CAPSULE, EXTENDED RELEASE ORAL DAILY
Status: DISCONTINUED | OUTPATIENT
Start: 2019-09-09 | End: 2019-09-13 | Stop reason: HOSPADM

## 2019-09-09 RX ORDER — HYDROMORPHONE HYDROCHLORIDE 1 MG/ML
1 INJECTION, SOLUTION INTRAMUSCULAR; INTRAVENOUS; SUBCUTANEOUS EVERY 4 HOURS PRN
Status: DISCONTINUED | OUTPATIENT
Start: 2019-09-09 | End: 2019-09-09

## 2019-09-09 RX ORDER — PANTOPRAZOLE SODIUM 40 MG/10ML
40 INJECTION, POWDER, LYOPHILIZED, FOR SOLUTION INTRAVENOUS 2 TIMES DAILY
Status: DISCONTINUED | OUTPATIENT
Start: 2019-09-09 | End: 2019-09-11

## 2019-09-09 RX ORDER — DIAZEPAM 10 MG/2ML
INJECTION INTRAMUSCULAR
Status: COMPLETED | OUTPATIENT
Start: 2019-09-09 | End: 2019-09-09

## 2019-09-09 RX ADMIN — MUPIROCIN: 20 OINTMENT TOPICAL at 03:09

## 2019-09-09 RX ADMIN — PANTOPRAZOLE SODIUM 40 MG: 40 INJECTION, POWDER, LYOPHILIZED, FOR SOLUTION INTRAVENOUS at 08:09

## 2019-09-09 RX ADMIN — POTASSIUM CHLORIDE 40 MEQ: 750 CAPSULE, EXTENDED RELEASE ORAL at 03:09

## 2019-09-09 RX ADMIN — CHLORHEXIDINE GLUCONATE 15 ML: 1.2 RINSE ORAL at 03:09

## 2019-09-09 RX ADMIN — FENTANYL CITRATE 25 MCG: 50 INJECTION INTRAMUSCULAR; INTRAVENOUS at 09:09

## 2019-09-09 RX ADMIN — PANTOPRAZOLE SODIUM 40 MG: 40 INJECTION, POWDER, LYOPHILIZED, FOR SOLUTION INTRAVENOUS at 09:09

## 2019-09-09 RX ADMIN — LEVETIRACETAM 1000 MG: 100 INJECTION, SOLUTION, CONCENTRATE INTRAVENOUS at 09:09

## 2019-09-09 RX ADMIN — LEVETIRACETAM 1000 MG: 100 INJECTION, SOLUTION, CONCENTRATE INTRAVENOUS at 11:09

## 2019-09-09 RX ADMIN — HYDROMORPHONE HYDROCHLORIDE 1 MG: 1 INJECTION, SOLUTION INTRAMUSCULAR; INTRAVENOUS; SUBCUTANEOUS at 08:09

## 2019-09-09 RX ADMIN — DIAZEPAM 5 MG: 5 INJECTION, SOLUTION INTRAMUSCULAR; INTRAVENOUS at 09:09

## 2019-09-09 RX ADMIN — FLUOXETINE 40 MG: 20 CAPSULE ORAL at 03:09

## 2019-09-09 RX ADMIN — CHLORHEXIDINE GLUCONATE 15 ML: 1.2 RINSE ORAL at 08:09

## 2019-09-09 RX ADMIN — MIDAZOLAM HYDROCHLORIDE 2 MG: 5 INJECTION, SOLUTION INTRAMUSCULAR; INTRAVENOUS at 09:09

## 2019-09-09 RX ADMIN — DIAZEPAM 2 MG: 5 INJECTION, SOLUTION INTRAMUSCULAR; INTRAVENOUS at 09:09

## 2019-09-09 RX ADMIN — SODIUM CHLORIDE: 0.9 INJECTION, SOLUTION INTRAVENOUS at 05:09

## 2019-09-09 RX ADMIN — MUPIROCIN: 20 OINTMENT TOPICAL at 08:09

## 2019-09-09 NOTE — ASSESSMENT & PLAN NOTE
-transfuse with 2 units of packed red cell now did re-evaluate for additional transfusions  -consult GI  -IV PPI  -NPO for now  -iron studies  -stool for occult blood x2

## 2019-09-09 NOTE — HPI
49-year-old  male presents emergency room with altered mental status.      According to the patient's brother the patient had been fishing with his friends all day.  His friends brought him back to his brother's house and he was found to be lethargic and have and what looked like seizure activity.  According to the patient's brother the patient having complaint of bloody stools for the past 2 days.  Patient denied associated nausea vomiting hematemesis.  He did endorse generalized weakness, fatigue and nausea.  He also complaints of bilateral lower quadrant abdominal pain.    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  The patient was recently on immuno therapy but this was discontinued secondary to side effects.  The patient's current we on the steroids seem a follows his oncologist at [facility] closely.

## 2019-09-09 NOTE — NURSING
Rec'd patient from ED. No s/s distress. V/S/S. Plan of Care and treatment reviewed. Callight in reach. Pts questions answered.

## 2019-09-09 NOTE — ASSESSMENT & PLAN NOTE
- patient with witnessed seizure activity upon arrival in emergency room this may have been from hypotension but will investigate  -Ativan p.r.n. for seizures  -consult Neurology Dr. Earl Torres  -EEG in a.m.  -seizure precautions   - will start keppra 1 gram IV bid - get imput from Neurologist if to continue Keppra after EEG

## 2019-09-09 NOTE — NURSING
Tolerated EGD well, Dr Lisa spoke with brother at length reguarding procedure and post op care at home.

## 2019-09-09 NOTE — HOSPITAL COURSE
9/9 Upon arrival in emergency room the patient was found to be hypotensive when he was placed in a room he had witnessed seizure activity he was given Ativan in diagnostic imaging was performed.  A CT of the head showed no acute findings his CBC did show a hemoglobin of less than 7     9/10 s/p 3 u pRBC H/H 7.0/20.9, s/p EGD show duodenal ulcer. H plyori pending. Pt denies any new signs of bleeding, He denies any bleeding and denies hematuria, hematemesis, hemoptysis. No bm overnight. Seen by neurology and EEG pending. Pt denies seizure activity (shaking, abnormal movements) overight or this morning.     9/11 s/1 upRBC this morning with h/h improved to 8/23.5. EEG yesterday wnl. He denies any bleeding and denies hematuria, hematemesis, hemoptysis, melena, blood in stool, increased bruising.  He denies abnormal movements, shaking, seizure activity.    9/12:  Patient was noted to bright red blood per rectum yesterday evening.  GI was consulted and planned for EGD.  EGD performed this morning showing to duodenal ulcers which were successfully cauterized.  Hemoglobin dropped from 8-7.6 overnight and 1 unit of PRBCs was given today.  On exam patient states feels some fatigue.  Denies any CP, SOB, N/V/D, headaches, fever or chills.  No seizure activity. No bleeding since yesterday.

## 2019-09-09 NOTE — OP NOTE
FILL -   blood glucose test strips (ONETOUCH VERIO) 100 strip 4 5/26/2016     Sig: Test blood sugar 2 times daily as directed. Diagnosis: Diabetes  DX Code E11.9      LOV -   02/03/2017       Patient underwent upper endoscopy this morning which revealed a 12 mm crater clean based ulcer without high-risk stigmata located and duodenal bulb.    -recommend Protonix infusion times 72 hr, check stool H pylori antigen, transition of Protonix p.o. b.i.d. x8 weeks then daily on discharge, repeat upper endoscopy in 6 weeks.  -absolutely no NSAIDs.  -notify our service for overt bleeding dropping hemoglobin  -outpatient colonoscopy  -full liquid diet for the next day then advance to soft bland for 3-4 days then as tolerated

## 2019-09-09 NOTE — PLAN OF CARE
09/09/19 1515   Discharge Assessment   Assessment Type Discharge Planning Assessment   Confirmed/corrected address and phone number on facesheet? Yes   Assessment information obtained from? Other  (Sister in Law Rufus Whiting )   Communicated expected length of stay with patient/caregiver no   Prior to hospitilization cognitive status: Alert/Oriented   Prior to hospitalization functional status: Independent   Current cognitive status: Alert/Oriented   Current Functional Status: Needs Assistance   Lives With alone   Able to Return to Prior Arrangements yes   Is patient able to care for self after discharge? Unable to determine at this time (comments)   Who are your caregiver(s) and their phone number(s)? Vladislav Whiting  [phone]  Franchesca Whiting  [phone]  [facility]  [phone]   Patient's perception of discharge disposition home or selfcare;other (comments)   Readmission Within the Last 30 Days no previous admission in last 30 days   Patient currently being followed by outpatient case management? No   Patient currently receives any other outside agency services? No   Equipment Currently Used at Home none   Do you have any problems affording any of your prescribed medications? No   Is the patient taking medications as prescribed? yes   Does the patient have transportation home? Yes   Transportation Anticipated family or friend will provide   Does the patient receive services at the [facility]? No   Discharge Plan A Home;Home with family;Home Health   DME Needed Upon Discharge  other (see comments)  (Could need DME )   Patient/Family in Agreement with Plan yes   Cm to follow until hospital discharge.

## 2019-09-09 NOTE — CONSULTS
[facility]  Neurology  Consult Note    Patient Name: Burton Whiting  MRN: [id number]  Admission Date: 9/8/2019  Hospital Length of Stay: 0 days  Code Status: Full Code   Attending Provider: Prasanna Cagle MD   Consulting Provider: ALEC Beckham  Primary Care Physician: Christopher Turpin DO  Principal Problem:Acute upper GI bleeding    Consults  Subjective:     Chief Complaint:  AMS      HPI: Pt seen and examined in ICU. He is s/p EGD and unable to provide any history or follow any commands.     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      According to the patient's brother the patient had been fishing with his friends all day.  His friends brought him back to his brother's house and he was found to be lethargic and have and what looked like seizure activity.  According to the patient's brother the patient having complaint of bloody stools for the past 2 days.  Patient denied associated nausea vomiting hematemesis.  He did endorse generalized weakness, fatigue and nausea.  He also complaints of bilateral lower quadrant abdominal pain.     The patient was recently on immuno therapy but this was discontinued secondary to side effects.  The patient's current we on the steroids seem a follows his oncologist at [facility] closely.      CRT: 1.7  B12: 113L   Folate: 7.0      Brain imaging:  CT head non contrast:   IMPRESSION: Negative noncontrast head CT.    Neck Imaging:  MRI C spine:   Continued moderate to severe spinal canal stenosis at C5-6 with slightly more apparent abnormal T2 cord signal at this level concerning for myelomalacia.  Additional multilevel degenerative changes are as detailed in the body of the report and appear similar to the prior.    No evidence of abnormal  "leptomeningeal enhancement.    Unchanged appearance of a subcentimeter T1 hypointense, nonenhancing focus in the clivus.    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    Cardiac imaging: pending         Past Medical History:   Diagnosis Date    Acute renal failure 6/21/2018    Alcohol abuse     ended 2004    Chemotherapy induced neutropenia 6/7/2018    Former smoker     HPV in male     Hx of psychiatric care     Ativan, Paxil ("caused anger problems")    Opiate addiction     Squamous cell carcinoma of palatine tonsil     throat and tonsillar    Substance abuse     opiates, methamphetamines; ended 2004       Past Surgical History:   Procedure Laterality Date    GASTROSTOMY TUBE PLACEMENT Left 02/12/2018    [id number]-X-CATH N/A 1/26/2018    Per[facility]    INSERTION-TUBE-GASTROSTOMY-LAPAROSCOPIC N/A 2/11/2018    [facility] at [facility] OR    Removal-port-a-cath N/A 6/6/2018    Performed by [facility] Diagnostic Provider at [facility] OR [address] [place] [address]       Review of patient's allergies indicates:   Allergen Reactions    Trazodone Other (See Comments)     confusion       Current Neurological Medications: MAR pending     No current facility-administered medications on file prior to encounter.      Current Outpatient Medications on File Prior to Encounter   Medication Sig    chlorhexidine (PERIDEX) 0.12 % solution Use as directed 15 mLs in the mouth or throat 2 (two) times daily.    calcium-vitamin D3 (CALCIUM 500 + D) 500 mg(1,250mg) -200 unit per tablet Take 1 tablet by mouth 2 (two) times daily with meals.    denosumab (XGEVA) 120 mg/1.7 mL (70 mg/mL) Soln Inject 1.7 mLs (120 mg total) into the skin once. for 1 dose    FLUoxetine 40 MG capsule Take 1 capsule (40 mg total) by mouth once daily.    gabapentin (NEURONTIN) 300 MG capsule Take 1 capsule (300 mg total) by mouth 3 (three) times daily. (Patient " taking differently: Take 300 mg by mouth 3 (three) times daily. )    mirtazapine (REMERON) 7.5 MG Tab     multivitamin capsule Take 1 capsule by mouth once daily.    potassium chloride (MICRO-K) 10 MEQ CpSR Take 4 capsules (40 mEq total) by mouth once daily.    predniSONE (DELTASONE) 20 MG tablet Take 3.5 tablets (70 mg total) by mouth once daily.      Family History     Problem Relation (Age of Onset)    Brain cancer Maternal Uncle, Maternal Uncle    Depression Sister    Hypertension Father, Brother    Leukemia Mother    Thyroid disease Sister        Tobacco Use    Smoking status: Former Smoker     Packs/day: 1.50     Years: 25.00     Pack years: 37.50     Types: Cigarettes     Last attempt to quit: 2017     Years since quittin.2    Smokeless tobacco: Never Used    Tobacco comment: smoked for about 25 years. quit 6 months ago   Substance and Sexual Activity    Alcohol use: No     Comment: history of overuse    Drug use: No     Comment: Former Opiate/methamphetamine addiction    Sexual activity: Yes     Partners: Female     Review of Systems   Unable to perform ROS: Acuity of condition     Objective:     Vital Signs (Most Recent):  Temp: 98.7 °F (37.1 °C) (19 075)  Pulse: 108 (19)  Resp: (!) 21 (19)  BP: 118/73 (193)  SpO2: 99 % (19) Vital Signs (24h Range):  Temp:  [97.5 °F (36.4 °C)-98.7 °F (37.1 °C)] 98.7 °F (37.1 °C)  Pulse:  [] 108  Resp:  [16-32] 21  SpO2:  [83 %-100 %] 99 %  BP: ()/(51-82) 118/73     Weight: 83.7 kg (184 lb 8.4 oz)  Body mass index is 28.06 kg/m².    Physical Exam   Constitutional: He appears well-developed and well-nourished. He appears lethargic. He is sedated.   HENT:   Head: Normocephalic and atraumatic.   Eyes: Pupils are equal, round, and reactive to light.   Cardiovascular: Normal rate and regular rhythm.   Pulmonary/Chest: Effort normal and breath sounds normal.   Abdominal: Soft.   Neurological: He appears  lethargic.   Skin: Skin is warm and dry.       NEUROLOGICAL EXAMINATION:     MENTAL STATUS   Level of consciousness: drowsy ,  responsive to painful stimuli       Unable to perform full neuro exam due to pt s/p EGD and sedated      CRANIAL NERVES     CN III, IV, VI   Pupils are equal, round, and reactive to light.      Significant Labs: All pertinent lab results from the past 24 hours have been reviewed.    Significant Imaging: I have reviewed and interpreted all pertinent imaging results/findings within the past 24 hours.    Assessment and Plan:   Seizure  - Witnessed seizure activity per brother and in ER  - Potentially r/t hypotension/anemia   - CT head negative acute  - Obtain 30 minute EEG   - Obtain MRI brain non contrast   - Seizure precautions  - Neuro checks   - Continue Keppra 1g BID until after EEG complete and read    Seizure education.      No driving for now, no swimming alone, no climbing high areas, no operation of heavy machinery or working with high risk electricity equipment.    Continue to take AEDs as prescribed. If any major side effects from neurological medications go to the ED including mood changes and severe depression.    Patient and/or next of kin informed.    Follow up Neurology in 2 weeks at [phone].    Medication side effects discussed with the patient and/or caregiver.    Active Diagnoses:    Diagnosis Date Noted POA    PRINCIPAL PROBLEM:  Acute upper GI bleeding [K92.2] 09/09/2019 Yes    Seizure [R56.9] 09/09/2019 Yes    Thrombocytopenia [D69.6] 02/28/2018 Yes    Squamous cell carcinoma of palatine tonsil [C09.9] 01/04/2018 Yes      Problems Resolved During this Admission:       VTE Risk Mitigation (From admission, onward)        Ordered     IP VTE LOW RISK PATIENT  Once      09/09/19 0222     Place sequential compression device  Until discontinued      09/09/19 0222          Thank you for your consult. I will follow-up with patient. Please contact us if you have any  additional questions.    ALEC Beckham  Neurology  [facility]    I, Dr. Earl Torres, have personally seen and examined the patient with my advanced provider and agree with above. I personally did a focused exam, and reviewed all necessary clinical information. I discussed my management plan with my NP and agree with above. Provoked event. Obtain EEG.
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
## 2019-09-09 NOTE — PROVATION PATIENT INSTRUCTIONS
Discharge Summary/Instructions after an Endoscopic Procedure  Patient Name: Burton Whiting  Patient MRN: [id number]  Patient YOB: 1970  Monday, September 09, 2019  Balta Lisa MD  RESTRICTIONS:  During your procedure today, you received medications for sedation.  These   medications may affect your judgment, balance and coordination.  Therefore,   for 24 hours, you have the following restrictions:   - DO NOT drive a car, operate machinery, make legal/financial decisions,   sign important papers or drink alcohol.    ACTIVITY:  Today: no heavy lifting, straining or running due to procedural   sedation/anesthesia.  The following day: return to full activity including work.  DIET:  Eat and drink normally unless instructed otherwise.     TREATMENT FOR COMMON SIDE EFFECTS:  - Mild abdominal pain, nausea, belching, bloating or excessive gas:  rest,   eat lightly and use a heating pad.  - Sore Throat: treat with throat lozenges and/or gargle with warm salt   water.  - Because air was used during the procedure, expelling large amounts of air   from your rectum or belching is normal.  - If a bowel prep was taken, you may not have a bowel movement for 1-3 days.    This is normal.  SYMPTOMS TO WATCH FOR AND REPORT TO YOUR PHYSICIAN:  1. Abdominal pain or bloating, other than gas cramps.  2. Chest pain.  3. Back pain.  4. Signs of infection such as: chills or fever occurring within 24 hours   after the procedure.  5. Rectal bleeding, which would show as bright red, maroon, or black stools.   (A tablespoon of blood from the rectum is not serious, especially if   hemorrhoids are present.)  6. Vomiting.  7. Weakness or dizziness.  GO DIRECTLY TO THE NEAREST EMERGENCY ROOM IF YOU HAVE ANY OF THE FOLLOWING:      Difficulty breathing              Chills and/or fever over 101 F   Persistent vomiting and/or vomiting blood   Severe abdominal pain   Severe chest pain   Black, tarry stools   Bleeding- more than one tablespoon   Any  other symptom or condition that you feel may need urgent attention  Your doctor recommends these additional instructions:  If any biopsies were taken, your doctors clinic will contact you in 1 to 2   weeks with any results.  - Patient has a contact number available for emergencies.  The signs and   symptoms of potential delayed complications were discussed with the   patient.  Return to normal activities tomorrow.  Written discharge   instructions were provided to the patient.   - Resume previous diet.   - Continue present medications.   - Protonix infusion for 72 hours then transition to protonix 40 mg po bid x   8 weeks then daily  - Repeat EGD in 6 weeks  - Check stool h.pylori Ag  - Follow up with me in 2-3 weeks  - Recommend outpatient colonoscopy as well  - Will sign off.  Call if overt bleeding, dropping h/h.  - Return patient to ICU for ongoing care.  For questions, problems or results please call your physician - Balta Lisa MD at Work:  [phone].  [facility], EMERGENCY ROOM PHONE NUMBER: [phone]  IF A COMPLICATION OR EMERGENCY SITUATION ARISES AND YOU ARE UNABLE TO REACH   YOUR PHYSICIAN - GO DIRECTLY TO THE EMERGENCY ROOM.  MD Balta Persaud MD  9/9/2019 10:10:59 AM  This report has been verified and signed electronically.  [employer]

## 2019-09-09 NOTE — ED PROVIDER NOTES
"Encounter Date: 9/8/2019       History     Chief Complaint   Patient presents with    Loss of Consciousness     pt brought in by ems reported syncopal episode when on toilet pt Hx throay CA pt N/V with ems    Rectal Bleeding     pt reports bleeding when having BM     Patient here with reported syncope onset today with noted seizure activity patient had a 2nd event arrival emergency department patient reports that he has been having rectal bleeding for approximately 1-2 weeks it much worse today patient was witnessed to have a syncopal event with associated grand mal tonic-clonic seizure activity however there was no associated postictal phase patient blood pressure did drop at that time at right prior to this event patient had if more melanotic stools he did vomit as well but there was only a minimal amount of coffee-ground noted is clear emesis no bright red blood patient denies any fever chills he states that he has a history of throat cancer treated        Review of patient's allergies indicates:   Allergen Reactions    Trazodone Other (See Comments)     confusion     Past Medical History:   Diagnosis Date    Acute renal failure 6/21/2018    Alcohol abuse     ended 2004    Chemotherapy induced neutropenia 6/7/2018    Former smoker     HPV in male     Hx of psychiatric care     Ativan, Paxil ("caused anger problems")    Opiate addiction     Squamous cell carcinoma of palatine tonsil     throat and tonsillar    Substance abuse     opiates, methamphetamines; ended 2004     Past Surgical History:   Procedure Laterality Date    GASTROSTOMY TUBE PLACEMENT Left 02/12/2018    [id number]-CATH N/A 1/26/2018    Performed by [facility] at [facility] LAB    INSERTION-TUBE-GASTROSTOMY-LAPAROSCOPIC N/A 2/11/2018    Performed by [facility] at [facility] OR    Removal-port-a-cath N/A 6/6/2018    Performed by [facility] Diagnostic Provider at [facility] OR [address] [place] [address]     Family History   Problem Relation " Age of Onset    Leukemia Mother     Hypertension Father     Thyroid disease Sister     Depression Sister     Hypertension Brother     Brain cancer Maternal Uncle     Brain cancer Maternal Uncle      Social History     Tobacco Use    Smoking status: Former Smoker     Packs/day: 1.50     Years: 25.00     Pack years: 37.50     Types: Cigarettes     Last attempt to quit: 2017     Years since quittin.2    Smokeless tobacco: Never Used    Tobacco comment: smoked for about 25 years. quit 6 months ago   Substance Use Topics    Alcohol use: No     Comment: history of overuse    Drug use: No     Comment: Former Opiate/methamphetamine addiction     Review of Systems   Constitutional: Positive for appetite change, chills, fatigue and unexpected weight change. Negative for fever.   HENT: Negative.    Eyes: Negative.    Respiratory: Negative for cough and shortness of breath.    Cardiovascular: Negative for chest pain, palpitations and leg swelling.   Gastrointestinal: Positive for anal bleeding, blood in stool, diarrhea, nausea and vomiting. Negative for abdominal distention, abdominal pain and constipation.   Endocrine: Negative.    Genitourinary: Negative.    Musculoskeletal: Negative.    Skin: Negative.    Allergic/Immunologic: Positive for immunocompromised state.   Neurological: Positive for dizziness, seizures, syncope and light-headedness. Negative for headaches.   Hematological: Negative.    Psychiatric/Behavioral: Negative.        Physical Exam     Initial Vitals [19 2252]   BP Pulse Resp Temp SpO2   130/72 105 16 98.2 °F (36.8 °C) 100 %      MAP       --         Physical Exam    Constitutional: He appears well-developed and well-nourished.   HENT:   Head: Normocephalic and atraumatic.   Right Ear: External ear normal.   Left Ear: External ear normal.   Mouth/Throat: Oropharynx is clear and moist.   Eyes: EOM are normal. Pupils are equal, round, and reactive to light.   pale conjunctiva   Neck:  Normal range of motion. Neck supple.   Cardiovascular: Regular rhythm, normal heart sounds and intact distal pulses.   Tachycardic   Pulmonary/Chest: Breath sounds normal.   Abdominal: Soft.   Diminished bowel sounds throughout no guarding or rebound obvious melena on rectal exam   Musculoskeletal: Normal range of motion. He exhibits no edema.   Neurological: He is alert and oriented to person, place, and time. He has normal strength. GCS score is 15. GCS eye subscore is 4. GCS verbal subscore is 5. GCS motor subscore is 6.   Skin: Skin is warm and dry. Capillary refill takes less than 2 seconds.   Psychiatric:   Somewhat flat affect         ED Course   Procedures  Labs Reviewed   CBC W/ AUTO DIFFERENTIAL - Abnormal; Notable for the following components:       Result Value    RBC 1.95 (*)     Hemoglobin 6.6 (*)     Hematocrit 20.5 (*)     Mean Corpuscular Volume 105 (*)     Mean Corpuscular Hemoglobin 33.8 (*)     Platelets 123 (*)     All other components within normal limits    Narrative:       HGB/HCT critical result(s) called and verbal readback obtained from   SENTHIL COBURN RN ED, 09/08/2019 23:31   ISTAT PROCEDURE - Abnormal; Notable for the following components:    POC Glucose 153 (*)     POC  (*)     POC Creatinine 1.7 (*)     POC Sodium 133 (*)     POC TCO2 (MEASURED) 21 (*)     POC Anion Gap 20 (*)     POC Hematocrit 17 (*)     All other components within normal limits   COMPREHENSIVE METABOLIC PANEL   URINALYSIS   TYPE & SCREEN   POCT GLUCOSE MONITORING CONTINUOUS   PREPARE RBC SOFT          Imaging Results          CT Head Without Contrast (In process)                X-Ray Chest AP Portable (In process)                  Medical Decision Making:   ED Management:  Patient having asthma obvious melanotic stools I have ordered 2 units of packed red blood cells and 1st unit has been administered 2nd unit is being administered current lead his blood pressure and heart rate have improved he is on a  Protonix drip and has been given a Protonix bolus of 40 mg of spoken to Dr. Moy of Gastroenterology and will admit patient to the intensive care unit with close guarded evaluation endoscopy              Attending Attestation:         Attending Critical Care:   Critical Care Times:   Direct Patient Care (initial evaluation, reassessments, and time considering the case)................................................................20 minutes.   Ordering, Reviewing, and Interpreting Diagnostic Studies...............................................................................................................10 minutes.   Documentation..................................................................................................................................................................................15 minutes.   Consultation with other Physicians. .................................................................................................................................................10 minutes.   ==============================================================  · Total Critical Care Time - exclusive of procedural time: 55 minutes.  ==============================================================  Critical care was time spent personally by me on the following activities: obtaining history from patient or relative, examination of patient, ordering lab, x-rays, and/or EKG, ordering and performing treatments and interventions, evaluation of patient's response to treatment and discussion with consultants.   Critical Care Condition: life-threatening               ED Course as of Sep 09 0111   Sun Sep 08, 2019   2320 POC Glucose(!): 153 [AT]   2320 POC BUN(!): 105 [AT]   2320 POC Creatinine(!): 1.7 [AT]   2320 POC Sodium(!): 133 [AT]   2320 POC Hematocrit(!!): 17 [AT]   2320 POC Creatinine(!): 1.7 [AT]   Mon Sep 09, 2019   0010 Discussed patient's case with Dr. Moy    [AT]      ED Course User  Index  [AT] [place] Aburto MD     Clinical Impression:       ICD-10-CM ICD-9-CM   1. Acute upper GI bleeding K92.2 578.9   2. Unresponsive episode R41.89 780.09                                C[place] Aburto MD  09/09/19 0112       Cuba Aburto MD  09/09/19 0213       [place] Aburto MD  09/09/19 0222

## 2019-09-09 NOTE — SUBJECTIVE & OBJECTIVE
"Past Medical History:   Diagnosis Date    Acute renal failure 6/21/2018    Alcohol abuse     ended 2004    Chemotherapy induced neutropenia 6/7/2018    Former smoker     HPV in male     Hx of psychiatric care     Ativan, Paxil ("caused anger problems")    Opiate addiction     Squamous cell carcinoma of palatine tonsil     throat and tonsillar    Substance abuse     opiates, methamphetamines; ended 2004       Past Surgical History:   Procedure Laterality Date    GASTROSTOMY TUBE PLACEMENT Left 02/12/2018    [id number] N/A 1/26/2018    Performed by [facility] at [facility] CATH LAB    INSERTION-TUBE-GASTROSTOMY-LAPAROSCOPIC N/A 2/11/2018    Performed by Susan Haas MD at [facility] OR    Removal-port-a-cath N/A 6/6/2018    Performed by [facility] Diagnostic Provider at [facility] OR [address] [place] [address]       Review of patient's allergies indicates:   Allergen Reactions    Trazodone Other (See Comments)     confusion       No current facility-administered medications on file prior to encounter.      Current Outpatient Medications on File Prior to Encounter   Medication Sig    chlorhexidine (PERIDEX) 0.12 % solution Use as directed 15 mLs in the mouth or throat 2 (two) times daily.    calcium-vitamin D3 (CALCIUM 500 + D) 500 mg(1,250mg) -200 unit per tablet Take 1 tablet by mouth 2 (two) times daily with meals.    denosumab (XGEVA) 120 mg/1.7 mL (70 mg/mL) Soln Inject 1.7 mLs (120 mg total) into the skin once. for 1 dose    FLUoxetine 40 MG capsule Take 1 capsule (40 mg total) by mouth once daily.    gabapentin (NEURONTIN) 300 MG capsule Take 1 capsule (300 mg total) by mouth 3 (three) times daily. (Patient taking differently: Take 300 mg by mouth 3 (three) times daily. )    mirtazapine (REMERON) 7.5 MG Tab     multivitamin capsule Take 1 capsule by mouth once daily.    potassium chloride (MICRO-K) 10 MEQ CpSR Take 4 capsules (40 mEq total) by mouth once daily.    predniSONE (DELTASONE) 20 MG tablet " Take 3.5 tablets (70 mg total) by mouth once daily.     Family History     Problem Relation (Age of Onset)    Brain cancer Maternal Uncle, Maternal Uncle    Depression Sister    Hypertension Father, Brother    Leukemia Mother    Thyroid disease Sister        Tobacco Use    Smoking status: Former Smoker     Packs/day: 1.50     Years: 25.00     Pack years: 37.50     Types: Cigarettes     Last attempt to quit: 2017     Years since quittin.2    Smokeless tobacco: Never Used    Tobacco comment: smoked for about 25 years. quit 6 months ago   Substance and Sexual Activity    Alcohol use: No     Comment: history of overuse    Drug use: No     Comment: Former Opiate/methamphetamine addiction    Sexual activity: Yes     Partners: Female     Review of Systems   Constitutional: Positive for diaphoresis and fatigue. Negative for appetite change, chills, fever and unexpected weight change.   HENT: Negative for congestion, dental problem, drooling, ear discharge, ear pain, facial swelling, hearing loss, mouth sores, nosebleeds, postnasal drip, rhinorrhea, sinus pressure, sinus pain, sneezing, sore throat, tinnitus, trouble swallowing and voice change.    Eyes: Negative for photophobia, pain, discharge, redness, itching and visual disturbance.   Respiratory: Positive for shortness of breath. Negative for apnea, cough, choking, chest tightness, wheezing and stridor.    Cardiovascular: Negative for chest pain, palpitations and leg swelling.   Gastrointestinal: Positive for abdominal pain, blood in stool and nausea. Negative for abdominal distention, constipation, diarrhea and vomiting.        History of hemorrhoids   Endocrine: Negative for polydipsia, polyphagia and polyuria.   Genitourinary: Negative for decreased urine volume, difficulty urinating, dysuria, flank pain, frequency, hematuria and urgency.   Musculoskeletal: Negative for arthralgias, back pain, gait problem, joint swelling, neck pain and neck stiffness.    Skin: Negative for rash and wound.   Neurological: Positive for seizures and weakness. Negative for dizziness, tremors, syncope, speech difficulty, light-headedness, numbness and headaches.   Hematological: Does not bruise/bleed easily.   Psychiatric/Behavioral: Positive for confusion. Negative for agitation, behavioral problems, hallucinations and sleep disturbance. The patient is not nervous/anxious.      Objective:     Vital Signs (Most Recent):  Temp: 97.5 °F (36.4 °C) (09/09/19 0045)  Pulse: 105 (09/09/19 0045)  Resp: (!) 22 (09/09/19 0045)  BP: 105/68 (09/09/19 0045)  SpO2: 100 % (09/09/19 0045) Vital Signs (24h Range):  Temp:  [97.5 °F (36.4 °C)-98.2 °F (36.8 °C)] 97.5 °F (36.4 °C)  Pulse:  [] 105  Resp:  [16-32] 22  SpO2:  [83 %-100 %] 100 %  BP: ()/(51-81) 105/68     Weight: 81.6 kg (180 lb)  Body mass index is 28.19 kg/m².    Physical Exam   Constitutional: He is oriented to person, place, and time. He appears well-developed.   HENT:   Head: Normocephalic.   Eyes: Pupils are equal, round, and reactive to light.   Neck: Normal range of motion. Neck supple.   Cardiovascular: Normal rate and regular rhythm. Exam reveals no gallop and no friction rub.   Murmur heard.  Pulmonary/Chest: Effort normal and breath sounds normal. No stridor. No respiratory distress. He has no wheezes. He has no rales. He exhibits no tenderness.   Abdominal: Soft. Bowel sounds are normal. There is no tenderness. There is no rebound and no guarding.   Musculoskeletal: Normal range of motion.   Neurological: He is oriented to person, place, and time.   Very drowsy when awoke answers questions appropriately   Skin: Skin is warm. Capillary refill takes less than 2 seconds. There is pallor.   Psychiatric: He has a normal mood and affect.   Vitals reviewed.        CRANIAL NERVES     CN III, IV, VI   Pupils are equal, round, and reactive to light.    CN V   Facial sensation intact.   Right facial sensation deficit: none  Left  facial sensation deficit: none    CN VII   Facial expression full, symmetric.   Right facial weakness: none  Left facial weakness: none    CN VIII   CN VIII normal.   Hearing: intact    CN IX, X   CN IX normal.   CN X normal.   Palate: symmetric    CN XI   CN XI normal.   Right sternocleidomastoid strength: normal  Left sternocleidomastoid strength: normal    CN XII   CN XII normal.   Tongue: not atrophic       Significant Labs:   BMP:   Recent Labs   Lab 09/08/19 2259   *   *   K 4.0      CO2 21*   *   CREATININE 1.7*   CALCIUM 7.2*     CBC:   Recent Labs   Lab 09/08/19 2259 09/08/19  2310   WBC 7.94  --    HGB 6.6*  --    HCT 20.5* 17*   *  --      CMP:   Recent Labs   Lab 09/08/19 2259   *   K 4.0      CO2 21*   *   *   CREATININE 1.7*   CALCIUM 7.2*   PROT 4.3*   ALBUMIN 2.3*   BILITOT 0.7   ALKPHOS 35*   AST 17   ALT 23   ANIONGAP 13   EGFRNONAA 45.7*     Cardiac Markers: No results for input(s): CKMB, MYOGLOBIN, BNP, TROPISTAT in the last 48 hours.  Coagulation: No results for input(s): PT, INR, APTT in the last 48 hours.  Lactic Acid: No results for input(s): LACTATE in the last 48 hours.  Lipase: No results for input(s): LIPASE in the last 48 hours.  Lipid Panel: No results for input(s): CHOL, HDL, LDLCALC, TRIG, CHOLHDL in the last 48 hours.  Magnesium: No results for input(s): MG in the last 48 hours.  Prealbumin: No results for input(s): PREALBUMIN in the last 48 hours.  Troponin: No results for input(s): TROPONINI in the last 48 hours.  TSH:   Recent Labs   Lab 07/05/19  1016   TSH 1.327     Urine Studies: No results for input(s): COLORU, APPEARANCEUA, PHUR, SPECGRAV, PROTEINUA, GLUCUA, KETONESU, BILIRUBINUA, OCCULTUA, NITRITE, UROBILINOGEN, LEUKOCYTESUR, RBCUA, WBCUA, BACTERIA, SQUAMEPITHEL, HYALINECASTS in the last 48 hours.    Invalid input(s): SETH  All pertinent labs within the past 24 hours have been reviewed.    Significant  Imaging: I have reviewed all pertinent imaging results/findings within the past 24 hours.   Ct Head Without Contrast    Result Date: 9/8/2019  CMS MANDATED QUALITY DATA - CT RADIATION - 436 All CT exams at this facility use dose modulation, iterative reconstruction, and or weight based dosing when appropriate to reduce radiation dose to as low as reasonably achievable. HISTORY: Syncope, fainting. FINDINGS: No prior studies currently available for comparison. There is no acute intracranial hemorrhage, with no mass effect or abnormal extra-axial fluid. Gray white differentiation is maintained, with the cortical sulci, ventricles and basal cisterns normal in size. The cerebellum and brainstem are unremarkable. The visualized paranasal sinuses and mastoid air cells are clear. There is no acute osseous abnormality.     IMPRESSION: Negative noncontrast head CT. Electronically Signed by Tj RESTREPO on 9/9/2019 12:01 AM

## 2019-09-09 NOTE — NURSING
Clarified  Blood transfusion orders from Dr Saenz and YUE Griffiths. Orders to check H/H q4 and not to give 2nd ordwers of PRBCs ( ER already gave 2 PRBC)

## 2019-09-09 NOTE — PROGRESS NOTES
Patient seen and examined bedside after being admitted overnight for GI bleeding.  Admission H&P reviewed.  Agree with assessment and plan. Patient underwent EGD which revealed duodenal bulb ulcer without any high-risk stigmata of bleeding.  He is on levetiracetam for seizures.  Appreciate GI and Neurology input.  Continue ICU care.  1 unit of RBCs ordered for hemoglobin of 6.9 gm/dl.  Transfuse p.r.n. to maintain hemoglobin greater than 7 grams/deciliter.  Continue pantoprazole infusion.

## 2019-09-09 NOTE — H&P
"[facility]  History & Physical    Patient Name: Burton Whiting  MRN: [id number]  Admission Date: 9/8/2019  Attending Physician: Dr. Sudhir Saenz  Primary Care Provider: Christopher Turpin DO         Patient information was obtained from patient, relative(s) and ER records.     Subjective:     Principal Problem:Acute upper GI bleeding    Chief Complaint:   Chief Complaint   Patient presents with    Loss of Consciousness     pt brought in by ems reported syncopal episode when on toilet pt Hx throay CA pt N/V with ems    Rectal Bleeding     pt reports bleeding when having BM        HPI: 49-year-old  male presents emergency room with altered mental status.      According to the patient's brother the patient had been fishing with his friends all day.  His friends brought him back to his brother's house and he was found to be lethargic and have and what looked like seizure activity.  According to the patient's brother the patient having complaint of bloody stools for the past 2 days.  Patient denied associated nausea vomiting hematemesis.  He did endorse generalized weakness, fatigue and nausea.  He also complaints of bilateral lower quadrant abdominal pain.    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  The patient was recently on immuno therapy but this was discontinued secondary to side effects.  The patient's current we on the steroids seem a follows his oncologist at [facility] closely.        Past Medical History:   Diagnosis Date    Acute renal failure 6/21/2018    Alcohol abuse     ended 2004    Chemotherapy induced neutropenia 6/7/2018    Former smoker     HPV in male     Hx of psychiatric care     Ativan, Paxil ("caused anger problems")    Opiate addiction     Squamous cell carcinoma of palatine tonsil     throat and tonsillar "    Substance abuse     opiates, methamphetamines; ended 2004       Past Surgical History:   Procedure Laterality Date    GASTROSTOMY TUBE PLACEMENT Left 02/12/2018    FCBLCGUUK-WTSK-E-CATH N/A 1/26/2018    Performed by Andrew [facility]anue[facility] at [facility] C[facility]    INSERTION-TUBE-GASTROSTOMY-LAPAROSCOPIC N/A 2/11/2018    Performed by Susan Haas [facility] at [facility] OR    Removal-port-a-cath N/A 6/6/2018    Performed by [facility] Diagnostic Provider at [facility] OR [address] [place] [address]       Review of patient's allergies indicates:   Allergen Reactions    Trazodone Other (See Comments)     confusion       No current facility-administered medications on file prior to encounter.      Current Outpatient Medications on File Prior to Encounter   Medication Sig    chlorhexidine (PERIDEX) 0.12 % solution Use as directed 15 mLs in the mouth or throat 2 (two) times daily.    calcium-vitamin D3 (CALCIUM 500 + D) 500 mg(1,250mg) -200 unit per tablet Take 1 tablet by mouth 2 (two) times daily with meals.    denosumab (XGEVA) 120 mg/1.7 mL (70 mg/mL) Soln Inject 1.7 mLs (120 mg total) into the skin once. for 1 dose    FLUoxetine 40 MG capsule Take 1 capsule (40 mg total) by mouth once daily.    gabapentin (NEURONTIN) 300 MG capsule Take 1 capsule (300 mg total) by mouth 3 (three) times daily. (Patient taking differently: Take 300 mg by mouth 3 (three) times daily. )    mirtazapine (REMERON) 7.5 MG Tab     multivitamin capsule Take 1 capsule by mouth once daily.    potassium chloride (MICRO-K) 10 MEQ CpSR Take 4 capsules (40 mEq total) by mouth once daily.    predniSONE (DELTASONE) 20 MG tablet Take 3.5 tablets (70 mg total) by mouth once daily.     Family History     Problem Relation (Age of Onset)    Brain cancer Maternal Uncle, Maternal Uncle    Depression Sister    Hypertension Father, Brother    Leukemia Mother    Thyroid disease Sister        Tobacco Use    Smoking status: Former Smoker     Packs/day: 1.50     Years: 25.00     Pack  years: 37.50     Types: Cigarettes     Last attempt to quit: 2017     Years since quittin.2    Smokeless tobacco: Never Used    Tobacco comment: smoked for about 25 years. quit 6 months ago   Substance and Sexual Activity    Alcohol use: No     Comment: history of overuse    Drug use: No     Comment: Former Opiate/methamphetamine addiction    Sexual activity: Yes     Partners: Female     Review of Systems   Constitutional: Positive for diaphoresis and fatigue. Negative for appetite change, chills, fever and unexpected weight change.   HENT: Negative for congestion, dental problem, drooling, ear discharge, ear pain, facial swelling, hearing loss, mouth sores, nosebleeds, postnasal drip, rhinorrhea, sinus pressure, sinus pain, sneezing, sore throat, tinnitus, trouble swallowing and voice change.    Eyes: Negative for photophobia, pain, discharge, redness, itching and visual disturbance.   Respiratory: Positive for shortness of breath. Negative for apnea, cough, choking, chest tightness, wheezing and stridor.    Cardiovascular: Negative for chest pain, palpitations and leg swelling.   Gastrointestinal: Positive for abdominal pain, blood in stool and nausea. Negative for abdominal distention, constipation, diarrhea and vomiting.        History of hemorrhoids   Endocrine: Negative for polydipsia, polyphagia and polyuria.   Genitourinary: Negative for decreased urine volume, difficulty urinating, dysuria, flank pain, frequency, hematuria and urgency.   Musculoskeletal: Negative for arthralgias, back pain, gait problem, joint swelling, neck pain and neck stiffness.   Skin: Negative for rash and wound.   Neurological: Positive for seizures and weakness. Negative for dizziness, tremors, syncope, speech difficulty, light-headedness, numbness and headaches.   Hematological: Does not bruise/bleed easily.   Psychiatric/Behavioral: Positive for confusion. Negative for agitation, behavioral problems, hallucinations and  sleep disturbance. The patient is not nervous/anxious.      Objective:     Vital Signs (Most Recent):  Temp: 97.5 °F (36.4 °C) (09/09/19 0045)  Pulse: 105 (09/09/19 0045)  Resp: (!) 22 (09/09/19 0045)  BP: 105/68 (09/09/19 0045)  SpO2: 100 % (09/09/19 0045) Vital Signs (24h Range):  Temp:  [97.5 °F (36.4 °C)-98.2 °F (36.8 °C)] 97.5 °F (36.4 °C)  Pulse:  [] 105  Resp:  [16-32] 22  SpO2:  [83 %-100 %] 100 %  BP: ()/(51-81) 105/68     Weight: 81.6 kg (180 lb)  Body mass index is 28.19 kg/m².    Physical Exam   Constitutional: He is oriented to person, place, and time. He appears well-developed.   HENT:   Head: Normocephalic.   Eyes: Pupils are equal, round, and reactive to light.   Neck: Normal range of motion. Neck supple.   Cardiovascular: Normal rate and regular rhythm. Exam reveals no gallop and no friction rub.   Murmur heard.  Pulmonary/Chest: Effort normal and breath sounds normal. No stridor. No respiratory distress. He has no wheezes. He has no rales. He exhibits no tenderness.   Abdominal: Soft. Bowel sounds are normal. There is no tenderness. There is no rebound and no guarding.   Musculoskeletal: Normal range of motion.   Neurological: He is oriented to person, place, and time.   Very drowsy when awoke answers questions appropriately   Skin: Skin is warm. Capillary refill takes less than 2 seconds. There is pallor.   Psychiatric: He has a normal mood and affect.   Vitals reviewed.        CRANIAL NERVES     CN III, IV, VI   Pupils are equal, round, and reactive to light.    CN V   Facial sensation intact.   Right facial sensation deficit: none  Left facial sensation deficit: none    CN VII   Facial expression full, symmetric.   Right facial weakness: none  Left facial weakness: none    CN VIII   CN VIII normal.   Hearing: intact    CN IX, X   CN IX normal.   CN X normal.   Palate: symmetric    CN XI   CN XI normal.   Right sternocleidomastoid strength: normal  Left sternocleidomastoid strength:  normal    CN XII   CN XII normal.   Tongue: not atrophic       Significant Labs:   BMP:   Recent Labs   Lab 09/08/19 2259   *   *   K 4.0      CO2 21*   *   CREATININE 1.7*   CALCIUM 7.2*     CBC:   Recent Labs   Lab 09/08/19 2259 09/08/19  2310   WBC 7.94  --    HGB 6.6*  --    HCT 20.5* 17*   *  --      CMP:   Recent Labs   Lab 09/08/19 2259   *   K 4.0      CO2 21*   *   *   CREATININE 1.7*   CALCIUM 7.2*   PROT 4.3*   ALBUMIN 2.3*   BILITOT 0.7   ALKPHOS 35*   AST 17   ALT 23   ANIONGAP 13   EGFRNONAA 45.7*     Cardiac Markers: No results for input(s): CKMB, MYOGLOBIN, BNP, TROPISTAT in the last 48 hours.  Coagulation: No results for input(s): PT, INR, APTT in the last 48 hours.  Lactic Acid: No results for input(s): LACTATE in the last 48 hours.  Lipase: No results for input(s): LIPASE in the last 48 hours.  Lipid Panel: No results for input(s): CHOL, HDL, LDLCALC, TRIG, CHOLHDL in the last 48 hours.  Magnesium: No results for input(s): MG in the last 48 hours.  Prealbumin: No results for input(s): PREALBUMIN in the last 48 hours.  Troponin: No results for input(s): TROPONINI in the last 48 hours.  TSH:   Recent Labs   Lab 07/05/19  1016   TSH 1.327     Urine Studies: No results for input(s): COLORU, APPEARANCEUA, PHUR, SPECGRAV, PROTEINUA, GLUCUA, KETONESU, BILIRUBINUA, OCCULTUA, NITRITE, UROBILINOGEN, LEUKOCYTESUR, RBCUA, WBCUA, BACTERIA, SQUAMEPITHEL, HYALINECASTS in the last 48 hours.    Invalid input(s): WRIGHTSUR  All pertinent labs within the past 24 hours have been reviewed.    Significant Imaging: I have reviewed all pertinent imaging results/findings within the past 24 hours.   Ct Head Without Contrast    Result Date: 9/8/2019  CMS MANDATED QUALITY DATA - CT RADIATION - 436 All CT exams at this facility use dose modulation, iterative reconstruction, and or weight based dosing when appropriate to reduce radiation dose to as low as reasonably  achievable. HISTORY: Syncope, fainting. FINDINGS: No prior studies currently available for comparison. There is no acute intracranial hemorrhage, with no mass effect or abnormal extra-axial fluid. Gray white differentiation is maintained, with the cortical sulci, ventricles and basal cisterns normal in size. The cerebellum and brainstem are unremarkable. The visualized paranasal sinuses and mastoid air cells are clear. There is no acute osseous abnormality.     IMPRESSION: Negative noncontrast head CT. Electronically Signed by Tj RESTREPO on 9/9/2019 12:01 AM        Assessment/Plan:     * Acute upper GI bleeding  -transfuse with 2 units of packed red cell now did re-evaluate for additional transfusions  -consult GI  -IV PPI  -NPO for now  -iron studies  -stool for occult blood x2      Seizure  - patient with witnessed seizure activity upon arrival in emergency room this may have been from hypotension but will investigate  -Ativan p.r.n. for seizures  -consult Neurology Dr. Earl Torres  -EEG in a.m.  -seizure precautions   - will start keppra 1 gram IV bid - get imput from Neurologist if to continue Keppra after EEG      Thrombocytopenia  -appears stable  -continue to monitor        VTE Risk Mitigation (From admission, onward)    None             Angeles Vaughan, NP  Department of Hospital Medicine   [facility]

## 2019-09-10 ENCOUNTER — PATIENT MESSAGE (OUTPATIENT)
Dept: HEMATOLOGY/ONCOLOGY | Facility: CLINIC | Age: 49
End: 2019-09-10

## 2019-09-10 LAB
ALBUMIN SERPL BCP-MCNC: 2.1 G/DL (ref 3.5–5.2)
ALP SERPL-CCNC: 27 U/L (ref 55–135)
ALT SERPL W/O P-5'-P-CCNC: 17 U/L (ref 10–44)
ANION GAP SERPL CALC-SCNC: 6 MMOL/L (ref 8–16)
AST SERPL-CCNC: 15 U/L (ref 10–40)
BASOPHILS # BLD AUTO: 0 K/UL (ref 0–0.2)
BASOPHILS NFR BLD: 0 % (ref 0–1.9)
BILIRUB SERPL-MCNC: 0.9 MG/DL (ref 0.1–1)
BILIRUB UR QL STRIP: NEGATIVE
BUN SERPL-MCNC: 40 MG/DL (ref 6–20)
CALCIUM SERPL-MCNC: 6.7 MG/DL (ref 8.7–10.5)
CHLORIDE SERPL-SCNC: 106 MMOL/L (ref 95–110)
CLARITY UR: CLEAR
CO2 SERPL-SCNC: 27 MMOL/L (ref 23–29)
COLOR UR: YELLOW
CREAT SERPL-MCNC: 1.2 MG/DL (ref 0.5–1.4)
DIFFERENTIAL METHOD: ABNORMAL
EOSINOPHIL # BLD AUTO: 0.1 K/UL (ref 0–0.5)
EOSINOPHIL NFR BLD: 1.9 % (ref 0–8)
ERYTHROCYTE [DISTWIDTH] IN BLOOD BY AUTOMATED COUNT: 16.4 % (ref 11.5–14.5)
EST. GFR  (AFRICAN AMERICAN): >60 ML/MIN/1.73 M^2
EST. GFR  (NON AFRICAN AMERICAN): >60 ML/MIN/1.73 M^2
GLUCOSE SERPL-MCNC: 100 MG/DL (ref 70–110)
GLUCOSE UR QL STRIP: NEGATIVE
HCT VFR BLD AUTO: 20.9 % (ref 40–54)
HCT VFR BLD AUTO: 20.9 % (ref 40–54)
HCT VFR BLD AUTO: 21.6 % (ref 40–54)
HGB BLD-MCNC: 7 G/DL (ref 14–18)
HGB BLD-MCNC: 7 G/DL (ref 14–18)
HGB BLD-MCNC: 7.4 G/DL (ref 14–18)
HGB UR QL STRIP: NEGATIVE
IMM GRANULOCYTES # BLD AUTO: 0.22 K/UL (ref 0–0.04)
IMM GRANULOCYTES NFR BLD AUTO: 4.2 % (ref 0–0.5)
KETONES UR QL STRIP: NEGATIVE
LEUKOCYTE ESTERASE UR QL STRIP: NEGATIVE
LYMPHOCYTES # BLD AUTO: 0.3 K/UL (ref 1–4.8)
LYMPHOCYTES NFR BLD: 6 % (ref 18–48)
MAGNESIUM SERPL-MCNC: 1.9 MG/DL (ref 1.6–2.6)
MCH RBC QN AUTO: 31.8 PG (ref 27–31)
MCHC RBC AUTO-ENTMCNC: 33.5 G/DL (ref 32–36)
MCV RBC AUTO: 95 FL (ref 82–98)
MONOCYTES # BLD AUTO: 0.4 K/UL (ref 0.3–1)
MONOCYTES NFR BLD: 6.6 % (ref 4–15)
NEUTROPHILS # BLD AUTO: 4.3 K/UL (ref 1.8–7.7)
NEUTROPHILS NFR BLD: 81.3 % (ref 38–73)
NITRITE UR QL STRIP: NEGATIVE
NRBC BLD-RTO: 1 /100 WBC
PH UR STRIP: 6 [PH] (ref 5–8)
PLATELET # BLD AUTO: 68 K/UL (ref 150–350)
PMV BLD AUTO: 8.8 FL (ref 9.2–12.9)
POTASSIUM SERPL-SCNC: 3.9 MMOL/L (ref 3.5–5.1)
PROT SERPL-MCNC: 4 G/DL (ref 6–8.4)
PROT UR QL STRIP: NEGATIVE
RBC # BLD AUTO: 2.2 M/UL (ref 4.6–6.2)
SODIUM SERPL-SCNC: 139 MMOL/L (ref 136–145)
SP GR UR STRIP: 1.01 (ref 1–1.03)
URN SPEC COLLECT METH UR: NORMAL
UROBILINOGEN UR STRIP-ACNC: NEGATIVE EU/DL
WBC # BLD AUTO: 5.29 K/UL (ref 3.9–12.7)

## 2019-09-10 PROCEDURE — 20000000 HC ICU ROOM

## 2019-09-10 PROCEDURE — 95819 EEG AWAKE AND ASLEEP: CPT

## 2019-09-10 PROCEDURE — 36415 COLL VENOUS BLD VENIPUNCTURE: CPT

## 2019-09-10 PROCEDURE — 85025 COMPLETE CBC W/AUTO DIFF WBC: CPT

## 2019-09-10 PROCEDURE — 25000003 PHARM REV CODE 250

## 2019-09-10 PROCEDURE — 63600175 PHARM REV CODE 636 W HCPCS: Performed by: INTERNAL MEDICINE

## 2019-09-10 PROCEDURE — 83735 ASSAY OF MAGNESIUM: CPT

## 2019-09-10 PROCEDURE — C9113 INJ PANTOPRAZOLE SODIUM, VIA: HCPCS | Performed by: INTERNAL MEDICINE

## 2019-09-10 PROCEDURE — 63600175 PHARM REV CODE 636 W HCPCS: Performed by: NURSE PRACTITIONER

## 2019-09-10 PROCEDURE — 81003 URINALYSIS AUTO W/O SCOPE: CPT

## 2019-09-10 PROCEDURE — 94761 N-INVAS EAR/PLS OXIMETRY MLT: CPT

## 2019-09-10 PROCEDURE — 25000003 PHARM REV CODE 250: Performed by: STUDENT IN AN ORGANIZED HEALTH CARE EDUCATION/TRAINING PROGRAM

## 2019-09-10 PROCEDURE — 85018 HEMOGLOBIN: CPT

## 2019-09-10 PROCEDURE — 27000221 HC OXYGEN, UP TO 24 HOURS

## 2019-09-10 PROCEDURE — 25000003 PHARM REV CODE 250: Performed by: NURSE PRACTITIONER

## 2019-09-10 PROCEDURE — 80053 COMPREHEN METABOLIC PANEL: CPT

## 2019-09-10 PROCEDURE — 85014 HEMATOCRIT: CPT

## 2019-09-10 RX ORDER — HYDROCODONE BITARTRATE AND ACETAMINOPHEN 5; 325 MG/1; MG/1
1 TABLET ORAL EVERY 6 HOURS PRN
Status: DISCONTINUED | OUTPATIENT
Start: 2019-09-10 | End: 2019-09-13 | Stop reason: HOSPADM

## 2019-09-10 RX ORDER — LANOLIN ALCOHOL/MO/W.PET/CERES
1000 CREAM (GRAM) TOPICAL DAILY
Status: DISCONTINUED | OUTPATIENT
Start: 2019-09-10 | End: 2019-09-13 | Stop reason: HOSPADM

## 2019-09-10 RX ORDER — ACETAMINOPHEN 325 MG/1
650 TABLET ORAL EVERY 6 HOURS PRN
Status: DISCONTINUED | OUTPATIENT
Start: 2019-09-10 | End: 2019-09-13 | Stop reason: HOSPADM

## 2019-09-10 RX ADMIN — PANTOPRAZOLE SODIUM 40 MG: 40 INJECTION, POWDER, LYOPHILIZED, FOR SOLUTION INTRAVENOUS at 08:09

## 2019-09-10 RX ADMIN — CHLORHEXIDINE GLUCONATE 15 ML: 1.2 RINSE ORAL at 09:09

## 2019-09-10 RX ADMIN — POTASSIUM CHLORIDE 40 MEQ: 750 CAPSULE, EXTENDED RELEASE ORAL at 08:09

## 2019-09-10 RX ADMIN — CHLORHEXIDINE GLUCONATE 15 ML: 1.2 RINSE ORAL at 08:09

## 2019-09-10 RX ADMIN — FLUOXETINE 40 MG: 20 CAPSULE ORAL at 08:09

## 2019-09-10 RX ADMIN — HYDROMORPHONE HYDROCHLORIDE 1 MG: 1 INJECTION, SOLUTION INTRAMUSCULAR; INTRAVENOUS; SUBCUTANEOUS at 08:09

## 2019-09-10 RX ADMIN — HYDROCODONE BITARTRATE AND ACETAMINOPHEN 1 TABLET: 5; 325 TABLET ORAL at 01:09

## 2019-09-10 RX ADMIN — LEVETIRACETAM 1000 MG: 100 INJECTION, SOLUTION, CONCENTRATE INTRAVENOUS at 08:09

## 2019-09-10 RX ADMIN — HYDROMORPHONE HYDROCHLORIDE 1 MG: 1 INJECTION, SOLUTION INTRAMUSCULAR; INTRAVENOUS; SUBCUTANEOUS at 07:09

## 2019-09-10 RX ADMIN — MUPIROCIN: 20 OINTMENT TOPICAL at 09:09

## 2019-09-10 RX ADMIN — CYANOCOBALAMIN TAB 1000 MCG 1000 MCG: 1000 TAB at 12:09

## 2019-09-10 RX ADMIN — MUPIROCIN: 20 OINTMENT TOPICAL at 08:09

## 2019-09-10 NOTE — ASSESSMENT & PLAN NOTE
"- s/p 3 units pRBC  -consulted GI, "recommend Protonix infusion times 72 hr, check stool H pylori antigen, transition of Protonix p.o. b.i.d. x8 weeks then daily on discharge, repeat upper endoscopy in 6 weeks. absolutely no NSAIDs. notify our service for overt bleeding dropping hemoglobin outpatient colonoscopy full liquid diet for the next day then advance to soft bland for 3-4 days then as tolerated"  -s/p IV PPI and now on Protonix 40 bid  - clear diet  -iron studies  -stool for occult blood x2  - monitoring H/h q 8 hours    "

## 2019-09-10 NOTE — PROGRESS NOTES
[facility] Medicine  Progress Note    Patient Name: Burton Whiting  MRN: [id number]  Patient Class: IP- Inpatient   Admission Date: 9/8/2019  Length of Stay: 1 days  Attending Physician: Lyly English DO  Primary Care Provider: Christopher Turpin DO        Subjective:     Principal Problem:Acute upper GI bleeding    HPI:  49-year-old  male presents emergency room with altered mental status.      According to the patient's brother the patient had been fishing with his friends all day.  His friends brought him back to his brother's house and he was found to be lethargic and have and what looked like seizure activity.  According to the patient's brother the patient having complaint of bloody stools for the past 2 days.  Patient denied associated nausea vomiting hematemesis.  He did endorse generalized weakness, fatigue and nausea.  He also complaints of bilateral lower quadrant abdominal pain.    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  The patient was recently on immuno therapy but this was discontinued secondary to side effects.  The patient's current we on the steroids seem a follows his oncologist at [facility] closely.        Overview/Hospital Course:  9/9 Upon arrival in emergency room the patient was found to be hypotensive when he was placed in a room he had witnessed seizure activity he was given Ativan in diagnostic imaging was performed.  A CT of the head showed no acute findings his CBC did show a hemoglobin of less than 7     9/10 s/p 3 u pRBC H/H 7.0/20.9, s/p EGD show duodenal ulcer. H plyori pending. Pt denies any new signs of bleeding, He denies any bleeding and denies hematuria, hematemesis, hemoptysis. No bm overnight. Seen by neurology and EEG pending. Pt denies seizure activity (shaking, abnormal movements) overight or this  morning.       Interval History: as above    Review of Systems   Constitutional: Negative for activity change, chills, fatigue and fever.   HENT: Negative for congestion, rhinorrhea and sore throat.    Eyes: Negative for visual disturbance.   Respiratory: Negative for cough, chest tightness and shortness of breath.    Cardiovascular: Negative for chest pain, palpitations and leg swelling.   Gastrointestinal: Negative for abdominal pain, constipation, diarrhea, nausea and vomiting.   Genitourinary: Negative for dysuria and hematuria.   Musculoskeletal: Negative for arthralgias and myalgias.   Skin: Negative for rash.   Neurological: Negative for dizziness, light-headedness and headaches.   Psychiatric/Behavioral: Negative for agitation. The patient is not nervous/anxious.      Objective:     Vital Signs (Most Recent):  Temp: 97.6 °F (36.4 °C) (09/10/19 0715)  Pulse: 79 (09/10/19 0845)  Resp: 20 (09/10/19 0715)  BP: (!) 94/59 (09/10/19 0845)  SpO2: 97 % (09/10/19 0757) Vital Signs (24h Range):  Temp:  [97.6 °F (36.4 °C)-99 °F (37.2 °C)] 97.6 °F (36.4 °C)  Pulse:  [] 79  Resp:  [10-52] 20  SpO2:  [97 %-100 %] 97 %  BP: ()/(51-79) 94/59     Weight: 83.7 kg (184 lb 8.4 oz)  Body mass index is 28.06 kg/m².    Intake/Output Summary (Last 24 hours) at 9/10/2019 0925  Last data filed at 9/10/2019 0600  Gross per 24 hour   Intake 3365 ml   Output 3200 ml   Net 165 ml      Physical Exam   Constitutional: He is oriented to person, place, and time. He appears well-developed and well-nourished. No distress.   HENT:   Head: Normocephalic and atraumatic.   Mouth/Throat: Oropharynx is clear and moist.   Eyes: Pupils are equal, round, and reactive to light. Conjunctivae and EOM are normal.   Neck: Normal range of motion. Neck supple.   Cardiovascular: Normal rate, regular rhythm and intact distal pulses.   Murmur heard.  Pulmonary/Chest: Effort normal and breath sounds normal.   Abdominal: Soft. Bowel sounds are normal.  "He exhibits no distension. There is no tenderness.   Musculoskeletal: Normal range of motion. He exhibits no edema, tenderness or deformity.   Neurological: He is alert and oriented to person, place, and time. He has normal reflexes. No sensory deficit. He exhibits normal muscle tone.   Skin: Skin is warm and dry. He is not diaphoretic.   Psychiatric: He has a normal mood and affect. His behavior is normal. Judgment and thought content normal.   Nursing note and vitals reviewed.      Significant Labs:   CBC  Recent Labs   Lab 09/08/19 2259 09/09/19  1607 09/09/19  2342 09/10/19  0848   WBC 7.94  --   --   --  5.29   RBC 1.95*  --   --   --  2.20*   HGB 6.6*   < > 6.9* 7.4* 7.0*   HCT 20.5*   < > 20.6* 21.6* 20.9*   *  --   --   --  68*   *  --   --   --  95   MCH 33.8*  --   --   --  31.8*   MCHC 32.2  --   --   --  33.5    < > = values in this interval not displayed.     BMP  Recent Labs   Lab 09/06/19  1404 09/08/19 2259 09/09/19  0513   NA  --  134* 136   K  --  4.0 3.9   CO2  --  21* 23   CL  --  100 105   BUN  --  101* 100*   CREATININE 1.27 1.7* 1.4   GLU  --  159* 136*       Recent Labs   Lab 09/08/19 2259 09/09/19  0513   CALCIUM 7.2* 6.9*     LFT  Recent Labs   Lab 09/08/19 2259   PROT 4.3*   ALBUMIN 2.3*   BILITOT 0.7   AST 17   ALKPHOS 35*   ALT 23       COAGS  Recent Labs   Lab 09/09/19  0810   INR 1.3         Significant Imaging:   MRI brain wo: No acute intracranial abnormality.  Focal area of previously described marrow signal alteration in the clivus has become less apparent in the interval.    EEG pending            Assessment/Plan:      * Acute upper GI bleeding  - s/p 3 units pRBC  -consulted GI, "recommend Protonix infusion times 72 hr, check stool H pylori antigen, transition of Protonix p.o. b.i.d. x8 weeks then daily on discharge, repeat upper endoscopy in 6 weeks. absolutely no NSAIDs. notify our service for overt bleeding dropping hemoglobin outpatient colonoscopy full " "liquid diet for the next day then advance to soft bland for 3-4 days then as tolerated"  -s/p IV PPI and now on Protonix 40 bid  - clear diet  -iron studies  -stool for occult blood x2  - monitoring H/h q 8 hours      Thrombocytopenia  -appears stable  -continue to monitor      Squamous cell carcinoma of palatine tonsil  - follows with Oncology at [facility]      Seizure  - patient with witnessed seizure activity upon arrival in emergency room this may have been from hypotension but will investigate  -Ativan p.r.n. for seizures  -consult Neurology Dr. Earl Torres  -EEG in a.m.  -seizure precautions   - keppra 1 gram IV bid - get imput from Neurologist if to continue Keppra after EEG        VTE Risk Mitigation (From admission, onward)        Ordered     IP VTE LOW RISK PATIENT  Once      09/09/19 0222     Place sequential compression device  Until discontinued      09/09/19 0222                Lyly Egnlish DO  Department of Hospital Medicine   [facility]  "

## 2019-09-10 NOTE — PLAN OF CARE
This note also relates to the following rows which could not be included:  Pulse - Cannot attach notes to unvalidated device data       09/10/19 0756   Patient Assessment/Suction   Level of Consciousness (AVPU) alert   PRE-TX-O2   O2 Device (Oxygen Therapy) room air  (nc on sb)   $ Is the patient on Low Flow Oxygen? Yes   SpO2 97 %   Pulse Oximetry Type Continuous   $ Pulse Oximetry - Multiple Charge Pulse Oximetry - Multiple

## 2019-09-10 NOTE — PROGRESS NOTES
[facility]  Neurology  Progress Note    Patient Name: Burton Whiting  MRN: [id number]  Admission Date: 9/8/2019  Hospital Length of Stay: 1 days  Code Status: Full Code   Attending Provider: Lyly English DO  Primary Care Physician: Christopher Turpin DO   Principal Problem:Acute upper GI bleeding    Subjective:     Interval History: Pt seen and examined. He is alert and oriented. States that he remembers having two shaking episodes that brought him into the hospital. He denies any urine incontinence or biting his tongue associated with these episodes.     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      According to the patient's brother the patient had been fishing with his friends all day.  His friends brought him back to his brother's house and he was found to be lethargic and have and what looked like seizure activity.  According to the patient's brother the patient having complaint of bloody stools for the past 2 days.  Patient denied associated nausea vomiting hematemesis.  He did endorse generalized weakness, fatigue and nausea.  He also complaints of bilateral lower quadrant abdominal pain.     The patient was recently on immuno therapy but this was discontinued secondary to side effects.  The patient's current we on the steroids seem a follows his oncologist at [facility] closely.        CRT: 1.7  B12: 113L   Folate: 7.0        Brain imaging:  CT head non contrast:   IMPRESSION: Negative noncontrast head CT.   MRI brain:   No acute intracranial abnormality.    Focal area of previously described marrow signal alteration in the clivus has become less apparent in the interval.    Neck Imaging:  MRI C spine:   Continued moderate to severe spinal canal stenosis at C5-6 with slightly more  apparent abnormal T2 cord signal at this level concerning for myelomalacia.  Additional multilevel degenerative changes are as detailed in the body of the report and appear similar to the prior.    No evidence of abnormal leptomeningeal enhancement.    Unchanged appearance of a subcentimeter T1 hypointense, nonenhancing focus in the clivus.    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     Cardiac imaging:   EKG:   Sinus tachycardia  Otherwise normal ECG    EEG: IMPRESSION: Normal awake and asleep.      Current Neurological Medications: MAR reviewed     Current Facility-Administered Medications   Medication Dose Route Frequency Provider Last Rate Last Dose    0.9%  NaCl infusion (for blood administration)   Intravenous Q24H PRN Prasanna Cagle MD        0.9%  NaCl infusion   Intravenous Continuous Angeles Vaughan  mL/hr at 09/09/19 0555      acetaminophen tablet 650 mg  650 mg Oral Q6H PRN Lyly English, DO        chlorhexidine 0.12 % solution 15 mL  15 mL Mouth/Throat BID Yana Johnson PharmD   15 mL at 09/10/19 0801    cyanocobalamin tablet 1,000 mcg  1,000 mcg Oral Daily yLly English,         diphenhydrAMINE injection 12.5 mg  12.5 mg Intravenous PRN Angeles Vaughan NP        FLUoxetine capsule 40 mg  40 mg Oral Daily Angeles Vaughan NP   40 mg at 09/10/19 0802    HYDROmorphone injection 1 mg  1 mg Intravenous Q4H PRN Angeles Vaughan NP   1 mg at 09/10/19 0755    levETIRAcetam (KEPPRA) 1,000 mg in dextrose 5 % 100 mL IVPB  1,000 mg Intravenous Q12H Prasanna Cagle  mL/hr at 09/10/19 0802 1,000 mg at 09/10/19 0802    lorazepam injection 1 mg  1 mg Intravenous Q2H PRN Angeles Vaughan NP        mupirocin 2 % ointment   Nasal BID Yana Johnson PharmD        pantoprazole injection 40 mg  40 mg Intravenous BID Prasanna Cagle MD   40 mg at 09/10/19 0802    potassium chloride CR capsule 40 mEq  40 mEq Oral Daily Angeles Vaughan NP   40  mEq at 09/10/19 0801    promethazine (PHENERGAN) 6.25 mg in dextrose 5 % 50 mL IVPB  6.25 mg Intravenous Q6H PRN Angeles Vaughan NP        sodium chloride 0.9% flush 10 mL  10 mL Intravenous PRN Angeles Vaughan NP           Review of Systems   Constitutional: Positive for fatigue.   HENT: Negative.    Eyes: Negative.    Respiratory: Negative.    Cardiovascular: Negative.    Gastrointestinal: Negative.    Endocrine: Negative.    Genitourinary: Negative.    Musculoskeletal: Negative.    Neurological: Negative.    Hematological: Negative.    Psychiatric/Behavioral: Negative.      Objective:     Vital Signs (Most Recent):  Temp: 97.6 °F (36.4 °C) (09/10/19 0715)  Pulse: 79 (09/10/19 0845)  Resp: 20 (09/10/19 0715)  BP: (!) 94/59 (09/10/19 0845)  SpO2: 97 % (09/10/19 0757) Vital Signs (24h Range):  Temp:  [97.6 °F (36.4 °C)-99 °F (37.2 °C)] 97.6 °F (36.4 °C)  Pulse:  [] 79  Resp:  [10-52] 20  SpO2:  [97 %-100 %] 97 %  BP: ()/(51-79) 94/59     Weight: 83.7 kg (184 lb 8.4 oz)  Body mass index is 28.06 kg/m².    Physical Exam   Constitutional: He is oriented to person, place, and time. He appears well-developed and well-nourished.   HENT:   Head: Normocephalic and atraumatic.   Eyes: Pupils are equal, round, and reactive to light. EOM are normal.   Neck: Normal range of motion. Neck supple.   Cardiovascular: Normal rate and regular rhythm.   Pulmonary/Chest: Effort normal and breath sounds normal.   Abdominal: Soft.   Musculoskeletal: Normal range of motion.   Neurological: He is alert and oriented to person, place, and time. He has normal strength and normal reflexes. He has a normal Finger-Nose-Finger Test and a normal Heel to Raymond Test.   Skin: Skin is warm and dry.   Nursing note and vitals reviewed.      NEUROLOGICAL EXAMINATION:     MENTAL STATUS   Oriented to person, place, and time.   Level of consciousness: alert    CRANIAL NERVES   Cranial nerves II through XII intact.     CN III, IV, VI   Pupils are  equal, round, and reactive to light.  Extraocular motions are normal.     MOTOR EXAM     Strength   Strength 5/5 throughout.     SENSORY EXAM   Light touch normal.     GAIT AND COORDINATION      Coordination   Finger to nose coordination: normal  Heel to shin coordination: normal    Tremor   Resting tremor: absent  Intention tremor: absent      Significant Labs: All pertinent lab results from the past 24 hours have been reviewed.    Significant Imaging: I have reviewed and interpreted all pertinent imaging results/findings within the past 24 hours.    Assessment and Plan:   Seizure  - Witnessed seizure activity per brother and in ER  - Potentially r/t hypotension/anemia   - CT MRI brain negative acute  - EEG normal    - Seizure precautions  - Neuro checks   - OK to stop Keppra   - No driving until follow up in clinic     Neurologically stable      Seizure education.       No driving for now, no swimming alone, no climbing high areas, no operation of heavy machinery or working with high risk electricity equipment.     Patient and/or next of kin informed.     Follow up Neurology in 2 weeks at [phone].       Active Diagnoses:    Diagnosis Date Noted POA    PRINCIPAL PROBLEM:  Acute upper GI bleeding [K92.2] 09/09/2019 Yes    Seizure [R56.9] 09/09/2019 Yes    Thrombocytopenia [D69.6] 02/28/2018 Yes    Squamous cell carcinoma of palatine tonsil [C09.9] 01/04/2018 Yes      Problems Resolved During this Admission:       VTE Risk Mitigation (From admission, onward)        Ordered     IP VTE LOW RISK PATIENT  Once      09/09/19 0222     Place sequential compression device  Until discontinued      09/09/19 0222          ALEC Beckham  Neurology  [facility]    I, Dr. Earl Torres, have personally seen and examined the patient with my advanced provider and agree with above. I personally did a focused exam, and reviewed all necessary clinical information. I discussed my management plan with my NP and  agree with above.

## 2019-09-10 NOTE — PLAN OF CARE
Problem: Adult Inpatient Plan of Care  Goal: Plan of Care Review  Updated patient on plan of care.

## 2019-09-10 NOTE — PROCEDURES
DATE OF STUDY: 9/9/19.  PHYSICIAN REQUESTING/INSTITUTION: Dr. English.  REASON FOR EEG: Altered mental status   AED: Keppra.  MENTAL STATUS: Awake, and asleep.  METHODS:  EEG was done according to the 10/20 international system.  This is a 30 mins routine EEG. Bipolar and referential montages were used.     CLINICAL HISTORY:       FINDINGS:  EEG overall symmetric with continuous polymorphic waveforms.       Background rhythm: Alpha dominant while eyes are closed and the patient is awake, with good reactivity.    PDR: 9-11 Hz.   Mental status: Patient is awake and asleep during the study.       Artifact: There is occasional eye movement, lead and EKG artifacts.       Sleep: Stage 1.     Epileptiform discharges: None.     Activation procedures:  Photic stimulation was performed with no abnormalities seen.     Abnormal activity: None     ECG: Regular rhythm.          IMPRESSION: Normal awake and asleep.

## 2019-09-10 NOTE — CARE UPDATE
09/09/19 1930   Patient Assessment/Suction   Level of Consciousness (AVPU) alert   PRE-TX-O2   O2 Device (Oxygen Therapy) room air   SpO2 100 %   Pulse Oximetry Type Continuous   $ Pulse Oximetry - Multiple Charge Pulse Oximetry - Multiple   Pulse 107   Resp 20

## 2019-09-10 NOTE — SUBJECTIVE & OBJECTIVE
Interval History: as above    Review of Systems   Constitutional: Negative for activity change, chills, fatigue and fever.   HENT: Negative for congestion, rhinorrhea and sore throat.    Eyes: Negative for visual disturbance.   Respiratory: Negative for cough, chest tightness and shortness of breath.    Cardiovascular: Negative for chest pain, palpitations and leg swelling.   Gastrointestinal: Negative for abdominal pain, constipation, diarrhea, nausea and vomiting.   Genitourinary: Negative for dysuria and hematuria.   Musculoskeletal: Negative for arthralgias and myalgias.   Skin: Negative for rash.   Neurological: Negative for dizziness, light-headedness and headaches.   Psychiatric/Behavioral: Negative for agitation. The patient is not nervous/anxious.      Objective:     Vital Signs (Most Recent):  Temp: 97.6 °F (36.4 °C) (09/10/19 0715)  Pulse: 79 (09/10/19 0845)  Resp: 20 (09/10/19 0715)  BP: (!) 94/59 (09/10/19 0845)  SpO2: 97 % (09/10/19 0757) Vital Signs (24h Range):  Temp:  [97.6 °F (36.4 °C)-99 °F (37.2 °C)] 97.6 °F (36.4 °C)  Pulse:  [] 79  Resp:  [10-52] 20  SpO2:  [97 %-100 %] 97 %  BP: ()/(51-79) 94/59     Weight: 83.7 kg (184 lb 8.4 oz)  Body mass index is 28.06 kg/m².    Intake/Output Summary (Last 24 hours) at 9/10/2019 0925  Last data filed at 9/10/2019 0600  Gross per 24 hour   Intake 3365 ml   Output 3200 ml   Net 165 ml      Physical Exam   Constitutional: He is oriented to person, place, and time. He appears well-developed and well-nourished. No distress.   HENT:   Head: Normocephalic and atraumatic.   Mouth/Throat: Oropharynx is clear and moist.   Eyes: Pupils are equal, round, and reactive to light. Conjunctivae and EOM are normal.   Neck: Normal range of motion. Neck supple.   Cardiovascular: Normal rate, regular rhythm and intact distal pulses.   Murmur heard.  Pulmonary/Chest: Effort normal and breath sounds normal.   Abdominal: Soft. Bowel sounds are normal. He exhibits no  distension. There is no tenderness.   Musculoskeletal: Normal range of motion. He exhibits no edema, tenderness or deformity.   Neurological: He is alert and oriented to person, place, and time. He has normal reflexes. No sensory deficit. He exhibits normal muscle tone.   Skin: Skin is warm and dry. He is not diaphoretic.   Psychiatric: He has a normal mood and affect. His behavior is normal. Judgment and thought content normal.   Nursing note and vitals reviewed.      Significant Labs:   CBC  Recent Labs   Lab 09/08/19 2259 09/09/19  1607 09/09/19  2342 09/10/19  0848   WBC 7.94  --   --   --  5.29   RBC 1.95*  --   --   --  2.20*   HGB 6.6*   < > 6.9* 7.4* 7.0*   HCT 20.5*   < > 20.6* 21.6* 20.9*   *  --   --   --  68*   *  --   --   --  95   MCH 33.8*  --   --   --  31.8*   MCHC 32.2  --   --   --  33.5    < > = values in this interval not displayed.     BMP  Recent Labs   Lab 09/06/19  1404 09/08/19 2259 09/09/19  0513   NA  --  134* 136   K  --  4.0 3.9   CO2  --  21* 23   CL  --  100 105   BUN  --  101* 100*   CREATININE 1.27 1.7* 1.4   GLU  --  159* 136*       Recent Labs   Lab 09/08/19 2259 09/09/19  0513   CALCIUM 7.2* 6.9*     LFT  Recent Labs   Lab 09/08/19 2259   PROT 4.3*   ALBUMIN 2.3*   BILITOT 0.7   AST 17   ALKPHOS 35*   ALT 23       COAGS  Recent Labs   Lab 09/09/19  0810   INR 1.3         Significant Imaging:   MRI brain wo: No acute intracranial abnormality.  Focal area of previously described marrow signal alteration in the clivus has become less apparent in the interval.    EEG pending

## 2019-09-10 NOTE — NURSING
Spoke with Dr. English. Patient having left shoulder pain 6/10. Requesting something for pain. Physician states she is not near computer at the moment. Telephone order for Norco 5-325 q 6 hrs prn pain.

## 2019-09-10 NOTE — ASSESSMENT & PLAN NOTE
- patient with witnessed seizure activity upon arrival in emergency room this may have been from hypotension but will investigate  -Ativan p.r.n. for seizures  -consult Neurology Dr. Earl Torres  -EEG in a.m.  -seizure precautions   - keppra 1 gram IV bid - get imput from Neurologist if to continue Keppra after EEG

## 2019-09-11 LAB
ALBUMIN SERPL BCP-MCNC: 2.1 G/DL (ref 3.5–5.2)
ALP SERPL-CCNC: 31 U/L (ref 55–135)
ALT SERPL W/O P-5'-P-CCNC: 16 U/L (ref 10–44)
ANION GAP SERPL CALC-SCNC: 6 MMOL/L (ref 8–16)
AST SERPL-CCNC: 13 U/L (ref 10–40)
BASOPHILS # BLD AUTO: 0 K/UL (ref 0–0.2)
BASOPHILS NFR BLD: 0 % (ref 0–1.9)
BILIRUB SERPL-MCNC: 0.8 MG/DL (ref 0.1–1)
BLD PROD TYP BPU: NORMAL
BLD PROD TYP BPU: NORMAL
BLOOD UNIT EXPIRATION DATE: NORMAL
BLOOD UNIT EXPIRATION DATE: NORMAL
BLOOD UNIT TYPE CODE: 5100
BLOOD UNIT TYPE CODE: 5100
BLOOD UNIT TYPE: NORMAL
BLOOD UNIT TYPE: NORMAL
BUN SERPL-MCNC: 20 MG/DL (ref 6–20)
CALCIUM SERPL-MCNC: 7 MG/DL (ref 8.7–10.5)
CHLORIDE SERPL-SCNC: 105 MMOL/L (ref 95–110)
CO2 SERPL-SCNC: 27 MMOL/L (ref 23–29)
CODING SYSTEM: NORMAL
CODING SYSTEM: NORMAL
CREAT SERPL-MCNC: 1.1 MG/DL (ref 0.5–1.4)
DIFFERENTIAL METHOD: ABNORMAL
DISPENSE STATUS: NORMAL
DISPENSE STATUS: NORMAL
EOSINOPHIL # BLD AUTO: 0.1 K/UL (ref 0–0.5)
EOSINOPHIL NFR BLD: 2.8 % (ref 0–8)
ERYTHROCYTE [DISTWIDTH] IN BLOOD BY AUTOMATED COUNT: 15.6 % (ref 11.5–14.5)
EST. GFR  (AFRICAN AMERICAN): >60 ML/MIN/1.73 M^2
EST. GFR  (NON AFRICAN AMERICAN): >60 ML/MIN/1.73 M^2
GLUCOSE SERPL-MCNC: 83 MG/DL (ref 70–110)
HCT VFR BLD AUTO: 19.3 % (ref 40–54)
HCT VFR BLD AUTO: 20.1 % (ref 40–54)
HCT VFR BLD AUTO: 20.2 % (ref 40–54)
HCT VFR BLD AUTO: 23.5 % (ref 40–54)
HGB BLD-MCNC: 6.6 G/DL (ref 14–18)
HGB BLD-MCNC: 6.7 G/DL (ref 14–18)
HGB BLD-MCNC: 6.8 G/DL (ref 14–18)
HGB BLD-MCNC: 8 G/DL (ref 14–18)
IMM GRANULOCYTES # BLD AUTO: 0.09 K/UL (ref 0–0.04)
IMM GRANULOCYTES NFR BLD AUTO: 2.3 % (ref 0–0.5)
LYMPHOCYTES # BLD AUTO: 0.3 K/UL (ref 1–4.8)
LYMPHOCYTES NFR BLD: 6.6 % (ref 18–48)
MAGNESIUM SERPL-MCNC: 1.8 MG/DL (ref 1.6–2.6)
MCH RBC QN AUTO: 32.5 PG (ref 27–31)
MCHC RBC AUTO-ENTMCNC: 34.2 G/DL (ref 32–36)
MCV RBC AUTO: 95 FL (ref 82–98)
MONOCYTES # BLD AUTO: 0.3 K/UL (ref 0.3–1)
MONOCYTES NFR BLD: 6.3 % (ref 4–15)
NEUTROPHILS # BLD AUTO: 3.2 K/UL (ref 1.8–7.7)
NEUTROPHILS NFR BLD: 82 % (ref 38–73)
NRBC BLD-RTO: 1 /100 WBC
NUM UNITS TRANS PACKED RBC: NORMAL
NUM UNITS TRANS PACKED RBC: NORMAL
PLATELET # BLD AUTO: 58 K/UL (ref 150–350)
PMV BLD AUTO: 8.9 FL (ref 9.2–12.9)
POTASSIUM SERPL-SCNC: 3.8 MMOL/L (ref 3.5–5.1)
PROT SERPL-MCNC: 4.2 G/DL (ref 6–8.4)
RBC # BLD AUTO: 2.03 M/UL (ref 4.6–6.2)
SODIUM SERPL-SCNC: 138 MMOL/L (ref 136–145)
WBC # BLD AUTO: 3.95 K/UL (ref 3.9–12.7)

## 2019-09-11 PROCEDURE — 83735 ASSAY OF MAGNESIUM: CPT

## 2019-09-11 PROCEDURE — 63600175 PHARM REV CODE 636 W HCPCS: Performed by: NURSE PRACTITIONER

## 2019-09-11 PROCEDURE — 85025 COMPLETE CBC W/AUTO DIFF WBC: CPT

## 2019-09-11 PROCEDURE — 85014 HEMATOCRIT: CPT

## 2019-09-11 PROCEDURE — 85018 HEMOGLOBIN: CPT

## 2019-09-11 PROCEDURE — 25000003 PHARM REV CODE 250

## 2019-09-11 PROCEDURE — 20000000 HC ICU ROOM

## 2019-09-11 PROCEDURE — 36415 COLL VENOUS BLD VENIPUNCTURE: CPT

## 2019-09-11 PROCEDURE — 25000003 PHARM REV CODE 250: Performed by: STUDENT IN AN ORGANIZED HEALTH CARE EDUCATION/TRAINING PROGRAM

## 2019-09-11 PROCEDURE — 94761 N-INVAS EAR/PLS OXIMETRY MLT: CPT

## 2019-09-11 PROCEDURE — 25000003 PHARM REV CODE 250: Performed by: NURSE PRACTITIONER

## 2019-09-11 PROCEDURE — 85014 HEMATOCRIT: CPT | Mod: 91

## 2019-09-11 PROCEDURE — 63600175 PHARM REV CODE 636 W HCPCS: Performed by: INTERNAL MEDICINE

## 2019-09-11 PROCEDURE — C9113 INJ PANTOPRAZOLE SODIUM, VIA: HCPCS | Performed by: INTERNAL MEDICINE

## 2019-09-11 PROCEDURE — 85018 HEMOGLOBIN: CPT | Mod: 91

## 2019-09-11 PROCEDURE — 80053 COMPREHEN METABOLIC PANEL: CPT

## 2019-09-11 PROCEDURE — P9016 RBC LEUKOCYTES REDUCED: HCPCS

## 2019-09-11 RX ORDER — HYDROCODONE BITARTRATE AND ACETAMINOPHEN 500; 5 MG/1; MG/1
TABLET ORAL
Status: DISCONTINUED | OUTPATIENT
Start: 2019-09-11 | End: 2019-09-12

## 2019-09-11 RX ORDER — PANTOPRAZOLE SODIUM 40 MG/10ML
40 INJECTION, POWDER, LYOPHILIZED, FOR SOLUTION INTRAVENOUS DAILY
Status: DISCONTINUED | OUTPATIENT
Start: 2019-09-12 | End: 2019-09-13 | Stop reason: HOSPADM

## 2019-09-11 RX ORDER — PANTOPRAZOLE SODIUM 40 MG/1
40 TABLET, DELAYED RELEASE ORAL 2 TIMES DAILY
Status: DISCONTINUED | OUTPATIENT
Start: 2019-09-11 | End: 2019-09-11

## 2019-09-11 RX ADMIN — HYDROCODONE BITARTRATE AND ACETAMINOPHEN 1 TABLET: 5; 325 TABLET ORAL at 05:09

## 2019-09-11 RX ADMIN — SODIUM CHLORIDE 100 ML/HR: 0.9 INJECTION, SOLUTION INTRAVENOUS at 08:09

## 2019-09-11 RX ADMIN — FLUOXETINE 40 MG: 20 CAPSULE ORAL at 09:09

## 2019-09-11 RX ADMIN — CHLORHEXIDINE GLUCONATE 15 ML: 1.2 RINSE ORAL at 08:09

## 2019-09-11 RX ADMIN — POTASSIUM CHLORIDE 40 MEQ: 750 CAPSULE, EXTENDED RELEASE ORAL at 09:09

## 2019-09-11 RX ADMIN — PANTOPRAZOLE SODIUM 40 MG: 40 INJECTION, POWDER, LYOPHILIZED, FOR SOLUTION INTRAVENOUS at 09:09

## 2019-09-11 RX ADMIN — HYDROCODONE BITARTRATE AND ACETAMINOPHEN 1 TABLET: 5; 325 TABLET ORAL at 12:09

## 2019-09-11 RX ADMIN — HYDROMORPHONE HYDROCHLORIDE 1 MG: 1 INJECTION, SOLUTION INTRAMUSCULAR; INTRAVENOUS; SUBCUTANEOUS at 07:09

## 2019-09-11 RX ADMIN — MUPIROCIN: 20 OINTMENT TOPICAL at 09:09

## 2019-09-11 RX ADMIN — MUPIROCIN: 20 OINTMENT TOPICAL at 08:09

## 2019-09-11 RX ADMIN — CHLORHEXIDINE GLUCONATE 15 ML: 1.2 RINSE ORAL at 09:09

## 2019-09-11 RX ADMIN — CYANOCOBALAMIN TAB 1000 MCG 1000 MCG: 1000 TAB at 09:09

## 2019-09-11 NOTE — PROGRESS NOTES
MD made aware of bright, red BM x1. MD to cancel Tx out of unit, patient to receive EGD in am. New type and screen to be ordered.

## 2019-09-11 NOTE — PLAN OF CARE
Problem: Adult Inpatient Plan of Care  Goal: Plan of Care Review  Patient VSS. No acute distress noted.   H/H 6.7 and 20.2 received 1 unit PRBC. H/H came up to 8/23.5. End of shift, patient had a bright, red stool. MD made aware. Patient to receive EGD марина. SR with stable BP during shift. RA- clear lung sounds. Clear liquid diet- fair intake. NPO to start @midnight. Patient to remain in ICU to closely monitor VS and H/H.

## 2019-09-11 NOTE — ASSESSMENT & PLAN NOTE
- patient with witnessed seizure activity upon arrival in emergency room  -Ativan p.r.n. for seizures  -consult Neurology Dr. Earl Torres, recs to dc Keppra, no driving office Visit  -EEG wnl  -seizure precautions discussed with patient

## 2019-09-11 NOTE — PHYSICIAN QUERY
PT Name: Burton Whiting  MR #: [id number]     Physician Query Form - Etiology of Condition Clarification      CDS/: Danae Pretty RN, CCDS               Contact information:  [phone]  09/12/19:  Withdrawn:  Upper GI endoscopy Procedure Date: 9/12/2019  This form is a permanent document in the medical record.     Query Date: September 11, 2019    By submitting this query, we are merely seeking further clarification of documentation.  Please utilize your independent clinical judgment when addressing the question(s) below.     The medical record contains the following:    Findings Supporting Clinical Information Location in Medical Record   Acute upper GI bleeding -transfuse with 2 units of packed red cell now did re-evaluate for additional transfusions  -IV PPI  -NPO for now  -iron studies  -stool for occult blood x2    admitted with acute onset heavy dark black blood per rectum that started 3 days ago associated with severe fatigue/malaise.  No hematemesis.  Admits to using 6 alleve per day for months.  Prior etoh abuse but none in years.   49 M with SCC recently on immunotherapy which was stopped due to side effects now on steroids in setting of nsaid abuse with acute blood loss anemia concerning for PUD  Ulcer likely combination of immunotherapy side effect with ongoing steroids/nsaids    Patient underwent upper endoscopy this morning which revealed a 12 mm crater clean based ulcer without high-risk stigmata located and duodenal bulb.    -recommend Protonix infusion times 72 hr, check stool H pylori antigen, transition of Protonix p.o. b.i.d. x8 weeks then daily on discharge, repeat upper endoscopy in 6 weeks.  -absolutely no NSAIDs.  -outpatient colonoscopy  -full liquid diet for the next day then advance to soft bland for 3-4 days then as tolerated 09/09/19 H & P            09/09/19 Gastroenterology Consult              09/09/19 Op note      09/09/19 Op note     Please document your best medical opinion  regarding the etiology of Acute upper GI bleeding for which treatment is/was directed.     Provider use only                   [  ] Clinically Undetermined     Please document in your progress notes daily for the duration of treatment, until resolved, and include in your discharge summary.

## 2019-09-11 NOTE — PROGRESS NOTES
Pharmacist Intervention IV to PO Note    Burton Whiting is a 49 y.o. male being treated with IV medication.    Patient Data:    Vital Signs (Most Recent):  Temp: 97.9 °F (36.6 °C) (09/11/19 0928)  Pulse: 78 (09/11/19 0928)  Resp: (!) 23 (09/11/19 0928)  BP: 99/63 (09/11/19 0928)  SpO2: 100 % (09/11/19 0928)   Vital Signs (72h Range):  Temp:  [97.5 °F (36.4 °C)-99 °F (37.2 °C)]   Pulse:  []   Resp:  [10-52]   BP: ()/(50-82)   SpO2:  [83 %-100 %]      CBC:  Recent Labs   Lab 09/08/19  2259  09/10/19  0848  09/11/19  0033 09/11/19 0444 09/11/19 0826   WBC 7.94  --  5.29  --   --  3.95  --    RBC 1.95*  --  2.20*  --   --  2.03*  --    HGB 6.6*   < > 7.0*   < > 6.8* 6.6* 6.7*   HCT 20.5*   < > 20.9*   < > 20.1* 19.3* 20.2*   *  --  68*  --   --  58*  --    *  --  95  --   --  95  --    MCH 33.8*  --  31.8*  --   --  32.5*  --    MCHC 32.2  --  33.5  --   --  34.2  --     < > = values in this interval not displayed.     CMP:     Recent Labs   Lab 09/08/19 2259 09/09/19 0513 09/10/19  0848 09/11/19  0444   * 136* 100 83   CALCIUM 7.2* 6.9* 6.7* 7.0*   ALBUMIN 2.3*  --  2.1* 2.1*   PROT 4.3*  --  4.0* 4.2*   * 136 139 138   K 4.0 3.9 3.9 3.8   CO2 21* 23 27 27    105 106 105   * 100* 40* 20   CREATININE 1.7* 1.4 1.2 1.1   ALKPHOS 35*  --  27* 31*   ALT 23  --  17 16   AST 17  --  15 13   BILITOT 0.7  --  0.9 0.8       Dietary Orders:  Diet Orders            Diet clear liquid: Clear Liquid starting at 09/10 0824            Based on the following criteria, this patient qualifies for intravenous to oral conversion:  [x] The patients gastrointestinal tract is functioning (tolerating medications via oral or enteral route for 24 hours and tolerating food or enteral feeds for 24 hours).    Pantoprazole 40 mg IV BID will be changed to Pantoprazole 40 mg oral tablet BID.    Pharmacist's Name: Suzy Ohara  Pharmacist's Extension: [phone]

## 2019-09-11 NOTE — PROGRESS NOTES
[facility]  Progress Note    Patient Name: Burton Whiting  MRN: [id number]  Patient Class: IP- Inpatient   Admission Date: 9/8/2019  Length of Stay: 2 days  Attending Physician: Lyly English DO  Primary Care Provider: Christopher Turpin DO      Subjective:     Principal Problem:Acute upper GI bleeding      HPI:  49-year-old  male presents emergency room with altered mental status.      According to the patient's brother the patient had been fishing with his friends all day.  His friends brought him back to his brother's house and he was found to be lethargic and have and what looked like seizure activity.  According to the patient's brother the patient having complaint of bloody stools for the past 2 days.  Patient denied associated nausea vomiting hematemesis.  He did endorse generalized weakness, fatigue and nausea.  He also complaints of bilateral lower quadrant abdominal pain.    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  The patient was recently on immuno therapy but this was discontinued secondary to side effects.  The patient's current we on the steroids seem a follows his oncologist at [facility] closely.        Overview/Hospital Course:  9/9 Upon arrival in emergency room the patient was found to be hypotensive when he was placed in a room he had witnessed seizure activity he was given Ativan in diagnostic imaging was performed.  A CT of the head showed no acute findings his CBC did show a hemoglobin of less than 7     9/10 s/p 3 u pRBC H/H 7.0/20.9, s/p EGD show duodenal ulcer. H plyori pending. Pt denies any new signs of bleeding, He denies any bleeding and denies hematuria, hematemesis, hemoptysis. No bm overnight. Seen by neurology and EEG pending. Pt denies seizure activity (shaking, abnormal movements) overight or this  morning.     9/11 s/1 upRBC this morning with h/h improved to 8/23.5. EEG yesterday wnl. He denies any bleeding and denies hematuria, hematemesis, hemoptysis, melena, blood in stool, increased bruising.  He denies abnormal movements, shaking, seizure activity.    Interval History:  As above    Review of Systems   Denies any CP, SOB, N/V/D, headaches, fever or chills.   All other systems negative   Objective:     Vital Signs (Most Recent):  Temp: 98.3 °F (36.8 °C) (09/11/19 1505)  Pulse: 69 (09/11/19 1505)  Resp: 16 (09/11/19 1505)  BP: 100/62 (09/11/19 1505)  SpO2: 98 % (09/11/19 1505) Vital Signs (24h Range):  Temp:  [97.9 °F (36.6 °C)-98.6 °F (37 °C)] 98.3 °F (36.8 °C)  Pulse:  [69-90] 69  Resp:  [10-23] 16  SpO2:  [96 %-100 %] 98 %  BP: ()/(50-65) 100/62     Weight: 83.7 kg (184 lb 8.4 oz)  Body mass index is 28.06 kg/m².    Intake/Output Summary (Last 24 hours) at 9/11/2019 1706  Last data filed at 9/11/2019 1519  Gross per 24 hour   Intake 1389 ml   Output 3600 ml   Net -2211 ml      Physical Exam   Constitutional: He is oriented to person, place, and time. He appears well-developed and well-nourished. No distress.   HENT:   Head: Normocephalic and atraumatic.   Mouth/Throat: Oropharynx is clear and moist.   Eyes: Pupils are equal, round, and reactive to light. Conjunctivae and EOM are normal.   Neck: Normal range of motion. Neck supple.   Cardiovascular: Normal rate, regular rhythm, normal heart sounds and intact distal pulses.   Pulmonary/Chest: Effort normal and breath sounds normal.   Abdominal: Soft. Bowel sounds are normal. He exhibits no distension. There is no tenderness.   Musculoskeletal: Normal range of motion. He exhibits no edema, tenderness or deformity.   Neurological: He is alert and oriented to person, place, and time. He has normal reflexes.   Skin: Skin is warm and dry. He is not diaphoretic.   Psychiatric: He has a normal mood and affect. His behavior is normal. Judgment and thought  "content normal.   Nursing note and vitals reviewed.      Significant Labs:   CBC  Recent Labs   Lab 09/08/19  2259  09/10/19  0848  09/11/19  0444 09/11/19  0826 09/11/19  1533   WBC 7.94  --  5.29  --  3.95  --   --    RBC 1.95*  --  2.20*  --  2.03*  --   --    HGB 6.6*   < > 7.0*   < > 6.6* 6.7* 8.0*   HCT 20.5*   < > 20.9*   < > 19.3* 20.2* 23.5*   *  --  68*  --  58*  --   --    *  --  95  --  95  --   --    MCH 33.8*  --  31.8*  --  32.5*  --   --    MCHC 32.2  --  33.5  --  34.2  --   --     < > = values in this interval not displayed.     BMP  Recent Labs   Lab 09/09/19  0513 09/10/19  0848 09/11/19  0444    139 138   K 3.9 3.9 3.8   CO2 23 27 27    106 105   * 40* 20   CREATININE 1.4 1.2 1.1   * 100 83       Recent Labs   Lab 09/09/19  0513 09/10/19  0848 09/11/19  0444   CALCIUM 6.9* 6.7* 7.0*   MG  --  1.9 1.8     LFT  Recent Labs   Lab 09/08/19  2259 09/10/19  0848 09/11/19  0444   PROT 4.3* 4.0* 4.2*   ALBUMIN 2.3* 2.1* 2.1*   BILITOT 0.7 0.9 0.8   AST 17 15 13   ALKPHOS 35* 27* 31*   ALT 23 17 16       COAGS  Recent Labs   Lab 09/09/19  0810   INR 1.3         Significant Imaging: I have reviewed all pertinent imaging results/findings within the past 24 hours.   EEG per neurology: IMPRESSION: Normal awake and asleep.      Assessment/Plan:      * Acute upper GI bleeding  - s/p 3 units pRBC and s/p 1 unit pRBC (9/11)   -consulted GI, "recommend Protonix infusion times 72 hr, check stool H pylori antigen, transition of Protonix p.o. b.i.d. x8 weeks then daily on discharge, repeat upper endoscopy in 6 weeks. absolutely no NSAIDs. notify our service for overt bleeding dropping hemoglobin outpatient colonoscopy full liquid diet for the next day then advance to soft bland for 3-4 days then as tolerated"  -s/p IV PPI and now on Protonix 40 bid  - monitoring H/h q 8 hours      Thrombocytopenia  -appears stable  -continue to monitor      Squamous cell carcinoma of palatine " tonsil  - follows with Oncology at [facility]      Seizure  - patient with witnessed seizure activity upon arrival in emergency room  -Ativan p.r.n. for seizures  -consult Neurology Dr. Earl Torres, recs to dc Keppra, no driving office Visit  -EEG wnl  -seizure precautions discussed with patient          VTE Risk Mitigation (From admission, onward)        Ordered     IP VTE LOW RISK PATIENT  Once      09/09/19 0222     Place sequential compression device  Until discontinued      09/09/19 0222            Lyly English DO  Department of Hospital Medicine   [facility]

## 2019-09-11 NOTE — PHYSICIAN QUERY
PT Name: Burton Whiting  MR #: [id number]     PHYSICIAN QUERY -  ACUITY OF CONDITION CLARIFICATION      CDS/: Danae Pretty RN, CCDS               Contact information:  [phone]  This form is a permanent document in the medical record.     Query Date: September 11, 2019    By submitting this query, we are merely seeking further clarification of documentation to reflect the severity of illness of your patient. Please utilize your independent clinical judgment when addressing the question(s) below.    The Medical record reflects the following:     Indicators   Supporting Clinical Findings Location in Medical Record   X Documentation of condition Acute upper GI bleeding    Patient underwent upper endoscopy this morning which revealed a 12 mm crater clean based ulcer without high-risk stigmata located and duodenal bulb.    s/p EGD show duodenal ulcer.    Multiple oozing duodenal ulcers with pigmented                         material. Treated with bipolar cautery. 09/09/19 H & P    09/09/19 Op note      09/10/19 Hospital Medicine progress note    Upper GI endoscopy  Procedure Date: 9/12/2019     X Lab Value(s) Lab 09/08/19  2259 09/08/19  2310   WBC 7.94  --    HGB 6.6*  --    HCT 20.5* 17*   *  --       09/09/19 H & P    Radiology Findings     X Treatment                                 Medication -recommend Protonix infusion times 72 hr, check stool H pylori antigen, transition of Protonix p.o. b.i.d. x8 weeks then daily on discharge, repeat upper endoscopy in 6 weeks.  -absolutely no NSAIDs.  -full liquid diet for the next day then advance to soft bland for 3-4 days then as tolerated 09/09/19 Op note   X Other Transfuse with 2 units of packed red cell now did re-evaluate for additional transfusions     admitted with acute onset heavy dark black blood per rectum that started 3 days ago associated with severe fatigue/malaise.  No hematemesis.  Admits to using 6 alleve per day for months.  Prior etoh abuse  but none in years.    49 M with SCC recently on immunotherapy which was stopped due to side effects now on steroids in setting of nsaid abuse with acute blood loss anemia concerning for PUD  Ulcer likely combination of immunotherapy side effect with ongoing steroids/nsaids    9/10 s/p 3 u pRBC H/H 7.0/20.9,  09/09/19 H & P    09/09/19 Gastroenterology Consult        09/09/19 Gastroenterology Consult        09/10/19 Hospital Medicine progress note     Provider, please specify the acuity/chronicity of duodenal ulcer:    [   ] Acute; Not chronic   [   ] Other (specify)   [   ]  Clinically Undetermined       Please document in your progress notes daily for the duration of treatment until resolved, and include in your discharge summary.

## 2019-09-11 NOTE — SUBJECTIVE & OBJECTIVE
Interval History:  As above    Review of Systems   Denies any CP, SOB, N/V/D, headaches, fever or chills.   All other systems negative   Objective:     Vital Signs (Most Recent):  Temp: 98.3 °F (36.8 °C) (09/11/19 1505)  Pulse: 69 (09/11/19 1505)  Resp: 16 (09/11/19 1505)  BP: 100/62 (09/11/19 1505)  SpO2: 98 % (09/11/19 1505) Vital Signs (24h Range):  Temp:  [97.9 °F (36.6 °C)-98.6 °F (37 °C)] 98.3 °F (36.8 °C)  Pulse:  [69-90] 69  Resp:  [10-23] 16  SpO2:  [96 %-100 %] 98 %  BP: ()/(50-65) 100/62     Weight: 83.7 kg (184 lb 8.4 oz)  Body mass index is 28.06 kg/m².    Intake/Output Summary (Last 24 hours) at 9/11/2019 1706  Last data filed at 9/11/2019 1519  Gross per 24 hour   Intake 1389 ml   Output 3600 ml   Net -2211 ml      Physical Exam   Constitutional: He is oriented to person, place, and time. He appears well-developed and well-nourished. No distress.   HENT:   Head: Normocephalic and atraumatic.   Mouth/Throat: Oropharynx is clear and moist.   Eyes: Pupils are equal, round, and reactive to light. Conjunctivae and EOM are normal.   Neck: Normal range of motion. Neck supple.   Cardiovascular: Normal rate, regular rhythm, normal heart sounds and intact distal pulses.   Pulmonary/Chest: Effort normal and breath sounds normal.   Abdominal: Soft. Bowel sounds are normal. He exhibits no distension. There is no tenderness.   Musculoskeletal: Normal range of motion. He exhibits no edema, tenderness or deformity.   Neurological: He is alert and oriented to person, place, and time. He has normal reflexes.   Skin: Skin is warm and dry. He is not diaphoretic.   Psychiatric: He has a normal mood and affect. His behavior is normal. Judgment and thought content normal.   Nursing note and vitals reviewed.      Significant Labs:   CBC  Recent Labs   Lab 09/08/19  9789  09/10/19  0848  09/11/19  0444 09/11/19  0826 09/11/19  1533   WBC 7.94  --  5.29  --  3.95  --   --    RBC 1.95*  --  2.20*  --  2.03*  --   --     HGB 6.6*   < > 7.0*   < > 6.6* 6.7* 8.0*   HCT 20.5*   < > 20.9*   < > 19.3* 20.2* 23.5*   *  --  68*  --  58*  --   --    *  --  95  --  95  --   --    MCH 33.8*  --  31.8*  --  32.5*  --   --    MCHC 32.2  --  33.5  --  34.2  --   --     < > = values in this interval not displayed.     BMP  Recent Labs   Lab 09/09/19  0513 09/10/19  0848 09/11/19  0444    139 138   K 3.9 3.9 3.8   CO2 23 27 27    106 105   * 40* 20   CREATININE 1.4 1.2 1.1   * 100 83       Recent Labs   Lab 09/09/19 0513 09/10/19  0848 09/11/19  0444   CALCIUM 6.9* 6.7* 7.0*   MG  --  1.9 1.8     LFT  Recent Labs   Lab 09/08/19  2259 09/10/19  0848 09/11/19  0444   PROT 4.3* 4.0* 4.2*   ALBUMIN 2.3* 2.1* 2.1*   BILITOT 0.7 0.9 0.8   AST 17 15 13   ALKPHOS 35* 27* 31*   ALT 23 17 16       COAGS  Recent Labs   Lab 09/09/19  0810   INR 1.3         Significant Imaging: I have reviewed all pertinent imaging results/findings within the past 24 hours.   EEG per neurology: IMPRESSION: Normal awake and asleep.

## 2019-09-11 NOTE — ASSESSMENT & PLAN NOTE
"- s/p 3 units pRBC and s/p 1 unit pRBC (9/11)   -consulted GI, "recommend Protonix infusion times 72 hr, check stool H pylori antigen, transition of Protonix p.o. b.i.d. x8 weeks then daily on discharge, repeat upper endoscopy in 6 weeks. absolutely no NSAIDs. notify our service for overt bleeding dropping hemoglobin outpatient colonoscopy full liquid diet for the next day then advance to soft bland for 3-4 days then as tolerated"  -s/p IV PPI and now on Protonix 40 bid  - monitoring H/h q 8 hours    "

## 2019-09-12 LAB
ABO + RH BLD: NORMAL
ALBUMIN SERPL BCP-MCNC: 2.2 G/DL (ref 3.5–5.2)
ALP SERPL-CCNC: 36 U/L (ref 55–135)
ALT SERPL W/O P-5'-P-CCNC: 17 U/L (ref 10–44)
ANION GAP SERPL CALC-SCNC: 6 MMOL/L (ref 8–16)
AST SERPL-CCNC: 12 U/L (ref 10–40)
BASOPHILS # BLD AUTO: 0.01 K/UL (ref 0–0.2)
BASOPHILS NFR BLD: 0.3 % (ref 0–1.9)
BILIRUB SERPL-MCNC: 0.7 MG/DL (ref 0.1–1)
BLD GP AB SCN CELLS X3 SERPL QL: NORMAL
BLD PROD TYP BPU: NORMAL
BLOOD UNIT EXPIRATION DATE: NORMAL
BLOOD UNIT TYPE CODE: 5100
BLOOD UNIT TYPE: NORMAL
BUN SERPL-MCNC: 13 MG/DL (ref 6–20)
CA-I BLDV-SCNC: 1 MMOL/L (ref 1.06–1.42)
CALCIUM SERPL-MCNC: 6.8 MG/DL (ref 8.7–10.5)
CHLORIDE SERPL-SCNC: 102 MMOL/L (ref 95–110)
CO2 SERPL-SCNC: 27 MMOL/L (ref 23–29)
CODING SYSTEM: NORMAL
CREAT SERPL-MCNC: 1.1 MG/DL (ref 0.5–1.4)
DIFFERENTIAL METHOD: ABNORMAL
DISPENSE STATUS: NORMAL
EOSINOPHIL # BLD AUTO: 0.1 K/UL (ref 0–0.5)
EOSINOPHIL NFR BLD: 2.1 % (ref 0–8)
ERYTHROCYTE [DISTWIDTH] IN BLOOD BY AUTOMATED COUNT: 16.5 % (ref 11.5–14.5)
EST. GFR  (AFRICAN AMERICAN): >60 ML/MIN/1.73 M^2
EST. GFR  (NON AFRICAN AMERICAN): >60 ML/MIN/1.73 M^2
GLUCOSE SERPL-MCNC: 95 MG/DL (ref 70–110)
HCT VFR BLD AUTO: 22.8 % (ref 40–54)
HCT VFR BLD AUTO: 25.8 % (ref 40–54)
HCT VFR BLD AUTO: 26.3 % (ref 40–54)
HCT VFR BLD AUTO: 26.5 % (ref 40–54)
HGB BLD-MCNC: 7.6 G/DL (ref 14–18)
HGB BLD-MCNC: 8.5 G/DL (ref 14–18)
HGB BLD-MCNC: 8.8 G/DL (ref 14–18)
HGB BLD-MCNC: 8.9 G/DL (ref 14–18)
IMM GRANULOCYTES # BLD AUTO: 0.07 K/UL (ref 0–0.04)
IMM GRANULOCYTES NFR BLD AUTO: 1.9 % (ref 0–0.5)
LYMPHOCYTES # BLD AUTO: 0.2 K/UL (ref 1–4.8)
LYMPHOCYTES NFR BLD: 5.8 % (ref 18–48)
MAGNESIUM SERPL-MCNC: 1.7 MG/DL (ref 1.6–2.6)
MCH RBC QN AUTO: 31.8 PG (ref 27–31)
MCHC RBC AUTO-ENTMCNC: 33.3 G/DL (ref 32–36)
MCV RBC AUTO: 95 FL (ref 82–98)
MONOCYTES # BLD AUTO: 0.3 K/UL (ref 0.3–1)
MONOCYTES NFR BLD: 7.9 % (ref 4–15)
NEUTROPHILS # BLD AUTO: 3.1 K/UL (ref 1.8–7.7)
NEUTROPHILS NFR BLD: 82 % (ref 38–73)
NRBC BLD-RTO: 0 /100 WBC
NUM UNITS TRANS PACKED RBC: NORMAL
PLATELET # BLD AUTO: 61 K/UL (ref 150–350)
PMV BLD AUTO: 8.6 FL (ref 9.2–12.9)
POTASSIUM SERPL-SCNC: 3.9 MMOL/L (ref 3.5–5.1)
PROT SERPL-MCNC: 4.5 G/DL (ref 6–8.4)
RBC # BLD AUTO: 2.39 M/UL (ref 4.6–6.2)
SODIUM SERPL-SCNC: 135 MMOL/L (ref 136–145)
WBC # BLD AUTO: 3.78 K/UL (ref 3.9–12.7)

## 2019-09-12 PROCEDURE — 36415 COLL VENOUS BLD VENIPUNCTURE: CPT

## 2019-09-12 PROCEDURE — 99152 MOD SED SAME PHYS/QHP 5/>YRS: CPT | Performed by: INTERNAL MEDICINE

## 2019-09-12 PROCEDURE — 63600175 PHARM REV CODE 636 W HCPCS: Performed by: NURSE PRACTITIONER

## 2019-09-12 PROCEDURE — 12000002 HC ACUTE/MED SURGE SEMI-PRIVATE ROOM

## 2019-09-12 PROCEDURE — 25000003 PHARM REV CODE 250

## 2019-09-12 PROCEDURE — 25000003 PHARM REV CODE 250: Performed by: NURSE PRACTITIONER

## 2019-09-12 PROCEDURE — 83735 ASSAY OF MAGNESIUM: CPT

## 2019-09-12 PROCEDURE — 94761 N-INVAS EAR/PLS OXIMETRY MLT: CPT

## 2019-09-12 PROCEDURE — 85014 HEMATOCRIT: CPT

## 2019-09-12 PROCEDURE — 63600175 PHARM REV CODE 636 W HCPCS: Performed by: FAMILY MEDICINE

## 2019-09-12 PROCEDURE — 25000003 PHARM REV CODE 250: Performed by: STUDENT IN AN ORGANIZED HEALTH CARE EDUCATION/TRAINING PROGRAM

## 2019-09-12 PROCEDURE — C9113 INJ PANTOPRAZOLE SODIUM, VIA: HCPCS | Performed by: STUDENT IN AN ORGANIZED HEALTH CARE EDUCATION/TRAINING PROGRAM

## 2019-09-12 PROCEDURE — 86850 RBC ANTIBODY SCREEN: CPT

## 2019-09-12 PROCEDURE — 85018 HEMOGLOBIN: CPT

## 2019-09-12 PROCEDURE — 80053 COMPREHEN METABOLIC PANEL: CPT

## 2019-09-12 PROCEDURE — 96374 THER/PROPH/DIAG INJ IV PUSH: CPT | Performed by: INTERNAL MEDICINE

## 2019-09-12 PROCEDURE — 63600175 PHARM REV CODE 636 W HCPCS: Performed by: STUDENT IN AN ORGANIZED HEALTH CARE EDUCATION/TRAINING PROGRAM

## 2019-09-12 PROCEDURE — 86920 COMPATIBILITY TEST SPIN: CPT

## 2019-09-12 PROCEDURE — P9016 RBC LEUKOCYTES REDUCED: HCPCS

## 2019-09-12 PROCEDURE — 99153 MOD SED SAME PHYS/QHP EA: CPT | Performed by: INTERNAL MEDICINE

## 2019-09-12 PROCEDURE — 63600175 PHARM REV CODE 636 W HCPCS: Performed by: INTERNAL MEDICINE

## 2019-09-12 PROCEDURE — 85018 HEMOGLOBIN: CPT | Mod: 91

## 2019-09-12 PROCEDURE — 85014 HEMATOCRIT: CPT | Mod: 91

## 2019-09-12 PROCEDURE — 85025 COMPLETE CBC W/AUTO DIFF WBC: CPT

## 2019-09-12 PROCEDURE — 82330 ASSAY OF CALCIUM: CPT

## 2019-09-12 PROCEDURE — 27201038 HC PROBE, BI-POLAR: Performed by: INTERNAL MEDICINE

## 2019-09-12 PROCEDURE — 63600175 PHARM REV CODE 636 W HCPCS: Mod: JG

## 2019-09-12 PROCEDURE — 43255 EGD CONTROL BLEEDING ANY: CPT | Performed by: INTERNAL MEDICINE

## 2019-09-12 RX ORDER — FENTANYL CITRATE 50 UG/ML
INJECTION, SOLUTION INTRAMUSCULAR; INTRAVENOUS
Status: COMPLETED | OUTPATIENT
Start: 2019-09-12 | End: 2019-09-12

## 2019-09-12 RX ORDER — DIAZEPAM 10 MG/2ML
INJECTION INTRAMUSCULAR
Status: COMPLETED | OUTPATIENT
Start: 2019-09-12 | End: 2019-09-12

## 2019-09-12 RX ORDER — HYDROCODONE BITARTRATE AND ACETAMINOPHEN 500; 5 MG/1; MG/1
TABLET ORAL
Status: DISCONTINUED | OUTPATIENT
Start: 2019-09-12 | End: 2019-09-13 | Stop reason: HOSPADM

## 2019-09-12 RX ORDER — MIDAZOLAM HYDROCHLORIDE 1 MG/ML
INJECTION INTRAMUSCULAR; INTRAVENOUS
Status: COMPLETED | OUTPATIENT
Start: 2019-09-12 | End: 2019-09-12

## 2019-09-12 RX ORDER — GLUCAGON 1 MG
KIT INJECTION
Status: COMPLETED | OUTPATIENT
Start: 2019-09-12 | End: 2019-09-12

## 2019-09-12 RX ORDER — LIDOCAINE 50 MG/G
1 PATCH TOPICAL
Status: DISCONTINUED | OUTPATIENT
Start: 2019-09-12 | End: 2019-09-13 | Stop reason: HOSPADM

## 2019-09-12 RX ORDER — MAGNESIUM SULFATE 1 G/100ML
1 INJECTION INTRAVENOUS ONCE
Status: COMPLETED | OUTPATIENT
Start: 2019-09-12 | End: 2019-09-12

## 2019-09-12 RX ADMIN — MIDAZOLAM HYDROCHLORIDE 1 MG: 1 INJECTION, SOLUTION INTRAMUSCULAR; INTRAVENOUS at 07:09

## 2019-09-12 RX ADMIN — MAGNESIUM SULFATE IN DEXTROSE 1 G: 10 INJECTION, SOLUTION INTRAVENOUS at 05:09

## 2019-09-12 RX ADMIN — MUPIROCIN: 20 OINTMENT TOPICAL at 09:09

## 2019-09-12 RX ADMIN — CHLORHEXIDINE GLUCONATE 15 ML: 1.2 RINSE ORAL at 09:09

## 2019-09-12 RX ADMIN — PANTOPRAZOLE SODIUM 40 MG: 40 INJECTION, POWDER, FOR SOLUTION INTRAVENOUS at 09:09

## 2019-09-12 RX ADMIN — POTASSIUM CHLORIDE 20 MEQ: 750 CAPSULE, EXTENDED RELEASE ORAL at 09:09

## 2019-09-12 RX ADMIN — FENTANYL CITRATE 50 MCG: 50 INJECTION INTRAMUSCULAR; INTRAVENOUS at 07:09

## 2019-09-12 RX ADMIN — HYDROCODONE BITARTRATE AND ACETAMINOPHEN 1 TABLET: 5; 325 TABLET ORAL at 09:09

## 2019-09-12 RX ADMIN — FENTANYL CITRATE 25 MCG: 50 INJECTION INTRAMUSCULAR; INTRAVENOUS at 07:09

## 2019-09-12 RX ADMIN — CYANOCOBALAMIN TAB 1000 MCG 1000 MCG: 1000 TAB at 09:09

## 2019-09-12 RX ADMIN — HYDROMORPHONE HYDROCHLORIDE 1 MG: 1 INJECTION, SOLUTION INTRAMUSCULAR; INTRAVENOUS; SUBCUTANEOUS at 04:09

## 2019-09-12 RX ADMIN — GLUCAGON 0.5 MG: KIT at 07:09

## 2019-09-12 RX ADMIN — MIDAZOLAM HYDROCHLORIDE 2 MG: 1 INJECTION, SOLUTION INTRAMUSCULAR; INTRAVENOUS at 07:09

## 2019-09-12 RX ADMIN — FLUOXETINE 40 MG: 20 CAPSULE ORAL at 01:09

## 2019-09-12 RX ADMIN — LIDOCAINE 1 PATCH: 50 PATCH TOPICAL at 09:09

## 2019-09-12 RX ADMIN — DIAZEPAM 5 MG: 5 INJECTION, SOLUTION INTRAMUSCULAR; INTRAVENOUS at 07:09

## 2019-09-12 RX ADMIN — CALCIUM GLUCONATE 1000 MG: 98 INJECTION, SOLUTION INTRAVENOUS at 07:09

## 2019-09-12 NOTE — NURSING
egd complete  Per  Dr hearn pt asleep vss  Phone  Order received  1520 for transfusion of  1 unit prbc

## 2019-09-12 NOTE — PROGRESS NOTES
[facility] Medicine  Progress Note    Patient Name: Burton Whiting  MRN: [id number]  Patient Class: IP- Inpatient   Admission Date: 9/8/2019  Length of Stay: 3 days  Attending Physician: Lyly English DO  Primary Care Provider: Christopher Turpin DO      Subjective:     Principal Problem:Acute upper GI bleeding      HPI:  49-year-old  male presents emergency room with altered mental status.      According to the patient's brother the patient had been fishing with his friends all day.  His friends brought him back to his brother's house and he was found to be lethargic and have and what looked like seizure activity.  According to the patient's brother the patient having complaint of bloody stools for the past 2 days.  Patient denied associated nausea vomiting hematemesis.  He did endorse generalized weakness, fatigue and nausea.  He also complaints of bilateral lower quadrant abdominal pain.    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  The patient was recently on immuno therapy but this was discontinued secondary to side effects.  The patient's current we on the steroids seem a follows his oncologist at [facility] closely.        Overview/Hospital Course:  9/9 Upon arrival in emergency room the patient was found to be hypotensive when he was placed in a room he had witnessed seizure activity he was given Ativan in diagnostic imaging was performed.  A CT of the head showed no acute findings his CBC did show a hemoglobin of less than 7     9/10 s/p 3 u pRBC H/H 7.0/20.9, s/p EGD show duodenal ulcer. H plyori pending. Pt denies any new signs of bleeding, He denies any bleeding and denies hematuria, hematemesis, hemoptysis. No bm overnight. Seen by neurology and EEG pending. Pt denies seizure activity (shaking, abnormal movements) overight or this  morning.     9/11 s/1 upRBC this morning with h/h improved to 8/23.5. EEG yesterday wnl. He denies any bleeding and denies hematuria, hematemesis, hemoptysis, melena, blood in stool, increased bruising.  He denies abnormal movements, shaking, seizure activity.    9/12:  Patient was noted to bright red blood per rectum yesterday evening.  GI was consulted and planned for EGD.  EGD performed this morning showing to duodenal ulcers which were successfully cauterized.  Hemoglobin dropped from 8-7.6 overnight and 1 unit of PRBCs was given today.  On exam patient states feels some fatigue.  Denies any CP, SOB, N/V/D, headaches, fever or chills.  No seizure activity. No bleeding since yesterday.     Interval History:  As above    Review of Systems   Constitutional: Positive for fatigue. Negative for activity change, chills and fever.   HENT: Negative for congestion, rhinorrhea and sore throat.    Eyes: Negative for visual disturbance.   Respiratory: Negative for cough, chest tightness and shortness of breath.    Cardiovascular: Negative for chest pain, palpitations and leg swelling.   Gastrointestinal: Negative for abdominal pain, constipation, diarrhea, nausea and vomiting.   Genitourinary: Negative for dysuria and hematuria.   Musculoskeletal: Negative for arthralgias and myalgias.   Skin: Negative for rash.   Neurological: Negative for dizziness, light-headedness and headaches.   Psychiatric/Behavioral: Negative for agitation. The patient is not nervous/anxious.      Objective:     Vital Signs (Most Recent):  Temp: 98.8 °F (37.1 °C) (09/12/19 0823)  Pulse: 62 (09/12/19 1045)  Resp: 11 (09/12/19 1045)  BP: 108/65 (09/12/19 1000)  SpO2: 99 % (09/12/19 1045) Vital Signs (24h Range):  Temp:  [98 °F (36.7 °C)-98.9 °F (37.2 °C)] 98.8 °F (37.1 °C)  Pulse:  [62-94] 62  Resp:  [7-26] 11  SpO2:  [76 %-100 %] 99 %  BP: ()/(53-75) 108/65     Weight: 78.8 kg (173 lb 11.6 oz)  Body mass index is 26.41 kg/m².    Intake/Output  Summary (Last 24 hours) at 9/12/2019 1404  Last data filed at 9/12/2019 1045  Gross per 24 hour   Intake 2186.67 ml   Output 1800 ml   Net 386.67 ml      Physical Exam   Constitutional: He is oriented to person, place, and time. He appears well-developed and well-nourished. No distress.   HENT:   Head: Normocephalic and atraumatic.   Mouth/Throat: Oropharynx is clear and moist.   Eyes: Pupils are equal, round, and reactive to light. Conjunctivae and EOM are normal.   Neck: Normal range of motion. Neck supple.   Cardiovascular: Normal rate, regular rhythm, normal heart sounds and intact distal pulses.   Pulmonary/Chest: Effort normal and breath sounds normal.   Abdominal: Soft. Bowel sounds are normal. He exhibits no distension. There is no tenderness.   Musculoskeletal: Normal range of motion. He exhibits no edema, tenderness or deformity.   Neurological: He is alert and oriented to person, place, and time. He has normal reflexes.   Skin: Skin is warm and dry. He is not diaphoretic.   Psychiatric: He has a normal mood and affect. His behavior is normal. Judgment and thought content normal.   Nursing note and vitals reviewed.      Significant Labs:   BMP:   Recent Labs   Lab 09/12/19  0332   GLU 95   *   K 3.9      CO2 27   BUN 13   CREATININE 1.1   CALCIUM 6.8*   MG 1.7     CBC:   Recent Labs   Lab 09/11/19  0444  09/11/19  1533 09/12/19  0001 09/12/19  0332   WBC 3.95  --   --   --  3.78*   HGB 6.6*   < > 8.0* 8.9* 7.6*   HCT 19.3*   < > 23.5* 26.5* 22.8*   PLT 58*  --   --   --  61*    < > = values in this interval not displayed.     All pertinent labs within the past 24 hours have been reviewed.    Significant Imaging:   EGD per chart review  Findings:       The esophagus was normal.       Scattered mild inflammation characterized by congestion (edema),        erosions and erythema was found in the gastric body and in the        gastric antrum.       Two oozing cratered duodenal ulcers with pigmented  material were        found in the duodenal bulb. The largest lesion was 15 mm in largest        dimension. Coagulation for hemostasis using bipolar probe was        successful.         Assessment/Plan:      * Acute upper GI bleeding  - s/p 3 units pRBC and s/p 1 unit pRBC (9/11) and will transfuse one unit today (9/12)  - GI consulted, thanks for assistance   -s/p EGD 9/9 : One non-bleeding duodenal ulcer with no stigmata of bleeding   -BRBPR yesterday evening (9/11)  -Repeat EGD today 9/12 with hemostasis of two bruising duodenal ulcers.  -Protonix 40 bid x 4-6 weeks and indefinitely if patient needs NSAIDs.  -No NSAIDs for at least 1 month.  -monitoring H/h q 8 hours      Thrombocytopenia  platelets today 61, down from 123 at admission  Could partially be delusional from blood transfusions.  Will continue to monitor if patient rebleeds or requires more transfusions will consider platelet transfusion as well.      Squamous cell carcinoma of palatine tonsil  - follows with Oncology at [facility] [place]      Seizure  - patient with witnessed seizure activity upon arrival in emergency room  -Ativan p.r.n. for seizures  -Consult Neurology Dr. Earl Torres, recs to dc Keppra, no driving until office Visit  -EEG wnl  -seizure precautions discussed with patient          VTE Risk Mitigation (From admission, onward)        Ordered     IP VTE LOW RISK PATIENT  Once      09/09/19 0222     Place sequential compression device  Until discontinued      09/09/19 0222          Dispo: will follow up post transfusion h/h and if stable with step to floor      Lyly English DO  Department of Hospital Medicine   [facility]

## 2019-09-12 NOTE — NURSING
Placed call to Dr Bermudez regarding pt's labs. H/H 7.6 /22.8 . No bleeding noted from this shift.  Other labs result given. Aware of it. Will hold off blood transfusion for now.

## 2019-09-12 NOTE — PLAN OF CARE
Problem: Adult Inpatient Plan of Care  Goal: Plan of Care Review     09/11/19 2000   Plan of Care Review   Plan of Care Reviewed With patient   Outcome Summary follow up blood works and EGD for tommorrow

## 2019-09-12 NOTE — PLAN OF CARE
Problem: Oral Intake Inadequate  Goal: Improved Oral Intake  Outcome: Ongoing (interventions implemented as appropriate)  Encourage PO intake of meals and supplements.

## 2019-09-12 NOTE — NURSING
Placed call to Dr Bermudez re Ionized Ca 1.0 with order.  0630 Endoscopy RN called. Pt been NPO since yesterday evening.  0700 Endo team set up at bedside. Placed call to Pt's brother that pt is going to have procedure done.

## 2019-09-12 NOTE — ASSESSMENT & PLAN NOTE
- s/p 3 units pRBC and s/p 1 unit pRBC (9/11) and will transfuse one unit today (9/12)  - GI consulted, thanks for assistance   -s/p EGD 9/9 : One non-bleeding duodenal ulcer with no stigmata of bleeding   -BRBPR yesterday evening (9/11)  -Repeat EGD today 9/12 with hemostasis of two bruising duodenal ulcers.  -Protonix 40 bid x 4-6 weeks and indefinitely if patient needs NSAIDs.  -No NSAIDs for at least 1 month.  -monitoring H/h q 8 hours

## 2019-09-12 NOTE — SUBJECTIVE & OBJECTIVE
Interval History:  As above    Review of Systems   Constitutional: Positive for fatigue. Negative for activity change, chills and fever.   HENT: Negative for congestion, rhinorrhea and sore throat.    Eyes: Negative for visual disturbance.   Respiratory: Negative for cough, chest tightness and shortness of breath.    Cardiovascular: Negative for chest pain, palpitations and leg swelling.   Gastrointestinal: Negative for abdominal pain, constipation, diarrhea, nausea and vomiting.   Genitourinary: Negative for dysuria and hematuria.   Musculoskeletal: Negative for arthralgias and myalgias.   Skin: Negative for rash.   Neurological: Negative for dizziness, light-headedness and headaches.   Psychiatric/Behavioral: Negative for agitation. The patient is not nervous/anxious.      Objective:     Vital Signs (Most Recent):  Temp: 98.8 °F (37.1 °C) (09/12/19 0823)  Pulse: 62 (09/12/19 1045)  Resp: 11 (09/12/19 1045)  BP: 108/65 (09/12/19 1000)  SpO2: 99 % (09/12/19 1045) Vital Signs (24h Range):  Temp:  [98 °F (36.7 °C)-98.9 °F (37.2 °C)] 98.8 °F (37.1 °C)  Pulse:  [62-94] 62  Resp:  [7-26] 11  SpO2:  [76 %-100 %] 99 %  BP: ()/(53-75) 108/65     Weight: 78.8 kg (173 lb 11.6 oz)  Body mass index is 26.41 kg/m².    Intake/Output Summary (Last 24 hours) at 9/12/2019 1404  Last data filed at 9/12/2019 1045  Gross per 24 hour   Intake 2186.67 ml   Output 1800 ml   Net 386.67 ml      Physical Exam   Constitutional: He is oriented to person, place, and time. He appears well-developed and well-nourished. No distress.   HENT:   Head: Normocephalic and atraumatic.   Mouth/Throat: Oropharynx is clear and moist.   Eyes: Pupils are equal, round, and reactive to light. Conjunctivae and EOM are normal.   Neck: Normal range of motion. Neck supple.   Cardiovascular: Normal rate, regular rhythm, normal heart sounds and intact distal pulses.   Pulmonary/Chest: Effort normal and breath sounds normal.   Abdominal: Soft. Bowel sounds are  normal. He exhibits no distension. There is no tenderness.   Musculoskeletal: Normal range of motion. He exhibits no edema, tenderness or deformity.   Neurological: He is alert and oriented to person, place, and time. He has normal reflexes.   Skin: Skin is warm and dry. He is not diaphoretic.   Psychiatric: He has a normal mood and affect. His behavior is normal. Judgment and thought content normal.   Nursing note and vitals reviewed.      Significant Labs:   BMP:   Recent Labs   Lab 09/12/19  0332   GLU 95   *   K 3.9      CO2 27   BUN 13   CREATININE 1.1   CALCIUM 6.8*   MG 1.7     CBC:   Recent Labs   Lab 09/11/19  0444  09/11/19  1533 09/12/19  0001 09/12/19  0332   WBC 3.95  --   --   --  3.78*   HGB 6.6*   < > 8.0* 8.9* 7.6*   HCT 19.3*   < > 23.5* 26.5* 22.8*   PLT 58*  --   --   --  61*    < > = values in this interval not displayed.     All pertinent labs within the past 24 hours have been reviewed.    Significant Imaging:   EGD: Findings:       The esophagus was normal.       Scattered mild inflammation characterized by congestion (edema),        erosions and erythema was found in the gastric body and in the        gastric antrum.       Two oozing cratered duodenal ulcers with pigmented material were        found in the duodenal bulb. The largest lesion was 15 mm in largest        dimension. Coagulation for hemostasis using bipolar probe was        successful.

## 2019-09-12 NOTE — PROGRESS NOTES
"[facility]  Adult Nutrition  Progress Note    SUMMARY       Recommendations    Recommendation/Intervention:   1. RD added ensure enlive TID (to provide 1050 kcal/day and 60 g/day protein).   2. Encourage PO intake of meals and supplements.     Goals:   Patient to meeet at least 75% of etimated needs PO via meals and supplements.     Nutrition Goal Status: new    Reason for Assessment    Reason For Assessment: other (see comments)(ICU status )  Diagnosis: cancer diagnosis/related complications  Relevant Medical History: acute upper GI bleed, squamous cell carcinoma of palatine tonsil, low vitamin D level    Nutrition Risk Screen    Nutrition Risk Screen: no indicators present    Nutrition/Diet History    Patient Reported Diet/Restrictions/Preferences: other (see comments)(N/A)  Spiritual, Cultural Beliefs, Mosque Practices, Values that Affect Care: no  Food Allergies: NKFA  Factors Affecting Nutritional Intake: altered gastrointestinal function    Anthropometrics    Temp: 98.8 °F (37.1 °C)  Height: 5' 8" (172.7 cm)  Height (inches): 68 in  Weight Method: Bed Scale  Weight: 78.8 kg (173 lb 11.6 oz)  Weight (lb): 173.72 lb  Ideal Body Weight (IBW), Male: 154 lb  % Ideal Body Weight, Male (lb): 119.82 lb  BMI (Calculated): 28.1  BMI Grade: 25 - 29.9 - overweight       Lab/Procedures/Meds    Pertinent Labs Reviewed: reviewed     9/12/2019 03:32   WBC 3.78 (L)   RBC 2.39 (L)   Hemoglobin 7.6 (L)   Hematocrit 22.8 (L)   MCV 95   MCH 31.8 (H)   MCHC 33.3   RDW 16.5 (H)   Platelets 61 (L)      9/12/2019 03:32   Sodium 135 (L)   Potassium 3.9   Chloride 102   CO2 27   Anion Gap 6 (L)   BUN, Bld 13   Creatinine 1.1   eGFR if non African American >60.0   Glucose 95   Calcium 6.8 (LL)      9/12/2019 05:35   Calcium, Ion 1.00 (L)      9/12/2019 03:32   Magnesium 1.7   Alkaline Phosphatase 36 (L)   PROTEIN TOTAL 4.5 (L)   Albumin 2.2 (L)   BILIRUBIN TOTAL 0.7   AST 12   ALT 17      Ref. Range 1/4/2018 16:30 " 4/10/2019 08:06   Vit D, 25-Hydroxy Latest Ref Range: 30 - 96 ng/mL 22 (L) 47     Pertinent Medications Reviewed: reviewed  Scheduled Meds:   chlorhexidine  15 mL Mouth/Throat BID    cyanocobalamin  1,000 mcg Oral Daily    FLUoxetine  40 mg Oral Daily    lidocaine  1 patch Transdermal Q24H    mupirocin   Nasal BID    pantoprazole  40 mg Intravenous Daily    potassium chloride  40 mEq Oral Daily     Continuous Infusions:   sodium chloride 0.9% 100 mL/hr at 09/12/19 0701     PRN Meds:.sodium chloride, acetaminophen, diazePAM, diphenhydrAMINE, fentaNYL, glucagon (human recombinant), HYDROcodone-acetaminophen, HYDROmorphone, lorazepam, midazolam, promethazine (PHENERGAN) IVPB, sodium chloride 0.9%     Estimated/Assessed Needs    Weight Used For Calorie Calculations: 78.8 kg (173 lb 11.6 oz)  Energy Calorie Requirements (kcal): 1970 - 2364 (25 - 30)   Energy Need Method: Kcal/kg  Protein Requirements: 95 - 119 (1.2 - 1.5)   Weight Used For Protein Calculations: 78.8 kg (173 lb 11.6 oz)     Estimated Fluid Requirement Method: RDA Method  RDA Method (mL): 1970       Nutrition Prescription Ordered    Current Diet Order: [place]     Evaluation of Received Nutrient/Fluid Intake    I/O:   Intake/Output - Last 3 Shifts       09/10 0700 - 09/11 0659 09/11 0700 - 09/12 0659 09/12 0700 - 09/13 0659    P.O. 750 400     I.V. (mL/kg) 1900 (22.7) 1386.7 (17.6)     Blood  289 300    IV Piggyback 100  100    Total Intake(mL/kg) 2750 (32.9) 2075.7 (26.3) 400 (5.1)    Urine (mL/kg/hr) 2450 (1.2) 3350 (1.8)     Stool  0     Total Output 2450 3350     Net +300 -1274.3 +400           Urine Occurrence  1 x     Stool Occurrence  1 x         Energy Calories Required: not meeting needs  Protein Required: not meeting needs  Fluid Required: meeting needs  Comments: Patient assessed 2' ICU status and cancer history per RD screening. Patient sleeping at time of rounds, RD did not wake patient. Patient's diet just advanced, patient has not yet  had solid foods. Patient NPO/CL/FL diet for past 3 days. Suspect inadequate nutrition. RD added supplement.   % Intake of Estimated Energy Needs: 25 - 50 %  % Meal Intake: 25 - 50 %, of clear/full liquid diet     Nutrition Risk    Level of Risk/Frequency of Follow-up: high     Monitor and Evaluation    Food and Nutrient Intake: energy intake  Food and Nutrient Adminstration: diet order  Physical Activity and Function: nutrition-related ADLs and IADLs  Anthropometric Measurements: weight change  Biochemical Data, Medical Tests and Procedures: inflammatory profile, electrolyte and renal panel, gastrointestinal profile, glucose/endocrine profile, lipid profile  Nutrition-Focused Physical Findings: overall appearance     Nutrition Follow-Up    RD Follow-up?: Yes    Montserrat Che RD 09/12/2019 3:15 PM

## 2019-09-12 NOTE — ASSESSMENT & PLAN NOTE
platelets today 61, down from 123 at admission  Could partially be delusional from blood transfusions.  Will continue to monitor if patient rebleeds or requires more transfusions will consider platelet transfusion as well.

## 2019-09-12 NOTE — [employer] PATIENT INSTRUCTIONS
Discharge Summary/Instructions after an Endoscopic Procedure  Patient Name: Burton Whiting  Patient MRN: [id number]  Patient YOB: 1970  Thursday, September 12, 2019  Perez Mon III, MD  RESTRICTIONS:  During your procedure today, you received medications for sedation.  These   medications may affect your judgment, balance and coordination.  Therefore,   for 24 hours, you have the following restrictions:   - DO NOT drive a car, operate machinery, make legal/financial decisions,   sign important papers or drink alcohol.    ACTIVITY:  Today: no heavy lifting, straining or running due to procedural   sedation/anesthesia.  The following day: return to full activity including work.  DIET:  Eat and drink normally unless instructed otherwise.     TREATMENT FOR COMMON SIDE EFFECTS:  - Mild abdominal pain, nausea, belching, bloating or excessive gas:  rest,   eat lightly and use a heating pad.  - Sore Throat: treat with throat lozenges and/or gargle with warm salt   water.  - Because air was used during the procedure, expelling large amounts of air   from your rectum or belching is normal.  - If a bowel prep was taken, you may not have a bowel movement for 1-3 days.    This is normal.  SYMPTOMS TO WATCH FOR AND REPORT TO YOUR PHYSICIAN:  1. Abdominal pain or bloating, other than gas cramps.  2. Chest pain.  3. Back pain.  4. Signs of infection such as: chills or fever occurring within 24 hours   after the procedure.  5. Rectal bleeding, which would show as bright red, maroon, or black stools.   (A tablespoon of blood from the rectum is not serious, especially if   hemorrhoids are present.)  6. Vomiting.  7. Weakness or dizziness.  GO DIRECTLY TO THE NEAREST EMERGENCY ROOM IF YOU HAVE ANY OF THE FOLLOWING:      Difficulty breathing              Chills and/or fever over 101 F   Persistent vomiting and/or vomiting blood   Severe abdominal pain   Severe chest pain   Black, tarry stools   Bleeding- more than one  tablespoon   Any other symptom or condition that you feel may need urgent attention  Your doctor recommends these additional instructions:  If any biopsies were taken, your doctors clinic will contact you in 1 to 2   weeks with any results.  - Return patient to hospital [facility] for ongoing care.   - Continue PPI BID x 4-6 weeks then indefinitely if NSAIDs needed; Hold   NSAIDs x at least one month.  For questions, problems or results please call your physician - Perez Mon III, MD at Work:  [phone].  [facility], EMERGENCY ROOM PHONE NUMBER: [phone]  IF A COMPLICATION OR EMERGENCY SITUATION ARISES AND YOU ARE UNABLE TO REACH   YOUR PHYSICIAN - GO DIRECTLY TO THE EMERGENCY ROOM.  Perez Mon III, MD  9/12/2019 7:42:51 AM  This report has been verified and signed electronically.  [employer]

## 2019-09-12 NOTE — ASSESSMENT & PLAN NOTE
- patient with witnessed seizure activity upon arrival in emergency room  -Ativan p.r.n. for seizures  -Consult Neurology Dr. Earl Torres, recs to dc Keppra, no driving until office Visit  -EEG wnl  -seizure precautions discussed with patient

## 2019-09-13 VITALS
OXYGEN SATURATION: 96 % | RESPIRATION RATE: 18 BRPM | WEIGHT: 173.75 LBS | DIASTOLIC BLOOD PRESSURE: 67 MMHG | BODY MASS INDEX: 26.33 KG/M2 | TEMPERATURE: 99 F | SYSTOLIC BLOOD PRESSURE: 103 MMHG | HEIGHT: 68 IN | HEART RATE: 81 BPM

## 2019-09-13 LAB
ALBUMIN SERPL BCP-MCNC: 2.3 G/DL (ref 3.5–5.2)
ALP SERPL-CCNC: 45 U/L (ref 55–135)
ALT SERPL W/O P-5'-P-CCNC: 17 U/L (ref 10–44)
ANION GAP SERPL CALC-SCNC: 5 MMOL/L (ref 8–16)
AST SERPL-CCNC: 14 U/L (ref 10–40)
BASOPHILS # BLD AUTO: 0 K/UL (ref 0–0.2)
BASOPHILS NFR BLD: 0 % (ref 0–1.9)
BILIRUB SERPL-MCNC: 0.5 MG/DL (ref 0.1–1)
BUN SERPL-MCNC: 14 MG/DL (ref 6–20)
CALCIUM SERPL-MCNC: 7.1 MG/DL (ref 8.7–10.5)
CHLORIDE SERPL-SCNC: 103 MMOL/L (ref 95–110)
CO2 SERPL-SCNC: 27 MMOL/L (ref 23–29)
CREAT SERPL-MCNC: 1.2 MG/DL (ref 0.5–1.4)
DIFFERENTIAL METHOD: ABNORMAL
EOSINOPHIL # BLD AUTO: 0.1 K/UL (ref 0–0.5)
EOSINOPHIL NFR BLD: 2.9 % (ref 0–8)
ERYTHROCYTE [DISTWIDTH] IN BLOOD BY AUTOMATED COUNT: 17.7 % (ref 11.5–14.5)
EST. GFR  (AFRICAN AMERICAN): >60 ML/MIN/1.73 M^2
EST. GFR  (NON AFRICAN AMERICAN): >60 ML/MIN/1.73 M^2
GLUCOSE SERPL-MCNC: 109 MG/DL (ref 70–110)
HCT VFR BLD AUTO: 25.4 % (ref 40–54)
HGB BLD-MCNC: 8.5 G/DL (ref 14–18)
IMM GRANULOCYTES # BLD AUTO: 0.11 K/UL (ref 0–0.04)
IMM GRANULOCYTES NFR BLD AUTO: 3.5 % (ref 0–0.5)
LYMPHOCYTES # BLD AUTO: 0.3 K/UL (ref 1–4.8)
LYMPHOCYTES NFR BLD: 9.4 % (ref 18–48)
MAGNESIUM SERPL-MCNC: 1.9 MG/DL (ref 1.6–2.6)
MCH RBC QN AUTO: 30.8 PG (ref 27–31)
MCHC RBC AUTO-ENTMCNC: 33.5 G/DL (ref 32–36)
MCV RBC AUTO: 92 FL (ref 82–98)
MONOCYTES # BLD AUTO: 0.3 K/UL (ref 0.3–1)
MONOCYTES NFR BLD: 10 % (ref 4–15)
NEUTROPHILS # BLD AUTO: 2.3 K/UL (ref 1.8–7.7)
NEUTROPHILS NFR BLD: 74.2 % (ref 38–73)
NRBC BLD-RTO: 0 /100 WBC
PLATELET # BLD AUTO: 78 K/UL (ref 150–350)
PMV BLD AUTO: 8.9 FL (ref 9.2–12.9)
POTASSIUM SERPL-SCNC: 4 MMOL/L (ref 3.5–5.1)
PROT SERPL-MCNC: 4.8 G/DL (ref 6–8.4)
RBC # BLD AUTO: 2.76 M/UL (ref 4.6–6.2)
SODIUM SERPL-SCNC: 135 MMOL/L (ref 136–145)
WBC # BLD AUTO: 3.1 K/UL (ref 3.9–12.7)

## 2019-09-13 PROCEDURE — 36415 COLL VENOUS BLD VENIPUNCTURE: CPT

## 2019-09-13 PROCEDURE — 83735 ASSAY OF MAGNESIUM: CPT

## 2019-09-13 PROCEDURE — 25000003 PHARM REV CODE 250: Performed by: STUDENT IN AN ORGANIZED HEALTH CARE EDUCATION/TRAINING PROGRAM

## 2019-09-13 PROCEDURE — 63600175 PHARM REV CODE 636 W HCPCS: Performed by: STUDENT IN AN ORGANIZED HEALTH CARE EDUCATION/TRAINING PROGRAM

## 2019-09-13 PROCEDURE — 85025 COMPLETE CBC W/AUTO DIFF WBC: CPT

## 2019-09-13 PROCEDURE — C9113 INJ PANTOPRAZOLE SODIUM, VIA: HCPCS | Performed by: STUDENT IN AN ORGANIZED HEALTH CARE EDUCATION/TRAINING PROGRAM

## 2019-09-13 PROCEDURE — 80053 COMPREHEN METABOLIC PANEL: CPT

## 2019-09-13 PROCEDURE — 94761 N-INVAS EAR/PLS OXIMETRY MLT: CPT

## 2019-09-13 RX ORDER — PANTOPRAZOLE SODIUM 40 MG/1
40 TABLET, DELAYED RELEASE ORAL 2 TIMES DAILY
Qty: 112 TABLET | Refills: 0 | Status: ON HOLD | OUTPATIENT
Start: 2019-09-13 | End: 2019-10-03 | Stop reason: HOSPADM

## 2019-09-13 RX ORDER — LIDOCAINE 50 MG/G
1 PATCH TOPICAL DAILY PRN
Qty: 7 PATCH | Refills: 0 | Status: SHIPPED | OUTPATIENT
Start: 2019-09-13 | End: 2020-10-21

## 2019-09-13 RX ORDER — ACETAMINOPHEN 325 MG/1
650 TABLET ORAL EVERY 6 HOURS PRN
Refills: 0 | COMMUNITY
Start: 2019-09-13

## 2019-09-13 RX ADMIN — LIDOCAINE 1 PATCH: 50 PATCH TOPICAL at 08:09

## 2019-09-13 RX ADMIN — CHLORHEXIDINE GLUCONATE 15 ML: 1.2 RINSE ORAL at 08:09

## 2019-09-13 RX ADMIN — CYANOCOBALAMIN TAB 1000 MCG 1000 MCG: 1000 TAB at 08:09

## 2019-09-13 RX ADMIN — SODIUM CHLORIDE 1 ML: 0.9 INJECTION, SOLUTION INTRAVENOUS at 10:09

## 2019-09-13 RX ADMIN — PANTOPRAZOLE SODIUM 40 MG: 40 INJECTION, POWDER, FOR SOLUTION INTRAVENOUS at 08:09

## 2019-09-13 RX ADMIN — HYDROCODONE BITARTRATE AND ACETAMINOPHEN 1 TABLET: 5; 325 TABLET ORAL at 09:09

## 2019-09-13 RX ADMIN — POTASSIUM CHLORIDE 40 MEQ: 750 CAPSULE, EXTENDED RELEASE ORAL at 08:09

## 2019-09-13 RX ADMIN — MUPIROCIN: 20 OINTMENT TOPICAL at 08:09

## 2019-09-13 RX ADMIN — FLUOXETINE 40 MG: 20 CAPSULE ORAL at 08:09

## 2019-09-13 NOTE — DISCHARGE SUMMARY
[facility] Medicine  Discharge Summary      Patient Name: Burton Whiting  MRN: [id number]  Admission Date: 9/8/2019  Hospital Length of Stay: 4 days  Discharge Date and Time:  09/13/2019 6:55 PM  Attending Physician: Lyly English DO   Discharging Provider: Lyly English DO  Primary Care Provider: Christopher Turpin DO      HPI:   49-year-old  male presents emergency room with altered mental status.      According to the patient's brother the patient had been fishing with his friends all day.  His friends brought him back to his brother's house and he was found to be lethargic and have and what looked like seizure activity.  According to the patient's brother the patient having complaint of bloody stools for the past 2 days.  Patient denied associated nausea vomiting hematemesis.  He did endorse generalized weakness, fatigue and nausea.  He also complaints of bilateral lower quadrant abdominal pain.    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  The patient was recently on immuno therapy but this was discontinued secondary to side effects.  The patient's current we on the steroids seem a follows his oncologist at [facility] closely.        Procedure(s) (LRB):  EGD (ESOPHAGOGASTRODUODENOSCOPY) (N/A)      Hospital Course:   9/9 Upon arrival in emergency room the patient was found to be hypotensive when he was placed in a room he had witnessed seizure activity he was given Ativan in diagnostic imaging was performed.  A CT of the head showed no acute findings his CBC did show a hemoglobin of less than 7     9/10 s/p 3 u pRBC H/H 7.0/20.9, s/p EGD show duodenal ulcer. H plyori pending. Pt denies any new signs of bleeding, He denies any bleeding and denies hematuria, hematemesis, hemoptysis. No bm overnight. Seen by neurology and EEG pending. Pt denies  "seizure activity (shaking, abnormal movements) overight or this morning.     9/11 s/1 upRBC this morning with h/h improved to 8/23.5. EEG yesterday wnl. He denies any bleeding and denies hematuria, hematemesis, hemoptysis, melena, blood in stool, increased bruising.  He denies abnormal movements, shaking, seizure activity.    9/12:  Patient was noted to bright red blood per rectum yesterday evening.  GI was consulted and planned for EGD.  EGD performed this morning showing to duodenal ulcers which were successfully cauterized.  Hemoglobin dropped from 8-7.6 overnight and 1 unit of PRBCs was given today.  On exam patient states feels some fatigue.  Denies any CP, SOB, N/V/D, headaches, fever or chills.  No seizure activity. No bleeding since yesterday.      9/13:  Patient seen and examined on day of discharge. H&H stable overnight.  Patient denied any further episodes of bleeding.  Patient denied any further episodes of seizure activity including shaking or abnormal movements..  Patient was hemodynamically stable and appropriate for discharge    Exam on day of discharge  /67   Pulse 81   Temp 98.5 °F (36.9 °C) (Oral)   Resp 18   Ht 5' 8" (1.727 m)   Wt 78.8 kg (173 lb 11.6 oz)   SpO2 96%   BMI 26.41 kg/m²   Gen: nad, afvss  CV: rrr no mrg  Resp: CTA B/l  AB: +bs soft mild ttp  Skin: dry, warm    Consults:   Consults (From admission, onward)        Status Ordering Provider     Inpatient consult to Gastroenterology  Once     Provider:  Balta Lisa MD    Completed BIANCA JURADO          No new Assessment & Plan notes have been filed under this hospital service since the last note was generated.  Service: Hospital Medicine    Final Active Diagnoses:    Diagnosis Date Noted POA    PRINCIPAL PROBLEM:  Acute upper GI bleeding [K92.2] 09/09/2019 Yes    Thrombocytopenia [D69.6] 02/28/2018 Yes    Squamous cell carcinoma of palatine tonsil [C09.9] 01/04/2018 Yes    Seizure [R56.9] 09/09/2019 Yes    "   Problems Resolved During this Admission:       Discharged Condition: stable    Disposition: home    Follow Up:  Follow-up Information     Schedule an appointment as soon as possible for a visit with Christopher Turpin DO.    Specialty:  Family Medicine  Contact information:  [address]  [place] [address] [phone]             Jonn Torres MD. Schedule an appointment as soon as possible for a visit in 2 weeks.    Specialties:  Vascular Neurology, Neurology  Contact information:  [address]  [address]  [place] [address]  [phone]                   Patient Instructions:   - patient advised to take PPI twice a day for at least 8 weeks.  - he is advised to avoid all NSAIDs for at least 1 month  - he is advised to follow up with Neurology in 2 weeks  - follow up with PCP for hospital follow-up      Significant Diagnostic Studies: Labs:   CBC   Recent Labs   Lab 09/12/19  0332 09/12/19  1424 09/12/19  2205 09/13/19  0551   WBC 3.78*  --   --  3.10*   HGB 7.6* 8.5* 8.8* 8.5*   HCT 22.8* 25.8* 26.3* 25.4*   PLT 61*  --   --  78*       Pending Diagnostic Studies:     Procedure Component Value Units Date/Time    Hematocrit [[id number]]     Order Status:  Sent Lab Status:  No result     Specimen:  Blood     Hemoglobin [[id number]]     Order Status:  Sent Lab Status:  No result     Specimen:  Blood          Medications:  Reconciled Home Medications:      Medication List      START taking these medications    acetaminophen 325 MG tablet  Commonly known as:  TYLENOL  Take 2 tablets (650 mg total) by mouth every 6 (six) hours as needed.     lidocaine 5 %  Commonly known as:  LIDODERM  Place 1 patch onto the skin daily as needed. Remove & Discard patch within 12 hours or as directed by MD     pantoprazole 40 MG tablet  Commonly known as:  PROTONIX  Take 1 tablet (40 mg total) by mouth 2 (two) times daily.        CONTINUE taking these medications    CALCIUM 500 + D 500 mg(1,250mg) -200 unit per tablet  Generic  drug:  calcium-vitamin D3  Take 1 tablet by mouth 2 (two) times daily with meals.     denosumab 120 mg/1.7 mL (70 mg/mL) Soln  Commonly known as:  XGEVA  Inject 1.7 mLs (120 mg total) into the skin once. for 1 dose     FLUoxetine 40 MG capsule  Take 1 capsule (40 mg total) by mouth once daily.     gabapentin 300 MG capsule  Commonly known as:  NEURONTIN  Take 1 capsule (300 mg total) by mouth 3 (three) times daily.     mirtazapine 7.5 MG Tab  Commonly known as:  REMERON     multivitamin capsule  Take 1 capsule by mouth once daily.        STOP taking these medications    chlorhexidine 0.12 % solution  Commonly known as:  PERIDEX     potassium chloride 10 MEQ Cpsr  Commonly known as:  MICRO-K     predniSONE 20 MG tablet  Commonly known as:  DELTASONE          Time spent on the discharge of patient: 35 minutes  Patient was seen and examined on the date of discharge and determined to be suitable for discharge.         Lyly English DO  Department of Hospital Medicine  [facility]

## 2019-09-13 NOTE — CARE UPDATE
09/13/19 0800   Patient Assessment/Suction   Level of Consciousness (AVPU) alert   Respiratory Effort Normal;Unlabored   All Lung Fields Breath Sounds clear   PRE-TX-O2   O2 Device (Oxygen Therapy) room air   SpO2 96 %   $ Pulse Oximetry - Multiple Charge Pulse Oximetry - Multiple   Pulse 81   Resp 18   Positioning   Body Position positioned/repositioned independently   Head of Bed (HOB) HOB at 30 degrees

## 2019-09-13 NOTE — NURSING
Pt received discharge instructions and new medication teachings. IV discontinued with cath intact. Pt awaiting ride.

## 2019-09-13 NOTE — NURSING
Received report. Pt asleep in bed chest rising and falling. Bed in lowest position with wheels locked and call bell within reach. Table @ bedside. Will continue to monitor.

## 2019-09-14 ENCOUNTER — PATIENT MESSAGE (OUTPATIENT)
Dept: HEMATOLOGY/ONCOLOGY | Facility: CLINIC | Age: 49
End: 2019-09-14

## 2019-09-25 ENCOUNTER — OFFICE VISIT (OUTPATIENT)
Dept: FAMILY MEDICINE | Facility: CLINIC | Age: 49
End: 2019-09-25
Payer: COMMERCIAL

## 2019-09-25 ENCOUNTER — LAB VISIT (OUTPATIENT)
Dept: LAB | Facility: HOSPITAL | Age: 49
End: 2019-09-25
Attending: FAMILY MEDICINE
Payer: COMMERCIAL

## 2019-09-25 VITALS
WEIGHT: 179.69 LBS | OXYGEN SATURATION: 99 % | HEIGHT: 68 IN | BODY MASS INDEX: 27.23 KG/M2 | TEMPERATURE: 99 F | HEART RATE: 93 BPM

## 2019-09-25 DIAGNOSIS — K28.4 BLEEDING ULCER: ICD-10-CM

## 2019-09-25 DIAGNOSIS — K92.2 ACUTE UPPER GI BLEEDING: ICD-10-CM

## 2019-09-25 DIAGNOSIS — Z12.11 COLON CANCER SCREENING: ICD-10-CM

## 2019-09-25 DIAGNOSIS — T45.1X5A CHEMOTHERAPY-INDUCED NEUROPATHY: ICD-10-CM

## 2019-09-25 DIAGNOSIS — G62.0 CHEMOTHERAPY-INDUCED NEUROPATHY: ICD-10-CM

## 2019-09-25 DIAGNOSIS — C76.0 HEAD AND NECK CANCER: ICD-10-CM

## 2019-09-25 DIAGNOSIS — M54.12 CERVICAL RADICULOPATHY AT C6: Primary | ICD-10-CM

## 2019-09-25 LAB
ALBUMIN SERPL BCP-MCNC: 3.2 G/DL (ref 3.5–5.2)
ALP SERPL-CCNC: 65 U/L (ref 55–135)
ALT SERPL W/O P-5'-P-CCNC: 12 U/L (ref 10–44)
ANION GAP SERPL CALC-SCNC: 10 MMOL/L (ref 8–16)
AST SERPL-CCNC: 29 U/L (ref 10–40)
BASOPHILS # BLD AUTO: 0.02 K/UL (ref 0–0.2)
BASOPHILS NFR BLD: 0.5 % (ref 0–1.9)
BILIRUB SERPL-MCNC: 0.4 MG/DL (ref 0.1–1)
BUN SERPL-MCNC: 21 MG/DL (ref 6–20)
CALCIUM SERPL-MCNC: 7.9 MG/DL (ref 8.7–10.5)
CHLORIDE SERPL-SCNC: 109 MMOL/L (ref 95–110)
CO2 SERPL-SCNC: 23 MMOL/L (ref 23–29)
CREAT SERPL-MCNC: 1.7 MG/DL (ref 0.5–1.4)
DIFFERENTIAL METHOD: ABNORMAL
EOSINOPHIL # BLD AUTO: 0 K/UL (ref 0–0.5)
EOSINOPHIL NFR BLD: 0.8 % (ref 0–8)
ERYTHROCYTE [DISTWIDTH] IN BLOOD BY AUTOMATED COUNT: 17.8 % (ref 11.5–14.5)
EST. GFR  (AFRICAN AMERICAN): 52.8 ML/MIN/1.73 M^2
EST. GFR  (NON AFRICAN AMERICAN): 45.7 ML/MIN/1.73 M^2
GLUCOSE SERPL-MCNC: 92 MG/DL (ref 70–110)
HCT VFR BLD AUTO: 25.4 % (ref 40–54)
HGB BLD-MCNC: 7.6 G/DL (ref 14–18)
IMM GRANULOCYTES # BLD AUTO: 0.15 K/UL (ref 0–0.04)
IMM GRANULOCYTES NFR BLD AUTO: 3.8 % (ref 0–0.5)
LYMPHOCYTES # BLD AUTO: 0.5 K/UL (ref 1–4.8)
LYMPHOCYTES NFR BLD: 11.4 % (ref 18–48)
MCH RBC QN AUTO: 30.3 PG (ref 27–31)
MCHC RBC AUTO-ENTMCNC: 29.9 G/DL (ref 32–36)
MCV RBC AUTO: 101 FL (ref 82–98)
MONOCYTES # BLD AUTO: 0.6 K/UL (ref 0.3–1)
MONOCYTES NFR BLD: 16.2 % (ref 4–15)
NEUTROPHILS # BLD AUTO: 2.7 K/UL (ref 1.8–7.7)
NEUTROPHILS NFR BLD: 67.3 % (ref 38–73)
NRBC BLD-RTO: 1 /100 WBC
PLATELET # BLD AUTO: 195 K/UL (ref 150–350)
PMV BLD AUTO: 9.2 FL (ref 9.2–12.9)
POTASSIUM SERPL-SCNC: 3.9 MMOL/L (ref 3.5–5.1)
PROT SERPL-MCNC: 6.5 G/DL (ref 6–8.4)
RBC # BLD AUTO: 2.51 M/UL (ref 4.6–6.2)
SODIUM SERPL-SCNC: 142 MMOL/L (ref 136–145)
WBC # BLD AUTO: 3.95 K/UL (ref 3.9–12.7)

## 2019-09-25 PROCEDURE — 85025 COMPLETE CBC W/AUTO DIFF WBC: CPT

## 2019-09-25 PROCEDURE — 36415 COLL VENOUS BLD VENIPUNCTURE: CPT | Mod: PO

## 2019-09-25 PROCEDURE — 99999 PR PBB SHADOW E&M-EST. PATIENT-LVL V: CPT | Mod: PBBFAC,,, | Performed by: FAMILY MEDICINE

## 2019-09-25 PROCEDURE — 80053 COMPREHEN METABOLIC PANEL: CPT

## 2019-09-25 PROCEDURE — 99214 OFFICE O/P EST MOD 30 MIN: CPT | Mod: S$GLB,,, | Performed by: FAMILY MEDICINE

## 2019-09-25 PROCEDURE — 3008F BODY MASS INDEX DOCD: CPT | Mod: CPTII,S$GLB,, | Performed by: FAMILY MEDICINE

## 2019-09-25 PROCEDURE — 99999 PR PBB SHADOW E&M-EST. PATIENT-LVL V: ICD-10-PCS | Mod: PBBFAC,,, | Performed by: FAMILY MEDICINE

## 2019-09-25 PROCEDURE — 99214 PR OFFICE/OUTPT VISIT, EST, LEVL IV, 30-39 MIN: ICD-10-PCS | Mod: S$GLB,,, | Performed by: FAMILY MEDICINE

## 2019-09-25 PROCEDURE — 3008F PR BODY MASS INDEX (BMI) DOCUMENTED: ICD-10-PCS | Mod: CPTII,S$GLB,, | Performed by: FAMILY MEDICINE

## 2019-09-25 RX ORDER — TIZANIDINE 2 MG/1
2 TABLET ORAL NIGHTLY PRN
Qty: 30 TABLET | Refills: 0 | Status: SHIPPED | OUTPATIENT
Start: 2019-09-25 | End: 2019-10-05

## 2019-09-25 RX ORDER — TRAMADOL HYDROCHLORIDE 50 MG/1
50 TABLET ORAL EVERY 8 HOURS PRN
Qty: 21 TABLET | Refills: 0 | Status: SHIPPED | OUTPATIENT
Start: 2019-09-25 | End: 2020-10-21

## 2019-09-25 RX ORDER — HYDROCORTISONE 25 MG/G
CREAM TOPICAL 2 TIMES DAILY
Qty: 28 G | Refills: 0 | Status: SHIPPED | OUTPATIENT
Start: 2019-09-25 | End: 2020-10-21

## 2019-09-25 NOTE — PROGRESS NOTES
Subjective:       Patient ID: Burton Whiting is a 49 y.o. male.    Chief Complaint: Shoulder Pain and Hemorrhoids    Burton is a 49 y.o. male who presents today for follow up on his neck/shoulder pain and recent hospitalization for GI bleed.     He had neck and shoulder pain that started 3-4 months ago. It has been slowly worsening. He was taking so many NSAIDS that he ended up with a GI bleed. The pain starts in the neck and goes down his arm. Its has been worsening. He was taking 8 aleve/day. This wasn't helping a lot. He takes gabapentin nightly.  When asked to describe where his pain is he points from his neck down to his arm along the C5-C6 dermatome    No further PRBPR. No epigastric pain. Not taking NSAIDS.     EGD: Multiple oozing duodenal ulcers with pigmented material. Treated with bipolar cautery.    MRI: Continued moderate to severe spinal canal stenosis at C5-6 with slightly more apparent abnormal T2 cord signal at this level concerning for myelomalacia.      Review of Systems   Constitutional: Negative for chills and fever.   Respiratory: Negative for cough and shortness of breath.    Cardiovascular: Negative for chest pain and leg swelling.   Gastrointestinal: Negative for diarrhea, nausea and vomiting.   Genitourinary: Negative for difficulty urinating and dysuria.   Musculoskeletal: Positive for myalgias and neck pain.   Skin: Negative for rash.   Neurological: Negative for dizziness, light-headedness and headaches.               Objective:     Vitals:    09/25/19 1617   Pulse: 93   Temp: 99 °F (37.2 °C)        Physical Exam   Constitutional: He appears well-developed and well-nourished. He appears distressed.   Normally very calm pleasant man appears to be in moderate distress   HENT:   Head: Normocephalic and atraumatic.   Eyes: Conjunctivae are normal.   Cardiovascular: Normal rate and regular rhythm.   Pulmonary/Chest: Effort normal and breath sounds normal.   Abdominal: He exhibits no distension.  There is no tenderness. There is no guarding.   No abdominal pain on palpation   Musculoskeletal:   Positive Spurling sign to the left  There is numbness noted on the left arm compared to the right  There is bilateral hand numbness noted that appears to be chronic from his chemotherapy   Neurological: He is alert.   Psychiatric: He has a normal mood and affect. His behavior is normal. Judgment and thought content normal.   Nursing note and vitals reviewed.      Assessment:       1. Cervical radiculopathy at C6    2. Colon cancer screening    3. Acute upper GI bleeding    4. Chemotherapy-induced neuropathy    5. Bleeding ulcer        Plan:       Increase gabapentin to 300 mg at lunch and then 900 mg qhs night  Start tramadol for severe pain nightly, this is an opioid pain medication  If gabapentin doesn't improve pain, can consider switching to lyrica  Refer to PT  Refer to pain management   Avoid NSAIDs, even topical  Avoid steroids, do to recent ulcer  Continue salon pas (or other topical lidocaine)  Check labs today  Refer to GI  annussilvina for reported hemorrhoid, stressed importance of close GI follow up and colonoscopy      Cervical radiculopathy at C6  -     Ambulatory referral to Pain Clinic  -     traMADol (ULTRAM) 50 mg tablet; Take 1 tablet (50 mg total) by mouth every 8 (eight) hours as needed for Pain.  Dispense: 21 tablet; Refill: 0  -     Ambulatory Referral to Physical/Occupational Therapy    Colon cancer screening  -     Ambulatory referral to Gastroenterology    Acute upper GI bleeding  -     Ambulatory referral to Gastroenterology    Chemotherapy-induced neuropathy    Bleeding ulcer  -     Ambulatory referral to Gastroenterology  -     CBC auto differential; Future; Expected date: 09/25/2019  -     Comprehensive metabolic panel; Future; Expected date: 09/25/2019    Other orders  -     hydrocortisone 2.5 % cream; Apply topically 2 (two) times daily.  Dispense: 28 g; Refill: 0        Warning signs  discussed, patient to call with any further issues or worsening of symptoms.     Parts of the above note were dictated using a voice dictation software. Please excuse any grammatical or typographical errors.

## 2019-09-25 NOTE — PATIENT INSTRUCTIONS
Increase gabapentin to 300 mg at lunch and then 900 mg qhs night  Start tramadol for severe pain nightly, this is an opioid pain medication  Avoid NSAIDs, even topical  Avoid steroids, do to recent ulcer  Check labs today  Refer to GI  xiomara for reported hemorrhoid, stressed importance of close GI follow up and colonoscopy  If gabapentin doesn't improve pain, can consider switching to lyrica  Refer to PT  Refer to pain management   Continue salon pas (or other topical lidocaine)

## 2019-09-30 ENCOUNTER — PATIENT OUTREACH (OUTPATIENT)
Dept: ADMINISTRATIVE | Facility: OTHER | Age: 49
End: 2019-09-30

## 2019-10-01 ENCOUNTER — PATIENT MESSAGE (OUTPATIENT)
Dept: HEMATOLOGY/ONCOLOGY | Facility: CLINIC | Age: 49
End: 2019-10-01

## 2019-10-01 ENCOUNTER — OFFICE VISIT (OUTPATIENT)
Dept: HEMATOLOGY/ONCOLOGY | Facility: CLINIC | Age: 49
DRG: 384 | End: 2019-10-01
Payer: COMMERCIAL

## 2019-10-01 ENCOUNTER — HOSPITAL ENCOUNTER (INPATIENT)
Facility: HOSPITAL | Age: 49
LOS: 2 days | Discharge: HOME | DRG: 384 | End: 2019-10-03
Attending: INTERNAL MEDICINE | Admitting: INTERNAL MEDICINE
Payer: COMMERCIAL

## 2019-10-01 ENCOUNTER — LAB VISIT (OUTPATIENT)
Dept: LAB | Facility: HOSPITAL | Age: 49
End: 2019-10-01
Attending: INTERNAL MEDICINE
Payer: COMMERCIAL

## 2019-10-01 ENCOUNTER — TELEPHONE (OUTPATIENT)
Dept: FAMILY MEDICINE | Facility: CLINIC | Age: 49
End: 2019-10-01

## 2019-10-01 VITALS
HEIGHT: 68 IN | BODY MASS INDEX: 26.3 KG/M2 | WEIGHT: 173.5 LBS | SYSTOLIC BLOOD PRESSURE: 116 MMHG | DIASTOLIC BLOOD PRESSURE: 66 MMHG | TEMPERATURE: 98 F | RESPIRATION RATE: 18 BRPM | HEART RATE: 86 BPM | OXYGEN SATURATION: 100 %

## 2019-10-01 DIAGNOSIS — G89.3 NEOPLASM RELATED PAIN: ICD-10-CM

## 2019-10-01 DIAGNOSIS — D50.0 ANEMIA DUE TO CHRONIC BLOOD LOSS: ICD-10-CM

## 2019-10-01 DIAGNOSIS — K92.2 GI BLEED: ICD-10-CM

## 2019-10-01 DIAGNOSIS — R50.9 FEVER AND CHILLS: ICD-10-CM

## 2019-10-01 DIAGNOSIS — M79.89 LEFT UPPER EXTREMITY SWELLING: ICD-10-CM

## 2019-10-01 DIAGNOSIS — C09.9 SQUAMOUS CELL CARCINOMA OF PALATINE TONSIL: ICD-10-CM

## 2019-10-01 DIAGNOSIS — C09.9 TONSIL CANCER: ICD-10-CM

## 2019-10-01 DIAGNOSIS — K92.2 GASTROINTESTINAL HEMORRHAGE, UNSPECIFIED GASTROINTESTINAL HEMORRHAGE TYPE: Primary | ICD-10-CM

## 2019-10-01 DIAGNOSIS — I82.409 DVT (DEEP VENOUS THROMBOSIS): ICD-10-CM

## 2019-10-01 DIAGNOSIS — C77.0 CANCER OF HEAD, FACE, OR NECK LYMPH NODES, SECONDARY: ICD-10-CM

## 2019-10-01 DIAGNOSIS — K26.9 DUODENAL ULCER: ICD-10-CM

## 2019-10-01 PROBLEM — M79.602 PAIN AND SWELLING OF LEFT UPPER EXTREMITY: Status: ACTIVE | Noted: 2019-10-01

## 2019-10-01 LAB
ABO + RH BLD: NORMAL
ALBUMIN SERPL BCP-MCNC: 3 G/DL (ref 3.5–5.2)
ALBUMIN SERPL BCP-MCNC: 3.1 G/DL (ref 3.5–5.2)
ALP SERPL-CCNC: 63 U/L (ref 55–135)
ALP SERPL-CCNC: 64 U/L (ref 55–135)
ALT SERPL W/O P-5'-P-CCNC: 11 U/L (ref 10–44)
ALT SERPL W/O P-5'-P-CCNC: 9 U/L (ref 10–44)
ANION GAP SERPL CALC-SCNC: 14 MMOL/L (ref 8–16)
ANION GAP SERPL CALC-SCNC: 15 MMOL/L (ref 8–16)
ANISOCYTOSIS BLD QL SMEAR: SLIGHT
ANISOCYTOSIS BLD QL SMEAR: SLIGHT
APTT BLDCRRT: 30.7 SEC (ref 21–32)
AST SERPL-CCNC: 16 U/L (ref 10–40)
AST SERPL-CCNC: 17 U/L (ref 10–40)
BACTERIA #/AREA URNS AUTO: ABNORMAL /HPF
BASO STIPL BLD QL SMEAR: ABNORMAL
BASO STIPL BLD QL SMEAR: ABNORMAL
BASOPHILS # BLD AUTO: 0.01 K/UL (ref 0–0.2)
BASOPHILS # BLD AUTO: 0.01 K/UL (ref 0–0.2)
BASOPHILS NFR BLD: 0.2 % (ref 0–1.9)
BASOPHILS NFR BLD: 0.2 % (ref 0–1.9)
BILIRUB SERPL-MCNC: 0.6 MG/DL (ref 0.1–1)
BILIRUB SERPL-MCNC: 0.7 MG/DL (ref 0.1–1)
BILIRUB UR QL STRIP: NEGATIVE
BLD GP AB SCN CELLS X3 SERPL QL: NORMAL
BUN SERPL-MCNC: 14 MG/DL (ref 6–20)
BUN SERPL-MCNC: 14 MG/DL (ref 6–20)
CALCIUM SERPL-MCNC: 7.6 MG/DL (ref 8.7–10.5)
CALCIUM SERPL-MCNC: 8 MG/DL (ref 8.7–10.5)
CHLORIDE SERPL-SCNC: 99 MMOL/L (ref 95–110)
CHLORIDE SERPL-SCNC: 99 MMOL/L (ref 95–110)
CLARITY UR REFRACT.AUTO: CLEAR
CO2 SERPL-SCNC: 22 MMOL/L (ref 23–29)
CO2 SERPL-SCNC: 23 MMOL/L (ref 23–29)
COLOR UR AUTO: YELLOW
CREAT SERPL-MCNC: 1.2 MG/DL (ref 0.5–1.4)
CREAT SERPL-MCNC: 1.2 MG/DL (ref 0.5–1.4)
DIFFERENTIAL METHOD: ABNORMAL
DIFFERENTIAL METHOD: ABNORMAL
EOSINOPHIL # BLD AUTO: 0 K/UL (ref 0–0.5)
EOSINOPHIL # BLD AUTO: 0 K/UL (ref 0–0.5)
EOSINOPHIL NFR BLD: 0 % (ref 0–8)
EOSINOPHIL NFR BLD: 0.2 % (ref 0–8)
ERYTHROCYTE [DISTWIDTH] IN BLOOD BY AUTOMATED COUNT: 16.8 % (ref 11.5–14.5)
ERYTHROCYTE [DISTWIDTH] IN BLOOD BY AUTOMATED COUNT: 17 % (ref 11.5–14.5)
ERYTHROCYTE [DISTWIDTH] IN BLOOD BY AUTOMATED COUNT: 17.1 % (ref 11.5–14.5)
EST. GFR  (AFRICAN AMERICAN): >60 ML/MIN/1.73 M^2
EST. GFR  (AFRICAN AMERICAN): >60 ML/MIN/1.73 M^2
EST. GFR  (NON AFRICAN AMERICAN): >60 ML/MIN/1.73 M^2
EST. GFR  (NON AFRICAN AMERICAN): >60 ML/MIN/1.73 M^2
FERRITIN SERPL-MCNC: 770 NG/ML (ref 20–300)
GLUCOSE SERPL-MCNC: 76 MG/DL (ref 70–110)
GLUCOSE SERPL-MCNC: 97 MG/DL (ref 70–110)
GLUCOSE UR QL STRIP: NEGATIVE
HCT VFR BLD AUTO: 22.7 % (ref 40–54)
HCT VFR BLD AUTO: 23.1 % (ref 40–54)
HCT VFR BLD AUTO: 24.7 % (ref 40–54)
HGB BLD-MCNC: 7.4 G/DL (ref 14–18)
HGB BLD-MCNC: 7.6 G/DL (ref 14–18)
HGB BLD-MCNC: 7.9 G/DL (ref 14–18)
HGB UR QL STRIP: ABNORMAL
HYALINE CASTS UR QL AUTO: 9 /LPF
HYPOCHROMIA BLD QL SMEAR: ABNORMAL
HYPOCHROMIA BLD QL SMEAR: ABNORMAL
IMM GRANULOCYTES # BLD AUTO: 0.15 K/UL (ref 0–0.04)
IMM GRANULOCYTES # BLD AUTO: 0.17 K/UL (ref 0–0.04)
IMM GRANULOCYTES NFR BLD AUTO: 2.8 % (ref 0–0.5)
IMM GRANULOCYTES NFR BLD AUTO: 3.1 % (ref 0–0.5)
INR PPP: 1.1 (ref 0.8–1.2)
IRON SERPL-MCNC: 36 UG/DL (ref 45–160)
KETONES UR QL STRIP: ABNORMAL
LEUKOCYTE ESTERASE UR QL STRIP: NEGATIVE
LYMPHOCYTES # BLD AUTO: 0.3 K/UL (ref 1–4.8)
LYMPHOCYTES # BLD AUTO: 0.4 K/UL (ref 1–4.8)
LYMPHOCYTES NFR BLD: 5.7 % (ref 18–48)
LYMPHOCYTES NFR BLD: 7.4 % (ref 18–48)
MAGNESIUM SERPL-MCNC: 1.9 MG/DL (ref 1.6–2.6)
MCH RBC QN AUTO: 29.4 PG (ref 27–31)
MCH RBC QN AUTO: 30.1 PG (ref 27–31)
MCH RBC QN AUTO: 30.5 PG (ref 27–31)
MCHC RBC AUTO-ENTMCNC: 32 G/DL (ref 32–36)
MCHC RBC AUTO-ENTMCNC: 32.6 G/DL (ref 32–36)
MCHC RBC AUTO-ENTMCNC: 32.9 G/DL (ref 32–36)
MCV RBC AUTO: 92 FL (ref 82–98)
MCV RBC AUTO: 92 FL (ref 82–98)
MCV RBC AUTO: 93 FL (ref 82–98)
MICROSCOPIC COMMENT: ABNORMAL
MONOCYTES # BLD AUTO: 0.7 K/UL (ref 0.3–1)
MONOCYTES # BLD AUTO: 0.7 K/UL (ref 0.3–1)
MONOCYTES NFR BLD: 12.8 % (ref 4–15)
MONOCYTES NFR BLD: 12.8 % (ref 4–15)
NEUTROPHILS # BLD AUTO: 4.1 K/UL (ref 1.8–7.7)
NEUTROPHILS # BLD AUTO: 4.3 K/UL (ref 1.8–7.7)
NEUTROPHILS # BLD AUTO: 4.6 K/UL (ref 1.8–7.7)
NEUTROPHILS NFR BLD: 76.8 % (ref 38–73)
NEUTROPHILS NFR BLD: 78 % (ref 38–73)
NITRITE UR QL STRIP: NEGATIVE
NRBC BLD-RTO: 0 /100 WBC
NRBC BLD-RTO: 0 /100 WBC
OVALOCYTES BLD QL SMEAR: ABNORMAL
PH UR STRIP: 6 [PH] (ref 5–8)
PHOSPHATE SERPL-MCNC: 1.9 MG/DL (ref 2.7–4.5)
PLATELET # BLD AUTO: 240 K/UL (ref 150–350)
PLATELET # BLD AUTO: 251 K/UL (ref 150–350)
PLATELET # BLD AUTO: 252 K/UL (ref 150–350)
PLATELET BLD QL SMEAR: ABNORMAL
PLATELET BLD QL SMEAR: ABNORMAL
PMV BLD AUTO: 7.8 FL (ref 9.2–12.9)
PMV BLD AUTO: 8.4 FL (ref 9.2–12.9)
PMV BLD AUTO: 8.4 FL (ref 9.2–12.9)
POIKILOCYTOSIS BLD QL SMEAR: SLIGHT
POLYCHROMASIA BLD QL SMEAR: ABNORMAL
POLYCHROMASIA BLD QL SMEAR: ABNORMAL
POTASSIUM SERPL-SCNC: 3.2 MMOL/L (ref 3.5–5.1)
POTASSIUM SERPL-SCNC: 3.6 MMOL/L (ref 3.5–5.1)
PROT SERPL-MCNC: 6.4 G/DL (ref 6–8.4)
PROT SERPL-MCNC: 6.5 G/DL (ref 6–8.4)
PROT UR QL STRIP: NEGATIVE
PROTHROMBIN TIME: 10.9 SEC (ref 9–12.5)
RBC # BLD AUTO: 2.46 M/UL (ref 4.6–6.2)
RBC # BLD AUTO: 2.49 M/UL (ref 4.6–6.2)
RBC # BLD AUTO: 2.69 M/UL (ref 4.6–6.2)
RBC #/AREA URNS AUTO: 3 /HPF (ref 0–4)
SATURATED IRON: 14 % (ref 20–50)
SODIUM SERPL-SCNC: 136 MMOL/L (ref 136–145)
SODIUM SERPL-SCNC: 136 MMOL/L (ref 136–145)
SP GR UR STRIP: 1.01 (ref 1–1.03)
SPHEROCYTES BLD QL SMEAR: ABNORMAL
SPHEROCYTES BLD QL SMEAR: ABNORMAL
T4 FREE SERPL-MCNC: 1.26 NG/DL (ref 0.71–1.51)
TOTAL IRON BINDING CAPACITY: 255 UG/DL (ref 250–450)
TRANSFERRIN SERPL-MCNC: 172 MG/DL (ref 200–375)
TSH SERPL DL<=0.005 MIU/L-ACNC: 2.16 UIU/ML (ref 0.4–4)
URN SPEC COLLECT METH UR: ABNORMAL
WBC # BLD AUTO: 5.37 K/UL (ref 3.9–12.7)
WBC # BLD AUTO: 5.48 K/UL (ref 3.9–12.7)
WBC # BLD AUTO: 5.65 K/UL (ref 3.9–12.7)
WBC #/AREA URNS AUTO: 0 /HPF (ref 0–5)

## 2019-10-01 PROCEDURE — 20600001 HC STEP DOWN PRIVATE ROOM

## 2019-10-01 PROCEDURE — 99223 PR INITIAL HOSPITAL CARE,LEVL III: ICD-10-PCS | Mod: ,,, | Performed by: INTERNAL MEDICINE

## 2019-10-01 PROCEDURE — 87449 NOS EACH ORGANISM AG IA: CPT

## 2019-10-01 PROCEDURE — 80053 COMPREHEN METABOLIC PANEL: CPT

## 2019-10-01 PROCEDURE — 85730 THROMBOPLASTIN TIME PARTIAL: CPT

## 2019-10-01 PROCEDURE — C9113 INJ PANTOPRAZOLE SODIUM, VIA: HCPCS | Performed by: STUDENT IN AN ORGANIZED HEALTH CARE EDUCATION/TRAINING PROGRAM

## 2019-10-01 PROCEDURE — 85027 COMPLETE CBC AUTOMATED: CPT

## 2019-10-01 PROCEDURE — 85025 COMPLETE CBC W/AUTO DIFF WBC: CPT

## 2019-10-01 PROCEDURE — 86920 COMPATIBILITY TEST SPIN: CPT

## 2019-10-01 PROCEDURE — 25000003 PHARM REV CODE 250: Performed by: STUDENT IN AN ORGANIZED HEALTH CARE EDUCATION/TRAINING PROGRAM

## 2019-10-01 PROCEDURE — 84439 ASSAY OF FREE THYROXINE: CPT

## 2019-10-01 PROCEDURE — 82728 ASSAY OF FERRITIN: CPT

## 2019-10-01 PROCEDURE — 85610 PROTHROMBIN TIME: CPT

## 2019-10-01 PROCEDURE — 80053 COMPREHEN METABOLIC PANEL: CPT | Mod: 91

## 2019-10-01 PROCEDURE — 87040 BLOOD CULTURE FOR BACTERIA: CPT

## 2019-10-01 PROCEDURE — 84100 ASSAY OF PHOSPHORUS: CPT

## 2019-10-01 PROCEDURE — 81001 URINALYSIS AUTO W/SCOPE: CPT

## 2019-10-01 PROCEDURE — 99223 1ST HOSP IP/OBS HIGH 75: CPT | Mod: ,,, | Performed by: INTERNAL MEDICINE

## 2019-10-01 PROCEDURE — 99999 PR PBB SHADOW E&M-EST. PATIENT-LVL III: CPT | Mod: PBBFAC,,, | Performed by: NURSE PRACTITIONER

## 2019-10-01 PROCEDURE — 99499 UNLISTED E&M SERVICE: CPT | Mod: S$GLB,,, | Performed by: NURSE PRACTITIONER

## 2019-10-01 PROCEDURE — 86850 RBC ANTIBODY SCREEN: CPT

## 2019-10-01 PROCEDURE — 99499 NO LOS: ICD-10-PCS | Mod: S$GLB,,, | Performed by: NURSE PRACTITIONER

## 2019-10-01 PROCEDURE — 84443 ASSAY THYROID STIM HORMONE: CPT

## 2019-10-01 PROCEDURE — 83735 ASSAY OF MAGNESIUM: CPT

## 2019-10-01 PROCEDURE — 3008F BODY MASS INDEX DOCD: CPT | Mod: CPTII,S$GLB,, | Performed by: NURSE PRACTITIONER

## 2019-10-01 PROCEDURE — 3008F PR BODY MASS INDEX (BMI) DOCUMENTED: ICD-10-PCS | Mod: CPTII,S$GLB,, | Performed by: NURSE PRACTITIONER

## 2019-10-01 PROCEDURE — 83540 ASSAY OF IRON: CPT

## 2019-10-01 PROCEDURE — 99999 PR PBB SHADOW E&M-EST. PATIENT-LVL III: ICD-10-PCS | Mod: PBBFAC,,, | Performed by: NURSE PRACTITIONER

## 2019-10-01 PROCEDURE — 63600175 PHARM REV CODE 636 W HCPCS: Performed by: STUDENT IN AN ORGANIZED HEALTH CARE EDUCATION/TRAINING PROGRAM

## 2019-10-01 PROCEDURE — 36415 COLL VENOUS BLD VENIPUNCTURE: CPT

## 2019-10-01 RX ORDER — FERROUS SULFATE, DRIED 160(50) MG
1 TABLET, EXTENDED RELEASE ORAL 2 TIMES DAILY WITH MEALS
Status: DISCONTINUED | OUTPATIENT
Start: 2019-10-01 | End: 2019-10-03 | Stop reason: HOSPADM

## 2019-10-01 RX ORDER — OXYCODONE AND ACETAMINOPHEN 5; 325 MG/1; MG/1
1 TABLET ORAL EVERY 4 HOURS PRN
Status: DISCONTINUED | OUTPATIENT
Start: 2019-10-01 | End: 2019-10-03 | Stop reason: HOSPADM

## 2019-10-01 RX ORDER — GABAPENTIN 300 MG/1
300 CAPSULE ORAL NIGHTLY
Status: DISCONTINUED | OUTPATIENT
Start: 2019-10-01 | End: 2019-10-01

## 2019-10-01 RX ORDER — AMOXICILLIN 250 MG
1 CAPSULE ORAL 2 TIMES DAILY
Status: DISCONTINUED | OUTPATIENT
Start: 2019-10-01 | End: 2019-10-01

## 2019-10-01 RX ORDER — SODIUM CHLORIDE 9 MG/ML
INJECTION, SOLUTION INTRAVENOUS CONTINUOUS
Status: DISCONTINUED | OUTPATIENT
Start: 2019-10-01 | End: 2019-10-03

## 2019-10-01 RX ORDER — ONDANSETRON 2 MG/ML
4 INJECTION INTRAMUSCULAR; INTRAVENOUS EVERY 8 HOURS PRN
Status: DISCONTINUED | OUTPATIENT
Start: 2019-10-01 | End: 2019-10-03 | Stop reason: HOSPADM

## 2019-10-01 RX ORDER — ACETAMINOPHEN 325 MG/1
650 TABLET ORAL EVERY 4 HOURS PRN
Status: DISCONTINUED | OUTPATIENT
Start: 2019-10-01 | End: 2019-10-03

## 2019-10-01 RX ORDER — GLUCAGON 1 MG
1 KIT INJECTION
Status: DISCONTINUED | OUTPATIENT
Start: 2019-10-01 | End: 2019-10-03 | Stop reason: HOSPADM

## 2019-10-01 RX ORDER — GABAPENTIN 300 MG/1
300 CAPSULE ORAL 3 TIMES DAILY
Status: DISCONTINUED | OUTPATIENT
Start: 2019-10-01 | End: 2019-10-03 | Stop reason: HOSPADM

## 2019-10-01 RX ORDER — POTASSIUM CHLORIDE 750 MG/1
40 CAPSULE, EXTENDED RELEASE ORAL ONCE
Status: COMPLETED | OUTPATIENT
Start: 2019-10-01 | End: 2019-10-01

## 2019-10-01 RX ORDER — INSULIN ASPART 100 [IU]/ML
0-5 INJECTION, SOLUTION INTRAVENOUS; SUBCUTANEOUS
Status: DISCONTINUED | OUTPATIENT
Start: 2019-10-01 | End: 2019-10-03 | Stop reason: HOSPADM

## 2019-10-01 RX ORDER — IBUPROFEN 200 MG
16 TABLET ORAL
Status: DISCONTINUED | OUTPATIENT
Start: 2019-10-01 | End: 2019-10-03 | Stop reason: HOSPADM

## 2019-10-01 RX ORDER — PANTOPRAZOLE SODIUM 40 MG/10ML
40 INJECTION, POWDER, LYOPHILIZED, FOR SOLUTION INTRAVENOUS 2 TIMES DAILY
Status: DISCONTINUED | OUTPATIENT
Start: 2019-10-01 | End: 2019-10-02

## 2019-10-01 RX ORDER — PANTOPRAZOLE SODIUM 40 MG/10ML
40 INJECTION, POWDER, LYOPHILIZED, FOR SOLUTION INTRAVENOUS 2 TIMES DAILY
Status: DISCONTINUED | OUTPATIENT
Start: 2019-10-01 | End: 2019-10-01

## 2019-10-01 RX ORDER — POLYETHYLENE GLYCOL 3350 17 G/17G
17 POWDER, FOR SOLUTION ORAL DAILY
Status: DISCONTINUED | OUTPATIENT
Start: 2019-10-01 | End: 2019-10-01

## 2019-10-01 RX ORDER — IBUPROFEN 200 MG
24 TABLET ORAL
Status: DISCONTINUED | OUTPATIENT
Start: 2019-10-01 | End: 2019-10-03 | Stop reason: HOSPADM

## 2019-10-01 RX ORDER — RAMELTEON 8 MG/1
8 TABLET ORAL NIGHTLY PRN
Status: DISCONTINUED | OUTPATIENT
Start: 2019-10-01 | End: 2019-10-03 | Stop reason: HOSPADM

## 2019-10-01 RX ORDER — FLUOXETINE HYDROCHLORIDE 20 MG/1
40 CAPSULE ORAL DAILY
Status: DISCONTINUED | OUTPATIENT
Start: 2019-10-01 | End: 2019-10-03 | Stop reason: HOSPADM

## 2019-10-01 RX ORDER — SODIUM CHLORIDE 0.9 % (FLUSH) 0.9 %
10 SYRINGE (ML) INJECTION
Status: DISCONTINUED | OUTPATIENT
Start: 2019-10-01 | End: 2019-10-03 | Stop reason: HOSPADM

## 2019-10-01 RX ADMIN — GABAPENTIN 300 MG: 300 CAPSULE ORAL at 08:10

## 2019-10-01 RX ADMIN — POTASSIUM CHLORIDE 40 MEQ: 750 CAPSULE, EXTENDED RELEASE ORAL at 05:10

## 2019-10-01 RX ADMIN — SODIUM CHLORIDE: 0.9 INJECTION, SOLUTION INTRAVENOUS at 05:10

## 2019-10-01 RX ADMIN — RAMELTEON 8 MG: 8 TABLET ORAL at 08:10

## 2019-10-01 RX ADMIN — PANTOPRAZOLE SODIUM 40 MG: 40 INJECTION, POWDER, LYOPHILIZED, FOR SOLUTION INTRAVENOUS at 05:10

## 2019-10-01 RX ADMIN — OXYCODONE HYDROCHLORIDE AND ACETAMINOPHEN 1 TABLET: 5; 325 TABLET ORAL at 08:10

## 2019-10-01 NOTE — PROGRESS NOTES
ATTENDING NOTE, ONCOLOGY INPATIENT TEAM      Patient seen and examined in the Clinic and again on the floor after he was admitted.  Chart reviewed.  Full note to follow by housestaff.    Appears comfortable, but complains of generalized weakness.  Lungs are clear to auscultation.  Abdomen is soft, nontender.  A small non inflamed extermanl hemorrhoid is seen on rectal inspection.  Labs reviewed.  Recent C spine MRI was reviewed.      PLAN  Check stools for c dif.  Check H/h Q 12 hours  Ultrasound of the LUE to rule out DVT; I suspect his left upper extremity pain is from his severe spinal stenosis (see recent MRI of early September 2019)  Hydration.  Type and cross match 2 units of PRBCs; will transfuse if his hg drops any further, which I suspect it will with hydration.  We have asked the GI Service to evaluate for possible EGD and colonoscopy given his continuing GI blood loss.  Continue PPIs for now  DVT prophylaxis with TEDs and SCDs.  We will reassess in am.              Rigo Ta MD

## 2019-10-01 NOTE — SUBJECTIVE & OBJECTIVE
"Oncology Treatment Plan:   OP NIVOLUMAB Q2W    Medications:  Continuous Infusions:   sodium chloride 0.9%       Scheduled Meds:   calcium-vitamin D3  1 tablet Oral BID WM    FLUoxetine  40 mg Oral Daily    gabapentin  300 mg Oral TID    multivitamin  1 tablet Oral Daily    pantoprazole  40 mg Intravenous BID     PRN Meds:acetaminophen, Dextrose 10% Bolus, Dextrose 10% Bolus, glucagon (human recombinant), glucose, glucose, insulin aspart U-100, ondansetron, ramelteon, sodium chloride 0.9%     Review of patient's allergies indicates:   Allergen Reactions    Trazodone Other (See Comments)     confusion        Past Medical History:   Diagnosis Date    Acute renal failure 6/21/2018    Alcohol abuse     ended 2004    Chemotherapy induced neutropenia 6/7/2018    Former smoker     HPV in male     Hx of psychiatric care     Ativan, Paxil ("caused anger problems")    Opiate addiction     Squamous cell carcinoma of palatine tonsil     throat and tonsillar    Substance abuse     opiates, methamphetamines; ended 2004     Past Surgical History:   Procedure Laterality Date    ESOPHAGOGASTRODUODENOSCOPY Left 9/9/2019    Procedure: EGD (ESOPHAGOGASTRODUODENOSCOPY);  Surgeon: Balta Lisa MD;  Location: [facility];  Service: Endoscopy;  Laterality: Left;    ESOPHAGOGASTRODUODENOSCOPY N/A 9/12/2019    Procedure: EGD (ESOPHAGOGASTRODUODENOSCOPY);  Surgeon: Perez Mon III, MD;  Location: [facility];  Service: Endoscopy;  Laterality: N/A;    GASTROSTOMY TUBE PLACEMENT Left 02/12/2018    MEDIPORT REMOVAL N/A 6/6/2018    Procedure: Removal-port-a-cath;  Surgeon: [facility];  Location: [address];  Service: General;  Laterality: N/A;     Family History     Problem Relation (Age of Onset)    Brain cancer Maternal Uncle, Maternal Uncle    Depression Sister    Hypertension Father, Brother    Leukemia Mother    Thyroid disease Sister        Tobacco Use    Smoking status: Former Smoker     " Packs/day: 1.50     Years: 25.00     Pack years: 37.50     Types: Cigarettes     Last attempt to quit: 2017     Years since quittin.3    Smokeless tobacco: Never Used    Tobacco comment: smoked for about 25 years. quit 6 months ago   Substance and Sexual Activity    Alcohol use: No     Comment: history of overuse    Drug use: No     Comment: Former Opiate/methamphetamine addiction    Sexual activity: Yes     Partners: Female       Review of Systems   Constitutional: Positive for chills and fever.   HENT: Negative for sore throat and trouble swallowing.    Eyes: Negative for visual disturbance.   Respiratory: Negative for cough and shortness of breath.    Cardiovascular: Negative for chest pain and palpitations.   Gastrointestinal: Positive for diarrhea and nausea.   Genitourinary: Negative for hematuria.   Musculoskeletal: Negative for back pain and neck pain.   Neurological: Positive for dizziness and light-headedness.   Psychiatric/Behavioral: Negative for agitation and behavioral problems.     Objective:     Vital Signs (Most Recent):  Temp: 98.2 °F (36.8 °C) (10/01/19 1410)  Pulse: 68 (10/01/19 1410)  Resp: 15 (10/01/19 1410)  BP: 117/69 (10/01/19 1410)  SpO2: 99 % (10/01/19 1410) Vital Signs (24h Range):  Temp:  [98 °F (36.7 °C)-98.2 °F (36.8 °C)] 98.2 °F (36.8 °C)  Pulse:  [68-86] 68  Resp:  [15-18] 15  SpO2:  [99 %-100 %] 99 %  BP: (116-117)/(66-69) 117/69     Weight: 76.7 kg (169 lb 1.5 oz)  Body mass index is 25.71 kg/m².  Body surface area is 1.92 meters squared.    No intake or output data in the 24 hours ending 10/01/19 1644    Physical Exam   Constitutional: He is oriented to person, place, and time. He appears well-developed and well-nourished. No distress.   HENT:   Head: Normocephalic and atraumatic.   Eyes: EOM are normal. No scleral icterus.   Neck: Neck supple. No JVD present.   Cardiovascular: Normal rate and regular rhythm. Exam reveals no gallop and no friction rub.   No murmur  heard.  Pulmonary/Chest: Breath sounds normal. No respiratory distress. He has no wheezes.   Abdominal: Soft. Bowel sounds are normal. He exhibits no distension. There is no tenderness.   Genitourinary: Rectal exam shows guaiac positive stool.   Musculoskeletal: Normal range of motion. He exhibits no edema.   No evidence of decreased ROM of left upper extremity.    Neurological: He is alert and oriented to person, place, and time.   Skin: Skin is dry.   Psychiatric: He has a normal mood and affect.       Significant Labs:   CBC:   Recent Labs   Lab 10/01/19  1105 10/01/19  1450   WBC 5.65 5.48   HGB 7.9* 7.6*   HCT 24.7* 23.1*    252    and CMP:   Recent Labs   Lab 10/01/19  1105 10/01/19  1450    136   K 3.6 3.2*   CL 99 99   CO2 23 22*   GLU 97 76   BUN 14 14   CREATININE 1.2 1.2   CALCIUM 8.0* 7.6*   PROT 6.5 6.4   ALBUMIN 3.1* 3.0*   BILITOT 0.7 0.6   ALKPHOS 63 64   AST 17 16   ALT 9* 11   ANIONGAP 14 15   EGFRNONAA >60 >60.0       Diagnostic Results:  I have reviewed all pertinent imaging results/findings within the past 24 hours.

## 2019-10-01 NOTE — ASSESSMENT & PLAN NOTE
Oncologic history as above. No active systemic therapy at this time. Patient with no evidence of disease per PET scan in Sept 2019.

## 2019-10-01 NOTE — PLAN OF CARE
Plan of care reviewed with patient and brother. Pt has remained free from injury this shift. Bed in low locked position. Call light and personal belongings within reach. Side rails up x2. Nonskid socks in place. Pt instructed to call with any needs. Will continue to monitor.

## 2019-10-01 NOTE — HPI
Oncology History:     Mr. Burton Whiting is a 48-year-old male, former smoker, who presents today with a four-month history of enlarging neck mass.  He initially delayed care due to lack of insurance.  He was seen by Dr. Turpin who referred him to see Dr. Olguin.  He had a CT that showed a 3.8 x 3.8 x 4.9 cm soft tissue mass arising from the left palatine tonsil resulting in moderate narrowing of the hypopharyngeal airway adjacent extensive matted left cervical lymphadenopathy was noted.  The largest ella mass was 6.6 x 9 x 2.6 cm extending inferiorly to the supraclavicular region.  The mass pushed the trachea and the left common carotid artery to the right.  Additional representative of cervical lymph nodes measuring 2.9 x 3.1 x 3.5 cm on axial image 37 of series 3   and 2.4 x 2.1 x 2.2 cm on axial image 55 of series 3.  There is also a sclerotic lesion involving the midline of the clivus measuring 1.2 cm.  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  PET scan performed on 01/19/2018 revealed persistent evidence of a soft tissue mass arising from the left palatine tonsil resulting in moderate narrowing of the hypopharyngeal   airway.  The mass is hypermetabolic demonstrating an SUV max of 18.5.  There is also persistent adjacent extensive matted left cervical lymphadenopathy, largest of this measuring 6.7 x 8.42 with hypermetabolic activity and SUV max of 20.5.  Lymphadenopathy is again noted to have a mass effect on the trachea and left common carotid artery, pushing both structures towards the right.  There is also prominent right cervical lymph nodes with the largest measuring 0.8 cm and demonstrating mild hypermetabolic activity with SUV max of 1.8, physiologic   distribution of FDG in the gray matter.  There are 3 pulmonary nodules noted within the right lung, the largest is in the anterior segment of the right upper lobe measuring 1 cm, second is visualized in the middle lobe measuring  "0.6 cm and the third is within the posterior basal segment measuring 0.6 cm.  There is one pulmonary nodule within the left lung within the lateral basal segment measuring 0.6 cm.  These nodules do not demonstrate hypermetabolic activity; however, they are below the threshold for observation with PET     He underwent MRI cervical spine revealed "No evidence of metastatic disease to the cervical spine. Multilevel degenerative changes of the cervical spine with disc protrusion at C5-6 which results in moderate to severe spinal canal stenosis and and associated cord signal abnormality, suggestive of compressive myelopathy. 6 mm T1 hypointense non-enhancing lesion in the clivus.  Continued followup is suggested. 9 mm inferior descent of the cerebellar tonsils below the foramen magnum, suggestive of Chiari 1 malformation"  MRI thoracic spine "No evidence of metastatic disease involving the thoracic spine. Incidental hemangioma involving the T7 vertebral body. Mild degenerative disc disease without any significant spinal canal stenosis or neuroforaminal narrowing.   He completed 3 cycles of TPF and 6 weeks of Cisplatin and RT. Completed on 6/26/18     His PET scan from 9/25/18 revealed "Progression of disease with multiple new hypermetabolic foci including the manubrium, mediastinal/retrocrural lymph nodes, and lungs. Partial therapeutic response within the presumed radiation field involving the left cervical mass, and complete therapeutic response in the right cervical lymph node, clivus, and left palatine tonsil. New soft tissue thickening and hypermetabolism in the left aryepiglottic fold and right cricoid cartilage"     He has been on Opdivo since 2/14/19- Last treatment 6/19/19 (C10). 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 He was started on steroids with some relief in bowel activity. PET 6/2019 showed response.     In early September 2019, he called with development left neck/arm pain and swelling. " Cervical spine MRI revealed stenosis. He was taking 8 Aleve a day for pain control.     Pt went to Ed 9/8/19 with altered Mental Status. Was admitted and noted to have bleeding ulcers. He required 5u PRBC's.  EGD performed 9/12/19 showing two duodenal ulcers which were successfully cauterized.      HISTORY OF PRESENT ILLNESS:  Patient returned to oncology clinic on 10/1/19. 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 Patient was in the hospital in [place] last month for altered mental status and found to be anemic. Symptoms improved after blood transfusions (5 total). Patient underwent EGD x2 which found two duodenal ulcers.     Patient presents for continued diarrhea and nausea over the past week. Patient states that he has increasing pain in his left neck, shoulder and arm over the past week as well. He reports mild swelling as well, no erythema or redness. He states the tramadol he was taking for this does not help. He states that he feels dehydrated and dizzy upon standing. Denies dark, tarry stools. Patient also reports poor oral intake as eating contributes to the diarrhea. Reports 6lb weight loss in past week. He reports a one time fever on 9/30 with recorded temperature 100. He also reports decreased urine output over the past several days.    Of note, patient states he has a history of opoid addiction in the past and would like to avoid opioids if possible. He takes gabapentin 300 TID for neuropathic pain.    Patient admitted to med onc for rehydration and further evaluation of  diarrhea.

## 2019-10-01 NOTE — H&P
[facility]  Hematology/Oncology  H&P    Patient Name: Burton Whiting  MRN: [id number]  Admission Date: 10/1/2019  Code Status: Full Code   Attending Provider: Rigo Ta MD  Primary Care Physician: Christopher Turpin DO  Principal Problem:<principal problem not specified>    Subjective:     HPI: Oncology History:     Mr. Burton Whiting is a 48-year-old male, former smoker, who presents today with a four-month history of enlarging neck mass.  He initially delayed care due to lack of insurance.  He was seen by Dr. Turpin who referred him to see Dr. Olguin.  He had a CT that showed a 3.8 x 3.8 x 4.9 cm soft tissue mass arising from the left palatine tonsil resulting in moderate narrowing of the hypopharyngeal airway adjacent extensive matted left cervical lymphadenopathy was noted.  The largest ella mass was 6.6 x 9 x 2.6 cm extending inferiorly to the supraclavicular region.  The mass pushed the trachea and the left common carotid artery to the right.  Additional representative of cervical lymph nodes measuring 2.9 x 3.1 x 3.5 cm on axial image 37 of series 3   and 2.4 x 2.1 x 2.2 cm on axial image 55 of series 3.  There is also a sclerotic lesion involving the midline of the clivus measuring 1.2 cm.  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  PET scan performed on 01/19/2018 revealed persistent evidence of a soft tissue mass arising from the left palatine tonsil resulting in moderate narrowing of the hypopharyngeal   airway.  The mass is hypermetabolic demonstrating an SUV max of 18.5.  There is also persistent adjacent extensive matted left cervical lymphadenopathy, largest of this measuring 6.7 x 8.42 with hypermetabolic activity and SUV max of 20.5.  Lymphadenopathy is again noted to have a mass effect on the trachea and left common carotid artery, pushing both structures towards the right.  There is also prominent right cervical lymph nodes with the largest  "measuring 0.8 cm and demonstrating mild hypermetabolic activity with SUV max of 1.8, physiologic   distribution of FDG in the gray matter.  There are 3 pulmonary nodules noted within the right lung, the largest is in the anterior segment of the right upper lobe measuring 1 cm, second is visualized in the middle lobe measuring 0.6 cm and the third is within the posterior basal segment measuring 0.6 cm.  There is one pulmonary nodule within the left lung within the lateral basal segment measuring 0.6 cm.  These nodules do not demonstrate hypermetabolic activity; however, they are below the threshold for observation with PET     He underwent MRI cervical spine revealed "No evidence of metastatic disease to the cervical spine. Multilevel degenerative changes of the cervical spine with disc protrusion at C5-6 which results in moderate to severe spinal canal stenosis and and associated cord signal abnormality, suggestive of compressive myelopathy. 6 mm T1 hypointense non-enhancing lesion in the clivus.  Continued followup is suggested. 9 mm inferior descent of the cerebellar tonsils below the foramen magnum, suggestive of Chiari 1 malformation"  MRI thoracic spine "No evidence of metastatic disease involving the thoracic spine. Incidental hemangioma involving the T7 vertebral body. Mild degenerative disc disease without any significant spinal canal stenosis or neuroforaminal narrowing.   He completed 3 cycles of TPF and 6 weeks of Cisplatin and RT. Completed on 6/26/18     His PET scan from 9/25/18 revealed "Progression of disease with multiple new hypermetabolic foci including the manubrium, mediastinal/retrocrural lymph nodes, and lungs. Partial therapeutic response within the presumed radiation field involving the left cervical mass, and complete therapeutic response in the right cervical lymph node, clivus, and left palatine tonsil. New soft tissue thickening and hypermetabolism in the left aryepiglottic fold and " "right cricoid cartilage"     He has been on Opdivo since 2/14/19- Last treatment 6/19/19 (C10).  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 He was started on steroids with some relief in bowel activity. PET 6/2019 showed response.     In early September 2019, he called with development left neck/arm pain and swelling. Cervical spine MRI revealed stenosis. He was taking 8 Aleve a day for pain control.     Pt went to Ed 9/8/19 with altered Mental Status.  Was admitted and noted to have bleeding ulcers. He required 5u PRBC's.  EGD performed 9/12/19 showing two duodenal ulcers which were successfully cauterized.      HISTORY OF PRESENT ILLNESS:  Patient returned to oncology clinic on 10/1/19. 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 Patient was in the hospital in [place] last month for altered mental status and found to be anemic. Symptoms improved after blood transfusions (5 total). Patient underwent EGD x2 which found two duodenal ulcers.     Patient presents for continued diarrhea and nausea over the past week. Patient states that he has increasing pain in his left neck, shoulder and arm over the past week as well. He reports mild swelling as well, no erythema or redness. He states the tramadol he was taking for this does not help. He states that he feels dehydrated and dizzy upon standing. Denies dark, tarry stools. Patient also reports poor oral intake as eating contributes to the diarrhea. Reports 6lb weight loss in past week. He reports a one time fever on 9/30 with recorded " "temperature 100. He also reports decreased urine output over the past several days.    Of note, patient states he has a history of opoid addiction in the past and would like to avoid opioids if possible. He takes gabapentin 300 TID for neuropathic pain.    Patient admitted to med onc for rehydration and further evaluation of diarrhea.     Oncology Treatment Plan:   OP NIVOLUMAB Q2W    Medications:  Continuous Infusions:   sodium chloride 0.9%       Scheduled Meds:   calcium-vitamin D3  1 tablet Oral BID WM    FLUoxetine  40 mg Oral Daily    gabapentin  300 mg Oral TID    multivitamin  1 tablet Oral Daily    pantoprazole  40 mg Intravenous BID     PRN Meds:acetaminophen, Dextrose 10% Bolus, Dextrose 10% Bolus, glucagon (human recombinant), glucose, glucose, insulin aspart U-100, ondansetron, ramelteon, sodium chloride 0.9%     Review of patient's allergies indicates:   Allergen Reactions    Trazodone Other (See Comments)     confusion        Past Medical History:   Diagnosis Date    Acute renal failure 6/21/2018    Alcohol abuse     ended 2004    Chemotherapy induced neutropenia 6/7/2018    Former smoker     HPV in male     Hx of psychiatric care     Ativan, Paxil ("caused anger problems")    Opiate addiction     Squamous cell carcinoma of palatine tonsil     throat and tonsillar    Substance abuse     opiates, methamphetamines; ended 2004     Past Surgical History:   Procedure Laterality Date    ESOPHAGOGASTRODUODENOSCOPY Left 9/9/2019    Procedure: EGD (ESOPHAGOGASTRODUODENOSCOPY);  Surgeon: Balta Lisa MD;  Location: [facility];  Service: Endoscopy;  Laterality: Left;    ESOPHAGOGASTRODUODENOSCOPY N/A 9/12/2019    Procedure: EGD (ESOPHAGOGASTRODUODENOSCOPY);  Surgeon: Perez Mon III, MD;  Location: [facility];  Service: Endoscopy;  Laterality: N/A;    GASTROSTOMY TUBE PLACEMENT Left 02/12/2018    MEDIPORT REMOVAL N/A 6/6/2018    Procedure: Removal-port-a-cath;  Surgeon: Blayne " Diagnostic Provider;  Location: [facility] OR [address] [place] [address];  Service: General;  Laterality: N/A;     Family History     Problem Relation (Age of Onset)    Brain cancer Maternal Uncle, Maternal Uncle    Depression Sister    Hypertension Father, Brother    Leukemia Mother    Thyroid disease Sister        Tobacco Use    Smoking status: Former Smoker     Packs/day: 1.50     Years: 25.00     Pack years: 37.50     Types: Cigarettes     Last attempt to quit: 2017     Years since quittin.3    Smokeless tobacco: Never Used    Tobacco comment: smoked for about 25 years. quit 6 months ago   Substance and Sexual Activity    Alcohol use: No     Comment: history of overuse    Drug use: No     Comment: Former Opiate/methamphetamine addiction    Sexual activity: Yes     Partners: Female       Review of Systems   Constitutional: Positive for chills and fever.   HENT: Negative for sore throat and trouble swallowing.    Eyes: Negative for visual disturbance.   Respiratory: Negative for cough and shortness of breath.    Cardiovascular: Negative for chest pain and palpitations.   Gastrointestinal: Positive for diarrhea and nausea.   Genitourinary: Negative for hematuria.   Musculoskeletal: Negative for back pain and neck pain.   Neurological: Positive for dizziness and light-headedness.   Psychiatric/Behavioral: Negative for agitation and behavioral problems.     Objective:     Vital Signs (Most Recent):  Temp: 98.2 °F (36.8 °C) (10/01/19 1410)  Pulse: 68 (10/01/19 1410)  Resp: 15 (10/01/19 1410)  BP: 117/69 (10/01/19 1410)  SpO2: 99 % (10/01/19 1410) Vital Signs (24h Range):  Temp:  [98 °F (36.7 °C)-98.2 °F (36.8 °C)] 98.2 °F (36.8 °C)  Pulse:  [68-86] 68  Resp:  [15-18] 15  SpO2:  [99 %-100 %] 99 %  BP: (116-117)/(66-69) 117/69     Weight: 76.7 kg (169 lb 1.5 oz)  Body mass index is 25.71 kg/m².  Body surface area is 1.92 meters squared.    No intake or output data in the 24 hours ending 10/01/19 1644    Physical Exam    Constitutional: He is oriented to person, place, and time. He appears well-developed and well-nourished. No distress.   HENT:   Head: Normocephalic and atraumatic.   Eyes: EOM are normal. No scleral icterus.   Neck: Neck supple. No JVD present.   Cardiovascular: Normal rate and regular rhythm. Exam reveals no gallop and no friction rub.   No murmur heard.  Pulmonary/Chest: Breath sounds normal. No respiratory distress. He has no wheezes.   Abdominal: Soft. Bowel sounds are normal. He exhibits no distension. There is no tenderness.   Genitourinary: Rectal exam shows guaiac positive stool.   Musculoskeletal: Normal range of motion. He exhibits no edema.   No evidence of decreased ROM of left upper extremity.    Neurological: He is alert and oriented to person, place, and time.   Skin: Skin is dry.   Psychiatric: He has a normal mood and affect.       Significant Labs:   CBC:   Recent Labs   Lab 10/01/19  1105 10/01/19  1450   WBC 5.65 5.48   HGB 7.9* 7.6*   HCT 24.7* 23.1*    252    and CMP:   Recent Labs   Lab 10/01/19  1105 10/01/19  1450    136   K 3.6 3.2*   CL 99 99   CO2 23 22*   GLU 97 76   BUN 14 14   CREATININE 1.2 1.2   CALCIUM 8.0* 7.6*   PROT 6.5 6.4   ALBUMIN 3.1* 3.0*   BILITOT 0.7 0.6   ALKPHOS 63 64   AST 17 16   ALT 9* 11   ANIONGAP 14 15   EGFRNONAA >60 >60.0       Diagnostic Results:  I have reviewed all pertinent imaging results/findings within the past 24 hours.    Assessment/Plan:     Pain and swelling of left upper extremity  Suspect that this pain is likely related to his known cervical spinal stenosis. Will rule out DVT give history of malignancy.    PLAN:  - LUE U/S to rule out DVT.    GI bleed  Patient presents from clinic with diarrhea and guaiac positive stool in the history of GI bleed last month. Patient also appears dehydrated and reports poor oral intake over past several days.    PLAN:  - GI consult  - CBC daily  - Type and screen. Consented for blood transfusion.  Transfuse for Hgb < 7.  - IV protonix 40 BID.  - Continuous NS.    Neuropathic pain  Continue home gabapentin.    Anxiety  Continue home fluoxetine.    Dehydration  See upper GI bleed.    Low vitamin D level  Continue home vit D supplementation.    Squamous cell carcinoma of palatine tonsil  Oncologic history as above. No active systemic therapy at this time. Patient with no evidence of disease per PET scan in Sept 2019.        Demi Fishman MD  Hematology/Oncology  [facility]            ATTENDING NOTE, ONCOLOGY INPATIENT TEAM    As above; please refer to my note of same day for our assessment and plan    Rigo Ta MD

## 2019-10-01 NOTE — HOSPITAL COURSE
10/1/19: admitted for rehydration and C diff rule out. Obtaining LUE ultrasound to rule out DVT. Obtaining C diff testing and blood cultures given history of subjective fever.  10/2: patient reports feeling better, less dizziness and lightheadedness with continuous IV NS. Patient reports 3 episodes of diarrhea last night, nonbloody. C diff negative. Blood cultures NGTD. Patient underwent EGD with no signs of active bleeding. Multiple duodenal ulcers visualized as per EGD procedure note. LUE Ultrasound did not show any DVT. Advanced patient to regular diet and will see how he tolerates. 2 episodes of watery diarrhea in the morning and afternoon. Will reassess diarrhea and Hgb in the AM tomorrow.   10/3: patient states his diarrhea is improved with prn lomotil. Only 1 episode last night which he states was less loose than previous episodes. States he feels much better today and would like to go home. Hgb 6.7 today (down from 7.0 yesterday and 7.4 the day before). Transfusing 1 unit pRBCs. Patient is tolerating regular diet with no issues. Patient off IV fluids. Patient feels well to go home. Will discharge with plans to follow up with Dr Lopez early next week and obtain CBC next week as well to assess for need for outpatient transfusion. Patient counseled to return to clinic or ED for intractable diarrhea with signs of dehydration and dizziness so we can rehydrate. Patient encouraged to return if obtains worsening SOB or signs of diffuse overt GI bleeding. Patient encouraged to drink fluids.

## 2019-10-01 NOTE — PROGRESS NOTES
Patient ID: Burton Whiting is a 49 y.o. male.      Chief Complaint: Squamous cell carcinoma of palatine tonsil; Colitis; Diarrhea; and Dehydration      URGENT CARE:     Today, reports feeling weak.   Dizzy upon standing.     Continues to have diarrhea.   Reports 3-4 loose stools in the past 24 hours. Bright red blood noted in stool 9/29/19 and reddish/brown blood saturated tissue today. He does have hemorrhoids.   No dark, tarry stools.   Not eating as this triggers diarrhea.   Gatorade and crackers the past 2 days.   +weight loss  Pain continues to be an issue in left neck and arm. Tramadol not helping. Could not go to work last week due to pain. Was to see pain management next week.   +headaches associated with left neck pain.   +fever/chills last night - recorded temperature 100.   Woke up with sweats this morning. Took tylenol.   +decreased urination  Of note, h/o Opoid addiction in the past.    In review,   48 yo male with progressive head and neck cancer. Last seen in clinic by Dr. Lopez 7/15/19. Most recently treated with Opdivo and last PET 6/2019 showed response. However, Opdivo held as he developed Immunotherapy induced Colitis. (last treatment - C10 6/19/19).   He was started on high dose steroids at that time with some relief in bowel activity. In early September 2019, he called with development left neck/arm pain and swelling. Cervical spine MRI revealed stenosis. He was taking 8 Aleve a day for pain control.   Went to Ed 9/8/19 with altered Mental Status. Noted to have bleeding ulcers and required 5u PRBC's. See below.   Admitted from 9-8-19-9/13/19:  Hospital Course:   9/9 Upon arrival in emergency room the patient was found to be hypotensive when he was placed in a room he had witnessed seizure activity he was given Ativan in diagnostic imaging was performed.  A CT of the head showed no acute findings his CBC did show a hemoglobin of less than 7   9/10 s/p 3 u pRBC H/H 7.0/20.9, s/p EGD show  "duodenal ulcer. H plyori pending. Pt denies any new signs of bleeding, He denies any bleeding and denies hematuria, hematemesis, hemoptysis. No bm overnight. Seen by neurology and EEG pending. Pt denies seizure activity (shaking, abnormal movements) overight or this morning.   9/11 s/1 upRBC this morning with h/h improved to 8/23.5. EEG yesterday wnl. He denies any bleeding and denies hematuria, hematemesis, hemoptysis, melena, blood in stool, increased bruising.  He denies abnormal movements, shaking, seizure activity.  9/12:  Patient was noted to bright red blood per rectum yesterday evening.  GI was consulted and planned for EGD.  EGD performed this morning showing to duodenal ulcers which were successfully cauterized.  Hemoglobin dropped from 8-7.6 overnight and 1 unit of PRBCs was given today.  On exam patient states feels some fatigue.  Denies any CP, SOB, N/V/D, headaches, fever or chills.  No seizure activity. No bleeding since yesterday.    9/13:  Patient seen and examined on day of discharge. H&H stable overnight.  Patient denied any further episodes of bleeding.  Patient denied any further episodes of seizure activity including shaking or abnormal movements..  Patient was hemodynamically stable and appropriate for discharge"    Oncology History:    Squamous cell carcinoma of palatine tonsil; g-tube malodorous; left neck pain 2/10; 4 cans formula per day; and r arm port---red/warm to touch  Mr. Burton Whiting is a 48-year-old male, former smoker, who presents today with a four-month history of enlarging neck mass.  He initially delayed care due to lack of insurance.  He was seen by Dr. Turpin who referred him to see Dr. Olguin.  He had a CT that showed a 3.8 x 3.8 x 4.9 cm soft tissue mass arising from the left palatine tonsil resulting in moderate narrowing of the hypopharyngeal airway adjacent extensive matted left cervical lymphadenopathy was noted.  The largest ella mass was 6.6 x 9 x 2.6 cm extending " "inferiorly to the supraclavicular region.  The mass pushed the trachea and the left common carotid artery to the right.  Additional representative of cervical lymph nodes measuring 2.9 x 3.1 x 3.5 cm on axial image 37 of series 3   and 2.4 x 2.1 x 2.2 cm on axial image 55 of series 3.  There is also a sclerotic lesion involving the midline of the clivus measuring 1.2 cm.  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  PET scan performed on 01/19/2018 revealed persistent evidence of a soft tissue mass arising from the left palatine tonsil resulting in moderate narrowing of the hypopharyngeal   airway.  The mass is hypermetabolic demonstrating an SUV max of 18.5.  There is also persistent adjacent extensive matted left cervical lymphadenopathy, largest of this measuring 6.7 x 8.42 with hypermetabolic activity and SUV max of 20.5.  Lymphadenopathy is again noted to have a mass effect on the trachea and left common carotid artery, pushing both structures towards the right.  There is also prominent right cervical lymph nodes with the largest measuring 0.8 cm and demonstrating mild hypermetabolic activity with SUV max of 1.8, physiologic   distribution of FDG in the gray matter.  There are 3 pulmonary nodules noted within the right lung, the largest is in the anterior segment of the right upper lobe measuring 1 cm, second is visualized in the middle lobe measuring 0.6 cm and the third is within the posterior basal segment measuring 0.6 cm.  There is one pulmonary nodule within the left lung within the lateral basal segment measuring 0.6 cm.  These nodules do not demonstrate hypermetabolic activity; however, they are below the threshold for observation with PET     He underwent MRI cervical spine revealed "No evidence of metastatic disease to the cervical spine. Multilevel degenerative changes of the cervical spine with disc protrusion at C5-6 which results in moderate to severe spinal canal stenosis " "and and associated cord signal abnormality, suggestive of compressive myelopathy. 6 mm T1 hypointense non-enhancing lesion in the clivus.  Continued followup is suggested. 9 mm inferior descent of the cerebellar tonsils below the foramen magnum, suggestive of Chiari 1 malformation"  MRI thoracic spine "No evidence of metastatic disease involving the thoracic spine. Incidental hemangioma involving the T7 vertebral body. Mild degenerative disc disease without any significant spinal canal stenosis or neuroforaminal narrowing.   He completed 3 cycles of TPF and 6 weeks of Cisplatin and RT. Completed on 6/26/18     His PET scan from 9/25/18 revealed "Progression of disease with multiple new hypermetabolic foci including the manubrium, mediastinal/retrocrural lymph nodes, and lungs. Partial therapeutic response within the presumed radiation field involving the left cervical mass, and complete therapeutic response in the right cervical lymph node, clivus, and left palatine tonsil. New soft tissue thickening and hypermetabolism in the left aryepiglottic fold and right cricoid cartilage"     He has been on Opdivo since 2/14/19- Last treatment 6/19/19 (C10).           Review of Systems   Constitutional: Positive for activity change, appetite change, chills, fatigue, fever and unexpected weight change.   HENT: Negative for mouth sores, nosebleeds and sore throat.    Eyes: Negative for visual disturbance.   Respiratory: Negative for cough and shortness of breath.    Cardiovascular: Negative for chest pain, palpitations and leg swelling.   Gastrointestinal: Positive for abdominal pain (soreness), blood in stool and diarrhea. Negative for constipation, nausea and vomiting.   Genitourinary: Positive for decreased urine volume. Negative for difficulty urinating and frequency.   Musculoskeletal: Negative for arthralgias and back pain.        Left neck and left arm pain.    Skin: Negative for rash.   Neurological: Positive for " "dizziness and headaches (associated with left neck and arm pain. ). Negative for numbness.   Hematological: Negative for adenopathy. Does not bruise/bleed easily.   Psychiatric/Behavioral: Positive for sleep disturbance. Negative for confusion. The patient is not nervous/anxious.    All other systems reviewed and are negative.      Objective:      Physical Exam   Constitutional: He is oriented to person, place, and time. He appears well-developed. No distress.   Presents with brother.   Appears weak.   BP sitting 116/66 Pulse 86.   BP standing 102/60 Pulse 96.   HENT:   Head: Normocephalic.   Mouth/Throat: Oropharynx is clear and moist. No oropharyngeal exudate.   Eyes: Pupils are equal, round, and reactive to light. Conjunctivae and lids are normal. No scleral icterus.   Neck: Trachea normal. Neck supple. No thyromegaly present.   ROM limited due to pain.   Left neck/clavicular soft tissue swelling noted.    Cardiovascular: Normal rate, regular rhythm, normal heart sounds and normal pulses.   No murmur heard.  Pulmonary/Chest: Effort normal and breath sounds normal. He has no wheezes.   Abdominal: Soft. Normal appearance and bowel sounds are normal. He exhibits no distension and no mass. There is no hepatosplenomegaly or splenomegaly. There is tenderness (to palpate; "sore").   Musculoskeletal: He exhibits no edema or tenderness.   Lymphadenopathy:        Head (right side): No submental and no submandibular adenopathy present.        Head (left side): No submental and no submandibular adenopathy present.     He has cervical adenopathy.     He has no axillary adenopathy.        Right: No supraclavicular adenopathy present.        Left: No supraclavicular adenopathy present.   Neurological: He is alert and oriented to person, place, and time. He has normal reflexes. No sensory deficit. Coordination normal.   Skin: Skin is warm, dry and intact. No bruising and no rash noted. No erythema. There is pallor. Nails show no " clubbing.   Poor turgor.   Skin pale/yellow appearance.    Psychiatric: He has a normal mood and affect. His speech is normal and behavior is normal. Judgment and thought content normal. Cognition and memory are normal.   Vitals reviewed.      LABS:  WBC   Date Value Ref Range Status   10/01/2019 5.65 3.90 - 12.70 K/uL Final     Hemoglobin   Date Value Ref Range Status   10/01/2019 7.9 (L) 14.0 - 18.0 g/dL Final     POC Hematocrit   Date Value Ref Range Status   09/08/2019 17 (LL) 36 - 54 %PCV Final     Hematocrit   Date Value Ref Range Status   10/01/2019 24.7 (L) 40.0 - 54.0 % Final     Platelets   Date Value Ref Range Status   10/01/2019 251 150 - 350 K/uL Final     Gran # (ANC)   Date Value Ref Range Status   10/01/2019 4.6 1.8 - 7.7 K/uL Final     Comment:     The ANC is based on a white cell differential from an   automated cell counter. It has not been microscopically   reviewed for the presence of abnormal cells. Clinical   correlation is required.         Chemistry        Component Value Date/Time     10/01/2019 1105    K 3.6 10/01/2019 1105    CL 99 10/01/2019 1105    CO2 23 10/01/2019 1105    BUN 14 10/01/2019 1105    CREATININE 1.2 10/01/2019 1105    GLU 97 10/01/2019 1105        Component Value Date/Time    CALCIUM 8.0 (L) 10/01/2019 1105    ALKPHOS 63 10/01/2019 1105    AST 17 10/01/2019 1105    ALT 9 (L) 10/01/2019 1105    BILITOT 0.7 10/01/2019 1105    ESTGFRAFRICA >60 10/01/2019 1105    EGFRNONAA >60 10/01/2019 1105          Assessment:       1. Gastrointestinal hemorrhage, unspecified gastrointestinal hemorrhage type    2. Squamous cell carcinoma of palatine tonsil    3. Cancer of head, face, or neck lymph nodes, secondary    4. Anemia due to chronic blood loss    5. Neoplasm related pain    6. Left upper extremity swelling    7. Fever and chills        Plan:           48 yo male with head and neck cancer, currently on immunotherapy hold due to colitis. Recent admission to hospital with GI  bleed/anemia- see above.   Presents today with severe weakness, anemia, bloody stools, fever/chills, pain/swelling to left neck and left arm.   Patient will multiple issues and will be admitted for IV hydration, possible transfusion, pain control, sepsis w/u, GI w/u, etc. He will also need CT neck to assess for disease progression and LUE US to r/o DVT as cause of pain.   Report to Dr. Arita. Direct admit to med/onc.     Patient is in agreement with the proposed treatment plan. All questions were answered to the patient's satisfaction. Pt knows to call clinic for any new or worsening symptoms and if anything is needed before the next clinic visit.      ÁNGEL ChunP-C  Hematology & Oncology  [facility] [place] [address]  ph. [phone]  Fax. [phone]     I spent 60 minutes (face to face) with the patient, more than 50% was in counseling and coordination of care as detailed above.

## 2019-10-01 NOTE — ASSESSMENT & PLAN NOTE
Patient presents from clinic with diarrhea and guaiac positive stool in the history of GI bleed last month. Patient also appears dehydrated and reports poor oral intake over past several days.    PLAN:  - GI consult  - CBC daily  - Type and screen. Consented for blood transfusion. Transfuse for Hgb < 7.  - IV protonix 40 BID.  - Continuous NS.

## 2019-10-01 NOTE — ASSESSMENT & PLAN NOTE
Suspect that this pain is likely related to his known cervical spinal stenosis. Will rule out DVT give history of malignancy.    PLAN:  - LUE U/S to rule out DVT.

## 2019-10-01 NOTE — TELEPHONE ENCOUNTER
"Spoke with patient.   He notes feeling "not right" since the weekend. He has been having a temperature tmax 100F, weakness, dizziness, no appetite, and 3-4 loose stools per day. He is not taking steroids, as he was told to stop them at [facility]\"" for bleeding ulcers.   His last opdivo was 6/19/19.    He denies SOB or any other symptoms.     He states he is in his car with his brother---and they want to know where to go for labs. "I think I just need labs," is what patient keeps repeating. Patient feels he is dehydrated.  Patient made it clear he does not want to go to ED or be admitted.   Nurse informed patient she would contact him back after speaking with dr waggoner. He voiced understanding.      Message routed to dr waggoner (cbc/cmp/tsh/t4/t&s?)      "

## 2019-10-01 NOTE — TELEPHONE ENCOUNTER
----- Message from Junie Alan sent at 10/1/2019  9:32 AM CDT -----  Contact: [phone]/self  Patient is requesting a call back. He had fever last night,  is not eating and feeling weak and dizzy. thanks

## 2019-10-01 NOTE — PROGRESS NOTES
Pt transferred from clinic and placed in 857 without any complications. AAOx4. Oriented to nurse, room, and call light. Assessment performed. No skin breakdown noted. VSS. All questions answered at this time. brother at bedside. Will continue to monitor pt.

## 2019-10-02 ENCOUNTER — CLINICAL SUPPORT (OUTPATIENT)
Dept: CARDIOLOGY | Facility: CLINIC | Age: 49
DRG: 384 | End: 2019-10-02
Attending: INTERNAL MEDICINE
Payer: COMMERCIAL

## 2019-10-02 ENCOUNTER — ANESTHESIA EVENT (OUTPATIENT)
Dept: ENDOSCOPY | Facility: HOSPITAL | Age: 49
DRG: 384 | End: 2019-10-02
Payer: COMMERCIAL

## 2019-10-02 ENCOUNTER — ANESTHESIA (OUTPATIENT)
Dept: ENDOSCOPY | Facility: HOSPITAL | Age: 49
DRG: 384 | End: 2019-10-02
Payer: COMMERCIAL

## 2019-10-02 PROBLEM — Z75.8 DISCHARGE PLANNING ISSUES: Status: ACTIVE | Noted: 2019-10-02

## 2019-10-02 LAB
ALBUMIN SERPL BCP-MCNC: 2.7 G/DL (ref 3.5–5.2)
ALP SERPL-CCNC: 56 U/L (ref 55–135)
ALT SERPL W/O P-5'-P-CCNC: 9 U/L (ref 10–44)
ANION GAP SERPL CALC-SCNC: 10 MMOL/L (ref 8–16)
AST SERPL-CCNC: 18 U/L (ref 10–40)
BASOPHILS # BLD AUTO: 0.02 K/UL (ref 0–0.2)
BASOPHILS NFR BLD: 0.4 % (ref 0–1.9)
BILIRUB SERPL-MCNC: 0.4 MG/DL (ref 0.1–1)
BUN SERPL-MCNC: 10 MG/DL (ref 6–20)
C DIFF GDH STL QL: NEGATIVE
C DIFF TOX A+B STL QL IA: NEGATIVE
CALCIUM SERPL-MCNC: 7.2 MG/DL (ref 8.7–10.5)
CHLORIDE SERPL-SCNC: 108 MMOL/L (ref 95–110)
CO2 SERPL-SCNC: 23 MMOL/L (ref 23–29)
CREAT SERPL-MCNC: 1 MG/DL (ref 0.5–1.4)
DIFFERENTIAL METHOD: ABNORMAL
EOSINOPHIL # BLD AUTO: 0 K/UL (ref 0–0.5)
EOSINOPHIL NFR BLD: 0.7 % (ref 0–8)
ERYTHROCYTE [DISTWIDTH] IN BLOOD BY AUTOMATED COUNT: 17.3 % (ref 11.5–14.5)
EST. GFR  (AFRICAN AMERICAN): >60 ML/MIN/1.73 M^2
EST. GFR  (NON AFRICAN AMERICAN): >60 ML/MIN/1.73 M^2
GLUCOSE SERPL-MCNC: 78 MG/DL (ref 70–110)
HCT VFR BLD AUTO: 22.8 % (ref 40–54)
HGB BLD-MCNC: 7 G/DL (ref 14–18)
IMM GRANULOCYTES # BLD AUTO: 0.14 K/UL (ref 0–0.04)
IMM GRANULOCYTES NFR BLD AUTO: 3.1 % (ref 0–0.5)
LYMPHOCYTES # BLD AUTO: 0.3 K/UL (ref 1–4.8)
LYMPHOCYTES NFR BLD: 7.6 % (ref 18–48)
MAGNESIUM SERPL-MCNC: 1.9 MG/DL (ref 1.6–2.6)
MCH RBC QN AUTO: 29.9 PG (ref 27–31)
MCHC RBC AUTO-ENTMCNC: 30.7 G/DL (ref 32–36)
MCV RBC AUTO: 97 FL (ref 82–98)
MONOCYTES # BLD AUTO: 0.7 K/UL (ref 0.3–1)
MONOCYTES NFR BLD: 15.1 % (ref 4–15)
NEUTROPHILS # BLD AUTO: 3.3 K/UL (ref 1.8–7.7)
NEUTROPHILS NFR BLD: 73.1 % (ref 38–73)
NRBC BLD-RTO: 0 /100 WBC
PHOSPHATE SERPL-MCNC: 2 MG/DL (ref 2.7–4.5)
PLATELET # BLD AUTO: 252 K/UL (ref 150–350)
PMV BLD AUTO: 8.6 FL (ref 9.2–12.9)
POTASSIUM SERPL-SCNC: 4.1 MMOL/L (ref 3.5–5.1)
PROT SERPL-MCNC: 5.6 G/DL (ref 6–8.4)
RBC # BLD AUTO: 2.34 M/UL (ref 4.6–6.2)
SODIUM SERPL-SCNC: 141 MMOL/L (ref 136–145)
WBC # BLD AUTO: 4.49 K/UL (ref 3.9–12.7)

## 2019-10-02 PROCEDURE — 27201012 HC FORCEPS, HOT/COLD, DISP: Performed by: INTERNAL MEDICINE

## 2019-10-02 PROCEDURE — 63600175 PHARM REV CODE 636 W HCPCS: Performed by: STUDENT IN AN ORGANIZED HEALTH CARE EDUCATION/TRAINING PROGRAM

## 2019-10-02 PROCEDURE — 99233 SBSQ HOSP IP/OBS HIGH 50: CPT | Mod: ,,, | Performed by: INTERNAL MEDICINE

## 2019-10-02 PROCEDURE — D9220A PRA ANESTHESIA: ICD-10-PCS | Mod: CRNA,,, | Performed by: NURSE ANESTHETIST, CERTIFIED REGISTERED

## 2019-10-02 PROCEDURE — 83735 ASSAY OF MAGNESIUM: CPT

## 2019-10-02 PROCEDURE — 88305 TISSUE EXAM BY PATHOLOGIST: CPT | Performed by: PATHOLOGY

## 2019-10-02 PROCEDURE — 63600175 PHARM REV CODE 636 W HCPCS: Performed by: NURSE ANESTHETIST, CERTIFIED REGISTERED

## 2019-10-02 PROCEDURE — 93971 CV US DOPPLER VENOUS ARM LEFT (CUPID ONLY): ICD-10-PCS | Mod: 26,LT,, | Performed by: INTERNAL MEDICINE

## 2019-10-02 PROCEDURE — 36415 COLL VENOUS BLD VENIPUNCTURE: CPT

## 2019-10-02 PROCEDURE — D9220A PRA ANESTHESIA: Mod: CRNA,,, | Performed by: NURSE ANESTHETIST, CERTIFIED REGISTERED

## 2019-10-02 PROCEDURE — 84100 ASSAY OF PHOSPHORUS: CPT

## 2019-10-02 PROCEDURE — 93971 EXTREMITY STUDY: CPT | Mod: 26,LT,, | Performed by: INTERNAL MEDICINE

## 2019-10-02 PROCEDURE — 37000008 HC ANESTHESIA 1ST 15 MINUTES: Performed by: INTERNAL MEDICINE

## 2019-10-02 PROCEDURE — 43239 EGD BIOPSY SINGLE/MULTIPLE: CPT | Performed by: INTERNAL MEDICINE

## 2019-10-02 PROCEDURE — 93971 EXTREMITY STUDY: CPT | Mod: LT

## 2019-10-02 PROCEDURE — 85025 COMPLETE CBC W/AUTO DIFF WBC: CPT

## 2019-10-02 PROCEDURE — 88305 TISSUE SPECIMEN TO PATHOLOGY - SURGERY: ICD-10-PCS | Mod: 26,,, | Performed by: PATHOLOGY

## 2019-10-02 PROCEDURE — D9220A PRA ANESTHESIA: ICD-10-PCS | Mod: ANES,,, | Performed by: ANESTHESIOLOGY

## 2019-10-02 PROCEDURE — 99233 PR SUBSEQUENT HOSPITAL CARE,LEVL III: ICD-10-PCS | Mod: ,,, | Performed by: INTERNAL MEDICINE

## 2019-10-02 PROCEDURE — 25000003 PHARM REV CODE 250: Performed by: STUDENT IN AN ORGANIZED HEALTH CARE EDUCATION/TRAINING PROGRAM

## 2019-10-02 PROCEDURE — 43239 PR EGD, FLEX, W/BIOPSY, SGL/MULTI: ICD-10-PCS | Mod: ,,, | Performed by: INTERNAL MEDICINE

## 2019-10-02 PROCEDURE — 20600001 HC STEP DOWN PRIVATE ROOM

## 2019-10-02 PROCEDURE — 99253 PR INITIAL INPATIENT CONSULT,LEVL III: ICD-10-PCS | Mod: ,,, | Performed by: INTERNAL MEDICINE

## 2019-10-02 PROCEDURE — 37000009 HC ANESTHESIA EA ADD 15 MINS: Performed by: INTERNAL MEDICINE

## 2019-10-02 PROCEDURE — 43239 EGD BIOPSY SINGLE/MULTIPLE: CPT | Mod: ,,, | Performed by: INTERNAL MEDICINE

## 2019-10-02 PROCEDURE — 99253 IP/OBS CNSLTJ NEW/EST LOW 45: CPT | Mod: ,,, | Performed by: INTERNAL MEDICINE

## 2019-10-02 PROCEDURE — D9220A PRA ANESTHESIA: Mod: ANES,,, | Performed by: ANESTHESIOLOGY

## 2019-10-02 PROCEDURE — 88305 TISSUE EXAM BY PATHOLOGIST: CPT | Mod: 26,,, | Performed by: PATHOLOGY

## 2019-10-02 PROCEDURE — 80053 COMPREHEN METABOLIC PANEL: CPT

## 2019-10-02 RX ORDER — PANTOPRAZOLE SODIUM 40 MG/1
40 TABLET, DELAYED RELEASE ORAL DAILY
Status: DISCONTINUED | OUTPATIENT
Start: 2019-10-03 | End: 2019-10-02

## 2019-10-02 RX ORDER — FENTANYL CITRATE 50 UG/ML
INJECTION, SOLUTION INTRAMUSCULAR; INTRAVENOUS
Status: DISCONTINUED | OUTPATIENT
Start: 2019-10-02 | End: 2019-10-02

## 2019-10-02 RX ORDER — PROPOFOL 10 MG/ML
VIAL (ML) INTRAVENOUS
Status: DISCONTINUED | OUTPATIENT
Start: 2019-10-02 | End: 2019-10-02

## 2019-10-02 RX ORDER — PANTOPRAZOLE SODIUM 40 MG/1
40 TABLET, DELAYED RELEASE ORAL
Status: DISCONTINUED | OUTPATIENT
Start: 2019-10-02 | End: 2019-10-03

## 2019-10-02 RX ORDER — SODIUM CHLORIDE 0.9 % (FLUSH) 0.9 %
10 SYRINGE (ML) INJECTION
Status: DISCONTINUED | OUTPATIENT
Start: 2019-10-02 | End: 2019-10-02 | Stop reason: HOSPADM

## 2019-10-02 RX ORDER — LIDOCAINE HCL/PF 100 MG/5ML
SYRINGE (ML) INTRAVENOUS
Status: DISCONTINUED | OUTPATIENT
Start: 2019-10-02 | End: 2019-10-02

## 2019-10-02 RX ORDER — PROPOFOL 10 MG/ML
VIAL (ML) INTRAVENOUS CONTINUOUS PRN
Status: DISCONTINUED | OUTPATIENT
Start: 2019-10-02 | End: 2019-10-02

## 2019-10-02 RX ORDER — PANTOPRAZOLE SODIUM 40 MG/1
40 TABLET, DELAYED RELEASE ORAL
Status: DISCONTINUED | OUTPATIENT
Start: 2019-10-02 | End: 2019-10-02

## 2019-10-02 RX ORDER — DIPHENOXYLATE HYDROCHLORIDE AND ATROPINE SULFATE 2.5; .025 MG/1; MG/1
2 TABLET ORAL 4 TIMES DAILY PRN
Status: DISCONTINUED | OUTPATIENT
Start: 2019-10-02 | End: 2019-10-03 | Stop reason: HOSPADM

## 2019-10-02 RX ADMIN — GABAPENTIN 300 MG: 300 CAPSULE ORAL at 08:10

## 2019-10-02 RX ADMIN — OYSTER SHELL CALCIUM WITH VITAMIN D 1 TABLET: 500; 200 TABLET, FILM COATED ORAL at 08:10

## 2019-10-02 RX ADMIN — LIDOCAINE HYDROCHLORIDE 75 MG: 20 INJECTION, SOLUTION INTRAVENOUS at 12:10

## 2019-10-02 RX ADMIN — DIPHENOXYLATE HYDROCHLORIDE AND ATROPINE SULFATE 2 TABLET: 2.5; .025 TABLET ORAL at 09:10

## 2019-10-02 RX ADMIN — SODIUM CHLORIDE: 0.9 INJECTION, SOLUTION INTRAVENOUS at 07:10

## 2019-10-02 RX ADMIN — THERA TABS 1 TABLET: TAB at 08:10

## 2019-10-02 RX ADMIN — PROPOFOL 30 MG: 10 INJECTION, EMULSION INTRAVENOUS at 12:10

## 2019-10-02 RX ADMIN — PANTOPRAZOLE SODIUM 40 MG: 40 TABLET, DELAYED RELEASE ORAL at 05:10

## 2019-10-02 RX ADMIN — SODIUM CHLORIDE: 0.9 INJECTION, SOLUTION INTRAVENOUS at 12:10

## 2019-10-02 RX ADMIN — GABAPENTIN 300 MG: 300 CAPSULE ORAL at 02:10

## 2019-10-02 RX ADMIN — OXYCODONE HYDROCHLORIDE AND ACETAMINOPHEN 1 TABLET: 5; 325 TABLET ORAL at 08:10

## 2019-10-02 RX ADMIN — OXYCODONE HYDROCHLORIDE AND ACETAMINOPHEN 1 TABLET: 5; 325 TABLET ORAL at 06:10

## 2019-10-02 RX ADMIN — FLUOXETINE 40 MG: 20 CAPSULE ORAL at 08:10

## 2019-10-02 RX ADMIN — SODIUM CHLORIDE: 0.9 INJECTION, SOLUTION INTRAVENOUS at 10:10

## 2019-10-02 RX ADMIN — FENTANYL CITRATE 100 MCG: 50 INJECTION, SOLUTION INTRAMUSCULAR; INTRAVENOUS at 12:10

## 2019-10-02 RX ADMIN — SODIUM CHLORIDE: 0.9 INJECTION, SOLUTION INTRAVENOUS at 02:10

## 2019-10-02 RX ADMIN — PROPOFOL 150 MCG/KG/MIN: 10 INJECTION, EMULSION INTRAVENOUS at 12:10

## 2019-10-02 NOTE — ASSESSMENT & PLAN NOTE
Patient presents from clinic with diarrhea and guaiac positive stool in the history of GI bleed last month with history of significant NSAID use 2/2 left arm pain (up to 8 aleve per day). Patient also appears dehydrated and reports poor oral intake over past several days.    PLAN:  - GI consult-->may perform EGD on 10/2 pending scheduling. Keeping patient NPO for now.  - CBC, CMP, Mg, Phos daily  - Type and screen. Consented for blood transfusion. Transfuse for Hgb < 7.  - IV protonix 40 BID.  - Continuous NS.

## 2019-10-02 NOTE — ANESTHESIA POSTPROCEDURE EVALUATION
Anesthesia Post Evaluation    Patient: Burton Whiting    Procedure(s) Performed: Procedure(s) (LRB):  EGD (ESOPHAGOGASTRODUODENOSCOPY) (N/A)    Final Anesthesia Type: MAC  Patient location during evaluation: PACU  Patient participation: Yes- Able to Participate  Level of consciousness: awake and alert and oriented  Post-procedure vital signs: reviewed and stable  Pain management: adequate  Airway patency: patent  PONV status at discharge: No PONV  Anesthetic complications: no      Cardiovascular status: hemodynamically stable  Respiratory status: unassisted  Hydration status: euvolemic  Follow-up not needed.          Vitals Value Taken Time   /63 10/2/2019  8:00 AM   Temp 36.7 °C (98.1 °F) 10/2/2019  8:00 AM   Pulse 66 10/2/2019  8:00 AM   Resp 18 10/2/2019  8:00 AM   SpO2 97 % 10/2/2019  8:00 AM         No case tracking events are documented in the log.      Pain/Monica Score: Pain Rating Prior to Med Admin: 8 (10/2/2019  6:31 AM)  Pain Rating Post Med Admin: 3 (10/1/2019  9:36 PM)

## 2019-10-02 NOTE — DISCHARGE INSTRUCTIONS

## 2019-10-02 NOTE — TRANSFER OF CARE
"Anesthesia Transfer of Care Note    Patient: Burton Whiting    Procedure(s) Performed: Procedure(s) (LRB):  EGD (ESOPHAGOGASTRODUODENOSCOPY) (N/A)    Patient location: [facility]    Anesthesia Type: general    Transport from OR: Transported from OR on room air with adequate spontaneous ventilation    Post pain: adequate analgesia    Post assessment: no apparent anesthetic complications and tolerated procedure well    Post vital signs: stable    Level of consciousness: awake, alert and oriented    Nausea/Vomiting: no nausea/vomiting    Complications: none    Transfer of care protocol was followed      Last vitals:   Visit Vitals  /63   Pulse 66   Temp 36.7 °C (98.1 °F) (Oral)   Resp 18   Ht 5' 8" (1.727 m)   Wt 76.7 kg (169 lb 1.5 oz)   SpO2 97%   BMI 25.71 kg/m²     "

## 2019-10-02 NOTE — PLAN OF CARE
POC reviewed with pt. ID verified. Plan of care initiated with patient. Understanding verbalized. No further questions or concerns

## 2019-10-02 NOTE — ASSESSMENT & PLAN NOTE
Patient presents from clinic with diarrhea and guaiac positive stool in the history of GI bleed last month with history of significant NSAID use 2/2 left arm pain (up to 8 aleve per day). Patient also appears dehydrated and reports poor oral intake over past several days.    PLAN:  - GI consult-->EGD on 10/2 without active bleeding, multiple duodenal ulcers visualized.  - Encourage oral intake. On regular diet. Avoid dairy products in setting of diarrhea.   - CBC, CMP, Mg, Phos daily. Replete electrolytes as needed.  - Type and screen. Consented for blood transfusion. Transfuse for Hgb < 7.  - PO protonix 40 daily.  - Continuous NS.  - Lomotil for diarrhea.  - Avoid steroids and NSAIDs in setting of duodenal ulcers.

## 2019-10-02 NOTE — H&P
History & Physical -   Gastroenterology      SUBJECTIVE:     Procedure: EGD    Chief Complaint/Indication for Procedure: Duodenal ulcers    History of Present Illness:  Patient is a 49 y.o. male presents for further evaluation of duodenal ulcers.     PTA Medications   Medication Sig    acetaminophen (TYLENOL) 325 MG tablet Take 2 tablets (650 mg total) by mouth every 6 (six) hours as needed.    calcium-vitamin D3 (CALCIUM 500 + D) 500 mg(1,250mg) -200 unit per tablet Take 1 tablet by mouth 2 (two) times daily with meals.    denosumab (XGEVA) 120 mg/1.7 mL (70 mg/mL) Soln Inject 1.7 mLs (120 mg total) into the skin once. for 1 dose    FLUoxetine 40 MG capsule Take 1 capsule (40 mg total) by mouth once daily.    gabapentin (NEURONTIN) 300 MG capsule Take 1 capsule (300 mg total) by mouth 3 (three) times daily. (Patient taking differently: Take 300 mg by mouth 3 (three) times daily. )    hydrocortisone 2.5 % cream Apply topically 2 (two) times daily.    lidocaine (LIDODERM) 5 % Place 1 patch onto the skin daily as needed. Remove & Discard patch within 12 hours or as directed by MD    mirtazapine (REMERON) 7.5 MG Tab     multivitamin capsule Take 1 capsule by mouth once daily.    pantoprazole (PROTONIX) 40 MG tablet Take 1 tablet (40 mg total) by mouth 2 (two) times daily.    tiZANidine (ZANAFLEX) 2 MG tablet Take 1 tablet (2 mg total) by mouth nightly as needed. For neck pain. May be sedating. Be careful and know how it affects you before driving (Patient not taking: Reported on 10/1/2019)    traMADol (ULTRAM) 50 mg tablet Take 1 tablet (50 mg total) by mouth every 8 (eight) hours as needed for Pain.       Review of patient's allergies indicates:   Allergen Reactions    Trazodone Other (See Comments)     confusion        Past Medical History:   Diagnosis Date    Acute renal failure 6/21/2018    Alcohol abuse     ended 2004    Chemotherapy induced neutropenia 6/7/2018    Former smoker     HPV in male   "   Hx of psychiatric care     Ativan, Paxil ("caused anger problems")    Opiate addiction     Squamous cell carcinoma of palatine tonsil     throat and tonsillar    Substance abuse     opiates, methamphetamines; ended      Past Surgical History:   Procedure Laterality Date    ESOPHAGOGASTRODUODENOSCOPY Left 2019    Procedure: EGD (ESOPHAGOGASTRODUODENOSCOPY);  Surgeon: Balta Lisa MD;  Location: [facility] ENDO;  Service: Endoscopy;  Laterality: Left;    ESOPHAGOGASTRODUODENOSCOPY N/A 2019    Procedure: EGD (ESOPHAGOGASTRODUODENOSCOPY);  Surgeon: Perez Mon III, MD;  Location: [facility] ENDO;  Service: Endoscopy;  Laterality: N/A;    GASTROSTOMY TUBE PLACEMENT Left 2018    MEDIPORT REMOVAL N/A 2018    Procedure: Removal-port-a-cath;  Surgeon: Blayne Diagnostic Provider;  Location: [address];  Service: General;  Laterality: N/A;     Family History   Problem Relation Age of Onset    Leukemia Mother     Hypertension Father     Thyroid disease Sister     Depression Sister     Hypertension Brother     Brain cancer Maternal Uncle     Brain cancer Maternal Uncle      Social History     Tobacco Use    Smoking status: Former Smoker     Packs/day: 1.50     Years: 25.00     Pack years: 37.50     Types: Cigarettes     Last attempt to quit: 2017     Years since quittin.3    Smokeless tobacco: Never Used    Tobacco comment: smoked for about 25 years. quit 6 months ago   Substance Use Topics    Alcohol use: No     Comment: history of overuse    Drug use: No     Comment: Former Opiate/methamphetamine addiction       Review of Systems:  Respiratory: no cough or shortness of breath  Cardiovascular: no chest pain or palpitations    OBJECTIVE:     Vital Signs (Most Recent)  Temp: 98.1 °F (36.7 °C) (10/02/19 0800)  Pulse: 66 (10/02/19 0800)  Resp: 18 (10/02/19 0800)  BP: 117/63 (10/02/19 0800)  SpO2: 97 % (10/02/19 0800)    Physical Exam:  General: well developed, well " nourished  Lungs:  normal respiratory effort  Heart: regular rate, S1, S2 normal  Abdomen: soft, non-tender non-distented; bowel sounds normal; no masses,  no organomegaly    Laboratory  CBC:   Recent Labs   Lab 10/02/19  0600   WBC 4.49   RBC 2.34*   HGB 7.0*   HCT 22.8*      MCV 97   MCH 29.9   MCHC 30.7*     CMP:   Recent Labs   Lab 10/02/19  0600   GLU 78   CALCIUM 7.2*   ALBUMIN 2.7*   PROT 5.6*      K 4.1   CO2 23      BUN 10   CREATININE 1.0   ALKPHOS 56   ALT 9*   AST 18   BILITOT 0.4     Coagulation:   Recent Labs   Lab 10/01/19  1450   LABPROT 10.9   INR 1.1   APTT 30.7         Diagnostic Results:      ASSESSMENT/PLAN:     Duodenal ulcers    Plan: EGD    Anesthesia Plan: MAC    ASA Grade: ASA 3 - Patient with moderate systemic disease with functional limitations     The impression and plan was discussed in detail with the patient. All questions have been answered and the patient voices understanding of our plan at this point. The risk of the procedure was discussed in detail which includes but not limited to bleeding, infection, perforation in some cases requiring surgery with its spectrum of complications.

## 2019-10-02 NOTE — SUBJECTIVE & OBJECTIVE
"Past Medical History:   Diagnosis Date    Acute renal failure 2018    Alcohol abuse     ended     Chemotherapy induced neutropenia 2018    Former smoker     HPV in male     Hx of psychiatric care     Atjana Paxil ("caused anger problems")    Opiate addiction     Squamous cell carcinoma of palatine tonsil     throat and tonsillar    Substance abuse     opiates, methamphetamines; ended        Past Surgical History:   Procedure Laterality Date    ESOPHAGOGASTRODUODENOSCOPY Left 2019    Procedure: EGD (ESOPHAGOGASTRODUODENOSCOPY);  Surgeon: Balta Lisa MD;  Location: [facility] ENDO;  Service: Endoscopy;  Laterality: Left;    ESOPHAGOGASTRODUODENOSCOPY N/A 2019    Procedure: EGD (ESOPHAGOGASTRODUODENOSCOPY);  Surgeon: Perez Mon III, MD;  Location: [facility] ENDO;  Service: Endoscopy;  Laterality: N/A;    GASTROSTOMY TUBE PLACEMENT Left 2018    MEDIPORT REMOVAL N/A 2018    Procedure: Removal-port-a-cath;  Surgeon: [facility] Diagnostic Provider;  Location: [address];  Service: General;  Laterality: N/A;       Review of patient's allergies indicates:   Allergen Reactions    Trazodone Other (See Comments)     confusion     Family History     Problem Relation (Age of Onset)    Brain cancer Maternal Uncle, Maternal Uncle    Depression Sister    Hypertension Father, Brother    Leukemia Mother    Thyroid disease Sister        Tobacco Use    Smoking status: Former Smoker     Packs/day: 1.50     Years: 25.00     Pack years: 37.50     Types: Cigarettes     Last attempt to quit: 2017     Years since quittin.3    Smokeless tobacco: Never Used    Tobacco comment: smoked for about 25 years. quit 6 months ago   Substance and Sexual Activity    Alcohol use: No     Comment: history of overuse    Drug use: No     Comment: Former Opiate/methamphetamine addiction    Sexual activity: Yes     Partners: Female     Review of Systems   Constitutional: Positive for fatigue. " Negative for activity change, appetite change, chills, diaphoresis, fever and unexpected weight change.   HENT: Negative for sore throat and trouble swallowing.    Eyes: Negative for visual disturbance.   Respiratory: Negative for chest tightness and shortness of breath.    Cardiovascular: Negative for chest pain and leg swelling.   Gastrointestinal: Positive for blood in stool (see HPI) and diarrhea. Negative for abdominal distention, abdominal pain, anal bleeding, constipation, nausea and vomiting.   Genitourinary: Negative for dysuria and hematuria.   Musculoskeletal: Negative for arthralgias and myalgias.   Skin: Negative for rash.   Neurological: Positive for dizziness and weakness. Negative for light-headedness and headaches.   Psychiatric/Behavioral: Negative for agitation and confusion.     Objective:     Vital Signs (Most Recent):  Temp: 98.1 °F (36.7 °C) (10/02/19 0800)  Pulse: 66 (10/02/19 0800)  Resp: 18 (10/02/19 0800)  BP: 117/63 (10/02/19 0800)  SpO2: 97 % (10/02/19 0800) Vital Signs (24h Range):  Temp:  [97.6 °F (36.4 °C)-98.4 °F (36.9 °C)] 98.1 °F (36.7 °C)  Pulse:  [66-86] 66  Resp:  [15-20] 18  SpO2:  [96 %-100 %] 97 %  BP: (107-139)/(56-73) 117/63     Weight: 76.7 kg (169 lb 1.5 oz) (10/01/19 1410)  Body mass index is 25.71 kg/m².      Intake/Output Summary (Last 24 hours) at 10/2/2019 0922  Last data filed at 10/1/2019 1925  Gross per 24 hour   Intake 280 ml   Output 300 ml   Net -20 ml       Lines/Drains/Airways     Peripheral Intravenous Line                 Peripheral IV - Single Lumen 10/01/19 1720 20 G;1 3/4 in Right Forearm less than 1 day         Peripheral IV - Single Lumen 10/01/19 1725 20 G;1 3/4 in Left Forearm less than 1 day                Physical Exam   Constitutional: He is oriented to person, place, and time. He appears well-developed and well-nourished. No distress.   Appears comfortable, asleep, easily awoken. Pleasant and no distress.   HENT:   Head: Normocephalic and  atraumatic.   Mouth/Throat: Oropharynx is clear and moist. No oropharyngeal exudate.   Eyes: Conjunctivae are normal. No scleral icterus.   Neck: No JVD present.   Cardiovascular: Normal rate, regular rhythm, normal heart sounds and intact distal pulses.   Pulmonary/Chest: Effort normal and breath sounds normal. No respiratory distress.   Abdominal: Soft. Bowel sounds are normal. He exhibits no distension and no mass. There is no tenderness. There is no rebound and no guarding.   Soft, non-tender abdomen.  VERA with small external hemorrhoids, scant bright red blood after VERA from hemorrhoid. Light brown stool.   Musculoskeletal: He exhibits no edema or tenderness.   Lymphadenopathy:     He has no cervical adenopathy.   Neurological: He is alert and oriented to person, place, and time.   Skin: Skin is warm. Capillary refill takes less than 2 seconds. He is not diaphoretic.   Psychiatric: He has a normal mood and affect. His behavior is normal. Judgment and thought content normal.   Nursing note and vitals reviewed.      Significant Labs:  All pertinent lab results from the last 24 hours have been reviewed.    Significant Imaging:  Imaging results within the past 24 hours have been reviewed.

## 2019-10-02 NOTE — ASSESSMENT & PLAN NOTE
Mr Whiting is a 48 year old man with history of tonsilar SCC s/p chemotherapy (6/2018), opdivo complicated by colitis (currently on hold), recently admitted to OSH with duodenal ulcers, who was admitted from oncology due to concern for weakness; GI consulted due to concern for recent GI bleed.    Currently hemodynamically stable. Labs notable for slight decline in H&H since admission but likely related to hemoconcentration. No melena on VERA but small amount of bright red blood afterwards likely secondary to ano-rectal etiology ?hemorrhoidal. Peptic ulcer disease was likely secondary to NSAID use and prior prednisone therapy.    Plan  - NPO  - will plan for EGD to re-evaluate peptic ulcer disease, can biopsy for HP at that time  - protonix 40mg BID  - Pending EGD and clinical course can consider whether colonoscopy is indicated for further GI bleeding evaluation  - diarrhea could be secondary to drug-induced colitis (last dose opdivo 6/2019). Last CTAP negative (6/2019). C.diff negative on admission. No prior colonoscopy history.

## 2019-10-02 NOTE — TREATMENT PLAN
GI Treatment Plan    Burton Whiting is a 49 y.o. male admitted to hospital 10/1/2019 (Hospital Day: 2) due to GI bleed.     Interval History  EGD Today  - The oropharynx was normal.  - A small hiatal hernia was present.  - Mildly erythematous mucosa without bleeding was found in the gastric antrum. Biopsies were taken with a cold forceps for histology.  - Two non-bleeding cratered duodenal ulcers with no stigmata of bleeding were found in the first portion of the duodenum. The largest lesion was 4 mm in largest dimension.  - The exam of the duodenum was otherwise normal.    Laboratory    Recent Labs   Lab 10/01/19  1450 10/01/19  1653 10/02/19  0600   HGB 7.6* 7.4* 7.0*       Plan  - s/p EGD, see formal report for additional details  - duodenal ulcers present but appear to be improving from prior study  - continue protonix 40mg PO daily  - would not recommend steroid for diarrhea at this time due to healing PUD  - will follow along and monitor diarrhea to determine if inpatient vs outpatient scope indicated  - Plan of care was discussed with primary team.  - We will continue to follow.    Thank you for involving us in the care of Burton Whiting. Please call with any additional questions, concerns or changes in the patient's clinical status.    Neri Schreiber MD  Gastroenterology Fellow  Spectralink: [phone]

## 2019-10-02 NOTE — ASSESSMENT & PLAN NOTE
Patient's barrier to discharge includes evaluation with EGD and improvement in his diarrhea/dizziness. Will also need to wait for 10/1 blood cultures for 48 hours.

## 2019-10-02 NOTE — NURSING TRANSFER
Nursing Transfer Note      10/2/2019     Transfer to 85 from [facility] 34    Transfer via stretcher    Transfer with pump and pole    Transported by PCT    Medicines sent: NS    Chart send with patient: YES    Notified: GLORY Christensen    Patient reassessed at: 10/2/2019 @4483

## 2019-10-02 NOTE — [employer]ATION PATIENT INSTRUCTIONS
Discharge Summary/Instructions after an Endoscopic Procedure  Patient Name: Burton Whiting  Patient MRN: [id number]  Patient YOB: 1970  Wednesday, October 02, 2019  Bonifacio Sosa MD  RESTRICTIONS:  During your procedure today, you received medications for sedation.  These   medications may affect your judgment, balance and coordination.  Therefore,   for 24 hours, you have the following restrictions:   - DO NOT drive a car, operate machinery, make legal/financial decisions,   sign important papers or drink alcohol.    ACTIVITY:  Today: no heavy lifting, straining or running due to procedural   sedation/anesthesia.  The following day: return to full activity including work.  DIET:  Eat and drink normally unless instructed otherwise.     TREATMENT FOR COMMON SIDE EFFECTS:  - Mild abdominal pain, nausea, belching, bloating or excessive gas:  rest,   eat lightly and use a heating pad.  - Sore Throat: treat with throat lozenges and/or gargle with warm salt   water.  - Because air was used during the procedure, expelling large amounts of air   from your rectum or belching is normal.  - If a bowel prep was taken, you may not have a bowel movement for 1-3 days.    This is normal.  SYMPTOMS TO WATCH FOR AND REPORT TO YOUR PHYSICIAN:  1. Abdominal pain or bloating, other than gas cramps.  2. Chest pain.  3. Back pain.  4. Signs of infection such as: chills or fever occurring within 24 hours   after the procedure.  5. Rectal bleeding, which would show as bright red, maroon, or black stools.   (A tablespoon of blood from the rectum is not serious, especially if   hemorrhoids are present.)  6. Vomiting.  7. Weakness or dizziness.  GO DIRECTLY TO THE NEAREST EMERGENCY ROOM IF YOU HAVE ANY OF THE FOLLOWING:      Difficulty breathing              Chills and/or fever over 101 F   Persistent vomiting and/or vomiting blood   Severe abdominal pain   Severe chest pain   Black, tarry stools   Bleeding- more than one  tablespoon   Any other symptom or condition that you feel may need urgent attention  Your doctor recommends these additional instructions:  If any biopsies were taken, your doctors clinic will contact you in 1 to 2   weeks with any results.  - Return patient to [facility] for ongoing care.   - Await pathology results.   - Use Protonix (pantoprazole) 40 mg PO daily.   - Further recommendations as per General GI Service.  For questions, problems or results please call your physician - Bonifacio Sosa MD at Work:  [phone].  [facility], EMERGENCY ROOM PHONE NUMBER: [phone]  IF A COMPLICATION OR EMERGENCY SITUATION ARISES AND YOU ARE UNABLE TO REACH   YOUR PHYSICIAN - GO DIRECTLY TO THE EMERGENCY ROOM.  Bonifacio Sosa MD  10/2/2019 1:09:26 PM  This report has been verified and signed electronically.  [employer]ATION

## 2019-10-02 NOTE — CONSULTS
[facility]  Gastroenterology  Consult Note    Patient Name: Burton Whiting  MRN: [id number]  Admission Date: 10/1/2019  Hospital Length of Stay: 1 days  Code Status: Full Code   Attending Provider: Rigo Ta MD   Consulting Provider: Neri Schreiber MD  Primary Care Physician: Christopher Turpin DO  Principal Problem:GI bleed    Inpatient consult to Gastroenterology  Consult performed by: Neri Schreiber MD  Consult ordered by: Demi Fishman MD        Subjective:     HPI:  Mr Whiting is a 48 year old man with history of tonsilar SCC s/p chemotherapy (6/2018), opdivo complicated by colitis (currently on hold), recently admitted to OSH with duodenal ulcers, who was admitted from oncology due to concern for weakness; GI consulted due to concern for recent GI bleed.    He was hospitalized 9/8 - 9/13 due to lethargy, hypotension and melena. Endorsed at that time significant naproxen intake due to back pain. Underwent EGD (9/10) with duodenal ulcer (non-bleeding) and follow-up EGD on 9/12 due to concern for recurrent bleeding in setting of BRBPR with multiple oozing duodenal ulcers with pigmented material s/p bipolar.     He was seen in oncology clinic on 10/1 and was admitted due to weakness and dizziness. Labs on admission notable for H&H 7.9 (8.5 on discharge 9/13, 8.5 on 9/13). He reports he was doing well from GI standpoint since leaving home. Denies any further episodes of melena. No hematemesis. Endorses one episode last week of BRBPR which he described as in the toilet bowel and on toilet paper but with brown stool which resolved a few hours later on subsequent bowel movement. Does see occasional blood with wiping. Denies any further nsaid use since hospitalization. He does endorse watery diarrhea related to opdivo for which the medication was held; has not had a colonoscopy previously.    Regarding bowel habits, he developed diarrhea while on Opdivo which was held and improved  "with steroid therapy. He endorses 3-4 watery bowel movements/day currently. Denies any recent antibiotics. No prior colonoscopy. Opdivo held since 2019.      Past Medical History:   Diagnosis Date    Acute renal failure 2018    Alcohol abuse     ended     Chemotherapy induced neutropenia 2018    Former smoker     HPV in male     Hx of psychiatric care     Ativan, Paxil ("caused anger problems")    Opiate addiction     Squamous cell carcinoma of palatine tonsil     throat and tonsillar    Substance abuse     opiates, methamphetamines; ended        Past Surgical History:   Procedure Laterality Date    ESOPHAGOGASTRODUODENOSCOPY Left 2019    Procedure: EGD (ESOPHAGOGASTRODUODENOSCOPY);  Surgeon: Balta Lisa MD;  Location: [facility];  Service: Endoscopy;  Laterality: Left;    ESOPHAGOGASTRODUODENOSCOPY N/A 2019    Procedure: EGD (ESOPHAGOGASTRODUODENOSCOPY);  Surgeon: Perez Mon III, MD;  Location: [facility] ENDO;  Service: Endoscopy;  Laterality: N/A;    GASTROSTOMY TUBE PLACEMENT Left 2018    MEDIPORT REMOVAL N/A 2018    Procedure: Removal-port-a-cath;  Surgeon: Blayne Diagnostic Provider;  Location: [facility] OR [address] [place] [address];  Service: General;  Laterality: N/A;       Review of patient's allergies indicates:   Allergen Reactions    Trazodone Other (See Comments)     confusion     Family History     Problem Relation (Age of Onset)    Brain cancer Maternal Uncle, Maternal Uncle    Depression Sister    Hypertension Father, Brother    Leukemia Mother    Thyroid disease Sister        Tobacco Use    Smoking status: Former Smoker     Packs/day: 1.50     Years: 25.00     Pack years: 37.50     Types: Cigarettes     Last attempt to quit: 2017     Years since quittin.3    Smokeless tobacco: Never Used    Tobacco comment: smoked for about 25 years. quit 6 months ago   Substance and Sexual Activity    Alcohol use: No     Comment: history of overuse    Drug use: No     " Comment: Former Opiate/methamphetamine addiction    Sexual activity: Yes     Partners: Female     Review of Systems   Constitutional: Positive for fatigue. Negative for activity change, appetite change, chills, diaphoresis, fever and unexpected weight change.   HENT: Negative for sore throat and trouble swallowing.    Eyes: Negative for visual disturbance.   Respiratory: Negative for chest tightness and shortness of breath.    Cardiovascular: Negative for chest pain and leg swelling.   Gastrointestinal: Positive for blood in stool (see HPI) and diarrhea. Negative for abdominal distention, abdominal pain, anal bleeding, constipation, nausea and vomiting.   Genitourinary: Negative for dysuria and hematuria.   Musculoskeletal: Negative for arthralgias and myalgias.   Skin: Negative for rash.   Neurological: Positive for dizziness and weakness. Negative for light-headedness and headaches.   Psychiatric/Behavioral: Negative for agitation and confusion.     Objective:     Vital Signs (Most Recent):  Temp: 98.1 °F (36.7 °C) (10/02/19 0800)  Pulse: 66 (10/02/19 0800)  Resp: 18 (10/02/19 0800)  BP: 117/63 (10/02/19 0800)  SpO2: 97 % (10/02/19 0800) Vital Signs (24h Range):  Temp:  [97.6 °F (36.4 °C)-98.4 °F (36.9 °C)] 98.1 °F (36.7 °C)  Pulse:  [66-86] 66  Resp:  [15-20] 18  SpO2:  [96 %-100 %] 97 %  BP: (107-139)/(56-73) 117/63     Weight: 76.7 kg (169 lb 1.5 oz) (10/01/19 1410)  Body mass index is 25.71 kg/m².      Intake/Output Summary (Last 24 hours) at 10/2/2019 0922  Last data filed at 10/1/2019 1925  Gross per 24 hour   Intake 280 ml   Output 300 ml   Net -20 ml       Lines/Drains/Airways     Peripheral Intravenous Line                 Peripheral IV - Single Lumen 10/01/19 1720 20 G;1 3/4 in Right Forearm less than 1 day         Peripheral IV - Single Lumen 10/01/19 1725 20 G;1 3/4 in Left Forearm less than 1 day                Physical Exam   Constitutional: He is oriented to person, place, and time. He appears  well-developed and well-nourished. No distress.   Appears comfortable, asleep, easily awoken. Pleasant and no distress.   HENT:   Head: Normocephalic and atraumatic.   Mouth/Throat: Oropharynx is clear and moist. No oropharyngeal exudate.   Eyes: Conjunctivae are normal. No scleral icterus.   Neck: No JVD present.   Cardiovascular: Normal rate, regular rhythm, normal heart sounds and intact distal pulses.   Pulmonary/Chest: Effort normal and breath sounds normal. No respiratory distress.   Abdominal: Soft. Bowel sounds are normal. He exhibits no distension and no mass. There is no tenderness. There is no rebound and no guarding.   Soft, non-tender abdomen.  VERA with small external hemorrhoids, scant bright red blood after VERA from hemorrhoid. Light brown stool.   Musculoskeletal: He exhibits no edema or tenderness.   Lymphadenopathy:     He has no cervical adenopathy.   Neurological: He is alert and oriented to person, place, and time.   Skin: Skin is warm. Capillary refill takes less than 2 seconds. He is not diaphoretic.   Psychiatric: He has a normal mood and affect. His behavior is normal. Judgment and thought content normal.   Nursing note and vitals reviewed.      Significant Labs:  All pertinent lab results from the last 24 hours have been reviewed.    Significant Imaging:  Imaging results within the past 24 hours have been reviewed.    Assessment/Plan:     * GI bleed  Mr Whiting is a 48 year old man with history of tonsilar SCC s/p chemotherapy (6/2018), opdivo complicated by colitis (currently on hold), recently admitted to OSH with duodenal ulcers, who was admitted from oncology due to concern for weakness; GI consulted due to concern for recent GI bleed.    Currently hemodynamically stable. Labs notable for slight decline in H&H since admission but likely related to hemoconcentration. No melena on VERA but small amount of bright red blood afterwards likely secondary to ano-rectal etiology ?hemorrhoidal.  Peptic ulcer disease was likely secondary to NSAID use and prior prednisone therapy.    Plan  - NPO  - will plan for EGD to re-evaluate peptic ulcer disease, can biopsy for HP at that time  - protonix 40mg BID  - Pending EGD and clinical course can consider whether colonoscopy is indicated for further GI bleeding evaluation  - diarrhea could be secondary to drug-induced colitis (last dose opdivo 6/2019). Last CTAP negative (6/2019). C.diff negative on admission. No prior colonoscopy history.             Thank you for your consult. I will follow-up with patient. Please contact us if you have any additional questions.    Neri Schreiber MD  Gastroenterology  [facility]

## 2019-10-02 NOTE — SUBJECTIVE & OBJECTIVE
"Interval history: no acute events overnight.    Oncology Treatment Plan:   OP NIVOLUMAB Q2W    Medications:  Continuous Infusions:   sodium chloride 0.9% 150 mL/hr at 10/02/19 0734     Scheduled Meds:   calcium-vitamin D3  1 tablet Oral BID WM    FLUoxetine  40 mg Oral Daily    gabapentin  300 mg Oral TID    multivitamin  1 tablet Oral Daily    pantoprazole  40 mg Intravenous BID     PRN Meds:acetaminophen, Dextrose 10% Bolus, Dextrose 10% Bolus, glucagon (human recombinant), glucose, glucose, insulin aspart U-100, ondansetron, oxyCODONE-acetaminophen, ramelteon, sodium chloride 0.9%     Review of patient's allergies indicates:   Allergen Reactions    Trazodone Other (See Comments)     confusion        Past Medical History:   Diagnosis Date    Acute renal failure 6/21/2018    Alcohol abuse     ended 2004    Chemotherapy induced neutropenia 6/7/2018    Former smoker     HPV in male     Hx of psychiatric care     Ativan, Paxil ("caused anger problems")    Opiate addiction     Squamous cell carcinoma of palatine tonsil     throat and tonsillar    Substance abuse     opiates, methamphetamines; ended 2004     Past Surgical History:   Procedure Laterality Date    ESOPHAGOGASTRODUODENOSCOPY Left 9/9/2019    Procedure: EGD (ESOPHAGOGASTRODUODENOSCOPY);  Surgeon: Balta Lisa MD;  Location: [facility];  Service: Endoscopy;  Laterality: Left;    ESOPHAGOGASTRODUODENOSCOPY N/A 9/12/2019    Procedure: EGD (ESOPHAGOGASTRODUODENOSCOPY);  Surgeon: Perez Mon III, MD;  Location: [facility];  Service: Endoscopy;  Laterality: N/A;    GASTROSTOMY TUBE PLACEMENT Left 02/12/2018    MEDIPORT REMOVAL N/A 6/6/2018    Procedure: Removal-port-a-cath;  Surgeon: Dosc Diagnostic Provider;  Location: [address];  Service: General;  Laterality: N/A;     Family History     Problem Relation (Age of Onset)    Brain cancer Maternal Uncle, Maternal Uncle    Depression Sister    Hypertension Father, Brother    " Leukemia Mother    Thyroid disease Sister        Tobacco Use    Smoking status: Former Smoker     Packs/day: 1.50     Years: 25.00     Pack years: 37.50     Types: Cigarettes     Last attempt to quit: 2017     Years since quittin.3    Smokeless tobacco: Never Used    Tobacco comment: smoked for about 25 years. quit 6 months ago   Substance and Sexual Activity    Alcohol use: No     Comment: history of overuse    Drug use: No     Comment: Former Opiate/methamphetamine addiction    Sexual activity: Yes     Partners: Female       Review of Systems   Constitutional: Positive for chills and fever.   HENT: Negative for sore throat and trouble swallowing.    Eyes: Negative for visual disturbance.   Respiratory: Negative for cough and shortness of breath.    Cardiovascular: Negative for chest pain and palpitations.   Gastrointestinal: Positive for diarrhea and nausea.   Genitourinary: Negative for hematuria.   Musculoskeletal: Negative for back pain and neck pain.   Neurological: Positive for dizziness and light-headedness.   Psychiatric/Behavioral: Negative for agitation and behavioral problems.     Objective:     Vital Signs (Most Recent):  Temp: 97.8 °F (36.6 °C) (10/02/19 0443)  Pulse: 75 (10/02/19 0443)  Resp: 20 (10/02/19 0443)  BP: (!) 111/59 (10/02/19 0443)  SpO2: 97 % (10/02/19 0443) Vital Signs (24h Range):  Temp:  [97.6 °F (36.4 °C)-98.4 °F (36.9 °C)] 97.8 °F (36.6 °C)  Pulse:  [68-86] 75  Resp:  [15-20] 20  SpO2:  [96 %-100 %] 97 %  BP: (107-139)/(56-73) 111/59     Weight: 76.7 kg (169 lb 1.5 oz)  Body mass index is 25.71 kg/m².  Body surface area is 1.92 meters squared.      Intake/Output Summary (Last 24 hours) at 10/2/2019 0799  Last data filed at 10/1/2019 1925  Gross per 24 hour   Intake 280 ml   Output 300 ml   Net -20 ml       Physical Exam   Constitutional: He is oriented to person, place, and time. He appears well-developed and well-nourished. No distress.   HENT:   Head: Normocephalic and  atraumatic.   Eyes: EOM are normal. No scleral icterus.   Neck: Neck supple. No JVD present.   Cardiovascular: Normal rate and regular rhythm. Exam reveals no gallop and no friction rub.   No murmur heard.  Pulmonary/Chest: Breath sounds normal. No respiratory distress. He has no wheezes.   Abdominal: Soft. Bowel sounds are normal. He exhibits no distension. There is no tenderness.   Genitourinary: Rectal exam shows guaiac positive stool.   Musculoskeletal: Normal range of motion. He exhibits no edema.   No evidence of decreased ROM of left upper extremity.    Neurological: He is alert and oriented to person, place, and time.   Skin: Skin is dry.   Psychiatric: He has a normal mood and affect.       Significant Labs:   CBC:   Recent Labs   Lab 10/01/19  1105 10/01/19  1450 10/01/19  1653   WBC 5.65 5.48 5.37   HGB 7.9* 7.6* 7.4*   HCT 24.7* 23.1* 22.7*    252 240    and CMP:   Recent Labs   Lab 10/01/19  1105 10/01/19  1450    136   K 3.6 3.2*   CL 99 99   CO2 23 22*   GLU 97 76   BUN 14 14   CREATININE 1.2 1.2   CALCIUM 8.0* 7.6*   PROT 6.5 6.4   ALBUMIN 3.1* 3.0*   BILITOT 0.7 0.6   ALKPHOS 63 64   AST 17 16   ALT 9* 11   ANIONGAP 14 15   EGFRNONAA >60 >60.0       Diagnostic Results:  I have reviewed all pertinent imaging results/findings within the past 24 hours.

## 2019-10-02 NOTE — PLAN OF CARE
Pt AAOx4 and independent with nonskid footwear on and bed locked and in lowest position with bed rails up x2. Call light is within reach and Pt instructed to call for assistance as needed. C diff sample collected - awaiting test results. Special contact precautions remain. Pt with 3 loose BMs total this shift. Pt with c/o LUE and L neck pain, given prn percocet for relief. Pt also reports worsening RLE neuropathy. Denies any nausea at this time. IVF @ 150. Outstanding US of LUE to r/o DVT. Remained afebrile throughout shift with no falls or injuries. VSS. Currently NPO for EGD/colonoscopy in AM. Will continue to monitor.

## 2019-10-02 NOTE — PLAN OF CARE
MDRs completed with Dr. Arita and the team. Patient of Dr. Lopez with a history of positive squamous cell carcinoma of the palatine tonsil s/p chemotherapy and systemic immunotherapy (with Opdivo from Feb 2019 to June 2019). Patient admitted on 10/1/19 with diarrhea and guaiac positive stool (history of GI bleed in the last month). GI consulted. Plans for EGD today, patient is currently NPO. Daily labs ordered. Last Hgb 7.0. Plans to transfuse if Hgb continues to drop <7. Normal saline at 150 mL/hr IV continuous. Protonix transitioned from IV to PO Protonix 40 mg 2 times daily. Plans to continue to monitor labs daily. MD encouraged the patient to remain ambulatory as tolerated. MD to start steroids tomorrow if diarrhea persists. Discharge needs to be determined. White board updated. CM to continue to follow with the team.    Alexandra Rosas, RN, BSN, CM  [facility]  Nurse - Med Onc/Gyn Onc

## 2019-10-02 NOTE — HPI
Mr Whiting is a 48 year old man with history of tonsilar SCC s/p chemotherapy (6/2018), opdivo complicated by colitis (currently on hold), recently admitted to OSH with duodenal ulcers, who was admitted from oncology due to concern for weakness; GI consulted due to concern for recent GI bleed.    He was hospitalized 9/8 - 9/13 due to lethargy, hypotension and melena. Endorsed at that time significant naproxen intake due to back pain. Underwent EGD (9/10) with duodenal ulcer (non-bleeding) and follow-up EGD on 9/12 due to concern for recurrent bleeding in setting of BRBPR with multiple oozing duodenal ulcers with pigmented material s/p bipolar.     He was seen in oncology clinic on 10/1 and was admitted due to weakness and dizziness. Labs on admission notable for H&H 7.9 (8.5 on discharge 9/13, 8.5 on 9/13). He reports he was doing well from GI standpoint since leaving home. Denies any further episodes of melena. No hematemesis. Endorses one episode last week of BRBPR which he described as in the toilet bowel and on toilet paper but with brown stool which resolved a few hours later on subsequent bowel movement. Does see occasional blood with wiping. Denies any further nsaid use since hospitalization. He does endorse watery diarrhea related to opdivo for which the medication was held; has not had a colonoscopy previously.    Regarding bowel habits, he developed diarrhea while on Opdivo which was held and improved with steroid therapy. He endorses 3-4 watery bowel movements/day currently. Denies any recent antibiotics. No prior colonoscopy. Opdivo held since 6/2019.

## 2019-10-02 NOTE — ANESTHESIA PREPROCEDURE EVALUATION
10/02/2019  Burton Whiting is a 49 y.o., male with several chronic medical issues including h/o tonsilar cancer s/p chemo and radiation (in remission), chronic right arm pain (significant NSAIDs use) now presenting with symptoms of dizziness, an isolated seizure, and having several episodes of melena (H/H: 7/22), presenting now for an EGD.    Patient Active Problem List   Diagnosis    Squamous cell carcinoma of palatine tonsil    Low vitamin D level    Cancer of head, face, or neck lymph nodes, secondary    Secondary cancer of bone    Tonsil cancer    Dehydration    Thrombocytopenia    Enterocolitis    Diverticulitis    Port or reservoir infection    Anxiety    Head and neck cancer    Macrocytic anemia    Adjustment disorder with emotional disturbance    Secondary malignancy of mediastinal lymph nodes    Fatigue    Hypocalcemia    Chemotherapy-induced neuropathy    Depression    Insomnia    BMI 28.0-28.9,adult    Neuropathic pain    Osteoradionecrosis of jaw    Acute renal failure    Acute upper GI bleeding    Seizure    Cervical radiculopathy at C6    GI bleed    Pain and swelling of left upper extremity    Discharge planning issues       Past Surgical History:   Procedure Laterality Date    ESOPHAGOGASTRODUODENOSCOPY Left 9/9/2019    Procedure: EGD (ESOPHAGOGASTRODUODENOSCOPY);  Surgeon: Balta Lisa MD;  Location: [facility];  Service: Endoscopy;  Laterality: Left;    ESOPHAGOGASTRODUODENOSCOPY N/A 9/12/2019    Procedure: EGD (ESOPHAGOGASTRODUODENOSCOPY);  Surgeon: Perez Mon III, MD;  Location: [facility];  Service: Endoscopy;  Laterality: N/A;    GASTROSTOMY TUBE PLACEMENT Left 02/12/2018    MEDIPORT REMOVAL N/A 6/6/2018    Procedure: Removal-port-a-cath;  Surgeon: Blayne Diagnostic Provider;  Location: [facility] OR [address] [place] [address];  Service: General;  Laterality: N/A;      Review of patient's allergies indicates:  No Known Allergies      Vital Signs Range (Last 24H):  Temp:  [36.4 °C (97.6 °F)-36.9 °C (98.4 °F)]   Pulse:  [66-79]   Resp:  [15-20]   BP: (107-139)/(56-73)   SpO2:  [96 %-99 %]         Labs (most recent):    CBC:   Recent Labs     10/01/19  1653 10/02/19  0600   WBC 5.37 4.49   RBC 2.46* 2.34*   HGB 7.4* 7.0*   HCT 22.7* 22.8*    252   MCV 92 97   MCH 30.1 29.9   MCHC 32.6 30.7*       CMP:   Recent Labs     10/01/19  1450 10/02/19  0600    141   K 3.2* 4.1   CL 99 108   CO2 22* 23   BUN 14 10   CREATININE 1.2 1.0   GLU 76 78   MG 1.9 1.9   PHOS 1.9* 2.0*   CALCIUM 7.6* 7.2*   ALBUMIN 3.0* 2.7*   PROT 6.4 5.6*   ALKPHOS 64 56   ALT 11 9*   AST 16 18   BILITOT 0.6 0.4         Pre-op Assessment    I have reviewed the Patient Summary Reports.     I have reviewed the Nursing Notes.   I have reviewed the Medications.     Review of Systems  Anesthesia Hx:  No previous Anesthesia  History of prior surgery of interest to airway management or planning: Denies Family Hx of Anesthesia complications.   Denies Personal Hx of Anesthesia complications.   Social:  No Alcohol Use, Former Smoker H/o Opiate addiction     Hematology/Oncology:         -- Anemia: Hematology Comments: Likely from GI bleed Denies Current/Recent Cancer  --  Cancer in past history:  Oncology Comments: Oral. Head, face, squamous cell carcinoma, metastatic     EENT/Dental:EENT/Dental Normal   Cardiovascular:   Exercise tolerance: good    Pulmonary:      Hepatic/GI:   TPN   Musculoskeletal:   Chiari 1 malformation per MRI    H/o 1 seizure episode in the setting of anemia. Resolved Spine Disorders:    Neurological:   Neuromuscular Disease, Seizures, well controlled H/o C6 radiculopathy and chemo-induced radiculopathy   Psych:   Psychiatric History H/o depression and anxiety         Physical Exam  General:  Well nourished    Airway/Jaw/Neck:  Airway Findings: Mouth Opening: Normal Tongue: Normal   Mallampati: I  TM Distance: Normal, at least 6 cm  Jaw/Neck Findings:  Neck ROM: Extension Decreased, Mod.  Extremely difficult airway requiring [place] Scope    Dental:  Dental Findings: Upper partial dentures, Lower partial dentures   Chest/Lungs:  Chest/Lungs Findings: Clear to auscultation     Heart/Vascular:  Heart Findings: Rate: Normal  Rhythm: Regular Rhythm             Anesthesia Plan  Type of Anesthesia, risks & benefits discussed:  Anesthesia Type:  MAC, general  Patient's Preference: MAC  Intra-op Monitoring Plan: standard ASA monitors  Intra-op Monitoring Plan Comments:   Post Op Pain Control Plan: per primary service following discharge from PACU  Post Op Pain Control Plan Comments:   Induction:   IV  Beta Blocker:  Patient is not currently on a Beta-Blocker (No further documentation required).       Informed Consent: Patient understands risks and agrees with Anesthesia plan.  Questions answered. Anesthesia consent signed with patient.  ASA Score: 3     Day of Surgery Review of History & Physical: I have interviewed and examined the patient. I have reviewed the patient's H&P dated:            Ready For Surgery From Anesthesia Perspective.

## 2019-10-02 NOTE — MED STUDENT SUBJECTIVE & OBJECTIVE
Medical Student Subjective & Objective     Principal Problem: GI bleed    Chief Complaint: No chief complaint on file.      HPI: Oncology History:     Mr. Burton Whiting is a 48-year-old male, former smoker, who presents today with a four-month history of enlarging neck mass.  He initially delayed care due to lack of insurance.  He was seen by Dr. Turpin who referred him to see Dr. Olguin.  He had a CT that showed a 3.8 x 3.8 x 4.9 cm soft tissue mass arising from the left palatine tonsil resulting in moderate narrowing of the hypopharyngeal airway adjacent extensive matted left cervical lymphadenopathy was noted.  The largest ella mass was 6.6 x 9 x 2.6 cm extending inferiorly to the supraclavicular region.  The mass pushed the trachea and the left common carotid artery to the right.  Additional representative of cervical lymph nodes measuring 2.9 x 3.1 x 3.5 cm on axial image 37 of series 3   and 2.4 x 2.1 x 2.2 cm on axial image 55 of series 3.  There is also a sclerotic lesion involving the midline of the clivus measuring 1.2 cm.  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  PET scan performed on 01/19/2018 revealed persistent evidence of a soft tissue mass arising from the left palatine tonsil resulting in moderate narrowing of the hypopharyngeal   airway.  The mass is hypermetabolic demonstrating an SUV max of 18.5.  There is also persistent adjacent extensive matted left cervical lymphadenopathy, largest of this measuring 6.7 x 8.42 with hypermetabolic activity and SUV max of 20.5.  Lymphadenopathy is again noted to have a mass effect on the trachea and left common carotid artery, pushing both structures towards the right.  There is also prominent right cervical lymph nodes with the largest measuring 0.8 cm and demonstrating mild hypermetabolic activity with SUV max of 1.8, physiologic   distribution of FDG in the gray matter.  There are 3 pulmonary nodules noted within the right lung,  "the largest is in the anterior segment of the right upper lobe measuring 1 cm, second is visualized in the middle lobe measuring 0.6 cm and the third is within the posterior basal segment measuring 0.6 cm.  There is one pulmonary nodule within the left lung within the lateral basal segment measuring 0.6 cm.  These nodules do not demonstrate hypermetabolic activity; however, they are below the threshold for observation with PET     He underwent MRI cervical spine revealed "No evidence of metastatic disease to the cervical spine. Multilevel degenerative changes of the cervical spine with disc protrusion at C5-6 which results in moderate to severe spinal canal stenosis and and associated cord signal abnormality, suggestive of compressive myelopathy. 6 mm T1 hypointense non-enhancing lesion in the clivus.  Continued followup is suggested. 9 mm inferior descent of the cerebellar tonsils below the foramen magnum, suggestive of Chiari 1 malformation"  MRI thoracic spine "No evidence of metastatic disease involving the thoracic spine. Incidental hemangioma involving the T7 vertebral body. Mild degenerative disc disease without any significant spinal canal stenosis or neuroforaminal narrowing.   He completed 3 cycles of TPF and 6 weeks of Cisplatin and RT. Completed on 6/26/18     His PET scan from 9/25/18 revealed "Progression of disease with multiple new hypermetabolic foci including the manubrium, mediastinal/retrocrural lymph nodes, and lungs. Partial therapeutic response within the presumed radiation field involving the left cervical mass, and complete therapeutic response in the right cervical lymph node, clivus, and left palatine tonsil. New soft tissue thickening and hypermetabolism in the left aryepiglottic fold and right cricoid cartilage"     He has been on Opdivo since 2/14/19- Last treatment 6/19/19 (C10).  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 He was started on steroids with some relief in bowel " activity. PET 6/2019 showed response.     In early September 2019, he called with development left neck/arm pain and swelling. Cervical spine MRI revealed stenosis. He was taking 8 Aleve a day for pain control.     Pt went to Ed 9/8/19 with altered Mental Status.  Was admitted and noted to have bleeding ulcers. He required 5u PRBC's.  EGD performed 9/12/19 showing two duodenal ulcers which were successfully cauterized.      HISTORY OF PRESENT ILLNESS:  Patient returned to oncology clinic on 10/1/19. 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 Patient was in the hospital in [place] last month for altered mental status and found to be anemic. Symptoms improved after blood transfusions (5 total). Patient underwent EGD x2 which found two duodenal ulcers.     Patient presents for continued diarrhea and nausea over the past week. Patient states that he has increasing pain in his left neck, shoulder and arm over the past week as well. He reports mild swelling as well, no erythema or redness. He states the tramadol he was taking for this does not help. He states that he feels dehydrated and dizzy upon standing. Denies dark, tarry stools. Patient also reports poor oral intake as eating contributes to the diarrhea. Reports 6lb weight loss in past week. He reports a one time fever on 9/30 with recorded temperature 100. He also reports decreased urine output over the past several days.    Of note, patient states he has a history of opoid addiction in the past and would like to avoid opioids if  "possible. He takes gabapentin 300 TID for neuropathic pain.    Patient admitted to med onc for rehydration and further evaluation of diarrhea.    Hospital Course: 10/1/19: admitted for rehydration and C diff rule out. Obtaining LUE ultrasound to rule out DVT. Obtaining C diff testing and blood cultures given history of subjective fever.  10/2: patient reports feeling better, less dizziness and lightheadedness with continuous IV NS. Patient reports 3 episodes of diarrhea last night, nonbloody. C diff negative. Blood cultures NGTD.    Interval History: The patient feels improved this morning. He has had 3 bouts of diarrhea since his admission, with his last one being bright green. He denies any blood in his stool since 3 days ago (bright red, he notes his hemorrhoids are active). His appetite is decreased but ate 1/2 a meal yesterday. His L shoulder pain is persistent, and has worsening L foot numbness, but denies any chest pain, abdominal pain, nausea, vomiting, LE swelling.    Past Medical History:   Diagnosis Date    Acute renal failure 6/21/2018    Alcohol abuse     ended 2004    Chemotherapy induced neutropenia 6/7/2018    Former smoker     HPV in male     Hx of psychiatric care     Ativan, Paxil ("caused anger problems")    Opiate addiction     Squamous cell carcinoma of palatine tonsil     throat and tonsillar    Substance abuse     opiates, methamphetamines; ended 2004       Past Surgical History:   Procedure Laterality Date    ESOPHAGOGASTRODUODENOSCOPY Left 9/9/2019    Procedure: EGD (ESOPHAGOGASTRODUODENOSCOPY);  Surgeon: Balta Lisa MD;  Location: [facility];  Service: Endoscopy;  Laterality: Left;    ESOPHAGOGASTRODUODENOSCOPY N/A 9/12/2019    Procedure: EGD (ESOPHAGOGASTRODUODENOSCOPY);  Surgeon: Perez Mno III, MD;  Location: [facility];  Service: Endoscopy;  Laterality: N/A;    GASTROSTOMY TUBE PLACEMENT Left 02/12/2018    MEDIPORT REMOVAL N/A 6/6/2018    Procedure: " Removal-port-a-cath;  Surgeon: Blayne Diagnostic Provider;  Location: [facility] OR [address] [place] [address];  Service: General;  Laterality: N/A;       Review of patient's allergies indicates:   Allergen Reactions    Trazodone Other (See Comments)     confusion       No current facility-administered medications on file prior to encounter.      Current Outpatient Medications on File Prior to Encounter   Medication Sig    acetaminophen (TYLENOL) 325 MG tablet Take 2 tablets (650 mg total) by mouth every 6 (six) hours as needed.    calcium-vitamin D3 (CALCIUM 500 + D) 500 mg(1,250mg) -200 unit per tablet Take 1 tablet by mouth 2 (two) times daily with meals.    denosumab (XGEVA) 120 mg/1.7 mL (70 mg/mL) Soln Inject 1.7 mLs (120 mg total) into the skin once. for 1 dose    FLUoxetine 40 MG capsule Take 1 capsule (40 mg total) by mouth once daily.    gabapentin (NEURONTIN) 300 MG capsule Take 1 capsule (300 mg total) by mouth 3 (three) times daily. (Patient taking differently: Take 300 mg by mouth 3 (three) times daily. )    hydrocortisone 2.5 % cream Apply topically 2 (two) times daily.    lidocaine (LIDODERM) 5 % Place 1 patch onto the skin daily as needed. Remove & Discard patch within 12 hours or as directed by MD    mirtazapine (REMERON) 7.5 MG Tab     multivitamin capsule Take 1 capsule by mouth once daily.    pantoprazole (PROTONIX) 40 MG tablet Take 1 tablet (40 mg total) by mouth 2 (two) times daily.    tiZANidine (ZANAFLEX) 2 MG tablet Take 1 tablet (2 mg total) by mouth nightly as needed. For neck pain. May be sedating. Be careful and know how it affects you before driving (Patient not taking: Reported on 10/1/2019)    traMADol (ULTRAM) 50 mg tablet Take 1 tablet (50 mg total) by mouth every 8 (eight) hours as needed for Pain.     Family History     Problem Relation (Age of Onset)    Brain cancer Maternal Uncle, Maternal Uncle    Depression Sister    Hypertension Father, Brother    Leukemia Mother    Thyroid disease  Sister        Tobacco Use    Smoking status: Former Smoker     Packs/day: 1.50     Years: 25.00     Pack years: 37.50     Types: Cigarettes     Last attempt to quit: 2017     Years since quittin.3    Smokeless tobacco: Never Used    Tobacco comment: smoked for about 25 years. quit 6 months ago   Substance and Sexual Activity    Alcohol use: No     Comment: history of overuse    Drug use: No     Comment: Former Opiate/methamphetamine addiction    Sexual activity: Yes     Partners: Female     Review of Systems   Constitutional: Positive for appetite change and unexpected weight change (down 6 lbs/ 1 week). Negative for fever.   HENT: Negative for sore throat and trouble swallowing.    Respiratory: Negative for cough and shortness of breath.    Cardiovascular: Negative for chest pain and leg swelling.   Gastrointestinal: Positive for blood in stool and diarrhea. Negative for abdominal pain, nausea and vomiting.   Genitourinary: Negative for difficulty urinating and hematuria.   Musculoskeletal: Positive for arthralgias.   Skin: Negative for rash.   Neurological: Positive for numbness. Negative for headaches.   Hematological: Positive for adenopathy.     Objective:     Vital Signs (Most Recent):  Temp: 97.8 °F (36.6 °C) (10/02/19 0443)  Pulse: 75 (10/02/19 0443)  Resp: 20 (10/02/19 0443)  BP: (!) 111/59 (10/02/19 0443)  SpO2: 97 % (10/02/19 0443) Vital Signs (24h Range):  Temp:  [97.6 °F (36.4 °C)-98.4 °F (36.9 °C)] 97.8 °F (36.6 °C)  Pulse:  [68-86] 75  Resp:  [15-20] 20  SpO2:  [96 %-100 %] 97 %  BP: (107-139)/(56-73) 111/59     Weight: 76.7 kg (169 lb 1.5 oz)  Body mass index is 25.71 kg/m².    Intake/Output Summary (Last 24 hours) at 10/2/2019 7371  Last data filed at 10/1/2019 1925  Gross per 24 hour   Intake 280 ml   Output 300 ml   Net -20 ml      Physical Exam   Constitutional: He appears well-developed and well-nourished. No distress.   HENT:   Head: Normocephalic and atraumatic.   Eyes:  Conjunctivae and EOM are normal.   Neck: Neck supple.   Cardiovascular: Normal rate, regular rhythm and normal heart sounds.   No murmur heard.  Pulmonary/Chest: Effort normal and breath sounds normal. No respiratory distress.   Abdominal: Soft. Bowel sounds are normal. He exhibits no distension. There is no tenderness.   Musculoskeletal: Normal range of motion. He exhibits no edema.   Lymphadenopathy:     He has no cervical adenopathy (evidence of fibrous tissue from prior treatments).   Neurological: He is alert.   4/5 strength LUE vs right   Skin: Skin is warm and dry. No rash noted.       Significant Labs:   CBC:   Recent Labs   Lab 10/01/19  1105 10/01/19  1450 10/01/19  1653   WBC 5.65 5.48 5.37   HGB 7.9* 7.6* 7.4*   HCT 24.7* 23.1* 22.7*    252 240     CMP:   Recent Labs   Lab 10/01/19  1105 10/01/19  1450    136   K 3.6 3.2*   CL 99 99   CO2 23 22*   GLU 97 76   BUN 14 14   CREATININE 1.2 1.2   CALCIUM 8.0* 7.6*   PROT 6.5 6.4   ALBUMIN 3.1* 3.0*   BILITOT 0.7 0.6   ALKPHOS 63 64   AST 17 16   ALT 9* 11   ANIONGAP 14 15   EGFRNONAA >60 >60.0     Medical Student Subjective & Objective      GI bleed   - Prior hospital admission 9/8/19 for bleeding duodenal ulcer, was transfused 5U in past   - Type and cross 2U of pRBC, transfuse for Hg <7   - GI consulted for EGD and colonoscopy for continued GI blood loss   - C. Diff negative    Severe spinal stenosis   - LUE US to r/o DVT    - Continue gabapentin 300mg TID for chronic peripheral neuropathy   - PRN percocet for pain 5-325 mg, Tylenol 650 mg    Squamous cell carcinoma of palatine tonsil   - Previous neoadj 3 cycles of TPF, then 6 weeks of cisplatin and RT (6/22/18), Feb - June 2019 Opdivo   - No evidence of disease per PET Sept 2019.    Insomnia   - PRN ramelteon 8mg    IV access - LUE, RUE  Diet - Normal diet  DVT prophylaxis - ambulation, TEDs and SCDs  Gi prophylaxis - Pantoprazole 40mg  Dispo - Pending results    Dane Peguero MS4

## 2019-10-02 NOTE — ASSESSMENT & PLAN NOTE
Barriers to discharge:  - stability of H/H. Transfusing 1 unit 10/3 for Hgb 6.7.  - improvement in his diarrhea/dizziness. Improved as of 10/3 AM.

## 2019-10-02 NOTE — PROGRESS NOTES
[facility]  Hematology/Oncology  Progress Note    Patient Name: Burton Whiting  Admission Date: 10/1/2019  Hospital Length of Stay: 1 days  Code Status: Full Code     Subjective:     HPI:  Oncology History:     Mr. Burton Whiting is a 48-year-old male, former smoker, who presents today with a four-month history of enlarging neck mass.  He initially delayed care due to lack of insurance.  He was seen by Dr. Turpin who referred him to see Dr. Olguin.  He had a CT that showed a 3.8 x 3.8 x 4.9 cm soft tissue mass arising from the left palatine tonsil resulting in moderate narrowing of the hypopharyngeal airway adjacent extensive matted left cervical lymphadenopathy was noted.  The largest ella mass was 6.6 x 9 x 2.6 cm extending inferiorly to the supraclavicular region.  The mass pushed the trachea and the left common carotid artery to the right.  Additional representative of cervical lymph nodes measuring 2.9 x 3.1 x 3.5 cm on axial image 37 of series 3   and 2.4 x 2.1 x 2.2 cm on axial image 55 of series 3.  There is also a sclerotic lesion involving the midline of the clivus measuring 1.2 cm.  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  PET scan performed on 01/19/2018 revealed persistent evidence of a soft tissue mass arising from the left palatine tonsil resulting in moderate narrowing of the hypopharyngeal   airway.  The mass is hypermetabolic demonstrating an SUV max of 18.5.  There is also persistent adjacent extensive matted left cervical lymphadenopathy, largest of this measuring 6.7 x 8.42 with hypermetabolic activity and SUV max of 20.5.  Lymphadenopathy is again noted to have a mass effect on the trachea and left common carotid artery, pushing both structures towards the right.  There is also prominent right cervical lymph nodes with the largest measuring 0.8 cm and demonstrating mild hypermetabolic activity with SUV max of 1.8, physiologic   distribution of FDG  "in the gray matter.  There are 3 pulmonary nodules noted within the right lung, the largest is in the anterior segment of the right upper lobe measuring 1 cm, second is visualized in the middle lobe measuring 0.6 cm and the third is within the posterior basal segment measuring 0.6 cm.  There is one pulmonary nodule within the left lung within the lateral basal segment measuring 0.6 cm.  These nodules do not demonstrate hypermetabolic activity; however, they are below the threshold for observation with PET     He underwent MRI cervical spine revealed "No evidence of metastatic disease to the cervical spine. Multilevel degenerative changes of the cervical spine with disc protrusion at C5-6 which results in moderate to severe spinal canal stenosis and and associated cord signal abnormality, suggestive of compressive myelopathy. 6 mm T1 hypointense non-enhancing lesion in the clivus.  Continued followup is suggested. 9 mm inferior descent of the cerebellar tonsils below the foramen magnum, suggestive of Chiari 1 malformation"  MRI thoracic spine "No evidence of metastatic disease involving the thoracic spine. Incidental hemangioma involving the T7 vertebral body. Mild degenerative disc disease without any significant spinal canal stenosis or neuroforaminal narrowing.   He completed 3 cycles of TPF and 6 weeks of Cisplatin and RT. Completed on 6/26/18     His PET scan from 9/25/18 revealed "Progression of disease with multiple new hypermetabolic foci including the manubrium, mediastinal/retrocrural lymph nodes, and lungs. Partial therapeutic response within the presumed radiation field involving the left cervical mass, and complete therapeutic response in the right cervical lymph node, clivus, and left palatine tonsil. New soft tissue thickening and hypermetabolism in the left aryepiglottic fold and right cricoid cartilage"     He has been on Opdivo since 2/14/19- Last treatment 6/19/19 (C10).  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 He was started on steroids with some relief in bowel activity. PET 6/2019 showed response.     In early September 2019, he called with development left neck/arm pain and swelling. Cervical spine MRI revealed stenosis. He was taking 8 Aleve a day for pain control.     Pt went to Ed 9/8/19 with altered Mental Status.  Was admitted and noted to have bleeding ulcers. He required 5u PRBC's.  EGD performed 9/12/19 showing two duodenal ulcers which were successfully cauterized.      HISTORY OF PRESENT ILLNESS:  Patient returned to oncology clinic on 10/1/19. 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 Patient was in the hospital in [place] last month for altered mental status and found to be anemic. Symptoms improved after blood transfusions (5 total). Patient underwent EGD x2 which found two duodenal ulcers.     Patient presents for continued diarrhea and nausea over the past week. Patient states that he has increasing pain in his left neck, shoulder and arm over the past week as well. He reports mild swelling as well, no erythema or redness. He states the tramadol he was taking for this does not help. He states that he feels dehydrated and dizzy upon standing. Denies dark, tarry stools. Patient also reports poor oral intake as eating contributes to the diarrhea. Reports 6lb weight loss in past week. He reports a one time fever on 9/30 with recorded temperature 100. He also reports decreased urine output over the past several days.    Of note, patient states he has  "a history of opoid addiction in the past and would like to avoid opioids if possible. He takes gabapentin 300 TID for neuropathic pain.    Patient admitted to med onc for rehydration and further evaluation of diarrhea.    Interval history: no acute events overnight.    Oncology Treatment Plan:   OP NIVOLUMAB Q2W    Medications:  Continuous Infusions:   sodium chloride 0.9% 150 mL/hr at 10/02/19 0734     Scheduled Meds:   calcium-vitamin D3  1 tablet Oral BID WM    FLUoxetine  40 mg Oral Daily    gabapentin  300 mg Oral TID    multivitamin  1 tablet Oral Daily    pantoprazole  40 mg Intravenous BID     PRN Meds:acetaminophen, Dextrose 10% Bolus, Dextrose 10% Bolus, glucagon (human recombinant), glucose, glucose, insulin aspart U-100, ondansetron, oxyCODONE-acetaminophen, ramelteon, sodium chloride 0.9%     Review of patient's allergies indicates:   Allergen Reactions    Trazodone Other (See Comments)     confusion        Past Medical History:   Diagnosis Date    Acute renal failure 6/21/2018    Alcohol abuse     ended 2004    Chemotherapy induced neutropenia 6/7/2018    Former smoker     HPV in male     Hx of psychiatric care     Ativan, Paxil ("caused anger problems")    Opiate addiction     Squamous cell carcinoma of palatine tonsil     throat and tonsillar    Substance abuse     opiates, methamphetamines; ended 2004     Past Surgical History:   Procedure Laterality Date    ESOPHAGOGASTRODUODENOSCOPY Left 9/9/2019    Procedure: EGD (ESOPHAGOGASTRODUODENOSCOPY);  Surgeon: Balta Lisa MD;  Location: [facility];  Service: Endoscopy;  Laterality: Left;    ESOPHAGOGASTRODUODENOSCOPY N/A 9/12/2019    Procedure: EGD (ESOPHAGOGASTRODUODENOSCOPY);  Surgeon: Perez Mon III, MD;  Location: [facility];  Service: Endoscopy;  Laterality: N/A;    GASTROSTOMY TUBE PLACEMENT Left 02/12/2018    MEDIPORT REMOVAL N/A 6/6/2018    Procedure: Removal-port-a-cath;  Surgeon: [facility] Provider;  " Location: [facility] OR [address] [place] [address];  Service: General;  Laterality: N/A;     Family History     Problem Relation (Age of Onset)    Brain cancer Maternal Uncle, Maternal Uncle    Depression Sister    Hypertension Father, Brother    Leukemia Mother    Thyroid disease Sister        Tobacco Use    Smoking status: Former Smoker     Packs/day: 1.50     Years: 25.00     Pack years: 37.50     Types: Cigarettes     Last attempt to quit: 2017     Years since quittin.3    Smokeless tobacco: Never Used    Tobacco comment: smoked for about 25 years. quit 6 months ago   Substance and Sexual Activity    Alcohol use: No     Comment: history of overuse    Drug use: No     Comment: Former Opiate/methamphetamine addiction    Sexual activity: Yes     Partners: Female       Review of Systems   Constitutional: Positive for chills and fever.   HENT: Negative for sore throat and trouble swallowing.    Eyes: Negative for visual disturbance.   Respiratory: Negative for cough and shortness of breath.    Cardiovascular: Negative for chest pain and palpitations.   Gastrointestinal: Positive for diarrhea and nausea.   Genitourinary: Negative for hematuria.   Musculoskeletal: Negative for back pain and neck pain.   Neurological: Positive for dizziness and light-headedness.   Psychiatric/Behavioral: Negative for agitation and behavioral problems.     Objective:     Vital Signs (Most Recent):  Temp: 97.8 °F (36.6 °C) (10/02/19 0443)  Pulse: 75 (10/02/19 0443)  Resp: 20 (10/02/19 0443)  BP: (!) 111/59 (10/02/19 0443)  SpO2: 97 % (10/02/19 0443) Vital Signs (24h Range):  Temp:  [97.6 °F (36.4 °C)-98.4 °F (36.9 °C)] 97.8 °F (36.6 °C)  Pulse:  [68-86] 75  Resp:  [15-20] 20  SpO2:  [96 %-100 %] 97 %  BP: (107-139)/(56-73) 111/59     Weight: 76.7 kg (169 lb 1.5 oz)  Body mass index is 25.71 kg/m².  Body surface area is 1.92 meters squared.      Intake/Output Summary (Last 24 hours) at 10/2/2019 0778  Last data filed at 10/1/2019 1925  Gross per 24  hour   Intake 280 ml   Output 300 ml   Net -20 ml       Physical Exam   Constitutional: He is oriented to person, place, and time. He appears well-developed and well-nourished. No distress.   HENT:   Head: Normocephalic and atraumatic.   Eyes: EOM are normal. No scleral icterus.   Neck: Neck supple. No JVD present.   Cardiovascular: Normal rate and regular rhythm. Exam reveals no gallop and no friction rub.   No murmur heard.  Pulmonary/Chest: Breath sounds normal. No respiratory distress. He has no wheezes.   Abdominal: Soft. Bowel sounds are normal. He exhibits no distension. There is no tenderness.   Genitourinary: Rectal exam shows guaiac positive stool.   Musculoskeletal: Normal range of motion. He exhibits no edema.   No evidence of decreased ROM of left upper extremity.    Neurological: He is alert and oriented to person, place, and time.   Skin: Skin is dry.   Psychiatric: He has a normal mood and affect.       Significant Labs:   CBC:   Recent Labs   Lab 10/01/19  1105 10/01/19  1450 10/01/19  1653   WBC 5.65 5.48 5.37   HGB 7.9* 7.6* 7.4*   HCT 24.7* 23.1* 22.7*    252 240    and CMP:   Recent Labs   Lab 10/01/19  1105 10/01/19  1450    136   K 3.6 3.2*   CL 99 99   CO2 23 22*   GLU 97 76   BUN 14 14   CREATININE 1.2 1.2   CALCIUM 8.0* 7.6*   PROT 6.5 6.4   ALBUMIN 3.1* 3.0*   BILITOT 0.7 0.6   ALKPHOS 63 64   AST 17 16   ALT 9* 11   ANIONGAP 14 15   EGFRNONAA >60 >60.0       Diagnostic Results:  I have reviewed all pertinent imaging results/findings within the past 24 hours.    Assessment/Plan:     * GI bleed  Patient presents from clinic with diarrhea and guaiac positive stool in the history of GI bleed last month with history of significant NSAID use 2/2 left arm pain (up to 8 aleve per day). Patient also appears dehydrated and reports poor oral intake over past several days.    PLAN:  - GI consult-->may perform EGD on 10/2 pending scheduling. Keeping patient NPO for now.  - CBC, CMP, Mg,  Phos daily  - Type and screen. Consented for blood transfusion. Transfuse for Hgb < 7.  - IV protonix 40 BID.  - Continuous NS.    Discharge planning issues  Patient's barrier to discharge includes evaluation with EGD and improvement in his diarrhea/dizziness. Will also need to wait for 10/1 blood cultures for 48 hours.     Pain and swelling of left upper extremity  Suspect that this pain is likely related to his known cervical spinal stenosis. Will rule out DVT give history of malignancy.    PLAN:  - LUE U/S to rule out DVT.    Neuropathic pain  Continue home gabapentin.    Anxiety  Continue home fluoxetine.    Dehydration  See upper GI bleed.    Low vitamin D level  Continue home vit D supplementation.    Squamous cell carcinoma of palatine tonsil  Oncologic history as above. No active systemic therapy at this time. Patient with no evidence of disease per PET scan in Sept 2019.             Demi Fishman MD  Hematology/Oncology  [facility]    ATTENDING NOTE, ONCOLOGY INPATIENT TEAM    As above; events of last 24 hours noted.  Patient seen and examined, chart reviewed.  Appears comfortable, in NAD, hwoever, he continues to have watery diarrhea.  Lungs are clear to auscultation.  Abdomen is soft, nontender. Bowel sounds are rpesent  Labs reviewed.  C dif and blood cultures are negative.    PLAN  Follow CBC daily.  Follow electrolytes daily.  Proceed with left upper extremity ultrasound .  EGD in the next 24 hours.  If any further drop in H/h we will transfuse.  Patient advised to remain ambulatory.  If his diarrhea does not improve, may consider empiric treatment with steroids for presumed colitis.  Will discuss with the GI Service.        Rigo Ta MD

## 2019-10-03 VITALS
OXYGEN SATURATION: 99 % | TEMPERATURE: 98 F | SYSTOLIC BLOOD PRESSURE: 117 MMHG | BODY MASS INDEX: 25.62 KG/M2 | RESPIRATION RATE: 16 BRPM | WEIGHT: 169.06 LBS | HEART RATE: 72 BPM | HEIGHT: 68 IN | DIASTOLIC BLOOD PRESSURE: 73 MMHG

## 2019-10-03 LAB
ALBUMIN SERPL BCP-MCNC: 2.5 G/DL (ref 3.5–5.2)
ALP SERPL-CCNC: 50 U/L (ref 55–135)
ALT SERPL W/O P-5'-P-CCNC: 8 U/L (ref 10–44)
ANION GAP SERPL CALC-SCNC: 9 MMOL/L (ref 8–16)
AST SERPL-CCNC: 14 U/L (ref 10–40)
BASOPHILS # BLD AUTO: 0.02 K/UL (ref 0–0.2)
BASOPHILS NFR BLD: 0.5 % (ref 0–1.9)
BILIRUB SERPL-MCNC: 0.3 MG/DL (ref 0.1–1)
BLD PROD TYP BPU: NORMAL
BLOOD UNIT EXPIRATION DATE: NORMAL
BLOOD UNIT TYPE CODE: 5100
BLOOD UNIT TYPE: NORMAL
BUN SERPL-MCNC: 5 MG/DL (ref 6–20)
CALCIUM SERPL-MCNC: 6.7 MG/DL (ref 8.7–10.5)
CHLORIDE SERPL-SCNC: 108 MMOL/L (ref 95–110)
CO2 SERPL-SCNC: 23 MMOL/L (ref 23–29)
CODING SYSTEM: NORMAL
CREAT SERPL-MCNC: 0.8 MG/DL (ref 0.5–1.4)
DIFFERENTIAL METHOD: ABNORMAL
DISPENSE STATUS: NORMAL
EOSINOPHIL # BLD AUTO: 0 K/UL (ref 0–0.5)
EOSINOPHIL NFR BLD: 0.5 % (ref 0–8)
ERYTHROCYTE [DISTWIDTH] IN BLOOD BY AUTOMATED COUNT: 17.2 % (ref 11.5–14.5)
EST. GFR  (AFRICAN AMERICAN): >60 ML/MIN/1.73 M^2
EST. GFR  (NON AFRICAN AMERICAN): >60 ML/MIN/1.73 M^2
GLUCOSE SERPL-MCNC: 79 MG/DL (ref 70–110)
HCT VFR BLD AUTO: 21.8 % (ref 40–54)
HGB BLD-MCNC: 6.7 G/DL (ref 14–18)
IMM GRANULOCYTES # BLD AUTO: 0.21 K/UL (ref 0–0.04)
IMM GRANULOCYTES NFR BLD AUTO: 4.8 % (ref 0–0.5)
LYMPHOCYTES # BLD AUTO: 0.3 K/UL (ref 1–4.8)
LYMPHOCYTES NFR BLD: 7.1 % (ref 18–48)
MAGNESIUM SERPL-MCNC: 1.5 MG/DL (ref 1.6–2.6)
MCH RBC QN AUTO: 30.2 PG (ref 27–31)
MCHC RBC AUTO-ENTMCNC: 30.7 G/DL (ref 32–36)
MCV RBC AUTO: 98 FL (ref 82–98)
MONOCYTES # BLD AUTO: 0.6 K/UL (ref 0.3–1)
MONOCYTES NFR BLD: 13.1 % (ref 4–15)
NEUTROPHILS # BLD AUTO: 3.2 K/UL (ref 1.8–7.7)
NEUTROPHILS NFR BLD: 74 % (ref 38–73)
NRBC BLD-RTO: 0 /100 WBC
PHOSPHATE SERPL-MCNC: 1.7 MG/DL (ref 2.7–4.5)
PLATELET # BLD AUTO: 232 K/UL (ref 150–350)
PMV BLD AUTO: 8.3 FL (ref 9.2–12.9)
POTASSIUM SERPL-SCNC: 3.6 MMOL/L (ref 3.5–5.1)
PROT SERPL-MCNC: 5.1 G/DL (ref 6–8.4)
RBC # BLD AUTO: 2.22 M/UL (ref 4.6–6.2)
SODIUM SERPL-SCNC: 140 MMOL/L (ref 136–145)
TRANS ERYTHROCYTES VOL PATIENT: NORMAL ML
WBC # BLD AUTO: 4.35 K/UL (ref 3.9–12.7)

## 2019-10-03 PROCEDURE — 99233 SBSQ HOSP IP/OBS HIGH 50: CPT | Mod: ,,, | Performed by: INTERNAL MEDICINE

## 2019-10-03 PROCEDURE — 25000003 PHARM REV CODE 250: Performed by: STUDENT IN AN ORGANIZED HEALTH CARE EDUCATION/TRAINING PROGRAM

## 2019-10-03 PROCEDURE — 85025 COMPLETE CBC W/AUTO DIFF WBC: CPT

## 2019-10-03 PROCEDURE — P9021 RED BLOOD CELLS UNIT: HCPCS

## 2019-10-03 PROCEDURE — 63600175 PHARM REV CODE 636 W HCPCS: Performed by: STUDENT IN AN ORGANIZED HEALTH CARE EDUCATION/TRAINING PROGRAM

## 2019-10-03 PROCEDURE — 83735 ASSAY OF MAGNESIUM: CPT

## 2019-10-03 PROCEDURE — 27201040 HC RC 50 FILTER

## 2019-10-03 PROCEDURE — 36415 COLL VENOUS BLD VENIPUNCTURE: CPT

## 2019-10-03 PROCEDURE — 84100 ASSAY OF PHOSPHORUS: CPT

## 2019-10-03 PROCEDURE — 99233 PR SUBSEQUENT HOSPITAL CARE,LEVL III: ICD-10-PCS | Mod: ,,, | Performed by: INTERNAL MEDICINE

## 2019-10-03 PROCEDURE — 36430 TRANSFUSION BLD/BLD COMPNT: CPT

## 2019-10-03 PROCEDURE — 80053 COMPREHEN METABOLIC PANEL: CPT

## 2019-10-03 RX ORDER — PANTOPRAZOLE SODIUM 40 MG/1
40 TABLET, DELAYED RELEASE ORAL DAILY
Qty: 30 TABLET | Refills: 11 | Status: SHIPPED | OUTPATIENT
Start: 2019-10-04 | End: 2021-10-14

## 2019-10-03 RX ORDER — GABAPENTIN 300 MG/1
300 CAPSULE ORAL 3 TIMES DAILY
Qty: 270 CAPSULE | Refills: 3
Start: 2019-10-03 | End: 2019-11-20 | Stop reason: SDUPTHER

## 2019-10-03 RX ORDER — MAGNESIUM SULFATE HEPTAHYDRATE 40 MG/ML
2 INJECTION, SOLUTION INTRAVENOUS ONCE
Status: COMPLETED | OUTPATIENT
Start: 2019-10-03 | End: 2019-10-03

## 2019-10-03 RX ORDER — DIPHENHYDRAMINE HCL 25 MG
25 CAPSULE ORAL ONCE
Status: COMPLETED | OUTPATIENT
Start: 2019-10-03 | End: 2019-10-03

## 2019-10-03 RX ORDER — DIPHENOXYLATE HYDROCHLORIDE AND ATROPINE SULFATE 2.5; .025 MG/1; MG/1
2 TABLET ORAL 4 TIMES DAILY PRN
Qty: 240 TABLET | Refills: 0 | Status: SHIPPED | OUTPATIENT
Start: 2019-10-03 | End: 2019-10-04 | Stop reason: SDUPTHER

## 2019-10-03 RX ORDER — HYDROCODONE BITARTRATE AND ACETAMINOPHEN 500; 5 MG/1; MG/1
TABLET ORAL
Status: DISCONTINUED | OUTPATIENT
Start: 2019-10-03 | End: 2019-10-03 | Stop reason: HOSPADM

## 2019-10-03 RX ORDER — ACETAMINOPHEN 325 MG/1
650 TABLET ORAL EVERY 4 HOURS PRN
Status: DISCONTINUED | OUTPATIENT
Start: 2019-10-03 | End: 2019-10-03 | Stop reason: HOSPADM

## 2019-10-03 RX ORDER — PANTOPRAZOLE SODIUM 40 MG/1
40 TABLET, DELAYED RELEASE ORAL DAILY
Status: DISCONTINUED | OUTPATIENT
Start: 2019-10-04 | End: 2019-10-03 | Stop reason: HOSPADM

## 2019-10-03 RX ADMIN — PANTOPRAZOLE SODIUM 40 MG: 40 TABLET, DELAYED RELEASE ORAL at 06:10

## 2019-10-03 RX ADMIN — MAGNESIUM SULFATE IN WATER 2 G: 40 INJECTION, SOLUTION INTRAVENOUS at 07:10

## 2019-10-03 RX ADMIN — DIPHENHYDRAMINE HYDROCHLORIDE 25 MG: 25 CAPSULE ORAL at 10:10

## 2019-10-03 RX ADMIN — SODIUM CHLORIDE: 0.9 INJECTION, SOLUTION INTRAVENOUS at 05:10

## 2019-10-03 RX ADMIN — GABAPENTIN 300 MG: 300 CAPSULE ORAL at 09:10

## 2019-10-03 RX ADMIN — FLUOXETINE 40 MG: 20 CAPSULE ORAL at 09:10

## 2019-10-03 RX ADMIN — POTASSIUM PHOSPHATE, MONOBASIC AND POTASSIUM PHOSPHATE, DIBASIC 20 MMOL: 224; 236 INJECTION, SOLUTION, CONCENTRATE INTRAVENOUS at 07:10

## 2019-10-03 RX ADMIN — OXYCODONE HYDROCHLORIDE AND ACETAMINOPHEN 1 TABLET: 5; 325 TABLET ORAL at 06:10

## 2019-10-03 RX ADMIN — ACETAMINOPHEN 650 MG: 325 TABLET ORAL at 10:10

## 2019-10-03 RX ADMIN — GABAPENTIN 300 MG: 300 CAPSULE ORAL at 03:10

## 2019-10-03 NOTE — PROGRESS NOTES
[facility]  Hematology/Oncology  Progress Note    Patient Name: Burton Whiting  Admission Date: 10/1/2019  Hospital Length of Stay: 2 days  Code Status: Full Code     Subjective:     HPI:  Oncology History:     Mr. Burton Whiting is a 48-year-old male, former smoker, who presents today with a four-month history of enlarging neck mass.  He initially delayed care due to lack of insurance.  He was seen by Dr. Turpin who referred him to see Dr. Olguin.  He had a CT that showed a 3.8 x 3.8 x 4.9 cm soft tissue mass arising from the left palatine tonsil resulting in moderate narrowing of the hypopharyngeal airway adjacent extensive matted left cervical lymphadenopathy was noted.  The largest ella mass was 6.6 x 9 x 2.6 cm extending inferiorly to the supraclavicular region.  The mass pushed the trachea and the left common carotid artery to the right.  Additional representative of cervical lymph nodes measuring 2.9 x 3.1 x 3.5 cm on axial image 37 of series 3   and 2.4 x 2.1 x 2.2 cm on axial image 55 of series 3.  There is also a sclerotic lesion involving the midline of the clivus measuring 1.2 cm.  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  PET scan performed on 01/19/2018 revealed persistent evidence of a soft tissue mass arising from the left palatine tonsil resulting in moderate narrowing of the hypopharyngeal   airway.  The mass is hypermetabolic demonstrating an SUV max of 18.5.  There is also persistent adjacent extensive matted left cervical lymphadenopathy, largest of this measuring 6.7 x 8.42 with hypermetabolic activity and SUV max of 20.5.  Lymphadenopathy is again noted to have a mass effect on the trachea and left common carotid artery, pushing both structures towards the right.  There is also prominent right cervical lymph nodes with the largest measuring 0.8 cm and demonstrating mild hypermetabolic activity with SUV max of 1.8, physiologic   distribution of FDG  "in the gray matter.  There are 3 pulmonary nodules noted within the right lung, the largest is in the anterior segment of the right upper lobe measuring 1 cm, second is visualized in the middle lobe measuring 0.6 cm and the third is within the posterior basal segment measuring 0.6 cm.  There is one pulmonary nodule within the left lung within the lateral basal segment measuring 0.6 cm.  These nodules do not demonstrate hypermetabolic activity; however, they are below the threshold for observation with PET     He underwent MRI cervical spine revealed "No evidence of metastatic disease to the cervical spine. Multilevel degenerative changes of the cervical spine with disc protrusion at C5-6 which results in moderate to severe spinal canal stenosis and and associated cord signal abnormality, suggestive of compressive myelopathy. 6 mm T1 hypointense non-enhancing lesion in the clivus.  Continued followup is suggested. 9 mm inferior descent of the cerebellar tonsils below the foramen magnum, suggestive of Chiari 1 malformation"  MRI thoracic spine "No evidence of metastatic disease involving the thoracic spine. Incidental hemangioma involving the T7 vertebral body. Mild degenerative disc disease without any significant spinal canal stenosis or neuroforaminal narrowing.   He completed 3 cycles of TPF and 6 weeks of Cisplatin and RT. Completed on 6/26/18     His PET scan from 9/25/18 revealed "Progression of disease with multiple new hypermetabolic foci including the manubrium, mediastinal/retrocrural lymph nodes, and lungs. Partial therapeutic response within the presumed radiation field involving the left cervical mass, and complete therapeutic response in the right cervical lymph node, clivus, and left palatine tonsil. New soft tissue thickening and hypermetabolism in the left aryepiglottic fold and right cricoid cartilage"     He has been on Opdivo since 2/14/19- Last treatment 6/19/19 (C10).  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 He was started on steroids with some relief in bowel activity. PET 6/2019 showed response.     In early September 2019, he called with development left neck/arm pain and swelling. Cervical spine MRI revealed stenosis. He was taking 8 Aleve a day for pain control.     Pt went to Ed 9/8/19 with altered Mental Status.  Was admitted and noted to have bleeding ulcers. He required 5u PRBC's.  EGD performed 9/12/19 showing two duodenal ulcers which were successfully cauterized.      HISTORY OF PRESENT ILLNESS:  Patient returned to oncology clinic on 10/1/19. 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 Patient was in the hospital in [place] last month for altered mental status and found to be anemic. Symptoms improved after blood transfusions (5 total). Patient underwent EGD x2 which found two duodenal ulcers.     Patient presents for continued diarrhea and nausea over the past week. Patient states that he has increasing pain in his left neck, shoulder and arm over the past week as well. He reports mild swelling as well, no erythema or redness. He states the tramadol he was taking for this does not help. He states that he feels dehydrated and dizzy upon standing. Denies dark, tarry stools. Patient also reports poor oral intake as eating contributes to the diarrhea. Reports 6lb weight loss in past week. He reports a one time fever on 9/30 with recorded temperature 100. He also reports decreased urine output over the past several days.    Of note, patient states he has  "a history of opoid addiction in the past and would like to avoid opioids if possible. He takes gabapentin 300 TID for neuropathic pain.    Patient admitted to med onc for rehydration and further evaluation of diarrhea.    Interval history: no acute events overnight. One episode diarrhea. Took lomotil. States improving. Would like to go home. Hgb 6.7-->transfusing 1 unit.    Oncology Treatment Plan:   OP NIVOLUMAB Q2W    Medications:  Continuous Infusions:   sodium chloride 0.9% 150 mL/hr at 10/03/19 0558     Scheduled Meds:   calcium-vitamin D3  1 tablet Oral BID WM    FLUoxetine  40 mg Oral Daily    gabapentin  300 mg Oral TID    magnesium sulfate IVPB  2 g Intravenous Once    [START ON 10/4/2019] pantoprazole  40 mg Oral Daily    potassium phosphate IVPB  20 mmol Intravenous Once     PRN Meds:sodium chloride, acetaminophen, Dextrose 10% Bolus, Dextrose 10% Bolus, diphenoxylate-atropine 2.5-0.025 mg, glucagon (human recombinant), glucose, glucose, insulin aspart U-100, ondansetron, oxyCODONE-acetaminophen, ramelteon, sodium chloride 0.9%     Review of patient's allergies indicates:   Allergen Reactions    Trazodone Other (See Comments)     confusion        Past Medical History:   Diagnosis Date    Acute renal failure 6/21/2018    Alcohol abuse     ended 2004    Chemotherapy induced neutropenia 6/7/2018    Former smoker     HPV in male     Hx of psychiatric care     Ativan, Paxil ("caused anger problems")    Opiate addiction     Squamous cell carcinoma of palatine tonsil     throat and tonsillar    Substance abuse     opiates, methamphetamines; ended 2004     Past Surgical History:   Procedure Laterality Date    ESOPHAGOGASTRODUODENOSCOPY Left 9/9/2019    Procedure: EGD (ESOPHAGOGASTRODUODENOSCOPY);  Surgeon: Balta Lisa MD;  Location[facility];  Service: Endoscopy;  Laterality: Left;    ESOPHAGOGASTRODUODENOSCOPY N/A 9/12/2019    Procedure: EGD (ESOPHAGOGASTRODUODENOSCOPY);  Surgeon: Perez" RICKY Mon III, MD;  Location: [facility] ENDO;  Service: Endoscopy;  Laterality: N/A;    GASTROSTOMY TUBE PLACEMENT Left 2018    MEDIPORT REMOVAL N/A 2018    Procedure: Removal-port-a-cath;  Surgeon: Blayne Diagnostic Provider;  Location: [facility] [address] [place] [address];  Service: General;  Laterality: N/A;     Family History     Problem Relation (Age of Onset)    Brain cancer Maternal Uncle, Maternal Uncle    Depression Sister    Hypertension Father, Brother    Leukemia Mother    Thyroid disease Sister        Tobacco Use    Smoking status: Former Smoker     Packs/day: 1.50     Years: 25.00     Pack years: 37.50     Types: Cigarettes     Last attempt to quit: 2017     Years since quittin.3    Smokeless tobacco: Never Used    Tobacco comment: smoked for about 25 years. quit 6 months ago   Substance and Sexual Activity    Alcohol use: No     Comment: history of overuse    Drug use: No     Comment: Former Opiate/methamphetamine addiction    Sexual activity: Yes     Partners: Female       Review of Systems   Constitutional: Positive for chills and fever.   HENT: Negative for sore throat and trouble swallowing.    Eyes: Negative for visual disturbance.   Respiratory: Negative for cough and shortness of breath.    Cardiovascular: Negative for chest pain and palpitations.   Gastrointestinal: Positive for diarrhea and nausea.   Genitourinary: Negative for hematuria.   Musculoskeletal: Negative for back pain and neck pain.   Neurological: Positive for dizziness and light-headedness.   Psychiatric/Behavioral: Negative for agitation and behavioral problems.     Objective:     Vital Signs (Most Recent):  Temp: 98.4 °F (36.9 °C) (10/03/19 0423)  Pulse: 74 (10/03/19 0423)  Resp: 18 (10/03/19 0423)  BP: (!) 111/55 (10/03/19 0423)  SpO2: 99 % (10/03/19 0423) Vital Signs (24h Range):  Temp:  [97.5 °F (36.4 °C)-99.1 °F (37.3 °C)] 98.4 °F (36.9 °C)  Pulse:  [66-82] 74  Resp:  [15-18] 18  SpO2:  [97 %-100 %] 99 %  BP:  (103-122)/(55-73) 111/55     Weight: 76.7 kg (169 lb 1.5 oz)  Body mass index is 25.71 kg/m².  Body surface area is 1.92 meters squared.      Intake/Output Summary (Last 24 hours) at 10/3/2019 0733  Last data filed at 10/2/2019 2028  Gross per 24 hour   Intake 450 ml   Output 750 ml   Net -300 ml       Physical Exam   Constitutional: He is oriented to person, place, and time. He appears well-developed and well-nourished. No distress.   HENT:   Head: Normocephalic and atraumatic.   Eyes: EOM are normal. No scleral icterus.   Neck: Neck supple. No JVD present.   Cardiovascular: Normal rate and regular rhythm. Exam reveals no gallop and no friction rub.   No murmur heard.  Pulmonary/Chest: Breath sounds normal. No respiratory distress. He has no wheezes.   Abdominal: Soft. Bowel sounds are normal. He exhibits no distension. There is no tenderness.   Genitourinary: Rectal exam shows guaiac positive stool.   Musculoskeletal: Normal range of motion. He exhibits no edema.   No evidence of decreased ROM of left upper extremity.    Neurological: He is alert and oriented to person, place, and time.   Skin: Skin is dry.   Psychiatric: He has a normal mood and affect.       Significant Labs:   CBC:   Recent Labs   Lab 10/01/19  1653 10/02/19  0600 10/03/19  0412   WBC 5.37 4.49 4.35   HGB 7.4* 7.0* 6.7*   HCT 22.7* 22.8* 21.8*    252 232    and CMP:   Recent Labs   Lab 10/01/19  1450 10/02/19  0600 10/03/19  0412    141 140   K 3.2* 4.1 3.6   CL 99 108 108   CO2 22* 23 23   GLU 76 78 79   BUN 14 10 5*   CREATININE 1.2 1.0 0.8   CALCIUM 7.6* 7.2* 6.7*   PROT 6.4 5.6* 5.1*   ALBUMIN 3.0* 2.7* 2.5*   BILITOT 0.6 0.4 0.3   ALKPHOS 64 56 50*   AST 16 18 14   ALT 11 9* 8*   ANIONGAP 15 10 9   EGFRNONAA >60.0 >60.0 >60.0       Diagnostic Results:  I have reviewed all pertinent imaging results/findings within the past 24 hours.    Assessment/Plan:     * GI bleed  Patient presents from clinic with diarrhea and guaiac  positive stool in the history of GI bleed last month with history of significant NSAID use 2/2 left arm pain (up to 8 aleve per day). Patient also appears dehydrated and reports poor oral intake over past several days.    PLAN:  - GI consult-->EGD on 10/2 without active bleeding, multiple duodenal ulcers visualized.  - Encourage oral intake. On regular diet. Avoid dairy products in setting of diarrhea.   - CBC, CMP, Mg, Phos daily. Replete electrolytes as needed.  - Type and screen. Consented for blood transfusion. Transfuse for Hgb < 7.  - PO protonix 40 daily.  - Continuous NS.  - Lomotil for diarrhea.  - Avoid steroids and NSAIDs in setting of duodenal ulcers.    Discharge planning issues  Barriers to discharge:  - stability of H/H. Transfusing 1 unit 10/3 for Hgb 6.7.  - improvement in his diarrhea/dizziness. Improved as of 10/3 AM.    Pain and swelling of left upper extremity  Suspect that this pain is likely related to his known cervical spinal stenosis. Will rule out DVT give history of malignancy.    PLAN:  - LUE U/S to rule out DVT.    Neuropathic pain  Continue home gabapentin.    Anxiety  Continue home fluoxetine.    Dehydration  See upper GI bleed.    Low vitamin D level  Continue home vit D supplementation.    Squamous cell carcinoma of palatine tonsil  Oncologic history as above. No active systemic therapy at this time. Patient with no evidence of disease per PET scan in Sept 2019.             Demi Fishman MD  Hematology/Oncology  [facility]      ATTENDING NOTE, ONCOLOGY INPATIENT TEAM    As above; events of last 24 hours noted.  Patient seen and examined, chart reviewed.  Appears comfortable, in NAD.  Lungs are clear to auscultation.  Abdomen is soft, nontender.  Labs reviewed.    PLAN  No need for further diagnostic workup at this point; his abdominal examination is benign.  Increase lomotil to 2 tablets after each loose BM, up to 8 per day.  If his diarrhea is  controlled, he may be discharged home in the next 24 hours.  No need for DVT prophylaxis; he is fully ambulatory.  We will follow.    Rigo Ta MD

## 2019-10-03 NOTE — ASSESSMENT & PLAN NOTE
Barriers to discharge:  - stability of H/H. Transfusing 1 unit 10/3 for Hgb 6.7. Given slow pace of Hgb downtrend, this is likely a chronic slow microscopic GI bleed in the setting of duodenal ulcers. He will need close outpatient follow up with CBC and transfusion as needed. He is asymptomatic from an anemia/SOB standpoint.  - improvement in his diarrhea/dizziness. Improved as of 10/3 AM.

## 2019-10-03 NOTE — ASSESSMENT & PLAN NOTE
Suspect that this pain is likely related to his known cervical spinal stenosis. Will rule out DVT give history of malignancy.    PLAN:  - LUE U/S to rule out DVT-->Negative for acute DVT.

## 2019-10-03 NOTE — PLAN OF CARE
Patient ambulating independently, voiding qs cyu. VSS. IVF maintained. Tolerating regular diet without difficulty. In no acute distress; will continue to monitor.

## 2019-10-03 NOTE — TREATMENT PLAN
GI Treatment Plan    Burton Whiting is a 49 y.o. male admitted to hospital 10/1/2019 (Hospital Day: 3) due to GI bleed.     Interval History  - reports feeling better in AM, denies any new complaints.  - denies abdominal pain, nausea or vomiting. Bowel movement overnight was brown, non-bloody or melenic  - reports only one bowel movement overnight which was soft in consistency. Received lomotil overnight  - H&H 7.0 -> 6.7 overnight    Objective  Temp:  [97.5 °F (36.4 °C)-99.1 °F (37.3 °C)] 97.7 °F (36.5 °C) (10/03 0745)  Pulse:  [67-87] 87 (10/03 0745)  BP: (103-122)/(55-73) 120/70 (10/03 0745)  Resp:  [15-18] 17 (10/03 0745)  SpO2:  [96 %-100 %] 96 % (10/03 0745)    General: Alert, Oriented x3, no distress  Abdomen: Normoactive bowel sounds. Non-distended. Normal tympany. Soft. Non-tender. No peritoneal signs.    Laboratory    Recent Labs   Lab 10/01/19  1653 10/02/19  0600 10/03/19  0412   HGB 7.4* 7.0* 6.7*       Lab Results   Component Value Date    WBC 4.35 10/03/2019    HGB 6.7 (L) 10/03/2019    HCT 21.8 (L) 10/03/2019    MCV 98 10/03/2019     10/03/2019       Lab Results   Component Value Date     10/03/2019    K 3.6 10/03/2019     10/03/2019    CO2 23 10/03/2019    BUN 5 (L) 10/03/2019    CREATININE 0.8 10/03/2019    CALCIUM 6.7 (LL) 10/03/2019    ANIONGAP 9 10/03/2019    ESTGFRAFRICA >60.0 10/03/2019    EGFRNONAA >60.0 10/03/2019       Lab Results   Component Value Date    ALT 8 (L) 10/03/2019    AST 14 10/03/2019    ALKPHOS 50 (L) 10/03/2019    BILITOT 0.3 10/03/2019       Lab Results   Component Value Date    INR 1.1 10/01/2019    INR 1.3 09/09/2019    INR 1.1 07/01/2018       Plan  - s/p EGD yesterday (10/2) notable for healing duodenal ulcers, no active bleeding.  - H&H declined but no evidence of ongoing GI bleeding  - given not currently with significant diarrhea (soft BM, 3-4 at most/day without anti-diarrheal), tolerating PO, not dehydrated, will defer colonoscopy to outpatient  setting. He has a follow-up appointment with his outpatient GI (Dr. Sawyer on 10/10)  - Plan of care was discussed with primary team.  - We are signing-off. Please call with any questions.    Thank you for involving us in the care of Burton Whiting. Please call with any additional questions, concerns or changes in the patient's clinical status.    Neri Schreiber MD  Gastroenterology Fellow  Spectralink: [phone]

## 2019-10-03 NOTE — NURSING
ROADTEST   O-oxygen saturation % on room air.  A-ambulating independently in room.  D-PIV D/Alex x2 (LA,RA), catheter tips intact.  T-tolerating regular diet without difficulty.  E-voiding adequate amount CYU, LBM today. C/O diarrhea x 1 just now, med offered but he refused.  S-Able to do ADL'S independently.  T-AVS reviewed with patient using the teach-back method. Ready for discharge, waiting for w/c.

## 2019-10-03 NOTE — PROGRESS NOTES
Admit Assessment    Patient Identification  Burton Whiting   :  1970  Admit Date:  10/1/2019  Attending Provider:  Rigo Ta MD              Referral:   Patient was admitted to Medical Oncology Service with a diagnosis of Stage III Squamous Cell Carcinoma of the Apex Tonsil, and he was admitted this hospital stay due to GI Bleed, pain and swelling of left upper extremity, diarrhea, dehydration.   GI bleed [K92.2]  GI bleed [K92.2]  GI bleed [K92.2].    Additional oncologic and medical history noted in H&P, in chart.    Oncology Social Worker is involved. Patient is known to this Oncology Social Worker from a past hospital stay last year. Patient was referred to the Social Work Department via admission list/census. Patient presents as a 49 y.o. year old, ,   male.    Persons interviewed: Met with patient in his room this afternoon. He was alert, oriented, and cooperative.    Living Situation:    Resides alone.  Resides at [address] [place] [address].  Phone: [phone] (patient's cell number).      Emergency Contacts:  1. Vladislav Whiting, brother, [phone]  2. Franchesca Whiting, sister, [phone]    Patient was previously ambulatory and independent with ADLs. He has had some recent issues with pain, limitation with his left shoulder.    (RETIRED) Functional Status Prior  Ambulation Prior: 0-->independent  Transferrin-->independent  Toiletin-->independent    Patient has good support from his siblings and they have assisted him as needed.      Current or Past Agencies and Description of Services/Supplies    DME  Equipment Currently Used at Home: none    Home Health  None currently. Patient used [facility] in the past.    IV Infusion  None currently. Patient used [employer] for tubefeeding supplies in the past.     Nutrition: Oral diet.    Outpatient Pharmacy:     [address] [facility] [address]  Phone: [phone] Fax: [phone]    [facility] [place] [facility] [address]  [address] [address]  Phone: [phone] Fax: [phone]      Patient Preference of agencies include: none specified.    Patient/Caregiver informed of right to choose providers or agencies.  Patient provides permission to release any necessary information to [facility] and to Non[facility] agencies as needed to facilitate patient care, treatment planning, and patient discharge planning.  Written and verbal resources will be provided as needed/requested.      Coping  Patient reported that he is doing good. He has returned to work full time.          Adjustment to Diagnosis and Treatment  Appropriate; ongoing process.      Emotional/Behavioral/Cognitive Issues  None noted/observed.          History/Current Symptoms of Anxiety/Depression: Yes - see H&P.    History/Current Substance Use:   Social History     Tobacco Use    Smoking status: Former Smoker     Packs/day: 1.50     Years: 25.00     Pack years: 37.50     Types: Cigarettes     Last attempt to quit: 2017     Years since quittin.3    Smokeless tobacco: Never Used    Tobacco comment: smoked for about 25 years. quit 6 months ago   Substance and Sexual Activity    Alcohol use: No     Comment: history of overuse    Drug use: No     Comment: Former Opiate/methamphetamine addiction    Sexual activity: Yes     Partners: Female       Indications of Abuse/Neglect: No  Abuse Screen (yes response referral indicated)  Feels Unsafe at Home or Work/School: no  Feels Threatened by Someone: no  Does Anyone Try to Keep You From Having Contact with Others or Doing Things Outside Your Home?: no  Physical Signs of Abuse Present: no      Financial:  Payor/Plan Subscr  Sex Relation Sub. Ins. ID Effective Group Num   1. BLUE CROSS BL* JUAN PABLO ADAME 1970 Male  [id number] 10/1/18  [id number]                                    [address] [id number]      Patient has a Blue Cross [employer] of [place] insurance plan - Preferred Blue PPO Deductible.    Patient returned to working full time.                 Other identified concerns/needs: none at present.    Plan: Patient to return to his home address and he said that his brother will provide car transportation for him at discharge from the hospital today. No discharge needs are indicated.    Interventions/Referrals: none at present.     Patient/caregiver engaged in treatment planning process.    Oncology Social Worker providing psychosocial and supportive counseling, resources, education, assistance and discharge planning as appropriate.  Patient/caregiver state understanding of  available resources,  following, remains available.    Provided patient with business card containing all contact information for Oncology Social Worker, and encouraged him to make contact if needed going forward.

## 2019-10-03 NOTE — MED STUDENT SUBJECTIVE & OBJECTIVE
Medical Student Subjective & Objective     Principal Problem: GI bleed    Chief Complaint: No chief complaint on file.      HPI: Oncology History:     Mr. Burton Whiting is a 48-year-old male, former smoker, who presents today with a four-month history of enlarging neck mass.  He initially delayed care due to lack of insurance.  He was seen by Dr. Turpin who referred him to see Dr. Olguin.  He had a CT that showed a 3.8 x 3.8 x 4.9 cm soft tissue mass arising from the left palatine tonsil resulting in moderate narrowing of the hypopharyngeal airway adjacent extensive matted left cervical lymphadenopathy was noted.  The largest ella mass was 6.6 x 9 x 2.6 cm extending inferiorly to the supraclavicular region.  The mass pushed the trachea and the left common carotid artery to the right.  Additional representative of cervical lymph nodes measuring 2.9 x 3.1 x 3.5 cm on axial image 37 of series 3   and 2.4 x 2.1 x 2.2 cm on axial image 55 of series 3.  There is also a sclerotic lesion involving the midline of the clivus measuring 1.2 cm.  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  PET scan performed on 01/19/2018 revealed persistent evidence of a soft tissue mass arising from the left palatine tonsil resulting in moderate narrowing of the hypopharyngeal   airway.  The mass is hypermetabolic demonstrating an SUV max of 18.5.  There is also persistent adjacent extensive matted left cervical lymphadenopathy, largest of this measuring 6.7 x 8.42 with hypermetabolic activity and SUV max of 20.5.  Lymphadenopathy is again noted to have a mass effect on the trachea and left common carotid artery, pushing both structures towards the right.  There is also prominent right cervical lymph nodes with the largest measuring 0.8 cm and demonstrating mild hypermetabolic activity with SUV max of 1.8, physiologic   distribution of FDG in the gray matter.  There are 3 pulmonary nodules noted within the right lung,  "the largest is in the anterior segment of the right upper lobe measuring 1 cm, second is visualized in the middle lobe measuring 0.6 cm and the third is within the posterior basal segment measuring 0.6 cm.  There is one pulmonary nodule within the left lung within the lateral basal segment measuring 0.6 cm.  These nodules do not demonstrate hypermetabolic activity; however, they are below the threshold for observation with PET     He underwent MRI cervical spine revealed "No evidence of metastatic disease to the cervical spine. Multilevel degenerative changes of the cervical spine with disc protrusion at C5-6 which results in moderate to severe spinal canal stenosis and and associated cord signal abnormality, suggestive of compressive myelopathy. 6 mm T1 hypointense non-enhancing lesion in the clivus.  Continued followup is suggested. 9 mm inferior descent of the cerebellar tonsils below the foramen magnum, suggestive of Chiari 1 malformation"  MRI thoracic spine "No evidence of metastatic disease involving the thoracic spine. Incidental hemangioma involving the T7 vertebral body. Mild degenerative disc disease without any significant spinal canal stenosis or neuroforaminal narrowing.   He completed 3 cycles of TPF and 6 weeks of Cisplatin and RT. Completed on 6/26/18     His PET scan from 9/25/18 revealed "Progression of disease with multiple new hypermetabolic foci including the manubrium, mediastinal/retrocrural lymph nodes, and lungs. Partial therapeutic response within the presumed radiation field involving the left cervical mass, and complete therapeutic response in the right cervical lymph node, clivus, and left palatine tonsil. New soft tissue thickening and hypermetabolism in the left aryepiglottic fold and right cricoid cartilage"     He has been on Opdivo since 2/14/19- Last treatment 6/19/19 (C10).  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 He was started on steroids with some relief in bowel " activity. PET 6/2019 showed response.     In early September 2019, he called with development left neck/arm pain and swelling. Cervical spine MRI revealed stenosis. He was taking 8 Aleve a day for pain control.     Pt went to Ed 9/8/19 with altered Mental Status.  Was admitted and noted to have bleeding ulcers. He required 5u PRBC's.  EGD performed 9/12/19 showing two duodenal ulcers which were successfully cauterized.      HISTORY OF PRESENT ILLNESS:  Patient returned to oncology clinic on 10/1/19. 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 Patient was in the hospital in [place] last month for altered mental status and found to be anemic. Symptoms improved after blood transfusions (5 total). Patient underwent EGD x2 which found two duodenal ulcers.     Patient presents for continued diarrhea and nausea over the past week. Patient states that he has increasing pain in his left neck, shoulder and arm over the past week as well. He reports mild swelling as well, no erythema or redness. He states the tramadol he was taking for this does not help. He states that he feels dehydrated and dizzy upon standing. Denies dark, tarry stools. Patient also reports poor oral intake as eating contributes to the diarrhea. Reports 6lb weight loss in past week. He reports a one time fever on 9/30 with recorded temperature 100. He also reports decreased urine output over the past several days.    Of note, patient states he has a history of opoid addiction in the past and would like to avoid opioids if  "possible. He takes gabapentin 300 TID for neuropathic pain.    Patient admitted to med onc for rehydration and further evaluation of diarrhea.    Hospital Course: 10/1/19: admitted for rehydration and C diff rule out. Obtaining LUE ultrasound to rule out DVT. Obtaining C diff testing and blood cultures given history of subjective fever.  10/2: patient reports feeling better, less dizziness and lightheadedness with continuous IV NS. Patient reports 3 episodes of diarrhea last night, nonbloody. C diff negative. Blood cultures NGTD. Patient underwent EGD with no signs of active bleeding. Multiple duodenal ulcers visualized as per EGD procedure note. LUE Ultrasound did not show any DVT. Advanced patient to regular diet and will see how he tolerates. 2 episodes of watery diarrhea in the morning and afternoon. Will reassess diarrhea and Hgb in the AM tomorrow.   10/3:     Interval History:The patient feels comfortable this morning after having an EGD yesterday (shown to have nonbleeding duodenal ulcers). He states his appetite is at his baseline and ate his dinner last night. He has had two episodes of loosely formed bowel movements yesterday, but denies hematochezia. He otherwise denies any nausea, vomiting, abdominal pain, but still has persistent L shoulder pain.    Past Medical History:   Diagnosis Date    Acute renal failure 6/21/2018    Alcohol abuse     ended 2004    Chemotherapy induced neutropenia 6/7/2018    Former smoker     HPV in male     Hx of psychiatric care     AtivanMatthewil ("caused anger problems")    Opiate addiction     Squamous cell carcinoma of palatine tonsil     throat and tonsillar    Substance abuse     opiates, methamphetamines; ended 2004       Past Surgical History:   Procedure Laterality Date    ESOPHAGOGASTRODUODENOSCOPY Left 9/9/2019    Procedure: EGD (ESOPHAGOGASTRODUODENOSCOPY);  Surgeon: Balta Lisa MD;  Location[facility];  Service: Endoscopy;  Laterality: Left;    " ESOPHAGOGASTRODUODENOSCOPY N/A 9/12/2019    Procedure: EGD (ESOPHAGOGASTRODUODENOSCOPY);  Surgeon: Perez Mon III, MD;  Location[facility];  Service: Endoscopy;  Laterality: N/A;    GASTROSTOMY TUBE PLACEMENT Left 02/12/2018    MEDIPORT REMOVAL N/A 6/6/2018    Procedure: Removal-port-a-cath;  Surgeon: Blayne Diagnostic Provider;  Location: [facility] OR [address] [place] [address];  Service: General;  Laterality: N/A;       Review of patient's allergies indicates:   Allergen Reactions    Trazodone Other (See Comments)     confusion       No current facility-administered medications on file prior to encounter.      Current Outpatient Medications on File Prior to Encounter   Medication Sig    acetaminophen (TYLENOL) 325 MG tablet Take 2 tablets (650 mg total) by mouth every 6 (six) hours as needed.    calcium-vitamin D3 (CALCIUM 500 + D) 500 mg(1,250mg) -200 unit per tablet Take 1 tablet by mouth 2 (two) times daily with meals.    denosumab (XGEVA) 120 mg/1.7 mL (70 mg/mL) Soln Inject 1.7 mLs (120 mg total) into the skin once. for 1 dose    FLUoxetine 40 MG capsule Take 1 capsule (40 mg total) by mouth once daily.    gabapentin (NEURONTIN) 300 MG capsule Take 1 capsule (300 mg total) by mouth 3 (three) times daily. (Patient taking differently: Take 300 mg by mouth 3 (three) times daily. )    hydrocortisone 2.5 % cream Apply topically 2 (two) times daily.    lidocaine (LIDODERM) 5 % Place 1 patch onto the skin daily as needed. Remove & Discard patch within 12 hours or as directed by MD    mirtazapine (REMERON) 7.5 MG Tab     multivitamin capsule Take 1 capsule by mouth once daily.    pantoprazole (PROTONIX) 40 MG tablet Take 1 tablet (40 mg total) by mouth 2 (two) times daily.    tiZANidine (ZANAFLEX) 2 MG tablet Take 1 tablet (2 mg total) by mouth nightly as needed. For neck pain. May be sedating. Be careful and know how it affects you before driving (Patient not taking: Reported on 10/1/2019)    traMADol (ULTRAM) 50 mg  tablet Take 1 tablet (50 mg total) by mouth every 8 (eight) hours as needed for Pain.     Family History     Problem Relation (Age of Onset)    Brain cancer Maternal Uncle, Maternal Uncle    Depression Sister    Hypertension Father, Brother    Leukemia Mother    Thyroid disease Sister        Tobacco Use    Smoking status: Former Smoker     Packs/day: 1.50     Years: 25.00     Pack years: 37.50     Types: Cigarettes     Last attempt to quit: 2017     Years since quittin.3    Smokeless tobacco: Never Used    Tobacco comment: smoked for about 25 years. quit 6 months ago   Substance and Sexual Activity    Alcohol use: No     Comment: history of overuse    Drug use: No     Comment: Former Opiate/methamphetamine addiction    Sexual activity: Yes     Partners: Female     Review of Systems   Constitutional: Positive for appetite change and unexpected weight change (down 6 lbs/ 1 week). Negative for fever.   HENT: Negative for sore throat and trouble swallowing.    Respiratory: Negative for cough and shortness of breath.    Cardiovascular: Negative for chest pain and leg swelling.   Gastrointestinal: Positive for blood in stool and diarrhea. Negative for abdominal pain, nausea and vomiting.   Genitourinary: Negative for difficulty urinating and hematuria.   Musculoskeletal: Positive for arthralgias.   Skin: Negative for rash.   Neurological: Positive for numbness. Negative for headaches.   Hematological: Positive for adenopathy.     Objective:     Vital Signs (Most Recent):  Temp: 98.4 °F (36.9 °C) (10/03/19 0423)  Pulse: 74 (10/03/19 0423)  Resp: 18 (10/03/19 0423)  BP: (!) 111/55 (10/03/19 0423)  SpO2: 99 % (10/03/19 0423) Vital Signs (24h Range):  Temp:  [97.5 °F (36.4 °C)-99.1 °F (37.3 °C)] 98.4 °F (36.9 °C)  Pulse:  [66-82] 74  Resp:  [15-18] 18  SpO2:  [97 %-100 %] 99 %  BP: (103-122)/(55-73) 111/55     Weight: 76.7 kg (169 lb 1.5 oz)  Body mass index is 25.71 kg/m².    Intake/Output Summary (Last 24  hours) at 10/3/2019 0711  Last data filed at 10/2/2019 2028  Gross per 24 hour   Intake 450 ml   Output 750 ml   Net -300 ml      Physical Exam   Constitutional: He appears well-developed and well-nourished. No distress.   HENT:   Head: Normocephalic and atraumatic.   Eyes: Conjunctivae and EOM are normal.   Neck: Neck supple.   Cardiovascular: Normal rate, regular rhythm and normal heart sounds.   No murmur heard.  Pulmonary/Chest: Effort normal and breath sounds normal. No respiratory distress.   Abdominal: Soft. Bowel sounds are normal. He exhibits no distension. There is no tenderness.   Musculoskeletal: Normal range of motion. He exhibits no edema.   Lymphadenopathy:     He has no cervical adenopathy (evidence of fibrous tissue from prior treatments).   Neurological: He is alert.   4/5 strength LUE vs right   Skin: Skin is warm and dry. No rash noted.       Significant Labs:   CBC:   Recent Labs   Lab 10/01/19  1653 10/02/19  0600 10/03/19  0412   WBC 5.37 4.49 4.35   HGB 7.4* 7.0* 6.7*   HCT 22.7* 22.8* 21.8*    252 232     CMP:   Recent Labs   Lab 10/01/19  1450 10/02/19  0600 10/03/19  0412    141 140   K 3.2* 4.1 3.6   CL 99 108 108   CO2 22* 23 23   GLU 76 78 79   BUN 14 10 5*   CREATININE 1.2 1.0 0.8   CALCIUM 7.6* 7.2* 6.7*   PROT 6.4 5.6* 5.1*   ALBUMIN 3.0* 2.7* 2.5*   BILITOT 0.6 0.4 0.3   ALKPHOS 64 56 50*   AST 16 18 14   ALT 11 9* 8*   ANIONGAP 15 10 9   EGFRNONAA >60.0 >60.0 >60.0     Medical Student Subjective & Objective      GI bleed   - Prior hospital admission 9/8/19 for bleeding duodenal ulcer, was transfused 5U in past   - Transfuse for Hg <7, plan to give 1U of blood this morning Hg 6.7   - EGD performed, consideration for colonoscopy as outpatient   - C. Diff negative   - Pending administration of Mg and Phos.    Severe spinal stenosis   - Continue gabapentin 300mg TID for chronic peripheral neuropathy   - PRN percocet for pain 5-325 mg, Tylenol 650 mg    Squamous cell  carcinoma of palatine tonsil   - Previous neoadj 3 cycles of TPF, then 6 weeks of cisplatin and RT (6/22/18), Feb - June 2019 Opdivo   - No evidence of disease per PET Sept 2019.    Insomnia   - PRN ramelteon 8mg    Hypocalcemia   - Corrected calcium today is 7.9. He is on a calcium supplement at baseline    IV access - LUE, RUE  Diet - Normal diet  DVT prophylaxis - ambulation, TEDs and SCDs  Gi prophylaxis - Pantoprazole 40mg  Dispo - Diarrhea improving, pending discharge to home    Dane Peguero MS4

## 2019-10-03 NOTE — PLAN OF CARE
Plan of care reviewed with pt. Pt is A & O X4, VSS, afebrile. Pt received percocet x2 with good relief. No complaints overnight. Will continue to monitor.

## 2019-10-03 NOTE — SUBJECTIVE & OBJECTIVE
"Interval history: no acute events overnight. One episode diarrhea. Took lomotil. States improving. Would like to go home. Hgb 6.7-->transfusing 1 unit.    Oncology Treatment Plan:   OP NIVOLUMAB Q2W    Medications:  Continuous Infusions:   sodium chloride 0.9% 150 mL/hr at 10/03/19 0558     Scheduled Meds:   calcium-vitamin D3  1 tablet Oral BID WM    FLUoxetine  40 mg Oral Daily    gabapentin  300 mg Oral TID    magnesium sulfate IVPB  2 g Intravenous Once    [START ON 10/4/2019] pantoprazole  40 mg Oral Daily    potassium phosphate IVPB  20 mmol Intravenous Once     PRN Meds:sodium chloride, acetaminophen, Dextrose 10% Bolus, Dextrose 10% Bolus, diphenoxylate-atropine 2.5-0.025 mg, glucagon (human recombinant), glucose, glucose, insulin aspart U-100, ondansetron, oxyCODONE-acetaminophen, ramelteon, sodium chloride 0.9%     Review of patient's allergies indicates:   Allergen Reactions    Trazodone Other (See Comments)     confusion        Past Medical History:   Diagnosis Date    Acute renal failure 6/21/2018    Alcohol abuse     ended 2004    Chemotherapy induced neutropenia 6/7/2018    Former smoker     HPV in male     Hx of psychiatric care     Ativan, Paxil ("caused anger problems")    Opiate addiction     Squamous cell carcinoma of palatine tonsil     throat and tonsillar    Substance abuse     opiates, methamphetamines; ended 2004     Past Surgical History:   Procedure Laterality Date    ESOPHAGOGASTRODUODENOSCOPY Left 9/9/2019    Procedure: EGD (ESOPHAGOGASTRODUODENOSCOPY);  Surgeon: Balta Lisa MD;  Location: [facility];  Service: Endoscopy;  Laterality: Left;    ESOPHAGOGASTRODUODENOSCOPY N/A 9/12/2019    Procedure: EGD (ESOPHAGOGASTRODUODENOSCOPY);  Surgeon: Perez Mon III, MD;  Location: [facility];  Service: Endoscopy;  Laterality: N/A;    GASTROSTOMY TUBE PLACEMENT Left 02/12/2018    MEDIPORT REMOVAL N/A 6/6/2018    Procedure: Removal-port-a-cath;  Surgeon: Blayne " Diagnostic Provider;  Location: [facility] OR [address] [place] [address];  Service: General;  Laterality: N/A;     Family History     Problem Relation (Age of Onset)    Brain cancer Maternal Uncle, Maternal Uncle    Depression Sister    Hypertension Father, Brother    Leukemia Mother    Thyroid disease Sister        Tobacco Use    Smoking status: Former Smoker     Packs/day: 1.50     Years: 25.00     Pack years: 37.50     Types: Cigarettes     Last attempt to quit: 2017     Years since quittin.3    Smokeless tobacco: Never Used    Tobacco comment: smoked for about 25 years. quit 6 months ago   Substance and Sexual Activity    Alcohol use: No     Comment: history of overuse    Drug use: No     Comment: Former Opiate/methamphetamine addiction    Sexual activity: Yes     Partners: Female       Review of Systems   Constitutional: Positive for chills and fever.   HENT: Negative for sore throat and trouble swallowing.    Eyes: Negative for visual disturbance.   Respiratory: Negative for cough and shortness of breath.    Cardiovascular: Negative for chest pain and palpitations.   Gastrointestinal: Positive for diarrhea and nausea.   Genitourinary: Negative for hematuria.   Musculoskeletal: Negative for back pain and neck pain.   Neurological: Positive for dizziness and light-headedness.   Psychiatric/Behavioral: Negative for agitation and behavioral problems.     Objective:     Vital Signs (Most Recent):  Temp: 98.4 °F (36.9 °C) (10/03/19 0423)  Pulse: 74 (10/03/19 0423)  Resp: 18 (10/03/19 0423)  BP: (!) 111/55 (10/03/19 0423)  SpO2: 99 % (10/03/19 0423) Vital Signs (24h Range):  Temp:  [97.5 °F (36.4 °C)-99.1 °F (37.3 °C)] 98.4 °F (36.9 °C)  Pulse:  [66-82] 74  Resp:  [15-18] 18  SpO2:  [97 %-100 %] 99 %  BP: (103-122)/(55-73) 111/55     Weight: 76.7 kg (169 lb 1.5 oz)  Body mass index is 25.71 kg/m².  Body surface area is 1.92 meters squared.      Intake/Output Summary (Last 24 hours) at 10/3/2019 0733  Last data filed at 10/2/2019  2028  Gross per 24 hour   Intake 450 ml   Output 750 ml   Net -300 ml       Physical Exam   Constitutional: He is oriented to person, place, and time. He appears well-developed and well-nourished. No distress.   HENT:   Head: Normocephalic and atraumatic.   Eyes: EOM are normal. No scleral icterus.   Neck: Neck supple. No JVD present.   Cardiovascular: Normal rate and regular rhythm. Exam reveals no gallop and no friction rub.   No murmur heard.  Pulmonary/Chest: Breath sounds normal. No respiratory distress. He has no wheezes.   Abdominal: Soft. Bowel sounds are normal. He exhibits no distension. There is no tenderness.   Genitourinary: Rectal exam shows guaiac positive stool.   Musculoskeletal: Normal range of motion. He exhibits no edema.   No evidence of decreased ROM of left upper extremity.    Neurological: He is alert and oriented to person, place, and time.   Skin: Skin is dry.   Psychiatric: He has a normal mood and affect.       Significant Labs:   CBC:   Recent Labs   Lab 10/01/19  1653 10/02/19  0600 10/03/19  0412   WBC 5.37 4.49 4.35   HGB 7.4* 7.0* 6.7*   HCT 22.7* 22.8* 21.8*    252 232    and CMP:   Recent Labs   Lab 10/01/19  1450 10/02/19  0600 10/03/19  0412    141 140   K 3.2* 4.1 3.6   CL 99 108 108   CO2 22* 23 23   GLU 76 78 79   BUN 14 10 5*   CREATININE 1.2 1.0 0.8   CALCIUM 7.6* 7.2* 6.7*   PROT 6.4 5.6* 5.1*   ALBUMIN 3.0* 2.7* 2.5*   BILITOT 0.6 0.4 0.3   ALKPHOS 64 56 50*   AST 16 18 14   ALT 11 9* 8*   ANIONGAP 15 10 9   EGFRNONAA >60.0 >60.0 >60.0       Diagnostic Results:  I have reviewed all pertinent imaging results/findings within the past 24 hours.

## 2019-10-03 NOTE — ASSESSMENT & PLAN NOTE
Patient presents from clinic with diarrhea and guaiac positive stool in the history of GI bleed last month with history of significant NSAID use 2/2 left arm pain (up to 8 aleve per day). Patient also appears dehydrated and reports poor oral intake over past several days.    PLAN:  - GI consult-->EGD on 10/2 without active bleeding, multiple duodenal ulcers visualized.  - Encourage oral intake. On regular diet. Avoid dairy products in setting of diarrhea.   - Electrolytes repleted.  - Type and screen. Consented for blood transfusion. Transfuse for Hgb < 7.  - Take PO protonix 40 daily.   - Lomotil as needed for diarrhea.  - Avoid steroids and NSAIDs in setting of duodenal ulcers.

## 2019-10-03 NOTE — PLAN OF CARE
Future Appointments   Date Time Provider Department Center   10/9/2019  9:15 AM LAB, HEMONC SAME DAY [facility] LAB [facility]   10/9/2019 10:45 AM Cortney Lopez MD [facility] HEM ONC [facility]   10/10/2019  3:30 PM iNthya Sawyer MD [facility] GASTRO [facility]     Appointments added to patient's AVS.    Alexandra Rosas RN, BSN, CM  [facility]  Nurse - Med Onc/Gyn Onc

## 2019-10-03 NOTE — PLAN OF CARE
Plans for the patient to discharge home later today. No discharge needs identified. Follow-up scheduled as listed below. CM requested a clinic follow-up appointment with Dr. Lopez's clinic with repeat labs. Discharge and follow-up instructions to be completed by the bedside nurse.    Future Appointments   Date Time Provider Department Center   10/10/2019  3:30 PM Ntihya Sawyer MD [facility]STRO [facility]      10/03/19 1510   Final Note   Assessment Type Final Discharge Note   Anticipated Discharge Disposition Home   What phone number can be called within the next 1-3 days to see how you are doing after discharge?   ([phone])   Hospital Follow Up  Appt(s) scheduled? Yes   Discharge plans and expectations educations in teach back method with documentation complete? Yes  (per bedside nurse)

## 2019-10-03 NOTE — DISCHARGE SUMMARY
[facility]  Hematology/Oncology  Discharge Summary      Patient Name: Burton Whiting  MRN: [id number]  Admission Date: 10/1/2019  Hospital Length of Stay: 2 days  Discharge Date and Time:  10/03/2019 10:44 AM  Attending Physician: Rigo Ta MD   Discharging Provider: Demi Fishman MD  Primary Care Provider: Christopher Turpin DO     HPI: Oncology History:     Mr. Burton Whiting is a 48-year-old male, former smoker, who presents today with a four-month history of enlarging neck mass.  He initially delayed care due to lack of insurance.  He was seen by Dr. Turpin who referred him to see Dr. Olguin.  He had a CT that showed a 3.8 x 3.8 x 4.9 cm soft tissue mass arising from the left palatine tonsil resulting in moderate narrowing of the hypopharyngeal airway adjacent extensive matted left cervical lymphadenopathy was noted.  The largest ella mass was 6.6 x 9 x 2.6 cm extending inferiorly to the supraclavicular region.  The mass pushed the trachea and the left common carotid artery to the right.  Additional representative of cervical lymph nodes measuring 2.9 x 3.1 x 3.5 cm on axial image 37 of series 3   and 2.4 x 2.1 x 2.2 cm on axial image 55 of series 3.  There is also a sclerotic lesion involving the midline of the clivus measuring 1.2 cm.  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  PET scan performed on 01/19/2018 revealed persistent evidence of a soft tissue mass arising from the left palatine tonsil resulting in moderate narrowing of the hypopharyngeal   airway.  The mass is hypermetabolic demonstrating an SUV max of 18.5.  There is also persistent adjacent extensive matted left cervical lymphadenopathy, largest of this measuring 6.7 x 8.42 with hypermetabolic activity and SUV max of 20.5.  Lymphadenopathy is again noted to have a mass effect on the trachea and left common carotid artery, pushing both structures towards the right.  There is also  "prominent right cervical lymph nodes with the largest measuring 0.8 cm and demonstrating mild hypermetabolic activity with SUV max of 1.8, physiologic   distribution of FDG in the gray matter.  There are 3 pulmonary nodules noted within the right lung, the largest is in the anterior segment of the right upper lobe measuring 1 cm, second is visualized in the middle lobe measuring 0.6 cm and the third is within the posterior basal segment measuring 0.6 cm.  There is one pulmonary nodule within the left lung within the lateral basal segment measuring 0.6 cm.  These nodules do not demonstrate hypermetabolic activity; however, they are below the threshold for observation with PET     He underwent MRI cervical spine revealed "No evidence of metastatic disease to the cervical spine. Multilevel degenerative changes of the cervical spine with disc protrusion at C5-6 which results in moderate to severe spinal canal stenosis and and associated cord signal abnormality, suggestive of compressive myelopathy. 6 mm T1 hypointense non-enhancing lesion in the clivus.  Continued followup is suggested. 9 mm inferior descent of the cerebellar tonsils below the foramen magnum, suggestive of Chiari 1 malformation"  MRI thoracic spine "No evidence of metastatic disease involving the thoracic spine. Incidental hemangioma involving the T7 vertebral body. Mild degenerative disc disease without any significant spinal canal stenosis or neuroforaminal narrowing.   He completed 3 cycles of TPF and 6 weeks of Cisplatin and RT. Completed on 6/26/18     His PET scan from 9/25/18 revealed "Progression of disease with multiple new hypermetabolic foci including the manubrium, mediastinal/retrocrural lymph nodes, and lungs. Partial therapeutic response within the presumed radiation field involving the left cervical mass, and complete therapeutic response in the right cervical lymph node, clivus, and left palatine tonsil. New soft tissue thickening and " "hypermetabolism in the left aryepiglottic fold and right cricoid cartilage"     He has been on Opdivo since 2/14/19- Last treatment 6/19/19 (C10).  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 He was started on steroids with some relief in bowel activity. PET 6/2019 showed response.     In early September 2019, he called with development left neck/arm pain and swelling. Cervical spine MRI revealed stenosis. He was taking 8 Aleve a day for pain control.     Pt went to Ed 9/8/19 with altered Mental Status.  Was admitted and noted to have bleeding ulcers. He required 5u PRBC's.  EGD performed 9/12/19 showing two duodenal ulcers which were successfully cauterized.      HISTORY OF PRESENT ILLNESS:  Patient returned to oncology clinic on 10/1/19. 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 Patient was in the hospital in [place] last month for altered mental status and found to be anemic. Symptoms improved after blood transfusions (5 total). Patient underwent EGD x2 which found two duodenal ulcers.     Patient presents for continued diarrhea and nausea over the past week. Patient states that he has increasing pain in his left neck, shoulder and arm over the past week as well. He reports mild swelling as well, no erythema or redness. He states the tramadol he was taking for this does not help. He states that he feels dehydrated and dizzy upon standing. Denies dark, tarry stools. Patient also reports poor oral intake as eating contributes to the diarrhea. Reports 6lb weight loss in past week. " He reports a one time fever on 9/30 with recorded temperature 100. He also reports decreased urine output over the past several days.    Of note, patient states he has a history of opoid addiction in the past and would like to avoid opioids if possible. He takes gabapentin 300 TID for neuropathic pain.    Patient admitted to med onc for rehydration and further evaluation of diarrhea.    Procedure(s) (LRB):  EGD (ESOPHAGOGASTRODUODENOSCOPY) (N/A)     Hospital Course: 10/1/19: admitted for rehydration and C diff rule out. Obtaining LUE ultrasound to rule out DVT. Obtaining C diff testing and blood cultures given history of subjective fever.  10/2: patient reports feeling better, less dizziness and lightheadedness with continuous IV NS. Patient reports 3 episodes of diarrhea last night, nonbloody. C diff negative. Blood cultures NGTD. Patient underwent EGD with no signs of active bleeding. Multiple duodenal ulcers visualized as per EGD procedure note. LUE Ultrasound did not show any DVT. Advanced patient to regular diet and will see how he tolerates. 2 episodes of watery diarrhea in the morning and afternoon. Will reassess diarrhea and Hgb in the AM tomorrow.   10/3: patient states his diarrhea is improved with prn lomotil. Only 1 episode last night which he states was less loose than previous episodes. States he feels much better today and would like to go home. Hgb 6.7 today (down from 7.0 yesterday and 7.4 the day before). Transfusing 1 unit pRBCs. Patient is tolerating regular diet with no issues. Patient off IV fluids. Patient feels well to go home. Will discharge with plans to follow up with Dr Lopez early next week and obtain CBC next week as well to assess for need for outpatient transfusion. Patient counseled to return to clinic or ED for intractable diarrhea with signs of dehydration and dizziness so we can rehydrate. Patient encouraged to return if obtains worsening SOB or signs of diffuse overt GI bleeding.  Patient encouraged to drink fluids.     Consults:   Consults (From admission, onward)        Status Ordering Provider     Inpatient consult to Gastroenterology  Once     Provider:  (Not yet assigned)    Completed ARI LOPEZ     Inpatient consult to Midline team  Once     Provider:  (Not yet assigned)    Completed ISSA MUJICA          Significant Diagnostic Studies: Labs:   CMP   Recent Labs   Lab 10/01/19  1450 10/02/19  0600 10/03/19  0412    141 140   K 3.2* 4.1 3.6   CL 99 108 108   CO2 22* 23 23   GLU 76 78 79   BUN 14 10 5*   CREATININE 1.2 1.0 0.8   CALCIUM 7.6* 7.2* 6.7*   PROT 6.4 5.6* 5.1*   ALBUMIN 3.0* 2.7* 2.5*   BILITOT 0.6 0.4 0.3   ALKPHOS 64 56 50*   AST 16 18 14   ALT 11 9* 8*   ANIONGAP 15 10 9   ESTGFRAFRICA >60.0 >60.0 >60.0   EGFRNONAA >60.0 >60.0 >60.0    and CBC   Recent Labs   Lab 10/01/19  1653 10/02/19  0600 10/03/19  0412   WBC 5.37 4.49 4.35   HGB 7.4* 7.0* 6.7*   HCT 22.7* 22.8* 21.8*    252 232       Pending Diagnostic Studies:     None        Final Active Diagnoses:    Diagnosis Date Noted POA    PRINCIPAL PROBLEM:  GI bleed [K92.2] 10/01/2019 Yes    Discharge planning issues [Z02.9] 10/02/2019 Not Applicable    Pain and swelling of left upper extremity [M79.602, M79.89] 10/01/2019 Yes    Neuropathic pain [M79.2] 08/07/2018 Yes    Anxiety [F41.9] 06/05/2018 Yes    Dehydration [E86.0] 02/06/2018 Yes    Low vitamin D level [R79.89] 01/05/2018 Yes    Squamous cell carcinoma of palatine tonsil [C09.9] 01/04/2018 Yes      Problems Resolved During this Admission:      Discharged Condition: stable    Disposition: Home or Self Care    Follow Up:  Follow-up Information     Cortney Lopez MD.    Specialties:  Hematology and Oncology, Hematology  Why:  Follow-Up Appointment as scheduled by the clinic  Contact information:  [address]  [facility] [id number]  [phone]                 Patient Instructions:   No discharge procedures on  file.  Medications:  Reconciled Home Medications:      Medication List      START taking these medications    diphenoxylate-atropine 2.5-0.025 mg 2.5-0.025 mg per tablet  Commonly known as:  LOMOTIL  Take 2 tablets by mouth 4 (four) times daily as needed for Diarrhea.        CHANGE how you take these medications    pantoprazole 40 MG tablet  Commonly known as:  PROTONIX  Take 1 tablet (40 mg total) by mouth once daily.  Start taking on:  October 4, 2019  What changed:  when to take this        CONTINUE taking these medications    acetaminophen 325 MG tablet  Commonly known as:  TYLENOL  Take 2 tablets (650 mg total) by mouth every 6 (six) hours as needed.     CALCIUM 500 + D 500 mg(1,250mg) -200 unit per tablet  Generic drug:  calcium-vitamin D3  Take 1 tablet by mouth 2 (two) times daily with meals.     denosumab 120 mg/1.7 mL (70 mg/mL) Soln  Commonly known as:  XGEVA  Inject 1.7 mLs (120 mg total) into the skin once. for 1 dose     FLUoxetine 40 MG capsule  Take 1 capsule (40 mg total) by mouth once daily.     gabapentin 300 MG capsule  Commonly known as:  NEURONTIN  Take 1 capsule (300 mg total) by mouth 3 (three) times daily.     hydrocortisone 2.5 % cream  Apply topically 2 (two) times daily.     lidocaine 5 %  Commonly known as:  LIDODERM  Place 1 patch onto the skin daily as needed. Remove & Discard patch within 12 hours or as directed by MD     mirtazapine 7.5 MG Tab  Commonly known as:  REMERON     multivitamin capsule  Take 1 capsule by mouth once daily.     tiZANidine 2 MG tablet  Commonly known as:  ZANAFLEX  Take 1 tablet (2 mg total) by mouth nightly as needed. For neck pain. May be sedating. Be careful and know how it affects you before driving     traMADol 50 mg tablet  Commonly known as:  ULTRAM  Take 1 tablet (50 mg total) by mouth every 8 (eight) hours as needed for Pain.            Demi Fishman MD  Hematology/Oncology  [facility]

## 2019-10-04 ENCOUNTER — PATIENT MESSAGE (OUTPATIENT)
Dept: HEMATOLOGY/ONCOLOGY | Facility: CLINIC | Age: 49
End: 2019-10-04

## 2019-10-04 DIAGNOSIS — R19.7 DIARRHEA, UNSPECIFIED TYPE: Primary | ICD-10-CM

## 2019-10-04 RX ORDER — DIPHENOXYLATE HYDROCHLORIDE AND ATROPINE SULFATE 2.5; .025 MG/1; MG/1
2 TABLET ORAL 4 TIMES DAILY PRN
Qty: 240 TABLET | Refills: 0 | Status: SHIPPED | OUTPATIENT
Start: 2019-10-04 | End: 2019-11-06

## 2019-10-06 LAB
BACTERIA BLD CULT: NORMAL
BACTERIA BLD CULT: NORMAL

## 2019-10-07 ENCOUNTER — PATIENT MESSAGE (OUTPATIENT)
Dept: HEMATOLOGY/ONCOLOGY | Facility: CLINIC | Age: 49
End: 2019-10-07

## 2019-10-07 ENCOUNTER — HOSPITAL ENCOUNTER (INPATIENT)
Facility: HOSPITAL | Age: 49
LOS: 2 days | Discharge: HOME | DRG: 378 | End: 2019-10-09
Attending: EMERGENCY MEDICINE | Admitting: INTERNAL MEDICINE
Payer: COMMERCIAL

## 2019-10-07 DIAGNOSIS — R07.9 CHEST PAIN: ICD-10-CM

## 2019-10-07 DIAGNOSIS — D64.9 SYMPTOMATIC ANEMIA: ICD-10-CM

## 2019-10-07 DIAGNOSIS — E83.51 HYPOCALCEMIA: ICD-10-CM

## 2019-10-07 DIAGNOSIS — R53.1 WEAKNESS: ICD-10-CM

## 2019-10-07 DIAGNOSIS — E83.42 HYPOMAGNESEMIA: ICD-10-CM

## 2019-10-07 DIAGNOSIS — R19.7 DIARRHEA, UNSPECIFIED TYPE: Primary | ICD-10-CM

## 2019-10-07 DIAGNOSIS — E87.6 HYPOKALEMIA: ICD-10-CM

## 2019-10-07 PROBLEM — K64.9 HEMORRHOIDS: Chronic | Status: ACTIVE | Noted: 2019-10-07

## 2019-10-07 PROBLEM — K26.9 DUODENAL ULCER: Chronic | Status: ACTIVE | Noted: 2019-10-07

## 2019-10-07 PROBLEM — C44.92 SCC (SQUAMOUS CELL CARCINOMA): Chronic | Status: ACTIVE | Noted: 2019-10-07

## 2019-10-07 PROBLEM — D62 ACUTE BLOOD LOSS ANEMIA: Chronic | Status: ACTIVE | Noted: 2019-10-07

## 2019-10-07 PROBLEM — K62.5 RECTAL BLEEDING: Status: ACTIVE | Noted: 2019-10-07

## 2019-10-07 LAB
ALBUMIN SERPL BCP-MCNC: 3.1 G/DL (ref 3.5–5.2)
ALP SERPL-CCNC: 47 U/L (ref 55–135)
ALT SERPL W/O P-5'-P-CCNC: 12 U/L (ref 10–44)
AMPHET+METHAMPHET UR QL: NEGATIVE
AMYLASE SERPL-CCNC: 41 U/L (ref 20–110)
ANION GAP SERPL CALC-SCNC: 11 MMOL/L (ref 8–16)
APTT PPP: 32.7 SEC (ref 26.2–34.7)
AST SERPL-CCNC: 18 U/L (ref 10–40)
BARBITURATES UR QL SCN>200 NG/ML: NEGATIVE
BASOPHILS # BLD AUTO: 0.01 K/UL (ref 0–0.2)
BASOPHILS NFR BLD: 0.2 % (ref 0–1.9)
BENZODIAZ UR QL SCN>200 NG/ML: NEGATIVE
BILIRUB SERPL-MCNC: 0.9 MG/DL (ref 0.1–1)
BILIRUB UR QL STRIP: NEGATIVE
BUN SERPL-MCNC: 7 MG/DL (ref 6–20)
BZE UR QL SCN: NEGATIVE
CALCIUM SERPL-MCNC: 6.6 MG/DL (ref 8.7–10.5)
CANNABINOIDS UR QL SCN: NEGATIVE
CHLORIDE SERPL-SCNC: 98 MMOL/L (ref 95–110)
CK MB SERPL-MCNC: 1.2 NG/ML (ref 0.1–6.5)
CK SERPL-CCNC: 61 U/L (ref 20–200)
CLARITY UR: CLEAR
CO2 SERPL-SCNC: 29 MMOL/L (ref 23–29)
COLOR UR: YELLOW
CREAT SERPL-MCNC: 1.1 MG/DL (ref 0.5–1.4)
CREAT UR-MCNC: 77 MG/DL (ref 23–375)
DIFFERENTIAL METHOD: ABNORMAL
EOSINOPHIL # BLD AUTO: 0 K/UL (ref 0–0.5)
EOSINOPHIL NFR BLD: 0.2 % (ref 0–8)
ERYTHROCYTE [DISTWIDTH] IN BLOOD BY AUTOMATED COUNT: 16.1 % (ref 11.5–14.5)
EST. GFR  (AFRICAN AMERICAN): >60 ML/MIN/1.73 M^2
EST. GFR  (NON AFRICAN AMERICAN): >60 ML/MIN/1.73 M^2
GLUCOSE SERPL-MCNC: 96 MG/DL (ref 70–110)
GLUCOSE UR QL STRIP: NEGATIVE
HCT VFR BLD AUTO: 26 % (ref 40–54)
HGB BLD-MCNC: 8.4 G/DL (ref 14–18)
HGB UR QL STRIP: NEGATIVE
IMM GRANULOCYTES # BLD AUTO: 0.07 K/UL (ref 0–0.04)
IMM GRANULOCYTES NFR BLD AUTO: 1.1 % (ref 0–0.5)
INR PPP: 1.2
KETONES UR QL STRIP: NEGATIVE
LEUKOCYTE ESTERASE UR QL STRIP: NEGATIVE
LIPASE SERPL-CCNC: 18 U/L (ref 4–60)
LYMPHOCYTES # BLD AUTO: 0.3 K/UL (ref 1–4.8)
LYMPHOCYTES NFR BLD: 4.9 % (ref 18–48)
MAGNESIUM SERPL-MCNC: 1.4 MG/DL (ref 1.6–2.6)
MCH RBC QN AUTO: 30.3 PG (ref 27–31)
MCHC RBC AUTO-ENTMCNC: 32.3 G/DL (ref 32–36)
MCV RBC AUTO: 94 FL (ref 82–98)
MONOCYTES # BLD AUTO: 0.7 K/UL (ref 0.3–1)
MONOCYTES NFR BLD: 11 % (ref 4–15)
NEUTROPHILS # BLD AUTO: 5.2 K/UL (ref 1.8–7.7)
NEUTROPHILS NFR BLD: 82.6 % (ref 38–73)
NITRITE UR QL STRIP: NEGATIVE
NRBC BLD-RTO: 0 /100 WBC
OB PNL STL: POSITIVE
OPIATES UR QL SCN: NEGATIVE
PCP UR QL SCN>25 NG/ML: NEGATIVE
PH UR STRIP: 6 [PH] (ref 5–8)
PLATELET # BLD AUTO: 224 K/UL (ref 150–350)
PMV BLD AUTO: 8.5 FL (ref 9.2–12.9)
POTASSIUM SERPL-SCNC: 3.1 MMOL/L (ref 3.5–5.1)
PROT SERPL-MCNC: 5.9 G/DL (ref 6–8.4)
PROT UR QL STRIP: NEGATIVE
PROTHROMBIN TIME: 14.3 SEC (ref 11.7–14)
RBC # BLD AUTO: 2.77 M/UL (ref 4.6–6.2)
SODIUM SERPL-SCNC: 138 MMOL/L (ref 136–145)
SP GR UR STRIP: 1 (ref 1–1.03)
TOXICOLOGY INFORMATION: NORMAL
TROPONIN I SERPL DL<=0.01 NG/ML-MCNC: <0.03 NG/ML (ref 0.02–0.04)
URN SPEC COLLECT METH UR: ABNORMAL
UROBILINOGEN UR STRIP-ACNC: NEGATIVE EU/DL
WBC # BLD AUTO: 6.3 K/UL (ref 3.9–12.7)

## 2019-10-07 PROCEDURE — 85730 THROMBOPLASTIN TIME PARTIAL: CPT

## 2019-10-07 PROCEDURE — 84484 ASSAY OF TROPONIN QUANT: CPT

## 2019-10-07 PROCEDURE — 83735 ASSAY OF MAGNESIUM: CPT

## 2019-10-07 PROCEDURE — 80053 COMPREHEN METABOLIC PANEL: CPT

## 2019-10-07 PROCEDURE — 82550 ASSAY OF CK (CPK): CPT

## 2019-10-07 PROCEDURE — 82553 CREATINE MB FRACTION: CPT

## 2019-10-07 PROCEDURE — 99285 EMERGENCY DEPT VISIT HI MDM: CPT | Mod: 25

## 2019-10-07 PROCEDURE — 83690 ASSAY OF LIPASE: CPT

## 2019-10-07 PROCEDURE — 96361 HYDRATE IV INFUSION ADD-ON: CPT

## 2019-10-07 PROCEDURE — 63600175 PHARM REV CODE 636 W HCPCS

## 2019-10-07 PROCEDURE — 82272 OCCULT BLD FECES 1-3 TESTS: CPT

## 2019-10-07 PROCEDURE — 25000003 PHARM REV CODE 250

## 2019-10-07 PROCEDURE — 80307 DRUG TEST PRSMV CHEM ANLYZR: CPT

## 2019-10-07 PROCEDURE — 81003 URINALYSIS AUTO W/O SCOPE: CPT

## 2019-10-07 PROCEDURE — 12000002 HC ACUTE/MED SURGE SEMI-PRIVATE ROOM

## 2019-10-07 PROCEDURE — 63600175 PHARM REV CODE 636 W HCPCS: Performed by: INTERNAL MEDICINE

## 2019-10-07 PROCEDURE — 85610 PROTHROMBIN TIME: CPT

## 2019-10-07 PROCEDURE — 96374 THER/PROPH/DIAG INJ IV PUSH: CPT

## 2019-10-07 PROCEDURE — 25000003 PHARM REV CODE 250: Performed by: INTERNAL MEDICINE

## 2019-10-07 PROCEDURE — 82150 ASSAY OF AMYLASE: CPT

## 2019-10-07 PROCEDURE — 63600175 PHARM REV CODE 636 W HCPCS: Performed by: EMERGENCY MEDICINE

## 2019-10-07 PROCEDURE — 93005 ELECTROCARDIOGRAM TRACING: CPT

## 2019-10-07 PROCEDURE — 85025 COMPLETE CBC W/AUTO DIFF WBC: CPT

## 2019-10-07 RX ORDER — ACETAMINOPHEN 325 MG/1
650 TABLET ORAL EVERY 4 HOURS PRN
Status: DISCONTINUED | OUTPATIENT
Start: 2019-10-07 | End: 2019-10-09 | Stop reason: HOSPADM

## 2019-10-07 RX ORDER — TRAMADOL HYDROCHLORIDE 50 MG/1
50 TABLET ORAL EVERY 8 HOURS PRN
Status: DISCONTINUED | OUTPATIENT
Start: 2019-10-07 | End: 2019-10-09 | Stop reason: HOSPADM

## 2019-10-07 RX ORDER — TIZANIDINE 2 MG/1
4 TABLET ORAL NIGHTLY PRN
COMMUNITY
End: 2020-10-21

## 2019-10-07 RX ORDER — PANTOPRAZOLE SODIUM 40 MG/10ML
40 INJECTION, POWDER, LYOPHILIZED, FOR SOLUTION INTRAVENOUS
Status: ACTIVE | OUTPATIENT
Start: 2019-10-07 | End: 2019-10-08

## 2019-10-07 RX ORDER — POTASSIUM CHLORIDE 20 MEQ/1
40 TABLET, EXTENDED RELEASE ORAL ONCE
Status: COMPLETED | OUTPATIENT
Start: 2019-10-07 | End: 2019-10-07

## 2019-10-07 RX ORDER — PANTOPRAZOLE SODIUM 40 MG/1
40 TABLET, DELAYED RELEASE ORAL DAILY
Status: DISCONTINUED | OUTPATIENT
Start: 2019-10-08 | End: 2019-10-09 | Stop reason: HOSPADM

## 2019-10-07 RX ORDER — LIDOCAINE 50 MG/G
1 PATCH TOPICAL DAILY
Status: DISCONTINUED | OUTPATIENT
Start: 2019-10-08 | End: 2019-10-09 | Stop reason: HOSPADM

## 2019-10-07 RX ORDER — LOPERAMIDE HCL 2 MG
2 TABLET ORAL 4 TIMES DAILY PRN
COMMUNITY
End: 2019-11-06

## 2019-10-07 RX ORDER — GLUCAGON 1 MG
1 KIT INJECTION
Status: DISCONTINUED | OUTPATIENT
Start: 2019-10-07 | End: 2019-10-09 | Stop reason: HOSPADM

## 2019-10-07 RX ORDER — CALCIUM CARBONATE 200(500)MG
500 TABLET,CHEWABLE ORAL
Status: COMPLETED | OUTPATIENT
Start: 2019-10-07 | End: 2019-10-07

## 2019-10-07 RX ORDER — GABAPENTIN 300 MG/1
300 CAPSULE ORAL 3 TIMES DAILY
Status: DISCONTINUED | OUTPATIENT
Start: 2019-10-07 | End: 2019-10-09 | Stop reason: HOSPADM

## 2019-10-07 RX ORDER — LOPERAMIDE HYDROCHLORIDE 2 MG/1
2 CAPSULE ORAL 4 TIMES DAILY PRN
Status: DISCONTINUED | OUTPATIENT
Start: 2019-10-07 | End: 2019-10-07

## 2019-10-07 RX ORDER — SODIUM CHLORIDE 0.9 % (FLUSH) 0.9 %
10 SYRINGE (ML) INJECTION
Status: DISCONTINUED | OUTPATIENT
Start: 2019-10-07 | End: 2019-10-09 | Stop reason: HOSPADM

## 2019-10-07 RX ORDER — LOPERAMIDE HYDROCHLORIDE 2 MG/1
2 CAPSULE ORAL 4 TIMES DAILY
Status: DISCONTINUED | OUTPATIENT
Start: 2019-10-07 | End: 2019-10-09 | Stop reason: HOSPADM

## 2019-10-07 RX ORDER — FLUOXETINE HYDROCHLORIDE 20 MG/1
40 CAPSULE ORAL DAILY
Status: DISCONTINUED | OUTPATIENT
Start: 2019-10-08 | End: 2019-10-09 | Stop reason: HOSPADM

## 2019-10-07 RX ORDER — LORAZEPAM 0.5 MG/1
0.5 TABLET ORAL ONCE
Status: COMPLETED | OUTPATIENT
Start: 2019-10-07 | End: 2019-10-07

## 2019-10-07 RX ORDER — TIZANIDINE 4 MG/1
4 TABLET ORAL NIGHTLY PRN
Status: DISCONTINUED | OUTPATIENT
Start: 2019-10-07 | End: 2019-10-09 | Stop reason: HOSPADM

## 2019-10-07 RX ORDER — SODIUM CHLORIDE 9 MG/ML
INJECTION, SOLUTION INTRAVENOUS CONTINUOUS
Status: DISCONTINUED | OUTPATIENT
Start: 2019-10-07 | End: 2019-10-09 | Stop reason: HOSPADM

## 2019-10-07 RX ORDER — LORAZEPAM 2 MG/ML
1 INJECTION INTRAMUSCULAR
Status: DISCONTINUED | OUTPATIENT
Start: 2019-10-07 | End: 2019-10-07

## 2019-10-07 RX ORDER — DIPHENOXYLATE HYDROCHLORIDE AND ATROPINE SULFATE 2.5; .025 MG/1; MG/1
2 TABLET ORAL 4 TIMES DAILY PRN
Status: DISCONTINUED | OUTPATIENT
Start: 2019-10-07 | End: 2019-10-07

## 2019-10-07 RX ORDER — MAGNESIUM SULFATE HEPTAHYDRATE 40 MG/ML
2 INJECTION, SOLUTION INTRAVENOUS
Status: COMPLETED | OUTPATIENT
Start: 2019-10-07 | End: 2019-10-08

## 2019-10-07 RX ORDER — IBUPROFEN 200 MG
16 TABLET ORAL
Status: DISCONTINUED | OUTPATIENT
Start: 2019-10-07 | End: 2019-10-09 | Stop reason: HOSPADM

## 2019-10-07 RX ORDER — MAGNESIUM SULFATE HEPTAHYDRATE 40 MG/ML
2 INJECTION, SOLUTION INTRAVENOUS ONCE
Status: COMPLETED | OUTPATIENT
Start: 2019-10-07 | End: 2019-10-07

## 2019-10-07 RX ORDER — ONDANSETRON 2 MG/ML
4 INJECTION INTRAMUSCULAR; INTRAVENOUS EVERY 8 HOURS PRN
Status: DISCONTINUED | OUTPATIENT
Start: 2019-10-07 | End: 2019-10-09 | Stop reason: HOSPADM

## 2019-10-07 RX ORDER — IBUPROFEN 200 MG
24 TABLET ORAL
Status: DISCONTINUED | OUTPATIENT
Start: 2019-10-07 | End: 2019-10-09 | Stop reason: HOSPADM

## 2019-10-07 RX ADMIN — POTASSIUM CHLORIDE 40 MEQ: 20 TABLET, EXTENDED RELEASE ORAL at 09:10

## 2019-10-07 RX ADMIN — GABAPENTIN 300 MG: 300 CAPSULE ORAL at 09:10

## 2019-10-07 RX ADMIN — LORAZEPAM 0.5 MG: 0.5 TABLET ORAL at 07:10

## 2019-10-07 RX ADMIN — LOPERAMIDE HYDROCHLORIDE 2 MG: 2 CAPSULE ORAL at 09:10

## 2019-10-07 RX ADMIN — POTASSIUM BICARBONATE 50 MEQ: 977.5 TABLET, EFFERVESCENT ORAL at 05:10

## 2019-10-07 RX ADMIN — SODIUM CHLORIDE, SODIUM LACTATE, POTASSIUM CHLORIDE, AND CALCIUM CHLORIDE 1000 ML: .6; .31; .03; .02 INJECTION, SOLUTION INTRAVENOUS at 01:10

## 2019-10-07 RX ADMIN — CALCIUM CARBONATE (ANTACID) CHEW TAB 500 MG 500 MG: 500 CHEW TAB at 05:10

## 2019-10-07 RX ADMIN — MAGNESIUM SULFATE 2 G: 2 INJECTION INTRAVENOUS at 09:10

## 2019-10-07 RX ADMIN — SODIUM CHLORIDE: 0.9 INJECTION, SOLUTION INTRAVENOUS at 07:10

## 2019-10-07 RX ADMIN — MAGNESIUM SULFATE 2 G: 2 INJECTION INTRAVENOUS at 05:10

## 2019-10-07 NOTE — HPI
Mr. Whiting is a 49-years-old male who presented to the ED with chief complaint of generalized weakness associated with anorexia and diarrhea.  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  He was started on steroids as well as developed neck pain and was taking copious amounts of NSAIDs, recently had upper GI bleed secondary to 2 duodenal ulcer, required 5 units PRBC transfusion during that admission.  The diarrhea has been persistent and he actually had recent admission at [facility] 2 days ago- treated for dehydration and anemia, received 1 unit PRBC transfusion on day of discharge. He states since discharge diarrhea has persisted with generalized weakness and he is fearful of eating due to the diarrhea.  He states he has intermittent ongoing rectal bleeding, he feels related to his hemorrhoids, present on wiping as well as on underwear. As per his brother at bedside, his oncologist prescribed anti diarrheals and he actually has outpatient follow up with Dr Patterson (gastroenterology) Thursday of this week. Patient has had issues with opioids in the past and requesting to minimize however, does feel anxious and is requesting Ativan. In the ED he was HD stable, rectal examination with external hemorrhoids, FOBT positive, admissions labs H/H 8.4/26, K 3.1, Mg 1.4, Ca 6.6, CXR clear, EKG SR. He received 1L LR, calcium, magnesium, potassium supplementation as well as 40 mg IV PPI therapy.

## 2019-10-07 NOTE — TELEPHONE ENCOUNTER
"Spoke with patient's brother. He is calling to state starting on Friday, when he started taking lomotil, he started feeling badly again, like last week prior to admission. Since Friday, he has only taken 4 tablets of lomotil, as it "makes him feel weird." he doesn't want to take lomotil anymore.     Per brother, he feels anxious, has a weird look in his eye. Denies dizziness, fever/chills, has no blood in stool. Has no appetite and is having 2-3 loose stools per day. Is taking imodium appropriately. He has c/o weakness.     Nurse informed brother she would contact him back after speaking with dr waggoner. He voiced understanding, and he stated patient is with him in [place], as he was too worried about how he was feeling to stay at home by himself.     Message routed to dr waggoner (I can't figure this nasra out---ED?)    "

## 2019-10-07 NOTE — TELEPHONE ENCOUNTER
He is off steroids. He does not want to take lomotil because of the way it makes him feel.     Should I just advise imodium up to 8 tablets per day right now?   ~Lauryn

## 2019-10-07 NOTE — ASSESSMENT & PLAN NOTE
Hypomagnesemia associated with generalized weakness secondary to decreased intake and increase loss with diarrhea.  Received 2 g IV magnesium in the ED.  Consult pharmacy for replacement and monitor.

## 2019-10-07 NOTE — ASSESSMENT & PLAN NOTE
Ongoing diarrhea, felt secondary to medication side effect- Opdivo.  He is fearful to eat due to diarrhea.  Scheduled Imodium.  Fluid hydration.  Diet as tolerated.

## 2019-10-07 NOTE — ASSESSMENT & PLAN NOTE
Ongoing intermittent rectal bleeding, has external hemorrhoids.  Persistent diarrhea with history of colitis secondary to Opdivo previously.   He also has history of duodenal ulcers.   He has been needing regular prbc transfusions- actually received unit 10/3 for H/H 6.7/21.   Admit inpatient, medical floor.   Trend H&H.  Consult Gastroenterology.

## 2019-10-07 NOTE — SUBJECTIVE & OBJECTIVE
"Past Medical History:   Diagnosis Date    Acute renal failure 6/21/2018    Alcohol abuse     ended 2004    Chemotherapy induced neutropenia 6/7/2018    Former smoker     HPV in male     Hx of psychiatric care     Ativan, Paxil ("caused anger problems")    Opiate addiction     Squamous cell carcinoma of palatine tonsil     throat and tonsillar    Substance abuse     opiates, methamphetamines; ended 2004       Past Surgical History:   Procedure Laterality Date    ESOPHAGOGASTRODUODENOSCOPY Left 9/9/2019    Procedure: EGD (ESOPHAGOGASTRODUODENOSCOPY);  Surgeon: Balta Lisa MD;  Location: [facility];  Service: Endoscopy;  Laterality: Left;    ESOPHAGOGASTRODUODENOSCOPY N/A 9/12/2019    Procedure: EGD (ESOPHAGOGASTRODUODENOSCOPY);  Surgeon: Perez Mon III, MD;  Location: [facility];  Service: Endoscopy;  Laterality: N/A;    ESOPHAGOGASTRODUODENOSCOPY N/A 10/2/2019    Procedure: EGD (ESOPHAGOGASTRODUODENOSCOPY);  Surgeon: Bonifacio Sosa MD;  Location: [facility] ([address] [place] [address]);  Service: Endoscopy;  Laterality: N/A;    GASTROSTOMY TUBE PLACEMENT Left 02/12/2018    MEDIPORT REMOVAL N/A 6/6/2018    Procedure: Removal-port-a-cath;  Surgeon: Blayne Diagnostic Provider;  [facility];  Service: General;  Laterality: N/A;       Review of patient's allergies indicates:   Allergen Reactions    Trazodone Other (See Comments)     confusion       No current facility-administered medications on file prior to encounter.      Current Outpatient Medications on File Prior to Encounter   Medication Sig    acetaminophen (TYLENOL) 325 MG tablet Take 2 tablets (650 mg total) by mouth every 6 (six) hours as needed.    diphenoxylate-atropine 2.5-0.025 mg (LOMOTIL) 2.5-0.025 mg per tablet Take 2 tablets by mouth 4 (four) times daily as needed for Diarrhea.    FLUoxetine 40 MG capsule Take 1 capsule (40 mg total) by mouth once daily.    lidocaine (LIDODERM) 5 % Place 1 patch onto the skin daily as needed. " Remove & Discard patch within 12 hours or as directed by MD    loperamide (IMODIUM A-D) 2 mg Tab Take 2 mg by mouth 4 (four) times daily as needed.    pantoprazole (PROTONIX) 40 MG tablet Take 1 tablet (40 mg total) by mouth once daily. (Patient taking differently: Take 40 mg by mouth 2 (two) times daily. )    tiZANidine (ZANAFLEX) 2 MG tablet Take 4 mg by mouth nightly as needed. NECK PAIN    traMADol (ULTRAM) 50 mg tablet Take 1 tablet (50 mg total) by mouth every 8 (eight) hours as needed for Pain.    calcium-vitamin D3 (CALCIUM 500 + D) 500 mg(1,250mg) -200 unit per tablet Take 1 tablet by mouth 2 (two) times daily with meals.    denosumab (XGEVA) 120 mg/1.7 mL (70 mg/mL) Soln Inject 1.7 mLs (120 mg total) into the skin once. for 1 dose    gabapentin (NEURONTIN) 300 MG capsule Take 1 capsule (300 mg total) by mouth 3 (three) times daily.    hydrocortisone 2.5 % cream Apply topically 2 (two) times daily. (Patient taking differently: Apply 1 application topically 2 (two) times daily. )    mirtazapine (REMERON) 7.5 MG Tab     multivitamin capsule Take 1 capsule by mouth once daily.     Family History     Problem Relation (Age of Onset)    Brain cancer Maternal Uncle, Maternal Uncle    Depression Sister    Hypertension Father, Brother    Leukemia Mother    Thyroid disease Sister        Tobacco Use    Smoking status: Former Smoker     Packs/day: 1.50     Years: 25.00     Pack years: 37.50     Types: Cigarettes     Last attempt to quit: 2017     Years since quittin.3    Smokeless tobacco: Never Used    Tobacco comment: smoked for about 25 years. quit 6 months ago   Substance and Sexual Activity    Alcohol use: No     Comment: history of overuse    Drug use: No     Comment: Former Opiate/methamphetamine addiction    Sexual activity: Yes     Partners: Female     Review of Systems   Constitutional: Positive for activity change, appetite change, chills, fatigue and unexpected weight change. Negative  for fever.   HENT: Negative for congestion, trouble swallowing and voice change.    Eyes: Negative for visual disturbance.   Respiratory: Positive for shortness of breath. Negative for cough, wheezing and stridor.    Cardiovascular: Negative for chest pain, palpitations and leg swelling.   Gastrointestinal: Positive for anal bleeding, blood in stool, diarrhea, nausea and vomiting. Negative for abdominal pain.   Endocrine: Negative for polyuria.   Genitourinary: Negative for difficulty urinating, dysuria, hematuria and urgency.   Musculoskeletal: Positive for neck pain.   Skin: Negative for wound.   Neurological: Positive for weakness and numbness (known peripheral neuropathy). Negative for seizures, syncope and speech difficulty.   Hematological: Bruises/bleeds easily.   Psychiatric/Behavioral: Negative for confusion. The patient is nervous/anxious.      Objective:     Vital Signs (Most Recent):  Temp: 98.5 °F (36.9 °C) (10/07/19 1048)  Pulse: 73 (10/07/19 1530)  Resp: 11 (10/07/19 1530)  BP: 122/69 (10/07/19 1530)  SpO2: 99 % (10/07/19 1530) Vital Signs (24h Range):  Temp:  [98.5 °F (36.9 °C)] 98.5 °F (36.9 °C)  Pulse:  [71-85] 73  Resp:  [11-20] 11  SpO2:  [98 %-100 %] 99 %  BP: (111-124)/(59-76) 122/69     Weight: 78.5 kg (173 lb)  Body mass index is 26.3 kg/m².    Physical Exam   Constitutional: He is oriented to person, place, and time. No distress.   Appears fatigued but comfortable   HENT:   Head: Normocephalic and atraumatic.   Dry mucus membranes   Eyes: Pupils are equal, round, and reactive to light. EOM are normal. Right eye exhibits no discharge. Left eye exhibits no discharge.   Neck: Normal range of motion. Neck supple.   Cardiovascular: Normal rate and regular rhythm.   No peripheral edema   Pulmonary/Chest: Effort normal and breath sounds normal. No stridor. No respiratory distress. He has no wheezes. He has no rales.   Abdominal: Soft. Bowel sounds are normal. He exhibits no distension. There is no  tenderness. There is no rebound and no guarding.   Previous PEG site left abdomen   Genitourinary:   Genitourinary Comments: No go   Musculoskeletal: He exhibits no edema.   Neurological: He is alert and oriented to person, place, and time.   LE peripheral neuropathy distally, non focal, + generalized weakness   Skin: Skin is warm and dry. He is not diaphoretic.   Psychiatric:   + anxiety   Vitals reviewed.        CRANIAL NERVES     CN III, IV, VI   Pupils are equal, round, and reactive to light.  Extraocular motions are normal.        Significant Labs:   BMP:   Recent Labs   Lab 10/07/19  1313   GLU 96      K 3.1*   CL 98   CO2 29   BUN 7   CREATININE 1.1   CALCIUM 6.6*   MG 1.4*     CBC:   Recent Labs   Lab 10/07/19  1313   WBC 6.30   HGB 8.4*   HCT 26.0*        CMP:   Recent Labs   Lab 10/07/19  1313      K 3.1*   CL 98   CO2 29   GLU 96   BUN 7   CREATININE 1.1   CALCIUM 6.6*   PROT 5.9*   ALBUMIN 3.1*   BILITOT 0.9   ALKPHOS 47*   AST 18   ALT 12   ANIONGAP 11   EGFRNONAA >60.0     Coagulation:   Recent Labs   Lab 10/07/19  1313   INR 1.2   APTT 32.7     Lactic Acid: No results for input(s): LACTATE in the last 48 hours.  Magnesium:   Recent Labs   Lab 10/07/19  1313   MG 1.4*     POCT Glucose: No results for input(s): POCTGLUCOSE in the last 48 hours.  Troponin:   Recent Labs   Lab 10/07/19  1313   TROPONINI <0.030     TSH:   Recent Labs   Lab 10/01/19  1105   TSH 2.163     All pertinent labs within the past 24 hours have been reviewed.    Significant Imaging: I have reviewed and interpreted all pertinent imaging results/findings within the past 24 hours.   X-ray Chest Pa And Lateral    Result Date: 10/7/2019  CLINICAL HISTORY: ([id number])48 y/o  (1970) M Weakness TECHNIQUE: (A#[id number], exam time 10/7/2019 11:29) XR CHEST PA AND LATERAL [id number] COMPARISON: Radiograph from 09/08/2019 FINDINGS: The lungs are clear. Costophrenic angles are seen without effusion. No pneumothorax is  identified. The heart is normal in size. The mediastinum is within normal limits. Osseous structures show degenerative changes in the spine. The visualized upper abdomen is unremarkable.     No acute cardiac or pulmonary process. Electronically signed by: Mike Staton MD Date:    10/07/2019 Time:    11:29    Ct Head Without Contrast    Result Date: 9/8/2019  CMS MANDATED QUALITY DATA - CT RADIATION - 436 All CT exams at this facility use dose modulation, iterative reconstruction, and or weight based dosing when appropriate to reduce radiation dose to as low as reasonably achievable. HISTORY: Syncope, fainting. FINDINGS: No prior studies currently available for comparison. There is no acute intracranial hemorrhage, with no mass effect or abnormal extra-axial fluid. Gray white differentiation is maintained, with the cortical sulci, ventricles and basal cisterns normal in size. The cerebellum and brainstem are unremarkable. The visualized paranasal sinuses and mastoid air cells are clear. There is no acute osseous abnormality. IMPRESSION: Negative noncontrast head CT. Electronically Signed by Tj RESTREPO on 9/9/2019 12:01 AM    Mri Brain Without Contrast    Result Date: 9/9/2019  EXAMINATION: MRI BRAIN WITHOUT CONTRAST CLINICAL HISTORY: Syncope/fainting; TECHNIQUE: Multiplanar noncontrast imaging is performed. COMPARISON: 08/22/2018. FINDINGS: The brain appears normally formed and myelinated. There are no findings of acute hemorrhage or infarction. There is no evidence of an intra-axial mass, mass effect or shift of the midline. The ventricular system is appropriate in size and position for age. Appropriate flow voids are present within the major blood vessels. A focal area of altered marrow signal within the clivus is less apparent on the current examination.     No acute intracranial abnormality. Focal area of previously described marrow signal alteration in the clivus has become less apparent in the  interval. Electronically signed by: Chuckie Jackson IV., MD Date:    09/09/2019 Time:    14:35    X-ray Chest Ap Portable    Result Date: 9/9/2019  PROCEDURE:   XR CHEST AP PORTABLE  dated  9/8/2019 10:59 PM CLINICAL HISTORY:   Male 49 years of age.   Chest Pain TECHNIQUE: AP view of the chest obtained portably at 10:59 PM. PREVIOUS STUDIES:  None Available FINDINGS: Cardiac and mediastinal contours are normal. Lungs are clear. There is no pleural effusion or pneumothorax. Bones are unremarkable. IMPRESSION: Normal Electronically Signed by Mounika Bolton on 9/9/2019 7:51 AM  ECG Results          EKG 12-lead (In process)  Result time 10/07/19 13:12:18    In process by Interface, Lab In [facility] (10/07/19 13:12:18)                 Narrative:    Test Reason : R53.1,    Vent. Rate : 075 BPM     Atrial Rate : 075 BPM     P-R Int : 148 ms          QRS Dur : 088 ms      QT Int : 440 ms       P-R-T Axes : 029 -06 024 degrees     QTc Int : 491 ms    Normal sinus rhythm  Possible Left atrial enlargement  Prolonged QT  Abnormal ECG  When compared with ECG of 08-SEP-2019 22:51,  Questionable change in The axis  Nonspecific T wave abnormality now evident in Inferior leads    Referred By: [email or web address]   SELF           Confirmed By:

## 2019-10-07 NOTE — ASSESSMENT & PLAN NOTE
Recent upper endoscopy with duodenal ulcer status post treatment.  Was felt secondary to copious NSAID use as well as steroids.  Continue PPI therapy.

## 2019-10-07 NOTE — ASSESSMENT & PLAN NOTE
Corrected calcium 7.3.  Received replacement in the ED.  Replacement as per protocol and monitor.

## 2019-10-07 NOTE — ASSESSMENT & PLAN NOTE
Known history of hemorrhoids likely exacerbated by persistent chronic diarrhea.  Has had intermittent ongoing bleeding recently.

## 2019-10-07 NOTE — H&P
[facility] Medicine  History & Physical    Patient Name: Burton Whiting  MRN: [id number]  Admission Date: 10/7/2019  Attending Physician: Tara Carias MD   Primary Care Provider: Christopher Turpin DO         Patient information was obtained from patient, relative(s), past medical records and ER records.     Subjective:     Principal Problem:Rectal bleeding    Chief Complaint:   Chief Complaint   Patient presents with    Diarrhea     DC on Thursday at [facility], feels dehydrated    Fatigue        HPI: Mr. Whiting is a 49-years-old male who presented to the ED with chief complaint of generalized weakness associated with anorexia and diarrhea.  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  He was started on steroids as well as developed neck pain and was taking copious amounts of NSAIDs, recently had upper GI bleed secondary to 2 duodenal ulcer, required 5 units PRBC transfusion during that admission.  The diarrhea has been persistent and he actually had recent admission at [facility] 2 days ago- treated for dehydration and anemia, received 1 unit PRBC transfusion on day of discharge. He states since discharge diarrhea has persisted with generalized weakness and he is fearful of eating due to the diarrhea.  He states he has intermittent ongoing rectal bleeding, he feels related to his hemorrhoids, present on wiping as well as on underwear. As per his brother at bedside, his oncologist prescribed anti diarrheals and he actually has outpatient follow up with Dr Patterson (gastroenterology) Thursday of this week. Patient has had issues with opioids in the past and requesting to minimize however, does feel anxious and is requesting Ativan. In the ED he was HD stable, rectal examination with external hemorrhoids, FOBT  "positive, admissions labs H/H 8.4/26, K 3.1, Mg 1.4, Ca 6.6, CXR clear, EKG SR. He received 1L LR, calcium, magnesium, potassium supplementation as well as 40 mg IV PPI therapy.    Past Medical History:   Diagnosis Date    Acute renal failure 6/21/2018    Alcohol abuse     ended 2004    Chemotherapy induced neutropenia 6/7/2018    Former smoker     HPV in male     Hx of psychiatric care     Ativan, Paxil ("caused anger problems")    Opiate addiction     Squamous cell carcinoma of palatine tonsil     throat and tonsillar    Substance abuse     opiates, methamphetamines; ended 2004       Past Surgical History:   Procedure Laterality Date    ESOPHAGOGASTRODUODENOSCOPY Left 9/9/2019    Procedure: EGD (ESOPHAGOGASTRODUODENOSCOPY);  Surgeon: Balta Lisa MD;  Location: [facility];  Service: Endoscopy;  Laterality: Left;    ESOPHAGOGASTRODUODENOSCOPY N/A 9/12/2019    Procedure: EGD (ESOPHAGOGASTRODUODENOSCOPY);  Surgeon: Perez Mon III, MD;  Location: [facility];  Service: Endoscopy;  Laterality: N/A;    ESOPHAGOGASTRODUODENOSCOPY N/A 10/2/2019    Procedure: EGD (ESOPHAGOGASTRODUODENOSCOPY);  Surgeon: Bonifacio Sosa MD;  Location: [facility] ([address] [place] [address]);  Service: Endoscopy;  Laterality: N/A;    GASTROSTOMY TUBE PLACEMENT Left 02/12/2018    MEDIPORT REMOVAL N/A 6/6/2018    Procedure: Removal-port-a-cath;  Surgeon: Blayne Diagnostic Provider;  Location: [facility] OR [address] [place] [address];  Service: General;  Laterality: N/A;       Review of patient's allergies indicates:   Allergen Reactions    Trazodone Other (See Comments)     confusion       No current facility-administered medications on file prior to encounter.      Current Outpatient Medications on File Prior to Encounter   Medication Sig    acetaminophen (TYLENOL) 325 MG tablet Take 2 tablets (650 mg total) by mouth every 6 (six) hours as needed.    diphenoxylate-atropine 2.5-0.025 mg (LOMOTIL) 2.5-0.025 mg per tablet Take 2 tablets by mouth 4 (four) times " daily as needed for Diarrhea.    FLUoxetine 40 MG capsule Take 1 capsule (40 mg total) by mouth once daily.    lidocaine (LIDODERM) 5 % Place 1 patch onto the skin daily as needed. Remove & Discard patch within 12 hours or as directed by MD    loperamide (IMODIUM A-D) 2 mg Tab Take 2 mg by mouth 4 (four) times daily as needed.    pantoprazole (PROTONIX) 40 MG tablet Take 1 tablet (40 mg total) by mouth once daily. (Patient taking differently: Take 40 mg by mouth 2 (two) times daily. )    tiZANidine (ZANAFLEX) 2 MG tablet Take 4 mg by mouth nightly as needed. NECK PAIN    traMADol (ULTRAM) 50 mg tablet Take 1 tablet (50 mg total) by mouth every 8 (eight) hours as needed for Pain.    calcium-vitamin D3 (CALCIUM 500 + D) 500 mg(1,250mg) -200 unit per tablet Take 1 tablet by mouth 2 (two) times daily with meals.    denosumab (XGEVA) 120 mg/1.7 mL (70 mg/mL) Soln Inject 1.7 mLs (120 mg total) into the skin once. for 1 dose    gabapentin (NEURONTIN) 300 MG capsule Take 1 capsule (300 mg total) by mouth 3 (three) times daily.    hydrocortisone 2.5 % cream Apply topically 2 (two) times daily. (Patient taking differently: Apply 1 application topically 2 (two) times daily. )    mirtazapine (REMERON) 7.5 MG Tab     multivitamin capsule Take 1 capsule by mouth once daily.     Family History     Problem Relation (Age of Onset)    Brain cancer Maternal Uncle, Maternal Uncle    Depression Sister    Hypertension Father, Brother    Leukemia Mother    Thyroid disease Sister        Tobacco Use    Smoking status: Former Smoker     Packs/day: 1.50     Years: 25.00     Pack years: 37.50     Types: Cigarettes     Last attempt to quit: 2017     Years since quittin.3    Smokeless tobacco: Never Used    Tobacco comment: smoked for about 25 years. quit 6 months ago   Substance and Sexual Activity    Alcohol use: No     Comment: history of overuse    Drug use: No     Comment: Former Opiate/methamphetamine addiction     Sexual activity: Yes     Partners: Female     Review of Systems   Constitutional: Positive for activity change, appetite change, chills, fatigue and unexpected weight change. Negative for fever.   HENT: Negative for congestion, trouble swallowing and voice change.    Eyes: Negative for visual disturbance.   Respiratory: Positive for shortness of breath. Negative for cough, wheezing and stridor.    Cardiovascular: Negative for chest pain, palpitations and leg swelling.   Gastrointestinal: Positive for anal bleeding, blood in stool, diarrhea, nausea and vomiting. Negative for abdominal pain.   Endocrine: Negative for polyuria.   Genitourinary: Negative for difficulty urinating, dysuria, hematuria and urgency.   Musculoskeletal: Positive for neck pain.   Skin: Negative for wound.   Neurological: Positive for weakness and numbness (known peripheral neuropathy). Negative for seizures, syncope and speech difficulty.   Hematological: Bruises/bleeds easily.   Psychiatric/Behavioral: Negative for confusion. The patient is nervous/anxious.      Objective:     Vital Signs (Most Recent):  Temp: 98.5 °F (36.9 °C) (10/07/19 1048)  Pulse: 73 (10/07/19 1530)  Resp: 11 (10/07/19 1530)  BP: 122/69 (10/07/19 1530)  SpO2: 99 % (10/07/19 1530) Vital Signs (24h Range):  Temp:  [98.5 °F (36.9 °C)] 98.5 °F (36.9 °C)  Pulse:  [71-85] 73  Resp:  [11-20] 11  SpO2:  [98 %-100 %] 99 %  BP: (111-124)/(59-76) 122/69     Weight: 78.5 kg (173 lb)  Body mass index is 26.3 kg/m².    Physical Exam   Constitutional: He is oriented to person, place, and time. No distress.   Appears fatigued but comfortable   HENT:   Head: Normocephalic and atraumatic.   Dry mucus membranes   Eyes: Pupils are equal, round, and reactive to light. EOM are normal. Right eye exhibits no discharge. Left eye exhibits no discharge.   Neck: Normal range of motion. Neck supple.   Cardiovascular: Normal rate and regular rhythm.   No peripheral edema   Pulmonary/Chest: Effort  normal and breath sounds normal. No stridor. No respiratory distress. He has no wheezes. He has no rales.   Abdominal: Soft. Bowel sounds are normal. He exhibits no distension. There is no tenderness. There is no rebound and no guarding.   Previous PEG site left abdomen   Genitourinary:   Genitourinary Comments: No go   Musculoskeletal: He exhibits no edema.   Neurological: He is alert and oriented to person, place, and time.   LE peripheral neuropathy distally, non focal, + generalized weakness   Skin: Skin is warm and dry. He is not diaphoretic.   Psychiatric:   + anxiety   Vitals reviewed.        CRANIAL NERVES     CN III, IV, VI   Pupils are equal, round, and reactive to light.  Extraocular motions are normal.        Significant Labs:   BMP:   Recent Labs   Lab 10/07/19  1313   GLU 96      K 3.1*   CL 98   CO2 29   BUN 7   CREATININE 1.1   CALCIUM 6.6*   MG 1.4*     CBC:   Recent Labs   Lab 10/07/19  1313   WBC 6.30   HGB 8.4*   HCT 26.0*        CMP:   Recent Labs   Lab 10/07/19  1313      K 3.1*   CL 98   CO2 29   GLU 96   BUN 7   CREATININE 1.1   CALCIUM 6.6*   PROT 5.9*   ALBUMIN 3.1*   BILITOT 0.9   ALKPHOS 47*   AST 18   ALT 12   ANIONGAP 11   EGFRNONAA >60.0     Coagulation:   Recent Labs   Lab 10/07/19  1313   INR 1.2   APTT 32.7     Lactic Acid: No results for input(s): LACTATE in the last 48 hours.  Magnesium:   Recent Labs   Lab 10/07/19  1313   MG 1.4*     POCT Glucose: No results for input(s): POCTGLUCOSE in the last 48 hours.  Troponin:   Recent Labs   Lab 10/07/19  1313   TROPONINI <0.030     TSH:   Recent Labs   Lab 10/01/19  1105   TSH 2.163     All pertinent labs within the past 24 hours have been reviewed.    Significant Imaging: I have reviewed and interpreted all pertinent imaging results/findings within the past 24 hours.   X-ray Chest Pa And Lateral    Result Date: 10/7/2019  CLINICAL HISTORY: ([id number])50 y/o  (1970) M Weakness TECHNIQUE: (A#[id number], exam time  10/7/2019 11:29) XR CHEST PA AND LATERAL IMG36 COMPARISON: Radiograph from 09/08/2019 FINDINGS: The lungs are clear. Costophrenic angles are seen without effusion. No pneumothorax is identified. The heart is normal in size. The mediastinum is within normal limits. Osseous structures show degenerative changes in the spine. The visualized upper abdomen is unremarkable.     No acute cardiac or pulmonary process. Electronically signed by: Mike Staton MD Date:    10/07/2019 Time:    11:29    Ct Head Without Contrast    Result Date: 9/8/2019  CMS MANDATED QUALITY DATA - CT RADIATION - 436 All CT exams at this facility use dose modulation, iterative reconstruction, and or weight based dosing when appropriate to reduce radiation dose to as low as reasonably achievable. HISTORY: Syncope, fainting. FINDINGS: No prior studies currently available for comparison. There is no acute intracranial hemorrhage, with no mass effect or abnormal extra-axial fluid. Gray white differentiation is maintained, with the cortical sulci, ventricles and basal cisterns normal in size. The cerebellum and brainstem are unremarkable. The visualized paranasal sinuses and mastoid air cells are clear. There is no acute osseous abnormality. IMPRESSION: Negative noncontrast head CT. Electronically Signed by Tj RESTREPO on 9/9/2019 12:01 AM    Mri Brain Without Contrast    Result Date: 9/9/2019  EXAMINATION: MRI BRAIN WITHOUT CONTRAST CLINICAL HISTORY: Syncope/fainting; TECHNIQUE: Multiplanar noncontrast imaging is performed. COMPARISON: 08/22/2018. FINDINGS: The brain appears normally formed and myelinated. There are no findings of acute hemorrhage or infarction. There is no evidence of an intra-axial mass, mass effect or shift of the midline. The ventricular system is appropriate in size and position for age. Appropriate flow voids are present within the major blood vessels. A focal area of altered marrow signal within the clivus is less  apparent on the current examination.     No acute intracranial abnormality. Focal area of previously described marrow signal alteration in the clivus has become less apparent in the interval. Electronically signed by: Chuckie Jackson IV., MD Date:    09/09/2019 Time:    14:35    X-ray Chest Ap Portable    Result Date: 9/9/2019  PROCEDURE:   XR CHEST AP PORTABLE  dated  9/8/2019 10:59 PM CLINICAL HISTORY:   Male 49 years of age.   Chest Pain TECHNIQUE: AP view of the chest obtained portably at 10:59 PM. PREVIOUS STUDIES:  None Available FINDINGS: Cardiac and mediastinal contours are normal. Lungs are clear. There is no pleural effusion or pneumothorax. Bones are unremarkable. IMPRESSION: Normal Electronically Signed by Mounika Bolton on 9/9/2019 7:51 AM  ECG Results          EKG 12-lead (In process)  Result time 10/07/19 13:12:18    In process by Interface, Lab In [facility] (10/07/19 13:12:18)                 Narrative:    Test Reason : R53.1,    Vent. Rate : 075 BPM     Atrial Rate : 075 BPM     P-R Int : 148 ms          QRS Dur : 088 ms      QT Int : 440 ms       P-R-T Axes : 029 -06 024 degrees     QTc Int : 491 ms    Normal sinus rhythm  Possible Left atrial enlargement  Prolonged QT  Abnormal ECG  When compared with ECG of 08-SEP-2019 22:51,  Questionable change in The axis  Nonspecific T wave abnormality now evident in Inferior leads    Referred By: [email or web address]   SELF           Confirmed By:                                 Assessment/Plan:     * Rectal bleeding  Ongoing intermittent rectal bleeding, has external hemorrhoids.  Persistent diarrhea with history of colitis secondary to Opdivo previously.   He also has history of duodenal ulcers.   He has been needing regular prbc transfusions- actually received unit 10/3 for H/H 6.7/21.   Admit inpatient, medical floor.   Trend H&H.  Consult Gastroenterology.      Hemorrhoids  Known history of hemorrhoids likely exacerbated by persistent chronic diarrhea.  Has had  intermittent ongoing bleeding recently.      Acute blood loss anemia  Has been getting intermittent blood transfusions.  Last was on 10/03.  Admission H&H 8.4/26.  Serial H&H.  Transfuse hemoglobin less than 7.      Diarrhea  Ongoing diarrhea, felt secondary to medication side effect- Opdivo.  He is fearful to eat due to diarrhea.  Scheduled Imodium.  Fluid hydration.  Diet as tolerated.      Hypomagnesemia  Hypomagnesemia associated with generalized weakness secondary to decreased intake and increase loss with diarrhea.  Received 2 g IV magnesium in the ED.  Consult pharmacy for replacement and monitor.    Hypokalemia  Patient has hypokalemia which is currently uncontrolled. Last electrolytes reviewed-   Recent Labs   Lab 10/07/19  1313   K 3.1*     Will replace potassium as per sliding scale and monitor electrolytes closely.         Weakness  Multifactorial secondary to reduced appetite, oral intake, diarrhea, anemia.  PT consultation.      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  Current therapy is on hold.  Follows with oncologist .       Duodenal ulcer  Recent upper endoscopy with duodenal ulcer status post treatment.  Was felt secondary to copious NSAID use as well as steroids.  Continue PPI therapy.      Hypocalcemia  Corrected calcium 7.3.  Received replacement in the ED.  Replacement as per protocol and monitor.      Anxiety  Chronic anxiety, on Prozac.  Requesting dose of Ativan though family apprehensive.        VTE Risk Mitigation (From admission, onward)    None             Diane Khan MD  Department of Hospital Medicine   [facility]

## 2019-10-07 NOTE — ED PROVIDER NOTES
"Encounter Date: 10/7/2019       History     Chief Complaint   Patient presents with    Diarrhea     DC on Thursday at [facility], feels dehydrated     49-year-old male presented emergency department complaining of generalized malaise and weakness.  Patient has diarrhea.  Patient had chemotherapy and radiation in the past for treatment of throat cancer.  Patient did have improvement of Cancer however recently had GI bleeding as he has ulcers after using steroids and ibuprofen and Naprosyn.  Patient denies fever chills or chest pain or shortness of breath. Patient denies any abdominal pain or vomiting however has diarrhea for the past few days and feels dehydrated.        Review of patient's allergies indicates:   Allergen Reactions    Trazodone Other (See Comments)     confusion     Past Medical History:   Diagnosis Date    Acute renal failure 6/21/2018    Alcohol abuse     ended 2004    Chemotherapy induced neutropenia 6/7/2018    Former smoker     HPV in male     Hx of psychiatric care     Ativan, Paxil ("caused anger problems")    Opiate addiction     Squamous cell carcinoma of palatine tonsil     throat and tonsillar    Substance abuse     opiates, methamphetamines; ended 2004     Past Surgical History:   Procedure Laterality Date    ESOPHAGOGASTRODUODENOSCOPY Left 9/9/2019    Procedure: EGD (ESOPHAGOGASTRODUODENOSCOPY);  Surgeon: Balta Lisa MD;  Location: [facility];  Service: Endoscopy;  Laterality: Left;    ESOPHAGOGASTRODUODENOSCOPY N/A 9/12/2019    Procedure: EGD (ESOPHAGOGASTRODUODENOSCOPY);  Surgeon: Perez Mon III, MD;  Location: [facility];  Service: Endoscopy;  Laterality: N/A;    ESOPHAGOGASTRODUODENOSCOPY N/A 10/2/2019    Procedure: EGD (ESOPHAGOGASTRODUODENOSCOPY);  Surgeon: Bonifacio Sosa MD;  Location: [facility] ([address] [place] [address]);  Service: Endoscopy;  Laterality: N/A;    GASTROSTOMY TUBE PLACEMENT Left 02/12/2018    MEDIPORT REMOVAL N/A 6/6/2018    Procedure: Removal-port-a-cath; "  Surgeon: [facility];  Location: [facility] OR [address] [place] [address];  Service: General;  Laterality: N/A;     Family History   Problem Relation Age of Onset    Leukemia Mother     Hypertension Father     Thyroid disease Sister     Depression Sister     Hypertension Brother     Brain cancer Maternal Uncle     Brain cancer Maternal Uncle      Social History     Tobacco Use    Smoking status: Former Smoker     Packs/day: 1.50     Years: 25.00     Pack years: 37.50     Types: Cigarettes     Last attempt to quit: 2017     Years since quittin.3    Smokeless tobacco: Never Used    Tobacco comment: smoked for about 25 years. quit 6 months ago   Substance Use Topics    Alcohol use: No     Comment: history of overuse    Drug use: No     Comment: Former Opiate/methamphetamine addiction     Review of Systems   Constitutional: Positive for fatigue. Negative for chills and fever.   HENT: Negative.    Eyes: Negative.    Respiratory: Negative.    Cardiovascular: Negative.    Gastrointestinal: Positive for diarrhea and nausea. Negative for abdominal pain, blood in stool, constipation, rectal pain and vomiting.   Endocrine: Negative.    Genitourinary: Negative.    Musculoskeletal: Negative.    Skin: Negative.    Allergic/Immunologic: Negative.    Neurological: Negative.    Hematological: Negative.    Psychiatric/Behavioral: Negative.  Negative for agitation.   All other systems reviewed and are negative.      Physical Exam     Initial Vitals [10/07/19 1048]   BP Pulse Resp Temp SpO2   124/76 85 20 98.5 °F (36.9 °C) 98 %      MAP       --         Physical Exam    Nursing note and vitals reviewed.  Constitutional: He appears well-developed and well-nourished.   HENT:   Head: Normocephalic and atraumatic.   Nose: Nose normal.   Mouth/Throat: Oropharynx is clear and moist. No oropharyngeal exudate.   Eyes: Conjunctivae and EOM are normal.   Neck: Normal range of motion. No tracheal deviation present.   Cardiovascular:  Normal rate, regular rhythm, normal heart sounds and intact distal pulses. Exam reveals no friction rub.    No murmur heard.  Pulmonary/Chest: Breath sounds normal. No respiratory distress. He has no wheezes. He has no rales.   Abdominal: Soft. He exhibits no distension. There is no tenderness. There is no rebound.   Genitourinary: : Acceptable.  Genitourinary Comments: Patient has external hemorrhoid noted on rectal exam which has some blood.  Color of stool is within normal limits other than the bleeding from the hemorrhoid.   Musculoskeletal: Normal range of motion. He exhibits no edema or tenderness.   Neurological: He is alert and oriented to person, place, and time. He has normal strength. No cranial nerve deficit. GCS score is 15. GCS eye subscore is 4. GCS verbal subscore is 5. GCS motor subscore is 6.   Skin: Skin is warm and dry. Capillary refill takes less than 2 seconds. No erythema. No pallor.   Psychiatric: He has a normal mood and affect. Thought content normal.         ED Course   Procedures  Labs Reviewed   TROPONIN I   DRUG SCREEN PANEL, URINE EMERGENCY   URINALYSIS, REFLEX TO URINE CULTURE   CK   COMPREHENSIVE METABOLIC PANEL   CK-MB   CBC W/ AUTO DIFFERENTIAL   APTT   PROTIME-INR   AMYLASE   LIPASE   MAGNESIUM   OCCULT BLOOD X 1, STOOL          Imaging Results          X-Ray Chest PA And Lateral (Final result)  Result time 10/07/19 11:29:49    Final result by Mike Staton MD (10/07/19 11:29:49)                 Impression:      No acute cardiac or pulmonary process.      Electronically signed by: Mike Staton MD  Date:    10/07/2019  Time:    11:29             Narrative:    CLINICAL HISTORY:  ([id number])50 y/o  (1970) M    Weakness    TECHNIQUE:  (A#[id number], exam time 10/7/2019 11:29)    XR CHEST PA AND LATERAL IMG36    COMPARISON:  Radiograph from 09/08/2019    FINDINGS:  The lungs are clear. Costophrenic angles are seen without effusion. No pneumothorax is  identified. The heart is normal in size. The mediastinum is within normal limits. Osseous structures show degenerative changes in the spine. The visualized upper abdomen is unremarkable.                                 Medical Decision Making:   Differential Diagnosis:   49-year-old male presented emergency department with generalized malaise and weakness and diarrhea.  Patient had previous blood transfusions and similar weakness when he was anemic.  Patient had chemotherapy and radiation in the past.  Patient feels dehydrated.  IV fluids given and patient feels better after that.  Hypocalcemia, hypomagnesemia, hypokalemia noted. Patient is anemic and is symptomatic however will closely monitor the hemoglobin counts and see if he would need blood transfusion, if he does not improve with conservative treatment.  Anxiety treated.  Hospital Medicine consulted for evaluation for admission and further management.  Clinical Tests:   Lab Tests: Reviewed  Medical Tests: Reviewed                   ED Course as of Oct 07 1256   Mon Oct 07, 2019   1052 Patient with history of throat cancer, recently discharged from [facility] presents to the emergency department with general weakness    [BF]      ED Course User Index  [BF] VITOR Johnston     Clinical Impression:       ICD-10-CM ICD-9-CM   1. Diarrhea, unspecified type R19.7 787.91   2. Weakness R53.1 780.79   3. Hypokalemia E87.6 276.8   4. Symptomatic anemia D64.9 285.9   5. Hypocalcemia E83.51 275.41                                Tara Carias MD  10/07/19 5400

## 2019-10-07 NOTE — ASSESSMENT & PLAN NOTE
Patient has hypokalemia which is currently uncontrolled. Last electrolytes reviewed-   Recent Labs   Lab 10/07/19  1313   K 3.1*     Will replace potassium as per sliding scale and monitor electrolytes closely.

## 2019-10-07 NOTE — ASSESSMENT & PLAN NOTE
He received new adjuvant chemotherapy followed by chemoradiation therapy and immunotherapy.  Current therapy is on hold.  Follows with oncologist .

## 2019-10-08 LAB
ANION GAP SERPL CALC-SCNC: 5 MMOL/L (ref 8–16)
BUN SERPL-MCNC: 6 MG/DL (ref 6–20)
C DIFF GDH STL QL: NEGATIVE
C DIFF TOX A+B STL QL IA: NEGATIVE
CA-I BLDV-SCNC: 0.96 MMOL/L (ref 1.06–1.42)
CALCIUM SERPL-MCNC: 7 MG/DL (ref 8.7–10.5)
CHLORIDE SERPL-SCNC: 102 MMOL/L (ref 95–110)
CO2 SERPL-SCNC: 33 MMOL/L (ref 23–29)
CREAT SERPL-MCNC: 1 MG/DL (ref 0.5–1.4)
ERYTHROCYTE [DISTWIDTH] IN BLOOD BY AUTOMATED COUNT: 16.2 % (ref 11.5–14.5)
EST. GFR  (AFRICAN AMERICAN): >60 ML/MIN/1.73 M^2
EST. GFR  (NON AFRICAN AMERICAN): >60 ML/MIN/1.73 M^2
GLUCOSE SERPL-MCNC: 98 MG/DL (ref 70–110)
HCT VFR BLD AUTO: 27.6 % (ref 40–54)
HCT VFR BLD AUTO: 27.9 % (ref 40–54)
HGB BLD-MCNC: 8.8 G/DL (ref 14–18)
HGB BLD-MCNC: 8.9 G/DL (ref 14–18)
MAGNESIUM SERPL-MCNC: 3.1 MG/DL (ref 1.6–2.6)
MCH RBC QN AUTO: 30.1 PG (ref 27–31)
MCHC RBC AUTO-ENTMCNC: 31.9 G/DL (ref 32–36)
MCV RBC AUTO: 95 FL (ref 82–98)
PHOSPHATE SERPL-MCNC: 2.2 MG/DL (ref 2.7–4.5)
PLATELET # BLD AUTO: 208 K/UL (ref 150–350)
PMV BLD AUTO: 8 FL (ref 9.2–12.9)
POTASSIUM SERPL-SCNC: 3.9 MMOL/L (ref 3.5–5.1)
RBC # BLD AUTO: 2.92 M/UL (ref 4.6–6.2)
SODIUM SERPL-SCNC: 140 MMOL/L (ref 136–145)
WBC # BLD AUTO: 5.17 K/UL (ref 3.9–12.7)

## 2019-10-08 PROCEDURE — 80048 BASIC METABOLIC PNL TOTAL CA: CPT

## 2019-10-08 PROCEDURE — 85014 HEMATOCRIT: CPT

## 2019-10-08 PROCEDURE — 83735 ASSAY OF MAGNESIUM: CPT

## 2019-10-08 PROCEDURE — 84100 ASSAY OF PHOSPHORUS: CPT

## 2019-10-08 PROCEDURE — 97162 PT EVAL MOD COMPLEX 30 MIN: CPT

## 2019-10-08 PROCEDURE — 25000003 PHARM REV CODE 250: Performed by: INTERNAL MEDICINE

## 2019-10-08 PROCEDURE — 97116 GAIT TRAINING THERAPY: CPT

## 2019-10-08 PROCEDURE — 85018 HEMOGLOBIN: CPT

## 2019-10-08 PROCEDURE — 63600175 PHARM REV CODE 636 W HCPCS

## 2019-10-08 PROCEDURE — 82330 ASSAY OF CALCIUM: CPT

## 2019-10-08 PROCEDURE — 36415 COLL VENOUS BLD VENIPUNCTURE: CPT

## 2019-10-08 PROCEDURE — 25000003 PHARM REV CODE 250

## 2019-10-08 PROCEDURE — 85027 COMPLETE CBC AUTOMATED: CPT

## 2019-10-08 PROCEDURE — 87324 CLOSTRIDIUM AG IA: CPT

## 2019-10-08 PROCEDURE — 12000002 HC ACUTE/MED SURGE SEMI-PRIVATE ROOM

## 2019-10-08 RX ORDER — CHOLESTYRAMINE 4 G/4.8G
4 POWDER, FOR SUSPENSION ORAL 2 TIMES DAILY
Status: DISCONTINUED | OUTPATIENT
Start: 2019-10-08 | End: 2019-10-09 | Stop reason: HOSPADM

## 2019-10-08 RX ORDER — CALCIUM CHLORIDE IN 0.9 % NACL 1 G/100 ML
1 INTRAVENOUS SOLUTION, PIGGYBACK (ML) INTRAVENOUS
Status: COMPLETED | OUTPATIENT
Start: 2019-10-08 | End: 2019-10-08

## 2019-10-08 RX ORDER — POTASSIUM CHLORIDE 750 MG/1
10 CAPSULE, EXTENDED RELEASE ORAL ONCE
Status: COMPLETED | OUTPATIENT
Start: 2019-10-08 | End: 2019-10-08

## 2019-10-08 RX ADMIN — LOPERAMIDE HYDROCHLORIDE 2 MG: 2 CAPSULE ORAL at 02:10

## 2019-10-08 RX ADMIN — TIZANIDINE 4 MG: 4 TABLET ORAL at 08:10

## 2019-10-08 RX ADMIN — FLUOXETINE 40 MG: 20 CAPSULE ORAL at 09:10

## 2019-10-08 RX ADMIN — CALCIUM CHLORIDE 0.1 G: 100 INJECTION, SOLUTION INTRAVENOUS at 05:10

## 2019-10-08 RX ADMIN — LOPERAMIDE HYDROCHLORIDE 2 MG: 2 CAPSULE ORAL at 08:10

## 2019-10-08 RX ADMIN — GABAPENTIN 300 MG: 300 CAPSULE ORAL at 02:10

## 2019-10-08 RX ADMIN — PANTOPRAZOLE SODIUM 40 MG: 40 TABLET, DELAYED RELEASE ORAL at 05:10

## 2019-10-08 RX ADMIN — LOPERAMIDE HYDROCHLORIDE 2 MG: 2 CAPSULE ORAL at 05:10

## 2019-10-08 RX ADMIN — GABAPENTIN 300 MG: 300 CAPSULE ORAL at 09:10

## 2019-10-08 RX ADMIN — GABAPENTIN 300 MG: 300 CAPSULE ORAL at 08:10

## 2019-10-08 RX ADMIN — MAGNESIUM SULFATE 2 G: 2 INJECTION INTRAVENOUS at 12:10

## 2019-10-08 RX ADMIN — LOPERAMIDE HYDROCHLORIDE 2 MG: 2 CAPSULE ORAL at 09:10

## 2019-10-08 RX ADMIN — ACETAMINOPHEN 650 MG: 325 TABLET ORAL at 09:10

## 2019-10-08 RX ADMIN — CHOLESTYRAMINE 4 G: 4 POWDER, FOR SUSPENSION ORAL at 05:10

## 2019-10-08 RX ADMIN — POTASSIUM CHLORIDE 10 MEQ: 750 CAPSULE, EXTENDED RELEASE ORAL at 05:10

## 2019-10-08 RX ADMIN — ACETAMINOPHEN 650 MG: 325 TABLET ORAL at 08:10

## 2019-10-08 RX ADMIN — CALCIUM CHLORIDE 1 G: 100 INJECTION, SOLUTION INTRAVENOUS at 05:10

## 2019-10-08 RX ADMIN — CALCIUM CHLORIDE 1 G: 100 INJECTION, SOLUTION INTRAVENOUS at 08:10

## 2019-10-08 NOTE — PROGRESS NOTES
ELECTROLYTE MANAGEMENT PROGRESS NOTE    Objective:  49 y.o., male, Actual Body Weight = 78.5 kg (173 lb 1 oz)    The patient has the following labs:  Lab Results   Component Value Date    K 3.9 10/08/2019    MG 3.1 (H) 10/08/2019    PHOS 2.2 (L) 10/08/2019    CALCIUM 7.0 (L) 10/08/2019    ALBUMIN 3.1 (L) 10/07/2019    CAION 0.96 (L) 10/08/2019    CREATININE 1.0 10/08/2019     Diet:  Regular   IV Access:  Peripheral    Assessment:  Electrolyte Deficiency:  Hypokalemia (K+ < 4.0), Hypophosphatemia and Hypocalcemia    Patient type:  Critical Care (includes Cardiology A)    Plan:  Replace potassium with Potassium chloride 10 mEq PO x 1 dose(s).   Replace calcium with Calcium chloride 1 gram IVPB x 2 dose(s).    Will delay phosphate replacement as phosphates can cause hypocalcemia.  Will allow serum calcium to improve before giving more sodium phosphate.    Will continue to monitor electrolytes daily.    Thank you for allowing us to participate in this patient's care.     Yana Johnson, PharmD 10/8/2019 4:37 PM  ID Clinical Pharmacist, Ext [phone]

## 2019-10-08 NOTE — ASSESSMENT & PLAN NOTE
Ongoing intermittent rectal bleeding, has external hemorrhoids.  Persistent diarrhea with history of colitis secondary to Opdivo previously.   He also has history of duodenal ulcers.   He has been needing regular prbc transfusions- actually received unit 10/3 for H/H 6.7/21.   Trending H&H. Stable so far  Consult Gastroenterology.

## 2019-10-08 NOTE — SUBJECTIVE & OBJECTIVE
Interval History:  Afvss, h/h stable. Patient seen and examined. He states that he is feeling better today. He has less fatigue and more energy. Diarrhea improving. Denies any CP, SOB, N/V, headaches, fever or chills.       Review of Systems   Per interval history, all other systems reviewed and negative.     Objective:     Vital Signs (Most Recent):  Temp: 98.2 °F (36.8 °C) (10/08/19 1112)  Pulse: 66 (10/08/19 1112)  Resp: 18 (10/08/19 1112)  BP: 114/72 (10/08/19 1112)  SpO2: 99 % (10/08/19 1112) Vital Signs (24h Range):  Temp:  [98.2 °F (36.8 °C)-98.8 °F (37.1 °C)] 98.2 °F (36.8 °C)  Pulse:  [66-87] 66  Resp:  [9-27] 18  SpO2:  [96 %-100 %] 99 %  BP: (102-131)/(59-89) 114/72     Weight: 78.5 kg (173 lb 1 oz)  Body mass index is 26.31 kg/m².    Intake/Output Summary (Last 24 hours) at 10/8/2019 1225  Last data filed at 10/8/2019 0600  Gross per 24 hour   Intake 1650 ml   Output 650 ml   Net 1000 ml      Physical Exam   Constitutional: He appears well-developed and well-nourished. No distress.   HENT:   Head: Normocephalic and atraumatic.   Mouth/Throat: Oropharynx is clear and moist.   Eyes: Pupils are equal, round, and reactive to light. Conjunctivae and EOM are normal.   Neck: Neck supple.   Cardiovascular: Normal rate, regular rhythm, normal heart sounds and intact distal pulses.   Pulmonary/Chest: Effort normal and breath sounds normal.   Abdominal: Soft. Bowel sounds are normal. He exhibits no distension. There is no tenderness. There is no rebound and no guarding.   Musculoskeletal: He exhibits no edema, tenderness or deformity.   Neurological: He has normal reflexes.   Skin: Skin is warm and dry. He is not diaphoretic.   Psychiatric: He has a normal mood and affect. His behavior is normal.   Nursing note and vitals reviewed.      Significant Labs:   CBC:   Recent Labs   Lab 10/07/19  1313 10/08/19  0542   WBC 6.30 5.17   HGB 8.4* 8.8*   HCT 26.0* 27.6*    208     CMP:   Recent Labs   Lab  10/07/19  1313 10/08/19  0542    140   K 3.1* 3.9   CL 98 102   CO2 29 33*   GLU 96 98   BUN 7 6   CREATININE 1.1 1.0   CALCIUM 6.6* 7.0*   PROT 5.9*  --    ALBUMIN 3.1*  --    BILITOT 0.9  --    ALKPHOS 47*  --    AST 18  --    ALT 12  --    ANIONGAP 11 5*   EGFRNONAA >60.0 >60.0     Magnesium:   Recent Labs   Lab 10/07/19  1313 10/08/19  0542   MG 1.4* 3.1*     All pertinent labs within the past 24 hours have been reviewed.    Significant Imaging: no new images

## 2019-10-08 NOTE — ASSESSMENT & PLAN NOTE
Has been getting intermittent blood transfusions.  Last was on 10/03.  Admission H&H 8.4/26.  Today H&H 8.8/27.6  Serial H&H.  Transfuse hemoglobin less than 7.

## 2019-10-08 NOTE — CONSULTS
"GASTROENTEROLOGY INPATIENT CONSULT NOTE  Patient Name: Burton Whiting  Patient MRN: [id number]  Patient : 1970    Admit Date: 10/7/2019  Service date: 10/8/2019    Reason for Consult: diarrhea / rectal bleeding    PCP: Christopher Turpin DO    Chief Complaint   Patient presents with    Diarrhea     DC on Thursday at [facility], feels dehydrated    Fatigue       HPI: Patient is a 49 y.o. male with PMHx tonsilar CA s/p chemo / immune Rx (Opdivo-colitis), duodenal ulcers, past substance abuse, previous PEG presents for evaluation of diarrhea and rectal bleeding. Chronic x 6-9 months intermittent, non-progressive. States has had diarrhea intermittently for most of  since Opdivo and intermittent rectal bleeding over past 2 months from hemorrhoids. States initial response to steroids that faded, imodium w/ borderline response, and neurologic side effects w/ lomotil. Has appt w/ his GI doc Thursday in [place] but was in town visiting his brother.     CHART REVIEW;   Hg 8.8   Stool Cx and O/P Negative in past  EGD 10/'19 &  - duodenal ulcers s/p coag w/ hemostasis; improvement on f/u EGD in 10/'19  PET  - No evidence of hypermetabolic tumor.  Stable low-grade uptake in the left neck in keeping with post treatment change.  CT Abd  - Nml biliary, GB, panc; tics; PEG tube    Past Medical History:  Past Medical History:   Diagnosis Date    Acute renal failure 2018    Alcohol abuse     ended     Chemotherapy induced neutropenia 2018    Former smoker     HPV in male     Hx of psychiatric care     Ativan, Paxil ("caused anger problems")    Opiate addiction     Squamous cell carcinoma of palatine tonsil     throat and tonsillar    Substance abuse     opiates, methamphetamines; ended         Past Surgical History:  Past Surgical History:   Procedure Laterality Date    ESOPHAGOGASTRODUODENOSCOPY Left 2019    Procedure: EGD (ESOPHAGOGASTRODUODENOSCOPY);  Surgeon: Balta Lisa MD;  " Location: [facility] ENDO;  Service: Endoscopy;  Laterality: Left;    ESOPHAGOGASTRODUODENOSCOPY N/A 9/12/2019    Procedure: EGD (ESOPHAGOGASTRODUODENOSCOPY);  Surgeon: Perez Mon III, MD;  Location: [facility];  Service: Endoscopy;  Laterality: N/A;    ESOPHAGOGASTRODUODENOSCOPY N/A 10/2/2019    Procedure: EGD (ESOPHAGOGASTRODUODENOSCOPY);  Surgeon: Bonifacio Sosa MD;  Location: [facility] (2ND FLR);  Service: Endoscopy;  Laterality: N/A;    GASTROSTOMY TUBE PLACEMENT Left 02/12/2018    MEDIPORT REMOVAL N/A 6/6/2018    Procedure: Removal-port-a-cath;  Surgeon: Blayne Diagnostic Provider;  Location: [facility] OR 2ND FLR;  Service: General;  Laterality: N/A;        Home Medications:  Medications Prior to Admission   Medication Sig Dispense Refill Last Dose    acetaminophen (TYLENOL) 325 MG tablet Take 2 tablets (650 mg total) by mouth every 6 (six) hours as needed.  0 10/7/2019 at 08:00    diphenoxylate-atropine 2.5-0.025 mg (LOMOTIL) 2.5-0.025 mg per tablet Take 2 tablets by mouth 4 (four) times daily as needed for Diarrhea. 240 tablet 0 10/6/2019 at Unknown time    FLUoxetine 40 MG capsule Take 1 capsule (40 mg total) by mouth once daily. 30 capsule 6 10/7/2019 at 08:00    lidocaine (LIDODERM) 5 % Place 1 patch onto the skin daily as needed. Remove & Discard patch within 12 hours or as directed by MD 7 patch 0 Past Month at Unknown time    loperamide (IMODIUM A-D) 2 mg Tab Take 2 mg by mouth 4 (four) times daily as needed.   10/6/2019 at Unknown time    pantoprazole (PROTONIX) 40 MG tablet Take 1 tablet (40 mg total) by mouth once daily. (Patient taking differently: Take 40 mg by mouth 2 (two) times daily. ) 30 tablet 11 10/6/2019 at Unknown time    tiZANidine (ZANAFLEX) 2 MG tablet Take 4 mg by mouth nightly as needed. NECK PAIN   Past Month at Unknown time    traMADol (ULTRAM) 50 mg tablet Take 1 tablet (50 mg total) by mouth every 8 (eight) hours as needed for Pain. 21 tablet 0 Past Month at Unknown  time    calcium-vitamin D3 (CALCIUM 500 + D) 500 mg(1,250mg) -200 unit per tablet Take 1 tablet by mouth 2 (two) times daily with meals.   Taking    denosumab (XGEVA) 120 mg/1.7 mL (70 mg/mL) Soln Inject 1.7 mLs (120 mg total) into the skin once. for 1 dose 3 vial 12 Taking    gabapentin (NEURONTIN) 300 MG capsule Take 1 capsule (300 mg total) by mouth 3 (three) times daily. 270 capsule 3 10/5/2019    hydrocortisone 2.5 % cream Apply topically 2 (two) times daily. (Patient taking differently: Apply 1 application topically 2 (two) times daily. ) 28 g 0 10/5/2019    mirtazapine (REMERON) 7.5 MG Tab    Not Taking    multivitamin capsule Take 1 capsule by mouth once daily.   Taking       Inpatient Medications:   calcium chloride IVPB  1 g Intravenous Q1H    FLUoxetine  40 mg Oral Daily    gabapentin  300 mg Oral TID    lidocaine  1 patch Transdermal Daily    loperamide  2 mg Oral QID    pantoprazole  40 mg Oral Daily    potassium chloride  10 mEq Oral Once     acetaminophen, dextrose 50%, dextrose 50%, glucagon (human recombinant), glucose, glucose, ondansetron, sodium chloride 0.9%, tiZANidine, traMADol, trazodone    Review of patient's allergies indicates:   Allergen Reactions    Trazodone Other (See Comments)     confusion       Social History:   Social History     Occupational History    Not on file   Tobacco Use    Smoking status: Former Smoker     Packs/day: 1.50     Years: 25.00     Pack years: 37.50     Types: Cigarettes     Last attempt to quit: 2017     Years since quittin.3    Smokeless tobacco: Never Used    Tobacco comment: smoked for about 25 years. quit 6 months ago   Substance and Sexual Activity    Alcohol use: No     Comment: history of overuse    Drug use: No     Comment: Former Opiate/methamphetamine addiction    Sexual activity: Yes     Partners: Female       Family History:   Family History   Problem Relation Age of Onset    Leukemia Mother     Hypertension Father      "Thyroid disease Sister     Depression Sister     Hypertension Brother     Brain cancer Maternal Uncle     Brain cancer Maternal Uncle        Review of Systems:  A 10 point review of systems was performed and was normal, except as mentioned in the HPI, including constitutional, HEENT, heme, lymph, cardiovascular, respiratory, gastrointestinal, genitourinary, neurologic, endocrine, psychiatric and musculoskeletal.      OBJECTIVE:    Physical Exam:  24 Hour Vital Sign Ranges: Temp:  [98.2 °F (36.8 °C)-98.8 °F (37.1 °C)] 98.3 °F (36.8 °C)  Pulse:  [65-87] 65  Resp:  [18-27] 18  SpO2:  [96 %-99 %] 96 %  BP: (102-127)/(65-89) 106/68  Most recent vitals: /68 (BP Location: Left arm, Patient Position: Lying)   Pulse 65   Temp 98.3 °F (36.8 °C) (Oral)   Resp 18   Ht 5' 8" (1.727 m)   Wt 78.5 kg (173 lb 1 oz)   SpO2 96%   BMI 26.31 kg/m²    GEN: well-developed, well-nourished, awake and alert, non-toxic appearing adult  HEENT: PERRL, sclera anicteric, oral mucosa pink and moist without lesion  NECK: trachea midline; Good ROM  CV: regular rate and rhythm, no murmurs or gallops  RESP: clear to auscultation bilaterally, no wheezes, rhonci or rales  ABD: soft, non-tender, non-distended, normal bowel sounds, no hepatosplenomegaly, no hernias  EXT: no swelling or edema, 2+ pulses distally  SKIN: no rashes or jaundice  PSYCH: normal affect    Labs:   Recent Labs     10/07/19  1313 10/08/19  0542   WBC 6.30 5.17   MCV 94 95    208     Recent Labs     10/07/19  1313 10/08/19  0542    140   K 3.1* 3.9   CL 98 102   CO2 29 33*   BUN 7 6   GLU 96 98     No results for input(s): ALB in the last 72 hours.    Invalid input(s): ALKP, SGOT, SGPT, TBIL, DBIL, TPRO  Recent Labs     10/07/19  1313   INR 1.2         Radiology Review:  X-Ray Chest PA And Lateral   Final Result      No acute cardiac or pulmonary process.         Electronically signed by: Mike Staton MD   Date:    10/07/2019   Time:    11:29    "         IMPRESSION / RECOMMENDATIONS:  49 y.o. male with PMHx tonsilar CA s/p chemo / immune Rx (Opdivo-colitis), duodenal ulcers, past substance abuse, previous PEG presents for evaluation of diarrhea and rectal bleeding. Discussed inpatient colonoscopy and he would like to hold off as chronic sx and has established GI MD in [facility].      -Conservative for now w/ questran  -If any concerns, will revisit inpatient colonoscopy    Thank you for this consult.    Perez Mon III  10/8/2019  4:54 PM

## 2019-10-08 NOTE — PROGRESS NOTES
[facility] Medicine  Progress Note    Patient Name: Burton Whiting  MRN: [id number]  Patient Class: IP- Inpatient   Admission Date: 10/7/2019  Length of Stay: 1 days  Attending Physician: Lyly English DO  Primary Care Provider: Christopher Turpin DO    Subjective:     Principal Problem:Rectal bleeding    HPI:  Mr. Whiting is a 49-years-old male who presented to the ED with chief complaint of generalized weakness associated with anorexia and diarrhea.  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  He was started on steroids as well as developed neck pain and was taking copious amounts of NSAIDs, recently had upper GI bleed secondary to 2 duodenal ulcer, required 5 units PRBC transfusion during that admission.  The diarrhea has been persistent and he actually had recent admission at [facility] 2 days ago- treated for dehydration and anemia, received 1 unit PRBC transfusion on day of discharge. He states since discharge diarrhea has persisted with generalized weakness and he is fearful of eating due to the diarrhea.  He states he has intermittent ongoing rectal bleeding, he feels related to his hemorrhoids, present on wiping as well as on underwear. As per his brother at bedside, his oncologist prescribed anti diarrheals and he actually has outpatient follow up with Dr Patterson (gastroenterology) Thursday of this week. Patient has had issues with opioids in the past and requesting to minimize however, does feel anxious and is requesting Ativan. In the ED he was HD stable, rectal examination with external hemorrhoids, FOBT positive, admissions labs H/H 8.4/26, K 3.1, Mg 1.4, Ca 6.6, CXR clear, EKG SR. He received 1L LR, calcium, magnesium, potassium supplementation as well as 40 mg IV PPI  therapy.    Overview/Hospital Course:  No notes on file    Interval History:  Afvss, h/h stable. Patient seen and examined. He states that he is feeling better today. He has less fatigue and more energy. Diarrhea improving. Denies any CP, SOB, N/V, headaches, fever or chills.       Review of Systems   Per interval history, all other systems reviewed and negative.     Objective:     Vital Signs (Most Recent):  Temp: 98.2 °F (36.8 °C) (10/08/19 1112)  Pulse: 66 (10/08/19 1112)  Resp: 18 (10/08/19 1112)  BP: 114/72 (10/08/19 1112)  SpO2: 99 % (10/08/19 1112) Vital Signs (24h Range):  Temp:  [98.2 °F (36.8 °C)-98.8 °F (37.1 °C)] 98.2 °F (36.8 °C)  Pulse:  [66-87] 66  Resp:  [9-27] 18  SpO2:  [96 %-100 %] 99 %  BP: (102-131)/(59-89) 114/72     Weight: 78.5 kg (173 lb 1 oz)  Body mass index is 26.31 kg/m².    Intake/Output Summary (Last 24 hours) at 10/8/2019 1225  Last data filed at 10/8/2019 0600  Gross per 24 hour   Intake 1650 ml   Output 650 ml   Net 1000 ml      Physical Exam   Constitutional: He appears well-developed and well-nourished. No distress.   HENT:   Head: Normocephalic and atraumatic.   Mouth/Throat: Oropharynx is clear and moist.   Eyes: Pupils are equal, round, and reactive to light. Conjunctivae and EOM are normal.   Neck: Neck supple.   Cardiovascular: Normal rate, regular rhythm, normal heart sounds and intact distal pulses.   Pulmonary/Chest: Effort normal and breath sounds normal.   Abdominal: Soft. Bowel sounds are normal. He exhibits no distension. There is no tenderness. There is no rebound and no guarding.   Musculoskeletal: He exhibits no edema, tenderness or deformity.   Neurological: He has normal reflexes.   Skin: Skin is warm and dry. He is not diaphoretic.   Psychiatric: He has a normal mood and affect. His behavior is normal.   Nursing note and vitals reviewed.      Significant Labs:   CBC:   Recent Labs   Lab 10/07/19  1313 10/08/19  0542   WBC 6.30 5.17   HGB 8.4* 8.8*   HCT 26.0*  27.6*    208     CMP:   Recent Labs   Lab 10/07/19  1313 10/08/19  0542    140   K 3.1* 3.9   CL 98 102   CO2 29 33*   GLU 96 98   BUN 7 6   CREATININE 1.1 1.0   CALCIUM 6.6* 7.0*   PROT 5.9*  --    ALBUMIN 3.1*  --    BILITOT 0.9  --    ALKPHOS 47*  --    AST 18  --    ALT 12  --    ANIONGAP 11 5*   EGFRNONAA >60.0 >60.0     Magnesium:   Recent Labs   Lab 10/07/19  1313 10/08/19  0542   MG 1.4* 3.1*     All pertinent labs within the past 24 hours have been reviewed.    Significant Imaging: no new images      Assessment/Plan:      * Rectal bleeding  Ongoing intermittent rectal bleeding, has external hemorrhoids.  Persistent diarrhea with history of colitis secondary to Opdivo previously.   He also has history of duodenal ulcers.   He has been needing regular prbc transfusions- actually received unit 10/3 for H/H 6.7/21.   Trending H&H. Stable so far  Consult Gastroenterology.      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  Current therapy is on hold.  Follows with oncologist .       Hypokalemia  K 3.1 on admission and repleted  Today K 3.9  Replace potassium as per sliding scale and monitor electrolytes closely.         Weakness  Multifactorial secondary to reduced appetite, oral intake, diarrhea, anemia.  PT consultation.      Acute blood loss anemia  Has been getting intermittent blood transfusions.  Last was on 10/03.  Admission H&H 8.4/26.  Today H&H 8.8/27.6  Serial H&H.  Transfuse hemoglobin less than 7.      Diarrhea  Ongoing diarrhea, felt secondary to medication side effect- Opdivo.  He is fearful to eat due to diarrhea.  Scheduled Imodium.  Fluid hydration.  Diet as tolerated.  Improving          Duodenal ulcer  Recent upper endoscopy with duodenal ulcer status post treatment.  Was felt secondary to copious NSAID use as well as steroids.  Continue PPI therapy.      Hemorrhoids  Known  history of hemorrhoids likely exacerbated by persistent chronic diarrhea.  Has had intermittent ongoing bleeding recently.      Hypomagnesemia  Hypomagnesemia associated with generalized weakness secondary to decreased intake and increase loss with diarrhea.  Received 2 g IV magnesium in the ED.  Resolved   Consult pharmacy for replacement and monitor.    Hypocalcemia  Received replacement in the ED.  Replacement as per protocol and monitor.      Anxiety  Chronic anxiety, on Prozac.    stable        VTE Risk Mitigation (From admission, onward)         Ordered     IP VTE HIGH RISK PATIENT  Once      10/07/19 2048     Place sequential compression device  Until discontinued      10/07/19 2048                Dispo: Follow up GI for recs.       Lyly English DO  Department of Hospital Medicine   [facility]

## 2019-10-08 NOTE — ASSESSMENT & PLAN NOTE
K 3.1 on admission and repleted  Today K 3.9  Replace potassium as per sliding scale and monitor electrolytes closely.

## 2019-10-08 NOTE — ASSESSMENT & PLAN NOTE
Hypomagnesemia associated with generalized weakness secondary to decreased intake and increase loss with diarrhea.  Received 2 g IV magnesium in the ED.  Resolved   Consult pharmacy for replacement and monitor.

## 2019-10-08 NOTE — PROGRESS NOTES
"[facility]  Adult Nutrition  Progress Note    SUMMARY       Recommendations    Recommendation/Intervention: 1.) continue PO diet as tolerated by pt 2.)  will assist with meal choices daily 3.) Unjury TID (330 kcal, 60 g pro)   Goals: 1.) pt to meet >75% estimated needs via PO diet/supplements  Nutrition Goal Status: progressing towards goal    Reason for Assessment    Reason For Assessment: other (see comments)(RD identified; MST score)  Diagnosis: other (see comments)(rectal bleeding)  Relevant Medical History: hx: squamous cell carcinoma of palatine tonsil, chemo, ARF  Interdisciplinary Rounds: attended    Nutrition Risk Screen    Nutrition Risk Screen: no indicators present    Nutrition/Diet History    Patient Reported Diet/Restrictions/Preferences: general  Spiritual, Cultural Beliefs, Quaker Practices, Values that Affect Care: yes  Food Allergies: NKFA  Factors Affecting Nutritional Intake: diarrhea, decreased appetite    Anthropometrics    Temp: 98.2 °F (36.8 °C)  Height Method: Stated  Height: 5' 8" (172.7 cm)  Height (inches): 68 in  Weight Method: Bed Scale  Weight: 78.5 kg (173 lb 1 oz)  Weight (lb): 173.06 lb  Ideal Body Weight (IBW), Male: 154 lb  % Ideal Body Weight, Male (lb): 112.38 lb  BMI (Calculated): 26.4  BMI Grade: 25 - 29.9 - overweight  Weight Loss: other (see comments)(wt stable since previous admit 9/12/19 per old chart)       Lab/Procedures/Meds    Pertinent Labs Reviewed: reviewed    Clinical Chemistry:  Recent Labs   Lab 10/02/19  0600 10/03/19  0412 10/07/19  1313 10/08/19  0542    140 138 140   K 4.1 3.6 3.1* 3.9    108 98 102   CO2 23 23 29 33*   GLU 78 79 96 98   BUN 10 5* 7 6   CREATININE 1.0 0.8 1.1 1.0   CALCIUM 7.2* 6.7* 6.6* 7.0*   PROT 5.6* 5.1* 5.9*  --    ALBUMIN 2.7* 2.5* 3.1*  --    BILITOT 0.4 0.3 0.9  --    ALKPHOS 56 50* 47*  --    AST 18 14 18  --    ALT 9* 8* 12  --    ANIONGAP 10 9 11 5*   ESTGFRAFRICA >60.0 >60.0 >60.0 >60.0 "   EGFRNONAA >60.0 >60.0 >60.0 >60.0   MG 1.9 1.5* 1.4* 3.1*   PHOS 2.0* 1.7*  --  2.2*   AMYLASE  --   --  41  --    LIPASE  --   --  18  --      CBC:   Recent Labs   Lab 10/08/19  0542   WBC 5.17   RBC 2.92*   HGB 8.8*   HCT 27.6*      MCV 95   MCH 30.1   MCHC 31.9*     Cardiac Profile:  Recent Labs   Lab 10/07/19  1313   CPK 61   CPKMB 1.2   TROPONINI <0.030     Thyroid & Parathyroid:  Recent Labs   Lab 10/01/19  1105   TSH 2.163   FREET4 1.26       Pertinent Medications Reviewed: reviewed    Scheduled Meds:   FLUoxetine  40 mg Oral Daily    gabapentin  300 mg Oral TID    lidocaine  1 patch Transdermal Daily    loperamide  2 mg Oral QID    pantoprazole  40 mg Oral Daily     Continuous Infusions:   sodium chloride 0.9% 75 mL/hr at 10/07/19 1924     PRN Meds:.acetaminophen, dextrose 50%, dextrose 50%, glucagon (human recombinant), glucose, glucose, ondansetron, sodium chloride 0.9%, tiZANidine, traMADol, trazodone    Estimated/Assessed Needs    Weight Used For Calorie Calculations: 78.5 kg (173 lb 1 oz)  Energy Calorie Requirements (kcal): 5758-1070 (25-30 kcal/kg)  Energy Need Method: Kcal/kg  Protein Requirements: 94 g (1.2 g/kg)  Weight Used For Protein Calculations: 78.5 kg (173 lb 1 oz)     Estimated Fluid Requirement Method: RDA Method  RDA Method (mL): 1962         Nutrition Prescription Ordered    Current Diet Order: regular     Evaluation of Received Nutrient/Fluid Intake    IV Fluid (mL): 1800(NS @ 75 ml/hr)  Energy Calories Required: meeting needs  Protein Required: meeting needs  Fluid Required: meeting needs  Tolerance: tolerating  % Meal Intake: 25-50%    Nutrition Risk    Level of Risk/Frequency of Follow-up: high     Assessment and Plan    Pt assessed 2' MST score. Noted decreased intake r/t diarrhea. Pt afraid to eat because diarrhea occurs immediately afterwards. No N/V reported. Noted GI consulted. Noted C diff negative. Able to eat some bkfst this AM. Weight maintained per pt; denies  significant wt changes. Offered supplement, pt accepted. Will f/u for tolerance.    Monitor and Evaluation    Food and Nutrient Intake: food and beverage intake, energy intake  Food and Nutrient Adminstration: diet order  Physical Activity and Function: nutrition-related ADLs and IADLs  Anthropometric Measurements: weight change  Biochemical Data, Medical Tests and Procedures: gastrointestinal profile, electrolyte and renal panel, glucose/endocrine profile  Nutrition-Focused Physical Findings: overall appearance       Nutrition Follow-Up    RD Follow-up?: Yes    Radha Suarez  10/08/2019  10:58 AM

## 2019-10-08 NOTE — PT/OT/SLP EVAL
Physical Therapy Evaluation and Discharge Note    Patient Name:  Burton Whiting   MRN:  [id number]    Recommendations:     Discharge Recommendations:  home   Discharge Equipment Recommendations: none   Barriers to discharge: None    Assessment:     Burton Whiting is a 49 y.o. male admitted with a medical diagnosis of Rectal bleeding and diarrhea.  At this time, patient is functioning at their prior level of function and does not require further acute PT services.     Recent Surgery: * No surgery found *      Plan:     During this hospitalization, patient does not require further acute PT services.  Please re-consult if situation changes.      Subjective     Chief Complaint:  diarrhea  Patient/Family Comments/goals: home  Pain/Comfort:  · Pain Rating 1: 0/10    Patients cultural, spiritual, Jewish conflicts given the current situation:      Living Environment:  *Pt lives in a one story home w/o entry steps  Prior to admission, patients level of function was Independent.  Equipment used at home: none.  DME owned (not currently used): none.     Objective:     Communicated with nurse prior to session.  Patient found supine with   upon PT entry to room.    General Precautions: Standard,     Orthopedic Precautions:    Braces:       Exams:  · Cognitive Exam:  Patient is oriented to Person, Place, Time and Situation  · RLE ROM: WNL  · RLE Strength: WNL  · LLE ROM: WNL  · LLE Strength: WNL    Functional Mobility:  · Bed Mobility:     · Sit to Supine: independence  · Transfers:     · Sit to Stand:  independence with no AD  · Gait: 200 ft independent w/o AD    AM-PAC 6 CLICK MOBILITY  Total Score:        Therapeutic Activities and Exercises:  Pt t/f'ed OOB  and ambulated in the babcock with independence w/o an AD.    AM-PAC 6 CLICK MOBILITY  Total Score:      Patient left up in chair with call button in reach.    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 and tonsillar    Substance abuse     opiates, methamphetamines; ended 2004       Past Surgical History:   Procedure Laterality Date    ESOPHAGOGASTRODUODENOSCOPY Left 9/9/2019    Procedure: EGD (ESOPHAGOGASTRODUODENOSCOPY);  Surgeon: Balta Lisa MD;  Location: [facility];  Service: Endoscopy;  Laterality: Left;    ESOPHAGOGASTRODUODENOSCOPY N/A 9/12/2019    Procedure: EGD (ESOPHAGOGASTRODUODENOSCOPY);  Surgeon: Perez Mon III, MD;  Location: [facility];  Service: Endoscopy;  Laterality: N/A;    ESOPHAGOGASTRODUODENOSCOPY N/A 10/2/2019    Procedure: EGD (ESOPHAGOGASTRODUODENOSCOPY);  Surgeon: Bonifacio Sosa MD;  [facility]  Service: Endoscopy;  Laterality: N/A;    GASTROSTOMY TUBE PLACEMENT Left 02/12/2018    MEDIPORT REMOVAL N/A 6/6/2018    Procedure: Removal-port-a-cath;  Surgeon: Blayne Diagnostic Provider;  [facility];  Service: General;  Laterality: N/A;       Time Tracking:     PT Received On: 10/08/19  PT Start Time: 1115     PT Stop Time: 1130  PT Total Time (min): 15 min     Billable Minutes: Evaluation 7 mins and Gait Training 8 mins      Noni Alan, CHACHO  10/08/2019  "

## 2019-10-08 NOTE — ASSESSMENT & PLAN NOTE
Ongoing diarrhea, felt secondary to medication side effect- Opdivo.  He is fearful to eat due to diarrhea.  Scheduled Imodium.  Fluid hydration.  Diet as tolerated.  Improving

## 2019-10-08 NOTE — PLAN OF CARE
Problem: Oral Intake Inadequate  Goal: Improved Oral Intake  Outcome: Ongoing, Progressing     PO intake <50% meals. Add Unjury TID (330 kcal, 60 g pro). RD to follow and monitor.

## 2019-10-09 ENCOUNTER — PATIENT OUTREACH (OUTPATIENT)
Dept: ADMINISTRATIVE | Facility: OTHER | Age: 49
End: 2019-10-09

## 2019-10-09 VITALS
BODY MASS INDEX: 26.23 KG/M2 | HEART RATE: 69 BPM | DIASTOLIC BLOOD PRESSURE: 69 MMHG | WEIGHT: 173.06 LBS | SYSTOLIC BLOOD PRESSURE: 111 MMHG | RESPIRATION RATE: 18 BRPM | TEMPERATURE: 98 F | OXYGEN SATURATION: 97 % | HEIGHT: 68 IN

## 2019-10-09 LAB
ANION GAP SERPL CALC-SCNC: 10 MMOL/L (ref 8–16)
BUN SERPL-MCNC: 11 MG/DL (ref 6–20)
CA-I BLDV-SCNC: 1.16 MMOL/L (ref 1.06–1.42)
CALCIUM SERPL-MCNC: 8.2 MG/DL (ref 8.7–10.5)
CHLORIDE SERPL-SCNC: 102 MMOL/L (ref 95–110)
CO2 SERPL-SCNC: 26 MMOL/L (ref 23–29)
CREAT SERPL-MCNC: 1.1 MG/DL (ref 0.5–1.4)
EST. GFR  (AFRICAN AMERICAN): >60 ML/MIN/1.73 M^2
EST. GFR  (NON AFRICAN AMERICAN): >60 ML/MIN/1.73 M^2
GLUCOSE SERPL-MCNC: 81 MG/DL (ref 70–110)
HCT VFR BLD AUTO: 24.5 % (ref 40–54)
HGB BLD-MCNC: 7.7 G/DL (ref 14–18)
PHOSPHATE SERPL-MCNC: 3.5 MG/DL (ref 2.7–4.5)
POTASSIUM SERPL-SCNC: 3.6 MMOL/L (ref 3.5–5.1)
SODIUM SERPL-SCNC: 138 MMOL/L (ref 136–145)

## 2019-10-09 PROCEDURE — 63600175 PHARM REV CODE 636 W HCPCS: Performed by: INTERNAL MEDICINE

## 2019-10-09 PROCEDURE — 80048 BASIC METABOLIC PNL TOTAL CA: CPT

## 2019-10-09 PROCEDURE — 25000003 PHARM REV CODE 250

## 2019-10-09 PROCEDURE — 82330 ASSAY OF CALCIUM: CPT

## 2019-10-09 PROCEDURE — 25000003 PHARM REV CODE 250: Performed by: INTERNAL MEDICINE

## 2019-10-09 PROCEDURE — 84100 ASSAY OF PHOSPHORUS: CPT

## 2019-10-09 PROCEDURE — 85014 HEMATOCRIT: CPT

## 2019-10-09 PROCEDURE — 85018 HEMOGLOBIN: CPT

## 2019-10-09 PROCEDURE — 36415 COLL VENOUS BLD VENIPUNCTURE: CPT

## 2019-10-09 RX ORDER — POTASSIUM CHLORIDE 20 MEQ/1
20 TABLET, EXTENDED RELEASE ORAL ONCE
Status: COMPLETED | OUTPATIENT
Start: 2019-10-09 | End: 2019-10-09

## 2019-10-09 RX ORDER — CHOLESTYRAMINE 4 G/4.8G
4 POWDER, FOR SUSPENSION ORAL 2 TIMES DAILY
Qty: 180 PACKET | Refills: 3 | Status: SHIPPED | OUTPATIENT
Start: 2019-10-09 | End: 2019-11-06

## 2019-10-09 RX ADMIN — POTASSIUM CHLORIDE 20 MEQ: 20 TABLET, EXTENDED RELEASE ORAL at 09:10

## 2019-10-09 RX ADMIN — LOPERAMIDE HYDROCHLORIDE 2 MG: 2 CAPSULE ORAL at 09:10

## 2019-10-09 RX ADMIN — SODIUM CHLORIDE: 0.9 INJECTION, SOLUTION INTRAVENOUS at 06:10

## 2019-10-09 RX ADMIN — CHOLESTYRAMINE 4 G: 4 POWDER, FOR SUSPENSION ORAL at 11:10

## 2019-10-09 RX ADMIN — GABAPENTIN 300 MG: 300 CAPSULE ORAL at 09:10

## 2019-10-09 RX ADMIN — FLUOXETINE 40 MG: 20 CAPSULE ORAL at 09:10

## 2019-10-09 RX ADMIN — PANTOPRAZOLE SODIUM 40 MG: 40 TABLET, DELAYED RELEASE ORAL at 06:10

## 2019-10-09 NOTE — PLAN OF CARE
Problem: Adult Inpatient Plan of Care  Goal: Plan of Care Review  Outcome: Met  Goal: Patient-Specific Goal (Individualization)  Outcome: Met  Goal: Absence of Hospital-Acquired Illness or Injury  Outcome: Met  Goal: Optimal Comfort and Wellbeing  Outcome: Met  Goal: Readiness for Transition of Care  Outcome: Met  Goal: Rounds/Family Conference  Outcome: Met     Problem: Skin Injury Risk Increased  Goal: Skin Health and Integrity  Outcome: Met     Problem: Fall Injury Risk  Goal: Absence of Fall and Fall-Related Injury  Outcome: Met     Problem: Infection  Goal: Infection Symptom Resolution  Outcome: Met     Problem: Oral Intake Inadequate  Goal: Improved Oral Intake  Outcome: Met

## 2019-10-09 NOTE — DISCHARGE SUMMARY
[facility] Medicine  Discharge Summary      Patient Name: Burton Whiting  MRN: [id number]  Admission Date: 10/7/2019  Hospital Length of Stay: 2 days  Discharge Date and Time: 10/9/2019 12:16 PM  Attending Physician: No att. providers found   Discharging Provider: Lyly English DO  Primary Care Provider: Christopher Turpin DO      HPI:   Mr. Whiting is a 49-years-old male who presented to the ED with chief complaint of generalized weakness associated with anorexia and diarrhea.  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  He was started on steroids as well as developed neck pain and was taking copious amounts of NSAIDs, recently had upper GI bleed secondary to 2 duodenal ulcer, required 5 units PRBC transfusion during that admission.  The diarrhea has been persistent and he actually had recent admission at [facility] 2 days ago- treated for dehydration and anemia, received 1 unit PRBC transfusion on day of discharge. He states since discharge diarrhea has persisted with generalized weakness and he is fearful of eating due to the diarrhea.  He states he has intermittent ongoing rectal bleeding, he feels related to his hemorrhoids, present on wiping as well as on underwear. As per his brother at bedside, his oncologist prescribed anti diarrheals and he actually has outpatient follow up with Dr Patterson (gastroenterology) Thursday of this week. Patient has had issues with opioids in the past and requesting to minimize however, does feel anxious and is requesting Ativan. In the ED he was HD stable, rectal examination with external hemorrhoids, FOBT positive, admissions labs H/H 8.4/26, K 3.1, Mg 1.4, Ca 6.6, CXR clear, EKG SR. He received 1L LR, calcium, magnesium, potassium supplementation as well as 40 mg IV  "PPI therapy.    * No surgery found *      Hospital Course:   Patient was admitted for generalized weakness with decreased appetite and diarrhea.  FOBT was positive on admission.  GI was consulted and discussed options including inpatient colonoscopy with patient.  Patient decided to against colonscopy and would like to follow up with his outpatient gastroenterologist, Dr. Sawyer, with whom he has an appointment on 10/10 (tomorrow).  For patient's diarrhea GI recommended Questran.  H/H remained relatively stable.  Patient denied any episodes of bleeding.  He was tolerating diet reported decrease in frequency of stooling and hemodynamically stable.  He was seen and examined the deemed appropriate for discharge.    Physical exam on day of discharge  /69   Pulse 69   Temp 98.3 °F (36.8 °C) (Oral)   Resp 18   Ht 5' 8" (1.727 m)   Wt 78.5 kg (173 lb 1 oz)   SpO2 97%   BMI 26.31 kg/m²   Gen: nad, sitting up in bed  Ent: mmm, supple neck  CV: rrr no mrg  Resp: CTA B/l  AB: +bs soft ntnd  Skin: dry, warm      Consults:   Consults (From admission, onward)        Status Ordering Provider     Inpatient consult to Gastroenterology  Once     Provider:  Balta Lisa MD    Completed RIGOBERTO STONE          No new Assessment & Plan notes have been filed under this hospital service since the last note was generated.  Service: Hospital Medicine    Final Active Diagnoses:    Diagnosis Date Noted POA    PRINCIPAL PROBLEM:  Rectal bleeding [K62.5] 10/07/2019 Yes    Hypomagnesemia [E83.42] 10/07/2019 Yes    Hemorrhoids [K64.9] 10/07/2019 Yes     Chronic    Duodenal ulcer [K26.9] 10/07/2019 Yes     Chronic    Diarrhea [R19.7] 10/07/2019 Yes     Chronic    Acute blood loss anemia [D62] 10/07/2019 Yes     Chronic    Weakness [R53.1] 10/07/2019 Yes    Hypokalemia [E87.6] 10/07/2019 Yes    Stage III squamous cell cancer palatine tonsil [C44.92] 10/07/2019 Yes     Chronic    Hypocalcemia [E83.51] 10/25/2018 Yes    " Anxiety [F41.9] 06/05/2018 Yes      Problems Resolved During this Admission:       Discharged Condition: stable    Disposition: Home or Self Care    Follow Up:  Follow-up Information     Christopher Turpin DO.    Specialty:  Family Medicine  Contact information:  [address]  [place] [address]  [phone]             Nithya Sawyer MD. Go on 10/10/2019.    Specialty:  Gastroenterology  Why:  go to pre-scheduled appointment 10/10 3pm   Contact information:  [address]  [address]  [place] [address]  [phone]                 Patient Instructions:      Diet Adult Regular     Notify your health care provider if you experience any of the following:  temperature >100.4     Notify your health care provider if you experience any of the following:  persistent nausea and vomiting or diarrhea     Notify your health care provider if you experience any of the following:  severe uncontrolled pain     Notify your health care provider if you experience any of the following:  redness, tenderness, or signs of infection (pain, swelling, redness, odor or green/yellow discharge around incision site)     Notify your health care provider if you experience any of the following:  severe persistent headache     Notify your health care provider if you experience any of the following:  difficulty breathing or increased cough     Notify your health care provider if you experience any of the following:  persistent dizziness, light-headedness, or visual disturbances     Activity as tolerated       Significant Diagnostic Studies: Labs:   CBC   Recent Labs   Lab 10/08/19  0542 10/08/19  2028 10/09/19  0008   WBC 5.17  --   --    HGB 8.8* 8.9* 7.7*   HCT 27.6* 27.9* 24.5*     --   --     and All labs within the past 24 hours have been reviewed    Pending Diagnostic Studies:     None         Medications:  Reconciled Home Medications:      Medication List      START taking these medications    cholestyramine-aspartame 4 gram  Pwpk  Commonly known as:  QUESTRAN LIGHT  Take 1 packet (4 g total) by mouth 2 (two) times daily.        CHANGE how you take these medications    hydrocortisone 2.5 % cream  Apply topically 2 (two) times daily.  What changed:  how much to take     pantoprazole 40 MG tablet  Commonly known as:  PROTONIX  Take 1 tablet (40 mg total) by mouth once daily.  What changed:  when to take this        CONTINUE taking these medications    acetaminophen 325 MG tablet  Commonly known as:  TYLENOL  Take 2 tablets (650 mg total) by mouth every 6 (six) hours as needed.     CALCIUM 500 + D 500 mg(1,250mg) -200 unit per tablet  Generic drug:  calcium-vitamin D3  Take 1 tablet by mouth 2 (two) times daily with meals.     denosumab 120 mg/1.7 mL (70 mg/mL) Soln  Commonly known as:  XGEVA  Inject 1.7 mLs (120 mg total) into the skin once. for 1 dose     diphenoxylate-atropine 2.5-0.025 mg 2.5-0.025 mg per tablet  Commonly known as:  LOMOTIL  Take 2 tablets by mouth 4 (four) times daily as needed for Diarrhea.     FLUoxetine 40 MG capsule  Take 1 capsule (40 mg total) by mouth once daily.     gabapentin 300 MG capsule  Commonly known as:  NEURONTIN  Take 1 capsule (300 mg total) by mouth 3 (three) times daily.     lidocaine 5 %  Commonly known as:  LIDODERM  Place 1 patch onto the skin daily as needed. Remove & Discard patch within 12 hours or as directed by MD     loperamide 2 mg Tab  Commonly known as:  IMODIUM A-D  Take 2 mg by mouth 4 (four) times daily as needed.     mirtazapine 7.5 MG Tab  Commonly known as:  REMERON     multivitamin capsule  Take 1 capsule by mouth once daily.     tiZANidine 2 MG tablet  Commonly known as:  ZANAFLEX  Take 4 mg by mouth nightly as needed. NECK PAIN     traMADol 50 mg tablet  Commonly known as:  ULTRAM  Take 1 tablet (50 mg total) by mouth every 8 (eight) hours as needed for Pain.            Indwelling Lines/Drains at time of discharge:   Lines/Drains/Airways     None                 Time spent on  the discharge of patient: 35 minutes  Patient was seen and examined on the date of discharge and determined to be suitable for discharge.         Lyly English DO  Department of Hospital Medicine  [facility]

## 2019-10-09 NOTE — PLAN OF CARE
Problem: Skin Injury Risk Increased  Goal: Skin Health and Integrity  Outcome: Ongoing, Progressing     Problem: Infection  Goal: Infection Symptom Resolution  Outcome: Ongoing, Progressing

## 2019-10-10 ENCOUNTER — OFFICE VISIT (OUTPATIENT)
Dept: GASTROENTEROLOGY | Facility: CLINIC | Age: 49
End: 2019-10-10
Payer: COMMERCIAL

## 2019-10-10 VITALS — BODY MASS INDEX: 26.35 KG/M2 | WEIGHT: 173.31 LBS

## 2019-10-10 DIAGNOSIS — R19.7 DIARRHEA, UNSPECIFIED TYPE: ICD-10-CM

## 2019-10-10 DIAGNOSIS — Z12.11 SCREENING FOR MALIGNANT NEOPLASM OF COLON: Primary | ICD-10-CM

## 2019-10-10 PROCEDURE — 99214 PR OFFICE/OUTPT VISIT, EST, LEVL IV, 30-39 MIN: ICD-10-PCS | Mod: S$GLB,,, | Performed by: INTERNAL MEDICINE

## 2019-10-10 PROCEDURE — 99999 PR PBB SHADOW E&M-EST. PATIENT-LVL III: ICD-10-PCS | Mod: PBBFAC,,, | Performed by: INTERNAL MEDICINE

## 2019-10-10 PROCEDURE — 3008F BODY MASS INDEX DOCD: CPT | Mod: CPTII,S$GLB,, | Performed by: INTERNAL MEDICINE

## 2019-10-10 PROCEDURE — 99214 OFFICE O/P EST MOD 30 MIN: CPT | Mod: S$GLB,,, | Performed by: INTERNAL MEDICINE

## 2019-10-10 PROCEDURE — 3008F PR BODY MASS INDEX (BMI) DOCUMENTED: ICD-10-PCS | Mod: CPTII,S$GLB,, | Performed by: INTERNAL MEDICINE

## 2019-10-10 PROCEDURE — 99999 PR PBB SHADOW E&M-EST. PATIENT-LVL III: CPT | Mod: PBBFAC,,, | Performed by: INTERNAL MEDICINE

## 2019-10-10 NOTE — PATIENT INSTRUCTIONS
"PLENVU Split Instructions    You are scheduled for a colonoscopy with Dr. Sawyer  on 11/1/19  at [facility]  To ensure that your test is accurate and complete, you MUST follow these instructions listed below.  If you have any questions, please call our office at [phone].  Plan on being at the hospital for your procedure for 3-4 hours.    1.  Follow a CLEAR LIQUID DIET for the entire day before your scheduled colonoscopy.  This means no solid food the entire day starting when you wake.  You may have as much of the clear liquids as you want throughout the day.   CLEAR LIQUID DIET:   - Avoid Red, Orange, Purple, and/or Blue food coloring   - NO DAIRY   - You can have:  Coffee with sugar (no creamer), tea, water, soda, apple or white grape juice, chicken or beef broth/bouillon (no meat, noodles, or veggies), green/yellow popsicles, green/yellow Jell-O, lemonade.    2.  AT 5 pm the evening before your colonoscopy, MIX THE "DOSE 1" AJAY FLAVORED PACKET OF PLENVU WITH 16 OUNCES OF WATER AND MIX IT THOROUGHLY (can take 2-3 minutes to mix).  DRINK THE ENTIRE MIXTURE WITHIN 30 MINUTES.  THEN DRINK 16 OUNCES OF WATER OVER THE NEXT 30 MINUTES.     3.  The endoscopy department will call you 2 days before your colonoscopy to tell you the exact time to arrive, AND to tell you the exact time to drink the 2nd portion of your prep (which will be FIVE HOURS BEFORE YOUR ARRIVAL TIME).  At this time given to you, MIX BOTH OFTHE "DOSE 2" CRANBERRY FLAVORED PACKET OF PLENVU WITH 16 OUNCES OF WATER AND MIX IT THOROUGHLY (can take 2-3 minutes to mix).  DRINK THE ENTIRE MIXTURE WITHIN 30 MINUTES.  THEN DRINK 16 OUNCES OF WATER OVER THE NEXT 30 MINUTES Once this is complete, you can not have anything else by mouth!    4.  You must have someone with you to DRIVE YOU HOME since you will be receiving IV sedation for the colonoscopy.    5.  It is ok to take your heart, blood pressure, and seizure medications in the morning of your test " with a SIP of water.  Hold other medications until after your procedure.  Do NOT have anything else to eat or drink the morning of your colonoscopy.  It is ok to brush your teeth.    6.  If you are on blood thinners THAT YOU HAVE BEEN INSTRUCTED TO HOLD BY YOUR DOCTOR FOR THIS PROCEDURE, then do NOT take this the morning of your colonoscopy.  Do NOT stop these medications on your own, they must be approved to be held by your doctor.  Your colonoscopy can NOT be done if you are on these medications.  Examples of blood thinners include: Coumadin, Aggrenox, Plavix, Pradaxa, Reapro, Pletal, Xarelto, Ticagrelor, Brilinta, Eliquis, and high dose aspirin (325 mg).  You do not have to stop baby aspirin 81 mg.    7.  IF YOU ARE DIABETIC:  NO INSULIN OR ORAL MEDICATIONS THE MORNING OF THE COLONOSCOPY.  TAKE ONLY HALF THE DOSE OF YOUR INSULIN THE DAY BEFORE THE COLONOSCOPY.  DO NOT TAKE ANY ORAL DIABETIC MEDICATIONS THE DAY BEFORE THE COLONOSCOPY.  IF YOU ARE AN INSULIN DEPENDENT DIABETIC WITH UNSTABLE BLOOD SUGARS, NOTIFY YOUR PRIMARY CARE PHYSICIAN FOR INSTRUCTIONS.

## 2019-10-10 NOTE — LETTER
October 22, 2019      Christopher Turpin, DO  [address]  [place] [address]           [place] [facility]  [address]  [place] [address]  Phone: [phone]          Patient: Burton Whiting   MR Number: [id number]   YOB: 1970   Date of Visit: 10/10/2019       Dear Dr. Christopher Turpin:    Thank you for referring Burton Whiting to me for evaluation. Attached you will find relevant portions of my assessment and plan of care.    If you have questions, please do not hesitate to call me. I look forward to following Burton Whiting along with you.    Sincerely,    Nithya Sawyer MD    Enclosure  CC:  No Recipients    If you would like to receive this communication electronically, please contact [email or web address] or [phone] to request more information on [facility] access.    For providers and/or their staff who would like to refer a patient to [facility], please contact us through our one-stop-shop provider referral line, [facility], at [phone].    If you feel you have received this communication in error or would no longer like to receive these types of communications, please e-mail [email or web address]

## 2019-10-11 ENCOUNTER — TELEPHONE (OUTPATIENT)
Dept: ENDOSCOPY | Facility: HOSPITAL | Age: 49
End: 2019-10-11

## 2019-10-11 NOTE — TELEPHONE ENCOUNTER
----- Message from Bonifacio Sosa MD sent at 10/11/2019  8:31 AM CDT -----  Please let the patient know the biopsies of the stomach showed gastritis. No h pylori. Need to continue PPI's

## 2019-10-13 ENCOUNTER — HOSPITAL ENCOUNTER (EMERGENCY)
Facility: HOSPITAL | Age: 49
Discharge: HOME | End: 2019-10-13
Attending: EMERGENCY MEDICINE
Payer: COMMERCIAL

## 2019-10-13 VITALS
RESPIRATION RATE: 18 BRPM | OXYGEN SATURATION: 99 % | BODY MASS INDEX: 26.52 KG/M2 | SYSTOLIC BLOOD PRESSURE: 130 MMHG | HEIGHT: 68 IN | DIASTOLIC BLOOD PRESSURE: 79 MMHG | HEART RATE: 70 BPM | TEMPERATURE: 98 F | WEIGHT: 175 LBS

## 2019-10-13 DIAGNOSIS — K60.4 RECTAL FISTULA: ICD-10-CM

## 2019-10-13 DIAGNOSIS — D64.9 ANEMIA, UNSPECIFIED TYPE: ICD-10-CM

## 2019-10-13 DIAGNOSIS — R19.7 DIARRHEA, UNSPECIFIED TYPE: Primary | ICD-10-CM

## 2019-10-13 LAB
ALBUMIN SERPL BCP-MCNC: 3.2 G/DL (ref 3.5–5.2)
ALP SERPL-CCNC: 51 U/L (ref 55–135)
ALT SERPL W/O P-5'-P-CCNC: 10 U/L (ref 10–44)
ANION GAP SERPL CALC-SCNC: 10 MMOL/L (ref 8–16)
AST SERPL-CCNC: 13 U/L (ref 10–40)
BASOPHILS # BLD AUTO: 0.02 K/UL (ref 0–0.2)
BASOPHILS NFR BLD: 0.4 % (ref 0–1.9)
BILIRUB SERPL-MCNC: 1 MG/DL (ref 0.1–1)
BUN SERPL-MCNC: 14 MG/DL (ref 6–20)
CALCIUM SERPL-MCNC: 7.8 MG/DL (ref 8.7–10.5)
CHLORIDE SERPL-SCNC: 98 MMOL/L (ref 95–110)
CO2 SERPL-SCNC: 28 MMOL/L (ref 23–29)
CREAT SERPL-MCNC: 1.3 MG/DL (ref 0.5–1.4)
DIFFERENTIAL METHOD: ABNORMAL
EOSINOPHIL # BLD AUTO: 0.1 K/UL (ref 0–0.5)
EOSINOPHIL NFR BLD: 1.1 % (ref 0–8)
ERYTHROCYTE [DISTWIDTH] IN BLOOD BY AUTOMATED COUNT: 15.4 % (ref 11.5–14.5)
EST. GFR  (AFRICAN AMERICAN): >60 ML/MIN/1.73 M^2
EST. GFR  (NON AFRICAN AMERICAN): >60 ML/MIN/1.73 M^2
GLUCOSE SERPL-MCNC: 89 MG/DL (ref 70–110)
HCT VFR BLD AUTO: 26.5 % (ref 40–54)
HGB BLD-MCNC: 8.3 G/DL (ref 14–18)
IMM GRANULOCYTES # BLD AUTO: 0.02 K/UL (ref 0–0.04)
IMM GRANULOCYTES NFR BLD AUTO: 0.4 % (ref 0–0.5)
LYMPHOCYTES # BLD AUTO: 0.3 K/UL (ref 1–4.8)
LYMPHOCYTES NFR BLD: 7.3 % (ref 18–48)
MCH RBC QN AUTO: 29.7 PG (ref 27–31)
MCHC RBC AUTO-ENTMCNC: 31.3 G/DL (ref 32–36)
MCV RBC AUTO: 95 FL (ref 82–98)
MONOCYTES # BLD AUTO: 0.7 K/UL (ref 0.3–1)
MONOCYTES NFR BLD: 14.4 % (ref 4–15)
NEUTROPHILS # BLD AUTO: 3.4 K/UL (ref 1.8–7.7)
NEUTROPHILS NFR BLD: 76.4 % (ref 38–73)
NRBC BLD-RTO: 0 /100 WBC
PLATELET # BLD AUTO: 219 K/UL (ref 150–350)
PMV BLD AUTO: 8.6 FL (ref 9.2–12.9)
POTASSIUM SERPL-SCNC: 3.7 MMOL/L (ref 3.5–5.1)
PROT SERPL-MCNC: 6.3 G/DL (ref 6–8.4)
RBC # BLD AUTO: 2.79 M/UL (ref 4.6–6.2)
SODIUM SERPL-SCNC: 136 MMOL/L (ref 136–145)
WBC # BLD AUTO: 4.5 K/UL (ref 3.9–12.7)

## 2019-10-13 PROCEDURE — 85025 COMPLETE CBC W/AUTO DIFF WBC: CPT

## 2019-10-13 PROCEDURE — 80053 COMPREHEN METABOLIC PANEL: CPT

## 2019-10-13 PROCEDURE — 36415 COLL VENOUS BLD VENIPUNCTURE: CPT

## 2019-10-13 PROCEDURE — 99283 EMERGENCY DEPT VISIT LOW MDM: CPT

## 2019-10-13 RX ORDER — METRONIDAZOLE 500 MG/1
500 TABLET ORAL 3 TIMES DAILY
Qty: 21 TABLET | Refills: 0 | Status: ON HOLD | OUTPATIENT
Start: 2019-10-13 | End: 2019-10-25 | Stop reason: HOSPADM

## 2019-10-13 RX ORDER — LEVOFLOXACIN 500 MG/1
500 TABLET, FILM COATED ORAL DAILY
Qty: 5 TABLET | Refills: 0 | Status: SHIPPED | OUTPATIENT
Start: 2019-10-13 | End: 2019-10-18

## 2019-10-13 NOTE — DISCHARGE INSTRUCTIONS
Make an appointment also with the gastroenterology group.  Call their office tomorrow to schedule appointment for soon as available.  Take antibiotics as directed.  Warm tub soaks.  Watch for any fever.

## 2019-10-14 ENCOUNTER — TELEPHONE (OUTPATIENT)
Dept: GASTROENTEROLOGY | Facility: CLINIC | Age: 49
End: 2019-10-14

## 2019-10-14 NOTE — ED NOTES
Patient states is a very difficult stick and only wants ultrasound iv or mele for picc line if needs iv, ok per md for straight stick for blood at this time

## 2019-10-14 NOTE — TELEPHONE ENCOUNTER
----- Message from Dulce Nayak sent at 10/14/2019  9:34 AM CDT -----  Contact: [phone]/self  Patient is requesting to speak with you regarding his colonoscopy. Pt would like to have it done sooner if possible. Please advise.

## 2019-10-15 ENCOUNTER — PATIENT OUTREACH (OUTPATIENT)
Dept: ADMINISTRATIVE | Facility: OTHER | Age: 49
End: 2019-10-15

## 2019-10-15 ENCOUNTER — TELEPHONE (OUTPATIENT)
Dept: SURGERY | Facility: CLINIC | Age: 49
End: 2019-10-15

## 2019-10-15 NOTE — PHYSICIAN QUERY
PT Name: Burton Whiting  MR #: [id number]     Physician Query Form - Documentation Clarification      CDS: Hui Guthrie RN     Contact information:[email or web address]    This form is a permanent document in the medical record.     Query Date: October 15, 2019    By submitting this query, we are merely seeking further clarification of documentation. Please utilize your independent clinical judgment when addressing the question(s) below.    The Medical record reflects the following:    Supporting Clinical Findings Location in Medical Record   FINAL PATHOLOGIC DIAGNOSIS  GASTRIC ANTRUM AND BODY BIOPSIES:  - Chemical gastritis/reactive gastropathy with mild chronic inflammation.  - Helicobacter pylori are not identified on routine staining.  - No evidence of intestinal metaplasia, dysplasia or malignancy.   Surgical Path 10/2                                                                            Doctor, please confirm diagnosis/diagnosis's from the pathology report.     Check all that apply.    Provider Use Only        [  ] Chemical Gastritis      [+  ] Reactive Gastropathy      [  ] Other:________________                                                                                                             [  ] Clinically Undetermined

## 2019-10-16 ENCOUNTER — TELEPHONE (OUTPATIENT)
Dept: SURGERY | Facility: CLINIC | Age: 49
End: 2019-10-16

## 2019-10-16 NOTE — PROGRESS NOTES
"CRS Office Visit History and Physical      SUBJECTIVE:     Chief Complaint:  Rectal bleeding    History of Present Illness:  The patient is new patient to this practice.   Course is as follows:  Patient is a 49 y.o. male presents with a few weeks of anal pain, which was followed by large release of bloody and purulent tissue a few days ago.  He says that his pain significantly improved after this time, has had ongoing small amount of drainage since then.  Currently on chemotherapy for head and neck cancer.  Has had relatively severe diarrhea from this, for which he has taken prednisone.  He is planning a colonoscopy in 2 weeks for further workup of this.  Chemotherapy is currently on hold.  He is currently taking antibiotics for this.  No prior anorectal procedures.    Last Colonoscopy:  None    Review of patient's allergies indicates:   Allergen Reactions    Trazodone Other (See Comments)     confusion       Past Medical History:   Diagnosis Date    Acute renal failure 6/21/2018    Alcohol abuse     ended 2004    Chemotherapy induced neutropenia 6/7/2018    Former smoker     HPV in male     Hx of psychiatric care     Ativan, Paxil ("caused anger problems")    Opiate addiction     Squamous cell carcinoma of palatine tonsil     throat and tonsillar    Substance abuse     opiates, methamphetamines; ended 2004     Past Surgical History:   Procedure Laterality Date    ESOPHAGOGASTRODUODENOSCOPY Left 9/9/2019    Procedure: EGD (ESOPHAGOGASTRODUODENOSCOPY);  Surgeon: aBlta Lisa MD;  Location: [facility];  Service: Endoscopy;  Laterality: Left;    ESOPHAGOGASTRODUODENOSCOPY N/A 9/12/2019    Procedure: EGD (ESOPHAGOGASTRODUODENOSCOPY);  Surgeon: Perez Mon III, MD;  Location: [facility];  Service: Endoscopy;  Laterality: N/A;    ESOPHAGOGASTRODUODENOSCOPY N/A 10/2/2019    Procedure: EGD (ESOPHAGOGASTRODUODENOSCOPY);  Surgeon: Bonifacio Sosa MD;  Location: [facility] ([address] [place] [address]);  Service: " "Endoscopy;  Laterality: N/A;    GASTROSTOMY TUBE PLACEMENT Left 2018    MEDIPORT REMOVAL N/A 2018    Procedure: Removal-port-a-cath;  Surgeon: Blayne Diagnostic Provider;  Location: [facility] OR [address] [place] [address];  Service: General;  Laterality: N/A;     Family History   Problem Relation Age of Onset    Leukemia Mother     Hypertension Father     Thyroid disease Sister     Depression Sister     Hypertension Brother     Brain cancer Maternal Uncle     Brain cancer Maternal Uncle      Social History     Tobacco Use    Smoking status: Former Smoker     Packs/day: 1.50     Years: 25.00     Pack years: 37.50     Types: Cigarettes     Last attempt to quit: 2017     Years since quittin.3    Smokeless tobacco: Never Used    Tobacco comment: smoked for about 25 years. quit 6 months ago   Substance Use Topics    Alcohol use: No     Comment: history of overuse    Drug use: No     Comment: Former Opiate/methamphetamine addiction        Review of Systems:  ROS    OBJECTIVE:     Vital Signs (Most Recent)  /71 (BP Location: Right arm, Patient Position: Sitting, BP Method: Large (Automatic))   Pulse 87   Ht 5' 8" (1.727 m)   Wt 77.4 kg (170 lb 10.2 oz)   BMI 25.95 kg/m²     Physical Exam:  General: White male in no distress   Neuro: Alert and oriented x 4.  Moves all extremities.     HEENT: No icterus.  Trachea midline  Respiratory: Respirations are even and unlabored  Cardiac: Regular rate  Extremities: Warm dry and intact  Skin: No rashes    Anorectal:   External exam:  He has what appears to be a self drained, anterior perianal abscess.  There is some tissue with edema and what appears to be resolving induration in the left anterior, right anterior, and anterior midline positions.  There is no palpable abscess in this location.  I am able to drain a very minimal amount of seropurulent fluid with pressure over these areas.  There is minimal tenderness over these areas.  There are 3 punctate openings in the " left anterior, right anterior, and anterior midline positions.  Digital exam was able to be performed without significant pain, no large palpable internal opening.  No palpable residual abscess cavity.      ASSESSMENT/PLAN:     49-year-old male, currently undergoing treatment with Opdivo for head and neck squamous cell cancer, treatment on hold for colitis for which he is undergoing colonoscopy next week.  He presents with what appears to be a large anterior perianal abscess that has Self drained.  No persistent abscess cavity on my exam today.  He was currently taking antibiotics.  We discussed that I would not recommend incision and drainage or seton placement at this time, as there is a chance that this will heal.  In the event that he develops persistent symptoms, or if he requires more definitive control before resuming chemotherapy could consider seton placement.  However this would leave him with a fistula.  Advised him to soak the area 3 times a day and continue his antibiotics.  If things have not resolved after his current course of antibiotics, he will call to renew.  We will see him back in clinic for repeat exam in 2-3 weeks.      Larisa Dominguez MD  Staff Surgeon, Colon and Rectal Surgery  [facility]

## 2019-10-18 ENCOUNTER — TELEPHONE (OUTPATIENT)
Dept: ENDOSCOPY | Facility: HOSPITAL | Age: 49
End: 2019-10-18

## 2019-10-18 ENCOUNTER — OFFICE VISIT (OUTPATIENT)
Dept: SURGERY | Facility: CLINIC | Age: 49
End: 2019-10-18
Attending: COLON & RECTAL SURGERY
Payer: COMMERCIAL

## 2019-10-18 VITALS
HEIGHT: 68 IN | SYSTOLIC BLOOD PRESSURE: 119 MMHG | DIASTOLIC BLOOD PRESSURE: 71 MMHG | HEART RATE: 87 BPM | WEIGHT: 170.63 LBS | BODY MASS INDEX: 25.86 KG/M2

## 2019-10-18 DIAGNOSIS — K64.2 GRADE III HEMORRHOIDS: Primary | ICD-10-CM

## 2019-10-18 PROCEDURE — 99999 PR PBB SHADOW E&M-EST. PATIENT-LVL III: ICD-10-PCS | Mod: PBBFAC,,, | Performed by: COLON & RECTAL SURGERY

## 2019-10-18 PROCEDURE — 99999 PR PBB SHADOW E&M-EST. PATIENT-LVL III: CPT | Mod: PBBFAC,,, | Performed by: COLON & RECTAL SURGERY

## 2019-10-18 PROCEDURE — 3008F BODY MASS INDEX DOCD: CPT | Mod: CPTII,S$GLB,, | Performed by: COLON & RECTAL SURGERY

## 2019-10-18 PROCEDURE — 3008F PR BODY MASS INDEX (BMI) DOCUMENTED: ICD-10-PCS | Mod: CPTII,S$GLB,, | Performed by: COLON & RECTAL SURGERY

## 2019-10-18 PROCEDURE — 99203 PR OFFICE/OUTPT VISIT, NEW, LEVL III, 30-44 MIN: ICD-10-PCS | Mod: S$GLB,,, | Performed by: COLON & RECTAL SURGERY

## 2019-10-18 PROCEDURE — 99203 OFFICE O/P NEW LOW 30 MIN: CPT | Mod: S$GLB,,, | Performed by: COLON & RECTAL SURGERY

## 2019-10-18 NOTE — TELEPHONE ENCOUNTER
Spoke with patient about arrival time @ 1200.     Prep instructions reviewed: the day before the procedure, follow a clear liquid diet all day, then start the first 1/2 of prep at 5pm and take 2nd 1/2 of prep @ 0530.  Pt must be completely NPO when prep completed @ 0700.              Medications: Do not take Insulin or oral diabetic medications the day of the procedure.  Take as prescribed: heart, seizure and blood pressure medication in the morning with a sip of water (less than an ounce).  Take any breathing medications and bring inhalers to hospital with you Leave all valuables and jewelry at home.     Wear comfortable clothes to procedure to change into hospital gown You cannot drive for 24 hours after your procedure because you will receive sedation for your procedure to make you comfortable.  A ride must be provided at discharge.

## 2019-10-20 ENCOUNTER — HOSPITAL ENCOUNTER (INPATIENT)
Facility: HOSPITAL | Age: 49
LOS: 3 days | Discharge: HOME | DRG: 392 | End: 2019-10-25
Attending: EMERGENCY MEDICINE | Admitting: HOSPITALIST
Payer: COMMERCIAL

## 2019-10-20 DIAGNOSIS — E86.0 DEHYDRATION: ICD-10-CM

## 2019-10-20 DIAGNOSIS — R11.2 NAUSEA VOMITING AND DIARRHEA: ICD-10-CM

## 2019-10-20 DIAGNOSIS — R19.7 NAUSEA VOMITING AND DIARRHEA: ICD-10-CM

## 2019-10-20 DIAGNOSIS — N17.9 AKI (ACUTE KIDNEY INJURY): Primary | ICD-10-CM

## 2019-10-20 PROBLEM — M54.12 CERVICAL RADICULOPATHY AT C6: Chronic | Status: ACTIVE | Noted: 2019-09-25

## 2019-10-20 PROBLEM — K92.2 GI BLEED: Status: RESOLVED | Noted: 2019-10-01 | Resolved: 2019-10-20

## 2019-10-20 PROBLEM — K52.9 ENTEROCOLITIS: Status: RESOLVED | Noted: 2018-02-28 | Resolved: 2019-10-20

## 2019-10-20 PROBLEM — G62.0 CHEMOTHERAPY-INDUCED NEUROPATHY: Chronic | Status: ACTIVE | Noted: 2019-03-01

## 2019-10-20 PROBLEM — C79.51 SECONDARY CANCER OF BONE: Chronic | Status: ACTIVE | Noted: 2018-01-25

## 2019-10-20 PROBLEM — R53.1 WEAKNESS: Status: RESOLVED | Noted: 2019-10-07 | Resolved: 2019-10-20

## 2019-10-20 PROBLEM — C09.9: Chronic | Status: ACTIVE | Noted: 2018-01-04

## 2019-10-20 PROBLEM — D69.6 THROMBOCYTOPENIA: Status: RESOLVED | Noted: 2018-02-28 | Resolved: 2019-10-20

## 2019-10-20 PROBLEM — C77.1 SECONDARY MALIGNANCY OF MEDIASTINAL LYMPH NODES: Chronic | Status: ACTIVE | Noted: 2018-09-25

## 2019-10-20 PROBLEM — D50.0 CHRONIC BLOOD LOSS ANEMIA: Chronic | Status: ACTIVE | Noted: 2019-10-07

## 2019-10-20 PROBLEM — Z75.8 DISCHARGE PLANNING ISSUES: Status: RESOLVED | Noted: 2019-10-02 | Resolved: 2019-10-20

## 2019-10-20 PROBLEM — K52.1 DRUG-INDUCED DIARRHEA: Chronic | Status: ACTIVE | Noted: 2019-10-07

## 2019-10-20 PROBLEM — R53.83 FATIGUE: Status: RESOLVED | Noted: 2018-09-25 | Resolved: 2019-10-20

## 2019-10-20 PROBLEM — K92.2 ACUTE UPPER GI BLEEDING: Status: RESOLVED | Noted: 2019-09-09 | Resolved: 2019-10-20

## 2019-10-20 PROBLEM — T45.1X5A CHEMOTHERAPY-INDUCED NEUROPATHY: Chronic | Status: ACTIVE | Noted: 2019-03-01

## 2019-10-20 PROBLEM — K57.92 DIVERTICULITIS: Status: RESOLVED | Noted: 2018-03-07 | Resolved: 2019-10-20

## 2019-10-20 LAB
ALBUMIN SERPL BCP-MCNC: 3.2 G/DL (ref 3.5–5.2)
ALBUMIN SERPL BCP-MCNC: 4.4 G/DL (ref 3.5–5.2)
ALP SERPL-CCNC: 82 U/L (ref 38–126)
ALT SERPL W/O P-5'-P-CCNC: 17 U/L (ref 10–44)
ANION GAP SERPL CALC-SCNC: 13 MMOL/L (ref 8–16)
ANION GAP SERPL CALC-SCNC: 17 MMOL/L (ref 8–16)
AST SERPL-CCNC: 34 U/L (ref 15–46)
BASOPHILS # BLD AUTO: 0.01 K/UL (ref 0–0.2)
BASOPHILS NFR BLD: 0.3 % (ref 0–1.9)
BILIRUB SERPL-MCNC: 0.8 MG/DL (ref 0.1–1)
BUN SERPL-MCNC: 20 MG/DL (ref 6–20)
BUN SERPL-MCNC: 21 MG/DL (ref 2–20)
C DIFF GDH STL QL: NEGATIVE
C DIFF TOX A+B STL QL IA: NEGATIVE
CALCIUM SERPL-MCNC: 7.1 MG/DL (ref 8.7–10.5)
CALCIUM SERPL-MCNC: 7.2 MG/DL (ref 8.7–10.5)
CHLORIDE SERPL-SCNC: 90 MMOL/L (ref 95–110)
CHLORIDE SERPL-SCNC: 95 MMOL/L (ref 95–110)
CO2 SERPL-SCNC: 27 MMOL/L (ref 23–29)
CO2 SERPL-SCNC: 29 MMOL/L (ref 23–29)
CREAT SERPL-MCNC: 2.46 MG/DL (ref 0.5–1.4)
CREAT SERPL-MCNC: 2.5 MG/DL (ref 0.5–1.4)
DIFFERENTIAL METHOD: ABNORMAL
EOSINOPHIL # BLD AUTO: 0.1 K/UL (ref 0–0.5)
EOSINOPHIL NFR BLD: 3 % (ref 0–8)
ERYTHROCYTE [DISTWIDTH] IN BLOOD BY AUTOMATED COUNT: 15.6 % (ref 11.5–14.5)
EST. GFR  (AFRICAN AMERICAN): 33 ML/MIN/1.73 M^2
EST. GFR  (AFRICAN AMERICAN): 33.8 ML/MIN/1.73 M^2
EST. GFR  (NON AFRICAN AMERICAN): 29 ML/MIN/1.73 M^2
EST. GFR  (NON AFRICAN AMERICAN): 29.2 ML/MIN/1.73 M^2
GLUCOSE SERPL-MCNC: 104 MG/DL (ref 70–110)
GLUCOSE SERPL-MCNC: 144 MG/DL (ref 70–110)
HCT VFR BLD AUTO: 33.4 % (ref 40–54)
HGB BLD-MCNC: 11.3 G/DL (ref 14–18)
LACTATE SERPL-SCNC: 1.5 MMOL/L (ref 0.5–2.2)
LYMPHOCYTES # BLD AUTO: 0.3 K/UL (ref 1–4.8)
LYMPHOCYTES NFR BLD: 7 % (ref 18–48)
MCH RBC QN AUTO: 31 PG (ref 27–31)
MCHC RBC AUTO-ENTMCNC: 33.8 G/DL (ref 32–36)
MCV RBC AUTO: 92 FL (ref 82–98)
MONOCYTES # BLD AUTO: 0.8 K/UL (ref 0.3–1)
MONOCYTES NFR BLD: 20.3 % (ref 4–15)
NEUTROPHILS # BLD AUTO: 2.8 K/UL (ref 1.8–7.7)
NEUTROPHILS NFR BLD: 69.4 % (ref 38–73)
OB PNL STL: POSITIVE
PHOSPHATE SERPL-MCNC: 3.3 MG/DL (ref 2.7–4.5)
PLATELET # BLD AUTO: 348 K/UL (ref 150–350)
PMV BLD AUTO: 8.6 FL (ref 9.2–12.9)
POTASSIUM SERPL-SCNC: 3.2 MMOL/L (ref 3.5–5.1)
POTASSIUM SERPL-SCNC: 3.4 MMOL/L (ref 3.5–5.1)
PROT SERPL-MCNC: 7.7 G/DL (ref 6–8.4)
RBC # BLD AUTO: 3.65 M/UL (ref 4.6–6.2)
SODIUM SERPL-SCNC: 134 MMOL/L (ref 136–145)
SODIUM SERPL-SCNC: 137 MMOL/L (ref 136–145)
WBC # BLD AUTO: 4 K/UL (ref 3.9–12.7)
WBC #/AREA STL HPF: ABNORMAL /[HPF]

## 2019-10-20 PROCEDURE — 36415 COLL VENOUS BLD VENIPUNCTURE: CPT

## 2019-10-20 PROCEDURE — 96361 HYDRATE IV INFUSION ADD-ON: CPT

## 2019-10-20 PROCEDURE — G0378 HOSPITAL OBSERVATION PER HR: HCPCS

## 2019-10-20 PROCEDURE — 83993 ASSAY FOR CALPROTECTIN FECAL: CPT | Mod: ER

## 2019-10-20 PROCEDURE — 87045 FECES CULTURE AEROBIC BACT: CPT | Mod: ER

## 2019-10-20 PROCEDURE — 89055 LEUKOCYTE ASSESSMENT FECAL: CPT | Mod: ER

## 2019-10-20 PROCEDURE — 85025 COMPLETE CBC W/AUTO DIFF WBC: CPT | Mod: ER

## 2019-10-20 PROCEDURE — 87329 GIARDIA AG IA: CPT | Mod: ER

## 2019-10-20 PROCEDURE — 87425 ROTAVIRUS AG IA: CPT | Mod: ER

## 2019-10-20 PROCEDURE — 87338 HPYLORI STOOL AG IA: CPT | Mod: ER

## 2019-10-20 PROCEDURE — 87046 STOOL CULTR AEROBIC BACT EA: CPT | Mod: ER

## 2019-10-20 PROCEDURE — 25000003 PHARM REV CODE 250: Performed by: HOSPITALIST

## 2019-10-20 PROCEDURE — 87328 CRYPTOSPORIDIUM AG IA: CPT | Mod: ER

## 2019-10-20 PROCEDURE — 25000003 PHARM REV CODE 250: Mod: ER | Performed by: EMERGENCY MEDICINE

## 2019-10-20 PROCEDURE — 63600175 PHARM REV CODE 636 W HCPCS: Mod: ER | Performed by: EMERGENCY MEDICINE

## 2019-10-20 PROCEDURE — 87427 SHIGA-LIKE TOXIN AG IA: CPT | Mod: 59,ER

## 2019-10-20 PROCEDURE — 99285 EMERGENCY DEPT VISIT HI MDM: CPT | Mod: 25,ER

## 2019-10-20 PROCEDURE — 96376 TX/PRO/DX INJ SAME DRUG ADON: CPT

## 2019-10-20 PROCEDURE — 80069 RENAL FUNCTION PANEL: CPT

## 2019-10-20 PROCEDURE — 87209 SMEAR COMPLEX STAIN: CPT | Mod: ER

## 2019-10-20 PROCEDURE — 83605 ASSAY OF LACTIC ACID: CPT | Mod: ER

## 2019-10-20 PROCEDURE — 87449 NOS EACH ORGANISM AG IA: CPT | Mod: ER

## 2019-10-20 PROCEDURE — 63600175 PHARM REV CODE 636 W HCPCS: Performed by: HOSPITALIST

## 2019-10-20 PROCEDURE — 25000003 PHARM REV CODE 250: Performed by: NURSE PRACTITIONER

## 2019-10-20 PROCEDURE — 96375 TX/PRO/DX INJ NEW DRUG ADDON: CPT

## 2019-10-20 PROCEDURE — 80053 COMPREHEN METABOLIC PANEL: CPT | Mod: ER

## 2019-10-20 PROCEDURE — 82272 OCCULT BLD FECES 1-3 TESTS: CPT | Mod: ER

## 2019-10-20 PROCEDURE — 96361 HYDRATE IV INFUSION ADD-ON: CPT | Mod: ER

## 2019-10-20 PROCEDURE — 96365 THER/PROPH/DIAG IV INF INIT: CPT | Mod: ER

## 2019-10-20 PROCEDURE — 89125 SPECIMEN FAT STAIN: CPT | Mod: ER

## 2019-10-20 PROCEDURE — 82656 EL-1 FECAL QUAL/SEMIQ: CPT | Mod: ER

## 2019-10-20 RX ORDER — FLUOXETINE HYDROCHLORIDE 20 MG/1
40 CAPSULE ORAL DAILY
Status: DISCONTINUED | OUTPATIENT
Start: 2019-10-20 | End: 2019-10-25 | Stop reason: HOSPADM

## 2019-10-20 RX ORDER — HYOSCYAMINE SULFATE 0.5 MG/ML
0.5 INJECTION, SOLUTION SUBCUTANEOUS
Status: DISCONTINUED | OUTPATIENT
Start: 2019-10-20 | End: 2019-10-20

## 2019-10-20 RX ORDER — DIPHENOXYLATE HYDROCHLORIDE AND ATROPINE SULFATE 2.5; .025 MG/1; MG/1
2 TABLET ORAL 4 TIMES DAILY PRN
Status: DISCONTINUED | OUTPATIENT
Start: 2019-10-20 | End: 2019-10-25 | Stop reason: HOSPADM

## 2019-10-20 RX ORDER — TRAMADOL HYDROCHLORIDE 50 MG/1
50 TABLET ORAL EVERY 8 HOURS PRN
Status: DISCONTINUED | OUTPATIENT
Start: 2019-10-20 | End: 2019-10-21

## 2019-10-20 RX ORDER — SODIUM CHLORIDE 0.9 % (FLUSH) 0.9 %
10 SYRINGE (ML) INJECTION
Status: DISCONTINUED | OUTPATIENT
Start: 2019-10-20 | End: 2019-10-25 | Stop reason: HOSPADM

## 2019-10-20 RX ORDER — ACETAMINOPHEN 325 MG/1
650 TABLET ORAL EVERY 4 HOURS PRN
Status: DISCONTINUED | OUTPATIENT
Start: 2019-10-20 | End: 2019-10-25 | Stop reason: HOSPADM

## 2019-10-20 RX ORDER — CHOLESTYRAMINE 4 G/4.8G
4 POWDER, FOR SUSPENSION ORAL 2 TIMES DAILY
Status: DISCONTINUED | OUTPATIENT
Start: 2019-10-20 | End: 2019-10-25 | Stop reason: HOSPADM

## 2019-10-20 RX ORDER — GABAPENTIN 300 MG/1
300 CAPSULE ORAL 3 TIMES DAILY
Status: DISCONTINUED | OUTPATIENT
Start: 2019-10-20 | End: 2019-10-25 | Stop reason: HOSPADM

## 2019-10-20 RX ORDER — SODIUM CHLORIDE, SODIUM LACTATE, POTASSIUM CHLORIDE, CALCIUM CHLORIDE 600; 310; 30; 20 MG/100ML; MG/100ML; MG/100ML; MG/100ML
INJECTION, SOLUTION INTRAVENOUS CONTINUOUS
Status: DISCONTINUED | OUTPATIENT
Start: 2019-10-20 | End: 2019-10-25 | Stop reason: HOSPADM

## 2019-10-20 RX ORDER — HYOSCYAMINE SULFATE 0.12 MG/1
0.12 TABLET SUBLINGUAL
Status: COMPLETED | OUTPATIENT
Start: 2019-10-20 | End: 2019-10-20

## 2019-10-20 RX ORDER — POTASSIUM CHLORIDE 7.45 MG/ML
10 INJECTION INTRAVENOUS
Status: DISPENSED | OUTPATIENT
Start: 2019-10-20 | End: 2019-10-20

## 2019-10-20 RX ORDER — PANTOPRAZOLE SODIUM 40 MG/1
40 TABLET, DELAYED RELEASE ORAL 2 TIMES DAILY
Status: DISCONTINUED | OUTPATIENT
Start: 2019-10-20 | End: 2019-10-25 | Stop reason: HOSPADM

## 2019-10-20 RX ORDER — ONDANSETRON 2 MG/ML
4 INJECTION INTRAMUSCULAR; INTRAVENOUS EVERY 8 HOURS PRN
Status: DISCONTINUED | OUTPATIENT
Start: 2019-10-20 | End: 2019-10-25 | Stop reason: HOSPADM

## 2019-10-20 RX ORDER — SODIUM CHLORIDE, SODIUM LACTATE, POTASSIUM CHLORIDE, CALCIUM CHLORIDE 600; 310; 30; 20 MG/100ML; MG/100ML; MG/100ML; MG/100ML
1000 INJECTION, SOLUTION INTRAVENOUS
Status: ACTIVE | OUTPATIENT
Start: 2019-10-20 | End: 2019-10-20

## 2019-10-20 RX ORDER — LOPERAMIDE HYDROCHLORIDE 2 MG/1
2 CAPSULE ORAL EVERY 4 HOURS PRN
Status: DISCONTINUED | OUTPATIENT
Start: 2019-10-20 | End: 2019-10-25 | Stop reason: HOSPADM

## 2019-10-20 RX ORDER — METRONIDAZOLE 500 MG/1
500 TABLET ORAL 3 TIMES DAILY
Status: DISCONTINUED | OUTPATIENT
Start: 2019-10-20 | End: 2019-10-20

## 2019-10-20 RX ADMIN — METRONIDAZOLE 500 MG: 500 TABLET ORAL at 08:10

## 2019-10-20 RX ADMIN — SODIUM CHLORIDE, SODIUM LACTATE, POTASSIUM CHLORIDE, AND CALCIUM CHLORIDE: .6; .31; .03; .02 INJECTION, SOLUTION INTRAVENOUS at 08:10

## 2019-10-20 RX ADMIN — ACETAMINOPHEN 650 MG: 325 TABLET ORAL at 08:10

## 2019-10-20 RX ADMIN — TRAMADOL HYDROCHLORIDE 50 MG: 50 TABLET ORAL at 11:10

## 2019-10-20 RX ADMIN — PANTOPRAZOLE SODIUM 40 MG: 40 TABLET, DELAYED RELEASE ORAL at 11:10

## 2019-10-20 RX ADMIN — HYOSCYAMINE SULFATE 0.12 MG: 0.12 TABLET ORAL; SUBLINGUAL at 02:10

## 2019-10-20 RX ADMIN — PANTOPRAZOLE SODIUM 40 MG: 40 TABLET, DELAYED RELEASE ORAL at 08:10

## 2019-10-20 RX ADMIN — POTASSIUM CHLORIDE 10 MEQ: 10 INJECTION, SOLUTION INTRAVENOUS at 11:10

## 2019-10-20 RX ADMIN — LACTOBACILLUS TAB 4 TABLET: TAB at 05:10

## 2019-10-20 RX ADMIN — POTASSIUM CHLORIDE 10 MEQ: 10 INJECTION, SOLUTION INTRAVENOUS at 10:10

## 2019-10-20 RX ADMIN — SODIUM CHLORIDE, SODIUM LACTATE, POTASSIUM CHLORIDE, AND CALCIUM CHLORIDE 1000 ML: .6; .31; .03; .02 INJECTION, SOLUTION INTRAVENOUS at 01:10

## 2019-10-20 RX ADMIN — FLUOXETINE 40 MG: 20 CAPSULE ORAL at 11:10

## 2019-10-20 RX ADMIN — ACETAMINOPHEN 650 MG: 325 TABLET ORAL at 05:10

## 2019-10-20 RX ADMIN — GABAPENTIN 300 MG: 300 CAPSULE ORAL at 08:10

## 2019-10-20 RX ADMIN — TRAMADOL HYDROCHLORIDE 50 MG: 50 TABLET ORAL at 08:10

## 2019-10-20 RX ADMIN — GABAPENTIN 300 MG: 300 CAPSULE ORAL at 02:10

## 2019-10-20 RX ADMIN — SODIUM CHLORIDE 1000 ML: 0.9 INJECTION, SOLUTION INTRAVENOUS at 05:10

## 2019-10-20 RX ADMIN — POTASSIUM CHLORIDE 10 MEQ: 10 INJECTION, SOLUTION INTRAVENOUS at 08:10

## 2019-10-20 RX ADMIN — PROMETHAZINE HYDROCHLORIDE 12.5 MG: 25 INJECTION INTRAMUSCULAR; INTRAVENOUS at 02:10

## 2019-10-20 NOTE — CONSULTS
[facility] Consult Note    CC: chronic diarrhea     HPI: 49 y.o. male with tonsillar CA s/p chemoradiation and opdivo, h/o PEG tube, duodenal ulcers who presents with chronic, recurrent, watery diarrhea associated with nausea and vomiting. This has been ongoing for 3-4 months, without much relief. Did have a brief time of relief, while on po steroids. Over the past few days, noticed worsening of diarrhea, now with vomiting x2. Never had vomiting before. Endorses multiple BM, >15, per day now. Recently on ABX for possible rectal abscess. Denies blood in stools or black stools. No blood in emesis. Denies fever, chills.     Upper endoscopy earlier this month with gastritis (negative for HP), small hiatal hernia, nonbleeding duodenal ulcer seen. Colonoscopy scheduled for 10/22 with Dr. Sawyer.     Previous medical records reviewed and summarized above.     Past Medical History   has a past medical history of Acute renal failure (2018), Alcohol abuse, Chemotherapy induced neutropenia (2018), Former smoker, HPV in male, psychiatric care, Opiate addiction, Port or reservoir infection (3/26/2018), Squamous cell carcinoma of palatine tonsil, and Substance abuse.    Past Surgical History   has a past surgical history that includes Gastrostomy tube placement (Left, 2018); Mediport removal (N/A, 2018); Esophagogastroduodenoscopy (Left, 2019); Esophagogastroduodenoscopy (N/A, 2019); and Esophagogastroduodenoscopy (N/A, 10/2/2019).    Social History  Social History     Tobacco Use    Smoking status: Former Smoker     Packs/day: 1.50     Years: 25.00     Pack years: 37.50     Types: Cigarettes     Last attempt to quit: 2017     Years since quittin.3    Smokeless tobacco: Never Used    Tobacco comment: smoked for about 25 years. quit 6 months ago   Substance Use Topics    Alcohol use: No     Comment: history of overuse    Drug use: No     Comment: Former Opiate/methamphetamine  "addiction     Family History  Family History   Problem Relation Age of Onset    Leukemia Mother     Hypertension Father     Thyroid disease Sister     Depression Sister     Hypertension Brother     Brain cancer Maternal Uncle     Brain cancer Maternal Uncle      Review of Systems  General ROS: negative for chills, fever or weight loss  Psychological ROS: negative for hallucination, depression or suicidal ideation  Ophthalmic ROS: negative for blurry vision, photophobia or eye pain  ENT ROS: negative for epistaxis, sore throat or rhinorrhea  Respiratory ROS: no cough, shortness of breath, or wheezing  Cardiovascular ROS: no chest pain or dyspnea on exertion  Gastrointestinal ROS: +vomiting, nausea, diarrhea   Genito-Urinary ROS: no dysuria, trouble voiding, or hematuria  Musculoskeletal ROS: negative for gait disturbance or muscular weakness  Neurological ROS: no syncope or seizures; no ataxia  Dermatological ROS: negative for pruritis, rash and jaundice    Physical Examination  /75   Pulse 104   Temp 98.3 °F (36.8 °C) (Oral)   Resp 17   Ht 5' 8" (1.727 m)   Wt 70.9 kg (156 lb 4.9 oz)   SpO2 96%   BMI 23.77 kg/m²     General appearance: alert, cooperative, no distress  HENT: Normocephalic, atraumatic, neck symmetrical, no nasal discharge   Eyes: conjunctivae/corneas clear, PERRL, EOM's intact  Lungs: clear to auscultation in all fields, no dullness to percussion bilaterally, no wheeze  Heart: normal rate, regular rhythm without rub; palpable peripheral pulses  Abdomen: soft, non-tender; bowel sounds normoactive; no organomegaly  Extremities: extremities symmetric; no clubbing, cyanosis, or edema  Integument: Skin color, texture, turgor normal; no rashes; hair distrubution normal  Neurologic: Alert and oriented X 3, normal strength, normal coordination  Psychiatric: no pressured speech; normal affect; no evidence of impaired cognition     Labs:  Recent Labs   Lab 10/20/19  0138   WBC 4.00   RBC " 3.65*   HGB 11.3*   HCT 33.4*      MCV 92   MCH 31.0   MCHC 33.8     Recent Labs   Lab 10/20/19  0835      K 3.4*   CL 95   CO2 29      BUN 20   CREATININE 2.5*     Recent Labs   Lab 10/20/19  0138 10/20/19  0835   PROT 7.7  --    ALBUMIN 4.4 3.2*   BILITOT 0.8  --    AST 34  --    ALT 17  --    ALKPHOS 82  --      Imaging:  Ct abd 2018 - without significant findings   I have personally reviewed these images.    Assessment:   50 yo with acute on chronic diarrhea, now associated with 2 episodes of vomiting. Denies any overt GI bleeding. Recent EGD without significant findings, with plan for colonoscopy as outpatient this week. C diff vs ABX induced diarrhea vs viral GE.     Plan:  -supportive care - IVF infusion, antiemetics  -FU stool studies   -may have colonoscopy  done inpatient ; will discuss after further workup     Thank you for the consult. Please call with questions.     Alvaro Stallings MD  \[facility]\"" Gastroenterology   [phone]

## 2019-10-20 NOTE — HPI
Burton Whiting is a 49 y.o. 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 He lives in [place], [place]. His primary care physician is Dr. Christopher Turpin. His hematologist-oncologist is Dr. Cortney Lopez. His gastroenterologist is Dr. Nithya Sawyer. His colorectal surgeon is Dr. Larisa Dominguez.    He was prescribed 7 days of metronidazole on 10/13/19 (end date 10/20/19) for an anterior perianal abscess that self drained. He was seen by Dr. Dominguez on 10/18/19. He was scheduled for outpatient colonoscopy by Dr. Sawyer on 10/22/19.    He presented to [facility] on 10/20/19 with vomiting and worsening diarrhea for 2 days. He reported having about 40 episodes of small volume watery diarrhea in the past day. He feared to drink or eat due to this. He felt malaise, dehydration, generalized body pain. He already takes cholestyramine-aspartame (Questran), loperamide, and diphenoxyalate-atropine (Lomotil) for his chronic diarrhea. Labs showed hypokalemia (potassium 3.2) and acute kidney injury (BUN 21 and creatinine 2.46, from 14 and 1.3 on 10/13/19). He was given 1 liter of lactated ringers and 1 liter of normal saline. He was admitted to [facility].

## 2019-10-20 NOTE — PLAN OF CARE
VN cued into patients room for admission assessment and rounding. VN role explained and informed pt that VN will be working alongside the bedside care team throughout the day. Pt verbalized understanding that VN is available for any questions and education, and nurse and PCT will continue hourly rounding at bedside. Fall education provided. Allotted time given for questions - all questions answered. Will continue to monitor and intervene PRN.

## 2019-10-20 NOTE — ASSESSMENT & PLAN NOTE
Secondary cancer of bone  Secondary malignancy of mediastinal lymph nodes  Chemotherapy-induced neuropathy  Followed by Hematology-Oncology at [facility]. Continue home gabapentin for neuropathy.

## 2019-10-20 NOTE — ED NOTES
Only able to obtain a small stool sample. Watery stool with spot of liquid brown.  Informed pt when he is able to go again we will need to collect some more.  Pt verbalized understanding.

## 2019-10-20 NOTE — ED PROVIDER NOTES
"Encounter Date: 10/20/2019       History     Chief Complaint   Patient presents with    Emesis     Pt c/o N/V/D.  Vomiting X 2 days with 2 episodes of vomiting; diarrhea X 3 months with increase in symptoms this AM.  Pt reports he is currently being treated for throat CA and is on "many" different meds.  No chemo or radiation at this time.       HPI   This is a 49 y.o. male who has a past medical history of Acute renal failure (6/21/2018), Alcohol abuse, Chemotherapy induced neutropenia (6/7/2018), Former smoker, HPV in male, psychiatric care, Opiate addiction, Squamous cell carcinoma of palatine tonsil, and Substance abuse.     The patient presents to the Emergency Department with nausea, vomiting and diarrhea.  Patient has had chronic symptoms for the last 3 months, however symptoms have significantly worsened over the last 2 days.  Patient states in the last day he had about 40 episodes of small volume, watery diarrhea.  He denies any blood in his stool or emesis.    Symptoms are associated with anorexia, as he is fearful to eat or drink anything.  He also has associated feelings of malaise, dehydration, generalized body pain.  Pt denies fever.  Patient had recent visit and admission to the hospital at [facility] for similar symptoms, as well as anemia.  He was treated with IV fluids and discharged on Questran and Flagyl.  He states no improvement and that time and in fact worsening as above.    Symptoms are aggravated by p.o. intake.  Symptoms are relieved by nothing currently.  He states that initially when he was given steroids he had some improvement.   He is not on any probiotics.  He never had issues prior to being put on up Opdivo.    Review of patient's allergies indicates:   Allergen Reactions    Trazodone Other (See Comments)     confusion     Past Medical History:   Diagnosis Date    Acute renal failure 6/21/2018    Alcohol abuse     ended 2004    Chemotherapy induced neutropenia 6/7/2018    Former " "smoker     HPV in male     Hx of psychiatric care     Ativan, Paxil ("caused anger problems")    Opiate addiction     Squamous cell carcinoma of palatine tonsil     throat and tonsillar    Substance abuse     opiates, methamphetamines; ended      Past Surgical History:   Procedure Laterality Date    ESOPHAGOGASTRODUODENOSCOPY Left 2019    Procedure: EGD (ESOPHAGOGASTRODUODENOSCOPY);  Surgeon: Balta Lisa MD;  Location: [facility];  Service: Endoscopy;  Laterality: Left;    ESOPHAGOGASTRODUODENOSCOPY N/A 2019    Procedure: EGD (ESOPHAGOGASTRODUODENOSCOPY);  Surgeon: Perez Mon III, MD;  Location: [facility];  Service: Endoscopy;  Laterality: N/A;    ESOPHAGOGASTRODUODENOSCOPY N/A 10/2/2019    Procedure: EGD (ESOPHAGOGASTRODUODENOSCOPY);  Surgeon: Bonifacio Sosa MD;  Location: [facility] ([address] [place] [address]);  Service: Endoscopy;  Laterality: N/A;    GASTROSTOMY TUBE PLACEMENT Left 2018    MEDIPORT REMOVAL N/A 2018    Procedure: Removal-port-a-cath;  Surgeon: Blayne Diagnostic Provider;  [facility];  Service: General;  Laterality: N/A;     Family History   Problem Relation Age of Onset    Leukemia Mother     Hypertension Father     Thyroid disease Sister     Depression Sister     Hypertension Brother     Brain cancer Maternal Uncle     Brain cancer Maternal Uncle      Social History     Tobacco Use    Smoking status: Former Smoker     Packs/day: 1.50     Years: 25.00     Pack years: 37.50     Types: Cigarettes     Last attempt to quit: 2017     Years since quittin.3    Smokeless tobacco: Never Used    Tobacco comment: smoked for about 25 years. quit 6 months ago   Substance Use Topics    Alcohol use: No     Comment: history of overuse    Drug use: No     Comment: Former Opiate/methamphetamine addiction     Review of Systems   Constitutional: Positive for activity change, appetite change and fatigue. Negative for fever.   HENT: Negative for sore " throat.    Respiratory: Negative for shortness of breath.    Cardiovascular: Negative for chest pain.   Gastrointestinal: Positive for abdominal pain (Cramping), diarrhea, nausea and vomiting. Negative for blood in stool.   Genitourinary: Positive for decreased urine volume. Negative for dysuria.   Musculoskeletal: Negative for back pain.   Skin: Negative for rash.   Neurological: Positive for dizziness and weakness.   Hematological: Does not bruise/bleed easily.   Psychiatric/Behavioral: Positive for sleep disturbance.       Physical Exam     Initial Vitals [10/20/19 0117]   BP Pulse Resp Temp SpO2   (!) 174/90 109 (!) 21 98.3 °F (36.8 °C) 97 %      MAP       --         Physical Exam    Nursing note and vitals reviewed.  Constitutional: He appears well-developed and well-nourished. He appears distressed.   Patient appears weak, pale, uncomfortable   HENT:   Head: Normocephalic and atraumatic.   Dry oropharynx   Eyes: Pupils are equal, round, and reactive to light.   Pale conjunctiva   Neck: Normal range of motion. Neck supple.   Cardiovascular: Normal rate, regular rhythm, normal heart sounds and intact distal pulses.   Pulmonary/Chest: Breath sounds normal. No respiratory distress.   Abdominal: Soft. He exhibits no distension. There is no tenderness.   Hyperactive bowel sounds   Genitourinary:   Genitourinary Comments: Fistula right buttock  Hemorrhoids, nonthrombosed, nontender   Musculoskeletal: Normal range of motion. He exhibits no edema or tenderness.   Lymphadenopathy:     He has no cervical adenopathy.   Neurological: He is alert and oriented to person, place, and time.   Skin: Skin is warm and dry. Capillary refill takes less than 2 seconds. No rash noted. No erythema. There is pallor.   Psychiatric: He has a normal mood and affect. Thought content normal.         ED Course   Procedures  Labs Reviewed   CBC W/ AUTO DIFFERENTIAL - Abnormal; Notable for the following components:       Result Value    RBC  3.65 (*)     Hemoglobin 11.3 (*)     Hematocrit 33.4 (*)     RDW 15.6 (*)     MPV 8.6 (*)     Lymph # 0.3 (*)     Lymph% 7.0 (*)     Mono% 20.3 (*)     All other components within normal limits   COMPREHENSIVE METABOLIC PANEL - Abnormal; Notable for the following components:    Sodium 134 (*)     Potassium 3.2 (*)     Chloride 90 (*)     Glucose 144 (*)     BUN, Bld 21 (*)     Creatinine 2.46 (*)     Calcium 7.2 (*)     Anion Gap 17 (*)     eGFR if  33.8 (*)     eGFR if non  29.2 (*)     All other components within normal limits   OCCULT BLOOD X 1, STOOL - Abnormal; Notable for the following components:    Occult Blood Positive (*)     All other components within normal limits   CULTURE, STOOL   CLOSTRIDIUM DIFFICILE   ENTEROHEMORRHAGIC E.COLI   LACTIC ACID, PLASMA   H. PYLORI ANTIGEN, STOOL   PANCREATIC ELASTASE, FECAL   FECAL FAT, QUALITATIVE   WBC, STOOL   ROTAVIRUS ANTIGEN, STOOL   CALPROTECTIN, STOOL   GIARDIA/CRYPTOSPORIDIUM (EIA)   STOOL EXAM-OVA,CYSTS,PARASITES          Imaging Results    None          Medical Decision Making:   Initial Assessment:   This is an emergent evaluation of a 49 y.o.male patient with presentation of acute worsening of chronic diarrhea - 3 months diarrhea but worse over last day.     Initial differentials include but are not limited to: infectious diarrhea; adverse effect of flagyl, other abx-assoc diarrhea, parasitic infection, dehydration, PJ, electrolyte abnormality, anemia    Plan: basic labs, stool studies, phenergan, levsin    Clinical Tests:   Lab Tests: Ordered and Reviewed                   ED Course as of Oct 20 0310   Sun Oct 20, 2019   0254 Pt has PJ. Will place in OBS to [facility].    [NP]   0310 Case discussed with YUE Jacob.  Will accept her service.    [NP]      ED Course User Index  [NP] Hermes Ledesma MD       Clinical Impression:       ICD-10-CM ICD-9-CM   1. PJ (acute kidney injury) N17.9 584.9   2. Nausea vomiting and  diarrhea R11.2 787.91    R19.7 787.01   3. Dehydration E86.0 276.51                                Hermes Ledesma MD  10/20/19 0317

## 2019-10-20 NOTE — ED NOTES
Appearance: Pt awake, alert. Pt in no acute distress at present time and answers appropriately. Pt is clean and well groomed with clothes appropriately fastened.   Skin: Skin warm, dry. Skin color has improved.  Pt with no discoloration. Mucous membranes are pink and moist. No bruising noted.   Musculoskeletal: Patient moving all extremities well, no obvious swelling or deformities noted. Not tender to palpate.   Respiratory: Respirations spontaneous, even, and non-labored. Visible chest rise noted. Airway is open and patent. No accessory muscle use noted.   Neurologic: Sensation is intact. Pt's eyes open spontaneously, behavior appropriate to situation, follows  commands, facial expression, purposeful motor response noted. Perrla noted.   Cardiac:  No Bilateral lower extremity edema. Cap refill is <3 seconds.   Abdomen: Abdomen soft and non tender.   VS updated.  No further vomiting since pt has arrived to hospital.  Instructed pt to call with problems, needs, or concerns.  Pt verbalized understanding.  Will continue to monitor.

## 2019-10-20 NOTE — ED NOTES
Pt being transferred to [facility] and concerned about his vehicle.   Notified  Sayda about need to leave vehicle.   She is at the bedside speaking with pt about information.

## 2019-10-20 NOTE — ED NOTES
"Updated Dr Ledesma about status of EMS and pt's current VS.  New orders noted.  Pt awake. Pt reports feeling "slightly better."  NADN at this time.  Pt ambulatory to the bathroom without any difficulty.   When pt returned to , placed pt on monitor, bed low and locked, SR up x 2, Call bell in reach.  Instruct to call with problem, needs, concerns.  Pt verbalized understanding.  Will continue to monitor.  "

## 2019-10-20 NOTE — ED NOTES
Spoke with Tristan, Pharmacy at [facility].  Assisted with fixing medication order for medication Hyoscyamine

## 2019-10-20 NOTE — ASSESSMENT & PLAN NOTE
Continue home Questran, Lomotil, loperamide. For worsening diarrhea, try Lactobacillus. Consult Gastroenterology. He may benefit from getting his colonoscopy while he is admitted.

## 2019-10-20 NOTE — SUBJECTIVE & OBJECTIVE
"Past Medical History:   Diagnosis Date    Acute renal failure 6/21/2018    Alcohol abuse     ended 2004    Chemotherapy induced neutropenia 6/7/2018    Former smoker     HPV in male     Hx of psychiatric care     Ativan, Paxil ("caused anger problems")    Opiate addiction     Port or reservoir infection 3/26/2018    Squamous cell carcinoma of palatine tonsil     throat and tonsillar    Substance abuse     opiates, methamphetamines; ended 2004       Past Surgical History:   Procedure Laterality Date    ESOPHAGOGASTRODUODENOSCOPY Left 9/9/2019    Procedure: EGD (ESOPHAGOGASTRODUODENOSCOPY);  Surgeon: Balta Lisa MD;  Location: [facility];  Service: Endoscopy;  Laterality: Left;    ESOPHAGOGASTRODUODENOSCOPY N/A 9/12/2019    Procedure: EGD (ESOPHAGOGASTRODUODENOSCOPY);  Surgeon: Perez Mon III, MD;  Location: [facility];  Service: Endoscopy;  Laterality: N/A;    ESOPHAGOGASTRODUODENOSCOPY N/A 10/2/2019    Procedure: EGD (ESOPHAGOGASTRODUODENOSCOPY);  Surgeon: Bonifacio Sosa MD;  Location: [facility] ([address] [place] [address]);  Service: Endoscopy;  Laterality: N/A;    GASTROSTOMY TUBE PLACEMENT Left 02/12/2018    MEDIPORT REMOVAL N/A 6/6/2018    Procedure: Removal-port-a-cath;  Surgeon: Blayne Diagnostic Provider;  Location: [facility] OR [address] [place] [address];  Service: General;  Laterality: N/A;       Review of patient's allergies indicates:   Allergen Reactions    Trazodone Other (See Comments)     confusion       No current facility-administered medications on file prior to encounter.      Current Outpatient Medications on File Prior to Encounter   Medication Sig    acetaminophen (TYLENOL) 325 MG tablet Take 2 tablets (650 mg total) by mouth every 6 (six) hours as needed.    calcium-vitamin D3 (CALCIUM 500 + D) 500 mg(1,250mg) -200 unit per tablet Take 1 tablet by mouth 2 (two) times daily with meals.    cholestyramine-aspartame (QUESTRAN LIGHT) 4 gram PwPk Take 1 packet (4 g total) by mouth 2 (two) times daily.    " diphenoxylate-atropine 2.5-0.025 mg (LOMOTIL) 2.5-0.025 mg per tablet Take 2 tablets by mouth 4 (four) times daily as needed for Diarrhea.    FLUoxetine 40 MG capsule Take 1 capsule (40 mg total) by mouth once daily.    gabapentin (NEURONTIN) 300 MG capsule Take 1 capsule (300 mg total) by mouth 3 (three) times daily.    lidocaine (LIDODERM) 5 % Place 1 patch onto the skin daily as needed. Remove & Discard patch within 12 hours or as directed by MD    loperamide (IMODIUM A-D) 2 mg Tab Take 2 mg by mouth 4 (four) times daily as needed.    metroNIDAZOLE (FLAGYL) 500 MG tablet Take 1 tablet (500 mg total) by mouth 3 (three) times daily. for 7 days    multivitamin capsule Take 1 capsule by mouth once daily.    denosumab (XGEVA) 120 mg/1.7 mL (70 mg/mL) Soln Inject 1.7 mLs (120 mg total) into the skin once. for 1 dose    hydrocortisone 2.5 % cream Apply topically 2 (two) times daily. (Patient taking differently: Apply 1 application topically 2 (two) times daily. )    mirtazapine (REMERON) 7.5 MG Tab     pantoprazole (PROTONIX) 40 MG tablet Take 1 tablet (40 mg total) by mouth once daily. (Patient taking differently: Take 40 mg by mouth 2 (two) times daily. )    tiZANidine (ZANAFLEX) 2 MG tablet Take 4 mg by mouth nightly as needed. NECK PAIN    traMADol (ULTRAM) 50 mg tablet Take 1 tablet (50 mg total) by mouth every 8 (eight) hours as needed for Pain.     Family History     Problem Relation (Age of Onset)    Brain cancer Maternal Uncle, Maternal Uncle    Depression Sister    Hypertension Father, Brother    Leukemia Mother    Thyroid disease Sister        Tobacco Use    Smoking status: Former Smoker     Packs/day: 1.50     Years: 25.00     Pack years: 37.50     Types: Cigarettes     Last attempt to quit: 2017     Years since quittin.3    Smokeless tobacco: Never Used    Tobacco comment: smoked for about 25 years. quit 6 months ago   Substance and Sexual Activity    Alcohol use: No     Comment:  history of overuse    Drug use: No     Comment: Former Opiate/methamphetamine addiction    Sexual activity: Yes     Partners: Female     Review of Systems   Constitutional: Negative for chills and fever.   HENT: Negative for ear pain and mouth sores.    Eyes: Negative for pain and redness.   Respiratory: Negative for cough and shortness of breath.    Cardiovascular: Negative for chest pain and palpitations.   Gastrointestinal: Positive for diarrhea, nausea and vomiting.   Genitourinary: Negative for difficulty urinating and dysuria.   Musculoskeletal: Negative for neck pain and neck stiffness.   Skin: Negative for rash and wound.   Neurological: Positive for weakness. Negative for syncope.     Objective:     Vital Signs (Most Recent):  Temp: 98.3 °F (36.8 °C) (10/20/19 0117)  Pulse: 75 (10/20/19 0631)  Resp: (!) 21 (10/20/19 0117)  BP: 116/70 (10/20/19 0631)  SpO2: 96 % (10/20/19 0631) Vital Signs (24h Range):  Temp:  [98.3 °F (36.8 °C)] 98.3 °F (36.8 °C)  Pulse:  [] 75  Resp:  [21] 21  SpO2:  [96 %-99 %] 96 %  BP: ()/(65-90) 116/70     Weight: 76.7 kg (169 lb)  Body mass index is 25.7 kg/m².    Physical Exam   Constitutional: He is oriented to person, place, and time. He appears well-developed. No distress.   HENT:   Head: Normocephalic and atraumatic.   Eyes: Conjunctivae are normal. No scleral icterus.   Neck: Neck supple. No tracheal deviation present.   Cardiovascular: Normal rate and regular rhythm.   Pulmonary/Chest: Effort normal and breath sounds normal. No respiratory distress.   Abdominal: Soft. He exhibits no distension. There is no tenderness. There is no guarding.   Musculoskeletal: He exhibits no edema or tenderness.   Neurological: He is alert and oriented to person, place, and time.   Skin: Skin is warm and dry.   Psychiatric: He has a normal mood and affect.   Nursing note and vitals reviewed.          Significant Labs: All pertinent labs within the past 24 hours have been  reviewed.    Significant Imaging: I have reviewed all pertinent imaging results/findings within the past 24 hours.

## 2019-10-20 NOTE — H&P
"[facility]  History & Physical    Patient Name: Burton Whiting  MRN: [id number]  Admission Date: 10/20/2019  Attending Physician: Crispin Feliciano MD   Primary Care Provider: Christopher Turpin DO         Patient information was obtained from patient, past medical records and ER records.     Subjective:     Principal Problem:PJ (acute kidney injury)    Chief Complaint:   Chief Complaint   Patient presents with    Emesis     Pt c/o N/V/D.  Vomiting X 2 days with 2 episodes of vomiting; diarrhea X 3 months with increase in symptoms this AM.  Pt reports he is currently being treated for throat CA and is on "many" different meds.  No chemo or radiation at this time.          HPI: Burton Whiting is a 49 y.o. 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 He lives in [place], [place]. His primary care physician is Dr. Christopher Turpin. His hematologist-oncologist is Dr. Cortney Lopez. His gastroenterologist is Dr. Nithya Sawyer. His colorectal surgeon is Dr. Larisa Dominguez.    He was prescribed 7 days of metronidazole on 10/13/19 (end date 10/20/19) for an anterior perianal abscess that self drained. He was seen by Dr. Dominguez on 10/18/19. He was scheduled for outpatient colonoscopy by Dr. Sawyer on 10/22/19.    He presented to [facility] on 10/20/19 with vomiting and worsening diarrhea for 2 days. He reported having about 40 episodes of small volume watery diarrhea in the past day. He feared to drink or eat due to this. He felt malaise, dehydration, generalized body pain. He already takes cholestyramine-aspartame (Questran), loperamide, and " "diphenoxyalate-atropine (Lomotil) for his chronic diarrhea. Labs showed hypokalemia (potassium 3.2) and acute kidney injury (BUN 21 and creatinine 2.46, from 14 and 1.3 on 10/13/19). He was given 1 liter of lactated ringers and 1 liter of normal saline. He was admitted to [facility].     Past Medical History:   Diagnosis Date    Acute renal failure 6/21/2018    Alcohol abuse     ended 2004    Chemotherapy induced neutropenia 6/7/2018    Former smoker     HPV in male     Hx of psychiatric care     Ativan, Paxil ("caused anger problems")    Opiate addiction     Port or reservoir infection 3/26/2018    Squamous cell carcinoma of palatine tonsil     throat and tonsillar    Substance abuse     opiates, methamphetamines; ended 2004       Past Surgical History:   Procedure Laterality Date    ESOPHAGOGASTRODUODENOSCOPY Left 9/9/2019    Procedure: EGD (ESOPHAGOGASTRODUODENOSCOPY);  Surgeon: Balta Lisa MD;  Location: [facility];  Service: Endoscopy;  Laterality: Left;    ESOPHAGOGASTRODUODENOSCOPY N/A 9/12/2019    Procedure: EGD (ESOPHAGOGASTRODUODENOSCOPY);  Surgeon: Perez Mon III, MD;  Location: [facility];  Service: Endoscopy;  Laterality: N/A;    ESOPHAGOGASTRODUODENOSCOPY N/A 10/2/2019    Procedure: EGD (ESOPHAGOGASTRODUODENOSCOPY);  Surgeon: Bonifacio Sosa MD;  Location: [facility] ([address] [place] [address]);  Service: Endoscopy;  Laterality: N/A;    GASTROSTOMY TUBE PLACEMENT Left 02/12/2018    MEDIPORT REMOVAL N/A 6/6/2018    Procedure: Removal-port-a-cath;  Surgeon: [facility] Diagnostic Provider;  Location: [facility] OR [address] [place] [address];  Service: General;  Laterality: N/A;       Review of patient's allergies indicates:   Allergen Reactions    Trazodone Other (See Comments)     confusion       No current facility-administered medications on file prior to encounter.      Current Outpatient Medications on File Prior to Encounter   Medication Sig    acetaminophen (TYLENOL) 325 MG tablet Take 2 tablets (650 " mg total) by mouth every 6 (six) hours as needed.    calcium-vitamin D3 (CALCIUM 500 + D) 500 mg(1,250mg) -200 unit per tablet Take 1 tablet by mouth 2 (two) times daily with meals.    cholestyramine-aspartame (QUESTRAN LIGHT) 4 gram PwPk Take 1 packet (4 g total) by mouth 2 (two) times daily.    diphenoxylate-atropine 2.5-0.025 mg (LOMOTIL) 2.5-0.025 mg per tablet Take 2 tablets by mouth 4 (four) times daily as needed for Diarrhea.    FLUoxetine 40 MG capsule Take 1 capsule (40 mg total) by mouth once daily.    gabapentin (NEURONTIN) 300 MG capsule Take 1 capsule (300 mg total) by mouth 3 (three) times daily.    lidocaine (LIDODERM) 5 % Place 1 patch onto the skin daily as needed. Remove & Discard patch within 12 hours or as directed by MD    loperamide (IMODIUM A-D) 2 mg Tab Take 2 mg by mouth 4 (four) times daily as needed.    metroNIDAZOLE (FLAGYL) 500 MG tablet Take 1 tablet (500 mg total) by mouth 3 (three) times daily. for 7 days    multivitamin capsule Take 1 capsule by mouth once daily.    denosumab (XGEVA) 120 mg/1.7 mL (70 mg/mL) Soln Inject 1.7 mLs (120 mg total) into the skin once. for 1 dose    hydrocortisone 2.5 % cream Apply topically 2 (two) times daily. (Patient taking differently: Apply 1 application topically 2 (two) times daily. )    mirtazapine (REMERON) 7.5 MG Tab     pantoprazole (PROTONIX) 40 MG tablet Take 1 tablet (40 mg total) by mouth once daily. (Patient taking differently: Take 40 mg by mouth 2 (two) times daily. )    tiZANidine (ZANAFLEX) 2 MG tablet Take 4 mg by mouth nightly as needed. NECK PAIN    traMADol (ULTRAM) 50 mg tablet Take 1 tablet (50 mg total) by mouth every 8 (eight) hours as needed for Pain.     Family History     Problem Relation (Age of Onset)    Brain cancer Maternal Uncle, Maternal Uncle    Depression Sister    Hypertension Father, Brother    Leukemia Mother    Thyroid disease Sister        Tobacco Use    Smoking status: Former Smoker      Packs/day: 1.50     Years: 25.00     Pack years: 37.50     Types: Cigarettes     Last attempt to quit: 2017     Years since quittin.3    Smokeless tobacco: Never Used    Tobacco comment: smoked for about 25 years. quit 6 months ago   Substance and Sexual Activity    Alcohol use: No     Comment: history of overuse    Drug use: No     Comment: Former Opiate/methamphetamine addiction    Sexual activity: Yes     Partners: Female     Review of Systems   Constitutional: Negative for chills and fever.   HENT: Negative for ear pain and mouth sores.    Eyes: Negative for pain and redness.   Respiratory: Negative for cough and shortness of breath.    Cardiovascular: Negative for chest pain and palpitations.   Gastrointestinal: Positive for diarrhea, nausea and vomiting.   Genitourinary: Negative for difficulty urinating and dysuria.   Musculoskeletal: Negative for neck pain and neck stiffness.   Skin: Negative for rash and wound.   Neurological: Positive for weakness. Negative for syncope.     Objective:     Vital Signs (Most Recent):  Temp: 98.3 °F (36.8 °C) (10/20/19 0117)  Pulse: 75 (10/20/19 0631)  Resp: (!) 21 (10/20/19 0117)  BP: 116/70 (10/20/19 0631)  SpO2: 96 % (10/20/19 0631) Vital Signs (24h Range):  Temp:  [98.3 °F (36.8 °C)] 98.3 °F (36.8 °C)  Pulse:  [] 75  Resp:  [21] 21  SpO2:  [96 %-99 %] 96 %  BP: ()/(65-90) 116/70     Weight: 76.7 kg (169 lb)  Body mass index is 25.7 kg/m².    Physical Exam   Constitutional: He is oriented to person, place, and time. He appears well-developed. No distress.   HENT:   Head: Normocephalic and atraumatic.   Eyes: Conjunctivae are normal. No scleral icterus.   Neck: Neck supple. No tracheal deviation present.   Cardiovascular: Normal rate and regular rhythm.   Pulmonary/Chest: Effort normal and breath sounds normal. No respiratory distress.   Abdominal: Soft. He exhibits no distension. There is no tenderness. There is no guarding.   Musculoskeletal: He  exhibits no edema or tenderness.   Neurological: He is alert and oriented to person, place, and time.   Skin: Skin is warm and dry.   Psychiatric: He has a normal mood and affect.   Nursing note and vitals reviewed.          Significant Labs: All pertinent labs within the past 24 hours have been reviewed.    Significant Imaging: I have reviewed all pertinent imaging results/findings within the past 24 hours.    Assessment/Plan:     * PJ (acute kidney injury)  Due to dehydration. Lactated ringers at 125 mL/hr.    Hypokalemia  Due to gastrointestinal losses. Give potassium chloride. Checking magnesium level.    Chronic blood loss anemia  Currently improved from a week ago, but may be hemoconcentrated.    Drug-induced diarrhea  Continue home Questran, Lomotil, loperamide. For worsening diarrhea, try Lactobacillus. Consult Gastroenterology. He may benefit from getting his colonoscopy while he is admitted.    Duodenal ulcer disease  Continue home pantoprazole.    Cervical radiculopathy at C6  Continue home tramadol.    Squamous cell carcinoma of palatine tonsil  Secondary cancer of bone  Secondary malignancy of mediastinal lymph nodes  Chemotherapy-induced neuropathy  Followed by Hematology-Oncology at [facility]. Continue home gabapentin for neuropathy.      VTE Risk Mitigation (From admission, onward)         Ordered     IP VTE HIGH RISK PATIENT  Once      10/20/19 0747     Place sequential compression device  Until discontinued      10/20/19 0747                   Crispin Feliciano MD  Department of Hospital Medicine   [facility]

## 2019-10-21 ENCOUNTER — ANESTHESIA EVENT (OUTPATIENT)
Dept: ENDOSCOPY | Facility: HOSPITAL | Age: 49
DRG: 392 | End: 2019-10-21
Payer: COMMERCIAL

## 2019-10-21 ENCOUNTER — PATIENT MESSAGE (OUTPATIENT)
Dept: HEMATOLOGY/ONCOLOGY | Facility: CLINIC | Age: 49
End: 2019-10-21

## 2019-10-21 LAB
ALBUMIN SERPL BCP-MCNC: 3.3 G/DL (ref 3.5–5.2)
ANION GAP SERPL CALC-SCNC: 14 MMOL/L (ref 8–16)
BUN SERPL-MCNC: 21 MG/DL (ref 6–20)
CALCIUM SERPL-MCNC: 7.3 MG/DL (ref 8.7–10.5)
CHLORIDE SERPL-SCNC: 91 MMOL/L (ref 95–110)
CO2 SERPL-SCNC: 29 MMOL/L (ref 23–29)
CREAT SERPL-MCNC: 2.3 MG/DL (ref 0.5–1.4)
CRYPTOSP AG STL QL IA: NEGATIVE
EST. GFR  (AFRICAN AMERICAN): 37 ML/MIN/1.73 M^2
EST. GFR  (NON AFRICAN AMERICAN): 32 ML/MIN/1.73 M^2
G LAMBLIA AG STL QL IA: NEGATIVE
GLUCOSE SERPL-MCNC: 97 MG/DL (ref 70–110)
MAGNESIUM SERPL-MCNC: 1 MG/DL (ref 1.6–2.6)
PHOSPHATE SERPL-MCNC: 3 MG/DL (ref 2.7–4.5)
POTASSIUM SERPL-SCNC: 3.2 MMOL/L (ref 3.5–5.1)
RV AG STL QL IA.RAPID: NEGATIVE
SODIUM SERPL-SCNC: 134 MMOL/L (ref 136–145)

## 2019-10-21 PROCEDURE — G0378 HOSPITAL OBSERVATION PER HR: HCPCS

## 2019-10-21 PROCEDURE — 83735 ASSAY OF MAGNESIUM: CPT

## 2019-10-21 PROCEDURE — 25000003 PHARM REV CODE 250: Performed by: HOSPITALIST

## 2019-10-21 PROCEDURE — 96361 HYDRATE IV INFUSION ADD-ON: CPT

## 2019-10-21 PROCEDURE — 63600175 PHARM REV CODE 636 W HCPCS: Performed by: HOSPITALIST

## 2019-10-21 PROCEDURE — 25000003 PHARM REV CODE 250: Performed by: INTERNAL MEDICINE

## 2019-10-21 PROCEDURE — 36415 COLL VENOUS BLD VENIPUNCTURE: CPT

## 2019-10-21 PROCEDURE — 80069 RENAL FUNCTION PANEL: CPT

## 2019-10-21 PROCEDURE — 99214 PR OFFICE/OUTPT VISIT, EST, LEVL IV, 30-39 MIN: ICD-10-PCS | Mod: ,,, | Performed by: INTERNAL MEDICINE

## 2019-10-21 PROCEDURE — 96376 TX/PRO/DX INJ SAME DRUG ADON: CPT

## 2019-10-21 PROCEDURE — 99214 OFFICE O/P EST MOD 30 MIN: CPT | Mod: ,,, | Performed by: INTERNAL MEDICINE

## 2019-10-21 PROCEDURE — 96375 TX/PRO/DX INJ NEW DRUG ADDON: CPT

## 2019-10-21 RX ORDER — MAGNESIUM SULFATE HEPTAHYDRATE 40 MG/ML
2 INJECTION, SOLUTION INTRAVENOUS ONCE
Status: DISCONTINUED | OUTPATIENT
Start: 2019-10-21 | End: 2019-10-21

## 2019-10-21 RX ORDER — BISACODYL 5 MG
10 TABLET, DELAYED RELEASE (ENTERIC COATED) ORAL ONCE
Status: COMPLETED | OUTPATIENT
Start: 2019-10-21 | End: 2019-10-21

## 2019-10-21 RX ORDER — POLYETHYLENE GLYCOL 3350, SODIUM SULFATE ANHYDROUS, SODIUM BICARBONATE, SODIUM CHLORIDE, POTASSIUM CHLORIDE 236; 22.74; 6.74; 5.86; 2.97 G/4L; G/4L; G/4L; G/4L; G/4L
4000 POWDER, FOR SOLUTION ORAL ONCE
Status: COMPLETED | OUTPATIENT
Start: 2019-10-21 | End: 2019-10-21

## 2019-10-21 RX ORDER — POLYETHYLENE GLYCOL 3350, SODIUM SULFATE ANHYDROUS, SODIUM BICARBONATE, SODIUM CHLORIDE, POTASSIUM CHLORIDE 236; 22.74; 6.74; 5.86; 2.97 G/4L; G/4L; G/4L; G/4L; G/4L
4000 POWDER, FOR SOLUTION ORAL ONCE
Status: DISCONTINUED | OUTPATIENT
Start: 2019-10-21 | End: 2019-10-21

## 2019-10-21 RX ORDER — POTASSIUM CHLORIDE 20 MEQ/1
40 TABLET, EXTENDED RELEASE ORAL ONCE
Status: COMPLETED | OUTPATIENT
Start: 2019-10-21 | End: 2019-10-21

## 2019-10-21 RX ORDER — TRAMADOL HYDROCHLORIDE 50 MG/1
50 TABLET ORAL EVERY 8 HOURS PRN
Status: DISCONTINUED | OUTPATIENT
Start: 2019-10-21 | End: 2019-10-25 | Stop reason: HOSPADM

## 2019-10-21 RX ORDER — OXYCODONE AND ACETAMINOPHEN 5; 325 MG/1; MG/1
1 TABLET ORAL EVERY 8 HOURS PRN
Status: DISCONTINUED | OUTPATIENT
Start: 2019-10-21 | End: 2019-10-25 | Stop reason: HOSPADM

## 2019-10-21 RX ORDER — MAGNESIUM SULFATE HEPTAHYDRATE 40 MG/ML
2 INJECTION, SOLUTION INTRAVENOUS
Status: COMPLETED | OUTPATIENT
Start: 2019-10-21 | End: 2019-10-21

## 2019-10-21 RX ADMIN — MAGNESIUM SULFATE IN WATER 2 G: 40 INJECTION, SOLUTION INTRAVENOUS at 09:10

## 2019-10-21 RX ADMIN — OXYCODONE AND ACETAMINOPHEN 1 TABLET: 5; 325 TABLET ORAL at 07:10

## 2019-10-21 RX ADMIN — POLYETHYLENE GLYCOL 3350, SODIUM SULFATE ANHYDROUS, SODIUM BICARBONATE, SODIUM CHLORIDE, POTASSIUM CHLORIDE 4000 ML: 236; 22.74; 6.74; 5.86; 2.97 POWDER, FOR SOLUTION ORAL at 09:10

## 2019-10-21 RX ADMIN — BISACODYL 10 MG: 5 TABLET, COATED ORAL at 09:10

## 2019-10-21 RX ADMIN — FLUOXETINE 40 MG: 20 CAPSULE ORAL at 09:10

## 2019-10-21 RX ADMIN — PANTOPRAZOLE SODIUM 40 MG: 40 TABLET, DELAYED RELEASE ORAL at 09:10

## 2019-10-21 RX ADMIN — GABAPENTIN 300 MG: 300 CAPSULE ORAL at 04:10

## 2019-10-21 RX ADMIN — GABAPENTIN 300 MG: 300 CAPSULE ORAL at 09:10

## 2019-10-21 RX ADMIN — POTASSIUM CHLORIDE 40 MEQ: 1500 TABLET, EXTENDED RELEASE ORAL at 09:10

## 2019-10-21 RX ADMIN — LACTOBACILLUS TAB 4 TABLET: TAB at 09:10

## 2019-10-21 RX ADMIN — LACTOBACILLUS TAB 4 TABLET: TAB at 04:10

## 2019-10-21 RX ADMIN — LACTOBACILLUS TAB 4 TABLET: TAB at 01:10

## 2019-10-21 RX ADMIN — TRAMADOL HYDROCHLORIDE 50 MG: 50 TABLET ORAL at 06:10

## 2019-10-21 RX ADMIN — MAGNESIUM SULFATE IN WATER 2 G: 40 INJECTION, SOLUTION INTRAVENOUS at 01:10

## 2019-10-21 RX ADMIN — OXYCODONE AND ACETAMINOPHEN 1 TABLET: 5; 325 TABLET ORAL at 10:10

## 2019-10-21 NOTE — HOSPITAL COURSE
He was put on continuous lactated ringers infusion. Stool was negative for C difficile. Lactobacillus was started because he just finished a course of antibiotics. Gastroenterology was consulted since he was supposed to get an outpatient colonoscopy to evaluate this problem, which he was going to miss by being admitted for the problem. Colonoscopy showed erythematous mucosa in the rectum, sigmoid, and descending colon and biopsies were taken. He was started on oral budesonide 9 mg daily with improvement in the diarrhea. His IV fluids were increased and his renal function started to decrease. His renal US was negative for any structural abnormalities. His creatinine peaked at 3 and was down to 1.7 on day of discharge. He was tolerating a regular diet on discharge.

## 2019-10-21 NOTE — PLAN OF CARE
Plan of care reviewed with patient, understanding verbalized. Continuous fluids maintained. PRN med administered for upper are pain. No acute distress noted. Instructed to call for any assistance, understanding verbalized.  Bed alarm refused, call light in reach, fall precautions in place. Will continue to monitor.

## 2019-10-21 NOTE — PLAN OF CARE
Pt lives alone in [place] and drives himself.  He is wondering if he will have colonoscopy inpatient or outpatient and is requesting I call Dr. Feliciano.  I paged Dr. Feliciano.  Waiting on call back.  White board updated with CM name and contact information.  Discharge brochure provided.  Pt encouraged to call with any questions or concerns.  Cm will continue to follow pt through transitions of care and assist with any discharge needs.       10/21/19 0082   Discharge Assessment   Assessment Type Discharge Planning Assessment   Confirmed/corrected address and phone number on facesheet? Yes   Assessment information obtained from? Patient   Communicated expected length of stay with patient/caregiver yes   Prior to hospitilization cognitive status: Alert/Oriented   Prior to hospitalization functional status: Independent   Current cognitive status: Alert/Oriented   Current Functional Status: Independent   Facility Arrived From: home   Lives With alone   Able to Return to Prior Arrangements yes   Is patient able to care for self after discharge? Yes   Patient's perception of discharge disposition home or selfcare   Readmission Within the Last 30 Days no previous admission in last 30 days   Patient currently being followed by outpatient case management? No   Patient currently receives any other outside agency services? No   Equipment Currently Used at Home none   Do you have any problems affording any of your prescribed medications? No   Is the patient taking medications as prescribed? yes   Does the patient have transportation home? Yes   Transportation Anticipated family or friend will provide   Discharge Plan A Home   Discharge Plan B Home with family   DME Needed Upon Discharge  none   Patient/Family in Agreement with Plan yes   Does the patient have transportation to healthcare appointments? Yes     Fermin Sykes RN,   [phone]

## 2019-10-21 NOTE — ASSESSMENT & PLAN NOTE
Continue home Questran, Lomotil, loperamide. For worsening diarrhea, trying Lactobacillus. Appreciate Gastroenterology. He may benefit from getting his colonoscopy while he is admitted.

## 2019-10-21 NOTE — PROGRESS NOTES
[facility]  Progress Note    Patient Name: Burton Whiting  MRN: [id number]  Patient Class: OP- Observation   Admission Date: 10/20/2019  Length of Stay: 0 days  Attending Physician: Crispin Feliciano MD  Primary Care Provider: Christopher Turpin DO        Subjective:     Principal Problem:PJ (acute kidney injury)        HPI:  Burton Whiting is a 49 y.o. 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 He lives in [place], [place]. His primary care physician is Dr. Christopher Turpin. His hematologist-oncologist is Dr. Cortney Lopez. His gastroenterologist is Dr. Nithya Sawyer. His colorectal surgeon is Dr. Larisa Dominguez.    He was prescribed 7 days of metronidazole on 10/13/19 (end date 10/20/19) for an anterior perianal abscess that self drained. He was seen by Dr. Dominguez on 10/18/19. He was scheduled for outpatient colonoscopy by Dr. Sawyer on 10/22/19.    He presented to [facility] on 10/20/19 with vomiting and worsening diarrhea for 2 days. He reported having about 40 episodes of small volume watery diarrhea in the past day. He feared to drink or eat due to this. He felt malaise, dehydration, generalized body pain. He already takes cholestyramine-aspartame (Questran), loperamide, and diphenoxyalate-atropine (Lomotil) for his chronic diarrhea. Labs showed hypokalemia (potassium 3.2) and acute kidney injury (BUN 21 and creatinine 2.46, from 14 and 1.3 on 10/13/19). He was given 1 liter of lactated ringers and 1 liter of normal saline. He was admitted to [facility].     Overview/Hospital Course:  He was put on continuous lactated ringers  infusion. Stool was negative for C difficile. Lactobacillus was started because he just finished a course of antibiotics. Gastroenterology was consulted since he was supposed to get an outpatient colonoscopy to evaluate this problem, which he was going to miss by being admitted for the problem.     Interval History: Already had 3 bowel movements this morning at the time I saw him this morning.    Review of Systems   Constitutional: Negative for chills and fever.   Gastrointestinal: Positive for diarrhea. Negative for vomiting.     Objective:     Vital Signs (Most Recent):  Temp: 96.8 °F (36 °C) (10/21/19 1228)  Pulse: 88 (10/21/19 1228)  Resp: 17 (10/21/19 1228)  BP: (!) 125/93 (10/21/19 1228)  SpO2: 98 % (10/21/19 1228) Vital Signs (24h Range):  Temp:  [96.8 °F (36 °C)-98.5 °F (36.9 °C)] 96.8 °F (36 °C)  Pulse:  [77-88] 88  Resp:  [17-18] 17  SpO2:  [97 %-98 %] 98 %  BP: (101-125)/(63-93) 125/93     Weight: 70.9 kg (156 lb 4.9 oz)  Body mass index is 23.77 kg/m².    Intake/Output Summary (Last 24 hours) at 10/21/2019 1445  Last data filed at 10/21/2019 0355  Gross per 24 hour   Intake 2673.75 ml   Output 200 ml   Net 2473.75 ml      Physical Exam   Constitutional: He is oriented to person, place, and time. He appears well-developed. No distress.   Pulmonary/Chest: Effort normal. No respiratory distress.   Abdominal: Soft. There is no guarding.   Neurological: He is alert and oriented to person, place, and time.   Psychiatric: He has a normal mood and affect.   Nursing note and vitals reviewed.      Significant Labs: All pertinent labs within the past 24 hours have been reviewed.   Recent Labs   Lab 10/20/19  0138 10/20/19  0835 10/21/19  0625   * 137 134*   K 3.2* 3.4* 3.2*   CL 90* 95 91*   CO2 27 29 29   BUN 21* 20 21*   CREATININE 2.46* 2.5* 2.3*   CALCIUM 7.2* 7.1* 7.3*   PROT 7.7  --   --    BILITOT 0.8  --   --    ALKPHOS 82  --   --    ALT 17  --   --    AST 34  --   --        Significant Imaging: I  have reviewed all pertinent imaging results/findings within the past 24 hours.      Assessment/Plan:      * PJ (acute kidney injury)  Due to dehydration. Lactated ringers at 125 mL/hr.    Hypokalemia  Hypomagnesemia  Due to gastrointestinal losses. Give potassium chloride, magnesium sulfate.    Chronic blood loss anemia  Currently improved from a week ago, but may be hemoconcentrated.    Drug-induced diarrhea  Continue home Questran, Lomotil, loperamide. For worsening diarrhea, trying Lactobacillus. Appreciate Gastroenterology. He may benefit from getting his colonoscopy while he is admitted.    Duodenal ulcer disease  Continue home pantoprazole.    Cervical radiculopathy at C6  Continue home tramadol.    Squamous cell carcinoma of palatine tonsil  Secondary cancer of bone  Secondary malignancy of mediastinal lymph nodes  Chemotherapy-induced neuropathy  Followed by Hematology-Oncology at [facility]. Continue home gabapentin for neuropathy.      VTE Risk Mitigation (From admission, onward)         Ordered     IP VTE HIGH RISK PATIENT  Once      10/20/19 0747     Place sequential compression device  Until discontinued      10/20/19 0747                      Crispin Feliciano MD  Department of Hospital Medicine   [facility]

## 2019-10-21 NOTE — NURSING
Plan of care reviewed with patient. Voiced understanding. No acute distress noted. Side rails x3, bed low, call bell within reach. Maintain bed alarm for patient safety. Colonoscopy in am. Patient will be monitored overnight.

## 2019-10-21 NOTE — ANESTHESIA PREPROCEDURE EVALUATION
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      Patient Active Problem List   Diagnosis    Squamous cell carcinoma of palatine tonsil    Low vitamin D level    Cancer of head, face, or neck lymph nodes, secondary    Secondary cancer of bone    Tonsil cancer    Port or reservoir infection    Anxiety    Macrocytic anemia    Adjustment disorder with emotional disturbance    Secondary malignancy of mediastinal lymph nodes    Hypocalcemia    Chemotherapy-induced neuropathy    Depression    Insomnia    BMI 28.0-28.9,adult    Neuropathic pain    Osteoradionecrosis of jaw    Acute renal failure    Seizure    Cervical radiculopathy at C6    Pain and swelling of left upper extremity    Hypomagnesemia    Rectal bleeding    Hemorrhoids    Duodenal ulcer disease    Drug-induced diarrhea    Chronic blood loss anemia    Hypokalemia    PJ (acute kidney injury)       Past Surgical History:   Procedure Laterality Date    ESOPHAGOGASTRODUODENOSCOPY Left 9/9/2019    Procedure: EGD (ESOPHAGOGASTRODUODENOSCOPY);  Surgeon: Balta Lisa MD;  Location: [facility];  Service: Endoscopy;  Laterality: Left;    ESOPHAGOGASTRODUODENOSCOPY N/A 9/12/2019    Procedure: EGD (ESOPHAGOGASTRODUODENOSCOPY);  Surgeon: Perez Mon III, MD;  Location: [facility];  Service: Endoscopy;  Laterality: N/A;    ESOPHAGOGASTRODUODENOSCOPY N/A 10/2/2019    Procedure: EGD (ESOPHAGOGASTRODUODENOSCOPY);  Surgeon: Bonifacio Sosa MD;  Location: [address]  [facility]);  Service: Endoscopy;  Laterality: N/A;    GASTROSTOMY TUBE PLACEMENT Left 02/12/2018    MEDIPORT REMOVAL N/A 6/6/2018    Procedure: Removal-port-a-cath;  Surgeon: Blayne Diagnostic Provider;  Location: [facility] OR [address] [place] [address];  Service: General;  Laterality: N/A;     Review of patient's allergies indicates:  Review of patient's allergies indicates:   Allergen Reactions    Trazodone Other (See Comments)     confusion         Vital Signs Range (Last 24H):  Temp:  [36 °C (96.8 °F)-36.9 °C (98.5 °F)]   Pulse:  [77-88]   Resp:  [17-18]   BP: (101-125)/(63-93)   SpO2:  [97 %-98 %]         Labs (most recent):    CBC:   Recent Labs     10/20/19  0138   WBC 4.00   RBC 3.65*   HGB 11.3*   HCT 33.4*      MCV 92   MCH 31.0   MCHC 33.8       CMP:   Recent Labs     10/20/19  0138 10/20/19  0835 10/21/19  0625   * 137 134*   K 3.2* 3.4* 3.2*   CL 90* 95 91*   CO2 27 29 29   BUN 21* 20 21*   CREATININE 2.46* 2.5* 2.3*   * 104 97   MG  --   --  1.0*   PHOS  --  3.3 3.0   CALCIUM 7.2* 7.1* 7.3*   ALBUMIN 4.4 3.2* 3.3*   PROT 7.7  --   --    ALKPHOS 82  --   --    ALT 17  --   --    AST 34  --   --    BILITOT 0.8  --   --          Anesthesia Evaluation    I have reviewed the Patient Summary Reports.    I have reviewed the Nursing Notes.   I have reviewed the Medications.     Review of Systems  Anesthesia Hx:  No problems with previous Anesthesia  Denies Family Hx of Anesthesia complications.  Personal Hx of Anesthesia complications  Difficult Intubation (see details below in airway exam), documented in Epic anesthesia history   Social:  Former Smoker H/o Opiate addiction     Hematology/Oncology:         -- Anemia: Hematology Comments: Hg 11.3 on 10/20/19 Current/Recent Cancer. 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 Resolved   Psych:   Psychiatric History H/o depression and anxiety         Physical Exam  General:  Well nourished    Airway/Jaw/Neck:  Airway Findings: Mouth Opening: Normal Tongue: Normal  General Airway Assessment: Adult  Mallampati: I  TM Distance: Normal, at least 6 cm  Jaw/Neck Findings:  Neck ROM: Extension Decreased, Mod.  Previous airway note, 2/11/18:  Glidescope; Mask Ventilation: Not Attempted; Intubated: Preinduction - awake intubation; Airway Device Size: 7.5; Grade: Grade IV;   Complicating Factors: Anterior larynx, Deviated trachea, Bleeding in oropharynx, Obesity (cancer oral pharynx affecting larynx); (friable airway bleeding prior); Depth of Insertion: 24; Insertion Attempts: 2;     Dental:  Dental Findings: Upper partial dentures, Lower partial dentures   Chest/Lungs:  Chest/Lungs Findings: Clear to auscultation, Normal Respiratory Rate     Heart/Vascular:  Heart Findings: Rate: Normal  Rhythm: Regular Rhythm        Mental Status:  Mental Status Findings:  Cooperative, Alert and Oriented         Anesthesia Plan  Type of Anesthesia, risks & benefits discussed:  Anesthesia Type:  MAC  Patient's Preference:   Intra-op Monitoring Plan: standard ASA monitors  Intra-op Monitoring Plan Comments:   Post Op Pain Control Plan: per primary service following discharge from PACU  Post Op Pain Control Plan Comments:   Induction:   IV  Beta Blocker:  Patient is not currently on a Beta-Blocker (No further documentation required).       Informed Consent: Patient understands risks and agrees with Anesthesia plan.  Questions answered. Anesthesia consent  signed with patient.  ASA Score: 3     Day of Surgery Review of History & Physical:            Ready For Surgery From Anesthesia Perspective.

## 2019-10-21 NOTE — NURSING
VN cued into the patient's room with permission. The patient was awake and alert in bed. He denies any pain. He has no concerns or questions about his care at this time. Progress notes, lab values, and MD orders were reviewed.

## 2019-10-21 NOTE — SUBJECTIVE & OBJECTIVE
Interval History: Already had 3 bowel movements this morning at the time I saw him this morning.    Review of Systems   Constitutional: Negative for chills and fever.   Gastrointestinal: Positive for diarrhea. Negative for vomiting.     Objective:     Vital Signs (Most Recent):  Temp: 96.8 °F (36 °C) (10/21/19 1228)  Pulse: 88 (10/21/19 1228)  Resp: 17 (10/21/19 1228)  BP: (!) 125/93 (10/21/19 1228)  SpO2: 98 % (10/21/19 1228) Vital Signs (24h Range):  Temp:  [96.8 °F (36 °C)-98.5 °F (36.9 °C)] 96.8 °F (36 °C)  Pulse:  [77-88] 88  Resp:  [17-18] 17  SpO2:  [97 %-98 %] 98 %  BP: (101-125)/(63-93) 125/93     Weight: 70.9 kg (156 lb 4.9 oz)  Body mass index is 23.77 kg/m².    Intake/Output Summary (Last 24 hours) at 10/21/2019 1445  Last data filed at 10/21/2019 0355  Gross per 24 hour   Intake 2673.75 ml   Output 200 ml   Net 2473.75 ml      Physical Exam   Constitutional: He is oriented to person, place, and time. He appears well-developed. No distress.   Pulmonary/Chest: Effort normal. No respiratory distress.   Abdominal: Soft. There is no guarding.   Neurological: He is alert and oriented to person, place, and time.   Psychiatric: He has a normal mood and affect.   Nursing note and vitals reviewed.      Significant Labs: All pertinent labs within the past 24 hours have been reviewed.   Recent Labs   Lab 10/20/19  0138 10/20/19  0835 10/21/19  0625   * 137 134*   K 3.2* 3.4* 3.2*   CL 90* 95 91*   CO2 27 29 29   BUN 21* 20 21*   CREATININE 2.46* 2.5* 2.3*   CALCIUM 7.2* 7.1* 7.3*   PROT 7.7  --   --    BILITOT 0.8  --   --    ALKPHOS 82  --   --    ALT 17  --   --    AST 34  --   --        Significant Imaging: I have reviewed all pertinent imaging results/findings within the past 24 hours.

## 2019-10-22 ENCOUNTER — ANESTHESIA (OUTPATIENT)
Dept: ENDOSCOPY | Facility: HOSPITAL | Age: 49
DRG: 392 | End: 2019-10-22
Payer: COMMERCIAL

## 2019-10-22 PROBLEM — E87.6 HYPOKALEMIA: Status: RESOLVED | Noted: 2019-10-07 | Resolved: 2019-10-22

## 2019-10-22 LAB
ALBUMIN SERPL BCP-MCNC: 3.6 G/DL (ref 3.5–5.2)
ANION GAP SERPL CALC-SCNC: 17 MMOL/L (ref 8–16)
BUN SERPL-MCNC: 21 MG/DL (ref 6–20)
CALCIUM SERPL-MCNC: 8.1 MG/DL (ref 8.7–10.5)
CHLORIDE SERPL-SCNC: 87 MMOL/L (ref 95–110)
CO2 SERPL-SCNC: 32 MMOL/L (ref 23–29)
CREAT SERPL-MCNC: 2.1 MG/DL (ref 0.5–1.4)
EST. GFR  (AFRICAN AMERICAN): 41 ML/MIN/1.73 M^2
EST. GFR  (NON AFRICAN AMERICAN): 35 ML/MIN/1.73 M^2
FAT STL SUDAN IV STN: NORMAL
GLUCOSE SERPL-MCNC: 112 MG/DL (ref 70–110)
MAGNESIUM SERPL-MCNC: 2.6 MG/DL (ref 1.6–2.6)
PHOSPHATE SERPL-MCNC: 3.5 MG/DL (ref 2.7–4.5)
POTASSIUM SERPL-SCNC: 3.7 MMOL/L (ref 3.5–5.1)
SODIUM SERPL-SCNC: 136 MMOL/L (ref 136–145)

## 2019-10-22 PROCEDURE — 63600175 PHARM REV CODE 636 W HCPCS: Performed by: NURSE ANESTHETIST, CERTIFIED REGISTERED

## 2019-10-22 PROCEDURE — 37000009 HC ANESTHESIA EA ADD 15 MINS: Performed by: INTERNAL MEDICINE

## 2019-10-22 PROCEDURE — 96361 HYDRATE IV INFUSION ADD-ON: CPT

## 2019-10-22 PROCEDURE — 37000008 HC ANESTHESIA 1ST 15 MINUTES: Performed by: INTERNAL MEDICINE

## 2019-10-22 PROCEDURE — 11000001 HC ACUTE MED/SURG PRIVATE ROOM

## 2019-10-22 PROCEDURE — 80069 RENAL FUNCTION PANEL: CPT

## 2019-10-22 PROCEDURE — 63600175 PHARM REV CODE 636 W HCPCS: Performed by: HOSPITALIST

## 2019-10-22 PROCEDURE — 83735 ASSAY OF MAGNESIUM: CPT

## 2019-10-22 PROCEDURE — 45380 COLONOSCOPY AND BIOPSY: CPT | Mod: ,,, | Performed by: INTERNAL MEDICINE

## 2019-10-22 PROCEDURE — 36415 COLL VENOUS BLD VENIPUNCTURE: CPT

## 2019-10-22 PROCEDURE — 45380 PR COLONOSCOPY,BIOPSY: ICD-10-PCS | Mod: ,,, | Performed by: INTERNAL MEDICINE

## 2019-10-22 PROCEDURE — 88305 TISSUE EXAM BY PATHOLOGIST: CPT | Mod: 26,,, | Performed by: PATHOLOGY

## 2019-10-22 PROCEDURE — 88305 TISSUE SPECIMEN TO PATHOLOGY - SURGERY: ICD-10-PCS | Mod: 26,,, | Performed by: PATHOLOGY

## 2019-10-22 PROCEDURE — 25000003 PHARM REV CODE 250: Performed by: HOSPITALIST

## 2019-10-22 PROCEDURE — 88305 TISSUE EXAM BY PATHOLOGIST: CPT | Performed by: PATHOLOGY

## 2019-10-22 PROCEDURE — 27201012 HC FORCEPS, HOT/COLD, DISP: Performed by: INTERNAL MEDICINE

## 2019-10-22 PROCEDURE — 45380 COLONOSCOPY AND BIOPSY: CPT | Performed by: INTERNAL MEDICINE

## 2019-10-22 RX ORDER — LIDOCAINE HCL/PF 100 MG/5ML
SYRINGE (ML) INTRAVENOUS
Status: DISCONTINUED | OUTPATIENT
Start: 2019-10-22 | End: 2019-10-23

## 2019-10-22 RX ORDER — PROPOFOL 10 MG/ML
VIAL (ML) INTRAVENOUS
Status: DISCONTINUED | OUTPATIENT
Start: 2019-10-22 | End: 2019-10-23

## 2019-10-22 RX ORDER — SULFASALAZINE 500 MG/1
1000 TABLET ORAL 2 TIMES DAILY
Status: DISCONTINUED | OUTPATIENT
Start: 2019-10-22 | End: 2019-10-25 | Stop reason: HOSPADM

## 2019-10-22 RX ORDER — PROPOFOL 10 MG/ML
VIAL (ML) INTRAVENOUS CONTINUOUS PRN
Status: DISCONTINUED | OUTPATIENT
Start: 2019-10-22 | End: 2019-10-23

## 2019-10-22 RX ORDER — SULFASALAZINE 500 MG/1
1000 TABLET, DELAYED RELEASE ORAL 2 TIMES DAILY
Status: DISCONTINUED | OUTPATIENT
Start: 2019-10-22 | End: 2019-10-22

## 2019-10-22 RX ADMIN — GABAPENTIN 300 MG: 300 CAPSULE ORAL at 09:10

## 2019-10-22 RX ADMIN — PROPOFOL 150 MCG/KG/MIN: 10 INJECTION, EMULSION INTRAVENOUS at 01:10

## 2019-10-22 RX ADMIN — SODIUM CHLORIDE, SODIUM LACTATE, POTASSIUM CHLORIDE, AND CALCIUM CHLORIDE: .6; .31; .03; .02 INJECTION, SOLUTION INTRAVENOUS at 11:10

## 2019-10-22 RX ADMIN — LACTOBACILLUS TAB 4 TABLET: TAB at 06:10

## 2019-10-22 RX ADMIN — SULFASALAZINE 1000 MG: 500 TABLET ORAL at 10:10

## 2019-10-22 RX ADMIN — PANTOPRAZOLE SODIUM 40 MG: 40 TABLET, DELAYED RELEASE ORAL at 09:10

## 2019-10-22 RX ADMIN — OXYCODONE AND ACETAMINOPHEN 1 TABLET: 5; 325 TABLET ORAL at 06:10

## 2019-10-22 RX ADMIN — LIDOCAINE HYDROCHLORIDE 100 MG: 20 INJECTION, SOLUTION INTRAVENOUS at 01:10

## 2019-10-22 RX ADMIN — PROPOFOL 100 MG: 10 INJECTION, EMULSION INTRAVENOUS at 01:10

## 2019-10-22 RX ADMIN — SODIUM CHLORIDE, SODIUM LACTATE, POTASSIUM CHLORIDE, AND CALCIUM CHLORIDE: .6; .31; .03; .02 INJECTION, SOLUTION INTRAVENOUS at 01:10

## 2019-10-22 RX ADMIN — SODIUM CHLORIDE, SODIUM LACTATE, POTASSIUM CHLORIDE, AND CALCIUM CHLORIDE: .6; .31; .03; .02 INJECTION, SOLUTION INTRAVENOUS at 06:10

## 2019-10-22 RX ADMIN — OXYCODONE AND ACETAMINOPHEN 1 TABLET: 5; 325 TABLET ORAL at 09:10

## 2019-10-22 RX ADMIN — SODIUM CHLORIDE, SODIUM LACTATE, POTASSIUM CHLORIDE, AND CALCIUM CHLORIDE: .6; .31; .03; .02 INJECTION, SOLUTION INTRAVENOUS at 12:10

## 2019-10-22 NOTE — PROGRESS NOTES
[facility]  Progress Note    Patient Name: Burton Whiting  MRN: [id number]  Patient Class: IP- Inpatient   Admission Date: 10/20/2019  Length of Stay: 0 days  Attending Physician: Crispin Feliciano MD  Primary Care Provider: Christopher Turpin DO        Subjective:     Principal Problem:PJ (acute kidney injury)        HPI:  Burton Whiting is a 49 y.o. 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 He lives in [place], [place]. His primary care physician is Dr. Christopher Turpin. His hematologist-oncologist is Dr. Cortney Lopez. His gastroenterologist is Dr. Nithya Sawyer. His colorectal surgeon is Dr. Larisa Dominguez.    He was prescribed 7 days of metronidazole on 10/13/19 (end date 10/20/19) for an anterior perianal abscess that self drained. He was seen by Dr. Dominguez on 10/18/19. He was scheduled for outpatient colonoscopy by Dr. Sawyer on 10/22/19.    He presented to [facility] on 10/20/19 with vomiting and worsening diarrhea for 2 days. He reported having about 40 episodes of small volume watery diarrhea in the past day. He feared to drink or eat due to this. He felt malaise, dehydration, generalized body pain. He already takes cholestyramine-aspartame (Questran), loperamide, and diphenoxyalate-atropine (Lomotil) for his chronic diarrhea. Labs showed hypokalemia (potassium 3.2) and acute kidney injury (BUN 21 and creatinine 2.46, from 14 and 1.3 on 10/13/19). He was given 1 liter of lactated ringers and 1 liter of normal saline. He was admitted to [facility].     Overview/Hospital Course:  He was put on continuous lactated ringers infusion.  Stool was negative for C difficile. Lactobacillus was started because he just finished a course of antibiotics. Gastroenterology was consulted since he was supposed to get an outpatient colonoscopy to evaluate this problem, which he was going to miss by being admitted for the problem. Colonoscopy showed erythematous mucosa in the rectum, sigmoid, and descending colon.     Interval History: Prepped for colonoscopy, so cannot tell if diarrhea is any better.    Review of Systems   Constitutional: Negative for chills and fever.   Respiratory: Negative for cough and shortness of breath.      Objective:     Vital Signs (Most Recent):  Temp: 98 °F (36.7 °C) (10/22/19 1340)  Pulse: 72 (10/22/19 1410)  Resp: 18 (10/22/19 1410)  BP: 110/76 (10/22/19 1410)  SpO2: 99 % (10/22/19 1410) Vital Signs (24h Range):  Temp:  [96.1 °F (35.6 °C)-98.1 °F (36.7 °C)] 98 °F (36.7 °C)  Pulse:  [67-88] 72  Resp:  [17-18] 18  SpO2:  [97 %-99 %] 99 %  BP: (103-121)/(66-85) 110/76     Weight: 70.9 kg (156 lb 4.9 oz)  Body mass index is 23.77 kg/m².    Intake/Output Summary (Last 24 hours) at 10/22/2019 1423  Last data filed at 10/22/2019 1345  Gross per 24 hour   Intake 4810.42 ml   Output --   Net 4810.42 ml      Physical Exam   Constitutional: He is oriented to person, place, and time. He appears well-developed. No distress.   Pulmonary/Chest: Effort normal. No respiratory distress.   Abdominal: Soft. There is no guarding.   Neurological: He is alert and oriented to person, place, and time.   Psychiatric: He has a normal mood and affect.   Nursing note and vitals reviewed.      Significant Labs: All pertinent labs within the past 24 hours have been reviewed.    Significant Imaging: I have reviewed all pertinent imaging results/findings within the past 24 hours.   Colonoscopy 10/22/19:  Findings: A diffuse area of mildly erythematous mucosa was found in the rectum, in the sigmoid colon and in the descending colon. Biopsies were taken with a cold  forceps for histology. Verification of patient identification for the specimen was done by the physician using the patient's name and birth date. Estimated blood loss: none. The exam was otherwise without abnormality on direct and retroflexion views. The terminal ileum appeared normal.  Impression:           - Erythematous mucosa in the rectum, in the sigmoid colon and in the descending colon. Biopsied.                        - The examination was otherwise normal on direct and retroflexion views.                        - The examined portion of the ileum was normal.  Recommendation:       - Discharge patient to home (via wheelchair).                        - Patient has a contact number available for emergencies. The signs and symptoms of potential delayed complications were discussed with the patient. Return to normal activities tomorrow. Written discharge instructions were provided to the patient.                        - Resume previous diet.                        - Continue present medications.                        - Await pathology results.                        - Repeat colonoscopy in 10 years for screening purposes.                        - Unclear if this represents mild, left sided UC vs indeterminant colitis/resolving infectious etiology. Could start mesalamine      Assessment/Plan:      * PJ (acute kidney injury)  Due to dehydration. Lactated ringers at 125 mL/hr. Monitor labs.    Chronic blood loss anemia  Currently improved from a week ago, but may be hemoconcentrated.    Drug-induced diarrhea  Continue home Questran, Lomotil, loperamide. Giving Lactobacillus. Appreciate Gastroenterology. Colitis seen, unclear etiology.    Duodenal ulcer disease  Continue home pantoprazole.    Cervical radiculopathy at C6  Continue home tramadol.    Squamous cell carcinoma of palatine tonsil  Secondary cancer of bone  Secondary malignancy of mediastinal lymph nodes  Chemotherapy-induced neuropathy  Followed by  Hematology-Oncology at [facility]. Continue home gabapentin for neuropathy.      VTE Risk Mitigation (From admission, onward)         Ordered     IP VTE HIGH RISK PATIENT  Once      10/20/19 0747     Place sequential compression device  Until discontinued      10/20/19 0747              Discharge home when diarrhea and renal function improve further.        Crispin Feliciano MD  Department of Hospital Medicine   [facility]

## 2019-10-22 NOTE — PLAN OF CARE
VN cued into patients room for rounding. VN role explained and informed pt that VN will be working alongside the bedside care team throughout the day. Pt verbalized understanding that VN is available for any questions and education, and nurse and PCT will continue hourly rounding at bedside. Patient is almost completely finished with Golytely prep. Patient informed that he is scheduled this afternoon for endo procedure. Denies any acute concerns at this time. Fall education provided. Allotted time given for questions - all questions answered. Will continue to monitor and intervene PRN.

## 2019-10-22 NOTE — PROGRESS NOTES
Subjective:       Patient ID: Burton Whiting is a 49 y.o. male.    Chief Complaint: Abdominal Pain and Diarrhea    Diarrhea    This is a new problem. The current episode started 1 to 4 weeks ago. The problem occurs 2 to 4 times per day. The problem has been unchanged. The stool consistency is described as mucous and watery. The patient states that diarrhea does not awaken him from sleep. Associated symptoms include abdominal pain and bloating. Pertinent negatives include no chills, coughing, fever or vomiting. Nothing aggravates the symptoms. There are no known risk factors. He has tried anti-motility drug and change of diet for the symptoms. The treatment provided no relief. There is no history of inflammatory bowel disease or irritable bowel syndrome.     Review of Systems   Constitutional: Negative for chills and fever.   HENT: Negative for postnasal drip and trouble swallowing.    Eyes: Negative for pain and visual disturbance.   Respiratory: Negative for cough, choking and shortness of breath.    Cardiovascular: Negative for chest pain and leg swelling.   Gastrointestinal: Positive for abdominal pain, bloating and diarrhea. Negative for abdominal distention, anal bleeding, blood in stool, constipation, nausea, rectal pain and vomiting.   Endocrine: Negative for cold intolerance and heat intolerance.   Genitourinary: Negative for difficulty urinating and hematuria.   Neurological: Negative for dizziness and numbness.   Hematological: Negative for adenopathy. Does not bruise/bleed easily.   Psychiatric/Behavioral: Negative for agitation and confusion.       Objective:      Physical Exam   Constitutional: He is oriented to person, place, and time. He appears well-developed and well-nourished. No distress.   HENT:   Head: Normocephalic and atraumatic.   Eyes: Conjunctivae are normal. No scleral icterus.   Neck: No tracheal deviation present. No thyromegaly present.   Cardiovascular: Normal rate, regular rhythm and  normal heart sounds. Exam reveals no gallop and no friction rub.   No murmur heard.  Pulmonary/Chest: Effort normal and breath sounds normal. He has no wheezes. He has no rales.   Abdominal: Soft. Bowel sounds are normal. He exhibits no distension. There is no tenderness. There is no rebound and no guarding.   Musculoskeletal: Normal range of motion. He exhibits no edema or tenderness.   Neurological: He is alert and oriented to person, place, and time.   Skin: He is not diaphoretic.   Psychiatric: He has a normal mood and affect. His behavior is normal.   Nursing note and vitals reviewed.      Assessment:       1. Screening for malignant neoplasm of colon    2. Diarrhea, unspecified type        Plan:   1. Stool studies, will need colonoscopy at some point as well

## 2019-10-22 NOTE — PROVATION PATIENT INSTRUCTIONS
Discharge Summary/Instructions after an Endoscopic Procedure  Patient Name: Burton Whiting  Patient MRN: [id number]  Patient YOB: 1970  Tuesday, October 22, 2019  Nithya Sawyer MD  RESTRICTIONS:  During your procedure today, you received medications for sedation.  These   medications may affect your judgment, balance and coordination.  Therefore,   for 24 hours, you have the following restrictions:   - DO NOT drive a car, operate machinery, make legal/financial decisions,   sign important papers or drink alcohol.    ACTIVITY:  Today: no heavy lifting, straining or running due to procedural   sedation/anesthesia.  The following day: return to full activity including work.  DIET:  Eat and drink normally unless instructed otherwise.     TREATMENT FOR COMMON SIDE EFFECTS:  - Mild abdominal pain, nausea, belching, bloating or excessive gas:  rest,   eat lightly and use a heating pad.  - Sore Throat: treat with throat lozenges and/or gargle with warm salt   water.  - Because air was used during the procedure, expelling large amounts of air   from your rectum or belching is normal.  - If a bowel prep was taken, you may not have a bowel movement for 1-3 days.    This is normal.  SYMPTOMS TO WATCH FOR AND REPORT TO YOUR PHYSICIAN:  1. Abdominal pain or bloating, other than gas cramps.  2. Chest pain.  3. Back pain.  4. Signs of infection such as: chills or fever occurring within 24 hours   after the procedure.  5. Rectal bleeding, which would show as bright red, maroon, or black stools.   (A tablespoon of blood from the rectum is not serious, especially if   hemorrhoids are present.)  6. Vomiting.  7. Weakness or dizziness.  GO DIRECTLY TO THE NEAREST EMERGENCY ROOM IF YOU HAVE ANY OF THE FOLLOWING:      Difficulty breathing              Chills and/or fever over 101 F   Persistent vomiting and/or vomiting blood   Severe abdominal pain   Severe chest pain   Black, tarry stools   Bleeding- more than one  tablespoon   Any other symptom or condition that you feel may need urgent attention  Your doctor recommends these additional instructions:  If any biopsies were taken, your doctors clinic will contact you in 1 to 2   weeks with any results.  - Discharge patient to home (via wheelchair).   - Patient has a contact number available for emergencies.  The signs and   symptoms of potential delayed complications were discussed with the   patient.  Return to normal activities tomorrow.  Written discharge   instructions were provided to the patient.   - Resume previous diet.   - Continue present medications.   - Await pathology results.   - Repeat colonoscopy in 10 years for screening purposes.   - Unclear if this represents mild, left sided UC vs indeterminant   colitis/resolving infectious etiology. Could start mesalamine  For questions, problems or results please call your physician - Nithya Sawyer MD at Work:  [phone].  EMERGENCY PHONE NUMBER: [phone],  LAB RESULTS: [phone]  IF A COMPLICATION OR EMERGENCY SITUATION ARISES AND YOU ARE UNABLE TO REACH   YOUR PHYSICIAN - GO DIRECTLY TO THE EMERGENCY ROOM.  Nithya Sawyer MD  10/22/2019 1:51:02 PM  This report has been verified and signed electronically.  [employer]

## 2019-10-22 NOTE — PROGRESS NOTES
[facility]  Gastroenterology  Progress Note    Patient Name: Burton Whiting  MRN: [id number]  Admission Date: 10/20/2019  Hospital Length of Stay: 0 days  Code Status: Full Code   Attending Provider: Crispin Feliciano MD  Consulting Provider: Ntihya Sawyer MD  Primary Care Physician: Christopher Turpin DO  Principal Problem: PJ (acute kidney injury)      Subjective:     Interval History: Still with diarrhea; no abd pain; afebrile. No overt bleeding.     The following portions of the patient's history were reviewed and updated as appropriate: allergies, current medications, past family history, past medical history, past social history, past surgical history and problem list.      Review of Systems   Constitutional: Negative for appetite change and unexpected weight change.   Respiratory: Negative for apnea and chest tightness.    Cardiovascular: Negative for chest pain and palpitations.   Gastrointestinal: Positive for abdominal distention and diarrhea.     Objective:     Vital Signs (Most Recent):  Temp: 96.6 °F (35.9 °C) (10/21/19 1950)  Pulse: 88 (10/21/19 1950)  Resp: 18 (10/21/19 1950)  BP: 120/66 (10/21/19 1950)  SpO2: 99 % (10/21/19 1950) Vital Signs (24h Range):  Temp:  [96.6 °F (35.9 °C)-98.5 °F (36.9 °C)] 96.6 °F (35.9 °C)  Pulse:  [77-88] 88  Resp:  [17-18] 18  SpO2:  [97 %-99 %] 99 %  BP: (101-125)/(63-93) 120/66     Weight: 70.9 kg (156 lb 4.9 oz) (10/20/19 1300)  Body mass index is 23.77 kg/m².      Intake/Output Summary (Last 24 hours) at 10/21/2019 2055  Last data filed at 10/21/2019 0355  Gross per 24 hour   Intake 935.42 ml   Output --   Net 935.42 ml       Lines/Drains/Airways     Peripheral Intravenous Line                 Peripheral IV - Single Lumen 10/20/19 0138 20 G Right Hand 1 day                Physical Exam   Constitutional: He is oriented to person, place, and time. He appears well-developed and well-nourished. No distress.   HENT:   Head: Normocephalic and atraumatic.    Eyes: Conjunctivae are normal. No scleral icterus.   Neck: No tracheal deviation present. No thyromegaly present.   Cardiovascular: Normal rate, regular rhythm and normal heart sounds. Exam reveals no gallop and no friction rub.   No murmur heard.  Pulmonary/Chest: Effort normal and breath sounds normal. He has no wheezes. He has no rales.   Abdominal: Soft. Bowel sounds are normal. He exhibits no distension. There is no tenderness. There is no rebound and no guarding.   Musculoskeletal: Normal range of motion. He exhibits no edema or tenderness.   Neurological: He is alert and oriented to person, place, and time.   Skin: He is not diaphoretic.   Psychiatric: He has a normal mood and affect. His behavior is normal.       Significant Labs:  CBC:   Recent Labs   Lab 10/20/19  0138   WBC 4.00   HGB 11.3*   HCT 33.4*        Stool C. diff:   Recent Labs   Lab 10/20/19  0209   CDIFFICILEAN Negative   CDIFFTOX Negative     Stool Culture: No results for input(s): STOOLCULTURE in the last 48 hours.  Stool Ova/Cysts/Parasites: No results for input(s): STLEXAMOCP in the last 48 hours.  Stool Giardia/Crypto:   Recent Labs   Lab 10/20/19  0209   GIARDIAANTIG Negative   CRSPAG Negative     Stool WBCs:   Recent Labs   Lab 10/20/19  0209   STOOLWBC Mod neutrophils seen*         Significant Imaging:  Imaging results within the past 24 hours have been reviewed.    Assessment/Plan:     Drug-induced diarrhea  - unclear etiology  - will plan for colonoscopy tomorrow  - cld, prep ordered  - npo p 5am  - monitor clinically  - stool studies reviewed        Thank you for your consult. I will follow-up with patient. Please contact us if you have any additional questions.    Nithya Sawyer MD  Gastroenterology  [facility]

## 2019-10-22 NOTE — SUBJECTIVE & OBJECTIVE
Interval History: Prepped for colonoscopy, so cannot tell if diarrhea is any better.    Review of Systems   Constitutional: Negative for chills and fever.   Respiratory: Negative for cough and shortness of breath.      Objective:     Vital Signs (Most Recent):  Temp: 98 °F (36.7 °C) (10/22/19 1340)  Pulse: 72 (10/22/19 1410)  Resp: 18 (10/22/19 1410)  BP: 110/76 (10/22/19 1410)  SpO2: 99 % (10/22/19 1410) Vital Signs (24h Range):  Temp:  [96.1 °F (35.6 °C)-98.1 °F (36.7 °C)] 98 °F (36.7 °C)  Pulse:  [67-88] 72  Resp:  [17-18] 18  SpO2:  [97 %-99 %] 99 %  BP: (103-121)/(66-85) 110/76     Weight: 70.9 kg (156 lb 4.9 oz)  Body mass index is 23.77 kg/m².    Intake/Output Summary (Last 24 hours) at 10/22/2019 1423  Last data filed at 10/22/2019 1345  Gross per 24 hour   Intake 4810.42 ml   Output --   Net 4810.42 ml      Physical Exam   Constitutional: He is oriented to person, place, and time. He appears well-developed. No distress.   Pulmonary/Chest: Effort normal. No respiratory distress.   Abdominal: Soft. There is no guarding.   Neurological: He is alert and oriented to person, place, and time.   Psychiatric: He has a normal mood and affect.   Nursing note and vitals reviewed.      Significant Labs: All pertinent labs within the past 24 hours have been reviewed.    Significant Imaging: I have reviewed all pertinent imaging results/findings within the past 24 hours.   Colonoscopy 10/22/19:  Findings: A diffuse area of mildly erythematous mucosa was found in the rectum, in the sigmoid colon and in the descending colon. Biopsies were taken with a cold forceps for histology. Verification of patient identification for the specimen was done by the physician using the patient's name and birth date. Estimated blood loss: none. The exam was otherwise without abnormality on direct and retroflexion views. The terminal ileum appeared normal.  Impression:           - Erythematous mucosa in the rectum, in the sigmoid colon and in  the descending colon. Biopsied.                        - The examination was otherwise normal on direct and retroflexion views.                        - The examined portion of the ileum was normal.  Recommendation:       - Discharge patient to home (via wheelchair).                        - Patient has a contact number available for emergencies. The signs and symptoms of potential delayed complications were discussed with the patient. Return to normal activities tomorrow. Written discharge instructions were provided to the patient.                        - Resume previous diet.                        - Continue present medications.                        - Await pathology results.                        - Repeat colonoscopy in 10 years for screening purposes.                        - Unclear if this represents mild, left sided UC vs indeterminant colitis/resolving infectious etiology. Could start mesalamine

## 2019-10-22 NOTE — ASSESSMENT & PLAN NOTE
- unclear etiology  - will plan for colonoscopy tomorrow  - cld, prep ordered  - npo p 5am  - monitor clinically  - stool studies reviewed

## 2019-10-22 NOTE — PLAN OF CARE
Patient handed back over to the Nurse in the Floor in charge of him. VSS. No complain of pain. Wheeled back in the floor on his stretcher. Brother at bedside.

## 2019-10-22 NOTE — PLAN OF CARE
POC reviewed with the pt, verbalized understanding.  AAOx3, VSS.  No complaint of pain or any other distress noted throughout night.  Infusing LR @ 125mL/hr.  Bowel prep started in the evening, golytely given with ice and straw.  Plan is to have colonoscopy today.  Fall precaution explained, pt refused bed alarm, OC Erlinda aware.  Safety maintained, free of falls throughout shift.  Instructed to call for any assistance.  Will continue to monitor.

## 2019-10-22 NOTE — ASSESSMENT & PLAN NOTE
Continue home Questran, Lomotil, loperamide. Giving Lactobacillus. Appreciate Gastroenterology. Colitis seen, unclear etiology.

## 2019-10-22 NOTE — SUBJECTIVE & OBJECTIVE
Subjective:     Interval History: Still with diarrhea; no abd pain; afebrile. No overt bleeding.     The following portions of the patient's history were reviewed and updated as appropriate: allergies, current medications, past family history, past medical history, past social history, past surgical history and problem list.      Review of Systems   Constitutional: Negative for appetite change and unexpected weight change.   Respiratory: Negative for apnea and chest tightness.    Cardiovascular: Negative for chest pain and palpitations.   Gastrointestinal: Positive for abdominal distention and diarrhea.     Objective:     Vital Signs (Most Recent):  Temp: 96.6 °F (35.9 °C) (10/21/19 1950)  Pulse: 88 (10/21/19 1950)  Resp: 18 (10/21/19 1950)  BP: 120/66 (10/21/19 1950)  SpO2: 99 % (10/21/19 1950) Vital Signs (24h Range):  Temp:  [96.6 °F (35.9 °C)-98.5 °F (36.9 °C)] 96.6 °F (35.9 °C)  Pulse:  [77-88] 88  Resp:  [17-18] 18  SpO2:  [97 %-99 %] 99 %  BP: (101-125)/(63-93) 120/66     Weight: 70.9 kg (156 lb 4.9 oz) (10/20/19 1300)  Body mass index is 23.77 kg/m².      Intake/Output Summary (Last 24 hours) at 10/21/2019 2055  Last data filed at 10/21/2019 0355  Gross per 24 hour   Intake 935.42 ml   Output --   Net 935.42 ml       Lines/Drains/Airways     Peripheral Intravenous Line                 Peripheral IV - Single Lumen 10/20/19 0138 20 G Right Hand 1 day                Physical Exam   Constitutional: He is oriented to person, place, and time. He appears well-developed and well-nourished. No distress.   HENT:   Head: Normocephalic and atraumatic.   Eyes: Conjunctivae are normal. No scleral icterus.   Neck: No tracheal deviation present. No thyromegaly present.   Cardiovascular: Normal rate, regular rhythm and normal heart sounds. Exam reveals no gallop and no friction rub.   No murmur heard.  Pulmonary/Chest: Effort normal and breath sounds normal. He has no wheezes. He has no rales.   Abdominal: Soft. Bowel  sounds are normal. He exhibits no distension. There is no tenderness. There is no rebound and no guarding.   Musculoskeletal: Normal range of motion. He exhibits no edema or tenderness.   Neurological: He is alert and oriented to person, place, and time.   Skin: He is not diaphoretic.   Psychiatric: He has a normal mood and affect. His behavior is normal.       Significant Labs:  CBC:   Recent Labs   Lab 10/20/19  0138   WBC 4.00   HGB 11.3*   HCT 33.4*        Stool C. diff:   Recent Labs   Lab 10/20/19  0209   CDIFFICILEAN Negative   CDIFFTOX Negative     Stool Culture: No results for input(s): STOOLCULTURE in the last 48 hours.  Stool Ova/Cysts/Parasites: No results for input(s): STLEXAMOCP in the last 48 hours.  Stool Giardia/Crypto:   Recent Labs   Lab 10/20/19  0209   GIARDIAANTIG Negative   CRSPAG Negative     Stool WBCs:   Recent Labs   Lab 10/20/19  0209   STOOLWBC Mod neutrophils seen*         Significant Imaging:  Imaging results within the past 24 hours have been reviewed.

## 2019-10-23 LAB
ALBUMIN SERPL BCP-MCNC: 3.3 G/DL (ref 3.5–5.2)
ANION GAP SERPL CALC-SCNC: 15 MMOL/L (ref 8–16)
BILIRUB UR QL STRIP: ABNORMAL
BUN SERPL-MCNC: 24 MG/DL (ref 6–20)
CALCIUM SERPL-MCNC: 7.6 MG/DL (ref 8.7–10.5)
CHLORIDE SERPL-SCNC: 89 MMOL/L (ref 95–110)
CLARITY UR: CLEAR
CO2 SERPL-SCNC: 32 MMOL/L (ref 23–29)
COLOR UR: YELLOW
CREAT SERPL-MCNC: 2.9 MG/DL (ref 0.5–1.4)
CREAT UR-MCNC: 367.8 MG/DL (ref 23–375)
EST. GFR  (AFRICAN AMERICAN): 28 ML/MIN/1.73 M^2
EST. GFR  (NON AFRICAN AMERICAN): 24 ML/MIN/1.73 M^2
GLUCOSE SERPL-MCNC: 122 MG/DL (ref 70–110)
GLUCOSE UR QL STRIP: NEGATIVE
HGB UR QL STRIP: NEGATIVE
KETONES UR QL STRIP: NEGATIVE
LEUKOCYTE ESTERASE UR QL STRIP: NEGATIVE
MAGNESIUM SERPL-MCNC: 2.4 MG/DL (ref 1.6–2.6)
NITRITE UR QL STRIP: NEGATIVE
O+P STL MICRO: NORMAL
PH UR STRIP: 5 [PH] (ref 5–8)
PHOSPHATE SERPL-MCNC: 4.5 MG/DL (ref 2.7–4.5)
POTASSIUM SERPL-SCNC: 3 MMOL/L (ref 3.5–5.1)
PROT UR QL STRIP: ABNORMAL
SODIUM SERPL-SCNC: 136 MMOL/L (ref 136–145)
SODIUM UR-SCNC: <20 MMOL/L (ref 20–250)
SP GR UR STRIP: >=1.03 (ref 1–1.03)
URN SPEC COLLECT METH UR: ABNORMAL
UROBILINOGEN UR STRIP-ACNC: NEGATIVE EU/DL

## 2019-10-23 PROCEDURE — 81003 URINALYSIS AUTO W/O SCOPE: CPT

## 2019-10-23 PROCEDURE — 83735 ASSAY OF MAGNESIUM: CPT

## 2019-10-23 PROCEDURE — 36415 COLL VENOUS BLD VENIPUNCTURE: CPT

## 2019-10-23 PROCEDURE — 63600175 PHARM REV CODE 636 W HCPCS: Performed by: NURSE PRACTITIONER

## 2019-10-23 PROCEDURE — 11000001 HC ACUTE MED/SURG PRIVATE ROOM

## 2019-10-23 PROCEDURE — 80069 RENAL FUNCTION PANEL: CPT

## 2019-10-23 PROCEDURE — 25000003 PHARM REV CODE 250: Performed by: HOSPITALIST

## 2019-10-23 PROCEDURE — 82570 ASSAY OF URINE CREATININE: CPT

## 2019-10-23 PROCEDURE — 84300 ASSAY OF URINE SODIUM: CPT

## 2019-10-23 PROCEDURE — 63600175 PHARM REV CODE 636 W HCPCS: Performed by: HOSPITALIST

## 2019-10-23 RX ADMIN — OXYCODONE AND ACETAMINOPHEN 1 TABLET: 5; 325 TABLET ORAL at 06:10

## 2019-10-23 RX ADMIN — SODIUM CHLORIDE, SODIUM LACTATE, POTASSIUM CHLORIDE, AND CALCIUM CHLORIDE: .6; .31; .03; .02 INJECTION, SOLUTION INTRAVENOUS at 06:10

## 2019-10-23 RX ADMIN — OXYCODONE AND ACETAMINOPHEN 1 TABLET: 5; 325 TABLET ORAL at 03:10

## 2019-10-23 RX ADMIN — SODIUM CHLORIDE, SODIUM LACTATE, POTASSIUM CHLORIDE, AND CALCIUM CHLORIDE: .6; .31; .03; .02 INJECTION, SOLUTION INTRAVENOUS at 03:10

## 2019-10-23 RX ADMIN — PANTOPRAZOLE SODIUM 40 MG: 40 TABLET, DELAYED RELEASE ORAL at 08:10

## 2019-10-23 RX ADMIN — LACTOBACILLUS TAB 4 TABLET: TAB at 08:10

## 2019-10-23 RX ADMIN — CHOLESTYRAMINE 4 G: 4 POWDER, FOR SUSPENSION ORAL at 10:10

## 2019-10-23 RX ADMIN — ONDANSETRON 4 MG: 2 INJECTION INTRAMUSCULAR; INTRAVENOUS at 03:10

## 2019-10-23 RX ADMIN — SULFASALAZINE 1000 MG: 500 TABLET ORAL at 08:10

## 2019-10-23 RX ADMIN — GABAPENTIN 300 MG: 300 CAPSULE ORAL at 03:10

## 2019-10-23 RX ADMIN — LACTOBACILLUS TAB 4 TABLET: TAB at 06:10

## 2019-10-23 RX ADMIN — GABAPENTIN 300 MG: 300 CAPSULE ORAL at 09:10

## 2019-10-23 RX ADMIN — FLUOXETINE 40 MG: 20 CAPSULE ORAL at 08:10

## 2019-10-23 RX ADMIN — GABAPENTIN 300 MG: 300 CAPSULE ORAL at 08:10

## 2019-10-23 RX ADMIN — LACTOBACILLUS TAB 4 TABLET: TAB at 12:10

## 2019-10-23 RX ADMIN — SULFASALAZINE 1000 MG: 500 TABLET ORAL at 09:10

## 2019-10-23 RX ADMIN — LOPERAMIDE HYDROCHLORIDE 2 MG: 2 CAPSULE ORAL at 09:10

## 2019-10-23 NOTE — TRANSFER OF CARE
"Anesthesia Transfer of Care Note    Patient: Burton Whiting    Procedure(s) Performed: Procedure(s) (LRB):  COLONOSCOPY/plenvu (N/A)    Patient location: GI    Anesthesia Type: MAC    Transport from OR: Transported from OR on room air with adequate spontaneous ventilation    Post pain: adequate analgesia    Post assessment: no apparent anesthetic complications and tolerated procedure well    Post vital signs: stable    Level of consciousness: awake, alert and oriented    Nausea/Vomiting: no nausea/vomiting    Complications: none    Transfer of care protocol was followed      Last vitals:   Visit Vitals  /67   Pulse 75   Temp 36.2 °C (97.2 °F)   Resp 18   Ht 5' 8" (1.727 m)   Wt 70.9 kg (156 lb 4.9 oz)   SpO2 97%   BMI 23.77 kg/m²     "

## 2019-10-23 NOTE — PLAN OF CARE
POC reviewed with the pt, verbalized understanding.  AAOx3, VSS.  Complaining of pain on shoulder/neck, prn oxycodone given.  Infusing LR @ 125mL/hr.  Fall precaution explained, pt refused bed alarm, OC Erlinda aware.  Safety maintained, free of falls throughout shift.  Instructed to call for any assistance.  Will continue to monitor.

## 2019-10-23 NOTE — SUBJECTIVE & OBJECTIVE
Interval History: Had colonoscopy yesterday with left sided colitis found. Ate supper and breakfast this AM. Still with diarrhea, but says that he is having to push to go now. Denies any abdominal pain. Says that he is urinating but it is a decreased amount compared to normal.     Review of Systems   Constitutional: Negative for chills and fever.   Respiratory: Negative for cough and shortness of breath.    Cardiovascular: Negative for chest pain and palpitations.   Gastrointestinal: Negative for nausea and vomiting.     Objective:     Vital Signs (Most Recent):  Temp: 98 °F (36.7 °C) (10/23/19 1644)  Pulse: 74 (10/23/19 1644)  Resp: 17 (10/23/19 1644)  BP: 104/63 (10/23/19 1644)  SpO2: 97 % (10/23/19 1644) Vital Signs (24h Range):  Temp:  [96.2 °F (35.7 °C)-98 °F (36.7 °C)] 98 °F (36.7 °C)  Pulse:  [74-91] 74  Resp:  [17-18] 17  SpO2:  [97 %-98 %] 97 %  BP: (103-116)/(63-80) 104/63     Weight: 70.9 kg (156 lb 4.9 oz)  Body mass index is 23.77 kg/m².    Intake/Output Summary (Last 24 hours) at 10/23/2019 1810  Last data filed at 10/22/2019 2130  Gross per 24 hour   Intake 100 ml   Output --   Net 100 ml      Physical Exam   Constitutional: He is oriented to person, place, and time. He appears well-developed and well-nourished. No distress.   Cardiovascular: Normal rate and regular rhythm.   Pulmonary/Chest: Effort normal and breath sounds normal. No respiratory distress.   Abdominal: Soft. Bowel sounds are normal. He exhibits no distension. There is no tenderness.   Musculoskeletal: He exhibits no edema or tenderness.   Neurological: He is alert and oriented to person, place, and time.   Skin: Skin is warm and dry.   Psychiatric: He has a normal mood and affect. His behavior is normal.   Nursing note and vitals reviewed.      Significant Labs:   CMP:   Recent Labs   Lab 10/22/19  0845 10/23/19  0518    136   K 3.7 3.0*   CL 87* 89*   CO2 32* 32*   * 122*   BUN 21* 24*   CREATININE 2.1* 2.9*   CALCIUM  8.1* 7.6*   ALBUMIN 3.6 3.3*   ANIONGAP 17* 15   EGFRNONAA 35* 24*     Magnesium:   Recent Labs   Lab 10/22/19  0845 10/23/19  0518   MG 2.6 2.4       Significant Imaging: I have reviewed all pertinent imaging results/findings within the past 24 hours.

## 2019-10-23 NOTE — ANESTHESIA POSTPROCEDURE EVALUATION
Anesthesia Post Evaluation    Patient: Burton Whiting    Procedure(s) Performed: Procedure(s) (LRB):  COLONOSCOPY/plenvu (N/A)    Final Anesthesia Type: MAC  Patient location during evaluation: GI PACU  Patient participation: Yes- Able to Participate  Level of consciousness: awake and alert  Post-procedure vital signs: reviewed and stable  Pain management: adequate  Airway patency: patent  PONV status at discharge: No PONV  Anesthetic complications: no      Cardiovascular status: blood pressure returned to baseline  Respiratory status: unassisted  Hydration status: euvolemic  Follow-up not needed.          Vitals Value Taken Time   /67 10/23/2019  7:21 AM   Temp 36.2 °C (97.2 °F) 10/23/2019  7:21 AM   Pulse 75 10/23/2019  7:21 AM   Resp 18 10/23/2019  7:21 AM   SpO2 97 % 10/23/2019  5:07 AM         Event Time     Out of Recovery 10/22/2019 14:19:33          Pain/Monica Score: Pain Rating Prior to Med Admin: 10 (10/23/2019  6:10 AM)  Pain Rating Post Med Admin: 0 (10/22/2019 10:22 PM)  Monica Score: 10 (10/22/2019  2:10 PM)

## 2019-10-24 LAB
ALBUMIN SERPL BCP-MCNC: 3.2 G/DL (ref 3.5–5.2)
ANION GAP SERPL CALC-SCNC: 13 MMOL/L (ref 8–16)
BACTERIA STL CULT: NORMAL
BUN SERPL-MCNC: 24 MG/DL (ref 6–20)
CALCIUM SERPL-MCNC: 7.6 MG/DL (ref 8.7–10.5)
CHLORIDE SERPL-SCNC: 89 MMOL/L (ref 95–110)
CO2 SERPL-SCNC: 34 MMOL/L (ref 23–29)
CREAT SERPL-MCNC: 3 MG/DL (ref 0.5–1.4)
ERYTHROCYTE [DISTWIDTH] IN BLOOD BY AUTOMATED COUNT: 15.1 % (ref 11.5–14.5)
EST. GFR  (AFRICAN AMERICAN): 27 ML/MIN/1.73 M^2
EST. GFR  (NON AFRICAN AMERICAN): 23 ML/MIN/1.73 M^2
GLUCOSE SERPL-MCNC: 99 MG/DL (ref 70–110)
HCT VFR BLD AUTO: 32.2 % (ref 40–54)
HGB BLD-MCNC: 10.4 G/DL (ref 14–18)
MAGNESIUM SERPL-MCNC: 2.1 MG/DL (ref 1.6–2.6)
MCH RBC QN AUTO: 29.5 PG (ref 27–31)
MCHC RBC AUTO-ENTMCNC: 32.3 G/DL (ref 32–36)
MCV RBC AUTO: 91 FL (ref 82–98)
PHOSPHATE SERPL-MCNC: 3.8 MG/DL (ref 2.7–4.5)
PLATELET # BLD AUTO: 273 K/UL (ref 150–350)
PMV BLD AUTO: 8.2 FL (ref 9.2–12.9)
POTASSIUM SERPL-SCNC: 3.1 MMOL/L (ref 3.5–5.1)
RBC # BLD AUTO: 3.53 M/UL (ref 4.6–6.2)
SODIUM SERPL-SCNC: 136 MMOL/L (ref 136–145)
WBC # BLD AUTO: 6.09 K/UL (ref 3.9–12.7)

## 2019-10-24 PROCEDURE — 80069 RENAL FUNCTION PANEL: CPT

## 2019-10-24 PROCEDURE — 25000003 PHARM REV CODE 250: Performed by: HOSPITALIST

## 2019-10-24 PROCEDURE — 85027 COMPLETE CBC AUTOMATED: CPT

## 2019-10-24 PROCEDURE — 99232 PR SUBSEQUENT HOSPITAL CARE,LEVL II: ICD-10-PCS | Mod: ,,, | Performed by: INTERNAL MEDICINE

## 2019-10-24 PROCEDURE — 99232 SBSQ HOSP IP/OBS MODERATE 35: CPT | Mod: ,,, | Performed by: INTERNAL MEDICINE

## 2019-10-24 PROCEDURE — 11000001 HC ACUTE MED/SURG PRIVATE ROOM

## 2019-10-24 PROCEDURE — 63600175 PHARM REV CODE 636 W HCPCS: Performed by: HOSPITALIST

## 2019-10-24 PROCEDURE — 36415 COLL VENOUS BLD VENIPUNCTURE: CPT

## 2019-10-24 PROCEDURE — 83735 ASSAY OF MAGNESIUM: CPT

## 2019-10-24 RX ORDER — BUDESONIDE 3 MG/1
9 CAPSULE, COATED PELLETS ORAL DAILY
Status: DISCONTINUED | OUTPATIENT
Start: 2019-10-24 | End: 2019-10-25 | Stop reason: HOSPADM

## 2019-10-24 RX ORDER — DIPHENOXYLATE HYDROCHLORIDE AND ATROPINE SULFATE 2.5; .025 MG/1; MG/1
2 TABLET ORAL ONCE
Status: DISCONTINUED | OUTPATIENT
Start: 2019-10-24 | End: 2019-10-25 | Stop reason: HOSPADM

## 2019-10-24 RX ORDER — POTASSIUM CHLORIDE 20 MEQ/1
40 TABLET, EXTENDED RELEASE ORAL ONCE
Status: COMPLETED | OUTPATIENT
Start: 2019-10-24 | End: 2019-10-24

## 2019-10-24 RX ADMIN — SULFASALAZINE 1000 MG: 500 TABLET ORAL at 09:10

## 2019-10-24 RX ADMIN — OXYCODONE AND ACETAMINOPHEN 1 TABLET: 5; 325 TABLET ORAL at 12:10

## 2019-10-24 RX ADMIN — LOPERAMIDE HYDROCHLORIDE 2 MG: 2 CAPSULE ORAL at 04:10

## 2019-10-24 RX ADMIN — BUDESONIDE 9 MG: 3 CAPSULE, GELATIN COATED ORAL at 12:10

## 2019-10-24 RX ADMIN — POTASSIUM CHLORIDE 40 MEQ: 1500 TABLET, EXTENDED RELEASE ORAL at 10:10

## 2019-10-24 RX ADMIN — SULFASALAZINE 1000 MG: 500 TABLET ORAL at 08:10

## 2019-10-24 RX ADMIN — GABAPENTIN 300 MG: 300 CAPSULE ORAL at 09:10

## 2019-10-24 RX ADMIN — FLUOXETINE 40 MG: 20 CAPSULE ORAL at 08:10

## 2019-10-24 RX ADMIN — GABAPENTIN 300 MG: 300 CAPSULE ORAL at 08:10

## 2019-10-24 RX ADMIN — OXYCODONE AND ACETAMINOPHEN 1 TABLET: 5; 325 TABLET ORAL at 04:10

## 2019-10-24 RX ADMIN — LACTOBACILLUS TAB 4 TABLET: TAB at 08:10

## 2019-10-24 RX ADMIN — GABAPENTIN 300 MG: 300 CAPSULE ORAL at 04:10

## 2019-10-24 RX ADMIN — SODIUM CHLORIDE, SODIUM LACTATE, POTASSIUM CHLORIDE, AND CALCIUM CHLORIDE: .6; .31; .03; .02 INJECTION, SOLUTION INTRAVENOUS at 11:10

## 2019-10-24 RX ADMIN — LACTOBACILLUS TAB 4 TABLET: TAB at 04:10

## 2019-10-24 RX ADMIN — SODIUM CHLORIDE, SODIUM LACTATE, POTASSIUM CHLORIDE, AND CALCIUM CHLORIDE: .6; .31; .03; .02 INJECTION, SOLUTION INTRAVENOUS at 04:10

## 2019-10-24 RX ADMIN — LACTOBACILLUS TAB 4 TABLET: TAB at 12:10

## 2019-10-24 RX ADMIN — OXYCODONE AND ACETAMINOPHEN 1 TABLET: 5; 325 TABLET ORAL at 09:10

## 2019-10-24 RX ADMIN — SODIUM CHLORIDE, SODIUM LACTATE, POTASSIUM CHLORIDE, AND CALCIUM CHLORIDE: .6; .31; .03; .02 INJECTION, SOLUTION INTRAVENOUS at 05:10

## 2019-10-24 RX ADMIN — CHOLESTYRAMINE 4 G: 4 POWDER, FOR SUSPENSION ORAL at 10:10

## 2019-10-24 RX ADMIN — SODIUM CHLORIDE, SODIUM LACTATE, POTASSIUM CHLORIDE, AND CALCIUM CHLORIDE: .6; .31; .03; .02 INJECTION, SOLUTION INTRAVENOUS at 09:10

## 2019-10-24 NOTE — PLAN OF CARE
VN cued into pt's room for introduction. VN informed pt that VN would be working along side bedside nurse and PCT throughout shift. Level of present pain assessed. At present no distress noted.  Thoroughly discussed with patient plan of care. Discussed with patient High fall risk protocol and interventions that have been initiated and cont be in place for safety. Patient verbalized clear understanding and cooperation using teach back method. Bed alarm presently activated and in use. Will cont to be available to patient and intervene prn.

## 2019-10-24 NOTE — PLAN OF CARE
Patient safety maintained. Medications given per order. Assistance provided as needed. US completed today. UA sent to lab.

## 2019-10-24 NOTE — PLAN OF CARE
"Plan of care reviewed with patient.Safety maintained at this time. Bed in lowest position, side rails up x 2. Bed alarm refused. OC notified, Call light in reach.  Encouraged patient to use call light for assistance. Patient AAOx4. Patient noncompliant with care refusing medications. X4 episodes of diarrhea reported by patient. Patient refused urinal when asked how many times he urinated patient stated "I dont know". Vital signs stable. Will continue to monitor patient.   "

## 2019-10-24 NOTE — PLAN OF CARE
Spoke with pt and he is ready to go home but understands why he has to stay.  Pt encouraged to call with any questions or concerns.  Cm will continue to follow pt through transitions of care and assist with any discharge needs.       10/24/19 1334   Discharge Reassessment   Assessment Type Discharge Planning Reassessment   Provided patient/caregiver education on the expected discharge date and the discharge plan Yes   Do you have any problems affording any of your prescribed medications? No   Discharge Plan A Home   Discharge Plan B Home with family   DME Needed Upon Discharge  none   Anticipated Discharge Disposition Home   Can the patient answer the patient profile reliably? Yes, cognitively intact     Fermin Sykes RN,   [phone]

## 2019-10-24 NOTE — ASSESSMENT & PLAN NOTE
Due to dehydration. Creatinine is up to 3 today, but hopefully reaching plateau. Says that is UOP remains on the lower side. Renal US did not show any significant abnormalities. UA had specific gravity of > 1.030 and urine sodium of < 20 indicating that he is still dehydrated. Continue LR at 200 mL/hr. Encouraged increased oral intake.

## 2019-10-24 NOTE — SUBJECTIVE & OBJECTIVE
Interval History: Says that his diarrhea increased and more free flowing again overnight. Has not taken any lomotil. Reports that his diarrhea improved previously when he was on a high dose prednisone taper, but came back as the dose was at the lower end of the taper.     Review of Systems   Constitutional: Negative for chills and fever.   Respiratory: Negative for cough and shortness of breath.    Cardiovascular: Negative for chest pain and palpitations.   Gastrointestinal: Negative for nausea and vomiting.     Objective:     Vital Signs (Most Recent):  Temp: 97.1 °F (36.2 °C) (10/24/19 1633)  Pulse: 62 (10/24/19 1633)  Resp: 18 (10/24/19 1633)  BP: 123/79 (10/24/19 1633)  SpO2: 98 % (10/24/19 1239) Vital Signs (24h Range):  Temp:  [96.1 °F (35.6 °C)-97.4 °F (36.3 °C)] 97.1 °F (36.2 °C)  Pulse:  [62-84] 62  Resp:  [17-18] 18  SpO2:  [98 %] 98 %  BP: (106-130)/(65-93) 123/79     Weight: 70.9 kg (156 lb 4.9 oz)  Body mass index is 23.77 kg/m².    Intake/Output Summary (Last 24 hours) at 10/24/2019 1746  Last data filed at 10/23/2019 2019  Gross per 24 hour   Intake 3639.58 ml   Output --   Net 3639.58 ml      Physical Exam   Constitutional: He is oriented to person, place, and time. He appears well-developed and well-nourished. No distress.   Cardiovascular: Normal rate and regular rhythm.   Pulmonary/Chest: Effort normal and breath sounds normal. No respiratory distress.   Abdominal: Soft. Bowel sounds are normal. He exhibits no distension. There is no tenderness.   Musculoskeletal: He exhibits no edema or tenderness.   Neurological: He is alert and oriented to person, place, and time.   Skin: Skin is warm and dry.   Psychiatric: He has a normal mood and affect. His behavior is normal.   Nursing note and vitals reviewed.      Significant Labs:   CBC:   Recent Labs   Lab 10/24/19  0618   WBC 6.09   HGB 10.4*   HCT 32.2*        CMP:   Recent Labs   Lab 10/23/19  0518 10/24/19  0618    136   K 3.0* 3.1*    CL 89* 89*   CO2 32* 34*   * 99   BUN 24* 24*   CREATININE 2.9* 3.0*   CALCIUM 7.6* 7.6*   ALBUMIN 3.3* 3.2*   ANIONGAP 15 13   EGFRNONAA 24* 23*     Magnesium:   Recent Labs   Lab 10/23/19  0518 10/24/19  0618   MG 2.4 2.1       Significant Imaging: I have reviewed all pertinent imaging results/findings within the past 24 hours.

## 2019-10-24 NOTE — ASSESSMENT & PLAN NOTE
Due to dehydration. Creatinine is up to 2.9 today. Says that is UOP is low. Will check renal US, UA and urine lytes. Increase LR to 200 mL/hr.

## 2019-10-24 NOTE — ASSESSMENT & PLAN NOTE
Continue home Questran, Lomotil, loperamide. Giving Lactobacillus. Appreciate Gastroenterology. Colitis seen on colonoscopy and biopsies taken. Started on sulfasalazine in case it is UC. Cultures and other stool studies have been negative.

## 2019-10-24 NOTE — PROGRESS NOTES
[facility] Medicine  Progress Note    Patient Name: Burton Whiting  MRN: [id number]  Patient Class: IP- Inpatient   Admission Date: 10/20/2019  Length of Stay: 1 days  Attending Physician: Joe Gold MD  Primary Care Provider: Christopher Turpin DO        Subjective:     Principal Problem:PJ (acute kidney injury)        HPI:  Burton Whiting is a 49 y.o. 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 He lives in [place], [place]. His primary care physician is Dr. Christopher Turpin. His hematologist-oncologist is Dr. Cortney Lopez. His gastroenterologist is Dr. Nithya Sawyer. His colorectal surgeon is Dr. Larisa Dominguez.    He was prescribed 7 days of metronidazole on 10/13/19 (end date 10/20/19) for an anterior perianal abscess that self drained. He was seen by Dr. Dominguez on 10/18/19. He was scheduled for outpatient colonoscopy by Dr. Sawyer on 10/22/19.    He presented to [facility] on 10/20/19 with vomiting and worsening diarrhea for 2 days. He reported having about 40 episodes of small volume watery diarrhea in the past day. He feared to drink or eat due to this. He felt malaise, dehydration, generalized body pain. He already takes cholestyramine-aspartame (Questran), loperamide, and diphenoxyalate-atropine (Lomotil) for his chronic diarrhea. Labs showed hypokalemia (potassium 3.2) and acute kidney injury (BUN 21 and creatinine 2.46, from 14 and 1.3 on 10/13/19). He was given 1 liter of lactated ringers and 1 liter of normal saline. He was admitted to [facility].     Overview/Hospital Course:  He was put on continuous lactated ringers  infusion. Stool was negative for C difficile. Lactobacillus was started because he just finished a course of antibiotics. Gastroenterology was consulted since he was supposed to get an outpatient colonoscopy to evaluate this problem, which he was going to miss by being admitted for the problem. Colonoscopy showed erythematous mucosa in the rectum, sigmoid, and descending colon.     Interval History: Had colonoscopy yesterday with left sided colitis found. Ate supper and breakfast this AM. Still with diarrhea, but says that he is having to push to go now. Denies any abdominal pain. Says that he is urinating but it is a decreased amount compared to normal.     Review of Systems   Constitutional: Negative for chills and fever.   Respiratory: Negative for cough and shortness of breath.    Cardiovascular: Negative for chest pain and palpitations.   Gastrointestinal: Negative for nausea and vomiting.     Objective:     Vital Signs (Most Recent):  Temp: 98 °F (36.7 °C) (10/23/19 1644)  Pulse: 74 (10/23/19 1644)  Resp: 17 (10/23/19 1644)  BP: 104/63 (10/23/19 1644)  SpO2: 97 % (10/23/19 1644) Vital Signs (24h Range):  Temp:  [96.2 °F (35.7 °C)-98 °F (36.7 °C)] 98 °F (36.7 °C)  Pulse:  [74-91] 74  Resp:  [17-18] 17  SpO2:  [97 %-98 %] 97 %  BP: (103-116)/(63-80) 104/63     Weight: 70.9 kg (156 lb 4.9 oz)  Body mass index is 23.77 kg/m².    Intake/Output Summary (Last 24 hours) at 10/23/2019 1810  Last data filed at 10/22/2019 2130  Gross per 24 hour   Intake 100 ml   Output --   Net 100 ml      Physical Exam   Constitutional: He is oriented to person, place, and time. He appears well-developed and well-nourished. No distress.   Cardiovascular: Normal rate and regular rhythm.   Pulmonary/Chest: Effort normal and breath sounds normal. No respiratory distress.   Abdominal: Soft. Bowel sounds are normal. He exhibits no distension. There is no tenderness.   Musculoskeletal: He exhibits no edema or tenderness.   Neurological: He  is alert and oriented to person, place, and time.   Skin: Skin is warm and dry.   Psychiatric: He has a normal mood and affect. His behavior is normal.   Nursing note and vitals reviewed.      Significant Labs:   CMP:   Recent Labs   Lab 10/22/19  0845 10/23/19  0518    136   K 3.7 3.0*   CL 87* 89*   CO2 32* 32*   * 122*   BUN 21* 24*   CREATININE 2.1* 2.9*   CALCIUM 8.1* 7.6*   ALBUMIN 3.6 3.3*   ANIONGAP 17* 15   EGFRNONAA 35* 24*     Magnesium:   Recent Labs   Lab 10/22/19  0845 10/23/19  0518   MG 2.6 2.4       Significant Imaging: I have reviewed all pertinent imaging results/findings within the past 24 hours.      Assessment/Plan:      * PJ (acute kidney injury)  Due to dehydration. Creatinine is up to 2.9 today. Says that is UOP is low. Will check renal US, UA and urine lytes. Increase LR to 200 mL/hr.     Drug-induced diarrhea  Continue home Questran, Lomotil, loperamide. Giving Lactobacillus. Appreciate Gastroenterology. Colitis seen on colonoscopy and biopsies taken. Started on sulfasalazine in case it is UC. Cultures and other stool studies have been negative.     Hypokalemia  Due to gastrointestinal losses. Give potassium chloride    Chronic blood loss anemia  Currently improved from a week ago, but may be hemoconcentrated.    Duodenal ulcer disease  Continue home pantoprazole.    Cervical radiculopathy at C6  Continue home tramadol.    Squamous cell carcinoma of palatine tonsil  Secondary cancer of bone  Secondary malignancy of mediastinal lymph nodes  Chemotherapy-induced neuropathy  Followed by Hematology-Oncology at [facility]. Continue home gabapentin for neuropathy.      VTE Risk Mitigation (From admission, onward)         Ordered     IP VTE HIGH RISK PATIENT  Once      10/20/19 0747     Place sequential compression device  Until discontinued      10/20/19 0747                      Joe Gold MD  Department of Hospital Medicine   [facility]

## 2019-10-24 NOTE — PROGRESS NOTES
[facility] Medicine  Progress Note    Patient Name: Burton Whiting  MRN: [id number]  Patient Class: IP- Inpatient   Admission Date: 10/20/2019  Length of Stay: 2 days  Attending Physician: Joe Gold MD  Primary Care Provider: Christopher Turpin DO        Subjective:     Principal Problem:PJ (acute kidney injury)        HPI:  Burton Whiting is a 49 y.o. 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 He lives in [place], [place]. His primary care physician is Dr. Christopher Turpin. His hematologist-oncologist is Dr. Cortney Lopez. His gastroenterologist is Dr. Nithya Sawyer. His colorectal surgeon is Dr. Larisa Dominguez.    He was prescribed 7 days of metronidazole on 10/13/19 (end date 10/20/19) for an anterior perianal abscess that self drained. He was seen by Dr. Dominguez on 10/18/19. He was scheduled for outpatient colonoscopy by Dr. Sawyer on 10/22/19.    He presented to [facility] on 10/20/19 with vomiting and worsening diarrhea for 2 days. He reported having about 40 episodes of small volume watery diarrhea in the past day. He feared to drink or eat due to this. He felt malaise, dehydration, generalized body pain. He already takes cholestyramine-aspartame (Questran), loperamide, and diphenoxyalate-atropine (Lomotil) for his chronic diarrhea. Labs showed hypokalemia (potassium 3.2) and acute kidney injury (BUN 21 and creatinine 2.46, from 14 and 1.3 on 10/13/19). He was given 1 liter of lactated ringers and 1 liter of normal saline. He was admitted to [facility].     Overview/Hospital Course:  He was put on continuous lactated ringers  infusion. Stool was negative for C difficile. Lactobacillus was started because he just finished a course of antibiotics. Gastroenterology was consulted since he was supposed to get an outpatient colonoscopy to evaluate this problem, which he was going to miss by being admitted for the problem. Colonoscopy showed erythematous mucosa in the rectum, sigmoid, and descending colon.     Interval History: Says that his diarrhea increased and more free flowing again overnight. Has not taken any lomotil. Reports that his diarrhea improved previously when he was on a high dose prednisone taper, but came back as the dose was at the lower end of the taper.     Review of Systems   Constitutional: Negative for chills and fever.   Respiratory: Negative for cough and shortness of breath.    Cardiovascular: Negative for chest pain and palpitations.   Gastrointestinal: Negative for nausea and vomiting.     Objective:     Vital Signs (Most Recent):  Temp: 97.1 °F (36.2 °C) (10/24/19 1633)  Pulse: 62 (10/24/19 1633)  Resp: 18 (10/24/19 1633)  BP: 123/79 (10/24/19 1633)  SpO2: 98 % (10/24/19 1239) Vital Signs (24h Range):  Temp:  [96.1 °F (35.6 °C)-97.4 °F (36.3 °C)] 97.1 °F (36.2 °C)  Pulse:  [62-84] 62  Resp:  [17-18] 18  SpO2:  [98 %] 98 %  BP: (106-130)/(65-93) 123/79     Weight: 70.9 kg (156 lb 4.9 oz)  Body mass index is 23.77 kg/m².    Intake/Output Summary (Last 24 hours) at 10/24/2019 1746  Last data filed at 10/23/2019 2019  Gross per 24 hour   Intake 3639.58 ml   Output --   Net 3639.58 ml      Physical Exam   Constitutional: He is oriented to person, place, and time. He appears well-developed and well-nourished. No distress.   Cardiovascular: Normal rate and regular rhythm.   Pulmonary/Chest: Effort normal and breath sounds normal. No respiratory distress.   Abdominal: Soft. Bowel sounds are normal. He exhibits no distension. There is no tenderness.   Musculoskeletal: He exhibits no edema or tenderness.   Neurological: He  is alert and oriented to person, place, and time.   Skin: Skin is warm and dry.   Psychiatric: He has a normal mood and affect. His behavior is normal.   Nursing note and vitals reviewed.      Significant Labs:   CBC:   Recent Labs   Lab 10/24/19  0618   WBC 6.09   HGB 10.4*   HCT 32.2*        CMP:   Recent Labs   Lab 10/23/19  0518 10/24/19  0618    136   K 3.0* 3.1*   CL 89* 89*   CO2 32* 34*   * 99   BUN 24* 24*   CREATININE 2.9* 3.0*   CALCIUM 7.6* 7.6*   ALBUMIN 3.3* 3.2*   ANIONGAP 15 13   EGFRNONAA 24* 23*     Magnesium:   Recent Labs   Lab 10/23/19  0518 10/24/19  0618   MG 2.4 2.1       Significant Imaging: I have reviewed all pertinent imaging results/findings within the past 24 hours.      Assessment/Plan:      * PJ (acute kidney injury)  Due to dehydration. Creatinine is up to 3 today, but hopefully reaching plateau. Says that is UOP remains on the lower side. Renal US did not show any significant abnormalities. UA had specific gravity of > 1.030 and urine sodium of < 20 indicating that he is still dehydrated. Continue LR at 200 mL/hr. Encouraged increased oral intake.     Drug-induced diarrhea  Continue home Questran, Lomotil, loperamide. Giving Lactobacillus. Appreciate Gastroenterology. Colitis seen on colonoscopy and biopsies taken. Started on sulfasalazine in case it is UC. Cultures and other stool studies have been negative. Will start on budesonide 9 mg daily to see if steroids will help again. Also given dose of lomotil today.     Hypokalemia  Due to gastrointestinal losses. Give more potassium chloride    Chronic blood loss anemia  Currently improved from a week ago, but may be hemoconcentrated.    Duodenal ulcer disease  Continue home pantoprazole.    Cervical radiculopathy at C6  Continue home tramadol.    Squamous cell carcinoma of palatine tonsil  Secondary cancer of bone  Secondary malignancy of mediastinal lymph nodes  Chemotherapy-induced neuropathy  Followed by  Hematology-Oncology at [facility]. Continue home gabapentin for neuropathy.      VTE Risk Mitigation (From admission, onward)         Ordered     IP VTE HIGH RISK PATIENT  Once      10/20/19 0747     Place sequential compression device  Until discontinued      10/20/19 0747                      Joe Gold MD  Department of Hospital Medicine   [facility]

## 2019-10-25 ENCOUNTER — TELEPHONE (OUTPATIENT)
Dept: GASTROENTEROLOGY | Facility: CLINIC | Age: 49
End: 2019-10-25

## 2019-10-25 VITALS
OXYGEN SATURATION: 100 % | WEIGHT: 156.31 LBS | SYSTOLIC BLOOD PRESSURE: 138 MMHG | RESPIRATION RATE: 18 BRPM | HEIGHT: 68 IN | BODY MASS INDEX: 23.69 KG/M2 | HEART RATE: 61 BPM | DIASTOLIC BLOOD PRESSURE: 84 MMHG | TEMPERATURE: 98 F

## 2019-10-25 LAB
ALBUMIN SERPL BCP-MCNC: 3 G/DL (ref 3.5–5.2)
ANION GAP SERPL CALC-SCNC: 11 MMOL/L (ref 8–16)
ANION GAP SERPL CALC-SCNC: 11 MMOL/L (ref 8–16)
BUN SERPL-MCNC: 18 MG/DL (ref 6–20)
BUN SERPL-MCNC: 18 MG/DL (ref 6–20)
CALCIUM SERPL-MCNC: 7.5 MG/DL (ref 8.7–10.5)
CALCIUM SERPL-MCNC: 7.5 MG/DL (ref 8.7–10.5)
CHLORIDE SERPL-SCNC: 93 MMOL/L (ref 95–110)
CHLORIDE SERPL-SCNC: 95 MMOL/L (ref 95–110)
CO2 SERPL-SCNC: 32 MMOL/L (ref 23–29)
CO2 SERPL-SCNC: 32 MMOL/L (ref 23–29)
CREAT SERPL-MCNC: 1.7 MG/DL (ref 0.5–1.4)
CREAT SERPL-MCNC: 2 MG/DL (ref 0.5–1.4)
ERYTHROCYTE [DISTWIDTH] IN BLOOD BY AUTOMATED COUNT: 15 % (ref 11.5–14.5)
EST. GFR  (AFRICAN AMERICAN): 43 ML/MIN/1.73 M^2
EST. GFR  (AFRICAN AMERICAN): 53 ML/MIN/1.73 M^2
EST. GFR  (NON AFRICAN AMERICAN): 38 ML/MIN/1.73 M^2
EST. GFR  (NON AFRICAN AMERICAN): 46 ML/MIN/1.73 M^2
GLUCOSE SERPL-MCNC: 106 MG/DL (ref 70–110)
GLUCOSE SERPL-MCNC: 84 MG/DL (ref 70–110)
H PYLORI AG STL QL IA: DETECTED
HBV SURFACE AG SERPL QL IA: NEGATIVE
HCT VFR BLD AUTO: 30 % (ref 40–54)
HGB BLD-MCNC: 9.7 G/DL (ref 14–18)
MAGNESIUM SERPL-MCNC: 1.8 MG/DL (ref 1.6–2.6)
MCH RBC QN AUTO: 29.7 PG (ref 27–31)
MCHC RBC AUTO-ENTMCNC: 32.3 G/DL (ref 32–36)
MCV RBC AUTO: 92 FL (ref 82–98)
PHOSPHATE SERPL-MCNC: 3.1 MG/DL (ref 2.7–4.5)
PLATELET # BLD AUTO: 258 K/UL (ref 150–350)
PMV BLD AUTO: 8.2 FL (ref 9.2–12.9)
POTASSIUM SERPL-SCNC: 2.9 MMOL/L (ref 3.5–5.1)
POTASSIUM SERPL-SCNC: 3.8 MMOL/L (ref 3.5–5.1)
RBC # BLD AUTO: 3.27 M/UL (ref 4.6–6.2)
SODIUM SERPL-SCNC: 136 MMOL/L (ref 136–145)
SODIUM SERPL-SCNC: 138 MMOL/L (ref 136–145)
WBC # BLD AUTO: 6.28 K/UL (ref 3.9–12.7)

## 2019-10-25 PROCEDURE — 80048 BASIC METABOLIC PNL TOTAL CA: CPT

## 2019-10-25 PROCEDURE — 85027 COMPLETE CBC AUTOMATED: CPT

## 2019-10-25 PROCEDURE — 25000003 PHARM REV CODE 250: Performed by: HOSPITALIST

## 2019-10-25 PROCEDURE — 83735 ASSAY OF MAGNESIUM: CPT

## 2019-10-25 PROCEDURE — 86480 TB TEST CELL IMMUN MEASURE: CPT

## 2019-10-25 PROCEDURE — 80069 RENAL FUNCTION PANEL: CPT

## 2019-10-25 PROCEDURE — 36415 COLL VENOUS BLD VENIPUNCTURE: CPT

## 2019-10-25 PROCEDURE — 87340 HEPATITIS B SURFACE AG IA: CPT

## 2019-10-25 PROCEDURE — 0034U TPMT NUDT15 GENES: CPT

## 2019-10-25 RX ORDER — SULFASALAZINE 500 MG/1
1000 TABLET ORAL 2 TIMES DAILY
Qty: 120 TABLET | Refills: 2 | Status: SHIPPED | OUTPATIENT
Start: 2019-10-25 | End: 2019-11-06 | Stop reason: SDUPTHER

## 2019-10-25 RX ORDER — BUDESONIDE 3 MG/1
9 CAPSULE, COATED PELLETS ORAL DAILY
Qty: 90 CAPSULE | Refills: 2 | Status: SHIPPED | OUTPATIENT
Start: 2019-10-26 | End: 2019-11-06

## 2019-10-25 RX ORDER — POTASSIUM CHLORIDE 20 MEQ/1
40 TABLET, EXTENDED RELEASE ORAL EVERY 4 HOURS
Status: COMPLETED | OUTPATIENT
Start: 2019-10-25 | End: 2019-10-25

## 2019-10-25 RX ADMIN — SULFASALAZINE 1000 MG: 500 TABLET ORAL at 09:10

## 2019-10-25 RX ADMIN — FLUOXETINE 40 MG: 20 CAPSULE ORAL at 09:10

## 2019-10-25 RX ADMIN — BUDESONIDE 9 MG: 3 CAPSULE, GELATIN COATED ORAL at 09:10

## 2019-10-25 RX ADMIN — POTASSIUM CHLORIDE 40 MEQ: 1500 TABLET, EXTENDED RELEASE ORAL at 01:10

## 2019-10-25 RX ADMIN — GABAPENTIN 300 MG: 300 CAPSULE ORAL at 09:10

## 2019-10-25 RX ADMIN — LACTOBACILLUS TAB 4 TABLET: TAB at 01:10

## 2019-10-25 RX ADMIN — OXYCODONE AND ACETAMINOPHEN 1 TABLET: 5; 325 TABLET ORAL at 06:10

## 2019-10-25 RX ADMIN — POTASSIUM CHLORIDE 40 MEQ: 1500 TABLET, EXTENDED RELEASE ORAL at 09:10

## 2019-10-25 RX ADMIN — PANTOPRAZOLE SODIUM 40 MG: 40 TABLET, DELAYED RELEASE ORAL at 09:10

## 2019-10-25 RX ADMIN — OXYCODONE AND ACETAMINOPHEN 1 TABLET: 5; 325 TABLET ORAL at 02:10

## 2019-10-25 RX ADMIN — LACTOBACILLUS TAB 4 TABLET: TAB at 09:10

## 2019-10-25 RX ADMIN — CHOLESTYRAMINE 4 G: 4 POWDER, FOR SUSPENSION ORAL at 09:10

## 2019-10-25 RX ADMIN — GABAPENTIN 300 MG: 300 CAPSULE ORAL at 02:10

## 2019-10-25 NOTE — PLAN OF CARE
Rounds completed on pt.  All questions addressed.  Bedside nurse to discuss d/c medications.  Discussed importance to attend all f/u appts and take medications as prescribed.  Verbalized understanding.    Nov1 Established Patient Visit with Larisa Dominguez MD   Friday Nov 1, 2019 10:00 AM   Arrive at check-in approximately 15 minutes before your scheduled appointment time. Bring all outside medical records and imaging, along with a list of your current medications and insurance card.  [facility] - 4th Floor  [facility]   [address]  [place] [address]   [phone]    Nov6 GASTROENTEROLOGY ESTABLISHED PATIENT with Nithya Sawyer MD   Wednesday Nov 6, 2019 1:00 PM   Arrive at check-in approximately 15 minutes before your scheduled appointment time. Bring all outside medical records and imaging, along with a list of your current medications and insurance card.  [address]: [facility] MICHAEL, Dr. Sawyer & Dr. Ruby Hardin - Gastroenterology   [address]  [place] [id number] [phone]    Nov7 Established Patient Visit with Christopher Turpin DO   Thursday Nov 7, 2019 8:40 AM   Arrive at check-in approximately 15 minutes before your scheduled appointment time. Bring all outside medical records and imaging, along with a list of your current medications and insurance card. [facility]   [address]  [place] [id number] [phone]         10/25/19 1458   Final Note   Assessment Type Final Discharge Note   Anticipated Discharge Disposition Home   Hospital Follow Up  Appt(s) scheduled? Yes   Discharge plans and expectations educations in teach back method with documentation complete? Yes   Right Care Referral Info   Post Acute Recommendation No Care     Fermin Sykes RN,   [phone]

## 2019-10-25 NOTE — ASSESSMENT & PLAN NOTE
- overall improving, seen/examined at 4pm and had 2 BMs so far today, thoug hloose  - continue sulfasalazine  - will send tpmt, hbsag  - continue steroids, hopefully home soon  - can f/u with me as an outpt

## 2019-10-25 NOTE — SUBJECTIVE & OBJECTIVE
Subjective:     Interval History:Still with loose stools, mild cramping. Oral intake ok, no vomiting    Review of Systems   Constitutional: Negative for chills and unexpected weight change.   Respiratory: Negative for apnea and chest tightness.    Cardiovascular: Negative for chest pain and palpitations.   Gastrointestinal: Positive for diarrhea. Negative for nausea.     Objective:     Vital Signs (Most Recent):  Temp: 97 °F (36.1 °C) (10/24/19 2009)  Pulse: 70 (10/24/19 2009)  Resp: 18 (10/24/19 2009)  BP: (!) 100/57 (10/24/19 2009)  SpO2: 97 % (10/24/19 2009) Vital Signs (24h Range):  Temp:  [96.1 °F (35.6 °C)-97.4 °F (36.3 °C)] 97 °F (36.1 °C)  Pulse:  [62-84] 70  Resp:  [17-18] 18  SpO2:  [97 %-98 %] 97 %  BP: (100-130)/(57-93) 100/57     Weight: 70.9 kg (156 lb 4.9 oz) (10/20/19 1300)  Body mass index is 23.77 kg/m².    No intake or output data in the 24 hours ending 10/24/19 2020    Lines/Drains/Airways     Peripheral Intravenous Line                 Peripheral IV - Single Lumen 10/20/19 0138 20 G Right Hand 4 days                Physical Exam   Constitutional: He is oriented to person, place, and time. He appears well-developed and well-nourished. No distress.   HENT:   Head: Normocephalic and atraumatic.   Eyes: Conjunctivae are normal. No scleral icterus.   Cardiovascular: Normal rate and regular rhythm. Exam reveals no gallop and no friction rub.   Pulmonary/Chest: Effort normal. He has no wheezes. He has no rales.   Abdominal: Soft. Bowel sounds are normal. He exhibits no distension. There is no tenderness. There is no rebound and no guarding.   Musculoskeletal: Normal range of motion. He exhibits no edema or tenderness.   Neurological: He is alert and oriented to person, place, and time.   Skin: He is not diaphoretic.   Psychiatric: He has a normal mood and affect. His behavior is normal.   Nursing note and vitals reviewed.      Significant Labs:  CBC:   Recent Labs   Lab 10/24/19  0618   WBC 6.09    HGB 10.4*   HCT 32.2*            Significant Imaging:  Imaging results within the past 24 hours have been reviewed.

## 2019-10-25 NOTE — PROGRESS NOTES
[facility]  Gastroenterology  Progress Note    Patient Name: Burton Whiting  MRN: [id number]  Admission Date: 10/20/2019  Hospital Length of Stay: 2 days  Code Status: Full Code   Attending Provider: Joe Gold MD  Consulting Provider: Nithya Sawyer MD  Primary Care Physician: Christopher Turpin DO  Principal Problem: PJ (acute kidney injury)      Subjective:     Interval History:Still with loose stools, mild cramping. Oral intake ok, no vomiting    Review of Systems   Constitutional: Negative for chills and unexpected weight change.   Respiratory: Negative for apnea and chest tightness.    Cardiovascular: Negative for chest pain and palpitations.   Gastrointestinal: Positive for diarrhea. Negative for nausea.     Objective:     Vital Signs (Most Recent):  Temp: 97 °F (36.1 °C) (10/24/19 2009)  Pulse: 70 (10/24/19 2009)  Resp: 18 (10/24/19 2009)  BP: (!) 100/57 (10/24/19 2009)  SpO2: 97 % (10/24/19 2009) Vital Signs (24h Range):  Temp:  [96.1 °F (35.6 °C)-97.4 °F (36.3 °C)] 97 °F (36.1 °C)  Pulse:  [62-84] 70  Resp:  [17-18] 18  SpO2:  [97 %-98 %] 97 %  BP: (100-130)/(57-93) 100/57     Weight: 70.9 kg (156 lb 4.9 oz) (10/20/19 1300)  Body mass index is 23.77 kg/m².    No intake or output data in the 24 hours ending 10/24/19 2020    Lines/Drains/Airways     Peripheral Intravenous Line                 Peripheral IV - Single Lumen 10/20/19 0138 20 G Right Hand 4 days                Physical Exam   Constitutional: He is oriented to person, place, and time. He appears well-developed and well-nourished. No distress.   HENT:   Head: Normocephalic and atraumatic.   Eyes: Conjunctivae are normal. No scleral icterus.   Cardiovascular: Normal rate and regular rhythm. Exam reveals no gallop and no friction rub.   Pulmonary/Chest: Effort normal. He has no wheezes. He has no rales.   Abdominal: Soft. Bowel sounds are normal. He exhibits no distension. There is no tenderness. There is no rebound and no  guarding.   Musculoskeletal: Normal range of motion. He exhibits no edema or tenderness.   Neurological: He is alert and oriented to person, place, and time.   Skin: He is not diaphoretic.   Psychiatric: He has a normal mood and affect. His behavior is normal.   Nursing note and vitals reviewed.      Significant Labs:  CBC:   Recent Labs   Lab 10/24/19  0618   WBC 6.09   HGB 10.4*   HCT 32.2*            Significant Imaging:  Imaging results within the past 24 hours have been reviewed.    Assessment/Plan:     Drug-induced diarrhea  - overall improving, seen/examined at 4pm and had 2 BMs so far today, thou hloose  - continue sulfasalazine  - will send tpmt, hbsag  - continue steroids, hopefully home soon  - can f/u with me as an outpt        Thank you for your consult. I will follow-up with patient. Please contact us if you have any additional questions.    Nithya Sawyer MD  Gastroenterology  [facility]

## 2019-10-25 NOTE — PLAN OF CARE
Plan of care reviewed with patient.Safety maintained at this time. Bed in lowest position, side rails up x 2. Bed alarm refused. OC informed. Call light in reach.  Encouraged patient to use call light for assistance. Patient AAOx4. Patient noncompliant with care refusing medications.Education provided Vital signs stable. Will continue to monitor patient.

## 2019-10-25 NOTE — TELEPHONE ENCOUNTER
----- Message from Lam Wheeler sent at 10/25/2019 10:31 AM CDT -----  Contact: [phone]/ self   Patient requesting to speak with you regarding getting scheduled for a sooner appointment after hospital follow up. Please call.

## 2019-10-27 LAB — ELASTASE 1, FECAL: 40 MCG/G

## 2019-10-28 ENCOUNTER — TELEPHONE (OUTPATIENT)
Dept: GASTROENTEROLOGY | Facility: CLINIC | Age: 49
End: 2019-10-28

## 2019-10-28 LAB
NUDT15 GENOTYPE: NORMAL
NUDT15 PHENOTYPE: NORMAL
TPMT ADDITIONAL INFORMATION: NORMAL
TPMT DISCLAIMER: NORMAL
TPMT GENOTYPE RESULT: NORMAL
TPMT INTERPRETATION: NORMAL
TPMT METHOD: NORMAL
TPMT PHENOTYPE: NORMAL
TPMT REVIEWED BY: NORMAL

## 2019-10-28 NOTE — TELEPHONE ENCOUNTER
----- Message from Khadra Piper sent at 10/28/2019  1:01 PM CDT -----  Contact: Self [phone]  Patient is calling to talk to nurse in regards to he had a colonoscopy last week and also has been having diarrhea and he is having abdominal pain and he is also having testicle pain and would like to get advice.

## 2019-10-29 LAB
M TB IFN-G CD4+ BCKGRND COR BLD-ACNC: 0 IU/ML
MITOGEN IGNF BCKGRD COR BLD-ACNC: 0.48 IU/ML
MITOGEN IGNF BCKGRD COR BLD-ACNC: ABNORMAL [IU]/ML
NIL: 0.04 IU/ML
TB2 - NIL: 0.01 IU/ML

## 2019-10-30 ENCOUNTER — PATIENT OUTREACH (OUTPATIENT)
Dept: ADMINISTRATIVE | Facility: OTHER | Age: 49
End: 2019-10-30

## 2019-10-31 LAB — CALPROTECTIN STL-MCNT: 947.3 MCG/G

## 2019-11-02 NOTE — DISCHARGE SUMMARY
[facility] Medicine  Discharge Summary      Patient Name: Burton Whiting  MRN: [id number]  Admission Date: 10/20/2019  Hospital Length of Stay: 3 days  Discharge Date and Time: 10/25/2019  6:04 PM  Attending Physician: Joe Gold MD   Discharging Provider: Joe Gold MD  Primary Care Provider: Christopher Turpin DO      HPI:   Burton Whiting is a 49 y.o. 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 He lives in [place], [place]. His primary care physician is Dr. Christopher Turpin. His hematologist-oncologist is Dr. Cortney Lopez. His gastroenterologist is Dr. Nithya Sawyer. His colorectal surgeon is Dr. Larisa Dominguez.    He was prescribed 7 days of metronidazole on 10/13/19 (end date 10/20/19) for an anterior perianal abscess that self drained. He was seen by Dr. Dominguez on 10/18/19. He was scheduled for outpatient colonoscopy by Dr. Sawyer on 10/22/19.    He presented to [facility] on 10/20/19 with vomiting and worsening diarrhea for 2 days. He reported having about 40 episodes of small volume watery diarrhea in the past day. He feared to drink or eat due to this. He felt malaise, dehydration, generalized body pain. He already takes cholestyramine-aspartame (Questran), loperamide, and diphenoxyalate-atropine (Lomotil) for his chronic diarrhea. Labs showed hypokalemia (potassium 3.2) and acute kidney injury (BUN 21 and creatinine 2.46, from 14 and 1.3 on 10/13/19). He was given 1 liter of lactated ringers and 1 liter of normal saline. He was admitted to [facility].     Procedure(s) (LRB):  COLONOSCOPY/plenvu (N/A)      Hospital Course:    He was put on continuous lactated ringers infusion. Stool was negative for C difficile. Lactobacillus was started because he just finished a course of antibiotics. Gastroenterology was consulted since he was supposed to get an outpatient colonoscopy to evaluate this problem, which he was going to miss by being admitted for the problem. Colonoscopy showed erythematous mucosa in the rectum, sigmoid, and descending colon and biopsies were taken. He was started on oral budesonide 9 mg daily with improvement in the diarrhea. His IV fluids were increased and his renal function started to decrease. His renal US was negative for any structural abnormalities. His creatinine peaked at 3 and was down to 1.7 on day of discharge. He was tolerating a regular diet on discharge.      Consults:   Consults (From admission, onward)        Status Ordering Provider     Inpatient consult [facility]     Provider:  Omari Vincent MD    Completed LISA RUIZ          No new Assessment & Plan notes have been filed under this hospital service since the last note was generated.  Service: Hospital Medicine    Final Active Diagnoses:    Diagnosis Date Noted POA    PRINCIPAL PROBLEM:  PJ (acute kidney injury) [N17.9] 10/20/2019 Yes    Drug-induced diarrhea [K52.1] 10/07/2019 Yes     Chronic    Hemorrhoids [K64.9] 10/07/2019 Yes     Chronic    Hypokalemia [E87.6] 10/07/2019 Yes    Duodenal ulcer disease [K26.9] 10/07/2019 Yes     Chronic    Chronic blood loss anemia [D50.0] 10/07/2019 Yes     Chronic    Cervical radiculopathy at C6 [M54.12] 09/25/2019 Yes     Chronic    Chemotherapy-induced neuropathy [G62.0, T45.1X5A] 03/01/2019 Yes     Chronic    Secondary malignancy of mediastinal lymph nodes [C77.1] 09/25/2018 Yes     Chronic    Secondary cancer of bone [C79.51] 01/25/2018 Yes     Chronic    Squamous cell carcinoma of palatine tonsil [C09.9] 01/04/2018 Yes     Chronic      Problems Resolved  During this Admission:       Discharged Condition: good    Disposition: Home or Self Care    Follow Up: GI with Dr. Sawyer in 2 weeks.     Patient Instructions:      Diet Adult Regular     Notify your health care provider if you experience any of the following:  temperature >100.4     Notify your health care provider if you experience any of the following:  persistent nausea and vomiting or diarrhea     Notify your health care provider if you experience any of the following:  severe uncontrolled pain     Notify your health care provider if you experience any of the following:  difficulty breathing or increased cough     Notify your health care provider if you experience any of the following:  persistent dizziness, light-headedness, or visual disturbances     Notify your health care provider if you experience any of the following:  increased confusion or weakness     Activity as tolerated       Significant Diagnostic Studies: Labs:   BMP  Lab Results   Component Value Date     10/25/2019    K 3.8 10/25/2019    CL 95 10/25/2019    CO2 32 (H) 10/25/2019    BUN 18 10/25/2019    CREATININE 1.7 (H) 10/25/2019    CALCIUM 7.5 (L) 10/25/2019    ANIONGAP 11 10/25/2019    ESTGFRAFRICA 53 (A) 10/25/2019    EGFRNONAA 46 (A) 10/25/2019     Lab Results   Component Value Date    WBC 6.28 10/25/2019    HGB 9.7 (L) 10/25/2019    HCT 30.0 (L) 10/25/2019    MCV 92 10/25/2019     10/25/2019       Radiology:   Imaging Results    None           Pending Diagnostic Studies:     None         Medications:  Reconciled Home Medications:      Medication List      START taking these medications    budesonide 3 mg capsule  Commonly known as:  ENTOCORT EC  Take 3 capsules (9 mg total) by mouth once daily.     sulfaSALAzine 500 mg Tab  Commonly known as:  AZULFIDINE  Take 2 tablets (1,000 mg total) by mouth 2 (two) times daily.        CHANGE how you take these medications    hydrocortisone 2.5 % cream  Apply topically 2 (two) times  daily.  What changed:  how much to take     pantoprazole 40 MG tablet  Commonly known as:  PROTONIX  Take 1 tablet (40 mg total) by mouth once daily.  What changed:  when to take this        CONTINUE taking these medications    acetaminophen 325 MG tablet  Commonly known as:  TYLENOL  Take 2 tablets (650 mg total) by mouth every 6 (six) hours as needed.     CALCIUM 500 + D 500 mg(1,250mg) -200 unit per tablet  Generic drug:  calcium-vitamin D3  Take 1 tablet by mouth 2 (two) times daily with meals.     cholestyramine-aspartame 4 gram Pwpk  Commonly known as:  QUESTRAN LIGHT  Take 1 packet (4 g total) by mouth 2 (two) times daily.     denosumab 120 mg/1.7 mL (70 mg/mL) Soln  Commonly known as:  XGEVA  Inject 1.7 mLs (120 mg total) into the skin once. for 1 dose     diphenoxylate-atropine 2.5-0.025 mg 2.5-0.025 mg per tablet  Commonly known as:  LOMOTIL  Take 2 tablets by mouth 4 (four) times daily as needed for Diarrhea.     FLUoxetine 40 MG capsule  Take 1 capsule (40 mg total) by mouth once daily.     gabapentin 300 MG capsule  Commonly known as:  NEURONTIN  Take 1 capsule (300 mg total) by mouth 3 (three) times daily.     lidocaine 5 %  Commonly known as:  LIDODERM  Place 1 patch onto the skin daily as needed. Remove & Discard patch within 12 hours or as directed by MD     loperamide 2 mg Tab  Commonly known as:  IMODIUM A-D  Take 2 mg by mouth 4 (four) times daily as needed.     mirtazapine 7.5 MG Tab  Commonly known as:  REMERON     multivitamin capsule  Take 1 capsule by mouth once daily.     tiZANidine 2 MG tablet  Commonly known as:  ZANAFLEX  Take 4 mg by mouth nightly as needed. NECK PAIN     traMADol 50 mg tablet  Commonly known as:  ULTRAM  Take 1 tablet (50 mg total) by mouth every 8 (eight) hours as needed for Pain.        STOP taking these medications    metroNIDAZOLE 500 MG tablet  Commonly known as:  FLAGYL            Indwelling Lines/Drains at time of discharge:   Lines/Drains/Airways     None                  Time spent on the discharge of patient: 40 minutes  Patient was seen and examined on the date of discharge and determined to be suitable for discharge.         Joe Gold MD  Department of Hospital Medicine  [facility]

## 2019-11-06 ENCOUNTER — OFFICE VISIT (OUTPATIENT)
Dept: GASTROENTEROLOGY | Facility: CLINIC | Age: 49
End: 2019-11-06
Payer: COMMERCIAL

## 2019-11-06 ENCOUNTER — PATIENT OUTREACH (OUTPATIENT)
Dept: ADMINISTRATIVE | Facility: OTHER | Age: 49
End: 2019-11-06

## 2019-11-06 VITALS
BODY MASS INDEX: 23.67 KG/M2 | DIASTOLIC BLOOD PRESSURE: 71 MMHG | HEART RATE: 86 BPM | WEIGHT: 155.63 LBS | SYSTOLIC BLOOD PRESSURE: 108 MMHG

## 2019-11-06 DIAGNOSIS — K51.50 LEFT SIDED COLITIS WITHOUT COMPLICATIONS: Primary | ICD-10-CM

## 2019-11-06 DIAGNOSIS — R19.7 DIARRHEA, UNSPECIFIED TYPE: ICD-10-CM

## 2019-11-06 PROCEDURE — 99214 PR OFFICE/OUTPT VISIT, EST, LEVL IV, 30-39 MIN: ICD-10-PCS | Mod: S$GLB,,, | Performed by: INTERNAL MEDICINE

## 2019-11-06 PROCEDURE — 99999 PR PBB SHADOW E&M-EST. PATIENT-LVL III: ICD-10-PCS | Mod: PBBFAC,,, | Performed by: INTERNAL MEDICINE

## 2019-11-06 PROCEDURE — 3008F BODY MASS INDEX DOCD: CPT | Mod: CPTII,S$GLB,, | Performed by: INTERNAL MEDICINE

## 2019-11-06 PROCEDURE — 99999 PR PBB SHADOW E&M-EST. PATIENT-LVL III: CPT | Mod: PBBFAC,,, | Performed by: INTERNAL MEDICINE

## 2019-11-06 PROCEDURE — 3008F PR BODY MASS INDEX (BMI) DOCUMENTED: ICD-10-PCS | Mod: CPTII,S$GLB,, | Performed by: INTERNAL MEDICINE

## 2019-11-06 PROCEDURE — 99214 OFFICE O/P EST MOD 30 MIN: CPT | Mod: S$GLB,,, | Performed by: INTERNAL MEDICINE

## 2019-11-06 RX ORDER — SULFASALAZINE 500 MG/1
1000 TABLET ORAL 2 TIMES DAILY
Qty: 120 TABLET | Refills: 2 | Status: SHIPPED | OUTPATIENT
Start: 2019-11-06 | End: 2020-03-02 | Stop reason: SDUPTHER

## 2019-11-06 NOTE — PROGRESS NOTES
Subjective:       Patient ID: Burton Whiting is a 49 y.o. male.    Chief Complaint: Follow-up    This is a 49-year-old male here for a follow-up visit a recent hospitalization.  He presented with chronic diarrhea which was severe with greater than 10 bowel movements daily with some associated hematochezia. He underwent colonoscopy revealing contiguous, mild-to-moderate colitis involving the left colon.  Biopsies did show cryptitis and acute injury, the presentation suspicious for mild to moderate left-sided ulcerative colitis.  He was placed on steroids temporarily given the severity of his symptoms which she is now off and sulfasalazine.  Clinically he is doing much better at this time, he is much healthier appearing in notes gaining several lb.  Appetite is good, no bleeding.  He is down to 2 bowel movements daily.  No other exacerbating or relieving factors or other associated symptoms.    The following portions of the patient's history were reviewed and updated as appropriate: allergies, current medications, past family history, past medical history, past social history, past surgical history and problem list.    (Portions of this note were dictated using voice recognition software and may contain dictation related errors in spelling/grammar/syntax not found on text review)  HPI  Review of Systems   Constitutional: Negative for appetite change and unexpected weight change.   Respiratory: Negative for shortness of breath and wheezing.    Cardiovascular: Negative for chest pain and palpitations.   Gastrointestinal: Negative for abdominal distention, abdominal pain and diarrhea.         Objective:      Physical Exam   Constitutional: He is oriented to person, place, and time. He appears well-developed and well-nourished. No distress.   HENT:   Head: Normocephalic and atraumatic.   Eyes: Conjunctivae are normal. No scleral icterus.   Neck: No tracheal deviation present. No thyromegaly present.   Cardiovascular:  Normal rate, regular rhythm and normal heart sounds. Exam reveals no gallop and no friction rub.   Pulmonary/Chest: Effort normal and breath sounds normal. He has no wheezes. He has no rales.   Abdominal: Soft. Bowel sounds are normal. He exhibits no distension. There is no tenderness. There is no rebound and no guarding.   Musculoskeletal: Normal range of motion. He exhibits no edema or tenderness.   Neurological: He is alert and oriented to person, place, and time.   Skin: He is not diaphoretic.   Psychiatric: He has a normal mood and affect. His behavior is normal.   Nursing note and vitals reviewed.        Labs/imaging; reviewed  Assessment:       1. Left sided colitis without complications    2. Diarrhea, unspecified type        Plan:   1. Continue current meds, refilled  2. Three month f/u

## 2019-11-20 DIAGNOSIS — C09.9 TONSIL CANCER: ICD-10-CM

## 2019-11-20 RX ORDER — GABAPENTIN 300 MG/1
300 CAPSULE ORAL 3 TIMES DAILY
Qty: 270 CAPSULE | Refills: 0 | Status: SHIPPED | OUTPATIENT
Start: 2019-11-20 | End: 2019-12-20 | Stop reason: SDUPTHER

## 2019-11-23 DIAGNOSIS — R11.0 NAUSEA: Primary | ICD-10-CM

## 2019-11-23 RX ORDER — ONDANSETRON 4 MG/1
TABLET, FILM COATED ORAL
Qty: 90 TABLET | Refills: 3 | Status: SHIPPED | OUTPATIENT
Start: 2019-11-23 | End: 2019-12-20 | Stop reason: SDUPTHER

## 2019-12-03 ENCOUNTER — PATIENT MESSAGE (OUTPATIENT)
Dept: HEMATOLOGY/ONCOLOGY | Facility: CLINIC | Age: 49
End: 2019-12-03

## 2019-12-03 DIAGNOSIS — C77.0 CANCER OF HEAD, FACE, OR NECK LYMPH NODES, SECONDARY: Primary | ICD-10-CM

## 2019-12-13 ENCOUNTER — PATIENT MESSAGE (OUTPATIENT)
Dept: HEMATOLOGY/ONCOLOGY | Facility: CLINIC | Age: 49
End: 2019-12-13

## 2019-12-13 DIAGNOSIS — C77.0 CANCER OF HEAD, FACE, OR NECK LYMPH NODES, SECONDARY: Primary | ICD-10-CM

## 2019-12-13 DIAGNOSIS — C77.1 SECONDARY MALIGNANCY OF MEDIASTINAL LYMPH NODES: Chronic | ICD-10-CM

## 2019-12-13 NOTE — TELEPHONE ENCOUNTER
Looks like they want to know why we are not doing CT scans first, I figured they would do this. His CT scans were never clear.  Please let him know and lets do CT neck, chest, abdomen/pelvis. Orders are in

## 2019-12-16 ENCOUNTER — HOSPITAL ENCOUNTER (OUTPATIENT)
Dept: RADIOLOGY | Facility: HOSPITAL | Age: 49
Discharge: HOME | End: 2019-12-16
Attending: INTERNAL MEDICINE
Payer: COMMERCIAL

## 2019-12-16 DIAGNOSIS — C77.0 CANCER OF HEAD, FACE, OR NECK LYMPH NODES, SECONDARY: ICD-10-CM

## 2019-12-16 PROCEDURE — 70491 CT SOFT TISSUE NECK W/DYE: CPT | Mod: TC

## 2019-12-16 PROCEDURE — 70491 CT SOFT TISSUE NECK WITH CONTRAST: ICD-10-PCS | Mod: 26,,, | Performed by: RADIOLOGY

## 2019-12-16 PROCEDURE — 71260 CT CHEST ABDOMEN PELVIS WITH CONTRAST (XPD): ICD-10-PCS | Mod: 26,,, | Performed by: RADIOLOGY

## 2019-12-16 PROCEDURE — 25500020 PHARM REV CODE 255: Performed by: INTERNAL MEDICINE

## 2019-12-16 PROCEDURE — 71260 CT THORAX DX C+: CPT | Mod: 26,,, | Performed by: RADIOLOGY

## 2019-12-16 PROCEDURE — 74177 CT CHEST ABDOMEN PELVIS WITH CONTRAST (XPD): ICD-10-PCS | Mod: 26,,, | Performed by: RADIOLOGY

## 2019-12-16 PROCEDURE — 70491 CT SOFT TISSUE NECK W/DYE: CPT | Mod: 26,,, | Performed by: RADIOLOGY

## 2019-12-16 PROCEDURE — 74177 CT ABD & PELVIS W/CONTRAST: CPT | Mod: TC

## 2019-12-16 PROCEDURE — 74177 CT ABD & PELVIS W/CONTRAST: CPT | Mod: 26,,, | Performed by: RADIOLOGY

## 2019-12-16 RX ADMIN — IOHEXOL 75 ML: 350 INJECTION, SOLUTION INTRAVENOUS at 03:12

## 2019-12-20 ENCOUNTER — TELEPHONE (OUTPATIENT)
Dept: HEMATOLOGY/ONCOLOGY | Facility: CLINIC | Age: 49
End: 2019-12-20

## 2019-12-20 ENCOUNTER — OFFICE VISIT (OUTPATIENT)
Dept: HEMATOLOGY/ONCOLOGY | Facility: CLINIC | Age: 49
End: 2019-12-20
Payer: COMMERCIAL

## 2019-12-20 ENCOUNTER — LAB VISIT (OUTPATIENT)
Dept: LAB | Facility: HOSPITAL | Age: 49
End: 2019-12-20
Attending: INTERNAL MEDICINE
Payer: COMMERCIAL

## 2019-12-20 VITALS
RESPIRATION RATE: 20 BRPM | TEMPERATURE: 98 F | HEART RATE: 72 BPM | BODY MASS INDEX: 25.62 KG/M2 | OXYGEN SATURATION: 97 % | HEIGHT: 68 IN | DIASTOLIC BLOOD PRESSURE: 90 MMHG | SYSTOLIC BLOOD PRESSURE: 141 MMHG | WEIGHT: 169.06 LBS

## 2019-12-20 DIAGNOSIS — C77.0 CANCER OF HEAD, FACE, OR NECK LYMPH NODES, SECONDARY: ICD-10-CM

## 2019-12-20 DIAGNOSIS — K52.9 COLITIS: ICD-10-CM

## 2019-12-20 DIAGNOSIS — C09.9 TONSIL CANCER: ICD-10-CM

## 2019-12-20 DIAGNOSIS — T45.1X5A CHEMOTHERAPY-INDUCED NEUROPATHY: Chronic | ICD-10-CM

## 2019-12-20 DIAGNOSIS — R11.0 NAUSEA: ICD-10-CM

## 2019-12-20 DIAGNOSIS — G62.0 CHEMOTHERAPY-INDUCED NEUROPATHY: Chronic | ICD-10-CM

## 2019-12-20 DIAGNOSIS — C09.9 SQUAMOUS CELL CARCINOMA OF PALATINE TONSIL: Primary | Chronic | ICD-10-CM

## 2019-12-20 LAB
ALBUMIN SERPL BCP-MCNC: 3.7 G/DL (ref 3.5–5.2)
ALP SERPL-CCNC: 79 U/L (ref 55–135)
ALT SERPL W/O P-5'-P-CCNC: 15 U/L (ref 10–44)
ANION GAP SERPL CALC-SCNC: 8 MMOL/L (ref 8–16)
AST SERPL-CCNC: 23 U/L (ref 10–40)
BILIRUB SERPL-MCNC: 0.2 MG/DL (ref 0.1–1)
BUN SERPL-MCNC: 20 MG/DL (ref 6–20)
CALCIUM SERPL-MCNC: 8.6 MG/DL (ref 8.7–10.5)
CHLORIDE SERPL-SCNC: 108 MMOL/L (ref 95–110)
CO2 SERPL-SCNC: 23 MMOL/L (ref 23–29)
CREAT SERPL-MCNC: 1.1 MG/DL (ref 0.5–1.4)
ERYTHROCYTE [DISTWIDTH] IN BLOOD BY AUTOMATED COUNT: 14.6 % (ref 11.5–14.5)
EST. GFR  (AFRICAN AMERICAN): >60 ML/MIN/1.73 M^2
EST. GFR  (NON AFRICAN AMERICAN): >60 ML/MIN/1.73 M^2
GLUCOSE SERPL-MCNC: 85 MG/DL (ref 70–110)
HCT VFR BLD AUTO: 34.1 % (ref 40–54)
HGB BLD-MCNC: 10.6 G/DL (ref 14–18)
IMM GRANULOCYTES # BLD AUTO: 0.01 K/UL (ref 0–0.04)
MCH RBC QN AUTO: 30.5 PG (ref 27–31)
MCHC RBC AUTO-ENTMCNC: 31.1 G/DL (ref 32–36)
MCV RBC AUTO: 98 FL (ref 82–98)
NEUTROPHILS # BLD AUTO: 4 K/UL (ref 1.8–7.7)
PLATELET # BLD AUTO: 182 K/UL (ref 150–350)
PMV BLD AUTO: 8.5 FL (ref 9.2–12.9)
POTASSIUM SERPL-SCNC: 4.1 MMOL/L (ref 3.5–5.1)
PROT SERPL-MCNC: 6.5 G/DL (ref 6–8.4)
RBC # BLD AUTO: 3.48 M/UL (ref 4.6–6.2)
SODIUM SERPL-SCNC: 139 MMOL/L (ref 136–145)
T4 FREE SERPL-MCNC: 1.01 NG/DL (ref 0.71–1.51)
TSH SERPL DL<=0.005 MIU/L-ACNC: 2.52 UIU/ML (ref 0.4–4)
WBC # BLD AUTO: 5.16 K/UL (ref 3.9–12.7)

## 2019-12-20 PROCEDURE — 99999 PR PBB SHADOW E&M-EST. PATIENT-LVL IV: CPT | Mod: PBBFAC,,, | Performed by: INTERNAL MEDICINE

## 2019-12-20 PROCEDURE — 3008F PR BODY MASS INDEX (BMI) DOCUMENTED: ICD-10-PCS | Mod: CPTII,S$GLB,, | Performed by: INTERNAL MEDICINE

## 2019-12-20 PROCEDURE — 80053 COMPREHEN METABOLIC PANEL: CPT

## 2019-12-20 PROCEDURE — 84439 ASSAY OF FREE THYROXINE: CPT

## 2019-12-20 PROCEDURE — 84443 ASSAY THYROID STIM HORMONE: CPT

## 2019-12-20 PROCEDURE — 99214 PR OFFICE/OUTPT VISIT, EST, LEVL IV, 30-39 MIN: ICD-10-PCS | Mod: S$GLB,,, | Performed by: INTERNAL MEDICINE

## 2019-12-20 PROCEDURE — 85027 COMPLETE CBC AUTOMATED: CPT

## 2019-12-20 PROCEDURE — 99999 PR PBB SHADOW E&M-EST. PATIENT-LVL IV: ICD-10-PCS | Mod: PBBFAC,,, | Performed by: INTERNAL MEDICINE

## 2019-12-20 PROCEDURE — 99214 OFFICE O/P EST MOD 30 MIN: CPT | Mod: S$GLB,,, | Performed by: INTERNAL MEDICINE

## 2019-12-20 PROCEDURE — 3008F BODY MASS INDEX DOCD: CPT | Mod: CPTII,S$GLB,, | Performed by: INTERNAL MEDICINE

## 2019-12-20 PROCEDURE — 36415 COLL VENOUS BLD VENIPUNCTURE: CPT

## 2019-12-20 RX ORDER — GABAPENTIN 300 MG/1
300 CAPSULE ORAL 3 TIMES DAILY
Qty: 270 CAPSULE | Refills: 6 | Status: SHIPPED | OUTPATIENT
Start: 2019-12-20 | End: 2020-08-26

## 2019-12-20 RX ORDER — BUDESONIDE 3 MG/1
9 CAPSULE, COATED PELLETS ORAL DAILY
COMMUNITY
Start: 2019-11-23 | End: 2020-01-24 | Stop reason: SDUPTHER

## 2019-12-20 RX ORDER — ONDANSETRON 4 MG/1
4 TABLET, FILM COATED ORAL EVERY 8 HOURS PRN
Qty: 90 TABLET | Refills: 3 | Status: SHIPPED | OUTPATIENT
Start: 2019-12-20 | End: 2021-10-14

## 2019-12-20 NOTE — Clinical Note
Schedule To see Dr. Olguin in ENT next available. Schedule CBC,CMP, TSH and T4 and CT neck, chest, abdomen/pelvis and see me in 3 months

## 2019-12-20 NOTE — TELEPHONE ENCOUNTER
----- Message from Cortney Lopez MD sent at 12/20/2019  1:36 PM CST -----  Schedule To see Dr. Olguin in ENT next available. Schedule CBC,CMP, TSH and T4 and CT neck, chest, abdomen/pelvis and see me in 3 months

## 2019-12-20 NOTE — PROGRESS NOTES
Subjective:       Patient ID: Bruton Whiting is a 49 y.o. male.    Chief Complaint: Squamous cell carcinoma of palatine tonsil  Squamous cell carcinoma of palatine tonsil; g-tube malodorous; left neck pain 2/10; 4 cans formula per day; and r arm port---red/warm to touch  Mr. Burton Whiting is a 48-year-old male, former smoker, who presents today with a four-month history of enlarging neck mass.  He initially delayed care due to lack of insurance.  He was seen by Dr. Turpin who referred him to see Dr. Olguin.  He had a CT that showed a 3.8 x 3.8 x 4.9 cm soft tissue mass arising from the left palatine tonsil resulting in moderate narrowing of the hypopharyngeal airway adjacent extensive matted left cervical lymphadenopathy was noted.  The largest ella mass was 6.6 x 9 x 2.6 cm extending inferiorly to the supraclavicular region.  The mass pushed the trachea and the left common carotid artery to the right.  Additional representative of cervical lymph nodes measuring 2.9 x 3.1 x 3.5 cm on axial image 37 of series 3   and 2.4 x 2.1 x 2.2 cm on axial image 55 of series 3.  There is also a sclerotic lesion involving the midline of the clivus measuring 1.2 cm.  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  PET scan performed on 01/19/2018 revealed persistent evidence of a soft tissue mass arising from the left palatine tonsil resulting in moderate narrowing of the hypopharyngeal   airway.  The mass is hypermetabolic demonstrating an SUV max of 18.5.  There is also persistent adjacent extensive matted left cervical lymphadenopathy, largest of this measuring 6.7 x 8.42 with hypermetabolic activity and SUV max of 20.5.  Lymphadenopathy is again noted to have a mass effect on the trachea and left common carotid artery, pushing both structures towards the right.  There is also prominent right cervical lymph nodes with the largest measuring 0.8 cm and demonstrating mild hypermetabolic activity with SUV max  "of 1.8, physiologic   distribution of FDG in the gray matter.  There are 3 pulmonary nodules noted within the right lung, the largest is in the anterior segment of the right upper lobe measuring 1 cm, second is visualized in the middle lobe measuring 0.6 cm and the third is within the posterior basal segment measuring 0.6 cm.  There is one pulmonary nodule within the left lung within the lateral basal segment measuring 0.6 cm.  These nodules do not demonstrate hypermetabolic activity; however, they are below the threshold for observation with PET     He underwent MRI cervical spine revealed "No evidence of metastatic disease to the cervical spine. Multilevel degenerative changes of the cervical spine with disc protrusion at C5-6 which results in moderate to severe spinal canal stenosis and and associated cord signal abnormality, suggestive of compressive myelopathy. 6 mm T1 hypointense non-enhancing lesion in the clivus.  Continued followup is suggested. 9 mm inferior descent of the cerebellar tonsils below the foramen magnum, suggestive of Chiari 1 malformation"  MRI thoracic spine "No evidence of metastatic disease involving the thoracic spine. Incidental hemangioma involving the T7 vertebral body. Mild degenerative disc disease without any significant spinal canal stenosis or neuroforaminal narrowing.   He completed 3 cycles of TPF and 6 weeks of Cisplatin and RT. Completed on 6/26/18           His PET scan from 9/25/18 revealed "Progression of disease with multiple new hypermetabolic foci including the manubrium, mediastinal/retrocrural lymph nodes, and lungs. Partial therapeutic response within the presumed radiation field involving the left cervical mass, and complete therapeutic response in the right cervical lymph node, clivus, and left palatine tonsil. New soft tissue thickening and hypermetabolism in the left aryepiglottic fold and right cricoid cartilage"      He has been on Opdivo from " "2/19-7/19                  HPIHe comes in to review his CT scans which reveal "No evidence for metastatic disease within the chest, abdomen, or pelvis.  There are no measurable lesions per RECIST criteria. Skin thickening of the left gluteal fold new from the prior FDG PET-CT exam.  This is nonspecific and clinical evaluation is recommended. Diverticulosis without evidence of diverticulitis"  CT neck reveals Nonspecific soft tissue density in the area of mild increased metabolism on the prior PET scan unchanged from that exam, measuring approximately 4.7 cm.  This is favored to represent post treatment change, but continued follow-up is advised. The left area epiglottic fold demonstrates stable slight nodularity and soft tissue thickening correlating in the area of previously noted slightly increased metabolic activity seen on the prior FDG PET exam.  This is also favored to represent post treatment change but continued follow-up is advised. 7 mm short axis left submandibular lymph node.  This is not seen on the prior PET CT examination, but is within normal limits for size.  Close attention to detail on follow-up. Left internal jugular vein is again thrombosed. Moderate to severe spinal canal stenosis at C5-6 better evaluated on the prior cervical spine MRI"    He is on Entocort for colitis.  He is doing well and denies any new issues.    Review of Systems   Constitutional: Negative for appetite change, fatigue and unexpected weight change.   HENT: Negative for mouth sores.    Eyes: Negative for visual disturbance.   Respiratory: Negative for cough and shortness of breath.    Cardiovascular: Negative for chest pain.   Gastrointestinal: Negative for abdominal pain and diarrhea.   Genitourinary: Negative for frequency.   Musculoskeletal: Negative for back pain.   Skin: Negative for rash.   Neurological: Negative for headaches.   Hematological: Negative for adenopathy.   Psychiatric/Behavioral: The patient is not " nervous/anxious.    All other systems reviewed and are negative.      Objective:      Physical Exam   Constitutional: He is oriented to person, place, and time. He appears well-developed and well-nourished.   HENT:   Mouth/Throat: No oropharyngeal exudate.   Cardiovascular: Normal rate and normal heart sounds.   Pulmonary/Chest: Effort normal and breath sounds normal. He has no wheezes.   Abdominal: Soft. Bowel sounds are normal. There is no tenderness.   Musculoskeletal: He exhibits no edema or tenderness.   Lymphadenopathy:     He has no cervical adenopathy.   Neurological: He is alert and oriented to person, place, and time. Coordination normal.   Skin: Skin is warm and dry. No rash noted.   Psychiatric: He has a normal mood and affect. Judgment and thought content normal.   Vitals reviewed.        LABS:  WBC   Date Value Ref Range Status   12/20/2019 5.16 3.90 - 12.70 K/uL Final     Hemoglobin   Date Value Ref Range Status   12/20/2019 10.6 (L) 14.0 - 18.0 g/dL Final     POC Hematocrit   Date Value Ref Range Status   09/08/2019 17 (LL) 36 - 54 %PCV Final     Hematocrit   Date Value Ref Range Status   12/20/2019 34.1 (L) 40.0 - 54.0 % Final     Platelets   Date Value Ref Range Status   12/20/2019 182 150 - 350 K/uL Final     Gran # (ANC)   Date Value Ref Range Status   12/20/2019 4.0 1.8 - 7.7 K/uL Final     Comment:     The ANC is based on a white cell differential from an   automated cell counter. It has not been microscopically   reviewed for the presence of abnormal cells. Clinical   correlation is required.         Chemistry        Component Value Date/Time     12/20/2019 1218    K 4.1 12/20/2019 1218     12/20/2019 1218    CO2 23 12/20/2019 1218    BUN 20 12/20/2019 1218    CREATININE 1.1 12/20/2019 1218    GLU 85 12/20/2019 1218        Component Value Date/Time    CALCIUM 8.6 (L) 12/20/2019 1218    ALKPHOS 79 12/20/2019 1218    AST 23 12/20/2019 1218    ALT 15 12/20/2019 1218    BILITOT 0.2  12/20/2019 1218    ESTGFRAFRICA >60.0 12/20/2019 1218    EGFRNONAA >60.0 12/20/2019 1218        TSH   Date Value Ref Range Status   12/20/2019 2.521 0.400 - 4.000 uIU/mL Final     Free T4   Date Value Ref Range Status   12/20/2019 1.01 0.71 - 1.51 ng/dL Final        Assessment:       1. Squamous cell carcinoma of palatine tonsil    2. Colitis    3. Chemotherapy-induced neuropathy    4. Tonsil cancer        Plan:        1. He is doing well with no evidence of disease on CT scans, Will have him see Dr. Olguin and see me back in 3 months with scans. Off of Opdivo due to colitis.  2. On Budesonide and is stable.  3. Discussed compliance with gabapentin  4. Thyroid normal, will recheck in 3 months.  Above care plan was discussed with patient and all questions were addressed to his satisfaction

## 2020-01-23 ENCOUNTER — PATIENT MESSAGE (OUTPATIENT)
Dept: GASTROENTEROLOGY | Facility: CLINIC | Age: 50
End: 2020-01-23

## 2020-01-24 RX ORDER — BUDESONIDE 3 MG/1
9 CAPSULE, COATED PELLETS ORAL DAILY
Qty: 90 CAPSULE | Refills: 2 | Status: SHIPPED | OUTPATIENT
Start: 2020-01-24 | End: 2020-04-23

## 2020-01-25 ENCOUNTER — PATIENT MESSAGE (OUTPATIENT)
Dept: GASTROENTEROLOGY | Facility: CLINIC | Age: 50
End: 2020-01-25

## 2020-02-03 ENCOUNTER — OFFICE VISIT (OUTPATIENT)
Dept: GASTROENTEROLOGY | Facility: CLINIC | Age: 50
End: 2020-02-03
Payer: COMMERCIAL

## 2020-02-03 ENCOUNTER — OFFICE VISIT (OUTPATIENT)
Dept: OTOLARYNGOLOGY | Facility: CLINIC | Age: 50
End: 2020-02-03
Payer: COMMERCIAL

## 2020-02-03 VITALS
BODY MASS INDEX: 26.41 KG/M2 | HEART RATE: 68 BPM | DIASTOLIC BLOOD PRESSURE: 86 MMHG | TEMPERATURE: 98 F | SYSTOLIC BLOOD PRESSURE: 133 MMHG | WEIGHT: 173.75 LBS

## 2020-02-03 VITALS
DIASTOLIC BLOOD PRESSURE: 80 MMHG | HEART RATE: 77 BPM | BODY MASS INDEX: 26.98 KG/M2 | WEIGHT: 177.5 LBS | SYSTOLIC BLOOD PRESSURE: 136 MMHG

## 2020-02-03 DIAGNOSIS — K51.50 LEFT SIDED COLITIS WITHOUT COMPLICATIONS: ICD-10-CM

## 2020-02-03 DIAGNOSIS — D53.9 MACROCYTIC ANEMIA: Primary | ICD-10-CM

## 2020-02-03 DIAGNOSIS — C09.9 SQUAMOUS CELL CARCINOMA OF PALATINE TONSIL: Primary | ICD-10-CM

## 2020-02-03 PROCEDURE — 3008F PR BODY MASS INDEX (BMI) DOCUMENTED: ICD-10-PCS | Mod: CPTII,S$GLB,, | Performed by: INTERNAL MEDICINE

## 2020-02-03 PROCEDURE — 3008F PR BODY MASS INDEX (BMI) DOCUMENTED: ICD-10-PCS | Mod: CPTII,S$GLB,, | Performed by: OTOLARYNGOLOGY

## 2020-02-03 PROCEDURE — 99999 PR PBB SHADOW E&M-EST. PATIENT-LVL III: CPT | Mod: PBBFAC,,, | Performed by: INTERNAL MEDICINE

## 2020-02-03 PROCEDURE — 99213 PR OFFICE/OUTPT VISIT, EST, LEVL III, 20-29 MIN: ICD-10-PCS | Mod: S$GLB,,, | Performed by: OTOLARYNGOLOGY

## 2020-02-03 PROCEDURE — 99213 OFFICE O/P EST LOW 20 MIN: CPT | Mod: S$GLB,,, | Performed by: OTOLARYNGOLOGY

## 2020-02-03 PROCEDURE — 99214 OFFICE O/P EST MOD 30 MIN: CPT | Mod: S$GLB,,, | Performed by: INTERNAL MEDICINE

## 2020-02-03 PROCEDURE — 99999 PR PBB SHADOW E&M-EST. PATIENT-LVL III: ICD-10-PCS | Mod: PBBFAC,,, | Performed by: INTERNAL MEDICINE

## 2020-02-03 PROCEDURE — 99999 PR PBB SHADOW E&M-EST. PATIENT-LVL III: ICD-10-PCS | Mod: PBBFAC,,, | Performed by: OTOLARYNGOLOGY

## 2020-02-03 PROCEDURE — 99999 PR PBB SHADOW E&M-EST. PATIENT-LVL III: CPT | Mod: PBBFAC,,, | Performed by: OTOLARYNGOLOGY

## 2020-02-03 PROCEDURE — 99214 PR OFFICE/OUTPT VISIT, EST, LEVL IV, 30-39 MIN: ICD-10-PCS | Mod: S$GLB,,, | Performed by: INTERNAL MEDICINE

## 2020-02-03 PROCEDURE — 3008F BODY MASS INDEX DOCD: CPT | Mod: CPTII,S$GLB,, | Performed by: INTERNAL MEDICINE

## 2020-02-03 PROCEDURE — 3008F BODY MASS INDEX DOCD: CPT | Mod: CPTII,S$GLB,, | Performed by: OTOLARYNGOLOGY

## 2020-02-03 NOTE — PROGRESS NOTES
Chief Complaint   Patient presents with    Follow-up        Squamous cell carcinoma of palatine tonsil    1/4/2018 Initial Diagnosis     Squamous cell carcinoma of palatine tonsil      5/3/2018 - 6/26/2018 Radiation Therapy     Treating physician: Dr. Sandra  Total Dose: 70 Gy  Fractions: 35/35      5/2018 - 6/2018 Chemotherapy     Treatment Summary   Plan Name: OP CISPLATIN DOCETAXEL 5FU Q3W  Treatment Goal: Curative  Status: Inactive  Start Date: 1/29/2018  End Date: 5/27/2018 (Planned)  Provider: Cortney Lopez MD  Chemotherapy: CISplatin (PLATINOL) 100 mg/m2 = 248 mg in sodium chloride 0.9% 500 mL chemo infusion, 100 mg/m2 = 248 mg (100 % of original dose 100 mg/m2), Intravenous, Clinic/HOD 1 time, 2 of 6 cycles  Dose modification: 100 mg/m2 (original dose 100 mg/m2, Cycle 1)    DOCEtaxel (TAXOTERE) 75 mg/m2 = 185 mg in sodium chloride 0.9% 250 mL chemo infusion, 75 mg/m2 = 185 mg, Intravenous, Clinic/HOD 1 time, 2 of 6 cycles    fluorouracil (ADRUCIL) 1,000 mg/m2/day = 12,400 mg in sodium chloride 0.9% 250 mL chemo infusion, 1,000 mg/m2/day = 12,400 mg (100 % of original dose 1,000 mg/m2/day), Intravenous, Over 120 hours, 2 of 6 cycles  Dose modification: 1,000 mg/m2/day (original dose 1,000 mg/m2/day, Cycle 1)    Plan Name: OP CISPLATIN (Weekly)  Treatment Goal: Curative  Status: Inactive  Start Date: 3/20/2018 (Planned)  End Date: 4/24/2018 (Planned)  Provider: Cortney Lopez MD  Chemotherapy: CISplatin (PLATINOL) 40 mg/m2 = 92 mg in sodium chloride 0.9% 250 mL chemo infusion, 40 mg/m2, Intravenous, Clinic/HOD 1 time, 0 of 6 cycles    Plan Name: OP CISPLATIN DOCETAXEL 5FU Q3W  Treatment Goal: Maintenance  Status: Inactive  Start Date: 3/19/2018  End Date: 7/12/2018 (Planned)  Provider: Cortney Lopez MD  Chemotherapy: CISplatin (PLATINOL) 75 mg/m2 = 172 mg in sodium chloride 0.9% 500 mL chemo infusion, 75 mg/m2 = 172 mg, Intravenous, Clinic/HOD 1 time, 1 of 6 cycles    DOCEtaxel (TAXOTERE) 75 mg/m2 = 170 mg in  sodium chloride 0.9% 250 mL chemo infusion, 75 mg/m2 = 170 mg, Intravenous, Clinic/HOD 1 time, 1 of 6 cycles    fluorouracil (ADRUCIL) 750 mg/m2/day = 8,590 mg in sodium chloride 0.9% 240 mL chemo infusion, 750 mg/m2/day = 8,590 mg, Intravenous, Over 120 hours, 1 of 6 cycles    Plan Name: OP CISPLATIN (Weekly)  Treatment Goal: Palliative  Status: Active  Start Date: 5/3/2018  End Date: 6/27/2018  Provider: Cortney Lopez MD  Chemotherapy: CISplatin (PLATINOL) 40 mg/m2 = 89 mg in sodium chloride 0.9% 250 mL chemo infusion, 40 mg/m2 = 89 mg, Intravenous, Clinic/HOD 1 time, 6 of 6 cycles          3/28/2019 Tumor Conference        His case was discussed at the Multidisciplinary Head and Neck Team Planning Meeting.    Representatives from Medical Oncology, Radiation Oncology, Head and Neck Surgical Oncology, Psychosocial Oncology, and Speech and Language Pathology discussed the case with the following recommendations:    1) continued observation                   HPI   50 y.o. male presents with the above treatment history.  No new complaints.  He is not receiving any therapy at this time.    Review of Systems   Constitutional: Negative for fatigue and unexpected weight change.   HENT: Per HPI.  Eyes: Negative for visual disturbance.   Respiratory: Negative for shortness of breath, hemoptysis   Cardiovascular: Negative for chest pain and palpitations.   Musculoskeletal: Negative for decreased ROM, back pain.   Skin: Negative for rash, sunburn, itching.   Neurological: Negative for dizziness and seizures.   Hematological: Negative for adenopathy. Does not bruise/bleed easily.   Endocrine: Negative for rapid weight loss/weight gain, heat/cold intolerance.     Past Medical History   Patient Active Problem List   Diagnosis    Squamous cell carcinoma of palatine tonsil    Low vitamin D level    Cancer of head, face, or neck lymph nodes, secondary    Secondary cancer of bone    Tonsil cancer    Port or reservoir  infection    Anxiety    Macrocytic anemia    Adjustment disorder with emotional disturbance    Secondary malignancy of mediastinal lymph nodes    Hypocalcemia    Chemotherapy-induced neuropathy    Depression    Insomnia    BMI 28.0-28.9,adult    Neuropathic pain    Osteoradionecrosis of jaw    Acute renal failure    Seizure    Cervical radiculopathy at C6    Pain and swelling of left upper extremity    Hypomagnesemia    Rectal bleeding    Hemorrhoids    Duodenal ulcer disease    Drug-induced diarrhea    Chronic blood loss anemia    Hypokalemia    PJ (acute kidney injury)           Past Surgical History   Past Surgical History:   Procedure Laterality Date    COLONOSCOPY N/A 10/22/2019    Procedure: COLONOSCOPY/plenvu;  Surgeon: Nithya Sawyer MD;  [facility];  Service: Endoscopy;  Laterality: N/A;    ESOPHAGOGASTRODUODENOSCOPY Left 9/9/2019    Procedure: EGD (ESOPHAGOGASTRODUODENOSCOPY);  Surgeon: Balta Lisa MD;  Location: [facility];  Service: Endoscopy;  Laterality: Left;    ESOPHAGOGASTRODUODENOSCOPY N/A 9/12/2019    Procedure: EGD (ESOPHAGOGASTRODUODENOSCOPY);  Surgeon: Perez Mon III, MD;  Location: [facility];  Service: Endoscopy;  Laterality: N/A;    ESOPHAGOGASTRODUODENOSCOPY N/A 10/2/2019    Procedure: EGD (ESOPHAGOGASTRODUODENOSCOPY);  Surgeon: Bonifacio Sosa MD;  Location: [facility] ([address] [place] [address]);  Service: Endoscopy;  Laterality: N/A;    GASTROSTOMY TUBE PLACEMENT Left 02/12/2018    MEDIPORT REMOVAL N/A 6/6/2018    Procedure: Removal-port-a-cath;  Surgeon: [facility] Diagnostic Provider;  [facility];  Service: General;  Laterality: N/A;         Family History   Family History   Problem Relation Age of Onset    Leukemia Mother     Hypertension Father     Thyroid disease Sister     Depression Sister     Hypertension Brother     Brain cancer Maternal Uncle     Brain cancer Maternal Uncle            Social History   .  Social History      Socioeconomic History    Marital status:      Spouse name: Not on file    Number of children: Not on file    Years of education: Not on file    Highest education level: Not on file   Occupational History    Not on file   Social Needs    Financial resource strain: Not on file    Food insecurity:     Worry: Not on file     Inability: Not on file    Transportation needs:     Medical: Not on file     Non-medical: Not on file   Tobacco Use    Smoking status: Former Smoker     Packs/day: 1.50     Years: 25.00     Pack years: 37.50     Types: Cigarettes     Last attempt to quit: 2017     Years since quittin.6    Smokeless tobacco: Never Used    Tobacco comment: smoked for about 25 years. quit 6 months ago   Substance and Sexual Activity    Alcohol use: No     Comment: history of overuse    Drug use: No     Comment: Former Opiate/methamphetamine addiction    Sexual activity: Yes     Partners: Female   Lifestyle    Physical activity:     Days per week: Not on file     Minutes per session: Not on file    Stress: Not on file   Relationships    Social connections:     Talks on phone: Not on file     Gets together: Not on file     Attends [employer] witness service: Not on file     Active member of club or organization: Not on file     Attends meetings of clubs or organizations: Not on file     Relationship status: Not on file   Other Topics Concern    Patient feels they ought to cut down on drinking/drug use Not Asked    Patient annoyed by others criticizing their drinking/drug use Not Asked    Patient has felt bad or guilty about drinking/drug use Not Asked    Patient has had a drink/used drugs as an eye opener in the AM Not Asked   Social History Narrative    3/28/19: he is living alone. No children. Close to siblings/father/several friends; enjoys fishing and flying drones. He works as a .          Allergies   Review of patient's allergies indicates:   Allergen Reactions     Trazodone Other (See Comments)     confusion           Physical Exam     Vitals:    02/03/20 1126   BP: 133/86   Pulse: 68   Temp: 97.9 °F (36.6 °C)         Body mass index is 26.41 kg/m².      General: AOx3, NAD   Respiratory:  Symmetric chest rise, normal effort  Nose: No gross nasal septal deviation. Inferior Turbinates WNL bilaterally. No septal perforation. No masses/lesions.   Oral Cavity:  Oral Tongue mobile, no lesions noted.  Hard Palate WNL. No buccal or FOM lesions.  Oropharynx:  No masses/lesions of the posterior pharyngeal wall. Tonsillar fossa without lesions. Soft palate without masses. Midline uvula.   Neck: No scars.  No cervical lymphadenopathy, thyromegaly or thyroid nodules.  Normal range of motion.    Face: House Brackmann I bilaterally.    NP: No lesions of posterior wall  OP: No lesions of posterior wall or BOT. BOT is soft to palpation  Larynx: No lesions of glottic or supraglottic larynx. Normal vocal fold mobility    HP: No lesions of pyriform sinuses or postcricoid region  Mirror examination was performed.       Neck CT reviewed.    Assessment/Plan  Problem List Items Addressed This Visit        Oncology    Squamous cell carcinoma of palatine tonsil - Primary (Chronic)     No evidence of disease.  Return to clinic in 3 months for surveillance.

## 2020-02-03 NOTE — NURSING
ROAD TEST:  Oxygen: room air.  Ambulates: independently.  Discontinued: right arm PAC heparin locked and de-accessed.    Tolerating: bolus tube feeds w/ no c/o no nausea or abdominal discomfort. Able to take in small amounts of clear liquids orally.  Elimination: voids without complications. Patient reports diarrhea is improved.  ADLs: performs independently.   Teaching: An informational handout was provided to the patient which included instructions that addressed activity level, diet, discharge medications, follow-up appointments, weight monitoring and what to do if symptoms worsen. Fentanyl prescription given to patient and other prescriptions sent electronically to preferred pharmacy.  Discussed all discharge instructions and medication education with the patient. Patient verbalizes understanding of all information given. Patient states he has no further questions. Patient leaving with brother-in-law. Denies needing wheelchair; will walk to car independently.      Medical Necessity Information: It is in your best interest to select a reason for this procedure from the list below. All of these items fulfill various CMS LCD requirements except the new and changing color options. Medical Necessity Clause: This procedure was medically necessary because the lesion that was treated was: Lab: [facility] Lab Facility: [address] [facility] Detail Level: Detailed Was A Bandage Applied: Yes Size Of Lesion In Cm (Required): 0.5 X Size Of Lesion In Cm (Optional): 0 Biopsy Method: Personna blade Anesthesia Type: 1% lidocaine with 1:400,000 epinephrine and a 1:10 solution of 8.4% sodium bicarbonate Hemostasis: Silver Nitrate Wound Care: Vaseline Path Notes (To The Dermatopathologist): Please check margins. Render Path Notes In Note?: No Consent was obtained from the patient. The risks and benefits to therapy were discussed in detail. Specifically, the risks of infection, scarring, bleeding, prolonged wound healing, incomplete removal, allergy to anesthesia, nerve injury and recurrence were addressed. Prior to the procedure, the treatment site was clearly identified and confirmed by the patient. All components of Universal Protocol/PAUSE Rule completed. Post-Care Instructions: I reviewed with the patient in detail post-care instructions. Patient is to keep the biopsy site dry overnight, and then apply bacitracin twice daily until healed. Patient may apply hydrogen peroxide soaks to remove any crusting. Notification Instructions: Patient will be notified of biopsy results. However, patient instructed to call the office if not contacted within 2 weeks. Billing Type: [employer] Lab: [id number] Lab Facility: [id number] Size Of Lesion In Cm (Required): 1.1 Billing Type: Third-Party Bill

## 2020-02-03 NOTE — PROGRESS NOTES
Subjective:       Patient ID: Burton Whiting is a 50 y.o. male.    Chief Complaint: Follow-up    This is a 50-year-old male here for a follow-up visit regarding chronic diarrhea with left-sided, mild-to-moderate ulcerative colitis.  Currently is doing well on sulfasalazine, though he noted the greatest initial improvement taking budesonide.  He has normal bowel movements, mild urgency but formed.  No diarrhea, hematochezia.  There has been a noticeable amount of weight gain in the feels and the best he has in quite some time.  Good appetite.  No dark urine, jaundice, pruritus.  No oral ulcerations, lower extremity rash or blurry vision.    The following portions of the patient's history were reviewed and updated as appropriate: allergies, current medications, past family history, past medical history, past social history, past surgical history and problem list.    (Portions of this note were dictated using voice recognition software and may contain dictation related errors in spelling/grammar/syntax not found on text review)  HPI  Review of Systems   Constitutional: Negative for appetite change and unexpected weight change.   Respiratory: Negative for shortness of breath and wheezing.    Cardiovascular: Negative for chest pain and palpitations.   Gastrointestinal: Negative for abdominal distention, abdominal pain and diarrhea.         Objective:      Physical Exam   Constitutional: He is oriented to person, place, and time. He appears well-developed and well-nourished. No distress.   HENT:   Head: Normocephalic and atraumatic.   Eyes: Conjunctivae are normal. No scleral icterus.   Neck: No tracheal deviation present. No thyromegaly present.   Cardiovascular: Normal rate, regular rhythm and normal heart sounds. Exam reveals no gallop and no friction rub.   Pulmonary/Chest: Effort normal and breath sounds normal. He has no wheezes. He has no rales.   Abdominal: Soft. Bowel sounds are normal. He exhibits no distension.  There is no tenderness. There is no rebound and no guarding.   Musculoskeletal: Normal range of motion. He exhibits no edema or tenderness.   Neurological: He is alert and oriented to person, place, and time.   Skin: He is not diaphoretic.   Psychiatric: He has a normal mood and affect. His behavior is normal.   Nursing note and vitals reviewed.        Labs/imaging; reviewed  Assessment:       1. Macrocytic anemia    2. Left sided colitis without complications        Plan:   1. Continue current medications  2. F/u 3 months

## 2020-03-02 DIAGNOSIS — F32.A DEPRESSION, UNSPECIFIED DEPRESSION TYPE: ICD-10-CM

## 2020-03-02 RX ORDER — FLUOXETINE HYDROCHLORIDE 40 MG/1
40 CAPSULE ORAL DAILY
Qty: 30 CAPSULE | Refills: 6 | Status: SHIPPED | OUTPATIENT
Start: 2020-03-02 | End: 2020-08-26

## 2020-03-03 RX ORDER — SULFASALAZINE 500 MG/1
1000 TABLET ORAL 2 TIMES DAILY
Qty: 120 TABLET | Refills: 2 | Status: SHIPPED | OUTPATIENT
Start: 2020-03-03 | End: 2020-05-14 | Stop reason: SDUPTHER

## 2020-03-31 ENCOUNTER — PATIENT MESSAGE (OUTPATIENT)
Dept: HEMATOLOGY/ONCOLOGY | Facility: CLINIC | Age: 50
End: 2020-03-31

## 2020-04-07 ENCOUNTER — TELEPHONE (OUTPATIENT)
Dept: HEMATOLOGY/ONCOLOGY | Facility: CLINIC | Age: 50
End: 2020-04-07

## 2020-04-07 DIAGNOSIS — C09.9 SQUAMOUS CELL CARCINOMA OF PALATINE TONSIL: Primary | ICD-10-CM

## 2020-04-07 NOTE — TELEPHONE ENCOUNTER
----- Message from Lauryn Vela RN sent at 4/7/2020 12:52 PM CDT -----      ----- Message -----  From: Cortney Lopez MD  Sent: 4/7/2020  12:51 PM CDT  To: Lauryn Vela RN, RT Tabitha    OK to move back 4-6 weeks  ----- Message -----  From: RT Tabitha  Sent: 4/7/2020  12:49 PM CDT  To: Cortney Lopez MD    Due to concerns associated with Covid19 the radiology team is seeking to reschedule outpatient diagnostic exams between 3/21 - 4/21 that have been ordered prior to 3/19.  Burton Whiting is scheduled for CT Ch/Abd/Pel on 4-7-2020 at [facility].  Please respond to this message if you believe it is safe to postpone this exam and the radiology team will reschedule and update you on the patient's response.  If you have concerns that postponement is not safe (urgent or time sensitive diagnostic study), then reply and it will be performed as ordered.   If further discussion needed, please provide a contact number and a Radiologist will reach out to you shortly.

## 2020-04-28 ENCOUNTER — TELEPHONE (OUTPATIENT)
Dept: GASTROENTEROLOGY | Facility: CLINIC | Age: 50
End: 2020-04-28

## 2020-04-28 NOTE — TELEPHONE ENCOUNTER
I called patient to switch him to a virtual visit May 4th with Dr. Sawyer. I left him a message to call us back.

## 2020-04-30 ENCOUNTER — TELEPHONE (OUTPATIENT)
Dept: GASTROENTEROLOGY | Facility: CLINIC | Age: 50
End: 2020-04-30

## 2020-04-30 NOTE — TELEPHONE ENCOUNTER
My second attempt to contact patient  to switch his appointment to a virtual visit. I left second message to give us a call back.

## 2020-05-14 ENCOUNTER — PATIENT MESSAGE (OUTPATIENT)
Dept: GASTROENTEROLOGY | Facility: CLINIC | Age: 50
End: 2020-05-14

## 2020-05-20 ENCOUNTER — HOSPITAL ENCOUNTER (OUTPATIENT)
Dept: RADIOLOGY | Facility: HOSPITAL | Age: 50
Discharge: HOME | End: 2020-05-20
Attending: INTERNAL MEDICINE
Payer: COMMERCIAL

## 2020-05-20 ENCOUNTER — PATIENT MESSAGE (OUTPATIENT)
Dept: HEMATOLOGY/ONCOLOGY | Facility: CLINIC | Age: 50
End: 2020-05-20

## 2020-05-20 DIAGNOSIS — C09.9 SQUAMOUS CELL CARCINOMA OF PALATINE TONSIL: Chronic | ICD-10-CM

## 2020-05-20 DIAGNOSIS — C09.9 SQUAMOUS CELL CARCINOMA OF PALATINE TONSIL: ICD-10-CM

## 2020-05-20 PROCEDURE — 74177 CT ABD & PELVIS W/CONTRAST: CPT | Mod: 26,,, | Performed by: RADIOLOGY

## 2020-05-20 PROCEDURE — 70491 CT SOFT TISSUE NECK W/DYE: CPT | Mod: 26,,, | Performed by: RADIOLOGY

## 2020-05-20 PROCEDURE — 74177 CT ABD & PELVIS W/CONTRAST: CPT | Mod: TC

## 2020-05-20 PROCEDURE — 71260 CT THORAX DX C+: CPT | Mod: 26,,, | Performed by: RADIOLOGY

## 2020-05-20 PROCEDURE — 74177 CT CHEST ABDOMEN PELVIS WITH CONTRAST (XPD): ICD-10-PCS | Mod: 26,,, | Performed by: RADIOLOGY

## 2020-05-20 PROCEDURE — 70491 CT SOFT TISSUE NECK WITH CONTRAST: ICD-10-PCS | Mod: 26,,, | Performed by: RADIOLOGY

## 2020-05-20 PROCEDURE — 70491 CT SOFT TISSUE NECK W/DYE: CPT | Mod: TC

## 2020-05-20 PROCEDURE — 25500020 PHARM REV CODE 255: Performed by: INTERNAL MEDICINE

## 2020-05-20 PROCEDURE — 71260 CT CHEST ABDOMEN PELVIS WITH CONTRAST (XPD): ICD-10-PCS | Mod: 26,,, | Performed by: RADIOLOGY

## 2020-05-20 RX ADMIN — IOHEXOL 15 ML: 350 INJECTION, SOLUTION INTRAVENOUS at 02:05

## 2020-05-20 RX ADMIN — IOHEXOL 100 ML: 350 INJECTION, SOLUTION INTRAVENOUS at 02:05

## 2020-05-22 ENCOUNTER — TELEPHONE (OUTPATIENT)
Dept: HEMATOLOGY/ONCOLOGY | Facility: CLINIC | Age: 50
End: 2020-05-22

## 2020-05-25 ENCOUNTER — OFFICE VISIT (OUTPATIENT)
Dept: HEMATOLOGY/ONCOLOGY | Facility: CLINIC | Age: 50
End: 2020-05-25
Payer: COMMERCIAL

## 2020-05-25 DIAGNOSIS — K52.9 COLITIS: ICD-10-CM

## 2020-05-25 DIAGNOSIS — C09.9 TONSIL CANCER: Primary | ICD-10-CM

## 2020-05-25 PROCEDURE — 99213 PR OFFICE/OUTPT VISIT, EST, LEVL III, 20-29 MIN: ICD-10-PCS | Mod: 95,,, | Performed by: INTERNAL MEDICINE

## 2020-05-25 PROCEDURE — 99213 OFFICE O/P EST LOW 20 MIN: CPT | Mod: 95,,, | Performed by: INTERNAL MEDICINE

## 2020-05-25 NOTE — PROGRESS NOTES
Subjective:      The patient location is: home  The chief complaint leading to consultation is: history of tonsil cancer    Visit type: audiovisual    Face to Face time with patient: 15 minutes of total time spent on the encounter, which includes face to face time and non-face to face time preparing to see the patient (eg, review of tests), Obtaining and/or reviewing separately obtained history, Documenting clinical information in the electronic or other health record, Independently interpreting results (not separately reported) and communicating results to the patient/family/caregiver, or Care coordination (not separately reported).         Each patient to whom he or she provides medical services by telemedicine is:  (1) informed of the relationship between the physician and patient and the respective role of any other health care provider with respect to management of the patient; and (2) notified that he or she may decline to receive medical services by telemedicine and may withdraw from such care at any time.    Notes:    Patient ID: Burton Whiting is a 50 y.o. male.    Chief Complaint: History of tonsil cancer  Squamous cell carcinoma of palatine tonsil; g-tube malodorous; left neck pain 2/10; 4 cans formula per day; and r arm port---red/warm to touch  Mr. Burton Whiting is a 48-year-old male, former smoker, who presents today with a four-month history of enlarging neck mass.  He initially delayed care due to lack of insurance.  He was seen by Dr. Turpin who referred him to see Dr. Olguin.  He had a CT that showed a 3.8 x 3.8 x 4.9 cm soft tissue mass arising from the left palatine tonsil resulting in moderate narrowing of the hypopharyngeal airway adjacent extensive matted left cervical lymphadenopathy was noted.  The largest ella mass was 6.6 x 9 x 2.6 cm extending inferiorly to the supraclavicular region.  The mass pushed the trachea and the left common carotid artery to the right.  Additional representative  "of cervical lymph nodes measuring 2.9 x 3.1 x 3.5 cm on axial image 37 of series 3   and 2.4 x 2.1 x 2.2 cm on axial image 55 of series 3.  There is also a sclerotic lesion involving the midline of the clivus measuring 1.2 cm.  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  PET scan performed on 01/19/2018 revealed persistent evidence of a soft tissue mass arising from the left palatine tonsil resulting in moderate narrowing of the hypopharyngeal   airway.  The mass is hypermetabolic demonstrating an SUV max of 18.5.  There is also persistent adjacent extensive matted left cervical lymphadenopathy, largest of this measuring 6.7 x 8.42 with hypermetabolic activity and SUV max of 20.5.  Lymphadenopathy is again noted to have a mass effect on the trachea and left common carotid artery, pushing both structures towards the right.  There is also prominent right cervical lymph nodes with the largest measuring 0.8 cm and demonstrating mild hypermetabolic activity with SUV max of 1.8, physiologic   distribution of FDG in the gray matter.  There are 3 pulmonary nodules noted within the right lung, the largest is in the anterior segment of the right upper lobe measuring 1 cm, second is visualized in the middle lobe measuring 0.6 cm and the third is within the posterior basal segment measuring 0.6 cm.  There is one pulmonary nodule within the left lung within the lateral basal segment measuring 0.6 cm.  These nodules do not demonstrate hypermetabolic activity; however, they are below the threshold for observation with PET     He underwent MRI cervical spine revealed "No evidence of metastatic disease to the cervical spine. Multilevel degenerative changes of the cervical spine with disc protrusion at C5-6 which results in moderate to severe spinal canal stenosis and and associated cord signal abnormality, suggestive of compressive myelopathy. 6 mm T1 hypointense non-enhancing lesion in the clivus. " " Continued followup is suggested. 9 mm inferior descent of the cerebellar tonsils below the foramen magnum, suggestive of Chiari 1 malformation"  MRI thoracic spine "No evidence of metastatic disease involving the thoracic spine. Incidental hemangioma involving the T7 vertebral body. Mild degenerative disc disease without any significant spinal canal stenosis or neuroforaminal narrowing.   He completed 3 cycles of TPF and 6 weeks of Cisplatin and RT. Completed on 6/26/18           His PET scan from 9/25/18 revealed "Progression of disease with multiple new hypermetabolic foci including the manubrium, mediastinal/retrocrural lymph nodes, and lungs. Partial therapeutic response within the presumed radiation field involving the left cervical mass, and complete therapeutic response in the right cervical lymph node, clivus, and left palatine tonsil. New soft tissue thickening and hypermetabolism in the left aryepiglottic fold and right cricoid cartilage"      He has been on Opdiv[facility] from 2/19-7/19                  IHPI He comes in to review his CT scans from 5/20/2020 which reveals "Stable increased soft tissue attenuation in the left neck overlying thyroid cartilage which previously demonstrated low-level hypermetabolism on FDG PET-CT dated 06/19/2019.  Given stability, findings are favored to represent post treatment changes.  Recommend continued surveillance as clinically warranted.  No new cervical lymphadenopathy or discrete mass. Persistent mild asymmetric enlargement/nodularity of the left aryepiglottic fold which previously demonstrated mild increased hypermetabolism on prior FDG PET-CT, slightly less prominent on today's exam.  Findings are also favored to represent posttreatment changes. No evidence of metastatic disease in the chest, abdomen, or pelvis.  There are no measurable lesions per RECIST criteria. Left internal jugular vein is not definitive visualized and likely thrombosed, similar prior " exam.    Review of Systems   Constitutional: Negative for appetite change and unexpected weight change.   Eyes: Negative for visual disturbance.   Respiratory: Negative for cough and shortness of breath.    Cardiovascular: Negative for chest pain.   Gastrointestinal: Negative for abdominal pain and diarrhea.   Genitourinary: Negative for frequency.   Musculoskeletal: Negative for back pain.   Skin: Negative for rash.   Neurological: Negative for headaches.   Hematological: Negative for adenopathy.   Psychiatric/Behavioral: The patient is not nervous/anxious.        Objective:      Physical Exam      LABS:  WBC   Date Value Ref Range Status   05/20/2020 3.78 (L) 3.90 - 12.70 K/uL Final     Hemoglobin   Date Value Ref Range Status   05/20/2020 12.8 (L) 14.0 - 18.0 g/dL Final     POC Hematocrit   Date Value Ref Range Status   09/08/2019 17 (LL) 36 - 54 %PCV Final     Hematocrit   Date Value Ref Range Status   05/20/2020 40.0 40.0 - 54.0 % Final     Platelets   Date Value Ref Range Status   05/20/2020 153 150 - 350 K/uL Final     Gran # (ANC)   Date Value Ref Range Status   05/20/2020 2.6 1.8 - 7.7 K/uL Final     Comment:     The ANC is based on a white cell differential from an   automated cell counter. It has not been microscopically   reviewed for the presence of abnormal cells. Clinical   correlation is required.         Chemistry        Component Value Date/Time     05/20/2020 1102    K 4.4 05/20/2020 1102     05/20/2020 1102    CO2 24 05/20/2020 1102    BUN 23 (H) 05/20/2020 1102    CREATININE 1.5 (H) 05/20/2020 1102    GLU 91 05/20/2020 1102        Component Value Date/Time    CALCIUM 9.1 05/20/2020 1102    ALKPHOS 92 05/20/2020 1102    AST 34 05/20/2020 1102    ALT 18 05/20/2020 1102    BILITOT 0.5 05/20/2020 1102    ESTGFRAFRICA >60.0 05/20/2020 1102    EGFRNONAA 52.8 (A) 05/20/2020 1102        TSH   Date Value Ref Range Status   05/20/2020 2.104 0.400 - 4.000 uIU/mL Final     Free T4   Date Value  Ref Range Status   12/20/2019 1.01 0.71 - 1.51 ng/dL Final       Assessment:       1. Tonsil cancer    2. Colitis        Plan:        1. He is doing well with no evidence of recurrence on CT scans, He will see Dr. Olguin in June 2020 and I will see him in 3 months with CT of neck and chest.  2. Needs follow-up with Dr. Sawyer to discuss stopping steroids    Above care plan was discussed with patient and all questions were addressed to his satisfaction

## 2020-05-25 NOTE — Clinical Note
Schedule virtual follow-up with [facility]astro[facility], Dr. Sawyer next available for follow-up of colitis.Schedule CBC,CMP, TSH, free T4, CT neck, chest and see me in 3 months

## 2020-05-25 NOTE — PROGRESS NOTES
Answers for HPI/ROS submitted by the patient on 5/25/2020   appetite change : No  unexpected weight change: No  visual disturbance: No  cough: No  shortness of breath: No  chest pain: No  abdominal pain: No  diarrhea: No  frequency: No  back pain: No  rash: No  headaches: No  adenopathy: No  nervous/ anxious: No

## 2020-06-15 ENCOUNTER — OFFICE VISIT (OUTPATIENT)
Dept: OTOLARYNGOLOGY | Facility: CLINIC | Age: 50
End: 2020-06-15
Payer: COMMERCIAL

## 2020-06-15 VITALS
WEIGHT: 181.44 LBS | BODY MASS INDEX: 27.59 KG/M2 | DIASTOLIC BLOOD PRESSURE: 85 MMHG | SYSTOLIC BLOOD PRESSURE: 131 MMHG | HEART RATE: 71 BPM

## 2020-06-15 DIAGNOSIS — C09.9 SQUAMOUS CELL CARCINOMA OF PALATINE TONSIL: Primary | ICD-10-CM

## 2020-06-15 PROCEDURE — 3008F BODY MASS INDEX DOCD: CPT | Mod: CPTII,S$GLB,, | Performed by: OTOLARYNGOLOGY

## 2020-06-15 PROCEDURE — 99213 PR OFFICE/OUTPT VISIT, EST, LEVL III, 20-29 MIN: ICD-10-PCS | Mod: S$GLB,,, | Performed by: OTOLARYNGOLOGY

## 2020-06-15 PROCEDURE — 3008F PR BODY MASS INDEX (BMI) DOCUMENTED: ICD-10-PCS | Mod: CPTII,S$GLB,, | Performed by: OTOLARYNGOLOGY

## 2020-06-15 PROCEDURE — 99999 PR PBB SHADOW E&M-EST. PATIENT-LVL III: ICD-10-PCS | Mod: PBBFAC,,, | Performed by: OTOLARYNGOLOGY

## 2020-06-15 PROCEDURE — 99999 PR PBB SHADOW E&M-EST. PATIENT-LVL III: CPT | Mod: PBBFAC,,, | Performed by: OTOLARYNGOLOGY

## 2020-06-15 PROCEDURE — 99213 OFFICE O/P EST LOW 20 MIN: CPT | Mod: S$GLB,,, | Performed by: OTOLARYNGOLOGY

## 2020-06-15 NOTE — ASSESSMENT & PLAN NOTE
No clinical or radiographic evidence of disease.  He does not have a recent COVID test so I will arrange for this to be done and have him return to see me in 1 month for flexible laryngoscopy.

## 2020-06-15 NOTE — PROGRESS NOTES
Chief Complaint   Patient presents with    [address] [place] [address]     Oncology History   Squamous cell carcinoma of palatine tonsil   1/4/2018 Initial Diagnosis    Squamous cell carcinoma of palatine tonsil     5/3/2018 - 6/26/2018 Radiation Therapy    Treating physician: Dr. Sandra  Total Dose: 70 Gy  Fractions: 35/35     5/2018 - 6/2018 Chemotherapy    Treatment Summary   Plan Name: OP CISPLATIN DOCETAXEL 5FU Q3W  Treatment Goal: Curative  Status: Inactive  Start Date: 1/29/2018  End Date: 5/27/2018 (Planned)  Provider: Cortney Lopez MD  Chemotherapy: CISplatin (PLATINOL) 100 mg/m2 = 248 mg in sodium chloride 0.9% 500 mL chemo infusion, 100 mg/m2 = 248 mg (100 % of original dose 100 mg/m2), Intravenous, Clinic/HOD 1 time, 2 of 6 cycles  Dose modification: 100 mg/m2 (original dose 100 mg/m2, Cycle 1)    DOCEtaxel (TAXOTERE) 75 mg/m2 = 185 mg in sodium chloride 0.9% 250 mL chemo infusion, 75 mg/m2 = 185 mg, Intravenous, Clinic/HOD 1 time, 2 of 6 cycles    fluorouracil (ADRUCIL) 1,000 mg/m2/day = 12,400 mg in sodium chloride 0.9% 250 mL chemo infusion, 1,000 mg/m2/day = 12,400 mg (100 % of original dose 1,000 mg/m2/day), Intravenous, Over 120 hours, 2 of 6 cycles  Dose modification: 1,000 mg/m2/day (original dose 1,000 mg/m2/day, Cycle 1)    Plan Name: OP CISPLATIN (Weekly)  Treatment Goal: Curative  Status: Inactive  Start Date: 3/20/2018 (Planned)  End Date: 4/24/2018 (Planned)  Provider: Cortney Lopez MD  Chemotherapy: CISplatin (PLATINOL) 40 mg/m2 = 92 mg in sodium chloride 0.9% 250 mL chemo infusion, 40 mg/m2, Intravenous, Clinic/HOD 1 time, 0 of 6 cycles    Plan Name: OP CISPLATIN DOCETAXEL 5FU Q3W  Treatment Goal: Maintenance  Status: Inactive  Start Date: 3/19/2018  End Date: 7/12/2018 (Planned)  Provider: Cortney Lopez MD  Chemotherapy: CISplatin (PLATINOL) 75 mg/m2 = 172 mg in sodium chloride 0.9% 500 mL chemo infusion, 75 mg/m2 = 172 mg, Intravenous, Clinic/HOD 1 time, 1 of 6 cycles    DOCEtaxel (TAXOTERE) 75 mg/m2 =  170 mg in sodium chloride 0.9% 250 mL chemo infusion, 75 mg/m2 = 170 mg, Intravenous, Clinic/HOD 1 time, 1 of 6 cycles    fluorouracil (ADRUCIL) 750 mg/m2/day = 8,590 mg in sodium chloride 0.9% 240 mL chemo infusion, 750 mg/m2/day = 8,590 mg, Intravenous, Over 120 hours, 1 of 6 cycles    Plan Name: OP CISPLATIN (Weekly)  Treatment Goal: Palliative  Status: Active  Start Date: 5/3/2018  End Date: 6/27/2018  Provider: Cortney Lopez MD  Chemotherapy: CISplatin (PLATINOL) 40 mg/m2 = 89 mg in sodium chloride 0.9% 250 mL chemo infusion, 40 mg/m2 = 89 mg, Intravenous, Clinic/HOD 1 time, 6 of 6 cycles         3/28/2019 Tumor Conference       His case was discussed at the Multidisciplinary Head and Neck Team Planning Meeting.    Representatives from Medical Oncology, Radiation Oncology, Head and Neck Surgical Oncology, Psychosocial Oncology, and Speech and Language Pathology discussed the case with the following recommendations:    1) continued observation                   HPI   50 y.o. male presents with the above treatment history.  No new complaints.  He is not receiving any therapy at this time.  Recent CT scans unremarkable.    Review of Systems   Constitutional: Negative for fatigue and unexpected weight change.   HENT: Per HPI.  Eyes: Negative for visual disturbance.   Respiratory: Negative for shortness of breath, hemoptysis   Cardiovascular: Negative for chest pain and palpitations.   Musculoskeletal: Negative for decreased ROM, back pain.   Skin: Negative for rash, sunburn, itching.   Neurological: Negative for dizziness and seizures.   Hematological: Negative for adenopathy. Does not bruise/bleed easily.   Endocrine: Negative for rapid weight loss/weight gain, heat/cold intolerance.     Past Medical History   Patient Active Problem List   Diagnosis    Squamous cell carcinoma of palatine tonsil    Low vitamin D level    Cancer of head, face, or neck lymph nodes, secondary    Secondary cancer of bone     Tonsil cancer    Port or reservoir infection    Anxiety    Macrocytic anemia    Adjustment disorder with emotional disturbance    Secondary malignancy of mediastinal lymph nodes    Hypocalcemia    Chemotherapy-induced neuropathy    Depression    Insomnia    BMI 28.0-28.9,adult    Neuropathic pain    Osteoradionecrosis of jaw    Acute renal failure    Seizure    Cervical radiculopathy at C6    Pain and swelling of left upper extremity    Hypomagnesemia    Rectal bleeding    Hemorrhoids    Duodenal ulcer disease    Drug-induced diarrhea    Chronic blood loss anemia    Hypokalemia    PJ (acute kidney injury)           Past Surgical History   Past Surgical History:   Procedure Laterality Date    COLONOSCOPY N/A 10/22/2019    Procedure: COLONOSCOPY/plenvu;  Surgeon: Nithya Sawyer MD;  Location: [facility];  Service: Endoscopy;  Laterality: N/A;    ESOPHAGOGASTRODUODENOSCOPY Left 9/9/2019    Procedure: EGD (ESOPHAGOGASTRODUODENOSCOPY);  Surgeon: Balta Lisa MD;  Location: [facility];  Service: Endoscopy;  Laterality: Left;    ESOPHAGOGASTRODUODENOSCOPY N/A 9/12/2019    Procedure: EGD (ESOPHAGOGASTRODUODENOSCOPY);  Surgeon: Perez Mon III, MD;  Location: [facility];  Service: Endoscopy;  Laterality: N/A;    ESOPHAGOGASTRODUODENOSCOPY N/A 10/2/2019    Procedure: EGD (ESOPHAGOGASTRODUODENOSCOPY);  Surgeon: Bonifacio Sosa MD;  Location: [facility] ([address] [place] [address]);  Service: Endoscopy;  Laterality: N/A;    GASTROSTOMY TUBE PLACEMENT Left 02/12/2018    MEDIPORT REMOVAL N/A 6/6/2018    Procedure: Removal-port-a-cath;  [facility]geon: [facility];  Location: [facility] OR [address] [place] [address];  Service: General;  Laterality: N/A;         Family History   Family History   Problem Relation Age of Onset    Leukemia Mother     Hypertension Father     Thyroid disease Sister     Depression Sister     Hypertension Brother     Brain cancer Maternal Uncle     Brain cancer Maternal Uncle            Social  History   .  Social History     Socioeconomic History    Marital status:      Spouse name: Not on file    Number of children: Not on file    Years of education: Not on file    Highest education level: Not on file   Occupational History    Not on file   Social Needs    Financial resource strain: Not on file    Food insecurity     Worry: Not on file     Inability: Not on file    Transportation needs     Medical: Not on file     Non-medical: Not on file   Tobacco Use    Smoking status: Former Smoker     Packs/day: 1.50     Years: 25.00     Pack years: 37.50     Types: Cigarettes     Quit date: 06/2017     Years since quitting: 3.0    Smokeless tobacco: Never Used    Tobacco comment: smoked for about 25 years. quit 6 months ago   Substance and Sexual Activity    Alcohol use: No     Comment: history of overuse    Drug use: No     Comment: Former Opiate/methamphetamine addiction    Sexual activity: Yes     Partners: Female   Lifestyle    Physical activity     Days per week: Not on file     Minutes per session: Not on file    Stress: Not on file   Relationships    Social connections     Talks on phone: Not on file     Gets together: Not on file     Attends Spiritism service: Not on file     Active member of club or organization: Not on file     Attends meetings of clubs or organizations: Not on file     Relationship status: Not on file   Other Topics Concern    Patient feels they ought to cut down on drinking/drug use Not Asked    Patient annoyed by others criticizing their drinking/drug use Not Asked    Patient has felt bad or guilty about drinking/drug use Not Asked    Patient has had a drink/used drugs as an eye opener in the AM Not Asked   Social History Narrative    3/28/19: he is living alone. No children. Close to siblings/father/several friends; enjoys fishing and flying drones. He works as a .          Allergies   Review of patient's allergies indicates:   Allergen  Reactions    Trazodone Other (See Comments)     confusion           Physical Exam     Vitals:    06/15/20 1427   BP: 131/85   Pulse: 71         Body mass index is 27.59 kg/m².      General: AOx3, NAD   Respiratory:  Symmetric chest rise, normal effort  Nose: No gross nasal septal deviation. Inferior Turbinates WNL bilaterally. No septal perforation. No masses/lesions.   Oral Cavity:  Oral Tongue mobile, no lesions noted.  Hard Palate WNL. No buccal or FOM lesions.  Oropharynx:  No masses/lesions of the posterior pharyngeal wall. Tonsillar fossa without lesions. Soft palate without masses. Midline uvula.   Neck: No scars.  No cervical lymphadenopathy, thyromegaly or thyroid nodules.  Normal range of motion.    Face: House Brackmann I bilaterally.      Neck CT reviewed.    Assessment/Plan  Problem List Items Addressed This Visit        Oncology    Squamous cell carcinoma of palatine tonsil - Primary (Chronic)     No clinical or radiographic evidence of disease.  He does not have a recent COVID test so I will arrange for this to be done and have him return to see me in 1 month for flexible laryngoscopy.

## 2020-07-24 ENCOUNTER — LAB VISIT (OUTPATIENT)
Dept: FAMILY MEDICINE | Facility: CLINIC | Age: 50
End: 2020-07-24
Payer: COMMERCIAL

## 2020-07-24 PROCEDURE — U0003 INFECTIOUS AGENT DETECTION BY NUCLEIC ACID (DNA OR RNA); SEVERE ACUTE RESPIRATORY SYNDROME CORONAVIRUS 2 (SARS-COV-2) (CORONAVIRUS DISEASE [COVID-19]), AMPLIFIED PROBE TECHNIQUE, MAKING USE OF HIGH THROUGHPUT TECHNOLOGIES AS DESCRIBED BY CMS-2020-01-R: HCPCS

## 2020-07-25 LAB — SARS-COV-2 RNA RESP QL NAA+PROBE: NOT DETECTED

## 2020-07-27 ENCOUNTER — OFFICE VISIT (OUTPATIENT)
Dept: OTOLARYNGOLOGY | Facility: CLINIC | Age: 50
End: 2020-07-27
Payer: COMMERCIAL

## 2020-07-27 VITALS
DIASTOLIC BLOOD PRESSURE: 93 MMHG | HEART RATE: 71 BPM | WEIGHT: 188.06 LBS | BODY MASS INDEX: 28.59 KG/M2 | TEMPERATURE: 98 F | SYSTOLIC BLOOD PRESSURE: 133 MMHG

## 2020-07-27 DIAGNOSIS — C09.9 SQUAMOUS CELL CARCINOMA OF PALATINE TONSIL: Primary | Chronic | ICD-10-CM

## 2020-07-27 PROCEDURE — 31575 DIAGNOSTIC LARYNGOSCOPY: CPT | Mod: S$GLB,,, | Performed by: NURSE PRACTITIONER

## 2020-07-27 PROCEDURE — 3008F BODY MASS INDEX DOCD: CPT | Mod: CPTII,S$GLB,, | Performed by: NURSE PRACTITIONER

## 2020-07-27 PROCEDURE — 3008F PR BODY MASS INDEX (BMI) DOCUMENTED: ICD-10-PCS | Mod: CPTII,S$GLB,, | Performed by: NURSE PRACTITIONER

## 2020-07-27 PROCEDURE — 99213 OFFICE O/P EST LOW 20 MIN: CPT | Mod: 25,S$GLB,, | Performed by: NURSE PRACTITIONER

## 2020-07-27 PROCEDURE — 31575 PR LARYNGOSCOPY, FLEXIBLE; DIAGNOSTIC: ICD-10-PCS | Mod: S$GLB,,, | Performed by: NURSE PRACTITIONER

## 2020-07-27 PROCEDURE — 99999 PR PBB SHADOW E&M-EST. PATIENT-LVL III: CPT | Mod: PBBFAC,,, | Performed by: NURSE PRACTITIONER

## 2020-07-27 PROCEDURE — 99999 PR PBB SHADOW E&M-EST. PATIENT-LVL III: ICD-10-PCS | Mod: PBBFAC,,, | Performed by: NURSE PRACTITIONER

## 2020-07-27 PROCEDURE — 99213 PR OFFICE/OUTPT VISIT, EST, LEVL III, 20-29 MIN: ICD-10-PCS | Mod: 25,S$GLB,, | Performed by: NURSE PRACTITIONER

## 2020-07-27 NOTE — ASSESSMENT & PLAN NOTE
Doing well. JODI. RTC in 6 weeks, sooner if needed.    We discussed the symptoms of head and neck cancer -  including, but not limited to, swelling in the neck, changes in voice and swallowing, and pain.  He was instructed to call our office if he experiences any of these problems for over 2 weeks.

## 2020-07-27 NOTE — PROGRESS NOTES
Chief Complaint   Patient presents with    Follow-up     Oncology History   Squamous cell carcinoma of palatine tonsil   1/4/2018 Initial Diagnosis    Squamous cell carcinoma of palatine tonsil     5/3/2018 - 6/26/2018 Radiation Therapy    Treating physician: Dr. Sandra  Total Dose: 70 Gy  Fractions: 35/35     5/2018 - 6/2018 Chemotherapy    Treatment Summary   Plan Name: OP CISPLATIN DOCETAXEL 5FU Q3W  Treatment Goal: Curative  Status: Inactive  Start Date: 1/29/2018  End Date: 5/27/2018 (Planned)  Provider: Cortney Lopez MD  Chemotherapy: CISplatin (PLATINOL) 100 mg/m2 = 248 mg in sodium chloride 0.9% 500 mL chemo infusion, 100 mg/m2 = 248 mg (100 % of original dose 100 mg/m2), Intravenous, Clinic/HOD 1 time, 2 of 6 cycles  Dose modification: 100 mg/m2 (original dose 100 mg/m2, Cycle 1)    DOCEtaxel (TAXOTERE) 75 mg/m2 = 185 mg in sodium chloride 0.9% 250 mL chemo infusion, 75 mg/m2 = 185 mg, Intravenous, Clinic/HOD 1 time, 2 of 6 cycles    fluorouracil (ADRUCIL) 1,000 mg/m2/day = 12,400 mg in sodium chloride 0.9% 250 mL chemo infusion, 1,000 mg/m2/day = 12,400 mg (100 % of original dose 1,000 mg/m2/day), Intravenous, Over 120 hours, 2 of 6 cycles  Dose modification: 1,000 mg/m2/day (original dose 1,000 mg/m2/day, Cycle 1)    Plan Name: OP CISPLATIN (Weekly)  Treatment Goal: Curative  Status: Inactive  Start Date: 3/20/2018 (Planned)  End Date: 4/24/2018 (Planned)  Provider: Cortney Lopez MD  Chemotherapy: CISplatin (PLATINOL) 40 mg/m2 = 92 mg in sodium chloride 0.9% 250 mL chemo infusion, 40 mg/m2, Intravenous, Clinic/HOD 1 time, 0 of 6 cycles    Plan Name: OP CISPLATIN DOCETAXEL 5FU Q3W  Treatment Goal: Maintenance  Status: Inactive  Start Date: 3/19/2018  End Date: 7/12/2018 (Planned)  Provider: Cortney Lopez MD  Chemotherapy: CISplatin (PLATINOL) 75 mg/m2 = 172 mg in sodium chloride 0.9% 500 mL chemo infusion, 75 mg/m2 = 172 mg, Intravenous, Clinic/HOD 1 time, 1 of 6 cycles    DOCEtaxel (TAXOTERE) 75 mg/m2 =  170 mg in sodium chloride 0.9% 250 mL chemo infusion, 75 mg/m2 = 170 mg, Intravenous, Clinic/HOD 1 time, 1 of 6 cycles    fluorouracil (ADRUCIL) 750 mg/m2/day = 8,590 mg in sodium chloride 0.9% 240 mL chemo infusion, 750 mg/m2/day = 8,590 mg, Intravenous, Over 120 hours, 1 of 6 cycles    Plan Name: OP CISPLATIN (Weekly)  Treatment Goal: Palliative  Status: Active  Start Date: 5/3/2018  End Date: 6/27/2018  Provider: Cortney Lopez MD  Chemotherapy: CISplatin (PLATINOL) 40 mg/m2 = 89 mg in sodium chloride 0.9% 250 mL chemo infusion, 40 mg/m2 = 89 mg, Intravenous, Clinic/HOD 1 time, 6 of 6 cycles         3/28/2019 Tumor Conference       His case was discussed at the Multidisciplinary Head and Neck Team Planning Meeting.    Representatives from Medical Oncology, Radiation Oncology, Head and Neck Surgical Oncology, Psychosocial Oncology, and Speech and Language Pathology discussed the case with the following recommendations:    1) continued observation                   HPI   50 y.o. male presents with the above treatment history.  No new complaints.  He is not receiving any therapy at this time.  Recent CT scans unremarkable.    Review of Systems   Constitutional: Negative for fatigue and unexpected weight change.   HENT: Per HPI.  Eyes: Negative for visual disturbance.   Respiratory: Negative for shortness of breath, hemoptysis   Cardiovascular: Negative for chest pain and palpitations.   Musculoskeletal: Negative for decreased ROM, back pain.   Skin: Negative for rash, sunburn, itching.   Neurological: Negative for dizziness and seizures.   Hematological: Negative for adenopathy. Does not bruise/bleed easily.   Endocrine: Negative for rapid weight loss/weight gain, heat/cold intolerance.     Past Medical History   Patient Active Problem List   Diagnosis    Squamous cell carcinoma of palatine tonsil    Low vitamin D level    Cancer of head, face, or neck lymph nodes, secondary    Secondary cancer of bone     Tonsil cancer    Port or reservoir infection    Anxiety    Macrocytic anemia    Adjustment disorder with emotional disturbance    Secondary malignancy of mediastinal lymph nodes    Hypocalcemia    Chemotherapy-induced neuropathy    Depression    Insomnia    BMI 28.0-28.9,adult    Neuropathic pain    Osteoradionecrosis of jaw    Acute renal failure    Seizure    Cervical radiculopathy at C6    Pain and swelling of left upper extremity    Hypomagnesemia    Rectal bleeding    Hemorrhoids    Duodenal ulcer disease    Drug-induced diarrhea    Chronic blood loss anemia    Hypokalemia    PJ (acute kidney injury)           Past Surgical History   Past Surgical History:   Procedure Laterality Date    COLONOSCOPY N/A 10/22/2019    Procedure: COLONOSCOPY/plenvu;  Surgeon: Nithya Sawyer MD;  Location: [facility];  Service: Endoscopy;  Laterality: N/A;    ESOPHAGOGASTRODUODENOSCOPY Left 9/9/2019    Procedure: EGD (ESOPHAGOGASTRODUODENOSCOPY);  Surgeon: Balta Lisa MD;  Location: [facility];  Service: Endoscopy;  Laterality: Left;    ESOPHAGOGASTRODUODENOSCOPY N/A 9/12/2019    Procedure: EGD (ESOPHAGOGASTRODUODENOSCOPY);  Surgeon: Perez Mon III, MD;  Location: [facility];  Service: Endoscopy;  Laterality: N/A;    ESOPHAGOGASTRODUODENOSCOPY N/A 10/2/2019    Procedure: EGD (ESOPHAGOGASTRODUODENOSCOPY);  Surgeon: Bonifacio Sosa MD;  Location: [facility] ([address] [place] [address]);  Service: Endoscopy;  Laterality: N/A;    GASTROSTOMY TUBE PLACEMENT Left 02/12/2018    MEDIPORT REMOVAL N/A 6/6/2018    Procedure: Removal-port-a-cath;  [facility]: [facility];  Location: [facility] [address] [place] [address];  Service: General;  Laterality: N/A;         Family History   Family History   Problem Relation Age of Onset    Leukemia Mother     Hypertension Father     Thyroid disease Sister     Depression Sister     Hypertension Brother     Brain cancer Maternal Uncle     Brain cancer Maternal Uncle            Social  History   .  Social History     Socioeconomic History    Marital status:      Spouse name: Not on file    Number of children: Not on file    Years of education: Not on file    Highest education level: Not on file   Occupational History    Not on file   Social Needs    Financial resource strain: Not on file    Food insecurity     Worry: Not on file     Inability: Not on file    Transportation needs     Medical: Not on file     Non-medical: Not on file   Tobacco Use    Smoking status: Former Smoker     Packs/day: 1.50     Years: 25.00     Pack years: 37.50     Types: Cigarettes     Quit date: 06/2017     Years since quitting: 3.1    Smokeless tobacco: Never Used    Tobacco comment: smoked for about 25 years. quit 6 months ago   Substance and Sexual Activity    Alcohol use: No     Comment: history of overuse    Drug use: No     Comment: Former Opiate/methamphetamine addiction    Sexual activity: Yes     Partners: Female   Lifestyle    Physical activity     Days per week: Not on file     Minutes per session: Not on file    Stress: Not on file   Relationships    Social connections     Talks on phone: Not on file     Gets together: Not on file     Attends [employer] service: Not on file     Active member of club or organization: Not on file     Attends meetings of clubs or organizations: Not on file     Relationship status: Not on file   Other Topics Concern    Patient feels they ought to cut down on drinking/drug use Not Asked    Patient annoyed by others criticizing their drinking/drug use Not Asked    Patient has felt bad or guilty about drinking/drug use Not Asked    Patient has had a drink/used drugs as an eye opener in the AM Not Asked   Social History Narrative    3/28/19: he is living alone. No children. Close to siblings/father/several friends; enjoys fishing and flying drones. He works as a .          Allergies   Review of patient's allergies indicates:   Allergen  Reactions    Trazodone Other (See Comments)     confusion           Physical Exam     Vitals:    07/27/20 1434   BP: (!) 133/93   Pulse: 71   Temp: 98.1 °F (36.7 °C)         Body mass index is 28.59 kg/m².      General: AOx3, NAD   Respiratory:  Symmetric chest rise, normal effort  Nose: No gross nasal septal deviation. Inferior Turbinates WNL bilaterally. No septal perforation. No masses/lesions.   Oral Cavity:  Oral Tongue mobile, no lesions noted.  Hard Palate WNL. No buccal or FOM lesions.  Oropharynx:  No masses/lesions of the posterior pharyngeal wall. Tonsillar fossa without lesions. Soft palate without masses. Midline uvula.   Neck: No scars.  No cervical lymphadenopathy, thyromegaly or thyroid nodules.  Normal range of motion.    Face: House Brackmann I bilaterally.    Procedure: Flexible laryngoscopy  In order to fully examine the upper aerodigestive tract, including the larynx, in a patient with a hyperactive gag reflex, flexible endoscopy is required.  After explaining the procedure and obtaining verbal consent, a timeout was performed with the patient's participation according to the universal protocol. Both nasal cavities were anesthetized with 4% Xylocaine spray mixed with Gregg-Synephrine. The flexible laryngoscope (#[id number]) was inserted into the nasal cavity and advanced to visualize the nasal cavity, nasopharynx, the posterior oropharynx, hypopharynx, and the endolarynx with the above findings noted. The scope was removed and the procedure terminated. The patient tolerated this procedure well without apparent complication.      FINDINGS  Nasopharynx - the torus is clear. There are no lesions of the posterior wall.   Oropharynx - no lesions of the tongue base. There is no obvious fullness or asymmetry.  Hypopharynx - there are no lesions of the pyriform sinuses or postcricoid region    Larynx - there are no lesions of the supraglottic or glottic larynx. Vocal fold mobility is normal with complete  closure.         Neck CT reviewed.    Assessment/Plan  Problem List Items Addressed This Visit        Oncology    Squamous cell carcinoma of palatine tonsil - Primary (Chronic)     Doing well. JODI. RTC in 6 weeks, sooner if needed.    We discussed the symptoms of head and neck cancer -  including, but not limited to, swelling in the neck, changes in voice and swallowing, and pain.  He was instructed to call our office if he experiences any of these problems for over 2 weeks.

## 2020-07-31 ENCOUNTER — TELEPHONE (OUTPATIENT)
Dept: HEMATOLOGY/ONCOLOGY | Facility: CLINIC | Age: 50
End: 2020-07-31

## 2020-08-24 ENCOUNTER — HOSPITAL ENCOUNTER (OUTPATIENT)
Dept: RADIOLOGY | Facility: HOSPITAL | Age: 50
Discharge: HOME | End: 2020-08-24
Attending: INTERNAL MEDICINE
Payer: COMMERCIAL

## 2020-08-24 DIAGNOSIS — C09.9 TONSIL CANCER: ICD-10-CM

## 2020-08-24 DIAGNOSIS — C09.9 SQUAMOUS CELL CARCINOMA OF PALATINE TONSIL: ICD-10-CM

## 2020-08-24 PROCEDURE — 70491 CT SOFT TISSUE NECK W/DYE: CPT | Mod: TC,PO

## 2020-08-24 PROCEDURE — 25500020 PHARM REV CODE 255: Mod: PO | Performed by: INTERNAL MEDICINE

## 2020-08-24 RX ADMIN — IOHEXOL 100 ML: 350 INJECTION, SOLUTION INTRAVENOUS at 11:08

## 2020-08-26 ENCOUNTER — OFFICE VISIT (OUTPATIENT)
Dept: HEMATOLOGY/ONCOLOGY | Facility: CLINIC | Age: 50
End: 2020-08-26
Payer: COMMERCIAL

## 2020-08-26 VITALS
HEART RATE: 68 BPM | SYSTOLIC BLOOD PRESSURE: 127 MMHG | BODY MASS INDEX: 29.03 KG/M2 | HEIGHT: 68 IN | WEIGHT: 191.56 LBS | TEMPERATURE: 97 F | OXYGEN SATURATION: 97 % | DIASTOLIC BLOOD PRESSURE: 80 MMHG | RESPIRATION RATE: 18 BRPM

## 2020-08-26 DIAGNOSIS — F32.A DEPRESSION, UNSPECIFIED DEPRESSION TYPE: ICD-10-CM

## 2020-08-26 DIAGNOSIS — C09.9 TONSIL CANCER: Primary | ICD-10-CM

## 2020-08-26 PROCEDURE — 99999 PR PBB SHADOW E&M-EST. PATIENT-LVL IV: ICD-10-PCS | Mod: PBBFAC,,, | Performed by: INTERNAL MEDICINE

## 2020-08-26 PROCEDURE — 99214 PR OFFICE/OUTPT VISIT, EST, LEVL IV, 30-39 MIN: ICD-10-PCS | Mod: S$GLB,,, | Performed by: INTERNAL MEDICINE

## 2020-08-26 PROCEDURE — 3008F BODY MASS INDEX DOCD: CPT | Mod: CPTII,S$GLB,, | Performed by: INTERNAL MEDICINE

## 2020-08-26 PROCEDURE — 3008F PR BODY MASS INDEX (BMI) DOCUMENTED: ICD-10-PCS | Mod: CPTII,S$GLB,, | Performed by: INTERNAL MEDICINE

## 2020-08-26 PROCEDURE — 99214 OFFICE O/P EST MOD 30 MIN: CPT | Mod: S$GLB,,, | Performed by: INTERNAL MEDICINE

## 2020-08-26 PROCEDURE — 99999 PR PBB SHADOW E&M-EST. PATIENT-LVL IV: CPT | Mod: PBBFAC,,, | Performed by: INTERNAL MEDICINE

## 2020-08-26 RX ORDER — FLUOXETINE HYDROCHLORIDE 40 MG/1
40 CAPSULE ORAL DAILY
Qty: 30 CAPSULE | Refills: 6 | Status: SHIPPED | OUTPATIENT
Start: 2020-08-26 | End: 2020-11-12 | Stop reason: SDUPTHER

## 2020-08-26 RX ORDER — GABAPENTIN 300 MG/1
300 CAPSULE ORAL 4 TIMES DAILY
Qty: 360 CAPSULE | Refills: 6 | Status: SHIPPED | OUTPATIENT
Start: 2020-08-26 | End: 2020-10-21 | Stop reason: ALTCHOICE

## 2020-08-26 NOTE — PROGRESS NOTES
Subjective:       Patient ID: Burton Whiting is a 50 y.o. male.    Chief Complaint: Squamous cell carcinoma of palatine tonsil  Squamous cell carcinoma of palatine tonsil; g-tube malodorous; left neck pain 2/10; 4 cans formula per day; and r arm port---red/warm to touch  Mr. Burton Whiting is a 48-year-old male, former smoker, who presents today with a four-month history of enlarging neck mass.  He initially delayed care due to lack of insurance.  He was seen by Dr. Turpin who referred him to see Dr. Olguin.  He had a CT that showed a 3.8 x 3.8 x 4.9 cm soft tissue mass arising from the left palatine tonsil resulting in moderate narrowing of the hypopharyngeal airway adjacent extensive matted left cervical lymphadenopathy was noted.  The largest ella mass was 6.6 x 9 x 2.6 cm extending inferiorly to the supraclavicular region.  The mass pushed the trachea and the left common carotid artery to the right.  Additional representative of cervical lymph nodes measuring 2.9 x 3.1 x 3.5 cm on axial image 37 of series 3   and 2.4 x 2.1 x 2.2 cm on axial image 55 of series 3.  There is also a sclerotic lesion involving the midline of the clivus measuring 1.2 cm.  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  PET scan performed on 01/19/2018 revealed persistent evidence of a soft tissue mass arising from the left palatine tonsil resulting in moderate narrowing of the hypopharyngeal   airway.  The mass is hypermetabolic demonstrating an SUV max of 18.5.  There is also persistent adjacent extensive matted left cervical lymphadenopathy, largest of this measuring 6.7 x 8.42 with hypermetabolic activity and SUV max of 20.5.  Lymphadenopathy is again noted to have a mass effect on the trachea and left common carotid artery, pushing both structures towards the right.  There is also prominent right cervical lymph nodes with the largest measuring 0.8 cm and demonstrating mild hypermetabolic activity with SUV max  "of 1.8, physiologic   distribution of FDG in the gray matter.  There are 3 pulmonary nodules noted within the right lung, the largest is in the anterior segment of the right upper lobe measuring 1 cm, second is visualized in the middle lobe measuring 0.6 cm and the third is within the posterior basal segment measuring 0.6 cm.  There is one pulmonary nodule within the left lung within the lateral basal segment measuring 0.6 cm.  These nodules do not demonstrate hypermetabolic activity; however, they are below the threshold for observation with PET     He underwent MRI cervical spine revealed "No evidence of metastatic disease to the cervical spine. Multilevel degenerative changes of the cervical spine with disc protrusion at C5-6 which results in moderate to severe spinal canal stenosis and and associated cord signal abnormality, suggestive of compressive myelopathy. 6 mm T1 hypointense non-enhancing lesion in the clivus.  Continued followup is suggested. 9 mm inferior descent of the cerebellar tonsils below the foramen magnum, suggestive of Chiari 1 malformation"  MRI thoracic spine "No evidence of metastatic disease involving the thoracic spine. Incidental hemangioma involving the T7 vertebral body. Mild degenerative disc disease without any significant spinal canal stenosis or neuroforaminal narrowing.   He completed 3 cycles of TPF and 6 weeks of Cisplatin and RT. Completed on 6/26/18           His PET scan from 9/25/18 revealed "Progression of disease with multiple new hypermetabolic foci including the manubrium, mediastinal/retrocrural lymph nodes, and lungs. Partial therapeutic response within the presumed radiation field involving the left cervical mass, and complete therapeutic response in the right cervical lymph node, clivus, and left palatine tonsil. New soft tissue thickening and hypermetabolism in the left aryepiglottic fold and right cricoid cartilage"      He has been on Opdivo from " 2/19-7/19                    HPI He comes in to review his CT scans from 8/24/2020 which reveal no evidence of recurrence.    Review of Systems   Constitutional: Negative for appetite change, fatigue and unexpected weight change.   HENT: Negative for mouth sores.    Eyes: Negative for visual disturbance.   Respiratory: Negative for cough and shortness of breath.    Cardiovascular: Negative for chest pain.   Gastrointestinal: Negative for abdominal pain and diarrhea.   Genitourinary: Negative for frequency.   Musculoskeletal: Negative for back pain.   Integumentary:  Negative for rash.   Neurological: Negative for headaches.   Hematological: Negative for adenopathy.   Psychiatric/Behavioral: The patient is not nervous/anxious.    All other systems reviewed and are negative.        Objective:      Physical Exam  Vitals signs reviewed.   Constitutional:       Appearance: He is well-developed.   HENT:      Mouth/Throat:      Pharynx: No oropharyngeal exudate.   Cardiovascular:      Rate and Rhythm: Normal rate.      Heart sounds: Normal heart sounds.   Pulmonary:      Effort: Pulmonary effort is normal.      Breath sounds: Normal breath sounds. No wheezing.   Abdominal:      General: Bowel sounds are normal.      Palpations: Abdomen is soft.      Tenderness: There is no abdominal tenderness.   Musculoskeletal:         General: No tenderness.   Lymphadenopathy:      Cervical: No cervical adenopathy.   Skin:     General: Skin is warm and dry.      Findings: No rash.   Neurological:      Mental Status: He is alert and oriented to person, place, and time.      Coordination: Coordination normal.   Psychiatric:         Thought Content: Thought content normal.         Judgment: Judgment normal.           LABS:  WBC   Date Value Ref Range Status   08/24/2020 4.52 3.90 - 12.70 K/uL Final     Hemoglobin   Date Value Ref Range Status   08/24/2020 13.6 (L) 14.0 - 18.0 g/dL Final     POC Hematocrit   Date Value Ref Range Status    09/08/2019 17 (LL) 36 - 54 %PCV Final     Hematocrit   Date Value Ref Range Status   08/24/2020 40.9 40.0 - 54.0 % Final     Platelets   Date Value Ref Range Status   08/24/2020 170 150 - 350 K/uL Final     Gran # (ANC)   Date Value Ref Range Status   08/24/2020 3.4 1.8 - 7.7 K/uL Final     Comment:     The ANC is based on a white cell differential from an   automated cell counter. It has not been microscopically   reviewed for the presence of abnormal cells. Clinical   correlation is required.         Chemistry        Component Value Date/Time     08/24/2020 0959    K 4.8 08/24/2020 0959     08/24/2020 0959    CO2 31 (H) 08/24/2020 0959    BUN 27 (H) 08/24/2020 0959    CREATININE 1.58 (H) 08/24/2020 0959    GLU 97 08/24/2020 0959        Component Value Date/Time    CALCIUM 9.6 08/24/2020 0959    ALKPHOS 85 08/24/2020 0959    AST 33 08/24/2020 0959    ALT 18 08/24/2020 0959    BILITOT 0.4 08/24/2020 0959    ESTGFRAFRICA 57.3 (A) 08/24/2020 0959    EGFRNONAA 49.6 (A) 08/24/2020 0959        TSH   Date Value Ref Range Status   08/24/2020 3.970 0.400 - 4.000 uIU/mL Final     Comment:     Warning:  Heterophilic antibodies in serum or plasma of   certain individuals are known to cause interference with   immunoassays. These antibodies may be present in blood samples   from individuals regularly exposed to animal or who have been   treated with animal products.   Patients taking high doses of supplemental biotin may have  negatively biased results.        Free T4   Date Value Ref Range Status   08/24/2020 0.94 0.71 - 1.51 ng/dL Final       Assessment:       1. Tonsil cancer    2. Depression, unspecified depression type        Plan:        1. He is doing well clinically with no evidence of recurrence and will return in 4 months with labs and CT neck and chest.  Follow-up with Dr. Olguin in early September 2020  2. Stable, refilled med    Above care plan was discussed with patient and all questions were  addressed to his satisfaction

## 2020-09-05 ENCOUNTER — CLINICAL SUPPORT (OUTPATIENT)
Dept: URGENT CARE | Facility: CLINIC | Age: 50
End: 2020-09-05
Payer: COMMERCIAL

## 2020-09-05 DIAGNOSIS — Z78.9 NO KNOWN PROBLEMS: Primary | ICD-10-CM

## 2020-09-05 PROCEDURE — U0003 INFECTIOUS AGENT DETECTION BY NUCLEIC ACID (DNA OR RNA); SEVERE ACUTE RESPIRATORY SYNDROME CORONAVIRUS 2 (SARS-COV-2) (CORONAVIRUS DISEASE [COVID-19]), AMPLIFIED PROBE TECHNIQUE, MAKING USE OF HIGH THROUGHPUT TECHNOLOGIES AS DESCRIBED BY CMS-2020-01-R: HCPCS

## 2020-09-06 LAB — SARS-COV-2 RNA RESP QL NAA+PROBE: NOT DETECTED

## 2020-09-08 ENCOUNTER — OFFICE VISIT (OUTPATIENT)
Dept: OTOLARYNGOLOGY | Facility: CLINIC | Age: 50
End: 2020-09-08
Payer: COMMERCIAL

## 2020-09-08 VITALS — DIASTOLIC BLOOD PRESSURE: 83 MMHG | HEART RATE: 67 BPM | SYSTOLIC BLOOD PRESSURE: 136 MMHG

## 2020-09-08 DIAGNOSIS — C09.9 SQUAMOUS CELL CARCINOMA OF PALATINE TONSIL: Primary | ICD-10-CM

## 2020-09-08 PROCEDURE — 99999 PR PBB SHADOW E&M-EST. PATIENT-LVL III: ICD-10-PCS | Mod: PBBFAC,,, | Performed by: OTOLARYNGOLOGY

## 2020-09-08 PROCEDURE — 99213 OFFICE O/P EST LOW 20 MIN: CPT | Mod: S$GLB,,, | Performed by: OTOLARYNGOLOGY

## 2020-09-08 PROCEDURE — 99999 PR PBB SHADOW E&M-EST. PATIENT-LVL III: CPT | Mod: PBBFAC,,, | Performed by: OTOLARYNGOLOGY

## 2020-09-08 PROCEDURE — 99213 PR OFFICE/OUTPT VISIT, EST, LEVL III, 20-29 MIN: ICD-10-PCS | Mod: S$GLB,,, | Performed by: OTOLARYNGOLOGY

## 2020-09-08 NOTE — PROGRESS NOTES
Chief Complaint   Patient presents with    Follow-up     Oncology History   Squamous cell carcinoma of palatine tonsil   1/4/2018 Initial Diagnosis    Squamous cell carcinoma of palatine tonsil     5/3/2018 - 6/26/2018 Radiation Therapy    Treating physician: Dr. Sandra  Total Dose: 70 Gy  Fractions: 35/35     5/2018 - 6/2018 Chemotherapy    Treatment Summary   Plan Name: OP CISPLATIN DOCETAXEL 5FU Q3W  Treatment Goal: Curative  Status: Inactive  Start Date: 1/29/2018  End Date: 5/27/2018 (Planned)  Provider: Cortney Lopez MD  Chemotherapy: CISplatin (PLATINOL) 100 mg/m2 = 248 mg in sodium chloride 0.9% 500 mL chemo infusion, 100 mg/m2 = 248 mg (100 % of original dose 100 mg/m2), Intravenous, Clinic/HOD 1 time, 2 of 6 cycles  Dose modification: 100 mg/m2 (original dose 100 mg/m2, Cycle 1)    DOCEtaxel (TAXOTERE) 75 mg/m2 = 185 mg in sodium chloride 0.9% 250 mL chemo infusion, 75 mg/m2 = 185 mg, Intravenous, Clinic/HOD 1 time, 2 of 6 cycles    fluorouracil (ADRUCIL) 1,000 mg/m2/day = 12,400 mg in sodium chloride 0.9% 250 mL chemo infusion, 1,000 mg/m2/day = 12,400 mg (100 % of original dose 1,000 mg/m2/day), Intravenous, Over 120 hours, 2 of 6 cycles  Dose modification: 1,000 mg/m2/day (original dose 1,000 mg/m2/day, Cycle 1)    Plan Name: OP CISPLATIN (Weekly)  Treatment Goal: Curative  Status: Inactive  Start Date: 3/20/2018 (Planned)  End Date: 4/24/2018 (Planned)  Provider: Cortney Lopez MD  Chemotherapy: CISplatin (PLATINOL) 40 mg/m2 = 92 mg in sodium chloride 0.9% 250 mL chemo infusion, 40 mg/m2, Intravenous, Clinic/HOD 1 time, 0 of 6 cycles    Plan Name: OP CISPLATIN DOCETAXEL 5FU Q3W  Treatment Goal: Maintenance  Status: Inactive  Start Date: 3/19/2018  End Date: 7/12/2018 (Planned)  Provider: Cortney Lopez MD  Chemotherapy: CISplatin (PLATINOL) 75 mg/m2 = 172 mg in sodium chloride 0.9% 500 mL chemo infusion, 75 mg/m2 = 172 mg, Intravenous, Clinic/HOD 1 time, 1 of 6 cycles    DOCEtaxel (TAXOTERE) 75 mg/m2 =  170 mg in sodium chloride 0.9% 250 mL chemo infusion, 75 mg/m2 = 170 mg, Intravenous, Clinic/HOD 1 time, 1 of 6 cycles    fluorouracil (ADRUCIL) 750 mg/m2/day = 8,590 mg in sodium chloride 0.9% 240 mL chemo infusion, 750 mg/m2/day = 8,590 mg, Intravenous, Over 120 hours, 1 of 6 cycles    Plan Name: OP CISPLATIN (Weekly)  Treatment Goal: Palliative  Status: Active  Start Date: 5/3/2018  End Date: 6/27/2018  Provider: Cortney Lopez MD  Chemotherapy: CISplatin (PLATINOL) 40 mg/m2 = 89 mg in sodium chloride 0.9% 250 mL chemo infusion, 40 mg/m2 = 89 mg, Intravenous, Clinic/HOD 1 time, 6 of 6 cycles         3/28/2019 Tumor Conference       His case was discussed at the Multidisciplinary Head and Neck Team Planning Meeting.    Representatives from Medical Oncology, Radiation Oncology, Head and Neck Surgical Oncology, Psychosocial Oncology, and Speech and Language Pathology discussed the case with the following recommendations:    1) continued observation                   HPI   50 y.o. male presents with the above treatment history.  No new complaints.  He is not receiving any therapy at this time.     Review of Systems   Constitutional: Negative for fatigue and unexpected weight change.   HENT: Per HPI.  Eyes: Negative for visual disturbance.   Respiratory: Negative for shortness of breath, hemoptysis   Cardiovascular: Negative for chest pain and palpitations.   Musculoskeletal: Negative for decreased ROM, back pain.   Skin: Negative for rash, sunburn, itching.   Neurological: Negative for dizziness and seizures.   Hematological: Negative for adenopathy. Does not bruise/bleed easily.   Endocrine: Negative for rapid weight loss/weight gain, heat/cold intolerance.     Past Medical History   Patient Active Problem List   Diagnosis    Squamous cell carcinoma of palatine tonsil    Low vitamin D level    Cancer of head, face, or neck lymph nodes, secondary    Secondary cancer of bone    Tonsil cancer    Port or reservoir  infection    Anxiety    Macrocytic anemia    Adjustment disorder with emotional disturbance    Secondary malignancy of mediastinal lymph nodes    Hypocalcemia    Chemotherapy-induced neuropathy    Depression    Insomnia    BMI 28.0-28.9,adult    Neuropathic pain    Osteoradionecrosis of jaw    Acute renal failure    Seizure    Cervical radiculopathy at C6    Pain and swelling of left upper extremity    Hypomagnesemia    Rectal bleeding    Hemorrhoids    Duodenal ulcer disease    Drug-induced diarrhea    Chronic blood loss anemia    Hypokalemia    PJ (acute kidney injury)           Past Surgical History   Past Surgical History:   Procedure Laterality Date    COLONOSCOPY N/A 10/22/2019    Procedure: COLONOSCOPY/plenvu;  Surgeon: Nithya Sawyer MD;  Location: [facility];  Service: Endoscopy;  Laterality: N/A;    ESOPHAGOGASTRODUODENOSCOPY Left 9/9/2019    Procedure: EGD (ESOPHAGOGASTRODUODENOSCOPY);  Surgeon: Balta Lisa MD;  Location: [facility];  Service: Endoscopy;  Laterality: Left;    ESOPHAGOGASTRODUODENOSCOPY N/A 9/12/2019    Procedure: EGD (ESOPHAGOGASTRODUODENOSCOPY);  Surgeon: Perez Mon III, MD;  Location: [facility];  Service: Endoscopy;  Laterality: N/A;    ESOPHAGOGASTRODUODENOSCOPY N/A 10/2/2019    Procedure: EGD (ESOPHAGOGASTRODUODENOSCOPY);  Surgeon: Bonifacio Sosa MD;  Location: [facility] ([address] [place] [address]);  Service: Endoscopy;  Laterality: N/A;    GASTROSTOMY TUBE PLACEMENT Left 02/12/2018    MEDIPORT REMOVAL N/A 6/6/2018    Procedure: Removal-port-a-cath;  Surgeon: [facility] Diagnostic Provider;  [facility];  Service: General;  Laterality: N/A;         Family History   Family History   Problem Relation Age of Onset    Leukemia Mother     Hypertension Father     Thyroid disease Sister     Depression Sister     Hypertension Brother     Brain cancer Maternal Uncle     Brain cancer Maternal Uncle            Social History   .  Social History      Socioeconomic History    Marital status:      Spouse name: Not on file    Number of children: Not on file    Years of education: Not on file    Highest education level: Not on file   Occupational History    Not on file   Social Needs    Financial resource strain: Not on file    Food insecurity     Worry: Not on file     Inability: Not on file    Transportation needs     Medical: Not on file     Non-medical: Not on file   Tobacco Use    Smoking status: Former Smoker     Packs/day: 1.50     Years: 25.00     Pack years: 37.50     Types: Cigarettes     Quit date: 06/2017     Years since quitting: 3.2    Smokeless tobacco: Never Used    Tobacco comment: smoked for about 25 years. quit 6 months ago   Substance and Sexual Activity    Alcohol use: No     Comment: history of overuse    Drug use: No     Comment: Former Opiate/methamphetamine addiction    Sexual activity: Yes     Partners: Female   Lifestyle    Physical activity     Days per week: Not on file     Minutes per session: Not on file    Stress: Not on file   Relationships    Social connections     Talks on phone: Not on file     Gets together: Not on file     Attends Congregation service: Not on file     Active member of club or organization: Not on file     Attends meetings of clubs or organizations: Not on file     Relationship status: Not on file   Other Topics Concern    Patient feels they ought to cut down on drinking/drug use Not Asked    Patient annoyed by others criticizing their drinking/drug use Not Asked    Patient has felt bad or guilty about drinking/drug use Not Asked    Patient has had a drink/used drugs as an eye opener in the AM Not Asked   Social History Narrative    3/28/19: he is living alone. No children. Close to siblings/father/several friends; enjoys fishing and flying drones. He works as a .          Allergies   Review of patient's allergies indicates:   Allergen Reactions    Trazodone Other  (See Comments)     confusion           Physical Exam     Vitals:    09/08/20 1432   BP: 136/83   Pulse: 67         There is no height or weight on file to calculate BMI.      General: AOx3, NAD   Respiratory:  Symmetric chest rise, normal effort  Nose: No gross nasal septal deviation. Inferior Turbinates WNL bilaterally. No septal perforation. No masses/lesions.   Oral Cavity:  Oral Tongue mobile, no lesions noted.  Hard Palate WNL. No buccal or FOM lesions.  Oropharynx:  No masses/lesions of the posterior pharyngeal wall. Tonsillar fossa without lesions. Soft palate without masses. Midline uvula.   Neck: No scars.  No cervical lymphadenopathy, thyromegaly or thyroid nodules.  Normal range of motion.    Face: House Brackmann I bilaterally.    NP: No lesions of posterior wall  OP: No lesions of posterior wall or BOT. BOT is soft to palpation  Larynx: No lesions of glottic or supraglottic larynx. Normal vocal fold mobility    HP: No lesions of pyriform sinuses or postcricoid region  Mirror examination was performed.    Neck CT reviewed.    Assessment/Plan  Problem List Items Addressed This Visit        Oncology    Squamous cell carcinoma of palatine tonsil - Primary (Chronic)     No evidence of disease.  Return to clinic in 3 months.

## 2020-10-21 ENCOUNTER — PATIENT MESSAGE (OUTPATIENT)
Dept: ORTHOPEDICS | Facility: CLINIC | Age: 50
End: 2020-10-21

## 2020-10-21 ENCOUNTER — LAB VISIT (OUTPATIENT)
Dept: LAB | Facility: HOSPITAL | Age: 50
End: 2020-10-21
Attending: FAMILY MEDICINE
Payer: COMMERCIAL

## 2020-10-21 ENCOUNTER — OFFICE VISIT (OUTPATIENT)
Dept: FAMILY MEDICINE | Facility: CLINIC | Age: 50
End: 2020-10-21
Payer: COMMERCIAL

## 2020-10-21 ENCOUNTER — TELEPHONE (OUTPATIENT)
Dept: GASTROENTEROLOGY | Facility: CLINIC | Age: 50
End: 2020-10-21

## 2020-10-21 VITALS
DIASTOLIC BLOOD PRESSURE: 82 MMHG | BODY MASS INDEX: 29.67 KG/M2 | TEMPERATURE: 97 F | SYSTOLIC BLOOD PRESSURE: 122 MMHG | HEART RATE: 78 BPM | WEIGHT: 195.13 LBS | OXYGEN SATURATION: 95 %

## 2020-10-21 DIAGNOSIS — R35.0 URINARY FREQUENCY: ICD-10-CM

## 2020-10-21 DIAGNOSIS — Z00.00 VISIT FOR WELL MAN HEALTH CHECK: Primary | ICD-10-CM

## 2020-10-21 DIAGNOSIS — C77.0 CANCER OF HEAD, FACE, OR NECK LYMPH NODES, SECONDARY: ICD-10-CM

## 2020-10-21 DIAGNOSIS — D53.9 MACROCYTIC ANEMIA: ICD-10-CM

## 2020-10-21 DIAGNOSIS — R35.1 BENIGN PROSTATIC HYPERPLASIA WITH NOCTURIA: ICD-10-CM

## 2020-10-21 DIAGNOSIS — Z00.00 VISIT FOR WELL MAN HEALTH CHECK: ICD-10-CM

## 2020-10-21 DIAGNOSIS — C79.51 SECONDARY CANCER OF BONE: Chronic | ICD-10-CM

## 2020-10-21 DIAGNOSIS — C09.9 TONSIL CANCER: ICD-10-CM

## 2020-10-21 DIAGNOSIS — C09.9 SQUAMOUS CELL CARCINOMA OF PALATINE TONSIL: Chronic | ICD-10-CM

## 2020-10-21 DIAGNOSIS — K52.9 COLITIS: ICD-10-CM

## 2020-10-21 DIAGNOSIS — C77.1 SECONDARY MALIGNANCY OF MEDIASTINAL LYMPH NODES: Chronic | ICD-10-CM

## 2020-10-21 DIAGNOSIS — N40.1 BENIGN PROSTATIC HYPERPLASIA WITH NOCTURIA: ICD-10-CM

## 2020-10-21 DIAGNOSIS — G62.0 CHEMOTHERAPY-INDUCED NEUROPATHY: ICD-10-CM

## 2020-10-21 DIAGNOSIS — Z23 NEED FOR INFLUENZA VACCINATION: ICD-10-CM

## 2020-10-21 DIAGNOSIS — T45.1X5A CHEMOTHERAPY-INDUCED NEUROPATHY: ICD-10-CM

## 2020-10-21 DIAGNOSIS — N52.8 OTHER MALE ERECTILE DYSFUNCTION: ICD-10-CM

## 2020-10-21 DIAGNOSIS — M65.30 TRIGGER FINGER OF LEFT HAND, UNSPECIFIED FINGER: ICD-10-CM

## 2020-10-21 DIAGNOSIS — Z11.59 NEED FOR HEPATITIS C SCREENING TEST: ICD-10-CM

## 2020-10-21 PROBLEM — E83.51 HYPOCALCEMIA: Status: RESOLVED | Noted: 2018-10-25 | Resolved: 2020-10-21

## 2020-10-21 PROBLEM — E83.42 HYPOMAGNESEMIA: Status: RESOLVED | Noted: 2019-10-07 | Resolved: 2020-10-21

## 2020-10-21 PROBLEM — E87.6 HYPOKALEMIA: Status: RESOLVED | Noted: 2019-10-07 | Resolved: 2020-10-21

## 2020-10-21 PROBLEM — N17.9 AKI (ACUTE KIDNEY INJURY): Status: RESOLVED | Noted: 2019-10-20 | Resolved: 2020-10-21

## 2020-10-21 LAB
ALBUMIN SERPL BCP-MCNC: 4.5 G/DL (ref 3.5–5.2)
ALP SERPL-CCNC: 90 U/L (ref 55–135)
ALT SERPL W/O P-5'-P-CCNC: 19 U/L (ref 10–44)
ANION GAP SERPL CALC-SCNC: 12 MMOL/L (ref 8–16)
AST SERPL-CCNC: 27 U/L (ref 10–40)
BASOPHILS # BLD AUTO: 0.02 K/UL (ref 0–0.2)
BASOPHILS NFR BLD: 0.5 % (ref 0–1.9)
BILIRUB SERPL-MCNC: 0.5 MG/DL (ref 0.1–1)
BUN SERPL-MCNC: 23 MG/DL (ref 6–20)
CALCIUM SERPL-MCNC: 9.6 MG/DL (ref 8.7–10.5)
CHLORIDE SERPL-SCNC: 106 MMOL/L (ref 95–110)
CHOLEST SERPL-MCNC: 194 MG/DL (ref 120–199)
CHOLEST/HDLC SERPL: 3.1 {RATIO} (ref 2–5)
CO2 SERPL-SCNC: 22 MMOL/L (ref 23–29)
CREAT SERPL-MCNC: 1.4 MG/DL (ref 0.5–1.4)
DIFFERENTIAL METHOD: ABNORMAL
EOSINOPHIL # BLD AUTO: 0.1 K/UL (ref 0–0.5)
EOSINOPHIL NFR BLD: 1.8 % (ref 0–8)
ERYTHROCYTE [DISTWIDTH] IN BLOOD BY AUTOMATED COUNT: 11.5 % (ref 11.5–14.5)
EST. GFR  (AFRICAN AMERICAN): >60 ML/MIN/1.73 M^2
EST. GFR  (NON AFRICAN AMERICAN): 57.4 ML/MIN/1.73 M^2
ESTIMATED AVG GLUCOSE: 91 MG/DL (ref 68–131)
GLUCOSE SERPL-MCNC: 84 MG/DL (ref 70–110)
HBA1C MFR BLD HPLC: 4.8 % (ref 4–5.6)
HCT VFR BLD AUTO: 41 % (ref 40–54)
HDLC SERPL-MCNC: 63 MG/DL (ref 40–75)
HDLC SERPL: 32.5 % (ref 20–50)
HGB BLD-MCNC: 13.4 G/DL (ref 14–18)
IMM GRANULOCYTES # BLD AUTO: 0.01 K/UL (ref 0–0.04)
IMM GRANULOCYTES NFR BLD AUTO: 0.2 % (ref 0–0.5)
LDLC SERPL CALC-MCNC: 112.2 MG/DL (ref 63–159)
LYMPHOCYTES # BLD AUTO: 0.5 K/UL (ref 1–4.8)
LYMPHOCYTES NFR BLD: 11.5 % (ref 18–48)
MCH RBC QN AUTO: 33.9 PG (ref 27–31)
MCHC RBC AUTO-ENTMCNC: 32.7 G/DL (ref 32–36)
MCV RBC AUTO: 104 FL (ref 82–98)
MONOCYTES # BLD AUTO: 0.5 K/UL (ref 0.3–1)
MONOCYTES NFR BLD: 11.3 % (ref 4–15)
NEUTROPHILS # BLD AUTO: 3.2 K/UL (ref 1.8–7.7)
NEUTROPHILS NFR BLD: 74.7 % (ref 38–73)
NONHDLC SERPL-MCNC: 131 MG/DL
NRBC BLD-RTO: 0 /100 WBC
PLATELET # BLD AUTO: 189 K/UL (ref 150–350)
PMV BLD AUTO: 9.7 FL (ref 9.2–12.9)
POTASSIUM SERPL-SCNC: 4.2 MMOL/L (ref 3.5–5.1)
PROT SERPL-MCNC: 7.3 G/DL (ref 6–8.4)
RBC # BLD AUTO: 3.95 M/UL (ref 4.6–6.2)
SODIUM SERPL-SCNC: 140 MMOL/L (ref 136–145)
TRIGL SERPL-MCNC: 94 MG/DL (ref 30–150)
WBC # BLD AUTO: 4.34 K/UL (ref 3.9–12.7)

## 2020-10-21 PROCEDURE — 80061 LIPID PANEL: CPT

## 2020-10-21 PROCEDURE — 84443 ASSAY THYROID STIM HORMONE: CPT

## 2020-10-21 PROCEDURE — 86703 HIV-1/HIV-2 1 RESULT ANTBDY: CPT

## 2020-10-21 PROCEDURE — 90686 FLU VACCINE (QUAD) GREATER THAN OR EQUAL TO 3YO PRESERVATIVE FREE IM: ICD-10-PCS | Mod: S$GLB,,, | Performed by: FAMILY MEDICINE

## 2020-10-21 PROCEDURE — 83036 HEMOGLOBIN GLYCOSYLATED A1C: CPT

## 2020-10-21 PROCEDURE — 90471 IMMUNIZATION ADMIN: CPT | Mod: S$GLB,,, | Performed by: FAMILY MEDICINE

## 2020-10-21 PROCEDURE — 84153 ASSAY OF PSA TOTAL: CPT

## 2020-10-21 PROCEDURE — 90686 IIV4 VACC NO PRSV 0.5 ML IM: CPT | Mod: S$GLB,,, | Performed by: FAMILY MEDICINE

## 2020-10-21 PROCEDURE — 36415 COLL VENOUS BLD VENIPUNCTURE: CPT | Mod: PO

## 2020-10-21 PROCEDURE — 80053 COMPREHEN METABOLIC PANEL: CPT

## 2020-10-21 PROCEDURE — 3008F PR BODY MASS INDEX (BMI) DOCUMENTED: ICD-10-PCS | Mod: CPTII,S$GLB,, | Performed by: FAMILY MEDICINE

## 2020-10-21 PROCEDURE — 82306 VITAMIN D 25 HYDROXY: CPT

## 2020-10-21 PROCEDURE — 99396 PREV VISIT EST AGE 40-64: CPT | Mod: 25,S$GLB,, | Performed by: FAMILY MEDICINE

## 2020-10-21 PROCEDURE — 86803 HEPATITIS C AB TEST: CPT

## 2020-10-21 PROCEDURE — 85025 COMPLETE CBC W/AUTO DIFF WBC: CPT

## 2020-10-21 PROCEDURE — 99999 PR PBB SHADOW E&M-EST. PATIENT-LVL V: ICD-10-PCS | Mod: PBBFAC,,, | Performed by: FAMILY MEDICINE

## 2020-10-21 PROCEDURE — 99396 PR PREVENTIVE VISIT,EST,40-64: ICD-10-PCS | Mod: 25,S$GLB,, | Performed by: FAMILY MEDICINE

## 2020-10-21 PROCEDURE — 90471 FLU VACCINE (QUAD) GREATER THAN OR EQUAL TO 3YO PRESERVATIVE FREE IM: ICD-10-PCS | Mod: S$GLB,,, | Performed by: FAMILY MEDICINE

## 2020-10-21 PROCEDURE — 99999 PR PBB SHADOW E&M-EST. PATIENT-LVL V: CPT | Mod: PBBFAC,,, | Performed by: FAMILY MEDICINE

## 2020-10-21 PROCEDURE — 3008F BODY MASS INDEX DOCD: CPT | Mod: CPTII,S$GLB,, | Performed by: FAMILY MEDICINE

## 2020-10-21 RX ORDER — PREGABALIN 150 MG/1
150 CAPSULE ORAL 2 TIMES DAILY
Qty: 60 CAPSULE | Refills: 3 | Status: SHIPPED | OUTPATIENT
Start: 2020-10-21 | End: 2021-01-28 | Stop reason: SDUPTHER

## 2020-10-21 RX ORDER — SILDENAFIL 50 MG/1
50 TABLET, FILM COATED ORAL DAILY PRN
Qty: 30 TABLET | Refills: 0 | Status: SHIPPED | OUTPATIENT
Start: 2020-10-21 | End: 2021-10-14

## 2020-10-21 RX ORDER — TAMSULOSIN HYDROCHLORIDE 0.4 MG/1
0.4 CAPSULE ORAL DAILY
Qty: 90 CAPSULE | Refills: 1 | Status: SHIPPED | OUTPATIENT
Start: 2020-10-21 | End: 2021-10-14

## 2020-10-21 NOTE — PROGRESS NOTES
"Subjective:       Patient ID: Burton Whiting is a 50 y.o. male.    Chief Complaint: Annual Exam      Burton Whiting is a 50y.o. male who presents for a physical    In 2018, he was seen in clinic here and diagnosed with squamous cell cancer. He had a CT that showed a 3.8 x 3.8 x 4.9 cm soft tissue mass arising from the left palatine tonsil resulting in moderate narrowing of the hypopharyngeal airway adjacent extensive matted left cervical lymphadenopathy was noted.  The largest ella mass was 6.6 x 9 x 2.6 cm extending inferiorly to the supraclavicular region. He had 4 sessions of chemo and 36 treatments of radiation. He had mets to his sternum, lung, and lymph nodes. He was currently told that he is cancer free.     Diet/Exercise: had lost weight due to chemo/radiation. Now has gained some weight. No soda. Not eating fast food. Occasionally diet drinks.  Still lower then peak weight.     He has had some neuropathy in the legs s/p radiation. Gabapentin helps a little. Rare back pain.     He is also having locking of the fingers. Left hand. 1-2 fingers. Gets "stuck"    He has had increased urinary urgency, but takes him longer to urinate then before. He also has post void dribbling. He has to go within 2 hours, frequently. He has to urinate at night, maybe 2 times.     He has issues with erections. Has a new partner. Has erections in the morning. Has issues with penetration and maintaining.      Flu: ordered  Tdap: UTD  Labs:  ordered     C-scope: GI referral     PMHx: reviewed in EMR and updated  Meds: reviewed in EMR and updated  Shx: reviewed in EMR and updated  FMHx: reviewed in EMR and updated  Social:  he is living alone. No children. Close to siblings/father/several friends; enjoys fishing and flying drones. He works as a .          Review of Systems   Constitutional: Negative for chills and fever.   Respiratory: Negative for cough and shortness of breath.    Gastrointestinal: Negative for " diarrhea, nausea and vomiting.   Genitourinary: Positive for frequency and urgency. Negative for difficulty urinating, dysuria, penile swelling, scrotal swelling and testicular pain.   Musculoskeletal: Positive for joint swelling.        He has leg numbness  He has what appears to be trigger finger   Skin: Negative for rash.   Neurological: Negative for dizziness.         Health Maintenance Due   Topic Date Due    Hepatitis C Screening  1970    HIV Screening  01/03/1983    Shingles Vaccine (1 of 2) 01/03/2018     Immunization History   Administered Date(s) Administered    Influenza 12/20/2019    Influenza - Quadrivalent - PF *Preferred* (6 months and older) 01/03/2019, 10/21/2020    Pneumococcal Conjugate - 13 Valent 01/03/2019    Pneumococcal Polysaccharide - 23 Valent 03/28/2019    Tdap 03/28/2019           Objective:     Vitals:    10/21/20 1101   BP: 122/82   Pulse: 78   Temp: 97.3 °F (36.3 °C)        Physical Exam  Vitals signs and nursing note reviewed.   Constitutional:       Appearance: He is obese.   HENT:      Head: Normocephalic and atraumatic.      Mouth/Throat:      Pharynx: Oropharynx is clear.   Cardiovascular:      Rate and Rhythm: Normal rate and regular rhythm.   Pulmonary:      Effort: Pulmonary effort is normal.      Breath sounds: Normal breath sounds.   Abdominal:      Palpations: Abdomen is soft.      Tenderness: There is no abdominal tenderness.   Musculoskeletal:         General: No swelling or tenderness.        Legs:    Neurological:      Mental Status: He is alert.      Sensory: Sensory deficit present.   Psychiatric:         Mood and Affect: Mood normal.         Behavior: Behavior normal.         Thought Content: Thought content normal.         Judgment: Judgment normal.         Assessment:       1. Visit for well man health check    2. Trigger finger of left hand, unspecified finger    3. Tonsil cancer    4. Cancer of head, face, or neck lymph nodes, secondary    5.  Secondary malignancy of mediastinal lymph nodes    6. Secondary cancer of bone    7. Squamous cell carcinoma of palatine tonsil    8. Macrocytic anemia    9. BMI 29.0-29.9,adult    10. Chemotherapy-induced neuropathy    11. Colitis    12. Need for influenza vaccination    13. Urinary frequency    14. Benign prostatic hyperplasia with nocturia    15. Other male erectile dysfunction    16. Need for hepatitis C screening test        Plan:         ?Avoid tobacco  ?Be physically active  ?Maintain a healthy weight  ?Eat a diet rich in fruits, vegetables, and whole grains, and low in saturated/trans fat  ?Limit alcohol consumption  ?Protect against sexually transmitted infections  ?Avoid excess sun    Try lyrica in place of gabapentin  Consider MRI lumbar spine if symptoms persist.     Add flomax for BPH, check PSA and urine today  Try viagra for ED    Refer to hand surgery for trigger finger    Unclear if GI follow up is needed. IBD? Message sent to GI.     Continue other medications, and f/u with hematology and ENT    F/u in 6 months      Visit for well man health check  -     CBC auto differential; Future; Expected date: 10/21/2020  -     Comprehensive Metabolic Panel; Future; Expected date: 10/21/2020  -     Hemoglobin A1C; Future; Expected date: 10/21/2020  -     Lipid Panel; Future; Expected date: 10/21/2020  -     TSH; Future; Expected date: 10/21/2020  -     Vitamin D; Future; Expected date: 10/21/2020  -     PSA, Screening; Future; Expected date: 10/21/2020  -     Hepatitis C Antibody; Future; Expected date: 10/21/2020  -     HIV 1/2 Ag/Ab (4th Gen); Future; Expected date: 10/21/2020    Trigger finger of left hand, unspecified finger  -     Ambulatory referral/consult to Hand Surgery; Future; Expected date: 10/28/2020    Tonsil cancer    Cancer of head, face, or neck lymph nodes, secondary    Secondary malignancy of mediastinal lymph nodes    Secondary cancer of bone    Squamous cell carcinoma of palatine  tonsil    Macrocytic anemia    BMI 29.0-29.9,adult    Chemotherapy-induced neuropathy  -     pregabalin (LYRICA) 150 MG capsule; Take 1 capsule (150 mg total) by mouth 2 (two) times daily.  Dispense: 60 capsule; Refill: 3    Colitis  -     Ambulatory referral/consult to Gastroenterology; Future; Expected date: 10/28/2020    Need for influenza vaccination  -     Influenza - Quadrivalent (PF)    Urinary frequency  -     Urine culture; Future; Expected date: 10/21/2020  -     Urinalysis; Future; Expected date: 10/21/2020    Benign prostatic hyperplasia with nocturia  -     PSA, Screening; Future; Expected date: 10/21/2020  -     tamsulosin (FLOMAX) 0.4 mg Cap; Take 1 capsule (0.4 mg total) by mouth once daily.  Dispense: 90 capsule; Refill: 1    Other male erectile dysfunction  -     sildenafiL (VIAGRA) 50 MG tablet; Take 1 tablet (50 mg total) by mouth daily as needed for Erectile Dysfunction. Take 30 min to 60 min prior to sexual activity. Max 1/day  Dispense: 30 tablet; Refill: 0    Need for hepatitis C screening test  -     Hepatitis C Antibody; Future; Expected date: 10/21/2020

## 2020-10-21 NOTE — TELEPHONE ENCOUNTER
----- Message from Nithya Sawyer MD sent at 10/21/2020 12:26 PM CDT -----  I can f/u with him in clinic  ----- Message -----  From: Christopher Turpin DO  Sent: 10/21/2020  12:23 PM CDT  To: Nithya Sawyer MD

## 2020-10-21 NOTE — Clinical Note
Does he need to see you again? Was this IBD or other colitis? Hes not taking any meds currently    Thanks    MARICHUY

## 2020-10-21 NOTE — PATIENT INSTRUCTIONS
?Avoid tobacco  ?Be physically active  ?Maintain a healthy weight  ?Eat a diet rich in fruits, vegetables, and whole grains, and low in saturated/trans fat  ?Limit alcohol consumption  ?Protect against sexually transmitted infections  ?Avoid excess sun  RTC as directed during the visit, as needed or in 1 year for a physical with labs prior. Call one month prior to the physical to schedule apt and labs.       Due to the 21st Century Cures Act, all results will be released immediately. This means that you will get your results on my chart prior to me having a chance to review them. Please give our office 3 business days to review your results       Try lyrica in place of gabapentin  Add flomax for BPH, check PSA and urine today    Try viagra for ED    Continue other medications, and f/u with hematology and ENT    F/u in 6 months

## 2020-10-22 LAB
25(OH)D3+25(OH)D2 SERPL-MCNC: 48 NG/ML (ref 30–96)
COMPLEXED PSA SERPL-MCNC: 0.92 NG/ML (ref 0–4)
HCV AB SERPL QL IA: NEGATIVE
HIV 1+2 AB+HIV1 P24 AG SERPL QL IA: NEGATIVE
TSH SERPL DL<=0.005 MIU/L-ACNC: 1.4 UIU/ML (ref 0.4–4)

## 2020-10-23 ENCOUNTER — TELEPHONE (OUTPATIENT)
Dept: ADMINISTRATIVE | Facility: OTHER | Age: 50
End: 2020-10-23

## 2020-11-02 ENCOUNTER — OFFICE VISIT (OUTPATIENT)
Dept: ORTHOPEDICS | Facility: CLINIC | Age: 50
End: 2020-11-02
Payer: COMMERCIAL

## 2020-11-02 VITALS — HEIGHT: 68 IN | WEIGHT: 195 LBS | BODY MASS INDEX: 29.55 KG/M2

## 2020-11-02 DIAGNOSIS — M65.342 TRIGGER RING FINGER OF LEFT HAND: ICD-10-CM

## 2020-11-02 DIAGNOSIS — M65.30 TRIGGER FINGER OF LEFT HAND, UNSPECIFIED FINGER: ICD-10-CM

## 2020-11-02 PROCEDURE — 99203 PR OFFICE/OUTPT VISIT, NEW, LEVL III, 30-44 MIN: ICD-10-PCS | Mod: 25,S$GLB,, | Performed by: ORTHOPAEDIC SURGERY

## 2020-11-02 PROCEDURE — 99203 OFFICE O/P NEW LOW 30 MIN: CPT | Mod: 25,S$GLB,, | Performed by: ORTHOPAEDIC SURGERY

## 2020-11-02 PROCEDURE — 99999 PR PBB SHADOW E&M-EST. PATIENT-LVL III: ICD-10-PCS | Mod: PBBFAC,,, | Performed by: ORTHOPAEDIC SURGERY

## 2020-11-02 PROCEDURE — 20550 NJX 1 TENDON SHEATH/LIGAMENT: CPT | Mod: 51,F2,S$GLB, | Performed by: ORTHOPAEDIC SURGERY

## 2020-11-02 PROCEDURE — 3008F PR BODY MASS INDEX (BMI) DOCUMENTED: ICD-10-PCS | Mod: CPTII,S$GLB,, | Performed by: ORTHOPAEDIC SURGERY

## 2020-11-02 PROCEDURE — 99999 PR PBB SHADOW E&M-EST. PATIENT-LVL III: CPT | Mod: PBBFAC,,, | Performed by: ORTHOPAEDIC SURGERY

## 2020-11-02 PROCEDURE — 3008F BODY MASS INDEX DOCD: CPT | Mod: CPTII,S$GLB,, | Performed by: ORTHOPAEDIC SURGERY

## 2020-11-02 PROCEDURE — 20550 PR INJECT TENDON SHEATH/LIGAMENT: ICD-10-PCS | Mod: 51,F2,S$GLB, | Performed by: ORTHOPAEDIC SURGERY

## 2020-11-02 RX ORDER — TRIAMCINOLONE ACETONIDE 40 MG/ML
40 INJECTION, SUSPENSION INTRA-ARTICULAR; INTRAMUSCULAR
Status: COMPLETED | OUTPATIENT
Start: 2020-11-02 | End: 2020-11-02

## 2020-11-02 RX ADMIN — TRIAMCINOLONE ACETONIDE 40 MG: 40 INJECTION, SUSPENSION INTRA-ARTICULAR; INTRAMUSCULAR at 02:11

## 2020-11-02 NOTE — LETTER
November 2, 2020      Christopher Turpin, DO  [address]  [place] [address]           [facility]  [address]  [place] [address]  Phone: [phone]          Patient: Burton Whiting   MR Number: [id number]   YOB: 1970   Date of Visit: 11/2/2020       Dear Dr. Christopher Turpin:    Thank you for referring Burton Whiting to me for evaluation. Attached you will find relevant portions of my assessment and plan of care.    If you have questions, please do not hesitate to call me. I look forward to following Burton Whiting along with you.    Sincerely,    Carlos Talbert Jr., MD    Enclosure  CC:  No Recipients    If you would like to receive this communication electronically, please contact [email or web address] or [phone] to request more information on [employer] access.    For providers and/or their staff who would like to refer a patient to [facility], please contact us through our one-stop-shop provider referral line, [facility] , at [phone].    If you feel you have received this communication in error or would no longer like to receive these types of communications, please e-mail [email or web address]

## 2020-11-02 NOTE — PROGRESS NOTES
"Subjective:      Patient ID: Burton Whiting is a 50 y.o. male.    Chief Complaint: Joint Pain (Left middle and ring trigger )      HPI  Burton Whiting is a  50 y.o. male presenting today for painful locking of the left ring and middle finger.  There was not a history of trauma.  Onset of symptoms began several months ago  Symptoms worse with use  No numbness or tingling reported.      Review of patient's allergies indicates:   Allergen Reactions    Trazodone Other (See Comments)     confusion         Current Outpatient Medications   Medication Sig Dispense Refill    acetaminophen (TYLENOL) 325 MG tablet Take 2 tablets (650 mg total) by mouth every 6 (six) hours as needed.  0    FLUoxetine 40 MG capsule Take 1 capsule (40 mg total) by mouth once daily. 30 capsule 6    multivitamin capsule Take 1 capsule by mouth once daily.      ondansetron (ZOFRAN) 4 MG tablet Take 1 tablet (4 mg total) by mouth every 8 (eight) hours as needed. 90 tablet 3    pantoprazole (PROTONIX) 40 MG tablet Take 1 tablet (40 mg total) by mouth once daily. 30 tablet 11    pregabalin (LYRICA) 150 MG capsule Take 1 capsule (150 mg total) by mouth 2 (two) times daily. 60 capsule 3    sildenafiL (VIAGRA) 50 MG tablet Take 1 tablet (50 mg total) by mouth daily as needed for Erectile Dysfunction. Take 30 min to 60 min prior to sexual activity. Max 1/day 30 tablet 0    tamsulosin (FLOMAX) 0.4 mg Cap Take 1 capsule (0.4 mg total) by mouth once daily. 90 capsule 1     No current facility-administered medications for this visit.        Past Medical History:   Diagnosis Date    Acute renal failure 6/21/2018    Alcohol abuse     ended 2004    Chemotherapy induced neutropenia 6/7/2018    Former smoker     HPV in male     Hx of psychiatric care     Ativan, Paxil ("caused anger problems")    Opiate addiction     Port or reservoir infection 3/26/2018    Squamous cell carcinoma of palatine tonsil     throat and tonsillar    Substance abuse     " "opiates, methamphetamines; ended 2004       Past Surgical History:   Procedure Laterality Date    COLONOSCOPY N/A 10/22/2019    Procedure: COLONOSCOPY/plenvu;  Surgeon: Nithya Sawyer MD;  Location: [facility];  Service: Endoscopy;  Laterality: N/A;    ESOPHAGOGASTRODUODENOSCOPY Left 9/9/2019    Procedure: EGD (ESOPHAGOGASTRODUODENOSCOPY);  Surgeon: Balta Lisa MD;  Location: [facility];  Service: Endoscopy;  Laterality: Left;    ESOPHAGOGASTRODUODENOSCOPY N/A 9/12/2019    Procedure: EGD (ESOPHAGOGASTRODUODENOSCOPY);  Surgeon: Perez Mon III, MD;  Location: [facility];  Service: Endoscopy;  Laterality: N/A;    ESOPHAGOGASTRODUODENOSCOPY N/A 10/2/2019    Procedure: EGD (ESOPHAGOGASTRODUODENOSCOPY);  Surgeon: Bonifacio Sosa MD;  Location: [facility] (2ND FLR);  Service: Endoscopy;  Laterality: N/A;    GASTROSTOMY TUBE PLACEMENT Left 02/12/2018    MEDIPORT REMOVAL N/A 6/6/2018    Procedure: Removal-port-a-cath;  Surgeon: Blayne Diagnostic Provider;  Location: [facility] OR 2ND FLR;  Service: General;  Laterality: N/A;       Review of Systems:  ROS    OBJECTIVE:     PHYSICAL EXAM:  Height: 5' 8" (172.7 cm) Weight: 88.5 kg (195 lb)  Vitals:    11/02/20 1402   Weight: 88.5 kg (195 lb)   Height: 5' 8" (1.727 m)   PainSc:   8     Well developed, well nourished male in no acute distress  Alert and oriented x 3  HEENT- Normal exam  Lungs- Clear to auscultation  Heart- Regular rate and rhythm  Abdomen- Soft nontender  Extremity exam- examination left hand there is tenderness over the flexor tendon sheath left middle and ring fingers  Positive triggering  Tinel sign negative  Sensation intact   strength decreased      RADIOGRAPHS:  None  Comments: I have personally reviewed the imaging and I agree with the above radiologist's report.    ASSESSMENT/PLAN:     IMPRESSION:  Triggering left middle and ring fingers    PLAN:  I explained the nature of the problem to the patient  Discussed options including injection " versus surgery  He would like injection today  After pause for time-out identified the left middle and ring  Each injected with combination Kenalog 20 mg 0.5 cc xylocaine sterile technique  Tolerated the procedure well without complication  Gentle range of motion  Avoid heavy lifting  Follow-up 4-6 weeks       - We talked at length about the anatomy and pathophysiology of   Encounter Diagnoses   Name Primary?    Trigger finger of left hand, unspecified finger     Trigger ring finger of left hand            Disclaimer: This note has been generated using voice-recognition software. There may be typographical errors that have been missed during proof-reading.

## 2020-11-23 ENCOUNTER — OFFICE VISIT (OUTPATIENT)
Dept: GASTROENTEROLOGY | Facility: CLINIC | Age: 50
End: 2020-11-23
Payer: COMMERCIAL

## 2020-11-23 VITALS — HEIGHT: 68 IN | BODY MASS INDEX: 30.84 KG/M2 | WEIGHT: 203.5 LBS

## 2020-11-23 DIAGNOSIS — K52.9 COLITIS: ICD-10-CM

## 2020-11-23 PROCEDURE — 99214 OFFICE O/P EST MOD 30 MIN: CPT | Mod: S$GLB,,, | Performed by: INTERNAL MEDICINE

## 2020-11-23 PROCEDURE — 99214 PR OFFICE/OUTPT VISIT, EST, LEVL IV, 30-39 MIN: ICD-10-PCS | Mod: S$GLB,,, | Performed by: INTERNAL MEDICINE

## 2020-11-23 PROCEDURE — 99999 PR PBB SHADOW E&M-EST. PATIENT-LVL III: CPT | Mod: PBBFAC,,, | Performed by: INTERNAL MEDICINE

## 2020-11-23 PROCEDURE — 1126F PR PAIN SEVERITY QUANTIFIED, NO PAIN PRESENT: ICD-10-PCS | Mod: S$GLB,,, | Performed by: INTERNAL MEDICINE

## 2020-11-23 PROCEDURE — 3008F PR BODY MASS INDEX (BMI) DOCUMENTED: ICD-10-PCS | Mod: CPTII,S$GLB,, | Performed by: INTERNAL MEDICINE

## 2020-11-23 PROCEDURE — 1126F AMNT PAIN NOTED NONE PRSNT: CPT | Mod: S$GLB,,, | Performed by: INTERNAL MEDICINE

## 2020-11-23 PROCEDURE — 99999 PR PBB SHADOW E&M-EST. PATIENT-LVL III: ICD-10-PCS | Mod: PBBFAC,,, | Performed by: INTERNAL MEDICINE

## 2020-11-23 PROCEDURE — 3008F BODY MASS INDEX DOCD: CPT | Mod: CPTII,S$GLB,, | Performed by: INTERNAL MEDICINE

## 2020-11-23 RX ORDER — SULFASALAZINE 500 MG/1
1000 TABLET ORAL 3 TIMES DAILY
Qty: 540 TABLET | Refills: 0 | Status: SHIPPED | OUTPATIENT
Start: 2020-11-23 | End: 2021-02-21

## 2020-12-28 ENCOUNTER — HOSPITAL ENCOUNTER (OUTPATIENT)
Dept: RADIOLOGY | Facility: HOSPITAL | Age: 50
Discharge: HOME | End: 2020-12-28
Attending: INTERNAL MEDICINE
Payer: COMMERCIAL

## 2020-12-28 DIAGNOSIS — C09.9 TONSIL CANCER: ICD-10-CM

## 2020-12-28 PROCEDURE — 70491 CT SOFT TISSUE NECK W/DYE: CPT | Mod: TC,PO

## 2020-12-28 PROCEDURE — 25500020 PHARM REV CODE 255: Mod: PO | Performed by: INTERNAL MEDICINE

## 2020-12-28 RX ADMIN — IOHEXOL 75 ML: 350 INJECTION, SOLUTION INTRAVENOUS at 10:12

## 2020-12-30 ENCOUNTER — OFFICE VISIT (OUTPATIENT)
Dept: HEMATOLOGY/ONCOLOGY | Facility: CLINIC | Age: 50
End: 2020-12-30
Payer: COMMERCIAL

## 2020-12-30 VITALS
DIASTOLIC BLOOD PRESSURE: 94 MMHG | RESPIRATION RATE: 20 BRPM | OXYGEN SATURATION: 96 % | WEIGHT: 198.63 LBS | TEMPERATURE: 97 F | BODY MASS INDEX: 30.1 KG/M2 | HEART RATE: 72 BPM | HEIGHT: 68 IN | SYSTOLIC BLOOD PRESSURE: 142 MMHG

## 2020-12-30 DIAGNOSIS — C09.9 SQUAMOUS CELL CARCINOMA OF PALATINE TONSIL: Primary | Chronic | ICD-10-CM

## 2020-12-30 DIAGNOSIS — T45.1X5A CHEMOTHERAPY-INDUCED NEUROPATHY: Chronic | ICD-10-CM

## 2020-12-30 DIAGNOSIS — G62.0 CHEMOTHERAPY-INDUCED NEUROPATHY: Chronic | ICD-10-CM

## 2020-12-30 PROCEDURE — 99214 OFFICE O/P EST MOD 30 MIN: CPT | Mod: S$GLB,,, | Performed by: INTERNAL MEDICINE

## 2020-12-30 PROCEDURE — 99214 PR OFFICE/OUTPT VISIT, EST, LEVL IV, 30-39 MIN: ICD-10-PCS | Mod: S$GLB,,, | Performed by: INTERNAL MEDICINE

## 2020-12-30 PROCEDURE — 3008F PR BODY MASS INDEX (BMI) DOCUMENTED: ICD-10-PCS | Mod: CPTII,S$GLB,, | Performed by: INTERNAL MEDICINE

## 2020-12-30 PROCEDURE — 1125F AMNT PAIN NOTED PAIN PRSNT: CPT | Mod: S$GLB,,, | Performed by: INTERNAL MEDICINE

## 2020-12-30 PROCEDURE — 1125F PR PAIN SEVERITY QUANTIFIED, PAIN PRESENT: ICD-10-PCS | Mod: S$GLB,,, | Performed by: INTERNAL MEDICINE

## 2020-12-30 PROCEDURE — 99999 PR PBB SHADOW E&M-EST. PATIENT-LVL III: CPT | Mod: PBBFAC,,, | Performed by: INTERNAL MEDICINE

## 2020-12-30 PROCEDURE — 3008F BODY MASS INDEX DOCD: CPT | Mod: CPTII,S$GLB,, | Performed by: INTERNAL MEDICINE

## 2020-12-30 PROCEDURE — 99999 PR PBB SHADOW E&M-EST. PATIENT-LVL III: ICD-10-PCS | Mod: PBBFAC,,, | Performed by: INTERNAL MEDICINE

## 2020-12-30 NOTE — PROGRESS NOTES
Subjective:       Patient ID: Burton Whiting is a 50 y.o. male.    Chief Complaint: Tonsil cancer  Squamous cell carcinoma of palatine tonsil; g-tube malodorous; left neck pain 2/10; 4 cans formula per day; and r arm port---red/warm to touch  Mr. Burton Whiting is a 48-year-old male, former smoker, who presents today with a four-month history of enlarging neck mass.  He initially delayed care due to lack of insurance.  He was seen by Dr. Turpin who referred him to see Dr. Olguin.  He had a CT that showed a 3.8 x 3.8 x 4.9 cm soft tissue mass arising from the left palatine tonsil resulting in moderate narrowing of the hypopharyngeal airway adjacent extensive matted left cervical lymphadenopathy was noted.  The largest ella mass was 6.6 x 9 x 2.6 cm extending inferiorly to the supraclavicular region.  The mass pushed the trachea and the left common carotid artery to the right.  Additional representative of cervical lymph nodes measuring 2.9 x 3.1 x 3.5 cm on axial image 37 of series 3   and 2.4 x 2.1 x 2.2 cm on axial image 55 of series 3.  There is also a sclerotic lesion involving the midline of the clivus measuring 1.2 cm.  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  PET scan performed on 01/19/2018 revealed persistent evidence of a soft tissue mass arising from the left palatine tonsil resulting in moderate narrowing of the hypopharyngeal   airway.  The mass is hypermetabolic demonstrating an SUV max of 18.5.  There is also persistent adjacent extensive matted left cervical lymphadenopathy, largest of this measuring 6.7 x 8.42 with hypermetabolic activity and SUV max of 20.5.  Lymphadenopathy is again noted to have a mass effect on the trachea and left common carotid artery, pushing both structures towards the right.  There is also prominent right cervical lymph nodes with the largest measuring 0.8 cm and demonstrating mild hypermetabolic activity with SUV max of 1.8, physiologic  "  distribution of FDG in the gray matter.  There are 3 pulmonary nodules noted within the right lung, the largest is in the anterior segment of the right upper lobe measuring 1 cm, second is visualized in the middle lobe measuring 0.6 cm and the third is within the posterior basal segment measuring 0.6 cm.  There is one pulmonary nodule within the left lung within the lateral basal segment measuring 0.6 cm.  These nodules do not demonstrate hypermetabolic activity; however, they are below the threshold for observation with PET     He underwent MRI cervical spine revealed "No evidence of metastatic disease to the cervical spine. Multilevel degenerative changes of the cervical spine with disc protrusion at C5-6 which results in moderate to severe spinal canal stenosis and and associated cord signal abnormality, suggestive of compressive myelopathy. 6 mm T1 hypointense non-enhancing lesion in the clivus.  Continued followup is suggested. 9 mm inferior descent of the cerebellar tonsils below the foramen magnum, suggestive of Chiari 1 malformation"  MRI thoracic spine "No evidence of metastatic disease involving the thoracic spine. Incidental hemangioma involving the T7 vertebral body. Mild degenerative disc disease without any significant spinal canal stenosis or neuroforaminal narrowing.   He completed 3 cycles of TPF and 6 weeks of Cisplatin and RT. Completed on 6/26/18           His PET scan from 9/25/18 revealed "Progression of disease with multiple new hypermetabolic foci including the manubrium, mediastinal/retrocrural lymph nodes, and lungs. Partial therapeutic response within the presumed radiation field involving the left cervical mass, and complete therapeutic response in the right cervical lymph node, clivus, and left palatine tonsil. New soft tissue thickening and hypermetabolism in the left aryepiglottic fold and right cricoid cartilage"      He has been on Opdivo from 2/19-7/19                      HPI He " "comes in to review his CT scans from 12/2020 which reveals "Currently, the right piriformis sinus is collapsed.  No definite underlying mass is seen.  This is likely within the broad range of normal. Subtle, amorphous ground-glass opacities are seen within the inferior portions of the right upper lobe.  These changes are most likely infectious in nature.  Clinical correlation is advised.  Short-term follow-up chest CT scan in 6-8 weeks is recommended to re-evaluate Negative for acute process involving the neck or chest.  Negative for CT evidence for adenopathy or metastasis in this patient with history of malignant neoplasm of tonsils.    Review of Systems   Constitutional: Negative for appetite change, fatigue and unexpected weight change.   HENT: Negative for mouth sores.    Eyes: Negative for visual disturbance.   Respiratory: Negative for cough and shortness of breath.    Cardiovascular: Negative for chest pain.   Gastrointestinal: Negative for abdominal pain and diarrhea.   Genitourinary: Negative for frequency.   Musculoskeletal: Negative for back pain.   Integumentary:  Negative for rash.   Neurological: Negative for headaches.   Hematological: Negative for adenopathy.   Psychiatric/Behavioral: The patient is not nervous/anxious.    All other systems reviewed and are negative.        Objective:      Physical Exam  Vitals signs reviewed.   Constitutional:       Appearance: He is well-developed.   HENT:      Mouth/Throat:      Pharynx: No oropharyngeal exudate.   Cardiovascular:      Rate and Rhythm: Normal rate.      Heart sounds: Normal heart sounds.   Pulmonary:      Effort: Pulmonary effort is normal.      Breath sounds: Normal breath sounds. No wheezing.   Abdominal:      General: Bowel sounds are normal.      Palpations: Abdomen is soft.      Tenderness: There is no abdominal tenderness.   Musculoskeletal:         General: No tenderness.   Lymphadenopathy:      Cervical: No cervical adenopathy.   Skin:    "  General: Skin is warm and dry.      Findings: No rash.   Neurological:      Mental Status: He is alert and oriented to person, place, and time.      Coordination: Coordination normal.   Psychiatric:         Thought Content: Thought content normal.         Judgment: Judgment normal.           LABS:  WBC   Date Value Ref Range Status   12/28/2020 5.01 3.90 - 12.70 K/uL Final     Hemoglobin   Date Value Ref Range Status   12/28/2020 15.1 14.0 - 18.0 g/dL Final     POC Hematocrit   Date Value Ref Range Status   09/08/2019 17 (LL) 36 - 54 %PCV Final     Hematocrit   Date Value Ref Range Status   12/28/2020 45.3 40.0 - 54.0 % Final     Platelets   Date Value Ref Range Status   12/28/2020 172 150 - 350 K/uL Final     Gran # (ANC)   Date Value Ref Range Status   12/28/2020 3.8 1.8 - 7.7 K/uL Final     Comment:     The ANC is based on a white cell differential from an   automated cell counter. It has not been microscopically   reviewed for the presence of abnormal cells. Clinical   correlation is required.         Chemistry        Component Value Date/Time     12/28/2020 0957    K 4.5 12/28/2020 0957     12/28/2020 0957    CO2 32 (H) 12/28/2020 0957    BUN 27 (H) 12/28/2020 0957    CREATININE 1.32 12/28/2020 0957    GLU 88 12/28/2020 0957        Component Value Date/Time    CALCIUM 9.8 12/28/2020 0957    ALKPHOS 91 12/28/2020 0957    AST 31 12/28/2020 0957    ALT 24 12/28/2020 0957    BILITOT 0.5 12/28/2020 0957    ESTGFRAFRICA >60.0 12/28/2020 0957    EGFRNONAA >60.0 12/28/2020 0957        TSH   Date Value Ref Range Status   12/28/2020 3.860 0.400 - 4.000 uIU/mL Final     Comment:     Warning:  Heterophilic antibodies in serum or plasma of   certain individuals are known to cause interference with   immunoassays. These antibodies may be present in blood samples   from individuals regularly exposed to animal or who have been   treated with animal products.   Patients taking high doses of supplemental biotin may  have  negatively biased results.        Free T4   Date Value Ref Range Status   12/28/2020 1.08 0.71 - 1.51 ng/dL Final        Assessment:       1. Squamous cell carcinoma of palatine tonsil    2. Chemotherapy-induced neuropathy        Plan:        1. CT scans look good with no evidence of recurrence, he will return in 3 months with labs and CT neck, chest.  Will have him see Dr. Olguin next available  2. Doing stable on Lyrica    Above care plan was discussed with patient and all questions were addressed to his satisfaction

## 2020-12-30 NOTE — Clinical Note
Schedule follow-up with Dr. Rigo Olguin in ENT next avaialble.  Schedule CBC,CMP, TSH and free T4, CT neck, chest and see me in 3 months

## 2021-01-21 ENCOUNTER — PATIENT MESSAGE (OUTPATIENT)
Dept: FAMILY MEDICINE | Facility: CLINIC | Age: 51
End: 2021-01-21

## 2021-01-28 ENCOUNTER — HOSPITAL ENCOUNTER (OUTPATIENT)
Dept: RADIOLOGY | Facility: HOSPITAL | Age: 51
Discharge: HOME | End: 2021-01-28
Attending: FAMILY MEDICINE
Payer: COMMERCIAL

## 2021-01-28 ENCOUNTER — OFFICE VISIT (OUTPATIENT)
Dept: FAMILY MEDICINE | Facility: CLINIC | Age: 51
End: 2021-01-28
Payer: COMMERCIAL

## 2021-01-28 VITALS
HEIGHT: 69 IN | RESPIRATION RATE: 17 BRPM | BODY MASS INDEX: 29.55 KG/M2 | OXYGEN SATURATION: 96 % | WEIGHT: 199.5 LBS | DIASTOLIC BLOOD PRESSURE: 83 MMHG | TEMPERATURE: 98 F | HEART RATE: 78 BPM | SYSTOLIC BLOOD PRESSURE: 132 MMHG

## 2021-01-28 DIAGNOSIS — M25.551 RIGHT HIP PAIN: ICD-10-CM

## 2021-01-28 DIAGNOSIS — R03.0 ELEVATED BLOOD PRESSURE READING: ICD-10-CM

## 2021-01-28 DIAGNOSIS — T45.1X5A CHEMOTHERAPY-INDUCED NEUROPATHY: ICD-10-CM

## 2021-01-28 DIAGNOSIS — G62.0 CHEMOTHERAPY-INDUCED NEUROPATHY: ICD-10-CM

## 2021-01-28 DIAGNOSIS — C09.9 SQUAMOUS CELL CARCINOMA OF PALATINE TONSIL: Chronic | ICD-10-CM

## 2021-01-28 DIAGNOSIS — M25.551 RIGHT HIP PAIN: Primary | ICD-10-CM

## 2021-01-28 PROCEDURE — 3008F BODY MASS INDEX DOCD: CPT | Mod: CPTII,S$GLB,, | Performed by: FAMILY MEDICINE

## 2021-01-28 PROCEDURE — 99999 PR PBB SHADOW E&M-EST. PATIENT-LVL V: ICD-10-PCS | Mod: PBBFAC,,, | Performed by: FAMILY MEDICINE

## 2021-01-28 PROCEDURE — 3008F PR BODY MASS INDEX (BMI) DOCUMENTED: ICD-10-PCS | Mod: CPTII,S$GLB,, | Performed by: FAMILY MEDICINE

## 2021-01-28 PROCEDURE — 73502 X-RAY EXAM HIP UNI 2-3 VIEWS: CPT | Mod: TC,FY,PO,RT

## 2021-01-28 PROCEDURE — 72110 X-RAY EXAM L-2 SPINE 4/>VWS: CPT | Mod: TC,FY,PO

## 2021-01-28 PROCEDURE — 99214 OFFICE O/P EST MOD 30 MIN: CPT | Mod: S$GLB,,, | Performed by: FAMILY MEDICINE

## 2021-01-28 PROCEDURE — 72110 XR LUMBAR SPINE COMPLETE 5 VIEW: ICD-10-PCS | Mod: 26,,, | Performed by: RADIOLOGY

## 2021-01-28 PROCEDURE — 73502 XR HIP 2 VIEW RIGHT: ICD-10-PCS | Mod: 26,RT,, | Performed by: RADIOLOGY

## 2021-01-28 PROCEDURE — 99999 PR PBB SHADOW E&M-EST. PATIENT-LVL V: CPT | Mod: PBBFAC,,, | Performed by: FAMILY MEDICINE

## 2021-01-28 PROCEDURE — 99214 PR OFFICE/OUTPT VISIT, EST, LEVL IV, 30-39 MIN: ICD-10-PCS | Mod: S$GLB,,, | Performed by: FAMILY MEDICINE

## 2021-01-28 PROCEDURE — 72110 X-RAY EXAM L-2 SPINE 4/>VWS: CPT | Mod: 26,,, | Performed by: RADIOLOGY

## 2021-01-28 PROCEDURE — 73502 X-RAY EXAM HIP UNI 2-3 VIEWS: CPT | Mod: 26,RT,, | Performed by: RADIOLOGY

## 2021-01-28 RX ORDER — PREGABALIN 100 MG/1
100 CAPSULE ORAL 2 TIMES DAILY
Qty: 60 CAPSULE | Refills: 3 | Status: SHIPPED | OUTPATIENT
Start: 2021-01-28 | End: 2021-05-27 | Stop reason: SDUPTHER

## 2021-01-28 RX ORDER — DICLOFENAC SODIUM 10 MG/G
2 GEL TOPICAL DAILY
Qty: 100 G | Refills: 0 | Status: SHIPPED | OUTPATIENT
Start: 2021-01-28 | End: 2022-05-05

## 2021-01-28 RX ORDER — MELOXICAM 7.5 MG/1
7.5 TABLET ORAL DAILY
Qty: 30 TABLET | Refills: 0 | Status: SHIPPED | OUTPATIENT
Start: 2021-01-28 | End: 2021-10-14 | Stop reason: SDUPTHER

## 2021-02-03 ENCOUNTER — CLINICAL SUPPORT (OUTPATIENT)
Dept: REHABILITATION | Facility: HOSPITAL | Age: 51
End: 2021-02-03
Attending: FAMILY MEDICINE
Payer: COMMERCIAL

## 2021-02-03 DIAGNOSIS — R29.898 WEAKNESS OF BOTH HIPS: ICD-10-CM

## 2021-02-03 DIAGNOSIS — M25.551 RIGHT HIP PAIN: ICD-10-CM

## 2021-02-03 DIAGNOSIS — M25.651 DECREASED RANGE OF RIGHT HIP MOVEMENT: ICD-10-CM

## 2021-02-03 DIAGNOSIS — M62.9 HAMSTRING TIGHTNESS OF BOTH LOWER EXTREMITIES: ICD-10-CM

## 2021-02-03 DIAGNOSIS — R29.898 HIP TIGHTNESS: ICD-10-CM

## 2021-02-03 PROCEDURE — 97162 PT EVAL MOD COMPLEX 30 MIN: CPT | Mod: PO

## 2021-02-08 ENCOUNTER — CLINICAL SUPPORT (OUTPATIENT)
Dept: REHABILITATION | Facility: HOSPITAL | Age: 51
End: 2021-02-08
Attending: FAMILY MEDICINE
Payer: COMMERCIAL

## 2021-02-08 DIAGNOSIS — R29.898 WEAKNESS OF BOTH HIPS: ICD-10-CM

## 2021-02-08 DIAGNOSIS — R29.898 HIP TIGHTNESS: ICD-10-CM

## 2021-02-08 DIAGNOSIS — M25.651 DECREASED RANGE OF RIGHT HIP MOVEMENT: ICD-10-CM

## 2021-02-08 DIAGNOSIS — M62.9 HAMSTRING TIGHTNESS OF BOTH LOWER EXTREMITIES: ICD-10-CM

## 2021-02-08 PROCEDURE — 97110 THERAPEUTIC EXERCISES: CPT | Mod: PO

## 2021-02-09 ENCOUNTER — PATIENT MESSAGE (OUTPATIENT)
Dept: REHABILITATION | Facility: HOSPITAL | Age: 51
End: 2021-02-09

## 2021-02-22 ENCOUNTER — CLINICAL SUPPORT (OUTPATIENT)
Dept: REHABILITATION | Facility: HOSPITAL | Age: 51
End: 2021-02-22
Attending: FAMILY MEDICINE
Payer: COMMERCIAL

## 2021-02-22 DIAGNOSIS — M25.651 DECREASED RANGE OF RIGHT HIP MOVEMENT: ICD-10-CM

## 2021-02-22 DIAGNOSIS — R29.898 WEAKNESS OF BOTH HIPS: ICD-10-CM

## 2021-02-22 DIAGNOSIS — R29.898 HIP TIGHTNESS: ICD-10-CM

## 2021-02-22 DIAGNOSIS — M62.9 HAMSTRING TIGHTNESS OF BOTH LOWER EXTREMITIES: ICD-10-CM

## 2021-02-22 PROCEDURE — 97110 THERAPEUTIC EXERCISES: CPT | Mod: PO

## 2021-02-24 ENCOUNTER — CLINICAL SUPPORT (OUTPATIENT)
Dept: REHABILITATION | Facility: HOSPITAL | Age: 51
End: 2021-02-24
Attending: FAMILY MEDICINE
Payer: COMMERCIAL

## 2021-02-24 DIAGNOSIS — M62.9 HAMSTRING TIGHTNESS OF BOTH LOWER EXTREMITIES: ICD-10-CM

## 2021-02-24 DIAGNOSIS — M25.651 DECREASED RANGE OF RIGHT HIP MOVEMENT: ICD-10-CM

## 2021-02-24 DIAGNOSIS — R29.898 HIP TIGHTNESS: ICD-10-CM

## 2021-02-24 DIAGNOSIS — R29.898 WEAKNESS OF BOTH HIPS: ICD-10-CM

## 2021-02-24 PROCEDURE — 97110 THERAPEUTIC EXERCISES: CPT | Mod: PO

## 2021-03-01 ENCOUNTER — PATIENT MESSAGE (OUTPATIENT)
Dept: FAMILY MEDICINE | Facility: CLINIC | Age: 51
End: 2021-03-01

## 2021-03-01 DIAGNOSIS — M25.551 RIGHT HIP PAIN: Primary | ICD-10-CM

## 2021-03-02 ENCOUNTER — PATIENT MESSAGE (OUTPATIENT)
Dept: FAMILY MEDICINE | Facility: CLINIC | Age: 51
End: 2021-03-02

## 2021-03-02 ENCOUNTER — TELEPHONE (OUTPATIENT)
Dept: ADMINISTRATIVE | Facility: OTHER | Age: 51
End: 2021-03-02

## 2021-03-04 ENCOUNTER — DOCUMENTATION ONLY (OUTPATIENT)
Dept: REHABILITATION | Facility: HOSPITAL | Age: 51
End: 2021-03-04

## 2021-03-04 ENCOUNTER — CLINICAL SUPPORT (OUTPATIENT)
Dept: REHABILITATION | Facility: HOSPITAL | Age: 51
End: 2021-03-04
Attending: FAMILY MEDICINE
Payer: COMMERCIAL

## 2021-03-04 DIAGNOSIS — R29.898 WEAKNESS OF BOTH HIPS: ICD-10-CM

## 2021-03-04 DIAGNOSIS — M62.9 HAMSTRING TIGHTNESS OF BOTH LOWER EXTREMITIES: ICD-10-CM

## 2021-03-04 DIAGNOSIS — R29.898 HIP TIGHTNESS: ICD-10-CM

## 2021-03-04 DIAGNOSIS — M25.651 DECREASED RANGE OF RIGHT HIP MOVEMENT: ICD-10-CM

## 2021-03-04 DIAGNOSIS — M25.651 DECREASED RANGE OF RIGHT HIP MOVEMENT: Primary | ICD-10-CM

## 2021-03-16 ENCOUNTER — OFFICE VISIT (OUTPATIENT)
Dept: SPORTS MEDICINE | Facility: CLINIC | Age: 51
End: 2021-03-16
Payer: COMMERCIAL

## 2021-03-16 VITALS
WEIGHT: 201 LBS | BODY MASS INDEX: 29.77 KG/M2 | HEIGHT: 69 IN | SYSTOLIC BLOOD PRESSURE: 124 MMHG | HEART RATE: 78 BPM | DIASTOLIC BLOOD PRESSURE: 88 MMHG

## 2021-03-16 DIAGNOSIS — M99.02 SOMATIC DYSFUNCTION OF THORACIC REGION: ICD-10-CM

## 2021-03-16 DIAGNOSIS — M99.04 SOMATIC DYSFUNCTION OF SACRAL REGION: ICD-10-CM

## 2021-03-16 DIAGNOSIS — M99.06 SOMATIC DYSFUNCTION OF LOWER EXTREMITY: ICD-10-CM

## 2021-03-16 DIAGNOSIS — M25.551 RIGHT HIP PAIN: Primary | ICD-10-CM

## 2021-03-16 DIAGNOSIS — M99.03 SOMATIC DYSFUNCTION OF LUMBAR REGION: ICD-10-CM

## 2021-03-16 DIAGNOSIS — M99.05 SOMATIC DYSFUNCTION OF PELVIS REGION: ICD-10-CM

## 2021-03-16 PROCEDURE — 99999 PR PBB SHADOW E&M-EST. PATIENT-LVL III: CPT | Mod: PBBFAC,,, | Performed by: ORTHOPAEDIC SURGERY

## 2021-03-16 PROCEDURE — 99204 PR OFFICE/OUTPT VISIT, NEW, LEVL IV, 45-59 MIN: ICD-10-PCS | Mod: 25,S$GLB,, | Performed by: ORTHOPAEDIC SURGERY

## 2021-03-16 PROCEDURE — 3008F PR BODY MASS INDEX (BMI) DOCUMENTED: ICD-10-PCS | Mod: CPTII,S$GLB,, | Performed by: ORTHOPAEDIC SURGERY

## 2021-03-16 PROCEDURE — 99999 PR PBB SHADOW E&M-EST. PATIENT-LVL III: ICD-10-PCS | Mod: PBBFAC,,, | Performed by: ORTHOPAEDIC SURGERY

## 2021-03-16 PROCEDURE — 97110 PR THERAPEUTIC EXERCISES: ICD-10-PCS | Mod: S$GLB,,, | Performed by: ORTHOPAEDIC SURGERY

## 2021-03-16 PROCEDURE — 97110 THERAPEUTIC EXERCISES: CPT | Mod: S$GLB,,, | Performed by: ORTHOPAEDIC SURGERY

## 2021-03-16 PROCEDURE — 1126F PR PAIN SEVERITY QUANTIFIED, NO PAIN PRESENT: ICD-10-PCS | Mod: S$GLB,,, | Performed by: ORTHOPAEDIC SURGERY

## 2021-03-16 PROCEDURE — 99204 OFFICE O/P NEW MOD 45 MIN: CPT | Mod: 25,S$GLB,, | Performed by: ORTHOPAEDIC SURGERY

## 2021-03-16 PROCEDURE — 98927 OSTEOPATH MANJ 5-6 REGIONS: CPT | Mod: S$GLB,,, | Performed by: ORTHOPAEDIC SURGERY

## 2021-03-16 PROCEDURE — 98927 PR OSTEOPATHIC MANIP,5-6 BODY REGN: ICD-10-PCS | Mod: S$GLB,,, | Performed by: ORTHOPAEDIC SURGERY

## 2021-03-16 PROCEDURE — 1126F AMNT PAIN NOTED NONE PRSNT: CPT | Mod: S$GLB,,, | Performed by: ORTHOPAEDIC SURGERY

## 2021-03-16 PROCEDURE — 3008F BODY MASS INDEX DOCD: CPT | Mod: CPTII,S$GLB,, | Performed by: ORTHOPAEDIC SURGERY

## 2021-03-29 ENCOUNTER — HOSPITAL ENCOUNTER (OUTPATIENT)
Dept: RADIOLOGY | Facility: HOSPITAL | Age: 51
Discharge: HOME | End: 2021-03-29
Attending: INTERNAL MEDICINE
Payer: COMMERCIAL

## 2021-03-29 DIAGNOSIS — C09.9 SQUAMOUS CELL CARCINOMA OF PALATINE TONSIL: ICD-10-CM

## 2021-03-29 PROCEDURE — 25500020 PHARM REV CODE 255: Mod: PO | Performed by: INTERNAL MEDICINE

## 2021-03-29 PROCEDURE — 70491 CT SOFT TISSUE NECK W/DYE: CPT | Mod: TC,PO

## 2021-03-29 RX ADMIN — IOHEXOL 100 ML: 350 INJECTION, SOLUTION INTRAVENOUS at 01:03

## 2021-03-31 ENCOUNTER — PATIENT MESSAGE (OUTPATIENT)
Dept: HEMATOLOGY/ONCOLOGY | Facility: CLINIC | Age: 51
End: 2021-03-31

## 2021-03-31 ENCOUNTER — OFFICE VISIT (OUTPATIENT)
Dept: HEMATOLOGY/ONCOLOGY | Facility: CLINIC | Age: 51
End: 2021-03-31
Payer: COMMERCIAL

## 2021-03-31 DIAGNOSIS — C09.9 SQUAMOUS CELL CARCINOMA OF PALATINE TONSIL: Primary | Chronic | ICD-10-CM

## 2021-03-31 PROCEDURE — 99214 OFFICE O/P EST MOD 30 MIN: CPT | Mod: 95,,, | Performed by: INTERNAL MEDICINE

## 2021-03-31 PROCEDURE — 99214 PR OFFICE/OUTPT VISIT, EST, LEVL IV, 30-39 MIN: ICD-10-PCS | Mod: 95,,, | Performed by: INTERNAL MEDICINE

## 2021-05-24 ENCOUNTER — TELEPHONE (OUTPATIENT)
Dept: HEMATOLOGY/ONCOLOGY | Facility: CLINIC | Age: 51
End: 2021-05-24

## 2021-05-25 NOTE — ED NOTES
Patient accepted at [facility] by Dr. Zuluaga arranged by Maryellen. Report to be called to [phone].   No

## 2021-06-28 ENCOUNTER — HOSPITAL ENCOUNTER (OUTPATIENT)
Dept: RADIOLOGY | Facility: HOSPITAL | Age: 51
Discharge: HOME | End: 2021-06-28
Attending: INTERNAL MEDICINE
Payer: COMMERCIAL

## 2021-06-28 DIAGNOSIS — C09.9 SQUAMOUS CELL CARCINOMA OF PALATINE TONSIL: ICD-10-CM

## 2021-06-28 LAB — POCT GLUCOSE: 102 MG/DL (ref 70–110)

## 2021-06-28 PROCEDURE — 78815 NM PET CT ROUTINE: ICD-10-PCS | Mod: 26,PS,, | Performed by: RADIOLOGY

## 2021-06-28 PROCEDURE — 78815 PET IMAGE W/CT SKULL-THIGH: CPT | Mod: 26,PS,, | Performed by: RADIOLOGY

## 2021-06-28 PROCEDURE — 78815 PET IMAGE W/CT SKULL-THIGH: CPT | Mod: TC

## 2021-06-30 ENCOUNTER — OFFICE VISIT (OUTPATIENT)
Dept: HEMATOLOGY/ONCOLOGY | Facility: CLINIC | Age: 51
End: 2021-06-30
Payer: COMMERCIAL

## 2021-06-30 VITALS
SYSTOLIC BLOOD PRESSURE: 134 MMHG | WEIGHT: 198.19 LBS | DIASTOLIC BLOOD PRESSURE: 98 MMHG | BODY MASS INDEX: 29.36 KG/M2 | HEART RATE: 69 BPM | HEIGHT: 69 IN

## 2021-06-30 DIAGNOSIS — C09.9 SQUAMOUS CELL CARCINOMA OF PALATINE TONSIL: Primary | Chronic | ICD-10-CM

## 2021-06-30 DIAGNOSIS — G62.0 CHEMOTHERAPY-INDUCED NEUROPATHY: Chronic | ICD-10-CM

## 2021-06-30 DIAGNOSIS — T45.1X5A CHEMOTHERAPY-INDUCED NEUROPATHY: Chronic | ICD-10-CM

## 2021-06-30 PROBLEM — C79.51 SECONDARY CANCER OF BONE: Chronic | Status: RESOLVED | Noted: 2018-01-25 | Resolved: 2021-06-30

## 2021-06-30 PROBLEM — R56.9 SEIZURE: Status: RESOLVED | Noted: 2019-09-09 | Resolved: 2021-06-30

## 2021-06-30 PROBLEM — C77.0 CANCER OF HEAD, FACE, OR NECK LYMPH NODES, SECONDARY: Status: RESOLVED | Noted: 2018-01-25 | Resolved: 2021-06-30

## 2021-06-30 PROBLEM — C77.1 SECONDARY MALIGNANCY OF MEDIASTINAL LYMPH NODES: Chronic | Status: RESOLVED | Noted: 2018-09-25 | Resolved: 2021-06-30

## 2021-06-30 PROCEDURE — 3008F PR BODY MASS INDEX (BMI) DOCUMENTED: ICD-10-PCS | Mod: CPTII,S$GLB,, | Performed by: INTERNAL MEDICINE

## 2021-06-30 PROCEDURE — 1126F AMNT PAIN NOTED NONE PRSNT: CPT | Mod: S$GLB,,, | Performed by: INTERNAL MEDICINE

## 2021-06-30 PROCEDURE — 99214 PR OFFICE/OUTPT VISIT, EST, LEVL IV, 30-39 MIN: ICD-10-PCS | Mod: S$GLB,,, | Performed by: INTERNAL MEDICINE

## 2021-06-30 PROCEDURE — 1126F PR PAIN SEVERITY QUANTIFIED, NO PAIN PRESENT: ICD-10-PCS | Mod: S$GLB,,, | Performed by: INTERNAL MEDICINE

## 2021-06-30 PROCEDURE — 99999 PR PBB SHADOW E&M-EST. PATIENT-LVL III: CPT | Mod: PBBFAC,,, | Performed by: INTERNAL MEDICINE

## 2021-06-30 PROCEDURE — 99214 OFFICE O/P EST MOD 30 MIN: CPT | Mod: S$GLB,,, | Performed by: INTERNAL MEDICINE

## 2021-06-30 PROCEDURE — 99999 PR PBB SHADOW E&M-EST. PATIENT-LVL III: ICD-10-PCS | Mod: PBBFAC,,, | Performed by: INTERNAL MEDICINE

## 2021-06-30 PROCEDURE — 3008F BODY MASS INDEX DOCD: CPT | Mod: CPTII,S$GLB,, | Performed by: INTERNAL MEDICINE

## 2021-07-02 DIAGNOSIS — F32.A DEPRESSION, UNSPECIFIED DEPRESSION TYPE: ICD-10-CM

## 2021-07-02 RX ORDER — FLUOXETINE HYDROCHLORIDE 40 MG/1
40 CAPSULE ORAL DAILY
Qty: 30 CAPSULE | Refills: 6 | Status: CANCELLED | OUTPATIENT
Start: 2021-07-02

## 2021-07-03 DIAGNOSIS — F32.A DEPRESSION, UNSPECIFIED DEPRESSION TYPE: ICD-10-CM

## 2021-07-03 RX ORDER — FLUOXETINE HYDROCHLORIDE 40 MG/1
40 CAPSULE ORAL DAILY
Qty: 30 CAPSULE | Refills: 6 | Status: CANCELLED | OUTPATIENT
Start: 2021-07-03

## 2021-07-06 DIAGNOSIS — F32.A DEPRESSION, UNSPECIFIED DEPRESSION TYPE: ICD-10-CM

## 2021-07-06 RX ORDER — FLUOXETINE HYDROCHLORIDE 40 MG/1
40 CAPSULE ORAL DAILY
Qty: 30 CAPSULE | Refills: 6 | Status: SHIPPED | OUTPATIENT
Start: 2021-07-06 | End: 2021-07-07 | Stop reason: SDUPTHER

## 2021-07-07 RX ORDER — FLUOXETINE HYDROCHLORIDE 40 MG/1
40 CAPSULE ORAL DAILY
Qty: 30 CAPSULE | Refills: 6 | Status: SHIPPED | OUTPATIENT
Start: 2021-07-07 | End: 2021-10-14

## 2021-07-15 NOTE — PROGRESS NOTES
Subjective:       Patient ID: Burton Whiting is a 48 y.o. male.    Chief Complaint: Squamous cell carcinoma of palatine tonsil  Chief Complaint: Squamous cell carcinoma of palatine tonsil; g-tube malodorous; left neck pain 2/10; 4 cans formula per day; and r arm port---red/warm to touch  Mr. Burton Whiting is a 48-year-old male, former smoker, who presents today with a four-month history of enlarging neck mass.  He initially delayed care due to lack of insurance.  He was seen by Dr. Turpin who referred him to see Dr. Olguin.  He had a CT that showed a 3.8 x 3.8 x 4.9 cm soft tissue mass arising from the left palatine tonsil resulting in moderate narrowing of the hypopharyngeal airway adjacent extensive matted left cervical lymphadenopathy was noted.  The largest ella mass was 6.6 x 9 x 2.6 cm extending inferiorly to the supraclavicular region.  The mass pushed the trachea and the left common carotid artery to the right.  Additional representative of cervical lymph nodes measuring 2.9 x 3.1 x 3.5 cm on axial image 37 of series 3   and 2.4 x 2.1 x 2.2 cm on axial image 55 of series 3.  There is also a sclerotic lesion involving the midline of the clivus measuring 1.2 cm.  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  PET scan performed on 01/19/2018 revealed persistent evidence of a soft tissue mass arising from the left palatine tonsil resulting in moderate narrowing of the hypopharyngeal   airway.  The mass is hypermetabolic demonstrating an SUV max of 18.5.  There is also persistent adjacent extensive matted left cervical lymphadenopathy, largest of this measuring 6.7 x 8.42 with hypermetabolic activity and SUV max of 20.5.  Lymphadenopathy is again noted to have a mass effect on the trachea and left common carotid artery, pushing both structures towards the right.  There is also prominent right cervical lymph nodes with the largest measuring 0.8 cm and demonstrating mild hypermetabolic  Transition of Care Plan  · RUR- 16% Low Risk  · DISPOSITION: The disposition plan is to transition to a SNF - [facility]/[phone] - patient accepted. · Insurance Authorization started on 7/13/21. · F/U with PCP/Specialist    · Transport: Family - Son Mariam Harrington - [phone]    At 10:58am - CM attempted to contact Deja SANTANA  [phone] to follow up regarding patient. She was not available at this time, CM left a voice message. Awaiting call back. At 12:30pm - CM attempted to contact [address] [facility] - [phone] to follow up regarding patient. She was not available at this time, CM left a voice message. Awaiting call back. At 2:36pm - CM attempted to contact [address] [employer] - [phone] to follow up regarding patient. She was not available at this time, CM left a voice message. Awaiting call back. At 3:15pm - CM received a call from Arabella Guevara who reports she spoke with patients insurance and the insurance authorization should be back this evening. Patient has an appointment tomorrow morning. Patient will discharge tomorrow morning to attend appointment then transition to SNF. CM spoke with attending physician and patients son. Patients son will provide transportation tomorrow morning. Patients son Davi Gonzalez reports he will leave hospital at [address] [place] [address] tomorrow. CM will continue to follow, provide support and assist with JULIOCESAR needs as they arise.     Elwanda Runner "activity with SUV max of 1.8, physiologic   distribution of FDG in the gray matter.  There are 3 pulmonary nodules noted within the right lung, the largest is in the anterior segment of the right upper lobe measuring 1 cm, second is visualized in the middle lobe measuring 0.6 cm and the third is within the posterior basal segment measuring 0.6 cm.  There is one pulmonary nodule within the left lung within the lateral basal segment measuring 0.6 cm.  These nodules do not demonstrate hypermetabolic activity; however, they are below the threshold for observation with PET     HPIHe underwent MRI cervical spine revealed "No evidence of metastatic disease to the cervical spine. Multilevel degenerative changes of the cervical spine with disc protrusion at C5-6 which results in moderate to severe spinal canal stenosis and and associated cord signal abnormality, suggestive of compressive myelopathy. 6 mm T1 hypointense non-enhancing lesion in the clivus.  Continued followup is suggested. 9 mm inferior descent of the cerebellar tonsils below the foramen magnum, suggestive of Chiari 1 malformation"  MRI thoracic spine "No evidence of metastatic disease involving the thoracic spine. Incidental hemangioma involving the T7 vertebral body. Mild degenerative disc disease without any significant spinal canal stenosis or neuroforaminal narrowing.   He completed 3 cycles of TPF and is now on definitive chemo./RT                 HPI He comes in for week 6 of Cisplatin and RT  He notes nausea and vomiting in the morning. Antiemetics help. He feels well the rest of the day. No diarrhea. He notes ringing in the ears which was present and is slowly worsening X 6 weeks  Denies any chest pain or shortness of breath. Denies tingling and numbess    ECOG PS is 2. Accompanied by his sister  Nutrition is via PEG tube and  He is only taking in 2 cans/day. He notes that he is supposed to do 4 cans but throws up beyond 2 cans. He is not taking Dukes " solution because it makes him feel bad.      Review of Systems   Constitutional: Positive for appetite change, fatigue and unexpected weight change.   HENT: Negative for mouth sores.    Eyes: Negative for visual disturbance.   Respiratory: Negative for cough and shortness of breath.    Cardiovascular: Negative for chest pain.   Gastrointestinal: Positive for nausea. Negative for abdominal pain and diarrhea.   Genitourinary: Negative for frequency.   Musculoskeletal: Negative for back pain.   Skin: Negative for rash.   Neurological: Negative for headaches.   Hematological: Negative for adenopathy.   Psychiatric/Behavioral: The patient is not nervous/anxious.    All other systems reviewed and are negative.      Objective:      Physical Exam   Constitutional: He is oriented to person, place, and time. He appears well-developed and well-nourished.   HENT:   Mouth/Throat: No oropharyngeal exudate.   Oral thrush +   Cardiovascular: Normal rate and normal heart sounds.    Pulmonary/Chest: Effort normal and breath sounds normal. He has no wheezes.   Abdominal: Soft. Bowel sounds are normal. There is no tenderness.   Gastrostomy present   Musculoskeletal: He exhibits no edema or tenderness.   Lymphadenopathy:     He has no cervical adenopathy.   Neurological: He is alert and oriented to person, place, and time. Coordination normal.   Skin: Skin is warm and dry. No rash noted.   Psychiatric: He has a normal mood and affect. Judgment and thought content normal.   Vitals reviewed.        LABS:  WBC   Date Value Ref Range Status   06/21/2018 5.28 3.90 - 12.70 K/uL Final     Hemoglobin   Date Value Ref Range Status   06/21/2018 9.5 (L) 14.0 - 18.0 g/dL Final     Hematocrit   Date Value Ref Range Status   06/21/2018 28.0 (L) 40.0 - 54.0 % Final     Platelets   Date Value Ref Range Status   06/21/2018 176 150 - 350 K/uL Final     Gran # (ANC)   Date Value Ref Range Status   06/21/2018 3.8 1.8 - 7.7 K/uL Final     Comment:     The  ANC is based on a white cell differential from an   automated cell counter. It has not been microscopically   reviewed for the presence of abnormal cells. Clinical   correlation is required.         Chemistry        Component Value Date/Time     06/21/2018 0732    K 3.5 06/21/2018 0732    CL 91 (L) 06/21/2018 0732    CO2 35 (H) 06/21/2018 0732    BUN 21 (H) 06/21/2018 0732    CREATININE 1.7 (H) 06/21/2018 0732     06/21/2018 0732        Component Value Date/Time    CALCIUM 8.6 (L) 06/21/2018 0732    ALKPHOS 96 06/21/2018 0732    AST 14 06/21/2018 0732    ALT 9 (L) 06/21/2018 0732    BILITOT 0.5 06/21/2018 0732    ESTGFRAFRICA 53.2 (A) 06/21/2018 0732    EGFRNONAA 46.0 (A) 06/21/2018 0732          Assessment:       1. Squamous cell carcinoma of palatine tonsil    2. Cancer of head, face, or neck lymph nodes, secondary    3. Chemotherapy follow-up examination    4. Oral thrush    5. Acute renal failure, unspecified acute renal failure type        Plan:        1,2,3. Hold chemo today. He will receive IV fluids today and tomorrow and will repeat labs on Monday 6/26 and if creatinine back to baseline he will proceed with last dose of Cisplatin. I will see him in 2 weeks with labs and in 4 weeks with CT neck and chest.    4. Escribed Diflucan  5. Discussed aggressive hydration.    Above care plan was discussed with patient and accompanying sister and all questions were addressed to their satisfaction

## 2021-07-23 ENCOUNTER — IMMUNIZATION (OUTPATIENT)
Dept: INTERNAL MEDICINE | Facility: CLINIC | Age: 51
End: 2021-07-23
Payer: COMMERCIAL

## 2021-07-23 DIAGNOSIS — Z23 NEED FOR VACCINATION: Primary | ICD-10-CM

## 2021-07-23 PROCEDURE — 0011A COVID-19, MRNA, LNP-S, PF, 100 MCG/0.5 ML DOSE VACCINE: CPT | Mod: PBBFAC | Performed by: INTERNAL MEDICINE

## 2021-08-20 ENCOUNTER — IMMUNIZATION (OUTPATIENT)
Dept: INTERNAL MEDICINE | Facility: CLINIC | Age: 51
End: 2021-08-20
Payer: COMMERCIAL

## 2021-08-20 DIAGNOSIS — Z23 NEED FOR VACCINATION: Primary | ICD-10-CM

## 2021-08-20 PROCEDURE — 91301 COVID-19, MRNA, LNP-S, PF, 100 MCG/0.5 ML DOSE VACCINE: CPT | Mod: S$GLB,,, | Performed by: INTERNAL MEDICINE

## 2021-08-20 PROCEDURE — 91301 COVID-19, MRNA, LNP-S, PF, 100 MCG/0.5 ML DOSE VACCINE: ICD-10-PCS | Mod: S$GLB,,, | Performed by: INTERNAL MEDICINE

## 2021-08-20 PROCEDURE — 0012A COVID-19, MRNA, LNP-S, PF, 100 MCG/0.5 ML DOSE VACCINE: CPT | Mod: CV19,S$GLB,, | Performed by: INTERNAL MEDICINE

## 2021-08-20 PROCEDURE — 0012A COVID-19, MRNA, LNP-S, PF, 100 MCG/0.5 ML DOSE VACCINE: ICD-10-PCS | Mod: CV19,S$GLB,, | Performed by: INTERNAL MEDICINE

## 2021-10-14 ENCOUNTER — OFFICE VISIT (OUTPATIENT)
Dept: PODIATRY | Facility: CLINIC | Age: 51
End: 2021-10-14
Payer: COMMERCIAL

## 2021-10-14 ENCOUNTER — OFFICE VISIT (OUTPATIENT)
Dept: FAMILY MEDICINE | Facility: CLINIC | Age: 51
End: 2021-10-14
Payer: COMMERCIAL

## 2021-10-14 ENCOUNTER — HOSPITAL ENCOUNTER (OUTPATIENT)
Dept: RADIOLOGY | Facility: HOSPITAL | Age: 51
Discharge: HOME | End: 2021-10-14
Attending: FAMILY MEDICINE
Payer: COMMERCIAL

## 2021-10-14 VITALS — DIASTOLIC BLOOD PRESSURE: 73 MMHG | SYSTOLIC BLOOD PRESSURE: 115 MMHG | HEART RATE: 74 BPM

## 2021-10-14 VITALS
BODY MASS INDEX: 30.66 KG/M2 | SYSTOLIC BLOOD PRESSURE: 110 MMHG | OXYGEN SATURATION: 98 % | HEIGHT: 69 IN | DIASTOLIC BLOOD PRESSURE: 80 MMHG | WEIGHT: 207 LBS | HEART RATE: 64 BPM

## 2021-10-14 DIAGNOSIS — G62.0 CHEMOTHERAPY-INDUCED NEUROPATHY: Chronic | ICD-10-CM

## 2021-10-14 DIAGNOSIS — M79.672 LEFT FOOT PAIN: ICD-10-CM

## 2021-10-14 DIAGNOSIS — M21.619 BUNION: ICD-10-CM

## 2021-10-14 DIAGNOSIS — E55.9 VITAMIN D DEFICIENCY: ICD-10-CM

## 2021-10-14 DIAGNOSIS — M79.672 LEFT FOOT PAIN: Primary | ICD-10-CM

## 2021-10-14 DIAGNOSIS — Z23 NEED FOR INFLUENZA VACCINATION: ICD-10-CM

## 2021-10-14 DIAGNOSIS — F32.A DEPRESSION, UNSPECIFIED DEPRESSION TYPE: ICD-10-CM

## 2021-10-14 DIAGNOSIS — M19.079 1ST MTP ARTHRITIS: ICD-10-CM

## 2021-10-14 DIAGNOSIS — T45.1X5A CHEMOTHERAPY-INDUCED NEUROPATHY: Chronic | ICD-10-CM

## 2021-10-14 DIAGNOSIS — M25.551 RIGHT HIP PAIN: ICD-10-CM

## 2021-10-14 DIAGNOSIS — R26.89 BALANCE PROBLEM: ICD-10-CM

## 2021-10-14 DIAGNOSIS — M79.2 NEUROPATHIC PAIN: ICD-10-CM

## 2021-10-14 DIAGNOSIS — Z00.00 VISIT FOR WELL MAN HEALTH CHECK: Primary | ICD-10-CM

## 2021-10-14 DIAGNOSIS — F17.210 NICOTINE DEPENDENCE, CIGARETTES, UNCOMPLICATED: ICD-10-CM

## 2021-10-14 PROBLEM — N17.9 ACUTE RENAL FAILURE: Status: RESOLVED | Noted: 2019-07-05 | Resolved: 2021-10-14

## 2021-10-14 PROBLEM — M65.342 TRIGGER RING FINGER OF LEFT HAND: Status: RESOLVED | Noted: 2020-11-02 | Resolved: 2021-10-14

## 2021-10-14 PROBLEM — R29.898 WEAKNESS OF BOTH HIPS: Status: RESOLVED | Noted: 2021-02-08 | Resolved: 2021-10-14

## 2021-10-14 PROBLEM — R79.89 LOW VITAMIN D LEVEL: Status: RESOLVED | Noted: 2018-01-05 | Resolved: 2021-10-14

## 2021-10-14 PROBLEM — M79.602 PAIN AND SWELLING OF LEFT UPPER EXTREMITY: Status: RESOLVED | Noted: 2019-10-01 | Resolved: 2021-10-14

## 2021-10-14 PROBLEM — M79.89 PAIN AND SWELLING OF LEFT UPPER EXTREMITY: Status: RESOLVED | Noted: 2019-10-01 | Resolved: 2021-10-14

## 2021-10-14 PROBLEM — M62.9 HAMSTRING TIGHTNESS OF BOTH LOWER EXTREMITIES: Status: RESOLVED | Noted: 2021-02-08 | Resolved: 2021-10-14

## 2021-10-14 PROBLEM — M25.651 DECREASED RANGE OF RIGHT HIP MOVEMENT: Status: RESOLVED | Noted: 2021-02-08 | Resolved: 2021-10-14

## 2021-10-14 PROBLEM — R29.898 HIP TIGHTNESS: Status: RESOLVED | Noted: 2021-02-08 | Resolved: 2021-10-14

## 2021-10-14 PROCEDURE — 3079F DIAST BP 80-89 MM HG: CPT | Mod: CPTII,S$GLB,, | Performed by: FAMILY MEDICINE

## 2021-10-14 PROCEDURE — 99203 OFFICE O/P NEW LOW 30 MIN: CPT | Mod: 25,S$GLB,, | Performed by: STUDENT IN AN ORGANIZED HEALTH CARE EDUCATION/TRAINING PROGRAM

## 2021-10-14 PROCEDURE — 3008F PR BODY MASS INDEX (BMI) DOCUMENTED: ICD-10-PCS | Mod: CPTII,S$GLB,, | Performed by: FAMILY MEDICINE

## 2021-10-14 PROCEDURE — 90686 IIV4 VACC NO PRSV 0.5 ML IM: CPT | Mod: S$GLB,,, | Performed by: FAMILY MEDICINE

## 2021-10-14 PROCEDURE — 99999 PR PBB SHADOW E&M-EST. PATIENT-LVL III: ICD-10-PCS | Mod: PBBFAC,,, | Performed by: STUDENT IN AN ORGANIZED HEALTH CARE EDUCATION/TRAINING PROGRAM

## 2021-10-14 PROCEDURE — 99396 PR PREVENTIVE VISIT,EST,40-64: ICD-10-PCS | Mod: 25,S$GLB,, | Performed by: FAMILY MEDICINE

## 2021-10-14 PROCEDURE — 90686 FLU VACCINE (QUAD) GREATER THAN OR EQUAL TO 3YO PRESERVATIVE FREE IM: ICD-10-PCS | Mod: S$GLB,,, | Performed by: FAMILY MEDICINE

## 2021-10-14 PROCEDURE — 1159F MED LIST DOCD IN RCRD: CPT | Mod: CPTII,S$GLB,, | Performed by: FAMILY MEDICINE

## 2021-10-14 PROCEDURE — 3074F PR MOST RECENT SYSTOLIC BLOOD PRESSURE < 130 MM HG: ICD-10-PCS | Mod: CPTII,S$GLB,, | Performed by: STUDENT IN AN ORGANIZED HEALTH CARE EDUCATION/TRAINING PROGRAM

## 2021-10-14 PROCEDURE — 3044F HG A1C LEVEL LT 7.0%: CPT | Mod: CPTII,S$GLB,, | Performed by: STUDENT IN AN ORGANIZED HEALTH CARE EDUCATION/TRAINING PROGRAM

## 2021-10-14 PROCEDURE — 20600 PR DRAIN/INJECT SMALL JOINT/BURSA: ICD-10-PCS | Mod: LT,S$GLB,, | Performed by: STUDENT IN AN ORGANIZED HEALTH CARE EDUCATION/TRAINING PROGRAM

## 2021-10-14 PROCEDURE — 99203 PR OFFICE/OUTPT VISIT, NEW, LEVL III, 30-44 MIN: ICD-10-PCS | Mod: 25,S$GLB,, | Performed by: STUDENT IN AN ORGANIZED HEALTH CARE EDUCATION/TRAINING PROGRAM

## 2021-10-14 PROCEDURE — 3078F DIAST BP <80 MM HG: CPT | Mod: CPTII,S$GLB,, | Performed by: STUDENT IN AN ORGANIZED HEALTH CARE EDUCATION/TRAINING PROGRAM

## 2021-10-14 PROCEDURE — 99999 PR PBB SHADOW E&M-EST. PATIENT-LVL III: CPT | Mod: PBBFAC,,, | Performed by: STUDENT IN AN ORGANIZED HEALTH CARE EDUCATION/TRAINING PROGRAM

## 2021-10-14 PROCEDURE — 99999 PR PBB SHADOW E&M-EST. PATIENT-LVL V: CPT | Mod: PBBFAC,,, | Performed by: FAMILY MEDICINE

## 2021-10-14 PROCEDURE — 1159F PR MEDICATION LIST DOCUMENTED IN MEDICAL RECORD: ICD-10-PCS | Mod: CPTII,S$GLB,, | Performed by: FAMILY MEDICINE

## 2021-10-14 PROCEDURE — 99396 PREV VISIT EST AGE 40-64: CPT | Mod: 25,S$GLB,, | Performed by: FAMILY MEDICINE

## 2021-10-14 PROCEDURE — 99999 PR PBB SHADOW E&M-EST. PATIENT-LVL V: ICD-10-PCS | Mod: PBBFAC,,, | Performed by: FAMILY MEDICINE

## 2021-10-14 PROCEDURE — 73630 X-RAY EXAM OF FOOT: CPT | Mod: TC,FY,PO,LT

## 2021-10-14 PROCEDURE — 3079F PR MOST RECENT DIASTOLIC BLOOD PRESSURE 80-89 MM HG: ICD-10-PCS | Mod: CPTII,S$GLB,, | Performed by: FAMILY MEDICINE

## 2021-10-14 PROCEDURE — 1160F PR REVIEW ALL MEDS BY PRESCRIBER/CLIN PHARMACIST DOCUMENTED: ICD-10-PCS | Mod: CPTII,S$GLB,, | Performed by: FAMILY MEDICINE

## 2021-10-14 PROCEDURE — 90471 IMMUNIZATION ADMIN: CPT | Mod: S$GLB,,, | Performed by: FAMILY MEDICINE

## 2021-10-14 PROCEDURE — 1159F MED LIST DOCD IN RCRD: CPT | Mod: CPTII,S$GLB,, | Performed by: STUDENT IN AN ORGANIZED HEALTH CARE EDUCATION/TRAINING PROGRAM

## 2021-10-14 PROCEDURE — 3074F SYST BP LT 130 MM HG: CPT | Mod: CPTII,S$GLB,, | Performed by: FAMILY MEDICINE

## 2021-10-14 PROCEDURE — 73630 XR FOOT COMPLETE 3 VIEW LEFT: ICD-10-PCS | Mod: 26,LT,, | Performed by: RADIOLOGY

## 2021-10-14 PROCEDURE — 1159F PR MEDICATION LIST DOCUMENTED IN MEDICAL RECORD: ICD-10-PCS | Mod: CPTII,S$GLB,, | Performed by: STUDENT IN AN ORGANIZED HEALTH CARE EDUCATION/TRAINING PROGRAM

## 2021-10-14 PROCEDURE — 1160F RVW MEDS BY RX/DR IN RCRD: CPT | Mod: CPTII,S$GLB,, | Performed by: FAMILY MEDICINE

## 2021-10-14 PROCEDURE — 3074F SYST BP LT 130 MM HG: CPT | Mod: CPTII,S$GLB,, | Performed by: STUDENT IN AN ORGANIZED HEALTH CARE EDUCATION/TRAINING PROGRAM

## 2021-10-14 PROCEDURE — 3044F PR MOST RECENT HEMOGLOBIN A1C LEVEL <7.0%: ICD-10-PCS | Mod: CPTII,S$GLB,, | Performed by: STUDENT IN AN ORGANIZED HEALTH CARE EDUCATION/TRAINING PROGRAM

## 2021-10-14 PROCEDURE — 3078F PR MOST RECENT DIASTOLIC BLOOD PRESSURE < 80 MM HG: ICD-10-PCS | Mod: CPTII,S$GLB,, | Performed by: STUDENT IN AN ORGANIZED HEALTH CARE EDUCATION/TRAINING PROGRAM

## 2021-10-14 PROCEDURE — 3074F PR MOST RECENT SYSTOLIC BLOOD PRESSURE < 130 MM HG: ICD-10-PCS | Mod: CPTII,S$GLB,, | Performed by: FAMILY MEDICINE

## 2021-10-14 PROCEDURE — 73630 X-RAY EXAM OF FOOT: CPT | Mod: 26,LT,, | Performed by: RADIOLOGY

## 2021-10-14 PROCEDURE — 3008F BODY MASS INDEX DOCD: CPT | Mod: CPTII,S$GLB,, | Performed by: FAMILY MEDICINE

## 2021-10-14 PROCEDURE — 90471 PR IMMUNIZ ADMIN,1 SINGLE/COMB VAC/TOXOID: ICD-10-PCS | Mod: S$GLB,,, | Performed by: FAMILY MEDICINE

## 2021-10-14 PROCEDURE — 20600 DRAIN/INJ JOINT/BURSA W/O US: CPT | Mod: LT,S$GLB,, | Performed by: STUDENT IN AN ORGANIZED HEALTH CARE EDUCATION/TRAINING PROGRAM

## 2021-10-14 RX ORDER — PREGABALIN 150 MG/1
150 CAPSULE ORAL 2 TIMES DAILY
Qty: 180 CAPSULE | Refills: 1 | Status: SHIPPED | OUTPATIENT
Start: 2021-10-14 | End: 2022-04-14

## 2021-10-14 RX ORDER — DEXAMETHASONE SODIUM PHOSPHATE 4 MG/ML
2 INJECTION, SOLUTION INTRA-ARTICULAR; INTRALESIONAL; INTRAMUSCULAR; INTRAVENOUS; SOFT TISSUE
Status: COMPLETED | OUTPATIENT
Start: 2021-10-14 | End: 2021-10-14

## 2021-10-14 RX ORDER — MELOXICAM 7.5 MG/1
7.5 TABLET ORAL DAILY
Qty: 90 TABLET | Refills: 0 | Status: SHIPPED | OUTPATIENT
Start: 2021-10-14 | End: 2021-10-15 | Stop reason: SDUPTHER

## 2021-10-14 RX ORDER — FLUOXETINE HYDROCHLORIDE 40 MG/1
40 CAPSULE ORAL DAILY
Qty: 90 CAPSULE | Refills: 1 | Status: SHIPPED | OUTPATIENT
Start: 2021-10-14 | End: 2022-05-05 | Stop reason: SDUPTHER

## 2021-10-14 RX ADMIN — DEXAMETHASONE SODIUM PHOSPHATE 2 MG: 4 INJECTION, SOLUTION INTRA-ARTICULAR; INTRALESIONAL; INTRAMUSCULAR; INTRAVENOUS; SOFT TISSUE at 04:10

## 2021-10-15 ENCOUNTER — TELEPHONE (OUTPATIENT)
Dept: FAMILY MEDICINE | Facility: CLINIC | Age: 51
End: 2021-10-15

## 2021-10-15 ENCOUNTER — TELEPHONE (OUTPATIENT)
Dept: ADMINISTRATIVE | Facility: OTHER | Age: 51
End: 2021-10-15

## 2021-10-15 DIAGNOSIS — N17.9 AKI (ACUTE KIDNEY INJURY): Primary | ICD-10-CM

## 2021-10-15 DIAGNOSIS — M25.551 RIGHT HIP PAIN: ICD-10-CM

## 2021-10-15 RX ORDER — MELOXICAM 7.5 MG/1
7.5 TABLET ORAL DAILY
Qty: 30 TABLET | Refills: 0 | Status: SHIPPED | OUTPATIENT
Start: 2021-10-15 | End: 2021-11-11 | Stop reason: SDUPTHER

## 2021-11-08 ENCOUNTER — OFFICE VISIT (OUTPATIENT)
Dept: SPORTS MEDICINE | Facility: CLINIC | Age: 51
End: 2021-11-08
Payer: COMMERCIAL

## 2021-11-08 VITALS
SYSTOLIC BLOOD PRESSURE: 120 MMHG | DIASTOLIC BLOOD PRESSURE: 72 MMHG | WEIGHT: 207 LBS | BODY MASS INDEX: 30.66 KG/M2 | HEIGHT: 69 IN

## 2021-11-08 DIAGNOSIS — G89.29 CHRONIC RIGHT HIP PAIN: Primary | ICD-10-CM

## 2021-11-08 DIAGNOSIS — M16.11 OSTEOARTHRITIS OF RIGHT HIP, UNSPECIFIED OSTEOARTHRITIS TYPE: ICD-10-CM

## 2021-11-08 DIAGNOSIS — M25.551 CHRONIC RIGHT HIP PAIN: Primary | ICD-10-CM

## 2021-11-08 PROCEDURE — 3078F DIAST BP <80 MM HG: CPT | Mod: CPTII,S$GLB,, | Performed by: ORTHOPAEDIC SURGERY

## 2021-11-08 PROCEDURE — 99999 PR PBB SHADOW E&M-EST. PATIENT-LVL III: ICD-10-PCS | Mod: PBBFAC,,, | Performed by: ORTHOPAEDIC SURGERY

## 2021-11-08 PROCEDURE — 3008F BODY MASS INDEX DOCD: CPT | Mod: CPTII,S$GLB,, | Performed by: ORTHOPAEDIC SURGERY

## 2021-11-08 PROCEDURE — 3044F HG A1C LEVEL LT 7.0%: CPT | Mod: CPTII,S$GLB,, | Performed by: ORTHOPAEDIC SURGERY

## 2021-11-08 PROCEDURE — 3008F PR BODY MASS INDEX (BMI) DOCUMENTED: ICD-10-PCS | Mod: CPTII,S$GLB,, | Performed by: ORTHOPAEDIC SURGERY

## 2021-11-08 PROCEDURE — 1160F RVW MEDS BY RX/DR IN RCRD: CPT | Mod: CPTII,S$GLB,, | Performed by: ORTHOPAEDIC SURGERY

## 2021-11-08 PROCEDURE — 20611 DRAIN/INJ JOINT/BURSA W/US: CPT | Mod: RT,S$GLB,, | Performed by: ORTHOPAEDIC SURGERY

## 2021-11-08 PROCEDURE — 99999 PR PBB SHADOW E&M-EST. PATIENT-LVL III: CPT | Mod: PBBFAC,,, | Performed by: ORTHOPAEDIC SURGERY

## 2021-11-08 PROCEDURE — 1159F MED LIST DOCD IN RCRD: CPT | Mod: CPTII,S$GLB,, | Performed by: ORTHOPAEDIC SURGERY

## 2021-11-08 PROCEDURE — 3078F PR MOST RECENT DIASTOLIC BLOOD PRESSURE < 80 MM HG: ICD-10-PCS | Mod: CPTII,S$GLB,, | Performed by: ORTHOPAEDIC SURGERY

## 2021-11-08 PROCEDURE — 99214 OFFICE O/P EST MOD 30 MIN: CPT | Mod: 25,S$GLB,, | Performed by: ORTHOPAEDIC SURGERY

## 2021-11-08 PROCEDURE — 3074F SYST BP LT 130 MM HG: CPT | Mod: CPTII,S$GLB,, | Performed by: ORTHOPAEDIC SURGERY

## 2021-11-08 PROCEDURE — 1160F PR REVIEW ALL MEDS BY PRESCRIBER/CLIN PHARMACIST DOCUMENTED: ICD-10-PCS | Mod: CPTII,S$GLB,, | Performed by: ORTHOPAEDIC SURGERY

## 2021-11-08 PROCEDURE — 1159F PR MEDICATION LIST DOCUMENTED IN MEDICAL RECORD: ICD-10-PCS | Mod: CPTII,S$GLB,, | Performed by: ORTHOPAEDIC SURGERY

## 2021-11-08 PROCEDURE — 99214 PR OFFICE/OUTPT VISIT, EST, LEVL IV, 30-39 MIN: ICD-10-PCS | Mod: 25,S$GLB,, | Performed by: ORTHOPAEDIC SURGERY

## 2021-11-08 PROCEDURE — 3044F PR MOST RECENT HEMOGLOBIN A1C LEVEL <7.0%: ICD-10-PCS | Mod: CPTII,S$GLB,, | Performed by: ORTHOPAEDIC SURGERY

## 2021-11-08 PROCEDURE — 20611 PR DRAIN/ASP/INJECT MAJOR JOINT/BURSA W/US GUIDANCE: ICD-10-PCS | Mod: RT,S$GLB,, | Performed by: ORTHOPAEDIC SURGERY

## 2021-11-08 PROCEDURE — 3074F PR MOST RECENT SYSTOLIC BLOOD PRESSURE < 130 MM HG: ICD-10-PCS | Mod: CPTII,S$GLB,, | Performed by: ORTHOPAEDIC SURGERY

## 2021-11-08 RX ORDER — TRIAMCINOLONE ACETONIDE 40 MG/ML
40 INJECTION, SUSPENSION INTRA-ARTICULAR; INTRAMUSCULAR
Status: DISCONTINUED | OUTPATIENT
Start: 2021-11-08 | End: 2022-05-05

## 2021-11-11 ENCOUNTER — PATIENT MESSAGE (OUTPATIENT)
Dept: FAMILY MEDICINE | Facility: CLINIC | Age: 51
End: 2021-11-11
Payer: COMMERCIAL

## 2021-11-11 DIAGNOSIS — M25.551 RIGHT HIP PAIN: ICD-10-CM

## 2021-11-11 RX ORDER — MELOXICAM 7.5 MG/1
7.5 TABLET ORAL DAILY
Qty: 30 TABLET | Refills: 0 | Status: SHIPPED | OUTPATIENT
Start: 2021-11-11 | End: 2022-01-15 | Stop reason: SDUPTHER

## 2021-11-24 ENCOUNTER — PATIENT MESSAGE (OUTPATIENT)
Dept: HEMATOLOGY/ONCOLOGY | Facility: CLINIC | Age: 51
End: 2021-11-24
Payer: COMMERCIAL

## 2021-11-24 DIAGNOSIS — C09.9 SQUAMOUS CELL CARCINOMA OF PALATINE TONSIL: Primary | ICD-10-CM

## 2021-12-09 ENCOUNTER — TELEPHONE (OUTPATIENT)
Dept: HEMATOLOGY/ONCOLOGY | Facility: CLINIC | Age: 51
End: 2021-12-09
Payer: COMMERCIAL

## 2022-01-10 ENCOUNTER — HOSPITAL ENCOUNTER (OUTPATIENT)
Dept: RADIOLOGY | Facility: HOSPITAL | Age: 52
Discharge: HOME | End: 2022-01-10
Attending: INTERNAL MEDICINE
Payer: COMMERCIAL

## 2022-01-10 DIAGNOSIS — C09.9 SQUAMOUS CELL CARCINOMA OF PALATINE TONSIL: ICD-10-CM

## 2022-01-10 PROCEDURE — A9698 NON-RAD CONTRAST MATERIALNOC: HCPCS | Performed by: INTERNAL MEDICINE

## 2022-01-10 PROCEDURE — 25500020 PHARM REV CODE 255: Performed by: INTERNAL MEDICINE

## 2022-01-10 PROCEDURE — 70491 CT SOFT TISSUE NECK W/DYE: CPT | Mod: TC

## 2022-01-10 PROCEDURE — 74177 CT CHEST ABDOMEN PELVIS WITH CONTRAST (XPD): ICD-10-PCS | Mod: 26,,, | Performed by: RADIOLOGY

## 2022-01-10 PROCEDURE — 70491 CT SOFT TISSUE NECK W/DYE: CPT | Mod: 26,,, | Performed by: STUDENT IN AN ORGANIZED HEALTH CARE EDUCATION/TRAINING PROGRAM

## 2022-01-10 PROCEDURE — 74177 CT ABD & PELVIS W/CONTRAST: CPT | Mod: TC

## 2022-01-10 PROCEDURE — 71260 CT THORAX DX C+: CPT | Mod: TC

## 2022-01-10 PROCEDURE — 71260 CT CHEST ABDOMEN PELVIS WITH CONTRAST (XPD): ICD-10-PCS | Mod: 26,,, | Performed by: RADIOLOGY

## 2022-01-10 PROCEDURE — 71260 CT THORAX DX C+: CPT | Mod: 26,,, | Performed by: RADIOLOGY

## 2022-01-10 PROCEDURE — 74177 CT ABD & PELVIS W/CONTRAST: CPT | Mod: 26,,, | Performed by: RADIOLOGY

## 2022-01-10 PROCEDURE — 70491 CT SOFT TISSUE NECK WITH CONTRAST: ICD-10-PCS | Mod: 26,,, | Performed by: STUDENT IN AN ORGANIZED HEALTH CARE EDUCATION/TRAINING PROGRAM

## 2022-01-10 RX ADMIN — IOHEXOL 1000 ML: 9 SOLUTION ORAL at 02:01

## 2022-01-10 RX ADMIN — IOHEXOL 75 ML: 350 INJECTION, SOLUTION INTRAVENOUS at 03:01

## 2022-01-12 ENCOUNTER — OFFICE VISIT (OUTPATIENT)
Dept: HEMATOLOGY/ONCOLOGY | Facility: CLINIC | Age: 52
End: 2022-01-12
Payer: COMMERCIAL

## 2022-01-12 VITALS
WEIGHT: 211.44 LBS | OXYGEN SATURATION: 98 % | HEIGHT: 69 IN | DIASTOLIC BLOOD PRESSURE: 94 MMHG | TEMPERATURE: 98 F | HEART RATE: 55 BPM | SYSTOLIC BLOOD PRESSURE: 165 MMHG | BODY MASS INDEX: 31.32 KG/M2 | RESPIRATION RATE: 18 BRPM

## 2022-01-12 DIAGNOSIS — G62.0 CHEMOTHERAPY-INDUCED NEUROPATHY: Chronic | ICD-10-CM

## 2022-01-12 DIAGNOSIS — Z85.810 HISTORY OF CANCER OF LINGUAL TONSIL: Primary | ICD-10-CM

## 2022-01-12 DIAGNOSIS — T45.1X5A CHEMOTHERAPY-INDUCED NEUROPATHY: Chronic | ICD-10-CM

## 2022-01-12 PROCEDURE — 3008F PR BODY MASS INDEX (BMI) DOCUMENTED: ICD-10-PCS | Mod: CPTII,S$GLB,, | Performed by: INTERNAL MEDICINE

## 2022-01-12 PROCEDURE — 99213 PR OFFICE/OUTPT VISIT, EST, LEVL III, 20-29 MIN: ICD-10-PCS | Mod: S$GLB,,, | Performed by: INTERNAL MEDICINE

## 2022-01-12 PROCEDURE — 3077F PR MOST RECENT SYSTOLIC BLOOD PRESSURE >= 140 MM HG: ICD-10-PCS | Mod: CPTII,S$GLB,, | Performed by: INTERNAL MEDICINE

## 2022-01-12 PROCEDURE — 3008F BODY MASS INDEX DOCD: CPT | Mod: CPTII,S$GLB,, | Performed by: INTERNAL MEDICINE

## 2022-01-12 PROCEDURE — 3080F DIAST BP >= 90 MM HG: CPT | Mod: CPTII,S$GLB,, | Performed by: INTERNAL MEDICINE

## 2022-01-12 PROCEDURE — 3077F SYST BP >= 140 MM HG: CPT | Mod: CPTII,S$GLB,, | Performed by: INTERNAL MEDICINE

## 2022-01-12 PROCEDURE — 99999 PR PBB SHADOW E&M-EST. PATIENT-LVL IV: CPT | Mod: PBBFAC,,, | Performed by: INTERNAL MEDICINE

## 2022-01-12 PROCEDURE — 3080F PR MOST RECENT DIASTOLIC BLOOD PRESSURE >= 90 MM HG: ICD-10-PCS | Mod: CPTII,S$GLB,, | Performed by: INTERNAL MEDICINE

## 2022-01-12 PROCEDURE — 1159F PR MEDICATION LIST DOCUMENTED IN MEDICAL RECORD: ICD-10-PCS | Mod: CPTII,S$GLB,, | Performed by: INTERNAL MEDICINE

## 2022-01-12 PROCEDURE — 1159F MED LIST DOCD IN RCRD: CPT | Mod: CPTII,S$GLB,, | Performed by: INTERNAL MEDICINE

## 2022-01-12 PROCEDURE — 99999 PR PBB SHADOW E&M-EST. PATIENT-LVL IV: ICD-10-PCS | Mod: PBBFAC,,, | Performed by: INTERNAL MEDICINE

## 2022-01-12 PROCEDURE — 99213 OFFICE O/P EST LOW 20 MIN: CPT | Mod: S$GLB,,, | Performed by: INTERNAL MEDICINE

## 2022-01-12 RX ORDER — PREGABALIN 75 MG/1
CAPSULE ORAL
Qty: 90 CAPSULE | Refills: 2 | Status: SHIPPED | OUTPATIENT
Start: 2022-01-12 | End: 2022-01-18 | Stop reason: SDUPTHER

## 2022-01-12 NOTE — PROGRESS NOTES
Subjective:       Patient ID: Burton Whiting is a 52 y.o. male.    Chief Complaint: Squamous cell carcinoma of palatine tonsil  Squamous cell carcinoma of palatine tonsil; g-tube malodorous; left neck pain 2/10; 4 cans formula per day; and r arm port---red/warm to touch  Mr. Burton Whiting is a 48-year-old male, former smoker, who presents today with a four-month history of enlarging neck mass.  He initially delayed care due to lack of insurance.  He was seen by Dr. Turpin who referred him to see Dr. Olguin.  He had a CT that showed a 3.8 x 3.8 x 4.9 cm soft tissue mass arising from the left palatine tonsil resulting in moderate narrowing of the hypopharyngeal airway adjacent extensive matted left cervical lymphadenopathy was noted.  The largest ella mass was 6.6 x 9 x 2.6 cm extending inferiorly to the supraclavicular region.  The mass pushed the trachea and the left common carotid artery to the right.  Additional representative of cervical lymph nodes measuring 2.9 x 3.1 x 3.5 cm on axial image 37 of series 3   and 2.4 x 2.1 x 2.2 cm on axial image 55 of series 3.  There is also a sclerotic lesion involving the midline of the clivus measuring 1.2 cm.  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  PET scan performed on 01/19/2018 revealed persistent evidence of a soft tissue mass arising from the left palatine tonsil resulting in moderate narrowing of the hypopharyngeal   airway.  The mass is hypermetabolic demonstrating an SUV max of 18.5.  There is also persistent adjacent extensive matted left cervical lymphadenopathy, largest of this measuring 6.7 x 8.42 with hypermetabolic activity and SUV max of 20.5.  Lymphadenopathy is again noted to have a mass effect on the trachea and left common carotid artery, pushing both structures towards the right.  There is also prominent right cervical lymph nodes with the largest measuring 0.8 cm and demonstrating mild hypermetabolic activity with SUV max  "of 1.8, physiologic   distribution of FDG in the gray matter.  There are 3 pulmonary nodules noted within the right lung, the largest is in the anterior segment of the right upper lobe measuring 1 cm, second is visualized in the middle lobe measuring 0.6 cm and the third is within the posterior basal segment measuring 0.6 cm.  There is one pulmonary nodule within the left lung within the lateral basal segment measuring 0.6 cm.  These nodules do not demonstrate hypermetabolic activity; however, they are below the threshold for observation with PET     He underwent MRI cervical spine revealed "No evidence of metastatic disease to the cervical spine. Multilevel degenerative changes of the cervical spine with disc protrusion at C5-6 which results in moderate to severe spinal canal stenosis and and associated cord signal abnormality, suggestive of compressive myelopathy. 6 mm T1 hypointense non-enhancing lesion in the clivus.  Continued followup is suggested. 9 mm inferior descent of the cerebellar tonsils below the foramen magnum, suggestive of Chiari 1 malformation"  MRI thoracic spine "No evidence of metastatic disease involving the thoracic spine. Incidental hemangioma involving the T7 vertebral body. Mild degenerative disc disease without any significant spinal canal stenosis or neuroforaminal narrowing.   He completed 3 cycles of TPF and 6 weeks of Cisplatin and RT. Completed on 6/26/18           His PET scan from 9/25/18 revealed "Progression of disease with multiple new hypermetabolic foci including the manubrium, mediastinal/retrocrural lymph nodes, and lungs. Partial therapeutic response within the presumed radiation field involving the left cervical mass, and complete therapeutic response in the right cervical lymph node, clivus, and left palatine tonsil. New soft tissue thickening and hypermetabolism in the left aryepiglottic fold and right cricoid cartilage"      He has been on Opdivo from " 2/19-7/19             HPI He comes in to review his CT neck, chest, abdomen/pelvis which reveal no evidence of recurrence  He feels well and denies any new issues     Review of Systems   Constitutional: Negative for appetite change, fatigue and unexpected weight change.   HENT: Negative for mouth sores.    Eyes: Negative for visual disturbance.   Respiratory: Negative for cough and shortness of breath.    Cardiovascular: Negative for chest pain.   Gastrointestinal: Negative for abdominal pain and diarrhea.   Genitourinary: Negative for frequency.   Musculoskeletal: Negative for back pain.   Integumentary:  Negative for rash.   Neurological: Positive for numbness. Negative for headaches.   Hematological: Negative for adenopathy.   Psychiatric/Behavioral: The patient is not nervous/anxious.    All other systems reviewed and are negative.        Objective:      Physical Exam  Vitals reviewed.   Constitutional:       Appearance: He is well-developed and well-nourished.   HENT:      Mouth/Throat:      Pharynx: No oropharyngeal exudate.   Cardiovascular:      Rate and Rhythm: Normal rate.      Heart sounds: Normal heart sounds.   Pulmonary:      Effort: Pulmonary effort is normal.      Breath sounds: Normal breath sounds. No wheezing.   Abdominal:      General: Bowel sounds are normal.      Palpations: Abdomen is soft.      Tenderness: There is no abdominal tenderness.   Musculoskeletal:         General: No tenderness or edema.   Lymphadenopathy:      Cervical: No cervical adenopathy.   Skin:     General: Skin is warm and dry.      Findings: No rash.   Neurological:      Mental Status: He is alert and oriented to person, place, and time.      Coordination: Coordination normal.   Psychiatric:         Mood and Affect: Mood and affect normal.         Thought Content: Thought content normal.         Judgment: Judgment normal.           LABS:  WBC   Date Value Ref Range Status   01/10/2022 5.51 3.90 - 12.70 K/uL Final      Hemoglobin   Date Value Ref Range Status   01/10/2022 14.2 14.0 - 18.0 g/dL Final     POC Hematocrit   Date Value Ref Range Status   09/08/2019 17 (LL) 36 - 54 %PCV Final     Hematocrit   Date Value Ref Range Status   01/10/2022 43.0 40.0 - 54.0 % Final     Platelets   Date Value Ref Range Status   01/10/2022 188 150 - 450 K/uL Final     Gran # (ANC)   Date Value Ref Range Status   01/10/2022 4.1 1.8 - 7.7 K/uL Final     Comment:     The ANC is based on a white cell differential from an   automated cell counter. It has not been microscopically   reviewed for the presence of abnormal cells. Clinical   correlation is required.         Chemistry        Component Value Date/Time     01/10/2022 1442    K 4.5 01/10/2022 1442     01/10/2022 1442    CO2 28 01/10/2022 1442    BUN 24 (H) 01/10/2022 1442    CREATININE 1.6 (H) 01/10/2022 1442    GLU 78 01/10/2022 1442        Component Value Date/Time    CALCIUM 9.7 01/10/2022 1442    ALKPHOS 93 01/10/2022 1442    AST 24 01/10/2022 1442    ALT 21 01/10/2022 1442    BILITOT 0.3 01/10/2022 1442    ESTGFRAFRICA 56 (A) 01/10/2022 1442    EGFRNONAA 48 (A) 01/10/2022 1442        TSH   Date Value Ref Range Status   01/10/2022 1.846 0.400 - 4.000 uIU/mL Final     Free T4   Date Value Ref Range Status   01/10/2022 0.93 0.71 - 1.51 ng/dL Final        Assessment:       Problem List Items Addressed This Visit     History of cancer of lingual tonsil - Primary    Relevant Orders    Comprehensive Metabolic Panel    CBC Oncology    TSH    T4, Free    CT Chest Abdomen Pelvis With Contrast    CT Soft Tissue Neck With Contrast          Plan:        He is doing well with no evidence of recurrence on his scans. He will return in  6 months with labs and CT scans    Above care plan was discussed with patient and all questions were addressed to his satisfaction

## 2022-01-18 DIAGNOSIS — G62.0 CHEMOTHERAPY-INDUCED NEUROPATHY: Chronic | ICD-10-CM

## 2022-01-18 DIAGNOSIS — T45.1X5A CHEMOTHERAPY-INDUCED NEUROPATHY: Chronic | ICD-10-CM

## 2022-01-18 RX ORDER — PREGABALIN 75 MG/1
CAPSULE ORAL
Qty: 90 CAPSULE | Refills: 2 | Status: SHIPPED | OUTPATIENT
Start: 2022-01-18 | End: 2022-04-20 | Stop reason: SDUPTHER

## 2022-02-12 NOTE — Clinical Note
Normal saline 2 liters today and tomorrow. CAncel chemo today Schedule CMP. Mag and phos and Cisplatin on 6/25. IV fluids on day 2 Schedule weekly labs,CBC, CMP, mag and phos   Schedule to see with CBC,CMP, mag and phos and see me in 2 weeks  
Schedule 2 days of neupogen after cisplatin next week
12-Feb-2022 03:47

## 2022-02-21 ENCOUNTER — OFFICE VISIT (OUTPATIENT)
Dept: SPORTS MEDICINE | Facility: CLINIC | Age: 52
End: 2022-02-21
Payer: COMMERCIAL

## 2022-02-21 VITALS
HEIGHT: 69 IN | WEIGHT: 211 LBS | DIASTOLIC BLOOD PRESSURE: 93 MMHG | BODY MASS INDEX: 31.25 KG/M2 | SYSTOLIC BLOOD PRESSURE: 148 MMHG

## 2022-02-21 DIAGNOSIS — M25.551 RIGHT HIP PAIN: Primary | ICD-10-CM

## 2022-02-21 DIAGNOSIS — M16.11 OSTEOARTHRITIS OF RIGHT HIP, UNSPECIFIED OSTEOARTHRITIS TYPE: ICD-10-CM

## 2022-02-21 PROCEDURE — 99999 PR PBB SHADOW E&M-EST. PATIENT-LVL III: CPT | Mod: PBBFAC,,, | Performed by: ORTHOPAEDIC SURGERY

## 2022-02-21 PROCEDURE — 3077F PR MOST RECENT SYSTOLIC BLOOD PRESSURE >= 140 MM HG: ICD-10-PCS | Mod: CPTII,S$GLB,, | Performed by: ORTHOPAEDIC SURGERY

## 2022-02-21 PROCEDURE — 99214 OFFICE O/P EST MOD 30 MIN: CPT | Mod: S$GLB,,, | Performed by: ORTHOPAEDIC SURGERY

## 2022-02-21 PROCEDURE — 3008F PR BODY MASS INDEX (BMI) DOCUMENTED: ICD-10-PCS | Mod: CPTII,S$GLB,, | Performed by: ORTHOPAEDIC SURGERY

## 2022-02-21 PROCEDURE — 3080F DIAST BP >= 90 MM HG: CPT | Mod: CPTII,S$GLB,, | Performed by: ORTHOPAEDIC SURGERY

## 2022-02-21 PROCEDURE — 3080F PR MOST RECENT DIASTOLIC BLOOD PRESSURE >= 90 MM HG: ICD-10-PCS | Mod: CPTII,S$GLB,, | Performed by: ORTHOPAEDIC SURGERY

## 2022-02-21 PROCEDURE — 1160F PR REVIEW ALL MEDS BY PRESCRIBER/CLIN PHARMACIST DOCUMENTED: ICD-10-PCS | Mod: CPTII,S$GLB,, | Performed by: ORTHOPAEDIC SURGERY

## 2022-02-21 PROCEDURE — 99999 PR PBB SHADOW E&M-EST. PATIENT-LVL III: ICD-10-PCS | Mod: PBBFAC,,, | Performed by: ORTHOPAEDIC SURGERY

## 2022-02-21 PROCEDURE — 3077F SYST BP >= 140 MM HG: CPT | Mod: CPTII,S$GLB,, | Performed by: ORTHOPAEDIC SURGERY

## 2022-02-21 PROCEDURE — 1160F RVW MEDS BY RX/DR IN RCRD: CPT | Mod: CPTII,S$GLB,, | Performed by: ORTHOPAEDIC SURGERY

## 2022-02-21 PROCEDURE — 1159F PR MEDICATION LIST DOCUMENTED IN MEDICAL RECORD: ICD-10-PCS | Mod: CPTII,S$GLB,, | Performed by: ORTHOPAEDIC SURGERY

## 2022-02-21 PROCEDURE — 1159F MED LIST DOCD IN RCRD: CPT | Mod: CPTII,S$GLB,, | Performed by: ORTHOPAEDIC SURGERY

## 2022-02-21 PROCEDURE — 3008F BODY MASS INDEX DOCD: CPT | Mod: CPTII,S$GLB,, | Performed by: ORTHOPAEDIC SURGERY

## 2022-02-21 PROCEDURE — 99214 PR OFFICE/OUTPT VISIT, EST, LEVL IV, 30-39 MIN: ICD-10-PCS | Mod: S$GLB,,, | Performed by: ORTHOPAEDIC SURGERY

## 2022-02-21 RX ORDER — MELOXICAM 7.5 MG/1
7.5 TABLET ORAL DAILY PRN
Qty: 30 TABLET | Refills: 1 | Status: SHIPPED | OUTPATIENT
Start: 2022-02-21 | End: 2022-05-05
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
## 2022-02-22 DIAGNOSIS — D84.9 IMMUNOSUPPRESSED STATUS: ICD-10-CM

## 2022-03-31 ENCOUNTER — OFFICE VISIT (OUTPATIENT)
Dept: PODIATRY | Facility: CLINIC | Age: 52
End: 2022-03-31
Payer: COMMERCIAL

## 2022-03-31 VITALS — HEIGHT: 69 IN | BODY MASS INDEX: 31.16 KG/M2

## 2022-03-31 DIAGNOSIS — M20.40 HAMMER TOE, UNSPECIFIED LATERALITY: ICD-10-CM

## 2022-03-31 DIAGNOSIS — M21.619 BUNION: ICD-10-CM

## 2022-03-31 DIAGNOSIS — B07.0 PLANTAR WART: Primary | ICD-10-CM

## 2022-03-31 DIAGNOSIS — M79.672 LEFT FOOT PAIN: ICD-10-CM

## 2022-03-31 DIAGNOSIS — M77.40 METATARSALGIA, UNSPECIFIED LATERALITY: ICD-10-CM

## 2022-03-31 DIAGNOSIS — M19.079 1ST MTP ARTHRITIS: ICD-10-CM

## 2022-03-31 PROCEDURE — 99999 PR PBB SHADOW E&M-EST. PATIENT-LVL III: ICD-10-PCS | Mod: PBBFAC,,, | Performed by: STUDENT IN AN ORGANIZED HEALTH CARE EDUCATION/TRAINING PROGRAM

## 2022-03-31 PROCEDURE — 99214 PR OFFICE/OUTPT VISIT, EST, LEVL IV, 30-39 MIN: ICD-10-PCS | Mod: S$GLB,,, | Performed by: STUDENT IN AN ORGANIZED HEALTH CARE EDUCATION/TRAINING PROGRAM

## 2022-03-31 PROCEDURE — 1159F MED LIST DOCD IN RCRD: CPT | Mod: CPTII,S$GLB,, | Performed by: STUDENT IN AN ORGANIZED HEALTH CARE EDUCATION/TRAINING PROGRAM

## 2022-03-31 PROCEDURE — 99999 PR PBB SHADOW E&M-EST. PATIENT-LVL III: CPT | Mod: PBBFAC,,, | Performed by: STUDENT IN AN ORGANIZED HEALTH CARE EDUCATION/TRAINING PROGRAM

## 2022-03-31 PROCEDURE — 3008F PR BODY MASS INDEX (BMI) DOCUMENTED: ICD-10-PCS | Mod: CPTII,S$GLB,, | Performed by: STUDENT IN AN ORGANIZED HEALTH CARE EDUCATION/TRAINING PROGRAM

## 2022-03-31 PROCEDURE — 3008F BODY MASS INDEX DOCD: CPT | Mod: CPTII,S$GLB,, | Performed by: STUDENT IN AN ORGANIZED HEALTH CARE EDUCATION/TRAINING PROGRAM

## 2022-03-31 PROCEDURE — 1159F PR MEDICATION LIST DOCUMENTED IN MEDICAL RECORD: ICD-10-PCS | Mod: CPTII,S$GLB,, | Performed by: STUDENT IN AN ORGANIZED HEALTH CARE EDUCATION/TRAINING PROGRAM

## 2022-03-31 PROCEDURE — 99214 OFFICE O/P EST MOD 30 MIN: CPT | Mod: S$GLB,,, | Performed by: STUDENT IN AN ORGANIZED HEALTH CARE EDUCATION/TRAINING PROGRAM

## 2022-03-31 RX ORDER — SALICYLIC ACID 40 %
ADHESIVE PATCH, MEDICATED TOPICAL
Qty: 48 EACH | Refills: 0 | Status: SHIPPED | OUTPATIENT
Start: 2022-03-31 | End: 2022-08-10

## 2022-03-31 NOTE — PROGRESS NOTES
Subjective:      Patient ID: Burton Whiting is a 52 y.o. male.    Chief Complaint: Foot Problem (Right ft., corn or plantar warts??? Bunion, got injection the last time he was here and ask for may another injection, it helped), Foot Pain (Right ft.,), and Peripheral Neuropathy    Burton is a 52 y.o. male who presents to the podiatry clinic  with complaint of  left foot pain. Onset of the symptoms was several weeks ago. Precipitating event: none known. Current symptoms include: worsening symptoms after a period of activity. Aggravating factors: walking. States alexandra has foot numbness for many years but has painful throbbing at the end of the day to his left foot, great toe. Symptoms have gradually worsened. Patient has had no prior foot problems. Evaluation to date: plain films: abnormal bunion deformity. Treatment to date: none. Patients rates pain 4/10 on pain scale.    3/31/22: Pt seen today for right foot callus, states developed over the past few weeks, relates to significant pain when he steps down. States he is considering surgery for bunion deformity as well. No other pedal complaints.         Review of Systems   Constitutional: Negative for chills, decreased appetite, diaphoresis and fever.   HENT: Negative for congestion and hearing loss.    Cardiovascular: Negative for chest pain, claudication, leg swelling and syncope.   Respiratory: Negative for cough and shortness of breath.    Skin: Negative for color change, dry skin, flushing, itching, nail changes, poor wound healing and rash.   Musculoskeletal: Negative for arthritis, back pain, joint pain and joint swelling.   Gastrointestinal: Negative for nausea and vomiting.   Neurological: Positive for numbness. Negative for focal weakness, paresthesias and weakness.   Psychiatric/Behavioral: Negative for altered mental status. The patient is not nervous/anxious.            Objective:      Physical Exam  Constitutional:       General: He is not in acute  distress.     Appearance: Normal appearance. He is well-developed. He is not diaphoretic.   Cardiovascular:      Comments: Dorsalis pedis and posterior tibial pulses are within normal limits. Skin temperature is within normal limits. Toes are cool to touch and feet are warm proximally. Hair growth is within normal limits. Skin is normotrophic and without hyperpigmentation. No edema noted. No varicosities or spider veins noted, bilaterally.     Musculoskeletal:         General: Tenderness present.      Comments: Adequate joint range of motion without pain, limitation, nor crepitation to foot and ankle joints. Muscle strength is 5/5 in all groups bilaterally.    HAV deformity to left foot, tenderness to dorsomedial bump and to 1st MTPJ with dorsiflexion, <20 degrees noted, loaded. No crepitus noted with ROM    Semirigid hammertoe deformity noted to left 2nd and 3rd digit with tenderness sub 2nd metatarsal head.    2nd metatarsal head with overlying callus, upon debridement with plantar wart noted x3, with loss of RSTL, spongy core and pin point bleeding, consistent with verruca plantaris   Feet:      Right foot:      Skin integrity: Dry skin present. No ulcer, blister, erythema or warmth.      Left foot:      Skin integrity: Dry skin present. No ulcer, blister, erythema or warmth.   Lymphadenopathy:      Comments: Negative lymphadenopathy bilateral popliteal fossa and tarsal tunnel.    Skin:     General: Skin is warm and dry.      Capillary Refill: Capillary refill takes less than 2 seconds.      Comments: Skin is warm and dry, no acute signs of infection noted bilaterally      Toenails are well trimmed and of normal morphology    Otherwise, no open wounds, macerations or hyperkeratotic lesions, bilaterally            Neurological:      Mental Status: He is alert and oriented to person, place, and time.      Sensory: Sensory deficit present.      Motor: No abnormal muscle tone.      Comments: Light touch within normal  limits.   Psychiatric:         Mood and Affect: Mood normal.         Behavior: Behavior normal.         Thought Content: Thought content normal.               Assessment:       Encounter Diagnoses   Name Primary?    Left foot pain     Bunion     1st MTP arthritis     Hammer toe, unspecified laterality     Metatarsalgia, unspecified laterality     Plantar wart Yes         Plan:       Burton was seen today for foot problem, foot pain and peripheral neuropathy.    Diagnoses and all orders for this visit:    Plantar wart    Left foot pain    Bunion    1st MTP arthritis    Hammer toe, unspecified laterality    Metatarsalgia, unspecified laterality    Other orders  -     salicylic acid (COMPOUND W) 40 % PtMd; Apply according to direction      I counseled the patient on his conditions, their implications and medical management.    -Xray independently reviewed with patient noting bunion deformity and mild arthritic changes to 1st MTPJ.   -Discussed surgical vs conservative treatment options for foot pain, patient relates he is interested in surgery at this time, referral to Dr. Cleary for surgical discussion.   -Recommend tennis shoes with wide and supportive toe box at all times while ambulating to reduce pressure. Consider [place] one one shoes. Stretch calf muscles as demonstrated in clinic  -RICE, OTC NSAIDs PRN pain  -Discussed etiology and treatment of plantar wart, including OTC treatments with list dispensed. Pt elects for compound w treatment at this time. Using a sterile 15 blade, overlying hyperkeratosis debrided without incident. Apply compound w as directed       -RTC  PRN

## 2022-05-04 ENCOUNTER — PATIENT OUTREACH (OUTPATIENT)
Dept: ADMINISTRATIVE | Facility: OTHER | Age: 52
End: 2022-05-04
Payer: COMMERCIAL

## 2022-05-04 NOTE — PROGRESS NOTES
Health Maintenance Due   Topic Date Due    Shingles Vaccine (1 of 2) Never done    COVID-19 Vaccine (3 - Booster for Moderna series) 01/20/2022     Updates were requested from care everywhere.  Chart was reviewed for overdue Proactive [facility] Encounters (PAM) topics (CRS, Breast Cancer Screening, Eye exam)  Health Maintenance has been updated.  LINKS immunization registry triggered.  Immunizations were reconciled.

## 2022-05-05 ENCOUNTER — OFFICE VISIT (OUTPATIENT)
Dept: PODIATRY | Facility: CLINIC | Age: 52
End: 2022-05-05
Payer: COMMERCIAL

## 2022-05-05 ENCOUNTER — OFFICE VISIT (OUTPATIENT)
Dept: FAMILY MEDICINE | Facility: CLINIC | Age: 52
End: 2022-05-05
Payer: COMMERCIAL

## 2022-05-05 ENCOUNTER — HOSPITAL ENCOUNTER (OUTPATIENT)
Dept: RADIOLOGY | Facility: HOSPITAL | Age: 52
Discharge: HOME | End: 2022-05-05
Attending: PODIATRIST
Payer: COMMERCIAL

## 2022-05-05 VITALS
DIASTOLIC BLOOD PRESSURE: 90 MMHG | BODY MASS INDEX: 31.25 KG/M2 | SYSTOLIC BLOOD PRESSURE: 138 MMHG | WEIGHT: 211 LBS | HEIGHT: 69 IN | HEART RATE: 67 BPM

## 2022-05-05 VITALS
HEART RATE: 66 BPM | BODY MASS INDEX: 31.77 KG/M2 | OXYGEN SATURATION: 96 % | WEIGHT: 214.5 LBS | SYSTOLIC BLOOD PRESSURE: 120 MMHG | HEIGHT: 69 IN | DIASTOLIC BLOOD PRESSURE: 80 MMHG

## 2022-05-05 DIAGNOSIS — C09.9 SQUAMOUS CELL CARCINOMA OF PALATINE TONSIL: ICD-10-CM

## 2022-05-05 DIAGNOSIS — T45.1X5A CHEMOTHERAPY-INDUCED NEUROPATHY: Primary | Chronic | ICD-10-CM

## 2022-05-05 DIAGNOSIS — F32.A DEPRESSION, UNSPECIFIED DEPRESSION TYPE: ICD-10-CM

## 2022-05-05 DIAGNOSIS — M20.5X2 ACQUIRED CLAW TOE, LEFT: ICD-10-CM

## 2022-05-05 DIAGNOSIS — B07.0 PLANTAR WART OF LEFT FOOT: ICD-10-CM

## 2022-05-05 DIAGNOSIS — G62.0 CHEMOTHERAPY-INDUCED NEUROPATHY: Primary | Chronic | ICD-10-CM

## 2022-05-05 DIAGNOSIS — M54.16 LUMBAR RADICULOPATHY, CHRONIC: ICD-10-CM

## 2022-05-05 DIAGNOSIS — M20.12 HALLUX VALGUS, LEFT: ICD-10-CM

## 2022-05-05 DIAGNOSIS — N18.31 STAGE 3A CHRONIC KIDNEY DISEASE: ICD-10-CM

## 2022-05-05 DIAGNOSIS — M20.12 HALLUX VALGUS, LEFT: Primary | ICD-10-CM

## 2022-05-05 DIAGNOSIS — M79.2 NEUROPATHIC PAIN: ICD-10-CM

## 2022-05-05 PROCEDURE — 1159F PR MEDICATION LIST DOCUMENTED IN MEDICAL RECORD: ICD-10-PCS | Mod: CPTII,S$GLB,, | Performed by: PODIATRIST

## 2022-05-05 PROCEDURE — 3075F SYST BP GE 130 - 139MM HG: CPT | Mod: CPTII,S$GLB,, | Performed by: PODIATRIST

## 2022-05-05 PROCEDURE — 99999 PR PBB SHADOW E&M-EST. PATIENT-LVL IV: CPT | Mod: PBBFAC,,, | Performed by: FAMILY MEDICINE

## 2022-05-05 PROCEDURE — 99999 PR PBB SHADOW E&M-EST. PATIENT-LVL IV: ICD-10-PCS | Mod: PBBFAC,,, | Performed by: FAMILY MEDICINE

## 2022-05-05 PROCEDURE — 3008F BODY MASS INDEX DOCD: CPT | Mod: CPTII,S$GLB,, | Performed by: PODIATRIST

## 2022-05-05 PROCEDURE — 1160F PR REVIEW ALL MEDS BY PRESCRIBER/CLIN PHARMACIST DOCUMENTED: ICD-10-PCS | Mod: CPTII,S$GLB,, | Performed by: PODIATRIST

## 2022-05-05 PROCEDURE — 3008F PR BODY MASS INDEX (BMI) DOCUMENTED: ICD-10-PCS | Mod: CPTII,S$GLB,, | Performed by: FAMILY MEDICINE

## 2022-05-05 PROCEDURE — 1159F MED LIST DOCD IN RCRD: CPT | Mod: CPTII,S$GLB,, | Performed by: FAMILY MEDICINE

## 2022-05-05 PROCEDURE — 1160F RVW MEDS BY RX/DR IN RCRD: CPT | Mod: CPTII,S$GLB,, | Performed by: PODIATRIST

## 2022-05-05 PROCEDURE — 3079F PR MOST RECENT DIASTOLIC BLOOD PRESSURE 80-89 MM HG: ICD-10-PCS | Mod: CPTII,S$GLB,, | Performed by: FAMILY MEDICINE

## 2022-05-05 PROCEDURE — 1160F PR REVIEW ALL MEDS BY PRESCRIBER/CLIN PHARMACIST DOCUMENTED: ICD-10-PCS | Mod: CPTII,S$GLB,, | Performed by: FAMILY MEDICINE

## 2022-05-05 PROCEDURE — 3080F PR MOST RECENT DIASTOLIC BLOOD PRESSURE >= 90 MM HG: ICD-10-PCS | Mod: CPTII,S$GLB,, | Performed by: PODIATRIST

## 2022-05-05 PROCEDURE — 99214 PR OFFICE/OUTPT VISIT, EST, LEVL IV, 30-39 MIN: ICD-10-PCS | Mod: S$GLB,,, | Performed by: PODIATRIST

## 2022-05-05 PROCEDURE — 1160F RVW MEDS BY RX/DR IN RCRD: CPT | Mod: CPTII,S$GLB,, | Performed by: FAMILY MEDICINE

## 2022-05-05 PROCEDURE — 99214 OFFICE O/P EST MOD 30 MIN: CPT | Mod: S$GLB,,, | Performed by: PODIATRIST

## 2022-05-05 PROCEDURE — 3008F BODY MASS INDEX DOCD: CPT | Mod: CPTII,S$GLB,, | Performed by: FAMILY MEDICINE

## 2022-05-05 PROCEDURE — 73630 XR FOOT COMPLETE 3 VIEW LEFT: ICD-10-PCS | Mod: 26,LT,, | Performed by: RADIOLOGY

## 2022-05-05 PROCEDURE — 99999 PR PBB SHADOW E&M-EST. PATIENT-LVL III: ICD-10-PCS | Mod: PBBFAC,,, | Performed by: PODIATRIST

## 2022-05-05 PROCEDURE — 99214 OFFICE O/P EST MOD 30 MIN: CPT | Mod: S$GLB,,, | Performed by: FAMILY MEDICINE

## 2022-05-05 PROCEDURE — 1159F MED LIST DOCD IN RCRD: CPT | Mod: CPTII,S$GLB,, | Performed by: PODIATRIST

## 2022-05-05 PROCEDURE — 3074F SYST BP LT 130 MM HG: CPT | Mod: CPTII,S$GLB,, | Performed by: FAMILY MEDICINE

## 2022-05-05 PROCEDURE — 73630 X-RAY EXAM OF FOOT: CPT | Mod: 26,LT,, | Performed by: RADIOLOGY

## 2022-05-05 PROCEDURE — 3074F PR MOST RECENT SYSTOLIC BLOOD PRESSURE < 130 MM HG: ICD-10-PCS | Mod: CPTII,S$GLB,, | Performed by: FAMILY MEDICINE

## 2022-05-05 PROCEDURE — 3080F DIAST BP >= 90 MM HG: CPT | Mod: CPTII,S$GLB,, | Performed by: PODIATRIST

## 2022-05-05 PROCEDURE — 3075F PR MOST RECENT SYSTOLIC BLOOD PRESS GE 130-139MM HG: ICD-10-PCS | Mod: CPTII,S$GLB,, | Performed by: PODIATRIST

## 2022-05-05 PROCEDURE — 99999 PR PBB SHADOW E&M-EST. PATIENT-LVL III: CPT | Mod: PBBFAC,,, | Performed by: PODIATRIST

## 2022-05-05 PROCEDURE — 73630 X-RAY EXAM OF FOOT: CPT | Mod: TC,PN,LT

## 2022-05-05 PROCEDURE — 1159F PR MEDICATION LIST DOCUMENTED IN MEDICAL RECORD: ICD-10-PCS | Mod: CPTII,S$GLB,, | Performed by: FAMILY MEDICINE

## 2022-05-05 PROCEDURE — 3079F DIAST BP 80-89 MM HG: CPT | Mod: CPTII,S$GLB,, | Performed by: FAMILY MEDICINE

## 2022-05-05 PROCEDURE — 3008F PR BODY MASS INDEX (BMI) DOCUMENTED: ICD-10-PCS | Mod: CPTII,S$GLB,, | Performed by: PODIATRIST

## 2022-05-05 PROCEDURE — 99214 PR OFFICE/OUTPT VISIT, EST, LEVL IV, 30-39 MIN: ICD-10-PCS | Mod: S$GLB,,, | Performed by: FAMILY MEDICINE

## 2022-05-05 RX ORDER — PREGABALIN 200 MG/1
200 CAPSULE ORAL 2 TIMES DAILY
Qty: 180 CAPSULE | Refills: 2 | Status: SHIPPED | OUTPATIENT
Start: 2022-05-05 | End: 2022-10-13 | Stop reason: SDUPTHER

## 2022-05-05 RX ORDER — FLUOXETINE HYDROCHLORIDE 20 MG/1
20 CAPSULE ORAL DAILY
Qty: 90 CAPSULE | Refills: 1 | Status: SHIPPED | OUTPATIENT
Start: 2022-05-05 | End: 2022-10-13 | Stop reason: SDUPTHER

## 2022-05-05 RX ORDER — CELECOXIB 100 MG/1
100 CAPSULE ORAL DAILY
Qty: 90 CAPSULE | Refills: 0 | Status: SHIPPED | OUTPATIENT
Start: 2022-05-05 | End: 2022-05-05 | Stop reason: SDUPTHER

## 2022-05-05 RX ORDER — CELECOXIB 100 MG/1
100 CAPSULE ORAL DAILY
Qty: 30 CAPSULE | Refills: 0 | Status: SHIPPED | OUTPATIENT
Start: 2022-05-05 | End: 2022-05-13 | Stop reason: SDUPTHER

## 2022-05-05 RX ORDER — AMITRIPTYLINE HYDROCHLORIDE 10 MG/1
10 TABLET, FILM COATED ORAL NIGHTLY
Qty: 90 TABLET | Refills: 0 | Status: SHIPPED | OUTPATIENT
Start: 2022-05-05 | End: 2022-10-13 | Stop reason: SDUPTHER
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
## 2022-05-05 NOTE — PROGRESS NOTES
Subjective:       Patient ID: Burton Whiting is a 52 y.o. male.    Chief Complaint: Peripheral Neuropathy    Burton is a 52 y.o. male who presents today for f/u. Was last seen about 6 months ago.   Neuropathy was the primary issues that he is following up for today. On lyrica, increased to 150 mg BID. Neuropathy is thought to be 2/2 to radiation for cancer. Gabapentin didn't help.     Currently taking 75 mg TID, per Dr. Lopez.     When he doesn't take the lyrica, symptoms are a lot worse.     Still having hip pain. Injections help, minimally.     Review of Systems   Constitutional: Negative for chills and fever.   Respiratory: Negative for shortness of breath.    Cardiovascular: Negative for chest pain.   Gastrointestinal: Negative for diarrhea, nausea and vomiting.   Musculoskeletal: Positive for arthralgias, gait problem, neck pain and neck stiffness.   Skin: Negative for rash.   Neurological: Positive for numbness.                 Objective:     Vitals:    05/05/22 1557   BP: 120/80   Pulse: 66        Physical Exam  Vitals and nursing note reviewed.   Constitutional:       General: He is not in acute distress.     Appearance: He is obese. He is not ill-appearing, toxic-appearing or diaphoretic.   Cardiovascular:      Rate and Rhythm: Normal rate and regular rhythm.   Pulmonary:      Effort: Pulmonary effort is normal.      Breath sounds: Normal breath sounds.   Musculoskeletal:         General: No swelling.      Comments: Shoulder exam grossly normal  No focal weakness or pain with provocative maneuvers    Neurological:      Mental Status: He is alert.   Psychiatric:         Mood and Affect: Mood normal.         Behavior: Behavior normal.         Thought Content: Thought content normal.         Judgment: Judgment normal.         Assessment:       1. Chemotherapy-induced neuropathy    2. Neuropathic pain    3. Lumbar radiculopathy, chronic    4. Depression, unspecified depression type    5. Squamous cell carcinoma of  palatine tonsil    6. Stage 3a chronic kidney disease        Plan:       Stop 75 mg TID  Go to lyrica 200 mg BID  Try this for 90 days.   If helping, but symptoms still not at goal, we can increase to 225 mg BID or 300 mg BID  Add elavil 10 mg nightly, this can be sedating and cause dry mouth. Can increase to 20 mg if needed     Continue fluoxetine but at 20 mg    Try Celebrex in place of meloxicam  Both of these can lower kidney function  Will prescribe short course  However, Kidney function is a little low. Try to limit NSAIDS as much as possible. Avoid Red meats. Drink a lot of water. I will continue monitoring kidney function q3-6 months.  Consider tramadol?    Surgery for hip? PRP?  Defer to Dr. Jacob.    Shoulder pain vs cervical radiculopathy? Can order MRI cervical spine if sports medicine thinks this is more related to the neck    Will get MRI of the lumbar spine.     Consider pain management? Back and spine?    F/u 3 months.     Chemotherapy-induced neuropathy  -     pregabalin (LYRICA) 200 MG Cap; Take 1 capsule (200 mg total) by mouth 2 (two) times daily.  Dispense: 180 capsule; Refill: 2  -     amitriptyline (ELAVIL) 10 MG tablet; Take 1 tablet (10 mg total) by mouth every evening.  Dispense: 90 tablet; Refill: 0  -     MRI Lumbar Spine Without Contrast; Future; Expected date: 05/05/2022  -     Discontinue: celecoxib (CELEBREX) 100 MG capsule; Take 1 capsule (100 mg total) by mouth once daily.  Dispense: 90 capsule; Refill: 0  -     celecoxib (CELEBREX) 100 MG capsule; Take 1 capsule (100 mg total) by mouth once daily.  Dispense: 30 capsule; Refill: 0    Neuropathic pain  -     pregabalin (LYRICA) 200 MG Cap; Take 1 capsule (200 mg total) by mouth 2 (two) times daily.  Dispense: 180 capsule; Refill: 2  -     amitriptyline (ELAVIL) 10 MG tablet; Take 1 tablet (10 mg total) by mouth every evening.  Dispense: 90 tablet; Refill: 0  -     MRI Lumbar Spine Without Contrast; Future; Expected date:  05/05/2022  -     Discontinue: celecoxib (CELEBREX) 100 MG capsule; Take 1 capsule (100 mg total) by mouth once daily.  Dispense: 90 capsule; Refill: 0  -     celecoxib (CELEBREX) 100 MG capsule; Take 1 capsule (100 mg total) by mouth once daily.  Dispense: 30 capsule; Refill: 0    Lumbar radiculopathy, chronic  -     MRI Lumbar Spine Without Contrast; Future; Expected date: 05/05/2022  -     Discontinue: celecoxib (CELEBREX) 100 MG capsule; Take 1 capsule (100 mg total) by mouth once daily.  Dispense: 90 capsule; Refill: 0  -     celecoxib (CELEBREX) 100 MG capsule; Take 1 capsule (100 mg total) by mouth once daily.  Dispense: 30 capsule; Refill: 0    Depression, unspecified depression type  -     FLUoxetine 20 MG capsule; Take 1 capsule (20 mg total) by mouth once daily.  Dispense: 90 capsule; Refill: 1    Squamous cell carcinoma of palatine tonsil    Stage 3a chronic kidney disease        Warning signs discussed, patient to call with any further issues or worsening of symptoms.

## 2022-05-05 NOTE — PATIENT INSTRUCTIONS
Stop 75 mg TID  Go to lyrica 200 mg BID  Try this for 90 days.   If helping, but symptoms still not at goal, we can increase to 225 mg BID or 300 mg BID  Add elavil 10 mg nightly, this can be sedating and cause dry mouth. Can increase to 20 mg if needed     Continue fluoxetine but at 20 mg    Try Celebrex in place of meloxicam  Both of these can lower kidney function  Will prescribe short course  However, Kidney function is a little low. Try to limit NSAIDS as much as possible. Avoid Red meats. Drink a lot of water. I will continue monitoring kidney function q3-6 months.    Surgery for hip? PRP?  Defer to Dr. Jacob.    Shoulder pain vs cervical radiculopathy? Can order MRI cervical spine if sports medicine thinks this is more related to the neck    Will get MRI of the lumbar spine.     Consider pain management? Back and spine?    F/u 3 months.

## 2022-05-06 ENCOUNTER — PATIENT MESSAGE (OUTPATIENT)
Dept: PODIATRY | Facility: CLINIC | Age: 52
End: 2022-05-06
Payer: COMMERCIAL

## 2022-05-09 ENCOUNTER — OFFICE VISIT (OUTPATIENT)
Dept: SPORTS MEDICINE | Facility: CLINIC | Age: 52
End: 2022-05-09
Payer: COMMERCIAL

## 2022-05-09 VITALS
DIASTOLIC BLOOD PRESSURE: 89 MMHG | HEIGHT: 69 IN | BODY MASS INDEX: 31.7 KG/M2 | SYSTOLIC BLOOD PRESSURE: 147 MMHG | WEIGHT: 214 LBS

## 2022-05-09 DIAGNOSIS — M25.551 RIGHT HIP PAIN: Primary | ICD-10-CM

## 2022-05-09 DIAGNOSIS — M16.11 OSTEOARTHRITIS OF RIGHT HIP, UNSPECIFIED OSTEOARTHRITIS TYPE: ICD-10-CM

## 2022-05-09 PROCEDURE — 99999 PR PBB SHADOW E&M-EST. PATIENT-LVL III: ICD-10-PCS | Mod: PBBFAC,,, | Performed by: ORTHOPAEDIC SURGERY

## 2022-05-09 PROCEDURE — 3077F SYST BP >= 140 MM HG: CPT | Mod: CPTII,S$GLB,, | Performed by: ORTHOPAEDIC SURGERY

## 2022-05-09 PROCEDURE — 3079F DIAST BP 80-89 MM HG: CPT | Mod: CPTII,S$GLB,, | Performed by: ORTHOPAEDIC SURGERY

## 2022-05-09 PROCEDURE — 3008F BODY MASS INDEX DOCD: CPT | Mod: CPTII,S$GLB,, | Performed by: ORTHOPAEDIC SURGERY

## 2022-05-09 PROCEDURE — 1159F PR MEDICATION LIST DOCUMENTED IN MEDICAL RECORD: ICD-10-PCS | Mod: CPTII,S$GLB,, | Performed by: ORTHOPAEDIC SURGERY

## 2022-05-09 PROCEDURE — 1160F PR REVIEW ALL MEDS BY PRESCRIBER/CLIN PHARMACIST DOCUMENTED: ICD-10-PCS | Mod: CPTII,S$GLB,, | Performed by: ORTHOPAEDIC SURGERY

## 2022-05-09 PROCEDURE — 99214 OFFICE O/P EST MOD 30 MIN: CPT | Mod: S$GLB,,, | Performed by: ORTHOPAEDIC SURGERY

## 2022-05-09 PROCEDURE — 99214 PR OFFICE/OUTPT VISIT, EST, LEVL IV, 30-39 MIN: ICD-10-PCS | Mod: S$GLB,,, | Performed by: ORTHOPAEDIC SURGERY

## 2022-05-09 PROCEDURE — 1159F MED LIST DOCD IN RCRD: CPT | Mod: CPTII,S$GLB,, | Performed by: ORTHOPAEDIC SURGERY

## 2022-05-09 PROCEDURE — 99999 PR PBB SHADOW E&M-EST. PATIENT-LVL III: CPT | Mod: PBBFAC,,, | Performed by: ORTHOPAEDIC SURGERY

## 2022-05-09 PROCEDURE — 1160F RVW MEDS BY RX/DR IN RCRD: CPT | Mod: CPTII,S$GLB,, | Performed by: ORTHOPAEDIC SURGERY

## 2022-05-09 PROCEDURE — 3077F PR MOST RECENT SYSTOLIC BLOOD PRESSURE >= 140 MM HG: ICD-10-PCS | Mod: CPTII,S$GLB,, | Performed by: ORTHOPAEDIC SURGERY

## 2022-05-09 PROCEDURE — 3008F PR BODY MASS INDEX (BMI) DOCUMENTED: ICD-10-PCS | Mod: CPTII,S$GLB,, | Performed by: ORTHOPAEDIC SURGERY

## 2022-05-09 PROCEDURE — 3079F PR MOST RECENT DIASTOLIC BLOOD PRESSURE 80-89 MM HG: ICD-10-PCS | Mod: CPTII,S$GLB,, | Performed by: ORTHOPAEDIC SURGERY
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
## 2022-05-13 DIAGNOSIS — G62.0 CHEMOTHERAPY-INDUCED NEUROPATHY: Chronic | ICD-10-CM

## 2022-05-13 DIAGNOSIS — M54.16 LUMBAR RADICULOPATHY, CHRONIC: ICD-10-CM

## 2022-05-13 DIAGNOSIS — M79.2 NEUROPATHIC PAIN: ICD-10-CM

## 2022-05-13 DIAGNOSIS — T45.1X5A CHEMOTHERAPY-INDUCED NEUROPATHY: Chronic | ICD-10-CM

## 2022-05-13 RX ORDER — CELECOXIB 100 MG/1
100 CAPSULE ORAL DAILY
Qty: 30 CAPSULE | Refills: 0 | Status: SHIPPED | OUTPATIENT
Start: 2022-05-13 | End: 2022-08-26 | Stop reason: SDUPTHER

## 2022-05-17 ENCOUNTER — TELEPHONE (OUTPATIENT)
Dept: PHARMACY | Facility: CLINIC | Age: 52
End: 2022-05-17
Payer: COMMERCIAL

## 2022-05-17 ENCOUNTER — TELEPHONE (OUTPATIENT)
Dept: FAMILY MEDICINE | Facility: CLINIC | Age: 52
End: 2022-05-17
Payer: COMMERCIAL

## 2022-05-17 NOTE — TELEPHONE ENCOUNTER
----- Message from Mily Dent, Patient Care Assistant sent at 5/17/2022 11:40 AM CDT -----  Type:  Needs Medical Advice    Who Called:  REN from [facility]   (please be specific):  Enriquez would like a call back to discuss the diagnosis for the medicationcelecoxib (CELEBREX) 100 MG capsule and also would like to know the OTC Nsaids  Would the patient rather a call back or a response via [facility]?  Please call  Best Call Back Number:  [phone]  Additional Information:  case # [id number]

## 2022-05-25 ENCOUNTER — TELEPHONE (OUTPATIENT)
Dept: SPORTS MEDICINE | Facility: CLINIC | Age: 52
End: 2022-05-25
Payer: COMMERCIAL

## 2022-05-25 NOTE — TELEPHONE ENCOUNTER
Contacted patient to reschedule PRP injection. Left clinic call back number.    Larisa Ricci MS, OTC  Clinical Assistant to Dr. Harry Jacob

## 2022-06-07 ENCOUNTER — TELEPHONE (OUTPATIENT)
Dept: FAMILY MEDICINE | Facility: CLINIC | Age: 52
End: 2022-06-07
Payer: COMMERCIAL

## 2022-06-07 ENCOUNTER — TELEPHONE (OUTPATIENT)
Dept: INTERNAL MEDICINE | Facility: CLINIC | Age: 52
End: 2022-06-07
Payer: COMMERCIAL

## 2022-06-07 NOTE — TELEPHONE ENCOUNTER
----- Message from Kita Leal sent at 6/7/2022 10:20 AM CDT -----  Contact: [phone]/Erlinda with [facility]  Who Called: Erlinda  Regarding: Request prior authorization on MRI and CT scan  Would the patient rather a call back or a response via [facility]? Call back  Best Call Back Number: [phone]  Additional Information:N/a

## 2022-06-07 NOTE — TELEPHONE ENCOUNTER
----- Message from Elle Elder sent at 6/7/2022  3:27 PM CDT -----  Good afternoon,  I have zero clue why the message was routed to us and not Jessica's office. I'm thinking call center error. Regardless I called the number and reached someone else who said the scans are approved and no PA is needed. There aren't even any notes saying a Erlinda called so not really sure. But he should be all set to get his scans without issue.    Thanks!

## 2022-06-08 NOTE — TELEPHONE ENCOUNTER
It was the message in regarding prior authorization for MRI and CT. But it looks like everything its taken care of

## 2022-06-10 ENCOUNTER — TELEPHONE (OUTPATIENT)
Dept: FAMILY MEDICINE | Facility: CLINIC | Age: 52
End: 2022-06-10
Payer: COMMERCIAL

## 2022-06-10 ENCOUNTER — HOSPITAL ENCOUNTER (OUTPATIENT)
Dept: RADIOLOGY | Facility: HOSPITAL | Age: 52
Discharge: HOME | End: 2022-06-10
Attending: FAMILY MEDICINE
Payer: COMMERCIAL

## 2022-06-10 ENCOUNTER — TELEPHONE (OUTPATIENT)
Dept: ADMINISTRATIVE | Facility: OTHER | Age: 52
End: 2022-06-10
Payer: COMMERCIAL

## 2022-06-10 ENCOUNTER — HOSPITAL ENCOUNTER (OUTPATIENT)
Dept: RADIOLOGY | Facility: HOSPITAL | Age: 52
Discharge: HOME | End: 2022-06-10
Attending: INTERNAL MEDICINE
Payer: COMMERCIAL

## 2022-06-10 DIAGNOSIS — T45.1X5A CHEMOTHERAPY-INDUCED NEUROPATHY: ICD-10-CM

## 2022-06-10 DIAGNOSIS — Z85.810 HISTORY OF CANCER OF LINGUAL TONSIL: ICD-10-CM

## 2022-06-10 DIAGNOSIS — M79.2 NEUROPATHIC PAIN: ICD-10-CM

## 2022-06-10 DIAGNOSIS — G89.29 CHRONIC BILATERAL LOW BACK PAIN, UNSPECIFIED WHETHER SCIATICA PRESENT: Primary | ICD-10-CM

## 2022-06-10 DIAGNOSIS — M54.50 CHRONIC BILATERAL LOW BACK PAIN, UNSPECIFIED WHETHER SCIATICA PRESENT: Primary | ICD-10-CM

## 2022-06-10 DIAGNOSIS — M54.16 LUMBAR RADICULOPATHY, CHRONIC: ICD-10-CM

## 2022-06-10 DIAGNOSIS — G62.0 CHEMOTHERAPY-INDUCED NEUROPATHY: ICD-10-CM

## 2022-06-10 PROCEDURE — 74177 CT ABD & PELVIS W/CONTRAST: CPT | Mod: TC,PO

## 2022-06-10 PROCEDURE — 25500020 PHARM REV CODE 255: Mod: PO | Performed by: INTERNAL MEDICINE

## 2022-06-10 PROCEDURE — 71260 CT THORAX DX C+: CPT | Mod: TC,PO

## 2022-06-10 PROCEDURE — 70491 CT SOFT TISSUE NECK W/DYE: CPT | Mod: TC,PO

## 2022-06-10 PROCEDURE — 72148 MRI LUMBAR SPINE W/O DYE: CPT | Mod: TC,PO

## 2022-06-10 RX ADMIN — IOHEXOL 100 ML: 350 INJECTION, SOLUTION INTRAVENOUS at 09:06

## 2022-06-10 RX ADMIN — IOHEXOL 30 ML: 300 INJECTION, SOLUTION INTRAVENOUS at 08:06

## 2022-06-15 ENCOUNTER — TELEPHONE (OUTPATIENT)
Dept: SPINE | Facility: CLINIC | Age: 52
End: 2022-06-15
Payer: COMMERCIAL

## 2022-06-17 ENCOUNTER — TELEPHONE (OUTPATIENT)
Dept: PAIN MEDICINE | Facility: CLINIC | Age: 52
End: 2022-06-17
Payer: COMMERCIAL

## 2022-06-17 NOTE — TELEPHONE ENCOUNTER
Staff lvm in regards to appointment on 6/20/22 @ 9:30 am with  on the 4th floor [address].staff informed patient give the office a call if he has any questions or concerns @ [phone].

## 2022-06-20 ENCOUNTER — TELEPHONE (OUTPATIENT)
Dept: ADMINISTRATIVE | Facility: OTHER | Age: 52
End: 2022-06-20
Payer: COMMERCIAL

## 2022-06-20 ENCOUNTER — OFFICE VISIT (OUTPATIENT)
Dept: SPINE | Facility: CLINIC | Age: 52
End: 2022-06-20
Payer: COMMERCIAL

## 2022-06-20 VITALS
HEART RATE: 53 BPM | HEIGHT: 69 IN | BODY MASS INDEX: 31.68 KG/M2 | RESPIRATION RATE: 18 BRPM | SYSTOLIC BLOOD PRESSURE: 157 MMHG | WEIGHT: 213.88 LBS | DIASTOLIC BLOOD PRESSURE: 93 MMHG

## 2022-06-20 DIAGNOSIS — M47.816 LUMBAR SPONDYLOSIS: ICD-10-CM

## 2022-06-20 DIAGNOSIS — M51.36 DDD (DEGENERATIVE DISC DISEASE), LUMBAR: ICD-10-CM

## 2022-06-20 DIAGNOSIS — M48.061 SPINAL STENOSIS OF LUMBAR REGION, UNSPECIFIED WHETHER NEUROGENIC CLAUDICATION PRESENT: ICD-10-CM

## 2022-06-20 DIAGNOSIS — M70.61 TROCHANTERIC BURSITIS OF RIGHT HIP: Primary | ICD-10-CM

## 2022-06-20 DIAGNOSIS — T45.1X5A CHRONIC PAINFUL POLYNEUROPATHY AFTER CHEMOTHERAPY: ICD-10-CM

## 2022-06-20 DIAGNOSIS — G62.0 CHRONIC PAINFUL POLYNEUROPATHY AFTER CHEMOTHERAPY: ICD-10-CM

## 2022-06-20 PROBLEM — M51.369 DDD (DEGENERATIVE DISC DISEASE), LUMBAR: Status: ACTIVE | Noted: 2022-06-20

## 2022-06-20 PROCEDURE — 99999 PR PBB SHADOW E&M-EST. PATIENT-LVL IV: CPT | Mod: PBBFAC,,, | Performed by: ANESTHESIOLOGY

## 2022-06-20 PROCEDURE — 1159F MED LIST DOCD IN RCRD: CPT | Mod: CPTII,S$GLB,, | Performed by: ANESTHESIOLOGY

## 2022-06-20 PROCEDURE — 1159F PR MEDICATION LIST DOCUMENTED IN MEDICAL RECORD: ICD-10-PCS | Mod: CPTII,S$GLB,, | Performed by: ANESTHESIOLOGY

## 2022-06-20 PROCEDURE — 99204 OFFICE O/P NEW MOD 45 MIN: CPT | Mod: S$GLB,,, | Performed by: ANESTHESIOLOGY

## 2022-06-20 PROCEDURE — 3080F DIAST BP >= 90 MM HG: CPT | Mod: CPTII,S$GLB,, | Performed by: ANESTHESIOLOGY

## 2022-06-20 PROCEDURE — 99204 PR OFFICE/OUTPT VISIT, NEW, LEVL IV, 45-59 MIN: ICD-10-PCS | Mod: S$GLB,,, | Performed by: ANESTHESIOLOGY

## 2022-06-20 PROCEDURE — 1160F RVW MEDS BY RX/DR IN RCRD: CPT | Mod: CPTII,S$GLB,, | Performed by: ANESTHESIOLOGY

## 2022-06-20 PROCEDURE — 99999 PR PBB SHADOW E&M-EST. PATIENT-LVL IV: ICD-10-PCS | Mod: PBBFAC,,, | Performed by: ANESTHESIOLOGY

## 2022-06-20 PROCEDURE — 3077F PR MOST RECENT SYSTOLIC BLOOD PRESSURE >= 140 MM HG: ICD-10-PCS | Mod: CPTII,S$GLB,, | Performed by: ANESTHESIOLOGY

## 2022-06-20 PROCEDURE — 3077F SYST BP >= 140 MM HG: CPT | Mod: CPTII,S$GLB,, | Performed by: ANESTHESIOLOGY

## 2022-06-20 PROCEDURE — 3008F BODY MASS INDEX DOCD: CPT | Mod: CPTII,S$GLB,, | Performed by: ANESTHESIOLOGY

## 2022-06-20 PROCEDURE — 3080F PR MOST RECENT DIASTOLIC BLOOD PRESSURE >= 90 MM HG: ICD-10-PCS | Mod: CPTII,S$GLB,, | Performed by: ANESTHESIOLOGY

## 2022-06-20 PROCEDURE — 1160F PR REVIEW ALL MEDS BY PRESCRIBER/CLIN PHARMACIST DOCUMENTED: ICD-10-PCS | Mod: CPTII,S$GLB,, | Performed by: ANESTHESIOLOGY

## 2022-06-20 PROCEDURE — 3008F PR BODY MASS INDEX (BMI) DOCUMENTED: ICD-10-PCS | Mod: CPTII,S$GLB,, | Performed by: ANESTHESIOLOGY
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
## 2022-06-21 ENCOUNTER — TELEPHONE (OUTPATIENT)
Dept: ADMINISTRATIVE | Facility: OTHER | Age: 52
End: 2022-06-21
Payer: COMMERCIAL

## 2022-06-22 ENCOUNTER — TELEPHONE (OUTPATIENT)
Dept: ADMINISTRATIVE | Facility: OTHER | Age: 52
End: 2022-06-22
Payer: COMMERCIAL

## 2022-06-22 ENCOUNTER — PATIENT MESSAGE (OUTPATIENT)
Dept: HEMATOLOGY/ONCOLOGY | Facility: CLINIC | Age: 52
End: 2022-06-22
Payer: COMMERCIAL

## 2022-08-09 ENCOUNTER — OFFICE VISIT (OUTPATIENT)
Dept: SPORTS MEDICINE | Facility: CLINIC | Age: 52
End: 2022-08-09
Payer: COMMERCIAL

## 2022-08-09 VITALS — HEIGHT: 69 IN | BODY MASS INDEX: 31.7 KG/M2 | WEIGHT: 214 LBS

## 2022-08-09 DIAGNOSIS — M25.551 RIGHT HIP PAIN: Primary | ICD-10-CM

## 2022-08-09 DIAGNOSIS — M16.11 OSTEOARTHRITIS OF RIGHT HIP, UNSPECIFIED OSTEOARTHRITIS TYPE: ICD-10-CM

## 2022-08-09 PROCEDURE — 3008F BODY MASS INDEX DOCD: CPT | Mod: CPTII,S$GLB,, | Performed by: ORTHOPAEDIC SURGERY

## 2022-08-09 PROCEDURE — 99214 PR OFFICE/OUTPT VISIT, EST, LEVL IV, 30-39 MIN: ICD-10-PCS | Mod: 25,S$GLB,, | Performed by: ORTHOPAEDIC SURGERY

## 2022-08-09 PROCEDURE — 99999 PR PBB SHADOW E&M-EST. PATIENT-LVL III: CPT | Mod: PBBFAC,,, | Performed by: ORTHOPAEDIC SURGERY

## 2022-08-09 PROCEDURE — 99999 PR PBB SHADOW E&M-EST. PATIENT-LVL III: ICD-10-PCS | Mod: PBBFAC,,, | Performed by: ORTHOPAEDIC SURGERY

## 2022-08-09 PROCEDURE — 1160F RVW MEDS BY RX/DR IN RCRD: CPT | Mod: CPTII,S$GLB,, | Performed by: ORTHOPAEDIC SURGERY

## 2022-08-09 PROCEDURE — 1160F PR REVIEW ALL MEDS BY PRESCRIBER/CLIN PHARMACIST DOCUMENTED: ICD-10-PCS | Mod: CPTII,S$GLB,, | Performed by: ORTHOPAEDIC SURGERY

## 2022-08-09 PROCEDURE — 20611 DRAIN/INJ JOINT/BURSA W/US: CPT | Mod: S$GLB,,, | Performed by: ORTHOPAEDIC SURGERY

## 2022-08-09 PROCEDURE — 1159F PR MEDICATION LIST DOCUMENTED IN MEDICAL RECORD: ICD-10-PCS | Mod: CPTII,S$GLB,, | Performed by: ORTHOPAEDIC SURGERY

## 2022-08-09 PROCEDURE — 3008F PR BODY MASS INDEX (BMI) DOCUMENTED: ICD-10-PCS | Mod: CPTII,S$GLB,, | Performed by: ORTHOPAEDIC SURGERY

## 2022-08-09 PROCEDURE — 1159F MED LIST DOCD IN RCRD: CPT | Mod: CPTII,S$GLB,, | Performed by: ORTHOPAEDIC SURGERY

## 2022-08-09 PROCEDURE — 99214 OFFICE O/P EST MOD 30 MIN: CPT | Mod: 25,S$GLB,, | Performed by: ORTHOPAEDIC SURGERY

## 2022-08-09 PROCEDURE — 20611 PR DRAIN/ASP/INJECT MAJOR JOINT/BURSA W/US GUIDANCE: ICD-10-PCS | Mod: S$GLB,,, | Performed by: ORTHOPAEDIC SURGERY
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
## 2022-08-10 RX ORDER — TRIAMCINOLONE ACETONIDE 40 MG/ML
40 INJECTION, SUSPENSION INTRA-ARTICULAR; INTRAMUSCULAR
Status: DISCONTINUED | OUTPATIENT
Start: 2022-08-09 | End: 2022-10-13

## 2022-08-16 ENCOUNTER — PATIENT MESSAGE (OUTPATIENT)
Dept: HEMATOLOGY/ONCOLOGY | Facility: CLINIC | Age: 52
End: 2022-08-16
Payer: COMMERCIAL

## 2022-08-26 ENCOUNTER — PATIENT MESSAGE (OUTPATIENT)
Dept: FAMILY MEDICINE | Facility: CLINIC | Age: 52
End: 2022-08-26
Payer: COMMERCIAL

## 2022-08-26 DIAGNOSIS — M54.16 LUMBAR RADICULOPATHY, CHRONIC: ICD-10-CM

## 2022-08-26 DIAGNOSIS — G62.0 CHEMOTHERAPY-INDUCED NEUROPATHY: Chronic | ICD-10-CM

## 2022-08-26 DIAGNOSIS — T45.1X5A CHEMOTHERAPY-INDUCED NEUROPATHY: Chronic | ICD-10-CM

## 2022-08-26 DIAGNOSIS — M79.2 NEUROPATHIC PAIN: ICD-10-CM

## 2022-08-26 RX ORDER — CELECOXIB 100 MG/1
100 CAPSULE ORAL DAILY
Qty: 30 CAPSULE | Refills: 0 | Status: SHIPPED | OUTPATIENT
Start: 2022-08-26 | End: 2022-10-14

## 2022-08-27 ENCOUNTER — TELEPHONE (OUTPATIENT)
Dept: FAMILY MEDICINE | Facility: CLINIC | Age: 52
End: 2022-08-27
Payer: COMMERCIAL

## 2022-08-27 NOTE — TELEPHONE ENCOUNTER
I attempt to call patient to reschedule appointment. No answer. I left  informing patient to return call,my chart message sent. Please advise.

## 2022-08-29 ENCOUNTER — TELEPHONE (OUTPATIENT)
Dept: FAMILY MEDICINE | Facility: CLINIC | Age: 52
End: 2022-08-29
Payer: COMMERCIAL

## 2022-08-29 NOTE — TELEPHONE ENCOUNTER
----- Message from Florecita Maya sent at 8/27/2022  9:03 AM CDT -----  Type:  Patient Returning Call    Who Called:pt  Who Left Message for Patient:office  Does the patient know what this is regarding?:scheduling  Would the patient rather a call back or a response via Mimoconer? call  Best Call Back Number:[phone]  Additional Information:

## 2022-08-29 NOTE — TELEPHONE ENCOUNTER
Returned patient call, no answer. I left patient a VM informing to return my call. Please advise.

## 2022-10-03 ENCOUNTER — OFFICE VISIT (OUTPATIENT)
Dept: HEMATOLOGY/ONCOLOGY | Facility: CLINIC | Age: 52
End: 2022-10-03
Payer: COMMERCIAL

## 2022-10-03 VITALS
HEART RATE: 60 BPM | HEIGHT: 69 IN | BODY MASS INDEX: 31.77 KG/M2 | DIASTOLIC BLOOD PRESSURE: 92 MMHG | SYSTOLIC BLOOD PRESSURE: 147 MMHG | WEIGHT: 214.5 LBS | RESPIRATION RATE: 18 BRPM | OXYGEN SATURATION: 96 % | TEMPERATURE: 98 F

## 2022-10-03 DIAGNOSIS — Z85.810 HISTORY OF CANCER OF LINGUAL TONSIL: Primary | ICD-10-CM

## 2022-10-03 PROCEDURE — 3080F PR MOST RECENT DIASTOLIC BLOOD PRESSURE >= 90 MM HG: ICD-10-PCS | Mod: CPTII,S$GLB,, | Performed by: INTERNAL MEDICINE

## 2022-10-03 PROCEDURE — 99999 PR PBB SHADOW E&M-EST. PATIENT-LVL IV: ICD-10-PCS | Mod: PBBFAC,,, | Performed by: INTERNAL MEDICINE

## 2022-10-03 PROCEDURE — 99999 PR PBB SHADOW E&M-EST. PATIENT-LVL IV: CPT | Mod: PBBFAC,,, | Performed by: INTERNAL MEDICINE

## 2022-10-03 PROCEDURE — 3008F PR BODY MASS INDEX (BMI) DOCUMENTED: ICD-10-PCS | Mod: CPTII,S$GLB,, | Performed by: INTERNAL MEDICINE

## 2022-10-03 PROCEDURE — 1159F MED LIST DOCD IN RCRD: CPT | Mod: CPTII,S$GLB,, | Performed by: INTERNAL MEDICINE

## 2022-10-03 PROCEDURE — 3008F BODY MASS INDEX DOCD: CPT | Mod: CPTII,S$GLB,, | Performed by: INTERNAL MEDICINE

## 2022-10-03 PROCEDURE — 3080F DIAST BP >= 90 MM HG: CPT | Mod: CPTII,S$GLB,, | Performed by: INTERNAL MEDICINE

## 2022-10-03 PROCEDURE — 3077F PR MOST RECENT SYSTOLIC BLOOD PRESSURE >= 140 MM HG: ICD-10-PCS | Mod: CPTII,S$GLB,, | Performed by: INTERNAL MEDICINE

## 2022-10-03 PROCEDURE — 1159F PR MEDICATION LIST DOCUMENTED IN MEDICAL RECORD: ICD-10-PCS | Mod: CPTII,S$GLB,, | Performed by: INTERNAL MEDICINE

## 2022-10-03 PROCEDURE — 99214 PR OFFICE/OUTPT VISIT, EST, LEVL IV, 30-39 MIN: ICD-10-PCS | Mod: S$GLB,,, | Performed by: INTERNAL MEDICINE

## 2022-10-03 PROCEDURE — 3077F SYST BP >= 140 MM HG: CPT | Mod: CPTII,S$GLB,, | Performed by: INTERNAL MEDICINE

## 2022-10-03 PROCEDURE — 99214 OFFICE O/P EST MOD 30 MIN: CPT | Mod: S$GLB,,, | Performed by: INTERNAL MEDICINE

## 2022-10-03 NOTE — PROGRESS NOTES
Subjective:       Patient ID: Burton Whiting is a 52 y.o. male.    Chief Complaint: History of cancer of lingual tonsil  Squamous cell carcinoma of palatine tonsil; g-tube malodorous; left neck pain 2/10; 4 cans formula per day; and r arm port---red/warm to touch  Mr. Burton Whiting is a 48-year-old male, former smoker, who presents today with a four-month history of enlarging neck mass.  He initially delayed care due to lack of insurance.  He was seen by Dr. Turpin who referred him to see Dr. Olguin.  He had a CT that showed a 3.8 x 3.8 x 4.9 cm soft tissue mass arising from the left palatine tonsil resulting in moderate narrowing of the hypopharyngeal airway adjacent extensive matted left cervical lymphadenopathy was noted.  The largest ella mass was 6.6 x 9 x 2.6 cm extending inferiorly to the supraclavicular region.  The mass pushed the trachea and the left common carotid artery to the right.  Additional representative of cervical lymph nodes measuring 2.9 x 3.1 x 3.5 cm on axial image 37 of series 3   and 2.4 x 2.1 x 2.2 cm on axial image 55 of series 3.  There is also a sclerotic lesion involving the midline of the clivus measuring 1.2 cm.  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  PET scan performed on 01/19/2018 revealed persistent evidence of a soft tissue mass arising from the left palatine tonsil resulting in moderate narrowing of the hypopharyngeal   airway.  The mass is hypermetabolic demonstrating an SUV max of 18.5.  There is also persistent adjacent extensive matted left cervical lymphadenopathy, largest of this measuring 6.7 x 8.42 with hypermetabolic activity and SUV max of 20.5.  Lymphadenopathy is again noted to have a mass effect on the trachea and left common carotid artery, pushing both structures towards the right.  There is also prominent right cervical lymph nodes with the largest measuring 0.8 cm and demonstrating mild hypermetabolic activity with SUV max of 1.8,  "physiologic   distribution of FDG in the gray matter.  There are 3 pulmonary nodules noted within the right lung, the largest is in the anterior segment of the right upper lobe measuring 1 cm, second is visualized in the middle lobe measuring 0.6 cm and the third is within the posterior basal segment measuring 0.6 cm.  There is one pulmonary nodule within the left lung within the lateral basal segment measuring 0.6 cm.  These nodules do not demonstrate hypermetabolic activity; however, they are below the threshold for observation with PET     He underwent MRI cervical spine revealed "No evidence of metastatic disease to the cervical spine. Multilevel degenerative changes of the cervical spine with disc protrusion at C5-6 which results in moderate to severe spinal canal stenosis and and associated cord signal abnormality, suggestive of compressive myelopathy. 6 mm T1 hypointense non-enhancing lesion in the clivus.  Continued followup is suggested. 9 mm inferior descent of the cerebellar tonsils below the foramen magnum, suggestive of Chiari 1 malformation"  MRI thoracic spine "No evidence of metastatic disease involving the thoracic spine. Incidental hemangioma involving the T7 vertebral body. Mild degenerative disc disease without any significant spinal canal stenosis or neuroforaminal narrowing.   He completed 3 cycles of TPF and 6 weeks of Cisplatin and RT. Completed on 6/26/18           His PET scan from 9/25/18 revealed "Progression of disease with multiple new hypermetabolic foci including the manubrium, mediastinal/retrocrural lymph nodes, and lungs. Partial therapeutic response within the presumed radiation field involving the left cervical mass, and complete therapeutic response in the right cervical lymph node, clivus, and left palatine tonsil. New soft tissue thickening and hypermetabolism in the left aryepiglottic fold and right cricoid cartilage"      He has been on Opdivo from 2/19-7/19      He has been " on surveillance         Berna comes in to review his scans from June 2022 which reveal no evidence of recurrence  He feels well and denies any issues    Review of Systems   Constitutional:  Negative for appetite change, fatigue and unexpected weight change.   HENT:  Negative for mouth sores.    Eyes:  Negative for visual disturbance.   Respiratory:  Negative for cough and shortness of breath.    Cardiovascular:  Negative for chest pain.   Gastrointestinal:  Negative for abdominal pain and diarrhea.   Genitourinary:  Negative for frequency.   Musculoskeletal:  Negative for back pain.   Integumentary:  Negative for rash.   Neurological:  Negative for headaches.   Hematological:  Negative for adenopathy.   Psychiatric/Behavioral:  The patient is not nervous/anxious.    All other systems reviewed and are negative.      Objective:      Physical Exam      LABS:  WBC   Date Value Ref Range Status   06/10/2022 4.03 3.90 - 12.70 K/uL Final     Hemoglobin   Date Value Ref Range Status   06/10/2022 13.4 (L) 14.0 - 18.0 g/dL Final     POC Hematocrit   Date Value Ref Range Status   09/08/2019 17 (LL) 36 - 54 %PCV Final     Hematocrit   Date Value Ref Range Status   06/10/2022 39.7 (L) 40.0 - 54.0 % Final     Platelets   Date Value Ref Range Status   06/10/2022 163 150 - 450 K/uL Final     Gran # (ANC)   Date Value Ref Range Status   06/10/2022 2.9 1.8 - 7.7 K/uL Final     Comment:     The ANC is based on a white cell differential from an   automated cell counter. It has not been microscopically   reviewed for the presence of abnormal cells. Clinical   correlation is required.         Assessment:       Problem List Items Addressed This Visit       History of cancer of lingual tonsil - Primary       Plan:         Route Chart for Scheduling    Med Onc Chart Routing      Follow up with physician Other. Schedule with Dr. Olguin in ENT next available. In 6 months Schedule CBC,CMP, CT neck, chest and see me   Follow up with NICKY     Infusion scheduling note    Injection scheduling note    Labs    Imaging    Pharmacy appointment    Other referrals      He is doing welll with no evidence of recurrence. He is 5 years out from his diagnosis. Will have him see Dr. Rigo Olguin for a follow-up and I will see him in 6 months with labs and CT scans    Above care plan was discussed with patient and all questions were addressed to his satisfaction

## 2022-10-13 ENCOUNTER — OFFICE VISIT (OUTPATIENT)
Dept: FAMILY MEDICINE | Facility: CLINIC | Age: 52
End: 2022-10-13
Attending: INTERNAL MEDICINE
Payer: COMMERCIAL

## 2022-10-13 ENCOUNTER — LAB VISIT (OUTPATIENT)
Dept: LAB | Facility: HOSPITAL | Age: 52
End: 2022-10-13
Attending: FAMILY MEDICINE
Payer: COMMERCIAL

## 2022-10-13 VITALS
WEIGHT: 218.5 LBS | BODY MASS INDEX: 32.36 KG/M2 | SYSTOLIC BLOOD PRESSURE: 112 MMHG | OXYGEN SATURATION: 95 % | HEIGHT: 69 IN | HEART RATE: 95 BPM | DIASTOLIC BLOOD PRESSURE: 82 MMHG

## 2022-10-13 DIAGNOSIS — Z23 NEED FOR INFLUENZA VACCINATION: ICD-10-CM

## 2022-10-13 DIAGNOSIS — Z00.00 VISIT FOR WELL MAN HEALTH CHECK: ICD-10-CM

## 2022-10-13 DIAGNOSIS — M25.551 CHRONIC RIGHT HIP PAIN: ICD-10-CM

## 2022-10-13 DIAGNOSIS — D53.9 MACROCYTIC ANEMIA: ICD-10-CM

## 2022-10-13 DIAGNOSIS — T45.1X5A CHEMOTHERAPY-INDUCED NEUROPATHY: Chronic | ICD-10-CM

## 2022-10-13 DIAGNOSIS — M79.2 NEUROPATHIC PAIN: ICD-10-CM

## 2022-10-13 DIAGNOSIS — G62.0 CHEMOTHERAPY-INDUCED NEUROPATHY: Chronic | ICD-10-CM

## 2022-10-13 DIAGNOSIS — D50.0 CHRONIC BLOOD LOSS ANEMIA: Chronic | ICD-10-CM

## 2022-10-13 DIAGNOSIS — M25.559 PAIN IN UNSPECIFIED HIP: ICD-10-CM

## 2022-10-13 DIAGNOSIS — M25.551 RIGHT HIP PAIN: ICD-10-CM

## 2022-10-13 DIAGNOSIS — E55.9 VITAMIN D DEFICIENCY: ICD-10-CM

## 2022-10-13 DIAGNOSIS — G89.29 CHRONIC RIGHT HIP PAIN: ICD-10-CM

## 2022-10-13 DIAGNOSIS — F41.9 ANXIETY: ICD-10-CM

## 2022-10-13 DIAGNOSIS — F32.A DEPRESSION, UNSPECIFIED DEPRESSION TYPE: ICD-10-CM

## 2022-10-13 DIAGNOSIS — M70.61 TROCHANTERIC BURSITIS OF RIGHT HIP: ICD-10-CM

## 2022-10-13 DIAGNOSIS — N18.31 STAGE 3A CHRONIC KIDNEY DISEASE: ICD-10-CM

## 2022-10-13 DIAGNOSIS — Z00.00 VISIT FOR WELL MAN HEALTH CHECK: Primary | ICD-10-CM

## 2022-10-13 PROBLEM — F43.29 ADJUSTMENT DISORDER WITH EMOTIONAL DISTURBANCE: Status: RESOLVED | Noted: 2018-08-24 | Resolved: 2022-10-13

## 2022-10-13 LAB
ESTIMATED AVG GLUCOSE: 97 MG/DL (ref 68–131)
HBA1C MFR BLD: 5 % (ref 4–5.6)

## 2022-10-13 PROCEDURE — 1160F PR REVIEW ALL MEDS BY PRESCRIBER/CLIN PHARMACIST DOCUMENTED: ICD-10-PCS | Mod: CPTII,S$GLB,, | Performed by: FAMILY MEDICINE

## 2022-10-13 PROCEDURE — 99999 PR PBB SHADOW E&M-EST. PATIENT-LVL IV: ICD-10-PCS | Mod: PBBFAC,,, | Performed by: FAMILY MEDICINE

## 2022-10-13 PROCEDURE — 1159F PR MEDICATION LIST DOCUMENTED IN MEDICAL RECORD: ICD-10-PCS | Mod: CPTII,S$GLB,, | Performed by: FAMILY MEDICINE

## 2022-10-13 PROCEDURE — 99396 PREV VISIT EST AGE 40-64: CPT | Mod: 25,S$GLB,, | Performed by: FAMILY MEDICINE

## 2022-10-13 PROCEDURE — 90471 IMMUNIZATION ADMIN: CPT | Mod: S$GLB,,, | Performed by: FAMILY MEDICINE

## 2022-10-13 PROCEDURE — 99396 PR PREVENTIVE VISIT,EST,40-64: ICD-10-PCS | Mod: 25,S$GLB,, | Performed by: FAMILY MEDICINE

## 2022-10-13 PROCEDURE — 80053 COMPREHEN METABOLIC PANEL: CPT | Performed by: FAMILY MEDICINE

## 2022-10-13 PROCEDURE — 90686 FLU VACCINE (QUAD) GREATER THAN OR EQUAL TO 3YO PRESERVATIVE FREE IM: ICD-10-PCS | Mod: S$GLB,,, | Performed by: FAMILY MEDICINE

## 2022-10-13 PROCEDURE — 3074F PR MOST RECENT SYSTOLIC BLOOD PRESSURE < 130 MM HG: ICD-10-PCS | Mod: CPTII,S$GLB,, | Performed by: FAMILY MEDICINE

## 2022-10-13 PROCEDURE — 1160F RVW MEDS BY RX/DR IN RCRD: CPT | Mod: CPTII,S$GLB,, | Performed by: FAMILY MEDICINE

## 2022-10-13 PROCEDURE — 1159F MED LIST DOCD IN RCRD: CPT | Mod: CPTII,S$GLB,, | Performed by: FAMILY MEDICINE

## 2022-10-13 PROCEDURE — 84550 ASSAY OF BLOOD/URIC ACID: CPT | Performed by: FAMILY MEDICINE

## 2022-10-13 PROCEDURE — 82607 VITAMIN B-12: CPT | Performed by: FAMILY MEDICINE

## 2022-10-13 PROCEDURE — 90471 FLU VACCINE (QUAD) GREATER THAN OR EQUAL TO 3YO PRESERVATIVE FREE IM: ICD-10-PCS | Mod: S$GLB,,, | Performed by: FAMILY MEDICINE

## 2022-10-13 PROCEDURE — 84153 ASSAY OF PSA TOTAL: CPT | Performed by: FAMILY MEDICINE

## 2022-10-13 PROCEDURE — 83036 HEMOGLOBIN GLYCOSYLATED A1C: CPT | Performed by: FAMILY MEDICINE

## 2022-10-13 PROCEDURE — 80061 LIPID PANEL: CPT | Performed by: FAMILY MEDICINE

## 2022-10-13 PROCEDURE — 82306 VITAMIN D 25 HYDROXY: CPT | Performed by: FAMILY MEDICINE

## 2022-10-13 PROCEDURE — 84443 ASSAY THYROID STIM HORMONE: CPT | Performed by: FAMILY MEDICINE

## 2022-10-13 PROCEDURE — 82746 ASSAY OF FOLIC ACID SERUM: CPT | Performed by: FAMILY MEDICINE

## 2022-10-13 PROCEDURE — 90686 IIV4 VACC NO PRSV 0.5 ML IM: CPT | Mod: S$GLB,,, | Performed by: FAMILY MEDICINE

## 2022-10-13 PROCEDURE — 84466 ASSAY OF TRANSFERRIN: CPT | Performed by: FAMILY MEDICINE

## 2022-10-13 PROCEDURE — 36415 COLL VENOUS BLD VENIPUNCTURE: CPT | Mod: PO | Performed by: FAMILY MEDICINE

## 2022-10-13 PROCEDURE — 3074F SYST BP LT 130 MM HG: CPT | Mod: CPTII,S$GLB,, | Performed by: FAMILY MEDICINE

## 2022-10-13 PROCEDURE — 99999 PR PBB SHADOW E&M-EST. PATIENT-LVL IV: CPT | Mod: PBBFAC,,, | Performed by: FAMILY MEDICINE

## 2022-10-13 PROCEDURE — 82728 ASSAY OF FERRITIN: CPT | Performed by: FAMILY MEDICINE

## 2022-10-13 PROCEDURE — 85025 COMPLETE CBC W/AUTO DIFF WBC: CPT | Performed by: FAMILY MEDICINE

## 2022-10-13 PROCEDURE — 3079F DIAST BP 80-89 MM HG: CPT | Mod: CPTII,S$GLB,, | Performed by: FAMILY MEDICINE

## 2022-10-13 PROCEDURE — 3079F PR MOST RECENT DIASTOLIC BLOOD PRESSURE 80-89 MM HG: ICD-10-PCS | Mod: CPTII,S$GLB,, | Performed by: FAMILY MEDICINE

## 2022-10-13 RX ORDER — AMITRIPTYLINE HYDROCHLORIDE 10 MG/1
10 TABLET, FILM COATED ORAL NIGHTLY
Qty: 90 TABLET | Refills: 1 | Status: SHIPPED | OUTPATIENT
Start: 2022-10-13 | End: 2023-04-13 | Stop reason: SDUPTHER

## 2022-10-13 RX ORDER — FLUOXETINE HYDROCHLORIDE 20 MG/1
20 CAPSULE ORAL DAILY
Qty: 90 CAPSULE | Refills: 1 | Status: SHIPPED | OUTPATIENT
Start: 2022-10-13 | End: 2022-11-16 | Stop reason: SDUPTHER

## 2022-10-13 RX ORDER — PREGABALIN 300 MG/1
300 CAPSULE ORAL 2 TIMES DAILY
Qty: 180 CAPSULE | Refills: 2 | Status: SHIPPED | OUTPATIENT
Start: 2022-10-13 | End: 2023-04-13 | Stop reason: SDUPTHER
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
## 2022-10-14 ENCOUNTER — TELEPHONE (OUTPATIENT)
Dept: FAMILY MEDICINE | Facility: CLINIC | Age: 52
End: 2022-10-14
Payer: COMMERCIAL

## 2022-10-14 DIAGNOSIS — N18.31 STAGE 3A CHRONIC KIDNEY DISEASE: ICD-10-CM

## 2022-10-14 DIAGNOSIS — G62.0 CHEMOTHERAPY-INDUCED NEUROPATHY: Chronic | ICD-10-CM

## 2022-10-14 DIAGNOSIS — E78.49 OTHER HYPERLIPIDEMIA: Primary | ICD-10-CM

## 2022-10-14 DIAGNOSIS — M79.2 NEUROPATHIC PAIN: ICD-10-CM

## 2022-10-14 DIAGNOSIS — T45.1X5A CHEMOTHERAPY-INDUCED NEUROPATHY: Chronic | ICD-10-CM

## 2022-10-14 LAB
25(OH)D3+25(OH)D2 SERPL-MCNC: 39 NG/ML (ref 30–96)
ALBUMIN SERPL BCP-MCNC: 4.3 G/DL (ref 3.5–5.2)
ALP SERPL-CCNC: 98 U/L (ref 55–135)
ALT SERPL W/O P-5'-P-CCNC: 18 U/L (ref 10–44)
ANION GAP SERPL CALC-SCNC: 12 MMOL/L (ref 8–16)
AST SERPL-CCNC: 30 U/L (ref 10–40)
BASOPHILS # BLD AUTO: 0.03 K/UL (ref 0–0.2)
BASOPHILS NFR BLD: 0.6 % (ref 0–1.9)
BILIRUB SERPL-MCNC: 0.4 MG/DL (ref 0.1–1)
BUN SERPL-MCNC: 34 MG/DL (ref 6–20)
CALCIUM SERPL-MCNC: 9.1 MG/DL (ref 8.7–10.5)
CHLORIDE SERPL-SCNC: 107 MMOL/L (ref 95–110)
CHOLEST SERPL-MCNC: 216 MG/DL (ref 120–199)
CHOLEST/HDLC SERPL: 4.9 {RATIO} (ref 2–5)
CO2 SERPL-SCNC: 20 MMOL/L (ref 23–29)
COMPLEXED PSA SERPL-MCNC: 1.1 NG/ML (ref 0–4)
CREAT SERPL-MCNC: 1.9 MG/DL (ref 0.5–1.4)
DIFFERENTIAL METHOD: ABNORMAL
EOSINOPHIL # BLD AUTO: 0.1 K/UL (ref 0–0.5)
EOSINOPHIL NFR BLD: 2.3 % (ref 0–8)
ERYTHROCYTE [DISTWIDTH] IN BLOOD BY AUTOMATED COUNT: 12.9 % (ref 11.5–14.5)
EST. GFR  (NO RACE VARIABLE): 41.4 ML/MIN/1.73 M^2
FERRITIN SERPL-MCNC: 134 NG/ML (ref 20–300)
FOLATE SERPL-MCNC: 14.7 NG/ML (ref 4–24)
GLUCOSE SERPL-MCNC: 96 MG/DL (ref 70–110)
HCT VFR BLD AUTO: 43.4 % (ref 40–54)
HDLC SERPL-MCNC: 44 MG/DL (ref 40–75)
HDLC SERPL: 20.4 % (ref 20–50)
HGB BLD-MCNC: 14.5 G/DL (ref 14–18)
IMM GRANULOCYTES # BLD AUTO: 0.01 K/UL (ref 0–0.04)
IMM GRANULOCYTES NFR BLD AUTO: 0.2 % (ref 0–0.5)
IRON SERPL-MCNC: 73 UG/DL (ref 45–160)
LDLC SERPL CALC-MCNC: 120.2 MG/DL (ref 63–159)
LYMPHOCYTES # BLD AUTO: 0.7 K/UL (ref 1–4.8)
LYMPHOCYTES NFR BLD: 12.5 % (ref 18–48)
MCH RBC QN AUTO: 33.6 PG (ref 27–31)
MCHC RBC AUTO-ENTMCNC: 33.4 G/DL (ref 32–36)
MCV RBC AUTO: 101 FL (ref 82–98)
MONOCYTES # BLD AUTO: 0.6 K/UL (ref 0.3–1)
MONOCYTES NFR BLD: 11.3 % (ref 4–15)
NEUTROPHILS # BLD AUTO: 3.8 K/UL (ref 1.8–7.7)
NEUTROPHILS NFR BLD: 73.1 % (ref 38–73)
NONHDLC SERPL-MCNC: 172 MG/DL
NRBC BLD-RTO: 0 /100 WBC
PLATELET # BLD AUTO: 185 K/UL (ref 150–450)
PMV BLD AUTO: 10.3 FL (ref 9.2–12.9)
POTASSIUM SERPL-SCNC: 4 MMOL/L (ref 3.5–5.1)
PROT SERPL-MCNC: 7.3 G/DL (ref 6–8.4)
RBC # BLD AUTO: 4.31 M/UL (ref 4.6–6.2)
SATURATED IRON: 20 % (ref 20–50)
SODIUM SERPL-SCNC: 139 MMOL/L (ref 136–145)
TOTAL IRON BINDING CAPACITY: 371 UG/DL (ref 250–450)
TRANSFERRIN SERPL-MCNC: 251 MG/DL (ref 200–375)
TRIGL SERPL-MCNC: 259 MG/DL (ref 30–150)
TSH SERPL DL<=0.005 MIU/L-ACNC: 1.32 UIU/ML (ref 0.4–4)
URATE SERPL-MCNC: 7.7 MG/DL (ref 3.4–7)
VIT B12 SERPL-MCNC: 325 PG/ML (ref 210–950)
WBC # BLD AUTO: 5.21 K/UL (ref 3.9–12.7)

## 2022-10-14 RX ORDER — TRAMADOL HYDROCHLORIDE 50 MG/1
50 TABLET ORAL EVERY 8 HOURS PRN
Qty: 90 TABLET | Refills: 0 | Status: SHIPPED | OUTPATIENT
Start: 2022-10-14 | End: 2022-11-16 | Stop reason: SDUPTHER

## 2022-10-14 RX ORDER — ROSUVASTATIN CALCIUM 5 MG/1
5 TABLET, COATED ORAL DAILY
Qty: 90 TABLET | Refills: 3 | Status: SHIPPED | OUTPATIENT
Start: 2022-10-14 | End: 2023-04-13 | Stop reason: SDUPTHER

## 2022-10-21 NOTE — ADDENDUM NOTE
Addended by: NATACHA COOK on: 12/13/2019 04:38 PM     Modules accepted: Orders     Abdominal Pain, N/V/D

## 2022-10-23 ENCOUNTER — HOSPITAL ENCOUNTER (OUTPATIENT)
Dept: RADIOLOGY | Facility: HOSPITAL | Age: 52
Discharge: HOME | End: 2022-10-23
Attending: FAMILY MEDICINE
Payer: COMMERCIAL

## 2022-10-23 DIAGNOSIS — G89.29 CHRONIC RIGHT HIP PAIN: ICD-10-CM

## 2022-10-23 DIAGNOSIS — M25.551 CHRONIC RIGHT HIP PAIN: ICD-10-CM

## 2022-10-23 DIAGNOSIS — M25.551 RIGHT HIP PAIN: ICD-10-CM

## 2022-10-23 DIAGNOSIS — M25.559 PAIN IN UNSPECIFIED HIP: ICD-10-CM

## 2022-10-23 PROCEDURE — 73721 MRI HIP WITHOUT CONTRAST RIGHT: ICD-10-PCS | Mod: 26,RT,, | Performed by: RADIOLOGY

## 2022-10-23 PROCEDURE — 73721 MRI JNT OF LWR EXTRE W/O DYE: CPT | Mod: 26,RT,, | Performed by: RADIOLOGY

## 2022-10-23 PROCEDURE — 73721 MRI JNT OF LWR EXTRE W/O DYE: CPT | Mod: TC,RT

## 2022-10-28 ENCOUNTER — OFFICE VISIT (OUTPATIENT)
Dept: ORTHOPEDICS | Facility: CLINIC | Age: 52
End: 2022-10-28
Payer: COMMERCIAL

## 2022-10-28 VITALS — BODY MASS INDEX: 31.84 KG/M2 | WEIGHT: 215 LBS | HEIGHT: 69 IN

## 2022-10-28 DIAGNOSIS — M25.551 CHRONIC RIGHT HIP PAIN: ICD-10-CM

## 2022-10-28 DIAGNOSIS — G89.29 CHRONIC RIGHT HIP PAIN: ICD-10-CM

## 2022-10-28 DIAGNOSIS — M25.551 RIGHT HIP PAIN: ICD-10-CM

## 2022-10-28 DIAGNOSIS — Z96.641 S/P TOTAL RIGHT HIP ARTHROPLASTY: Primary | ICD-10-CM

## 2022-10-28 DIAGNOSIS — M16.11 PRIMARY OSTEOARTHRITIS OF RIGHT HIP: Primary | ICD-10-CM

## 2022-10-28 DIAGNOSIS — M25.559 PAIN IN UNSPECIFIED HIP: ICD-10-CM

## 2022-10-28 PROCEDURE — 99999 PR PBB SHADOW E&M-EST. PATIENT-LVL III: CPT | Mod: PBBFAC,,, | Performed by: ORTHOPAEDIC SURGERY

## 2022-10-28 PROCEDURE — 3044F PR MOST RECENT HEMOGLOBIN A1C LEVEL <7.0%: ICD-10-PCS | Mod: CPTII,S$GLB,, | Performed by: ORTHOPAEDIC SURGERY

## 2022-10-28 PROCEDURE — 99203 OFFICE O/P NEW LOW 30 MIN: CPT | Mod: S$GLB,,, | Performed by: ORTHOPAEDIC SURGERY

## 2022-10-28 PROCEDURE — 99999 PR PBB SHADOW E&M-EST. PATIENT-LVL III: ICD-10-PCS | Mod: PBBFAC,,, | Performed by: ORTHOPAEDIC SURGERY

## 2022-10-28 PROCEDURE — 1159F MED LIST DOCD IN RCRD: CPT | Mod: CPTII,S$GLB,, | Performed by: ORTHOPAEDIC SURGERY

## 2022-10-28 PROCEDURE — 3044F HG A1C LEVEL LT 7.0%: CPT | Mod: CPTII,S$GLB,, | Performed by: ORTHOPAEDIC SURGERY

## 2022-10-28 PROCEDURE — 1160F RVW MEDS BY RX/DR IN RCRD: CPT | Mod: CPTII,S$GLB,, | Performed by: ORTHOPAEDIC SURGERY

## 2022-10-28 PROCEDURE — 1160F PR REVIEW ALL MEDS BY PRESCRIBER/CLIN PHARMACIST DOCUMENTED: ICD-10-PCS | Mod: CPTII,S$GLB,, | Performed by: ORTHOPAEDIC SURGERY

## 2022-10-28 PROCEDURE — 99203 PR OFFICE/OUTPT VISIT, NEW, LEVL III, 30-44 MIN: ICD-10-PCS | Mod: S$GLB,,, | Performed by: ORTHOPAEDIC SURGERY

## 2022-10-28 PROCEDURE — 1159F PR MEDICATION LIST DOCUMENTED IN MEDICAL RECORD: ICD-10-PCS | Mod: CPTII,S$GLB,, | Performed by: ORTHOPAEDIC SURGERY

## 2022-10-28 NOTE — PROGRESS NOTES
Subjective:      Patient ID: Burton Whiting is a 52 y.o. male.    Chief Complaint: Consult (R Hip )    HPI      Burton Whiting is seen for evaluation and treatment of hip pain.  They have experienced problems with their right hip over the past 2 years Pain is located in the groin and  referred to the knee. They have been treated with intra-articular injection, NSAIDs and over-the-counter analgesics.   Symptoms have recently worsened. Ambulation reportedly has been impaired. Self care ADLs are painful.     Review of Systems   Constitutional: Negative for fever and weight loss.   HENT:  Negative for congestion.    Eyes:  Negative for visual disturbance.   Cardiovascular:  Negative for chest pain.   Respiratory:  Negative for shortness of breath.    Hematologic/Lymphatic: Negative for bleeding problem. Does not bruise/bleed easily.   Skin:  Negative for poor wound healing.   Musculoskeletal:  Positive for joint pain.   Gastrointestinal:  Negative for abdominal pain.   Genitourinary:  Negative for dysuria.   Neurological:  Negative for seizures.   Psychiatric/Behavioral:  Negative for altered mental status.    Allergic/Immunologic: Negative for persistent infections.       Objective:            Ortho/SPM Exam      Right hip    The patient is not in acute distress.   Body habitus is:normal.   Sclerae normal  The patient walks with a limp.   Respiratory distress:  none  The skin over the hip is:intact.   There is:no local tenderness.   Range of motion- Flexion 80, External rotation 20, internal rotation 0.  Resisted SLR positive.  Pain with rotation positive  Sciatic tension findings negative.  Shortening/lengthening compared to the contralateral side exam deferred.  Pulses DP present, PT present.  Motor normal 5/5 strength in all tested muscle groups.   Sensory normal.    I reviewed the relevant imaging for the patient's condition:  Recent right hip MRI shows severe osteoarthritis.      Assessment:       Encounter  Diagnoses   Name Primary?    Right hip pain     Pain in unspecified hip     Chronic right hip pain     Primary osteoarthritis of right hip Yes    The condition is radiographically very advanced.  The patient has significant pain and functional impairment that is not responding to nonsurgical measures.      Plan:       Burton was seen today for consult.    Diagnoses and all orders for this visit:    Primary osteoarthritis of right hip  -     X-Ray Hip 2 or 3 views Right (with Pelvis when performed); Future    Right hip pain  -     Ambulatory referral/consult to Orthopedics    Pain in unspecified hip  -     Ambulatory referral/consult to Orthopedics    Chronic right hip pain  -     Ambulatory referral/consult to Orthopedics    I explained my diagnostic impression and the reasoning behind it in detail, using layman's terms.  Models and/or pictures were used to help in the explanation.    The process of right total hip replacement was explained to the patient.  The nature of the procedure was explained using a model.  The expected perioperative clinical course and period of recovery as applicable to the patient's condition was described.  The risks including death, infection, thromboembolic events, instability, leg length discrepancy, persistent pain/stiffness, fracture around implant and implant failure due to wear or loosening, with possible need for reoperation, were all explained.  The possibility and expectations of continued nonsurgical care were reviewed.  The patient understands and wishes to proceed with the recommended operation.     New radiographs were ordered.  These may be done today or at his preop visit

## 2022-11-02 ENCOUNTER — TELEPHONE (OUTPATIENT)
Dept: ORTHOPEDICS | Facility: CLINIC | Age: 52
End: 2022-11-02
Payer: COMMERCIAL

## 2022-11-02 DIAGNOSIS — M16.11 PRIMARY OSTEOARTHRITIS OF RIGHT HIP: Primary | ICD-10-CM

## 2022-11-02 RX ORDER — SODIUM CHLORIDE 0.9 % (FLUSH) 0.9 %
3 SYRINGE (ML) INJECTION EVERY 8 HOURS
Status: CANCELLED | OUTPATIENT
Start: 2022-11-02

## 2022-11-02 RX ORDER — PREGABALIN 50 MG/1
150 CAPSULE ORAL
Status: CANCELLED | OUTPATIENT
Start: 2022-11-02 | End: 2022-11-02

## 2022-11-02 RX ORDER — MUPIROCIN 20 MG/G
OINTMENT TOPICAL
Status: CANCELLED | OUTPATIENT
Start: 2022-11-02

## 2022-11-02 RX ORDER — ACETAMINOPHEN 325 MG/1
1000 TABLET ORAL
Status: CANCELLED | OUTPATIENT
Start: 2022-11-02 | End: 2022-11-02

## 2022-11-02 RX ORDER — NAPROXEN 250 MG/1
500 TABLET ORAL
Status: CANCELLED | OUTPATIENT
Start: 2022-11-02 | End: 2022-11-02

## 2022-11-08 ENCOUNTER — LAB VISIT (OUTPATIENT)
Dept: LAB | Facility: HOSPITAL | Age: 52
End: 2022-11-08
Attending: FAMILY MEDICINE
Payer: COMMERCIAL

## 2022-11-08 DIAGNOSIS — N18.31 STAGE 3A CHRONIC KIDNEY DISEASE: ICD-10-CM

## 2022-11-08 LAB
ANION GAP SERPL CALC-SCNC: 6 MMOL/L (ref 8–16)
CALCIUM SERPL-MCNC: 8.9 MG/DL (ref 8.7–10.5)
CHLORIDE SERPL-SCNC: 103 MMOL/L (ref 95–110)
CO2 SERPL-SCNC: 32 MMOL/L (ref 23–29)
CREAT SERPL-MCNC: 1.36 MG/DL (ref 0.5–1.4)
EST. GFR  (NO RACE VARIABLE): >60 ML/MIN/1.73 M^2
GLUCOSE SERPL-MCNC: 93 MG/DL (ref 70–110)
POTASSIUM SERPL-SCNC: 5 MMOL/L (ref 3.5–5.1)
SODIUM SERPL-SCNC: 141 MMOL/L (ref 136–145)
UUN UR-MCNC: 21 MG/DL (ref 2–20)

## 2022-11-08 PROCEDURE — 80048 BASIC METABOLIC PNL TOTAL CA: CPT | Mod: PO | Performed by: FAMILY MEDICINE

## 2022-11-08 PROCEDURE — 36415 COLL VENOUS BLD VENIPUNCTURE: CPT | Mod: PO | Performed by: FAMILY MEDICINE

## 2022-11-16 ENCOUNTER — HOSPITAL ENCOUNTER (OUTPATIENT)
Dept: PREADMISSION TESTING | Facility: HOSPITAL | Age: 52
Discharge: HOME | End: 2022-11-16
Attending: ORTHOPAEDIC SURGERY
Payer: COMMERCIAL

## 2022-11-16 ENCOUNTER — ANESTHESIA EVENT (OUTPATIENT)
Dept: SURGERY | Facility: HOSPITAL | Age: 52
End: 2022-11-16
Payer: COMMERCIAL

## 2022-11-16 VITALS
RESPIRATION RATE: 16 BRPM | WEIGHT: 219.81 LBS | HEART RATE: 69 BPM | OXYGEN SATURATION: 99 % | HEIGHT: 69 IN | SYSTOLIC BLOOD PRESSURE: 180 MMHG | DIASTOLIC BLOOD PRESSURE: 76 MMHG | BODY MASS INDEX: 32.56 KG/M2

## 2022-11-16 RX ORDER — LIDOCAINE HYDROCHLORIDE 10 MG/ML
1 INJECTION, SOLUTION EPIDURAL; INFILTRATION; INTRACAUDAL; PERINEURAL ONCE
Status: CANCELLED | OUTPATIENT
Start: 2022-11-16 | End: 2022-11-16

## 2022-11-16 RX ORDER — SODIUM CHLORIDE, SODIUM LACTATE, POTASSIUM CHLORIDE, CALCIUM CHLORIDE 600; 310; 30; 20 MG/100ML; MG/100ML; MG/100ML; MG/100ML
INJECTION, SOLUTION INTRAVENOUS CONTINUOUS
Status: CANCELLED | OUTPATIENT
Start: 2022-11-16

## 2022-11-16 NOTE — DISCHARGE INSTRUCTIONS
Your surgery is scheduled for 11/30/22.    Please report to [facility] on the 1st Floor at 0530 a.m.    THIS TIME IS SUBJECT TO CHANGE.  YOU WILL RECEIVE A PHONE CALL THE DAY BEFORE SURGERY BY 3:30 PM TO CONFIRM YOUR TIME OF ARRIVAL.  IF YOU HAVE NOT RECEIVED A PHONE CALL BY 3:30 PM THE DAY BEFORE YOUR SURGERY PLEASE CALL [phone]     INSTRUCTIONS IMPORTANT!!!  ¨ Do not eat or drink after 12 midnight-including water, candy, gum, & mints. OK to brush teeth.      ____  Proceed to [facility] on *** for additional testing.        ____  Do not wear makeup, including mascara.  ____  No powder, lotions or creams to surgical area.  ____  Please remove all jewelry, including piercings and leave at home.  ____  No money or valuables needed. Please leave at home.  ____  Please bring any documents given by your doctor.  ____  If going home the same day, arrange for a ride home. You will not be able to             drive if Anesthesia was used.  ____  Children under 18 years require a parent / guardian present the entire time             they are in surgery / recovery.  ____  Wear loose fitting clothing. Allow for dressings, bandages.  ____  Stop Aspirin, Ibuprofen, Motrin, Aleve, Goody's/BC powders, Excedrine and Naproxen (NSAIDS) at least 3-5 days before surgery, unless otherwise instructed by your doctor, or the nurse.   You MAY use Tylenol/acetaminophen until day of surgery.  ____  Wash the surgical area with Hibiclens the night before surgery, and again the             morning of surgery.  Be sure to rinse hibiclens off completely (if instructed by   nurse).  ____  If you take diabetic medication, do not take am of surgery unless instructed by Doctor.  ____  Call MD for temperature above 101 degrees or any other signs of infection such as Urinary (bladder) infection, Upper respiratory infection, skin boils, etc.  ____ Stop taking any Fish Oil supplement or any Vitamins that contain Vitamin E at  least 5 days prior to surgery.  ____ Do Not wear your contact lenses the day of your procedure.  You may wear your glasses.      ____Do not shave surgical site for 3 days prior to surgery.  ____ Practice Good hand washing before, during, and after procedure.      I have read or had read and explained to me, and understand the above information.  Additional comments or instructions:  For additional questions call [phone]      ANESTHESIA SIDE EFFECTS  -For the first 24 hours after surgery:  Do not drive, use heavy equipment, make important decisions, or drink alcohol  -It is normal to feel sleepy for several hours.  Rest until you are more awake.  -Have someone stay with you, if needed.  They can watch for problems and help keep you safe.  -Some possible post anesthesia side effects include: nausea and vomiting, sore throat and hoarseness, sleepiness, and dizziness.        Pre-Op Bathing Instructions    Before surgery, you can play an important role in your own health.    Because skin is not sterile, we need to be sure that your skin is as free of germs as possible. By following the instructions below, you can reduce the number of germs on your skin before surgery.    IMPORTANT: You will need to shower with a special soap called Hibiclens*, available at any pharmacy.  If you are allergic to Chlorhexidine (the antiseptic in Hibiclens), use an antibacterial soap such as Dial Soap for your preoperative shower.  You will shower with Hibiclens both the night before your surgery and the morning of your surgery.  Do not use Hibiclens on the head, face or genitals to avoid injury to those areas.    STEP #1: THE NIGHT BEFORE YOUR SURGERY     Do not shave the area of your body where your surgery will be performed.  Shower and wash your hair and body as usual with your normal soap and shampoo.  Rinse your hair and body thoroughly after you shower to remove all soap residue.  With your hand, apply one packet of Hibiclens soap to  the surgical site.   Wash the site gently for five (5) minutes. Do not scrub your skin too hard.   Do not wash with your regular soap after Hibiclens is used.  Rinse your body thoroughly.  Pat yourself dry with a clean, soft towel.  Do not use lotion, cream, or powder.  Wear clean clothes.    STEP #2: THE MORNING OF YOUR SURGERY     Repeat Step #1.    * Not to be used by people allergic to Chlorhexidine.

## 2022-11-16 NOTE — ANESTHESIA PREPROCEDURE EVALUATION
"                                                                                                             11/16/2022  Burton Whiting is a 52 y.o., male scheduled for ARTHROPLASTY, HIP (Right: Hip) 11/30/22.    Past Medical History:   Diagnosis Date    Acute renal failure 6/21/2018    Alcohol abuse     ended 2004    Chemotherapy induced neutropenia 6/7/2018    Former smoker     HPV in male     Hx of psychiatric care     AtivanMatthewil ("caused anger problems")    Opiate addiction     Port or reservoir infection 3/26/2018    Squamous cell carcinoma of palatine tonsil     throat and tonsillar    Substance abuse     opiates, methamphetamines; ended 2004     Past Surgical History:   Procedure Laterality Date    COLONOSCOPY N/A 10/22/2019    Procedure: COLONOSCOPY/plenvu;  Surgeon: Nithya Sawyer MD;  Location: [facility];  Service: Endoscopy;  Laterality: N/A;    ESOPHAGOGASTRODUODENOSCOPY Left 9/9/2019    Procedure: EGD (ESOPHAGOGASTRODUODENOSCOPY);  Surgeon: Balta Lisa MD;  Location: [facility];  Service: Endoscopy;  Laterality: Left;    ESOPHAGOGASTRODUODENOSCOPY N/A 9/12/2019    Procedure: EGD (ESOPHAGOGASTRODUODENOSCOPY);  Surgeon: Perez Mon III, MD;  Location: [facility];  Service: Endoscopy;  Laterality: N/A;    ESOPHAGOGASTRODUODENOSCOPY N/A 10/2/2019    Procedure: EGD (ESOPHAGOGASTRODUODENOSCOPY);  Surgeon: Bonifacio Sosa MD;  Location: [facility] ([address] [place] [address]);  Service: Endoscopy;  Laterality: N/A;    GASTROSTOMY TUBE PLACEMENT Left 02/12/2018    MEDIPORT REMOVAL N/A 6/6/2018    Procedure: Removal-port-a-cath;  Surgeon: [facility] Diagnostic Provider;  Location: [address];  Service: General;  Laterality: N/A;       Pre-op Assessment    I have reviewed the Patient Summary Reports.     I have reviewed the Nursing Notes.    I have reviewed the Medications.     Review of Systems  Anesthesia Hx:  No problems with previous Anesthesia   Denies Personal Hx of Anesthesia complications. "   Social:  Former Smoker, No Alcohol Use    Hematology/Oncology:         -- Anemia: --  Cancer in past history (tonsil cancer):    Cardiovascular:   Exercise tolerance: good Denies Pacemaker.  Denies Hypertension.  hyperlipidemia    Pulmonary:  Pulmonary Normal  Denies Shortness of breath.    Renal/:   Chronic Renal Disease, CKD    Hepatic/GI:   PUD,    Musculoskeletal:  Spine Disorders: lumbar and cervical Disc disease and Degenerative disease    Neurological:   Denies TIA. Denies CVA. Neuromuscular Disease,  Denies Seizures.    Endocrine:  Endocrine Normal  Obesity / BMI > 30  Psych:   Psychiatric History anxiety depression          Physical Exam  General: Well nourished and Cooperative    Airway:  Mallampati: IV / III  Mouth Opening: Normal  TM Distance: Normal  Tongue: Normal  Neck ROM: Normal ROM    Dental:  Dentures  Upper and lower dentures  Chest/Lungs:  Clear to auscultation, Normal Respiratory Rate    Heart:  Rate: Normal  Rhythm: Regular Rhythm  Sounds: Normal      Lab Results   Component Value Date    WBC 5.21 10/13/2022    HGB 14.5 10/13/2022    HCT 43.4 10/13/2022     10/13/2022    CHOL 216 (H) 10/13/2022    TRIG 259 (H) 10/13/2022    HDL 44 10/13/2022    ALT 18 10/13/2022    AST 30 10/13/2022     11/08/2022    K 5.0 11/08/2022     11/08/2022    CREATININE 1.36 11/08/2022    BUN 21 (H) 11/08/2022    CO2 32 (H) 11/08/2022    TSH 1.320 10/13/2022    PSA 1.1 10/13/2022    INR 1.2 10/07/2019    HGBA1C 5.0 10/13/2022     Results for orders placed or performed during the hospital encounter of 10/07/19   EKG 12-lead    Collection Time: 10/07/19 12:00 PM    Narrative    Test Reason : R53.1,    Vent. Rate : 075 BPM     Atrial Rate : 075 BPM     P-R Int : 148 ms          QRS Dur : 088 ms      QT Int : 440 ms       P-R-T Axes : 029 -06 024 degrees     QTc Int : 491 ms    Normal sinus rhythm  Possible Left atrial enlargement  Prolonged QT  Abnormal ECG  When compared with ECG of 08-SEP-2019  22:51,  Questionable change in The axis  Nonspecific T wave abnormality now evident in Inferior leads  Confirmed by Abhay Brooks MD ([id number]) on 10/8/2019 9:57:38 AM    Referred By: [email or web address]   SELF           Confirmed By:Abhay Brooks MD         Anesthesia Plan  Type of Anesthesia, risks & benefits discussed:    Anesthesia Type: Regional, Spinal, Epidural, Gen ETT  Intra-op Monitoring Plan: Standard ASA Monitors  Post Op Pain Control Plan: multimodal analgesia  Induction:  IV  Informed Consent: Informed consent signed with the Patient and all parties understand the risks and agree with anesthesia plan.  All questions answered.   ASA Score: 2  Day of Surgery Review of History & Physical: H&P Update referred to the surgeon/provider.  Anesthesia Plan Notes: Anesthesia consent to be signed prior to surgery 11/30/22      Ready For Surgery From Anesthesia Perspective.     .

## 2022-11-18 ENCOUNTER — HOSPITAL ENCOUNTER (OUTPATIENT)
Dept: RADIOLOGY | Facility: HOSPITAL | Age: 52
Discharge: HOME | End: 2022-11-18
Attending: ORTHOPAEDIC SURGERY
Payer: COMMERCIAL

## 2022-11-18 DIAGNOSIS — M16.11 PRIMARY OSTEOARTHRITIS OF RIGHT HIP: ICD-10-CM

## 2022-11-18 PROCEDURE — 73502 X-RAY EXAM HIP UNI 2-3 VIEWS: CPT | Mod: TC,FY,PO,RT

## 2022-11-28 ENCOUNTER — PATIENT MESSAGE (OUTPATIENT)
Dept: SURGERY | Facility: HOSPITAL | Age: 52
End: 2022-11-28
Payer: COMMERCIAL

## 2022-11-30 ENCOUNTER — ANESTHESIA (OUTPATIENT)
Dept: SURGERY | Facility: HOSPITAL | Age: 52
End: 2022-11-30
Payer: COMMERCIAL

## 2022-11-30 ENCOUNTER — HOSPITAL ENCOUNTER (OUTPATIENT)
Facility: HOSPITAL | Age: 52
Discharge: HOME HEALTH | End: 2022-11-30
Attending: ORTHOPAEDIC SURGERY | Admitting: ORTHOPAEDIC SURGERY
Payer: COMMERCIAL

## 2022-11-30 DIAGNOSIS — Z96.641 HISTORY OF RIGHT HIP REPLACEMENT: ICD-10-CM

## 2022-11-30 DIAGNOSIS — M16.11 PRIMARY OSTEOARTHRITIS OF RIGHT HIP: Primary | ICD-10-CM

## 2022-11-30 PROCEDURE — 63600175 PHARM REV CODE 636 W HCPCS: Performed by: ANESTHESIOLOGY

## 2022-11-30 PROCEDURE — 36000710: Performed by: ORTHOPAEDIC SURGERY

## 2022-11-30 PROCEDURE — 25000003 PHARM REV CODE 250: Performed by: NURSE ANESTHETIST, CERTIFIED REGISTERED

## 2022-11-30 PROCEDURE — 27130 TOTAL HIP ARTHROPLASTY: CPT | Mod: RT,,, | Performed by: ORTHOPAEDIC SURGERY

## 2022-11-30 PROCEDURE — 27130 TOTAL HIP ARTHROPLASTY: CPT | Mod: AS,RT,, | Performed by: PHYSICIAN ASSISTANT

## 2022-11-30 PROCEDURE — 36000711: Performed by: ORTHOPAEDIC SURGERY

## 2022-11-30 PROCEDURE — 27130 PR TOTAL HIP ARTHROPLASTY: ICD-10-PCS | Mod: AS,RT,, | Performed by: PHYSICIAN ASSISTANT

## 2022-11-30 PROCEDURE — 37000009 HC ANESTHESIA EA ADD 15 MINS: Performed by: ORTHOPAEDIC SURGERY

## 2022-11-30 PROCEDURE — C1776 JOINT DEVICE (IMPLANTABLE): HCPCS | Performed by: ORTHOPAEDIC SURGERY

## 2022-11-30 PROCEDURE — 71000039 HC RECOVERY, EACH ADD'L HOUR: Performed by: ORTHOPAEDIC SURGERY

## 2022-11-30 PROCEDURE — 63600175 PHARM REV CODE 636 W HCPCS: Performed by: NURSE PRACTITIONER

## 2022-11-30 PROCEDURE — 71000015 HC POSTOP RECOV 1ST HR: Performed by: ORTHOPAEDIC SURGERY

## 2022-11-30 PROCEDURE — D9220A PRA ANESTHESIA: ICD-10-PCS | Mod: CRNA,,, | Performed by: NURSE ANESTHETIST, CERTIFIED REGISTERED

## 2022-11-30 PROCEDURE — 71000033 HC RECOVERY, INTIAL HOUR: Performed by: ORTHOPAEDIC SURGERY

## 2022-11-30 PROCEDURE — 63600175 PHARM REV CODE 636 W HCPCS: Performed by: ORTHOPAEDIC SURGERY

## 2022-11-30 PROCEDURE — D9220A PRA ANESTHESIA: Mod: CRNA,,, | Performed by: NURSE ANESTHETIST, CERTIFIED REGISTERED

## 2022-11-30 PROCEDURE — 97165 OT EVAL LOW COMPLEX 30 MIN: CPT

## 2022-11-30 PROCEDURE — 63600175 PHARM REV CODE 636 W HCPCS: Performed by: NURSE ANESTHETIST, CERTIFIED REGISTERED

## 2022-11-30 PROCEDURE — D9220A PRA ANESTHESIA: ICD-10-PCS | Mod: ANES,,, | Performed by: ANESTHESIOLOGY

## 2022-11-30 PROCEDURE — 25000003 PHARM REV CODE 250: Performed by: ORTHOPAEDIC SURGERY

## 2022-11-30 PROCEDURE — 97535 SELF CARE MNGMENT TRAINING: CPT

## 2022-11-30 PROCEDURE — 97116 GAIT TRAINING THERAPY: CPT

## 2022-11-30 PROCEDURE — 27130 PR TOTAL HIP ARTHROPLASTY: ICD-10-PCS | Mod: RT,,, | Performed by: ORTHOPAEDIC SURGERY

## 2022-11-30 PROCEDURE — 37000008 HC ANESTHESIA 1ST 15 MINUTES: Performed by: ORTHOPAEDIC SURGERY

## 2022-11-30 PROCEDURE — D9220A PRA ANESTHESIA: Mod: ANES,,, | Performed by: ANESTHESIOLOGY

## 2022-11-30 PROCEDURE — 97161 PT EVAL LOW COMPLEX 20 MIN: CPT

## 2022-11-30 PROCEDURE — 71000016 HC POSTOP RECOV ADDL HR: Performed by: ORTHOPAEDIC SURGERY

## 2022-11-30 PROCEDURE — 27201423 OPTIME MED/SURG SUP & DEVICES STERILE SUPPLY: Performed by: ORTHOPAEDIC SURGERY

## 2022-11-30 DEVICE — STEM FEMORAL ACCOL SZ 4 35X105: Type: IMPLANTABLE DEVICE | Site: HIP | Status: FUNCTIONAL

## 2022-11-30 DEVICE — IMPLANTABLE DEVICE: Type: IMPLANTABLE DEVICE | Site: HIP | Status: FUNCTIONAL

## 2022-11-30 DEVICE — SHELL TRIDENT II ACET E 52MM: Type: IMPLANTABLE DEVICE | Site: HIP | Status: FUNCTIONAL

## 2022-11-30 RX ORDER — ZOLPIDEM TARTRATE 5 MG/1
5 TABLET ORAL NIGHTLY PRN
Status: DISCONTINUED | OUTPATIENT
Start: 2022-11-30 | End: 2022-11-30 | Stop reason: HOSPADM

## 2022-11-30 RX ORDER — POLYETHYLENE GLYCOL 3350 17 G/17G
17 POWDER, FOR SOLUTION ORAL DAILY
Status: DISCONTINUED | OUTPATIENT
Start: 2022-11-30 | End: 2022-11-30 | Stop reason: HOSPADM

## 2022-11-30 RX ORDER — NAPROXEN 500 MG/1
500 TABLET ORAL
Status: COMPLETED | OUTPATIENT
Start: 2022-11-30 | End: 2022-11-30

## 2022-11-30 RX ORDER — HYDROMORPHONE HYDROCHLORIDE 2 MG/ML
0.2 INJECTION, SOLUTION INTRAMUSCULAR; INTRAVENOUS; SUBCUTANEOUS EVERY 5 MIN PRN
Status: DISCONTINUED | OUTPATIENT
Start: 2022-11-30 | End: 2022-11-30 | Stop reason: HOSPADM

## 2022-11-30 RX ORDER — OXYCODONE HYDROCHLORIDE 5 MG/1
10 TABLET ORAL
Status: DISCONTINUED | OUTPATIENT
Start: 2022-11-30 | End: 2022-11-30 | Stop reason: HOSPADM

## 2022-11-30 RX ORDER — PREGABALIN 75 MG/1
150 CAPSULE ORAL
Status: COMPLETED | OUTPATIENT
Start: 2022-11-30 | End: 2022-11-30

## 2022-11-30 RX ORDER — FLUOXETINE HYDROCHLORIDE 20 MG/1
20 CAPSULE ORAL DAILY
Status: CANCELLED | OUTPATIENT
Start: 2022-11-30

## 2022-11-30 RX ORDER — MUPIROCIN 20 MG/G
OINTMENT TOPICAL
Status: DISCONTINUED | OUTPATIENT
Start: 2022-11-30 | End: 2022-11-30 | Stop reason: HOSPADM

## 2022-11-30 RX ORDER — CEFAZOLIN SODIUM 2 G/50ML
2 SOLUTION INTRAVENOUS
Status: DISCONTINUED | OUTPATIENT
Start: 2022-11-30 | End: 2022-11-30 | Stop reason: HOSPADM

## 2022-11-30 RX ORDER — ACETAMINOPHEN 500 MG
1000 TABLET ORAL 3 TIMES DAILY
Status: DISCONTINUED | OUTPATIENT
Start: 2022-11-30 | End: 2022-11-30 | Stop reason: HOSPADM

## 2022-11-30 RX ORDER — ACETAMINOPHEN 500 MG
1000 TABLET ORAL
Status: COMPLETED | OUTPATIENT
Start: 2022-11-30 | End: 2022-11-30

## 2022-11-30 RX ORDER — OXYCODONE HYDROCHLORIDE 5 MG/1
5 TABLET ORAL
Status: DISCONTINUED | OUTPATIENT
Start: 2022-11-30 | End: 2022-11-30 | Stop reason: HOSPADM

## 2022-11-30 RX ORDER — SODIUM CHLORIDE 0.9 % (FLUSH) 0.9 %
3 SYRINGE (ML) INJECTION EVERY 8 HOURS
Status: DISCONTINUED | OUTPATIENT
Start: 2022-11-30 | End: 2022-11-30 | Stop reason: HOSPADM

## 2022-11-30 RX ORDER — BUPIVACAINE HYDROCHLORIDE 7.5 MG/ML
INJECTION, SOLUTION INTRASPINAL
Status: DISCONTINUED | OUTPATIENT
Start: 2022-11-30 | End: 2022-11-30

## 2022-11-30 RX ORDER — NAPROXEN SODIUM 220 MG/1
81 TABLET, FILM COATED ORAL DAILY
Status: CANCELLED | OUTPATIENT
Start: 2022-11-30

## 2022-11-30 RX ORDER — MIDAZOLAM HYDROCHLORIDE 1 MG/ML
INJECTION, SOLUTION INTRAMUSCULAR; INTRAVENOUS
Status: DISCONTINUED | OUTPATIENT
Start: 2022-11-30 | End: 2022-11-30

## 2022-11-30 RX ORDER — OXYCODONE AND ACETAMINOPHEN 5; 325 MG/1; MG/1
1 TABLET ORAL EVERY 6 HOURS PRN
Qty: 45 TABLET | Refills: 0 | Status: SHIPPED | OUTPATIENT
Start: 2022-11-30 | End: 2023-04-13

## 2022-11-30 RX ORDER — CEFAZOLIN SODIUM 2 G/50ML
2 SOLUTION INTRAVENOUS
Status: COMPLETED | OUTPATIENT
Start: 2022-11-30 | End: 2022-11-30

## 2022-11-30 RX ORDER — PREGABALIN 75 MG/1
300 CAPSULE ORAL 2 TIMES DAILY
Status: CANCELLED | OUTPATIENT
Start: 2022-11-30

## 2022-11-30 RX ORDER — HYDROMORPHONE HYDROCHLORIDE 2 MG/ML
0.5 INJECTION, SOLUTION INTRAMUSCULAR; INTRAVENOUS; SUBCUTANEOUS EVERY 6 HOURS PRN
Status: DISCONTINUED | OUTPATIENT
Start: 2022-11-30 | End: 2022-11-30 | Stop reason: HOSPADM

## 2022-11-30 RX ORDER — AMITRIPTYLINE HYDROCHLORIDE 10 MG/1
10 TABLET, FILM COATED ORAL NIGHTLY
Status: CANCELLED | OUTPATIENT
Start: 2022-11-30

## 2022-11-30 RX ORDER — TRANEXAMIC ACID 100 MG/ML
1000 INJECTION, SOLUTION INTRAVENOUS
Status: COMPLETED | OUTPATIENT
Start: 2022-11-30 | End: 2022-11-30

## 2022-11-30 RX ORDER — LACTULOSE 10 G/15ML
20 SOLUTION ORAL EVERY 6 HOURS PRN
Status: DISCONTINUED | OUTPATIENT
Start: 2022-11-30 | End: 2022-11-30 | Stop reason: HOSPADM

## 2022-11-30 RX ORDER — SODIUM CHLORIDE, SODIUM LACTATE, POTASSIUM CHLORIDE, CALCIUM CHLORIDE 600; 310; 30; 20 MG/100ML; MG/100ML; MG/100ML; MG/100ML
INJECTION, SOLUTION INTRAVENOUS CONTINUOUS
Status: DISCONTINUED | OUTPATIENT
Start: 2022-11-30 | End: 2022-11-30 | Stop reason: HOSPADM

## 2022-11-30 RX ORDER — PROPOFOL 10 MG/ML
VIAL (ML) INTRAVENOUS CONTINUOUS PRN
Status: DISCONTINUED | OUTPATIENT
Start: 2022-11-30 | End: 2022-11-30

## 2022-11-30 RX ORDER — BISACODYL 10 MG
10 SUPPOSITORY, RECTAL RECTAL DAILY PRN
Status: DISCONTINUED | OUTPATIENT
Start: 2022-11-30 | End: 2022-11-30 | Stop reason: HOSPADM

## 2022-11-30 RX ORDER — NAPROXEN SODIUM 220 MG/1
81 TABLET, FILM COATED ORAL DAILY
Refills: 0
Start: 2022-11-30 | End: 2023-01-25

## 2022-11-30 RX ORDER — AMOXICILLIN 250 MG
1 CAPSULE ORAL 2 TIMES DAILY
Status: DISCONTINUED | OUTPATIENT
Start: 2022-11-30 | End: 2022-11-30 | Stop reason: HOSPADM

## 2022-11-30 RX ORDER — SODIUM CHLORIDE 0.9 % (FLUSH) 0.9 %
10 SYRINGE (ML) INJECTION
Status: DISCONTINUED | OUTPATIENT
Start: 2022-11-30 | End: 2022-11-30 | Stop reason: HOSPADM

## 2022-11-30 RX ORDER — ONDANSETRON 4 MG/1
4 TABLET, ORALLY DISINTEGRATING ORAL EVERY 6 HOURS PRN
Status: DISCONTINUED | OUTPATIENT
Start: 2022-11-30 | End: 2022-11-30 | Stop reason: HOSPADM

## 2022-11-30 RX ORDER — ONDANSETRON 2 MG/ML
4 INJECTION INTRAMUSCULAR; INTRAVENOUS DAILY PRN
Status: DISCONTINUED | OUTPATIENT
Start: 2022-11-30 | End: 2022-11-30 | Stop reason: HOSPADM

## 2022-11-30 RX ORDER — FAMOTIDINE 20 MG/1
20 TABLET, FILM COATED ORAL DAILY
Status: DISCONTINUED | OUTPATIENT
Start: 2022-11-30 | End: 2022-11-30 | Stop reason: HOSPADM

## 2022-11-30 RX ORDER — DEXAMETHASONE SODIUM PHOSPHATE 4 MG/ML
INJECTION, SOLUTION INTRA-ARTICULAR; INTRALESIONAL; INTRAMUSCULAR; INTRAVENOUS; SOFT TISSUE
Status: DISCONTINUED | OUTPATIENT
Start: 2022-11-30 | End: 2022-11-30

## 2022-11-30 RX ORDER — LIDOCAINE HYDROCHLORIDE 10 MG/ML
1 INJECTION, SOLUTION EPIDURAL; INFILTRATION; INTRACAUDAL; PERINEURAL ONCE
Status: DISCONTINUED | OUTPATIENT
Start: 2022-11-30 | End: 2022-11-30 | Stop reason: HOSPADM

## 2022-11-30 RX ORDER — SODIUM CHLORIDE 0.9 % (FLUSH) 0.9 %
5 SYRINGE (ML) INJECTION
Status: CANCELLED | OUTPATIENT
Start: 2022-11-30

## 2022-11-30 RX ORDER — SODIUM CHLORIDE 9 MG/ML
INJECTION, SOLUTION INTRAVENOUS CONTINUOUS
Status: DISCONTINUED | OUTPATIENT
Start: 2022-11-30 | End: 2022-11-30 | Stop reason: HOSPADM

## 2022-11-30 RX ORDER — ONDANSETRON 2 MG/ML
4 INJECTION INTRAMUSCULAR; INTRAVENOUS EVERY 6 HOURS PRN
Status: DISCONTINUED | OUTPATIENT
Start: 2022-11-30 | End: 2022-11-30 | Stop reason: HOSPADM

## 2022-11-30 RX ORDER — TRANEXAMIC ACID
POWDER (GRAM) MISCELLANEOUS
Status: DISCONTINUED | OUTPATIENT
Start: 2022-11-30 | End: 2022-11-30 | Stop reason: HOSPADM

## 2022-11-30 RX ORDER — LIDOCAINE HYDROCHLORIDE 20 MG/ML
INJECTION INTRAVENOUS
Status: DISCONTINUED | OUTPATIENT
Start: 2022-11-30 | End: 2022-11-30

## 2022-11-30 RX ADMIN — SODIUM CHLORIDE, SODIUM LACTATE, POTASSIUM CHLORIDE, AND CALCIUM CHLORIDE: .6; .31; .03; .02 INJECTION, SOLUTION INTRAVENOUS at 06:11

## 2022-11-30 RX ADMIN — MIDAZOLAM 2 MG: 1 INJECTION INTRAMUSCULAR; INTRAVENOUS at 06:11

## 2022-11-30 RX ADMIN — NAPROXEN 500 MG: 500 TABLET ORAL at 06:11

## 2022-11-30 RX ADMIN — DEXAMETHASONE SODIUM PHOSPHATE 12 MG: 4 INJECTION, SOLUTION INTRA-ARTICULAR; INTRALESIONAL; INTRAMUSCULAR; INTRAVENOUS; SOFT TISSUE at 07:11

## 2022-11-30 RX ADMIN — PROPOFOL 75 MCG/KG/MIN: 10 INJECTION, EMULSION INTRAVENOUS at 07:11

## 2022-11-30 RX ADMIN — CEFAZOLIN SODIUM 2 G: 2 SOLUTION INTRAVENOUS at 07:11

## 2022-11-30 RX ADMIN — BUPIVACAINE HYDROCHLORIDE 1.8 ML: 7.5 INJECTION, SOLUTION SUBARACHNOID at 07:11

## 2022-11-30 RX ADMIN — ACETAMINOPHEN 1000 MG: 500 TABLET ORAL at 06:11

## 2022-11-30 RX ADMIN — LIDOCAINE HYDROCHLORIDE 20 MG: 20 INJECTION, SOLUTION INTRAVENOUS at 07:11

## 2022-11-30 RX ADMIN — HYDROMORPHONE HYDROCHLORIDE 0.2 MG: 2 INJECTION, SOLUTION INTRAMUSCULAR; INTRAVENOUS; SUBCUTANEOUS at 09:11

## 2022-11-30 RX ADMIN — OXYCODONE HYDROCHLORIDE 10 MG: 5 TABLET ORAL at 10:11

## 2022-11-30 RX ADMIN — TRANEXAMIC ACID 1000 MG: 100 INJECTION, SOLUTION INTRAVENOUS at 07:11

## 2022-11-30 RX ADMIN — PREGABALIN 150 MG: 75 CAPSULE ORAL at 06:11

## 2022-11-30 RX ADMIN — HYDROMORPHONE HYDROCHLORIDE 0.2 MG: 2 INJECTION, SOLUTION INTRAMUSCULAR; INTRAVENOUS; SUBCUTANEOUS at 10:11

## 2022-11-30 RX ADMIN — MUPIROCIN: 20 OINTMENT TOPICAL at 06:11

## 2022-11-30 RX ADMIN — SODIUM CHLORIDE, SODIUM LACTATE, POTASSIUM CHLORIDE, AND CALCIUM CHLORIDE: .6; .31; .03; .02 INJECTION, SOLUTION INTRAVENOUS at 08:11

## 2022-11-30 RX ADMIN — PROPOFOL 100 MCG/KG/MIN: 10 INJECTION, EMULSION INTRAVENOUS at 08:11

## 2022-11-30 NOTE — PLAN OF CARE
Patient has met PACU discharge criteria, VSS, pain well controlled. Family updated by phone. Released from PACU by Anesthesia.

## 2022-11-30 NOTE — DISCHARGE SUMMARY
"[facility] - Surgery (Hospital)  Discharge Note  Short Stay    Procedure(s) (LRB):  ARTHROPLASTY, HIP (Right)      OUTCOME: Patient tolerated treatment/procedure well without complication and is now ready for discharge.    DISPOSITION: Home-Health Care [facility]    FINAL DIAGNOSIS:  History of right hip replacement    FOLLOWUP: In clinic    DISCHARGE INSTRUCTIONS:    Discharge Procedure Orders   COMMODE FOR HOME USE     Order Specific Question Answer Comments   Type: Standard    Height: 5'9"    Weight: 210    Length of need (1-99 months): 99      WALKER FOR HOME USE     Order Specific Question Answer Comments   Type of Walker: Adult (5'4"-6'6")    With wheels? Yes    Height: 5'9"    Weight: 210    Length of need (1-99 months): 99    Please check all that apply: Patient's condition impairs ambulation.      HIP KIT FOR HOME USE     Order Specific Question Answer Comments   Height: 5'9"    Weight: 210    Length of need (1-99 months): 99    Type: [place] Hip Kit      Ambulatory referral/consult to Home Health   Standing Status: Future   Referral Priority: Routine Referral Type: Home Health Care   Referral Reason: Specialty Services Required   Requested Specialty: Home Health Services   Number of Visits Requested: 1     Diet Adult Regular     Ice to affected area     Leave dressing on - Keep it clean, dry, and intact until clinic visit   Order Comments: The patient may shower with intact dressing    If the dressing becomes loose or saturated please replace with similar, occlusive dressing.     Activity as tolerated        TIME SPENT ON DISCHARGE:  15 minutes  "

## 2022-11-30 NOTE — PLAN OF CARE
Problem: Physical Therapy  Goal: Physical Therapy Goal  Description: Goals to be met by: 22     Patient will increase functional independence with mobility by performin. Gait  x 40 feet with Stand-by Assistance and Contact Guard Assistance using Rolling Walker.     Outcome: Adequate for Care Transition     Pt participating in initial OT/PT evaluations this date. Pt educated on ice/rest/elevation, LE exercises, safe stair negotiation, WBAT RLE, posterior hip precautions, home safety, car/toilet/shower/tub t/fs, use of DME for functional mobility and ADLs. Pt to d/c home this date with  PT/OT.

## 2022-11-30 NOTE — OP NOTE
INDICATIONS/PREOPERATIVE DIAGNOSIS: Osteoarthritis of the right hip.    POSTOPERATIVE DIAGNOSIS: Same.    DATE OF SURGERY: 11/30/2022    NAME OF PROCEDURE: right total hip replacement.    SURGEON: Brennon Yates MD.    ASSISTANT:  VITOR Quintana    IMPLANT SYSTEM: Freer Trident /Accolade 2    EBL:  150 cc.    DESCRIPTION OF PROCEDURE: The patient was taken to the Operating Room and given spinal anesthesia. Intravenous antibiotics were given. The patient was placed in the lateral decubitus position with an axillary roll in place. The limbs were carefully padded and supported. The hip was prepped and draped in the usual sterile fashion. A posterolateral skin incision was made. Sharp dissection was carried down to the fascia mary. The fascia mary and gluteus jo-ann were incised The trochanteric bursa was excised. The abductor mechanism was retracted anteriorly. An incision was made between the piriformis and the gluteus minimus starting at the rim of the acetabulum, extending distally to the intertrochanteric ridge and then distally to the lesser trochanter creating a full-thickness posterior capsular/tendinous flap. This flap was retracted posteriorly, and the hip was dislocated. The femoral neck was cut 1/2 inch above the lesser trochanter according to the preoperative plan. The femur was retracted anteriorly. The acetabulum was debrided from its rim to its base of connective tissue. It was reamed to 52 mm. The 52 mm trial had excellent fit and stability. The final shell was seated using the guide.  No supplemental dome screws were used. The anterior rim was checked for osteophytes and all that were threatening to cause impingement were removed. The socket was irrigated. The MDM liner was snapped into place. It was carefully checked and found to be secure. The proximal femur was then exposed in the wound. It was entered with a box osteotome and a starting awl. It was then broached to a size 4 x 127° in 15 degrees of  anteversion. The calcar was planed. Trial reduction revealed excellent range of motion and stability with a +5 mm neck length. The final stem was seated. The neck was cleaned and dried.  The 36 mm metal head was seated.  The socket was irrigated. The hip was reduced. Range of motion and stability were excellent. The wound was irrigated. The capsule and tendinous flap were sutured back into place with #1 Vicryl, the fascia was closed with Stratafix, the deep tissues were injected with 1 gram of tranexamic acid. The subcutaneous tissue was closed with 2-0 Vicryl. The skin was closed with Monocryl and Prineo, and a occlusive plastic adhesive dressing was applied. There were no known complications.

## 2022-11-30 NOTE — PATIENT INSTRUCTIONS
GENERAL DISCHARGE INSTRUCTIONS:        FOLLOW UP APPOINTMENT:  Call your surgeon's office to schedule your post operative appointment, if one has not already been scheduled.     WEIGHTBEARING:  You may bear as much weight as you can tolerate on your operative leg.  Continue to use a walking device until d/c'd by your physical therapist or doctor.    PAIN MANAGEMENT:  Take your pain medication as prescribed.  Wean yourself off narcotic pain medication slowly, supplementing with acetominophen (Tylenol).  Do not exceed 3000 mg of Tylenol per day.  You may also substitute other over-the-counter pain medications unless your allergic or have been instructed not to do so by your doctor.    SURGICAL DRESSING:  If you are discharged on the day of surgery after knee replacement, remove Ace wrap 24 hours after surgery.  Leave underlying, plastic dressing in place.  For all patients:  Do not change plastic dressing unless it falls off or it appears that it is saturated.     It is normal to see some blood spotting on the dressing.  If the dressing appears to leak or be saturated, visiting nursing may replace it with a similar dressing.  It is also permissible to reinforce it with gauze and tape.     Whenever possible, avoid seeking emergency room and urgent care for dressing related questions and problems.  I and my staff are available Monday through Friday for dressing changes.  Please contact the office by phone or through the portal for questions regarding your dressing    DRIVING:  Do not drive until you have your follow up appointment with your surgeon.    SHOWERING:  You can shower as long as your dressing is intact and your physical therapist has instructed you on how to safely get in and out of your shower.    TO PREVENT BLOOD CLOTS: continue to take aspirin (or other medication) as instructed above.  Also continue to walk frequently throughout the day.    TO PREVENT CONSTIPATION:  continue to take stool softeners  regularly for as long as you are on narcotic pain medication (oxycodone/hydrocodone). If you do not have normal bowel movement for 1-2 days, purchase an over the counter laxative, such as Milk of Magnesia, and follow the instructions on the label. Call your doctor for severe abdominal pain, vomiting or diarrhea.    To PREVENT SWELLING:  This is normal for at least 2 months after surgery. Spend time each day with your leg elevated on several pillows. Use ice as needed. SHAHNAZ stockings are helpful for swelling, but MAY be removed, if desired.    NOTIFY YOUR SURGEON IF: Unrelenting pain that does not improve, even after taking prescribed pain medication. Increasing drainage from the wound.

## 2022-11-30 NOTE — ANESTHESIA POSTPROCEDURE EVALUATION
Anesthesia Post Evaluation    Patient: Burton Whiting    Procedure(s) Performed: Procedure(s) (LRB):  ARTHROPLASTY, HIP (Right)    Final Anesthesia Type: spinal      Patient location during evaluation: PACU  Patient participation: Yes- Able to Participate  Level of consciousness: awake and alert  Post-procedure vital signs: reviewed and stable  Pain management: adequate  Airway patency: patent    PONV status at discharge: No PONV  Anesthetic complications: no      Cardiovascular status: blood pressure returned to baseline  Respiratory status: unassisted  Hydration status: euvolemic  Follow-up not needed.          Vitals Value Taken Time   /82 11/30/22 1200   Temp 36.6 °C (97.9 °F) 11/30/22 1200   Pulse 66 11/30/22 1212   Resp 18 11/30/22 1212   SpO2 99 % 11/30/22 1211   Vitals shown include unvalidated device data.      Event Time   Out of Recovery 12:08:00         Pain/Monica Score: Pain Rating Prior to Med Admin: 7 (11/30/2022 10:05 AM)  Pain Rating Post Med Admin: 3 (11/30/2022 10:35 AM)  Monica Score: 10 (11/30/2022 12:05 PM)

## 2022-11-30 NOTE — PROGRESS NOTES
Home Health orders sent to [facility], they will reach out to pt once received and verify address.

## 2022-11-30 NOTE — PT/OT/SLP EVAL
"Physical Therapy Evaluation and Treatment    Patient Name:  Burton Whiting   MRN:  [id number]    Recommendations:     Discharge Recommendations:  home health OT, home health PT   Discharge Equipment Recommendations: bedside commode, walker, rolling, hip kit, shower chair (BSC and RW to be delivered to bedside prior to d/c)   Barriers to discharge: None    Assessment:     Burton Whiting is a 52 y.o. male admitted with a medical diagnosis of History of right hip replacement.  He presents with the following impairments/functional limitations:  gait instability, impaired balance, impaired functional mobility, impaired self care skills, decreased lower extremity function, decreased ROM, impaired skin, pain, edema, orthopedic precautions, impaired sensation .Pt participating in initial OT/PT evaluations this date. Pt educated on ice/rest/elevation, LE exercises, safe stair negotiation, WBAT RLE, posterior hip precautions, home safety, car/toilet/shower/tub t/fs, use of DME for functional mobility and ADLs. Pt to d/c home this date with  PT/OT. Pt ambulated ~30 ft x 2 with RW and CGA progressing to SBA. Pt ascended/descended an 8" step with R HR and L HHA/CGA. Pt deemed safe for d/c.       Recent Surgery: Procedure(s) (LRB):  ARTHROPLASTY, HIP (Right) Day of Surgery    Plan:     Pt to d/c this date  Plan of Care Expires:  11/30/22    Subjective     Chief Complaint: post-op R hip pain with ambulating   Patient/Family Comments/goals: return home  Pain/Comfort:  Pain Rating 1: 0/10  Location - Side 1: Right  Location 1: hip  Pain Addressed 1: Reposition, Distraction, Cessation of Activity  Pain Rating Post-Intervention 1: 7/10    Patients cultural, spiritual, Jewish conflicts given the current situation: no    Living Environment:  Pt lives alone in a [facility], 4 RADHA with R HR, and WIS  Prior to admission, patients level of function was Independent, driving, and working as a supervisor.  Equipment used at home: none.  DME owned " "(not currently used): none.  Upon discharge, patient will have assistance from sister who will be staying at pt's house for 1 week.    Objective:     Communicated with nsg prior to session.  Patient found HOB elevated with peripheral IV, hip abduction pillow  upon PT entry to room.    General Precautions: Standard, fall   Orthopedic Precautions:RLE posterior precautions, RLE weight bearing as tolerated   Braces: N/A  Respiratory Status: Room air    Exams:  Cognitive Exam:  Patient is oriented to Person, Place, Time, and Situation  Postural Exam:  Patient presented with the following abnormalities:    -       Rounded shoulders  Sensation:    -       Impaired  light/touch B feet to knee level pt reports ~50% of full sensation due to neuropathy  Skin Integrity/Edema:      -       Skin integrity: Wound surgical R hip no drainage noted on bandage   -       Edema: Mild RLE  RLE ROM: WFL except R hip limited by posterior hip precautions  RLE Strength: NT  LLE ROM: WFL  LLE Strength: WFL    Functional Mobility:  Bed Mobility:     Scooting: stand by assistance  Supine to Sit: stand by assistance  Sit to Supine: stand by assistance  Transfers:     Sit to Stand:  stand by assistance and contact guard assistance with rolling walker; VC's and demonstration provided for proper hand placement and sequencing   Toilet Transfer: stand by assistance with  rolling walker  using  Step Transfer  Gait: Pt ambulated ~30 ft x 2 with RW and CGA progressing to SBA; pt educated on 3 pt gait pattern; VC's for upright posture, efficiency/safety with turning while maintaining precautions, and proper use of RW with good carryover  Stairs:  Pt ascended/descended  8" step  with R HR and L HHA/CGA; VC's for sequencing       AM-PAC 6 CLICK MOBILITY  Total Score:18       Treatment & Education:  Pt participating in initial OT/PT evaluations this date.   Pt educated on ice/rest/elevation, use of abduction pillow, LE exercises, safe stair negotiation, " "WBAT RLE, posterior hip precautions, home safety, car/toilet/shower/tub t/fs, and safe use of DME for functional mobility and ADLs. Pt to d/c home this date with HH PT/OT. Pt ambulated ~30 ft x 2 with RW and CGA progressing to SBA. Pt ascended/descended an 8" step with R HR and L HHA/CGA in between gait trials. Pt ambulated to restroom for toilet t/f and performed dressing with OT sitting EOB.   Gait belt provided and educated on use.     Patient left HOB elevated with all lines intact, call button in reach, nsg notified, and brother and sister present.    GOALS:   Multidisciplinary Problems       Physical Therapy Goals          Problem: Physical Therapy    Goal Priority Disciplines Outcome Goal Variances Interventions   Physical Therapy Goal     PT, PT/OT Adequate for Care Transition     Description: Goals to be met by: 22     Patient will increase functional independence with mobility by performin. Gait  x 40 feet with Stand-by Assistance and Contact Guard Assistance using Rolling Walker.                          History:     Past Medical History:   Diagnosis Date    Acute renal failure 2018    Alcohol abuse     ended     Chemotherapy induced neutropenia 2018    Former smoker     HPV in male     Hx of psychiatric care     Ativan, Paxil ("caused anger problems")    Opiate addiction     Port or reservoir infection 3/26/2018    Squamous cell carcinoma of palatine tonsil     throat and tonsillar    Substance abuse     opiates, methamphetamines; ended        Past Surgical History:   Procedure Laterality Date    COLONOSCOPY N/A 10/22/2019    Procedure: COLONOSCOPY/plenvu;  Surgeon: Nithya Sawyer MD;  Location: [facility] END[facility];  Service: Endoscopy;  Laterality: N/A;    ESOPHAGOGASTRODUODENOSCOPY Left 2019    Procedure: EGD (ESOPHAGOGASTRODUODENOSCOPY);  Surgeon: Balta Lisa MD;  Location: [facility] END[facility];  Service: Endoscopy;  Laterality: Left;    ESOPHAGOGASTRODUODENOSCOPY N/A 2019    " Procedure: EGD (ESOPHAGOGASTRODUODENOSCOPY);  Surgeon: Perez Mon III, MD;  Location: [facility];  Service: Endoscopy;  Laterality: N/A;    ESOPHAGOGASTRODUODENOSCOPY N/A 10/2/2019    Procedure: EGD (ESOPHAGOGASTRODUODENOSCOPY);  Surgeon: Bonifacio Sosa MD;  [facility]  Service: Endoscopy;  Laterality: N/A;    GASTROSTOMY TUBE PLACEMENT Left 02/12/2018    MEDIPORT REMOVAL N/A 6/6/2018    Procedure: Removal-port-a-cath;  Surgeon: [facility] Diagnostic Provider;  [facility];  Service: General;  Laterality: N/A;       Time Tracking:     PT Received On: 11/30/22  PT Start Time: 1312     PT Stop Time: 1343  PT Total Time (min): 31 min     Billable Minutes: Evaluation 8 and Gait Training 10 (cotx with OT)      11/30/2022

## 2022-11-30 NOTE — PLAN OF CARE
Pt participating in initial OT/PT evaluations this date. Pt educated on adaptive dsg post surgery, home safety, car/toilet/shower/tub t/fs, use of DME for functional mobility and ADLs as well as posterior hip precautions. Pt performing functional mobility with CGA & RW progressing to SBA with use of RW. Pt to d/c home with HH OT/PT & 24/7 care/assistance from family.     Problem: Occupational Therapy  Goal: Occupational Therapy Goal  Description: Goals to be met by: 12/30/2022     Patient will increase functional independence with ADLs by performing:    Toileting from toilet with Supervision for hygiene and clothing management. MET 11/30/2022    Outcome: Adequate for Care Transition

## 2022-11-30 NOTE — TRANSFER OF CARE
"Anesthesia Transfer of Care Note    Patient: Burton Whiting    Procedure(s) Performed: Procedure(s) (LRB):  ARTHROPLASTY, HIP (Right)    Patient location: PACU    Anesthesia Type: MAC, general and spinal    Transport from OR: Transported from OR on room air with adequate spontaneous ventilation    Post pain: adequate analgesia    Post assessment: no apparent anesthetic complications and tolerated procedure well    Post vital signs: stable    Level of consciousness: awake, alert and oriented    Nausea/Vomiting: no nausea/vomiting    Complications: none    Transfer of care protocol was followed      Last vitals:   Visit Vitals  BP (!) 131/91 (BP Location: Right arm, Patient Position: Lying)   Pulse 67   Temp 37.1 °C (98.8 °F) (Skin)   Resp 18   Ht 5' 9" (1.753 m)   Wt 97.5 kg (215 lb)   SpO2 99%   BMI 31.75 kg/m²     "

## 2022-11-30 NOTE — H&P
"[facility])  Orthopedics  H&P    Patient Name: Burton Whiting  MRN: [id number]  Admission Date: 11/30/2022  Primary Care Provider: Christopher Turpin DO    Patient information was obtained from patient and past medical records.     Subjective:     Principal Problem:<principal problem not specified>    Chief Complaint: No chief complaint on file.       HPI:  Burton Whiting is seen for evaluation and treatment of hip pain.  They have experienced problems with their right hip over the past 2 years Pain is located in the groin and  referred to the knee. They have been treated with intra-articular injection, NSAIDs and over-the-counter analgesics.   Symptoms have recently worsened. Ambulation reportedly has been impaired. Self care ADLs are painful.     Past Medical History:   Diagnosis Date    Acute renal failure 6/21/2018    Alcohol abuse     ended 2004    Chemotherapy induced neutropenia 6/7/2018    Former smoker     HPV in male     Hx of psychiatric care     Ativan, Paxil ("caused anger problems")    Opiate addiction     Port or reservoir infection 3/26/2018    Squamous cell carcinoma of palatine tonsil     throat and tonsillar    Substance abuse     opiates, methamphetamines; ended 2004       Past Surgical History:   Procedure Laterality Date    COLONOSCOPY N/A 10/22/2019    Procedure: COLONOSCOPY/plenvu;  Surgeon: Nithya Sawyer MD;  Location: [facility];  Service: Endoscopy;  Laterality: N/A;    ESOPHAGOGASTRODUODENOSCOPY Left 9/9/2019    Procedure: EGD (ESOPHAGOGASTRODUODENOSCOPY);  Surgeon: Balta Lisa MD;  Location: [facility];  Service: Endoscopy;  Laterality: Left;    ESOPHAGOGASTRODUODENOSCOPY N/A 9/12/2019    Procedure: EGD (ESOPHAGOGASTRODUODENOSCOPY);  Surgeon: Perez Mon III, MD;  Location: [facility];  Service: Endoscopy;  Laterality: N/A;    ESOPHAGOGASTRODUODENOSCOPY N/A 10/2/2019    Procedure: EGD (ESOPHAGOGASTRODUODENOSCOPY);  Surgeon: Bonifacio Sosa MD;  Location: [facility]" "ENDO (2ND FLR);  Service: Endoscopy;  Laterality: N/A;    GASTROSTOMY TUBE PLACEMENT Left 2018    MEDIPORT REMOVAL N/A 2018    Procedure: Removal-port-a-cath;  Surgeon: Blayne Diagnostic Provider;  Location: [facility] OR 2ND FLR;  Service: General;  Laterality: N/A;       Review of patient's allergies indicates:   Allergen Reactions    Trazodone Other (See Comments)     confusion       Current Facility-Administered Medications   Medication    cefazolin (ANCEF) 2 gram in dextrose 5% 50 mL IVPB (premix)    lactated ringers infusion    LIDOcaine (PF) 10 mg/ml (1%) injection 10 mg    mupirocin 2 % ointment    sodium chloride 0.9% flush 3 mL    tranexamic acid (CYKLOKAPRON) 1,000 mg in sodium chloride 0.9 % 100 mL (ready to mix system)     Family History       Problem Relation (Age of Onset)    Brain cancer Maternal Uncle    Depression Sister    Hypertension Father, Brother    Leukemia Mother    Thyroid disease Sister          Tobacco Use    Smoking status: Former     Packs/day: 1.50     Years: 25.00     Pack years: 37.50     Types: Cigarettes     Quit date: 2017     Years since quittin.5    Smokeless tobacco: Never   Substance and Sexual Activity    Alcohol use: No     Comment: history of overuse    Drug use: No     Comment: Former Opiate/methamphetamine addiction    Sexual activity: Yes     Partners: Female     ROS  Objective:     Vital Signs (Most Recent):  Temp: 98.8 °F (37.1 °C) (22 06)  Pulse: 67 (22)  Resp: 18 (22)  BP: (!) 131/91 (22)  SpO2: 99 % (22)   Vital Signs (24h Range):  Temp:  [98.8 °F (37.1 °C)] 98.8 °F (37.1 °C)  Pulse:  [67] 67  Resp:  [18] 18  SpO2:  [99 %] 99 %  BP: (131)/(91) 131/91     Weight: 97.5 kg (215 lb)  Height: 5' 9" (175.3 cm)  Body mass index is 31.75 kg/m².    No intake or output data in the 24 hours ending 22 07    Ortho/SPM Exam    The patient is not in acute distress.   Body habitus is:normal.   Sclerae normal  The " patient walks with a limp.   Respiratory distress:  none  The skin over the hip is:intact.   There is:no local tenderness.   Range of motion- Flexion 80, External rotation 20, internal rotation 0.  Resisted SLR positive.  Pain with rotation positive  Sciatic tension findings negative.  Shortening/lengthening compared to the contralateral side exam deferred.  Pulses DP present, PT present.  Motor normal 5/5 strength in all tested muscle groups.   Sensory normal    I reviewed the relevant imaging for the patient's condition:  Right hip radiographs show near complete loss of joint space with cystic changes and osteophytes    Significant Labs: All pertinent labs within the past 24 hours have been reviewed.    Significant Imaging: I have reviewed all pertinent imaging results/findings.    Assessment/Plan:     There are no hospital problems to display for this patient.     The condition is radiographically very advanced.  The patient has significant pain and functional impairment that is not responding to nonsurgical measures.    I explained my diagnostic impression and the reasoning behind it in detail, using layman's terms.  Models and/or pictures were used to help in the explanation.     The process of right total hip replacement was explained to the patient.  The nature of the procedure was explained using a model.  The expected perioperative clinical course and period of recovery as applicable to the patient's condition was described.  The risks including death, infection, thromboembolic events, instability, leg length discrepancy, persistent pain/stiffness, fracture around implant and implant failure due to wear or loosening, with possible need for reoperation, were all explained.  The possibility and expectations of continued nonsurgical care were reviewed.  The patient understands and wishes to proceed with the recommended operation.         Brennon Yates MD  Orthopedics  [facility])

## 2022-11-30 NOTE — OPERATIVE NOTE ADDENDUM
Certification of Assistant at Surgery       Surgery Date: 11/30/2022     Participating Surgeons:  Surgeon(s) and Role:     * Brennon Yates MD - Primary    Procedures:  Procedure(s) (LRB):  ARTHROPLASTY, HIP (Right)    Assistant Surgeon's Certification of Necessity:  I understand that section 1842 (b) (6) (d) of the Social Security Act generally prohibits Medicare Part B reasonable charge payment for the services of assistants at surgery in teaching hospitals when qualified residents are available to furnish such services. I certify that the services for which payment is claimed were medically necessary, and that no qualified resident was available to perform the services. I further understand that these services are subject to post-payment review by the Medicare carrier.      Cici Quintana PA-C    11/30/2022  9:27 AM

## 2022-11-30 NOTE — PT/OT/SLP EVAL
Occupational Therapy   Evaluation    Name: Burton Whiting  MRN: [id number]  Admitting Diagnosis:  History of right hip replacement  Recent Surgery: Procedure(s) (LRB):  ARTHROPLASTY, HIP (Right) Day of Surgery    Recommendations:     Discharge Recommendations: home health PT, home health OT, other (see comments) (& 24/7 care and assistance from family)  Discharge Equipment Recommendations:  bedside commode, walker, rolling, hip kit, shower chair  Barriers to discharge:  None    Assessment:     Burton Whiting is a 52 y.o. male with a medical diagnosis of History of right hip replacement.  He presents with The primary encounter diagnosis was Primary osteoarthritis of right hip. A diagnosis of History of right hip replacement was also pertinent to this visit. Performance deficits affecting function: orthopedic precautions, edema, decreased ROM, decreased lower extremity function, impaired skin, gait instability, impaired functional mobility, impaired endurance, impaired balance, impaired self care skills, decreased coordination, pain.      Pt participating in initial OT/PT evaluations this date. Pt educated on adaptive dsg post surgery, home safety, car/toilet/shower/tub t/fs, use of DME for functional mobility and ADLs as well as posterior hip precautions. Pt performing functional mobility with CGA & RW progressing to SBA with use of RW. Pt to d/c home with HH OT/PT & 24/7 care/assistance from family.     Rehab Prognosis: Good; patient would benefit from acute skilled OT services to address these deficits and reach maximum level of function.       Plan:     Patient to be seen   to address the above listed problems via self-care/home management, therapeutic activities, therapeutic exercises  Plan of Care Expires: 11/30/22  Plan of Care Reviewed with: patient, family    Subjective     Chief Complaint: Pain in R hip  Patient/Family Comments/goals: Return home    Occupational Profile:  Pt lives alone in a [facility], 4 RADHA with R  HR, and WIS  Prior to admission, patients level of function was Independent, driving, and working as a supervisor.    Equipment used at home: none.    Upon discharge, patient will have assistance from sister who will be staying at pt's house for 1 week.    Pain/Comfort:  Pain Rating 1: 0/10  Location - Side 1: Right  Location 1: hip  Pain Addressed 1: Reposition, Distraction, Cessation of Activity, Nurse notified  Pain Rating Post-Intervention 1: 7/10    Patients cultural, spiritual, Worship conflicts given the current situation: no    Objective:     Communicated with: nsvesta prior to session.  Patient found HOB elevated with hip abduction pillow, peripheral IV upon OT entry to room.    General Precautions: Standard, fall   Orthopedic Precautions:RLE posterior precautions, RLE weight bearing as tolerated   Braces: N/A  Respiratory Status: Room air    Occupational Performance:    Bed Mobility:    Patient completed Scooting/Bridging with stand by assistance  Patient completed Supine to Sit with stand by assistance  Patient completed Sit to Supine with stand by assistance    Functional Mobility/Transfers:  Patient completed Sit <> Stand Transfer with stand by assistance and contact guard assistance  with  rolling walker   Patient completed Toilet Transfer Step Transfer technique with contact guard assistance with  rolling walker  Functional Mobility: Pt performing functional mobility in room/hallway with CGA & RW progressing to SBA & RW; pt educated on/practicing step for home safety. Pt/family issued gait belt & educated on use for increased safety with steps at home.     Activities of Daily Living:  Upper Body Dressing: modified independence seated EOB  Lower Body Dressing: minimum assistance EOB to barron pants with use of reacher while maintaining posterior hip precautions  Toileting: supervision seated on toilet    Cognitive/Visual Perceptual:  Cognitive/Psychosocial Skills:     -       Oriented to: Person, Place,  "Time, and Situation   -       Follows Commands/attention:Follows multistep  commands  -       Memory: No Deficits noted  -       Mood/Affect/Coping skills/emotional control: Cooperative and Pleasant    Physical Exam:  Sensation: Pt reports numbness ~50% sensation BUE fingertips to elbows as his baseline  Upper Extremity Range of Motion:     -       Right Upper Extremity: WFL  -       Left Upper Extremity: WFL  Upper Extremity Strength:    -       Right Upper Extremity: WFL  -       Left Upper Extremity: WFL   Strength:    -       Right Upper Extremity: WFL  -       Left Upper Extremity: WFL    AMPAC 6 Click ADL:  AMPAC Total Score: 22    Treatment & Education:  Pt participating in initial OT/PT evaluations this date.   Pt educated on adaptive dsg post surgery, home safety, car/toilet/shower/tub t/fs, use of DME for functional mobility and ADLs as well as posterior hip precautions.   Pt performing functional mobility with CGA & RW progressing to SBA with use of RW.   Pt performing dressing as above with use of hip kit.   Pt to d/c home with HH OT/PT & 24/7 care/assistance from family.     Patient left HOB elevated with all lines intact, call button in reach, nsg notified, and family present    GOALS:   Multidisciplinary Problems       Occupational Therapy Goals          Problem: Occupational Therapy    Goal Priority Disciplines Outcome Interventions   Occupational Therapy Goal     OT, PT/OT Adequate for Care Transition    Description: Goals to be met by: 12/30/2022     Patient will increase functional independence with ADLs by performing:    Toileting from toilet with Supervision for hygiene and clothing management. MET 11/30/2022                         History:     Past Medical History:   Diagnosis Date    Acute renal failure 6/21/2018    Alcohol abuse     ended 2004    Chemotherapy induced neutropenia 6/7/2018    Former smoker     HPV in male     Hx of psychiatric care     Ativan, Paxil ("caused anger " "problems")    Opiate addiction     Port or reservoir infection 3/26/2018    Squamous cell carcinoma of palatine tonsil     throat and tonsillar    Substance abuse     opiates, methamphetamines; ended 2004         Past Surgical History:   Procedure Laterality Date    COLONOSCOPY N/A 10/22/2019    Procedure: COLONOSCOPY/plenvu;  Surgeon: Nithya Sawyer MD;  Location: [facility];  Service: Endoscopy;  Laterality: N/A;    ESOPHAGOGASTRODUODENOSCOPY Left 9/9/2019    Procedure: EGD (ESOPHAGOGASTRODUODENOSCOPY);  Surgeon: Balta Lisa MD;  Location: [facility];  Service: Endoscopy;  Laterality: Left;    ESOPHAGOGASTRODUODENOSCOPY N/A 9/12/2019    Procedure: EGD (ESOPHAGOGASTRODUODENOSCOPY);  Surgeon: Perez Mon III, MD;  Location: [facility];  Service: Endoscopy;  Laterality: N/A;    ESOPHAGOGASTRODUODENOSCOPY N/A 10/2/2019    Procedure: EGD (ESOPHAGOGASTRODUODENOSCOPY);  Surgeon: Bonifacio Sosa MD;  [facility]  Service: Endoscopy;  Laterality: N/A;    GASTROSTOMY TUBE PLACEMENT Left 02/12/2018    MEDIPORT REMOVAL N/A 6/6/2018    Procedure: Removal-port-a-cath;  Surgeon: Blayne Diagnostic Provider;  Location: [facility] OR [address] [place] [address];  Service: General;  Laterality: N/A;       Time Tracking:     OT Date of Treatment: 11/30/22  OT Start Time: 1312  OT Stop Time: 1343  OT Total Time (min): 31 min with PT    Billable Minutes:Evaluation 10  Self Care/Home Management 15    11/30/2022  "

## 2022-11-30 NOTE — ANESTHESIA PROCEDURE NOTES
Spinal    Diagnosis: Total Hip  Patient location during procedure: OR  Start time: 11/30/2022 7:06 AM  Timeout: 11/30/2022 7:05 AM  End time: 11/30/2022 7:10 AM    Staffing  Authorizing Provider: Kain Chakraborty MD  Performing Provider: Kain Chakraborty MD    Preanesthetic Checklist  Completed: patient identified, IV checked, site marked, risks and benefits discussed, surgical consent, monitors and equipment checked, pre-op evaluation and timeout performed  Spinal Block  Patient position: sitting  Prep: Betasept  Patient monitoring: heart rate, cardiac monitor, continuous pulse ox, continuous capnometry and frequent blood pressure checks  Approach: midline  Location: L3-4  Injection technique: single shot  CSF Fluid: clear free-flowing CSF  Needle  Needle type: pencil-tip   Needle gauge: 25 G  Needle length: 3.5 in  Needle localization: anatomical landmarks  Assessment  Sensory level: T5   Dermatomal levels determined by pinch or prick  Ease of block: easy  Patient's tolerance of the procedure: comfortable throughout block  Additional Notes  Bupivacaine 0.75% 1.8mL

## 2022-12-01 PROCEDURE — G0180 PR HOME HEALTH MD CERTIFICATION: ICD-10-PCS | Mod: ,,, | Performed by: ORTHOPAEDIC SURGERY

## 2022-12-01 PROCEDURE — G0180 MD CERTIFICATION HHA PATIENT: HCPCS | Mod: ,,, | Performed by: ORTHOPAEDIC SURGERY

## 2022-12-02 VITALS
DIASTOLIC BLOOD PRESSURE: 82 MMHG | HEART RATE: 72 BPM | RESPIRATION RATE: 17 BRPM | WEIGHT: 215 LBS | OXYGEN SATURATION: 98 % | BODY MASS INDEX: 31.84 KG/M2 | TEMPERATURE: 98 F | SYSTOLIC BLOOD PRESSURE: 150 MMHG | HEIGHT: 69 IN

## 2022-12-12 ENCOUNTER — EXTERNAL HOME HEALTH (OUTPATIENT)
Dept: HOME HEALTH SERVICES | Facility: HOSPITAL | Age: 52
End: 2022-12-12
Payer: COMMERCIAL

## 2022-12-16 ENCOUNTER — HOSPITAL ENCOUNTER (OUTPATIENT)
Dept: RADIOLOGY | Facility: HOSPITAL | Age: 52
Discharge: HOME | End: 2022-12-16
Attending: ORTHOPAEDIC SURGERY
Payer: COMMERCIAL

## 2022-12-16 ENCOUNTER — OFFICE VISIT (OUTPATIENT)
Dept: ORTHOPEDICS | Facility: CLINIC | Age: 52
End: 2022-12-16
Payer: COMMERCIAL

## 2022-12-16 VITALS — BODY MASS INDEX: 31.84 KG/M2 | HEIGHT: 69 IN | WEIGHT: 214.94 LBS

## 2022-12-16 DIAGNOSIS — Z96.641 S/P TOTAL RIGHT HIP ARTHROPLASTY: ICD-10-CM

## 2022-12-16 DIAGNOSIS — M16.11 PRIMARY OSTEOARTHRITIS OF RIGHT HIP: Primary | ICD-10-CM

## 2022-12-16 PROCEDURE — 1159F PR MEDICATION LIST DOCUMENTED IN MEDICAL RECORD: ICD-10-PCS | Mod: CPTII,S$GLB,, | Performed by: ORTHOPAEDIC SURGERY

## 2022-12-16 PROCEDURE — 73502 XR HIP WITH PELVIS WHEN PERFORMED, 2 OR 3  VIEWS RIGHT: ICD-10-PCS | Mod: 26,RT,, | Performed by: RADIOLOGY

## 2022-12-16 PROCEDURE — 99024 POSTOP FOLLOW-UP VISIT: CPT | Mod: S$GLB,,, | Performed by: ORTHOPAEDIC SURGERY

## 2022-12-16 PROCEDURE — 99999 PR PBB SHADOW E&M-EST. PATIENT-LVL III: CPT | Mod: PBBFAC,,, | Performed by: ORTHOPAEDIC SURGERY

## 2022-12-16 PROCEDURE — 1160F RVW MEDS BY RX/DR IN RCRD: CPT | Mod: CPTII,S$GLB,, | Performed by: ORTHOPAEDIC SURGERY

## 2022-12-16 PROCEDURE — 3044F PR MOST RECENT HEMOGLOBIN A1C LEVEL <7.0%: ICD-10-PCS | Mod: CPTII,S$GLB,, | Performed by: ORTHOPAEDIC SURGERY

## 2022-12-16 PROCEDURE — 1160F PR REVIEW ALL MEDS BY PRESCRIBER/CLIN PHARMACIST DOCUMENTED: ICD-10-PCS | Mod: CPTII,S$GLB,, | Performed by: ORTHOPAEDIC SURGERY

## 2022-12-16 PROCEDURE — 99024 PR POST-OP FOLLOW-UP VISIT: ICD-10-PCS | Mod: S$GLB,,, | Performed by: ORTHOPAEDIC SURGERY

## 2022-12-16 PROCEDURE — 1159F MED LIST DOCD IN RCRD: CPT | Mod: CPTII,S$GLB,, | Performed by: ORTHOPAEDIC SURGERY

## 2022-12-16 PROCEDURE — 73502 X-RAY EXAM HIP UNI 2-3 VIEWS: CPT | Mod: 26,RT,, | Performed by: RADIOLOGY

## 2022-12-16 PROCEDURE — 99999 PR PBB SHADOW E&M-EST. PATIENT-LVL III: ICD-10-PCS | Mod: PBBFAC,,, | Performed by: ORTHOPAEDIC SURGERY

## 2022-12-16 PROCEDURE — 3008F BODY MASS INDEX DOCD: CPT | Mod: CPTII,S$GLB,, | Performed by: ORTHOPAEDIC SURGERY

## 2022-12-16 PROCEDURE — 3044F HG A1C LEVEL LT 7.0%: CPT | Mod: CPTII,S$GLB,, | Performed by: ORTHOPAEDIC SURGERY

## 2022-12-16 PROCEDURE — 73502 X-RAY EXAM HIP UNI 2-3 VIEWS: CPT | Mod: TC,PN,RT

## 2022-12-16 PROCEDURE — 3008F PR BODY MASS INDEX (BMI) DOCUMENTED: ICD-10-PCS | Mod: CPTII,S$GLB,, | Performed by: ORTHOPAEDIC SURGERY

## 2022-12-16 NOTE — PROGRESS NOTES
"Subjective:      Patient ID: Burton Whiting is a 52 y.o. male.    Chief Complaint: Post-op Evaluation (2 wk p/o- right ELIEL )      HPI:  Two weeks postop  The patient is seen for postop follow-up of right  ELIEL.  Pain control has been satisfactory  They feel that they are ambulating easily  Postoperative complaints include:  None at this time      Current Outpatient Medications:     amitriptyline (ELAVIL) 10 MG tablet, Take 1 tablet (10 mg total) by mouth every evening., Disp: 90 tablet, Rfl: 1    aspirin 81 MG Chew, Take 1 tablet (81 mg total) by mouth once daily., Disp: , Rfl: 0    FLUoxetine 20 MG capsule, Take 1 capsule (20 mg total) by mouth once daily., Disp: 90 capsule, Rfl: 1    multivitamin capsule, Take 1 capsule by mouth once daily., Disp: , Rfl:     pregabalin (LYRICA) 300 MG Cap, Take 1 capsule (300 mg total) by mouth 2 (two) times daily., Disp: 180 capsule, Rfl: 2    rosuvastatin (CRESTOR) 5 MG tablet, Take 1 tablet (5 mg total) by mouth once daily., Disp: 90 tablet, Rfl: 3    acetaminophen (TYLENOL) 325 MG tablet, Take 2 tablets (650 mg total) by mouth every 6 (six) hours as needed. (Patient not taking: Reported on 12/16/2022), Disp: , Rfl: 0    oxyCODONE-acetaminophen (PERCOCET) 5-325 mg per tablet, Take 1 tablet by mouth every 6 (six) hours as needed for Pain. (Patient not taking: Reported on 12/16/2022), Disp: 45 tablet, Rfl: 0  Review of patient's allergies indicates:   Allergen Reactions    Trazodone Other (See Comments)     confusion       Ht 5' 9" (1.753 m)   Wt 97.5 kg (214 lb 15.2 oz)   BMI 31.74 kg/m²     ROS        Objective:    Ortho Exam          Alert, oriented, no acute distress  Sclera-Normal  Respiratory distress-none  Gait:  No limp  Wound:  Healing cleanly  Range of motion:  Painless  Distal perfusion:  Intact  Distal neurologic:  Intact    Imaging:  Radiographs show well-positioned right hip replacement without complicating process      Assessment:             No diagnosis " found.    The patient is recovering normally from the procedure.  There is no evidence of complication at the surgical site        Plan:          No follow-ups on file.    I explained my assessment and reviewed the natural history of recovery from the procedure.  Appropriate progression of physical therapy and activity was discussed.  After discussing options the patient and I have decided that he will do self-directed exercise for now.

## 2023-01-05 ENCOUNTER — TELEPHONE (OUTPATIENT)
Dept: ORTHOPEDICS | Facility: CLINIC | Age: 53
End: 2023-01-05
Payer: COMMERCIAL

## 2023-01-05 NOTE — TELEPHONE ENCOUNTER
Attempted to contact Mr. Whiting, in regards to his appointment on 1/13. Left a message--Unfortunately Cici Quintana PA-C will be in surgery, and I have him tentatively scheduled with Yuliana Damon PA-C on 1/17 at 1:45 PM. Give us a call if any questions.

## 2023-01-12 ENCOUNTER — TELEPHONE (OUTPATIENT)
Dept: ORTHOPEDICS | Facility: CLINIC | Age: 53
End: 2023-01-12
Payer: COMMERCIAL

## 2023-01-12 NOTE — TELEPHONE ENCOUNTER
----- Message from Larisa Scott sent at 1/12/2023  1:07 PM CST -----  Needs advice from nurse:      Who Called:pt  Regarding:patient tripped over an extension cord and hip is hurting  Would the patient rather a call back or VIA [facility]?  Best Call Back number:[phone]  Additional Info:

## 2023-01-12 NOTE — TELEPHONE ENCOUNTER
Spoke with Mr. Whiting- pt stated he tripped, but didn't fall. Reports no pain at this time and doing okay. Appointment has been confirmed with PA on 01/18/23.

## 2023-01-24 NOTE — PROGRESS NOTES
"Subjective:      Patient ID: Burton Whiting is a 53 y.o. male.    Chief Complaint: Post-op Evaluation (R ELIEL )      HPI:  The patient is seen for postop follow-up of right  ELIEL.  The patient is 8 out from surgery.  Pain control has been satisfactory. They feel that they are ambulating easily.  He has resumed activities of daily living; including work and riding peleton  Preoperative complaints include: none      Current Outpatient Medications:     amitriptyline (ELAVIL) 10 MG tablet, Take 1 tablet (10 mg total) by mouth every evening., Disp: 90 tablet, Rfl: 1    aspirin 81 MG Chew, Take 1 tablet (81 mg total) by mouth once daily., Disp: , Rfl: 0    FLUoxetine 20 MG capsule, Take 1 capsule (20 mg total) by mouth once daily., Disp: 90 capsule, Rfl: 1    multivitamin capsule, Take 1 capsule by mouth once daily., Disp: , Rfl:     rosuvastatin (CRESTOR) 5 MG tablet, Take 1 tablet (5 mg total) by mouth once daily., Disp: 90 tablet, Rfl: 3    acetaminophen (TYLENOL) 325 MG tablet, Take 2 tablets (650 mg total) by mouth every 6 (six) hours as needed. (Patient not taking: Reported on 12/16/2022), Disp: , Rfl: 0    oxyCODONE-acetaminophen (PERCOCET) 5-325 mg per tablet, Take 1 tablet by mouth every 6 (six) hours as needed for Pain. (Patient not taking: Reported on 12/16/2022), Disp: 45 tablet, Rfl: 0    pregabalin (LYRICA) 300 MG Cap, Take 1 capsule (300 mg total) by mouth 2 (two) times daily. (Patient not taking: Reported on 1/25/2023), Disp: 180 capsule, Rfl: 2  Review of patient's allergies indicates:   Allergen Reactions    Trazodone Other (See Comments)     confusion       Ht 5' 9" (1.753 m)   Wt 97.1 kg (214 lb)   BMI 31.60 kg/m²     Review of Systems   Constitutional: Negative for chills and fever.   Cardiovascular:  Negative for chest pain and palpitations.   Respiratory:  Negative for shortness of breath and wheezing.    Skin:  Negative for poor wound healing and rash.   Gastrointestinal:  Negative for nausea and " vomiting.   Genitourinary:  Negative for dysuria and hematuria.   Neurological:  Negative for seizures and tremors.   Psychiatric/Behavioral:  Negative for altered mental status.    Allergic/Immunologic: Negative for environmental allergies and persistent infections.         Objective:    Ortho Exam          Exam demonstrates  A well developed male in no distress.  Alert and oriented. Breathing in unlabored. Mood and affect are appropriate.     Hip incision is well healed. There is a good, stable range of motion that is fluid and painless. Leg lengths are clinically equal. The extremity is neurovascularly intact. Gait is painless. He ambulates indeppendently.    Assessment:     Imaging:  Xrays demonstrate a well fixed and positioned prosthesis.        1. S/P total right hip arthroplasty            Plan:          Pt reports no complaints  Excellent recovery   Continue activity as tolerated    Follow up in about 6 weeks (around 3/8/2023).

## 2023-01-25 ENCOUNTER — OFFICE VISIT (OUTPATIENT)
Dept: ORTHOPEDICS | Facility: CLINIC | Age: 53
End: 2023-01-25
Payer: COMMERCIAL

## 2023-01-25 VITALS — WEIGHT: 214 LBS | BODY MASS INDEX: 31.7 KG/M2 | HEIGHT: 69 IN

## 2023-01-25 DIAGNOSIS — Z96.641 S/P TOTAL RIGHT HIP ARTHROPLASTY: Primary | ICD-10-CM

## 2023-01-25 PROCEDURE — 1159F PR MEDICATION LIST DOCUMENTED IN MEDICAL RECORD: ICD-10-PCS | Mod: CPTII,S$GLB,, | Performed by: ORTHOPAEDIC SURGERY

## 2023-01-25 PROCEDURE — 99024 PR POST-OP FOLLOW-UP VISIT: ICD-10-PCS | Mod: S$GLB,,, | Performed by: ORTHOPAEDIC SURGERY

## 2023-01-25 PROCEDURE — 99999 PR PBB SHADOW E&M-EST. PATIENT-LVL III: CPT | Mod: PBBFAC,,, | Performed by: ORTHOPAEDIC SURGERY

## 2023-01-25 PROCEDURE — 99024 POSTOP FOLLOW-UP VISIT: CPT | Mod: S$GLB,,, | Performed by: ORTHOPAEDIC SURGERY

## 2023-01-25 PROCEDURE — 99999 PR PBB SHADOW E&M-EST. PATIENT-LVL III: ICD-10-PCS | Mod: PBBFAC,,, | Performed by: ORTHOPAEDIC SURGERY

## 2023-01-25 PROCEDURE — 3008F PR BODY MASS INDEX (BMI) DOCUMENTED: ICD-10-PCS | Mod: CPTII,S$GLB,, | Performed by: ORTHOPAEDIC SURGERY

## 2023-01-25 PROCEDURE — 1159F MED LIST DOCD IN RCRD: CPT | Mod: CPTII,S$GLB,, | Performed by: ORTHOPAEDIC SURGERY

## 2023-01-25 PROCEDURE — 3008F BODY MASS INDEX DOCD: CPT | Mod: CPTII,S$GLB,, | Performed by: ORTHOPAEDIC SURGERY

## 2023-03-07 NOTE — PROGRESS NOTES
"  Subjective:      Patient ID: Burton Whiting is a 53 y.o. male.    Chief Complaint: Post-op Evaluation of the Right Hip      HPI:  The patient is seen for postop follow-up of right  ELIEL.  The patient is a little over 3 months out from surgery.  Pain control has been satisfactory. They feel that they are ambulating easily.  He has resumed activities of daily living.  In fact, he has returned to work and has been walking approximately 4 miles per day.  He does find with overactivity that he has lateral hip pain after a long day.  He denies any groin pain.  He has been taking over-the-counter Advil for this with modest benefit.  Post-operative complaints include:  Lateral hip discomfort      Current Outpatient Medications:     acetaminophen (TYLENOL) 325 MG tablet, Take 2 tablets (650 mg total) by mouth every 6 (six) hours as needed., Disp: , Rfl: 0    amitriptyline (ELAVIL) 10 MG tablet, Take 1 tablet (10 mg total) by mouth every evening., Disp: 90 tablet, Rfl: 1    FLUoxetine 20 MG capsule, Take 1 capsule (20 mg total) by mouth once daily., Disp: 90 capsule, Rfl: 1    multivitamin capsule, Take 1 capsule by mouth once daily., Disp: , Rfl:     oxyCODONE-acetaminophen (PERCOCET) 5-325 mg per tablet, Take 1 tablet by mouth every 6 (six) hours as needed for Pain., Disp: 45 tablet, Rfl: 0    pregabalin (LYRICA) 300 MG Cap, Take 1 capsule (300 mg total) by mouth 2 (two) times daily., Disp: 180 capsule, Rfl: 2    rosuvastatin (CRESTOR) 5 MG tablet, Take 1 tablet (5 mg total) by mouth once daily., Disp: 90 tablet, Rfl: 3    aspirin 81 MG Chew, Take 1 tablet (81 mg total) by mouth once daily., Disp: , Rfl: 0    celecoxib (CELEBREX) 200 MG capsule, Take 1 capsule (200 mg total) by mouth 2 (two) times daily., Disp: 60 capsule, Rfl: 0  Review of patient's allergies indicates:   Allergen Reactions    Trazodone Other (See Comments)     confusion       Ht 5' 9" (1.753 m)   Wt 97.1 kg (214 lb)   BMI 31.60 kg/m²     Review of " Systems   Constitutional: Negative for chills and fever.   Cardiovascular:  Negative for chest pain and palpitations.   Respiratory:  Negative for shortness of breath and wheezing.    Skin:  Negative for poor wound healing and rash.   Musculoskeletal:  Positive for joint pain.   Gastrointestinal:  Negative for nausea and vomiting.   Genitourinary:  Negative for dysuria and hematuria.   Neurological:  Negative for seizures and tremors.   Psychiatric/Behavioral:  Negative for altered mental status.    Allergic/Immunologic: Negative for environmental allergies and persistent infections.         Objective:    Ortho Exam          Exam demonstrates  A well developed male in no distress.  Alert and oriented. Breathing in unlabored. Mood and affect are appropriate.     Hip incision is well healed. There is a good, stable range of motion that is fluid and painless. Leg lengths are clinically equal. The extremity is neurovascularly intact. Gait is normal. He ambulates independently and without limp    Assessment:     Imaging: Previously reviewed. Xrays demonstrate a well fixed and positioned prosthesis.        1. S/P total right hip arthroplasty    2. Primary osteoarthritis of right hip            Plan:       Orders Placed This Encounter    celecoxib (CELEBREX) 200 MG capsule     Follow up in about 6 weeks (around 4/19/2023).    Patient is progressing very well postoperatively.  However he is likely having lateral hip discomfort, possibly bursitis or tendinitis, related to overuse and prolonged walking.  He has been taking over-the-counter Advil with some benefit.  He does have a history of GI ulcer and bleeding.  I recommend that he does not take over-the-counter anti-inflammatories.  I have given him a small prescription for Celebrex to take for 1 month only.  He is to not take any other anti-inflammatories at this time.  He is to discontinue anti-inflammatories after 1 month.  Activity modifications discussed  Activity as  tolerated

## 2023-03-08 ENCOUNTER — OFFICE VISIT (OUTPATIENT)
Dept: ORTHOPEDICS | Facility: CLINIC | Age: 53
End: 2023-03-08
Payer: COMMERCIAL

## 2023-03-08 VITALS — BODY MASS INDEX: 31.7 KG/M2 | HEIGHT: 69 IN | WEIGHT: 214 LBS

## 2023-03-08 DIAGNOSIS — Z96.641 S/P TOTAL RIGHT HIP ARTHROPLASTY: Primary | ICD-10-CM

## 2023-03-08 DIAGNOSIS — M16.11 PRIMARY OSTEOARTHRITIS OF RIGHT HIP: ICD-10-CM

## 2023-03-08 PROCEDURE — 3008F BODY MASS INDEX DOCD: CPT | Mod: CPTII,S$GLB,, | Performed by: ORTHOPAEDIC SURGERY

## 2023-03-08 PROCEDURE — 1159F PR MEDICATION LIST DOCUMENTED IN MEDICAL RECORD: ICD-10-PCS | Mod: CPTII,S$GLB,, | Performed by: ORTHOPAEDIC SURGERY

## 2023-03-08 PROCEDURE — 99999 PR PBB SHADOW E&M-EST. PATIENT-LVL III: CPT | Mod: PBBFAC,,, | Performed by: ORTHOPAEDIC SURGERY

## 2023-03-08 PROCEDURE — 99024 POSTOP FOLLOW-UP VISIT: CPT | Mod: S$GLB,,, | Performed by: ORTHOPAEDIC SURGERY

## 2023-03-08 PROCEDURE — 99024 PR POST-OP FOLLOW-UP VISIT: ICD-10-PCS | Mod: S$GLB,,, | Performed by: ORTHOPAEDIC SURGERY

## 2023-03-08 PROCEDURE — 3008F PR BODY MASS INDEX (BMI) DOCUMENTED: ICD-10-PCS | Mod: CPTII,S$GLB,, | Performed by: ORTHOPAEDIC SURGERY

## 2023-03-08 PROCEDURE — 1159F MED LIST DOCD IN RCRD: CPT | Mod: CPTII,S$GLB,, | Performed by: ORTHOPAEDIC SURGERY

## 2023-03-08 PROCEDURE — 99999 PR PBB SHADOW E&M-EST. PATIENT-LVL III: ICD-10-PCS | Mod: PBBFAC,,, | Performed by: ORTHOPAEDIC SURGERY

## 2023-03-08 RX ORDER — CELECOXIB 200 MG/1
200 CAPSULE ORAL 2 TIMES DAILY
Qty: 60 CAPSULE | Refills: 0 | Status: SHIPPED | OUTPATIENT
Start: 2023-03-08 | End: 2023-04-13

## 2023-04-13 ENCOUNTER — OFFICE VISIT (OUTPATIENT)
Dept: FAMILY MEDICINE | Facility: CLINIC | Age: 53
End: 2023-04-13
Payer: COMMERCIAL

## 2023-04-13 VITALS
HEIGHT: 69 IN | BODY MASS INDEX: 33.11 KG/M2 | HEART RATE: 82 BPM | OXYGEN SATURATION: 96 % | SYSTOLIC BLOOD PRESSURE: 132 MMHG | WEIGHT: 223.56 LBS | DIASTOLIC BLOOD PRESSURE: 88 MMHG | TEMPERATURE: 98 F

## 2023-04-13 DIAGNOSIS — T45.1X5A CHEMOTHERAPY-INDUCED NEUROPATHY: Chronic | ICD-10-CM

## 2023-04-13 DIAGNOSIS — Z85.810 HISTORY OF CANCER OF LINGUAL TONSIL: ICD-10-CM

## 2023-04-13 DIAGNOSIS — E78.49 OTHER HYPERLIPIDEMIA: Primary | ICD-10-CM

## 2023-04-13 DIAGNOSIS — N18.31 STAGE 3A CHRONIC KIDNEY DISEASE: ICD-10-CM

## 2023-04-13 DIAGNOSIS — G62.0 CHEMOTHERAPY-INDUCED NEUROPATHY: Chronic | ICD-10-CM

## 2023-04-13 DIAGNOSIS — F32.A DEPRESSION, UNSPECIFIED DEPRESSION TYPE: ICD-10-CM

## 2023-04-13 DIAGNOSIS — F41.9 ANXIETY: ICD-10-CM

## 2023-04-13 DIAGNOSIS — M79.2 NEUROPATHIC PAIN: ICD-10-CM

## 2023-04-13 PROBLEM — M70.61 TROCHANTERIC BURSITIS OF RIGHT HIP: Status: RESOLVED | Noted: 2022-06-20 | Resolved: 2023-04-13

## 2023-04-13 PROCEDURE — 3008F BODY MASS INDEX DOCD: CPT | Mod: CPTII,S$GLB,, | Performed by: FAMILY MEDICINE

## 2023-04-13 PROCEDURE — 99999 PR PBB SHADOW E&M-EST. PATIENT-LVL IV: ICD-10-PCS | Mod: PBBFAC,,, | Performed by: FAMILY MEDICINE

## 2023-04-13 PROCEDURE — 3075F SYST BP GE 130 - 139MM HG: CPT | Mod: CPTII,S$GLB,, | Performed by: FAMILY MEDICINE

## 2023-04-13 PROCEDURE — 1160F RVW MEDS BY RX/DR IN RCRD: CPT | Mod: CPTII,S$GLB,, | Performed by: FAMILY MEDICINE

## 2023-04-13 PROCEDURE — 1160F PR REVIEW ALL MEDS BY PRESCRIBER/CLIN PHARMACIST DOCUMENTED: ICD-10-PCS | Mod: CPTII,S$GLB,, | Performed by: FAMILY MEDICINE

## 2023-04-13 PROCEDURE — 99999 PR PBB SHADOW E&M-EST. PATIENT-LVL IV: CPT | Mod: PBBFAC,,, | Performed by: FAMILY MEDICINE

## 2023-04-13 PROCEDURE — 3008F PR BODY MASS INDEX (BMI) DOCUMENTED: ICD-10-PCS | Mod: CPTII,S$GLB,, | Performed by: FAMILY MEDICINE

## 2023-04-13 PROCEDURE — 3075F PR MOST RECENT SYSTOLIC BLOOD PRESS GE 130-139MM HG: ICD-10-PCS | Mod: CPTII,S$GLB,, | Performed by: FAMILY MEDICINE

## 2023-04-13 PROCEDURE — 1159F PR MEDICATION LIST DOCUMENTED IN MEDICAL RECORD: ICD-10-PCS | Mod: CPTII,S$GLB,, | Performed by: FAMILY MEDICINE

## 2023-04-13 PROCEDURE — 99214 OFFICE O/P EST MOD 30 MIN: CPT | Mod: S$GLB,,, | Performed by: FAMILY MEDICINE

## 2023-04-13 PROCEDURE — 3079F DIAST BP 80-89 MM HG: CPT | Mod: CPTII,S$GLB,, | Performed by: FAMILY MEDICINE

## 2023-04-13 PROCEDURE — 99214 PR OFFICE/OUTPT VISIT, EST, LEVL IV, 30-39 MIN: ICD-10-PCS | Mod: S$GLB,,, | Performed by: FAMILY MEDICINE

## 2023-04-13 PROCEDURE — 1159F MED LIST DOCD IN RCRD: CPT | Mod: CPTII,S$GLB,, | Performed by: FAMILY MEDICINE

## 2023-04-13 PROCEDURE — 3079F PR MOST RECENT DIASTOLIC BLOOD PRESSURE 80-89 MM HG: ICD-10-PCS | Mod: CPTII,S$GLB,, | Performed by: FAMILY MEDICINE

## 2023-04-13 RX ORDER — ROSUVASTATIN CALCIUM 5 MG/1
5 TABLET, COATED ORAL DAILY
Qty: 90 TABLET | Refills: 3 | Status: SHIPPED | OUTPATIENT
Start: 2023-04-13 | End: 2024-04-01 | Stop reason: SDUPTHER

## 2023-04-13 RX ORDER — AMITRIPTYLINE HYDROCHLORIDE 10 MG/1
10 TABLET, FILM COATED ORAL NIGHTLY
Qty: 90 TABLET | Refills: 1 | Status: SHIPPED | OUTPATIENT
Start: 2023-04-13 | End: 2023-07-04 | Stop reason: SDUPTHER

## 2023-04-13 RX ORDER — DULOXETIN HYDROCHLORIDE 60 MG/1
60 CAPSULE, DELAYED RELEASE ORAL DAILY
Qty: 90 CAPSULE | Refills: 1 | Status: SHIPPED | OUTPATIENT
Start: 2023-04-13 | End: 2023-09-22

## 2023-04-13 RX ORDER — PHENTERMINE HYDROCHLORIDE 37.5 MG/1
37.5 TABLET ORAL
Qty: 90 TABLET | Refills: 0 | Status: SHIPPED | OUTPATIENT
Start: 2023-04-13 | End: 2023-05-13

## 2023-04-13 RX ORDER — PREGABALIN 300 MG/1
300 CAPSULE ORAL 2 TIMES DAILY
Qty: 180 CAPSULE | Refills: 2 | Status: SHIPPED | OUTPATIENT
Start: 2023-04-13 | End: 2023-10-09 | Stop reason: SDUPTHER

## 2023-04-13 NOTE — PROGRESS NOTES
"Subjective:       Patient ID: Burton Whiting is a 53 y.o. male.    Chief Complaint: Anxiety    Burton is a 53 y.o. male who presents today for f/u    Neuropathy: on lyrica 300 mg BID and elavil. Symptoms worse at night.   Anxiety/Mood: on fluoxetine. Has doubled doses on his own in the past, and things this has helped.     S/p hip surgery November 2022 with Dr. Yates. Right hip. No pain currently.     Review of Systems   Constitutional:  Negative for chills and fever.   Cardiovascular:  Negative for chest pain.   Gastrointestinal:  Negative for nausea and vomiting.   Neurological:  Positive for numbness.               Objective:     Vitals:    04/13/23 1437   BP: 132/88   BP Location: Right arm   Patient Position: Sitting   BP Method: Large (Manual)   Pulse: 82   Temp: 98 °F (36.7 °C)   TempSrc: Oral   SpO2: 96%   Weight: 101.4 kg (223 lb 8.7 oz)   Height: 5' 9" (1.753 m)        Physical Exam  Constitutional:       General: He is not in acute distress.     Appearance: He is obese. He is not ill-appearing, toxic-appearing or diaphoretic.   Cardiovascular:      Rate and Rhythm: Normal rate and regular rhythm.   Pulmonary:      Effort: Pulmonary effort is normal.      Breath sounds: Normal breath sounds.   Neurological:      Mental Status: He is alert.   Psychiatric:         Mood and Affect: Mood normal.         Behavior: Behavior normal.         Thought Content: Thought content normal.         Judgment: Judgment normal.       Assessment:       1. Other hyperlipidemia    2. Stage 3a chronic kidney disease    3. Chemotherapy-induced neuropathy    4. Neuropathic pain    5. BMI 33.0-33.9,adult    6. History of cancer of lingual tonsil    7. Anxiety    8. Depression, unspecified depression type        Plan:       Stop fluoxetine  Start cymbalta 60 mg  Continue lyrica   Continue elavil nightly, can double dose PRN    Will start phentermine. Good Rx coupon given. Risks and benefits of this medication including but not limited " to cardiovascular issues, palpitations, and headaches were discussed. Benefits include weight loss. Discussed that this medication has an indication for 3 months of use and that close follow up to ensure weight loss is working is indicated. Patient is agreeable.     F/u 3 and 6 months. Annual in 6 months.     Other hyperlipidemia  -     rosuvastatin (CRESTOR) 5 MG tablet; Take 1 tablet (5 mg total) by mouth once daily.  Dispense: 90 tablet; Refill: 3    Stage 3a chronic kidney disease  -     rosuvastatin (CRESTOR) 5 MG tablet; Take 1 tablet (5 mg total) by mouth once daily.  Dispense: 90 tablet; Refill: 3    Chemotherapy-induced neuropathy  -     pregabalin (LYRICA) 300 MG Cap; Take 1 capsule (300 mg total) by mouth 2 (two) times daily.  Dispense: 180 capsule; Refill: 2  -     amitriptyline (ELAVIL) 10 MG tablet; Take 1 tablet (10 mg total) by mouth every evening.  Dispense: 90 tablet; Refill: 1  -     DULoxetine (CYMBALTA) 60 MG capsule; Take 1 capsule (60 mg total) by mouth once daily.  Dispense: 90 capsule; Refill: 1    Neuropathic pain  -     pregabalin (LYRICA) 300 MG Cap; Take 1 capsule (300 mg total) by mouth 2 (two) times daily.  Dispense: 180 capsule; Refill: 2  -     amitriptyline (ELAVIL) 10 MG tablet; Take 1 tablet (10 mg total) by mouth every evening.  Dispense: 90 tablet; Refill: 1  -     DULoxetine (CYMBALTA) 60 MG capsule; Take 1 capsule (60 mg total) by mouth once daily.  Dispense: 90 capsule; Refill: 1    BMI 33.0-33.9,adult    History of cancer of lingual tonsil  -     phentermine (ADIPEX-P) 37.5 mg tablet; Take 1 tablet (37.5 mg total) by mouth before breakfast.  Dispense: 90 tablet; Refill: 0    Anxiety    Depression, unspecified depression type  -     DULoxetine (CYMBALTA) 60 MG capsule; Take 1 capsule (60 mg total) by mouth once daily.  Dispense: 90 capsule; Refill: 1            Warning signs discussed, patient to call with any further issues or worsening of symptoms. Above note may have  utilized dictation, please excuse any transcription errors.

## 2023-04-13 NOTE — PATIENT INSTRUCTIONS
Stop fluoxetine  Start cymbalta 60 mg  Continue lyrica   Continue elavil nightly, can double dose PRN    Will start phentermine. Good Rx coupon given. Risks and benefits of this medication including but not limited to cardiovascular issues, palpitations, and headaches were discussed. Benefits include weight loss. Discussed that this medication has an indication for 3 months of use and that close follow up to ensure weight loss is working is indicated. Patient is agreeable.

## 2023-05-01 ENCOUNTER — PATIENT MESSAGE (OUTPATIENT)
Dept: HEMATOLOGY/ONCOLOGY | Facility: CLINIC | Age: 53
End: 2023-05-01
Payer: COMMERCIAL

## 2023-05-01 ENCOUNTER — HOSPITAL ENCOUNTER (OUTPATIENT)
Dept: RADIOLOGY | Facility: HOSPITAL | Age: 53
Discharge: HOME | End: 2023-05-01
Attending: INTERNAL MEDICINE
Payer: COMMERCIAL

## 2023-05-01 DIAGNOSIS — Z85.810 HISTORY OF CANCER OF LINGUAL TONSIL: Primary | ICD-10-CM

## 2023-05-01 DIAGNOSIS — Z85.810 HISTORY OF CANCER OF LINGUAL TONSIL: ICD-10-CM

## 2023-05-01 PROCEDURE — 71260 CT THORAX DX C+: CPT | Mod: 26,,, | Performed by: RADIOLOGY

## 2023-05-01 PROCEDURE — 71260 CT THORAX DX C+: CPT | Mod: TC,PO

## 2023-05-01 PROCEDURE — 70491 CT NECK CHEST WITH CONTRAST (XPD): ICD-10-PCS | Mod: 26,,, | Performed by: RADIOLOGY

## 2023-05-01 PROCEDURE — 71260 CT NECK CHEST WITH CONTRAST (XPD): ICD-10-PCS | Mod: 26,,, | Performed by: RADIOLOGY

## 2023-05-01 PROCEDURE — 70491 CT SOFT TISSUE NECK W/DYE: CPT | Mod: 26,,, | Performed by: RADIOLOGY

## 2023-05-01 PROCEDURE — 25500020 PHARM REV CODE 255: Mod: PO | Performed by: INTERNAL MEDICINE

## 2023-05-01 RX ADMIN — IOHEXOL 100 ML: 350 INJECTION, SOLUTION INTRAVENOUS at 12:05

## 2023-05-03 ENCOUNTER — OFFICE VISIT (OUTPATIENT)
Dept: HEMATOLOGY/ONCOLOGY | Facility: CLINIC | Age: 53
End: 2023-05-03
Payer: COMMERCIAL

## 2023-05-03 DIAGNOSIS — Z85.810 HISTORY OF CANCER OF LINGUAL TONSIL: Primary | ICD-10-CM

## 2023-05-03 PROCEDURE — 99213 PR OFFICE/OUTPT VISIT, EST, LEVL III, 20-29 MIN: ICD-10-PCS | Mod: 95,,, | Performed by: INTERNAL MEDICINE

## 2023-05-03 PROCEDURE — 99213 OFFICE O/P EST LOW 20 MIN: CPT | Mod: 95,,, | Performed by: INTERNAL MEDICINE

## 2023-05-03 NOTE — PROGRESS NOTES
Subjective     Patient ID: Burton Whiting is a 53 y.o. male.  The patient location is: home   The chief complaint leading to consultation is: History of tonsil cancer    Visit type: audiovisual      Notes:    Chief Complaint: History of tonsil cancer    Squamous cell carcinoma of palatine tonsil; g-tube malodorous; left neck pain 2/10; 4 cans formula per day; and r arm port---red/warm to touch  Mr. Burton Whiting is a 48-year-old male, former smoker, who presents today with a four-month history of enlarging neck mass.  He initially delayed care due to lack of insurance.  He was seen by Dr. Turpin who referred him to see Dr. Olguin.  He had a CT that showed a 3.8 x 3.8 x 4.9 cm soft tissue mass arising from the left palatine tonsil resulting in moderate narrowing of the hypopharyngeal airway adjacent extensive matted left cervical lymphadenopathy was noted.  The largest ella mass was 6.6 x 9 x 2.6 cm extending inferiorly to the supraclavicular region.  The mass pushed the trachea and the left common carotid artery to the right.  Additional representative of cervical lymph nodes measuring 2.9 x 3.1 x 3.5 cm on axial image 37 of series 3   and 2.4 x 2.1 x 2.2 cm on axial image 55 of series 3.  There is also a sclerotic lesion involving the midline of the clivus measuring 1.2 cm.  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  PET scan performed on 01/19/2018 revealed persistent evidence of a soft tissue mass arising from the left palatine tonsil resulting in moderate narrowing of the hypopharyngeal   airway.  The mass is hypermetabolic demonstrating an SUV max of 18.5.  There is also persistent adjacent extensive matted left cervical lymphadenopathy, largest of this measuring 6.7 x 8.42 with hypermetabolic activity and SUV max of 20.5.  Lymphadenopathy is again noted to have a mass effect on the trachea and left common carotid artery, pushing both structures towards the right.  There is also  "prominent right cervical lymph nodes with the largest measuring 0.8 cm and demonstrating mild hypermetabolic activity with SUV max of 1.8, physiologic   distribution of FDG in the gray matter.  There are 3 pulmonary nodules noted within the right lung, the largest is in the anterior segment of the right upper lobe measuring 1 cm, second is visualized in the middle lobe measuring 0.6 cm and the third is within the posterior basal segment measuring 0.6 cm.  There is one pulmonary nodule within the left lung within the lateral basal segment measuring 0.6 cm.  These nodules do not demonstrate hypermetabolic activity; however, they are below the threshold for observation with PET     He underwent MRI cervical spine revealed "No evidence of metastatic disease to the cervical spine. Multilevel degenerative changes of the cervical spine with disc protrusion at C5-6 which results in moderate to severe spinal canal stenosis and and associated cord signal abnormality, suggestive of compressive myelopathy. 6 mm T1 hypointense non-enhancing lesion in the clivus.  Continued followup is suggested. 9 mm inferior descent of the cerebellar tonsils below the foramen magnum, suggestive of Chiari 1 malformation"  MRI thoracic spine "No evidence of metastatic disease involving the thoracic spine. Incidental hemangioma involving the T7 vertebral body. Mild degenerative disc disease without any significant spinal canal stenosis or neuroforaminal narrowing.   He completed 3 cycles of TPF and 6 weeks of Cisplatin and RT. Completed on 6/26/18           His PET scan from 9/25/18 revealed "Progression of disease with multiple new hypermetabolic foci including the manubrium, mediastinal/retrocrural lymph nodes, and lungs. Partial therapeutic response within the presumed radiation field involving the left cervical mass, and complete therapeutic response in the right cervical lymph node, clivus, and left palatine tonsil. New soft tissue thickening " "and hypermetabolism in the left aryepiglottic fold and right cricoid cartilage"      He received Opdivo from 2/19-7/19      He has been on surveillance    HPIHe had a CT neck and chest and that reveal "There is no abnormal lymphadenopathy based on CT size criteria. There is no abnormal enhancing mass visualized within the neck.  The left piriform recess is again not well distended.There is grade 2 anterolisthesis of C7 on T1. There is straightening of the normal cervical lordosis. There are moderate degenerative changes between C4 and C7"  He feels well and denies any new issues      Review of Systems   Constitutional:  Negative for appetite change, fatigue and unexpected weight change.   HENT:  Negative for mouth sores.    Eyes:  Negative for visual disturbance.   Respiratory:  Negative for cough and shortness of breath.    Cardiovascular:  Negative for chest pain.   Gastrointestinal:  Negative for abdominal pain and diarrhea.   Genitourinary:  Negative for frequency.   Musculoskeletal:  Negative for back pain.   Integumentary:  Negative for rash.   Neurological:  Negative for headaches.   Hematological:  Negative for adenopathy.   Psychiatric/Behavioral:  The patient is not nervous/anxious.    All other systems reviewed and are negative.       Objective     Physical Exam         LABS:  WBC   Date Value Ref Range Status   05/01/2023 4.83 3.90 - 12.70 K/uL Final     Hemoglobin   Date Value Ref Range Status   05/01/2023 14.0 14.0 - 18.0 g/dL Final     POC Hematocrit   Date Value Ref Range Status   09/08/2019 17 (LL) 36 - 54 %PCV Final     Hematocrit   Date Value Ref Range Status   05/01/2023 40.8 40.0 - 54.0 % Final     Platelets   Date Value Ref Range Status   05/01/2023 151 150 - 450 K/uL Final     Gran # (ANC)   Date Value Ref Range Status   05/01/2023 3.4 1.8 - 7.7 K/uL Final     Comment:     The ANC is based on a white cell differential from an   automated cell counter. It has not been microscopically   reviewed " for the presence of abnormal cells. Clinical   correlation is required.         Chemistry        Component Value Date/Time     05/01/2023 1259    K 4.3 05/01/2023 1259     05/01/2023 1259    CO2 27 05/01/2023 1259    BUN 27 (H) 05/01/2023 1259    CREATININE 1.57 (H) 05/01/2023 1259    GLU 81 05/01/2023 1259        Component Value Date/Time    CALCIUM 8.5 (L) 05/01/2023 1259    ALKPHOS 84 05/01/2023 1259    AST 30 05/01/2023 1259    ALT 24 05/01/2023 1259    BILITOT 0.5 05/01/2023 1259    ESTGFRAFRICA 54.0 (A) 06/10/2022 0807    EGFRNONAA 46.7 (A) 06/10/2022 0807        TSH   Date Value Ref Range Status   05/01/2023 1.920 0.400 - 4.000 uIU/mL Final     Comment:     Warning:  Heterophilic antibodies in serum or plasma of   certain individuals are known to cause interference with   immunoassays. These antibodies may be present in blood samples   from individuals regularly exposed to animal or who have been   treated with animal products.     Patients taking high doses of supplemental biotin may have  negatively biased results.        Free T4   Date Value Ref Range Status   05/01/2023 0.91 0.71 - 1.51 ng/dL Final       Assessment and Plan     Problem List Items Addressed This Visit       History of cancer of lingual tonsil - Primary         Route Chart for Scheduling    Med Onc Chart Routing      Follow up with physician . Schedule with Dr. Rigo Olguin on ENT. In 1 year schedule CBC,CMp, TSH, free T4 CT neck and chest and see me   Follow up with [facility]    Infusion scheduling note    Injection scheduling note    Labs    Imaging    Pharmacy appointment    Other referrals          Reviewed Ct scans which reveal no evidence of recurrence. He has done very well form his cancer stand point. Has not seen Dr. Olguin since 2020 so will schedule him accordingly. Will see me in 1 year with labs and scans     Above care plan was discussed with patient and all questions were addressed to his satisfaction

## 2023-05-23 ENCOUNTER — OFFICE VISIT (OUTPATIENT)
Dept: OTOLARYNGOLOGY | Facility: CLINIC | Age: 53
End: 2023-05-23
Payer: COMMERCIAL

## 2023-05-23 VITALS
WEIGHT: 220.44 LBS | BODY MASS INDEX: 32.65 KG/M2 | HEART RATE: 66 BPM | SYSTOLIC BLOOD PRESSURE: 141 MMHG | DIASTOLIC BLOOD PRESSURE: 95 MMHG | HEIGHT: 69 IN

## 2023-05-23 DIAGNOSIS — C09.9 TONSIL CANCER: ICD-10-CM

## 2023-05-23 DIAGNOSIS — Z85.810 HISTORY OF CANCER OF LINGUAL TONSIL: Primary | ICD-10-CM

## 2023-05-23 PROCEDURE — 99213 OFFICE O/P EST LOW 20 MIN: CPT | Mod: S$GLB,,, | Performed by: OTOLARYNGOLOGY

## 2023-05-23 PROCEDURE — 1160F RVW MEDS BY RX/DR IN RCRD: CPT | Mod: CPTII,S$GLB,, | Performed by: OTOLARYNGOLOGY

## 2023-05-23 PROCEDURE — 99999 PR PBB SHADOW E&M-EST. PATIENT-LVL III: ICD-10-PCS | Mod: PBBFAC,,, | Performed by: OTOLARYNGOLOGY

## 2023-05-23 PROCEDURE — 99213 PR OFFICE/OUTPT VISIT, EST, LEVL III, 20-29 MIN: ICD-10-PCS | Mod: S$GLB,,, | Performed by: OTOLARYNGOLOGY

## 2023-05-23 PROCEDURE — 3080F DIAST BP >= 90 MM HG: CPT | Mod: CPTII,S$GLB,, | Performed by: OTOLARYNGOLOGY

## 2023-05-23 PROCEDURE — 3080F PR MOST RECENT DIASTOLIC BLOOD PRESSURE >= 90 MM HG: ICD-10-PCS | Mod: CPTII,S$GLB,, | Performed by: OTOLARYNGOLOGY

## 2023-05-23 PROCEDURE — 3077F PR MOST RECENT SYSTOLIC BLOOD PRESSURE >= 140 MM HG: ICD-10-PCS | Mod: CPTII,S$GLB,, | Performed by: OTOLARYNGOLOGY

## 2023-05-23 PROCEDURE — 3008F PR BODY MASS INDEX (BMI) DOCUMENTED: ICD-10-PCS | Mod: CPTII,S$GLB,, | Performed by: OTOLARYNGOLOGY

## 2023-05-23 PROCEDURE — 1159F MED LIST DOCD IN RCRD: CPT | Mod: CPTII,S$GLB,, | Performed by: OTOLARYNGOLOGY

## 2023-05-23 PROCEDURE — 3008F BODY MASS INDEX DOCD: CPT | Mod: CPTII,S$GLB,, | Performed by: OTOLARYNGOLOGY

## 2023-05-23 PROCEDURE — 99999 PR PBB SHADOW E&M-EST. PATIENT-LVL III: CPT | Mod: PBBFAC,,, | Performed by: OTOLARYNGOLOGY

## 2023-05-23 PROCEDURE — 3077F SYST BP >= 140 MM HG: CPT | Mod: CPTII,S$GLB,, | Performed by: OTOLARYNGOLOGY

## 2023-05-23 PROCEDURE — 1160F PR REVIEW ALL MEDS BY PRESCRIBER/CLIN PHARMACIST DOCUMENTED: ICD-10-PCS | Mod: CPTII,S$GLB,, | Performed by: OTOLARYNGOLOGY

## 2023-05-23 PROCEDURE — 1159F PR MEDICATION LIST DOCUMENTED IN MEDICAL RECORD: ICD-10-PCS | Mod: CPTII,S$GLB,, | Performed by: OTOLARYNGOLOGY

## 2023-05-23 NOTE — ASSESSMENT & PLAN NOTE
JODI.  RTC 1 year. Several melanotic areas throughout the OC/OP likely due to history of smoking. I do not suspect that these are of any concern.  He will monitor for s/sx of head and neck cancer.  Educated him on these s/sx.
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
## 2023-06-14 DIAGNOSIS — Z96.641 S/P TOTAL RIGHT HIP ARTHROPLASTY: Primary | ICD-10-CM

## 2023-06-29 ENCOUNTER — PATIENT MESSAGE (OUTPATIENT)
Dept: FAMILY MEDICINE | Facility: CLINIC | Age: 53
End: 2023-06-29
Payer: COMMERCIAL

## 2023-07-04 DIAGNOSIS — G62.0 CHEMOTHERAPY-INDUCED NEUROPATHY: Chronic | ICD-10-CM

## 2023-07-04 DIAGNOSIS — T45.1X5A CHEMOTHERAPY-INDUCED NEUROPATHY: Chronic | ICD-10-CM

## 2023-07-04 DIAGNOSIS — M79.2 NEUROPATHIC PAIN: ICD-10-CM

## 2023-07-04 RX ORDER — AMITRIPTYLINE HYDROCHLORIDE 10 MG/1
10 TABLET, FILM COATED ORAL NIGHTLY
Qty: 90 TABLET | Refills: 1 | Status: SHIPPED | OUTPATIENT
Start: 2023-07-04 | End: 2023-12-18 | Stop reason: SDUPTHER

## 2023-07-04 NOTE — TELEPHONE ENCOUNTER
No care due was identified.  [facility] Embedded Care Due Messages. Reference number: [id number].   7/04/2023 10:03:11 AM CDT

## 2023-07-05 ENCOUNTER — HOSPITAL ENCOUNTER (OUTPATIENT)
Dept: RADIOLOGY | Facility: HOSPITAL | Age: 53
Discharge: HOME | End: 2023-07-05
Attending: ORTHOPAEDIC SURGERY
Payer: COMMERCIAL

## 2023-07-05 DIAGNOSIS — Z96.641 S/P TOTAL RIGHT HIP ARTHROPLASTY: ICD-10-CM

## 2023-07-05 PROCEDURE — 73502 XR HIP WITH PELVIS WHEN PERFORMED, 2 OR 3  VIEWS RIGHT: ICD-10-PCS | Mod: 26,RT,, | Performed by: RADIOLOGY

## 2023-07-05 PROCEDURE — 73502 X-RAY EXAM HIP UNI 2-3 VIEWS: CPT | Mod: TC,FY,PO,RT

## 2023-07-05 PROCEDURE — 73502 X-RAY EXAM HIP UNI 2-3 VIEWS: CPT | Mod: 26,RT,, | Performed by: RADIOLOGY

## 2023-07-11 ENCOUNTER — PATIENT MESSAGE (OUTPATIENT)
Dept: FAMILY MEDICINE | Facility: CLINIC | Age: 53
End: 2023-07-11
Payer: COMMERCIAL

## 2023-07-25 ENCOUNTER — OFFICE VISIT (OUTPATIENT)
Dept: FAMILY MEDICINE | Facility: CLINIC | Age: 53
End: 2023-07-25
Payer: COMMERCIAL

## 2023-07-25 VITALS
OXYGEN SATURATION: 96 % | DIASTOLIC BLOOD PRESSURE: 85 MMHG | HEIGHT: 69 IN | WEIGHT: 225.31 LBS | TEMPERATURE: 98 F | SYSTOLIC BLOOD PRESSURE: 130 MMHG | HEART RATE: 81 BPM | BODY MASS INDEX: 33.37 KG/M2

## 2023-07-25 DIAGNOSIS — H81.13 BENIGN PAROXYSMAL POSITIONAL VERTIGO DUE TO BILATERAL VESTIBULAR DISORDER: Primary | ICD-10-CM

## 2023-07-25 DIAGNOSIS — Z85.810 HISTORY OF CANCER OF LINGUAL TONSIL: ICD-10-CM

## 2023-07-25 DIAGNOSIS — R42 DIZZINESS AND GIDDINESS: ICD-10-CM

## 2023-07-25 PROCEDURE — 1160F RVW MEDS BY RX/DR IN RCRD: CPT | Mod: CPTII,S$GLB,, | Performed by: FAMILY MEDICINE

## 2023-07-25 PROCEDURE — 93005 EKG 12-LEAD: ICD-10-PCS | Mod: S$GLB,,, | Performed by: FAMILY MEDICINE

## 2023-07-25 PROCEDURE — 93010 EKG 12-LEAD: ICD-10-PCS | Mod: S$GLB,,, | Performed by: INTERNAL MEDICINE

## 2023-07-25 PROCEDURE — 3075F SYST BP GE 130 - 139MM HG: CPT | Mod: CPTII,S$GLB,, | Performed by: FAMILY MEDICINE

## 2023-07-25 PROCEDURE — 1160F PR REVIEW ALL MEDS BY PRESCRIBER/CLIN PHARMACIST DOCUMENTED: ICD-10-PCS | Mod: CPTII,S$GLB,, | Performed by: FAMILY MEDICINE

## 2023-07-25 PROCEDURE — 3008F BODY MASS INDEX DOCD: CPT | Mod: CPTII,S$GLB,, | Performed by: FAMILY MEDICINE

## 2023-07-25 PROCEDURE — 99999 PR PBB SHADOW E&M-EST. PATIENT-LVL V: ICD-10-PCS | Mod: PBBFAC,,, | Performed by: FAMILY MEDICINE

## 2023-07-25 PROCEDURE — 3079F DIAST BP 80-89 MM HG: CPT | Mod: CPTII,S$GLB,, | Performed by: FAMILY MEDICINE

## 2023-07-25 PROCEDURE — 99999 PR PBB SHADOW E&M-EST. PATIENT-LVL V: CPT | Mod: PBBFAC,,, | Performed by: FAMILY MEDICINE

## 2023-07-25 PROCEDURE — 99214 PR OFFICE/OUTPT VISIT, EST, LEVL IV, 30-39 MIN: ICD-10-PCS | Mod: S$GLB,,, | Performed by: FAMILY MEDICINE

## 2023-07-25 PROCEDURE — 1159F MED LIST DOCD IN RCRD: CPT | Mod: CPTII,S$GLB,, | Performed by: FAMILY MEDICINE

## 2023-07-25 PROCEDURE — 93005 ELECTROCARDIOGRAM TRACING: CPT | Mod: S$GLB,,, | Performed by: FAMILY MEDICINE

## 2023-07-25 PROCEDURE — 1159F PR MEDICATION LIST DOCUMENTED IN MEDICAL RECORD: ICD-10-PCS | Mod: CPTII,S$GLB,, | Performed by: FAMILY MEDICINE

## 2023-07-25 PROCEDURE — 3079F PR MOST RECENT DIASTOLIC BLOOD PRESSURE 80-89 MM HG: ICD-10-PCS | Mod: CPTII,S$GLB,, | Performed by: FAMILY MEDICINE

## 2023-07-25 PROCEDURE — 93010 ELECTROCARDIOGRAM REPORT: CPT | Mod: S$GLB,,, | Performed by: INTERNAL MEDICINE

## 2023-07-25 PROCEDURE — 99214 OFFICE O/P EST MOD 30 MIN: CPT | Mod: S$GLB,,, | Performed by: FAMILY MEDICINE

## 2023-07-25 PROCEDURE — 3075F PR MOST RECENT SYSTOLIC BLOOD PRESS GE 130-139MM HG: ICD-10-PCS | Mod: CPTII,S$GLB,, | Performed by: FAMILY MEDICINE

## 2023-07-25 PROCEDURE — 3008F PR BODY MASS INDEX (BMI) DOCUMENTED: ICD-10-PCS | Mod: CPTII,S$GLB,, | Performed by: FAMILY MEDICINE

## 2023-07-25 NOTE — PATIENT INSTRUCTIONS
Suspect BPPV  Given history of cancer, will rule out neurological causes  EKG today  MRI  BMP, does have history of PJ  Vestibular therapy  If persisting, consider ENT.     No change in meds at this time. Was present at last visit, labs at that time were normal    F/u October.

## 2023-07-25 NOTE — PROGRESS NOTES
"Subjective:       Patient ID: Burton Whiting is a 53 y.o. male.    Chief Complaint: Dizziness    Burton is a 53 y.o. male who presents today for dizziness. He reports that this has been ongoing for months. It was going on at the last visit,  but he didn't tell me about this. He tried stopping medications to see if he was having side effects,  but none of this helped. Worse with positions. Doesn't think that turning his head causes it. Lasts 30 seconds to a minute. Happens randomly    Couldn't tolerate adipex.     Cymbalta may be helping neuropathy, slightly.     Stopped coffee, that may have helped minimally, but symptoms are persisting.     Does report neck twitching/head twitching when he is relaxed. Not hands or legs. That started "a while."    Can walk without any issues.     Has had nasal congestion s/p covid.     Dizziness:   Chronicity:  Chronic  Onset:  More than 1 month ago  Progression since onset:  Gradually worsening  Frequency:  Every few hours  Duration:  Very brief  Dizziness characteristics:  Off-balanceno fever, no nausea and no vomiting.  Review of Systems   Constitutional:  Negative for chills and fever.   HENT:  Positive for congestion.    Respiratory:  Negative for cough, shortness of breath and wheezing.    Gastrointestinal:  Negative for nausea and vomiting.   Neurological:  Positive for dizziness.               Objective:     Vitals:    07/25/23 1053 07/25/23 1101   BP: (!) 120/90 130/85   BP Location: Right arm    Patient Position: Sitting    BP Method: Medium (Manual)    Pulse: 81    Temp: 97.8 °F (36.6 °C)    TempSrc: Oral    SpO2: 96%    Weight: 102.2 kg (225 lb 5 oz)    Height: 5' 9" (1.753 m)         Physical Exam  Vitals and nursing note reviewed.   Constitutional:       General: He is not in acute distress.     Appearance: He is well-developed. He is obese. He is not ill-appearing, toxic-appearing or diaphoretic.   Cardiovascular:      Rate and Rhythm: Normal rate and regular rhythm. "   Pulmonary:      Effort: Pulmonary effort is normal.      Breath sounds: Normal breath sounds.   Musculoskeletal:         General: No tenderness.   Neurological:      General: No focal deficit present.      Mental Status: He is alert and oriented to person, place, and time.      Cranial Nerves: No cranial nerve deficit.      Sensory: No sensory deficit.      Motor: No abnormal muscle tone.      Comments: Finger to nose intact  Heel to shin intact  Strength and sensation grossly intact BL UE/LE  CN 2-12 grossly intact  PERRLA  Rapid alternating movement intact    Gait normal  Toe walk normal  Heel walk normal  Tandem gait normal    Swaying with romberg noted, but no fall.    Psychiatric:         Mood and Affect: Mood normal.         Speech: Speech normal.         Behavior: Behavior normal.         Thought Content: Thought content normal.         Judgment: Judgment normal.       Assessment:       1. Benign paroxysmal positional vertigo due to bilateral vestibular disorder    2. BMI 33.0-33.9,adult    3. History of cancer of lingual tonsil    4. Dizziness and giddiness        Plan:       Suspect BPPV  Given history of cancer, will rule out neurological causes  EKG today  MRI  BMP, does have history of PJ  Vestibular therapy  If persisting, consider ENT.     No change in meds at this time. Was present at last visit, labs at that time were normal    F/u October.     Benign paroxysmal positional vertigo due to bilateral vestibular disorder  -     MRI Brain W WO Contrast; Future; Expected date: 07/25/2023  -     BASIC METABOLIC PANEL; Future; Expected date: 07/25/2023  -     IN OFFICE EKG 12-LEAD (to [place])  -     Ambulatory referral/consult to Physical/Occupational Therapy; Future; Expected date: 08/01/2023    BMI 33.0-33.9,adult  -     BASIC METABOLIC PANEL; Future; Expected date: 07/25/2023    History of cancer of lingual tonsil  -     MRI Brain W WO Contrast; Future; Expected date: 07/25/2023  -     BASIC METABOLIC  PANEL; Future; Expected date: 07/25/2023  -     IN OFFICE EKG 12-LEAD (to [place])    Dizziness and giddiness  -     MRI Brain W WO Contrast; Future; Expected date: 07/25/2023  -     BASIC METABOLIC PANEL; Future; Expected date: 07/25/2023  -     IN OFFICE EKG 12-LEAD (to [facility])            Warning signs discussed, patient to call with any further issues or worsening of symptoms. Above note may have utilized dictation, please excuse any transcription errors.

## 2023-09-08 NOTE — PLAN OF CARE
"    2023         RE: Elizabeth Rivero  [id number] [facility] [address]        Dear Colleague,    Thank you for referring your patient, Elizabeth Rivero, to the [facility]. Please see a copy of my visit note below.    PRS    HPI: 46-year-old -American female presenting with abdominal keloid.  She had a hysterectomy 2 years ago and then the healing process resulted in what she believes to be a keloid.  She would like it removed if possible.  Of note, she has had prior keloids from prior surgeries including  and fibroid removal.  She has never been treated with steroids.  Never been treated with 5-FU.  She thinks that may be in [facility] she had undergone some sort of radiation treatment but she is unclear.  Of note, the keloid scar of the periumbilical area is very itchy and sometimes hurts.  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  We also did discuss the possibility of sending her to a radiation oncologist for discussion about the risks and " Patient safety maintained. Medications provided per order. Assistance provided as needed.     benefits of localized radiation treatment.  My preference in general is to reserve localized radiation therapy for keloids only if intralesional injections fail to work.  The rationale for this is that even though it is low-dose localized radiation, it is still radiation and carries with it risks.  -Discussed the risks of surgery, including but not limited to: Infection, bleeding, pain, poor scarring, recurrence of keloid, wound healing issues, hypopigmentation, fat atrophy, contour irregularity, suboptimal aesthetic result.  Despite these risks, patient consents to and would like to proceed with periumbilical abdominal keloid excision with intralesional steroid injection.  We will also plan to schedule intralesional steroid and 5-FU injections starting at 3-4 weeks postoperatively for a total of 4-6 injections.  -Discussed the possibility of performing a tummy tuck in the future.  My preference would be to excise the keloid scar, treat the keloid scarring and ensure that she does not redevelop a keloid prior to committing to a larger elective aesthetic surgery.  And agrees with the plan.  -Case request placed  -Photography today  -A total of 45 minutes was devoted to review of chart, direct face-to-face patient counseling and documentation during this encounter, exclusive of any procedure performed.    Paul Jamison MD, PhD      Again, thank you for allowing me to participate in the care of your patient.        Sincerely,        Paul Jamison MD

## 2023-10-09 DIAGNOSIS — M79.2 NEUROPATHIC PAIN: ICD-10-CM

## 2023-10-09 DIAGNOSIS — G62.0 CHEMOTHERAPY-INDUCED NEUROPATHY: Chronic | ICD-10-CM

## 2023-10-09 DIAGNOSIS — T45.1X5A CHEMOTHERAPY-INDUCED NEUROPATHY: Chronic | ICD-10-CM

## 2023-10-09 RX ORDER — PREGABALIN 300 MG/1
300 CAPSULE ORAL 2 TIMES DAILY
Qty: 180 CAPSULE | Refills: 2 | Status: SHIPPED | OUTPATIENT
Start: 2023-10-09 | End: 2024-04-01

## 2023-10-09 NOTE — TELEPHONE ENCOUNTER
No care due was identified.  [facility] Embedded Care Due Messages. Reference number: [id number].   10/09/2023 12:16:30 PM CDT

## 2023-10-11 ENCOUNTER — PATIENT MESSAGE (OUTPATIENT)
Dept: FAMILY MEDICINE | Facility: CLINIC | Age: 53
End: 2023-10-11
Payer: COMMERCIAL

## 2023-12-18 DIAGNOSIS — T45.1X5A CHEMOTHERAPY-INDUCED NEUROPATHY: Chronic | ICD-10-CM

## 2023-12-18 DIAGNOSIS — G62.0 CHEMOTHERAPY-INDUCED NEUROPATHY: Chronic | ICD-10-CM

## 2023-12-18 DIAGNOSIS — M79.2 NEUROPATHIC PAIN: ICD-10-CM

## 2023-12-18 RX ORDER — AMITRIPTYLINE HYDROCHLORIDE 10 MG/1
10 TABLET, FILM COATED ORAL NIGHTLY
Qty: 90 TABLET | Refills: 1 | Status: SHIPPED | OUTPATIENT
Start: 2023-12-18 | End: 2024-04-01

## 2023-12-18 NOTE — TELEPHONE ENCOUNTER
Care Due:                  Date            Visit Type   Department     Provider  --------------------------------------------------------------------------------                                EP -                              PRIMARY      [facility]  Last Visit: 07-      CARE ([facility])   MEDICINE       Christopher Turpin  Next Visit: None Scheduled  None         None Found                                                            Last  Test          Frequency    Reason                     Performed    Due Date  --------------------------------------------------------------------------------    Lipid Panel.  12 months..  rosuvastatin.............  10-   10-    [facility] Embedded Care Due Messages. Reference number: [id number].   12/18/2023 3:46:43 PM CST

## 2024-02-12 DIAGNOSIS — Z85.810 HISTORY OF CANCER OF LINGUAL TONSIL: Primary | ICD-10-CM

## 2024-04-01 ENCOUNTER — OFFICE VISIT (OUTPATIENT)
Dept: FAMILY MEDICINE | Facility: CLINIC | Age: 54
End: 2024-04-01
Payer: COMMERCIAL

## 2024-04-01 ENCOUNTER — TELEPHONE (OUTPATIENT)
Dept: INTERNAL MEDICINE | Facility: CLINIC | Age: 54
End: 2024-04-01
Payer: COMMERCIAL

## 2024-04-01 ENCOUNTER — LAB VISIT (OUTPATIENT)
Dept: LAB | Facility: HOSPITAL | Age: 54
End: 2024-04-01
Attending: FAMILY MEDICINE
Payer: COMMERCIAL

## 2024-04-01 VITALS
SYSTOLIC BLOOD PRESSURE: 140 MMHG | BODY MASS INDEX: 35.49 KG/M2 | HEIGHT: 69 IN | WEIGHT: 239.63 LBS | OXYGEN SATURATION: 98 % | HEART RATE: 63 BPM | DIASTOLIC BLOOD PRESSURE: 92 MMHG

## 2024-04-01 DIAGNOSIS — T45.1X5A CHEMOTHERAPY-INDUCED NEUROPATHY: ICD-10-CM

## 2024-04-01 DIAGNOSIS — M79.2 NEUROPATHIC PAIN: ICD-10-CM

## 2024-04-01 DIAGNOSIS — Z12.5 SCREENING PSA (PROSTATE SPECIFIC ANTIGEN): ICD-10-CM

## 2024-04-01 DIAGNOSIS — C09.9 TONSIL CANCER: ICD-10-CM

## 2024-04-01 DIAGNOSIS — Z00.00 VISIT FOR WELL MAN HEALTH CHECK: Primary | ICD-10-CM

## 2024-04-01 DIAGNOSIS — Z96.641 HISTORY OF RIGHT HIP REPLACEMENT: ICD-10-CM

## 2024-04-01 DIAGNOSIS — E79.0 ELEVATED URIC ACID IN BLOOD: ICD-10-CM

## 2024-04-01 DIAGNOSIS — E55.9 VITAMIN D DEFICIENCY: ICD-10-CM

## 2024-04-01 DIAGNOSIS — F32.A DEPRESSION, UNSPECIFIED DEPRESSION TYPE: ICD-10-CM

## 2024-04-01 DIAGNOSIS — E53.8 LOW SERUM VITAMIN B12: ICD-10-CM

## 2024-04-01 DIAGNOSIS — F41.9 ANXIETY: ICD-10-CM

## 2024-04-01 DIAGNOSIS — M54.12 CERVICAL RADICULOPATHY AT C6: Chronic | ICD-10-CM

## 2024-04-01 DIAGNOSIS — N18.31 STAGE 3A CHRONIC KIDNEY DISEASE: ICD-10-CM

## 2024-04-01 DIAGNOSIS — G62.0 CHEMOTHERAPY-INDUCED NEUROPATHY: ICD-10-CM

## 2024-04-01 DIAGNOSIS — Z23 NEED FOR VACCINATION AGAINST STREPTOCOCCUS PNEUMONIAE: ICD-10-CM

## 2024-04-01 DIAGNOSIS — I10 ESSENTIAL HYPERTENSION: ICD-10-CM

## 2024-04-01 DIAGNOSIS — D53.9 MACROCYTIC ANEMIA: ICD-10-CM

## 2024-04-01 DIAGNOSIS — E78.49 OTHER HYPERLIPIDEMIA: ICD-10-CM

## 2024-04-01 DIAGNOSIS — Z23 NEED FOR INFLUENZA VACCINATION: ICD-10-CM

## 2024-04-01 LAB
BILIRUB UR QL STRIP: NEGATIVE
CLARITY UR REFRACT.AUTO: CLEAR
COLOR UR AUTO: YELLOW
GLUCOSE UR QL STRIP: NEGATIVE
HGB UR QL STRIP: NEGATIVE
KETONES UR QL STRIP: NEGATIVE
LEUKOCYTE ESTERASE UR QL STRIP: NEGATIVE
NITRITE UR QL STRIP: NEGATIVE
PH UR STRIP: 6 [PH] (ref 5–8)
PROT UR QL STRIP: NEGATIVE
SP GR UR STRIP: 1.02 (ref 1–1.03)
URN SPEC COLLECT METH UR: NORMAL

## 2024-04-01 PROCEDURE — 90472 IMMUNIZATION ADMIN EACH ADD: CPT | Mod: S$GLB,,, | Performed by: FAMILY MEDICINE

## 2024-04-01 PROCEDURE — 90677 PCV20 VACCINE IM: CPT | Mod: S$GLB,,, | Performed by: FAMILY MEDICINE

## 2024-04-01 PROCEDURE — 81003 URINALYSIS AUTO W/O SCOPE: CPT | Performed by: FAMILY MEDICINE

## 2024-04-01 PROCEDURE — 1159F MED LIST DOCD IN RCRD: CPT | Mod: CPTII,S$GLB,, | Performed by: FAMILY MEDICINE

## 2024-04-01 PROCEDURE — 3080F DIAST BP >= 90 MM HG: CPT | Mod: CPTII,S$GLB,, | Performed by: FAMILY MEDICINE

## 2024-04-01 PROCEDURE — 3077F SYST BP >= 140 MM HG: CPT | Mod: CPTII,S$GLB,, | Performed by: FAMILY MEDICINE

## 2024-04-01 PROCEDURE — 99999 PR PBB SHADOW E&M-EST. PATIENT-LVL V: CPT | Mod: PBBFAC,,, | Performed by: FAMILY MEDICINE

## 2024-04-01 PROCEDURE — 99396 PREV VISIT EST AGE 40-64: CPT | Mod: 25,S$GLB,, | Performed by: FAMILY MEDICINE

## 2024-04-01 PROCEDURE — 90471 IMMUNIZATION ADMIN: CPT | Mod: S$GLB,,, | Performed by: FAMILY MEDICINE

## 2024-04-01 PROCEDURE — 3008F BODY MASS INDEX DOCD: CPT | Mod: CPTII,S$GLB,, | Performed by: FAMILY MEDICINE

## 2024-04-01 PROCEDURE — 90686 IIV4 VACC NO PRSV 0.5 ML IM: CPT | Mod: S$GLB,,, | Performed by: FAMILY MEDICINE

## 2024-04-01 RX ORDER — FLUOXETINE HYDROCHLORIDE 40 MG/1
40 CAPSULE ORAL DAILY
Qty: 90 CAPSULE | Refills: 0 | Status: SHIPPED | OUTPATIENT
Start: 2024-04-01 | End: 2024-04-29

## 2024-04-01 RX ORDER — SEMAGLUTIDE 0.25 MG/.5ML
0.25 INJECTION, SOLUTION SUBCUTANEOUS
Qty: 2 ML | Refills: 0 | Status: SHIPPED | OUTPATIENT
Start: 2024-04-01 | End: 2024-04-29

## 2024-04-01 RX ORDER — LANOLIN ALCOHOL/MO/W.PET/CERES
1000 CREAM (GRAM) TOPICAL DAILY
Qty: 90 TABLET | Refills: 5 | Status: SHIPPED | OUTPATIENT
Start: 2024-04-01 | End: 2024-04-02 | Stop reason: SDUPTHER

## 2024-04-01 RX ORDER — SEMAGLUTIDE 0.5 MG/.5ML
0.5 INJECTION, SOLUTION SUBCUTANEOUS
Qty: 2 ML | Refills: 0 | Status: SHIPPED | OUTPATIENT
Start: 2024-05-01 | End: 2024-04-29

## 2024-04-01 RX ORDER — HYDROCORTISONE 25 MG/G
CREAM TOPICAL 2 TIMES DAILY
Qty: 28 G | Refills: 0 | Status: SHIPPED | OUTPATIENT
Start: 2024-04-01 | End: 2024-05-24

## 2024-04-01 RX ORDER — LOSARTAN POTASSIUM 100 MG/1
100 TABLET ORAL NIGHTLY
Qty: 90 TABLET | Refills: 0 | Status: SHIPPED | OUTPATIENT
Start: 2024-04-01 | End: 2024-04-29

## 2024-04-01 RX ORDER — ROSUVASTATIN CALCIUM 5 MG/1
5 TABLET, COATED ORAL DAILY
Qty: 90 TABLET | Refills: 3 | Status: SHIPPED | OUTPATIENT
Start: 2024-04-01 | End: 2024-04-29 | Stop reason: SDUPTHER

## 2024-04-01 RX ORDER — GABAPENTIN 600 MG/1
600 TABLET ORAL 3 TIMES DAILY
Qty: 270 TABLET | Refills: 0 | Status: SHIPPED | OUTPATIENT
Start: 2024-04-01 | End: 2025-04-01

## 2024-04-01 RX ORDER — SEMAGLUTIDE 1 MG/.5ML
1 INJECTION, SOLUTION SUBCUTANEOUS
Qty: 2 ML | Refills: 0 | Status: SHIPPED | OUTPATIENT
Start: 2024-05-31 | End: 2024-04-29

## 2024-04-01 NOTE — PROGRESS NOTES
"Subjective:       Patient ID: Burton Whiting is a 54 y.o. male.    Chief Complaint: Annual Exam    Burton Whiting is a 54 y.o. male who presents today for an annual exam    Diet/Exercise: not exercising. Weight gain noted.     Labs: ordered     Colon Cancer Screening: Last Colonoscopy completed on 10/22/2019     Neuropathy: on lyrica 300 mg BID. Not taking elavil. "Made me feel like crap." Lyrica is not helping, however he reports that "if I don't take it I feel it." The pins and needles returns. Wants to try gabapentin in place of lyrica.   Anxiety/Mood: on cymbalta. Was on fluoxetine in the past. Doesn't know if one if helping more then the other. However, wants to try changing back.   HTN: will start losartan.   DLD: not taking statin.   Low b12: not taking b12  Elevated uric acid: recheck today.   Having neck pain. Mri showed severe spinal canal stenosis at C5-6 in 2019.     Had ant bites yesterday on his arms.     PMHx: reviewed in EMR and updated  Meds: reviewed in EMR and updated  Shx: reviewed in EMR and updated  FMHx: no family history of colon cancer, breast cancer, ovarian cancer  Social: he is living alone. No children. Brother lives in [place]. Dad passed away, early 2021. He works as a . No pets at home.           Review of Systems   Constitutional:  Negative for chills and fever.   Respiratory:  Negative for shortness of breath.    Cardiovascular:  Negative for chest pain.   Gastrointestinal:  Negative for nausea and vomiting.   Musculoskeletal:  Positive for neck pain.   Neurological:  Positive for numbness. Negative for dizziness, light-headedness and headaches.   Psychiatric/Behavioral:  Positive for dysphoric mood and sleep disturbance. Negative for suicidal ideas. The patient is nervous/anxious.                Objective:     Vitals:    04/01/24 1542 04/01/24 1625   BP: (!) 142/100 (!) 140/92   BP Location: Left arm    Patient Position: Sitting    BP Method: Large (Manual)    Pulse: " "63    SpO2: 98%    Weight: 108.7 kg (239 lb 10.2 oz)    Height: 5' 9" (1.753 m)         Physical Exam  Vitals and nursing note reviewed.   Constitutional:       General: He is not in acute distress.     Appearance: He is obese. He is not ill-appearing, toxic-appearing or diaphoretic.   Cardiovascular:      Rate and Rhythm: Normal rate and regular rhythm.   Pulmonary:      Effort: Pulmonary effort is normal. No respiratory distress.      Breath sounds: Normal breath sounds. No wheezing.   Abdominal:      Palpations: Abdomen is soft.      Tenderness: There is no abdominal tenderness.   Musculoskeletal:         General: No swelling.   Skin:     Comments: What appears to be ant bites noted on BL arms.    Neurological:      General: No focal deficit present.      Mental Status: He is alert.   Psychiatric:         Mood and Affect: Mood normal.         Behavior: Behavior normal.         Thought Content: Thought content normal.         Judgment: Judgment normal.         Assessment:       1. Visit for well man health check    2. Stage 3a chronic kidney disease    3. Chemotherapy-induced neuropathy    4. Vitamin D deficiency    5. History of right hip replacement    6. Macrocytic anemia    7. Anxiety    8. Depression, unspecified depression type    9. Neuropathic pain    10. Tonsil cancer    11. Other hyperlipidemia    12. Elevated uric acid in blood    13. Screening PSA (prostate specific antigen)    14. BMI 35.0-35.9,adult    15. Essential hypertension    16. Cervical radiculopathy at C6    17. Need for vaccination against Streptococcus pneumoniae    18. Need for influenza vaccination    19. Low serum vitamin B12        Plan:         After discussion, stop cymbalta  Stop lyrica  Reports not helping  I am uncertain about this    Start fluoxetine 40 mg for mood.  Start gabapentin 600 mg TID  Start nightly x 3-5 days then increase.   Can increase to 1200 mg TID as the max dose    Please restart low dose cholesterol " medication  Start Losartan for BP. Reviewed EKG from last visit, will not repeat today     You have low B12. Goal >400. Start daily OTC B12 supplementation at 1000 mcg     Uric acid slightly high  Eat food low in purines  Recheck today.     Try wegovy.   Discussed risks of nausea, vomiting, abdominal pain, increased risk of thyroid cancer. Will start low and increase. Discussed all of this in detail.     Extensive labs today    Flu and pna 3rd dose today  Shingrix at next visit.     F/u with heme/onc with CT before for tonsil cancer.     F/u in 1 month or so.       Visit for [facility] health check  -     CBC Auto Differential; Future; Expected date: 04/01/2024  -     Comprehensive Metabolic Panel; Future; Expected date: 04/01/2024  -     Hemoglobin A1C; Future; Expected date: 04/01/2024  -     Lipid Panel; Future; Expected date: 04/01/2024  -     TSH; Future; Expected date: 04/01/2024  -     Vitamin B12; Future; Expected date: 04/01/2024  -     FOLATE; Future; Expected date: 04/01/2024  -     Iron and TIBC; Future; Expected date: 04/01/2024  -     FERRITIN; Future; Expected date: 04/01/2024  -     Vitamin B1; Future; Expected date: 04/01/2024  -     Vitamin A; Future; Expected date: 04/01/2024  -     Vitamin C; Future; Expected date: 04/01/2024  -     Zinc; Future; Expected date: 04/01/2024  -     Copper, Serum; Future; Expected date: 04/01/2024  -     Selenium serum; Future; Expected date: 04/01/2024  -     URIC ACID; Future; Expected date: 04/01/2024  -     PSA, Screening; Future; Expected date: 04/01/2024    Stage 3a chronic kidney disease  -     Iron and TIBC; Future; Expected date: 04/01/2024  -     FERRITIN; Future; Expected date: 04/01/2024  -     rosuvastatin (CRESTOR) 5 MG tablet; Take 1 tablet (5 mg total) by mouth once daily.  Dispense: 90 tablet; Refill: 3    Chemotherapy-induced neuropathy  -     Zinc; Future; Expected date: 04/01/2024  -     Copper, Serum; Future; Expected date: 04/01/2024  -      Selenium serum; Future; Expected date: 04/01/2024    Vitamin D deficiency  -     Vitamin D; Future; Expected date: 04/01/2024    History of right hip replacement    Macrocytic anemia  -     Vitamin B12; Future; Expected date: 04/01/2024  -     FOLATE; Future; Expected date: 04/01/2024    Anxiety  -     FLUoxetine 40 MG capsule; Take 1 capsule (40 mg total) by mouth once daily.  Dispense: 90 capsule; Refill: 0    Depression, unspecified depression type  -     FLUoxetine 40 MG capsule; Take 1 capsule (40 mg total) by mouth once daily.  Dispense: 90 capsule; Refill: 0    Neuropathic pain  -     Vitamin B1; Future; Expected date: 04/01/2024  -     Vitamin A; Future; Expected date: 04/01/2024  -     Vitamin C; Future; Expected date: 04/01/2024  -     Zinc; Future; Expected date: 04/01/2024  -     Copper, Serum; Future; Expected date: 04/01/2024  -     gabapentin (NEURONTIN) 600 MG tablet; Take 1 tablet (600 mg total) by mouth 3 (three) times daily.  Dispense: 270 tablet; Refill: 0    Tonsil cancer    Other hyperlipidemia  -     Lipid Panel; Future; Expected date: 04/01/2024  -     rosuvastatin (CRESTOR) 5 MG tablet; Take 1 tablet (5 mg total) by mouth once daily.  Dispense: 90 tablet; Refill: 3  -     semaglutide, weight loss, (WEGOVY) 0.25 mg/0.5 mL PnIj; Inject 0.25 mg into the skin every 7 days.  Dispense: 2 mL; Refill: 0  -     semaglutide, weight loss, (WEGOVY) 0.5 mg/0.5 mL PnIj; Inject 0.5 mg into the skin every 7 days.  Dispense: 2 mL; Refill: 0  -     semaglutide, weight loss, (WEGOVY) 1 mg/0.5 mL PnIj; Inject 1 mg into the skin every 7 days.  Dispense: 2 mL; Refill: 0    Elevated uric acid in blood  -     URIC ACID; Future; Expected date: 04/01/2024    Screening PSA (prostate specific antigen)  -     PSA, Screening; Future; Expected date: 04/01/2024    BMI 35.0-35.9,adult  -     semaglutide, weight loss, (WEGOVY) 0.25 mg/0.5 mL PnIj; Inject 0.25 mg into the skin every 7 days.  Dispense: 2 mL; Refill: 0  -      semaglutide, weight loss, (WEGOVY) 0.5 mg/0.5 mL PnIj; Inject 0.5 mg into the skin every 7 days.  Dispense: 2 mL; Refill: 0  -     semaglutide, weight loss, (WEGOVY) 1 mg/0.5 mL PnIj; Inject 1 mg into the skin every 7 days.  Dispense: 2 mL; Refill: 0  -     Ambulatory Referral/Consult to Lifestyle Nutrition; Future; Expected date: 04/08/2024    Essential hypertension  -     CBC Auto Differential; Future; Expected date: 04/01/2024  -     Comprehensive Metabolic Panel; Future; Expected date: 04/01/2024  -     TSH; Future; Expected date: 04/01/2024  -     losartan (COZAAR) 100 MG tablet; Take 1 tablet (100 mg total) by mouth every evening.  Dispense: 90 tablet; Refill: 0  -     Urinalysis; Future; Expected date: 04/01/2024  -     IN OFFICE EKG 12-LEAD (to [facility])    Cervical radiculopathy at C6  -     Vitamin B12; Future; Expected date: 04/01/2024  -     FOLATE; Future; Expected date: 04/01/2024  -     Zinc; Future; Expected date: 04/01/2024  -     Copper, Serum; Future; Expected date: 04/01/2024  -     Selenium serum; Future; Expected date: 04/01/2024  -     Ambulatory referral/consult to Pain Clinic; Future; Expected date: 04/08/2024    Need for vaccination against Streptococcus pneumoniae  -     (In Office Administered) Pneumococcal Conjugate Vaccine (20 Valent) (IM) (Preferred)    Need for influenza vaccination  -     Influenza - Quadrivalent (PF)    Low serum vitamin B12  -     cyanocobalamin (VITAMIN B-12) 1000 MCG tablet; Take 1 tablet (1,000 mcg total) by mouth once daily.  Dispense: 90 tablet; Refill: 5    Other orders  -     hydrocortisone 2.5 % cream; Apply topically 2 (two) times daily.  Dispense: 28 g; Refill: 0

## 2024-04-01 NOTE — PATIENT INSTRUCTIONS
After discussion, stop cymbalta  Stop lyrica  Reports not helping  I am uncertain about this  Start fluoxetine 40 mg  Start gabapentin 600 mg TID  Start nightly x 3-5 days then increase.   Can increase to 1200 mg TID as the max dose    Please restart low dose cholesterol medication     You have low B12. Goal >400. Start daily OTC B12 supplementation at 1000 mcg     Uric acid slightly high  Eat food low in purines    Start Losartan for BP.     Try wegovy.   Discussed risks of nausea, vomiting, abdominal pain, increased risk of thyroid cancer. Will start low and increase. Discussed all of this in detail.     Extensive labs today    Flu and pna 3rd dose today  Shingrix at next visit.     F/u in 1 month or so.

## 2024-04-02 ENCOUNTER — TELEPHONE (OUTPATIENT)
Dept: FAMILY MEDICINE | Facility: CLINIC | Age: 54
End: 2024-04-02
Payer: COMMERCIAL

## 2024-04-02 DIAGNOSIS — E53.8 B12 DEFICIENCY: ICD-10-CM

## 2024-04-02 DIAGNOSIS — E53.8 LOW SERUM VITAMIN B12: ICD-10-CM

## 2024-04-02 DIAGNOSIS — E55.9 VITAMIN D DEFICIENCY: Primary | ICD-10-CM

## 2024-04-02 RX ORDER — ERGOCALCIFEROL 1.25 MG/1
50000 CAPSULE ORAL
Qty: 12 CAPSULE | Refills: 3 | Status: SHIPPED | OUTPATIENT
Start: 2024-04-02

## 2024-04-02 RX ORDER — LANOLIN ALCOHOL/MO/W.PET/CERES
1000 CREAM (GRAM) TOPICAL DAILY
Qty: 90 TABLET | Refills: 5 | Status: SHIPPED | OUTPATIENT
Start: 2024-04-02

## 2024-04-02 NOTE — TELEPHONE ENCOUNTER
Please call patient    I have reviewed your results    Uric acid high but almost at goal  No allopurinol right now  Low purine diet!    You have low vitamin D and a Rx has been sent to your pharmacy. Take this pill once a week and when the prescription and refills are done, start taking an OTC vitamin D supplementation at 1000 IU daily.     You have low B12. Goal >400. Start daily OTC B12 supplementation at 1000 mcg     Kidney function is a little low. No NSAIDS such as ibuprofen or naproxen. Avoid Red meats. Drink a lot of water. I will continue monitoring kidney function    Cholesterol is high  Take statin    No anemia    Prostate normal    Few labs pending.

## 2024-04-02 NOTE — TELEPHONE ENCOUNTER
Called patient to go over recent lab results and medication changes per Dr Turpin. Patient verbalized understanding.

## 2024-04-03 ENCOUNTER — TELEPHONE (OUTPATIENT)
Dept: FAMILY MEDICINE | Facility: CLINIC | Age: 54
End: 2024-04-03
Payer: COMMERCIAL

## 2024-04-05 ENCOUNTER — TELEPHONE (OUTPATIENT)
Dept: FAMILY MEDICINE | Facility: CLINIC | Age: 54
End: 2024-04-05
Payer: COMMERCIAL

## 2024-04-29 ENCOUNTER — OFFICE VISIT (OUTPATIENT)
Dept: FAMILY MEDICINE | Facility: CLINIC | Age: 54
End: 2024-04-29
Payer: COMMERCIAL

## 2024-04-29 VITALS
OXYGEN SATURATION: 96 % | TEMPERATURE: 98 F | SYSTOLIC BLOOD PRESSURE: 154 MMHG | DIASTOLIC BLOOD PRESSURE: 110 MMHG | HEIGHT: 69 IN | BODY MASS INDEX: 34.51 KG/M2 | WEIGHT: 233 LBS | HEART RATE: 86 BPM

## 2024-04-29 DIAGNOSIS — N18.31 STAGE 3A CHRONIC KIDNEY DISEASE: ICD-10-CM

## 2024-04-29 DIAGNOSIS — E53.8 LOW SERUM VITAMIN B12: ICD-10-CM

## 2024-04-29 DIAGNOSIS — F32.A DEPRESSION, UNSPECIFIED DEPRESSION TYPE: ICD-10-CM

## 2024-04-29 DIAGNOSIS — I10 ESSENTIAL HYPERTENSION: Primary | ICD-10-CM

## 2024-04-29 DIAGNOSIS — M79.2 NEUROPATHIC PAIN: ICD-10-CM

## 2024-04-29 DIAGNOSIS — F41.9 ANXIETY: ICD-10-CM

## 2024-04-29 DIAGNOSIS — E78.49 OTHER HYPERLIPIDEMIA: ICD-10-CM

## 2024-04-29 DIAGNOSIS — E55.9 VITAMIN D DEFICIENCY: ICD-10-CM

## 2024-04-29 PROCEDURE — 99214 OFFICE O/P EST MOD 30 MIN: CPT | Mod: S$GLB,,, | Performed by: FAMILY MEDICINE

## 2024-04-29 PROCEDURE — 99999 PR PBB SHADOW E&M-EST. PATIENT-LVL IV: CPT | Mod: PBBFAC,,, | Performed by: FAMILY MEDICINE

## 2024-04-29 PROCEDURE — 3077F SYST BP >= 140 MM HG: CPT | Mod: CPTII,S$GLB,, | Performed by: FAMILY MEDICINE

## 2024-04-29 PROCEDURE — 3080F DIAST BP >= 90 MM HG: CPT | Mod: CPTII,S$GLB,, | Performed by: FAMILY MEDICINE

## 2024-04-29 PROCEDURE — 3044F HG A1C LEVEL LT 7.0%: CPT | Mod: CPTII,S$GLB,, | Performed by: FAMILY MEDICINE

## 2024-04-29 PROCEDURE — 4010F ACE/ARB THERAPY RXD/TAKEN: CPT | Mod: CPTII,S$GLB,, | Performed by: FAMILY MEDICINE

## 2024-04-29 PROCEDURE — 3008F BODY MASS INDEX DOCD: CPT | Mod: CPTII,S$GLB,, | Performed by: FAMILY MEDICINE

## 2024-04-29 PROCEDURE — 1159F MED LIST DOCD IN RCRD: CPT | Mod: CPTII,S$GLB,, | Performed by: FAMILY MEDICINE

## 2024-04-29 RX ORDER — ROSUVASTATIN CALCIUM 5 MG/1
5 TABLET, COATED ORAL DAILY
Qty: 90 TABLET | Refills: 3 | Status: SHIPPED | OUTPATIENT
Start: 2024-04-29 | End: 2025-04-29

## 2024-04-29 RX ORDER — PROPRANOLOL HYDROCHLORIDE 20 MG/1
20 TABLET ORAL NIGHTLY
Qty: 90 TABLET | Refills: 1 | Status: SHIPPED | OUTPATIENT
Start: 2024-04-29 | End: 2025-04-29

## 2024-04-29 RX ORDER — FLUOXETINE HYDROCHLORIDE 60 MG/1
60 TABLET, FILM COATED ORAL; ORAL DAILY
Qty: 90 TABLET | Refills: 1 | Status: SHIPPED | OUTPATIENT
Start: 2024-04-29

## 2024-04-29 NOTE — PROGRESS NOTES
"Subjective:       Patient ID: Burton Whiting is a 54 y.o. male.    Chief Complaint: Hypertension    Burton is a 54 y.o. male who presents today for f/u    Mood: stopped cymbalta. Started fluoxetine. He doesn't know if it is helping. He thinks fluoxetine 80 mg helps more then 40 mg. He doubles on his own. Cut out coffee that didn't help anxiety. Still feeling anxious. Sleep is okay. Sleep number bed rates his sleep last night as a 93/100.   Neuropathy: changed from lyrica to gabapentin. He thinks this is better. Taking 600 mg TID.   HTN: unable to tolerate losartan. Made him feel nauseated. Tried stopping other medications but symptoms persisted and he was able to isolate them to his losartan. Didn't contact this office. BP still elevated.   DLD: taking statin.     Taking b12. He thinks this may be helping.           Review of Systems   Constitutional:  Positive for fatigue. Negative for chills and fever.   Respiratory:  Negative for chest tightness and shortness of breath.    Cardiovascular:  Negative for chest pain.   Gastrointestinal:  Negative for nausea and vomiting.   Neurological:  Positive for numbness. Negative for dizziness, light-headedness and headaches.   Psychiatric/Behavioral:  Negative for dysphoric mood, sleep disturbance and suicidal ideas. The patient is nervous/anxious.            Objective:     Vitals:    04/29/24 1551   BP: (!) 154/110   Pulse: 86   Temp: 98 °F (36.7 °C)   TempSrc: Oral   SpO2: 96%   Weight: 105.7 kg (233 lb 0.4 oz)   Height: 5' 9" (1.753 m)        Physical Exam  Constitutional:       General: He is not in acute distress.     Appearance: He is not ill-appearing, toxic-appearing or diaphoretic.   Cardiovascular:      Rate and Rhythm: Normal rate and regular rhythm.   Pulmonary:      Effort: Pulmonary effort is normal.      Breath sounds: Normal breath sounds.   Neurological:      Mental Status: He is alert.         Assessment:       1. Essential hypertension    2. Anxiety    3. " Depression, unspecified depression type    4. Stage 3a chronic kidney disease    5. Other hyperlipidemia    6. Neuropathic pain    7. Low serum vitamin B12    8. Vitamin D deficiency        Plan:       Increase gabapentin up to 1200 mg three times daily (2 pills three times a day)  Can start by taking 1 pill am, 1 pill mid day, 2 pills night.     Increase fluoxetine to 60 mg from 40 mg  Stop the losartan  Start propranolol which can help BP and anxiety  Start 20 mg nightly  Can increase up to 60 mg (3 pills) without contacting as long as HR >60  BP: 130/80    You have low B12. Goal >400. Start daily OTC B12 supplementation at 1000 mcg     F/u 6-8 weeks. Will add a few tests to upcoming heme/onc labs.     Essential hypertension  -     propranoloL (INDERAL) 20 MG tablet; Take 1 tablet (20 mg total) by mouth every evening.  Dispense: 90 tablet; Refill: 1    Anxiety  -     FLUoxetine 60 mg Tab; Take 60 mg by mouth once daily.  Dispense: 90 tablet; Refill: 1    Depression, unspecified depression type  -     FLUoxetine 60 mg Tab; Take 60 mg by mouth once daily.  Dispense: 90 tablet; Refill: 1    Stage 3a chronic kidney disease  -     rosuvastatin (CRESTOR) 5 MG tablet; Take 1 tablet (5 mg total) by mouth once daily.  Dispense: 90 tablet; Refill: 3  -     PTH, Intact; Future; Expected date: 04/29/2024    Other hyperlipidemia  -     rosuvastatin (CRESTOR) 5 MG tablet; Take 1 tablet (5 mg total) by mouth once daily.  Dispense: 90 tablet; Refill: 3    Neuropathic pain    Low serum vitamin B12  -     Vitamin B12; Future; Expected date: 04/29/2024    Vitamin D deficiency  -     PTH, Intact; Future; Expected date: 04/29/2024            Warning signs discussed, patient to call with any further issues or worsening of symptoms. Above note may have utilized dictation, please excuse any transcription errors.

## 2024-04-29 NOTE — PATIENT INSTRUCTIONS
Increase gabapentin up to 1200 mg three times daily (2 pills three times a day)  Can start by taking 1 pill am, 1 pill mid day, 2 pills night.     Increase fluoxetine to 60 mg from 40 mg  Stop the losartan  Start propranolol which can help BP and anxiety  Start 20 mg nightly  Can increase up to 60 mg (3 pills) without contacting me as long as HR >60  BP: 130/80    You have low B12. Goal >400. Start daily OTC B12 supplementation at 1000 mcg     Update me in 2-3 weeks.

## 2024-05-23 ENCOUNTER — E-VISIT (OUTPATIENT)
Dept: INTERNAL MEDICINE | Facility: CLINIC | Age: 54
End: 2024-05-23
Payer: COMMERCIAL

## 2024-05-23 ENCOUNTER — PATIENT MESSAGE (OUTPATIENT)
Dept: FAMILY MEDICINE | Facility: CLINIC | Age: 54
End: 2024-05-23
Payer: COMMERCIAL

## 2024-05-23 DIAGNOSIS — I10 ESSENTIAL HYPERTENSION: Primary | ICD-10-CM

## 2024-05-23 PROCEDURE — 99421 OL DIG E/M SVC 5-10 MIN: CPT | Mod: ,,, | Performed by: INTERNAL MEDICINE

## 2024-05-24 RX ORDER — AMLODIPINE BESYLATE 5 MG/1
5 TABLET ORAL DAILY
Qty: 90 TABLET | Refills: 0 | Status: SHIPPED | OUTPATIENT
Start: 2024-05-24 | End: 2025-05-24

## 2024-05-24 NOTE — PROGRESS NOTES
Patient ID: Burton Whiting is a 54 y.o. male.    Chief Complaint: Hypertension (Entered automatically based on patient selection in Patient Portal.)    The patient initiated a request through [employer] on 5/23/2024 for evaluation and management with a chief complaint of Hypertension (Entered automatically based on patient selection in Patient Portal.)     I evaluated the questionnaire submission on 05/24/2024  .    [facility] Peq Evisit Hypertension    5/24/2024  6:30 AM CDT - Filed by Patient   Do you agree to participate in an E-Visit? Yes   If you have any of the following symptoms, please do not complete an E-Visit. Instead, schedule an appointment with your provider I acknowledge   Choose the state of your primary residence [place]   What would you like addressed about your blood pressure? Recent blood pressure medication change   What is the main issue you would like addressed today? Try to get bp lowered   How would you classify your blood pressure? Hard to control   Are you having any of the following symptoms from your high blood pressure? Anxiety;  Sweats   Are you taking any of the following medications? Over-the-counter pain and fever relievers   The following factors can make high blood pressure worse or harder to control. Which of them might be contributing to your high blood pressure?  Poor diet;  Excess weight   Have you taken blood pressure medications in the past that caused you problems or side effects? Yes   What was/were the medication(s) and what type of side effects? Just feeling terrible   Are you currently taking medication(s) for your blood pressure? Yes   Have you recently started a new medication or changed your dose? Yes   How often are you taking your medication per week?  Every day   Have you had any side effects from your current blood pressure medication? No   Are you able to take your blood pressure? No   If you are able to take your pulse, please provide it below.    Provide any  additional information you feel is important.    Please attach any relevant images or files    Are you able to take any other vitals? No         Encounter Diagnosis   Name Primary?    Essential hypertension Yes        No orders of the defined types were placed in this encounter.     Medications Ordered This Encounter   Medications    amLODIPine (NORVASC) 5 MG tablet     Sig: Take 1 tablet (5 mg total) by mouth once daily.     Dispense:  90 tablet     Refill:  0     .        No follow-ups on file.      E-Visit Time Tracking:    Day 1 Time (in minutes): 5    Total Time (in minutes): 5          Hi  I'm sorry your going through that, please start taking the new blood pressure medications I sent to the pharmacy. This medication is called AMLODIPINE  . You can take one tablet daily. Its usually well tolerated but rarely people can get headahce, upset stomach or swelling in the leg. Please let us know if any of this occurs. Please continue to keep track of your blood pressure.     If you have any symptoms of severe headache, chest pain , shortness of breath with high blood pressure then please go to the ER . Otherwise please return to clinic at you regularly scheduled visit

## 2024-06-10 ENCOUNTER — TELEPHONE (OUTPATIENT)
Dept: FAMILY MEDICINE | Facility: CLINIC | Age: 54
End: 2024-06-10
Payer: COMMERCIAL

## 2024-06-10 ENCOUNTER — HOSPITAL ENCOUNTER (OUTPATIENT)
Dept: RADIOLOGY | Facility: HOSPITAL | Age: 54
Discharge: HOME | End: 2024-06-10
Attending: INTERNAL MEDICINE
Payer: COMMERCIAL

## 2024-06-10 DIAGNOSIS — E78.49 OTHER HYPERLIPIDEMIA: ICD-10-CM

## 2024-06-10 DIAGNOSIS — Z85.810 HISTORY OF CANCER OF LINGUAL TONSIL: ICD-10-CM

## 2024-06-10 DIAGNOSIS — E55.9 VITAMIN D DEFICIENCY: ICD-10-CM

## 2024-06-10 DIAGNOSIS — N18.31 STAGE 3A CHRONIC KIDNEY DISEASE: ICD-10-CM

## 2024-06-10 DIAGNOSIS — R79.89 HIGH SERUM PARATHYROID HORMONE (PTH): Primary | ICD-10-CM

## 2024-06-10 PROCEDURE — 25500020 PHARM REV CODE 255: Mod: PN | Performed by: INTERNAL MEDICINE

## 2024-06-10 PROCEDURE — 71260 CT THORAX DX C+: CPT | Mod: 26,,, | Performed by: RADIOLOGY

## 2024-06-10 PROCEDURE — 70491 CT SOFT TISSUE NECK W/DYE: CPT | Mod: TC,PN

## 2024-06-10 PROCEDURE — 70491 CT SOFT TISSUE NECK W/DYE: CPT | Mod: 26,,, | Performed by: RADIOLOGY

## 2024-06-10 RX ADMIN — IOHEXOL 75 ML: 350 INJECTION, SOLUTION INTRAVENOUS at 08:06

## 2024-06-11 ENCOUNTER — TELEPHONE (OUTPATIENT)
Dept: FAMILY MEDICINE | Facility: CLINIC | Age: 54
End: 2024-06-11
Payer: COMMERCIAL

## 2024-06-11 NOTE — TELEPHONE ENCOUNTER
Called patient to schedule labs and to  24 hour urine test. No answer. Left voicemail for patient to return phone call to  office.

## 2024-06-11 NOTE — TELEPHONE ENCOUNTER
Spoke to patient in regards to repeating lab results and doing 24 hour urine. Patient verbalized understanding. He stated he's taking his crestor. Patient will  24 hour urine jug tomorrow.

## 2024-06-11 NOTE — TELEPHONE ENCOUNTER
----- Message from Aisha Walters sent at 6/11/2024  9:09 AM CDT -----  Type:  Patient Returning Call    Who Called:Pt   Who Left Message for Patient:Carlos  Does the patient know what this is regarding?:  Would the patient rather a call back or a response via [facility]? Call back   Best Call Back Number:[phone]  Additional Information:

## 2024-06-11 NOTE — TELEPHONE ENCOUNTER
Repeat labs in 8-12 weeks please.   I ordered two 24 hour urine tests as well. Will need to  a jug    Remind him to take crestor, I'll check cholesterol next time.

## 2024-06-12 ENCOUNTER — OFFICE VISIT (OUTPATIENT)
Dept: HEMATOLOGY/ONCOLOGY | Facility: CLINIC | Age: 54
End: 2024-06-12
Payer: COMMERCIAL

## 2024-06-12 VITALS
HEIGHT: 69 IN | WEIGHT: 234.56 LBS | DIASTOLIC BLOOD PRESSURE: 102 MMHG | OXYGEN SATURATION: 98 % | RESPIRATION RATE: 14 BRPM | HEART RATE: 95 BPM | TEMPERATURE: 98 F | SYSTOLIC BLOOD PRESSURE: 145 MMHG | BODY MASS INDEX: 34.74 KG/M2

## 2024-06-12 DIAGNOSIS — Z85.810 HISTORY OF CANCER OF LINGUAL TONSIL: Primary | ICD-10-CM

## 2024-06-12 DIAGNOSIS — E03.9 HYPOTHYROIDISM, UNSPECIFIED TYPE: ICD-10-CM

## 2024-06-12 PROCEDURE — 3080F DIAST BP >= 90 MM HG: CPT | Mod: CPTII,S$GLB,, | Performed by: INTERNAL MEDICINE

## 2024-06-12 PROCEDURE — 4010F ACE/ARB THERAPY RXD/TAKEN: CPT | Mod: CPTII,S$GLB,, | Performed by: INTERNAL MEDICINE

## 2024-06-12 PROCEDURE — 3044F HG A1C LEVEL LT 7.0%: CPT | Mod: CPTII,S$GLB,, | Performed by: INTERNAL MEDICINE

## 2024-06-12 PROCEDURE — 99214 OFFICE O/P EST MOD 30 MIN: CPT | Mod: S$GLB,,, | Performed by: INTERNAL MEDICINE

## 2024-06-12 PROCEDURE — 3008F BODY MASS INDEX DOCD: CPT | Mod: CPTII,S$GLB,, | Performed by: INTERNAL MEDICINE

## 2024-06-12 PROCEDURE — 1159F MED LIST DOCD IN RCRD: CPT | Mod: CPTII,S$GLB,, | Performed by: INTERNAL MEDICINE

## 2024-06-12 PROCEDURE — 3077F SYST BP >= 140 MM HG: CPT | Mod: CPTII,S$GLB,, | Performed by: INTERNAL MEDICINE

## 2024-06-12 PROCEDURE — 99999 PR PBB SHADOW E&M-EST. PATIENT-LVL IV: CPT | Mod: PBBFAC,,, | Performed by: INTERNAL MEDICINE

## 2024-06-12 NOTE — PROGRESS NOTES
Subjective     Patient ID: Burton Whiting is a 54 y.o. male.    Chief Complaint: History of cancer of lingual tonsil  Mr. Burton Whiting is a 48-year-old male, former smoker, who noted a four-month history of enlarging neck mass.  He initially delayed care due to lack of insurance.  He was seen by Dr. Turpin who referred him to see Dr. Olguin.  He had a CT that showed a 3.8 x 3.8 x 4.9 cm soft tissue mass arising from the left palatine tonsil resulting in moderate narrowing of the hypopharyngeal airway adjacent extensive matted left cervical lymphadenopathy was noted.  The largest ella mass was 6.6 x 9 x 2.6 cm extending inferiorly to the supraclavicular region.  The mass pushed the trachea and the left common carotid artery to the right.  Additional representative of cervical lymph nodes measuring 2.9 x 3.1 x 3.5 cm on axial image 37 of series 3   and 2.4 x 2.1 x 2.2 cm on axial image 55 of series 3.  There is also a sclerotic lesion involving the midline of the clivus measuring 1.2 cm.  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  PET scan performed on 01/19/2018 revealed persistent evidence of a soft tissue mass arising from the left palatine tonsil resulting in moderate narrowing of the hypopharyngeal   airway.  The mass is hypermetabolic demonstrating an SUV max of 18.5.  There is also persistent adjacent extensive matted left cervical lymphadenopathy, largest of this measuring 6.7 x 8.42 with hypermetabolic activity and SUV max of 20.5.  Lymphadenopathy is again noted to have a mass effect on the trachea and left common carotid artery, pushing both structures towards the right.  There is also prominent right cervical lymph nodes with the largest measuring 0.8 cm and demonstrating mild hypermetabolic activity with SUV max of 1.8, physiologic   distribution of FDG in the gray matter.  There are 3 pulmonary nodules noted within the right lung, the largest is in the anterior segment of the  "right upper lobe measuring 1 cm, second is visualized in the middle lobe measuring 0.6 cm and the third is within the posterior basal segment measuring 0.6 cm.  There is one pulmonary nodule within the left lung within the lateral basal segment measuring 0.6 cm.  These nodules do not demonstrate hypermetabolic activity; however, they are below the threshold for observation with PET     He underwent MRI cervical spine revealed "No evidence of metastatic disease to the cervical spine. Multilevel degenerative changes of the cervical spine with disc protrusion at C5-6 which results in moderate to severe spinal canal stenosis and and associated cord signal abnormality, suggestive of compressive myelopathy. 6 mm T1 hypointense non-enhancing lesion in the clivus.  Continued followup is suggested. 9 mm inferior descent of the cerebellar tonsils below the foramen magnum, suggestive of Chiari 1 malformation"  MRI thoracic spine "No evidence of metastatic disease involving the thoracic spine. Incidental hemangioma involving the T7 vertebral body. Mild degenerative disc disease without any significant spinal canal stenosis or neuroforaminal narrowing.   He completed 3 cycles of TPF and 6 weeks of Cisplatin and RT. Completed on 6/26/18           His PET scan from 9/25/18 revealed "Progression of disease with multiple new hypermetabolic foci including the manubrium, mediastinal/retrocrural lymph nodes, and lungs. Partial therapeutic response within the presumed radiation field involving the left cervical mass, and complete therapeutic response in the right cervical lymph node, clivus, and left palatine tonsil. New soft tissue thickening and hypermetabolism in the left aryepiglottic fold and right cricoid cartilage"      He received Opdivo from 2/2019-7/2019      He has been on surveillance     HPIHe comes in to review his CT neck from 6/10/2024 which reveals "There is mild soft tissue asymmetry to the left of midline at the level " "of the hypopharynx.  The relative effacement of the left piriform sinus seen on 05/01/2023 is decreased. Symmetrical small size of the submandibular salivary glands, question sequela of radiation therapy. Chronic disc degeneration in the cervical spine. No acute pulmonary disease, adenopathy or suspicious pulmonary nodules are identified in the chest"    He is doing well clinically.    Review of Systems   Constitutional:  Negative for appetite change, fatigue and unexpected weight change.   HENT:  Negative for mouth sores.    Eyes:  Negative for visual disturbance.   Respiratory:  Negative for cough and shortness of breath.    Cardiovascular:  Negative for chest pain.   Gastrointestinal:  Negative for abdominal pain and diarrhea.   Genitourinary:  Negative for frequency.   Musculoskeletal:  Negative for back pain.   Integumentary:  Negative for rash.   Neurological:  Negative for headaches.   Hematological:  Negative for adenopathy.   Psychiatric/Behavioral:  The patient is not nervous/anxious.    All other systems reviewed and are negative.         Objective     Physical Exam  Vitals reviewed.   Constitutional:       Appearance: He is well-developed.   HENT:      Mouth/Throat:      Pharynx: No oropharyngeal exudate.   Cardiovascular:      Rate and Rhythm: Normal rate.      Heart sounds: Normal heart sounds.   Pulmonary:      Effort: Pulmonary effort is normal.      Breath sounds: Normal breath sounds. No wheezing.   Abdominal:      General: Bowel sounds are normal.      Palpations: Abdomen is soft.      Tenderness: There is no abdominal tenderness.   Musculoskeletal:         General: No tenderness.   Lymphadenopathy:      Cervical: No cervical adenopathy.   Skin:     General: Skin is warm and dry.      Findings: No rash.   Neurological:      Mental Status: He is alert and oriented to person, place, and time.      Coordination: Coordination normal.   Psychiatric:         Thought Content: Thought content normal.    "      Judgment: Judgment normal.              LABS:  WBC   Date Value Ref Range Status   06/10/2024 4.62 3.90 - 12.70 K/uL Final     Hemoglobin   Date Value Ref Range Status   06/10/2024 15.2 14.0 - 18.0 g/dL Final     POC Hematocrit   Date Value Ref Range Status   09/08/2019 17 (LL) 36 - 54 %PCV Final     Hematocrit   Date Value Ref Range Status   06/10/2024 43.1 40.0 - 54.0 % Final     Platelets   Date Value Ref Range Status   06/10/2024 201 150 - 450 K/uL Final     Gran # (ANC)   Date Value Ref Range Status   06/10/2024 3.3 1.8 - 7.7 K/uL Final     Comment:     The ANC is based on a white cell differential from an   automated cell counter. It has not been microscopically   reviewed for the presence of abnormal cells. Clinical   correlation is required.         Chemistry        Component Value Date/Time     06/10/2024 0851    K 3.7 06/10/2024 0851     06/10/2024 0851    CO2 24 06/10/2024 0851    BUN 23 (H) 06/10/2024 0851    CREATININE 1.43 (H) 06/10/2024 0851     06/10/2024 0851        Component Value Date/Time    CALCIUM 8.8 06/10/2024 0851    ALKPHOS 87 06/10/2024 0851    AST 27 06/10/2024 0851    ALT 18 06/10/2024 0851    BILITOT 0.9 06/10/2024 0851    ESTGFRAFRICA 54.0 (A) 06/10/2022 0807    EGFRNONAA 46.7 (A) 06/10/2022 0807        TSH   Date Value Ref Range Status   06/10/2024 1.200 0.400 - 4.000 uIU/mL Final     Comment:     Warning:  Heterophilic antibodies in serum or plasma of   certain individuals are known to cause interference with   immunoassays. These antibodies may be present in blood samples   from individuals regularly exposed to animal or who have been   treated with animal products.     Patients taking high doses of supplemental biotin may have  negatively biased results.        Free T4   Date Value Ref Range Status   06/10/2024 0.99 0.71 - 1.51 ng/dL Final       Assessment and Plan     1. History of cancer of lingual tonsil  -     CBC w/ DIFF; Future; Expected date:  06/12/2024  -     CMP; Future; Expected date: 06/12/2024  -     CT Neck Chest With Contrast (XPD); Future; Expected date: 06/12/2024    2. Hypothyroidism, unspecified type  -     TSH; Future; Expected date: 06/12/2024  -     FREE T4; Future; Expected date: 06/12/2024        Route Chart for Scheduling    Med Onc Chart Routing      Follow up with physician 1 year. Schedule CBC, CMP, TSH, free T4, CT neck, chest and see me   Follow up with [facility]    Infusion scheduling note    Injection scheduling note    Labs    Imaging    Pharmacy appointment    Other referrals                  Mr. Whiting is doing well clinically with no evidence of recurrence and will return in 1 year with labs and CT scan of neck and chest.  He is also due to see head and neck Surgical Oncology Dr. Olgiun so sent message to Dr. Olguin to see him    Above care plan was discussed with patient and all questions were addressed to his satisfaction

## 2024-06-18 ENCOUNTER — LAB VISIT (OUTPATIENT)
Dept: LAB | Facility: HOSPITAL | Age: 54
End: 2024-06-18
Attending: FAMILY MEDICINE
Payer: COMMERCIAL

## 2024-06-18 DIAGNOSIS — N18.31 STAGE 3A CHRONIC KIDNEY DISEASE: ICD-10-CM

## 2024-06-18 LAB
CALCIUM 24H UR-MRATE: 6 MG/HR (ref 4–12)
CALCIUM UR-MCNC: 7.5 MG/DL (ref 0–15)
CALCIUM URINE (MG/SPEC): 143 MG/SPEC
CREAT 24H UR-MRATE: 48.3 MG/HR (ref 40–75)
CREAT UR-MCNC: 61 MG/DL (ref 23–375)
CREATININE, URINE (MG/SPEC): 1159 MG/SPEC
URINE COLLECTION DURATION: 24 HR
URINE COLLECTION DURATION: 24 HR
URINE VOLUME: 1900 ML
URINE VOLUME: 1900 ML

## 2024-06-18 PROCEDURE — 82570 ASSAY OF URINE CREATININE: CPT | Performed by: FAMILY MEDICINE

## 2024-06-18 PROCEDURE — 82340 ASSAY OF CALCIUM IN URINE: CPT | Performed by: FAMILY MEDICINE

## 2024-06-20 DIAGNOSIS — G62.0 CHEMOTHERAPY-INDUCED NEUROPATHY: Chronic | ICD-10-CM

## 2024-06-20 DIAGNOSIS — F32.A DEPRESSION, UNSPECIFIED DEPRESSION TYPE: ICD-10-CM

## 2024-06-20 DIAGNOSIS — T45.1X5A CHEMOTHERAPY-INDUCED NEUROPATHY: Chronic | ICD-10-CM

## 2024-06-20 DIAGNOSIS — M79.2 NEUROPATHIC PAIN: ICD-10-CM

## 2024-06-20 RX ORDER — DULOXETIN HYDROCHLORIDE 60 MG/1
60 CAPSULE, DELAYED RELEASE ORAL
Qty: 90 CAPSULE | Refills: 1 | OUTPATIENT
Start: 2024-06-20

## 2024-06-20 RX ORDER — GABAPENTIN 600 MG/1
600 TABLET ORAL 3 TIMES DAILY
Qty: 270 TABLET | Refills: 0 | Status: SHIPPED | OUTPATIENT
Start: 2024-06-20

## 2024-06-20 NOTE — TELEPHONE ENCOUNTER
Refill Routing Note   Medication(s) are not appropriate for processing by [facility] for the following reason(s):        Outside of protocol    [facility] action(s):  Quick Discontinue  Route      Medication Therapy Plan: The original prescription was discontinued on 4/1/2024 by Christopher Turpin DO.      Appointments  past 12m or future 3m with PCP    Date Provider   Last Visit   4/29/2024 Christopher Turpin DO   Next Visit   6/24/2024 Christopher Turpin DO   ED visits in past 90 days: 0        Note composed:8:49 AM 06/20/2024

## 2024-06-20 NOTE — TELEPHONE ENCOUNTER
No care due was identified.  [facility] Embedded Care Due Messages. Reference number: [id number].   6/20/2024 8:34:34 AM CDT

## 2024-06-21 DIAGNOSIS — F32.A DEPRESSION, UNSPECIFIED DEPRESSION TYPE: ICD-10-CM

## 2024-06-21 DIAGNOSIS — G62.0 CHEMOTHERAPY-INDUCED NEUROPATHY: Chronic | ICD-10-CM

## 2024-06-21 DIAGNOSIS — T45.1X5A CHEMOTHERAPY-INDUCED NEUROPATHY: Chronic | ICD-10-CM

## 2024-06-21 DIAGNOSIS — M79.2 NEUROPATHIC PAIN: ICD-10-CM

## 2024-06-21 RX ORDER — DULOXETIN HYDROCHLORIDE 60 MG/1
60 CAPSULE, DELAYED RELEASE ORAL
Qty: 90 CAPSULE | Refills: 1 | OUTPATIENT
Start: 2024-06-21

## 2024-06-21 NOTE — TELEPHONE ENCOUNTER
No care due was identified.  [facility] Embedded Care Due Messages. Reference number: [id number].   6/21/2024 3:31:57 PM CDT

## 2024-06-22 NOTE — TELEPHONE ENCOUNTER
Refill Routing Note   Medication(s) are not appropriate for processing by [facility] for the following reason(s):        Outside of protocol    [facility] action(s):  Route  Quick Discontinue        Medication Therapy Plan: Duloxetine discontinued on 4/1/2024 by Christopher Turpin DO.      Appointments  past 12m or future 3m with PCP    Date Provider   Last Visit   4/29/2024 Christopher Turpin DO   Next Visit   6/24/2024 Christopher Turpin DO   ED visits in past 90 days: 0        Note composed:8:57 PM 06/21/2024

## 2024-06-23 RX ORDER — GABAPENTIN 600 MG/1
600 TABLET ORAL 3 TIMES DAILY
Qty: 270 TABLET | Refills: 0 | Status: SHIPPED | OUTPATIENT
Start: 2024-06-23 | End: 2024-06-24 | Stop reason: SDUPTHER

## 2024-06-24 ENCOUNTER — OFFICE VISIT (OUTPATIENT)
Dept: FAMILY MEDICINE | Facility: CLINIC | Age: 54
End: 2024-06-24
Payer: COMMERCIAL

## 2024-06-24 VITALS
DIASTOLIC BLOOD PRESSURE: 88 MMHG | OXYGEN SATURATION: 97 % | WEIGHT: 235.44 LBS | SYSTOLIC BLOOD PRESSURE: 133 MMHG | HEIGHT: 69 IN | HEART RATE: 89 BPM | BODY MASS INDEX: 34.87 KG/M2

## 2024-06-24 DIAGNOSIS — I10 ESSENTIAL HYPERTENSION: ICD-10-CM

## 2024-06-24 DIAGNOSIS — E53.8 LOW SERUM VITAMIN B12: ICD-10-CM

## 2024-06-24 DIAGNOSIS — N18.31 STAGE 3A CHRONIC KIDNEY DISEASE: ICD-10-CM

## 2024-06-24 DIAGNOSIS — E53.8 B12 DEFICIENCY: ICD-10-CM

## 2024-06-24 DIAGNOSIS — E55.9 VITAMIN D DEFICIENCY: ICD-10-CM

## 2024-06-24 DIAGNOSIS — F41.9 ANXIETY: ICD-10-CM

## 2024-06-24 DIAGNOSIS — E78.49 OTHER HYPERLIPIDEMIA: ICD-10-CM

## 2024-06-24 DIAGNOSIS — F32.A DEPRESSION, UNSPECIFIED DEPRESSION TYPE: ICD-10-CM

## 2024-06-24 DIAGNOSIS — M79.2 NEUROPATHIC PAIN: ICD-10-CM

## 2024-06-24 PROCEDURE — 3075F SYST BP GE 130 - 139MM HG: CPT | Mod: CPTII,S$GLB,, | Performed by: FAMILY MEDICINE

## 2024-06-24 PROCEDURE — 3008F BODY MASS INDEX DOCD: CPT | Mod: CPTII,S$GLB,, | Performed by: FAMILY MEDICINE

## 2024-06-24 PROCEDURE — 3044F HG A1C LEVEL LT 7.0%: CPT | Mod: CPTII,S$GLB,, | Performed by: FAMILY MEDICINE

## 2024-06-24 PROCEDURE — 99214 OFFICE O/P EST MOD 30 MIN: CPT | Mod: S$GLB,,, | Performed by: FAMILY MEDICINE

## 2024-06-24 PROCEDURE — 99999 PR PBB SHADOW E&M-EST. PATIENT-LVL IV: CPT | Mod: PBBFAC,,, | Performed by: FAMILY MEDICINE

## 2024-06-24 PROCEDURE — 3079F DIAST BP 80-89 MM HG: CPT | Mod: CPTII,S$GLB,, | Performed by: FAMILY MEDICINE

## 2024-06-24 PROCEDURE — 1159F MED LIST DOCD IN RCRD: CPT | Mod: CPTII,S$GLB,, | Performed by: FAMILY MEDICINE

## 2024-06-24 PROCEDURE — 1160F RVW MEDS BY RX/DR IN RCRD: CPT | Mod: CPTII,S$GLB,, | Performed by: FAMILY MEDICINE

## 2024-06-24 PROCEDURE — 4010F ACE/ARB THERAPY RXD/TAKEN: CPT | Mod: CPTII,S$GLB,, | Performed by: FAMILY MEDICINE

## 2024-06-24 RX ORDER — FLUOXETINE HYDROCHLORIDE 60 MG/1
60 TABLET, FILM COATED ORAL; ORAL DAILY
Qty: 90 TABLET | Refills: 1 | Status: SHIPPED | OUTPATIENT
Start: 2024-06-24

## 2024-06-24 RX ORDER — ERGOCALCIFEROL 1.25 MG/1
50000 CAPSULE ORAL
Qty: 12 CAPSULE | Refills: 3 | Status: SHIPPED | OUTPATIENT
Start: 2024-06-24

## 2024-06-24 RX ORDER — GABAPENTIN 600 MG/1
TABLET ORAL
Qty: 360 TABLET | Refills: 1 | Status: SHIPPED | OUTPATIENT
Start: 2024-06-24 | End: 2025-06-24

## 2024-06-24 RX ORDER — ROSUVASTATIN CALCIUM 5 MG/1
5 TABLET, COATED ORAL DAILY
Qty: 90 TABLET | Refills: 3 | Status: SHIPPED | OUTPATIENT
Start: 2024-06-24 | End: 2025-06-24

## 2024-06-24 RX ORDER — LANOLIN ALCOHOL/MO/W.PET/CERES
1000 CREAM (GRAM) TOPICAL DAILY
Qty: 90 TABLET | Refills: 5 | Status: SHIPPED | OUTPATIENT
Start: 2024-06-24

## 2024-06-24 RX ORDER — PROPRANOLOL HYDROCHLORIDE 20 MG/1
20 TABLET ORAL NIGHTLY
Qty: 90 TABLET | Refills: 1 | Status: SHIPPED | OUTPATIENT
Start: 2024-06-24 | End: 2025-06-24

## 2024-06-24 RX ORDER — AMLODIPINE BESYLATE 10 MG/1
10 TABLET ORAL DAILY
Qty: 90 TABLET | Refills: 1 | Status: SHIPPED | OUTPATIENT
Start: 2024-06-24 | End: 2025-06-24

## 2024-06-24 NOTE — PROGRESS NOTES
"Subjective:       Patient ID: Burton Whiting is a 54 y.o. male.    Chief Complaint: Anxiety    Burton is a 54 y.o. male who presents today for f/u    Mood: stopped cymbalta. Started fluoxetine. Also added propranolol.   Neuropathy: on gabapentin. 1200 mg AM, 600 mg mid day and PM. Thinks this is helping his symptoms. Less jittery then when on lyrica. The neuropathy is not gone, but this medication helps. Neuropathy is a 5/10 currently.   HTN: unable to tolerate losartan. Made him feel nauseated. On propranolol and amlodipine. Occasionally dizziness when he stands up, mostly when he's in the heat.   DLD: taking statin.   High PTH: slightly low vitamin D. Normal calcium. Urine creatinine and calcium normal. GFR mildly low.   CKD3a: leading to high PTH??     Taking b12. He thinks this may be helping.     Compared to April 2024, "I feel way better."       Review of Systems   Constitutional:  Negative for chills and fever.   Respiratory:  Negative for shortness of breath.    Cardiovascular:  Negative for chest pain.   Gastrointestinal:  Negative for nausea and vomiting.   Neurological:  Positive for dizziness and numbness. Negative for light-headedness and headaches.   Psychiatric/Behavioral:  Negative for sleep disturbance and suicidal ideas. The patient is not nervous/anxious.              Results for orders placed or performed in visit on 06/18/24   Calcium, Timed Urine [facility]; 24 Hours   Result Value Ref Range    Urine Volume 1900 mL    Urine Collection Duration 24 Hr    Calcium, Urine 7.5 0.0 - 15.0 mg/dL    Calcium, 24 Hr Urine 6 4 - 12 mg/Hr    Calcium, Urine (mg/spec) 143 mg/Spec   Creatinine, urine, timed 24 Hours   Result Value Ref Range    Urine Volume 1900 mL    Urine Collection Duration 24 Hr    Creatinine, Urine 61.0 23.0 - 375.0 mg/dL    Creatinine, Timed Urine 48.3 40.0 - 75.0 mg/Hr    Creatinine, Urinr (mg/spec) 1159.0 mg/Spec     *Note: Due to a large number of results and/or encounters for the requested " "time period, some results have not been displayed. A complete set of results can be found in Results Review.       Objective:     Vitals:    06/24/24 1245 06/24/24 1252   BP: (!) 138/100 133/88   BP Location: Left arm    Patient Position: Sitting    BP Method: Medium (Manual)    Pulse: 89    SpO2: 97%    Weight: 106.8 kg (235 lb 7.2 oz)    Height: 5' 9" (1.753 m)         Physical Exam  Constitutional:       General: He is not in acute distress.     Appearance: He is not ill-appearing, toxic-appearing or diaphoretic.   Cardiovascular:      Rate and Rhythm: Normal rate and regular rhythm.   Pulmonary:      Effort: Pulmonary effort is normal.      Breath sounds: Normal breath sounds.   Abdominal:      Palpations: Abdomen is soft.      Tenderness: There is no abdominal tenderness.   Musculoskeletal:         General: No swelling.   Neurological:      Mental Status: He is alert.   Psychiatric:         Mood and Affect: Mood normal.         Behavior: Behavior normal.         Thought Content: Thought content normal.         Judgment: Judgment normal.         Assessment:       1. Neuropathic pain    2. Anxiety    3. Depression, unspecified depression type    4. Essential hypertension    5. Stage 3a chronic kidney disease    6. Other hyperlipidemia    7. Vitamin D deficiency    8. B12 deficiency    9. Low serum vitamin B12        Plan:       Can try to increase gabapentin up to 1200 mg three times daily (2 pills three times a day)  For now, continue 4 pills/day, split up in 3 times     Continue fluoxetine 60 mg up from 40 mg  Continue propranolol to 20 mg  Continue amlodipine but increase to 10 mg.   BP goal: 130/80     You have low B12. Goal >400. Start daily OTC B12 supplementation at 1000 mcg   Continue vitamin D weekly.     Continue crestor.     Recheck PTH in August  High due to low GFR and vitamin D?  Repeat at f/u higher, refer to endocrine?    F/u 2-3 months. Labs prior. If stable, f/u 6 months.     Neuropathic pain  - "     gabapentin (NEURONTIN) 600 MG tablet; Take 1 tablet (600 mg total) by mouth 2 (two) times a day AND 2 tablets (1,200 mg total) every evening.  Dispense: 360 tablet; Refill: 1    Anxiety  -     FLUoxetine 60 mg Tab; Take 60 mg by mouth once daily.  Dispense: 90 tablet; Refill: 1    Depression, unspecified depression type  -     FLUoxetine 60 mg Tab; Take 60 mg by mouth once daily.  Dispense: 90 tablet; Refill: 1    Essential hypertension  -     propranoloL (INDERAL) 20 MG tablet; Take 1 tablet (20 mg total) by mouth every evening. For BP and anxiety  Dispense: 90 tablet; Refill: 1  -     amLODIPine (NORVASC) 10 MG tablet; Take 1 tablet (10 mg total) by mouth once daily.  Dispense: 90 tablet; Refill: 1    Stage 3a chronic kidney disease  -     rosuvastatin (CRESTOR) 5 MG tablet; Take 1 tablet (5 mg total) by mouth once daily.  Dispense: 90 tablet; Refill: 3    Other hyperlipidemia  -     rosuvastatin (CRESTOR) 5 MG tablet; Take 1 tablet (5 mg total) by mouth once daily.  Dispense: 90 tablet; Refill: 3    Vitamin D deficiency  -     ergocalciferol (ERGOCALCIFEROL) 50,000 unit Cap; Take 1 capsule (50,000 Units total) by mouth every 7 days.  Dispense: 12 capsule; Refill: 3    B12 deficiency  -     cyanocobalamin (VITAMIN B-12) 1000 MCG tablet; Take 1 tablet (1,000 mcg total) by mouth once daily.  Dispense: 90 tablet; Refill: 5    Low serum vitamin B12  -     cyanocobalamin (VITAMIN B-12) 1000 MCG tablet; Take 1 tablet (1,000 mcg total) by mouth once daily.  Dispense: 90 tablet; Refill: 5

## 2024-06-24 NOTE — PATIENT INSTRUCTIONS
Can try to increase gabapentin up to 1200 mg three times daily (2 pills three times a day)  For now, continue 4 pills/day, split up in 3 times     Continue fluoxetine 60 mg up from 40 mg  Continue propranolol to 20 mg  Continue amlodipine but increase to 10 mg.   BP goal: 130/80     You have low B12. Goal >400. Start daily OTC B12 supplementation at 1000 mcg   Continue vitamin D weekly.     Continue crestor.     Recheck PTH in August  High due to low GFR and vitamin D?  Repeat at f/u higher, refer to endocrine?    F/u 2-3 months. Labs prior. If stable, f/u 6 months.

## 2024-08-06 ENCOUNTER — PATIENT MESSAGE (OUTPATIENT)
Dept: FAMILY MEDICINE | Facility: CLINIC | Age: 54
End: 2024-08-06
Payer: COMMERCIAL

## 2024-08-14 ENCOUNTER — LAB VISIT (OUTPATIENT)
Dept: LAB | Facility: HOSPITAL | Age: 54
End: 2024-08-14
Attending: FAMILY MEDICINE
Payer: COMMERCIAL

## 2024-08-14 DIAGNOSIS — E55.9 VITAMIN D DEFICIENCY: ICD-10-CM

## 2024-08-14 DIAGNOSIS — E78.49 OTHER HYPERLIPIDEMIA: ICD-10-CM

## 2024-08-14 DIAGNOSIS — R79.89 HIGH SERUM PARATHYROID HORMONE (PTH): ICD-10-CM

## 2024-08-14 DIAGNOSIS — N18.31 STAGE 3A CHRONIC KIDNEY DISEASE: ICD-10-CM

## 2024-08-14 LAB
25(OH)D3+25(OH)D2 SERPL-MCNC: 33 NG/ML (ref 30–96)
ALBUMIN SERPL BCP-MCNC: 4.7 G/DL (ref 3.5–5.2)
ALP SERPL-CCNC: 101 U/L (ref 38–126)
ALT SERPL W/O P-5'-P-CCNC: 19 U/L (ref 10–44)
ANION GAP SERPL CALC-SCNC: 13 MMOL/L (ref 8–16)
AST SERPL-CCNC: 32 U/L (ref 15–46)
BASOPHILS # BLD AUTO: 0.05 K/UL (ref 0–0.2)
BASOPHILS NFR BLD: 0.7 % (ref 0–1.9)
BILIRUB SERPL-MCNC: 0.7 MG/DL (ref 0.1–1)
CALCIUM SERPL-MCNC: 8.9 MG/DL (ref 8.7–10.5)
CHLORIDE SERPL-SCNC: 100 MMOL/L (ref 95–110)
CHOLEST SERPL-MCNC: 129 MG/DL (ref 120–199)
CHOLEST/HDLC SERPL: 3.4 {RATIO} (ref 2–5)
CO2 SERPL-SCNC: 27 MMOL/L (ref 23–29)
CREAT SERPL-MCNC: 1.4 MG/DL (ref 0.5–1.4)
DIFFERENTIAL METHOD BLD: ABNORMAL
EOSINOPHIL # BLD AUTO: 0.3 K/UL (ref 0–0.5)
EOSINOPHIL NFR BLD: 4.4 % (ref 0–8)
ERYTHROCYTE [DISTWIDTH] IN BLOOD BY AUTOMATED COUNT: 12 % (ref 11.5–14.5)
EST. GFR  (NO RACE VARIABLE): 59 ML/MIN/1.73 M^2
GLUCOSE SERPL-MCNC: 113 MG/DL (ref 70–110)
HCT VFR BLD AUTO: 41.3 % (ref 40–54)
HDLC SERPL-MCNC: 38 MG/DL (ref 40–75)
HDLC SERPL: 29.5 % (ref 20–50)
HGB BLD-MCNC: 14.5 G/DL (ref 14–18)
IMM GRANULOCYTES # BLD AUTO: 0.03 K/UL (ref 0–0.04)
IMM GRANULOCYTES NFR BLD AUTO: 0.4 % (ref 0–0.5)
LDLC SERPL CALC-MCNC: 70.6 MG/DL (ref 63–159)
LYMPHOCYTES # BLD AUTO: 0.5 K/UL (ref 1–4.8)
LYMPHOCYTES NFR BLD: 7.2 % (ref 18–48)
MCH RBC QN AUTO: 33.2 PG (ref 27–31)
MCHC RBC AUTO-ENTMCNC: 35.1 G/DL (ref 32–36)
MCV RBC AUTO: 95 FL (ref 82–98)
MONOCYTES # BLD AUTO: 0.6 K/UL (ref 0.3–1)
MONOCYTES NFR BLD: 8.3 % (ref 4–15)
NEUTROPHILS # BLD AUTO: 5.7 K/UL (ref 1.8–7.7)
NEUTROPHILS NFR BLD: 79 % (ref 38–73)
NONHDLC SERPL-MCNC: 91 MG/DL
NRBC BLD-RTO: 0 /100 WBC
PHOSPHATE SERPL-MCNC: 2.3 MG/DL (ref 2.7–4.5)
PLATELET # BLD AUTO: 210 K/UL (ref 150–450)
PMV BLD AUTO: 9.4 FL (ref 9.2–12.9)
POTASSIUM SERPL-SCNC: 3.7 MMOL/L (ref 3.5–5.1)
PROT SERPL-MCNC: 7.5 G/DL (ref 6–8.4)
PTH-INTACT SERPL-MCNC: 150.5 PG/ML (ref 9–77)
RBC # BLD AUTO: 4.37 M/UL (ref 4.6–6.2)
SODIUM SERPL-SCNC: 140 MMOL/L (ref 136–145)
TRIGL SERPL-MCNC: 102 MG/DL (ref 30–150)
URATE SERPL-MCNC: 7.1 MG/DL (ref 3.4–7)
UUN UR-MCNC: 23 MG/DL (ref 2–20)
WBC # BLD AUTO: 7.23 K/UL (ref 3.9–12.7)

## 2024-08-14 PROCEDURE — 80053 COMPREHEN METABOLIC PANEL: CPT | Mod: PN | Performed by: FAMILY MEDICINE

## 2024-08-14 PROCEDURE — 36415 COLL VENOUS BLD VENIPUNCTURE: CPT | Mod: PN | Performed by: FAMILY MEDICINE

## 2024-08-14 PROCEDURE — 82306 VITAMIN D 25 HYDROXY: CPT | Mod: PN | Performed by: FAMILY MEDICINE

## 2024-08-14 PROCEDURE — 84550 ASSAY OF BLOOD/URIC ACID: CPT | Performed by: FAMILY MEDICINE

## 2024-08-14 PROCEDURE — 85025 COMPLETE CBC W/AUTO DIFF WBC: CPT | Mod: PN | Performed by: FAMILY MEDICINE

## 2024-08-14 PROCEDURE — 80061 LIPID PANEL: CPT | Performed by: FAMILY MEDICINE

## 2024-08-14 PROCEDURE — 84100 ASSAY OF PHOSPHORUS: CPT | Mod: PN | Performed by: FAMILY MEDICINE

## 2024-08-14 PROCEDURE — 83970 ASSAY OF PARATHORMONE: CPT | Mod: PN | Performed by: FAMILY MEDICINE

## 2024-08-21 ENCOUNTER — LAB VISIT (OUTPATIENT)
Dept: LAB | Facility: HOSPITAL | Age: 54
End: 2024-08-21
Payer: COMMERCIAL

## 2024-08-21 ENCOUNTER — OFFICE VISIT (OUTPATIENT)
Dept: FAMILY MEDICINE | Facility: CLINIC | Age: 54
End: 2024-08-21
Payer: COMMERCIAL

## 2024-08-21 ENCOUNTER — PATIENT MESSAGE (OUTPATIENT)
Dept: FAMILY MEDICINE | Facility: CLINIC | Age: 54
End: 2024-08-21

## 2024-08-21 VITALS
SYSTOLIC BLOOD PRESSURE: 126 MMHG | HEART RATE: 80 BPM | HEIGHT: 69 IN | WEIGHT: 229.75 LBS | OXYGEN SATURATION: 98 % | DIASTOLIC BLOOD PRESSURE: 98 MMHG | BODY MASS INDEX: 34.03 KG/M2

## 2024-08-21 DIAGNOSIS — E53.8 LOW SERUM VITAMIN B12: ICD-10-CM

## 2024-08-21 DIAGNOSIS — F41.9 ANXIETY: ICD-10-CM

## 2024-08-21 DIAGNOSIS — E53.8 B12 DEFICIENCY: ICD-10-CM

## 2024-08-21 DIAGNOSIS — F32.A DEPRESSION, UNSPECIFIED DEPRESSION TYPE: ICD-10-CM

## 2024-08-21 DIAGNOSIS — N18.31 STAGE 3A CHRONIC KIDNEY DISEASE: ICD-10-CM

## 2024-08-21 DIAGNOSIS — R79.89 HIGH SERUM PARATHYROID HORMONE (PTH): ICD-10-CM

## 2024-08-21 DIAGNOSIS — R35.1 NOCTURIA: ICD-10-CM

## 2024-08-21 DIAGNOSIS — M79.2 NEUROPATHIC PAIN: ICD-10-CM

## 2024-08-21 DIAGNOSIS — E55.9 VITAMIN D DEFICIENCY: ICD-10-CM

## 2024-08-21 DIAGNOSIS — I10 ESSENTIAL HYPERTENSION: Primary | ICD-10-CM

## 2024-08-21 DIAGNOSIS — I10 ESSENTIAL HYPERTENSION: ICD-10-CM

## 2024-08-21 DIAGNOSIS — B36.9 FUNGAL RASH OF TORSO: Primary | ICD-10-CM

## 2024-08-21 DIAGNOSIS — E78.49 OTHER HYPERLIPIDEMIA: ICD-10-CM

## 2024-08-21 PROCEDURE — 99999 PR PBB SHADOW E&M-EST. PATIENT-LVL V: CPT | Mod: PBBFAC,,,

## 2024-08-21 PROCEDURE — 81003 URINALYSIS AUTO W/O SCOPE: CPT

## 2024-08-21 PROCEDURE — 3061F NEG MICROALBUMINURIA REV: CPT | Mod: CPTII,S$GLB,,

## 2024-08-21 PROCEDURE — 3074F SYST BP LT 130 MM HG: CPT | Mod: CPTII,S$GLB,,

## 2024-08-21 PROCEDURE — 3008F BODY MASS INDEX DOCD: CPT | Mod: CPTII,S$GLB,,

## 2024-08-21 PROCEDURE — 3080F DIAST BP >= 90 MM HG: CPT | Mod: CPTII,S$GLB,,

## 2024-08-21 PROCEDURE — 1160F RVW MEDS BY RX/DR IN RCRD: CPT | Mod: CPTII,S$GLB,,

## 2024-08-21 PROCEDURE — 3066F NEPHROPATHY DOC TX: CPT | Mod: CPTII,S$GLB,,

## 2024-08-21 PROCEDURE — 99214 OFFICE O/P EST MOD 30 MIN: CPT | Mod: S$GLB,,,

## 2024-08-21 PROCEDURE — 4010F ACE/ARB THERAPY RXD/TAKEN: CPT | Mod: CPTII,S$GLB,,

## 2024-08-21 PROCEDURE — 1159F MED LIST DOCD IN RCRD: CPT | Mod: CPTII,S$GLB,,

## 2024-08-21 PROCEDURE — 3044F HG A1C LEVEL LT 7.0%: CPT | Mod: CPTII,S$GLB,,

## 2024-08-21 RX ORDER — CLOTRIMAZOLE 1 %
CREAM (GRAM) TOPICAL
COMMUNITY
Start: 2024-08-16 | End: 2024-08-23 | Stop reason: SDUPTHER

## 2024-08-21 RX ORDER — FLUOXETINE HYDROCHLORIDE 60 MG/1
60 TABLET, FILM COATED ORAL; ORAL DAILY
Qty: 90 TABLET | Refills: 1 | Status: SHIPPED | OUTPATIENT
Start: 2024-08-21 | End: 2024-08-23 | Stop reason: SDUPTHER

## 2024-08-21 RX ORDER — FLUCONAZOLE 150 MG/1
TABLET ORAL
COMMUNITY
Start: 2024-08-16 | End: 2024-08-23 | Stop reason: SDUPTHER

## 2024-08-21 RX ORDER — METOPROLOL SUCCINATE 25 MG/1
25 TABLET, EXTENDED RELEASE ORAL DAILY
Qty: 90 TABLET | Refills: 3 | Status: SHIPPED | OUTPATIENT
Start: 2024-08-21 | End: 2025-08-21

## 2024-08-21 NOTE — PROGRESS NOTES
"Subjective     Patient ID: Burton Whiting is a 54 y.o. male.    Chief Complaint: Hypertension      Hypertension  Pertinent negatives include no chest pain, headaches, palpitations or shortness of breath.       Elevated PTH: Pt continues to have elevated PTH. Normal calcium and Vit D levels. Decreased phosphorus. Pt initially presented with changes in his mood with increased anger.   Reports no GI upset, dysuria, hematuria. Does state that he has noticed increased urination at night.     Nocturia. States that he gets up to urinate 3 times per night. This is new. Started 3 weeks ago.  No family history of prostate CA or BPH. Denies any hematuria, decreased stream, or interrupted stream. Denies and flank or suprapubic pain.     BP: Taking Amlodipine 10mg  in morning. Reports no leg swelling. Bp taking at home about once per day. Has been elevated at home 140/90. Not taking propanolol. No orthostatic symptoms. No HA or blurred vision.     Mood: Did not start fluoxetine 60. Still taking 40. Pharmacy said too soon to fill. Not taking propanolol. Pt said makes him feel too groggy. States that mood is the same         Review of Systems   Constitutional:  Negative for fatigue and fever.   Respiratory:  Negative for cough, shortness of breath and wheezing.    Cardiovascular:  Negative for chest pain, palpitations and leg swelling.   Endocrine: Positive for polyuria.   Genitourinary:  Negative for difficulty urinating, dysuria, flank pain, hematuria and urgency.        Nocturia   Musculoskeletal:  Negative for myalgias.   Neurological:  Negative for dizziness, light-headedness and headaches.          Objective     Vitals:    08/21/24 0948 08/21/24 1051   BP: (!) 130/90 (!) 126/98   Pulse: 80    SpO2: 98%    Weight: 104.2 kg (229 lb 11.5 oz)    Height: 5' 9" (1.753 m)    PainSc: 0-No pain       Physical Exam  Constitutional:       General: He is not in acute distress.  HENT:      Head: Normocephalic.   Cardiovascular:      Rate " and Rhythm: Normal rate and regular rhythm.   Pulmonary:      Effort: No respiratory distress.      Breath sounds: Normal breath sounds. No wheezing.   Abdominal:      General: Bowel sounds are normal.      Tenderness: There is no abdominal tenderness. There is no guarding.   Musculoskeletal:         General: No swelling.   Skin:     Findings: Rash present.      Comments: Pt being treated for ringworm    Neurological:      Mental Status: He is oriented to person, place, and time.              Assessment and Plan     1. Essential hypertension  -     metoprolol succinate (TOPROL-XL) 25 MG 24 hr tablet; Take 1 tablet (25 mg total) by mouth once daily.  Dispense: 90 tablet; Refill: 3    2. Anxiety  -     FLUoxetine 60 mg Tab; Take 60 mg by mouth once daily.  Dispense: 90 tablet; Refill: 1    3. Depression, unspecified depression type  -     FLUoxetine 60 mg Tab; Take 60 mg by mouth once daily.  Dispense: 90 tablet; Refill: 1    4. High serum parathyroid hormone (PTH)  -     Ambulatory referral/consult to Endocrinology; Future; Expected date: 08/28/2024    5. Nocturia  -     Cancel: Urinalysis, Reflex to Urine Culture Urine, Clean Catch  -     Urinalysis, Reflex to Urine Culture Urine, Clean Catch; Future; Expected date: 08/21/2024      Can try to increase gabapentin up to 1200 mg three times daily (2 pills three times a day)  For now, continue 4 pills/day, split up in 3 times     Increase fluoxetine to 60 mg up from 40 mg    Continue amlodipine 10mg.   Start metoprolol 25mg   STOP Propanolol   BP goal: 130/80     You have low B12. Goal >400. Start daily OTC B12 supplementation at 1000 mcg   Continue vitamin D weekly.      Continue crestor.     Discussed with PCP, he is unsure what to do about elevated PTH levels  He does not think this is related to mild CKD,vitamin D normal  Microalbumin normal  Normal calcium  Appreciate specialist input, regarding possibly hyperparathyroidism??    Appointment with endocrine  Urine  today     Follow up in 4 weeks. Bring your medications and blood pressure machine with you. Continue to take Bp at home.   If you have any issues with medication refills. Please contact the office.              Follow up in about 4 weeks (around 9/18/2024).    40  minutes of total time spent on the encounter, which includes face to face time and non-face to face time preparing to see the patient (eg, review of tests), Obtaining and/or reviewing separately obtained history, Documenting clinical information in the electronic or other health record, Independently interpreting results (not separately reported) and communicating results to the patient/family/caregiver, or Care coordination (not separately reported).

## 2024-08-21 NOTE — Clinical Note
Joi,  Pt has had an elevated PTH with normal calcium levels and decreased Phosphorus. Sending him for evaluation.

## 2024-08-21 NOTE — PATIENT INSTRUCTIONS
Can try to increase gabapentin up to 1200 mg three times daily (2 pills three times a day)  For now, continue 4 pills/day, split up in 3 times     Increase fluoxetine to 60 mg up from 40 mg    Continue amlodipine 10mg.   Start metoprolol 25mg   STOP Propanolol   BP goal: 130/80     You have low B12. Goal >400. Start daily OTC B12 supplementation at 1000 mcg   Continue vitamin D weekly.      Continue crestor.     Appointment with endocrine  Urine today     Follow up in 4 weeks. Bring your medications and blood pressure machine with you. Continue to take Bp at home.   If you have any issues with medication refills. Please contact the office.

## 2024-08-21 NOTE — TELEPHONE ENCOUNTER
No care due was identified.  [facility] Embedded Care Due Messages. Reference number: [id number].   8/21/2024 3:58:11 PM CDT

## 2024-08-22 LAB
BILIRUB UR QL STRIP: NEGATIVE
CLARITY UR REFRACT.AUTO: CLEAR
COLOR UR AUTO: YELLOW
GLUCOSE UR QL STRIP: NEGATIVE
HGB UR QL STRIP: ABNORMAL
KETONES UR QL STRIP: NEGATIVE
LEUKOCYTE ESTERASE UR QL STRIP: NEGATIVE
NITRITE UR QL STRIP: NEGATIVE
PH UR STRIP: 6 [PH] (ref 5–8)
PROT UR QL STRIP: ABNORMAL
SP GR UR STRIP: 1.02 (ref 1–1.03)
URN SPEC COLLECT METH UR: ABNORMAL

## 2024-08-23 DIAGNOSIS — B36.9 FUNGAL RASH OF TORSO: ICD-10-CM

## 2024-08-23 RX ORDER — FLUOXETINE HYDROCHLORIDE 60 MG/1
60 TABLET, FILM COATED ORAL; ORAL DAILY
Qty: 90 TABLET | Refills: 1 | OUTPATIENT
Start: 2024-08-23

## 2024-08-23 RX ORDER — FLUCONAZOLE 200 MG/1
200 TABLET ORAL WEEKLY
Qty: 12 TABLET | Refills: 0 | Status: SHIPPED | OUTPATIENT
Start: 2024-08-23 | End: 2024-11-09

## 2024-08-23 RX ORDER — ROSUVASTATIN CALCIUM 5 MG/1
5 TABLET, COATED ORAL DAILY
Qty: 90 TABLET | Refills: 3 | Status: SHIPPED | OUTPATIENT
Start: 2024-08-23 | End: 2025-08-23

## 2024-08-23 RX ORDER — GABAPENTIN 600 MG/1
TABLET ORAL
Qty: 360 TABLET | Refills: 1 | Status: SHIPPED | OUTPATIENT
Start: 2024-08-23 | End: 2025-08-23

## 2024-08-23 RX ORDER — ERGOCALCIFEROL 1.25 MG/1
50000 CAPSULE ORAL
Qty: 12 CAPSULE | Refills: 3 | Status: SHIPPED | OUTPATIENT
Start: 2024-08-23

## 2024-08-23 RX ORDER — CLOTRIMAZOLE 1 %
CREAM (GRAM) TOPICAL 2 TIMES DAILY
Qty: 60 G | Refills: 0 | Status: SHIPPED | OUTPATIENT
Start: 2024-08-23

## 2024-08-23 RX ORDER — AMLODIPINE BESYLATE 10 MG/1
10 TABLET ORAL DAILY
Qty: 90 TABLET | Refills: 3 | Status: SHIPPED | OUTPATIENT
Start: 2024-08-23

## 2024-08-23 RX ORDER — LANOLIN ALCOHOL/MO/W.PET/CERES
1000 CREAM (GRAM) TOPICAL DAILY
Qty: 90 TABLET | Refills: 3 | Status: SHIPPED | OUTPATIENT
Start: 2024-08-23

## 2024-08-23 RX ORDER — FLUOXETINE HYDROCHLORIDE 60 MG/1
60 TABLET, FILM COATED ORAL; ORAL DAILY
Qty: 90 TABLET | Refills: 1 | Status: SHIPPED | OUTPATIENT
Start: 2024-08-23

## 2024-08-23 RX ORDER — METOPROLOL SUCCINATE 25 MG/1
25 TABLET, EXTENDED RELEASE ORAL DAILY
Qty: 90 TABLET | Refills: 3 | OUTPATIENT
Start: 2024-08-23 | End: 2025-08-23

## 2024-08-23 NOTE — TELEPHONE ENCOUNTER
Refill Routing Note   Medication(s) are not appropriate for processing by [facility] for the following reason(s):        Outside of protocol  New or recently adjusted medication  Required vitals abnormal:Amlodipine    [facility] action(s):  Defer  Route  Quick Discontinue  Approve      Medication Therapy Plan: ERGOCACIFEROL,VITAMIN,GABAPENTIN-OOP      Appointments  past 12m or future 3m with PCP    Date Provider   Last Visit   6/24/2024 Christopher Turpin DO   Next Visit   Visit date not found Christopher Turpin DO   ED visits in past 90 days: 0        Note composed:5:53 AM 08/23/2024

## 2024-08-24 RX ORDER — CLOTRIMAZOLE 1 %
CREAM (GRAM) TOPICAL 2 TIMES DAILY
Qty: 60 G | Refills: 0 | OUTPATIENT
Start: 2024-08-24

## 2024-08-24 NOTE — TELEPHONE ENCOUNTER
Refill Decision Note   Burton Whiting  is requesting a refill authorization.  Brief Assessment and Rationale for Refill:  Quick Discontinue     Medication Therapy Plan:  Spoke to patient. He picked up medication at [facility].      Comments:     Note composed:11:07 AM 08/24/2024

## 2024-08-26 DIAGNOSIS — F32.A DEPRESSION, UNSPECIFIED DEPRESSION TYPE: ICD-10-CM

## 2024-08-26 DIAGNOSIS — B36.9 FUNGAL RASH OF TORSO: ICD-10-CM

## 2024-08-26 DIAGNOSIS — M79.2 NEUROPATHIC PAIN: ICD-10-CM

## 2024-08-26 DIAGNOSIS — F41.9 ANXIETY: ICD-10-CM

## 2024-08-26 RX ORDER — CLOTRIMAZOLE 1 %
CREAM (GRAM) TOPICAL 2 TIMES DAILY
Qty: 60 G | Refills: 0 | Status: CANCELLED | OUTPATIENT
Start: 2024-08-26

## 2024-08-26 RX ORDER — FLUOXETINE HYDROCHLORIDE 60 MG/1
60 TABLET, FILM COATED ORAL; ORAL DAILY
Qty: 90 TABLET | Refills: 1 | Status: CANCELLED | OUTPATIENT
Start: 2024-08-26

## 2024-08-26 NOTE — TELEPHONE ENCOUNTER
Refill Routing Note   Medication(s) are not appropriate for processing by [facility] for the following reason(s):        Outside of protocol  Other: pharmacy change    [facility] action(s):  Route               Appointments  past 12m or future 3m with PCP    Date Provider   Last Visit   6/24/2024 Christopher Turpin DO   Next Visit   Visit date not found Christopher Turpin DO   ED visits in past 90 days: 0        Note composed:4:53 PM 08/26/2024

## 2024-08-26 NOTE — TELEPHONE ENCOUNTER
No care due was identified.  [facility] Embedded Care Due Messages. Reference number: [id number].   8/26/2024 9:02:29 AM CDT

## 2024-08-27 ENCOUNTER — PATIENT MESSAGE (OUTPATIENT)
Dept: FAMILY MEDICINE | Facility: CLINIC | Age: 54
End: 2024-08-27
Payer: COMMERCIAL

## 2024-08-27 DIAGNOSIS — F32.A DEPRESSION, UNSPECIFIED DEPRESSION TYPE: ICD-10-CM

## 2024-08-27 DIAGNOSIS — F41.9 ANXIETY: ICD-10-CM

## 2024-08-27 DIAGNOSIS — B36.9 FUNGAL RASH OF TORSO: ICD-10-CM

## 2024-08-27 RX ORDER — GABAPENTIN 600 MG/1
TABLET ORAL
Qty: 360 TABLET | Refills: 1 | Status: SHIPPED | OUTPATIENT
Start: 2024-08-27 | End: 2025-08-27

## 2024-08-27 RX ORDER — FLUCONAZOLE 200 MG/1
200 TABLET ORAL WEEKLY
Qty: 12 TABLET | Refills: 0 | Status: SHIPPED | OUTPATIENT
Start: 2024-08-27 | End: 2024-08-28 | Stop reason: SDUPTHER

## 2024-08-27 NOTE — TELEPHONE ENCOUNTER
Problem: Communication  Goal: The ability to communicate needs accurately and effectively will improve  Outcome: MET     Problem: Safety  Goal: Will remain free from injury  Outcome: MET  Goal: Will remain free from falls  Outcome: MET     Problem: Infection  Goal: Will remain free from infection  Outcome: MET     Problem: Venous Thromboembolism (VTW)/Deep Vein Thrombosis (DVT) Prevention:  Goal: Patient will participate in Venous Thrombosis (VTE)/Deep Vein Thrombosis (DVT)Prevention Measures  Outcome: MET     Problem: Bowel/Gastric:  Goal: Normal bowel function is maintained or improved  Outcome: MET  Goal: Will not experience complications related to bowel motility  Outcome: MET     Problem: Knowledge Deficit  Goal: Knowledge of disease process/condition, treatment plan, diagnostic tests, and medications will improve  Outcome: MET  Goal: Knowledge of the prescribed therapeutic regimen will improve  Outcome: MET     Problem: Discharge Barriers/Planning  Goal: Patient's continuum of care needs will be met  Outcome: MET     Problem: Pain Management  Goal: Pain level will decrease to patient's comfort goal  Outcome: MET      Refill Routing Note   Medication(s) are not appropriate for processing by [facility] for the following reason(s):        Outside of protocol    [facility] action(s):  Route               Appointments  past 12m or future 3m with PCP    Date Provider   Last Visit   6/24/2024 Christopher Turpin DO   Next Visit   Visit date not found Christopher Turpin DO   ED visits in past 90 days: 0        Note composed:12:47 PM 08/27/2024

## 2024-08-28 DIAGNOSIS — F41.9 ANXIETY: ICD-10-CM

## 2024-08-28 DIAGNOSIS — B36.9 FUNGAL RASH OF TORSO: ICD-10-CM

## 2024-08-28 DIAGNOSIS — F32.A DEPRESSION, UNSPECIFIED DEPRESSION TYPE: ICD-10-CM

## 2024-08-28 RX ORDER — FLUCONAZOLE 200 MG/1
200 TABLET ORAL WEEKLY
Qty: 12 TABLET | Refills: 0 | Status: SHIPPED | OUTPATIENT
Start: 2024-08-28 | End: 2024-11-14

## 2024-08-28 RX ORDER — FLUOXETINE HYDROCHLORIDE 60 MG/1
60 TABLET, FILM COATED ORAL; ORAL DAILY
Qty: 90 TABLET | Refills: 1 | Status: SHIPPED | OUTPATIENT
Start: 2024-08-28 | End: 2024-08-29 | Stop reason: SDUPTHER

## 2024-08-28 NOTE — TELEPHONE ENCOUNTER
Refill Routing Note   Medication(s) are not appropriate for processing by [facility] for the following reason(s):        Outside of protocol  CHANGE OF PHARMACY    [facility] action(s):  Route               Appointments  past 12m or future 3m with PCP    Date Provider   Last Visit   6/24/2024 Christopher Turpin DO   Next Visit   2/24/2025 Christopher Turpin DO   ED visits in past 90 days: 0        Note composed:3:37 PM 08/28/2024

## 2024-08-29 RX ORDER — FLUOXETINE HYDROCHLORIDE 60 MG/1
60 TABLET, FILM COATED ORAL; ORAL DAILY
Qty: 90 TABLET | Refills: 3 | Status: SHIPPED | OUTPATIENT
Start: 2024-08-29 | End: 2024-08-30

## 2024-08-29 NOTE — TELEPHONE ENCOUNTER
No care due was identified.  [facility] Embedded Care Due Messages. Reference number: [id number].   8/29/2024 11:47:05 AM CDT

## 2024-08-29 NOTE — TELEPHONE ENCOUNTER
Refill Decision Note   Burton Whiting  is requesting a refill authorization.  Brief Assessment and Rationale for Refill:  Approve     Medication Therapy Plan:         Comments:     Note composed:12:20 PM 08/29/2024

## 2024-08-30 ENCOUNTER — TELEPHONE (OUTPATIENT)
Dept: FAMILY MEDICINE | Facility: CLINIC | Age: 54
End: 2024-08-30
Payer: COMMERCIAL

## 2024-08-30 RX ORDER — FLUOXETINE HYDROCHLORIDE 40 MG/1
40 CAPSULE ORAL DAILY
Qty: 30 CAPSULE | Refills: 0 | Status: SHIPPED | OUTPATIENT
Start: 2024-08-30 | End: 2024-09-29

## 2024-08-30 NOTE — TELEPHONE ENCOUNTER
Called pt and left a message that I would send to [facility]. Pt could not get fluoxetine 60 mg filled. Canceled the Rx for 60 and placed a new Rx for Fluoxetine 40 mg.     Herlinda Tavera PA-C

## 2024-10-23 ENCOUNTER — PATIENT MESSAGE (OUTPATIENT)
Dept: INTERNAL MEDICINE | Facility: CLINIC | Age: 54
End: 2024-10-23
Payer: COMMERCIAL

## 2024-11-03 ENCOUNTER — HOSPITAL ENCOUNTER (EMERGENCY)
Facility: HOSPITAL | Age: 54
Discharge: HOME HEALTH | End: 2024-11-03
Attending: FAMILY MEDICINE
Payer: COMMERCIAL

## 2024-11-03 VITALS
SYSTOLIC BLOOD PRESSURE: 126 MMHG | HEIGHT: 69 IN | DIASTOLIC BLOOD PRESSURE: 83 MMHG | TEMPERATURE: 99 F | BODY MASS INDEX: 32.58 KG/M2 | OXYGEN SATURATION: 97 % | WEIGHT: 220 LBS | RESPIRATION RATE: 16 BRPM | HEART RATE: 102 BPM

## 2024-11-03 DIAGNOSIS — R05.9 COUGH: ICD-10-CM

## 2024-11-03 DIAGNOSIS — J18.9 PNEUMONIA OF LEFT LOWER LOBE DUE TO INFECTIOUS ORGANISM: Primary | ICD-10-CM

## 2024-11-03 LAB
INFLUENZA A, MOLECULAR: NEGATIVE
INFLUENZA B, MOLECULAR: NEGATIVE
SARS-COV-2 RDRP RESP QL NAA+PROBE: NEGATIVE
SPECIMEN SOURCE: NORMAL

## 2024-11-03 PROCEDURE — 99284 EMERGENCY DEPT VISIT MOD MDM: CPT | Mod: 25,ER

## 2024-11-03 PROCEDURE — 87502 INFLUENZA DNA AMP PROBE: CPT | Mod: ER

## 2024-11-03 PROCEDURE — 87635 SARS-COV-2 COVID-19 AMP PRB: CPT | Mod: ER

## 2024-11-03 RX ORDER — BENZONATATE 100 MG/1
100 CAPSULE ORAL 3 TIMES DAILY PRN
Qty: 20 CAPSULE | Refills: 0 | Status: SHIPPED | OUTPATIENT
Start: 2024-11-03 | End: 2024-11-13

## 2024-11-03 RX ORDER — ONDANSETRON 4 MG/1
4 TABLET, ORALLY DISINTEGRATING ORAL EVERY 6 HOURS PRN
Qty: 28 TABLET | Refills: 0 | Status: SHIPPED | OUTPATIENT
Start: 2024-11-03

## 2024-11-03 RX ORDER — AMOXICILLIN AND CLAVULANATE POTASSIUM 875; 125 MG/1; MG/1
1 TABLET, FILM COATED ORAL 2 TIMES DAILY
Qty: 14 TABLET | Refills: 0 | Status: SHIPPED | OUTPATIENT
Start: 2024-11-03

## 2024-11-03 RX ORDER — PROMETHAZINE HYDROCHLORIDE AND DEXTROMETHORPHAN HYDROBROMIDE 6.25; 15 MG/5ML; MG/5ML
5 SYRUP ORAL NIGHTLY PRN
Qty: 50 ML | Refills: 0 | Status: SHIPPED | OUTPATIENT
Start: 2024-11-03 | End: 2024-11-13

## 2024-11-03 RX ORDER — ALBUTEROL SULFATE 90 UG/1
1-2 INHALANT RESPIRATORY (INHALATION) EVERY 6 HOURS PRN
Qty: 18 G | Refills: 0 | Status: SHIPPED | OUTPATIENT
Start: 2024-11-03 | End: 2025-11-03

## 2024-11-03 RX ORDER — AZITHROMYCIN 250 MG/1
TABLET, FILM COATED ORAL
Qty: 6 TABLET | Refills: 0 | Status: SHIPPED | OUTPATIENT
Start: 2024-11-03 | End: 2024-11-08

## 2024-11-03 NOTE — DISCHARGE INSTRUCTIONS
Today you were diagnosed with community-acquired pneumonia. You will still have symptoms of pneumonia after your visit to the emergency room today.  Your cough will slowly get better over 7 to 14 days.  Sleeping and eating may take up to a week to return to normal.  Your energy level may take 2 weeks or more to return to normal.  Place a warm, wet washcloth loosely near your nose and mouth to help open up your sinuses and lungs.  Use a humidifier with warm water to help with this as well.    I have prescribed 2 antibiotics for you to take.  These are medicines that kill the germs that cause pneumonia. Do not miss any doses. Take the full duration of the prescription medicine, even if you start to feel better.     Please return to the emergency room you feel your breathing is getting faster, it feels harder to breathe, you are having a fever for greater than 5 days, or you start to experience chest pain.    I have also prescribed you Tessalon Perles and promethazine DM for your cough.  Please use Tessalon Perles during the day and promethazine DM at night, as this can cause drowsiness.

## 2024-11-03 NOTE — ED PROVIDER NOTES
"Encounter Date: 11/3/2024       History     Chief Complaint   Patient presents with    Cough    Fever     Cough, fever, congestion x 4 days      Patient is a 54-year-old male with a past medical history of acute renal failure, alcohol abuse, essential hypertension, former smoker, HPV, opioid addiction, and substance abuse who presents to emergency room for subjective fever, congestion, increased fatigue, and productive cough over the past 4 days.  Patient states that he also feels lightheaded when going from sitting to standing position.  No headache, visual changes, nausea, vomiting, abdominal pain, chest pain, shortness of breath, ear pain, weakness, numbness, tingling, changes in bowel movements, dysuria, or others at this time.  No recent sick contacts.  Prior treatment includes NyQuil and Tylenol.    The history is provided by the patient. No  was used.     Review of patient's allergies indicates:   Allergen Reactions    Trazodone Other (See Comments)     confusion    Losartan Nausea And Vomiting     Past Medical History:   Diagnosis Date    Acute renal failure 6/21/2018    Alcohol abuse     ended 2004    Chemotherapy induced neutropenia 6/7/2018    Essential hypertension 4/1/2024    Former smoker     HPV in male     Hx of psychiatric care     Ativan, Paxil ("caused anger problems")    Opiate addiction     Port or reservoir infection 3/26/2018    Squamous cell carcinoma of palatine tonsil     throat and tonsillar    Substance abuse     opiates, methamphetamines; ended 2004     Past Surgical History:   Procedure Laterality Date    COLONOSCOPY N/A 10/22/2019    Procedure: COLONOSCOPY/plenvu;  Surgeon: Nithya Sawyer MD;  Location: [facility];  Service: Endoscopy;  Laterality: N/A;    ESOPHAGOGASTRODUODENOSCOPY Left 9/9/2019    Procedure: EGD (ESOPHAGOGASTRODUODENOSCOPY);  Surgeon: Balta Lisa MD;  Location: [facility];  Service: Endoscopy;  Laterality: Left;    ESOPHAGOGASTRODUODENOSCOPY " N/A 2019    Procedure: EGD (ESOPHAGOGASTRODUODENOSCOPY);  Surgeon: Perez Mon III, MD;  Location: [facility] ENDO;  Service: Endoscopy;  Laterality: N/A;    ESOPHAGOGASTRODUODENOSCOPY N/A 10/2/2019    Procedure: EGD (ESOPHAGOGASTRODUODENOSCOPY);  Surgeon: Bonifacio Sosa MD;  Location: [facility] ENDO (2ND FLR);  Service: Endoscopy;  Laterality: N/A;    GASTROSTOMY TUBE PLACEMENT Left 2018    HIP ARTHROPLASTY Right 2022    Procedure: ARTHROPLASTY, HIP;  Surgeon: Brennon Yates MD;  Location: [facility] OR;  Service: Orthopedics;  Laterality: Right;  [place] Dick confirmed CW     MEDIPORT REMOVAL N/A 2018    Procedure: Removal-port-a-cath;  Surgeon: Blayne Diagnostic Provider;  Location: [facility] OR 2ND FLR;  Service: General;  Laterality: N/A;     Family History   Problem Relation Name Age of Onset    Leukemia Mother      Hypertension Father      Thyroid disease Sister      Depression Sister      Hypertension Brother      Brain cancer Maternal Uncle       Social History     Tobacco Use    Smoking status: Former     Current packs/day: 0.00     Average packs/day: 1.5 packs/day for 25.0 years (37.5 ttl pk-yrs)     Types: Cigarettes     Start date: 1992     Quit date: 2017     Years since quittin.4     Passive exposure: Past    Smokeless tobacco: Never   Substance Use Topics    Alcohol use: Not Currently    Drug use: Not Currently     Comment: Former Opiate/methamphetamine addiction     Review of Systems   Constitutional:  Positive for fatigue and fever. Negative for chills and diaphoresis.   HENT:  Positive for congestion and sore throat. Negative for trouble swallowing.    Respiratory:  Positive for cough. Negative for shortness of breath.    Cardiovascular:  Negative for chest pain and palpitations.   Gastrointestinal:  Negative for abdominal pain, blood in stool, constipation, diarrhea, nausea and vomiting.   Genitourinary:  Negative for difficulty urinating, dysuria, frequency and  hematuria.   Musculoskeletal:  Negative for back pain and myalgias.   Skin:  Negative for rash and wound.   Neurological:  Negative for weakness, light-headedness, numbness and headaches.       Physical Exam     Initial Vitals [11/03/24 1141]   BP Pulse Resp Temp SpO2   126/83 102 16 98.7 °F (37.1 °C) 97 %      MAP       --         Physical Exam    Nursing note and vitals reviewed.  Constitutional: He appears well-developed and well-nourished. He is not diaphoretic. No distress.   Patient well-appearing.  Awake and alert.  No acute distress.  Maintaining airway appropriately.  Speaking in complete sentences.   HENT:   Head: Normocephalic and atraumatic.   Right Ear: Hearing, tympanic membrane, external ear and ear canal normal.   Left Ear: Hearing, tympanic membrane, external ear and ear canal normal.   Nose: Mucosal edema present. Mouth/Throat: Uvula is midline and mucous membranes are normal. Posterior oropharyngeal erythema present.   Eyes: Conjunctivae and EOM are normal. Pupils are equal, round, and reactive to light. No scleral icterus.   Neck: Neck supple.   Normal range of motion.  Cardiovascular:  Normal rate, regular rhythm and normal heart sounds.     Exam reveals no friction rub.       No murmur heard.  Pulmonary/Chest: Breath sounds normal. No respiratory distress. He has no wheezes. He has no rhonchi. He has no rales.   Musculoskeletal:         General: No tenderness or edema. Normal range of motion.      Cervical back: Normal range of motion and neck supple.     Neurological: He is alert and oriented to person, place, and time. He has normal strength.   Skin: Skin is warm and dry. No erythema.   Psychiatric: He has a normal mood and affect. His behavior is normal. Thought content normal.         ED Course   Procedures  Labs Reviewed   INFLUENZA A & B BY MOLECULAR       Result Value    Influenza A, Molecular Negative      Influenza B, Molecular Negative      Flu A & B Source Nasal swab     SARS-COV-2  RNA AMPLIFICATION, QUAL    SARS-CoV-2 RNA, Amplification, Qual Negative            Imaging Results              X-Ray Chest AP Portable (Final result)  Result time 11/03/24 12:43:17      Final result by Guilherme Yost MD (Timothy) (11/03/24 12:43:17)                   Impression:      Possible hazy infiltrate mid left lung.      Electronically signed by: Guilherme Yost MD  Date:    11/03/2024  Time:    12:43               Narrative:    EXAMINATION:  XR CHEST AP PORTABLE    CLINICAL HISTORY:  Cough,    COMPARISON:  Chest, 10/07/2019.    FINDINGS:  One view.  Normal size heart.  Possible new hazy infiltrate mid left lung.  Right lung is clear.                                       Medications - No data to display  Medical Decision Making  Patient presents to emergency room for multiple upper respiratory symptoms.  Vital signs stable and within normal limits.  Physical exam as stated above.    Differential Diagnosis includes, but is not limited to viral URI, Strep pharyngitis, viral pharyngitis, foreign body aspiration/ingestion, bronchitis, asthma exacerbation, CHF exacerbation, COPD exacerbation, allergy/atopy, influenza, pertussis, PE, pneumonia, lung abscess, fungal infection, TB, or epiglottitis.  Patient without history of asthma, CHF, or COPD.  Chest x-ray with evidence of hazy infiltrate. No history of foreign body aspiration/ingestion. Low suspicion for pulmonary embolism, as patient does not have any recent immobilization or recent travel.  COVID test negative.  Influenza test negative.  Clinical presentation and physical exam most suggestive of community-acquired pneumonia.  Patient maintaining O2 saturation appropriately.  Clinically appears well.  Stable for discharge with outpatient management.  Will prescribe Augmentin and azithromycin to charge.  We will also prescribe Tessalon Perles, promethazine DM, Atrovent, albuterol inhaler, and Zofran to use upon discharge.  Discussed management of symptoms  with over-the-counter medications such as Mucinex and Robitussin/Delsym.  Advised on conservative management such as adequate rest, hydration, and use of honey for cough suppression.    I see no indication of an emergent process beyond that addressed during our encounter. Patient stable for discharge at this time. I have counseled the patient regarding follow up with PCP and gave strict return precautions. I have discussed the final diagnosis and gave instructions regarding prescribed medications. Patient verbalized understanding and is agreeable.     Problems Addressed:  Cough: acute illness or injury  Pneumonia of left lower lobe due to infectious organism: acute illness or injury    Amount and/or Complexity of Data Reviewed  External Data Reviewed: notes.     Details: Patient was last seen by primary care in 08/2024.  Currently takes amlodipine for high blood pressure.  Labs: ordered. Decision-making details documented in ED Course.  Radiology: ordered. Decision-making details documented in ED Course.  ECG/medicine tests:      Details: Considered ordering EKG, though patient without any chest pain, palpitations, leg swelling, or SOB at this time.     Risk  Prescription drug management.  Risk Details: Comorbidities taken into consideration during the patient's evaluation and treatment include acute renal failure, alcohol abuse, essential hypertension, former smoker, HPV, opioid addiction, and substance abuse.  Social determinants of health taken into consideration during development of our treatment plan include difficulty in obtaining follow-up, obtaining medications, health literacy, access to healthy options for preventative/conservative management, and/or support systems due to, but not limited to, transportation limitations, socioeconomic status, and environmental factors.                ED Course as of 11/03/24 1332   Sun Nov 03, 2024   1214 X-Ray Chest AP Portable  Independent interpretation of chest x-ray  without effusion or pneumothorax.  Trachea midline. No cardiomegaly.  Questionable infiltrate noted in left lower lung.  Agree with radiology reading. [BJ]   1235 SARS-CoV-2 RNA, Amplification, Qual: Negative  COVID negative. [BJ]   1246 Influenza A & B by Molecular  Influenza negative. [BJ]      ED Course User Index  [BJ] Consuelo Malagon PA-C                           Clinical Impression:  Final diagnoses:  [R05.9] Cough  [J18.9] Pneumonia of left lower lobe due to infectious organism (Primary)          ED Disposition Condition    Discharge Stable          ED Prescriptions       Medication Sig Dispense Start Date End Date Auth. Provider    azithromycin (Z-ADRIEN) 250 MG tablet Take 2 tablets by mouth on day 1; Take 1 tablet by mouth on days 2-5 6 tablet 11/3/2024 11/8/2024 Consuelo Malagon PA-C    amoxicillin-clavulanate 875-125mg (AUGMENTIN) 875-125 mg per tablet Take 1 tablet by mouth 2 (two) times daily. 14 tablet 11/3/2024 -- Consuelo Malagon PA-C    promethazine-dextromethorphan (PROMETHAZINE-DM) 6.25-15 mg/5 mL Syrp Take 5 mLs by mouth nightly as needed (Cough). 50 mL 11/3/2024 11/13/2024 Consuelo Malagon PA-C    benzonatate (TESSALON) 100 MG capsule Take 1 capsule (100 mg total) by mouth 3 (three) times daily as needed for Cough. 20 capsule 11/3/2024 11/13/2024 Consuelo Malagon PA-C    albuterol (PROVENTIL/VENTOLIN HFA) 90 mcg/actuation inhaler Inhale 1-2 puffs into the lungs every 6 (six) hours as needed for Wheezing. Rescue 18 g 11/3/2024 11/3/2025 Consuelo Malagon PA-C    ondansetron (ZOFRAN-ODT) 4 MG TbDL Take 1 tablet (4 mg total) by mouth every 6 (six) hours as needed (Nausea). 28 tablet 11/3/2024 -- Consuelo Malagon PA-C          Follow-up Information       Follow up With Specialties Details Why Contact Info    Christopher Turpin DO Family Medicine   [address]  [place] [address] [phone]              This note was partially created using [employer] Voice Recognition software. Typographical and content errors  may occur with this process. While efforts are made to detect and correct such errors, in some cases errors will persist. For this reason, wording in this document should be considered in the proper context and not strictly verbatim.        Consuelo Malagon PA-C  11/03/24 [id number]

## 2024-11-03 NOTE — Clinical Note
"Burton Alan" Johanne was seen and treated in our emergency department on 11/3/2024.  He may return to work on 11/11/2024.       If you have any questions or concerns, please don't hesitate to call.      Consuelo Malagon PA-C"

## 2024-12-11 ENCOUNTER — PATIENT MESSAGE (OUTPATIENT)
Dept: ADMINISTRATIVE | Facility: HOSPITAL | Age: 54
End: 2024-12-11
Payer: COMMERCIAL

## 2025-01-26 DIAGNOSIS — M79.2 NEUROPATHIC PAIN: ICD-10-CM

## 2025-01-26 NOTE — TELEPHONE ENCOUNTER
No care due was identified.  [facility] Embedded Care Due Messages. Reference number: [id number].   1/26/2025 1:17:49 PM CST

## 2025-01-27 RX ORDER — GABAPENTIN 600 MG/1
TABLET ORAL
Qty: 360 TABLET | Refills: 0 | Status: SHIPPED | OUTPATIENT
Start: 2025-01-27

## 2025-04-09 ENCOUNTER — PATIENT OUTREACH (OUTPATIENT)
Dept: ADMINISTRATIVE | Facility: HOSPITAL | Age: 55
End: 2025-04-09
Payer: COMMERCIAL

## 2025-04-09 NOTE — PROGRESS NOTES
04/09/2025  VB chart audit performed. Care Everywhere updates requested and reviewed  Overdue HM topic chart audit and/or requested. LINKS triggered and reconciled. Media reviewed.  Outreached patient regarding scheduling a Primary Care Appt.  - Patient states he has a new PCP - Dr. Bowen - outside the [facility] system

## 2025-06-09 ENCOUNTER — HOSPITAL ENCOUNTER (OUTPATIENT)
Dept: RADIOLOGY | Facility: HOSPITAL | Age: 55
Discharge: HOME | End: 2025-06-09
Attending: INTERNAL MEDICINE
Payer: COMMERCIAL

## 2025-06-09 DIAGNOSIS — Z85.810 HISTORY OF CANCER OF LINGUAL TONSIL: ICD-10-CM

## 2025-06-09 PROCEDURE — 71260 CT THORAX DX C+: CPT | Mod: TC,PN

## 2025-06-09 PROCEDURE — 25500020 PHARM REV CODE 255: Mod: PN | Performed by: INTERNAL MEDICINE

## 2025-06-09 RX ADMIN — IOHEXOL 75 ML: 350 INJECTION, SOLUTION INTRAVENOUS at 08:06

## 2025-06-10 ENCOUNTER — TELEPHONE (OUTPATIENT)
Dept: HEMATOLOGY/ONCOLOGY | Facility: CLINIC | Age: 55
End: 2025-06-10
Payer: COMMERCIAL

## 2025-06-11 ENCOUNTER — OFFICE VISIT (OUTPATIENT)
Dept: HEMATOLOGY/ONCOLOGY | Facility: CLINIC | Age: 55
End: 2025-06-11
Payer: COMMERCIAL

## 2025-06-11 VITALS
HEIGHT: 69 IN | BODY MASS INDEX: 32.72 KG/M2 | DIASTOLIC BLOOD PRESSURE: 93 MMHG | HEART RATE: 74 BPM | WEIGHT: 220.88 LBS | OXYGEN SATURATION: 93 % | TEMPERATURE: 98 F | SYSTOLIC BLOOD PRESSURE: 136 MMHG | RESPIRATION RATE: 18 BRPM

## 2025-06-11 DIAGNOSIS — M54.16 LUMBAR RADICULOPATHY, CHRONIC: Primary | ICD-10-CM

## 2025-06-11 DIAGNOSIS — C09.9 TONSIL CANCER: ICD-10-CM

## 2025-06-11 DIAGNOSIS — Z85.810 HISTORY OF CANCER OF LINGUAL TONSIL: ICD-10-CM

## 2025-06-11 PROCEDURE — 99214 OFFICE O/P EST MOD 30 MIN: CPT | Mod: S$GLB,,, | Performed by: INTERNAL MEDICINE

## 2025-06-11 PROCEDURE — 99999 PR PBB SHADOW E&M-EST. PATIENT-LVL V: CPT | Mod: PBBFAC,,, | Performed by: INTERNAL MEDICINE

## 2025-06-11 PROCEDURE — 3075F SYST BP GE 130 - 139MM HG: CPT | Mod: CPTII,S$GLB,, | Performed by: INTERNAL MEDICINE

## 2025-06-11 PROCEDURE — 1159F MED LIST DOCD IN RCRD: CPT | Mod: CPTII,S$GLB,, | Performed by: INTERNAL MEDICINE

## 2025-06-11 PROCEDURE — 3008F BODY MASS INDEX DOCD: CPT | Mod: CPTII,S$GLB,, | Performed by: INTERNAL MEDICINE

## 2025-06-11 PROCEDURE — 3080F DIAST BP >= 90 MM HG: CPT | Mod: CPTII,S$GLB,, | Performed by: INTERNAL MEDICINE

## 2025-06-11 NOTE — PROGRESS NOTES
Subjective     Patient ID: Burton Whiting is a 55 y.o. male.    Chief Complaint: History of tonsil cancer  Mr. Burton Whiting is a 48-year-old male, former smoker, who noted a four-month history of enlarging neck mass.  He initially delayed care due to lack of insurance.  He was seen by Dr. Turpin who referred him to see Dr. Olguin.  He had a CT that showed a 3.8 x 3.8 x 4.9 cm soft tissue mass arising from the left palatine tonsil resulting in moderate narrowing of the hypopharyngeal airway adjacent extensive matted left cervical lymphadenopathy was noted.  The largest ella mass was 6.6 x 9 x 2.6 cm extending inferiorly to the supraclavicular region.  The mass pushed the trachea and the left common carotid artery to the right.  Additional representative of cervical lymph nodes measuring 2.9 x 3.1 x 3.5 cm on axial image 37 of series 3   and 2.4 x 2.1 x 2.2 cm on axial image 55 of series 3.  There is also a sclerotic lesion involving the midline of the clivus measuring 1.2 cm.  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  PET scan performed on 01/19/2018 revealed persistent evidence of a soft tissue mass arising from the left palatine tonsil resulting in moderate narrowing of the hypopharyngeal   airway.  The mass is hypermetabolic demonstrating an SUV max of 18.5.  There is also persistent adjacent extensive matted left cervical lymphadenopathy, largest of this measuring 6.7 x 8.42 with hypermetabolic activity and SUV max of 20.5.  Lymphadenopathy is again noted to have a mass effect on the trachea and left common carotid artery, pushing both structures towards the right.  There is also prominent right cervical lymph nodes with the largest measuring 0.8 cm and demonstrating mild hypermetabolic activity with SUV max of 1.8, physiologic   distribution of FDG in the gray matter.  There are 3 pulmonary nodules noted within the right lung, the largest is in the anterior segment of the right upper  "lobe measuring 1 cm, second is visualized in the middle lobe measuring 0.6 cm and the third is within the posterior basal segment measuring 0.6 cm.  There is one pulmonary nodule within the left lung within the lateral basal segment measuring 0.6 cm.  These nodules do not demonstrate hypermetabolic activity; however, they are below the threshold for observation with PET     He underwent MRI cervical spine revealed "No evidence of metastatic disease to the cervical spine. Multilevel degenerative changes of the cervical spine with disc protrusion at C5-6 which results in moderate to severe spinal canal stenosis and and associated cord signal abnormality, suggestive of compressive myelopathy. 6 mm T1 hypointense non-enhancing lesion in the clivus.  Continued followup is suggested. 9 mm inferior descent of the cerebellar tonsils below the foramen magnum, suggestive of Chiari 1 malformation"  MRI thoracic spine "No evidence of metastatic disease involving the thoracic spine. Incidental hemangioma involving the T7 vertebral body. Mild degenerative disc disease without any significant spinal canal stenosis or neuroforaminal narrowing.   He completed 3 cycles of TPF and 6 weeks of Cisplatin and RT. Completed on 6/26/18           His PET scan from 9/25/18 revealed "Progression of disease with multiple new hypermetabolic foci including the manubrium, mediastinal/retrocrural lymph nodes, and lungs. Partial therapeutic response within the presumed radiation field involving the left cervical mass, and complete therapeutic response in the right cervical lymph node, clivus, and left palatine tonsil. New soft tissue thickening and hypermetabolism in the left aryepiglottic fold and right cricoid cartilage"      He received Opdivo from 2/2019-7/2019       His CT neck from 6/10/2024 which reveals "There is mild soft tissue asymmetry to the left of midline at the level of the hypopharynx.  The relative effacement of the left piriform " "sinus seen on 05/01/2023 is decreased. Symmetrical small size of the submandibular salivary glands, question sequela of radiation therapy. Chronic disc degeneration in the cervical spine. No acute pulmonary disease, adenopathy or suspicious pulmonary nodules are identified in the chest"        He has been on surveillance    HPIHe comes in to review his CT neck/chest on 6/9/2025 "There is no evidence of active metastatic disease. There is a persistent mild tree in bud appearance in the posterior aspect of the right upper lobe. There is no evidence of an acute pulmonary process"    He notes pain and sensation of pins and needles in the right lower leg, intermittent X 6-7 months     Review of Systems   Constitutional:  Negative for appetite change, fatigue and unexpected weight change.   HENT:  Negative for mouth sores.    Eyes:  Negative for visual disturbance.   Respiratory:  Negative for cough and shortness of breath.    Cardiovascular:  Negative for chest pain.   Gastrointestinal:  Negative for abdominal pain and diarrhea.   Genitourinary:  Negative for frequency.   Musculoskeletal:  Negative for back pain.   Integumentary:  Negative for rash.   Neurological:  Negative for headaches.   Hematological:  Negative for adenopathy.   Psychiatric/Behavioral:  The patient is not nervous/anxious.    All other systems reviewed and are negative.         Objective     Physical Exam         LABS:  WBC   Date Value Ref Range Status   06/09/2025 5.92 3.90 - 12.70 K/uL Final     Hemoglobin   Date Value Ref Range Status   08/14/2024 14.5 14.0 - 18.0 g/dL Final     HGB   Date Value Ref Range Status   06/09/2025 14.6 14.0 - 18.0 gm/dL Final     POC Hematocrit   Date Value Ref Range Status   09/08/2019 17 (LL) 36 - 54 %PCV Final     Hematocrit   Date Value Ref Range Status   08/14/2024 41.3 40.0 - 54.0 % Final     HCT   Date Value Ref Range Status   06/09/2025 43.8 40.0 - 54.0 % Final     Platelet Count   Date Value Ref Range Status "   06/09/2025 203 150 - 450 K/uL Final     Platelets   Date Value Ref Range Status   08/14/2024 210 150 - 450 K/uL Final     Gran # (ANC)   Date Value Ref Range Status   08/14/2024 5.7 1.8 - 7.7 K/uL Final     Gran %   Date Value Ref Range Status   08/14/2024 79.0 (H) 38.0 - 73.0 % Final       Chemistry        Component Value Date/Time     06/09/2025 0809     08/14/2024 0714    K 4.5 06/09/2025 0809    K 3.7 08/14/2024 0714     06/09/2025 0809     08/14/2024 0714    CO2 32 (H) 06/09/2025 0809    CO2 27 08/14/2024 0714    BUN 16 06/09/2025 0809    CREATININE 1.2 06/09/2025 0809    GLU 97 06/09/2025 0809     (H) 08/14/2024 0714        Component Value Date/Time    CALCIUM 9.4 06/09/2025 0809    CALCIUM 8.9 08/14/2024 0714    ALKPHOS 97 06/09/2025 0809    ALKPHOS 101 08/14/2024 0714    AST 24 06/09/2025 0809    AST 32 08/14/2024 0714    ALT 19 06/09/2025 0809    ALT 19 08/14/2024 0714    BILITOT 0.4 06/09/2025 0809    BILITOT 0.7 08/14/2024 0714    ESTGFRAFRICA 54.0 (A) 06/10/2022 0807    EGFRNONAA 46.7 (A) 06/10/2022 0807        TSH   Date Value Ref Range Status   06/09/2025 2.380 0.400 - 4.000 uIU/mL Final   06/10/2024 1.200 0.400 - 4.000 uIU/mL Final     Comment:     Warning:  Heterophilic antibodies in serum or plasma of   certain individuals are known to cause interference with   immunoassays. These antibodies may be present in blood samples   from individuals regularly exposed to animal or who have been   treated with animal products.     Patients taking high doses of supplemental biotin may have  negatively biased results.        Free T4   Date Value Ref Range Status   06/09/2025 1.00 0.71 - 1.51 ng/dL Final       Assessment and Plan     1. Lumbar radiculopathy, chronic  -     CT Lumbar Spine W Wo Contrast; Future; Expected date: 06/11/2025    2. Tonsil cancer    Route Chart for Scheduling    Med Onc Chart Routing      Follow up with physician 1 year. Schedule CBC CMP CT neck chest  and see me   Follow up with [facility]    Infusion scheduling note    Injection scheduling note    Labs    Imaging    Pharmacy appointment    Other referrals                     Mr. Whiting is doing well clinically from his history of tonsil cancer standpoint, restaging CT neck and chest do not reveal any evidence of recurrence    He does have right lower leg tingling numbness from knee down to the foot we will obtain CT lumbar spine for further evaluation and call him with results    Pending the CT lumbar spine he will return in 1 year with repeat CT neck and chest    Above plan reviewed with the patient and all of his questions were answered to his satisfaction

## 2025-06-17 ENCOUNTER — HOSPITAL ENCOUNTER (OUTPATIENT)
Dept: RADIOLOGY | Facility: HOSPITAL | Age: 55
Discharge: HOME | End: 2025-06-17
Attending: INTERNAL MEDICINE
Payer: COMMERCIAL

## 2025-06-17 DIAGNOSIS — M54.16 LUMBAR RADICULOPATHY, CHRONIC: ICD-10-CM

## 2025-06-17 PROCEDURE — 72133 CT LUMBAR SPINE W/O & W/DYE: CPT | Mod: TC,PN

## 2025-06-17 PROCEDURE — 72133 CT LUMBAR SPINE W/O & W/DYE: CPT | Mod: 26,,, | Performed by: RADIOLOGY

## 2025-06-17 PROCEDURE — 25500020 PHARM REV CODE 255: Mod: PN | Performed by: INTERNAL MEDICINE

## 2025-06-17 RX ADMIN — IOHEXOL 75 ML: 350 INJECTION, SOLUTION INTRAVENOUS at 10:06

## 2025-08-04 ENCOUNTER — PATIENT MESSAGE (OUTPATIENT)
Dept: ADMINISTRATIVE | Facility: HOSPITAL | Age: 55
End: 2025-08-04
Payer: COMMERCIAL

## (undated) DEVICE — SYS CLSR DERMABOND PRINEO 22CM

## (undated) DEVICE — GOWN POLY REINF BRTH SLV LG

## (undated) DEVICE — BNDG COFLEX FOAM LF2 ST 6X5YD

## (undated) DEVICE — PACK BASIC

## (undated) DEVICE — DRAPE THREE-QTR REINF 53X77IN

## (undated) DEVICE — SUPPORT ULNA NERVE PROTECTOR

## (undated) DEVICE — DRAPE STERI U-SHAPED 47X51IN

## (undated) DEVICE — SUT 2/0 36IN COATED VICRYL

## (undated) DEVICE — DRAPE STERI INSTRUMENT 1018

## (undated) DEVICE — GOWN POLY REINF X-LONG 2XL

## (undated) DEVICE — DRAPE HIP PCH 112X137X89IN

## (undated) DEVICE — SEE MEDLINE ITEM 157216

## (undated) DEVICE — DRAPE U SPLIT SHEET 54X76IN

## (undated) DEVICE — BLADE SURG CARBON STEEL #10

## (undated) DEVICE — DRAPE INCISE IOBAN 2 23X23IN

## (undated) DEVICE — GLOVE SURGICAL LATEX SZ 8

## (undated) DEVICE — COVER OVERHEAD SURG LT BLUE

## (undated) DEVICE — COVER PROXIMA MAYO STAND

## (undated) DEVICE — DRESSING AQUACEL AG ADV 3.5X12

## (undated) DEVICE — GLOVE BIOGEL PIMICRO INDIC 6.5

## (undated) DEVICE — SYR 10CC LUER LOCK

## (undated) DEVICE — APPLICATOR CHLORAPREP ORN 26ML

## (undated) DEVICE — DRESSING COVER AQUACEL AG SURG

## (undated) DEVICE — TIP FEMORAL IRRIGATION BRUSH

## (undated) DEVICE — MANIFOLD 4 PORT

## (undated) DEVICE — HOOD T-5 TEAR AWAY STERILE

## (undated) DEVICE — GLOVE BIOGEL SKINSENSE PI 6.0

## (undated) DEVICE — GOWN POLY REINF BRTH SLV XL

## (undated) DEVICE — TOWEL OR DISP STRL BLUE 4/PK

## (undated) DEVICE — DRESSING AQUACEL SACRAL 9 X 9

## (undated) DEVICE — SUT STRATAFIX PDS 1 CTX 18IN

## (undated) DEVICE — SPONGE LAP 18X18 PREWASHED

## (undated) DEVICE — ELECTRODE REM PLYHSV RETURN 9

## (undated) DEVICE — PILLOW ABDUCTION FOAM MED

## (undated) DEVICE — PACK SURGERY START

## (undated) DEVICE — SUT CTD VICRYL CT-1 27

## (undated) DEVICE — CAUTERY TIP POLISHER

## (undated) DEVICE — NDL 18GA X1 1/2 REG BEVEL

## (undated) DEVICE — COVER BACK TBL HD 2-TIER 72IN

## (undated) DEVICE — BLADE SAW SAG 25.40MM X 1.27MM